# Patient Record
Sex: MALE | Race: WHITE | NOT HISPANIC OR LATINO | Employment: OTHER | URBAN - METROPOLITAN AREA
[De-identification: names, ages, dates, MRNs, and addresses within clinical notes are randomized per-mention and may not be internally consistent; named-entity substitution may affect disease eponyms.]

---

## 2021-06-07 ENCOUNTER — TRANSFERRED RECORDS (OUTPATIENT)
Dept: HEALTH INFORMATION MANAGEMENT | Facility: CLINIC | Age: 66
End: 2021-06-07

## 2021-06-30 ENCOUNTER — TRANSFERRED RECORDS (OUTPATIENT)
Dept: HEALTH INFORMATION MANAGEMENT | Facility: CLINIC | Age: 66
End: 2021-06-30

## 2021-09-13 ENCOUNTER — TRANSFERRED RECORDS (OUTPATIENT)
Dept: HEALTH INFORMATION MANAGEMENT | Facility: CLINIC | Age: 66
End: 2021-09-13

## 2021-09-28 ENCOUNTER — TRANSFERRED RECORDS (OUTPATIENT)
Dept: HEALTH INFORMATION MANAGEMENT | Facility: CLINIC | Age: 66
End: 2021-09-28

## 2021-09-28 LAB — EJECTION FRACTION: 65 %

## 2021-09-29 ENCOUNTER — MEDICAL CORRESPONDENCE (OUTPATIENT)
Dept: HEALTH INFORMATION MANAGEMENT | Facility: CLINIC | Age: 66
End: 2021-09-29

## 2021-11-12 ENCOUNTER — TRANSFERRED RECORDS (OUTPATIENT)
Dept: HEALTH INFORMATION MANAGEMENT | Facility: CLINIC | Age: 66
End: 2021-11-12

## 2022-06-27 ENCOUNTER — HOSPITAL ENCOUNTER (OUTPATIENT)
Facility: HOSPITAL | Age: 67
End: 2022-06-27
Attending: UROLOGY | Admitting: UROLOGY
Payer: COMMERCIAL

## 2022-07-08 ENCOUNTER — OFFICE VISIT (OUTPATIENT)
Dept: CARDIOLOGY | Facility: CLINIC | Age: 67
End: 2022-07-08
Payer: COMMERCIAL

## 2022-07-08 VITALS
DIASTOLIC BLOOD PRESSURE: 62 MMHG | SYSTOLIC BLOOD PRESSURE: 104 MMHG | WEIGHT: 266.3 LBS | OXYGEN SATURATION: 99 % | HEIGHT: 72 IN | BODY MASS INDEX: 36.07 KG/M2 | HEART RATE: 53 BPM

## 2022-07-08 DIAGNOSIS — I25.10 CORONARY ARTERY DISEASE DUE TO LIPID RICH PLAQUE: ICD-10-CM

## 2022-07-08 DIAGNOSIS — I35.0 NONRHEUMATIC AORTIC VALVE STENOSIS: Primary | ICD-10-CM

## 2022-07-08 DIAGNOSIS — I25.83 CORONARY ARTERY DISEASE DUE TO LIPID RICH PLAQUE: ICD-10-CM

## 2022-07-08 DIAGNOSIS — I87.2 VENOUS REFLUX: ICD-10-CM

## 2022-07-08 PROCEDURE — 99205 OFFICE O/P NEW HI 60 MIN: CPT | Performed by: INTERNAL MEDICINE

## 2022-07-08 RX ORDER — BISOPROLOL FUMARATE 5 MG/1
2.5 TABLET, FILM COATED ORAL DAILY
Status: ON HOLD | COMMUNITY
Start: 2022-05-04 | End: 2022-11-08

## 2022-07-08 RX ORDER — ASPIRIN 81 MG/1
81 TABLET ORAL DAILY
Status: ON HOLD | COMMUNITY
End: 2022-11-08

## 2022-07-08 RX ORDER — ATORVASTATIN CALCIUM 20 MG/1
20 TABLET, FILM COATED ORAL DAILY
Status: ON HOLD | COMMUNITY
Start: 2022-05-04 | End: 2022-11-08

## 2022-07-08 RX ORDER — CALCIUM CARBONATE/VITAMIN D3 500-10/5ML
1 LIQUID (ML) ORAL DAILY
COMMUNITY
End: 2023-06-26

## 2022-07-08 RX ORDER — CELECOXIB 200 MG/1
CAPSULE ORAL
COMMUNITY
End: 2022-09-16

## 2022-07-08 RX ORDER — BENAZEPRIL HYDROCHLORIDE 5 MG/1
5 TABLET ORAL DAILY
Status: ON HOLD | COMMUNITY
End: 2022-11-08

## 2022-07-08 RX ORDER — GABAPENTIN 800 MG/1
TABLET ORAL PRN
COMMUNITY
Start: 2022-05-08 | End: 2022-09-16

## 2022-07-08 RX ORDER — MULTIVIT-MIN/IRON/FOLIC ACID/K 18-600-40
500 CAPSULE ORAL DAILY
COMMUNITY

## 2022-07-08 RX ORDER — TRAMADOL HYDROCHLORIDE 200 MG/1
200 TABLET, EXTENDED RELEASE ORAL PRN
Status: ON HOLD | COMMUNITY
Start: 2022-03-11 | End: 2022-11-08

## 2022-07-08 RX ORDER — CHLORAL HYDRATE 500 MG
2 CAPSULE ORAL DAILY
Status: ON HOLD | COMMUNITY
End: 2022-11-08

## 2022-07-08 RX ORDER — CHOLECALCIFEROL (VITAMIN D3) 50 MCG
1 TABLET ORAL EVERY OTHER DAY
COMMUNITY

## 2022-07-08 RX ORDER — TRAMADOL HYDROCHLORIDE 50 MG/1
100-200 TABLET ORAL EVERY 6 HOURS PRN
Status: ON HOLD | COMMUNITY
Start: 2022-03-11 | End: 2022-11-08

## 2022-07-08 RX ORDER — FOLIC ACID 1 MG/1
1000 TABLET ORAL 2 TIMES DAILY
COMMUNITY

## 2022-07-08 RX ORDER — TAMSULOSIN HYDROCHLORIDE 0.4 MG/1
0.8 CAPSULE ORAL EVERY EVENING
COMMUNITY
Start: 2022-02-18

## 2022-07-08 RX ORDER — FLUTICASONE PROPIONATE 50 MCG
SPRAY, SUSPENSION (ML) NASAL
COMMUNITY
Start: 2021-09-03 | End: 2022-09-16

## 2022-07-08 NOTE — PROGRESS NOTES
Cardiology Consultation      Richar Davis MRN# [id number]   YOB: 1955 Age: 66 year old   Date of Visit 07/08/2022     Reason for consult:  Aortic stenosis           Assessment and Plan:     1. Severe aortic stenosis with valve area 0.9 cm  on outside transesophageal echocardiogram, concomitant three-vessel coronary artery disease with occluded right coronary artery and 60 to 70% stenosis in the LAD and circumflex system on cardiac catheterization    Currently without any high risk symptoms but should have evaluation and definitive treatment.    Recommended he visit with our cardiac surgeons for discussion of valve replacement and bypass.    Patient has had vein ablation in [place], he may not have greater saphenous veins available for harvest.  Would need vein mapping regardless.    60 minutes spent with review of past medical records, discussion with patient and his wife and post visit charting      This note was transcribed using electronic voice recognition software, typographical errors may be present.                Chief Complaint:   No chief complaint on file.           History of Present Illness:   This patient is a 66 year old male accompanied by his wife.  They have just moved here from [place].  In summary he has severe aortic stenosis with aortic valve area 0.9 cm .  He has had transesophageal echocardiogram.  He also had cardiac catheterization that showed occluded right coronary artery with some collateralization.  Also 60 to 70% LAD and circumflex disease.  He has records of his catheterization report physically with him.    He denies any significant symptoms at this time but was recommended to follow-up in [place] with cardiology to further that discussion and timing.    We discussed that with his borderline diabetes, young age, multivessel coronary artery disease and aortic valve stenosis that surgical intervention is our standard of care and likely would have best long-term  survival and benefit for him.    He also has venous insufficiency and I do note that outpatient vein clinic performed what sounds like bilateral greater saphenous vein ablation so this may not be available for harvest in the future.           Physical Exam:     Vitals: /62 (BP Location: Right arm, Patient Position: Sitting, Cuff Size: Adult Regular)   Pulse 53   Ht 1.829 m (6')   Wt 120.8 kg (266 lb 4.8 oz)   SpO2 99%   BMI 36.12 kg/m    Constitutional:  cooperative, alert and oriented, well developed, well nourished, in no acute distress        Skin:  warm and dry to the touch, no apparent skin lesions or masses noted        Head:  normocephalic, no masses or lesions        Eyes:  pupils equal and round, conjunctivae and lids unremarkable, sclera white, no xanthalasma, EOMS intact, no nystagmus        ENT:  no pallor or cyanosis        Neck:  JVP normal        Chest:  normal symmetry        Cardiac: regular rhythm       grade 2;late systolic murmur          Abdomen:  BS normoactive        Extremities and Back:  no deformities, clubbing, cyanosis, erythema observed;no edema        Neurological:  no gross motor deficits;affect appropriate                      Past Medical History:   I have reviewed this patient's past medical history  History reviewed. No pertinent past medical history.          Past Surgical History:   I have reviewed this patient's past surgical history  No past surgical history on file.            Social History:   I have reviewed this patient's social history  Social History     Tobacco Use     Smoking status: Not on file     Smokeless tobacco: Not on file   Substance Use Topics     Alcohol use: Not on file             Family History:   I have reviewed this patient's family history  History reviewed. No pertinent family history.          Allergies:   No Known Allergies          Medications:   I have reviewed this patient's current medications  Current Outpatient Medications    Medication Sig Dispense Refill     Ascorbic Acid (VITAMIN C) 500 MG CAPS Take 500 mg by mouth daily       aspirin 81 MG EC tablet Take 81 mg by mouth daily       atorvastatin (LIPITOR) 20 MG tablet Take 20 mg by mouth daily       benazepril (LOTENSIN) 5 MG tablet every 24 hours       bisoprolol (ZEBETA) 5 MG tablet Take 2.5 mg by mouth daily       celecoxib (CELEBREX) 200 MG capsule celecoxib 200 mg capsule       fish oil-omega-3 fatty acids 1000 MG capsule Take 2 g by mouth daily       fluticasone (FLONASE) 50 MCG/ACT nasal spray fluticasone propionate 50 mcg/actuation nasal spray,suspension   SPRAY 2 SPRAYS INTO EACH NOSTRIL EVERY DAY       folic acid (FOLVITE) 400 MCG tablet Take 400 mcg by mouth daily       gabapentin (NEURONTIN) 800 MG tablet TAKE 2-3 TABS BY MOUTH EVERY NIGHT AS NEEDED.       Magnesium Oxide 140 MG CAPS Take 133 mg by mouth daily       metFORMIN (GLUCOPHAGE) 500 MG tablet Take 500 mg by mouth       Multiple Vitamins-Minerals (MENS 50+ MULTI VITAMIN/MIN PO) Take by mouth daily       tamsulosin (FLOMAX) 0.4 MG capsule Take 0.8 mg by mouth       tiZANidine (ZANAFLEX) 4 MG tablet tizanidine 4 mg tablet       traMADol (ULTRAM) 50 MG tablet tramadol 50 mg tablet       traMADol (ULTRAM-ER) 200 MG 24 hr tablet tramadol  mg tablet,extended release 24 hr   TAKE 1 TABLET BY MOUTH EVERY DAY       vitamin D3 (CHOLECALCIFEROL) 50 mcg (2000 units) tablet Take 1 tablet by mouth daily                 Review of Systems:       Review of Systems:  Skin:        Eyes:       ENT:       Respiratory:  Negative    Cardiovascular:  Negative    Gastroenterology:      Genitourinary:       Musculoskeletal:       Neurologic:       Psychiatric:       Heme/Lymph/Imm:       Endocrine:                          Data:   All available laboratory data reviewed  No results found for: CHOL  No results found for: HDL  No results found for: LDL  No results found for: TRIG  No results found for: CHOLHDLRATIO  No results found  for: TSH  Last Basic Metabolic Panel:  No results found for: NA   No results found for: POTASSIUM  No results found for: CHLORIDE  No results found for: HALEY  No results found for: CO2  No results found for: BUN  No results found for: CR  No results found for: GLC  No results found for: WBC  No results found for: RBC  No results found for: HGB  No results found for: HCT  No results found for: MCV  No results found for: MCH  No results found for: MCHC  No results found for: RDW  No results found for: PLT

## 2022-07-08 NOTE — LETTER
7/8/2022    Cory Valles MD  [facility] [address]  [facility] [address]    RE: Richar Davis       Dear Colleague,     I had the pleasure of seeing Richar Davis in the [facility].  Cardiology Consultation      Richar Davis MRN# [id number]   YOB: 1955 Age: 66 year old   Date of Visit 07/08/2022     Reason for consult:  Aortic stenosis           Assessment and Plan:     1. Severe aortic stenosis with valve area 0.9 cm  on outside transesophageal echocardiogram, concomitant three-vessel coronary artery disease with occluded right coronary artery and 60 to 70% stenosis in the LAD and circumflex system on cardiac catheterization    Currently without any high risk symptoms but should have evaluation and definitive treatment.    Recommended he visit with our cardiac surgeons for discussion of valve replacement and bypass.    Patient has had vein ablation in [place], he may not have greater saphenous veins available for harvest.  Would need vein mapping regardless.    60 minutes spent with review of past medical records, discussion with patient and his wife and post visit charting      This note was transcribed using electronic voice recognition software, typographical errors may be present.                Chief Complaint:   No chief complaint on file.           History of Present Illness:   This patient is a 66 year old male accompanied by his wife.  They have just moved here from [place].  In summary he has severe aortic stenosis with aortic valve area 0.9 cm .  He has had transesophageal echocardiogram.  He also had cardiac catheterization that showed occluded right coronary artery with some collateralization.  Also 60 to 70% LAD and circumflex disease.  He has records of his catheterization report physically with him.    He denies any significant symptoms at this time but was recommended to follow-up in [place] with cardiology to further that  discussion and timing.    We discussed that with his borderline diabetes, young age, multivessel coronary artery disease and aortic valve stenosis that surgical intervention is our standard of care and likely would have best long-term survival and benefit for him.    He also has venous insufficiency and I do note that outpatient vein clinic performed what sounds like bilateral greater saphenous vein ablation so this may not be available for harvest in the future.           Physical Exam:     Vitals: /62 (BP Location: Right arm, Patient Position: Sitting, Cuff Size: Adult Regular)   Pulse 53   Ht 1.829 m (6')   Wt 120.8 kg (266 lb 4.8 oz)   SpO2 99%   BMI 36.12 kg/m    Constitutional:  cooperative, alert and oriented, well developed, well nourished, in no acute distress        Skin:  warm and dry to the touch, no apparent skin lesions or masses noted        Head:  normocephalic, no masses or lesions        Eyes:  pupils equal and round, conjunctivae and lids unremarkable, sclera white, no xanthalasma, EOMS intact, no nystagmus        ENT:  no pallor or cyanosis        Neck:  JVP normal        Chest:  normal symmetry        Cardiac: regular rhythm       grade 2;late systolic murmur          Abdomen:  BS normoactive        Extremities and Back:  no deformities, clubbing, cyanosis, erythema observed;no edema        Neurological:  no gross motor deficits;affect appropriate                      Past Medical History:   I have reviewed this patient's past medical history  History reviewed. No pertinent past medical history.          Past Surgical History:   I have reviewed this patient's past surgical history  No past surgical history on file.            Social History:   I have reviewed this patient's social history  Social History     Tobacco Use     Smoking status: Not on file     Smokeless tobacco: Not on file   Substance Use Topics     Alcohol use: Not on file             Family History:   I have reviewed  this patient's family history  History reviewed. No pertinent family history.          Allergies:   No Known Allergies          Medications:   I have reviewed this patient's current medications  Current Outpatient Medications   Medication Sig Dispense Refill     Ascorbic Acid (VITAMIN C) 500 MG CAPS Take 500 mg by mouth daily       aspirin 81 MG EC tablet Take 81 mg by mouth daily       atorvastatin (LIPITOR) 20 MG tablet Take 20 mg by mouth daily       benazepril (LOTENSIN) 5 MG tablet every 24 hours       bisoprolol (ZEBETA) 5 MG tablet Take 2.5 mg by mouth daily       celecoxib (CELEBREX) 200 MG capsule celecoxib 200 mg capsule       fish oil-omega-3 fatty acids 1000 MG capsule Take 2 g by mouth daily       fluticasone (FLONASE) 50 MCG/ACT nasal spray fluticasone propionate 50 mcg/actuation nasal spray,suspension   SPRAY 2 SPRAYS INTO EACH NOSTRIL EVERY DAY       folic acid (FOLVITE) 400 MCG tablet Take 400 mcg by mouth daily       gabapentin (NEURONTIN) 800 MG tablet TAKE 2-3 TABS BY MOUTH EVERY NIGHT AS NEEDED.       Magnesium Oxide 140 MG CAPS Take 133 mg by mouth daily       metFORMIN (GLUCOPHAGE) 500 MG tablet Take 500 mg by mouth       Multiple Vitamins-Minerals (MENS 50+ MULTI VITAMIN/MIN PO) Take by mouth daily       tamsulosin (FLOMAX) 0.4 MG capsule Take 0.8 mg by mouth       tiZANidine (ZANAFLEX) 4 MG tablet tizanidine 4 mg tablet       traMADol (ULTRAM) 50 MG tablet tramadol 50 mg tablet       traMADol (ULTRAM-ER) 200 MG 24 hr tablet tramadol  mg tablet,extended release 24 hr   TAKE 1 TABLET BY MOUTH EVERY DAY       vitamin D3 (CHOLECALCIFEROL) 50 mcg (2000 units) tablet Take 1 tablet by mouth daily                 Review of Systems:       Review of Systems:  Skin:        Eyes:       ENT:       Respiratory:  Negative    Cardiovascular:  Negative    Gastroenterology:      Genitourinary:       Musculoskeletal:       Neurologic:       Psychiatric:       Heme/Lymph/Imm:       Endocrine:                           Data:   All available laboratory data reviewed  No results found for: CHOL  No results found for: HDL  No results found for: LDL  No results found for: TRIG  No results found for: CHOLHDLRATIO  No results found for: TSH  Last Basic Metabolic Panel:  No results found for: NA   No results found for: POTASSIUM  No results found for: CHLORIDE  No results found for: HALEY  No results found for: CO2  No results found for: BUN  No results found for: CR  No results found for: GLC  No results found for: WBC  No results found for: RBC  No results found for: HGB  No results found for: HCT  No results found for: MCV  No results found for: MCH  No results found for: MCHC  No results found for: RDW  No results found for: PLT        Thank you for allowing me to participate in the care of your patient.      Sincerely,     Sebastian Mejias MD     [facility]  cc:   No referring provider defined for this encounter.

## 2022-07-11 ENCOUNTER — TELEPHONE (OUTPATIENT)
Dept: CARDIOLOGY | Facility: CLINIC | Age: 67
End: 2022-07-11

## 2022-07-11 NOTE — TELEPHONE ENCOUNTER
[facility] never returned my call and are not answering their phone.   Sent fax request to [facility] requesting images be pushed.     Called the [facility] to request images to be pushed. Left message with callback number

## 2022-07-15 ENCOUNTER — TELEPHONE (OUTPATIENT)
Dept: CARDIOLOGY | Facility: CLINIC | Age: 67
End: 2022-07-15

## 2022-07-15 NOTE — TELEPHONE ENCOUNTER
[place] with the [facility] concerning records request sent on 7/11 & 7/12. As of this writing I have been unable to speak with anybody about if these faxes were received. Will continue to try to contact.

## 2022-07-18 ENCOUNTER — OFFICE VISIT (OUTPATIENT)
Dept: CARDIOLOGY | Facility: CLINIC | Age: 67
End: 2022-07-18
Attending: INTERNAL MEDICINE
Payer: COMMERCIAL

## 2022-07-18 VITALS
WEIGHT: 263.2 LBS | SYSTOLIC BLOOD PRESSURE: 121 MMHG | HEART RATE: 67 BPM | BODY MASS INDEX: 35.65 KG/M2 | OXYGEN SATURATION: 97 % | HEIGHT: 72 IN | DIASTOLIC BLOOD PRESSURE: 78 MMHG

## 2022-07-18 DIAGNOSIS — I35.0 NONRHEUMATIC AORTIC VALVE STENOSIS: ICD-10-CM

## 2022-07-18 DIAGNOSIS — I25.10 CORONARY ARTERY DISEASE DUE TO LIPID RICH PLAQUE: ICD-10-CM

## 2022-07-18 DIAGNOSIS — I25.83 CORONARY ARTERY DISEASE DUE TO LIPID RICH PLAQUE: ICD-10-CM

## 2022-07-18 PROCEDURE — 99205 OFFICE O/P NEW HI 60 MIN: CPT | Performed by: SURGERY

## 2022-07-18 NOTE — PROGRESS NOTES
I met patient and his wife in consultation with Dr. Pierre. Awaiting Coronary Angiogram images for [place]. Patient needs AVR/CABG. Pre op education intitiated and literature given. Plan on 4-6 weeks after pat's wife has undergone hip replacement and see how her recovery is going. Will follow along.

## 2022-07-20 NOTE — PROGRESS NOTES
Service Date: 07/18/2022    REFERRING CARDIOLOGIST:  Sebastian Mejias MD    REASON FOR CONSULTATION:  Evaluation for severe aortic stenosis and severe 3-vessel coronary artery disease.    HISTORY OF PRESENT ILLNESS:  Mr. Davis is a very pleasant 66-year-old gentleman who recently moved from [place] to the [facility].  In [place], he was diagnosed with severe aortic stenosis and also severe 3-vessel coronary artery disease.  He has also had a vein ablation in both of his legs for varicose veins.  He recently established care in our system and saw Dr. Sebastian Mejias in the Cardiology Clinic.  Dr. Mejias referred the patient to me for surgical evaluation for AVR, CABG.    PAST MEDICAL HISTORY:  Significant for a newly diagnosed triple vessel coronary artery disease and aortic stenosis, diabetes, non-insulin dependent.    PAST SURGICAL HISTORY:  Includes bilateral vein stripping from both legs.    ALLERGIES:  No known drug allergies.    CURRENT OUTPATIENT MEDICATIONS:  Reviewed in Epic chart.    FAMILY HISTORY:  Noncontributory.    SOCIAL HISTORY:  Denies any smoking.  He does chew tobacco.  No significant alcohol use.    REVIEW OF SYSTEMS:  As per HPI.  All other 10-point review of systems are completed and were otherwise negative unless stated above.  The patient denies having any shortness of breath or chest pain.    PHYSICAL EXAMINATION:    VITAL SIGNS:  All vital signs are stable.  GENERAL:  He appears well, in no acute distress.  HEENT:  Within normal limits.  NECK:  Supple, no lymphadenopathy.  CARDIOVASCULAR:  Regular rate and rhythm, normal S1, S2.  Grade 3/6 systolic murmur at the right upper sternal border.  LUNGS:  Clear bilaterally.  ABDOMEN:  Soft, nontender, nondistended.  EXTREMITIES:  Negative for edema, cyanosis or clubbing.  NEUROLOGIC:  He is A and O x3.  No focal deficits.    Of note, the patient is ambidextrous but mostly uses his left arm. I explained to him that we might have to use a radial artery from  either arm, most likely the right arm, if he does not have enough saphenous vein conduit.    PERTINENT LABORATORY STUDIES:  Outside hospital transthoracic echo dated 10/19/2021 demonstrates severe aortic stenosis with preserved systolic function with no other significant valvular disease.  I have reviewed the actual echocardiographic images.  He did have a left heart cath from an outside hospital in [place] as well dated 11/12/2021.  Unfortunately, images were not within our system and we are currently actively trying to obtain them and I will review them when we actually have images.  Dictation suggests that he does have a 60% LAD lesion, 70% circumflex lesion and a chronic total occlusion of the RCA.    IMPRESSION AND PLAN:  Mr. Davis is a 66-year-old gentleman with severe aortic stenosis and severe 3-vessel coronary artery disease.  We are still waiting for the actual cath images for me to review and confirm.  However, my recommendation at this point is aortic valve replacement and concomitant coronary artery bypass grafting.  We will have to get a lower extremity vein mapping study to see if he has enough saphenous vein conduit for grafting, and if not, we will have to scan his arms for radial artery for conduit used.  I explained to him his diagnoses in detail and the reason for recommending aortic valve replacement and coronary artery bypass grafting.  I went over the risks and benefits of the operation and the potential complications associated with the surgery.  These complications include but are not strictly limited to infection, bleeding, stroke, postop MI, postop arrhythmias, pulmonary or renal complications.  I think overall he is an excellent surgical candidate and I quoted an operative mortality risk of 1%.  The patient understands and agrees to proceed.  We briefly discussed the prosthetic valve choice, mechanical versus bioprosthetic, along with the ACC/AHA guidelines and he is considering a  bioprosthetic valve at this time, but will let me know for sure the day of surgery what he decides.  He also mentions that his wife does need hip surgery in the next couple of weeks and he would like to wait for 4-6 weeks after she recovers from the hip surgery to proceed with heart surgery.  I think this is also reasonable as well given that his symptoms appear to be minimal to none.  We will wait for the coronary angiogram images to be sent over and lower extremity vein mapping studies and hope to schedule him for AVR, CABG within the next 2 months.  It was a pleasure meeting Mr. Davis today.  Thank you very much for this referral.      Bennie Pierre MD        D: 2022   T: 2022   MT: daniela    Name:     KANNAN DAVIS  MRN:      [id number]        Account:      [id number]   :      1955           Service Date: 2022       Document: [id number]

## 2022-07-20 NOTE — PROGRESS NOTES
CV Surgery    Patient seen, clinic note dictated #[id number]. Awaiting OSH cath images to be sent to us. Patient also needs LE dopplers to assess his saphenous veins for conduit use.      Bennie Pierre MD

## 2022-07-21 ENCOUNTER — TELEPHONE (OUTPATIENT)
Dept: CARDIOLOGY | Facility: CLINIC | Age: 67
End: 2022-07-21

## 2022-07-21 NOTE — TELEPHONE ENCOUNTER
LVM with [facility] Medical Records trying to obtain coronary angio images for pt. Left name and callback number. Will continue to try.

## 2022-07-22 ENCOUNTER — TELEPHONE (OUTPATIENT)
Dept: CARDIOLOGY | Facility: CLINIC | Age: 67
End: 2022-07-22

## 2022-07-22 NOTE — TELEPHONE ENCOUNTER
Image preview     Cardiothoracic Surgery Division     URGENT REQUEST      CONFIDENTIAL FAX NUMBER:  [phone]                                                                                                                                                                                                 Date: 3/29/2022          To:  [facility] Medical Records                                                                       FAX:   [phone]          Patient:  Richar Davis                                                 : 1955          Please push to [facility] : Recent Coronary Angiogram images          Or mail to:     [address], [facility] [address]     [facility] [address]     Attn: Caitlyn     * Please call if this information is no longer available.              Comments:           FROM: Caitlyn Anders     CV Surgery, [facility]                       Phone: [phone]                            Fax:[phone]          The confidential information accompanying this transmission contains protected health information under state and federal law and is legally privileged. This information is intended only for the use of the individual or entity named above and may be used only for carrying out treatment, payment or other healthcare operations. The recipient or person responsible for delivering this information is prohibited by law from disclosing this information without proper authorization to any other party, unless required to do so by law or regulation. If you are not the intended recipient, you are hereby notified that any review, dissemination, distribution, or copying of this message is strictly prohibited. If you have received this communication in error, please destroy the materials and contact us immediately by calling the department number listed above. No response indicates that the information was received by the appropriate authorized party.      Form #[id number], Aug 2009

## 2022-07-28 ENCOUNTER — TELEPHONE (OUTPATIENT)
Dept: CARDIOLOGY | Facility: CLINIC | Age: 67
End: 2022-07-28

## 2022-07-28 NOTE — TELEPHONE ENCOUNTER
Sent fax request for images 7/22. Called [facility] on 7/25 after receiving faxed medical records to make sure they were sending image disc also. [facility] confirmed the were.    Spoke again today (3rd outreach) with medical records. First was told they never received fax request, then found it, then told I didn't request images. After stating it clearly says images on fax request, medical records response was 'oh sorry we will send the disk now'. Also called the cardiology (Dr Cesar Blank) clinic there to speak with Nurse Real for help acquiring images I was told both times she was at lunch or unavailable.

## 2022-08-01 ENCOUNTER — TELEPHONE (OUTPATIENT)
Dept: CARDIOLOGY | Facility: CLINIC | Age: 67
End: 2022-08-01

## 2022-08-01 NOTE — TELEPHONE ENCOUNTER
Received image disc from [facility] today. Brought to film room for uploading

## 2022-08-11 ENCOUNTER — TELEPHONE (OUTPATIENT)
Dept: CARDIOLOGY | Facility: CLINIC | Age: 67
End: 2022-08-11

## 2022-08-11 ENCOUNTER — PREP FOR PROCEDURE (OUTPATIENT)
Dept: CARDIOLOGY | Facility: CLINIC | Age: 67
End: 2022-08-11

## 2022-08-11 DIAGNOSIS — R07.9 CHEST PAIN, UNSPECIFIED TYPE: ICD-10-CM

## 2022-08-11 DIAGNOSIS — I35.0 AORTIC VALVE STENOSIS, ETIOLOGY OF CARDIAC VALVE DISEASE UNSPECIFIED: Primary | ICD-10-CM

## 2022-08-11 DIAGNOSIS — I25.10 CAD (CORONARY ARTERY DISEASE): Primary | ICD-10-CM

## 2022-08-11 DIAGNOSIS — R09.89 OTHER SPECIFIED SYMPTOMS AND SIGNS INVOLVING THE CIRCULATORY AND RESPIRATORY SYSTEMS: ICD-10-CM

## 2022-08-11 DIAGNOSIS — I25.118 CORONARY ARTERY DISEASE OF NATIVE HEART WITH STABLE ANGINA PECTORIS, UNSPECIFIED VESSEL OR LESION TYPE (H): ICD-10-CM

## 2022-08-11 DIAGNOSIS — I35.0 AORTIC STENOSIS: ICD-10-CM

## 2022-08-11 DIAGNOSIS — R79.89 OTHER SPECIFIED ABNORMAL FINDINGS OF BLOOD CHEMISTRY: ICD-10-CM

## 2022-08-11 NOTE — TELEPHONE ENCOUNTER
Per task, pt needs to schedule surgery with Dr. Pierre. Talked to pt's wife/Mya and schedule surgery for 9/16. Will call if anything changes

## 2022-08-23 ENCOUNTER — TELEPHONE (OUTPATIENT)
Dept: CARDIOLOGY | Facility: CLINIC | Age: 67
End: 2022-08-23

## 2022-08-23 NOTE — TELEPHONE ENCOUNTER
Patient's wife Mya called asking to reschedule imaging and possibly surgery because they need to leave town for [place] for a family emergency. Wife is advised that the stress of traveling could potentially lead to an emergent decompensation of patient's condition. She states that they intend to make this trip even if it is against advice.  notified - will attempt to reschedule imaging and labs per request and keep 9/19 DOS at this time.    JOEY VIZCAINO RN  Care Coordinator - [facility] CV Surgery   [facility]  [phone]

## 2022-08-26 RX ORDER — CEFAZOLIN SODIUM 2 G/100ML
2 INJECTION, SOLUTION INTRAVENOUS SEE ADMIN INSTRUCTIONS
Status: CANCELLED | OUTPATIENT
Start: 2022-08-26

## 2022-08-26 RX ORDER — LIDOCAINE 40 MG/G
CREAM TOPICAL
Status: CANCELLED | OUTPATIENT
Start: 2022-08-26

## 2022-08-26 RX ORDER — ASPIRIN 81 MG/1
81 TABLET, CHEWABLE ORAL
Status: CANCELLED | OUTPATIENT
Start: 2022-08-26

## 2022-08-26 RX ORDER — CHLORHEXIDINE GLUCONATE ORAL RINSE 1.2 MG/ML
10 SOLUTION DENTAL ONCE
Status: CANCELLED | OUTPATIENT
Start: 2022-08-26 | End: 2022-08-26

## 2022-08-26 RX ORDER — FAMOTIDINE 20 MG/1
20 TABLET, FILM COATED ORAL
Status: CANCELLED | OUTPATIENT
Start: 2022-08-26

## 2022-08-26 RX ORDER — ASPIRIN 81 MG/1
162 TABLET, CHEWABLE ORAL
Status: CANCELLED | OUTPATIENT
Start: 2022-08-26

## 2022-08-26 RX ORDER — CEFAZOLIN SODIUM 2 G/100ML
2 INJECTION, SOLUTION INTRAVENOUS
Status: CANCELLED | OUTPATIENT
Start: 2022-08-26

## 2022-09-07 ENCOUNTER — TELEPHONE (OUTPATIENT)
Dept: CARDIOLOGY | Facility: CLINIC | Age: 67
End: 2022-09-07

## 2022-09-07 NOTE — TELEPHONE ENCOUNTER
Patient's wife Mya called to confirm that H&P is still needed before surgery. Explained to her that she will need the appointment but that it doesn't necessarily need to be with his regular provider.     JOEY VIZCAINO RN  Care Coordinator - [facility] CV Surgery   [facility]  [phone]

## 2022-09-13 LAB
ABO/RH(D): NORMAL
ANTIBODY SCREEN: NEGATIVE
SPECIMEN EXPIRATION DATE: NORMAL

## 2022-09-14 ENCOUNTER — HOSPITAL ENCOUNTER (OUTPATIENT)
Dept: ULTRASOUND IMAGING | Facility: CLINIC | Age: 67
Discharge: HOME | End: 2022-09-14
Attending: SURGERY
Payer: COMMERCIAL

## 2022-09-14 ENCOUNTER — TELEPHONE (OUTPATIENT)
Dept: OTHER | Facility: CLINIC | Age: 67
End: 2022-09-14

## 2022-09-14 ENCOUNTER — LAB (OUTPATIENT)
Dept: LAB | Facility: CLINIC | Age: 67
End: 2022-09-14
Attending: SURGERY
Payer: COMMERCIAL

## 2022-09-14 ENCOUNTER — HOSPITAL ENCOUNTER (OUTPATIENT)
Dept: CARDIOLOGY | Facility: CLINIC | Age: 67
Discharge: HOME | End: 2022-09-14
Attending: SURGERY
Payer: COMMERCIAL

## 2022-09-14 ENCOUNTER — HOSPITAL ENCOUNTER (OUTPATIENT)
Dept: CT IMAGING | Facility: CLINIC | Age: 67
Discharge: HOME | End: 2022-09-14
Attending: SURGERY
Payer: COMMERCIAL

## 2022-09-14 DIAGNOSIS — R09.89 OTHER SPECIFIED SYMPTOMS AND SIGNS INVOLVING THE CIRCULATORY AND RESPIRATORY SYSTEMS: ICD-10-CM

## 2022-09-14 DIAGNOSIS — I35.0 AORTIC VALVE STENOSIS, ETIOLOGY OF CARDIAC VALVE DISEASE UNSPECIFIED: ICD-10-CM

## 2022-09-14 DIAGNOSIS — I25.118 CORONARY ARTERY DISEASE OF NATIVE HEART WITH STABLE ANGINA PECTORIS, UNSPECIFIED VESSEL OR LESION TYPE (H): ICD-10-CM

## 2022-09-14 DIAGNOSIS — R07.9 CHEST PAIN, UNSPECIFIED TYPE: ICD-10-CM

## 2022-09-14 DIAGNOSIS — R79.89 OTHER SPECIFIED ABNORMAL FINDINGS OF BLOOD CHEMISTRY: ICD-10-CM

## 2022-09-14 LAB
ALBUMIN SERPL-MCNC: 3.6 G/DL (ref 3.4–5)
ALBUMIN UR-MCNC: NEGATIVE MG/DL
ALP SERPL-CCNC: 106 U/L (ref 40–150)
ALT SERPL W P-5'-P-CCNC: 26 U/L (ref 0–70)
ANION GAP SERPL CALCULATED.3IONS-SCNC: 3 MMOL/L (ref 3–14)
APPEARANCE UR: CLEAR
AST SERPL W P-5'-P-CCNC: 19 U/L (ref 0–45)
BASOPHILS # BLD AUTO: 0 10E3/UL (ref 0–0.2)
BASOPHILS NFR BLD AUTO: 1 %
BILIRUB SERPL-MCNC: 0.4 MG/DL (ref 0.2–1.3)
BILIRUB UR QL STRIP: NEGATIVE
BUN SERPL-MCNC: 16 MG/DL (ref 7–30)
CALCIUM SERPL-MCNC: 9.9 MG/DL (ref 8.5–10.1)
CHLORIDE BLD-SCNC: 107 MMOL/L (ref 94–109)
CO2 SERPL-SCNC: 31 MMOL/L (ref 20–32)
COLOR UR AUTO: YELLOW
CREAT SERPL-MCNC: 0.98 MG/DL (ref 0.66–1.25)
EOSINOPHIL # BLD AUTO: 0.2 10E3/UL (ref 0–0.7)
EOSINOPHIL NFR BLD AUTO: 3 %
ERYTHROCYTE [DISTWIDTH] IN BLOOD BY AUTOMATED COUNT: 13.2 % (ref 10–15)
GFR SERPL CREATININE-BSD FRML MDRD: 85 ML/MIN/1.73M2
GLUCOSE BLD-MCNC: 104 MG/DL (ref 70–99)
GLUCOSE UR STRIP-MCNC: NEGATIVE MG/DL
HBA1C MFR BLD: 5.6 % (ref 0–5.6)
HCT VFR BLD AUTO: 45.9 % (ref 40–53)
HGB BLD-MCNC: 14.8 G/DL (ref 13.3–17.7)
HGB UR QL STRIP: NEGATIVE
IMM GRANULOCYTES # BLD: 0 10E3/UL
IMM GRANULOCYTES NFR BLD: 0 %
KETONES UR STRIP-MCNC: NEGATIVE MG/DL
LEUKOCYTE ESTERASE UR QL STRIP: NEGATIVE
LVEF ECHO: NORMAL
LYMPHOCYTES # BLD AUTO: 0.8 10E3/UL (ref 0.8–5.3)
LYMPHOCYTES NFR BLD AUTO: 12 %
MCH RBC QN AUTO: 30.4 PG (ref 26.5–33)
MCHC RBC AUTO-ENTMCNC: 32.2 G/DL (ref 31.5–36.5)
MCV RBC AUTO: 94 FL (ref 78–100)
MONOCYTES # BLD AUTO: 0.7 10E3/UL (ref 0–1.3)
MONOCYTES NFR BLD AUTO: 10 %
NEUTROPHILS # BLD AUTO: 5.1 10E3/UL (ref 1.6–8.3)
NEUTROPHILS NFR BLD AUTO: 74 %
NITRATE UR QL: NEGATIVE
NRBC # BLD AUTO: 0 10E3/UL
NRBC BLD AUTO-RTO: 0 /100
PH UR STRIP: 6.5 [PH] (ref 5–7)
PLATELET # BLD AUTO: 150 10E3/UL (ref 150–450)
POTASSIUM BLD-SCNC: 4.2 MMOL/L (ref 3.4–5.3)
PROT SERPL-MCNC: 6.7 G/DL (ref 6.8–8.8)
RBC # BLD AUTO: 4.87 10E6/UL (ref 4.4–5.9)
SODIUM SERPL-SCNC: 141 MMOL/L (ref 133–144)
SP GR UR STRIP: 1.02 (ref 1–1.03)
UROBILINOGEN UR STRIP-MCNC: NORMAL MG/DL
WBC # BLD AUTO: 6.9 10E3/UL (ref 4–11)

## 2022-09-14 PROCEDURE — 86901 BLOOD TYPING SEROLOGIC RH(D): CPT

## 2022-09-14 PROCEDURE — 71250 CT THORAX DX C-: CPT

## 2022-09-14 PROCEDURE — 93306 TTE W/DOPPLER COMPLETE: CPT

## 2022-09-14 PROCEDURE — 93970 EXTREMITY STUDY: CPT

## 2022-09-14 PROCEDURE — 93931 UPPER EXTREMITY STUDY: CPT | Mod: RT

## 2022-09-14 PROCEDURE — 81003 URINALYSIS AUTO W/O SCOPE: CPT

## 2022-09-14 PROCEDURE — 36415 COLL VENOUS BLD VENIPUNCTURE: CPT

## 2022-09-14 PROCEDURE — 80053 COMPREHEN METABOLIC PANEL: CPT

## 2022-09-14 PROCEDURE — 93306 TTE W/DOPPLER COMPLETE: CPT | Mod: 26 | Performed by: INTERNAL MEDICINE

## 2022-09-14 PROCEDURE — 83036 HEMOGLOBIN GLYCOSYLATED A1C: CPT

## 2022-09-14 PROCEDURE — 85025 COMPLETE CBC W/AUTO DIFF WBC: CPT

## 2022-09-14 NOTE — TELEPHONE ENCOUNTER
Patient called called stating he needs to extend his disability due to a gout flare post op in his foot limiting his mobility and recovering. He is scheduled to see a podiatrist for a cortisone shot, he is unable to take medications due to his kidney disease. Informed patient we can extend his disability from a heart surgery aspect but he could open a new claim form his podiatrists or PCP.

## 2022-09-15 ENCOUNTER — TELEPHONE (OUTPATIENT)
Dept: OTHER | Facility: CLINIC | Age: 67
End: 2022-09-15

## 2022-09-16 ENCOUNTER — ANESTHESIA EVENT (OUTPATIENT)
Dept: SURGERY | Facility: CLINIC | Age: 67
End: 2022-09-16
Payer: COMMERCIAL

## 2022-09-16 RX ORDER — GABAPENTIN 800 MG/1
800 TABLET ORAL 3 TIMES DAILY PRN
Status: ON HOLD | COMMUNITY
End: 2022-11-08

## 2022-09-16 NOTE — PROGRESS NOTES
PTA medications updated by Medication Scribe prior to surgery via phone call with patient (last doses completed by Nurse)     Medication history sources: Patient, Surescripts and H&P  In the past week, patient estimated taking medication this percent of the time: Greater than 90%  Adherence assessment: N/A Not Observed    Significant changes made to the medication list:  Patient reports no longer taking the following meds (med scribe removed from PTA med list): Celecoxib, Flonase Nasal Spray      Additional medication history information:   None    Medication reconciliation completed by provider prior to medication history? No    Time spent in this activity: 45 minutes    The information provided in this note is only as accurate as the sources available at the time of update(s)    Prior to Admission medications    Medication Sig Last Dose Taking? Auth Provider Long Term End Date   Ascorbic Acid (VITAMIN C) 500 MG CAPS Take 500 mg by mouth daily 9/16/2022 at am Yes Reported, Patient     aspirin 81 MG EC tablet Take 81 mg by mouth daily  at am Yes Reported, Patient     atorvastatin (LIPITOR) 20 MG tablet Take 20 mg by mouth daily  at pm Yes Reported, Patient Yes    benazepril (LOTENSIN) 5 MG tablet Take 5 mg by mouth daily  at pm Yes Reported, Patient Yes    bisoprolol (ZEBETA) 5 MG tablet Take 2.5 mg by mouth daily (0.5 x 5 mg = 2.5 mg)  at pm Yes Reported, Patient Yes    CALCIUM PO Take 1 capsule by mouth daily 9/16/2022 at am Yes Reported, Patient     Cyanocobalamin (VITAMIN B-12 PO) Take 1 tablet by mouth every other day 9/15/2022 at am Yes Reported, Patient     diphenhydrAMINE (BENADRYL) 50 MG tablet Take 100 mg by mouth every 6 hours as needed for itching or allergies  at prn Yes Reported, Patient     fish oil-omega-3 fatty acids 1000 MG capsule Take 2 g by mouth daily 9/15/2022 at am Yes Reported, Patient     folic acid (FOLVITE) 400 MCG tablet Take 400 mcg by mouth 2 times daily 9/16/2022 at am Yes Reported,  Patient     gabapentin (NEURONTIN) 800 MG tablet Take 800 mg by mouth 3 times daily  at prn Yes Reported, Patient No    magnesium oxide 400 MG CAPS Take 1 capsule by mouth daily 9/16/2022 at am Yes Reported, Patient     metFORMIN (GLUCOPHAGE) 500 MG tablet Take 500 mg by mouth daily (with dinner) (0.5 x 500 mg = 250 mg)  at pm Yes Reported, Patient Yes    Multiple Vitamins-Minerals (MENS 50+ MULTI VITAMIN/MIN PO) Take 1 tablet by mouth daily 9/16/2022 at am Yes Reported, Patient     tamsulosin (FLOMAX) 0.4 MG capsule Take 0.8 mg by mouth every evening (2 x 0.4 mg = 0.8 mg)  at pm Yes Reported, Patient     tiZANidine (ZANAFLEX) 4 MG tablet Take 8 mg by mouth every evening (2 x 4 mg = 8 mg)  at pm Yes Reported, Patient     traMADol (ULTRAM) 50 MG tablet Take 100-200 mg by mouth every 6 hours as needed for pain 9/15/2022 at prn Yes Reported, Patient     traMADol (ULTRAM-ER) 200 MG 24 hr tablet Take 200 mg by mouth as needed for moderate pain  at prn Yes Reported, Patient     vitamin D3 (CHOLECALCIFEROL) 50 mcg (2000 units) tablet Take 1 tablet by mouth every other day 9/15/2022 at am Yes Reported, Patient       Medication history completed by:    Arjun Marin CPhT  Medication [facility]

## 2022-09-19 ENCOUNTER — PREP FOR PROCEDURE (OUTPATIENT)
Dept: CARDIOLOGY | Facility: CLINIC | Age: 67
End: 2022-09-19

## 2022-09-19 ENCOUNTER — TELEPHONE (OUTPATIENT)
Dept: CARDIOLOGY | Facility: CLINIC | Age: 67
End: 2022-09-19

## 2022-09-19 ENCOUNTER — HOSPITAL ENCOUNTER (INPATIENT)
Facility: CLINIC | Age: 67
Setting detail: SURGERY ADMIT
End: 2022-09-19
Attending: SURGERY | Admitting: SURGERY
Payer: COMMERCIAL

## 2022-09-19 ENCOUNTER — TELEPHONE (OUTPATIENT)
Dept: OTHER | Facility: CLINIC | Age: 67
End: 2022-09-19

## 2022-09-19 ENCOUNTER — HOSPITAL ENCOUNTER (OUTPATIENT)
Facility: CLINIC | Age: 67
Discharge: HOME | End: 2022-09-19
Attending: SURGERY | Admitting: SURGERY
Payer: COMMERCIAL

## 2022-09-19 ENCOUNTER — ANESTHESIA (OUTPATIENT)
Dept: SURGERY | Facility: CLINIC | Age: 67
End: 2022-09-19
Payer: COMMERCIAL

## 2022-09-19 VITALS
SYSTOLIC BLOOD PRESSURE: 127 MMHG | TEMPERATURE: 98.6 F | OXYGEN SATURATION: 97 % | RESPIRATION RATE: 16 BRPM | HEIGHT: 72 IN | WEIGHT: 268 LBS | DIASTOLIC BLOOD PRESSURE: 69 MMHG | HEART RATE: 72 BPM | BODY MASS INDEX: 36.3 KG/M2

## 2022-09-19 DIAGNOSIS — I25.10 CAD (CORONARY ARTERY DISEASE): Primary | ICD-10-CM

## 2022-09-19 DIAGNOSIS — I35.0 AORTIC STENOSIS: ICD-10-CM

## 2022-09-19 LAB
CREAT SERPL-MCNC: 0.87 MG/DL (ref 0.66–1.25)
GFR SERPL CREATININE-BSD FRML MDRD: >90 ML/MIN/1.73M2
GLUCOSE BLD-MCNC: 108 MG/DL (ref 70–99)
MRSA DNA SPEC QL NAA+PROBE: NEGATIVE
POTASSIUM BLD-SCNC: 4.3 MMOL/L (ref 3.4–5.3)
SA TARGET DNA: NEGATIVE
SARS-COV-2 RNA RESP QL NAA+PROBE: NEGATIVE

## 2022-09-19 PROCEDURE — 82565 ASSAY OF CREATININE: CPT | Performed by: ANESTHESIOLOGY

## 2022-09-19 PROCEDURE — 250N000011 HC RX IP 250 OP 636: Performed by: SURGERY

## 2022-09-19 PROCEDURE — 999N000141 HC STATISTIC PRE-PROCEDURE NURSING ASSESSMENT: Performed by: SURGERY

## 2022-09-19 PROCEDURE — 87641 MR-STAPH DNA AMP PROBE: CPT | Performed by: SURGERY

## 2022-09-19 PROCEDURE — U0003 INFECTIOUS AGENT DETECTION BY NUCLEIC ACID (DNA OR RNA); SEVERE ACUTE RESPIRATORY SYNDROME CORONAVIRUS 2 (SARS-COV-2) (CORONAVIRUS DISEASE [COVID-19]), AMPLIFIED PROBE TECHNIQUE, MAKING USE OF HIGH THROUGHPUT TECHNOLOGIES AS DESCRIBED BY CMS-2020-01-R: HCPCS | Performed by: SURGERY

## 2022-09-19 PROCEDURE — 84132 ASSAY OF SERUM POTASSIUM: CPT | Performed by: ANESTHESIOLOGY

## 2022-09-19 PROCEDURE — 258N000003 HC RX IP 258 OP 636: Performed by: SURGERY

## 2022-09-19 PROCEDURE — 82947 ASSAY GLUCOSE BLOOD QUANT: CPT | Performed by: ANESTHESIOLOGY

## 2022-09-19 PROCEDURE — 250N000009 HC RX 250: Performed by: SURGERY

## 2022-09-19 PROCEDURE — 36415 COLL VENOUS BLD VENIPUNCTURE: CPT | Performed by: ANESTHESIOLOGY

## 2022-09-19 RX ORDER — ASPIRIN 81 MG/1
81 TABLET, CHEWABLE ORAL
Status: DISCONTINUED | OUTPATIENT
Start: 2022-09-19 | End: 2022-09-19 | Stop reason: HOSPADM

## 2022-09-19 RX ORDER — FAMOTIDINE 20 MG/1
20 TABLET, FILM COATED ORAL
Status: CANCELLED | OUTPATIENT
Start: 2022-09-19

## 2022-09-19 RX ORDER — ASPIRIN 81 MG/1
162 TABLET, CHEWABLE ORAL
Status: CANCELLED | OUTPATIENT
Start: 2022-09-19

## 2022-09-19 RX ORDER — CEFAZOLIN SODIUM/WATER 2 G/20 ML
2 SYRINGE (ML) INTRAVENOUS
Status: DISCONTINUED | OUTPATIENT
Start: 2022-09-19 | End: 2022-09-19 | Stop reason: HOSPADM

## 2022-09-19 RX ORDER — CEFAZOLIN SODIUM IN 0.9 % NACL 3 G/100 ML
3 INTRAVENOUS SOLUTION, PIGGYBACK (ML) INTRAVENOUS
Status: CANCELLED | OUTPATIENT
Start: 2022-09-19

## 2022-09-19 RX ORDER — LIDOCAINE 40 MG/G
CREAM TOPICAL
Status: CANCELLED | OUTPATIENT
Start: 2022-09-19

## 2022-09-19 RX ORDER — LIDOCAINE 40 MG/G
CREAM TOPICAL
Status: DISCONTINUED | OUTPATIENT
Start: 2022-09-19 | End: 2022-09-19 | Stop reason: HOSPADM

## 2022-09-19 RX ORDER — CEFAZOLIN SODIUM IN 0.9 % NACL 3 G/100 ML
3 INTRAVENOUS SOLUTION, PIGGYBACK (ML) INTRAVENOUS SEE ADMIN INSTRUCTIONS
Status: CANCELLED | OUTPATIENT
Start: 2022-09-19

## 2022-09-19 RX ORDER — CHLORHEXIDINE GLUCONATE ORAL RINSE 1.2 MG/ML
10 SOLUTION DENTAL ONCE
Status: DISCONTINUED | OUTPATIENT
Start: 2022-09-19 | End: 2022-09-19 | Stop reason: HOSPADM

## 2022-09-19 RX ORDER — ASPIRIN 81 MG/1
81 TABLET, CHEWABLE ORAL
Status: CANCELLED | OUTPATIENT
Start: 2022-09-19

## 2022-09-19 RX ORDER — FAMOTIDINE 20 MG/1
20 TABLET, FILM COATED ORAL
Status: DISCONTINUED | OUTPATIENT
Start: 2022-09-19 | End: 2022-09-19 | Stop reason: HOSPADM

## 2022-09-19 RX ORDER — CHLORHEXIDINE GLUCONATE ORAL RINSE 1.2 MG/ML
10 SOLUTION DENTAL ONCE
Status: CANCELLED | OUTPATIENT
Start: 2022-09-19 | End: 2022-09-19

## 2022-09-19 RX ORDER — ASPIRIN 81 MG/1
162 TABLET, CHEWABLE ORAL
Status: DISCONTINUED | OUTPATIENT
Start: 2022-09-19 | End: 2022-09-19 | Stop reason: HOSPADM

## 2022-09-19 RX ORDER — SODIUM CHLORIDE, SODIUM LACTATE, POTASSIUM CHLORIDE, CALCIUM CHLORIDE 600; 310; 30; 20 MG/100ML; MG/100ML; MG/100ML; MG/100ML
INJECTION, SOLUTION INTRAVENOUS CONTINUOUS
Status: DISCONTINUED | OUTPATIENT
Start: 2022-09-19 | End: 2022-09-19 | Stop reason: HOSPADM

## 2022-09-19 RX ORDER — FENTANYL CITRATE 0.05 MG/ML
50-100 INJECTION, SOLUTION INTRAMUSCULAR; INTRAVENOUS EVERY 5 MIN PRN
Status: DISCONTINUED | OUTPATIENT
Start: 2022-09-19 | End: 2022-09-19 | Stop reason: HOSPADM

## 2022-09-19 RX ORDER — CEFAZOLIN SODIUM/WATER 2 G/20 ML
2 SYRINGE (ML) INTRAVENOUS SEE ADMIN INSTRUCTIONS
Status: DISCONTINUED | OUTPATIENT
Start: 2022-09-19 | End: 2022-09-19 | Stop reason: HOSPADM

## 2022-09-19 NOTE — OR NURSING
Patient cancelled this morning for heart bypass surgery per anesthesia and surgeon due to the fact that patient had tobacco chew in his mouth for a couple hours this morning.  Tobacco chew was spit out at 0630 am this morning

## 2022-09-19 NOTE — ANESTHESIA PREPROCEDURE EVALUATION
Anesthesia Pre-Procedure Evaluation    Patient: Richar Davis   MRN: [id number] : 1955        Procedure : Procedure(s):  CORONARY ARTERY BYPASS GRAFT, WITH AORTIC VALVE REPLACEMENT          No past medical history on file.   No past surgical history on file.   Allergies   Allergen Reactions     Niaspan [Niacin] Other (See Comments)     flushing      Social History     Tobacco Use     Smoking status: Not on file     Smokeless tobacco: Not on file   Substance Use Topics     Alcohol use: Not on file      Wt Readings from Last 1 Encounters:   22 119.4 kg (263 lb 3.2 oz)        Anesthesia Evaluation   Pt has had prior anesthetic.     No history of anesthetic complications       ROS/MED HX  ENT/Pulmonary:    (-) sleep apnea   Neurologic:    (-) no CVA   Cardiovascular:     (+) hypertension--CAD -past MI --    METS/Exercise Tolerance:     Hematologic:       Musculoskeletal:       GI/Hepatic:    (-) GERD   Renal/Genitourinary:       Endo:    (-) Type II DM   Psychiatric/Substance Use:       Infectious Disease:       Malignancy:       Other:               OUTSIDE LABS:  CBC:   Lab Results   Component Value Date    WBC 6.9 2022    HGB 14.8 2022    HCT 45.9 2022     2022     BMP:   Lab Results   Component Value Date     2022    POTASSIUM 4.3 2022    POTASSIUM 4.2 2022    CHLORIDE 107 2022    CO2 31 2022    BUN 16 2022    CR 0.87 2022    CR 0.98 2022     (H) 2022     (H) 2022     COAGS: No results found for: PTT, INR, FIBR  POC: No results found for: BGM, HCG, HCGS  HEPATIC:   Lab Results   Component Value Date    ALBUMIN 3.6 2022    PROTTOTAL 6.7 (L) 2022    ALT 26 2022    AST 19 2022    ALKPHOS 106 2022    BILITOTAL 0.4 2022     OTHER:   Lab Results   Component Value Date    A1C 5.6 2022    HALEY 9.9 2022       Anesthesia Plan    ASA Status:  3   NPO  Status:  NPO Appropriate    Anesthesia Type: General.     - Airway: ETT   Induction: Intravenous.   Maintenance: Balanced.   Techniques and Equipment:     - Lines/Monitors: MANDEEP, Arterial Line, Central Line            MANDEEP Absolute Contra-indication: NONE            MANDEEP Relative Contra-indication: NONE     - Blood: T&C     Consents    Anesthesia Plan(s) and associated risks, benefits, and realistic alternatives discussed. Questions answered and patient/representative(s) expressed understanding.    - Discussed:     - Discussed with:  Patient         Postoperative Care    Pain management: IV analgesics.        Comments:                Gi Lopez MD, MD

## 2022-09-19 NOTE — TELEPHONE ENCOUNTER
I called patient and discussed OR reschedule date of 10/29. Discussed  stopping MVI,fish oil, NPO after midnight,water after 3 pm. Encouraged pt to stop chewing tobacco and none after 3 am of surgery. States understanding.

## 2022-09-19 NOTE — TELEPHONE ENCOUNTER
Per task, pt's 9/19 surgery was cancelled and needs to be r/s. LM asking pt to call back to r/s. Will try again later

## 2022-09-21 ENCOUNTER — TELEPHONE (OUTPATIENT)
Dept: CARDIOLOGY | Facility: CLINIC | Age: 67
End: 2022-09-21

## 2022-09-21 NOTE — TELEPHONE ENCOUNTER
Called patient request he make new appointment for COVID screening.    Mya(spouse) states she will call today.    JOEY VIZCAINO RN  Care Coordinator - [facility] CV Surgery   [facility]  [phone]

## 2022-09-28 RX ORDER — SODIUM CHLORIDE, SODIUM LACTATE, POTASSIUM CHLORIDE, CALCIUM CHLORIDE 600; 310; 30; 20 MG/100ML; MG/100ML; MG/100ML; MG/100ML
INJECTION, SOLUTION INTRAVENOUS CONTINUOUS
Status: CANCELLED | OUTPATIENT
Start: 2022-09-28

## 2022-09-28 NOTE — PROGRESS NOTES
PTA medications updated by Medication Scribe prior to surgery via phone call with patient (last doses completed by Nurse)     Medication history sources: Patient, Surescripts and H&P  In the past week, patient estimated taking medication this percent of the time: Greater than 90%  Adherence assessment: N/A Not Observed    Significant changes made to the medication list:  None      Additional medication history information:   None    Medication reconciliation completed by provider prior to medication history? No    Time spent in this activity: 40 minutes    The information provided in this note is only as accurate as the sources available at the time of update(s)    Prior to Admission medications    Medication Sig Last Dose Taking? Auth Provider Long Term End Date   Ascorbic Acid (VITAMIN C) 500 MG CAPS Take 500 mg by mouth daily 9/16/2022 at am Yes Reported, Patient     aspirin 81 MG EC tablet Take 81 mg by mouth daily 9/16/2022 at am Yes Reported, Patient     atorvastatin (LIPITOR) 20 MG tablet Take 20 mg by mouth daily 9/28/2022 at pm Yes Reported, Patient Yes    benazepril (LOTENSIN) 5 MG tablet Take 5 mg by mouth daily 9/28/2022 at pm Yes Reported, Patient Yes    bisoprolol (ZEBETA) 5 MG tablet Take 2.5 mg by mouth daily (0.5 x 5 mg = 2.5 mg) 9/28/2022 at pm Yes Reported, Patient Yes    CALCIUM PO Take 1 capsule by mouth daily 9/16/2022 at am Yes Reported, Patient     Cyanocobalamin (VITAMIN B-12 PO) Take 1 tablet by mouth every other day 9/15/2022 at am Yes Reported, Patient     diphenhydrAMINE (BENADRYL) 50 MG tablet Take 100 mg by mouth every 6 hours as needed for itching or allergies  at prn Yes Reported, Patient     fish oil-omega-3 fatty acids 1000 MG capsule Take 2 g by mouth daily 9/16/2022 at am Yes Reported, Patient     folic acid (FOLVITE) 400 MCG tablet Take 400 mcg by mouth 2 times daily 9/16/2022 at pm Yes Reported, Patient     gabapentin (NEURONTIN) 800 MG tablet Take 800 mg by mouth 3 times daily  9/28/2022 at am Yes Reported, Patient No    magnesium oxide 400 MG CAPS Take 1 capsule by mouth daily 9/16/2022 at am Yes Reported, Patient     metFORMIN (GLUCOPHAGE) 500 MG tablet Take 500 mg by mouth daily (with dinner) (0.5 x 500 mg = 250 mg) 9/28/2022 at pm Yes Reported, Patient Yes    Multiple Vitamins-Minerals (MENS 50+ MULTI VITAMIN/MIN PO) Take 1 tablet by mouth daily 9/16/2022 at am Yes Reported, Patient     tamsulosin (FLOMAX) 0.4 MG capsule Take 0.8 mg by mouth every evening (2 x 0.4 mg = 0.8 mg) 9/28/2022 at pm Yes Reported, Patient     tiZANidine (ZANAFLEX) 4 MG tablet Take 8 mg by mouth every evening (2 x 4 mg = 8 mg) 9/28/2022 at pm Yes Reported, Patient     traMADol (ULTRAM) 50 MG tablet Take 100-200 mg by mouth every 6 hours as needed for pain  at prn Yes Reported, Patient     traMADol (ULTRAM-ER) 200 MG 24 hr tablet Take 200 mg by mouth as needed for moderate pain  at prn Yes Reported, Patient     vitamin D3 (CHOLECALCIFEROL) 50 mcg (2000 units) tablet Take 1 tablet by mouth every other day 9/16/2022 at am Yes Reported, Patient       Medication history completed by:    Arjun Marin CPhT  Medication [facility]

## 2022-09-29 LAB
BLOOD BANK CHART COMMENT: NORMAL
SPECIMEN EXPIRATION DATE: NORMAL

## 2022-09-30 ENCOUNTER — TELEPHONE (OUTPATIENT)
Dept: OTHER | Facility: CLINIC | Age: 67
End: 2022-09-30

## 2022-09-30 ENCOUNTER — PREP FOR PROCEDURE (OUTPATIENT)
Dept: CARDIOLOGY | Facility: CLINIC | Age: 67
End: 2022-09-30

## 2022-09-30 DIAGNOSIS — I25.10 CAD (CORONARY ARTERY DISEASE): Primary | ICD-10-CM

## 2022-09-30 NOTE — TELEPHONE ENCOUNTER
I spoke with patient's wife regarding new surgery date of 10/6. Instructed her he needs to be symptom free and cleared by his PCP before proceeding. Covid test neg with in 72 hours. She states understanding.   
DC instructions

## 2022-09-30 NOTE — TELEPHONE ENCOUNTER
Due to the pt having a cold, pt's 9/29 surgery was cancelled. Talked to pts/ wife Mya and r/s surgery for 10/6. Will call if anything else changes

## 2022-10-11 RX ORDER — CEFAZOLIN SODIUM IN 0.9 % NACL 3 G/100 ML
3 INTRAVENOUS SOLUTION, PIGGYBACK (ML) INTRAVENOUS SEE ADMIN INSTRUCTIONS
Status: CANCELLED | OUTPATIENT
Start: 2022-10-11

## 2022-10-11 RX ORDER — CHLORHEXIDINE GLUCONATE ORAL RINSE 1.2 MG/ML
10 SOLUTION DENTAL ONCE
Status: CANCELLED | OUTPATIENT
Start: 2022-10-11 | End: 2022-10-11

## 2022-10-11 RX ORDER — FAMOTIDINE 20 MG/1
20 TABLET, FILM COATED ORAL
Status: CANCELLED | OUTPATIENT
Start: 2022-10-11

## 2022-10-11 RX ORDER — ASPIRIN 81 MG/1
81 TABLET, CHEWABLE ORAL
Status: CANCELLED | OUTPATIENT
Start: 2022-10-11

## 2022-10-11 RX ORDER — CEFAZOLIN SODIUM IN 0.9 % NACL 3 G/100 ML
3 INTRAVENOUS SOLUTION, PIGGYBACK (ML) INTRAVENOUS
Status: CANCELLED | OUTPATIENT
Start: 2022-10-11

## 2022-10-11 RX ORDER — ASPIRIN 81 MG/1
162 TABLET, CHEWABLE ORAL
Status: CANCELLED | OUTPATIENT
Start: 2022-10-11

## 2022-10-11 RX ORDER — LIDOCAINE 40 MG/G
CREAM TOPICAL
Status: CANCELLED | OUTPATIENT
Start: 2022-10-11

## 2022-10-19 ENCOUNTER — ANESTHESIA EVENT (OUTPATIENT)
Dept: SURGERY | Facility: CLINIC | Age: 67
DRG: 219 | End: 2022-10-19
Payer: COMMERCIAL

## 2022-10-19 RX ORDER — CELECOXIB 200 MG/1
200 CAPSULE ORAL EVERY 24 HOURS
COMMUNITY
End: 2022-10-19

## 2022-10-19 RX ORDER — CLOTRIMAZOLE 1 %
1 CREAM (GRAM) TOPICAL 2 TIMES DAILY PRN
COMMUNITY
Start: 2021-12-07 | End: 2022-10-19

## 2022-10-19 ASSESSMENT — LIFESTYLE VARIABLES: TOBACCO_USE: 1

## 2022-10-19 NOTE — ANESTHESIA PREPROCEDURE EVALUATION
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     CTA ANGIOGRAM CORONARY ARTERY       ORTHOPEDIC SURGERY Left     elbow surgery      Allergies   Allergen Reactions     Niaspan [Niacin] Other (See Comments)     flushing      Social History     Tobacco Use     Smoking status: Former     Smokeless tobacco: Current     Types: Chew     Tobacco comments:     daily   Substance Use Topics     Alcohol use: Not on file     Comment: rare      Wt Readings from Last 1 Encounters:   22 121.6 kg (268 lb)        Anesthesia Evaluation            ROS/MED HX  ENT/Pulmonary:     (+) sleep apnea, doesn't use CPAP, ALIREZA risk factors, snores loudly, hypertension, obese, observed stopped breathing, tobacco use,     Neurologic:  - neg neurologic ROS     Cardiovascular:     (+) Dyslipidemia hypertension--CAD -past MI --valvular problems/murmurs type: AS Previous cardiac testing   Echo: Date:  Results:  Interpretation Summary     Severe calcific aortic valve stenosis with mild regurgitation.  Peak transaortic velocity 4.1 m/s, mean gradient 40 mmHg, valve area of 0.9  cmÂ , dimensionless index 0.26.  Normal left ventricular systolic function. Estimated LVEF 60-65%.  Normal right ventricular size and systolic function.  Trace mitral and tricuspid valve regurgitation.    Stress Test: Date: Results:    ECG Reviewed: Date: Results:    Cath: Date: Results:      METS/Exercise Tolerance:     Hematologic:       Musculoskeletal:       GI/Hepatic:    (-) GERD   Renal/Genitourinary:  -  neg Renal ROS   (+) BPH,     Endo:     (+) type II DM, Not using insulin, - not using insulin pump. Diabetic complications: cardiac problems. Obesity,     Psychiatric/Substance Use:  - neg psychiatric ROS     Infectious Disease:       Malignancy:    (-) malignancy   Other:            Physical Exam    Airway        Mallampati: III   TM distance: > 3 FB   Neck ROM: full   Mouth opening: > 3 cm    Respiratory Devices and Support         Dental         B=Bridge, C=Chipped, L=Loose, M=Missing    Cardiovascular   cardiovascular exam normal       Rhythm and rate: regular     Pulmonary   pulmonary exam normal                OUTSIDE LABS:  CBC:   Lab Results   Component Value Date    WBC 6.9 09/14/2022    HGB 14.8 09/14/2022    HCT 45.9 09/14/2022     09/14/2022     BMP:   Lab Results   Component Value Date     09/14/2022    POTASSIUM 4.3 09/19/2022    POTASSIUM 4.2 09/14/2022    CHLORIDE 107 09/14/2022    CO2 31 09/14/2022    BUN 16 09/14/2022    CR 0.87 09/19/2022    CR 0.98 09/14/2022     (H) 09/19/2022     (H) 09/14/2022     COAGS: No results found for: PTT, INR, FIBR  POC: No results found for: BGM, HCG, HCGS  HEPATIC:   Lab Results   Component Value Date    ALBUMIN 3.6 09/14/2022    PROTTOTAL 6.7 (L) 09/14/2022    ALT 26 09/14/2022    AST 19 09/14/2022    ALKPHOS 106 09/14/2022    BILITOTAL 0.4 09/14/2022     OTHER:   Lab Results   Component Value Date    A1C 5.6 09/14/2022    HALEY 9.9 09/14/2022       Anesthesia Plan    ASA Status:  4   NPO Status:  NPO Appropriate    Anesthesia Type: General.     - Airway: ETT   Induction: Intravenous, Propofol.   Maintenance: Balanced.   Techniques and Equipment:     - Lines/Monitors: Arterial Line, Central Line, MANDEEP, PAC            MANDEEP Absolute Contra-indication: NONE            MANDEEP Relative Contra-indication: NONE     - Blood: Blood in Room     - Drips/Meds: Epinephrine, Phenylephrine     Consents    Anesthesia Plan(s) and associated risks, benefits, and  realistic alternatives discussed. Questions answered and patient/representative(s) expressed understanding.     - Discussed: Risks, Benefits and Alternatives for the PROCEDURE were discussed     - Discussed with:  Patient      - Extended Intubation/Ventilatory Support Discussed: Yes.      - Patient is DNR/DNI Status: No    Use of blood products discussed: Yes.     - Discussed with: Patient.     - Consented: consented to blood products            Reason for refusal: other.     Postoperative Care    Pain management: Multi-modal analgesia.   PONV prophylaxis: Dexamethasone or Solumedrol     Comments:    Other Comments: Patient is counseled on the anesthesia plan and relevant anesthesia procedures including all risks and benefits. All patient questions were answered.     Induction with Fentanyl, Versed, Propofol and Rocuronium.  Plan to infuse Epinephrine  and Phenylephrine for separation from cardiopulmonary bypass.   MANDEEP for monitoring purposes only.   Will plan to transfer to ICU on full monitors with ETT in situ.   Propofol gtt for post operative sedation.     Multi Model Analgesia:  -Tylenol 1000 mg po.  -Precedex gtt at 0.2-0.5 mcg/kg/hour  -Decadron 8mg IV after induction.   -Dilaudid titrated prn.             Jovan Rubalcava MD

## 2022-10-19 NOTE — PROGRESS NOTES
PTA medications updated by Medication Scribe prior to surgery via phone call with patient (last doses completed by Nurse)     Medication history sources: Patient, Surescripts and H&P  In the past week, patient estimated taking medication this percent of the time: Greater than 90%  Adherence assessment: N/A Not Observed    Significant changes made to the medication list:  Patient reports no longer taking the following meds (med scribe removed from PTA med list): Celebrex, Clotrimazole      Additional medication history information:   None    Medication reconciliation completed by provider prior to medication history? No    Time spent in this activity: 35 minutes    The information provided in this note is only as accurate as the sources available at the time of update(s)    Prior to Admission medications    Medication Sig Last Dose Taking? Auth Provider Long Term End Date   Ascorbic Acid (VITAMIN C) 500 MG CAPS Take 500 mg by mouth daily 9/16/2022 at am Yes Reported, Patient     aspirin 81 MG EC tablet Take 81 mg by mouth daily 10/13/2022 at am Yes Reported, Patient     atorvastatin (LIPITOR) 20 MG tablet Take 20 mg by mouth daily 10/19/2022 at pm Yes Reported, Patient Yes    benazepril (LOTENSIN) 5 MG tablet Take 5 mg by mouth daily 10/19/2022 at pm Yes Reported, Patient Yes    bisoprolol (ZEBETA) 5 MG tablet Take 2.5 mg by mouth daily (0.5 x 5 mg = 2.5 mg) 10/19/2022 at pm Yes Reported, Patient Yes    CALCIUM PO Take 1 capsule by mouth daily 9/16/2022 at am Yes Reported, Patient     Cyanocobalamin (VITAMIN B-12 PO) Take 1 tablet by mouth every other day 9/16/2022 at am Yes Reported, Patient     diphenhydrAMINE (BENADRYL) 50 MG tablet Take 100 mg by mouth every 6 hours as needed for itching or allergies 10/19/2022 at pm Yes Reported, Patient     fish oil-omega-3 fatty acids 1000 MG capsule Take 2 g by mouth daily 9/16/2022 at am Yes Reported, Patient     folic acid (FOLVITE) 400 MCG tablet Take 400 mcg by mouth 2  times daily 10/19/2022 at pm Yes Reported, Patient     gabapentin (NEURONTIN) 800 MG tablet Take 800 mg by mouth 3 times daily as needed 10/19/2022 at am Yes Reported, Patient No    magnesium oxide 400 MG CAPS Take 1 capsule by mouth daily 9/16/2022 at am Yes Reported, Patient     metFORMIN (GLUCOPHAGE) 500 MG tablet Take 250 mg by mouth daily (with dinner) (0.5 x 500 mg = 250 mg) 10/19/2022 at pm Yes Reported, Patient Yes    Multiple Vitamins-Minerals (MENS 50+ MULTI VITAMIN/MIN PO) Take 1 tablet by mouth daily 9/16/2022 at am Yes Reported, Patient     tamsulosin (FLOMAX) 0.4 MG capsule Take 0.8 mg by mouth every evening (2 x 0.4 mg = 0.8 mg) 10/19/2022 at pm Yes Reported, Patient     tiZANidine (ZANAFLEX) 4 MG tablet Take 8 mg by mouth every evening (2 x 4 mg = 8 mg) 10/19/2022 at pm Yes Reported, Patient     traMADol (ULTRAM) 50 MG tablet Take 100-200 mg by mouth every 6 hours as needed for pain  at prn Yes Reported, Patient     traMADol (ULTRAM-ER) 200 MG 24 hr tablet Take 200 mg by mouth as needed for moderate pain  at prn Yes Reported, Patient     vitamin D3 (CHOLECALCIFEROL) 50 mcg (2000 units) tablet Take 1 tablet by mouth every other day 9/16/2022 at am Yes Reported, Patient       Medication history completed by:    Arjun Marin CPhT  Medication [facility]

## 2022-10-20 ENCOUNTER — APPOINTMENT (OUTPATIENT)
Dept: CARDIOLOGY | Facility: CLINIC | Age: 67
DRG: 219 | End: 2022-10-20
Attending: INTERNAL MEDICINE
Payer: COMMERCIAL

## 2022-10-20 ENCOUNTER — APPOINTMENT (OUTPATIENT)
Dept: GENERAL RADIOLOGY | Facility: CLINIC | Age: 67
DRG: 219 | End: 2022-10-20
Attending: STUDENT IN AN ORGANIZED HEALTH CARE EDUCATION/TRAINING PROGRAM
Payer: COMMERCIAL

## 2022-10-20 ENCOUNTER — HOSPITAL ENCOUNTER (INPATIENT)
Facility: CLINIC | Age: 67
LOS: 19 days | Discharge: NURSING, REHAB OR LONG-TERM CARE | DRG: 219 | End: 2022-11-08
Attending: SURGERY | Admitting: SURGERY
Payer: COMMERCIAL

## 2022-10-20 ENCOUNTER — ANESTHESIA (OUTPATIENT)
Dept: SURGERY | Facility: CLINIC | Age: 67
DRG: 219 | End: 2022-10-20
Payer: COMMERCIAL

## 2022-10-20 DIAGNOSIS — I35.0 AORTIC VALVE STENOSIS, ETIOLOGY OF CARDIAC VALVE DISEASE UNSPECIFIED: ICD-10-CM

## 2022-10-20 DIAGNOSIS — Z95.2 S/P AVR (AORTIC VALVE REPLACEMENT): ICD-10-CM

## 2022-10-20 DIAGNOSIS — Z95.1 S/P CABG (CORONARY ARTERY BYPASS GRAFT): Primary | ICD-10-CM

## 2022-10-20 PROBLEM — I25.10 CORONARY ARTERY DISEASE: Status: ACTIVE | Noted: 2022-10-20

## 2022-10-20 LAB
ABO/RH(D): NORMAL
ALBUMIN SERPL-MCNC: 3.1 G/DL (ref 3.4–5)
ALBUMIN SERPL-MCNC: 3.4 G/DL (ref 3.4–5)
ALP SERPL-CCNC: 108 U/L (ref 40–150)
ALP SERPL-CCNC: 84 U/L (ref 40–150)
ALT SERPL W P-5'-P-CCNC: 23 U/L (ref 0–70)
ALT SERPL W P-5'-P-CCNC: 25 U/L (ref 0–70)
ANION GAP SERPL CALCULATED.3IONS-SCNC: 15 MMOL/L (ref 3–14)
ANION GAP SERPL CALCULATED.3IONS-SCNC: 4 MMOL/L (ref 3–14)
ANION GAP SERPL CALCULATED.3IONS-SCNC: 6 MMOL/L (ref 3–14)
ANTIBODY SCREEN: NEGATIVE
APTT PPP: 107 SECONDS (ref 22–38)
APTT PPP: 48 SECONDS (ref 22–38)
AST SERPL W P-5'-P-CCNC: 23 U/L (ref 0–45)
AST SERPL W P-5'-P-CCNC: 68 U/L (ref 0–45)
BASE EXCESS BLDA CALC-SCNC: -14.2 MMOL/L (ref -9–1.8)
BASE EXCESS BLDV CALC-SCNC: -13 MMOL/L (ref -7.7–1.9)
BILIRUB SERPL-MCNC: 0.6 MG/DL (ref 0.2–1.3)
BILIRUB SERPL-MCNC: 0.6 MG/DL (ref 0.2–1.3)
BLD PROD TYP BPU: NORMAL
BLD PROD TYP BPU: NORMAL
BLOOD COMPONENT TYPE: NORMAL
BLOOD COMPONENT TYPE: NORMAL
BUN SERPL-MCNC: 12 MG/DL (ref 7–30)
BUN SERPL-MCNC: 13 MG/DL (ref 7–30)
BUN SERPL-MCNC: 13 MG/DL (ref 7–30)
CA-I BLD-MCNC: 5 MG/DL (ref 4.4–5.2)
CALCIUM SERPL-MCNC: 8.5 MG/DL (ref 8.5–10.1)
CALCIUM SERPL-MCNC: 9.5 MG/DL (ref 8.5–10.1)
CALCIUM SERPL-MCNC: 9.7 MG/DL (ref 8.5–10.1)
CHLORIDE BLD-SCNC: 105 MMOL/L (ref 94–109)
CHLORIDE BLD-SCNC: 109 MMOL/L (ref 94–109)
CHLORIDE BLD-SCNC: 109 MMOL/L (ref 94–109)
CO2 SERPL-SCNC: 17 MMOL/L (ref 20–32)
CO2 SERPL-SCNC: 23 MMOL/L (ref 20–32)
CO2 SERPL-SCNC: 27 MMOL/L (ref 20–32)
CODING SYSTEM: NORMAL
CODING SYSTEM: NORMAL
CREAT SERPL-MCNC: 0.85 MG/DL (ref 0.66–1.25)
CREAT SERPL-MCNC: 0.96 MG/DL (ref 0.66–1.25)
CREAT SERPL-MCNC: 0.97 MG/DL (ref 0.66–1.25)
CROSSMATCH: NORMAL
CROSSMATCH: NORMAL
ERYTHROCYTE [DISTWIDTH] IN BLOOD BY AUTOMATED COUNT: 13.1 % (ref 10–15)
ERYTHROCYTE [DISTWIDTH] IN BLOOD BY AUTOMATED COUNT: 13.2 % (ref 10–15)
FIBRINOGEN PPP-MCNC: 198 MG/DL (ref 170–490)
GFR SERPL CREATININE-BSD FRML MDRD: 86 ML/MIN/1.73M2
GFR SERPL CREATININE-BSD FRML MDRD: 87 ML/MIN/1.73M2
GFR SERPL CREATININE-BSD FRML MDRD: >90 ML/MIN/1.73M2
GLUCOSE BLD-MCNC: 182 MG/DL (ref 70–99)
GLUCOSE BLD-MCNC: 229 MG/DL (ref 70–99)
GLUCOSE BLD-MCNC: 91 MG/DL (ref 70–99)
GLUCOSE BLDC GLUCOMTR-MCNC: 139 MG/DL (ref 70–99)
GLUCOSE BLDC GLUCOMTR-MCNC: 146 MG/DL (ref 70–99)
GLUCOSE BLDC GLUCOMTR-MCNC: 154 MG/DL (ref 70–99)
GLUCOSE BLDC GLUCOMTR-MCNC: 165 MG/DL (ref 70–99)
GLUCOSE BLDC GLUCOMTR-MCNC: 186 MG/DL (ref 70–99)
GLUCOSE BLDC GLUCOMTR-MCNC: 199 MG/DL (ref 70–99)
GLUCOSE BLDC GLUCOMTR-MCNC: 212 MG/DL (ref 70–99)
GLUCOSE BLDC GLUCOMTR-MCNC: 212 MG/DL (ref 70–99)
GLUCOSE BLDC GLUCOMTR-MCNC: 217 MG/DL (ref 70–99)
HCO3 BLD-SCNC: 15 MMOL/L (ref 21–28)
HCO3 BLDV-SCNC: 16 MMOL/L (ref 21–28)
HCT VFR BLD AUTO: 34.3 % (ref 40–53)
HCT VFR BLD AUTO: 42.4 % (ref 40–53)
HCT VFR BLD AUTO: 43.9 % (ref 40–53)
HCT VFR BLD AUTO: 45.3 % (ref 40–53)
HGB BLD-MCNC: 11.5 G/DL (ref 13.3–17.7)
HGB BLD-MCNC: 13.9 G/DL (ref 13.3–17.7)
HGB BLD-MCNC: 14.2 G/DL (ref 13.3–17.7)
HGB BLD-MCNC: 14.9 G/DL (ref 13.3–17.7)
INR PPP: 1.31 (ref 0.85–1.15)
INR PPP: 1.48 (ref 0.85–1.15)
ISSUE DATE AND TIME: NORMAL
ISSUE DATE AND TIME: NORMAL
LACTATE SERPL-SCNC: 11.3 MMOL/L (ref 0.7–2)
LACTATE SERPL-SCNC: 8.7 MMOL/L (ref 0.7–2)
LVEF ECHO: NORMAL
MAGNESIUM SERPL-MCNC: 2.4 MG/DL (ref 1.6–2.3)
MCH RBC QN AUTO: 30.1 PG (ref 26.5–33)
MCH RBC QN AUTO: 30.1 PG (ref 26.5–33)
MCH RBC QN AUTO: 30.5 PG (ref 26.5–33)
MCH RBC QN AUTO: 31 PG (ref 26.5–33)
MCHC RBC AUTO-ENTMCNC: 32.3 G/DL (ref 31.5–36.5)
MCHC RBC AUTO-ENTMCNC: 32.8 G/DL (ref 31.5–36.5)
MCHC RBC AUTO-ENTMCNC: 32.9 G/DL (ref 31.5–36.5)
MCHC RBC AUTO-ENTMCNC: 33.5 G/DL (ref 31.5–36.5)
MCV RBC AUTO: 92 FL (ref 78–100)
MCV RBC AUTO: 93 FL (ref 78–100)
O2/TOTAL GAS SETTING VFR VENT: 50 %
O2/TOTAL GAS SETTING VFR VENT: 50 %
OXYHGB MFR BLDV: 97 % (ref 70–75)
PCO2 BLD: 45 MM HG (ref 35–45)
PCO2 BLDV: 47 MM HG (ref 40–50)
PH BLD: 7.13 [PH] (ref 7.35–7.45)
PH BLDV: 7.14 [PH] (ref 7.32–7.43)
PHOSPHATE SERPL-MCNC: 4.4 MG/DL (ref 2.5–4.5)
PLATELET # BLD AUTO: 135 10E3/UL (ref 150–450)
PLATELET # BLD AUTO: 136 10E3/UL (ref 150–450)
PLATELET # BLD AUTO: 144 10E3/UL (ref 150–450)
PLATELET # BLD AUTO: 158 10E3/UL (ref 150–450)
PO2 BLD: 155 MM HG (ref 80–105)
PO2 BLDV: 150 MM HG (ref 25–47)
POTASSIUM BLD-SCNC: 3.7 MMOL/L (ref 3.4–5.3)
POTASSIUM BLD-SCNC: 3.8 MMOL/L (ref 3.4–5.3)
POTASSIUM BLD-SCNC: 4.2 MMOL/L (ref 3.4–5.3)
PROT SERPL-MCNC: 5.5 G/DL (ref 6.8–8.8)
PROT SERPL-MCNC: 6.8 G/DL (ref 6.8–8.8)
RBC # BLD AUTO: 3.71 10E6/UL (ref 4.4–5.9)
RBC # BLD AUTO: 4.56 10E6/UL (ref 4.4–5.9)
RBC # BLD AUTO: 4.71 10E6/UL (ref 4.4–5.9)
RBC # BLD AUTO: 4.95 10E6/UL (ref 4.4–5.9)
SARS-COV-2 RNA RESP QL NAA+PROBE: NEGATIVE
SODIUM SERPL-SCNC: 136 MMOL/L (ref 133–144)
SODIUM SERPL-SCNC: 138 MMOL/L (ref 133–144)
SODIUM SERPL-SCNC: 141 MMOL/L (ref 133–144)
SPECIMEN EXPIRATION DATE: NORMAL
UNIT ABO/RH: NORMAL
UNIT ABO/RH: NORMAL
UNIT NUMBER: NORMAL
UNIT NUMBER: NORMAL
UNIT STATUS: NORMAL
UNIT STATUS: NORMAL
UNIT TYPE ISBT: 6200
UNIT TYPE ISBT: 6200
WBC # BLD AUTO: 22.4 10E3/UL (ref 4–11)
WBC # BLD AUTO: 25.2 10E3/UL (ref 4–11)
WBC # BLD AUTO: 29.3 10E3/UL (ref 4–11)
WBC # BLD AUTO: 6.5 10E3/UL (ref 4–11)

## 2022-10-20 PROCEDURE — 258N000003 HC RX IP 258 OP 636: Performed by: STUDENT IN AN ORGANIZED HEALTH CARE EDUCATION/TRAINING PROGRAM

## 2022-10-20 PROCEDURE — 99291 CRITICAL CARE FIRST HOUR: CPT | Mod: 24 | Performed by: INTERNAL MEDICINE

## 2022-10-20 PROCEDURE — 3E043XZ INTRODUCTION OF VASOPRESSOR INTO CENTRAL VEIN, PERCUTANEOUS APPROACH: ICD-10-PCS | Performed by: SURGERY

## 2022-10-20 PROCEDURE — 250N000011 HC RX IP 250 OP 636

## 2022-10-20 PROCEDURE — 250N000011 HC RX IP 250 OP 636: Performed by: STUDENT IN AN ORGANIZED HEALTH CARE EDUCATION/TRAINING PROGRAM

## 2022-10-20 PROCEDURE — 250N000011 HC RX IP 250 OP 636: Performed by: ANESTHESIOLOGY

## 2022-10-20 PROCEDURE — 85610 PROTHROMBIN TIME: CPT | Performed by: SURGERY

## 2022-10-20 PROCEDURE — 85730 THROMBOPLASTIN TIME PARTIAL: CPT | Performed by: STUDENT IN AN ORGANIZED HEALTH CARE EDUCATION/TRAINING PROGRAM

## 2022-10-20 PROCEDURE — 06BQ4ZZ EXCISION OF LEFT SAPHENOUS VEIN, PERCUTANEOUS ENDOSCOPIC APPROACH: ICD-10-PCS | Performed by: SURGERY

## 2022-10-20 PROCEDURE — 93325 DOPPLER ECHO COLOR FLOW MAPG: CPT | Mod: 26 | Performed by: INTERNAL MEDICINE

## 2022-10-20 PROCEDURE — 33518 CABG ARTERY-VEIN TWO: CPT | Mod: GC | Performed by: SURGERY

## 2022-10-20 PROCEDURE — P9045 ALBUMIN (HUMAN), 5%, 250 ML: HCPCS

## 2022-10-20 PROCEDURE — 250N000011 HC RX IP 250 OP 636: Performed by: SURGERY

## 2022-10-20 PROCEDURE — 84132 ASSAY OF SERUM POTASSIUM: CPT | Performed by: SURGERY

## 2022-10-20 PROCEDURE — 999N000009 HC STATISTIC AIRWAY CARE

## 2022-10-20 PROCEDURE — 85384 FIBRINOGEN ACTIVITY: CPT | Performed by: SURGERY

## 2022-10-20 PROCEDURE — 250N000009 HC RX 250

## 2022-10-20 PROCEDURE — 82803 BLOOD GASES ANY COMBINATION: CPT | Performed by: SURGERY

## 2022-10-20 PROCEDURE — C1713 ANCHOR/SCREW BN/BN,TIS/BN: HCPCS | Performed by: SURGERY

## 2022-10-20 PROCEDURE — 83605 ASSAY OF LACTIC ACID: CPT | Performed by: STUDENT IN AN ORGANIZED HEALTH CARE EDUCATION/TRAINING PROGRAM

## 2022-10-20 PROCEDURE — 999N000065 XR CHEST PORT 1 VIEW

## 2022-10-20 PROCEDURE — 250N000013 HC RX MED GY IP 250 OP 250 PS 637: Performed by: STUDENT IN AN ORGANIZED HEALTH CARE EDUCATION/TRAINING PROGRAM

## 2022-10-20 PROCEDURE — 250N000011 HC RX IP 250 OP 636: Performed by: INTERNAL MEDICINE

## 2022-10-20 PROCEDURE — 250N000012 HC RX MED GY IP 250 OP 636 PS 637: Performed by: STUDENT IN AN ORGANIZED HEALTH CARE EDUCATION/TRAINING PROGRAM

## 2022-10-20 PROCEDURE — 85014 HEMATOCRIT: CPT | Performed by: INTERNAL MEDICINE

## 2022-10-20 PROCEDURE — 370N000017 HC ANESTHESIA TECHNICAL FEE, PER MIN: Performed by: SURGERY

## 2022-10-20 PROCEDURE — 85014 HEMATOCRIT: CPT | Performed by: STUDENT IN AN ORGANIZED HEALTH CARE EDUCATION/TRAINING PROGRAM

## 2022-10-20 PROCEDURE — 250N000013 HC RX MED GY IP 250 OP 250 PS 637: Performed by: SURGERY

## 2022-10-20 PROCEDURE — 278N000051 HC OR IMPLANT GENERAL: Performed by: SURGERY

## 2022-10-20 PROCEDURE — 250N000011 HC RX IP 250 OP 636: Performed by: PHYSICIAN ASSISTANT

## 2022-10-20 PROCEDURE — 272N000001 HC OR GENERAL SUPPLY STERILE: Performed by: SURGERY

## 2022-10-20 PROCEDURE — 84450 TRANSFERASE (AST) (SGOT): CPT | Performed by: STUDENT IN AN ORGANIZED HEALTH CARE EDUCATION/TRAINING PROGRAM

## 2022-10-20 PROCEDURE — 80053 COMPREHEN METABOLIC PANEL: CPT | Performed by: SURGERY

## 2022-10-20 PROCEDURE — 410N000005 HC PER-PERFUSION, SH ONLY, 1ST 30 MIN: Performed by: SURGERY

## 2022-10-20 PROCEDURE — 82803 BLOOD GASES ANY COMBINATION: CPT | Performed by: PHYSICIAN ASSISTANT

## 2022-10-20 PROCEDURE — 86923 COMPATIBILITY TEST ELECTRIC: CPT | Performed by: SURGERY

## 2022-10-20 PROCEDURE — 258N000003 HC RX IP 258 OP 636: Performed by: PHYSICIAN ASSISTANT

## 2022-10-20 PROCEDURE — 250N000009 HC RX 250: Performed by: STUDENT IN AN ORGANIZED HEALTH CARE EDUCATION/TRAINING PROGRAM

## 2022-10-20 PROCEDURE — U0005 INFEC AGEN DETEC AMPLI PROBE: HCPCS | Performed by: SURGERY

## 2022-10-20 PROCEDURE — 93308 TTE F-UP OR LMTD: CPT | Mod: 26 | Performed by: INTERNAL MEDICINE

## 2022-10-20 PROCEDURE — 200N000001 HC R&B ICU

## 2022-10-20 PROCEDURE — 410N000006: Performed by: SURGERY

## 2022-10-20 PROCEDURE — 250N000009 HC RX 250: Performed by: INTERNAL MEDICINE

## 2022-10-20 PROCEDURE — 255N000002 HC RX 255 OP 636: Performed by: SURGERY

## 2022-10-20 PROCEDURE — 02RF08Z REPLACEMENT OF AORTIC VALVE WITH ZOOPLASTIC TISSUE, OPEN APPROACH: ICD-10-PCS | Performed by: SURGERY

## 2022-10-20 PROCEDURE — 93321 DOPPLER ECHO F-UP/LMTD STD: CPT | Mod: 26 | Performed by: INTERNAL MEDICINE

## 2022-10-20 PROCEDURE — 83735 ASSAY OF MAGNESIUM: CPT | Performed by: STUDENT IN AN ORGANIZED HEALTH CARE EDUCATION/TRAINING PROGRAM

## 2022-10-20 PROCEDURE — 33508 ENDOSCOPIC VEIN HARVEST: CPT | Mod: GC | Performed by: SURGERY

## 2022-10-20 PROCEDURE — 85610 PROTHROMBIN TIME: CPT | Performed by: STUDENT IN AN ORGANIZED HEALTH CARE EDUCATION/TRAINING PROGRAM

## 2022-10-20 PROCEDURE — 999N000157 HC STATISTIC RCP TIME EA 10 MIN

## 2022-10-20 PROCEDURE — 85730 THROMBOPLASTIN TIME PARTIAL: CPT | Performed by: SURGERY

## 2022-10-20 PROCEDURE — 82330 ASSAY OF CALCIUM: CPT | Performed by: SURGERY

## 2022-10-20 PROCEDURE — 83605 ASSAY OF LACTIC ACID: CPT | Performed by: PHYSICIAN ASSISTANT

## 2022-10-20 PROCEDURE — 84155 ASSAY OF PROTEIN SERUM: CPT | Performed by: STUDENT IN AN ORGANIZED HEALTH CARE EDUCATION/TRAINING PROGRAM

## 2022-10-20 PROCEDURE — 0212099 BYPASS CORONARY ARTERY, THREE ARTERIES FROM LEFT INTERNAL MAMMARY WITH AUTOLOGOUS VENOUS TISSUE, OPEN APPROACH: ICD-10-PCS | Performed by: SURGERY

## 2022-10-20 PROCEDURE — 82330 ASSAY OF CALCIUM: CPT | Performed by: STUDENT IN AN ORGANIZED HEALTH CARE EDUCATION/TRAINING PROGRAM

## 2022-10-20 PROCEDURE — 258N000003 HC RX IP 258 OP 636: Performed by: ANESTHESIOLOGY

## 2022-10-20 PROCEDURE — P9016 RBC LEUKOCYTES REDUCED: HCPCS | Performed by: SURGERY

## 2022-10-20 PROCEDURE — 258N000003 HC RX IP 258 OP 636: Performed by: NURSE ANESTHETIST, CERTIFIED REGISTERED

## 2022-10-20 PROCEDURE — 88305 TISSUE EXAM BY PATHOLOGIST: CPT | Mod: TC | Performed by: SURGERY

## 2022-10-20 PROCEDURE — 84100 ASSAY OF PHOSPHORUS: CPT | Performed by: STUDENT IN AN ORGANIZED HEALTH CARE EDUCATION/TRAINING PROGRAM

## 2022-10-20 PROCEDURE — 250N000011 HC RX IP 250 OP 636: Performed by: NURSE ANESTHETIST, CERTIFIED REGISTERED

## 2022-10-20 PROCEDURE — 250N000005 HC OR RX SURGIFLO HEMOSTATIC MATRIX 10ML 199102S OPNP: Performed by: SURGERY

## 2022-10-20 PROCEDURE — 250N000009 HC RX 250: Performed by: SURGERY

## 2022-10-20 PROCEDURE — 360N000079 HC SURGERY LEVEL 6, PER MIN: Performed by: SURGERY

## 2022-10-20 PROCEDURE — 93010 ELECTROCARDIOGRAM REPORT: CPT | Performed by: INTERNAL MEDICINE

## 2022-10-20 PROCEDURE — 258N000003 HC RX IP 258 OP 636: Performed by: SURGERY

## 2022-10-20 PROCEDURE — P9045 ALBUMIN (HUMAN), 5%, 250 ML: HCPCS | Performed by: PHYSICIAN ASSISTANT

## 2022-10-20 PROCEDURE — 82805 BLOOD GASES W/O2 SATURATION: CPT | Performed by: INTERNAL MEDICINE

## 2022-10-20 PROCEDURE — 94003 VENT MGMT INPAT SUBQ DAY: CPT

## 2022-10-20 PROCEDURE — 93005 ELECTROCARDIOGRAM TRACING: CPT

## 2022-10-20 PROCEDURE — 5A1221Z PERFORMANCE OF CARDIAC OUTPUT, CONTINUOUS: ICD-10-PCS | Performed by: SURGERY

## 2022-10-20 PROCEDURE — 33405 REPLACEMENT AORTIC VALVE OPN: CPT | Mod: GC | Performed by: SURGERY

## 2022-10-20 PROCEDURE — 85027 COMPLETE CBC AUTOMATED: CPT | Performed by: SURGERY

## 2022-10-20 PROCEDURE — 86901 BLOOD TYPING SEROLOGIC RH(D): CPT | Performed by: SURGERY

## 2022-10-20 PROCEDURE — 06BP4ZZ EXCISION OF RIGHT SAPHENOUS VEIN, PERCUTANEOUS ENDOSCOPIC APPROACH: ICD-10-PCS | Performed by: SURGERY

## 2022-10-20 PROCEDURE — 82310 ASSAY OF CALCIUM: CPT | Performed by: SURGERY

## 2022-10-20 PROCEDURE — 999N000141 HC STATISTIC PRE-PROCEDURE NURSING ASSESSMENT: Performed by: SURGERY

## 2022-10-20 PROCEDURE — 250N000024 HC ISOFLURANE, PER MIN: Performed by: SURGERY

## 2022-10-20 PROCEDURE — 36415 COLL VENOUS BLD VENIPUNCTURE: CPT | Performed by: SURGERY

## 2022-10-20 PROCEDURE — 250N000009 HC RX 250: Performed by: ANESTHESIOLOGY

## 2022-10-20 PROCEDURE — 33533 CABG ARTERIAL SINGLE: CPT | Mod: GC | Performed by: SURGERY

## 2022-10-20 PROCEDURE — 93325 DOPPLER ECHO COLOR FLOW MAPG: CPT

## 2022-10-20 PROCEDURE — 250N000009 HC RX 250: Performed by: NURSE ANESTHETIST, CERTIFIED REGISTERED

## 2022-10-20 DEVICE — INSPIRIS RESILIA AORTIC VALVE
Type: IMPLANTABLE DEVICE | Site: AORTA | Status: FUNCTIONAL
Brand: INSPIRIS RESILIA AORTIC VALVE

## 2022-10-20 DEVICE — SU DEVICE ENDO COR KNOT QUICK LOAD RELOAD 030874: Type: IMPLANTABLE DEVICE | Site: AORTA | Status: FUNCTIONAL

## 2022-10-20 DEVICE — COR-KNOT MINI® COMBO KIT
Type: IMPLANTABLE DEVICE | Site: HEART | Status: FUNCTIONAL
Brand: COR-KNOT MINI®

## 2022-10-20 RX ORDER — NALOXONE HYDROCHLORIDE 0.4 MG/ML
0.4 INJECTION, SOLUTION INTRAMUSCULAR; INTRAVENOUS; SUBCUTANEOUS
Status: DISCONTINUED | OUTPATIENT
Start: 2022-10-20 | End: 2022-11-08 | Stop reason: HOSPADM

## 2022-10-20 RX ORDER — EPINEPHRINE IN 0.9 % SOD CHLOR 5 MG/250ML
.01-.1 PLASTIC BAG, INJECTION (ML) INTRAVENOUS CONTINUOUS
Status: DISCONTINUED | OUTPATIENT
Start: 2022-10-20 | End: 2022-10-21

## 2022-10-20 RX ORDER — HYDROMORPHONE HCL IN WATER/PF 6 MG/30 ML
0.2 PATIENT CONTROLLED ANALGESIA SYRINGE INTRAVENOUS
Status: DISCONTINUED | OUTPATIENT
Start: 2022-10-20 | End: 2022-10-21

## 2022-10-20 RX ORDER — ASPIRIN 81 MG/1
81 TABLET, CHEWABLE ORAL
Status: COMPLETED | OUTPATIENT
Start: 2022-10-20 | End: 2022-10-20

## 2022-10-20 RX ORDER — ONDANSETRON 2 MG/ML
4 INJECTION INTRAMUSCULAR; INTRAVENOUS EVERY 6 HOURS PRN
Status: DISCONTINUED | OUTPATIENT
Start: 2022-10-20 | End: 2022-11-08 | Stop reason: HOSPADM

## 2022-10-20 RX ORDER — VECURONIUM BROMIDE 1 MG/ML
INJECTION, POWDER, LYOPHILIZED, FOR SOLUTION INTRAVENOUS PRN
Status: DISCONTINUED | OUTPATIENT
Start: 2022-10-20 | End: 2022-10-20

## 2022-10-20 RX ORDER — LIDOCAINE 40 MG/G
CREAM TOPICAL
Status: DISCONTINUED | OUTPATIENT
Start: 2022-10-20 | End: 2022-10-20 | Stop reason: HOSPADM

## 2022-10-20 RX ORDER — SODIUM CHLORIDE 9 MG/ML
INJECTION, SOLUTION INTRAVENOUS CONTINUOUS PRN
Status: DISCONTINUED | OUTPATIENT
Start: 2022-10-20 | End: 2022-10-20

## 2022-10-20 RX ORDER — NITROGLYCERIN 20 MG/100ML
10-200 INJECTION INTRAVENOUS CONTINUOUS PRN
Status: DISCONTINUED | OUTPATIENT
Start: 2022-10-20 | End: 2022-11-01

## 2022-10-20 RX ORDER — METHOCARBAMOL 500 MG/1
500 TABLET, FILM COATED ORAL EVERY 6 HOURS PRN
Status: DISCONTINUED | OUTPATIENT
Start: 2022-10-20 | End: 2022-10-21

## 2022-10-20 RX ORDER — SODIUM CHLORIDE, SODIUM LACTATE, POTASSIUM CHLORIDE, CALCIUM CHLORIDE 600; 310; 30; 20 MG/100ML; MG/100ML; MG/100ML; MG/100ML
INJECTION, SOLUTION INTRAVENOUS CONTINUOUS
Status: DISCONTINUED | OUTPATIENT
Start: 2022-10-20 | End: 2022-10-20 | Stop reason: HOSPADM

## 2022-10-20 RX ORDER — NEOSTIGMINE METHYLSULFATE 1 MG/ML
VIAL (ML) INJECTION PRN
Status: DISCONTINUED | OUTPATIENT
Start: 2022-10-20 | End: 2022-10-20

## 2022-10-20 RX ORDER — CHLORHEXIDINE GLUCONATE ORAL RINSE 1.2 MG/ML
10 SOLUTION DENTAL ONCE
Status: COMPLETED | OUTPATIENT
Start: 2022-10-20 | End: 2022-10-20

## 2022-10-20 RX ORDER — PROTAMINE SULFATE 10 MG/ML
INJECTION, SOLUTION INTRAVENOUS PRN
Status: DISCONTINUED | OUTPATIENT
Start: 2022-10-20 | End: 2022-10-20

## 2022-10-20 RX ORDER — FENTANYL CITRATE 0.05 MG/ML
50 INJECTION, SOLUTION INTRAMUSCULAR; INTRAVENOUS
Status: DISCONTINUED | OUTPATIENT
Start: 2022-10-20 | End: 2022-10-20 | Stop reason: HOSPADM

## 2022-10-20 RX ORDER — PROPOFOL 10 MG/ML
5-75 INJECTION, EMULSION INTRAVENOUS CONTINUOUS
Status: DISCONTINUED | OUTPATIENT
Start: 2022-10-20 | End: 2022-10-21

## 2022-10-20 RX ORDER — AMOXICILLIN 250 MG
1 CAPSULE ORAL 2 TIMES DAILY
Status: DISCONTINUED | OUTPATIENT
Start: 2022-10-20 | End: 2022-11-08 | Stop reason: HOSPADM

## 2022-10-20 RX ORDER — ACETAMINOPHEN 325 MG/1
975 TABLET ORAL EVERY 8 HOURS
Status: DISCONTINUED | OUTPATIENT
Start: 2022-10-20 | End: 2022-10-21

## 2022-10-20 RX ORDER — HEPARIN SODIUM 5000 [USP'U]/.5ML
5000 INJECTION, SOLUTION INTRAVENOUS; SUBCUTANEOUS EVERY 8 HOURS
Status: DISCONTINUED | OUTPATIENT
Start: 2022-10-21 | End: 2022-10-28

## 2022-10-20 RX ORDER — FENTANYL CITRATE 50 UG/ML
INJECTION, SOLUTION INTRAMUSCULAR; INTRAVENOUS PRN
Status: DISCONTINUED | OUTPATIENT
Start: 2022-10-20 | End: 2022-10-20

## 2022-10-20 RX ORDER — PROCHLORPERAZINE MALEATE 5 MG
5 TABLET ORAL EVERY 6 HOURS PRN
Status: DISCONTINUED | OUTPATIENT
Start: 2022-10-20 | End: 2022-11-08 | Stop reason: HOSPADM

## 2022-10-20 RX ORDER — NICOTINE POLACRILEX 4 MG
15-30 LOZENGE BUCCAL
Status: DISCONTINUED | OUTPATIENT
Start: 2022-10-20 | End: 2022-10-20

## 2022-10-20 RX ORDER — HEPARIN SODIUM 1000 [USP'U]/ML
INJECTION, SOLUTION INTRAVENOUS; SUBCUTANEOUS PRN
Status: DISCONTINUED | OUTPATIENT
Start: 2022-10-20 | End: 2022-10-20

## 2022-10-20 RX ORDER — NALOXONE HYDROCHLORIDE 0.4 MG/ML
0.2 INJECTION, SOLUTION INTRAMUSCULAR; INTRAVENOUS; SUBCUTANEOUS
Status: DISCONTINUED | OUTPATIENT
Start: 2022-10-20 | End: 2022-11-08 | Stop reason: HOSPADM

## 2022-10-20 RX ORDER — POLYETHYLENE GLYCOL 3350 17 G/17G
17 POWDER, FOR SOLUTION ORAL DAILY
Status: DISCONTINUED | OUTPATIENT
Start: 2022-10-21 | End: 2022-11-08 | Stop reason: HOSPADM

## 2022-10-20 RX ORDER — GLYCOPYRROLATE 0.2 MG/ML
INJECTION, SOLUTION INTRAMUSCULAR; INTRAVENOUS PRN
Status: DISCONTINUED | OUTPATIENT
Start: 2022-10-20 | End: 2022-10-20

## 2022-10-20 RX ORDER — HYDRALAZINE HYDROCHLORIDE 20 MG/ML
10 INJECTION INTRAMUSCULAR; INTRAVENOUS EVERY 30 MIN PRN
Status: DISCONTINUED | OUTPATIENT
Start: 2022-10-20 | End: 2022-11-08 | Stop reason: HOSPADM

## 2022-10-20 RX ORDER — CEFAZOLIN SODIUM 2 G/100ML
2 INJECTION, SOLUTION INTRAVENOUS EVERY 8 HOURS
Status: COMPLETED | OUTPATIENT
Start: 2022-10-20 | End: 2022-10-21

## 2022-10-20 RX ORDER — LIDOCAINE 40 MG/G
CREAM TOPICAL
Status: DISCONTINUED | OUTPATIENT
Start: 2022-10-20 | End: 2022-11-08 | Stop reason: HOSPADM

## 2022-10-20 RX ORDER — PHENYLEPHRINE HCL IN 0.9% NACL 50MG/250ML
.1-4 PLASTIC BAG, INJECTION (ML) INTRAVENOUS CONTINUOUS PRN
Status: DISCONTINUED | OUTPATIENT
Start: 2022-10-20 | End: 2022-11-01

## 2022-10-20 RX ORDER — FAMOTIDINE 20 MG/1
20 TABLET, FILM COATED ORAL
Status: COMPLETED | OUTPATIENT
Start: 2022-10-20 | End: 2022-10-20

## 2022-10-20 RX ORDER — DEXTROSE MONOHYDRATE 25 G/50ML
25-50 INJECTION, SOLUTION INTRAVENOUS
Status: DISCONTINUED | OUTPATIENT
Start: 2022-10-20 | End: 2022-10-20

## 2022-10-20 RX ORDER — SODIUM CHLORIDE, SODIUM LACTATE, POTASSIUM CHLORIDE, CALCIUM CHLORIDE 600; 310; 30; 20 MG/100ML; MG/100ML; MG/100ML; MG/100ML
INJECTION, SOLUTION INTRAVENOUS CONTINUOUS PRN
Status: DISCONTINUED | OUTPATIENT
Start: 2022-10-20 | End: 2022-10-20

## 2022-10-20 RX ORDER — OXYCODONE HYDROCHLORIDE 5 MG/1
10 TABLET ORAL EVERY 4 HOURS PRN
Status: DISCONTINUED | OUTPATIENT
Start: 2022-10-20 | End: 2022-10-30

## 2022-10-20 RX ORDER — MUPIROCIN 20 MG/G
0.5 OINTMENT TOPICAL 2 TIMES DAILY
Status: DISCONTINUED | OUTPATIENT
Start: 2022-10-20 | End: 2022-10-21

## 2022-10-20 RX ORDER — ASPIRIN 81 MG/1
162 TABLET, CHEWABLE ORAL DAILY
Status: DISCONTINUED | OUTPATIENT
Start: 2022-10-21 | End: 2022-11-08 | Stop reason: HOSPADM

## 2022-10-20 RX ORDER — EPHEDRINE SULFATE 50 MG/ML
INJECTION, SOLUTION INTRAMUSCULAR; INTRAVENOUS; SUBCUTANEOUS PRN
Status: DISCONTINUED | OUTPATIENT
Start: 2022-10-20 | End: 2022-10-20

## 2022-10-20 RX ORDER — CALCIUM GLUCONATE 20 MG/ML
2 INJECTION, SOLUTION INTRAVENOUS
Status: ACTIVE | OUTPATIENT
Start: 2022-10-20 | End: 2022-10-26

## 2022-10-20 RX ORDER — DEXMEDETOMIDINE HYDROCHLORIDE 4 UG/ML
.2-.7 INJECTION, SOLUTION INTRAVENOUS CONTINUOUS
Status: DISCONTINUED | OUTPATIENT
Start: 2022-10-20 | End: 2022-10-21

## 2022-10-20 RX ORDER — CALCIUM GLUCONATE 20 MG/ML
1 INJECTION, SOLUTION INTRAVENOUS
Status: ACTIVE | OUTPATIENT
Start: 2022-10-20 | End: 2022-10-26

## 2022-10-20 RX ORDER — GABAPENTIN 100 MG/1
100 CAPSULE ORAL AT BEDTIME
Status: DISCONTINUED | OUTPATIENT
Start: 2022-10-20 | End: 2022-10-20

## 2022-10-20 RX ORDER — DEXTROSE MONOHYDRATE 25 G/50ML
25-50 INJECTION, SOLUTION INTRAVENOUS
Status: DISCONTINUED | OUTPATIENT
Start: 2022-10-20 | End: 2022-11-08 | Stop reason: HOSPADM

## 2022-10-20 RX ORDER — PANTOPRAZOLE SODIUM 40 MG/1
40 TABLET, DELAYED RELEASE ORAL DAILY
Status: DISCONTINUED | OUTPATIENT
Start: 2022-10-20 | End: 2022-11-08 | Stop reason: HOSPADM

## 2022-10-20 RX ORDER — ACETAMINOPHEN 325 MG/1
650 TABLET ORAL EVERY 4 HOURS PRN
Status: DISCONTINUED | OUTPATIENT
Start: 2022-10-23 | End: 2022-11-08 | Stop reason: HOSPADM

## 2022-10-20 RX ORDER — CEFAZOLIN SODIUM/WATER 3 G/30 ML
3 SYRINGE (ML) INTRAVENOUS SEE ADMIN INSTRUCTIONS
Status: DISCONTINUED | OUTPATIENT
Start: 2022-10-20 | End: 2022-10-20 | Stop reason: HOSPADM

## 2022-10-20 RX ORDER — OXYCODONE HYDROCHLORIDE 5 MG/1
5 TABLET ORAL EVERY 4 HOURS PRN
Status: DISCONTINUED | OUTPATIENT
Start: 2022-10-20 | End: 2022-10-30

## 2022-10-20 RX ORDER — ASPIRIN 81 MG/1
162 TABLET, CHEWABLE ORAL
Status: COMPLETED | OUTPATIENT
Start: 2022-10-20 | End: 2022-10-20

## 2022-10-20 RX ORDER — PROPOFOL 10 MG/ML
INJECTION, EMULSION INTRAVENOUS PRN
Status: DISCONTINUED | OUTPATIENT
Start: 2022-10-20 | End: 2022-10-20

## 2022-10-20 RX ORDER — CEFAZOLIN SODIUM/WATER 3 G/30 ML
3 SYRINGE (ML) INTRAVENOUS
Status: COMPLETED | OUTPATIENT
Start: 2022-10-20 | End: 2022-10-20

## 2022-10-20 RX ORDER — LIDOCAINE HYDROCHLORIDE 20 MG/ML
INJECTION, SOLUTION INFILTRATION; PERINEURAL PRN
Status: DISCONTINUED | OUTPATIENT
Start: 2022-10-20 | End: 2022-10-20

## 2022-10-20 RX ORDER — SODIUM CHLORIDE 9 MG/ML
INJECTION, SOLUTION INTRAVENOUS CONTINUOUS
Status: DISCONTINUED | OUTPATIENT
Start: 2022-10-20 | End: 2022-11-08 | Stop reason: HOSPADM

## 2022-10-20 RX ORDER — NICOTINE POLACRILEX 4 MG
15-30 LOZENGE BUCCAL
Status: DISCONTINUED | OUTPATIENT
Start: 2022-10-20 | End: 2022-11-08 | Stop reason: HOSPADM

## 2022-10-20 RX ORDER — BISACODYL 10 MG
10 SUPPOSITORY, RECTAL RECTAL DAILY PRN
Status: DISCONTINUED | OUTPATIENT
Start: 2022-10-20 | End: 2022-11-08 | Stop reason: HOSPADM

## 2022-10-20 RX ORDER — ONDANSETRON 4 MG/1
4 TABLET, ORALLY DISINTEGRATING ORAL EVERY 6 HOURS PRN
Status: DISCONTINUED | OUTPATIENT
Start: 2022-10-20 | End: 2022-11-08 | Stop reason: HOSPADM

## 2022-10-20 RX ORDER — GABAPENTIN 250 MG/5ML
100 SOLUTION ORAL AT BEDTIME
Status: DISCONTINUED | OUTPATIENT
Start: 2022-10-20 | End: 2022-10-21

## 2022-10-20 RX ORDER — HYDROMORPHONE HCL IN WATER/PF 6 MG/30 ML
0.4 PATIENT CONTROLLED ANALGESIA SYRINGE INTRAVENOUS
Status: DISCONTINUED | OUTPATIENT
Start: 2022-10-20 | End: 2022-10-21

## 2022-10-20 RX ORDER — PROPOFOL 10 MG/ML
INJECTION, EMULSION INTRAVENOUS CONTINUOUS PRN
Status: DISCONTINUED | OUTPATIENT
Start: 2022-10-20 | End: 2022-10-20

## 2022-10-20 RX ORDER — NOREPINEPHRINE BITARTRATE 0.02 MG/ML
.01-.6 INJECTION, SOLUTION INTRAVENOUS CONTINUOUS
Status: DISCONTINUED | OUTPATIENT
Start: 2022-10-20 | End: 2022-11-01

## 2022-10-20 RX ADMIN — HUMAN ALBUMIN MICROSPHERES AND PERFLUTREN 9 ML: 10; .22 INJECTION, SOLUTION INTRAVENOUS at 20:15

## 2022-10-20 RX ADMIN — SODIUM CHLORIDE: 9 INJECTION, SOLUTION INTRAVENOUS at 22:15

## 2022-10-20 RX ADMIN — PHENYLEPHRINE HYDROCHLORIDE 100 MCG: 10 INJECTION INTRAVENOUS at 09:58

## 2022-10-20 RX ADMIN — Medication 3 G: at 11:52

## 2022-10-20 RX ADMIN — PHENYLEPHRINE HYDROCHLORIDE 100 MCG: 10 INJECTION INTRAVENOUS at 09:36

## 2022-10-20 RX ADMIN — FENTANYL CITRATE 100 MCG: 50 INJECTION, SOLUTION INTRAMUSCULAR; INTRAVENOUS at 08:32

## 2022-10-20 RX ADMIN — SODIUM CHLORIDE, POTASSIUM CHLORIDE, SODIUM LACTATE AND CALCIUM CHLORIDE: 600; 310; 30; 20 INJECTION, SOLUTION INTRAVENOUS at 06:24

## 2022-10-20 RX ADMIN — FENTANYL CITRATE 100 MCG: 50 INJECTION, SOLUTION INTRAMUSCULAR; INTRAVENOUS at 13:15

## 2022-10-20 RX ADMIN — FENTANYL CITRATE 100 MCG: 50 INJECTION, SOLUTION INTRAMUSCULAR; INTRAVENOUS at 07:51

## 2022-10-20 RX ADMIN — FENTANYL CITRATE 100 MCG: 50 INJECTION, SOLUTION INTRAMUSCULAR; INTRAVENOUS at 08:35

## 2022-10-20 RX ADMIN — Medication 5 MG: at 08:09

## 2022-10-20 RX ADMIN — VANCOMYCIN HYDROCHLORIDE 1500 MG: 10 INJECTION, POWDER, LYOPHILIZED, FOR SOLUTION INTRAVENOUS at 19:39

## 2022-10-20 RX ADMIN — VECURONIUM BROMIDE 5 MG: 1 INJECTION, POWDER, LYOPHILIZED, FOR SOLUTION INTRAVENOUS at 10:46

## 2022-10-20 RX ADMIN — PHENYLEPHRINE HYDROCHLORIDE 100 MCG: 10 INJECTION INTRAVENOUS at 08:54

## 2022-10-20 RX ADMIN — ALBUMIN HUMAN 25 G: 0.05 INJECTION, SOLUTION INTRAVENOUS at 16:24

## 2022-10-20 RX ADMIN — EPINEPHRINE 0.03 MCG/KG/MIN: 1 INJECTION INTRAMUSCULAR; INTRAVENOUS; SUBCUTANEOUS at 12:52

## 2022-10-20 RX ADMIN — PHENYLEPHRINE HYDROCHLORIDE 100 MCG: 10 INJECTION INTRAVENOUS at 08:14

## 2022-10-20 RX ADMIN — SODIUM CHLORIDE: 9 INJECTION, SOLUTION INTRAVENOUS at 10:47

## 2022-10-20 RX ADMIN — PHENYLEPHRINE HYDROCHLORIDE 0.3 MCG/KG/MIN: 10 INJECTION INTRAVENOUS at 07:57

## 2022-10-20 RX ADMIN — SODIUM BICARBONATE: 84 INJECTION, SOLUTION INTRAVENOUS at 19:39

## 2022-10-20 RX ADMIN — VECURONIUM BROMIDE 3 MG: 1 INJECTION, POWDER, LYOPHILIZED, FOR SOLUTION INTRAVENOUS at 09:01

## 2022-10-20 RX ADMIN — PROPOFOL 50 MCG/KG/MIN: 10 INJECTION, EMULSION INTRAVENOUS at 13:50

## 2022-10-20 RX ADMIN — LIDOCAINE HYDROCHLORIDE 100 MG: 20 INJECTION, SOLUTION INFILTRATION; PERINEURAL at 07:39

## 2022-10-20 RX ADMIN — PROPOFOL 80 MG: 10 INJECTION, EMULSION INTRAVENOUS at 07:39

## 2022-10-20 RX ADMIN — FENTANYL CITRATE 100 MCG: 50 INJECTION, SOLUTION INTRAMUSCULAR; INTRAVENOUS at 07:39

## 2022-10-20 RX ADMIN — Medication 0.5 UNITS: at 13:29

## 2022-10-20 RX ADMIN — VANCOMYCIN HYDROCHLORIDE 2000 MG: 10 INJECTION, POWDER, LYOPHILIZED, FOR SOLUTION INTRAVENOUS at 06:55

## 2022-10-20 RX ADMIN — MIDAZOLAM HYDROCHLORIDE 2 MG: 1 INJECTION, SOLUTION INTRAMUSCULAR; INTRAVENOUS at 07:39

## 2022-10-20 RX ADMIN — PROPOFOL 20 MG: 10 INJECTION, EMULSION INTRAVENOUS at 08:35

## 2022-10-20 RX ADMIN — SENNOSIDES AND DOCUSATE SODIUM 1 TABLET: 8.6; 5 TABLET ORAL at 22:34

## 2022-10-20 RX ADMIN — ACETAMINOPHEN 975 MG: 325 TABLET, FILM COATED ORAL at 22:34

## 2022-10-20 RX ADMIN — SODIUM CHLORIDE 2 UNITS/HR: 9 INJECTION, SOLUTION INTRAVENOUS at 15:39

## 2022-10-20 RX ADMIN — PHENYLEPHRINE HYDROCHLORIDE 100 MCG: 10 INJECTION INTRAVENOUS at 07:57

## 2022-10-20 RX ADMIN — Medication 1 UNITS: at 14:43

## 2022-10-20 RX ADMIN — DEXMEDETOMIDINE HYDROCHLORIDE 0.2 MCG/KG/HR: 100 INJECTION, SOLUTION INTRAVENOUS at 08:09

## 2022-10-20 RX ADMIN — Medication 5 MG: at 14:26

## 2022-10-20 RX ADMIN — MIDAZOLAM HYDROCHLORIDE 2 MG: 1 INJECTION, SOLUTION INTRAMUSCULAR; INTRAVENOUS at 08:32

## 2022-10-20 RX ADMIN — VECURONIUM BROMIDE 2 MG: 1 INJECTION, POWDER, LYOPHILIZED, FOR SOLUTION INTRAVENOUS at 08:32

## 2022-10-20 RX ADMIN — Medication 0.5 UNITS: at 13:26

## 2022-10-20 RX ADMIN — PHENYLEPHRINE HYDROCHLORIDE 100 MCG: 10 INJECTION INTRAVENOUS at 07:39

## 2022-10-20 RX ADMIN — Medication 5 MG: at 08:27

## 2022-10-20 RX ADMIN — FENTANYL CITRATE 100 MCG: 50 INJECTION, SOLUTION INTRAMUSCULAR; INTRAVENOUS at 09:01

## 2022-10-20 RX ADMIN — Medication 1 UNITS: at 13:08

## 2022-10-20 RX ADMIN — Medication 1 UNITS: at 14:37

## 2022-10-20 RX ADMIN — CEFAZOLIN SODIUM 2 G: 2 INJECTION, SOLUTION INTRAVENOUS at 21:51

## 2022-10-20 RX ADMIN — SODIUM CHLORIDE: 9 INJECTION, SOLUTION INTRAVENOUS at 07:55

## 2022-10-20 RX ADMIN — VASOPRESSIN 2.4 UNITS/HR: 20 INJECTION INTRAVENOUS at 22:49

## 2022-10-20 RX ADMIN — Medication 5 MG: at 08:03

## 2022-10-20 RX ADMIN — VECURONIUM BROMIDE 5 MG: 1 INJECTION, POWDER, LYOPHILIZED, FOR SOLUTION INTRAVENOUS at 08:06

## 2022-10-20 RX ADMIN — FAMOTIDINE 20 MG: 20 TABLET ORAL at 06:23

## 2022-10-20 RX ADMIN — PHENYLEPHRINE HYDROCHLORIDE 100 MCG: 10 INJECTION INTRAVENOUS at 07:55

## 2022-10-20 RX ADMIN — FENTANYL CITRATE 100 MCG: 50 INJECTION, SOLUTION INTRAMUSCULAR; INTRAVENOUS at 13:50

## 2022-10-20 RX ADMIN — PROPOFOL 40 MCG/KG/MIN: 10 INJECTION, EMULSION INTRAVENOUS at 19:29

## 2022-10-20 RX ADMIN — CHLORHEXIDINE GLUCONATE 10 ML: 1.2 SOLUTION ORAL at 06:23

## 2022-10-20 RX ADMIN — SODIUM CHLORIDE, POTASSIUM CHLORIDE, SODIUM LACTATE AND CALCIUM CHLORIDE: 600; 310; 30; 20 INJECTION, SOLUTION INTRAVENOUS at 07:55

## 2022-10-20 RX ADMIN — PROPOFOL 20 MG: 10 INJECTION, EMULSION INTRAVENOUS at 10:27

## 2022-10-20 RX ADMIN — PHENYLEPHRINE HYDROCHLORIDE 1.6 MCG/KG/MIN: 10 INJECTION INTRAVENOUS at 18:00

## 2022-10-20 RX ADMIN — Medication 0.03 MCG/KG/MIN: at 19:19

## 2022-10-20 RX ADMIN — Medication 5 MG: at 07:57

## 2022-10-20 RX ADMIN — PROPOFOL 40 MCG/KG/MIN: 10 INJECTION, EMULSION INTRAVENOUS at 22:50

## 2022-10-20 RX ADMIN — Medication 5 MG: at 08:54

## 2022-10-20 RX ADMIN — FENTANYL CITRATE 100 MCG: 50 INJECTION, SOLUTION INTRAMUSCULAR; INTRAVENOUS at 10:24

## 2022-10-20 RX ADMIN — PHENYLEPHRINE HYDROCHLORIDE 100 MCG: 10 INJECTION INTRAVENOUS at 10:30

## 2022-10-20 RX ADMIN — VECURONIUM BROMIDE 5 MG: 1 INJECTION, POWDER, LYOPHILIZED, FOR SOLUTION INTRAVENOUS at 09:34

## 2022-10-20 RX ADMIN — GLYCOPYRROLATE 0.8 MG: 0.2 INJECTION, SOLUTION INTRAMUSCULAR; INTRAVENOUS at 14:50

## 2022-10-20 RX ADMIN — PHENYLEPHRINE HYDROCHLORIDE 100 MCG: 10 INJECTION INTRAVENOUS at 14:14

## 2022-10-20 RX ADMIN — PROPOFOL 30 MG: 10 INJECTION, EMULSION INTRAVENOUS at 10:22

## 2022-10-20 RX ADMIN — HEPARIN SODIUM 30000 UNITS: 1000 INJECTION INTRAVENOUS; SUBCUTANEOUS at 09:51

## 2022-10-20 RX ADMIN — MIDAZOLAM HYDROCHLORIDE 2 MG: 1 INJECTION, SOLUTION INTRAMUSCULAR; INTRAVENOUS at 10:15

## 2022-10-20 RX ADMIN — Medication 0.5 UNITS: at 14:02

## 2022-10-20 RX ADMIN — PROTAMINE SULFATE 300 MG: 10 INJECTION, SOLUTION INTRAVENOUS at 13:12

## 2022-10-20 RX ADMIN — MIDAZOLAM HYDROCHLORIDE 2 MG: 1 INJECTION, SOLUTION INTRAMUSCULAR; INTRAVENOUS at 06:58

## 2022-10-20 RX ADMIN — VECURONIUM BROMIDE 5 MG: 1 INJECTION, POWDER, LYOPHILIZED, FOR SOLUTION INTRAVENOUS at 11:33

## 2022-10-20 RX ADMIN — Medication 0.5 UNITS: at 14:00

## 2022-10-20 RX ADMIN — ROCURONIUM BROMIDE 50 MG: 50 INJECTION, SOLUTION INTRAVENOUS at 07:30

## 2022-10-20 RX ADMIN — ASPIRIN 81 MG CHEWABLE TABLET 162 MG: 81 TABLET CHEWABLE at 06:23

## 2022-10-20 RX ADMIN — Medication 25 MEQ: at 18:44

## 2022-10-20 RX ADMIN — Medication 3 G: at 07:59

## 2022-10-20 RX ADMIN — PROPOFOL 40 MCG/KG/MIN: 10 INJECTION, EMULSION INTRAVENOUS at 16:10

## 2022-10-20 RX ADMIN — PROPOFOL 20 MG: 10 INJECTION, EMULSION INTRAVENOUS at 10:18

## 2022-10-20 RX ADMIN — FENTANYL CITRATE 25 MCG: 50 INJECTION, SOLUTION INTRAMUSCULAR; INTRAVENOUS at 06:58

## 2022-10-20 RX ADMIN — PHENYLEPHRINE HYDROCHLORIDE 100 MCG: 10 INJECTION INTRAVENOUS at 08:25

## 2022-10-20 RX ADMIN — Medication 40 MG: at 18:18

## 2022-10-20 RX ADMIN — FENTANYL CITRATE 100 MCG: 50 INJECTION, SOLUTION INTRAMUSCULAR; INTRAVENOUS at 10:15

## 2022-10-20 RX ADMIN — Medication 0.5 UNITS: at 14:12

## 2022-10-20 RX ADMIN — MUPIROCIN 0.5 G: 20 OINTMENT TOPICAL at 19:31

## 2022-10-20 RX ADMIN — PHENYLEPHRINE HYDROCHLORIDE 100 MCG: 10 INJECTION INTRAVENOUS at 09:52

## 2022-10-20 RX ADMIN — PHENYLEPHRINE HYDROCHLORIDE 0.8 MCG/KG/MIN: 10 INJECTION INTRAVENOUS at 22:29

## 2022-10-20 RX ADMIN — VECURONIUM BROMIDE 5 MG: 1 INJECTION, POWDER, LYOPHILIZED, FOR SOLUTION INTRAVENOUS at 12:19

## 2022-10-20 RX ADMIN — PROPOFOL 30 MG: 10 INJECTION, EMULSION INTRAVENOUS at 07:41

## 2022-10-20 RX ADMIN — NEOSTIGMINE METHYLSULFATE 5 MG: 1 INJECTION, SOLUTION INTRAVENOUS at 14:50

## 2022-10-20 RX ADMIN — SODIUM BICARBONATE 50 MEQ: 84 INJECTION, SOLUTION INTRAVENOUS at 16:03

## 2022-10-20 RX ADMIN — AMINOCAPROIC ACID 5 G/HR: 250 INJECTION, SOLUTION INTRAVENOUS at 07:57

## 2022-10-20 RX ADMIN — PHENYLEPHRINE HYDROCHLORIDE 100 MCG: 10 INJECTION INTRAVENOUS at 09:54

## 2022-10-20 RX ADMIN — SODIUM CHLORIDE 3 UNITS/HR: 9 INJECTION, SOLUTION INTRAVENOUS at 21:10

## 2022-10-20 RX ADMIN — ALBUMIN HUMAN 25 G: 50 SOLUTION INTRAVENOUS at 19:20

## 2022-10-20 RX ADMIN — VASOPRESSIN 2.4 UNITS/HR: 20 INJECTION INTRAVENOUS at 16:25

## 2022-10-20 RX ADMIN — FENTANYL CITRATE 100 MCG: 50 INJECTION, SOLUTION INTRAMUSCULAR; INTRAVENOUS at 10:18

## 2022-10-20 RX ADMIN — GABAPENTIN 100 MG: 250 SOLUTION ORAL at 22:33

## 2022-10-20 RX ADMIN — PHENYLEPHRINE HYDROCHLORIDE 100 MCG: 10 INJECTION INTRAVENOUS at 09:10

## 2022-10-20 RX ADMIN — PHENYLEPHRINE HYDROCHLORIDE 100 MCG: 10 INJECTION INTRAVENOUS at 14:24

## 2022-10-20 RX ADMIN — PHENYLEPHRINE HYDROCHLORIDE 100 MCG: 10 INJECTION INTRAVENOUS at 09:45

## 2022-10-20 ASSESSMENT — ACTIVITIES OF DAILY LIVING (ADL)
ADLS_ACUITY_SCORE: 35
ADLS_ACUITY_SCORE: 35
ADLS_ACUITY_SCORE: 39
ADLS_ACUITY_SCORE: 47
ADLS_ACUITY_SCORE: 47
ADLS_ACUITY_SCORE: 35
ADLS_ACUITY_SCORE: 35
ADLS_ACUITY_SCORE: 47
ADLS_ACUITY_SCORE: 35
ADLS_ACUITY_SCORE: 35

## 2022-10-20 NOTE — ANESTHESIA CARE TRANSFER NOTE
Patient: Richar Davis    Procedure: Procedure(s):  CORONARY ARTERY BYPASS GRAFT x 3 (LEFT INTERNAL MAMMARY ARTERY - LEFT ANTERIOR DESCENDING ARTERY; SAPHENOUS VEIN - POSTERIOR DESCENDING ARTERY; SAPHENOUS VEIN - CIRCUMFLEX ARTERY) WITH ENDOSCOPIC LESSER SAPHENOUS VEIN HARVEST ON BILATERAL LOWER EXTREMITY, AORTIC VALVE REPLACEMENT WITH TISSUE HEART VALVE INSPIRIS  RESILIA  AORTIC VALVE SIZE: 25MM, AND ON CARDIOPULMONARY PUMP OXYGENATOR  (INTRAOPERATIVE TRANSESOPHAGEAL ECHOCARDIOGRAM BY ANESTHESIOLOGIST)       Diagnosis: CAD (coronary artery disease) [I25.10]  Diagnosis Additional Information: No value filed.    Anesthesia Type:   General     Note:    Oropharynx: endotracheal tube in place, ventilatory support and oral gastic tube in place        Independent Airway: airway patency not satisfactory and stable  Dentition: dentition unchanged  Vital Signs Stable: post-procedure vital signs reviewed and stable  Report to RN Given: handoff report given  Patient transferred to: ICU    ICU Handoff: Call for PAUSE to initiate/utilize ICU HANDOFF, Identified Patient, Identified Responsible Provider, Reviewed the Pertinent Medical History, Discussed Surgical Course, Reviewed Intra-OP Anesthesia Management and Issues during Anesthesia, Set Expectations for Post Procedure Period and Allowed Opportunity for Questions and Acknowledgement of Understanding      Vitals:  Vitals Value Taken Time   /60    Temp     Pulse 91 10/20/22 1453   Resp 16 10/20/22 1453   SpO2 99 % 10/20/22 1453   Vitals shown include unvalidated device data.    Electronically Signed By: ADONIS Glass CRNA  October 20, 2022  2:54 PM

## 2022-10-20 NOTE — ANESTHESIA POSTPROCEDURE EVALUATION
Patient: Richar Davis    Procedure: Procedure(s):  CORONARY ARTERY BYPASS GRAFT x 3 (LEFT INTERNAL MAMMARY ARTERY - LEFT ANTERIOR DESCENDING ARTERY; SAPHENOUS VEIN - POSTERIOR DESCENDING ARTERY; SAPHENOUS VEIN - CIRCUMFLEX ARTERY) WITH ENDOSCOPIC LESSER SAPHENOUS VEIN HARVEST ON BILATERAL LOWER EXTREMITY, AORTIC VALVE REPLACEMENT WITH TISSUE HEART VALVE INSPIRIS  RESILIA  AORTIC VALVE SIZE: 25MM, AND ON CARDIOPULMONARY PUMP OXYGENATOR  (INTRAOPERATIVE TRANSESOPHAGEAL ECHOCARDIOGRAM BY ANESTHESIOLOGIST)       Anesthesia Type:  General    Note:  Disposition: ICU            ICU Sign Out: Anesthesiologist/ICU physician sign out WAS performed   Postop Pain Control: Uneventful            Sign Out: Well controlled pain   PONV: No   Neuro/Psych: Uneventful            Sign Out: PLANNED postop sedation   Airway/Respiratory:             Sign Out: AIRWAY IN SITU/Resp. Support               Airway in situ/Resp. Support: ETT                 Reason: Planned Pre-op   CV/Hemodynamics: Uneventful            Sign Out: Acceptable CV status   Other NRE: NONE   DID A NON-ROUTINE EVENT OCCUR? No    Event details/Postop Comments:  Report given to the critical care physician Dr Velarde after patient delivered to ICU.             Last vitals:  Vitals:    10/20/22 0600 10/20/22 1450   BP: (!) 147/82    Pulse: 65    Resp: 16    Temp: 36.4  C (97.6  F)    SpO2: 96% 99%       Electronically Signed By: Jovan Rubalcava MD  October 20, 2022  3:12 PM

## 2022-10-20 NOTE — PROGRESS NOTES
[facility]  Critical Care Service  Progress Note  Date of Service (when I saw the patient): 10/20/2022  Main Plans for Today  extubate on pathway if able  Assessment & Plan   Richar Davis is a 67 year old male who was admitted on 10/20/2022. He underwent 3 vessel CAB and AVR today.  Neuro    1. Acute pain  2. Sedation  Plan:  -- Scheduled acetaminophen,   -- Propofol for sedation as needed until extubation  -- Delirium prevention as able    CV  1. S/p 3 vessel CAB and AVR  2. HTN by history  Plan:  -- Per pathway  -- Cardiac meds per CV surgery    Resp:  1. Post operative ventilator management  2. Acute respiratory failure  Plan:  -- Current settings are:    Vent Mode: CMV/AC  (Continuous Mandatory Ventilation/ Assist Control)  FiO2 (%): 50 %  Resp Rate (Set): 16 breaths/min  Tidal Volume (Set, mL): 500 mL  PEEP (cm H2O): 5 cmH2O  Resp: 16      -- PST when hemodynamically stable    GI/Nutrition  1. No prior hx  Plan:  -- NPO  -- PPI    Renal  1. No prior hx.  Baseline creatinine appears to be 0.9  Plan:  --monitor function and electrolytes as needed with replacement per ICU protocols. - generally avoid nephrotoxic agents such as NSAID, IV contrast unless specifically required  -- adjust medications as needed for renal clearance  -- follow I/O's as appropriate.  -- maintain euvolemia    Endocrine  1. Stress Hyperglycemia  Plan:  --  Insulin gtt per protocol if indicated  -- Keep BG  <180 for optimal healing    Heme:  1. Acute blood loss anemia  2. Coagulopathy   Plan:  -- Monitor hemoglobin.  -- Transfuse to keep > 7.0        General cares:  DVT Prophylaxis: Pneumatic Compression Devices  GI Prophylaxis: PPI  Restraints: Restraints for medical healing needed: YES  Family update by me today: Yes   Current lines are required for patient management  Access:  Maxwell Sarabia MD  Time Spent on this Encounter   Billing:  I spent 40 minutes bedside and on the inpatient unit today  managing the critical care of Richar Davis in relation to the issues listed in this note.  Interval History   S/p cardiac surgery today.  Physical Exam   Temp: 97.6  F (36.4  C) Temp src: Temporal Temp  Min: 97.6  F (36.4  C)  Max: 97.6  F (36.4  C) BP: (!) 147/82 Pulse: 65   Resp: 16 SpO2: 99 % O2 Device: Mechanical Ventilator    Vitals:    10/20/22 0600   Weight: 120.2 kg (265 lb)     I/O last 3 completed shifts:  In: 3050 [I.V.:2150; Other:900]  Out: 1100 [Urine:600; Blood:500]    GEN: sedated on ventilator  EYES: PERRL, Anicteric sclera.   HEENT:  Normocephalic, atraumatic, trachea midline, ETT secure  CV: RRR, no gallops, rubs, or murmurs. Chest tubes in place  PULM/CHEST: Clear breath sounds bilaterally without rhonchi, crackles or wheeze, symmetric chest rise  GI: abdomen soft, obese  : hernandez catheter in place, urine yellow and clear  EXTREMITIES: no peripheral edema, peripheral pulses intact  NEURO: sedated  SKIN: No rashes, sores or ulcerations      Data   Recent Labs   Lab 10/20/22  1321 10/20/22  0631   WBC 22.4* 6.5   HGB 11.5* 14.9   MCV 93 92   * 135*   INR 1.48*  --     136   POTASSIUM 3.8 4.2   CHLORIDE 109 105   CO2 23 27   BUN 12 13   CR 0.85 0.96   ANIONGAP 6 4   HALEY 9.5 9.7   * 91   ALBUMIN  --  3.4   PROTTOTAL  --  6.8   BILITOTAL  --  0.6   ALKPHOS  --  108   ALT  --  23   AST  --  23

## 2022-10-20 NOTE — PROGRESS NOTES
Pt arrived to unit at 1456, by 1511 pt had become hypertensive and the arterial line waveform readout was poor quality, NIBP pressures w/ MAP at 59. Increased epinephrine and phenylephrine and called provider to bedside. Bicarb push given for critical lactic acid and low blood gas pH. Pt stabilized around 1540 on 1.6 phenylephrine and 0.06 epinephrine. Nursing continue to monitor.

## 2022-10-20 NOTE — BRIEF OP NOTE
[facility]    Brief Operative Note    Pre-operative diagnosis: CAD (coronary artery disease) [I25.10]  Post-operative diagnosis Same    Procedure: Procedure(s):  CORONARY ARTERY BYPASS GRAFT x 3 (LEFT INTERNAL MAMMARY ARTERY - LEFT ANTERIOR DESCENDING ARTERY; SAPHENOUS VEIN - POSTERIOR DESCENDING ARTERY; SAPHENOUS VEIN - CIRCUMFLEX ARTERY) WITH ENDOSCOPIC LESSER SAPHENOUS VEIN HARVEST ON BILATERAL LOWER EXTREMITY, AORTIC VALVE REPLACEMENT WITH TISSUE HEART VALVE INSPIRIS  RESILIA  AORTIC VALVE SIZE: 25MM, AND ON CARDIOPULMONARY PUMP OXYGENATOR  (INTRAOPERATIVE TRANSESOPHAGEAL ECHOCARDIOGRAM BY ANESTHESIOLOGIST)  Surgeon: Surgeon(s) and Role:     * Bennie Pierre MD - Primary     * Tennille Ruiz PA-C - Assisting   * Farideh Calero MD - Fellow  Anesthesia: General   Estimated Blood Loss: 500 mls    Drains: 32 Fr meds x 2  Specimens:   ID Type Source Tests Collected by Time Destination   1 : AORTIC VALVE LEAFLETS Tissue Heart Valve, Aortic SURGICAL PATHOLOGY EXAM Bennie Pierre MD 10/20/2022  1:25 PM    A :  Blood Line, arterial CBC WITH PLATELETS, BASIC METABOLIC PANEL, FIBRINOGEN ACTIVITY, PARTIAL THROMBOPLASTIN TIME, INR Sample, ADONIS Cesar CRNA 10/20/2022  1:21 PM      Findings:   Severe multivessel CAD, aortic stenosis  Complications: None  Implants:   Implant Name Type Inv. Item Serial No.  Lot No. LRB No. Used Action   IMP KIT SUTURE COR-KNOT MINI 4X14MM [id number] - [id number] Metallic Hardware/[place] IMP KIT SUTURE COR-KNOT MINI 4X14MM [id number]  [employer] [id number] N/A 1 Used as a Supply   VALVE AORTIC INSPIRIS RESILLA [id number] SZ 25MM - [id number] Valve VALVE AORTIC INSPIRIS RESILLA [id number] SZ 25MM [id number] [place]  N/A 1 Implanted   DEVICE MERINO ENDO COR KNOT QUICK LOAD RELOAD [id number] - [id number] Wire DEVICE MERINO ENDO COR KNOT QUICK LOAD RELOAD [id number]  [employer] [id number] N/A 1 Used as a Supply

## 2022-10-20 NOTE — ANESTHESIA PROCEDURE NOTES
Central Line/PA Catheter Placement    Pre-Procedure   Staff -        Anesthesiologist:  Jovan Rubalcava MD       Performed By: anesthesiologist       Location: pre-op       Pre-Anesthestic Checklist: patient identified, IV checked, site marked, risks and benefits discussed, informed consent, monitors and equipment checked, pre-op evaluation and at physician/surgeon's request  Timeout:       Correct Patient: Yes        Correct Procedure: Yes        Correct Site: Yes        Correct Position: Yes        Correct Laterality: Yes   Line Placement:   This line was placed Pre Induction starting at 10/20/2022 7:00 AM and ending at 10/20/2022 7:15 AM    Procedure   Procedure: central line       Laterality: right       Insertion Site: right, internal jugular.       Patient Position: Trendelenburg  Sterile Prep        All elements of maximal sterile barrier technique followed       Patient Prep/Sterile Barriers: draped, hand hygiene, gloves , hat , mask , draped, gown, sterile gel and probe cover       Skin prep: Chloraprep  Insertion/Injection        Local skin infiltrated with 3 mL of 1% lidocaine.        Technique: ultrasound guided and Seldinger Technique        1. Ultrasound was used to evaluate the access site.       2. Vein evaluated via ultrasound for patency/adequacy.       3. Using real-time ultrasound the needle/catheter was observed entering the artery/vein.       4. Permanent image was captured and entered into the patient's record.       5. The visualized structures were anatomically normal.       6. There were no apparent abnormal pathologic findings.       Introducer Type: 9 Fr, 10 cm        Type: PA/CVC with Introducer       PA Catheter Type: pacing            Withdrawn and Locked at cm: 45  Narrative         Secured by: suture       Tegaderm and Biopatch dressing used.       Complications: None apparent,        blood aspirated from all lumens,        All lumens flushed: Yes       Verification method:  Palliative Care Heme/Onc Ultrasound   Comments:  Ultrasound Interpretation central venous access    1. Ultrasound guidance was used to evaluate potential access sites.  2. Ultrasound was also used to verify the patency of the vessel specified above.   3. Ultrasound was used to visualize the needle entering the vessel.   4. The visualized structures were anatomically normal.  5. There were no apparent abnormal pathological findings.  6. A permanent ultrasound image was saved in the patient's record.       Vascular Surgery Urology Intervent Radiology Nephrology Rehab Medicine

## 2022-10-20 NOTE — CONSULTS
CARDIAC SURGERY NUTRITION CONSULT    Received standing order to assess and educate patient.    Will follow and complete assessment once patient is extubated and/or is transferred to medical unit.    Patient will receive nutrition education during the Outpatient Cardiac Rehab Program (nutrition classes/dietitian counseling).    Mary Lou Lacey RD, LD

## 2022-10-20 NOTE — OR NURSING
Pt has pre-existing rash/excoriation bilateral groin area, more so on left. Dr Pierre aware and assessed in pre-op area. Did not flynn area as appears open/excoriated in nature and contraindicated.     Nicotine patch in place on left upper arm, MDA aware. Pt states has own supply of patches. Advised to notify pharmacy when admitted to floor. RN also visualized has nicotine pouches in his pocket. Educated pt that he cannot use these while in hospital, pt agrees to plan.

## 2022-10-20 NOTE — ANESTHESIA PROCEDURE NOTES
Airway       Patient location during procedure: OR       Procedure Start/Stop Times: 10/20/2022 7:42 AM  Staff -        CRNA: Lauryn Crowder APRN CRNA       Performed By: CRNA  Consent for Airway        Urgency: elective  Indications and Patient Condition       Indications for airway management: kathie-procedural       Induction type:intravenous       Mask difficulty assessment: 2 - vent by mask + OA or adjuvant +/- NMBA    Final Airway Details       Final airway type: endotracheal airway       Successful airway: Single subglottic suction  Endotracheal Airway Details        ETT size (mm): 8.0       Cuffed: yes       Successful intubation technique: video laryngoscopy       VL Blade Size: Tinajero 4       Grade View of Cords: 1       Adjucts: stylet       Position: Right       Measured from: gums/teeth       Secured at (cm): 23       Bite block used: None    Post intubation assessment        Placement verified by: capnometry, equal breath sounds and chest rise        Number of attempts at approach: 1       Number of other approaches attempted: 0       Secured with: pink tape       Ease of procedure: easy       Dentition: Unchanged    Medication(s) Administered   Medication Administration Time: 10/20/2022 7:42 AM

## 2022-10-20 NOTE — INTERVAL H&P NOTE
"I have reviewed the surgical (or preoperative) H&P that is linked to this encounter, and examined the patient. There are no significant changes    Clinical Conditions Present on Arrival:  Clinically Significant Risk Factors Present on Admission                    # Obesity: Estimated body mass index is 36.96 kg/m  as calculated from the following:    Height as of this encounter: 1.803 m (5' 11\").    Weight as of this encounter: 120.2 kg (265 lb).       "

## 2022-10-20 NOTE — ANESTHESIA PROCEDURE NOTES
Arterial Line Procedure Note    Pre-Procedure   Staff -        Anesthesiologist:  Jovan Rubalcava MD       Performed By: Anesthesiologist       Location: pre-op       Pre-Anesthestic Checklist: patient identified, IV checked, risks and benefits discussed, informed consent, monitors and equipment checked and pre-op evaluation  Timeout:       Correct Patient: Yes        Correct Procedure: Yes        Correct Site: Yes        Correct Position: Yes   Line Placement:   This line was placed Pre Induction starting at 10/20/2022 6:50 AM and ending at 10/20/2022 7:00 AM  Procedure   Procedure: arterial line       Diagnosis: Hemodynamic instability       Laterality: right       Insertion Site: radial.  Sterile Prep        All elements of maximal sterile barrier technique followed       Patient Prep/Sterile Barriers: hand hygiene, sterile gloves, hat, mask, draped, sterile gown, draped       Skin prep: Chloraprep  Insertion/Injection        Technique: ultrasound guided        1. Ultrasound was used to evaluate the access site.       2. Artery evaluated via ultrasound for patency/adequacy.       3. Using real-time ultrasound the needle/catheter was observed entering the artery/vein.       4. Permanent image was captured and entered into the patient's record.       Catheter Type/Size: 20 gauge, 1.75 in/4.5 cm quick cath (integral wire)  Narrative         Secured by: other       Tegaderm dressing used.       Complications: None apparent,        Arterial waveform: Yes    Comments:  Ultrasound Interpretation arterial catheter    1. Ultrasound guidance was used to evaluate potential access sites.  2. Ultrasound was also used to verify the patency of the vessel specified above.   3. Ultrasound was used to visualize the needle entering the vessel.   4. The visualized structures were anatomically normal.  5. There were no apparent abnormal pathological findings.  6. A permanent ultrasound image was saved in the patient's  record.

## 2022-10-21 ENCOUNTER — APPOINTMENT (OUTPATIENT)
Dept: PHYSICAL THERAPY | Facility: CLINIC | Age: 67
DRG: 219 | End: 2022-10-21
Attending: STUDENT IN AN ORGANIZED HEALTH CARE EDUCATION/TRAINING PROGRAM
Payer: COMMERCIAL

## 2022-10-21 ENCOUNTER — APPOINTMENT (OUTPATIENT)
Dept: GENERAL RADIOLOGY | Facility: CLINIC | Age: 67
DRG: 219 | End: 2022-10-21
Attending: STUDENT IN AN ORGANIZED HEALTH CARE EDUCATION/TRAINING PROGRAM
Payer: COMMERCIAL

## 2022-10-21 DIAGNOSIS — Z95.1 S/P CABG (CORONARY ARTERY BYPASS GRAFT): Primary | ICD-10-CM

## 2022-10-21 LAB
ALBUMIN SERPL-MCNC: 3.1 G/DL (ref 3.4–5)
ALBUMIN SERPL-MCNC: 3.4 G/DL (ref 3.4–5)
ALP SERPL-CCNC: 63 U/L (ref 40–150)
ALP SERPL-CCNC: 66 U/L (ref 40–150)
ALT SERPL W P-5'-P-CCNC: 23 U/L (ref 0–70)
ALT SERPL W P-5'-P-CCNC: 24 U/L (ref 0–70)
ANION GAP SERPL CALCULATED.3IONS-SCNC: 4 MMOL/L (ref 3–14)
ANION GAP SERPL CALCULATED.3IONS-SCNC: 9 MMOL/L (ref 3–14)
AST SERPL W P-5'-P-CCNC: 73 U/L (ref 0–45)
AST SERPL W P-5'-P-CCNC: 74 U/L (ref 0–45)
BASE EXCESS BLDA CALC-SCNC: -0.1 MMOL/L (ref -9.6–2)
BASE EXCESS BLDA CALC-SCNC: -0.3 MMOL/L (ref -9.6–2)
BASE EXCESS BLDA CALC-SCNC: -0.4 MMOL/L (ref -9.6–2)
BASE EXCESS BLDA CALC-SCNC: -0.7 MMOL/L (ref -9–1.8)
BASE EXCESS BLDA CALC-SCNC: -0.9 MMOL/L (ref -9.6–2)
BASE EXCESS BLDA CALC-SCNC: -1 MMOL/L (ref -9.6–2)
BASE EXCESS BLDA CALC-SCNC: -1.2 MMOL/L (ref -9.6–2)
BASE EXCESS BLDA CALC-SCNC: -4.2 MMOL/L (ref -9.6–2)
BASE EXCESS BLDA CALC-SCNC: 0 MMOL/L (ref -9.6–2)
BASE EXCESS BLDA CALC-SCNC: 1.4 MMOL/L (ref -9–1.8)
BASE EXCESS BLDV CALC-SCNC: 0.1 MMOL/L (ref -8.1–1.9)
BASE EXCESS BLDV CALC-SCNC: 0.5 MMOL/L (ref -7.7–1.9)
BILIRUB DIRECT SERPL-MCNC: 0.2 MG/DL (ref 0–0.2)
BILIRUB SERPL-MCNC: 0.4 MG/DL (ref 0.2–1.3)
BILIRUB SERPL-MCNC: 0.5 MG/DL (ref 0.2–1.3)
BUN SERPL-MCNC: 13 MG/DL (ref 7–30)
BUN SERPL-MCNC: 13 MG/DL (ref 7–30)
CA-I BLD-MCNC: 4.7 MG/DL (ref 4.4–5.2)
CA-I BLD-MCNC: 4.8 MG/DL (ref 4.4–5.2)
CA-I BLD-MCNC: 4.8 MG/DL (ref 4.4–5.2)
CA-I BLD-MCNC: 4.9 MG/DL (ref 4.4–5.2)
CA-I BLD-MCNC: 5.1 MG/DL (ref 4.4–5.2)
CA-I BLD-MCNC: 5.3 MG/DL (ref 4.4–5.2)
CA-I BLD-MCNC: 5.6 MG/DL (ref 4.4–5.2)
CALCIUM SERPL-MCNC: 8.7 MG/DL (ref 8.5–10.1)
CALCIUM SERPL-MCNC: 8.8 MG/DL (ref 8.5–10.1)
CHLORIDE BLD-SCNC: 110 MMOL/L (ref 94–109)
CHLORIDE BLD-SCNC: 111 MMOL/L (ref 94–109)
CO2 SERPL-SCNC: 22 MMOL/L (ref 20–32)
CO2 SERPL-SCNC: 25 MMOL/L (ref 20–32)
CREAT SERPL-MCNC: 0.84 MG/DL (ref 0.66–1.25)
CREAT SERPL-MCNC: 0.89 MG/DL (ref 0.66–1.25)
ERYTHROCYTE [DISTWIDTH] IN BLOOD BY AUTOMATED COUNT: 13.2 % (ref 10–15)
GFR SERPL CREATININE-BSD FRML MDRD: >90 ML/MIN/1.73M2
GFR SERPL CREATININE-BSD FRML MDRD: >90 ML/MIN/1.73M2
GLUCOSE BLD-MCNC: 116 MG/DL (ref 70–99)
GLUCOSE BLD-MCNC: 121 MG/DL (ref 70–99)
GLUCOSE BLD-MCNC: 122 MG/DL (ref 70–99)
GLUCOSE BLD-MCNC: 126 MG/DL (ref 70–99)
GLUCOSE BLD-MCNC: 135 MG/DL (ref 70–99)
GLUCOSE BLD-MCNC: 138 MG/DL (ref 70–99)
GLUCOSE BLD-MCNC: 152 MG/DL (ref 70–99)
GLUCOSE BLD-MCNC: 159 MG/DL (ref 70–99)
GLUCOSE BLD-MCNC: 162 MG/DL (ref 70–99)
GLUCOSE BLD-MCNC: 173 MG/DL (ref 70–99)
GLUCOSE BLD-MCNC: 97 MG/DL (ref 70–99)
GLUCOSE BLDC GLUCOMTR-MCNC: 102 MG/DL (ref 70–99)
GLUCOSE BLDC GLUCOMTR-MCNC: 110 MG/DL (ref 70–99)
GLUCOSE BLDC GLUCOMTR-MCNC: 115 MG/DL (ref 70–99)
GLUCOSE BLDC GLUCOMTR-MCNC: 117 MG/DL (ref 70–99)
GLUCOSE BLDC GLUCOMTR-MCNC: 122 MG/DL (ref 70–99)
GLUCOSE BLDC GLUCOMTR-MCNC: 122 MG/DL (ref 70–99)
GLUCOSE BLDC GLUCOMTR-MCNC: 124 MG/DL (ref 70–99)
GLUCOSE BLDC GLUCOMTR-MCNC: 125 MG/DL (ref 70–99)
GLUCOSE BLDC GLUCOMTR-MCNC: 128 MG/DL (ref 70–99)
GLUCOSE BLDC GLUCOMTR-MCNC: 128 MG/DL (ref 70–99)
GLUCOSE BLDC GLUCOMTR-MCNC: 131 MG/DL (ref 70–99)
GLUCOSE BLDC GLUCOMTR-MCNC: 142 MG/DL (ref 70–99)
GLUCOSE BLDC GLUCOMTR-MCNC: 71 MG/DL (ref 70–99)
GLUCOSE BLDC GLUCOMTR-MCNC: 91 MG/DL (ref 70–99)
HCO3 BLD-SCNC: 23 MMOL/L (ref 21–28)
HCO3 BLD-SCNC: 25 MMOL/L (ref 21–28)
HCO3 BLDA-SCNC: 23 MMOL/L (ref 21–28)
HCO3 BLDA-SCNC: 24 MMOL/L (ref 21–28)
HCO3 BLDA-SCNC: 24 MMOL/L (ref 21–28)
HCO3 BLDA-SCNC: 25 MMOL/L (ref 21–28)
HCO3 BLDV-SCNC: 25 MMOL/L (ref 21–28)
HCO3 BLDV-SCNC: 26 MMOL/L (ref 21–28)
HCT VFR BLD AUTO: 33.4 % (ref 40–53)
HGB BLD-MCNC: 10.7 G/DL (ref 13.3–17.7)
HGB BLD-MCNC: 10.9 G/DL (ref 13.3–17.7)
HGB BLD-MCNC: 11 G/DL (ref 13.3–17.7)
HGB BLD-MCNC: 11.1 G/DL (ref 13.3–17.7)
HGB BLD-MCNC: 11.1 G/DL (ref 13.3–17.7)
HGB BLD-MCNC: 11.3 G/DL (ref 13.3–17.7)
HGB BLD-MCNC: 11.7 G/DL (ref 13.3–17.7)
HGB BLD-MCNC: 11.7 G/DL (ref 13.3–17.7)
HGB BLD-MCNC: 14.2 G/DL (ref 13.3–17.7)
HGB BLD-MCNC: 14.4 G/DL (ref 13.3–17.7)
LACTATE BLD-SCNC: 1 MMOL/L
LACTATE BLD-SCNC: 1 MMOL/L
LACTATE BLD-SCNC: 1.1 MMOL/L
LACTATE BLD-SCNC: 1.3 MMOL/L
LACTATE BLD-SCNC: 1.5 MMOL/L
LACTATE BLD-SCNC: 1.6 MMOL/L
LACTATE BLD-SCNC: 3.1 MMOL/L
LACTATE SERPL-SCNC: 1.2 MMOL/L (ref 0.7–2)
LACTATE SERPL-SCNC: 3.2 MMOL/L (ref 0.7–2)
MAGNESIUM SERPL-MCNC: 2 MG/DL (ref 1.6–2.3)
MCH RBC QN AUTO: 31 PG (ref 26.5–33)
MCHC RBC AUTO-ENTMCNC: 35 G/DL (ref 31.5–36.5)
MCV RBC AUTO: 89 FL (ref 78–100)
O2/TOTAL GAS SETTING VFR VENT: 100 %
O2/TOTAL GAS SETTING VFR VENT: 100 %
O2/TOTAL GAS SETTING VFR VENT: 30 %
O2/TOTAL GAS SETTING VFR VENT: 35 %
O2/TOTAL GAS SETTING VFR VENT: 35 %
O2/TOTAL GAS SETTING VFR VENT: 60 %
O2/TOTAL GAS SETTING VFR VENT: 70 %
O2/TOTAL GAS SETTING VFR VENT: 80 %
OXYHGB MFR BLDV: 72 % (ref 70–75)
PATH REPORT.COMMENTS IMP SPEC: NORMAL
PATH REPORT.COMMENTS IMP SPEC: NORMAL
PATH REPORT.FINAL DX SPEC: NORMAL
PATH REPORT.GROSS SPEC: NORMAL
PATH REPORT.MICROSCOPIC SPEC OTHER STN: NORMAL
PATH REPORT.RELEVANT HX SPEC: NORMAL
PCO2 BLD: 35 MM HG (ref 35–45)
PCO2 BLD: 35 MM HG (ref 35–45)
PCO2 BLDA: 40 MM HG (ref 35–45)
PCO2 BLDA: 41 MM HG (ref 35–45)
PCO2 BLDA: 42 MM HG (ref 35–45)
PCO2 BLDA: 42 MM HG (ref 35–45)
PCO2 BLDA: 43 MM HG (ref 35–45)
PCO2 BLDA: 50 MM HG (ref 35–45)
PCO2 BLDV: 38 MM HG (ref 40–50)
PCO2 BLDV: 49 MM HG (ref 40–50)
PH BLD: 7.43 [PH] (ref 7.35–7.45)
PH BLD: 7.46 [PH] (ref 7.35–7.45)
PH BLDA: 7.27 [PH] (ref 7.35–7.45)
PH BLDA: 7.37 [PH] (ref 7.35–7.45)
PH BLDA: 7.38 [PH] (ref 7.35–7.45)
PH BLDV: 7.34 [PH] (ref 7.32–7.43)
PH BLDV: 7.42 [PH] (ref 7.32–7.43)
PHOSPHATE SERPL-MCNC: 2.4 MG/DL (ref 2.5–4.5)
PHOTO IMAGE: NORMAL
PLATELET # BLD AUTO: 86 10E3/UL (ref 150–450)
PO2 BLD: 115 MM HG (ref 80–105)
PO2 BLD: 153 MM HG (ref 80–105)
PO2 BLDA: 166 MM HG (ref 80–105)
PO2 BLDA: 214 MM HG (ref 80–105)
PO2 BLDA: 329 MM HG (ref 80–105)
PO2 BLDA: 332 MM HG (ref 80–105)
PO2 BLDA: 350 MM HG (ref 80–105)
PO2 BLDA: 386 MM HG (ref 80–105)
PO2 BLDA: 397 MM HG (ref 80–105)
PO2 BLDA: 473 MM HG (ref 80–105)
PO2 BLDV: 36 MM HG (ref 25–47)
PO2 BLDV: 47 MM HG (ref 25–47)
POTASSIUM BLD-SCNC: 3.8 MMOL/L (ref 3.5–5)
POTASSIUM BLD-SCNC: 3.9 MMOL/L (ref 3.5–5)
POTASSIUM BLD-SCNC: 4.2 MMOL/L (ref 3.4–5.3)
POTASSIUM BLD-SCNC: 4.2 MMOL/L (ref 3.5–5)
POTASSIUM BLD-SCNC: 4.3 MMOL/L (ref 3.5–5)
POTASSIUM BLD-SCNC: 4.4 MMOL/L (ref 3.5–5)
POTASSIUM BLD-SCNC: 4.4 MMOL/L (ref 3.5–5)
POTASSIUM BLD-SCNC: 4.5 MMOL/L (ref 3.5–5)
POTASSIUM BLD-SCNC: 4.5 MMOL/L (ref 3.5–5)
POTASSIUM BLD-SCNC: 4.6 MMOL/L (ref 3.5–5)
POTASSIUM BLD-SCNC: 4.8 MMOL/L (ref 3.4–5.3)
PROT SERPL-MCNC: 5.4 G/DL (ref 6.8–8.8)
PROT SERPL-MCNC: 5.5 G/DL (ref 6.8–8.8)
RBC # BLD AUTO: 3.77 10E6/UL (ref 4.4–5.9)
SODIUM BLD-SCNC: 137 MMOL/L (ref 133–144)
SODIUM BLD-SCNC: 138 MMOL/L (ref 133–144)
SODIUM BLD-SCNC: 140 MMOL/L (ref 133–144)
SODIUM SERPL-SCNC: 139 MMOL/L (ref 133–144)
SODIUM SERPL-SCNC: 142 MMOL/L (ref 133–144)
WBC # BLD AUTO: 11.7 10E3/UL (ref 4–11)

## 2022-10-21 PROCEDURE — 258N000003 HC RX IP 258 OP 636: Performed by: STUDENT IN AN ORGANIZED HEALTH CARE EDUCATION/TRAINING PROGRAM

## 2022-10-21 PROCEDURE — 258N000003 HC RX IP 258 OP 636: Performed by: PHYSICIAN ASSISTANT

## 2022-10-21 PROCEDURE — 94003 VENT MGMT INPAT SUBQ DAY: CPT

## 2022-10-21 PROCEDURE — 999N000065 XR CHEST PORT 1 VIEW

## 2022-10-21 PROCEDURE — 84100 ASSAY OF PHOSPHORUS: CPT | Performed by: STUDENT IN AN ORGANIZED HEALTH CARE EDUCATION/TRAINING PROGRAM

## 2022-10-21 PROCEDURE — 82310 ASSAY OF CALCIUM: CPT | Performed by: PHYSICIAN ASSISTANT

## 2022-10-21 PROCEDURE — 250N000009 HC RX 250: Performed by: STUDENT IN AN ORGANIZED HEALTH CARE EDUCATION/TRAINING PROGRAM

## 2022-10-21 PROCEDURE — 93005 ELECTROCARDIOGRAM TRACING: CPT

## 2022-10-21 PROCEDURE — 82805 BLOOD GASES W/O2 SATURATION: CPT | Performed by: SURGERY

## 2022-10-21 PROCEDURE — 999N000157 HC STATISTIC RCP TIME EA 10 MIN

## 2022-10-21 PROCEDURE — 93010 ELECTROCARDIOGRAM REPORT: CPT | Performed by: INTERNAL MEDICINE

## 2022-10-21 PROCEDURE — 82330 ASSAY OF CALCIUM: CPT | Performed by: STUDENT IN AN ORGANIZED HEALTH CARE EDUCATION/TRAINING PROGRAM

## 2022-10-21 PROCEDURE — 97110 THERAPEUTIC EXERCISES: CPT | Mod: GP

## 2022-10-21 PROCEDURE — 250N000011 HC RX IP 250 OP 636: Performed by: STUDENT IN AN ORGANIZED HEALTH CARE EDUCATION/TRAINING PROGRAM

## 2022-10-21 PROCEDURE — 250N000013 HC RX MED GY IP 250 OP 250 PS 637: Performed by: STUDENT IN AN ORGANIZED HEALTH CARE EDUCATION/TRAINING PROGRAM

## 2022-10-21 PROCEDURE — 97162 PT EVAL MOD COMPLEX 30 MIN: CPT | Mod: GP

## 2022-10-21 PROCEDURE — 85027 COMPLETE CBC AUTOMATED: CPT | Performed by: PHYSICIAN ASSISTANT

## 2022-10-21 PROCEDURE — 83605 ASSAY OF LACTIC ACID: CPT | Performed by: PHYSICIAN ASSISTANT

## 2022-10-21 PROCEDURE — 83735 ASSAY OF MAGNESIUM: CPT | Performed by: STUDENT IN AN ORGANIZED HEALTH CARE EDUCATION/TRAINING PROGRAM

## 2022-10-21 PROCEDURE — 200N000001 HC R&B ICU

## 2022-10-21 PROCEDURE — 250N000013 HC RX MED GY IP 250 OP 250 PS 637: Performed by: PHYSICIAN ASSISTANT

## 2022-10-21 PROCEDURE — 250N000011 HC RX IP 250 OP 636: Performed by: PHYSICIAN ASSISTANT

## 2022-10-21 PROCEDURE — 250N000011 HC RX IP 250 OP 636: Performed by: INTERNAL MEDICINE

## 2022-10-21 PROCEDURE — 250N000012 HC RX MED GY IP 250 OP 636 PS 637: Performed by: STUDENT IN AN ORGANIZED HEALTH CARE EDUCATION/TRAINING PROGRAM

## 2022-10-21 PROCEDURE — 99291 CRITICAL CARE FIRST HOUR: CPT | Mod: 24 | Performed by: INTERNAL MEDICINE

## 2022-10-21 PROCEDURE — 82248 BILIRUBIN DIRECT: CPT | Performed by: PHYSICIAN ASSISTANT

## 2022-10-21 PROCEDURE — 88305 TISSUE EXAM BY PATHOLOGIST: CPT | Mod: 26 | Performed by: PATHOLOGY

## 2022-10-21 PROCEDURE — 82803 BLOOD GASES ANY COMBINATION: CPT | Performed by: PHYSICIAN ASSISTANT

## 2022-10-21 RX ORDER — HYDROMORPHONE HYDROCHLORIDE 1 MG/ML
0.5 INJECTION, SOLUTION INTRAMUSCULAR; INTRAVENOUS; SUBCUTANEOUS
Status: DISCONTINUED | OUTPATIENT
Start: 2022-10-21 | End: 2022-10-21

## 2022-10-21 RX ORDER — HYDROMORPHONE HCL IN WATER/PF 6 MG/30 ML
0.2 PATIENT CONTROLLED ANALGESIA SYRINGE INTRAVENOUS
Status: DISCONTINUED | OUTPATIENT
Start: 2022-10-21 | End: 2022-10-21

## 2022-10-21 RX ORDER — GABAPENTIN 250 MG/5ML
300 SOLUTION ORAL 3 TIMES DAILY
Status: DISCONTINUED | OUTPATIENT
Start: 2022-10-21 | End: 2022-11-01

## 2022-10-21 RX ORDER — HYDROMORPHONE HYDROCHLORIDE 1 MG/ML
0.5 INJECTION, SOLUTION INTRAMUSCULAR; INTRAVENOUS; SUBCUTANEOUS
Status: DISCONTINUED | OUTPATIENT
Start: 2022-10-21 | End: 2022-10-22

## 2022-10-21 RX ORDER — ACETAMINOPHEN 325 MG/1
975 TABLET ORAL EVERY 8 HOURS
Status: DISCONTINUED | OUTPATIENT
Start: 2022-10-21 | End: 2022-11-08 | Stop reason: HOSPADM

## 2022-10-21 RX ORDER — KETOROLAC TROMETHAMINE 15 MG/ML
15 INJECTION, SOLUTION INTRAMUSCULAR; INTRAVENOUS EVERY 6 HOURS PRN
Status: DISPENSED | OUTPATIENT
Start: 2022-10-21 | End: 2022-10-26

## 2022-10-21 RX ORDER — TAMSULOSIN HYDROCHLORIDE 0.4 MG/1
0.4 CAPSULE ORAL EVERY EVENING
Status: DISCONTINUED | OUTPATIENT
Start: 2022-10-21 | End: 2022-10-21

## 2022-10-21 RX ORDER — METHOCARBAMOL 750 MG/1
750 TABLET, FILM COATED ORAL 4 TIMES DAILY
Status: DISCONTINUED | OUTPATIENT
Start: 2022-10-21 | End: 2022-10-30

## 2022-10-21 RX ORDER — HYDROXYZINE HYDROCHLORIDE 25 MG/1
25-50 TABLET, FILM COATED ORAL EVERY 6 HOURS PRN
Status: DISCONTINUED | OUTPATIENT
Start: 2022-10-21 | End: 2022-11-08 | Stop reason: HOSPADM

## 2022-10-21 RX ORDER — TAMSULOSIN HYDROCHLORIDE 0.4 MG/1
0.8 CAPSULE ORAL EVERY EVENING
Status: DISCONTINUED | OUTPATIENT
Start: 2022-10-21 | End: 2022-11-08 | Stop reason: HOSPADM

## 2022-10-21 RX ORDER — HYDROMORPHONE HYDROCHLORIDE 1 MG/ML
0.5 INJECTION, SOLUTION INTRAMUSCULAR; INTRAVENOUS; SUBCUTANEOUS ONCE
Status: COMPLETED | OUTPATIENT
Start: 2022-10-21 | End: 2022-10-21

## 2022-10-21 RX ORDER — QUETIAPINE FUMARATE 50 MG/1
50 TABLET, FILM COATED ORAL 3 TIMES DAILY
Status: DISCONTINUED | OUTPATIENT
Start: 2022-10-21 | End: 2022-10-22

## 2022-10-21 RX ORDER — LIDOCAINE 4 G/G
1-2 PATCH TOPICAL
Status: DISCONTINUED | OUTPATIENT
Start: 2022-10-21 | End: 2022-11-08 | Stop reason: HOSPADM

## 2022-10-21 RX ORDER — ATORVASTATIN CALCIUM 10 MG/1
20 TABLET, FILM COATED ORAL DAILY
Status: DISCONTINUED | OUTPATIENT
Start: 2022-10-21 | End: 2022-11-08 | Stop reason: HOSPADM

## 2022-10-21 RX ADMIN — ACETAMINOPHEN 975 MG: 325 TABLET, FILM COATED ORAL at 07:28

## 2022-10-21 RX ADMIN — GABAPENTIN 300 MG: 250 SOLUTION ORAL at 21:48

## 2022-10-21 RX ADMIN — CEFAZOLIN SODIUM 2 G: 2 INJECTION, SOLUTION INTRAVENOUS at 03:10

## 2022-10-21 RX ADMIN — VANCOMYCIN HYDROCHLORIDE 1500 MG: 10 INJECTION, POWDER, LYOPHILIZED, FOR SOLUTION INTRAVENOUS at 06:11

## 2022-10-21 RX ADMIN — HYDROMORPHONE HYDROCHLORIDE 0.5 MG: 1 INJECTION, SOLUTION INTRAMUSCULAR; INTRAVENOUS; SUBCUTANEOUS at 20:04

## 2022-10-21 RX ADMIN — HYDROMORPHONE HYDROCHLORIDE 0.5 MG: 1 INJECTION, SOLUTION INTRAMUSCULAR; INTRAVENOUS; SUBCUTANEOUS at 09:37

## 2022-10-21 RX ADMIN — ACETAMINOPHEN 975 MG: 325 TABLET, FILM COATED ORAL at 14:30

## 2022-10-21 RX ADMIN — HYDROMORPHONE HYDROCHLORIDE 0.5 MG: 1 INJECTION, SOLUTION INTRAMUSCULAR; INTRAVENOUS; SUBCUTANEOUS at 12:10

## 2022-10-21 RX ADMIN — METHOCARBAMOL 750 MG: 750 TABLET ORAL at 12:10

## 2022-10-21 RX ADMIN — OXYCODONE HYDROCHLORIDE 10 MG: 5 TABLET ORAL at 16:24

## 2022-10-21 RX ADMIN — KETOROLAC TROMETHAMINE 15 MG: 15 INJECTION, SOLUTION INTRAMUSCULAR; INTRAVENOUS at 23:29

## 2022-10-21 RX ADMIN — OXYCODONE HYDROCHLORIDE 10 MG: 5 TABLET ORAL at 09:31

## 2022-10-21 RX ADMIN — QUETIAPINE FUMARATE 50 MG: 50 TABLET ORAL at 22:36

## 2022-10-21 RX ADMIN — ATORVASTATIN CALCIUM 20 MG: 10 TABLET, FILM COATED ORAL at 20:07

## 2022-10-21 RX ADMIN — OXYCODONE HYDROCHLORIDE 5 MG: 5 TABLET ORAL at 02:46

## 2022-10-21 RX ADMIN — HYDROXYZINE HYDROCHLORIDE 25 MG: 25 TABLET, FILM COATED ORAL at 09:43

## 2022-10-21 RX ADMIN — SENNOSIDES AND DOCUSATE SODIUM 1 TABLET: 8.6; 5 TABLET ORAL at 20:07

## 2022-10-21 RX ADMIN — HEPARIN SODIUM 5000 UNITS: 5000 INJECTION, SOLUTION INTRAVENOUS; SUBCUTANEOUS at 14:30

## 2022-10-21 RX ADMIN — HYDROMORPHONE HYDROCHLORIDE 0.5 MG: 1 INJECTION, SOLUTION INTRAMUSCULAR; INTRAVENOUS; SUBCUTANEOUS at 09:05

## 2022-10-21 RX ADMIN — SODIUM PHOSPHATE, MONOBASIC, MONOHYDRATE 15 MMOL: 276; 142 INJECTION, SOLUTION INTRAVENOUS at 06:02

## 2022-10-21 RX ADMIN — PROPOFOL 40 MCG/KG/MIN: 10 INJECTION, EMULSION INTRAVENOUS at 02:27

## 2022-10-21 RX ADMIN — MUPIROCIN 0.5 G: 20 OINTMENT TOPICAL at 09:17

## 2022-10-21 RX ADMIN — ONDANSETRON 4 MG: 2 INJECTION INTRAMUSCULAR; INTRAVENOUS at 10:04

## 2022-10-21 RX ADMIN — METHOCARBAMOL 500 MG: 500 TABLET ORAL at 07:28

## 2022-10-21 RX ADMIN — SODIUM CHLORIDE, POTASSIUM CHLORIDE, SODIUM LACTATE AND CALCIUM CHLORIDE 250 ML: 600; 310; 30; 20 INJECTION, SOLUTION INTRAVENOUS at 09:16

## 2022-10-21 RX ADMIN — ASPIRIN 81 MG CHEWABLE TABLET 162 MG: 81 TABLET CHEWABLE at 07:28

## 2022-10-21 RX ADMIN — KETOROLAC TROMETHAMINE 15 MG: 15 INJECTION, SOLUTION INTRAMUSCULAR; INTRAVENOUS at 09:42

## 2022-10-21 RX ADMIN — HYDROMORPHONE HYDROCHLORIDE 0.2 MG: 0.2 INJECTION, SOLUTION INTRAMUSCULAR; INTRAVENOUS; SUBCUTANEOUS at 07:28

## 2022-10-21 RX ADMIN — DEXMEDETOMIDINE HYDROCHLORIDE 0.2 MCG/KG/HR: 400 INJECTION INTRAVENOUS at 07:56

## 2022-10-21 RX ADMIN — LIDOCAINE 2 PATCH: 560 PATCH PERCUTANEOUS; TOPICAL; TRANSDERMAL at 09:16

## 2022-10-21 RX ADMIN — HYDROMORPHONE HYDROCHLORIDE 0.5 MG: 1 INJECTION, SOLUTION INTRAMUSCULAR; INTRAVENOUS; SUBCUTANEOUS at 17:48

## 2022-10-21 RX ADMIN — ACETAMINOPHEN 975 MG: 325 TABLET, FILM COATED ORAL at 23:31

## 2022-10-21 RX ADMIN — HYDROMORPHONE HYDROCHLORIDE 0.2 MG: 0.2 INJECTION, SOLUTION INTRAMUSCULAR; INTRAVENOUS; SUBCUTANEOUS at 02:27

## 2022-10-21 RX ADMIN — METHOCARBAMOL 750 MG: 750 TABLET ORAL at 23:26

## 2022-10-21 RX ADMIN — TAMSULOSIN HYDROCHLORIDE 0.8 MG: 0.4 CAPSULE ORAL at 20:06

## 2022-10-21 RX ADMIN — PROPOFOL 35 MCG/KG/MIN: 10 INJECTION, EMULSION INTRAVENOUS at 05:40

## 2022-10-21 RX ADMIN — METHOCARBAMOL 750 MG: 750 TABLET ORAL at 20:08

## 2022-10-21 RX ADMIN — OXYCODONE HYDROCHLORIDE 10 MG: 5 TABLET ORAL at 22:36

## 2022-10-21 RX ADMIN — POLYETHYLENE GLYCOL 3350 17 G: 17 POWDER, FOR SOLUTION ORAL at 07:28

## 2022-10-21 RX ADMIN — HYDROXYZINE HYDROCHLORIDE 25 MG: 25 TABLET, FILM COATED ORAL at 21:48

## 2022-10-21 RX ADMIN — SODIUM CHLORIDE 1.5 UNITS/HR: 9 INJECTION, SOLUTION INTRAVENOUS at 11:33

## 2022-10-21 RX ADMIN — CEFAZOLIN SODIUM 2 G: 2 INJECTION, SOLUTION INTRAVENOUS at 11:33

## 2022-10-21 RX ADMIN — Medication 40 MG: at 07:27

## 2022-10-21 RX ADMIN — GABAPENTIN 300 MG: 250 SOLUTION ORAL at 15:45

## 2022-10-21 ASSESSMENT — ACTIVITIES OF DAILY LIVING (ADL)
ADLS_ACUITY_SCORE: 47

## 2022-10-21 NOTE — PROGRESS NOTES
[facility]  Critical Care Service  Progress Note  Date of Service (when I saw the patient): 10/21/2022  Main Plans for Today  extubate on pathway if able  Assessment & Plan   Richar Davis is a 67 year old male who was admitted on 10/20/2022. He underwent 3 vessel CAB and AVR yesterday. 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  Neuro    1. Acute pain  2. Sedation  Plan:  -- Scheduled acetaminophen,   -- Propofol for sedation as needed until extubation  -- Delirium prevention as able    CV  1. S/p 3 vessel CAB and AVR  2. HTN by history  Plan:  -- Per pathway  -- Cardiac meds per CV surgery    Resp:  1. Post operative ventilator management  2. Acute respiratory failure  Plan:  -- Current settings are:    Vent Mode: CPAP/PS  (Continuous positive airway pressure with Pressure Support)  FiO2 (%): 30 %  Resp Rate (Set): 20 breaths/min  Tidal Volume (Set, mL): 500 mL  PEEP (cm H2O): 5 cmH2O  Pressure Support (cm H2O): 5 cmH2O  Resp: 24      -- PST this morning with possible extubation    GI/Nutrition  1. No prior hx  Plan:  -- NPO  -- PPI    Renal  1. No prior hx.  Baseline creatinine appears to be 0.9  Plan:  --monitor function and electrolytes as needed with replacement per ICU protocols. - generally avoid nephrotoxic agents such as NSAID, IV contrast unless specifically required  -- adjust medications as needed for renal clearance  -- follow I/O's as appropriate.  -- maintain euvolemia    Endocrine  1. Stress Hyperglycemia  Plan:  --  Insulin gtt per protocol if indicated  -- Keep BG  <180 for optimal healing    Heme:  1. Acute blood loss anemia  2. Coagulopathy   Plan:  -- Monitor hemoglobin.  -- Transfuse to keep > 7.0        General cares:  DVT Prophylaxis: Pneumatic Compression Devices  GI Prophylaxis: PPI  Restraints: Restraints for medical healing needed: YES  Family update by me today: Yes    Current lines are required for patient management  Access:  Maxwell Sarabia MD  Time Spent on this Encounter   Billing:  I spent 30 minutes bedside and on the inpatient unit today managing the critical care of Richar Davis in relation to the issues listed in this note.  Interval History   S/p cardiac surgery today.  Physical Exam   Temp: (!) 100.6  F (38.1  C) Temp src: Bladder Temp  Min: 97  F (36.1  C)  Max: 101.1  F (38.4  C) BP: (!) 134/90 Pulse: 110   Resp: 24 SpO2: 97 % O2 Device: Nasal cannula Oxygen Delivery: 4 LPM  Vitals:    10/20/22 0600 10/21/22 0600   Weight: 120.2 kg (265 lb) 124.6 kg (274 lb 11.1 oz)     I/O last 3 completed shifts:  In: 7106.31 [I.V.:5586.31; Other:900; NG/GT:120]  Out: 2785 [Urine:1805; Blood:500; Chest Tube:480]    GEN: sedated on ventilator  EYES: PERRL, Anicteric sclera.   HEENT:  Normocephalic, atraumatic, trachea midline, ETT secure  CV: RRR, no gallops, rubs, or murmurs. Chest tubes in place  PULM/CHEST: Clear breath sounds bilaterally without rhonchi, crackles or wheeze, symmetric chest rise  GI: abdomen soft, obese  : hernandez catheter in place, urine yellow and clear  EXTREMITIES: no peripheral edema, peripheral pulses intact  NEURO: sedated  SKIN: No rashes, sores or ulcerations      Data   Recent Labs   Lab 10/21/22  0806 10/21/22  0558 10/21/22  0557 10/21/22  0458 10/21/22  0004 10/20/22  2338 10/20/22  2022 10/20/22  1924 10/20/22  1528 10/20/22  1526 10/20/22  1321   WBC  --  11.7*  --   --   --   --   --  25.2*  --  29.3* 22.4*   HGB  --  11.7*  --   --   --   --   --  13.9  --  14.2 11.5*   MCV  --  89  --   --   --   --   --  93  --  93 93   PLT  --  86*  --   --   --   --   --  144*  --  158 136*   INR  --   --   --   --   --   --   --   --   --  1.31* 1.48*   NA  --   --   --  139  --  142  --   --   --  141 138   POTASSIUM  --   --   --  4.2  --  4.8  --   --   --  3.7 3.8   CHLORIDE  --   --   --  110*  --  111*  --   --   --  109 109   CO2  --    --   --  25  --  22  --   --   --  17* 23   BUN  --   --   --  13  --  13  --   --   --  13 12   CR  --   --   --  0.84  --  0.89  --   --   --  0.97 0.85   ANIONGAP  --   --   --  4  --  9  --   --   --  15* 6   HALEY  --   --   --  8.7  --  8.8  --   --   --  8.5 9.5   *  --  117* 126*   < > 135*   < >  --    < > 229* 182*   ALBUMIN  --   --   --  3.1*  --  3.4  --   --   --  3.1*  --    PROTTOTAL  --   --   --  5.4*  --  5.5*  --   --   --  5.5*  --    BILITOTAL  --   --   --  0.5  --  0.4  --   --   --  0.6  --    ALKPHOS  --   --   --  63  --  66  --   --   --  84  --    ALT  --   --   --  23  --  24  --   --   --  25  --    AST  --   --   --  74*  --  73*  --   --   --  68*  --     < > = values in this interval not displayed.

## 2022-10-21 NOTE — PROGRESS NOTES
"[facility]  Cardiovascular and Thoracic Surgery Daily Note      Assessment and Plan  POD # 1 s/p aortic valve replacement with a 25 mm Bal Inspiris Resilia bovine pericardial valve, coronary artery bypass grafting x 3 with left internal mammary artery to left anterior descending, reverse lesser saphenous vein graft to the posterior descending artery, reverse left lesser saphenous vein graft to the distal circumflex marginal artery, endoscopic bilateral lesser saphenous vein graft harvest from below the knees on 10/20 with Dr. Bennie Pierre    - CVS: Pre-op TTE with preserved biventricular function. Immediate postop profound vasoplegia and acidosis requiring multiple high dose pressors, bicarb infusion, and volume resuscitation. Weaned off pressors by this AM, lactate normalized. CI/CO preserved. BP somewhat labile, intermittently requiring nitroglycerin infusion, will consider low dose BB pending stability. Filling pressures low normal, volume PRN. Aspirin 162 mg daily, atorvastatin 20 mg at bedtime. Chest tubes: serosang, 480 mL, no airleak, keep today. TPW: capped.     - Resp: Extubated POD1. IS, pulmonary toilet    - Neuro: Post-extubation, repeating \"I need help\" over and over, but able to answer question appropriately, redirect, and no focal physical deficits. Chronic pain at baseline. Increase IV dilaudid to 0.5-1.0, schedule Robaxin, Tylenol. Add lidocaine patches, Toradol, Atarax. PTA on gabapentin 800 mg TID, will resume 300 mg BID and increasing pending improved confusion.       - Renal: No history of significant renal disease. Cr stable WNL. Trend BMP. Diuretic as above.   Recent Labs   Lab 10/21/22  0458 10/20/22  2338 10/20/22  1526   CR 0.84 0.89 0.97       - GI: No BM, continue bowel regimen    - : Mccarty in place, continue today. History urinary retention, PTA Flomax 0.8 mg daily resumed.     - Endo: DMT2. Insulin infusion x 48H per protocol. PTA metformin on hold.   " "  Hemoglobin A1C   Date Value Ref Range Status   09/14/2022 5.6 0.0 - 5.6 % Final     Comment:     Normal <5.7%   Prediabetes 5.7-6.4%    Diabetes 6.5% or higher     Note: Adopted from ADA consensus guidelines.        - FEN: Replace electrolytes as needed. ADAT post-extubation    - ID: Postop Leukocytosis, improving. Tmax 101.3. WBC down-trending. Periop abx in process. Trend CBC.   Recent Labs   Lab 10/21/22  0558 10/20/22  1924 10/20/22  1526   WBC 11.7* 25.2* 29.3*       - Heme: Acute blood loss anemia and thrombocytopenia due to surgery. Trend CBC, transfuse PRN.   Recent Labs   Lab 10/21/22  0558 10/20/22  1924 10/20/22  1526   HGB 11.7* 13.9 14.2   PLT 86* 144* 158       - Proph: SCD, subcutaneous heparin, PPI    - Dispo: [address]      Interval History  Multiple high dose pressors overnight with profound acidosis, now resolved with bicarb infusion, volume resuscitation. Extubated this AM. Consistently repeating \"I need help\"      Medications    acetaminophen  975 mg Oral Q8H     aspirin  162 mg Oral or NG Tube Daily     ceFAZolin  2 g Intravenous Q8H     gabapentin  100 mg Oral or G tube At Bedtime     heparin ANTICOAGULANT  5,000 Units Subcutaneous Q8H     lactated ringers  250 mL Intravenous Once     mupirocin  0.5 g Both Nostrils BID     pantoprazole  40 mg Oral or NG Tube Daily    Or     pantoprazole  40 mg Oral Daily     polyethylene glycol  17 g Oral Daily     senna-docusate  1 tablet Oral BID     sodium chloride (PF)  3 mL Intracatheter Q8H     sodium phosphate  15 mmol Intravenous Once     vancomycin  1,500 mg Intravenous Q12H     [START ON 10/23/2022] acetaminophen, bisacodyl, calcium gluconate, calcium gluconate, calcium gluconate, glucose **OR** dextrose **OR** glucagon, hydrALAZINE, HYDROmorphone **OR** HYDROmorphone, lidocaine 4%, lidocaine (buffered or not buffered), magnesium hydroxide, methocarbamol, naloxone **OR** naloxone **OR** naloxone **OR** naloxone, nitroGLYcerin, ondansetron **OR** " "ondansetron, oxyCODONE **OR** oxyCODONE, phenylephrine, prochlorperazine **OR** prochlorperazine, sodium chloride, sodium chloride (PF)      Physical Exam  Vitals were reviewed  Blood pressure 125/74, pulse 98, temperature (!) 100.8  F (38.2  C), resp. rate 20, height 1.803 m (5' 11\"), weight 124.6 kg (274 lb 11.1 oz), SpO2 98 %.  Rhythm: NSR    Lungs: diminished bases    Cardiovascular: rrr, no m/r/g    Abdomen: soft, NT, ND, +BS    Extremeties: warm, 1+ LE edema    Incision: CDI    CT: serosang output 480 mL, no air leak    Weight:   Vitals:    10/20/22 0600 10/21/22 0600   Weight: 120.2 kg (265 lb) 124.6 kg (274 lb 11.1 oz)         Data  Recent Labs   Lab 10/21/22  0558 10/21/22  0557 10/21/22  0458 10/21/22  0408 10/21/22  0004 10/20/22  2338 10/20/22  2022 10/20/22  1924 10/20/22  1528 10/20/22  1526 10/20/22  1321   WBC 11.7*  --   --   --   --   --   --  25.2*  --  29.3* 22.4*   HGB 11.7*  --   --   --   --   --   --  13.9  --  14.2 11.5*   MCV 89  --   --   --   --   --   --  93  --  93 93   PLT 86*  --   --   --   --   --   --  144*  --  158 136*   INR  --   --   --   --   --   --   --   --   --  1.31* 1.48*   NA  --   --  139  --   --  142  --   --   --  141 138   POTASSIUM  --   --  4.2  --   --  4.8  --   --   --  3.7 3.8   CHLORIDE  --   --  110*  --   --  111*  --   --   --  109 109   CO2  --   --  25  --   --  22  --   --   --  17* 23   BUN  --   --  13  --   --  13  --   --   --  13 12   CR  --   --  0.84  --   --  0.89  --   --   --  0.97 0.85   ANIONGAP  --   --  4  --   --  9  --   --   --  15* 6   HALEY  --   --  8.7  --   --  8.8  --   --   --  8.5 9.5   GLC  --  117* 126* 115*   < > 135*   < >  --    < > 229* 182*   ALBUMIN  --   --  3.1*  --   --  3.4  --   --   --  3.1*  --    PROTTOTAL  --   --  5.4*  --   --  5.5*  --   --   --  5.5*  --    BILITOTAL  --   --  0.5  --   --  0.4  --   --   --  0.6  --    ALKPHOS  --   --  63  --   --  66  --   --   --  84  --    ALT  --   --  23  --   --  24  " --   --   --  25  --    AST  --   --  74*  --   --  73*  --   --   --  68*  --     < > = values in this interval not displayed.       Imaging:  Recent Results (from the past 24 hour(s))   XR Chest Port 1 View    Narrative    XR CHEST PORT 1 VIEW 10/20/2022 3:51 PM    HISTORY: Post Op CVTS Surgery    COMPARISON: 2022      Impression    IMPRESSION: Endotracheal tube tip is about 5.5 cm above the sona. NG  tube coursing below the left hemidiaphragm, tip outside the field of  view and side port in the stomach. Right IJ [place]-Paige catheter tip in  the main pulmonary artery. Median sternotomy, mediastinal surgical  clips, aortic valvular prosthesis, mediastinal drains and left chest  tube. Small left pleural effusion and mild bibasilar atelectasis. No  pneumothorax. Normal heart size.    SARAH CHARLES MD         SYSTEM ID:  [id number]   Echocardiogram Limited   Result Value    LVEF  65-70%    Narrative    [id number]  [id number]  [id number]  [id number]^ANDREA^MAXWELL^COMFORT     [facility]  Echocardiography Laboratory  [address] [place] [address]     Name: KANNAN LORD  MRN: [id number]  : 1955  Study Date: 10/20/2022 07:53 PM  Age: 67 yrs  Gender: Male  Patient Location: [facility]  Reason For Study: Shock  Ordering Physician: MAXWELL MENDEZ  Performed By: Amita Wesley     BSA: 2.4 m2  Height: 71 in  Weight: 265 lb  HR: 85  BP: 99/55 mmHg  ______________________________________________________________________________  Procedure  Limited Portable Echo Adult. Optison (NDC #[id number]) given intravenously.  ______________________________________________________________________________  Interpretation Summary     Ordering physician number: [phone]  Maxwell Mendez MD The study was technically difficult. The study  was technically limited.  ______________________________________________________________________________  Left Ventricle  The visual ejection fraction  is 65-70%. Left ventricular systolic function is  normal. No regional wall motion abnormalities noted.     Right Ventricle  Right ventricular function cannot be assessed due to poor image quality.     Mitral Valve  There is mild to moderate mitral annular calcification. The mitral valve is  not well visualized. There is trace mitral regurgitation.     Tricuspid Valve  The tricuspid valve is not well visualized.     Aortic Valve  The aortic valve is not well visualized. No hemodynamically significant  valvular aortic stenosis.     Pulmonic Valve  The pulmonic valve is not well visualized.     Vessels  The aortic root is not well visualized.     Pericardium  There is no pericardial effusion.     Rhythm  Sinus rhythm was noted.     ______________________________________________________________________________  Doppler Measurements & Calculations     Ao V2 max: 188.9 cm/sec  Ao max P.0 mmHg  Ao V2 mean: 118.4 cm/sec  Ao mean P.8 mmHg  Ao V2 VTI: 31.7 cm  Ao acc time: 0.09 sec  LV V1 max P.2 mmHg  LV V1 max: 102.8 cm/sec  LV V1 VTI: 17.3 cm  AV Shola Ratio (DI): 0.54     ______________________________________________________________________________  Report approved by: Matilde Naidu 10/20/2022 08:32 PM         XR Chest Port 1 View    Narrative    EXAM: XR CHEST PORT 1 VIEW  LOCATION: [facility]  DATE/TIME: 10/21/2022 5:15 AM    INDICATION: Post Op CVTS Surgery  COMPARISON: 10/20/2022.      Impression    IMPRESSION: Poststernotomy and coronary artery bypass grafting. Endotracheal tube in satisfactory position terminating 5.5 cm above the sona. Right internal jugular venous [place]-Paige catheter tip in the main pulmonary artery. Mediastinal and left   pleural drains unchanged. Small left pleural effusion and mild left basilar atelectasis. Mild atelectasis of the right perihilar region. No pneumothorax visible.         Patient seen and discussed with Dr. Hardy Kat  ANNE Laboy  Cardiothoracic Surgery  Pager [phone]

## 2022-10-21 NOTE — PROGRESS NOTES
Neuro: Sedated. Patient STEEL, PERRLA, withdraws to pain.   CV: SR/ST. MAP greater than 65 and SBP less than 140 maintained with levo. V Wires capped.   Respiratory: Vented. FiO2 30%, , RR 20 Peep 5.   GI/: OG tube in place. Urine output 40-60ml/hr  Skin: Midline sternal incision. Bilateral graft sites. Groin rash.   Activity: Reposition every 2 hours..  Diet: NPO  Drips: Levo, Insulin, Propofol.   Other: Patient Lactic Acidosis corrected throughout shift. Hypotensive at beginning of shift. See chart for titrations and interventions.   Plan: Extubate, begin therapies and advancing activity as tolerated.

## 2022-10-21 NOTE — PLAN OF CARE
"Pt extubated to 2L NC at 0830. On extubation neurologically intact and can answer questions appropriately however maked repeated statements. \"I need help\" \"I want to go home\" etc and has difficulty expressing himself such as pain or thirst. Was very agitated post extubation, pain treated, required nitroglycerin for a few hours through this episiode. Off pressors. Sinus tachycardia HR >100 this shift. Danged at bedside at 1130 and up to chair via mechanical lift at 1300. Pain controlled with tylenol, robaxin, oxycodone, dilaudid, atarax, and Toradol. Tolerating ice chips and few sips of water. Continues on insulin infusion. Wife Mya at bedside this morning, supportive.   "

## 2022-10-21 NOTE — PROGRESS NOTES
[facility] ICU RESPIRATORY NOTE           Date of Admission: 10/20/2020     Date of Intubation (most recent): 10/20/2020    Reason for Mechanical Ventilation: CABG     Number of Days on Mechanical Ventilation: 1     Met Criteria for Spontaneous Breathing Trial: no     Significant event today :none     ABG Results:   Recent Labs   Lab 10/21/22  0503 10/20/22  2339 10/20/22  1806 10/20/22  1535   PH 7.46* 7.43 7.13*  --    PCO2 35 35 45  --    PO2 115* 153* 155*  --    HCO3 25 23 15*  --    O2PER 35 35 50 50       Current Vent Settings:  Vent Mode: CMV/AC  (Continuous Mandatory Ventilation/ Assist Control)  FiO2 (%): 30 %  Resp Rate (Set): 20 breaths/min  Tidal Volume (Set, mL): 500 mL  PEEP (cm H2O): 5 cmH2O  Resp: 22     Skin Assessment :Intact      Plan: Continue full ventilatory support and wean as tolerated.     Kip Haji, RT

## 2022-10-21 NOTE — PLAN OF CARE
[id number]    Pt remains sedated and on ventilator, plan to stay intubated overnight until lactic aid and arterial pH improve. Currently on vasopressin, levophed, and phenylephrine to maintain MAP 65<. CO + CI stable, SVR improved. SR, backup pacer set at 50 bpm. OG clamped for meds, intermittent suctioning. LS clear/dim, minimal secretions. Incisions covered, CDI dressings. Unable to doppler R DP pulse, able to get R PT. Cool extremities baseline per spouse. Rash to groin preexisting. R internal jugular introducer and PA catheter. R radial art line. Continue to monitor.

## 2022-10-21 NOTE — PLAN OF CARE
"Shift Summary: [id number]    Neuro: pt continues to yell out \"Hello, I need help\" \"I want to go home\" etc. Has difficulty expressing his needs and becomes very agitated at times. D/o to time and sometimes place and situation. Remainder of exam intact. JAYESH Laboy and MD Avilaed at bedside and aware of mental status, plan to monitor. Pt very agitated and pulling at Gisele/CT's/IVs. While staff attempting to prevent removal and redirect, pt swung arm at staff, pt able to be verbally redirected. MD Merchant updated at bedside and order received for 50mg seroquel.  CV: ST, remains off vasoactives  Resp: RA  GI: tolerating ice chips  : hernandez with initially dim UOP, became adequate w/out intervention  Inf/Endo: insulin gtt stopped d/t BGL 91.   Lines/Gtts: [place], introducer, and PIV. PA catheter pulled and capped  Pain: difficult to assess as pt will state having pain but refuse intervention. Received scheduled gabapentin, robaxin, tyl. Received PRN atarax, oxycodone, dilaudid. Appeared to rest comfortably between agitation periods. Pt refused scheduled robaxin despite education and spouse encouragement, later took with encouragement.  Family: pts wife Mya at bedside late evening. Appropriate and supportive. Mya inquired if a nicotine patch could help with agitation. Discussed that they are generally not prescribed post CAB, but that the CV surgery team would be asked in the morning.            "

## 2022-10-21 NOTE — PROGRESS NOTES
Weaned sedation off and flipped to SBT at 0800. Moves all extremities, gives thumbs up, and tolerating 5/5 30%. Dr. Sarabia rounded at bedside and gave okay to extubate per RT at 08:20.

## 2022-10-21 NOTE — PROGRESS NOTES
Pt extubated @ 830 to 4l nasal cannula without complication.  Pt is able to vocalize. Breath sounds diminished, strong cough, no stridor present.     Will continue to monitor.    Radha York RT on 10/21/2022 at 8:39 AM

## 2022-10-21 NOTE — PROVIDER NOTIFICATION
MD Notification    Notified Person: MD    Notified Person Name: Syd    Notification Date/Time: 10/20/22 1806    Notification Interaction: verbal    Purpose of Notification: lactic 11.3, pH arterial 7.13    Orders Received: Bicarb push and drip, call to CV surg fellow, start levophed to titrate down epiephrine    Comments:

## 2022-10-21 NOTE — PROVIDER NOTIFICATION
MD Notification    Notified Person: MD    Notified Person Name: Stacie    Notification Date/Time: 1220    Notification Interaction: verbal     Purpose of Notification: Lactic 3.2, PH Arterial 7.43    Orders Received: Stop Bicarb Drip. Recheck Lactic with morning labs     Comments:

## 2022-10-21 NOTE — OP NOTE
Procedure Date: 10/20/2022    REFERRING CARDIOLOGIST:  Sebastian Mejias MD    PREOPERATIVE DIAGNOSIS:  Severe symptomatic aortic stenosis and severe 3-vessel coronary artery disease.    POSTOPERATIVE DIAGNOSIS:  Severe symptomatic aortic stenosis and severe 3-vessel coronary artery disease.    SURGEON:  Bennie Pierre MD    ASSISTANT:  Farideh Calero MD and JAYESH Tay    NAME OF OPERATION:  Aortic valve replacement with a 25 mm Bal Inspiris Resilia bovine pericardial valve, coronary artery bypass grafting x 3 with left internal mammary artery to the left anterior descending, reverse lesser saphenous vein graft to the posterior descending artery, reverse left lesser saphenous vein graft to the distal circumflex marginal artery, endoscopic bilateral lesser saphenous vein graft harvest from below the knees, intraoperative MANDEEP.    ANESTHESIA:  General endotracheal.    INDICATIONS FOR PROCEDURE:  Mr. Davis is a very pleasant 67-year-old gentleman who I saw several months ago for severe symptomatic aortic stenosis and severe 3-vessel coronary artery disease.  He was taken to the operating room today for aortic valve replacement and coronary artery bypass grafting.  The patient elected to have a bioprosthetic valve and understood the limited longevity of a tissue valve and did not want a mechanical valve to avoid lifelong anticoagulation.    OPERATIVE FINDINGS:  The patient had an overall normal LV systolic size and function.  He had moderate LVH.  His aortic valve was severely stenotic and heavily calcified and was a trileaflet structure.  His left internal mammary artery was a good quality conduit with excellent flow, measuring 2.5 mm in diameter.  The lesser saphenous veins were on the slightly smaller side, but was of acceptable quality.  It was roughly 3 mm in diameter.  The posterior descending artery had mild disease and the probe size was 1.5 mm.  The distal circumflex marginal artery had mild disease and  the probe size was 1.5 mm as well.  The mid to distal LAD had mild disease and the probe size was 1.5 mm.    DESCRIPTION OF PROCEDURE:  After informed consent was obtained, the patient was brought down to the operating room and was placed on the OR table in a supine position.  Intravenous and intra-arterial lines were begun.  A [place]-Paige catheter was also placed by the anesthesia team.  While monitoring his blood pressure and EKG tracing, he was anesthetized and intubated using a single lumen endotracheal tube.  His entire chest, abdomen, both groins and legs were prepped down to the toes using multiple layers of DuraPrep.  He was draped in a sterile field.  A median sternotomy was performed, and in the meantime, the lesser saphenous veins were harvested endoscopically from both legs.  The left internal mammary artery was taken down.  Prior to clipping the LIMA distally, the patient was fully heparinized.  The sternal edges were retracted laterally, and the pericardium was opened to suspend the heart in a pericardial cradle.  The ascending aorta and the right atrial appendage were cannulated.  A retrograde cardioplegia catheter was placed into the coronary sinus without difficulty.  An antegrade needle/aortic root was placed in the ascending aorta as well.  After appropriate ACT level was achieved, cardiopulmonary bypass was established and the patient was kept normothermic during the entire operation.  The aorta was then crossclamped and antegrade cold blood cardioplegia was given to fully arrest the heart.  The patient went into good diastolic arrest without any LV distention. Following this, intermittent retrograde cardioplegia doses were given on average every 15 minutes for myocardial protection while the aorta was crossclamped. Carbon dioxide was flooded into the chest to prevent air embolism.  An LV vent was placed via the right superior pulmonary vein as well.    First, the distal coronary anastomoses were  performed.  The first coronary vessel grafted was the posterior descending artery.  Conduit used was a saphenous vein.  This anastomosis was performed in an end-to-side fashion using running 7-0 Prolene.  Next, the distal circumflex marginal artery was grafted.  Another saphenous vein was used as a conduit.  This anastomosis was performed in an end-to-side fashion using running 7-0 Prolene.  Next, the LIMA to LAD anastomosis was performed.  This was done in an end-to-side fashion using running 7-0 Prolene.  This anastomosis was protected by tacking the LIMA pedicle down to the epicardium using interrupted 6-0 Prolene.  Next, a transverse aortotomy was made and the aortic valve was exposed.  Findings noted above.  All 3 leaflets were excised and the annulus was meticulously debrided and irrigated out.  The annulus was sized to a 25 mm Inspiris Resilia valve.  Annular sutures were placed using horizontal mattress sutures with 2-0 Ethibond with subannular pledgets.  The valve was brought to the field and all sutures were brought through the sewing ring and the valve seated down without difficulty.  All sutures were tied down securely using the Cor-Knot device.  The aortotomy was then closed in 2 layers of running 4-0 Prolene.  Next, two 4 mm aortotomies were created in the ascending aorta and the 2 proximal vein anastomoses were performed in an end-to-side fashion using running 6-0 Prolene.  Retrograde hot shot was given and the right crossclamp was removed.  The left ventricle and the ascending aorta were continuously vented via the heart.  The patient was eventually weaned off cardiopulmonary bypass with low-dose epinephrine and Srinivas-Synephrine drips.  LV function was good.  The heart was adequately de-aired.  The prosthetic aortic valve was seated down well and without any paravalvular leaks.  Once the patient remained stable off bypass, the venous cannula was removed and protamine was given.  The aortic cannula was  subsequently removed as well.  Temporary ventricular pacer wire was placed in the RV muscle.  Two 28-Slovak straight chest tubes were placed in mediastinum as well.  These were all brought out percutaneously below the sternotomy incision and secured to the skin using 2-0 Ethibond.  The right pleural space was slightly opened.  The mediastinum was irrigated with antibiotic saline and hemostasis was achieved.  The sternum was reapproximated using multiple interrupted single and double wires.  The incision was closed in layers of Vicryl suture.  Skin was closed using 3-0 Vicryl and sealed using Dermabond.    There were no intraoperative complications and the patient tolerated the operation well.  No blood products were given intraoperatively.  All sponge counts, needle counts and instrument counts were correct x 2 at the end of the operation.    ESTIMATED BLOOD LOSS:  Unknown.    SPECIMEN REMOVED:  Aortic valve leaflets.    The patient was brought to the ICU, hemodynamically stable condition and remained intubated.    Bennie Pierre MD        D: 10/21/2022   T: 10/21/2022   MT: JANELLE    Name:     KANNAN LORD  MRN:      [id number]        Account:        [id number]   :      1955           Procedure Date: 10/20/2022     Document: [id number]

## 2022-10-21 NOTE — PROVIDER NOTIFICATION
MD Notification    Notified Person: MD    Notified Person Name: Syd    Notification Date/Time: 10/20/22 1526    Notification Interaction: verbal    Purpose of Notification: lactic 8.7, mixed blood gas pH 7.14    Orders Received: Amp of bicarb, decrease epi, called CV surg fellow    Comments:

## 2022-10-22 ENCOUNTER — APPOINTMENT (OUTPATIENT)
Dept: GENERAL RADIOLOGY | Facility: CLINIC | Age: 67
DRG: 219 | End: 2022-10-22
Attending: INTERNAL MEDICINE
Payer: COMMERCIAL

## 2022-10-22 LAB
ALBUMIN SERPL-MCNC: 2.8 G/DL (ref 3.4–5)
ALP SERPL-CCNC: 66 U/L (ref 40–150)
ALT SERPL W P-5'-P-CCNC: 20 U/L (ref 0–70)
ANION GAP SERPL CALCULATED.3IONS-SCNC: 2 MMOL/L (ref 3–14)
ANION GAP SERPL CALCULATED.3IONS-SCNC: 7 MMOL/L (ref 3–14)
AST SERPL W P-5'-P-CCNC: 72 U/L (ref 0–45)
BASE EXCESS BLDV CALC-SCNC: 0.6 MMOL/L (ref -7.7–1.9)
BASE EXCESS BLDV CALC-SCNC: 1.3 MMOL/L (ref -7.7–1.9)
BILIRUB DIRECT SERPL-MCNC: 0.3 MG/DL (ref 0–0.2)
BILIRUB SERPL-MCNC: 0.8 MG/DL (ref 0.2–1.3)
BUN SERPL-MCNC: 16 MG/DL (ref 7–30)
BUN SERPL-MCNC: 22 MG/DL (ref 7–30)
CA-I BLD-MCNC: 5.3 MG/DL (ref 4.4–5.2)
CALCIUM SERPL-MCNC: 9.1 MG/DL (ref 8.5–10.1)
CALCIUM SERPL-MCNC: 9.1 MG/DL (ref 8.5–10.1)
CHLORIDE BLD-SCNC: 106 MMOL/L (ref 94–109)
CHLORIDE BLD-SCNC: 107 MMOL/L (ref 94–109)
CO2 SERPL-SCNC: 25 MMOL/L (ref 20–32)
CO2 SERPL-SCNC: 28 MMOL/L (ref 20–32)
CREAT SERPL-MCNC: 1.04 MG/DL (ref 0.66–1.25)
CREAT SERPL-MCNC: 1.08 MG/DL (ref 0.66–1.25)
ERYTHROCYTE [DISTWIDTH] IN BLOOD BY AUTOMATED COUNT: 13.4 % (ref 10–15)
ERYTHROCYTE [DISTWIDTH] IN BLOOD BY AUTOMATED COUNT: 13.6 % (ref 10–15)
GFR SERPL CREATININE-BSD FRML MDRD: 76 ML/MIN/1.73M2
GFR SERPL CREATININE-BSD FRML MDRD: 79 ML/MIN/1.73M2
GLUCOSE BLD-MCNC: 128 MG/DL (ref 70–99)
GLUCOSE BLD-MCNC: 137 MG/DL (ref 70–99)
GLUCOSE BLDC GLUCOMTR-MCNC: 108 MG/DL (ref 70–99)
GLUCOSE BLDC GLUCOMTR-MCNC: 124 MG/DL (ref 70–99)
GLUCOSE BLDC GLUCOMTR-MCNC: 129 MG/DL (ref 70–99)
GLUCOSE BLDC GLUCOMTR-MCNC: 141 MG/DL (ref 70–99)
HCO3 BLDV-SCNC: 27 MMOL/L (ref 21–28)
HCO3 BLDV-SCNC: 28 MMOL/L (ref 21–28)
HCT VFR BLD AUTO: 29.3 % (ref 40–53)
HCT VFR BLD AUTO: 32.7 % (ref 40–53)
HGB BLD-MCNC: 10.5 G/DL (ref 13.3–17.7)
HGB BLD-MCNC: 9.5 G/DL (ref 13.3–17.7)
LACTATE SERPL-SCNC: 1.3 MMOL/L (ref 0.7–2)
MAGNESIUM SERPL-MCNC: 2.2 MG/DL (ref 1.6–2.3)
MCH RBC QN AUTO: 30.2 PG (ref 26.5–33)
MCH RBC QN AUTO: 30.3 PG (ref 26.5–33)
MCHC RBC AUTO-ENTMCNC: 32.1 G/DL (ref 31.5–36.5)
MCHC RBC AUTO-ENTMCNC: 32.4 G/DL (ref 31.5–36.5)
MCV RBC AUTO: 93 FL (ref 78–100)
MCV RBC AUTO: 94 FL (ref 78–100)
O2/TOTAL GAS SETTING VFR VENT: 21 %
O2/TOTAL GAS SETTING VFR VENT: 28 %
PCO2 BLDV: 52 MM HG (ref 40–50)
PCO2 BLDV: 52 MM HG (ref 40–50)
PH BLDV: 7.32 [PH] (ref 7.32–7.43)
PH BLDV: 7.34 [PH] (ref 7.32–7.43)
PHOSPHATE SERPL-MCNC: 3.1 MG/DL (ref 2.5–4.5)
PLATELET # BLD AUTO: 54 10E3/UL (ref 150–450)
PLATELET # BLD AUTO: 54 10E3/UL (ref 150–450)
PO2 BLDV: 33 MM HG (ref 25–47)
PO2 BLDV: 36 MM HG (ref 25–47)
POTASSIUM BLD-SCNC: 4.4 MMOL/L (ref 3.4–5.3)
POTASSIUM BLD-SCNC: 4.7 MMOL/L (ref 3.4–5.3)
PROT SERPL-MCNC: 5.3 G/DL (ref 6.8–8.8)
RBC # BLD AUTO: 3.14 10E6/UL (ref 4.4–5.9)
RBC # BLD AUTO: 3.48 10E6/UL (ref 4.4–5.9)
SODIUM SERPL-SCNC: 137 MMOL/L (ref 133–144)
SODIUM SERPL-SCNC: 138 MMOL/L (ref 133–144)
WBC # BLD AUTO: 8.4 10E3/UL (ref 4–11)
WBC # BLD AUTO: 8.6 10E3/UL (ref 4–11)

## 2022-10-22 PROCEDURE — 93005 ELECTROCARDIOGRAM TRACING: CPT

## 2022-10-22 PROCEDURE — 250N000011 HC RX IP 250 OP 636: Performed by: INTERNAL MEDICINE

## 2022-10-22 PROCEDURE — 85014 HEMATOCRIT: CPT | Performed by: PHYSICIAN ASSISTANT

## 2022-10-22 PROCEDURE — 250N000013 HC RX MED GY IP 250 OP 250 PS 637: Performed by: STUDENT IN AN ORGANIZED HEALTH CARE EDUCATION/TRAINING PROGRAM

## 2022-10-22 PROCEDURE — 99233 SBSQ HOSP IP/OBS HIGH 50: CPT | Mod: 24 | Performed by: INTERNAL MEDICINE

## 2022-10-22 PROCEDURE — 200N000001 HC R&B ICU

## 2022-10-22 PROCEDURE — 85014 HEMATOCRIT: CPT | Performed by: INTERNAL MEDICINE

## 2022-10-22 PROCEDURE — 258N000003 HC RX IP 258 OP 636: Performed by: STUDENT IN AN ORGANIZED HEALTH CARE EDUCATION/TRAINING PROGRAM

## 2022-10-22 PROCEDURE — 999N000040 HC STATISTIC CONSULT NO CHARGE VASC ACCESS

## 2022-10-22 PROCEDURE — 83605 ASSAY OF LACTIC ACID: CPT | Performed by: PHYSICIAN ASSISTANT

## 2022-10-22 PROCEDURE — 84100 ASSAY OF PHOSPHORUS: CPT | Performed by: STUDENT IN AN ORGANIZED HEALTH CARE EDUCATION/TRAINING PROGRAM

## 2022-10-22 PROCEDURE — 258N000003 HC RX IP 258 OP 636: Performed by: PHYSICIAN ASSISTANT

## 2022-10-22 PROCEDURE — 999N000065 XR CHEST PORT 1 VIEW

## 2022-10-22 PROCEDURE — 250N000009 HC RX 250: Performed by: INTERNAL MEDICINE

## 2022-10-22 PROCEDURE — 82330 ASSAY OF CALCIUM: CPT | Performed by: SURGERY

## 2022-10-22 PROCEDURE — 250N000013 HC RX MED GY IP 250 OP 250 PS 637: Performed by: INTERNAL MEDICINE

## 2022-10-22 PROCEDURE — 250N000013 HC RX MED GY IP 250 OP 250 PS 637: Performed by: PHYSICIAN ASSISTANT

## 2022-10-22 PROCEDURE — 82803 BLOOD GASES ANY COMBINATION: CPT | Performed by: PHYSICIAN ASSISTANT

## 2022-10-22 PROCEDURE — 250N000011 HC RX IP 250 OP 636

## 2022-10-22 PROCEDURE — 250N000011 HC RX IP 250 OP 636: Performed by: STUDENT IN AN ORGANIZED HEALTH CARE EDUCATION/TRAINING PROGRAM

## 2022-10-22 PROCEDURE — 82803 BLOOD GASES ANY COMBINATION: CPT | Performed by: STUDENT IN AN ORGANIZED HEALTH CARE EDUCATION/TRAINING PROGRAM

## 2022-10-22 PROCEDURE — 250N000011 HC RX IP 250 OP 636: Performed by: PHYSICIAN ASSISTANT

## 2022-10-22 PROCEDURE — 93010 ELECTROCARDIOGRAM REPORT: CPT | Mod: 76 | Performed by: INTERNAL MEDICINE

## 2022-10-22 PROCEDURE — 82248 BILIRUBIN DIRECT: CPT | Performed by: PHYSICIAN ASSISTANT

## 2022-10-22 PROCEDURE — 250N000009 HC RX 250: Performed by: STUDENT IN AN ORGANIZED HEALTH CARE EDUCATION/TRAINING PROGRAM

## 2022-10-22 PROCEDURE — 82310 ASSAY OF CALCIUM: CPT | Performed by: PHYSICIAN ASSISTANT

## 2022-10-22 PROCEDURE — 999N000065 XR ABDOMEN PORT 1 VIEW

## 2022-10-22 PROCEDURE — 83735 ASSAY OF MAGNESIUM: CPT | Performed by: STUDENT IN AN ORGANIZED HEALTH CARE EDUCATION/TRAINING PROGRAM

## 2022-10-22 PROCEDURE — 36556 INSERT NON-TUNNEL CV CATH: CPT | Performed by: INTERNAL MEDICINE

## 2022-10-22 RX ORDER — DEXMEDETOMIDINE HYDROCHLORIDE 4 UG/ML
.1-1.5 INJECTION, SOLUTION INTRAVENOUS CONTINUOUS
Status: DISCONTINUED | OUTPATIENT
Start: 2022-10-22 | End: 2022-10-24

## 2022-10-22 RX ORDER — HALOPERIDOL 5 MG/ML
2 INJECTION INTRAMUSCULAR EVERY 6 HOURS PRN
Status: DISCONTINUED | OUTPATIENT
Start: 2022-10-22 | End: 2022-10-29

## 2022-10-22 RX ORDER — HALOPERIDOL 5 MG/ML
INJECTION INTRAMUSCULAR
Status: COMPLETED
Start: 2022-10-22 | End: 2022-10-22

## 2022-10-22 RX ORDER — WATER 10 ML/10ML
INJECTION INTRAMUSCULAR; INTRAVENOUS; SUBCUTANEOUS
Status: DISCONTINUED
Start: 2022-10-22 | End: 2022-10-22 | Stop reason: HOSPADM

## 2022-10-22 RX ORDER — HALOPERIDOL 5 MG/ML
5-10 INJECTION INTRAMUSCULAR EVERY 6 HOURS PRN
Status: DISCONTINUED | OUTPATIENT
Start: 2022-10-22 | End: 2022-10-22

## 2022-10-22 RX ORDER — HALOPERIDOL 5 MG/ML
5 INJECTION INTRAMUSCULAR EVERY 6 HOURS PRN
Status: DISCONTINUED | OUTPATIENT
Start: 2022-10-22 | End: 2022-10-22

## 2022-10-22 RX ORDER — OLANZAPINE 10 MG/1
2.5 INJECTION, POWDER, LYOPHILIZED, FOR SOLUTION INTRAMUSCULAR ONCE
Status: COMPLETED | OUTPATIENT
Start: 2022-10-22 | End: 2022-10-22

## 2022-10-22 RX ORDER — HYDROMORPHONE HCL IN WATER/PF 6 MG/30 ML
0.4 PATIENT CONTROLLED ANALGESIA SYRINGE INTRAVENOUS
Status: DISCONTINUED | OUTPATIENT
Start: 2022-10-22 | End: 2022-11-04

## 2022-10-22 RX ORDER — QUETIAPINE FUMARATE 25 MG/1
25 TABLET, FILM COATED ORAL 2 TIMES DAILY
Status: DISCONTINUED | OUTPATIENT
Start: 2022-10-22 | End: 2022-10-30

## 2022-10-22 RX ORDER — HYDROMORPHONE HCL IN WATER/PF 6 MG/30 ML
0.2 PATIENT CONTROLLED ANALGESIA SYRINGE INTRAVENOUS
Status: DISCONTINUED | OUTPATIENT
Start: 2022-10-22 | End: 2022-11-01

## 2022-10-22 RX ORDER — LORAZEPAM 2 MG/ML
.5-1 INJECTION INTRAMUSCULAR ONCE
Status: COMPLETED | OUTPATIENT
Start: 2022-10-22 | End: 2022-10-22

## 2022-10-22 RX ORDER — METOPROLOL TARTRATE 1 MG/ML
5 INJECTION, SOLUTION INTRAVENOUS EVERY 5 MIN PRN
Status: DISCONTINUED | OUTPATIENT
Start: 2022-10-22 | End: 2022-11-08 | Stop reason: HOSPADM

## 2022-10-22 RX ADMIN — AMIODARONE HYDROCHLORIDE 1 MG/MIN: 50 INJECTION, SOLUTION INTRAVENOUS at 09:43

## 2022-10-22 RX ADMIN — HYDROXYZINE HYDROCHLORIDE 50 MG: 25 TABLET, FILM COATED ORAL at 22:54

## 2022-10-22 RX ADMIN — METHOCARBAMOL 750 MG: 750 TABLET ORAL at 23:58

## 2022-10-22 RX ADMIN — HALOPERIDOL 5 MG: 5 INJECTION INTRAMUSCULAR at 09:33

## 2022-10-22 RX ADMIN — ACETAMINOPHEN 975 MG: 325 TABLET, FILM COATED ORAL at 23:58

## 2022-10-22 RX ADMIN — KETOROLAC TROMETHAMINE 15 MG: 15 INJECTION, SOLUTION INTRAMUSCULAR; INTRAVENOUS at 05:33

## 2022-10-22 RX ADMIN — DEXMEDETOMIDINE HYDROCHLORIDE 0.2 MCG/KG/HR: 400 INJECTION INTRAVENOUS at 10:15

## 2022-10-22 RX ADMIN — ACETAMINOPHEN 975 MG: 325 TABLET, FILM COATED ORAL at 17:19

## 2022-10-22 RX ADMIN — SENNOSIDES AND DOCUSATE SODIUM 1 TABLET: 8.6; 5 TABLET ORAL at 20:19

## 2022-10-22 RX ADMIN — LORAZEPAM 1 MG: 2 INJECTION INTRAMUSCULAR at 09:08

## 2022-10-22 RX ADMIN — SODIUM CHLORIDE: 9 INJECTION, SOLUTION INTRAVENOUS at 03:59

## 2022-10-22 RX ADMIN — HEPARIN SODIUM 5000 UNITS: 5000 INJECTION, SOLUTION INTRAVENOUS; SUBCUTANEOUS at 05:49

## 2022-10-22 RX ADMIN — HYDROMORPHONE HYDROCHLORIDE 0.5 MG: 1 INJECTION, SOLUTION INTRAMUSCULAR; INTRAVENOUS; SUBCUTANEOUS at 00:16

## 2022-10-22 RX ADMIN — HYDROMORPHONE HYDROCHLORIDE 0.4 MG: 0.2 INJECTION, SOLUTION INTRAMUSCULAR; INTRAVENOUS; SUBCUTANEOUS at 17:58

## 2022-10-22 RX ADMIN — DEXMEDETOMIDINE HYDROCHLORIDE 1.2 MCG/KG/HR: 400 INJECTION INTRAVENOUS at 13:44

## 2022-10-22 RX ADMIN — METOPROLOL TARTRATE 5 MG: 5 INJECTION INTRAVENOUS at 04:08

## 2022-10-22 RX ADMIN — HYDROXYZINE HYDROCHLORIDE 25 MG: 25 TABLET, FILM COATED ORAL at 05:14

## 2022-10-22 RX ADMIN — GABAPENTIN 300 MG: 250 SOLUTION ORAL at 17:19

## 2022-10-22 RX ADMIN — HALOPERIDOL LACTATE 5 MG: 5 INJECTION, SOLUTION INTRAMUSCULAR at 09:33

## 2022-10-22 RX ADMIN — OLANZAPINE 2.5 MG: 10 INJECTION, POWDER, FOR SOLUTION INTRAMUSCULAR at 10:49

## 2022-10-22 RX ADMIN — QUETIAPINE FUMARATE 25 MG: 25 TABLET ORAL at 20:19

## 2022-10-22 RX ADMIN — Medication 0.01 MCG/KG/MIN: at 13:51

## 2022-10-22 RX ADMIN — DEXMEDETOMIDINE HYDROCHLORIDE 1.2 MCG/KG/HR: 400 INJECTION INTRAVENOUS at 23:58

## 2022-10-22 RX ADMIN — KETOROLAC TROMETHAMINE 15 MG: 15 INJECTION, SOLUTION INTRAMUSCULAR; INTRAVENOUS at 20:19

## 2022-10-22 RX ADMIN — AMIODARONE HYDROCHLORIDE 150 MG: 1.5 INJECTION, SOLUTION INTRAVENOUS at 08:32

## 2022-10-22 RX ADMIN — DEXMEDETOMIDINE HYDROCHLORIDE 1.2 MCG/KG/HR: 400 INJECTION INTRAVENOUS at 21:27

## 2022-10-22 RX ADMIN — OXYCODONE HYDROCHLORIDE 5 MG: 5 TABLET ORAL at 02:50

## 2022-10-22 RX ADMIN — METHOCARBAMOL 750 MG: 750 TABLET ORAL at 17:19

## 2022-10-22 RX ADMIN — METHOCARBAMOL 750 MG: 750 TABLET ORAL at 06:28

## 2022-10-22 RX ADMIN — QUETIAPINE FUMARATE 50 MG: 50 TABLET ORAL at 06:07

## 2022-10-22 RX ADMIN — HALOPERIDOL LACTATE 2 MG: 5 INJECTION, SOLUTION INTRAMUSCULAR at 17:38

## 2022-10-22 RX ADMIN — HALOPERIDOL LACTATE 5 MG: 5 INJECTION, SOLUTION INTRAMUSCULAR at 09:39

## 2022-10-22 RX ADMIN — DEXMEDETOMIDINE HYDROCHLORIDE 1.2 MCG/KG/HR: 400 INJECTION INTRAVENOUS at 16:13

## 2022-10-22 RX ADMIN — ATORVASTATIN CALCIUM 20 MG: 10 TABLET, FILM COATED ORAL at 20:19

## 2022-10-22 RX ADMIN — SODIUM CHLORIDE: 9 INJECTION, SOLUTION INTRAVENOUS at 21:50

## 2022-10-22 RX ADMIN — TAMSULOSIN HYDROCHLORIDE 0.8 MG: 0.4 CAPSULE ORAL at 20:19

## 2022-10-22 RX ADMIN — GABAPENTIN 300 MG: 250 SOLUTION ORAL at 21:53

## 2022-10-22 RX ADMIN — METOPROLOL TARTRATE 5 MG: 5 INJECTION INTRAVENOUS at 01:02

## 2022-10-22 RX ADMIN — HALOPERIDOL 5 MG: 5 INJECTION INTRAMUSCULAR at 09:39

## 2022-10-22 RX ADMIN — OXYCODONE HYDROCHLORIDE 10 MG: 5 TABLET ORAL at 07:40

## 2022-10-22 RX ADMIN — DEXMEDETOMIDINE HYDROCHLORIDE 0.8 MCG/KG/HR: 400 INJECTION INTRAVENOUS at 18:54

## 2022-10-22 ASSESSMENT — ACTIVITIES OF DAILY LIVING (ADL)
ADLS_ACUITY_SCORE: 47
ADLS_ACUITY_SCORE: 45
ADLS_ACUITY_SCORE: 45
ADLS_ACUITY_SCORE: 49
ADLS_ACUITY_SCORE: 45
ADLS_ACUITY_SCORE: 49
ADLS_ACUITY_SCORE: 45

## 2022-10-22 NOTE — PROGRESS NOTES
"[facility]  Cardiovascular and Thoracic Surgery Daily Note      Assessment and Plan  POD # 2 s/p aortic valve replacement with a 25 mm Bal Inspiris Resilia bovine pericardial valve, coronary artery bypass grafting x 3 with left internal mammary artery to left anterior descending, reverse lesser saphenous vein graft to the posterior descending artery, reverse left lesser saphenous vein graft to the distal circumflex marginal artery, endoscopic bilateral lesser saphenous vein graft harvest from below the knees on 10/20 with Dr. Bennie Pierre    - CVS: Pre-op TTE with preserved biventricular function. Immediate postop profound vasoplegia and acidosis requiring multiple high dose pressors, bicarb infusion, and volume resuscitation. Weaned off pressors by POD1, lactate normalized. CI/CO preserved. BP labile with agitation and need for sedation today. May need to resume low dose pressor. New onset afib RVR 10/22; amiodarone bolus and infusion started. Appears hypervolemic after prior volume resuscitation, defer diuresis today unless worsening respiratory status. Aspirin 162 mg daily (hold 10/22 with thrombocytopenia), atorvastatin 20 mg daily. Chest tubes: serosang, 370 mL, no airleak, keep today. TPW: capped.     - Resp: Extubated POD1. IS, pulmonary toilet    - Neuro: Post-extubation, repeating \"I need help\" over and over, but able to answer question appropriately, redirect, and no focal physical deficits. Worsening agitation 10/22 despite Seroquel, ativan, and Haldol. Placed in restraints due to swinging at staff, pulling at lines/tubes. Started on Precedex and Zyprexa with improvement. Chronic pain at baseline. Minimize narcotics with agitation. Schedule Robaxin, Tylenol., lidocaine patches, Toradol, Atarax. PTA on gabapentin 800 mg TID, will resume 300 mg BID and increasing pending improved confusion. Head CT today.     - Renal: No history of significant renal disease. Cr stable WNL. Trend BMP. " Diuretic as above.   Recent Labs   Lab 10/22/22  0334 10/21/22  0458 10/20/22  2338   CR 1.04 0.84 0.89       - GI: No BM, continue bowel regimen    - : Mccarty in place, continue today. History urinary retention, PTA Flomax 0.8 mg daily resumed.     - Endo: DMT2. Insulin infusion transitioned to SSI. PTA metformin on hold.     Hemoglobin A1C   Date Value Ref Range Status   09/14/2022 5.6 0.0 - 5.6 % Final     Comment:     Normal <5.7%   Prediabetes 5.7-6.4%    Diabetes 6.5% or higher     Note: Adopted from ADA consensus guidelines.        - FEN: Replace electrolytes as needed. ADAT post-extubation.     - ID: Postop Leukocytosis, resolved. Afebrile. WBC down-trending. Periop abx in process. Trend CBC.   Recent Labs   Lab 10/22/22  0334 10/21/22  0558 10/20/22  1924   WBC 8.6 11.7* 25.2*       - Heme: Acute blood loss anemia and thrombocytopenia due to surgery. Hold aspirin and subcutaneous heparin with thrombocytopenia. Trend CBC, transfuse PRN.   Recent Labs   Lab 10/22/22  0334 10/21/22  0558 10/20/22  1924   HGB 10.5* 11.7* 13.9   PLT 54* 86* 144*       - Proph: SCD, subcutaneous heparin, PPI    - Dispo: ICU      Interval History  Progressive agitation this morning, unable to be redirected. Combative with staff, pulled out IV, trying to pull out lines. Keep repeating words. Less oriented than yesterday.      Medications    acetaminophen  975 mg Oral Q8H     [Held by provider] aspirin  162 mg Oral or NG Tube Daily     atorvastatin  20 mg Oral Daily     gabapentin  300 mg Oral or G tube TID     [Held by provider] heparin ANTICOAGULANT  5,000 Units Subcutaneous Q8H     lactated ringers  250 mL Intravenous Once     lidocaine  1-2 patch Transdermal Q24H     lidocaine   Transdermal Q8H MITA     methocarbamol  750 mg Oral 4x Daily     pantoprazole  40 mg Oral or NG Tube Daily    Or     pantoprazole  40 mg Oral Daily     polyethylene glycol  17 g Oral Daily     senna-docusate  1 tablet Oral BID     sodium chloride (PF)  3  "mL Intracatheter Q8H     sterile water (preservative free)         tamsulosin  0.8 mg Oral QPM     [START ON 10/23/2022] acetaminophen, bisacodyl, calcium gluconate, calcium gluconate, calcium gluconate, glucose **OR** dextrose **OR** glucagon, haloperidol lactate, hydrALAZINE, HYDROmorphone **OR** HYDROmorphone, hydrOXYzine, ketorolac, lidocaine 4%, lidocaine (buffered or not buffered), magnesium hydroxide, metoprolol, naloxone **OR** naloxone **OR** naloxone **OR** naloxone, nitroGLYcerin, ondansetron **OR** ondansetron, oxyCODONE **OR** oxyCODONE, phenylephrine, prochlorperazine **OR** prochlorperazine, sodium chloride, sodium chloride (PF)      Physical Exam  Vitals were reviewed  Blood pressure (!) 81/64, pulse 112, temperature 98.8  F (37.1  C), temperature source Axillary, resp. rate 20, height 1.803 m (5' 11\"), weight 124 kg (273 lb 5.9 oz), SpO2 90 %.  Rhythm: NSR    Lungs: diminished bases    Cardiovascular: rrr, no m/r/g    Abdomen: soft, NT, ND, +BS    Extremeties: warm, 1+ LE edema    Incision: CDI    CT: serosang output 370 mL, no air leak    Weight:   Vitals:    10/20/22 0600 10/21/22 0600 10/22/22 0250   Weight: 120.2 kg (265 lb) 124.6 kg (274 lb 11.1 oz) 124 kg (273 lb 5.9 oz)         Data  Recent Labs   Lab 10/22/22  1217 10/22/22  0334 10/22/22  0006 10/21/22  0806 10/21/22  0558 10/21/22  0557 10/21/22  0458 10/21/22  0004 10/20/22  2338 10/20/22  2022 10/20/22  1924 10/20/22  1528 10/20/22  1526 10/20/22  1324 10/20/22  1321   WBC  --  8.6  --   --  11.7*  --   --   --   --   --  25.2*  --  29.3*  --  22.4*   HGB  --  10.5*  --   --  11.7*  --   --   --   --   --  13.9  --  14.2   < > 11.5*   MCV  --  94  --   --  89  --   --   --   --   --  93  --  93  --  93   PLT  --  54*  --   --  86*  --   --   --   --   --  144*  --  158  --  136*   INR  --   --   --   --   --   --   --   --   --   --   --   --  1.31*  --  1.48*   NA  --  138  --   --   --   --  139  --  142  --   --   --  141   < > 138 "   POTASSIUM  --  4.4  --   --   --   --  4.2  --  4.8  --   --   --  3.7   < > 3.8   CHLORIDE  --  106  --   --   --   --  110*  --  111*  --   --   --  109  --  109   CO2  --  25  --   --   --   --  25  --  22  --   --   --  17*  --  23   BUN  --  16  --   --   --   --  13  --  13  --   --   --  13  --  12   CR  --  1.04  --   --   --   --  0.84  --  0.89  --   --   --  0.97  --  0.85   ANIONGAP  --  7  --   --   --   --  4  --  9  --   --   --  15*  --  6   HALEY  --  9.1  --   --   --   --  8.7  --  8.8  --   --   --  8.5  --  9.5   * 128* 124*   < >  --    < > 126*   < > 135*   < >  --    < > 229*   < > 182*   ALBUMIN  --  2.8*  --   --   --   --  3.1*  --  3.4  --   --   --  3.1*  --   --    PROTTOTAL  --  5.3*  --   --   --   --  5.4*  --  5.5*  --   --   --  5.5*  --   --    BILITOTAL  --  0.8  --   --   --   --  0.5  --  0.4  --   --   --  0.6  --   --    ALKPHOS  --  66  --   --   --   --  63  --  66  --   --   --  84  --   --    ALT  --  20  --   --   --   --  23  --  24  --   --   --  25  --   --    AST  --  72*  --   --   --   --  74*  --  73*  --   --   --  68*  --   --     < > = values in this interval not displayed.       Imaging:  No results found for this or any previous visit (from the past 24 hour(s)).      Patient seen and discussed with Dr. Hardy Laboy PAAshleighC  Cardiothoracic Surgery  Pager [phone]

## 2022-10-22 NOTE — PROGRESS NOTES
[facility]  Critical Care Service  Progress Note  Date of Service (when I saw the patient): 10/22/2022  Main Plans for Today  extubate on pathway if able  Assessment & Plan      Richar Davis is a 67 year old male who was admitted on 10/20/2022. He underwent 3 vessel CAB and AVR yesterday. 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    Post cardiac patient   10/20 s/p aortic valve replacement with a 25 mm Bal Inspiris Resilia bovine pericardial valve, coronary artery bypass grafting x 3 with left internal mammary artery to left anterior descending, reverse lesser saphenous vein graft to the posterior descending artery, reverse left lesser saphenous vein graft to the distal circumflex marginal artery, endoscopic bilateral lesser saphenous vein graft harvest from below the knees on    Neuro    1. Acute pain  2. Sedation  Plan:  -- Scheduled acetaminophen,   -- Dex for now (agitation since extubation on 10/21)  -- Delirium prevention as able (consider haldol and/or zyprexa) unable to take PO, will place ND/feeding tube  -- Consider head scanning and neurol consult    CV  1. S/p 3 vessel CAB and AVR  2. HTN by history  3. A fib RVR on amiodarone  Plan:  -- Per pathway    Resp:  1. Extubated 10/21  Plan:  -- Resume suppl O2    GI/Nutrition  1. No prior hx  Plan:  -- Place a feeding tube  -- PPI    Renal  1. No prior hx.  Baseline creatinine appears to be 0.9  Plan:  --monitor function and electrolytes as needed with replacement per ICU protocols. - generally avoid nephrotoxic agents such as NSAID, IV contrast unless specifically required  -- adjust medications as needed for renal clearance  -- follow I/O's as appropriate.  -- maintain euvolemia    Endocrine  1. Stress Hyperglycemia  Plan:  --  Insulin gtt per protocol if indicated  -- Keep BG  <180 for optimal healing    Heme:  1. Acute blood loss  anemia  2. Coagulopathy   Plan:  -- Monitor hemoglobin.  -- Transfuse to keep > 7.0    General cares:  DVT Prophylaxis: Pneumatic Compression Devices  GI Prophylaxis: PPI  Restraints: Restraints for medical healing needed: YES  Family update by me today: Yes   Current lines are required for patient management  Access:  Denilson Wallace MD  Time Spent on this Encounter   Billing:  I spent 30 minutes bedside and on the inpatient unit today managing the critical care of Richar Davis in relation to the issues listed in this note.    Physical Exam   Temp: 98.8  F (37.1  C) Temp src: Axillary Temp  Min: 97.5  F (36.4  C)  Max: 99  F (37.2  C) BP: (!) 81/64 Pulse: 112   Resp: 20 SpO2: 90 %   Oxygen Delivery: 2 LPM  Vitals:    10/20/22 0600 10/21/22 0600 10/22/22 0250   Weight: 120.2 kg (265 lb) 124.6 kg (274 lb 11.1 oz) 124 kg (273 lb 5.9 oz)     I/O last 3 completed shifts:  In: 1123.79 [P.O.:110; I.V.:763.79; IV Piggyback:250]  Out: 1170 [Urine:800; Chest Tube:370]    GEN: on Dex, delirious   EYES: PERRL, Anicteric sclera.   HEENT:  Normocephalic, atraumatic, trachea midline, ETT secure  CV: RRR, no gallops, rubs, or murmurs. Chest tubes in place  PULM/CHEST: Clear breath sounds bilaterally without rhonchi, crackles or wheeze, symmetric chest rise  GI: abdomen soft, obese  : hernandez catheter in place, urine yellow and clear  EXTREMITIES: no peripheral edema, peripheral pulses intact  NEURO: delirious  SKIN: No rashes, sores or ulcerations      Data   Recent Labs   Lab 10/22/22  1217 10/22/22  0334 10/22/22  0006 10/21/22  0806 10/21/22  0558 10/21/22  0557 10/21/22  0458 10/21/22  0004 10/20/22  2338 10/20/22  2022 10/20/22  1924 10/20/22  1528 10/20/22  1526 10/20/22  1324 10/20/22  1321   WBC  --  8.6  --   --  11.7*  --   --   --   --   --  25.2*  --  29.3*  --  22.4*   HGB  --  10.5*  --   --  11.7*  --   --   --   --   --  13.9  --  14.2   < > 11.5*   MCV  --  94  --   --  89  --   --   --   --   --  93   --  93  --  93   PLT  --  54*  --   --  86*  --   --   --   --   --  144*  --  158  --  136*   INR  --   --   --   --   --   --   --   --   --   --   --   --  1.31*  --  1.48*   NA  --  138  --   --   --   --  139  --  142  --   --   --  141   < > 138   POTASSIUM  --  4.4  --   --   --   --  4.2  --  4.8  --   --   --  3.7   < > 3.8   CHLORIDE  --  106  --   --   --   --  110*  --  111*  --   --   --  109  --  109   CO2  --  25  --   --   --   --  25  --  22  --   --   --  17*  --  23   BUN  --  16  --   --   --   --  13  --  13  --   --   --  13  --  12   CR  --  1.04  --   --   --   --  0.84  --  0.89  --   --   --  0.97  --  0.85   ANIONGAP  --  7  --   --   --   --  4  --  9  --   --   --  15*  --  6   HALEY  --  9.1  --   --   --   --  8.7  --  8.8  --   --   --  8.5  --  9.5   * 128* 124*   < >  --    < > 126*   < > 135*   < >  --    < > 229*   < > 182*   ALBUMIN  --  2.8*  --   --   --   --  3.1*  --  3.4  --   --   --  3.1*  --   --    PROTTOTAL  --  5.3*  --   --   --   --  5.4*  --  5.5*  --   --   --  5.5*  --   --    BILITOTAL  --  0.8  --   --   --   --  0.5  --  0.4  --   --   --  0.6  --   --    ALKPHOS  --  66  --   --   --   --  63  --  66  --   --   --  84  --   --    ALT  --  20  --   --   --   --  23  --  24  --   --   --  25  --   --    AST  --  72*  --   --   --   --  74*  --  73*  --   --   --  68*  --   --     < > = values in this interval not displayed.

## 2022-10-22 NOTE — PROGRESS NOTES
Notified provider about indwelling hernandez catheter discussed removal or continued need.    Did provider choose to remove indwelling hernandez catheter? No    Provider's hernandez indication for keeping indwelling hernandez catheter: Strict 1-2 Hour I & O if external catheters are not an option.    Is there an order for indwelling hernandez catheter? Yes    *If there is a plan to keep hernandez catheter in place at discharge daily notification with provider is not necessary.

## 2022-10-22 NOTE — PROGRESS NOTES
"Pt oriented to self, will not answer all questions. Frequently refuses care/assessment such as part of neuro exam. Will yell out repetative statements such as \"help me\" or \"please be quiet\". Pt appears anxious/restless/frustrated at times, pulling off oxygen, BP cuff, or gown, other times pt is directable. Scheduled and PRN pain meds given to rule out pain when pt is yelling. Will say \"yes\" to having pain but does not describe where. Only slept for brief periods throughout the night.  Converted to AFIB in RVR during the night. MD updated. PRN Metoprolol ordered and given x2. On 2L NC O2 while sleeping, pt took of NC this morning when more restless.   MD updated on pt overall status and difficult completing full assessment. AM Seroquel given early.   "

## 2022-10-22 NOTE — PLAN OF CARE
"Neuro: yelling out \"help me\" does not reorient. Refusing cares. Restless in bed, pulling off gown.   CV: converted to SR. Levo started   Resp: on room air/2L NC  GI/: hernandez for accurate I&O. Passing gas. NGT placed for medication administration.   Skin: LE harvest sites with staples. Sternal incision ecchymotic.   Pain/Gtts: precedex gtt, amio, levophed. PRN Haldol and dilaudid given.  POC: Restrained. Plan to get CT when patient is calm and able to stay still for period of time  "

## 2022-10-22 NOTE — PROCEDURES
[facility]    Central line    Date/Time: 10/22/2022 12:34 PM  Performed by: Juan Carlos Yee MD  Authorized by: Juan Carlos Yee MD   Indications: vascular access      UNIVERSAL PROTOCOL   Site Marked: NA  Prior Images Obtained and Reviewed:  NA  Required items: Required blood products, implants, devices and special equipment available    Patient identity confirmed:  Verbally with patient  Patient was reevaluated immediately before administering moderate or deep sedation or anesthesia  Confirmation Checklist:  Patient's identity using two indicators, relevant allergies, procedure was appropriate and matched the consent or emergent situation and correct equipment/implants were available  Time out: Immediately prior to the procedure a time out was called    Universal Protocol: the Joint Commission Universal Protocol was followed    Preparation: Patient was prepped and draped in usual sterile fashion       ANESTHESIA    Anesthesia: Local infiltration  Local Anesthetic:  Lidocaine 1% without epinephrine      SEDATION    Patient Sedated: No      Preparation: skin prepped with 2% chlorhexidine  Skin prep agent dried: skin prep agent completely dried prior to procedure  Sterile barriers: all five maximum sterile barriers used - cap, mask, sterile gown, sterile gloves, and large sterile sheet  Hand hygiene: hand hygiene performed prior to central venous catheter insertion  Patient position: flat  Catheter type: triple lumen  Pre-procedure: landmarks identified  Number of attempts: 1  Successful placement: yes  Post-procedure: line sutured and dressing applied  Assessment: blood return through all ports,  free fluid flow,  placement verified by x-ray and no pneumothorax on x-ray      PROCEDURE  Describe Procedure: Previous placed sheath was noted. Sterile precaution and complete drapping of the access was donw and a triple lumen catheter was floated through the port.   Patient Tolerance:  Patient tolerated  the procedure well with no immediate complications  Length of time physician/provider present for 1:1 monitoring during sedation: 0    Line placed to manage undifferentiated shock.

## 2022-10-22 NOTE — PROVIDER NOTIFICATION
"Neuro-  Pt is oriented to self only.  Repeats what is said to him.  Yelling out \"help Me\".  Tried to redirect,  Ppain controlled,  Provided calm environment,  Reassurance given.  Cares explained.  Pt having increasing agitation and delirium.  Pt pulled out IV in Left hand.  Pulled at RT internal jugular, Pulled off wrist bands.  Web PA at bedside.  Haldol and ativan given with no effect on pt condition.  Intensivist's  Notified of continued agitation and Precedex gtt ordered and one time Zyprexa given.  Patient was placed in soft restraints at 10 am as least restrictive alternative  To protect pt from self harm. Wife updated on pt status.  "

## 2022-10-23 ENCOUNTER — APPOINTMENT (OUTPATIENT)
Dept: GENERAL RADIOLOGY | Facility: CLINIC | Age: 67
DRG: 219 | End: 2022-10-23
Attending: INTERNAL MEDICINE
Payer: COMMERCIAL

## 2022-10-23 LAB
ALBUMIN SERPL-MCNC: 2.5 G/DL (ref 3.4–5)
ALP SERPL-CCNC: 60 U/L (ref 40–150)
ALT SERPL W P-5'-P-CCNC: 20 U/L (ref 0–70)
AMMONIA PLAS-SCNC: 19 UMOL/L (ref 10–50)
ANION GAP SERPL CALCULATED.3IONS-SCNC: 4 MMOL/L (ref 3–14)
AST SERPL W P-5'-P-CCNC: 48 U/L (ref 0–45)
ATRIAL RATE - MUSE: 103 BPM
ATRIAL RATE - MUSE: 89 BPM
BASE EXCESS BLDV CALC-SCNC: 1.1 MMOL/L (ref -7.7–1.9)
BILIRUB DIRECT SERPL-MCNC: 0.2 MG/DL (ref 0–0.2)
BILIRUB SERPL-MCNC: 0.6 MG/DL (ref 0.2–1.3)
BUN SERPL-MCNC: 27 MG/DL (ref 7–30)
CA-I BLD-MCNC: 5.4 MG/DL (ref 4.4–5.2)
CALCIUM SERPL-MCNC: 9.2 MG/DL (ref 8.5–10.1)
CHLORIDE BLD-SCNC: 108 MMOL/L (ref 94–109)
CO2 SERPL-SCNC: 26 MMOL/L (ref 20–32)
CREAT SERPL-MCNC: 1.11 MG/DL (ref 0.66–1.25)
DIASTOLIC BLOOD PRESSURE - MUSE: NORMAL MMHG
DIASTOLIC BLOOD PRESSURE - MUSE: NORMAL MMHG
ERYTHROCYTE [DISTWIDTH] IN BLOOD BY AUTOMATED COUNT: 13.4 % (ref 10–15)
GFR SERPL CREATININE-BSD FRML MDRD: 73 ML/MIN/1.73M2
GLUCOSE BLD-MCNC: 136 MG/DL (ref 70–99)
GLUCOSE BLDC GLUCOMTR-MCNC: 122 MG/DL (ref 70–99)
GLUCOSE BLDC GLUCOMTR-MCNC: 124 MG/DL (ref 70–99)
GLUCOSE BLDC GLUCOMTR-MCNC: 128 MG/DL (ref 70–99)
GLUCOSE BLDC GLUCOMTR-MCNC: 137 MG/DL (ref 70–99)
HCO3 BLDV-SCNC: 27 MMOL/L (ref 21–28)
HCT VFR BLD AUTO: 28.3 % (ref 40–53)
HGB BLD-MCNC: 9.2 G/DL (ref 13.3–17.7)
INTERPRETATION ECG - MUSE: NORMAL
INTERPRETATION ECG - MUSE: NORMAL
MAGNESIUM SERPL-MCNC: 2.2 MG/DL (ref 1.6–2.3)
MCH RBC QN AUTO: 30.2 PG (ref 26.5–33)
MCHC RBC AUTO-ENTMCNC: 32.5 G/DL (ref 31.5–36.5)
MCV RBC AUTO: 93 FL (ref 78–100)
O2/TOTAL GAS SETTING VFR VENT: 21 %
P AXIS - MUSE: 52 DEGREES
P AXIS - MUSE: 55 DEGREES
PCO2 BLDV: 49 MM HG (ref 40–50)
PH BLDV: 7.35 [PH] (ref 7.32–7.43)
PHOSPHATE SERPL-MCNC: 1.6 MG/DL (ref 2.5–4.5)
PHOSPHATE SERPL-MCNC: 3.1 MG/DL (ref 2.5–4.5)
PLATELET # BLD AUTO: 57 10E3/UL (ref 150–450)
PO2 BLDV: 34 MM HG (ref 25–47)
POTASSIUM BLD-SCNC: 4.3 MMOL/L (ref 3.4–5.3)
PR INTERVAL - MUSE: 134 MS
PR INTERVAL - MUSE: 150 MS
PROT SERPL-MCNC: 5.3 G/DL (ref 6.8–8.8)
QRS DURATION - MUSE: 128 MS
QRS DURATION - MUSE: 72 MS
QT - MUSE: 334 MS
QT - MUSE: 466 MS
QTC - MUSE: 437 MS
QTC - MUSE: 566 MS
R AXIS - MUSE: -24 DEGREES
R AXIS - MUSE: 58 DEGREES
RBC # BLD AUTO: 3.05 10E6/UL (ref 4.4–5.9)
SODIUM SERPL-SCNC: 138 MMOL/L (ref 133–144)
SYSTOLIC BLOOD PRESSURE - MUSE: NORMAL MMHG
SYSTOLIC BLOOD PRESSURE - MUSE: NORMAL MMHG
T AXIS - MUSE: 24 DEGREES
T AXIS - MUSE: 44 DEGREES
VENTRICULAR RATE- MUSE: 103 BPM
VENTRICULAR RATE- MUSE: 89 BPM
WBC # BLD AUTO: 7.5 10E3/UL (ref 4–11)

## 2022-10-23 PROCEDURE — 250N000013 HC RX MED GY IP 250 OP 250 PS 637: Performed by: INTERNAL MEDICINE

## 2022-10-23 PROCEDURE — 84100 ASSAY OF PHOSPHORUS: CPT | Performed by: SURGERY

## 2022-10-23 PROCEDURE — 85027 COMPLETE CBC AUTOMATED: CPT | Performed by: PHYSICIAN ASSISTANT

## 2022-10-23 PROCEDURE — 250N000009 HC RX 250: Performed by: INTERNAL MEDICINE

## 2022-10-23 PROCEDURE — 258N000003 HC RX IP 258 OP 636: Performed by: SURGERY

## 2022-10-23 PROCEDURE — 250N000011 HC RX IP 250 OP 636: Performed by: PHYSICIAN ASSISTANT

## 2022-10-23 PROCEDURE — 250N000009 HC RX 250: Performed by: SURGERY

## 2022-10-23 PROCEDURE — 250N000011 HC RX IP 250 OP 636: Performed by: INTERNAL MEDICINE

## 2022-10-23 PROCEDURE — 74018 RADEX ABDOMEN 1 VIEW: CPT

## 2022-10-23 PROCEDURE — 99233 SBSQ HOSP IP/OBS HIGH 50: CPT | Mod: 24 | Performed by: INTERNAL MEDICINE

## 2022-10-23 PROCEDURE — 82310 ASSAY OF CALCIUM: CPT | Performed by: PHYSICIAN ASSISTANT

## 2022-10-23 PROCEDURE — 82140 ASSAY OF AMMONIA: CPT | Performed by: PHYSICIAN ASSISTANT

## 2022-10-23 PROCEDURE — 93010 ELECTROCARDIOGRAM REPORT: CPT | Performed by: INTERNAL MEDICINE

## 2022-10-23 PROCEDURE — 93005 ELECTROCARDIOGRAM TRACING: CPT

## 2022-10-23 PROCEDURE — 84100 ASSAY OF PHOSPHORUS: CPT | Performed by: STUDENT IN AN ORGANIZED HEALTH CARE EDUCATION/TRAINING PROGRAM

## 2022-10-23 PROCEDURE — 82803 BLOOD GASES ANY COMBINATION: CPT | Performed by: STUDENT IN AN ORGANIZED HEALTH CARE EDUCATION/TRAINING PROGRAM

## 2022-10-23 PROCEDURE — 250N000011 HC RX IP 250 OP 636: Performed by: STUDENT IN AN ORGANIZED HEALTH CARE EDUCATION/TRAINING PROGRAM

## 2022-10-23 PROCEDURE — 250N000013 HC RX MED GY IP 250 OP 250 PS 637: Performed by: PHYSICIAN ASSISTANT

## 2022-10-23 PROCEDURE — 82330 ASSAY OF CALCIUM: CPT | Performed by: STUDENT IN AN ORGANIZED HEALTH CARE EDUCATION/TRAINING PROGRAM

## 2022-10-23 PROCEDURE — 82248 BILIRUBIN DIRECT: CPT | Performed by: PHYSICIAN ASSISTANT

## 2022-10-23 PROCEDURE — 83735 ASSAY OF MAGNESIUM: CPT | Performed by: STUDENT IN AN ORGANIZED HEALTH CARE EDUCATION/TRAINING PROGRAM

## 2022-10-23 PROCEDURE — 200N000001 HC R&B ICU

## 2022-10-23 PROCEDURE — 250N000013 HC RX MED GY IP 250 OP 250 PS 637: Performed by: STUDENT IN AN ORGANIZED HEALTH CARE EDUCATION/TRAINING PROGRAM

## 2022-10-23 RX ORDER — QUETIAPINE FUMARATE 25 MG/1
25 TABLET, FILM COATED ORAL ONCE
Status: COMPLETED | OUTPATIENT
Start: 2022-10-23 | End: 2022-10-23

## 2022-10-23 RX ORDER — AMIODARONE HYDROCHLORIDE 200 MG/1
400 TABLET ORAL 2 TIMES DAILY
Status: DISCONTINUED | OUTPATIENT
Start: 2022-10-23 | End: 2022-10-24

## 2022-10-23 RX ORDER — DEXTROSE MONOHYDRATE 100 MG/ML
INJECTION, SOLUTION INTRAVENOUS CONTINUOUS PRN
Status: DISCONTINUED | OUTPATIENT
Start: 2022-10-23 | End: 2022-11-08 | Stop reason: HOSPADM

## 2022-10-23 RX ORDER — FUROSEMIDE 10 MG/ML
20 INJECTION INTRAMUSCULAR; INTRAVENOUS ONCE
Status: COMPLETED | OUTPATIENT
Start: 2022-10-23 | End: 2022-10-23

## 2022-10-23 RX ORDER — NICOTINE 21 MG/24HR
1 PATCH, TRANSDERMAL 24 HOURS TRANSDERMAL DAILY
Status: DISCONTINUED | OUTPATIENT
Start: 2022-10-23 | End: 2022-11-08 | Stop reason: HOSPADM

## 2022-10-23 RX ADMIN — SODIUM PHOSPHATE, MONOBASIC, MONOHYDRATE 15 MMOL: 276; 142 INJECTION, SOLUTION INTRAVENOUS at 05:59

## 2022-10-23 RX ADMIN — DEXMEDETOMIDINE HYDROCHLORIDE 1.5 MCG/KG/HR: 400 INJECTION INTRAVENOUS at 21:04

## 2022-10-23 RX ADMIN — OXYCODONE HYDROCHLORIDE 10 MG: 5 TABLET ORAL at 13:57

## 2022-10-23 RX ADMIN — HYDROMORPHONE HYDROCHLORIDE 0.4 MG: 0.2 INJECTION, SOLUTION INTRAMUSCULAR; INTRAVENOUS; SUBCUTANEOUS at 14:47

## 2022-10-23 RX ADMIN — POLYETHYLENE GLYCOL 3350 17 G: 17 POWDER, FOR SOLUTION ORAL at 09:48

## 2022-10-23 RX ADMIN — HALOPERIDOL LACTATE 2 MG: 5 INJECTION, SOLUTION INTRAMUSCULAR at 18:29

## 2022-10-23 RX ADMIN — QUETIAPINE FUMARATE 25 MG: 25 TABLET ORAL at 07:50

## 2022-10-23 RX ADMIN — METHOCARBAMOL 750 MG: 750 TABLET ORAL at 07:49

## 2022-10-23 RX ADMIN — KETOROLAC TROMETHAMINE 15 MG: 15 INJECTION, SOLUTION INTRAMUSCULAR; INTRAVENOUS at 03:05

## 2022-10-23 RX ADMIN — DEXMEDETOMIDINE HYDROCHLORIDE 1.5 MCG/KG/HR: 400 INJECTION INTRAVENOUS at 08:20

## 2022-10-23 RX ADMIN — GABAPENTIN 300 MG: 250 SOLUTION ORAL at 21:58

## 2022-10-23 RX ADMIN — HYDROMORPHONE HYDROCHLORIDE 0.4 MG: 0.2 INJECTION, SOLUTION INTRAMUSCULAR; INTRAVENOUS; SUBCUTANEOUS at 17:37

## 2022-10-23 RX ADMIN — ACETAMINOPHEN 975 MG: 325 TABLET, FILM COATED ORAL at 16:18

## 2022-10-23 RX ADMIN — DEXMEDETOMIDINE HYDROCHLORIDE 1.4 MCG/KG/HR: 400 INJECTION INTRAVENOUS at 06:17

## 2022-10-23 RX ADMIN — HYDROMORPHONE HYDROCHLORIDE 0.4 MG: 0.2 INJECTION, SOLUTION INTRAMUSCULAR; INTRAVENOUS; SUBCUTANEOUS at 11:58

## 2022-10-23 RX ADMIN — FUROSEMIDE 20 MG: 10 INJECTION, SOLUTION INTRAMUSCULAR; INTRAVENOUS at 13:04

## 2022-10-23 RX ADMIN — AMIODARONE HYDROCHLORIDE 400 MG: 200 TABLET ORAL at 10:03

## 2022-10-23 RX ADMIN — AMIODARONE HYDROCHLORIDE 400 MG: 200 TABLET ORAL at 20:03

## 2022-10-23 RX ADMIN — OXYCODONE HYDROCHLORIDE 10 MG: 5 TABLET ORAL at 08:31

## 2022-10-23 RX ADMIN — DEXMEDETOMIDINE HYDROCHLORIDE 1.5 MCG/KG/HR: 400 INJECTION INTRAVENOUS at 14:42

## 2022-10-23 RX ADMIN — DEXMEDETOMIDINE HYDROCHLORIDE 1.5 MCG/KG/HR: 400 INJECTION INTRAVENOUS at 19:08

## 2022-10-23 RX ADMIN — DEXMEDETOMIDINE HYDROCHLORIDE 1.5 MCG/KG/HR: 400 INJECTION INTRAVENOUS at 23:17

## 2022-10-23 RX ADMIN — QUETIAPINE FUMARATE 25 MG: 25 TABLET ORAL at 00:24

## 2022-10-23 RX ADMIN — GABAPENTIN 300 MG: 250 SOLUTION ORAL at 16:18

## 2022-10-23 RX ADMIN — SODIUM PHOSPHATE, MONOBASIC, MONOHYDRATE 15 MMOL: 276; 142 INJECTION, SOLUTION INTRAVENOUS at 08:03

## 2022-10-23 RX ADMIN — METHOCARBAMOL 750 MG: 750 TABLET ORAL at 17:23

## 2022-10-23 RX ADMIN — DEXMEDETOMIDINE HYDROCHLORIDE 1.2 MCG/KG/HR: 400 INJECTION INTRAVENOUS at 16:58

## 2022-10-23 RX ADMIN — HYDROMORPHONE HYDROCHLORIDE 0.4 MG: 0.2 INJECTION, SOLUTION INTRAMUSCULAR; INTRAVENOUS; SUBCUTANEOUS at 19:50

## 2022-10-23 RX ADMIN — DEXMEDETOMIDINE HYDROCHLORIDE 1.5 MCG/KG/HR: 400 INJECTION INTRAVENOUS at 04:24

## 2022-10-23 RX ADMIN — OXYCODONE HYDROCHLORIDE 10 MG: 5 TABLET ORAL at 20:03

## 2022-10-23 RX ADMIN — Medication 40 MG: at 07:50

## 2022-10-23 RX ADMIN — NICOTINE 1 PATCH: 14 PATCH, EXTENDED RELEASE TRANSDERMAL at 18:29

## 2022-10-23 RX ADMIN — ATORVASTATIN CALCIUM 20 MG: 10 TABLET, FILM COATED ORAL at 20:03

## 2022-10-23 RX ADMIN — HYDROMORPHONE HYDROCHLORIDE 0.4 MG: 0.2 INJECTION, SOLUTION INTRAMUSCULAR; INTRAVENOUS; SUBCUTANEOUS at 03:25

## 2022-10-23 RX ADMIN — SENNOSIDES AND DOCUSATE SODIUM 1 TABLET: 8.6; 5 TABLET ORAL at 10:03

## 2022-10-23 RX ADMIN — HALOPERIDOL LACTATE 2 MG: 5 INJECTION, SOLUTION INTRAMUSCULAR at 10:28

## 2022-10-23 RX ADMIN — DEXMEDETOMIDINE HYDROCHLORIDE 1.5 MCG/KG/HR: 400 INJECTION INTRAVENOUS at 01:58

## 2022-10-23 RX ADMIN — QUETIAPINE FUMARATE 25 MG: 25 TABLET ORAL at 20:03

## 2022-10-23 RX ADMIN — DEXMEDETOMIDINE HYDROCHLORIDE 1.5 MCG/KG/HR: 400 INJECTION INTRAVENOUS at 12:11

## 2022-10-23 RX ADMIN — HALOPERIDOL LACTATE 2 MG: 5 INJECTION, SOLUTION INTRAMUSCULAR at 01:58

## 2022-10-23 RX ADMIN — METHOCARBAMOL 750 MG: 750 TABLET ORAL at 12:02

## 2022-10-23 RX ADMIN — GABAPENTIN 300 MG: 250 SOLUTION ORAL at 07:50

## 2022-10-23 RX ADMIN — ACETAMINOPHEN 975 MG: 325 TABLET, FILM COATED ORAL at 07:29

## 2022-10-23 RX ADMIN — SENNOSIDES AND DOCUSATE SODIUM 1 TABLET: 8.6; 5 TABLET ORAL at 20:04

## 2022-10-23 RX ADMIN — DEXMEDETOMIDINE HYDROCHLORIDE 1.5 MCG/KG/HR: 400 INJECTION INTRAVENOUS at 10:04

## 2022-10-23 RX ADMIN — PROCHLORPERAZINE EDISYLATE 5 MG: 5 INJECTION INTRAMUSCULAR; INTRAVENOUS at 20:04

## 2022-10-23 ASSESSMENT — ACTIVITIES OF DAILY LIVING (ADL)
ADLS_ACUITY_SCORE: 49

## 2022-10-23 NOTE — PLAN OF CARE
Pt refusing cares and assessments this shift.  Pt moving all extremities, yelling and screaming all shift.  Pt was restless and agitated throughout and intermittently combative towards staff.  PRN and scheduled medications were given with Precedex infusion max dose being increased with minimal to no effectiveness (see MAR).  Pt was awakePt SR and was able to titrate off Levophed drip.  Epicardial temporary pacer wires remained capped. Pt on 3L O2 via Oxymask while resting.  No new noted skin breakdown.

## 2022-10-23 NOTE — PROGRESS NOTES
[facility]  Critical Care Service  Progress Note  Date of Service (when I saw the patient): 10/23/2022  Main Plans for Today  extubate on pathway if able  Assessment & Plan      Richar Davis is a 67 year old male who was admitted on 10/20/2022. He underwent 3 vessel CAB and AVR yesterday. 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    Post cardiac patient   10/20 s/p aortic valve replacement with a 25 mm Bal Inspiris Resilia bovine pericardial valve, coronary artery bypass grafting x 3 with left internal mammary artery to left anterior descending, reverse lesser saphenous vein graft to the posterior descending artery, reverse left lesser saphenous vein graft to the distal circumflex marginal artery, endoscopic bilateral lesser saphenous vein graft harvest from below the knees on    Neuro    1. Acute pain  2. Sedation  Plan:  -- Scheduled acetaminophen, prn dilaudid   -- Dex for now (agitation since extubation on 10/21)  -- Delirium prevention as able, on Seroquel   -- Consider head scanning and neurol consult    CV  1. S/p 3 vessel CAB and AVR  2. HTN by history  3. A fib RVR on amiodarone  Plan:  -- Per pathway    Resp:  1. Extubated 10/21  Plan:  -- Resume suppl O2 (0-3 lpm)    GI/Nutrition  1. No prior hx  Plan:  -- Placed feeding tube, nutrition service consult, start feeding  -- PPI    Renal  1. No prior hx.  Baseline creatinine appears to be 0.9  Plan:  --monitor function and electrolytes as needed with replacement per ICU protocols. - generally avoid nephrotoxic agents such as NSAID, IV contrast unless specifically required  -- adjust medications as needed for renal clearance  -- follow I/O's as appropriate.  -- maintain euvolemia  -- replace phosphorus    Endocrine  1. Stress Hyperglycemia  Plan:  --  Insulin gtt per protocol if indicated  -- Keep BG  <180 for optimal  healing    Heme:  1. Acute blood loss anemia  2. Coagulopathy   Plan:  -- Monitor hemoglobin.  -- Transfuse to keep Hgb > 7.0    General cares:  DVT Prophylaxis: Pneumatic Compression Devices  GI Prophylaxis: PPI  Restraints: Restraints for medical healing needed: YES  Current lines are required for patient management      Sandeep Wallace MD    Time Spent on this Encounter   Billing:  I spent 30 minutes bedside and on the inpatient unit today managing the critical care of Richar Davis in relation to the issues listed in this note.    Physical Exam   Temp: 98.8  F (37.1  C) Temp src: Axillary Temp  Min: 98.3  F (36.8  C)  Max: 98.8  F (37.1  C) BP: 99/58 Pulse: 59   Resp: 23 SpO2: 100 % O2 Device: Oxymask Oxygen Delivery: 3 LPM  Vitals:    10/21/22 0600 10/22/22 0250 10/23/22 0200   Weight: 124.6 kg (274 lb 11.1 oz) 124 kg (273 lb 5.9 oz) 126.6 kg (279 lb 1.6 oz)     I/O last 3 completed shifts:  In: 2336.78 [I.V.:1836.78; NG/GT:500]  Out: 865 [Urine:695; Chest Tube:170]    GEN: on Dex, delirious   EYES: PERRL, Anicteric sclera.   HEENT:  Normocephalic, atraumatic, trachea midline, ETT secure  CV: RRR, no gallops, rubs, or murmurs. Chest tubes in place  PULM/CHEST: Clear breath sounds bilaterally without rhonchi, crackles or wheeze, symmetric chest rise  GI: abdomen soft, obese  : hernandez catheter in place, urine yellow and clear  EXTREMITIES: no peripheral edema, peripheral pulses intact  NEURO: delirious  SKIN: No rashes, sores or ulcerations      Data   Recent Labs   Lab 10/23/22  0743 10/23/22  0351 10/22/22  2158 10/22/22  1657 10/22/22  1403 10/22/22  1217 10/22/22  0334 10/20/22  1528 10/20/22  1526 10/20/22  1324 10/20/22  1321   WBC  --  7.5  --   --  8.4  --  8.6   < > 29.3*  --  22.4*   HGB  --  9.2*  --   --  9.5*  --  10.5*   < > 14.2   < > 11.5*   MCV  --  93  --   --  93  --  94   < > 93  --  93   PLT  --  57*  --   --  54*  --  54*   < > 158  --  136*   INR  --   --   --   --   --   --   --   --   1.31*  --  1.48*   NA  --  138  --   --  137  --  138   < > 141   < > 138   POTASSIUM  --  4.3  --   --  4.7  --  4.4   < > 3.7   < > 3.8   CHLORIDE  --  108  --   --  107  --  106   < > 109  --  109   CO2  --  26  --   --  28  --  25   < > 17*  --  23   BUN  --  27  --   --  22  --  16   < > 13  --  12   CR  --  1.11  --   --  1.08  --  1.04   < > 0.97  --  0.85   ANIONGAP  --  4  --   --  2*  --  7   < > 15*  --  6   HALEY  --  9.2  --   --  9.1  --  9.1   < > 8.5  --  9.5   * 136* 108*   < > 137*   < > 128*   < > 229*   < > 182*   ALBUMIN  --  2.5*  --   --   --   --  2.8*   < > 3.1*  --   --    PROTTOTAL  --  5.3*  --   --   --   --  5.3*   < > 5.5*  --   --    BILITOTAL  --  0.6  --   --   --   --  0.8   < > 0.6  --   --    ALKPHOS  --  60  --   --   --   --  66   < > 84  --   --    ALT  --  20  --   --   --   --  20   < > 25  --   --    AST  --  48*  --   --   --   --  72*   < > 68*  --   --     < > = values in this interval not displayed.

## 2022-10-23 NOTE — PROGRESS NOTES
"[facility]  Cardiovascular and Thoracic Surgery Daily Note      Assessment and Plan  POD # 3 s/p aortic valve replacement with a 25 mm Bal Inspiris Resilia bovine pericardial valve, coronary artery bypass grafting x 3 with left internal mammary artery to left anterior descending, reverse lesser saphenous vein graft to the posterior descending artery, reverse left lesser saphenous vein graft to the distal circumflex marginal artery, endoscopic bilateral lesser saphenous vein graft harvest from below the knees on 10/20 with Dr. Bennie Pierre    - CVS: Pre-op TTE with preserved biventricular function. Immediate postop profound vasoplegia and acidosis requiring multiple high dose pressors, bicarb infusion, and volume resuscitation. Weaned off pressors by POD1, lactate normalized. BP labile with agitation and need for sedation today. Intermittent low dose NE- sedation related. New onset afib RVR 10/22; amiodarone bolus and infusion, back in NSR, transition to PO amio with taper. Appears hypervolemic, Lasix 20 mg IV x1 and reassess. Aspirin 162 mg daily (hold since 10/22 with thrombocytopenia), atorvastatin 20 mg daily. Chest tubes: serosang, 170 mL, no airleak, keep today. TPW: capped.     - Resp: Extubated POD1. IS, pulmonary toilet    - Neuro: Post-extubation, repeating \"I need help\" over and over, but able to answer question appropriately, redirect, and no focal physical deficits. Worsening agitation 10/22 despite Seroquel, ativan, and Haldol. Placed in restraints due to swinging at staff, pulling at lines/tubes. Started on Precedex and Zyprexa with some improvement. Chronic pain at baseline. Minimize narcotics with agitation. Schedule Robaxin, Tylenol, lidocaine patches, Toradol, Atarax. PTA on gabapentin 800 mg TID, will resume 300 mg BID and increasing pending improved confusion. Head CT on hold due to agitation, would likely require reintubation to tolerate at this point.     - Renal: No " history of significant renal disease. Cr stable WNL. Trend BMP. Diuretic as above.   Recent Labs   Lab 10/23/22  0351 10/22/22  1403 10/22/22  0334   CR 1.11 1.08 1.04       - GI: No BM, continue bowel regimen    - : Mccarty in place, continue today. History urinary retention, PTA Flomax 0.8 mg daily resumed.     - Endo: DMT2. Insulin infusion transitioned to SSI. PTA metformin on hold.  Hemoglobin A1C   Date Value Ref Range Status   09/14/2022 5.6 0.0 - 5.6 % Final     Comment:     Normal <5.7%   Prediabetes 5.7-6.4%    Diabetes 6.5% or higher     Note: Adopted from ADA consensus guidelines.        - FEN: Replace electrolytes as needed. NPO for now with agitation, feeding tube placed. Start TF once tube advanced postpyloric    - ID: Postop Leukocytosis, resolved. Afebrile. WBC down-trending. Periop abx complete. Trend CBC, fever curve.   Recent Labs   Lab 10/23/22  0351 10/22/22  1403 10/22/22  0334   WBC 7.5 8.4 8.6       - Heme: Acute blood loss anemia and thrombocytopenia due to surgery. Hold aspirin and subcutaneous heparin with thrombocytopenia. Trend CBC, transfuse PRN.   Recent Labs   Lab 10/23/22  0351 10/22/22  1403 10/22/22  0334   HGB 9.2* 9.5* 10.5*   PLT 57* 54* 54*       - Proph: SCD, subcutaneous heparin, PPI    - Dispo: ICU      Interval History  Persistent agitation despite Precedex, seroquel. Intermittent levo requirements, sedation related. Converted back to NSR      Medications    acetaminophen  975 mg Oral Q8H     [Held by provider] aspirin  162 mg Oral or NG Tube Daily     atorvastatin  20 mg Oral Daily     gabapentin  300 mg Oral or G tube TID     [Held by provider] heparin ANTICOAGULANT  5,000 Units Subcutaneous Q8H     insulin aspart  1-10 Units Subcutaneous TID AC     insulin aspart  1-7 Units Subcutaneous At Bedtime     lactated ringers  250 mL Intravenous Once     lidocaine  1-2 patch Transdermal Q24H     lidocaine   Transdermal Q8H MITA     methocarbamol  750 mg Oral 4x Daily      "pantoprazole  40 mg Oral or NG Tube Daily    Or     pantoprazole  40 mg Oral Daily     polyethylene glycol  17 g Oral Daily     QUEtiapine  25 mg Oral BID     senna-docusate  1 tablet Oral BID     sodium chloride (PF)  3 mL Intracatheter Q8H     sodium phosphate  15 mmol Intravenous Q2H     tamsulosin  0.8 mg Oral QPM     acetaminophen, bisacodyl, calcium gluconate, calcium gluconate, calcium gluconate, glucose **OR** dextrose **OR** glucagon, haloperidol lactate, hydrALAZINE, HYDROmorphone **OR** HYDROmorphone, hydrOXYzine, ketorolac, lidocaine 4%, lidocaine (buffered or not buffered), magnesium hydroxide, metoprolol, naloxone **OR** naloxone **OR** naloxone **OR** naloxone, nitroGLYcerin, ondansetron **OR** ondansetron, oxyCODONE **OR** oxyCODONE, phenylephrine, prochlorperazine **OR** prochlorperazine, sodium chloride, sodium chloride (PF)      Physical Exam  Vitals were reviewed  Blood pressure 99/58, pulse 59, temperature 98.8  F (37.1  C), temperature source Axillary, resp. rate 23, height 1.803 m (5' 11\"), weight 126.6 kg (279 lb 1.6 oz), SpO2 100 %.  Rhythm: NSR    Lungs: diminished bases    Cardiovascular: rrr, no m/r/g    Abdomen: soft, NT, ND, +BS    Extremeties: warm, 1+ LE edema    Incision: CDI    CT: serosang output 170 mL, no air leak    Weight:   Vitals:    10/20/22 0600 10/21/22 0600 10/22/22 0250 10/23/22 0200   Weight: 120.2 kg (265 lb) 124.6 kg (274 lb 11.1 oz) 124 kg (273 lb 5.9 oz) 126.6 kg (279 lb 1.6 oz)         Data  Recent Labs   Lab 10/23/22  0743 10/23/22  0351 10/22/22  2158 10/22/22  1657 10/22/22  1403 10/22/22  1217 10/22/22  0334 10/20/22  1528 10/20/22  1526 10/20/22  1324 10/20/22  1321   WBC  --  7.5  --   --  8.4  --  8.6   < > 29.3*  --  22.4*   HGB  --  9.2*  --   --  9.5*  --  10.5*   < > 14.2   < > 11.5*   MCV  --  93  --   --  93  --  94   < > 93  --  93   PLT  --  57*  --   --  54*  --  54*   < > 158  --  136*   INR  --   --   --   --   --   --   --   --  1.31*  --  1.48* "   NA  --  138  --   --  137  --  138   < > 141   < > 138   POTASSIUM  --  4.3  --   --  4.7  --  4.4   < > 3.7   < > 3.8   CHLORIDE  --  108  --   --  107  --  106   < > 109  --  109   CO2  --  26  --   --  28  --  25   < > 17*  --  23   BUN  --  27  --   --  22  --  16   < > 13  --  12   CR  --  1.11  --   --  1.08  --  1.04   < > 0.97  --  0.85   ANIONGAP  --  4  --   --  2*  --  7   < > 15*  --  6   HALEY  --  9.2  --   --  9.1  --  9.1   < > 8.5  --  9.5   * 136* 108*   < > 137*   < > 128*   < > 229*   < > 182*   ALBUMIN  --  2.5*  --   --   --   --  2.8*   < > 3.1*  --   --    PROTTOTAL  --  5.3*  --   --   --   --  5.3*   < > 5.5*  --   --    BILITOTAL  --  0.6  --   --   --   --  0.8   < > 0.6  --   --    ALKPHOS  --  60  --   --   --   --  66   < > 84  --   --    ALT  --  20  --   --   --   --  20   < > 25  --   --    AST  --  48*  --   --   --   --  72*   < > 68*  --   --     < > = values in this interval not displayed.       Imaging:  Recent Results (from the past 24 hour(s))   XR Chest Port 1 View    Narrative    EXAM: XR CHEST PORT 1 VIEW  LOCATION: [facility]  DATE/TIME: 10/22/2022 12:10 PM    INDICATION: Line placement  COMPARISON: 10/21/2022      Impression    IMPRESSION: Right IJ central venous catheter tip is at the SVC/right atrial junction. No pneumothorax. Right IJ [place] catheter has been removed. Extubation and removal of the NG tube. Median sternotomy and mediastinal surgical clips. Left-sided chest   tube in place. Stable mild bibasilar atelectasis and small left pleural effusion.   XR Abdomen Port 1 View    Narrative    EXAM: XR ABDOMEN PORT 1 VIEW  LOCATION: [facility]  DATE/TIME: 10/22/2022 3:52 PM    INDICATION: ngt placment  COMPARISON: None.      Impression    IMPRESSION: Enteric tube tip is in the stomach. Cardiac valvular prosthesis, median sternotomy, mediastinal surgical clips, mediastinal drains. Cholelithiasis.          Patient seen and discussed with Dr. Hardy Laboy PA-C  Cardiothoracic Surgery  Pager [phone]

## 2022-10-23 NOTE — PLAN OF CARE
Neuro: in four point restraints due to agitation. Trying to get out of bed and risking sternal precautions and kicking at staple incisions. combative with staff trying to kick. Yelling out and not redirectable.  CV: SR/SB HR 50s-60s. BP soft but stable.  Resp: on 2-3L oxymask  GI/: hernandez in place for adequate output, getting diuresed. NGT advanced to post pyloric, OK to use per MD. TF started at 1800.  Skin: sternal incision ecchymotic. LE with staples.   Pain/Gtts: alternating between dilaudid, oxycodone and haldol to try to keep patient calm and safe, multiple interventions used but pt still very agitated/restless and unable to redirect.  Family: wife updated at bedside.  POC: CT if able depending on neuro status.

## 2022-10-23 NOTE — CONSULTS
"CLINICAL NUTRITION SERVICES  -  ASSESSMENT NOTE    Recommendations Ordered by Registered Dietitian (RD):   Initiate TF @ 15 mL/hr. Once MD approved advancement, advance by 20 mL q 12 hours until goal rate reached.   - Recommend 60 ml flushes q 4 hours  - Certavite --> HELD due to allergy contraindication   - Monitor electrolytes    Future/Additional Recommendations:   Vital High Protein @ goal of  75ml/hr (1800ml/day)  Provides 1800 kcals (15 kcal/kg), 157 g PRO (2 g/kg), 1504 ml free H20, 199 g CHO, and 0 g fiber daily.   Malnutrition:   % Weight Loss:  None noted  % Intake:  Decreased intake does not meet criteria for malnutrition   Subcutaneous Fat Loss:  None observed  Muscle Loss:  None observed  Fluid Retention:  Trace 1+    Malnutrition Diagnosis: Patient does not meet two of the above criteria necessary for diagnosing malnutrition     REASON FOR ASSESSMENT  Richar Davis is a 67 year old male seen by Registered Dietitian for Provider Order - Registered Dietitian to Assess and Order TF per Medical Nutrition Therapy Protocol    NUTRITION HISTORY  - Patient not appropriate for interview at this time.   - Pt admitted 10/20 for CABGx3 and AVR.     CURRENT NUTRITION ORDERS  Diet Order:     Full liquid    Current Intake/Tolerance:  Unable to take PO due to delirium post extubation     NUTRITION FOCUSED PHYSICAL ASSESSMENT FOR DIAGNOSING MALNUTRITION)  Yes -  Brief visual           Observed:    No fat/muscle losses indicated     Obtained from Chart/Interdisciplinary Team:  Combative w/ staff yesterday, pulling on lines, restraints in place for medical healing.   10/22: NGT placed \"enteric tube tip is in the stomach\"    ANTHROPOMETRICS  Height: 5' 11\"  Weight: 265 lb (120.2 kg) - 10/20  Body mass index is 36.96 kg/m .  Weight Status:  Obesity Grade II BMI 35-39.9  IBW: 78.2 kg   % IBW: 154%  Weight History: No recent wt loss indicted.   Wt Readings from Last 10 Encounters:   10/23/22 126.6 kg (279 lb 1.6 oz) "   09/19/22 121.6 kg (268 lb)   07/18/22 119.4 kg (263 lb 3.2 oz)   07/08/22 120.8 kg (266 lb 4.8 oz)      117.9 kg (260 lb) 04/06/2022        LABS  Labs reviewed  Phos 1.6 (L)  BGM <150 - within acceptable range     MEDICATIONS  Medications reviewed  High sliding scale insulin   Miralax, Senokot   Precedex   Norepi - stopped this morning   Phenylephrine - stopped 10/21  Vasopressin - stopped 10/21    ASSESSED NUTRITION NEEDS PER APPROVED PRACTICE GUIDELINES:  Dosing Weight   Actual 120.2 kg for energy  Ideal 78.2 kg for protein   Estimated Energy Needs: 4944-2599 kcals (14-17 Kcal/Kg)  Justification: obese  Estimated Protein Needs: 156+ grams protein (2+ g pro/Kg)  Justification: post-op  Estimated Fluid Needs: Per provider     MALNUTRITION:  % Weight Loss:  None noted  % Intake:  Decreased intake does not meet criteria for malnutrition   Subcutaneous Fat Loss:  None observed  Muscle Loss:  None observed  Fluid Retention:  Trace 1+    Malnutrition Diagnosis: Patient does not meet two of the above criteria necessary for diagnosing malnutrition    NUTRITION DIAGNOSIS:  Inadequate protein-energy intake related to delirium post op as evidenced by no PO x3 day admission in setting of increased need post op.     NUTRITION INTERVENTIONS  Recommendations / Nutrition Prescription  Goal TF as follows :   Vital High Protein @ goal of  75ml/hr (1800ml/day)  Provides 1800 kcals (15 kcal/kg), 157 g PRO (2 g/kg), 1504 ml free H20, 199 g CHO, and 0 g fiber daily.    Initiate TF @ 15 mL/hr. Once MD approved advancement, advance by 20 mL q 12 hours until goal rate reached.   - Recommend 60 ml flushes q 4 hours  - Certavite --> HELD due to allergy contraindication     Implementation  Nutrition education: Per Provider order if indicated   EN Composition, EN Schedule and Feeding Tube Flush: as ordered     Nutrition Goals  TF @ goal to provide % estimated needs while unable to take PO.     MONITORING AND EVALUATION:  Progress  towards goals will be monitored and evaluated per protocol and Practice Guidelines    Mindy House RD, LD  [facility] [place], 67, [facility], [facility]  Pager: [phone]  Weekend Pager: [phone]

## 2022-10-23 NOTE — PROVIDER NOTIFICATION
Pt having increased restlessness, agitation and combativeness towards staff.  Pt also constantly yelling out with increased aggression.  PRN and scheduled medications given with no effect (see MAR).  Precedex drip infusion was at max dose.  Notified CASH Chiu MD.  One time dose of Seroquel ordered and Precedex max dose increased (see orders).

## 2022-10-23 NOTE — PROGRESS NOTES
intensivist note  Patient still agitated and delirious  1 extra dose seroquel 25 mgms given and ok to increase precedex to 1.5 as tolerated.    kailyn stanton  October 23, 2022

## 2022-10-24 ENCOUNTER — APPOINTMENT (OUTPATIENT)
Dept: GENERAL RADIOLOGY | Facility: CLINIC | Age: 67
DRG: 219 | End: 2022-10-24
Attending: INTERNAL MEDICINE
Payer: COMMERCIAL

## 2022-10-24 ENCOUNTER — ANESTHESIA (OUTPATIENT)
Dept: INTENSIVE CARE | Facility: CLINIC | Age: 67
DRG: 219 | End: 2022-10-24
Payer: COMMERCIAL

## 2022-10-24 ENCOUNTER — ANESTHESIA EVENT (OUTPATIENT)
Dept: INTENSIVE CARE | Facility: CLINIC | Age: 67
DRG: 219 | End: 2022-10-24
Payer: COMMERCIAL

## 2022-10-24 ENCOUNTER — APPOINTMENT (OUTPATIENT)
Dept: CT IMAGING | Facility: CLINIC | Age: 67
DRG: 219 | End: 2022-10-24
Attending: PHYSICIAN ASSISTANT
Payer: COMMERCIAL

## 2022-10-24 ENCOUNTER — APPOINTMENT (OUTPATIENT)
Dept: CT IMAGING | Facility: CLINIC | Age: 67
DRG: 219 | End: 2022-10-24
Attending: INTERNAL MEDICINE
Payer: COMMERCIAL

## 2022-10-24 ENCOUNTER — APPOINTMENT (OUTPATIENT)
Dept: CARDIOLOGY | Facility: CLINIC | Age: 67
DRG: 219 | End: 2022-10-24
Attending: ANESTHESIOLOGY
Payer: COMMERCIAL

## 2022-10-24 LAB
ALBUMIN SERPL-MCNC: 2.4 G/DL (ref 3.4–5)
ALP SERPL-CCNC: 64 U/L (ref 40–150)
ALT SERPL W P-5'-P-CCNC: 19 U/L (ref 0–70)
ANION GAP SERPL CALCULATED.3IONS-SCNC: 4 MMOL/L (ref 3–14)
AST SERPL W P-5'-P-CCNC: 35 U/L (ref 0–45)
BASE EXCESS BLDA CALC-SCNC: -1 MMOL/L (ref -9–1.8)
BASE EXCESS BLDA CALC-SCNC: 0 MMOL/L (ref -9–1.8)
BASE EXCESS BLDV CALC-SCNC: 0.2 MMOL/L (ref -7.7–1.9)
BILIRUB DIRECT SERPL-MCNC: 0.1 MG/DL (ref 0–0.2)
BILIRUB SERPL-MCNC: 0.8 MG/DL (ref 0.2–1.3)
BUN SERPL-MCNC: 33 MG/DL (ref 7–30)
CA-I BLD-MCNC: 5.2 MG/DL (ref 4.4–5.2)
CALCIUM SERPL-MCNC: 8.8 MG/DL (ref 8.5–10.1)
CHLORIDE BLD-SCNC: 110 MMOL/L (ref 94–109)
CO2 SERPL-SCNC: 27 MMOL/L (ref 20–32)
CREAT SERPL-MCNC: 1.16 MG/DL (ref 0.66–1.25)
ERYTHROCYTE [DISTWIDTH] IN BLOOD BY AUTOMATED COUNT: 13.2 % (ref 10–15)
GFR SERPL CREATININE-BSD FRML MDRD: 69 ML/MIN/1.73M2
GLUCOSE BLD-MCNC: 126 MG/DL (ref 70–99)
GLUCOSE BLD-MCNC: 151 MG/DL (ref 70–99)
GLUCOSE BLDC GLUCOMTR-MCNC: 119 MG/DL (ref 70–99)
GLUCOSE BLDC GLUCOMTR-MCNC: 125 MG/DL (ref 70–99)
GLUCOSE BLDC GLUCOMTR-MCNC: 130 MG/DL (ref 70–99)
GLUCOSE BLDC GLUCOMTR-MCNC: 131 MG/DL (ref 70–99)
GLUCOSE BLDC GLUCOMTR-MCNC: 132 MG/DL (ref 70–99)
GLUCOSE BLDC GLUCOMTR-MCNC: 140 MG/DL (ref 70–99)
HCO3 BLD-SCNC: 25 MMOL/L (ref 21–28)
HCO3 BLD-SCNC: 25 MMOL/L (ref 21–28)
HCO3 BLDV-SCNC: 27 MMOL/L (ref 21–28)
HCT VFR BLD AUTO: 27.5 % (ref 40–53)
HGB BLD-MCNC: 8.8 G/DL (ref 13.3–17.7)
LACTATE SERPL-SCNC: 1 MMOL/L (ref 0.7–2)
LVEF ECHO: NORMAL
MAGNESIUM SERPL-MCNC: 2.2 MG/DL (ref 1.6–2.3)
MAGNESIUM SERPL-MCNC: 2.2 MG/DL (ref 1.6–2.3)
MCH RBC QN AUTO: 30.1 PG (ref 26.5–33)
MCHC RBC AUTO-ENTMCNC: 32 G/DL (ref 31.5–36.5)
MCV RBC AUTO: 94 FL (ref 78–100)
O2/TOTAL GAS SETTING VFR VENT: 3 %
O2/TOTAL GAS SETTING VFR VENT: 40 %
O2/TOTAL GAS SETTING VFR VENT: 50 %
PCO2 BLD: 43 MM HG (ref 35–45)
PCO2 BLD: 49 MM HG (ref 35–45)
PCO2 BLDV: 55 MM HG (ref 40–50)
PH BLD: 7.32 [PH] (ref 7.35–7.45)
PH BLD: 7.38 [PH] (ref 7.35–7.45)
PH BLDV: 7.3 [PH] (ref 7.32–7.43)
PHOSPHATE SERPL-MCNC: 2.2 MG/DL (ref 2.5–4.5)
PHOSPHATE SERPL-MCNC: 2.5 MG/DL (ref 2.5–4.5)
PLATELET # BLD AUTO: 84 10E3/UL (ref 150–450)
PO2 BLD: 118 MM HG (ref 80–105)
PO2 BLD: 91 MM HG (ref 80–105)
PO2 BLDV: 37 MM HG (ref 25–47)
POTASSIUM BLD-SCNC: 4.1 MMOL/L (ref 3.4–5.3)
POTASSIUM BLD-SCNC: 4.3 MMOL/L (ref 3.4–5.3)
PROCALCITONIN SERPL-MCNC: 0.9 NG/ML
PROT SERPL-MCNC: 5.3 G/DL (ref 6.8–8.8)
RBC # BLD AUTO: 2.92 10E6/UL (ref 4.4–5.9)
SODIUM SERPL-SCNC: 141 MMOL/L (ref 133–144)
WBC # BLD AUTO: 6.9 10E3/UL (ref 4–11)

## 2022-10-24 PROCEDURE — 93308 TTE F-UP OR LMTD: CPT

## 2022-10-24 PROCEDURE — 250N000013 HC RX MED GY IP 250 OP 250 PS 637: Performed by: SURGERY

## 2022-10-24 PROCEDURE — 250N000009 HC RX 250: Performed by: INTERNAL MEDICINE

## 2022-10-24 PROCEDURE — 99291 CRITICAL CARE FIRST HOUR: CPT | Mod: 24 | Performed by: INTERNAL MEDICINE

## 2022-10-24 PROCEDURE — P9045 ALBUMIN (HUMAN), 5%, 250 ML: HCPCS | Performed by: ANESTHESIOLOGY

## 2022-10-24 PROCEDURE — 250N000013 HC RX MED GY IP 250 OP 250 PS 637: Performed by: PHYSICIAN ASSISTANT

## 2022-10-24 PROCEDURE — 250N000009 HC RX 250: Performed by: NURSE ANESTHETIST, CERTIFIED REGISTERED

## 2022-10-24 PROCEDURE — 370N000003 HC ANESTHESIA WARD SERVICE

## 2022-10-24 PROCEDURE — 250N000011 HC RX IP 250 OP 636

## 2022-10-24 PROCEDURE — 255N000002 HC RX 255 OP 636: Performed by: SURGERY

## 2022-10-24 PROCEDURE — 250N000009 HC RX 250: Performed by: STUDENT IN AN ORGANIZED HEALTH CARE EDUCATION/TRAINING PROGRAM

## 2022-10-24 PROCEDURE — 84132 ASSAY OF SERUM POTASSIUM: CPT | Performed by: SURGERY

## 2022-10-24 PROCEDURE — 250N000011 HC RX IP 250 OP 636: Performed by: STUDENT IN AN ORGANIZED HEALTH CARE EDUCATION/TRAINING PROGRAM

## 2022-10-24 PROCEDURE — 200N000001 HC R&B ICU

## 2022-10-24 PROCEDURE — 250N000011 HC RX IP 250 OP 636: Performed by: PHYSICIAN ASSISTANT

## 2022-10-24 PROCEDURE — 82248 BILIRUBIN DIRECT: CPT | Performed by: PHYSICIAN ASSISTANT

## 2022-10-24 PROCEDURE — 80069 RENAL FUNCTION PANEL: CPT | Performed by: PHYSICIAN ASSISTANT

## 2022-10-24 PROCEDURE — 999N000065 XR CHEST PORT 1 VIEW

## 2022-10-24 PROCEDURE — 999N000157 HC STATISTIC RCP TIME EA 10 MIN

## 2022-10-24 PROCEDURE — 84145 PROCALCITONIN (PCT): CPT | Performed by: INTERNAL MEDICINE

## 2022-10-24 PROCEDURE — 87205 SMEAR GRAM STAIN: CPT | Performed by: INTERNAL MEDICINE

## 2022-10-24 PROCEDURE — 84100 ASSAY OF PHOSPHORUS: CPT | Performed by: INTERNAL MEDICINE

## 2022-10-24 PROCEDURE — 250N000011 HC RX IP 250 OP 636: Performed by: INTERNAL MEDICINE

## 2022-10-24 PROCEDURE — 82330 ASSAY OF CALCIUM: CPT | Performed by: STUDENT IN AN ORGANIZED HEALTH CARE EDUCATION/TRAINING PROGRAM

## 2022-10-24 PROCEDURE — 94640 AIRWAY INHALATION TREATMENT: CPT | Mod: 76

## 2022-10-24 PROCEDURE — 258N000003 HC RX IP 258 OP 636: Performed by: INTERNAL MEDICINE

## 2022-10-24 PROCEDURE — 93308 TTE F-UP OR LMTD: CPT | Mod: 26 | Performed by: INTERNAL MEDICINE

## 2022-10-24 PROCEDURE — 250N000013 HC RX MED GY IP 250 OP 250 PS 637: Performed by: INTERNAL MEDICINE

## 2022-10-24 PROCEDURE — 250N000013 HC RX MED GY IP 250 OP 250 PS 637: Performed by: STUDENT IN AN ORGANIZED HEALTH CARE EDUCATION/TRAINING PROGRAM

## 2022-10-24 PROCEDURE — 83735 ASSAY OF MAGNESIUM: CPT | Performed by: SURGERY

## 2022-10-24 PROCEDURE — 94668 MNPJ CHEST WALL SBSQ: CPT

## 2022-10-24 PROCEDURE — 70450 CT HEAD/BRAIN W/O DYE: CPT

## 2022-10-24 PROCEDURE — 5A1935Z RESPIRATORY VENTILATION, LESS THAN 24 CONSECUTIVE HOURS: ICD-10-PCS | Performed by: PHYSICIAN ASSISTANT

## 2022-10-24 PROCEDURE — 82947 ASSAY GLUCOSE BLOOD QUANT: CPT | Performed by: SURGERY

## 2022-10-24 PROCEDURE — 84100 ASSAY OF PHOSPHORUS: CPT | Performed by: SURGERY

## 2022-10-24 PROCEDURE — 80053 COMPREHEN METABOLIC PANEL: CPT | Performed by: PHYSICIAN ASSISTANT

## 2022-10-24 PROCEDURE — 82803 BLOOD GASES ANY COMBINATION: CPT | Performed by: STUDENT IN AN ORGANIZED HEALTH CARE EDUCATION/TRAINING PROGRAM

## 2022-10-24 PROCEDURE — 250N000011 HC RX IP 250 OP 636: Performed by: ANESTHESIOLOGY

## 2022-10-24 PROCEDURE — 83605 ASSAY OF LACTIC ACID: CPT | Performed by: PHYSICIAN ASSISTANT

## 2022-10-24 PROCEDURE — 93005 ELECTROCARDIOGRAM TRACING: CPT

## 2022-10-24 PROCEDURE — 94640 AIRWAY INHALATION TREATMENT: CPT

## 2022-10-24 PROCEDURE — 93010 ELECTROCARDIOGRAM REPORT: CPT | Performed by: INTERNAL MEDICINE

## 2022-10-24 PROCEDURE — 94667 MNPJ CHEST WALL 1ST: CPT

## 2022-10-24 PROCEDURE — 85027 COMPLETE CBC AUTOMATED: CPT | Performed by: PHYSICIAN ASSISTANT

## 2022-10-24 PROCEDURE — 4A133R1 MONITORING OF ARTERIAL SATURATION, PERIPHERAL, PERCUTANEOUS APPROACH: ICD-10-PCS | Performed by: ANESTHESIOLOGY

## 2022-10-24 PROCEDURE — 258N000003 HC RX IP 258 OP 636: Performed by: STUDENT IN AN ORGANIZED HEALTH CARE EDUCATION/TRAINING PROGRAM

## 2022-10-24 PROCEDURE — 71250 CT THORAX DX C-: CPT

## 2022-10-24 PROCEDURE — 82803 BLOOD GASES ANY COMBINATION: CPT | Performed by: INTERNAL MEDICINE

## 2022-10-24 PROCEDURE — 94002 VENT MGMT INPAT INIT DAY: CPT

## 2022-10-24 RX ORDER — AMIODARONE HYDROCHLORIDE 200 MG/1
400 TABLET ORAL 2 TIMES DAILY
Status: DISCONTINUED | OUTPATIENT
Start: 2022-10-24 | End: 2022-10-25

## 2022-10-24 RX ORDER — CHLORHEXIDINE GLUCONATE ORAL RINSE 1.2 MG/ML
15 SOLUTION DENTAL EVERY 12 HOURS
Status: DISCONTINUED | OUTPATIENT
Start: 2022-10-24 | End: 2022-10-26 | Stop reason: CLARIF

## 2022-10-24 RX ORDER — ETOMIDATE 2 MG/ML
INJECTION INTRAVENOUS PRN
Status: DISCONTINUED | OUTPATIENT
Start: 2022-10-24 | End: 2022-10-24

## 2022-10-24 RX ORDER — PROPOFOL 10 MG/ML
INJECTION, EMULSION INTRAVENOUS
Status: COMPLETED
Start: 2022-10-24 | End: 2022-10-24

## 2022-10-24 RX ORDER — PIPERACILLIN SODIUM, TAZOBACTAM SODIUM 4; .5 G/20ML; G/20ML
4.5 INJECTION, POWDER, LYOPHILIZED, FOR SOLUTION INTRAVENOUS EVERY 6 HOURS
Status: COMPLETED | OUTPATIENT
Start: 2022-10-24 | End: 2022-10-31

## 2022-10-24 RX ORDER — PROPOFOL 10 MG/ML
5-75 INJECTION, EMULSION INTRAVENOUS CONTINUOUS
Status: DISCONTINUED | OUTPATIENT
Start: 2022-10-24 | End: 2022-10-26 | Stop reason: CLARIF

## 2022-10-24 RX ORDER — FUROSEMIDE 10 MG/ML
40 INJECTION INTRAMUSCULAR; INTRAVENOUS ONCE
Status: COMPLETED | OUTPATIENT
Start: 2022-10-24 | End: 2022-10-24

## 2022-10-24 RX ORDER — ACETYLCYSTEINE 200 MG/ML
2 SOLUTION ORAL; RESPIRATORY (INHALATION) 4 TIMES DAILY
Status: DISCONTINUED | OUTPATIENT
Start: 2022-10-24 | End: 2022-11-01

## 2022-10-24 RX ORDER — IPRATROPIUM BROMIDE AND ALBUTEROL SULFATE 2.5; .5 MG/3ML; MG/3ML
3 SOLUTION RESPIRATORY (INHALATION)
Status: DISCONTINUED | OUTPATIENT
Start: 2022-10-24 | End: 2022-11-08 | Stop reason: HOSPADM

## 2022-10-24 RX ADMIN — PIPERACILLIN AND TAZOBACTAM 4.5 G: 4; .5 INJECTION, POWDER, FOR SOLUTION INTRAVENOUS at 18:03

## 2022-10-24 RX ADMIN — PROPOFOL 50 MCG/KG/MIN: 10 INJECTION, EMULSION INTRAVENOUS at 23:59

## 2022-10-24 RX ADMIN — CHLORHEXIDINE GLUCONATE 15 ML: 1.2 SOLUTION ORAL at 19:45

## 2022-10-24 RX ADMIN — ACETAMINOPHEN 975 MG: 325 TABLET, FILM COATED ORAL at 18:03

## 2022-10-24 RX ADMIN — ASPIRIN 81 MG CHEWABLE TABLET 162 MG: 81 TABLET CHEWABLE at 09:22

## 2022-10-24 RX ADMIN — PROPOFOL 20 MCG/KG/MIN: 10 INJECTION, EMULSION INTRAVENOUS at 09:47

## 2022-10-24 RX ADMIN — AMIODARONE HYDROCHLORIDE 400 MG: 200 TABLET ORAL at 12:20

## 2022-10-24 RX ADMIN — HYDROXYZINE HYDROCHLORIDE 50 MG: 25 TABLET, FILM COATED ORAL at 00:10

## 2022-10-24 RX ADMIN — NICOTINE 1 PATCH: 14 PATCH, EXTENDED RELEASE TRANSDERMAL at 09:57

## 2022-10-24 RX ADMIN — POLYETHYLENE GLYCOL 3350 17 G: 17 POWDER, FOR SOLUTION ORAL at 08:12

## 2022-10-24 RX ADMIN — ETOMIDATE 16 MG: 2 INJECTION, SOLUTION INTRAVENOUS at 09:42

## 2022-10-24 RX ADMIN — ACETYLCYSTEINE 2 ML: 200 SOLUTION ORAL; RESPIRATORY (INHALATION) at 19:31

## 2022-10-24 RX ADMIN — PROPOFOL 40 MCG/KG/MIN: 10 INJECTION, EMULSION INTRAVENOUS at 16:00

## 2022-10-24 RX ADMIN — POTASSIUM & SODIUM PHOSPHATES POWDER PACK 280-160-250 MG 1 PACKET: 280-160-250 PACK at 19:48

## 2022-10-24 RX ADMIN — GABAPENTIN 300 MG: 250 SOLUTION ORAL at 21:36

## 2022-10-24 RX ADMIN — CHLORHEXIDINE GLUCONATE 15 ML: 1.2 SOLUTION ORAL at 10:43

## 2022-10-24 RX ADMIN — Medication 0.03 MCG/KG/MIN: at 06:07

## 2022-10-24 RX ADMIN — DEXMEDETOMIDINE HYDROCHLORIDE 1.5 MCG/KG/HR: 400 INJECTION INTRAVENOUS at 01:30

## 2022-10-24 RX ADMIN — AMIODARONE HYDROCHLORIDE 1 MG/MIN: 50 INJECTION, SOLUTION INTRAVENOUS at 03:48

## 2022-10-24 RX ADMIN — PIPERACILLIN AND TAZOBACTAM 4.5 G: 4; .5 INJECTION, POWDER, FOR SOLUTION INTRAVENOUS at 22:43

## 2022-10-24 RX ADMIN — QUETIAPINE FUMARATE 25 MG: 25 TABLET ORAL at 21:31

## 2022-10-24 RX ADMIN — ACETAMINOPHEN 975 MG: 325 TABLET, FILM COATED ORAL at 00:10

## 2022-10-24 RX ADMIN — DEXMEDETOMIDINE HYDROCHLORIDE 1.5 MCG/KG/HR: 400 INJECTION INTRAVENOUS at 03:42

## 2022-10-24 RX ADMIN — HYDROMORPHONE HYDROCHLORIDE 0.4 MG: 0.2 INJECTION, SOLUTION INTRAMUSCULAR; INTRAVENOUS; SUBCUTANEOUS at 02:23

## 2022-10-24 RX ADMIN — HALOPERIDOL LACTATE 2 MG: 5 INJECTION, SOLUTION INTRAMUSCULAR at 01:44

## 2022-10-24 RX ADMIN — AMIODARONE HYDROCHLORIDE 150 MG: 1.5 INJECTION, SOLUTION INTRAVENOUS at 03:37

## 2022-10-24 RX ADMIN — ATORVASTATIN CALCIUM 20 MG: 10 TABLET, FILM COATED ORAL at 19:48

## 2022-10-24 RX ADMIN — ACETAMINOPHEN 975 MG: 325 TABLET, FILM COATED ORAL at 08:12

## 2022-10-24 RX ADMIN — PROPOFOL 40 MCG/KG/MIN: 10 INJECTION, EMULSION INTRAVENOUS at 19:33

## 2022-10-24 RX ADMIN — SENNOSIDES AND DOCUSATE SODIUM 1 TABLET: 8.6; 5 TABLET ORAL at 21:31

## 2022-10-24 RX ADMIN — QUETIAPINE FUMARATE 25 MG: 25 TABLET ORAL at 08:12

## 2022-10-24 RX ADMIN — DEXMEDETOMIDINE HYDROCHLORIDE 1.5 MCG/KG/HR: 400 INJECTION INTRAVENOUS at 08:00

## 2022-10-24 RX ADMIN — PROPOFOL 45 MCG/KG/MIN: 10 INJECTION, EMULSION INTRAVENOUS at 18:08

## 2022-10-24 RX ADMIN — ALBUMIN HUMAN 12.5 G: 50 SOLUTION INTRAVENOUS at 18:00

## 2022-10-24 RX ADMIN — HUMAN ALBUMIN MICROSPHERES AND PERFLUTREN 9 ML: 10; .22 INJECTION, SOLUTION INTRAVENOUS at 16:50

## 2022-10-24 RX ADMIN — HYDROMORPHONE HYDROCHLORIDE 0.4 MG: 0.2 INJECTION, SOLUTION INTRAMUSCULAR; INTRAVENOUS; SUBCUTANEOUS at 00:04

## 2022-10-24 RX ADMIN — GABAPENTIN 300 MG: 250 SOLUTION ORAL at 08:21

## 2022-10-24 RX ADMIN — LIDOCAINE 2 PATCH: 560 PATCH PERCUTANEOUS; TOPICAL; TRANSDERMAL at 08:17

## 2022-10-24 RX ADMIN — ACETYLCYSTEINE 2 ML: 200 SOLUTION ORAL; RESPIRATORY (INHALATION) at 14:17

## 2022-10-24 RX ADMIN — Medication 40 MG: at 08:12

## 2022-10-24 RX ADMIN — PIPERACILLIN AND TAZOBACTAM 4.5 G: 4; .5 INJECTION, POWDER, FOR SOLUTION INTRAVENOUS at 10:40

## 2022-10-24 RX ADMIN — IPRATROPIUM BROMIDE AND ALBUTEROL SULFATE 3 ML: .5; 3 SOLUTION RESPIRATORY (INHALATION) at 14:17

## 2022-10-24 RX ADMIN — AMIODARONE HYDROCHLORIDE 400 MG: 200 TABLET ORAL at 21:31

## 2022-10-24 RX ADMIN — HALOPERIDOL LACTATE 2 MG: 5 INJECTION, SOLUTION INTRAMUSCULAR at 09:21

## 2022-10-24 RX ADMIN — OXYCODONE HYDROCHLORIDE 10 MG: 5 TABLET ORAL at 00:10

## 2022-10-24 RX ADMIN — HEPARIN SODIUM 5000 UNITS: 5000 INJECTION, SOLUTION INTRAVENOUS; SUBCUTANEOUS at 21:36

## 2022-10-24 RX ADMIN — HYDROMORPHONE HYDROCHLORIDE 0.4 MG: 0.2 INJECTION, SOLUTION INTRAMUSCULAR; INTRAVENOUS; SUBCUTANEOUS at 04:38

## 2022-10-24 RX ADMIN — GABAPENTIN 300 MG: 250 SOLUTION ORAL at 15:32

## 2022-10-24 RX ADMIN — OXYCODONE HYDROCHLORIDE 10 MG: 5 TABLET ORAL at 04:38

## 2022-10-24 RX ADMIN — DEXMEDETOMIDINE HYDROCHLORIDE 1.5 MCG/KG/HR: 400 INJECTION INTRAVENOUS at 06:08

## 2022-10-24 RX ADMIN — SENNOSIDES AND DOCUSATE SODIUM 1 TABLET: 8.6; 5 TABLET ORAL at 08:12

## 2022-10-24 RX ADMIN — VANCOMYCIN HYDROCHLORIDE 2000 MG: 1 INJECTION, POWDER, LYOPHILIZED, FOR SOLUTION INTRAVENOUS at 13:18

## 2022-10-24 RX ADMIN — IPRATROPIUM BROMIDE AND ALBUTEROL SULFATE 3 ML: .5; 3 SOLUTION RESPIRATORY (INHALATION) at 19:31

## 2022-10-24 RX ADMIN — ACETAMINOPHEN 650 MG: 325 TABLET, FILM COATED ORAL at 04:38

## 2022-10-24 RX ADMIN — METHOCARBAMOL 750 MG: 750 TABLET ORAL at 12:21

## 2022-10-24 RX ADMIN — HEPARIN SODIUM 5000 UNITS: 5000 INJECTION, SOLUTION INTRAVENOUS; SUBCUTANEOUS at 13:24

## 2022-10-24 RX ADMIN — METOPROLOL TARTRATE 5 MG: 5 INJECTION INTRAVENOUS at 02:10

## 2022-10-24 RX ADMIN — PROPOFOL 30 MCG/KG/MIN: 10 INJECTION, EMULSION INTRAVENOUS at 12:34

## 2022-10-24 RX ADMIN — METHOCARBAMOL 750 MG: 750 TABLET ORAL at 08:12

## 2022-10-24 RX ADMIN — PROPOFOL 50 MCG/KG/MIN: 10 INJECTION, EMULSION INTRAVENOUS at 21:28

## 2022-10-24 RX ADMIN — Medication 0.11 MCG/KG/MIN: at 19:10

## 2022-10-24 RX ADMIN — METHOCARBAMOL 750 MG: 750 TABLET ORAL at 00:10

## 2022-10-24 RX ADMIN — Medication 0.09 MCG/KG/MIN: at 14:12

## 2022-10-24 RX ADMIN — HYDROMORPHONE HYDROCHLORIDE 0.4 MG: 0.2 INJECTION, SOLUTION INTRAMUSCULAR; INTRAVENOUS; SUBCUTANEOUS at 07:57

## 2022-10-24 RX ADMIN — FUROSEMIDE 40 MG: 10 INJECTION, SOLUTION INTRAMUSCULAR; INTRAVENOUS at 06:48

## 2022-10-24 ASSESSMENT — ACTIVITIES OF DAILY LIVING (ADL)
ADLS_ACUITY_SCORE: 49
ADLS_ACUITY_SCORE: 49
ADLS_ACUITY_SCORE: 41
ADLS_ACUITY_SCORE: 49
ADLS_ACUITY_SCORE: 41
ADLS_ACUITY_SCORE: 49
ADLS_ACUITY_SCORE: 41
ADLS_ACUITY_SCORE: 49
ADLS_ACUITY_SCORE: 41
ADLS_ACUITY_SCORE: 41

## 2022-10-24 NOTE — PROGRESS NOTES
"End of Shift Summary:  Assumed care of pt at 2300  Neuro- Pt unable to follow commands or participate in assessments. Yells out unintelligible words, or \"help me\". Is not redirectable. Does not track. STEEL 4/5, four point restraints remain in place d/t extreme agitation. Maxed on precedex, prabhakar. Seroquel, given prn haldol, oxy, and dilaudid, pt still extremely restless and agitated.    Resp- 2-3L oxy mask. Lung sounds clear/ dim base. Weak unproductive cough,NT suctioned with moderate amount red/brown thick sections. Chest tubes in place, pt minimal output until pt was turned, dumped 390 serosang.     Cardiac- Pt became tachycardic over night,  With rhythm change on monitor CCMD notified EKG done pt in a-fib with RVR, amio bolus and gtt started. Pt appears to be in sinus now. On 0.03 levo MAP>65. Febrile tmax- 100.8 prn tylenol given    GI- TF Vital High protein, advanced this AM to 35 per order (goal of 70) q4 60ml FWF. No BM over night.     - Mccarty in place good UOP, diuresed this AM with 40mg of lasix    Integ/Msk- Sternal incision ecchyomotic, harvest sites WNL-mepilex placed over sites to prevent injury     Lines/Gtts- R introducer with 3 lumen CVC.   Levo-0.03, precedex- 1.5, Amio- 1mg/min     Labs- WNL, no replacements needed    WCTM          "

## 2022-10-24 NOTE — PROGRESS NOTES
"[facility]  Cardiovascular and Thoracic Surgery Daily Note        Assessment and Plan  POD # 4 s/p aortic valve replacement with a 25 mm Bal Inspiris Resilia bovine pericardial valve, coronary artery bypass grafting x 3 with left internal mammary artery to left anterior descending, reverse lesser saphenous vein graft to the posterior descending artery, reverse left lesser saphenous vein graft to the distal circumflex marginal artery, endoscopic bilateral lesser saphenous vein graft harvest from below the knees on 10/20 with Dr. Bennie Pierre     - CVS: Pre-op TTE with preserved biventricular function. Immediate postop profound vasoplegia and acidosis requiring multiple high dose pressors, bicarb infusion, and volume resuscitation. Weaned off pressors by POD1, lactate normalized. BP labile with agitation and ongoing need for sedation. Intermittent low dose NE- sedation related. New onset afib RVR 10/22; amiodarone bolus and infusion, back in NSR, transition to PO amio with taper, recurrent afib restarted on amio infusion. Appears hypervolemic, Lasix 40 mg IV x1 and reassess. Aspirin 162 mg daily, atorvastatin 20 mg daily. Chest tubes: serosang, 620 mL, no airleak, keep today. TPW: capped.      - Resp: Extubated POD1. IS, pulmonary toilet. Acute hypoxemic respiratory failure, increased WOB and inability to protect airway due to delirium, discussed with Dr. Pagan and Dr. Pierre, reintubated 10/24.      - Neuro: Post-extubation, repeating \"I need help\" over and over, but able to answer question appropriately, redirect, and no focal physical deficits. Worsening agitation 10/22 despite Seroquel, ativan, and Haldol. Placed in restraints due to swinging at staff, pulling at lines/tubes. Started on Precedex and Zyprexa with some improvement. Chronic pain at baseline. Minimize narcotics with agitation. Schedule Robaxin, Tylenol, lidocaine patches, Toradol, Atarax. PTA on gabapentin 800 mg TID, will " resume 300 mg BID and increasing pending improved confusion. Head CT on hold due to agitation, will proceed now that reintubated.       - Renal: No history of significant renal disease. Cr stable WNL. Trend BMP. Diuretic as above.         Recent Labs   Lab 10/24/22  0336 10/23/22  0351 10/22/22  1403   CR 1.16 1.11 1.08         - GI: No BM, continue bowel regimen     - : Mccarty in place, continue today. History urinary retention, PTA Flomax 0.8 mg daily resumed.      - Endo: DMT2. Insulin infusion transitioned to SSI. PTA metformin on hold.          Hemoglobin A1C   Date Value Ref Range Status   09/14/2022 5.6 0.0 - 5.6 % Final       Comment:       Normal <5.7%   Prediabetes 5.7-6.4%    Diabetes 6.5% or higher     Note: Adopted from ADA consensus guidelines.         - FEN: Replace electrolytes as needed. NPO for now with agitation, feeding tube placed postpyloric, TF started     - ID: Postop Leukocytosis, resolved. Afebrile. WBC down-trending. Periop abx complete. Trend CBC, fever curve.         Recent Labs   Lab 10/24/22  0336 10/23/22  0351 10/22/22  1403   WBC 6.9 7.5 8.4         - Heme: Acute blood loss anemia and thrombocytopenia due to surgery. Aspirin and subcutaneous heparin resumed 10/24 with improved PLT count. Trend CBC, transfuse PRN.         Recent Labs   Lab 10/24/22  0336 10/23/22  0351 10/22/22  1403   HGB 8.8* 9.2* 9.5*   PLT 84* 57* 54*         - Proph: SCD, subcutaneous heparin, PPI     - Dispo: ICU        Interval History  Persistent agitation despite Precedex. Maintaining airway thus far. Upper airway course.      Medications    acetaminophen  975 mg Oral Q8H     [Held by provider] aspirin  162 mg Oral or NG Tube Daily     atorvastatin  20 mg Oral Daily     gabapentin  300 mg Oral or G tube TID     [Held by provider] heparin ANTICOAGULANT  5,000 Units Subcutaneous Q8H     insulin aspart  1-10 Units Subcutaneous TID AC     insulin aspart  1-7 Units Subcutaneous At Bedtime     lactated ringers   "250 mL Intravenous Once     lidocaine  1-2 patch Transdermal Q24H     lidocaine   Transdermal Q8H MITA     methocarbamol  750 mg Oral 4x Daily     nicotine  1 patch Transdermal Daily     nicotine   Transdermal Q8H     pantoprazole  40 mg Oral or NG Tube Daily     Or     pantoprazole  40 mg Oral Daily     polyethylene glycol  17 g Oral Daily     QUEtiapine  25 mg Oral BID     senna-docusate  1 tablet Oral BID     sodium chloride (PF)  3 mL Intracatheter Q8H     tamsulosin  0.8 mg Oral QPM      acetaminophen, bisacodyl, calcium gluconate, calcium gluconate, calcium gluconate, dextrose, glucose **OR** dextrose **OR** glucagon, haloperidol lactate, hydrALAZINE, HYDROmorphone **OR** HYDROmorphone, hydrOXYzine, ketorolac, lidocaine 4%, lidocaine (buffered or not buffered), magnesium hydroxide, metoprolol, naloxone **OR** naloxone **OR** naloxone **OR** naloxone, nitroGLYcerin, ondansetron **OR** ondansetron, oxyCODONE **OR** oxyCODONE, phenylephrine, prochlorperazine **OR** prochlorperazine, sodium chloride, sodium chloride (PF)        Physical Exam  Vitals were reviewed  Blood pressure (!) 62/39, pulse 114, temperature (!) 100.8  F (38.2  C), resp. rate 26, height 1.803 m (5' 11\"), weight 126.6 kg (279 lb 1.6 oz), SpO2 100 %.  Rhythm: NSR     Lungs: diminished bases     Cardiovascular: rrr, no m/r/g     Abdomen: soft, NT, ND, +BS     Extremeties: warm, 1+ LE edema     Incision: CDI     CT: serosang output 620 mL, no air leak     Weight:          Vitals:     10/20/22 0600 10/21/22 0600 10/22/22 0250 10/23/22 0200   Weight: 120.2 kg (265 lb) 124.6 kg (274 lb 11.1 oz) 124 kg (273 lb 5.9 oz) 126.6 kg (279 lb 1.6 oz)            Data                Recent Labs   Lab 10/24/22  0434 10/24/22  0336 10/24/22  0019 10/23/22  0743 10/23/22  0351 10/22/22  1657 10/22/22  1403 10/20/22  1528 10/20/22  1526 10/20/22  1324 10/20/22  1321   WBC  --  6.9  --   --  7.5  --  8.4   < > 29.3*  --  22.4*   HGB  --  8.8*  --   --  9.2*  --  9.5* "   < > 14.2   < > 11.5*   MCV  --  94  --   --  93  --  93   < > 93  --  93   PLT  --  84*  --   --  57*  --  54*   < > 158  --  136*   INR  --   --   --   --   --   --   --   --  1.31*  --  1.48*   NA  --  141  --   --  138  --  137   < > 141   < > 138   POTASSIUM  --  4.3  --   --  4.3  --  4.7   < > 3.7   < > 3.8   CHLORIDE  --  110*  --   --  108  --  107   < > 109  --  109   CO2  --  27  --   --  26  --  28   < > 17*  --  23   BUN  --  33*  --   --  27  --  22   < > 13  --  12   CR  --  1.16  --   --  1.11  --  1.08   < > 0.97  --  0.85   ANIONGAP  --  4  --   --  4  --  2*   < > 15*  --  6   HALEY  --  8.8  --   --  9.2  --  9.1   < > 8.5  --  9.5   * 126* 131*   < > 136*   < > 137*   < > 229*   < > 182*   ALBUMIN  --  2.4*  --   --  2.5*  --   --    < > 3.1*  --   --    PROTTOTAL  --  5.3*  --   --  5.3*  --   --    < > 5.5*  --   --    BILITOTAL  --  0.8  --   --  0.6  --   --    < > 0.6  --   --    ALKPHOS  --  64  --   --  60  --   --    < > 84  --   --    ALT  --  19  --   --  20  --   --    < > 25  --   --    AST  --  35  --   --  48*  --   --    < > 68*  --   --     < > = values in this interval not displayed.         Imaging:      Recent Results (from the past 24 hour(s))   XR Abdomen Port 1 View     Narrative     EXAM: XR ABDOMEN PORT 1 VIEW  LOCATION: [facility]  DATE/TIME: 10/23/2022 11:04 AM     INDICATION: advanced NGT, needs to be post pyloric for feeding  COMPARISON: 10/22/2022 at 1542 hours.        Impression     IMPRESSION: Feeding tube tip is at the gastroduodenal junction and is likely postpyloric, however this is not definitive on this study. There is redundancy in the tube and it will likely progress distally on its own. Chest tubes present. Cholelithiasis.   Normal bowel gas pattern.            Patient seen and discussed with Dr. Ignacio Laboy PA-C  Cardiothoracic Surgery  Pager [phone]

## 2022-10-24 NOTE — PROGRESS NOTES
"SPIRITUAL HEALTH SERVICES: Tele-Encounter  Patient Location: ICU  Spoke with: Ptnt's wife Mya    Referral Source: Mya rang in on  phone line.    DATA:  Mya identified herself and shared a tearful conversation that Richar's surgery went well but he's \"not doing as well.\" She shared that she was at bedside Saturday and yesterday and felt that he was doing worse yesterday, from medications and recovery, than the day before, which was distressing her. She said she'd had several surgeries herself this year and would not be coming in to visit today, focusing on self care.    Mya said that Richar's godfather is Fr Andriy Caballero, who may be visiting. She spoke of a strong Denominational hortensia and asked for someone to pray at Richar's bedside today.    I offered reflective listening and emotional support, affirmed experiences, normalized feelings, encouraged self-care, invited her to consider asking Fr Ashraf to visit Richar, even if Richar isn't speaking yet, and said a blessing.    PLAN:  I've triaged to Unit  for follow-up and bedside prayer.     will follow.    Rev Carlee Paz  Associate   [facility] Phone Line [phone]  [place] (Tuesdays & Thursdays) [phone]  ______________________________    Type of service:  Telephone Visit     has received verbal consent for a TelephoneVisit from the patient? Yes    Distance Provider Location: designated [place] office or home office (secure setting)    Mode of Communication: telephone (via [employer] phone or [employer] tele-call-number ([phone]))      "

## 2022-10-24 NOTE — PROGRESS NOTES
Sputum sample obtained from patient ETT. Sent to lab for analysis.    Twan Muhammad, RRT on 10/24/2022 at 11:13 AM

## 2022-10-24 NOTE — PROGRESS NOTES
Critical Care Progress Note      10/24/2022    Name: Richar Davis MRN#: [id number]   Age: 67 year old YOB: 1955     [facility] Day# 4  ICU DAY #    MV DAY #             Problem List:   Principal Problem:    Coronary artery disease    1. Acute respiratory failure -  He was ventilating poorly this morning and was intubated. He is on 50%, +8 PEEP and will titrate down support as able and will add aggressive pulmonary hygiene. I suspect atelectasis and possible HAP (less likely). His wave forms indicate significant airway obstruction and suspect secretions.   2. Possible aspiration - as above; cultures pending and will treat with a short course of antibiotics  3. Circulatory shock- he still remains on Levophed at 0.04 since surgery; will titrate as needed.   4. S/p CABg x3, AVR (tissue valve); preserved EF  5. Delirium ongoing since surgery, etiology unclear; head CT pending though low index of suspicion for structural damage.   6. Atrial fibrillation   7. DM - glucoses ok; no changes at present  8. Nutrition enteral   9. HTN - monitor only   Overall, he remains critical. I assisted anesthesia with intubation and full assessment during the day. I spent 60 minutes of critical care time with him today (does not include time spent assisting arterial line). I discussed case with CV surgery service.            Summary/Hospital Course:           Assessment and plan :     Richar Davis IS a 67 year old male admitted on 10/20/2022 for acute respiratory failure.   I have personally reviewed the daily labs, imaging studies, cultures and discussed the case with referring physician and consulting physicians.     My assessment and plan by system for this patient is as follows:    Neurology/Psychiatry:   1. His delirium is still worrisome and likely metabolic; head CT pending     Cardiovascular:   1.Hemodynamics - he remains on low dose levophed; titrate down as able     Pulmonary/Ventilator Management:   1. He  has significant dependent atelectasis; focus on pulmonary hygiene and antibiotics to treat possible aspiration     GI and Nutrition :   1. Enteral nutrition     Renal/Fluids/Electrolytes:   1. Monitor closely; slight bump in BUN and Creatinine.     Infectious Disease:   1. As above, on antibiotics for possible HAP    Endocrine:   1. Glucoses ok on minimal coverage    Hematology/Oncology:   1. Hb 8.8; will monitor closely; platelets increasing and now 84k today      IV/Access:   1. Venous access -   2. Arterial access -   3.  Plan  - central access required and necessary      ICU Prophylaxis:   1. DVT: Hep Subq/ LMWH/mechanical  2. VAP: HOB 30 degrees, chlorhexidine rinse  3. Stress Ulcer: PPI/H2 blocker  4. Restraints: Nonviolent soft two point restraints required and necessary for patient safety and continued cares and good effect as patient continues to pull at necessary lines, tubes despite education and distraction. Will readdress daily.   5. IV Access - central access required and necessary for continued patient cares  6. Feeding - enteral nutrition   7. Family Update: discussed with wife via phone   8. Disposition - ICU care         Key goals for next 24 hours:   1. Intubated; pulmonary hygiene   2. Antibiotics started  3.               Interim History:              Key Medications:       acetaminophen  975 mg Oral Q8H     acetylcysteine  2 mL Nebulization 4x Daily     amiodarone  400 mg Oral BID     aspirin  162 mg Oral or NG Tube Daily     atorvastatin  20 mg Oral Daily     chlorhexidine  15 mL Mouth/Throat Q12H     gabapentin  300 mg Oral or G tube TID     heparin ANTICOAGULANT  5,000 Units Subcutaneous Q8H     insulin aspart  1-10 Units Subcutaneous TID AC     insulin aspart  1-7 Units Subcutaneous At Bedtime     ipratropium - albuterol 0.5 mg/2.5 mg/3 mL  3 mL Nebulization 4x daily     lactated ringers  250 mL Intravenous Once     lidocaine  1-2 patch Transdermal Q24H     lidocaine   Transdermal Q8H [facility]      methocarbamol  750 mg Oral 4x Daily     nicotine  1 patch Transdermal Daily     nicotine   Transdermal Q8H     pantoprazole  40 mg Oral or NG Tube Daily    Or     pantoprazole  40 mg Oral Daily     piperacillin-tazobactam  4.5 g Intravenous Q6H     polyethylene glycol  17 g Oral Daily     QUEtiapine  25 mg Oral BID     senna-docusate  1 tablet Oral BID     sodium chloride (PF)  3 mL Intracatheter Q8H     tamsulosin  0.8 mg Oral QPM     [START ON 10/25/2022] vancomycin  1,250 mg Intravenous Q12H     vancomycin  2,000 mg (central catheter) Intravenous Once       dextrose       nitroGLYcerin Stopped (10/21/22 1100)     norepinephrine 0.08 mcg/kg/min (10/24/22 1157)     phenylephrine Stopped (10/21/22 0035)     propofol 30 mcg/kg/min (10/24/22 1234)     sodium chloride 20 mL/hr (10/24/22 0808)     vasopressin Stopped (10/21/22 0511)               Physical Examination:   Temp:  [97.4  F (36.3  C)-100.8  F (38.2  C)] 100.8  F (38.2  C)  Pulse:  [] 67  Resp:  [0-40] 22  BP: ()/(39-85) 106/63  FiO2 (%):  [50 %-60 %] 50 %  SpO2:  [87 %-100 %] 99 %    Intake/Output Summary (Last 24 hours) at 10/24/2022 1238  Last data filed at 10/24/2022 1200  Gross per 24 hour   Intake 2993.14 ml   Output 2860 ml   Net 133.14 ml     Wt Readings from Last 4 Encounters:   10/24/22 128.3 kg (282 lb 13.6 oz)   09/19/22 121.6 kg (268 lb)   07/18/22 119.4 kg (263 lb 3.2 oz)   07/08/22 120.8 kg (266 lb 4.8 oz)     BP - Mean:  [] 75  Vent Mode: CMV/AC  (Continuous Mandatory Ventilation/ Assist Control)  FiO2 (%): 50 %  Resp Rate (Set): 18 breaths/min  Tidal Volume (Set, mL): 380 mL  PEEP (cm H2O): 8 cmH2O  Resp: 22    Recent Labs   Lab 10/24/22  1141 10/24/22  0624 10/23/22  0351 10/22/22  1426 10/21/22  0526 10/21/22  0503 10/20/22  0967 10/20/22  8458   PH 7.38  --   --   --   --  7.46* 7.43 7.13*   PCO2 43  --   --   --   --  35 35 45   PO2 91  --   --   --   --  115* 153* 155*   HCO3 25  --   --   --   --  25 23 15*   O2PER  50 3 21 21   < > 35 35 50    < > = values in this interval not displayed.       GEN: no acute distress; now comfortable on vent   HEENT: head ncat, sclera anicteric, OP patent, trachea midline   PULM: unlabored synchronous with vent, clear anteriorly though with significant rhonchi   CV/COR: RRR S1S2 no gallop,  No rub, no murmur; distant heart sounds   ABD: soft nontender, obese  EXT:  Mild edema   warm  NEURO: grossly intact moving extremities; delirious  SKIN: no obvious rash; no cyanosis or mottling   LINES: clean, dry intact         Data:   All data and imaging reviewed     ROUTINE ICU LABS (Last four results)  CMP  Recent Labs   Lab 10/24/22  1141 10/24/22  0807 10/24/22  0434 10/24/22  0336 10/23/22  1731 10/23/22  1255 10/23/22  0743 10/23/22  0351 10/22/22  1657 10/22/22  1403 10/22/22  1217 10/22/22  0334 10/21/22  0557 10/21/22  0458   NA  --   --   --  141  --   --   --  138  --  137  --  138  --  139   POTASSIUM  --   --   --  4.3  --   --   --  4.3  --  4.7  --  4.4  --  4.2   CHLORIDE  --   --   --  110*  --   --   --  108  --  107  --  106  --  110*   CO2  --   --   --  27  --   --   --  26  --  28  --  25  --  25   ANIONGAP  --   --   --  4  --   --   --  4  --  2*  --  7  --  4   * 130* 132* 126*   < >  --    < > 136*   < > 137*   < > 128*   < > 126*   BUN  --   --   --  33*  --   --   --  27  --  22  --  16  --  13   CR  --   --   --  1.16  --   --   --  1.11  --  1.08  --  1.04  --  0.84   GFRESTIMATED  --   --   --  69  --   --   --  73  --  76  --  79  --  >90   HALEY  --   --   --  8.8  --   --   --  9.2  --  9.1  --  9.1  --  8.7   MAG  --   --   --  2.2  --   --   --  2.2  --   --   --  2.2  --  2.0   PHOS  --   --   --  2.5  --  3.1  --  1.6*  --   --   --  3.1  --  2.4*   PROTTOTAL  --   --   --  5.3*  --   --   --  5.3*  --   --   --  5.3*  --  5.4*   ALBUMIN  --   --   --  2.4*  --   --   --  2.5*  --   --   --  2.8*  --  3.1*   BILITOTAL  --   --   --  0.8  --   --   --  0.6  --    --   --  0.8  --  0.5   ALKPHOS  --   --   --  64  --   --   --  60  --   --   --  66  --  63   AST  --   --   --  35  --   --   --  48*  --   --   --  72*  --  74*   ALT  --   --   --  19  --   --   --  20  --   --   --  20  --  23    < > = values in this interval not displayed.     CBC  Recent Labs   Lab 10/24/22  0336 10/23/22  0351 10/22/22  1403 10/22/22  0334   WBC 6.9 7.5 8.4 8.6   RBC 2.92* 3.05* 3.14* 3.48*   HGB 8.8* 9.2* 9.5* 10.5*   HCT 27.5* 28.3* 29.3* 32.7*   MCV 94 93 93 94   MCH 30.1 30.2 30.3 30.2   MCHC 32.0 32.5 32.4 32.1   RDW 13.2 13.4 13.6 13.4   PLT 84* 57* 54* 54*     INR  Recent Labs   Lab 10/20/22  1526 10/20/22  1321   INR 1.31* 1.48*     Arterial Blood Gas  Recent Labs   Lab 10/24/22  1141 10/24/22  0624 10/23/22  0351 10/22/22  1426 10/21/22  0526 10/21/22  0503 10/20/22  2339 10/20/22  1806   PH 7.38  --   --   --   --  7.46* 7.43 7.13*   PCO2 43  --   --   --   --  35 35 45   PO2 91  --   --   --   --  115* 153* 155*   HCO3 25  --   --   --   --  25 23 15*   O2PER 50 3 21 21   < > 35 35 50    < > = values in this interval not displayed.       All cultures:  No results for input(s): CULT in the last 168 hours.  Recent Results (from the past 24 hour(s))   XR Chest Port 1 View    Narrative    XR PORTABLE CHEST ONE VIEW   10/24/2022 10:55 AM       INDICATION: Endotracheal tube positioning  COMPARISON: 10/22/2022       Impression    IMPRESSION: New endotracheal tube in good position above the sona.  Right IJ central venous catheter is in the SVC. Left chest tube  present. Sternotomy wires are unchanged. Enteric tube courses below  the diaphragm. Opacity at the left lung base is similar to previous  likely representing atelectasis. Mild superimposed effusion possible.  No pneumothorax.       MD Luis    Billing: This patient is critically ill: Yes. Total critical care time today 60 min.

## 2022-10-24 NOTE — ANESTHESIA CARE TRANSFER NOTE
Patient: Richar Davis    Procedure: * No procedures listed *       Diagnosis: * No pre-op diagnosis entered *  Diagnosis Additional Information: No value filed.    Anesthesia Type:   No value filed.     Note:    Oropharynx: endotracheal tube in place and ventilatory support  Level of Consciousness: iatrogenic sedation      Independent Airway: airway patency satisfactory and stable  Dentition: dentition unchanged    Report to RN Given: handoff report given  Patient transferred to: ICU  Comments:   Diagnosis/Indication: Respiratory failure, Airway protection.  Procedure: Intubation.  Ordering Physician: Dr. Pagan.  Location: [facility], Bed  INTENSIVE CARE Bed: [facility]        Preoxygenated with 100% oxygen at 15 liters per minute via ambu facemask, induced with 16 mg IV etomidate, Tinajero 4 used to place 8.0 mm ID endotracheal tube with subglottic suction port, Cormack-Lehane grade 1 (full view of entire glottic aperture) view, cords open (abducted), small amount of white frothy secretions noted exiting the glottis.    At handoff of care, ETT depth 24 cm at the incisors, endotracheal tube position unchanged, equal, bilateral breath sounds auscultated in ICU room, patient placed on ICU ventilator by respiratory therapist, teeth and oral mucosa intact and unchanged at handoff of care, oxygen saturations greater than 98% after intubation, clinical monitors and alarms on and functioning, report on patient's clinical status given to ICU RN, report on patient's clinical status given to intensivist Dr. Pagan at bedside, ICU staff questions answered.     **See EPIC flowsheets records for vital signs recorded during anesthesia care.**      ICU Handoff: Call for PAUSE to initiate/utilize ICU HANDOFF, Identified Patient, Identified Responsible Provider, Reviewed the Pertinent Medical History, Discussed Surgical Course, Reviewed Intra-OP Anesthesia Management and Issues during Anesthesia, Set  Expectations for Post Procedure Period and Allowed Opportunity for Questions and Acknowledgement of Understanding      Vitals:  Vitals Value Taken Time   /57 10/24/22 0950   Temp     Pulse 72 10/24/22 0956   Resp 20 10/24/22 0956   SpO2 98 % 10/24/22 0956   Vitals shown include unvalidated device data.    Electronically Signed By: ADONIS Fry CRNA  October 24, 2022  9:48 AM

## 2022-10-24 NOTE — PHARMACY-VANCOMYCIN DOSING SERVICE
"Pharmacy Vancomycin Initial Note  Date of Service 2022  Patient's  1955  67 year old, male    Indication: [facility] Pneum[facility]    Current estimated CrCl = Estimated Creatinine Clearance: 85.5 mL/min (based on SCr of 1.16 mg/dL).    Creatinine for last 3 days  10/22/2022:  3:34 AM Creatinine 1.04 mg/dL;  2:03 PM Creatinine 1.08 mg/dL  10/23/2022:  3:51 AM Creatinine 1.11 mg/dL  10/24/2022:  3:36 AM Creatinine 1.16 mg/dL    Recent Vancomycin Level(s) for last 3 days  No results found for requested labs within last 72 hours.      Vancomycin IV Administrations (past 72 hours)      No vancomycin orders with administrations in past 72 hours.                Nephrotoxins and other renal medications (From now, onward)    Start     Dose/Rate Route Frequency Ordered Stop    10/25/22 0000  vancomycin (VANCOCIN) 1,250 mg in 0.9% NaCl 250 mL intermittent infusion         1,250 mg  over 90 Minutes Intravenous EVERY 12 HOURS 10/24/22 1150      10/24/22 1200  vancomycin (VANCOCIN) 2,000 mg in sodium chloride 0.9 % 250 mL intermittent infusion         2,000 mg (central catheter)  over 90 Minutes Intravenous ONCE 10/24/22 1150      10/24/22 1030  piperacillin-tazobactam (ZOSYN) 4.5 g vial to attach to  mL bag        Note to Pharmacy: For [facility], [facility] and [facility]: For Zosyn-naive patients, use the \"Zosyn initial dose + extended infusion\" order panel.    4.5 g  over 30 Minutes Intravenous EVERY 6 HOURS 10/24/22 1007 10/29/22 1029    10/21/22 0937  ketorolac (TORADOL) injection 15 mg         15 mg Intravenous EVERY 6 HOURS PRN 10/21/22 0937 10/26/22 0936    10/20/22 1930  norepinephrine (LEVOPHED) 4 mg in  mL infusion PREMIX         0.01-0.6 mcg/kg/min × 120.2 kg  4.5-270.5 mL/hr  Intravenous CONTINUOUS 10/20/22 1907      10/20/22 1630  vasopressin 0.2 units/mL in NS (PITRESSIN) standard conc infusion         1-2.4 Units/hr  5-12 mL/hr  Intravenous CONTINUOUS 10/20/22 1609            Contrast Orders - " past 72 hours (72h ago, onward)    None          [employer] Prediction of Planned Initial Vancomycin Regimen  Loading dose: 2000mg x 1  Regimen: 1250 mg IV every 12 hours.  Start time: 12:40 on 10/24/2022  Exposure target: AUC24 (range)400-600 mg/L.hr   AUC24,ss: 547 mg/L.hr  Probability of AUC24 > 400: 82 %  Ctrough,ss: 18.7 mg/L  Probability of Ctrough,ss > 20: 43 %  Probability of nephrotoxicity (Lodise LESLY 2009): 15 %          Plan:  1. Start vancomycin  2000 mg IV x 1, followed by 1250 mg IV q12h   2. Vancomycin monitoring method: AUC  3. Vancomycin therapeutic monitoring goal: 400-600 mg*h/L  4. Pharmacy will check vancomycin levels as appropriate in 1-3 Days.    5. Serum creatinine levels will be ordered daily for the first week of therapy and at least twice weekly for subsequent weeks.      Estephania Devlin, pharmacy student

## 2022-10-24 NOTE — PROCEDURES
Called by nurse quickly to bedside for nonfunctioning arterial line.  Patient's on escalating doses of norepinephrine.  Decision made to either rewire or replace arterial line for blood pressure monitoring and frequent arterial blood gases.  I attempted to rewire the patient's left radial A-line under sterile conditions.  The wire passed easily however when I remove the wire there was nonbrisk blood flow and the catheter was unable to sustain an arterial tracing.  I then turned my attention to the patient's right extremity.  First view on ultrasound showed small clotted right radial A-line however what appeared to be an adequate brachial artery.  Under sterile conditions I attempted to place a right brachial arterial line under ultrasound guidance.  I was able to enter the brachial artery under direct visualization multiple times with good blood flow.  However the wire was not able to thread.  At this time and then turned my attention to the right groin.  The right groin was prepped and draped in usual fashion and under ultrasound guidance the right femoral artery was entered with brisk blood flow once the syringe was removed.  The wire was threaded without resistance.  Using the Seldinger technique the catheter was threaded over the wire without resistance.  The wire was removed and disposed in usual fashion.  There was brisk blood flow.  The catheter was hooked up to the wire which showed appropriate arterial tracing.  The line was sutured into place.  Dressing was applied.  The patient tolerated procedure I personally performed the procedure.    Shant Zaldivar MD  Critical Care Medicine

## 2022-10-24 NOTE — PROGRESS NOTES
SPIRITUAL HEALTH SERVICES Progress Note  [facility]  ICU    Per request by spouse (mya) visited PT at beside for prayer and silent presence.    Mya requested regular visits with PT for prayer. Follow-up with PT and family while PT remains in hospital.    Daqaun Rogers  Associate

## 2022-10-24 NOTE — PROCEDURES
[facility]    Arterial line placement    Date/Time: 10/24/2022 11:39 AM  Performed by: Shannon Montano MD  Authorized by: Shannon Montano MD       UNIVERSAL PROTOCOL   Site Marked: No  Prior Images Obtained and Reviewed:  Yes  Required items: Required blood products, implants, devices and special equipment available    Patient identity confirmed:  Arm band  Patient was reevaluated immediately before administering moderate or deep sedation or anesthesia  Confirmation Checklist:  Patient's identity using two indicators  Time out: Immediately prior to the procedure a time out was called    Universal Protocol: the Joint Commission Universal Protocol was followed    Preparation: Patient was prepped and draped in usual sterile fashion    ESBL (mL):  2  Indication: multiple ABGs respiratory failure hemodynamic monitoring  Location:  Left radial      SEDATION  Patient Sedated: Yes    Sedation:  Propofol  Vital signs: Vital signs monitored during sedation      PROCEDURE DETAILS  Nomi's test: US showed color doppler flow in left radial and ulnar artery.  Needle Gauge:  18  Seldinger technique: Seldinger technique used    Number of Attempts:  1  Post-procedure:  Line sutured and dressing applied  CMS: normal    PROCEDURE    Length of time physician/provider present for 1:1 monitoring during sedation: 10

## 2022-10-24 NOTE — PROGRESS NOTES
Patient reintubated around 0940 with size 8.0 ETT located 24 cm at patient teeth. Tube placement confirmed via colorimetric CO2 detector, and bilateral breath sounds. Chest X-Ray pending. Patient placed on CMV mode with the following settings:     Rate: 18/min   Tidal Volume: 380 mL  PEEP: 8 cm H2O  Itime: 0.9 seconds  FiO2: 100% (now weaned to 60%)     Blood gas to be drawn in an hour.     Respiratory will continue to follow.    Twan Muhammad, RRT on 10/24/2022 at 10:05 AM

## 2022-10-24 NOTE — PROGRESS NOTES
[facility] ICU RESPIRATORY NOTE        Date of Admission: 10/20/2022    Date of Intubation (most recent): Reintubated 10/24/22    Reason for Mechanical Ventilation: Acute respiratory failure    Number of Days on Mechanical Ventilation: Initial Day    Met Criteria for Spontaneous Breathing Trial: No    Reason for No Spontaneous Breathing Trial: Initial day of mechanical ventilation.    Significant Events Today: Patient was intubated around 0940. Patient brought to CT for head and chest. CPT volera initiated with duoneb and mucomyst. Sputum sample obtained and sent to lab for analysis.     ABG Results:   Recent Labs   Lab 10/24/22  1141 10/24/22  0624 10/23/22  0351 10/22/22  1426 10/21/22  0526 10/21/22  0503 10/20/22  2339 10/20/22  1806   PH 7.38  --   --   --   --  7.46* 7.43 7.13*   PCO2 43  --   --   --   --  35 35 45   PO2 91  --   --   --   --  115* 153* 155*   HCO3 25  --   --   --   --  25 23 15*   O2PER 50 3 21 21   < > 35 35 50    < > = values in this interval not displayed.       Current Vent Settings: Vent Mode: CMV/AC  (Continuous Mandatory Ventilation/ Assist Control)  FiO2 (%): 50 %  Resp Rate (Set): 18 breaths/min  Tidal Volume (Set, mL): 380 mL  PEEP (cm H2O): 8 cmH2O  Resp: 24      Skin Assessment: Clean/Dry/Intact.    Plan: Continue to monitor patient on full ventilatory support. Continue QID nebulizer and volera treatments.     Twan Muhammad, RRT on 10/24/2022 at 2:28 PM

## 2022-10-25 ENCOUNTER — APPOINTMENT (OUTPATIENT)
Dept: GENERAL RADIOLOGY | Facility: CLINIC | Age: 67
DRG: 219 | End: 2022-10-25
Attending: INTERNAL MEDICINE
Payer: COMMERCIAL

## 2022-10-25 LAB
ANION GAP SERPL CALCULATED.3IONS-SCNC: 3 MMOL/L (ref 3–14)
ATRIAL RATE - MUSE: 141 BPM
ATRIAL RATE - MUSE: 166 BPM
ATRIAL RATE - MUSE: 58 BPM
ATRIAL RATE - MUSE: 70 BPM
BASE EXCESS BLDA CALC-SCNC: 0.4 MMOL/L (ref -9–1.8)
BUN SERPL-MCNC: 26 MG/DL (ref 7–30)
CA-I BLD-MCNC: 5.2 MG/DL (ref 4.4–5.2)
CA-I BLD-MCNC: 5.2 MG/DL (ref 4.4–5.2)
CALCIUM SERPL-MCNC: 8.7 MG/DL (ref 8.5–10.1)
CHLORIDE BLD-SCNC: 112 MMOL/L (ref 94–109)
CO2 SERPL-SCNC: 27 MMOL/L (ref 20–32)
CREAT SERPL-MCNC: 0.84 MG/DL (ref 0.66–1.25)
DIASTOLIC BLOOD PRESSURE - MUSE: NORMAL MMHG
ERYTHROCYTE [DISTWIDTH] IN BLOOD BY AUTOMATED COUNT: 13.7 % (ref 10–15)
ERYTHROCYTE [DISTWIDTH] IN BLOOD BY AUTOMATED COUNT: 13.8 % (ref 10–15)
GFR SERPL CREATININE-BSD FRML MDRD: >90 ML/MIN/1.73M2
GLUCOSE BLD-MCNC: 163 MG/DL (ref 70–99)
GLUCOSE BLD-MCNC: 183 MG/DL (ref 70–99)
GLUCOSE BLDC GLUCOMTR-MCNC: 130 MG/DL (ref 70–99)
GLUCOSE BLDC GLUCOMTR-MCNC: 142 MG/DL (ref 70–99)
GLUCOSE BLDC GLUCOMTR-MCNC: 143 MG/DL (ref 70–99)
GLUCOSE BLDC GLUCOMTR-MCNC: 157 MG/DL (ref 70–99)
GLUCOSE BLDC GLUCOMTR-MCNC: 167 MG/DL (ref 70–99)
HCO3 BLD-SCNC: 27 MMOL/L (ref 21–28)
HCT VFR BLD AUTO: 26.7 % (ref 40–53)
HCT VFR BLD AUTO: 26.7 % (ref 40–53)
HGB BLD-MCNC: 8.9 G/DL (ref 13.3–17.7)
HGB BLD-MCNC: 9 G/DL (ref 13.3–17.7)
INTERPRETATION ECG - MUSE: NORMAL
MAGNESIUM SERPL-MCNC: 2 MG/DL (ref 1.6–2.3)
MAGNESIUM SERPL-MCNC: 2.1 MG/DL (ref 1.6–2.3)
MCH RBC QN AUTO: 30.3 PG (ref 26.5–33)
MCH RBC QN AUTO: 30.8 PG (ref 26.5–33)
MCHC RBC AUTO-ENTMCNC: 33.3 G/DL (ref 31.5–36.5)
MCHC RBC AUTO-ENTMCNC: 33.7 G/DL (ref 31.5–36.5)
MCV RBC AUTO: 91 FL (ref 78–100)
MCV RBC AUTO: 91 FL (ref 78–100)
O2/TOTAL GAS SETTING VFR VENT: 40 %
P AXIS - MUSE: 40 DEGREES
P AXIS - MUSE: 40 DEGREES
P AXIS - MUSE: NORMAL DEGREES
P AXIS - MUSE: NORMAL DEGREES
PCO2 BLD: 49 MM HG (ref 35–45)
PH BLD: 7.35 [PH] (ref 7.35–7.45)
PHOSPHATE SERPL-MCNC: 2.2 MG/DL (ref 2.5–4.5)
PHOSPHATE SERPL-MCNC: 2.5 MG/DL (ref 2.5–4.5)
PLATELET # BLD AUTO: 104 10E3/UL (ref 150–450)
PLATELET # BLD AUTO: 86 10E3/UL (ref 150–450)
PO2 BLD: 166 MM HG (ref 80–105)
POTASSIUM BLD-SCNC: 3.8 MMOL/L (ref 3.4–5.3)
POTASSIUM BLD-SCNC: 4 MMOL/L (ref 3.4–5.3)
PR INTERVAL - MUSE: 150 MS
PR INTERVAL - MUSE: 156 MS
PR INTERVAL - MUSE: NORMAL MS
PR INTERVAL - MUSE: NORMAL MS
QRS DURATION - MUSE: 72 MS
QRS DURATION - MUSE: 76 MS
QRS DURATION - MUSE: 76 MS
QRS DURATION - MUSE: 80 MS
QT - MUSE: 290 MS
QT - MUSE: 336 MS
QT - MUSE: 402 MS
QT - MUSE: 450 MS
QTC - MUSE: 434 MS
QTC - MUSE: 441 MS
QTC - MUSE: 459 MS
QTC - MUSE: 488 MS
R AXIS - MUSE: 17 DEGREES
R AXIS - MUSE: 47 DEGREES
R AXIS - MUSE: 52 DEGREES
R AXIS - MUSE: 57 DEGREES
RBC # BLD AUTO: 2.92 10E6/UL (ref 4.4–5.9)
RBC # BLD AUTO: 2.94 10E6/UL (ref 4.4–5.9)
SODIUM SERPL-SCNC: 142 MMOL/L (ref 133–144)
SYSTOLIC BLOOD PRESSURE - MUSE: NORMAL MMHG
T AXIS - MUSE: 100 DEGREES
T AXIS - MUSE: 73 DEGREES
T AXIS - MUSE: 76 DEGREES
T AXIS - MUSE: 77 DEGREES
VANCOMYCIN SERPL-MCNC: 11 MG/L
VENTRICULAR RATE- MUSE: 127 BPM
VENTRICULAR RATE- MUSE: 151 BPM
VENTRICULAR RATE- MUSE: 58 BPM
VENTRICULAR RATE- MUSE: 70 BPM
WBC # BLD AUTO: 5.2 10E3/UL (ref 4–11)
WBC # BLD AUTO: 7.8 10E3/UL (ref 4–11)

## 2022-10-25 PROCEDURE — 83735 ASSAY OF MAGNESIUM: CPT | Performed by: INTERNAL MEDICINE

## 2022-10-25 PROCEDURE — 250N000011 HC RX IP 250 OP 636: Performed by: STUDENT IN AN ORGANIZED HEALTH CARE EDUCATION/TRAINING PROGRAM

## 2022-10-25 PROCEDURE — 250N000012 HC RX MED GY IP 250 OP 636 PS 637: Performed by: PHYSICIAN ASSISTANT

## 2022-10-25 PROCEDURE — 258N000003 HC RX IP 258 OP 636: Performed by: INTERNAL MEDICINE

## 2022-10-25 PROCEDURE — 258N000003 HC RX IP 258 OP 636: Performed by: STUDENT IN AN ORGANIZED HEALTH CARE EDUCATION/TRAINING PROGRAM

## 2022-10-25 PROCEDURE — 85027 COMPLETE CBC AUTOMATED: CPT | Performed by: INTERNAL MEDICINE

## 2022-10-25 PROCEDURE — 250N000011 HC RX IP 250 OP 636: Performed by: INTERNAL MEDICINE

## 2022-10-25 PROCEDURE — 250N000013 HC RX MED GY IP 250 OP 250 PS 637: Performed by: INTERNAL MEDICINE

## 2022-10-25 PROCEDURE — 258N000003 HC RX IP 258 OP 636: Performed by: SURGERY

## 2022-10-25 PROCEDURE — 94003 VENT MGMT INPAT SUBQ DAY: CPT

## 2022-10-25 PROCEDURE — 83735 ASSAY OF MAGNESIUM: CPT | Performed by: SURGERY

## 2022-10-25 PROCEDURE — 82947 ASSAY GLUCOSE BLOOD QUANT: CPT | Performed by: SURGERY

## 2022-10-25 PROCEDURE — 999N000157 HC STATISTIC RCP TIME EA 10 MIN

## 2022-10-25 PROCEDURE — 250N000009 HC RX 250: Performed by: PHYSICIAN ASSISTANT

## 2022-10-25 PROCEDURE — 71045 X-RAY EXAM CHEST 1 VIEW: CPT

## 2022-10-25 PROCEDURE — 200N000001 HC R&B ICU

## 2022-10-25 PROCEDURE — 99291 CRITICAL CARE FIRST HOUR: CPT | Mod: 24 | Performed by: INTERNAL MEDICINE

## 2022-10-25 PROCEDURE — 93005 ELECTROCARDIOGRAM TRACING: CPT

## 2022-10-25 PROCEDURE — 250N000013 HC RX MED GY IP 250 OP 250 PS 637: Performed by: PHYSICIAN ASSISTANT

## 2022-10-25 PROCEDURE — 93010 ELECTROCARDIOGRAM REPORT: CPT | Mod: 76 | Performed by: INTERNAL MEDICINE

## 2022-10-25 PROCEDURE — 94640 AIRWAY INHALATION TREATMENT: CPT | Mod: 76

## 2022-10-25 PROCEDURE — 84100 ASSAY OF PHOSPHORUS: CPT | Performed by: SURGERY

## 2022-10-25 PROCEDURE — 94640 AIRWAY INHALATION TREATMENT: CPT

## 2022-10-25 PROCEDURE — 94668 MNPJ CHEST WALL SBSQ: CPT

## 2022-10-25 PROCEDURE — 250N000011 HC RX IP 250 OP 636: Performed by: SURGERY

## 2022-10-25 PROCEDURE — 82803 BLOOD GASES ANY COMBINATION: CPT | Performed by: INTERNAL MEDICINE

## 2022-10-25 PROCEDURE — 84100 ASSAY OF PHOSPHORUS: CPT | Performed by: INTERNAL MEDICINE

## 2022-10-25 PROCEDURE — 82310 ASSAY OF CALCIUM: CPT | Performed by: PHYSICIAN ASSISTANT

## 2022-10-25 PROCEDURE — 250N000009 HC RX 250: Performed by: INTERNAL MEDICINE

## 2022-10-25 PROCEDURE — 85027 COMPLETE CBC AUTOMATED: CPT | Performed by: PHYSICIAN ASSISTANT

## 2022-10-25 PROCEDURE — 82330 ASSAY OF CALCIUM: CPT | Performed by: STUDENT IN AN ORGANIZED HEALTH CARE EDUCATION/TRAINING PROGRAM

## 2022-10-25 PROCEDURE — 250N000011 HC RX IP 250 OP 636: Performed by: PHYSICIAN ASSISTANT

## 2022-10-25 PROCEDURE — 250N000013 HC RX MED GY IP 250 OP 250 PS 637: Performed by: STUDENT IN AN ORGANIZED HEALTH CARE EDUCATION/TRAINING PROGRAM

## 2022-10-25 PROCEDURE — 999N000009 HC STATISTIC AIRWAY CARE

## 2022-10-25 PROCEDURE — 80202 ASSAY OF VANCOMYCIN: CPT | Performed by: SURGERY

## 2022-10-25 PROCEDURE — 258N000003 HC RX IP 258 OP 636: Performed by: PHYSICIAN ASSISTANT

## 2022-10-25 PROCEDURE — 84132 ASSAY OF SERUM POTASSIUM: CPT | Performed by: SURGERY

## 2022-10-25 PROCEDURE — 250N000013 HC RX MED GY IP 250 OP 250 PS 637: Performed by: SURGERY

## 2022-10-25 RX ORDER — FUROSEMIDE 10 MG/ML
40 INJECTION INTRAMUSCULAR; INTRAVENOUS ONCE
Status: COMPLETED | OUTPATIENT
Start: 2022-10-25 | End: 2022-10-25

## 2022-10-25 RX ORDER — METOPROLOL TARTRATE 1 MG/ML
5 INJECTION, SOLUTION INTRAVENOUS ONCE
Status: COMPLETED | OUTPATIENT
Start: 2022-10-25 | End: 2022-10-25

## 2022-10-25 RX ORDER — AMINO AC/PROTEIN HYDR/WHEY PRO 10G-100/30
1 LIQUID (ML) ORAL 3 TIMES DAILY
Status: DISCONTINUED | OUTPATIENT
Start: 2022-10-25 | End: 2022-10-28 | Stop reason: CLARIF

## 2022-10-25 RX ADMIN — GABAPENTIN 300 MG: 250 SOLUTION ORAL at 08:44

## 2022-10-25 RX ADMIN — VANCOMYCIN HYDROCHLORIDE 1500 MG: 10 INJECTION, POWDER, LYOPHILIZED, FOR SOLUTION INTRAVENOUS at 13:11

## 2022-10-25 RX ADMIN — Medication 0.08 MCG/KG/MIN: at 19:19

## 2022-10-25 RX ADMIN — METOPROLOL TARTRATE 5 MG: 5 INJECTION INTRAVENOUS at 08:53

## 2022-10-25 RX ADMIN — IPRATROPIUM BROMIDE AND ALBUTEROL SULFATE 3 ML: .5; 3 SOLUTION RESPIRATORY (INHALATION) at 19:11

## 2022-10-25 RX ADMIN — PIPERACILLIN AND TAZOBACTAM 4.5 G: 4; .5 INJECTION, POWDER, FOR SOLUTION INTRAVENOUS at 04:06

## 2022-10-25 RX ADMIN — HEPARIN SODIUM 5000 UNITS: 5000 INJECTION, SOLUTION INTRAVENOUS; SUBCUTANEOUS at 21:18

## 2022-10-25 RX ADMIN — ACETAMINOPHEN 975 MG: 325 TABLET, FILM COATED ORAL at 15:56

## 2022-10-25 RX ADMIN — PROPOFOL 45 MCG/KG/MIN: 10 INJECTION, EMULSION INTRAVENOUS at 14:07

## 2022-10-25 RX ADMIN — POTASSIUM & SODIUM PHOSPHATES POWDER PACK 280-160-250 MG 1 PACKET: 280-160-250 PACK at 21:25

## 2022-10-25 RX ADMIN — QUETIAPINE FUMARATE 25 MG: 25 TABLET ORAL at 21:19

## 2022-10-25 RX ADMIN — IPRATROPIUM BROMIDE AND ALBUTEROL SULFATE 3 ML: .5; 3 SOLUTION RESPIRATORY (INHALATION) at 16:27

## 2022-10-25 RX ADMIN — AMIODARONE HYDROCHLORIDE 400 MG: 200 TABLET ORAL at 08:43

## 2022-10-25 RX ADMIN — ASPIRIN 81 MG CHEWABLE TABLET 162 MG: 81 TABLET CHEWABLE at 08:43

## 2022-10-25 RX ADMIN — INSULIN ASPART 1 UNITS: 100 INJECTION, SOLUTION INTRAVENOUS; SUBCUTANEOUS at 05:57

## 2022-10-25 RX ADMIN — PROPOFOL 45 MCG/KG/MIN: 10 INJECTION, EMULSION INTRAVENOUS at 22:11

## 2022-10-25 RX ADMIN — IPRATROPIUM BROMIDE AND ALBUTEROL SULFATE 3 ML: .5; 3 SOLUTION RESPIRATORY (INHALATION) at 12:08

## 2022-10-25 RX ADMIN — ACETAMINOPHEN 975 MG: 325 TABLET, FILM COATED ORAL at 23:38

## 2022-10-25 RX ADMIN — ACETAMINOPHEN 975 MG: 325 TABLET, FILM COATED ORAL at 08:43

## 2022-10-25 RX ADMIN — HEPARIN SODIUM 5000 UNITS: 5000 INJECTION, SOLUTION INTRAVENOUS; SUBCUTANEOUS at 05:42

## 2022-10-25 RX ADMIN — POTASSIUM & SODIUM PHOSPHATES POWDER PACK 280-160-250 MG 1 PACKET: 280-160-250 PACK at 00:29

## 2022-10-25 RX ADMIN — METHOCARBAMOL 750 MG: 750 TABLET ORAL at 23:38

## 2022-10-25 RX ADMIN — ACETYLCYSTEINE 2 ML: 200 SOLUTION ORAL; RESPIRATORY (INHALATION) at 08:33

## 2022-10-25 RX ADMIN — CHLORHEXIDINE GLUCONATE 15 ML: 1.2 SOLUTION ORAL at 19:35

## 2022-10-25 RX ADMIN — AMIODARONE HYDROCHLORIDE 1 MG/MIN: 50 INJECTION, SOLUTION INTRAVENOUS at 10:06

## 2022-10-25 RX ADMIN — PROPOFOL 50 MCG/KG/MIN: 10 INJECTION, EMULSION INTRAVENOUS at 04:51

## 2022-10-25 RX ADMIN — FUROSEMIDE 40 MG: 10 INJECTION, SOLUTION INTRAMUSCULAR; INTRAVENOUS at 13:28

## 2022-10-25 RX ADMIN — METHOCARBAMOL 750 MG: 750 TABLET ORAL at 00:29

## 2022-10-25 RX ADMIN — INSULIN ASPART 1 UNITS: 100 INJECTION, SOLUTION INTRAVENOUS; SUBCUTANEOUS at 11:29

## 2022-10-25 RX ADMIN — GABAPENTIN 300 MG: 250 SOLUTION ORAL at 15:57

## 2022-10-25 RX ADMIN — LIDOCAINE 1 PATCH: 560 PATCH PERCUTANEOUS; TOPICAL; TRANSDERMAL at 09:05

## 2022-10-25 RX ADMIN — Medication 0.12 MCG/KG/MIN: at 05:56

## 2022-10-25 RX ADMIN — PROPOFOL 45 MCG/KG/MIN: 10 INJECTION, EMULSION INTRAVENOUS at 09:44

## 2022-10-25 RX ADMIN — ACETAMINOPHEN 975 MG: 325 TABLET, FILM COATED ORAL at 00:29

## 2022-10-25 RX ADMIN — Medication 1 PACKET: at 21:19

## 2022-10-25 RX ADMIN — QUETIAPINE FUMARATE 25 MG: 25 TABLET ORAL at 08:43

## 2022-10-25 RX ADMIN — PROPOFOL 45 MCG/KG/MIN: 10 INJECTION, EMULSION INTRAVENOUS at 11:59

## 2022-10-25 RX ADMIN — ACETYLCYSTEINE 2 ML: 200 SOLUTION ORAL; RESPIRATORY (INHALATION) at 19:12

## 2022-10-25 RX ADMIN — Medication 40 MG: at 08:44

## 2022-10-25 RX ADMIN — SODIUM CHLORIDE: 9 INJECTION, SOLUTION INTRAVENOUS at 21:11

## 2022-10-25 RX ADMIN — HEPARIN SODIUM 5000 UNITS: 5000 INJECTION, SOLUTION INTRAVENOUS; SUBCUTANEOUS at 13:26

## 2022-10-25 RX ADMIN — ACETYLCYSTEINE 2 ML: 200 SOLUTION ORAL; RESPIRATORY (INHALATION) at 12:08

## 2022-10-25 RX ADMIN — PROPOFOL 45 MCG/KG/MIN: 10 INJECTION, EMULSION INTRAVENOUS at 19:17

## 2022-10-25 RX ADMIN — PROPOFOL 45 MCG/KG/MIN: 10 INJECTION, EMULSION INTRAVENOUS at 16:37

## 2022-10-25 RX ADMIN — IPRATROPIUM BROMIDE AND ALBUTEROL SULFATE 3 ML: .5; 3 SOLUTION RESPIRATORY (INHALATION) at 08:34

## 2022-10-25 RX ADMIN — PIPERACILLIN AND TAZOBACTAM 4.5 G: 4; .5 INJECTION, POWDER, FOR SOLUTION INTRAVENOUS at 21:25

## 2022-10-25 RX ADMIN — POTASSIUM & SODIUM PHOSPHATES POWDER PACK 280-160-250 MG 1 PACKET: 280-160-250 PACK at 17:07

## 2022-10-25 RX ADMIN — SODIUM CHLORIDE: 9 INJECTION, SOLUTION INTRAVENOUS at 10:50

## 2022-10-25 RX ADMIN — PIPERACILLIN AND TAZOBACTAM 4.5 G: 4; .5 INJECTION, POWDER, FOR SOLUTION INTRAVENOUS at 09:46

## 2022-10-25 RX ADMIN — Medication 0.08 MCG/KG/MIN: at 00:46

## 2022-10-25 RX ADMIN — VANCOMYCIN HYDROCHLORIDE 1500 MG: 10 INJECTION, POWDER, LYOPHILIZED, FOR SOLUTION INTRAVENOUS at 23:43

## 2022-10-25 RX ADMIN — ATORVASTATIN CALCIUM 20 MG: 10 TABLET, FILM COATED ORAL at 19:38

## 2022-10-25 RX ADMIN — Medication 0.08 MCG/KG/MIN: at 12:00

## 2022-10-25 RX ADMIN — ACETYLCYSTEINE 2 ML: 200 SOLUTION ORAL; RESPIRATORY (INHALATION) at 16:27

## 2022-10-25 RX ADMIN — VANCOMYCIN HYDROCHLORIDE 1250 MG: 10 INJECTION, POWDER, LYOPHILIZED, FOR SOLUTION INTRAVENOUS at 00:29

## 2022-10-25 RX ADMIN — Medication 1 PACKET: at 15:56

## 2022-10-25 RX ADMIN — PROPOFOL 40 MCG/KG/MIN: 10 INJECTION, EMULSION INTRAVENOUS at 07:19

## 2022-10-25 RX ADMIN — GABAPENTIN 300 MG: 250 SOLUTION ORAL at 21:24

## 2022-10-25 RX ADMIN — POTASSIUM & SODIUM PHOSPHATES POWDER PACK 280-160-250 MG 1 PACKET: 280-160-250 PACK at 04:06

## 2022-10-25 RX ADMIN — INSULIN ASPART 1 UNITS: 100 INJECTION, SOLUTION INTRAVENOUS; SUBCUTANEOUS at 16:17

## 2022-10-25 RX ADMIN — PIPERACILLIN AND TAZOBACTAM 4.5 G: 4; .5 INJECTION, POWDER, FOR SOLUTION INTRAVENOUS at 15:53

## 2022-10-25 RX ADMIN — NICOTINE 1 PATCH: 14 PATCH, EXTENDED RELEASE TRANSDERMAL at 08:38

## 2022-10-25 RX ADMIN — CHLORHEXIDINE GLUCONATE 15 ML: 1.2 SOLUTION ORAL at 08:39

## 2022-10-25 RX ADMIN — PROPOFOL 55 MCG/KG/MIN: 10 INJECTION, EMULSION INTRAVENOUS at 02:41

## 2022-10-25 ASSESSMENT — ACTIVITIES OF DAILY LIVING (ADL)
ADLS_ACUITY_SCORE: 49
ADLS_ACUITY_SCORE: 43
ADLS_ACUITY_SCORE: 51
ADLS_ACUITY_SCORE: 43
ADLS_ACUITY_SCORE: 49
ADLS_ACUITY_SCORE: 43
ADLS_ACUITY_SCORE: 41
ADLS_ACUITY_SCORE: 43
ADLS_ACUITY_SCORE: 43
ADLS_ACUITY_SCORE: 49

## 2022-10-25 NOTE — PROVIDER NOTIFICATION
At approximately 0818-- Sudden sustained tachycardia 130-160's. EKG ordered.   --> Notified Dr. Zaldivar and eventually CV surgery about elevated HR

## 2022-10-25 NOTE — PROGRESS NOTES
CLINICAL NUTRITION SERVICES - REASSESSMENT NOTE      Recommendations Ordered by Registered Dietitian (RD): Reduce TF to Vital HP at 40 mL/hr= 960 kcal, 84 g protein, 107 g CHO, 0 g fiber, 803 mL H2O  Add ProSource 1 pkt TID = 120 kcal and 33 g protein  Total (TF + ProSource + Propofol)= 2096 kcal (17 kcal/kg), 117 g protein (1.5 g/kg)   Malnutrition: (10/23)  % Weight Loss:  None noted  % Intake:  Decreased intake does not meet criteria for malnutrition   Subcutaneous Fat Loss:  None observed  Muscle Loss:  None observed  Fluid Retention:  Trace 1+     Malnutrition Diagnosis: Patient does not meet two of the above criteria necessary for diagnosing malnutrition       EVALUATION OF PROGRESS TOWARD GOALS   Diet:  NPO on vent     Nutrition Support:  Patient started on TF 10/23, advanced to goal this am (0600), and continues as follows ~    Nutrition Support Enteral:  Type of Feeding Tube: Nasoduodenal   Enteral Frequency:  Continuous  Enteral Regimen: Vital HP at 75 mL/hr  Total Enteral Provisions: 1800 kcal, 157 g protein (2 g/kg), 1504 mL H2O, 199 g CHO, 0 g fiber   Free Water Flush: 60 mL every 4 hours  Propofol started yesterday and is currently running at 38.5 mL/hr= 1016 kcal   Total = 2816 kcal (23 kcal/kg and 138% needs)    Intake/Tolerance:    K/Mg/Phos normal  -167 with High sliding scale insulin  I/O 5285/2425, wt 127.6 kg (up overall)  No BM yet (noted holding bowel meds??)      ASSESSED NUTRITION NEEDS:  Dosing Weight   Actual 120.2 kg for energy  Ideal 78.2 kg for protein   Estimated Energy Needs: 3924-4226 kcals (14-17 Kcal/Kg)  Justification: obese  Estimated Protein Needs: 115-115 grams protein (1.5-2 g pro/Kg)  Justification: post-op      NEW FINDINGS:   10/24:  Re-intubated     Previous Goals (10/23):   TF @ goal to provide % estimated needs while unable to take PO  Evaluation: Met, patient now being overfed with addition of Propofol     Previous Nutrition Diagnosis (10/23):   Inadequate  protein-energy intake related to delirium post op as evidenced by no PO x3 day admission in setting of increased need post op  Evaluation: Resolved       CURRENT NUTRITION DIAGNOSIS  Excessive energy intake related to TF at goal, also on Propofol as evidenced by meeting > 135% estimated energy needs     INTERVENTIONS  Recommendations / Nutrition Prescription  Reduce TF to Vital HP at 40 mL/hr= 960 kcal, 84 g protein, 107 g CHO, 0 g fiber, 803 mL H2O  Add ProSource 1 pkt TID = 120 kcal and 33 g protein  Total (TF + ProSource + Propofol)= 2096 kcal (17 kcal/kg), 117 g protein (1.5 g/kg)  Certavite contraindicated d/t Niacin allergy     Implementation  EN Composition, Medical Food Supplement:  Orders written for reduced TF rate, ProSource as above.   Collaboration and Referral of Nutrition care:  Patient discussed today during interdisciplinary bedside rounds.     Goals  TF + ProSource + Propofol will meet % needs  Patient will have a BM once every 24-48 hours     MONITORING AND EVALUATION:  Progress towards goals will be monitored and evaluated per protocol and Practice Guidelines    Roxi Johns RD, LD, CNSC   Clinical Dietitian - [facility]

## 2022-10-25 NOTE — PLAN OF CARE
Shift 4216-7877: VSS ex. febrile, hypotension. Neuro: GOMEZ orientation, does not follow commands, moves all extremities. Pulm: Lungs: clear in upper lobes, diminished in bases bilaterally; mechanically ventilated. GI/: BS present, no BM; Mccarty in place: WDL output. Access: CVC x 3 lumens, A-line; prop gtt, levo gtt, NS for TKO. Diet: Continuous enteral feedings.     AM Shift:  - Re-intubated  - CT of head + chest completed  - ECHO completed  - A-line placed x 2 (initial a-line went bad and later removed)  - Pt behavior improved being intubated and sedated, but still thrashes around when more alert

## 2022-10-25 NOTE — PROGRESS NOTES
"[facility]  Cardiovascular and Thoracic Surgery Daily Note        Assessment and Plan  POD # 5 s/p aortic valve replacement with a 25 mm Bal Inspiris Resilia bovine pericardial valve, coronary artery bypass grafting x 3 with left internal mammary artery to left anterior descending, reverse lesser saphenous vein graft to the posterior descending artery, reverse left lesser saphenous vein graft to the distal circumflex marginal artery, endoscopic bilateral lesser saphenous vein graft harvest from below the knees on 10/20 with Dr. Bennie Pierre     - CVS: Pre-op TTE with preserved biventricular function. Immediate postop profound vasoplegia and acidosis requiring multiple high dose pressors, bicarb infusion, and volume resuscitation. Weaned off pressors by POD1, lactate normalized. BP labile with agitation and ongoing need for sedation. Intermittent low dose NE- sedation related. New onset afib RVR 10/22; amiodarone bolus and infusion, back in NSR, transition to PO amio with taper, recurrent afib restarted on amio infusion, back in RVR a.fib this am 1 dose IV metoprolol trialed with no success, will restart amiodarone. Appears hypervolemic, repeat Lasix 40 mg IV x1 and reassess. Aspirin 162 mg daily, atorvastatin 20 mg daily. Chest tubes: serosang, 440 this am<620 mL, no airleak, keep today. TPW: capped.      - Resp: Extubated POD1. IS, pulmonary toilet. Acute hypoxemic respiratory failure, increased WOB and inability to protect airway due to delirium, discussed with Dr. Pagan and Dr. Pierre, reintubated 10/24.      - Neuro: Post-extubation, repeating \"I need help\" over and over, but able to answer question appropriately, redirect, and no focal physical deficits. Worsening agitation 10/22 despite Seroquel, ativan, and Haldol. Placed in restraints due to swinging at staff, pulling at lines/tubes. Started on Precedex and Zyprexa with some improvement. Chronic pain at baseline. Minimize narcotics " with agitation. Schedule Robaxin, Tylenol, lidocaine patches, Toradol, Atarax. PTA on gabapentin 800 mg TID, will resume 300 mg BID and increasing pending improved confusion. Head CT on hold due to agitation, will proceed now that reintubated. Head CT negative 10/24/22     - Renal: No history of significant renal disease. Cr stable WNL. Trend BMP. Diuretic as above.         Recent Labs   Lab 10/24/22  0336 10/23/22  0351 10/22/22  1403   CR 1.16 1.11 1.08         - GI: No BM, continue bowel regimen     - : Mccarty in place, continue today. History urinary retention, PTA Flomax 0.8 mg daily resumed.      - Endo: DMT2. Insulin infusion transitioned to SSI. PTA metformin on hold.          Hemoglobin A1C   Date Value Ref Range Status   09/14/2022 5.6 0.0 - 5.6 % Final       Comment:       Normal <5.7%   Prediabetes 5.7-6.4%    Diabetes 6.5% or higher     Note: Adopted from ADA consensus guidelines.         - FEN: Replace electrolytes as needed. NPO for now with agitation, feeding tube placed postpyloric, TF continued      - ID: Postop Leukocytosis, resolved. Afebrile. WBC down-trending. Periop abx complete. Trend CBC, fever curve.         Recent Labs   Lab 10/24/22  0336 10/23/22  0351 10/22/22  1403   WBC 6.9 7.5 8.4         - Heme: Acute blood loss anemia and thrombocytopenia due to surgery. Aspirin and subcutaneous heparin resumed 10/24 with improved PLT count. Trend CBC, transfuse PRN.         Recent Labs   Lab 10/24/22  0336 10/23/22  0351 10/22/22  1403   HGB 8.8* 9.2* 9.5*   PLT 84* 57* 54*         - Proph: SCD, subcutaneous heparin, PPI     - Dispo: ICU        Interval History  Lying in bed, appears comfortable, wife at bedside, updated will restart amio gtt this am, trial of diuresis later this afternoon.      Medications    acetaminophen  975 mg Oral Q8H     [Held by provider] aspirin  162 mg Oral or NG Tube Daily     atorvastatin  20 mg Oral Daily     gabapentin  300 mg Oral or G tube TID     [Held by  "provider] heparin ANTICOAGULANT  5,000 Units Subcutaneous Q8H     insulin aspart  1-10 Units Subcutaneous TID AC     insulin aspart  1-7 Units Subcutaneous At Bedtime     lactated ringers  250 mL Intravenous Once     lidocaine  1-2 patch Transdermal Q24H     lidocaine   Transdermal Q8H MITA     methocarbamol  750 mg Oral 4x Daily     nicotine  1 patch Transdermal Daily     nicotine   Transdermal Q8H     pantoprazole  40 mg Oral or NG Tube Daily     Or     pantoprazole  40 mg Oral Daily     polyethylene glycol  17 g Oral Daily     QUEtiapine  25 mg Oral BID     senna-docusate  1 tablet Oral BID     sodium chloride (PF)  3 mL Intracatheter Q8H     tamsulosin  0.8 mg Oral QPM      acetaminophen, bisacodyl, calcium gluconate, calcium gluconate, calcium gluconate, dextrose, glucose **OR** dextrose **OR** glucagon, haloperidol lactate, hydrALAZINE, HYDROmorphone **OR** HYDROmorphone, hydrOXYzine, ketorolac, lidocaine 4%, lidocaine (buffered or not buffered), magnesium hydroxide, metoprolol, naloxone **OR** naloxone **OR** naloxone **OR** naloxone, nitroGLYcerin, ondansetron **OR** ondansetron, oxyCODONE **OR** oxyCODONE, phenylephrine, prochlorperazine **OR** prochlorperazine, sodium chloride, sodium chloride (PF)        Physical Exam  Vitals were reviewed  Blood pressure (!) 62/39, pulse 114, temperature (!) 100.8  F (38.2  C), resp. rate 26, height 1.803 m (5' 11\"), weight 126.6 kg (279 lb 1.6 oz), SpO2 100 %.  Rhythm: irreg      Lungs: diminished bases     Cardiovascular: rrr, no m/r/g     Abdomen: soft, NT, ND, +BS     Extremeties: warm, 1+ LE edema      Incision: cdi      CT: serosang output 440<620 mL, no air leak     Weight:          Vitals:     10/20/22 0600 10/21/22 0600 10/22/22 0250 10/23/22 0200   Weight: 120.2 kg (265 lb) 124.6 kg (274 lb 11.1 oz) 124 kg (273 lb 5.9 oz) 126.6 kg (279 lb 1.6 oz)            Data                Recent Labs   Lab 10/24/22  0434 10/24/22  0336 10/24/22  0019 10/23/22  0743 " 10/23/22  0351 10/22/22  1657 10/22/22  1403 10/20/22  1528 10/20/22  1526 10/20/22  1324 10/20/22  1321   WBC  --  6.9  --   --  7.5  --  8.4   < > 29.3*  --  22.4*   HGB  --  8.8*  --   --  9.2*  --  9.5*   < > 14.2   < > 11.5*   MCV  --  94  --   --  93  --  93   < > 93  --  93   PLT  --  84*  --   --  57*  --  54*   < > 158  --  136*   INR  --   --   --   --   --   --   --   --  1.31*  --  1.48*   NA  --  141  --   --  138  --  137   < > 141   < > 138   POTASSIUM  --  4.3  --   --  4.3  --  4.7   < > 3.7   < > 3.8   CHLORIDE  --  110*  --   --  108  --  107   < > 109  --  109   CO2  --  27  --   --  26  --  28   < > 17*  --  23   BUN  --  33*  --   --  27  --  22   < > 13  --  12   CR  --  1.16  --   --  1.11  --  1.08   < > 0.97  --  0.85   ANIONGAP  --  4  --   --  4  --  2*   < > 15*  --  6   HALEY  --  8.8  --   --  9.2  --  9.1   < > 8.5  --  9.5   * 126* 131*   < > 136*   < > 137*   < > 229*   < > 182*   ALBUMIN  --  2.4*  --   --  2.5*  --   --    < > 3.1*  --   --    PROTTOTAL  --  5.3*  --   --  5.3*  --   --    < > 5.5*  --   --    BILITOTAL  --  0.8  --   --  0.6  --   --    < > 0.6  --   --    ALKPHOS  --  64  --   --  60  --   --    < > 84  --   --    ALT  --  19  --   --  20  --   --    < > 25  --   --    AST  --  35  --   --  48*  --   --    < > 68*  --   --     < > = values in this interval not displayed.         Imaging:      Recent Results (from the past 24 hour(s))   XR Abdomen Port 1 View     Narrative     EXAM: XR ABDOMEN PORT 1 VIEW  LOCATION: [facility]  DATE/TIME: 10/23/2022 11:04 AM     INDICATION: advanced NGT, needs to be post pyloric for feeding  COMPARISON: 10/22/2022 at 1542 hours.        Impression     IMPRESSION: Feeding tube tip is at the gastroduodenal junction and is likely postpyloric, however this is not definitive on this study. There is redundancy in the tube and it will likely progress distally on its own. Chest tubes present. Cholelithiasis.    Normal bowel gas pattern.            Patient seen and discussed with Dr. Hardy Baugh PA-C  Cardiothoracic Surgery  Pager [phone]

## 2022-10-25 NOTE — PLAN OF CARE
Shift 1115-6953: VSS on levo gtt. Neuro: GOMEZ orientation, does not follow commands, moves all extremities. Pulm: Lungs: clear in upper lobes, diminished in bases bilaterally; mechanically ventilated. GI/: BS present, multiple loose BM's; Mccarty in place: WDL output. Access: CVC x 3 lumens, A-line; prop gtt, levo gtt, amio gtt, NS for TKO. Diet: Continuous enteral feedings.      AM Shift:  - Sudden change to A-fib RVR in AM (confirmed w/ EKG); IV metoprolol given x 1, amio gtt eventually re-ordered and started; converted back into SR (confirmed w/ EKG @1827)  - Multiple loose BM's  - IV Lasix given x 1  - TF rate decreased per nutrition order  - Chest x-ray completed in evening r/t change in ETT cm position; ETT okay at current position  - Levo gtt turned off at 2059--remains off at change of shift

## 2022-10-25 NOTE — PHARMACY-VANCOMYCIN DOSING SERVICE
"Pharmacy Vancomycin Note  Date of Service 2022  Patient's  1955   67 year old, male    Indication: [facility]ated Pneumonia  Day of Therapy: 2  Current vancomycin regimen:  1250 mg IV q12h  Current vancomycin monitoring method: AUC  Current vancomycin therapeutic monitoring goal: 400-600 mg*h/L    InsightRX Prediction of Current Vancomycin Regimen  Regimen: 1250 mg IV every 12 hours.  Start time: 12:37 on 10/25/2022  Exposure target: AUC24 (range)400-600 mg/L.hr   AUC24,ss: 399 mg/L.hr  Probability of AUC24 > 400: 50 %  Ctrough,ss: 12.9 mg/L  Probability of Ctrough,ss > 20: 8 %  Probability of nephrotoxicity (Lodise LESLY ): 8 %    Current estimated CrCl = Estimated Creatinine Clearance: 117.7 mL/min (based on SCr of 0.84 mg/dL).    Creatinine for last 3 days  10/22/2022:  2:03 PM Creatinine 1.08 mg/dL  10/23/2022:  3:51 AM Creatinine 1.11 mg/dL  10/24/2022:  3:36 AM Creatinine 1.16 mg/dL  10/25/2022:  5:47 AM Creatinine 0.84 mg/dL    Recent Vancomycin Levels (past 3 days)  10/25/2022:  9:54 AM Vancomycin 11.0 mg/L    Vancomycin IV Administrations (past 72 hours)                   vancomycin (VANCOCIN) 1,250 mg in 0.9% NaCl 250 mL intermittent infusion (mg) 1,250 mg New Bag 10/25/22 0029    vancomycin (VANCOCIN) 2,000 mg in sodium chloride 0.9 % 250 mL intermittent infusion (mg) 2,000 mg New Bag 10/24/22 1318                Nephrotoxins and other renal medications (From now, onward)    Start     Dose/Rate Route Frequency Ordered Stop    10/25/22 1300  furosemide (LASIX) injection 40 mg         40 mg  over 1-3 Minutes Intravenous ONCE 10/25/22 0935      10/25/22 1200  vancomycin (VANCOCIN) 1,500 mg in 0.9% NaCl 250 mL intermittent infusion         1,500 mg  over 90 Minutes Intravenous EVERY 12 HOURS 10/25/22 1147      10/24/22 1030  piperacillin-tazobactam (ZOSYN) 4.5 g vial to attach to  mL bag        Note to Pharmacy: For [facility], [facility] and [facility]: For Zosyn-naive patients, use the \"Zosyn " "initial dose + extended infusion\" order panel.    4.5 g  over 30 Minutes Intravenous EVERY 6 HOURS 10/24/22 1007 10/29/22 1029    10/21/22 0937  ketorolac (TORADOL) injection 15 mg         15 mg Intravenous EVERY 6 HOURS PRN 10/21/22 0937 10/26/22 0936    10/20/22 1930  norepinephrine (LEVOPHED) 4 mg in  mL infusion PREMIX         0.01-0.6 mcg/kg/min × 120.2 kg  4.5-270.5 mL/hr  Intravenous CONTINUOUS 10/20/22 1907      10/20/22 1630  vasopressin 0.2 units/mL in NS (PITRESSIN) standard conc infusion         1-2.4 Units/hr  5-12 mL/hr  Intravenous CONTINUOUS 10/20/22 1609               Contrast Orders - past 72 hours (72h ago, onward)    Start     Dose/Rate Route Frequency Stop    10/24/22 1700  perflutren diluted 1mL to 2mL with saline (OPTISON) diluted injection 5 mL         5 mL Intravenous ONCE 10/24/22 1650          Interpretation of levels and current regimen:  Vancomycin level is reflective of AUC less than 400    Has serum creatinine changed greater than 50% in last 72 hours: No    Urine output:  good urine output    Renal Function: Stable    InsightRX Prediction of Planned New Vancomycin Regimen  Regimen: 1500 mg IV every 12 hours.  Start time: 12:37 on 10/25/2022  Exposure target: AUC24 (range)400-600 mg/L.hr   AUC24,ss: 477 mg/L.hr  Probability of AUC24 > 400: 80 %  Ctrough,ss: 15.5 mg/L  Probability of Ctrough,ss > 20: 21 %  Probability of nephrotoxicity (Lodise LESLY 2009): 11 %    Plan:  1. Increase Dose to Vancomycin 1500 mg IV q12h.  2. Vancomycin monitoring method: AUC  3. Vancomycin therapeutic monitoring goal: 400-600 mg*h/L  4. Pharmacy will check vancomycin levels as appropriate in 3-5 Days.  5. Serum creatinine levels will be ordered daily for the first week of therapy and at least twice weekly for subsequent weeks.    Beverly Jimenez, PharmD, BCPS    "

## 2022-10-25 NOTE — PROGRESS NOTES
[facility] ICU RESPIRATORY NOTE        Date of Admission: 10/20/2022    Date of Intubation (most recent): 10/24/2022    Reason for Mechanical Ventilation: Resp Failure    Number of Days on Mechanical Ventilation: 1    Met Criteria for Spontaneous Breathing Trial: No    Reason for No Spontaneous Breathing Trial: Per MD    Significant Events Today: No events overnight.     Requiring regular suctioning for tenacious secretions.     ABG Results:   Recent Labs   Lab 10/24/22  1850 10/24/22  1141 10/24/22  0624 10/23/22  0351 10/21/22  0526 10/21/22  0503 10/20/22  2339   PH 7.32* 7.38  --   --   --  7.46* 7.43   PCO2 49* 43  --   --   --  35 35   PO2 118* 91  --   --   --  115* 153*   HCO3 25 25  --   --   --  25 23   O2PER 40 50 3 21   < > 35 35    < > = values in this interval not displayed.       Current Vent Settings: Vent Mode: CMV/AC  (Continuous Mandatory Ventilation/ Assist Control)  FiO2 (%): 50 %  Resp Rate (Set): 18 breaths/min  Tidal Volume (Set, mL): 480 mL  PEEP (cm H2O): 8 cmH2O  Resp: 15      Skin Assessment: Intact    Plan: Continue QID Volera and neb treatments    Levy Silverio on 10/25/2022 at 5:28 AM

## 2022-10-25 NOTE — PLAN OF CARE
PT/OT/CR: Per discussion with RN and chart review, patient is not medically appropriate to participate in therapy. Will complete orders. Please reorder therapy when patient able to participate in therapy and medically appropriate.

## 2022-10-25 NOTE — PROVIDER NOTIFICATION
Paged and notified CV surgery of sudden large output dump out of chest tube following BM and turn/repositioning. See flowsheets for output total

## 2022-10-25 NOTE — PROGRESS NOTES
Pt's spouse (Mya) called and I provided a pt update + answered questions. Mya plans to follow up with MD's tomorrow AM

## 2022-10-25 NOTE — PROGRESS NOTES
Critical Care Progress Note      10/25/2022    Name: Richar Davis MRN#: [id number]   Age: 67 year old YOB: 1955     [facility]    [facility] DAY #             Problem List:   Principal Problem:    Coronary artery disease    1. Acute respiratory failure -  Compensated today on 35%, +8 PEEP and will titrate down support as able and will continue aggressive pulmonary hygiene for atelectasis and possible HAP (less likely). His wave forms c/w significant airway obstruction and suspect secretions. His cultures are negative and he will continue for 5 days on antibiotics  2. Possible aspiration - as above; cultures negative and will treat with a short course of antibiotics  3. Circulatory shock- he still remains on Levophed at 0.04 since surgery and will continue to titrate as needed.   4. S/p CABg x3, AVR (tissue valve); preserved EF  5. Delirium ongoing since surgery, etiology unclear; head CT negative though low index of suspicion for structural damage.   6. Atrial fibrillation - he is back on IV amiodarone with reasonable HR control  7. DM - glucoses ok   8. Nutrition enteral   9. HTN - monitor only          Summary/Hospital Course:           Assessment and plan :     Richar Davis IS a 67 year old male admitted on 10/20/2022 for CABg and AVR.   I have personally reviewed the daily labs, imaging studies, cultures and discussed the case with referring physician and consulting physicians.     My assessment and plan by system for this patient is as follows:    Neurology/Psychiatry:   1. Sedated on vent     Cardiovascular:   1.Hemodynamics  - he remains on Levophed     Pulmonary/Ventilator Management:   1. On 35% and +8 PEEP    GI and Nutrition :   1. Enteral nutrition     Renal/Fluids/Electrolytes:   1. Normal renal function     Infectious Disease:   1. Completing 5 day course of antibiotics for possible HAP; cultures negative     Endocrine:   1. Glucoses ok    Hematology/Oncology:   1. Hb  ok at 9.0     IV/Access:   1. Venous access -   2. Arterial access -   3.  Plan  - central access required and necessary      ICU Prophylaxis:   1. DVT: Hep Subq/ LMWH/mechanical  2. VAP: HOB 30 degrees, chlorhexidine rinse  3. Stress Ulcer: PPI/H2 blocker  4. Restraints: Nonviolent soft two point restraints required and necessary for patient safety and continued cares and good effect as patient continues to pull at necessary lines, tubes despite education and distraction. Will readdress daily.   5. IV Access - central access required and necessary for continued patient cares  6. Feeding - enteral nutrition  7. Family Update: deferred   8. Disposition - ICU care        Key goals for next 24 hours:   1. Continue to titrate down vent support as able   2. Continue to titrate down Levophed as able  3.               Interim History:              Key Medications:       acetaminophen  975 mg Oral Q8H     acetylcysteine  2 mL Nebulization 4x Daily     aspirin  162 mg Oral or NG Tube Daily     atorvastatin  20 mg Oral Daily     chlorhexidine  15 mL Mouth/Throat Q12H     gabapentin  300 mg Oral or G tube TID     heparin ANTICOAGULANT  5,000 Units Subcutaneous Q8H     insulin aspart  1-10 Units Subcutaneous TID AC     insulin aspart  1-7 Units Subcutaneous At Bedtime     ipratropium - albuterol 0.5 mg/2.5 mg/3 mL  3 mL Nebulization 4x daily     lactated ringers  250 mL Intravenous Once     lidocaine  1-2 patch Transdermal Q24H     lidocaine   Transdermal Q8H MITA     methocarbamol  750 mg Oral 4x Daily     nicotine  1 patch Transdermal Daily     nicotine   Transdermal Q8H     pantoprazole  40 mg Oral or NG Tube Daily    Or     pantoprazole  40 mg Oral Daily     piperacillin-tazobactam  4.5 g Intravenous Q6H     polyethylene glycol  17 g Oral Daily     potassium & sodium phosphates  1 packet Oral or Feeding Tube Q4H     protein modular  1 packet Per Feeding Tube TID     QUEtiapine  25 mg Oral BID     senna-docusate  1 tablet  Oral BID     sodium chloride (PF)  3 mL Intracatheter Q8H     [Held by provider] tamsulosin  0.8 mg Oral QPM     vancomycin  1,500 mg Intravenous Q12H       amiodarone 0.5 mg/min (10/25/22 1622)     dextrose       nitroGLYcerin Stopped (10/21/22 1100)     norepinephrine 0.08 mcg/kg/min (10/25/22 1631)     phenylephrine Stopped (10/21/22 0035)     propofol 45 mcg/kg/min (10/25/22 1637)     sodium chloride 20 mL/hr at 10/25/22 1050     vasopressin Stopped (10/21/22 0511)               Physical Examination:   Temp:  [97.1  F (36.2  C)-98.2  F (36.8  C)] 98.2  F (36.8  C)  Pulse:  [] 87  Resp:  [0-29] 22  BP: ()/(43-64) 97/43  MAP:  [41 mmHg-114 mmHg] 67 mmHg  Arterial Line BP: ()/(25-75) 119/46  FiO2 (%):  [30 %-50 %] 35 %  SpO2:  [87 %-100 %] 99 %    Intake/Output Summary (Last 24 hours) at 10/25/2022 1733  Last data filed at 10/25/2022 1709  Gross per 24 hour   Intake 5832.11 ml   Output 4840 ml   Net 992.11 ml     Wt Readings from Last 4 Encounters:   10/25/22 127.6 kg (281 lb 4.9 oz)   09/19/22 121.6 kg (268 lb)   07/18/22 119.4 kg (263 lb 3.2 oz)   07/08/22 120.8 kg (266 lb 4.8 oz)     Arterial Line BP: ()/(25-75) 119/46  MAP:  [41 mmHg-114 mmHg] 67 mmHg  BP - Mean:  [64-82] 64  CVP:  [8 mmHg-18 mmHg] 8 mmHg  Vent Mode: CMV/AC  (Continuous Mandatory Ventilation/ Assist Control)  FiO2 (%): 35 %  Resp Rate (Set): 18 breaths/min  Tidal Volume (Set, mL): 380 mL  PEEP (cm H2O): 8 cmH2O  Resp: 22    Recent Labs   Lab 10/25/22  0543 10/24/22  1850 10/24/22  1141 10/24/22  0624 10/21/22  0526 10/21/22  0503   PH 7.35 7.32* 7.38  --   --  7.46*   PCO2 49* 49* 43  --   --  35   PO2 166* 118* 91  --   --  115*   HCO3 27 25 25  --   --  25   O2PER 40 40 50 3   < > 35    < > = values in this interval not displayed.       GEN: no acute distress; comfortable on vent    HEENT: head ncat, sclera anicteric, OP patent, trachea midline   PULM: unlabored synchronous with vent, clear anteriorly    CV/COR: RRR  S1S2 no gallop,  No rub, no murmur  ABD: soft nontender,  Obese   EXT:  Minimal edema   warm  NEURO: moves 4 extremities when light  SKIN: no obvious rash; no cyanosis or mottling   LINES: clean, dry intact         Data:   All data and imaging reviewed     ROUTINE ICU LABS (Last four results)  CMP  Recent Labs   Lab 10/25/22  1549 10/25/22  1128 10/25/22  0734 10/25/22  0547 10/24/22  2139 10/24/22  1855 10/24/22  0434 10/24/22  0336 10/23/22  0743 10/23/22  0351 10/22/22  1657 10/22/22  1403 10/22/22  1217 10/22/22  0334 10/21/22  0557 10/21/22  0458   NA  --   --   --  142  --   --   --  141  --  138  --  137  --  138  --  139   POTASSIUM 3.8  --   --  4.0  --  4.1  --  4.3  --  4.3  --  4.7  --  4.4  --  4.2   CHLORIDE  --   --   --  112*  --   --   --  110*  --  108  --  107  --  106  --  110*   CO2  --   --   --  27  --   --   --  27  --  26  --  28  --  25  --  25   ANIONGAP  --   --   --  3  --   --   --  4  --  4  --  2*  --  7  --  4   * 143* 167* 183*  157*   < > 151*   < > 126*   < > 136*   < > 137*   < > 128*   < > 126*   BUN  --   --   --  26  --   --   --  33*  --  27  --  22  --  16  --  13   CR  --   --   --  0.84  --   --   --  1.16  --  1.11  --  1.08  --  1.04  --  0.84   GFRESTIMATED  --   --   --  >90  --   --   --  69  --  73  --  76  --  79  --  >90   HALEY  --   --   --  8.7  --   --   --  8.8  --  9.2  --  9.1  --  9.1  --  8.7   MAG 2.0  --   --  2.1  --  2.2  --  2.2  --  2.2  --   --   --  2.2  --  2.0   PHOS 2.2*  --   --  2.5  --  2.2*  --  2.5   < > 1.6*  --   --   --  3.1  --  2.4*   PROTTOTAL  --   --   --   --   --   --   --  5.3*  --  5.3*  --   --   --  5.3*  --  5.4*   ALBUMIN  --   --   --   --   --   --   --  2.4*  --  2.5*  --   --   --  2.8*  --  3.1*   BILITOTAL  --   --   --   --   --   --   --  0.8  --  0.6  --   --   --  0.8  --  0.5   ALKPHOS  --   --   --   --   --   --   --  64  --  60  --   --   --  66  --  63   AST  --   --   --   --   --   --   --  35  --  48*   --   --   --  72*  --  74*   ALT  --   --   --   --   --   --   --  19  --  20  --   --   --  20  --  23    < > = values in this interval not displayed.     CBC  Recent Labs   Lab 10/25/22  1043 10/25/22  0547 10/24/22  0336 10/23/22  0351   WBC 5.2 7.8 6.9 7.5   RBC 2.92* 2.94* 2.92* 3.05*   HGB 9.0* 8.9* 8.8* 9.2*   HCT 26.7* 26.7* 27.5* 28.3*   MCV 91 91 94 93   MCH 30.8 30.3 30.1 30.2   MCHC 33.7 33.3 32.0 32.5   RDW 13.8 13.7 13.2 13.4   PLT 86* 104* 84* 57*     INR  Recent Labs   Lab 10/20/22  1526 10/20/22  1321   INR 1.31* 1.48*     Arterial Blood Gas  Recent Labs   Lab 10/25/22  0543 10/24/22  1850 10/24/22  1141 10/24/22  0624 10/21/22  0526 10/21/22  0503   PH 7.35 7.32* 7.38  --   --  7.46*   PCO2 49* 49* 43  --   --  35   PO2 166* 118* 91  --   --  115*   HCO3 27 25 25  --   --  25   O2PER 40 40 50 3   < > 35    < > = values in this interval not displayed.       All cultures:  No results for input(s): CULT in the last 168 hours.  No results found for this or any previous visit (from the past 24 hour(s)).  Case discussed with [facility] service.     MD Luis    Billing: This patient is critically ill: Yes. Total critical care time today 40 min.

## 2022-10-25 NOTE — PLAN OF CARE
Goal Outcome Evaluation:      Plan of Care Reviewed With:  (Interdisciplinary bedside rounds)    Overall Patient Progress: no changeOverall Patient Progress: no change    Outcome Evaluation: Patient now being overfed via TF + Propofol.  TF goal adjustment made, added protein modular.    Roxi Johns RD, LD, CNSC   Clinical Dietitian - [facility]

## 2022-10-25 NOTE — PLAN OF CARE
Neuro: patient is intubated ans sedated GOMEZ orientation, does not follow commands, moves all extremities.  CV: SR, Levo continuous to maintain MAP > 65   Pulm: LSC mechanically ventilated thick white in line and oral secretion.  GI/: NPO, TF at goal. BS present, no BM; Mccarty in place: WDL output.   LIne /Gtt: CVC x 3 lumens, A-line; prop gtt, levo gtt, NS for TKO.  Continue to monitor closely.

## 2022-10-26 LAB
ANION GAP SERPL CALCULATED.3IONS-SCNC: 5 MMOL/L (ref 3–14)
BACTERIA SPT CULT: NORMAL
BASE EXCESS BLDA CALC-SCNC: 4.4 MMOL/L (ref -9–1.8)
BUN SERPL-MCNC: 18 MG/DL (ref 7–30)
CA-I BLD-MCNC: 5 MG/DL (ref 4.4–5.2)
CA-I BLD-MCNC: 5.1 MG/DL (ref 4.4–5.2)
CALCIUM SERPL-MCNC: 8.8 MG/DL (ref 8.5–10.1)
CHLORIDE BLD-SCNC: 114 MMOL/L (ref 94–109)
CO2 SERPL-SCNC: 27 MMOL/L (ref 20–32)
CREAT SERPL-MCNC: 0.83 MG/DL (ref 0.66–1.25)
ERYTHROCYTE [DISTWIDTH] IN BLOOD BY AUTOMATED COUNT: 13.8 % (ref 10–15)
GFR SERPL CREATININE-BSD FRML MDRD: >90 ML/MIN/1.73M2
GLUCOSE BLD-MCNC: 154 MG/DL (ref 70–99)
GLUCOSE BLD-MCNC: 160 MG/DL (ref 70–99)
GLUCOSE BLDC GLUCOMTR-MCNC: 128 MG/DL (ref 70–99)
GLUCOSE BLDC GLUCOMTR-MCNC: 128 MG/DL (ref 70–99)
GLUCOSE BLDC GLUCOMTR-MCNC: 130 MG/DL (ref 70–99)
GLUCOSE BLDC GLUCOMTR-MCNC: 132 MG/DL (ref 70–99)
GLUCOSE BLDC GLUCOMTR-MCNC: 135 MG/DL (ref 70–99)
GRAM STAIN RESULT: NORMAL
GRAM STAIN RESULT: NORMAL
HCO3 BLD-SCNC: 30 MMOL/L (ref 21–28)
HCT VFR BLD AUTO: 28.3 % (ref 40–53)
HGB BLD-MCNC: 9.1 G/DL (ref 13.3–17.7)
MAGNESIUM SERPL-MCNC: 1.8 MG/DL (ref 1.6–2.3)
MAGNESIUM SERPL-MCNC: 2 MG/DL (ref 1.6–2.3)
MCH RBC QN AUTO: 29.5 PG (ref 26.5–33)
MCHC RBC AUTO-ENTMCNC: 32.2 G/DL (ref 31.5–36.5)
MCV RBC AUTO: 92 FL (ref 78–100)
O2/TOTAL GAS SETTING VFR VENT: 32 %
OXYHGB MFR BLD: 97 % (ref 92–100)
PCO2 BLD: 46 MM HG (ref 35–45)
PH BLD: 7.42 [PH] (ref 7.35–7.45)
PHOSPHATE SERPL-MCNC: 2.2 MG/DL (ref 2.5–4.5)
PHOSPHATE SERPL-MCNC: 2.8 MG/DL (ref 2.5–4.5)
PLATELET # BLD AUTO: 120 10E3/UL (ref 150–450)
PO2 BLD: 101 MM HG (ref 80–105)
POTASSIUM BLD-SCNC: 3.5 MMOL/L (ref 3.4–5.3)
POTASSIUM BLD-SCNC: 3.6 MMOL/L (ref 3.4–5.3)
RBC # BLD AUTO: 3.08 10E6/UL (ref 4.4–5.9)
SODIUM SERPL-SCNC: 146 MMOL/L (ref 133–144)
VANCOMYCIN SERPL-MCNC: 14.8 MG/L
WBC # BLD AUTO: 7.5 10E3/UL (ref 4–11)

## 2022-10-26 PROCEDURE — 250N000011 HC RX IP 250 OP 636: Performed by: STUDENT IN AN ORGANIZED HEALTH CARE EDUCATION/TRAINING PROGRAM

## 2022-10-26 PROCEDURE — 999N000259 HC STATISTIC EXTUBATION

## 2022-10-26 PROCEDURE — 250N000009 HC RX 250: Performed by: INTERNAL MEDICINE

## 2022-10-26 PROCEDURE — 250N000013 HC RX MED GY IP 250 OP 250 PS 637: Performed by: INTERNAL MEDICINE

## 2022-10-26 PROCEDURE — 94640 AIRWAY INHALATION TREATMENT: CPT

## 2022-10-26 PROCEDURE — 250N000013 HC RX MED GY IP 250 OP 250 PS 637: Performed by: STUDENT IN AN ORGANIZED HEALTH CARE EDUCATION/TRAINING PROGRAM

## 2022-10-26 PROCEDURE — 999N000157 HC STATISTIC RCP TIME EA 10 MIN

## 2022-10-26 PROCEDURE — 82805 BLOOD GASES W/O2 SATURATION: CPT | Performed by: SURGERY

## 2022-10-26 PROCEDURE — 82330 ASSAY OF CALCIUM: CPT | Performed by: STUDENT IN AN ORGANIZED HEALTH CARE EDUCATION/TRAINING PROGRAM

## 2022-10-26 PROCEDURE — 200N000001 HC R&B ICU

## 2022-10-26 PROCEDURE — 94640 AIRWAY INHALATION TREATMENT: CPT | Mod: 76

## 2022-10-26 PROCEDURE — 83735 ASSAY OF MAGNESIUM: CPT | Performed by: SURGERY

## 2022-10-26 PROCEDURE — 250N000013 HC RX MED GY IP 250 OP 250 PS 637: Performed by: SURGERY

## 2022-10-26 PROCEDURE — 250N000011 HC RX IP 250 OP 636: Performed by: INTERNAL MEDICINE

## 2022-10-26 PROCEDURE — 258N000003 HC RX IP 258 OP 636: Performed by: INTERNAL MEDICINE

## 2022-10-26 PROCEDURE — 250N000013 HC RX MED GY IP 250 OP 250 PS 637: Performed by: ANESTHESIOLOGY

## 2022-10-26 PROCEDURE — 80202 ASSAY OF VANCOMYCIN: CPT | Performed by: SURGERY

## 2022-10-26 PROCEDURE — 93005 ELECTROCARDIOGRAM TRACING: CPT

## 2022-10-26 PROCEDURE — 258N000003 HC RX IP 258 OP 636: Performed by: SURGERY

## 2022-10-26 PROCEDURE — 84100 ASSAY OF PHOSPHORUS: CPT | Performed by: SURGERY

## 2022-10-26 PROCEDURE — 80048 BASIC METABOLIC PNL TOTAL CA: CPT | Performed by: PHYSICIAN ASSISTANT

## 2022-10-26 PROCEDURE — 82947 ASSAY GLUCOSE BLOOD QUANT: CPT | Performed by: SURGERY

## 2022-10-26 PROCEDURE — 258N000003 HC RX IP 258 OP 636: Performed by: PHYSICIAN ASSISTANT

## 2022-10-26 PROCEDURE — 94660 CPAP INITIATION&MGMT: CPT

## 2022-10-26 PROCEDURE — 84132 ASSAY OF SERUM POTASSIUM: CPT | Performed by: SURGERY

## 2022-10-26 PROCEDURE — 99291 CRITICAL CARE FIRST HOUR: CPT | Mod: 24 | Performed by: INTERNAL MEDICINE

## 2022-10-26 PROCEDURE — 250N000013 HC RX MED GY IP 250 OP 250 PS 637: Performed by: PHYSICIAN ASSISTANT

## 2022-10-26 PROCEDURE — 250N000011 HC RX IP 250 OP 636: Performed by: PHYSICIAN ASSISTANT

## 2022-10-26 PROCEDURE — 999N000009 HC STATISTIC AIRWAY CARE

## 2022-10-26 PROCEDURE — 94003 VENT MGMT INPAT SUBQ DAY: CPT

## 2022-10-26 PROCEDURE — 85014 HEMATOCRIT: CPT | Performed by: PHYSICIAN ASSISTANT

## 2022-10-26 PROCEDURE — 250N000011 HC RX IP 250 OP 636: Performed by: SURGERY

## 2022-10-26 PROCEDURE — 93010 ELECTROCARDIOGRAM REPORT: CPT | Performed by: INTERNAL MEDICINE

## 2022-10-26 PROCEDURE — 999N000253 HC STATISTIC WEANING TRIALS

## 2022-10-26 PROCEDURE — 84100 ASSAY OF PHOSPHORUS: CPT | Performed by: INTERNAL MEDICINE

## 2022-10-26 RX ORDER — LOPERAMIDE HCL 1 MG/7.5ML
2 SUSPENSION ORAL 4 TIMES DAILY PRN
Status: DISCONTINUED | OUTPATIENT
Start: 2022-10-26 | End: 2022-11-08 | Stop reason: HOSPADM

## 2022-10-26 RX ORDER — FUROSEMIDE 10 MG/ML
40 INJECTION INTRAMUSCULAR; INTRAVENOUS ONCE
Status: COMPLETED | OUTPATIENT
Start: 2022-10-26 | End: 2022-10-26

## 2022-10-26 RX ADMIN — HYDROXYZINE HYDROCHLORIDE 25 MG: 25 TABLET, FILM COATED ORAL at 15:48

## 2022-10-26 RX ADMIN — HEPARIN SODIUM 5000 UNITS: 5000 INJECTION, SOLUTION INTRAVENOUS; SUBCUTANEOUS at 05:44

## 2022-10-26 RX ADMIN — PROPOFOL 35 MCG/KG/MIN: 10 INJECTION, EMULSION INTRAVENOUS at 04:18

## 2022-10-26 RX ADMIN — GABAPENTIN 300 MG: 250 SOLUTION ORAL at 21:43

## 2022-10-26 RX ADMIN — HALOPERIDOL LACTATE 2 MG: 5 INJECTION, SOLUTION INTRAMUSCULAR at 18:57

## 2022-10-26 RX ADMIN — HEPARIN SODIUM 5000 UNITS: 5000 INJECTION, SOLUTION INTRAVENOUS; SUBCUTANEOUS at 13:25

## 2022-10-26 RX ADMIN — ACETAMINOPHEN 975 MG: 325 TABLET, FILM COATED ORAL at 08:21

## 2022-10-26 RX ADMIN — POTASSIUM & SODIUM PHOSPHATES POWDER PACK 280-160-250 MG 1 PACKET: 280-160-250 PACK at 11:19

## 2022-10-26 RX ADMIN — Medication 1 PACKET: at 16:10

## 2022-10-26 RX ADMIN — ACETYLCYSTEINE 2 ML: 200 SOLUTION ORAL; RESPIRATORY (INHALATION) at 19:06

## 2022-10-26 RX ADMIN — QUETIAPINE FUMARATE 25 MG: 25 TABLET ORAL at 08:21

## 2022-10-26 RX ADMIN — OXYCODONE HYDROCHLORIDE 5 MG: 5 TABLET ORAL at 15:48

## 2022-10-26 RX ADMIN — PIPERACILLIN AND TAZOBACTAM 4.5 G: 4; .5 INJECTION, POWDER, FOR SOLUTION INTRAVENOUS at 11:13

## 2022-10-26 RX ADMIN — PIPERACILLIN AND TAZOBACTAM 4.5 G: 4; .5 INJECTION, POWDER, FOR SOLUTION INTRAVENOUS at 03:35

## 2022-10-26 RX ADMIN — AMIODARONE HYDROCHLORIDE 0.5 MG/MIN: 50 INJECTION, SOLUTION INTRAVENOUS at 06:36

## 2022-10-26 RX ADMIN — IPRATROPIUM BROMIDE AND ALBUTEROL SULFATE 3 ML: .5; 3 SOLUTION RESPIRATORY (INHALATION) at 16:23

## 2022-10-26 RX ADMIN — METHOCARBAMOL 750 MG: 750 TABLET ORAL at 23:33

## 2022-10-26 RX ADMIN — HYDRALAZINE HYDROCHLORIDE 10 MG: 20 INJECTION INTRAMUSCULAR; INTRAVENOUS at 17:55

## 2022-10-26 RX ADMIN — QUETIAPINE FUMARATE 25 MG: 25 TABLET ORAL at 21:17

## 2022-10-26 RX ADMIN — POTASSIUM & SODIUM PHOSPHATES POWDER PACK 280-160-250 MG 1 PACKET: 280-160-250 PACK at 00:54

## 2022-10-26 RX ADMIN — ONDANSETRON 4 MG: 2 INJECTION INTRAMUSCULAR; INTRAVENOUS at 11:16

## 2022-10-26 RX ADMIN — ATORVASTATIN CALCIUM 20 MG: 10 TABLET, FILM COATED ORAL at 21:17

## 2022-10-26 RX ADMIN — PIPERACILLIN AND TAZOBACTAM 4.5 G: 4; .5 INJECTION, POWDER, FOR SOLUTION INTRAVENOUS at 15:56

## 2022-10-26 RX ADMIN — PIPERACILLIN AND TAZOBACTAM 4.5 G: 4; .5 INJECTION, POWDER, FOR SOLUTION INTRAVENOUS at 21:42

## 2022-10-26 RX ADMIN — PROPOFOL 45 MCG/KG/MIN: 10 INJECTION, EMULSION INTRAVENOUS at 01:36

## 2022-10-26 RX ADMIN — Medication 1 PACKET: at 21:43

## 2022-10-26 RX ADMIN — POTASSIUM & SODIUM PHOSPHATES POWDER PACK 280-160-250 MG 1 PACKET: 280-160-250 PACK at 05:44

## 2022-10-26 RX ADMIN — ACETAMINOPHEN 975 MG: 325 TABLET, FILM COATED ORAL at 15:48

## 2022-10-26 RX ADMIN — ACETAMINOPHEN 975 MG: 325 TABLET, FILM COATED ORAL at 23:33

## 2022-10-26 RX ADMIN — POTASSIUM & SODIUM PHOSPHATES POWDER PACK 280-160-250 MG 1 PACKET: 280-160-250 PACK at 13:26

## 2022-10-26 RX ADMIN — IPRATROPIUM BROMIDE AND ALBUTEROL SULFATE 3 ML: .5; 3 SOLUTION RESPIRATORY (INHALATION) at 07:35

## 2022-10-26 RX ADMIN — ACETYLCYSTEINE 2 ML: 200 SOLUTION ORAL; RESPIRATORY (INHALATION) at 16:23

## 2022-10-26 RX ADMIN — PROCHLORPERAZINE EDISYLATE 5 MG: 5 INJECTION INTRAMUSCULAR; INTRAVENOUS at 18:02

## 2022-10-26 RX ADMIN — VANCOMYCIN HYDROCHLORIDE 1500 MG: 10 INJECTION, POWDER, LYOPHILIZED, FOR SOLUTION INTRAVENOUS at 11:55

## 2022-10-26 RX ADMIN — ASPIRIN 81 MG CHEWABLE TABLET 162 MG: 81 TABLET CHEWABLE at 08:21

## 2022-10-26 RX ADMIN — INSULIN ASPART 1 UNITS: 100 INJECTION, SOLUTION INTRAVENOUS; SUBCUTANEOUS at 17:11

## 2022-10-26 RX ADMIN — HYDRALAZINE HYDROCHLORIDE 10 MG: 20 INJECTION INTRAMUSCULAR; INTRAVENOUS at 03:01

## 2022-10-26 RX ADMIN — NICOTINE 1 PATCH: 14 PATCH, EXTENDED RELEASE TRANSDERMAL at 08:19

## 2022-10-26 RX ADMIN — GABAPENTIN 300 MG: 250 SOLUTION ORAL at 08:26

## 2022-10-26 RX ADMIN — ACETYLCYSTEINE 2 ML: 200 SOLUTION ORAL; RESPIRATORY (INHALATION) at 07:35

## 2022-10-26 RX ADMIN — VANCOMYCIN HYDROCHLORIDE 1500 MG: 10 INJECTION, POWDER, LYOPHILIZED, FOR SOLUTION INTRAVENOUS at 23:33

## 2022-10-26 RX ADMIN — FUROSEMIDE 40 MG: 10 INJECTION, SOLUTION INTRAMUSCULAR; INTRAVENOUS at 08:17

## 2022-10-26 RX ADMIN — METHOCARBAMOL 750 MG: 750 TABLET ORAL at 13:26

## 2022-10-26 RX ADMIN — GABAPENTIN 300 MG: 250 SOLUTION ORAL at 15:55

## 2022-10-26 RX ADMIN — IPRATROPIUM BROMIDE AND ALBUTEROL SULFATE 3 ML: .5; 3 SOLUTION RESPIRATORY (INHALATION) at 19:06

## 2022-10-26 RX ADMIN — CHLORHEXIDINE GLUCONATE 15 ML: 1.2 SOLUTION ORAL at 07:26

## 2022-10-26 RX ADMIN — METHOCARBAMOL 750 MG: 750 TABLET ORAL at 17:56

## 2022-10-26 RX ADMIN — Medication 1 PACKET: at 08:22

## 2022-10-26 RX ADMIN — HEPARIN SODIUM 5000 UNITS: 5000 INJECTION, SOLUTION INTRAVENOUS; SUBCUTANEOUS at 21:39

## 2022-10-26 RX ADMIN — LOPERAMIDE HCL 2 MG: 1 SOLUTION ORAL at 21:17

## 2022-10-26 RX ADMIN — LIDOCAINE 1 PATCH: 560 PATCH PERCUTANEOUS; TOPICAL; TRANSDERMAL at 08:20

## 2022-10-26 RX ADMIN — PROPOFOL 35 MCG/KG/MIN: 10 INJECTION, EMULSION INTRAVENOUS at 07:46

## 2022-10-26 RX ADMIN — Medication 40 MG: at 08:14

## 2022-10-26 ASSESSMENT — ACTIVITIES OF DAILY LIVING (ADL)
ADLS_ACUITY_SCORE: 47
ADLS_ACUITY_SCORE: 43
ADLS_ACUITY_SCORE: 55
ADLS_ACUITY_SCORE: 43
ADLS_ACUITY_SCORE: 43
ADLS_ACUITY_SCORE: 55
ADLS_ACUITY_SCORE: 47
ADLS_ACUITY_SCORE: 47
ADLS_ACUITY_SCORE: 43
ADLS_ACUITY_SCORE: 43
ADLS_ACUITY_SCORE: 51
ADLS_ACUITY_SCORE: 47

## 2022-10-26 NOTE — PROGRESS NOTES
[facility] ICU RESPIRATORY NOTE        Date of Admission: 10/20/2022    Date of Intubation (most recent):  Reintubated 10/24/22    Reason for Mechanical Ventilation: Resp Failure    Number of Days on Mechanical Ventilation: 2    Met Criteria for Spontaneous Breathing Trial: No    Reason for No Spontaneous Breathing Trial: Secretions    Significant Events Today: e    ABG Results:   Recent Labs   Lab 10/25/22  0543 10/24/22  1850 10/24/22  1141 10/24/22  0624 10/21/22  0526 10/21/22  0503   PH 7.35 7.32* 7.38  --   --  7.46*   PCO2 49* 49* 43  --   --  35   PO2 166* 118* 91  --   --  115*   HCO3 27 25 25  --   --  25   O2PER 40 40 50 3   < > 35    < > = values in this interval not displayed.       Current Vent Settings: CMV -8-30%    Skin Assessment: Itace    Plan: Continue pulmonary toilet, possible PST    RT Melissa on 10/26/2022 at 8:24 AM

## 2022-10-26 NOTE — PROGRESS NOTES
Alerted by RN to change in placement of ET tube. Previously 24cm and now 26cm. ETT 5-6cm above sona. ETT should be at 26.

## 2022-10-26 NOTE — PROGRESS NOTES
Pt extubated by Rt and placed on 10 lpm oxymask, flow weaned from 10 to 3 lpm, and then pt placed on 2lpm nc.  No pulmonary toilet post extubation 2/2 to chest tubes and inability to follow directions.    Bipap order for noc 14/6, may trial earlier.

## 2022-10-26 NOTE — PROGRESS NOTES
Shift 7090-4645  VSS off levophed. No changes to vent settings overnight. Tele SR, rates 90-100s. Opens eyes to repeated stimulation, does not follow commands, PERLL. Moves BLE spontaneously and withdraws to pain in BUE. Lung sounds course/diminished. CT x2 in place to suction with minimal output. Soft Bm x 2 overnight. Mccarty in place with adequate UOP. See LDA for skin assessments.     Plan: wean vent support as able

## 2022-10-26 NOTE — PROGRESS NOTES
"[facility]  Cardiovascular and Thoracic Surgery Daily Note        Assessment and Plan  POD # 6 s/p aortic valve replacement with a 25 mm Bal Inspiris Resilia bovine pericardial valve, coronary artery bypass grafting x 3 with left internal mammary artery to left anterior descending, reverse lesser saphenous vein graft to the posterior descending artery, reverse left lesser saphenous vein graft to the distal circumflex marginal artery, endoscopic bilateral lesser saphenous vein graft harvest from below the knees on 10/20 with Dr. Bennie Pierre     - CVS: Pre-op TTE with preserved biventricular function. Immediate postop profound vasoplegia and acidosis requiring multiple high dose pressors, bicarb infusion, and volume resuscitation. Weaned off pressors by POD1, lactate normalized. BP labile with agitation and ongoing need for sedation. Intermittent low dose NE- sedation related. New onset afib RVR 10/22; amiodarone bolus and infusion, back in NSR, transition to PO amio with taper, recurrent afib restarted on amio infusion, RVR a.fib 10/27-1 dose IV metoprolol trialed with no success, amio restarted converted to NSR at 1849. Will need anticoagulation for PAF once chest tubes removed. Appears hypervolemic but improved, repeat Lasix 40 mg IV x1 and reassess. Aspirin 162 mg daily, atorvastatin 20 mg daily. Chest tubes: serosang, 634<440<620 mL, no airleak, keep today. TPW: capped.      - Resp: Extubated POD1. IS, pulmonary toilet. Acute hypoxemic respiratory failure, increased WOB and inability to protect airway due to delirium, discussed with Dr. Pagan and Dr. Pierre, reintubated 10/24. Lightening sedation, wean to extubate as able per intensivist      - Neuro: Post-extubation, repeating \"I need help\" over and over, but able to answer question appropriately, redirect, and no focal physical deficits. Worsening agitation 10/22 despite Seroquel, ativan, and Haldol. Placed in restraints due to " swinging at staff, pulling at lines/tubes. Started on Precedex and Zyprexa with some improvement. Chronic pain at baseline. Minimize narcotics with agitation. Schedule Robaxin, Tylenol, lidocaine patches, Toradol, Atarax. PTA on gabapentin 800 mg TID, will resume 300 mg BID and increasing pending improved confusion. Head CT on hold due to agitation, will proceed now that reintubated. Head CT negative 10/24/22     - Renal: No history of significant renal disease. Cr stable WNL. Trend BMP. Diuretic as above.         Recent Labs   Lab 10/24/22  0336 10/23/22  0351 10/22/22  1403   CR 1.16 1.11 1.08         - GI: No BM, continue bowel regimen     - : Mccarty in place, continue today. History urinary retention, PTA Flomax 0.8 mg daily resumed.      - Endo: DMT2. Insulin infusion transitioned to SSI. PTA metformin on hold.          Hemoglobin A1C   Date Value Ref Range Status   09/14/2022 5.6 0.0 - 5.6 % Final       Comment:       Normal <5.7%   Prediabetes 5.7-6.4%    Diabetes 6.5% or higher     Note: Adopted from ADA consensus guidelines.         - FEN: Replace electrolytes as needed. NPO for now with agitation, feeding tube placed postpyloric, TF continued      - ID: Postop Leukocytosis, resolved. Afebrile. WBC down-trending. Periop abx complete. Trend CBC, fever curve.         Recent Labs   Lab 10/24/22  0336 10/23/22  0351 10/22/22  1403   WBC 6.9 7.5 8.4         - Heme: Acute blood loss anemia and thrombocytopenia due to surgery. Aspirin and subcutaneous heparin resumed 10/24 with improved PLT count. Trend CBC, transfuse PRN.         Recent Labs   Lab 10/24/22  0336 10/23/22  0351 10/22/22  1403   HGB 8.8* 9.2* 9.5*   PLT 84* 57* 54*         - Proph: SCD, subcutaneous heparin, PPI     - Dispo: ICU        Interval History  Lying in bed, starting to move legs as sedation lightening, lasix this am, ekg for QT monitoring-if ok would give haldol prior to weaning     Medications    acetaminophen  975 mg Oral Q8H     [Held  "by provider] aspirin  162 mg Oral or NG Tube Daily     atorvastatin  20 mg Oral Daily     gabapentin  300 mg Oral or G tube TID     [Held by provider] heparin ANTICOAGULANT  5,000 Units Subcutaneous Q8H     insulin aspart  1-10 Units Subcutaneous TID AC     insulin aspart  1-7 Units Subcutaneous At Bedtime     lactated ringers  250 mL Intravenous Once     lidocaine  1-2 patch Transdermal Q24H     lidocaine   Transdermal Q8H MITA     methocarbamol  750 mg Oral 4x Daily     nicotine  1 patch Transdermal Daily     nicotine   Transdermal Q8H     pantoprazole  40 mg Oral or NG Tube Daily     Or     pantoprazole  40 mg Oral Daily     polyethylene glycol  17 g Oral Daily     QUEtiapine  25 mg Oral BID     senna-docusate  1 tablet Oral BID     sodium chloride (PF)  3 mL Intracatheter Q8H     tamsulosin  0.8 mg Oral QPM      acetaminophen, bisacodyl, calcium gluconate, calcium gluconate, calcium gluconate, dextrose, glucose **OR** dextrose **OR** glucagon, haloperidol lactate, hydrALAZINE, HYDROmorphone **OR** HYDROmorphone, hydrOXYzine, ketorolac, lidocaine 4%, lidocaine (buffered or not buffered), magnesium hydroxide, metoprolol, naloxone **OR** naloxone **OR** naloxone **OR** naloxone, nitroGLYcerin, ondansetron **OR** ondansetron, oxyCODONE **OR** oxyCODONE, phenylephrine, prochlorperazine **OR** prochlorperazine, sodium chloride, sodium chloride (PF)        Physical Exam  Vitals were reviewed  Blood pressure (!) 62/39, pulse 114, temperature (!) 100.8  F (38.2  C), resp. rate 26, height 1.803 m (5' 11\"), weight 126.6 kg (279 lb 1.6 oz), SpO2 100 %.  Rhythm: NSR     Lungs: diminished bases     Cardiovascular: rrr, no m/r/g     Abdomen: soft, NT, ND, +BS     Extremeties: warm, 1+ LE edema      Incision: cdi      CT: serosang output 634<440<620 mL, no air leak     Weight:          Vitals:     10/20/22 0600 10/21/22 0600 10/22/22 0250 10/23/22 0200   Weight: 120.2 kg (265 lb) 124.6 kg (274 lb 11.1 oz) 124 kg (273 lb 5.9 oz) " 126.6 kg (279 lb 1.6 oz)            Data                Recent Labs   Lab 10/24/22  0434 10/24/22  0336 10/24/22  0019 10/23/22  0743 10/23/22  0351 10/22/22  1657 10/22/22  1403 10/20/22  1528 10/20/22  1526 10/20/22  1324 10/20/22  1321   WBC  --  6.9  --   --  7.5  --  8.4   < > 29.3*  --  22.4*   HGB  --  8.8*  --   --  9.2*  --  9.5*   < > 14.2   < > 11.5*   MCV  --  94  --   --  93  --  93   < > 93  --  93   PLT  --  84*  --   --  57*  --  54*   < > 158  --  136*   INR  --   --   --   --   --   --   --   --  1.31*  --  1.48*   NA  --  141  --   --  138  --  137   < > 141   < > 138   POTASSIUM  --  4.3  --   --  4.3  --  4.7   < > 3.7   < > 3.8   CHLORIDE  --  110*  --   --  108  --  107   < > 109  --  109   CO2  --  27  --   --  26  --  28   < > 17*  --  23   BUN  --  33*  --   --  27  --  22   < > 13  --  12   CR  --  1.16  --   --  1.11  --  1.08   < > 0.97  --  0.85   ANIONGAP  --  4  --   --  4  --  2*   < > 15*  --  6   HALEY  --  8.8  --   --  9.2  --  9.1   < > 8.5  --  9.5   * 126* 131*   < > 136*   < > 137*   < > 229*   < > 182*   ALBUMIN  --  2.4*  --   --  2.5*  --   --    < > 3.1*  --   --    PROTTOTAL  --  5.3*  --   --  5.3*  --   --    < > 5.5*  --   --    BILITOTAL  --  0.8  --   --  0.6  --   --    < > 0.6  --   --    ALKPHOS  --  64  --   --  60  --   --    < > 84  --   --    ALT  --  19  --   --  20  --   --    < > 25  --   --    AST  --  35  --   --  48*  --   --    < > 68*  --   --     < > = values in this interval not displayed.         Imaging:      Recent Results (from the past 24 hour(s))   XR Abdomen Port 1 View     Narrative     EXAM: XR ABDOMEN PORT 1 VIEW  LOCATION: [facility]  DATE/TIME: 10/23/2022 11:04 AM     INDICATION: advanced NGT, needs to be post pyloric for feeding  COMPARISON: 10/22/2022 at 1542 hours.        Impression     IMPRESSION: Feeding tube tip is at the gastroduodenal junction and is likely postpyloric, however this is not  definitive on this study. There is redundancy in the tube and it will likely progress distally on its own. Chest tubes present. Cholelithiasis.   Normal bowel gas pattern.            Patient seen and discussed with Dr. Hardy Baugh PA-C  Cardiothoracic Surgery  Pager [phone]

## 2022-10-26 NOTE — PROGRESS NOTES
[facility] ICU RESPIRATORY NOTE        Date of Admission: 10/20/2022    Date of Intubation (most recent): 10/24/22    Reason for Mechanical Ventilation: Respiratory Failure    Number of Days on Mechanical Ventilation: 1     Met Criteria for Spontaneous Breathing Trial:No    Reason for No Spontaneous Breathing Trial: Per MD    Significant Events Today: None overnight    ABG Results:   Recent Labs   Lab 10/25/22  0543 10/24/22  1850 10/24/22  1141 10/24/22  0624 10/21/22  0526 10/21/22  0503   PH 7.35 7.32* 7.38  --   --  7.46*   PCO2 49* 49* 43  --   --  35   PO2 166* 118* 91  --   --  115*   HCO3 27 25 25  --   --  25   O2PER 40 40 50 3   < > 35    < > = values in this interval not displayed.       Current Vent Settings: Vent Mode: CMV/AC  (Continuous Mandatory Ventilation/ Assist Control)  FiO2 (%): 35 %  Resp Rate (Set): 18 breaths/min  Tidal Volume (Set, mL): 380 mL  PEEP (cm H2O): 8 cmH2O  Resp: 8      Skin Assessment: Intact    Plan: Continue full vent support with daily wean assessment    Ronald Gonsalves, RRT on 10/26/2022 at 4:51 AM

## 2022-10-26 NOTE — PROGRESS NOTES
Critical Care Progress Note      10/26/2022    Name: Richar Davis MRN#: [id number]   Age: 67 year old YOB: 1955     [facility]    [facility] DAY #             Problem List:   Principal Problem:    Coronary artery disease     1. Acute respiratory failure -  Compensated today on 35%, +5 PEEP, he did well on a breathing trial, and was successfully extubated. We will continue to focus on pulmonary hygiene and increase physical activity. He has sleep apnea and will provide bipap as needed, and will complete 5 days worth of antibiotics  2. Possible aspiration - as above; cultures are negative and will treat with a 5 day course of antibiotics  3. Circulatory shock- he is now off Levophed for about a day.    4. S/p CABg x3, AVR (tissue valve); preserved EF  5. Delirium ongoing since surgery, etiology unclear; head CT negative though low index of suspicion for structural damage.He was following commands before extubation and am hopeful this is improving   6. Atrial fibrillation - he is on IV amiodarone with reasonable HR control  7. DM - glucoses ok   8. Nutrition enteral; PO's when able  9. HTN - monitor only           Summary/Hospital Course:           Assessment and plan :     Richar Davis IS a 67 year old male admitted on 10/20/2022 for CABg and AVR.   I have personally reviewed the daily labs, imaging studies, cultures and discussed the case with referring physician and consulting physicians.     My assessment and plan by system for this patient is as follows:    Neurology/Psychiatry:   1. Resolving sedation after intubation    Cardiovascular:   1.Hemodynamics - compensated now off of Levophed    Pulmonary/Ventilator Management:   1. Extubated this AM; focus on pulmonary hygiene    GI and Nutrition :   1. Enteral before extubation; PO's now and swallow eval in AM    Renal/Fluids/Electrolytes:   1. Normal renal function     Infectious Disease:   1. Complete 5 day course of antibiotics for  possible HAP    Endocrine:   1. Glucoses ok     Hematology/Oncology:   1. Hb 9.1     IV/Access:   1. Venous access -   2. Arterial access -   3.  Plan  - central access required and necessary      ICU Prophylaxis:   1. DVT: Hep Subq/ LMWH/mechanical  2. VAP: HOB 30 degrees, chlorhexidine rinse  3. Stress Ulcer: PPI/H2 blocker  4. Restraints: Nonviolent soft two point restraints required and necessary for patient safety and continued cares and good effect as patient continues to pull at necessary lines, tubes despite education and distraction. Will readdress daily.   5. IV Access - central access required and necessary for continued patient cares  6. Feeding - enteral to PO's   7. Family Update: discussed with wife at bedside   8. Disposition - follow in ICU at present        Key goals for next 24 hours:   1. Extubation today and pulmonary hygiene   2. Therapies scheduled tomorrow   3.               Interim History:              Key Medications:       acetaminophen  975 mg Oral Q8H     acetylcysteine  2 mL Nebulization 4x Daily     aspirin  162 mg Oral or NG Tube Daily     atorvastatin  20 mg Oral Daily     chlorhexidine  15 mL Mouth/Throat Q12H     gabapentin  300 mg Oral or G tube TID     heparin ANTICOAGULANT  5,000 Units Subcutaneous Q8H     insulin aspart  1-10 Units Subcutaneous TID AC     insulin aspart  1-7 Units Subcutaneous At Bedtime     ipratropium - albuterol 0.5 mg/2.5 mg/3 mL  3 mL Nebulization 4x daily     lactated ringers  250 mL Intravenous Once     lidocaine  1-2 patch Transdermal Q24H     lidocaine   Transdermal Q8H MITA     methocarbamol  750 mg Oral 4x Daily     nicotine  1 patch Transdermal Daily     nicotine   Transdermal Q8H     pantoprazole  40 mg Oral or NG Tube Daily    Or     pantoprazole  40 mg Oral Daily     piperacillin-tazobactam  4.5 g Intravenous Q6H     polyethylene glycol  17 g Oral Daily     protein modular  1 packet Per Feeding Tube TID     QUEtiapine  25 mg Oral BID      senna-docusate  1 tablet Oral BID     sodium chloride (PF)  3 mL Intracatheter Q8H     [Held by provider] tamsulosin  0.8 mg Oral QPM     vancomycin  1,500 mg Intravenous Q12H       amiodarone 0.5 mg/min (10/26/22 0727)     dextrose       nitroGLYcerin Stopped (10/21/22 1100)     norepinephrine Stopped (10/25/22 2059)     phenylephrine Stopped (10/21/22 0035)     propofol Stopped (10/26/22 0945)     sodium chloride 20 mL/hr at 10/26/22 0727     vasopressin Stopped (10/21/22 0511)               Physical Examination:   Temp:  [98.2  F (36.8  C)-98.9  F (37.2  C)] 98.9  F (37.2  C)  Pulse:  [] 100  Resp:  [0-34] 12  BP: (108-146)/(52-73) 141/64  MAP:  [50 mmHg-89 mmHg] 62 mmHg  Arterial Line BP: ()/(23-62) 117/36  FiO2 (%):  [35 %-40 %] 35 %  SpO2:  [92 %-100 %] 98 %    Intake/Output Summary (Last 24 hours) at 10/26/2022 1552  Last data filed at 10/26/2022 1400  Gross per 24 hour   Intake 4172.12 ml   Output 3581 ml   Net 591.12 ml     Wt Readings from Last 4 Encounters:   10/26/22 128.3 kg (282 lb 13.6 oz)   09/19/22 121.6 kg (268 lb)   07/18/22 119.4 kg (263 lb 3.2 oz)   07/08/22 120.8 kg (266 lb 4.8 oz)     Arterial Line BP: ()/(23-62) 117/36  MAP:  [50 mmHg-89 mmHg] 62 mmHg  BP - Mean:  [] 86  CVP:  [6 mmHg-23 mmHg] 14 mmHg  Vent Mode: PS  (Pressure Support)  FiO2 (%): 35 %  Resp Rate (Set): 18 breaths/min  Tidal Volume (Set, mL): 380 mL  PEEP (cm H2O): 6 cmH2O  Pressure Support (cm H2O): 8 cmH2O  Resp: 12    Recent Labs   Lab 10/25/22  0543 10/24/22  1850 10/24/22  1141 10/24/22  0624 10/21/22  0526 10/21/22  0503   PH 7.35 7.32* 7.38  --   --  7.46*   PCO2 49* 49* 43  --   --  35   PO2 166* 118* 91  --   --  115*   HCO3 27 25 25  --   --  25   O2PER 40 40 50 3   < > 35    < > = values in this interval not displayed.       GEN: no acute distress; comfortable on breathing trial    HEENT: head ncat, sclera anicteric, OP patent, trachea midline   PULM: unlabored synchronous with vent, clear  anteriorly prior to extubation     CV/COR: RRR S1S2 no gallop,  No rub, no murmur  ABD: soft nontender, obese   EXT:  Mild edema   warm  NEURO: moving all extremities spontaneously   SKIN: no obvious rash; no cyanosis or mottling   LINES: clean, dry intact         Data:   All data and imaging reviewed     ROUTINE ICU LABS (Last four results)  CMP  Recent Labs   Lab 10/26/22  1128 10/26/22  0733 10/26/22  0640 10/26/22  0441 10/25/22  1943 10/25/22  1549 10/25/22  0734 10/25/22  0547 10/24/22  2139 10/24/22  1855 10/24/22  0434 10/24/22  0336 10/23/22  0743 10/23/22  0351 10/22/22  1217 10/22/22  0334 10/21/22  0557 10/21/22  0458   NA  --   --   --  146*  --   --   --  142  --   --   --  141  --  138   < > 138  --  139   POTASSIUM  --   --   --  3.6  --  3.8  --  4.0  --  4.1  --  4.3  --  4.3   < > 4.4  --  4.2   CHLORIDE  --   --   --  114*  --   --   --  112*  --   --   --  110*  --  108   < > 106  --  110*   CO2  --   --   --  27  --   --   --  27  --   --   --  27  --  26   < > 25  --  25   ANIONGAP  --   --   --  5  --   --   --  3  --   --   --  4  --  4   < > 7  --  4   * 135* 132* 154*   < > 163*   < > 183*  157*   < > 151*   < > 126*   < > 136*   < > 128*   < > 126*   BUN  --   --   --  18  --   --   --  26  --   --   --  33*  --  27   < > 16  --  13   CR  --   --   --  0.83  --   --   --  0.84  --   --   --  1.16  --  1.11   < > 1.04  --  0.84   GFRESTIMATED  --   --   --  >90  --   --   --  >90  --   --   --  69  --  73   < > 79  --  >90   HALEY  --   --   --  8.8  --   --   --  8.7  --   --   --  8.8  --  9.2   < > 9.1  --  8.7   MAG  --   --   --  2.0  --  2.0  --  2.1  --  2.2  --  2.2  --  2.2  --  2.2  --  2.0   PHOS  --   --   --  2.2*  --  2.2*  --  2.5  --  2.2*  --  2.5   < > 1.6*  --  3.1  --  2.4*   PROTTOTAL  --   --   --   --   --   --   --   --   --   --   --  5.3*  --  5.3*  --  5.3*  --  5.4*   ALBUMIN  --   --   --   --   --   --   --   --   --   --   --  2.4*  --  2.5*  --  2.8*   --  3.1*   BILITOTAL  --   --   --   --   --   --   --   --   --   --   --  0.8  --  0.6  --  0.8  --  0.5   ALKPHOS  --   --   --   --   --   --   --   --   --   --   --  64  --  60  --  66  --  63   AST  --   --   --   --   --   --   --   --   --   --   --  35  --  48*  --  72*  --  74*   ALT  --   --   --   --   --   --   --   --   --   --   --  19  --  20  --  20  --  23    < > = values in this interval not displayed.     CBC  Recent Labs   Lab 10/26/22  0441 10/25/22  1043 10/25/22  0547 10/24/22  0336   WBC 7.5 5.2 7.8 6.9   RBC 3.08* 2.92* 2.94* 2.92*   HGB 9.1* 9.0* 8.9* 8.8*   HCT 28.3* 26.7* 26.7* 27.5*   MCV 92 91 91 94   MCH 29.5 30.8 30.3 30.1   MCHC 32.2 33.7 33.3 32.0   RDW 13.8 13.8 13.7 13.2   * 86* 104* 84*     INR  Recent Labs   Lab 10/20/22  1526 10/20/22  1321   INR 1.31* 1.48*     Arterial Blood Gas  Recent Labs   Lab 10/25/22  0543 10/24/22  1850 10/24/22  1141 10/24/22  0624 10/21/22  0526 10/21/22  0503   PH 7.35 7.32* 7.38  --   --  7.46*   PCO2 49* 49* 43  --   --  35   PO2 166* 118* 91  --   --  115*   HCO3 27 25 25  --   --  25   O2PER 40 40 50 3   < > 35    < > = values in this interval not displayed.       All cultures:  No results for input(s): CULT in the last 168 hours.  Recent Results (from the past 24 hour(s))   XR Chest Port 1 View    Narrative    EXAM: XR CHEST PORT 1 VIEW  LOCATION: [facility]  DATE/TIME: 10/25/2022 8:14 PM    INDICATION: ARDS  COMPARISON: Chest radiograph 10/22/2022.      Impression    IMPRESSION: Intubation with endotracheal tube tip 5.5 cm above the sona. Feeding tube courses below the diaphragm, tip beyond the field of view. Stable position of right central venous catheter and mediastinal drain/left chest tube. Stable   cardiomediastinal silhouette status post CABG and aortic valve replacement. Unchanged small left pleural effusion with likely new small right pleural effusion. No significant pneumothorax. Bones are  unchanged.       MD Luis    Billing: This patient is critically ill: Yes. Total critical care time today 35 min.

## 2022-10-26 NOTE — PROVIDER NOTIFICATION
Chest tube became disconnected from the [facility] for what I estimated to be about 5 to 7 seconds. No changes in patients respiratory status. Contacted and notified CV surgery about the CT disconnection. No new orders at this time.

## 2022-10-27 ENCOUNTER — APPOINTMENT (OUTPATIENT)
Dept: SPEECH THERAPY | Facility: CLINIC | Age: 67
DRG: 219 | End: 2022-10-27
Attending: INTERNAL MEDICINE
Payer: COMMERCIAL

## 2022-10-27 ENCOUNTER — APPOINTMENT (OUTPATIENT)
Dept: OCCUPATIONAL THERAPY | Facility: CLINIC | Age: 67
DRG: 219 | End: 2022-10-27
Attending: INTERNAL MEDICINE
Payer: COMMERCIAL

## 2022-10-27 ENCOUNTER — APPOINTMENT (OUTPATIENT)
Dept: GENERAL RADIOLOGY | Facility: CLINIC | Age: 67
DRG: 219 | End: 2022-10-27
Attending: PHYSICIAN ASSISTANT
Payer: COMMERCIAL

## 2022-10-27 LAB
ANION GAP SERPL CALCULATED.3IONS-SCNC: 1 MMOL/L (ref 3–14)
BUN SERPL-MCNC: 18 MG/DL (ref 7–30)
CA-I BLD-MCNC: 5.1 MG/DL (ref 4.4–5.2)
CALCIUM SERPL-MCNC: 8.8 MG/DL (ref 8.5–10.1)
CHLORIDE BLD-SCNC: 113 MMOL/L (ref 94–109)
CO2 SERPL-SCNC: 31 MMOL/L (ref 20–32)
CREAT SERPL-MCNC: 0.72 MG/DL (ref 0.66–1.25)
ERYTHROCYTE [DISTWIDTH] IN BLOOD BY AUTOMATED COUNT: 14.1 % (ref 10–15)
GFR SERPL CREATININE-BSD FRML MDRD: >90 ML/MIN/1.73M2
GLUCOSE BLD-MCNC: 138 MG/DL (ref 70–99)
GLUCOSE BLDC GLUCOMTR-MCNC: 128 MG/DL (ref 70–99)
GLUCOSE BLDC GLUCOMTR-MCNC: 134 MG/DL (ref 70–99)
GLUCOSE BLDC GLUCOMTR-MCNC: 134 MG/DL (ref 70–99)
GLUCOSE BLDC GLUCOMTR-MCNC: 145 MG/DL (ref 70–99)
HCT VFR BLD AUTO: 28.5 % (ref 40–53)
HGB BLD-MCNC: 9.1 G/DL (ref 13.3–17.7)
MAGNESIUM SERPL-MCNC: 1.9 MG/DL (ref 1.6–2.3)
MCH RBC QN AUTO: 30.2 PG (ref 26.5–33)
MCHC RBC AUTO-ENTMCNC: 31.9 G/DL (ref 31.5–36.5)
MCV RBC AUTO: 95 FL (ref 78–100)
PHOSPHATE SERPL-MCNC: 2.1 MG/DL (ref 2.5–4.5)
PLATELET # BLD AUTO: 167 10E3/UL (ref 150–450)
POTASSIUM BLD-SCNC: 3.5 MMOL/L (ref 3.4–5.3)
RBC # BLD AUTO: 3.01 10E6/UL (ref 4.4–5.9)
SODIUM SERPL-SCNC: 145 MMOL/L (ref 133–144)
WBC # BLD AUTO: 8.8 10E3/UL (ref 4–11)

## 2022-10-27 PROCEDURE — 250N000013 HC RX MED GY IP 250 OP 250 PS 637: Performed by: PHYSICIAN ASSISTANT

## 2022-10-27 PROCEDURE — 71045 X-RAY EXAM CHEST 1 VIEW: CPT

## 2022-10-27 PROCEDURE — 250N000013 HC RX MED GY IP 250 OP 250 PS 637: Performed by: STUDENT IN AN ORGANIZED HEALTH CARE EDUCATION/TRAINING PROGRAM

## 2022-10-27 PROCEDURE — 84100 ASSAY OF PHOSPHORUS: CPT | Performed by: SURGERY

## 2022-10-27 PROCEDURE — 92526 ORAL FUNCTION THERAPY: CPT | Mod: GN | Performed by: SPEECH-LANGUAGE PATHOLOGIST

## 2022-10-27 PROCEDURE — 250N000009 HC RX 250: Performed by: INTERNAL MEDICINE

## 2022-10-27 PROCEDURE — 250N000011 HC RX IP 250 OP 636: Performed by: INTERNAL MEDICINE

## 2022-10-27 PROCEDURE — 94640 AIRWAY INHALATION TREATMENT: CPT

## 2022-10-27 PROCEDURE — 250N000009 HC RX 250: Performed by: STUDENT IN AN ORGANIZED HEALTH CARE EDUCATION/TRAINING PROGRAM

## 2022-10-27 PROCEDURE — 250N000011 HC RX IP 250 OP 636: Performed by: STUDENT IN AN ORGANIZED HEALTH CARE EDUCATION/TRAINING PROGRAM

## 2022-10-27 PROCEDURE — 999N000157 HC STATISTIC RCP TIME EA 10 MIN

## 2022-10-27 PROCEDURE — 82330 ASSAY OF CALCIUM: CPT | Performed by: STUDENT IN AN ORGANIZED HEALTH CARE EDUCATION/TRAINING PROGRAM

## 2022-10-27 PROCEDURE — 258N000003 HC RX IP 258 OP 636: Performed by: INTERNAL MEDICINE

## 2022-10-27 PROCEDURE — 94640 AIRWAY INHALATION TREATMENT: CPT | Mod: 76

## 2022-10-27 PROCEDURE — 97167 OT EVAL HIGH COMPLEX 60 MIN: CPT | Mod: GO

## 2022-10-27 PROCEDURE — 200N000001 HC R&B ICU

## 2022-10-27 PROCEDURE — 83735 ASSAY OF MAGNESIUM: CPT | Performed by: SURGERY

## 2022-10-27 PROCEDURE — 94799 UNLISTED PULMONARY SVC/PX: CPT

## 2022-10-27 PROCEDURE — 250N000013 HC RX MED GY IP 250 OP 250 PS 637: Performed by: INTERNAL MEDICINE

## 2022-10-27 PROCEDURE — 94660 CPAP INITIATION&MGMT: CPT

## 2022-10-27 PROCEDURE — 258N000003 HC RX IP 258 OP 636: Performed by: PHYSICIAN ASSISTANT

## 2022-10-27 PROCEDURE — 250N000013 HC RX MED GY IP 250 OP 250 PS 637

## 2022-10-27 PROCEDURE — 250N000011 HC RX IP 250 OP 636: Performed by: PHYSICIAN ASSISTANT

## 2022-10-27 PROCEDURE — 97530 THERAPEUTIC ACTIVITIES: CPT | Mod: GO

## 2022-10-27 PROCEDURE — 258N000003 HC RX IP 258 OP 636: Performed by: STUDENT IN AN ORGANIZED HEALTH CARE EDUCATION/TRAINING PROGRAM

## 2022-10-27 PROCEDURE — 85027 COMPLETE CBC AUTOMATED: CPT | Performed by: PHYSICIAN ASSISTANT

## 2022-10-27 PROCEDURE — 80048 BASIC METABOLIC PNL TOTAL CA: CPT | Performed by: PHYSICIAN ASSISTANT

## 2022-10-27 RX ORDER — FUROSEMIDE 10 MG/ML
40 INJECTION INTRAMUSCULAR; INTRAVENOUS ONCE
Status: COMPLETED | OUTPATIENT
Start: 2022-10-27 | End: 2022-10-27

## 2022-10-27 RX ORDER — METOPROLOL TARTRATE 25 MG/1
25 TABLET, FILM COATED ORAL 2 TIMES DAILY
Status: DISCONTINUED | OUTPATIENT
Start: 2022-10-27 | End: 2022-10-28

## 2022-10-27 RX ORDER — AMIODARONE HYDROCHLORIDE 200 MG/1
400 TABLET ORAL 2 TIMES DAILY
Status: COMPLETED | OUTPATIENT
Start: 2022-10-27 | End: 2022-11-02

## 2022-10-27 RX ADMIN — METOPROLOL TARTRATE 2.5 MG: 5 INJECTION INTRAVENOUS at 09:55

## 2022-10-27 RX ADMIN — ACETYLCYSTEINE 2 ML: 200 SOLUTION ORAL; RESPIRATORY (INHALATION) at 11:44

## 2022-10-27 RX ADMIN — VANCOMYCIN HYDROCHLORIDE 1500 MG: 10 INJECTION, POWDER, LYOPHILIZED, FOR SOLUTION INTRAVENOUS at 12:10

## 2022-10-27 RX ADMIN — GABAPENTIN 300 MG: 250 SOLUTION ORAL at 16:04

## 2022-10-27 RX ADMIN — ATORVASTATIN CALCIUM 20 MG: 10 TABLET, FILM COATED ORAL at 20:53

## 2022-10-27 RX ADMIN — POTASSIUM & SODIUM PHOSPHATES POWDER PACK 280-160-250 MG 1 PACKET: 280-160-250 PACK at 05:11

## 2022-10-27 RX ADMIN — GABAPENTIN 300 MG: 250 SOLUTION ORAL at 09:24

## 2022-10-27 RX ADMIN — HYDRALAZINE HYDROCHLORIDE 10 MG: 20 INJECTION INTRAMUSCULAR; INTRAVENOUS at 05:08

## 2022-10-27 RX ADMIN — METOPROLOL TARTRATE 25 MG: 25 TABLET, FILM COATED ORAL at 20:54

## 2022-10-27 RX ADMIN — HEPARIN SODIUM 5000 UNITS: 5000 INJECTION, SOLUTION INTRAVENOUS; SUBCUTANEOUS at 06:42

## 2022-10-27 RX ADMIN — METHOCARBAMOL 750 MG: 750 TABLET ORAL at 17:42

## 2022-10-27 RX ADMIN — NICOTINE 1 PATCH: 14 PATCH, EXTENDED RELEASE TRANSDERMAL at 09:00

## 2022-10-27 RX ADMIN — POTASSIUM & SODIUM PHOSPHATES POWDER PACK 280-160-250 MG 1 PACKET: 280-160-250 PACK at 14:03

## 2022-10-27 RX ADMIN — POTASSIUM & SODIUM PHOSPHATES POWDER PACK 280-160-250 MG 1 PACKET: 280-160-250 PACK at 09:24

## 2022-10-27 RX ADMIN — Medication 1 PACKET: at 21:50

## 2022-10-27 RX ADMIN — ACETYLCYSTEINE 2 ML: 200 SOLUTION ORAL; RESPIRATORY (INHALATION) at 15:35

## 2022-10-27 RX ADMIN — MAGNESIUM SULFATE HEPTAHYDRATE 1 G: 500 INJECTION, SOLUTION INTRAMUSCULAR; INTRAVENOUS at 10:31

## 2022-10-27 RX ADMIN — Medication 40 MG: at 08:34

## 2022-10-27 RX ADMIN — ACETYLCYSTEINE 2 ML: 200 SOLUTION ORAL; RESPIRATORY (INHALATION) at 08:27

## 2022-10-27 RX ADMIN — GABAPENTIN 300 MG: 250 SOLUTION ORAL at 21:50

## 2022-10-27 RX ADMIN — AMIODARONE HYDROCHLORIDE 400 MG: 200 TABLET ORAL at 20:53

## 2022-10-27 RX ADMIN — PIPERACILLIN AND TAZOBACTAM 4.5 G: 4; .5 INJECTION, POWDER, FOR SOLUTION INTRAVENOUS at 09:34

## 2022-10-27 RX ADMIN — HEPARIN SODIUM 5000 UNITS: 5000 INJECTION, SOLUTION INTRAVENOUS; SUBCUTANEOUS at 14:03

## 2022-10-27 RX ADMIN — QUETIAPINE FUMARATE 25 MG: 25 TABLET ORAL at 20:54

## 2022-10-27 RX ADMIN — ASPIRIN 81 MG CHEWABLE TABLET 162 MG: 81 TABLET CHEWABLE at 08:33

## 2022-10-27 RX ADMIN — LIDOCAINE 1 PATCH: 560 PATCH PERCUTANEOUS; TOPICAL; TRANSDERMAL at 09:01

## 2022-10-27 RX ADMIN — HYDRALAZINE HYDROCHLORIDE 10 MG: 20 INJECTION INTRAMUSCULAR; INTRAVENOUS at 06:45

## 2022-10-27 RX ADMIN — PIPERACILLIN AND TAZOBACTAM 4.5 G: 4; .5 INJECTION, POWDER, FOR SOLUTION INTRAVENOUS at 03:36

## 2022-10-27 RX ADMIN — IPRATROPIUM BROMIDE AND ALBUTEROL SULFATE 3 ML: .5; 3 SOLUTION RESPIRATORY (INHALATION) at 15:35

## 2022-10-27 RX ADMIN — IPRATROPIUM BROMIDE AND ALBUTEROL SULFATE 3 ML: .5; 3 SOLUTION RESPIRATORY (INHALATION) at 19:53

## 2022-10-27 RX ADMIN — FUROSEMIDE 40 MG: 10 INJECTION, SOLUTION INTRAMUSCULAR; INTRAVENOUS at 16:14

## 2022-10-27 RX ADMIN — HYDRALAZINE HYDROCHLORIDE 10 MG: 20 INJECTION INTRAMUSCULAR; INTRAVENOUS at 22:21

## 2022-10-27 RX ADMIN — ACETAMINOPHEN 975 MG: 325 TABLET, FILM COATED ORAL at 08:32

## 2022-10-27 RX ADMIN — METHOCARBAMOL 750 MG: 750 TABLET ORAL at 08:33

## 2022-10-27 RX ADMIN — Medication 1 PACKET: at 15:54

## 2022-10-27 RX ADMIN — Medication 1 PACKET: at 09:33

## 2022-10-27 RX ADMIN — METHOCARBAMOL 750 MG: 750 TABLET ORAL at 14:03

## 2022-10-27 RX ADMIN — QUETIAPINE FUMARATE 25 MG: 25 TABLET ORAL at 08:33

## 2022-10-27 RX ADMIN — HYDROXYZINE HYDROCHLORIDE 50 MG: 25 TABLET, FILM COATED ORAL at 03:06

## 2022-10-27 RX ADMIN — SODIUM CHLORIDE: 9 INJECTION, SOLUTION INTRAVENOUS at 09:53

## 2022-10-27 RX ADMIN — ACETYLCYSTEINE 2 ML: 200 SOLUTION ORAL; RESPIRATORY (INHALATION) at 19:53

## 2022-10-27 RX ADMIN — IPRATROPIUM BROMIDE AND ALBUTEROL SULFATE 3 ML: .5; 3 SOLUTION RESPIRATORY (INHALATION) at 11:44

## 2022-10-27 RX ADMIN — METOPROLOL TARTRATE 25 MG: 25 TABLET, FILM COATED ORAL at 09:22

## 2022-10-27 RX ADMIN — IPRATROPIUM BROMIDE AND ALBUTEROL SULFATE 3 ML: .5; 3 SOLUTION RESPIRATORY (INHALATION) at 08:27

## 2022-10-27 RX ADMIN — AMIODARONE HYDROCHLORIDE 400 MG: 200 TABLET ORAL at 09:23

## 2022-10-27 RX ADMIN — ACETAMINOPHEN 975 MG: 325 TABLET, FILM COATED ORAL at 15:53

## 2022-10-27 RX ADMIN — PIPERACILLIN AND TAZOBACTAM 4.5 G: 4; .5 INJECTION, POWDER, FOR SOLUTION INTRAVENOUS at 15:53

## 2022-10-27 RX ADMIN — PIPERACILLIN AND TAZOBACTAM 4.5 G: 4; .5 INJECTION, POWDER, FOR SOLUTION INTRAVENOUS at 21:50

## 2022-10-27 ASSESSMENT — ACTIVITIES OF DAILY LIVING (ADL)
ADLS_ACUITY_SCORE: 47
ADLS_ACUITY_SCORE: 51
ADLS_ACUITY_SCORE: 43
ADLS_ACUITY_SCORE: 47
ADLS_ACUITY_SCORE: 47
ADLS_ACUITY_SCORE: 43
ADLS_ACUITY_SCORE: 47
IADL_COMMENTS: RETIRED

## 2022-10-27 NOTE — PROGRESS NOTES
SLP Bedside Swallow Evaluation  10/27/22 0921   Appointment Info   Signing Clinician's Name / Credentials (SLP) Briana Bangura MA [facility] SLP   General Information   Onset of Illness/Injury or Date of Surgery 10/20/22   Referring Physician Dr. Pagan   Patient/Family Therapy Goal Statement (SLP) Agreed to POC goal.  No additional goal stated.   Pertinent History of Current Problem Dx: CABG 10/20-extubated 10/21.  Re-intubated 10/24 - 10/26 pm.   General Observations Pt was alert and cooperative, but unable to follow all commands.  Pt coughed up thick mucus with suctioning provided by SLP x 3 with repeateed mod-max cues to cough.  Vocal quality still partially gurgly after cough.   Type of Evaluation   Type of Evaluation Swallow Evaluation   Oral Motor   Oral Musculature generally intact   Dentition (Oral Motor)   Dentition (Oral Motor) some missing teeth   Facial Symmetry (Oral Motor)   Comment, Facial Symmetry (Oral Motor) Slight facial asymmetry   Vocal Quality/Secretion Management (Oral Motor)   Secretion Management (Oral Motor) difficulty swallowing secretions;excessive secretions;oral suctioning required;wet/gurgly vocal quality;unaware of wet vocal quality  (thick copious secretions noted)   Comment, Vocal Quality/Secretion Management (Oral Motor) Gurgly hoarse quality   General Swallowing Observations   Respiratory Support (General Swallowing Observations) none   Current Diet/Method of Nutritional Intake (General Swallowing Observations, NIS) full liquid diet   Swallowing Evaluation Clinical swallow evaluation   Clinical Swallow Evaluation   Feeding Assistance dependent   Clinical Swallow Evaluation Textures Trialed thin liquids   Clinical Swallow Eval: Thin Liquid Texture Trial   Mode of Presentation, Thin Liquids spoon   Volume of Liquid or Food Presented ice chips x 3   Oral Phase of Swallow premature pharyngeal entry   Pharyngeal Phase of Swallow reduction in laryngeal movement;repeated swallows  (delay)    Diagnostic Statement increased wet gurgly voicing, increased wet congested coughing x 2   Swallowing Recommendations   Diet Consistency Recommendations NPO   Clinical Impression   Criteria for Skilled Therapeutic Interventions Met (SLP Eval) Yes, treatment indicated   SLP Diagnosis Moderate-severe oral-pharyngeal dysphagia   Risks & Benefits of therapy have been explained evaluation/treatment results reviewed;care plan/treatment goals reviewed;risks/benefits reviewed;current/potential barriers reviewed;participants voiced agreement with care plan;participants included;patient   Clinical Impression Comments Pt presents with moderate-severe oral-pharyngeal dysphagia and poor secretion management at bedside.  Deficits/risk factors include recent CABG, intubations x 2, cognitive deficits, thick copious secretions, wet gurgly voicing and cough with decreased awareness, delayed swallows, decreased laryngeal elevation, need for multiple swallows, and overt congested coughing with minimal ice chip trials.  Recommend change to NPO, cue pt to cough to spit/suction, and cues to swallow hard to help manage secretions and increase pharyngeal function.  Plan to continue swallow Tx with increased po trials as indicated.  VFSS/FEES may be needed to fully assess pt's aspiration risk prior to return to a po diet given pt deficits and decreased awareness of gurgly voicing/cough/silent aspiration risk.   SLP Total Evaluation Time   Eval: oral/pharyngeal swallow function, clinical swallow Minutes (37066) 15   Interventions   Interventions Quick Adds Swallowing Dysfunction   SLP Discharge Planning   SLP Plan SLP - PO trials, ?VFSS/FEES   SLP Discharge Recommendation [facility]-Mot[facility] patient will benefit from intensive, interdisciplinary therapy.  Anticipate will be able to tolerate 3 hours of therapy per day;Transitional Care Facility   SLP Rationale for DC Rec swallow and cognitive function are below baseline, 24 hour  assist/supervision after discharge   SLP Brief overview of current status  Mod-severe dysphagia, poor secretion management; Rec: NPO, cues to cough, frequent suctioning, cues to complete effortful swallows   Total Session Time   Total Session Time (sum of timed and untimed services) 25

## 2022-10-27 NOTE — PHARMACY-VANCOMYCIN DOSING SERVICE
Pharmacy Vancomycin Note  Date of Service 2022  Patient's  1955   67 year old, male    Indication: [facility]ated Pneumonia  Day of Therapy: 3  Current vancomycin regimen:  1500 mg IV q12h  Current vancomycin monitoring method: AUC  Current vancomycin therapeutic monitoring goal: 400-600 mg*h/L    [employer]ight[employer] Prediction of Current Vancomycin Regimen  Regimen: 1500 mg IV every 12 hours.  Start time: 23:29 on 10/26/2022  Exposure target: AUC24 (range)400-600 mg/L.hr   AUC24,ss: 450 mg/L.hr  Probability of AUC24 > 400: 77 %  Ctrough,ss: 13.9 mg/L  Probability of Ctrough,ss > 20: 8 %  Probability of nephrotoxicity (Lodise LESLY ): 9 %      Current estimated CrCl = Estimated Creatinine Clearance: 119.5 mL/min (based on SCr of 0.83 mg/dL).    Creatinine for last 3 days  10/24/2022:  3:36 AM Creatinine 1.16 mg/dL  10/25/2022:  5:47 AM Creatinine 0.84 mg/dL  10/26/2022:  4:41 AM Creatinine 0.83 mg/dL    Recent Vancomycin Levels (past 3 days)  10/25/2022:  9:54 AM Vancomycin 11.0 mg/L  10/26/2022:  9:22 PM Vancomycin 14.8 mg/L    Vancomycin IV Administrations (past 72 hours)                   vancomycin (VANCOCIN) 1,500 mg in 0.9% NaCl 250 mL intermittent infusion (mg) 1,500 mg New Bag 10/26/22 1155     1,500 mg New Bag 10/25/22 2343     1,500 mg New Bag  1311    vancomycin (VANCOCIN) 1,250 mg in 0.9% NaCl 250 mL intermittent infusion (mg) 1,250 mg New Bag 10/25/22 0029    vancomycin (VANCOCIN) 2,000 mg in sodium chloride 0.9 % 250 mL intermittent infusion (mg) 2,000 mg New Bag 10/24/22 1318                Nephrotoxins and other renal medications (From now, onward)    Start     Dose/Rate Route Frequency Ordered Stop    10/25/22 1200  vancomycin (VANCOCIN) 1,500 mg in 0.9% NaCl 250 mL intermittent infusion         1,500 mg  over 90 Minutes Intravenous EVERY 12 HOURS 10/25/22 1147 10/29/22 1159    10/24/22 1030  piperacillin-tazobactam (ZOSYN) 4.5 g vial to attach to  mL bag        Note to  "Pharmacy: For [facility], [facility] and [facility]: For Zosyn-naive patients, use the \"Zosyn initial dose + extended infusion\" order panel.    4.5 g  over 30 Minutes Intravenous EVERY 6 HOURS 10/24/22 1007 10/29/22 0959    10/20/22 1930  norepinephrine (LEVOPHED) 4 mg in  mL infusion PREMIX         0.01-0.6 mcg/kg/min × 120.2 kg  4.5-270.5 mL/hr  Intravenous CONTINUOUS 10/20/22 1907      10/20/22 1630  vasopressin 0.2 units/mL in NS (PITRESSIN) standard conc infusion         1-2.4 Units/hr  5-12 mL/hr  Intravenous CONTINUOUS 10/20/22 1609               Contrast Orders - past 72 hours (72h ago, onward)    Start     Dose/Rate Route Frequency Stop    10/24/22 1700  perflutren diluted 1mL to 2mL with saline (OPTISON) diluted injection 5 mL         5 mL Intravenous ONCE 10/24/22 1650          Interpretation of levels and current regimen:  Vancomycin level is reflective of -600  Has serum creatinine changed greater than 50% in last 72 hours: No  Urine output:  good urine output  Renal Function: Stable    Plan:  1. Continue Current Dose  2. Vancomycin monitoring method: AUC  3. Vancomycin therapeutic monitoring goal: 400-600 mg*h/L  4. Pharmacy will check vancomycin levels as appropriate in 3-5 Days.  5. Serum creatinine levels will be ordered every 48 hours.    Josephine Craig [facility]    "

## 2022-10-27 NOTE — PROGRESS NOTES
"[facility]  Cardiovascular and Thoracic Surgery Daily Note        Assessment and Plan  POD # 7 s/p aortic valve replacement with a 25 mm Bal Inspiris Resilia bovine pericardial valve, coronary artery bypass grafting x 3 with left internal mammary artery to left anterior descending, reverse lesser saphenous vein graft to the posterior descending artery, reverse left lesser saphenous vein graft to the distal circumflex marginal artery, endoscopic bilateral lesser saphenous vein graft harvest from below the knees on 10/20 with Dr. Bennie Pierre     - CVS: Pre-op TTE with preserved biventricular function. Immediate postop profound vasoplegia and acidosis requiring multiple high dose pressors, bicarb infusion, and volume resuscitation. Weaned off pressors by POD1, lactate normalized. BP labile with agitation and ongoing need for sedation. Intermittent low dose NE- sedation related. New onset afib RVR 10/22; amiodarone bolus and infusion, back in NSR, transition to PO amio with taper, recurrent afib restarted on amio infusion, RVR a.fib 10/27-1 dose IV metoprolol trialed with no success, amio restarted converted to NSR at 1849. Will need anticoagulation for PAF once chest tubes removed. Appears hypervolemic but improved, repeat Lasix 40 mg IV x1 and reassess. Aspirin 162 mg daily, atorvastatin 20 mg daily. Chest tubes: serosang, 65<634<440<620 mL, no airleak, keep today. TPW: capped.      - Resp: Extubated POD1. IS, pulmonary toilet. Acute hypoxemic respiratory failure, increased WOB and inability to protect airway due to delirium, discussed with Dr. Pagan and Dr. Pierre, reintubated 10/24.Re extubated 10/26/22, sating well on 3L NC, CPAP at night      - Neuro: Post-extubation, repeating \"I need help\" over and over, but able to answer question appropriately, redirect, and no focal physical deficits. Worsening agitation 10/22 despite Seroquel, ativan, and Haldol. Placed in restraints due to " swinging at staff, pulling at lines/tubes. Started on Precedex and Zyprexa with some improvement. Chronic pain at baseline. Minimize narcotics with agitation. Schedule Robaxin, Tylenol, lidocaine patches, Toradol, Atarax. PTA on gabapentin 800 mg TID, will resume 300 mg BID and increasing pending improved confusion. Head CT on hold due to agitation, will proceed now that reintubated. Head CT negative 10/24/22, calm this am, re oriented.      - Renal: No history of significant renal disease. Cr stable WNL. Trend BMP. Diuretic as above.         Recent Labs   Lab 10/24/22  0336 10/23/22  0351 10/22/22  1403   CR 1.16 1.11 1.08         - GI: No BM, continue bowel regimen     - : Mccarty in place, continue today. History urinary retention, PTA Flomax 0.8 mg daily resumed.      - Endo: DMT2. Insulin infusion transitioned to SSI. PTA metformin on hold.          Hemoglobin A1C   Date Value Ref Range Status   09/14/2022 5.6 0.0 - 5.6 % Final       Comment:       Normal <5.7%   Prediabetes 5.7-6.4%    Diabetes 6.5% or higher     Note: Adopted from ADA consensus guidelines.         - FEN: Replace electrolytes as needed. NPO for now with agitation, feeding tube placed postpyloric, TF continued      - ID: Postop Leukocytosis, resolved. Afebrile. WBC down-trending. Periop abx complete. Trend CBC, fever curve.         Recent Labs   Lab 10/24/22  0336 10/23/22  0351 10/22/22  1403   WBC 6.9 7.5 8.4         - Heme: Acute blood loss anemia and thrombocytopenia due to surgery. Aspirin and subcutaneous heparin resumed 10/24 with improved PLT count. Trend CBC, transfuse PRN.         Recent Labs   Lab 10/24/22  0336 10/23/22  0351 10/22/22  1403   HGB 8.8* 9.2* 9.5*   PLT 84* 57* 54*         - Proph: SCD, subcutaneous heparin, PPI     - Dispo: ICU        Interval History  Sitting up in bed, breathing course/congested, able to suction self, pain controlled, will remove CTs this am     Medications    acetaminophen  975 mg Oral Q8H      "[Held by provider] aspirin  162 mg Oral or NG Tube Daily     atorvastatin  20 mg Oral Daily     gabapentin  300 mg Oral or G tube TID     [Held by provider] heparin ANTICOAGULANT  5,000 Units Subcutaneous Q8H     insulin aspart  1-10 Units Subcutaneous TID AC     insulin aspart  1-7 Units Subcutaneous At Bedtime     lactated ringers  250 mL Intravenous Once     lidocaine  1-2 patch Transdermal Q24H     lidocaine   Transdermal Q8H MITA     methocarbamol  750 mg Oral 4x Daily     nicotine  1 patch Transdermal Daily     nicotine   Transdermal Q8H     pantoprazole  40 mg Oral or NG Tube Daily     Or     pantoprazole  40 mg Oral Daily     polyethylene glycol  17 g Oral Daily     QUEtiapine  25 mg Oral BID     senna-docusate  1 tablet Oral BID     sodium chloride (PF)  3 mL Intracatheter Q8H     tamsulosin  0.8 mg Oral QPM      acetaminophen, bisacodyl, calcium gluconate, calcium gluconate, calcium gluconate, dextrose, glucose **OR** dextrose **OR** glucagon, haloperidol lactate, hydrALAZINE, HYDROmorphone **OR** HYDROmorphone, hydrOXYzine, ketorolac, lidocaine 4%, lidocaine (buffered or not buffered), magnesium hydroxide, metoprolol, naloxone **OR** naloxone **OR** naloxone **OR** naloxone, nitroGLYcerin, ondansetron **OR** ondansetron, oxyCODONE **OR** oxyCODONE, phenylephrine, prochlorperazine **OR** prochlorperazine, sodium chloride, sodium chloride (PF)        Physical Exam  Vitals were reviewed  Blood pressure (!) 62/39, pulse 114, temperature (!) 100.8  F (38.2  C), resp. rate 26, height 1.803 m (5' 11\"), weight 126.6 kg (279 lb 1.6 oz), SpO2 100 %.  Rhythm: NSR     Lungs: diminished bases     Cardiovascular: rrr, no m/r/g     Abdomen: soft, NT, ND, +BS     Extremeties: warm, 1+ LE edema      Incision: cdi      CT: serosang output 65<634<440<620 mL, no air leak     Weight:          Vitals:     10/20/22 0600 10/21/22 0600 10/22/22 0250 10/23/22 0200   Weight: 120.2 kg (265 lb) 124.6 kg (274 lb 11.1 oz) 124 kg (273 lb " 5.9 oz) 126.6 kg (279 lb 1.6 oz)            Data                Recent Labs   Lab 10/24/22  0434 10/24/22  0336 10/24/22  0019 10/23/22  0743 10/23/22  0351 10/22/22  1657 10/22/22  1403 10/20/22  1528 10/20/22  1526 10/20/22  1324 10/20/22  1321   WBC  --  6.9  --   --  7.5  --  8.4   < > 29.3*  --  22.4*   HGB  --  8.8*  --   --  9.2*  --  9.5*   < > 14.2   < > 11.5*   MCV  --  94  --   --  93  --  93   < > 93  --  93   PLT  --  84*  --   --  57*  --  54*   < > 158  --  136*   INR  --   --   --   --   --   --   --   --  1.31*  --  1.48*   NA  --  141  --   --  138  --  137   < > 141   < > 138   POTASSIUM  --  4.3  --   --  4.3  --  4.7   < > 3.7   < > 3.8   CHLORIDE  --  110*  --   --  108  --  107   < > 109  --  109   CO2  --  27  --   --  26  --  28   < > 17*  --  23   BUN  --  33*  --   --  27  --  22   < > 13  --  12   CR  --  1.16  --   --  1.11  --  1.08   < > 0.97  --  0.85   ANIONGAP  --  4  --   --  4  --  2*   < > 15*  --  6   HALEY  --  8.8  --   --  9.2  --  9.1   < > 8.5  --  9.5   * 126* 131*   < > 136*   < > 137*   < > 229*   < > 182*   ALBUMIN  --  2.4*  --   --  2.5*  --   --    < > 3.1*  --   --    PROTTOTAL  --  5.3*  --   --  5.3*  --   --    < > 5.5*  --   --    BILITOTAL  --  0.8  --   --  0.6  --   --    < > 0.6  --   --    ALKPHOS  --  64  --   --  60  --   --    < > 84  --   --    ALT  --  19  --   --  20  --   --    < > 25  --   --    AST  --  35  --   --  48*  --   --    < > 68*  --   --     < > = values in this interval not displayed.         Imaging:      Recent Results (from the past 24 hour(s))   XR Abdomen Port 1 View     Narrative     EXAM: XR ABDOMEN PORT 1 VIEW  LOCATION: [facility]  DATE/TIME: 10/23/2022 11:04 AM     INDICATION: advanced NGT, needs to be post pyloric for feeding  COMPARISON: 10/22/2022 at 1542 hours.        Impression     IMPRESSION: Feeding tube tip is at the gastroduodenal junction and is likely postpyloric, however this is not  definitive on this study. There is redundancy in the tube and it will likely progress distally on its own. Chest tubes present. Cholelithiasis.   Normal bowel gas pattern.            Patient seen and discussed with Dr. Hardy Baugh PA-C  Cardiothoracic Surgery  Pager [phone]

## 2022-10-27 NOTE — PLAN OF CARE
Pt Alert and oriented x3;disoriented to time. Neuro checks unchaged. BP and MAP at goal. On 3L oxygen via NC. Pain managed with scheduled pain meds. Continues on Tube feeding. Chest tubes removed dressing CDI. Art line removed; dressing CDI.  Mccarty is removed; voiding spontaneously. Wife visited she was updated and questions answered.

## 2022-10-27 NOTE — PROGRESS NOTES
Patient is on a 3L NC with SpO2 in the mid 90's. BS coarse and crackles. Skin intact under oxygen device. EZPAP treatments started. Pt is only achieving 250 ml on IS with poor pt effort. BS improved after EZPAP treatments. Pt has a productive cough.   Will cont to follow.  10/27/2022  Amita Hernandez, RT

## 2022-10-27 NOTE — PROGRESS NOTES
10/27/22 1100   Appointment Info   Signing Clinician's Name / Credentials (OT) Shannan Fleming, OTR/L   Rehab Comments (OT) OT Initial Eval   Living Environment   People in Home spouse   Current Living Arrangements house   Home Accessibility stairs to enter home   Number of Stairs, Main Entrance 3   Transportation Anticipated family or friend will provide;car, drives self   Living Environment Comments pt reports he live with his wife and has stairs. walk in shower and tub shower combo. better historian today though still somewhat confused   Self-Care   Usual Activity Tolerance good   Current Activity Tolerance fair   Regular Exercise No   Equipment Currently Used at Home none;cane, straight   Activity/Exercise/Self-Care Comment per chart uses a cane and was fairly indp with self care and ADL   Instrumental Activities of Daily Living (IADL)   IADL Comments retired   General Information   Onset of Illness/Injury or Date of Surgery 10/20/22   Referring Physician Saurabh Pagan MD   Patient/Family Therapy Goal Statement (OT) pt does not state goal. is agreeable to getting up to chair   Additional Occupational Profile Info/Pertinent History of Current Problem per CARDS note this date pt is POD # 7 s/p aortic valve replacement with a 25 mm Bal Inspiris Resilia bovine pericardial valve, coronary artery bypass grafting x 3 with left internal mammary artery to left anterior descending, reverse lesser saphenous vein graft to the posterior descending artery, reverse left lesser saphenous vein graft to the distal circumflex marginal artery, endoscopic bilateral lesser saphenous vein graft harvest from below the knees on 10/20   Existing Precautions/Restrictions fall   General Observations and Info agreeable to activity with TC for alertness and arousal   Cognitive Status Examination   Orientation Status person;place   Cognitive Status Comments vaguely oriented to time and season/date. was able self correct date  after education. follows commands appropiately, answering questions appropiately. can count back from 20 to 1 outload on cue. was seen attempting problem solving with looking at screen for time of day.   Visual Perception   Impact of Vision Impairment on Function (Vision) unable to assess   Sensory   Sensory Comments reports no numbness or tingling   Pain Assessment   Patient Currently in Pain No   Posture   Posture forward head position;protracted shoulders   Range of Motion Comprehensive   Comment, General Range of Motion PROM at SH biaterally intact. L UE at SH ~8-0 AROM. R UE 50% AROM. finger flexion and extension impaired bilaterally L >R. elbow flexion bilerally intact PROM and AROM   Strength Comprehensive (MMT)   Comment, General Manual Muscle Testing (MMT) Assessment L UE SH 3+/5, R SH 3-/5   Coordination   Upper Extremity Coordination Left UE impaired;Right UE impaired   Gross Motor Coordination impaired bilaterally   Fine Motor Coordination impaired bilterally   Bed Mobility   Comment (Bed Mobility) dep x2   Transfers   Transfer Comments depx2 shalonda   Activities of Daily Living   BADL Assessment/Intervention grooming;bathing;upper body dressing;lower body dressing;toileting   Bathing Assessment/Intervention   [place] Level (Bathing) dependent (less than 25% patient effort)   Upper Body Dressing Assessment/Training   [place] Level (Upper Body Dressing) dependent (less than 25% patient effort)   Lower Body Dressing Assessment/Training   [place] Level (Lower Body Dressing) dependent (less than 25% patient effort)   Grooming Assessment/Training   [place] Level (Grooming) maximum assist (25% patient effort)   Comment, (Grooming) able to bring L and R hands to face for sim facial grooming.   Clinical Impression   Criteria for Skilled Therapeutic Interventions Met (OT) Yes, treatment indicated   OT Diagnosis decreased function in ADL   OT Problem List-Impairments impacting ADL problems  related to;activity tolerance impaired;balance;cognition;coordination;flexibility;mobility;strength;post-surgical precautions;vision   Assessment of Occupational Performance 5 or more Performance Deficits   Identified Performance Deficits bathing, dressing, toileting, mobility, bed mobility, home mgmt, all IADL   Planned Therapy Interventions (OT) ADL retraining;IADL retraining;progressive activity/exercise;transfer training;strengthening;ROM   Clinical Decision Making Complexity (OT) high complexity   Anticipated Equipment Needs Upon Discharge (OT)   (defer to next level of care)   Risk & Benefits of therapy have been explained evaluation/treatment results reviewed;care plan/treatment goals reviewed;risks/benefits reviewed;current/potential barriers reviewed;participants voiced agreement with care plan;participants included;patient   Clinical Impression Comments decreased function in ADL and precautions warrants skilled IP OT tx   OT Total Evaluation Time   OT Eval, High Complexity Minutes (35963) 20   OT Goals   Therapy Frequency (OT) Daily   OT Predicted Duration/Target Date for Goal Attainment 11/15/22   OT Goals Hygiene/Grooming;Upper Body Dressing;Lower Body Dressing;Toilet Transfer/Toileting;Transfers   OT: Hygiene/Grooming minimal assist  (seated EOB)   OT: Upper Body Dressing Moderate assist  (seated EOB)   OT: Lower Body Dressing Moderate assist  (seated in recliner)   OT: Transfer Maximum assist  (sit>Stand)   OT: Toilet Transfer/Toileting Maximum assist  (to BSC)   Interventions   Interventions Quick Adds Therapeutic Activity   Therapeutic Activities   Therapeutic Activity Minutes (22716) 25   Symptoms noted during/after treatment fatigue   Treatment Detail/Skilled Intervention pt seen for OT tx session this date. RN in room for line mgmt during session. rolling L<>R with VC and TC for precautions and safe mobility max x2 for shalonda under pt. dep lift to EOB. seated EOB poor balance, cues for weight  shifting, posture, attention to task. pt able to anterior lean  with VC, reports he feels when off center, requries cues to attend to and correct. able to progress to CGAx2 seated EOB with sling under for back support. dep shalonda lift to chair. seated in chair able to complete lateral weight shifting with mod A to place pillows. educated on LE knee extension while in chair and pink sponge in L hand 10 repx2 sets. educated on performing while up. end of session pt fatigued, all needs in reach with RN   OT Discharge Planning   OT Plan shalonda to EOB or to chair for g/h, exercises   OT Discharge Recommendation (DC Rec) Acute Rehab Center-Motivated patient will benefit from intensive, interdisciplinary therapy.  Anticipate will be able to tolerate 3 hours of therapy per day;Transitional Care Facility   OT Rationale for DC Rec pt well below baseline function in self care, ADL and mobility. currently will needs Ax2 for all ADL and mobility with shalonda lift. recommend ARU at this time as pt cooperative, good family spose support and anticiapte will be able to tolerance at discharge. if not ARU then TCU   OT Brief overview of current status dep with shalonda, increase partipation with g/h and ex this date.   Total Session Time   Timed Code Treatment Minutes 25   Total Session Time (sum of timed and untimed services) 45

## 2022-10-27 NOTE — PLAN OF CARE
Shift 1501-9366: VSS. Neuro: Alert to self only (most consistently) ex. was A/O x 4 at 2000 despite continuous illogical commentary/repetition of words; behavior: anxious, sudden agitation at times attempting to remove lines/hernandez; pt is eventually re-directable, but frequently forgetful. Pulm: Lungs: clear in upper lobes, diminished in bases bilaterally; congested, on RA. GI/: BS present, multiple and almost always large loose BM's (see flow sheets); Hernandez in place: WDL output. Access: CVC x 3 lumens, A-line; amio gtt, NS for TKO's. Diet: Continuous enteral feedings.      AM Shift:  - Extubated, now on RA at change of shift  - Multiple loose BM's x 12; rectal tube placed in late evening; PRN Imodium ordered and given x 1  - PRN's given in evening for behavior; PRN oxycodone given x 1 for 10/10 chronic back pain per pt + pt crying-- pain resolved   - Behavior/mentation: From noon-change of shift pt slowly became increasingly delirious every hour. Pt did not sleep at all and is continuously talking to everyone present in room or having conversation with himself/continuously repeating 3-5 words.  - Pt required nearly continuous oral suctioning for majority of time post-extubation, pt has very weak and strong difficulty to clear junk in throat; pt refused/unable to follow direction for IS for me  - Sitter now at bedside at all times

## 2022-10-27 NOTE — PROGRESS NOTES
Patient on BiPAP overnight, AxO, coopereative, swabs, oral cares and suction during mask breaks, switched to 3l NC at 0600, patient able to suction himself, thick white secretions, patient sounds like he has secretions stuck in the back of throat. Rectal tube remains in place, liquid output, hernandez in place, initially pink tinged, now more garret, adequate output, Chest tube in place, minimal output overnight, dressings changed overnight, pacer wires remain in place, all incisions WNL, appear to be healing well. Given hydralazine x2 for SBP >140.

## 2022-10-28 ENCOUNTER — APPOINTMENT (OUTPATIENT)
Dept: GENERAL RADIOLOGY | Facility: CLINIC | Age: 67
DRG: 219 | End: 2022-10-28
Attending: PHYSICIAN ASSISTANT
Payer: COMMERCIAL

## 2022-10-28 ENCOUNTER — APPOINTMENT (OUTPATIENT)
Dept: SPEECH THERAPY | Facility: CLINIC | Age: 67
DRG: 219 | End: 2022-10-28
Attending: SURGERY
Payer: COMMERCIAL

## 2022-10-28 ENCOUNTER — APPOINTMENT (OUTPATIENT)
Dept: OCCUPATIONAL THERAPY | Facility: CLINIC | Age: 67
DRG: 219 | End: 2022-10-28
Attending: SURGERY
Payer: COMMERCIAL

## 2022-10-28 LAB
ALBUMIN UR-MCNC: 10 MG/DL
ANION GAP SERPL CALCULATED.3IONS-SCNC: 4 MMOL/L (ref 3–14)
APPEARANCE UR: CLEAR
ATRIAL RATE - MUSE: 75 BPM
ATRIAL RATE - MUSE: 85 BPM
ATRIAL RATE - MUSE: 99 BPM
BILIRUB UR QL STRIP: NEGATIVE
BUN SERPL-MCNC: 23 MG/DL (ref 7–30)
CALCIUM SERPL-MCNC: 9.6 MG/DL (ref 8.5–10.1)
CHLORIDE BLD-SCNC: 112 MMOL/L (ref 94–109)
CO2 SERPL-SCNC: 29 MMOL/L (ref 20–32)
COLOR UR AUTO: ABNORMAL
CREAT SERPL-MCNC: 0.74 MG/DL (ref 0.66–1.25)
DIASTOLIC BLOOD PRESSURE - MUSE: NORMAL MMHG
ERYTHROCYTE [DISTWIDTH] IN BLOOD BY AUTOMATED COUNT: 14.3 % (ref 10–15)
ERYTHROCYTE [DISTWIDTH] IN BLOOD BY AUTOMATED COUNT: 14.5 % (ref 10–15)
GFR SERPL CREATININE-BSD FRML MDRD: >90 ML/MIN/1.73M2
GLUCOSE BLD-MCNC: 160 MG/DL (ref 70–99)
GLUCOSE BLDC GLUCOMTR-MCNC: 137 MG/DL (ref 70–99)
GLUCOSE BLDC GLUCOMTR-MCNC: 144 MG/DL (ref 70–99)
GLUCOSE BLDC GLUCOMTR-MCNC: 145 MG/DL (ref 70–99)
GLUCOSE BLDC GLUCOMTR-MCNC: 158 MG/DL (ref 70–99)
GLUCOSE UR STRIP-MCNC: NEGATIVE MG/DL
HCT VFR BLD AUTO: 32.7 % (ref 40–53)
HCT VFR BLD AUTO: 34.1 % (ref 40–53)
HGB BLD-MCNC: 10.1 G/DL (ref 13.3–17.7)
HGB BLD-MCNC: 10.7 G/DL (ref 13.3–17.7)
HGB UR QL STRIP: ABNORMAL
INTERPRETATION ECG - MUSE: NORMAL
KETONES UR STRIP-MCNC: NEGATIVE MG/DL
LEUKOCYTE ESTERASE UR QL STRIP: NEGATIVE
MAGNESIUM SERPL-MCNC: 2.1 MG/DL (ref 1.6–2.3)
MCH RBC QN AUTO: 29.5 PG (ref 26.5–33)
MCH RBC QN AUTO: 29.6 PG (ref 26.5–33)
MCHC RBC AUTO-ENTMCNC: 30.9 G/DL (ref 31.5–36.5)
MCHC RBC AUTO-ENTMCNC: 31.4 G/DL (ref 31.5–36.5)
MCV RBC AUTO: 94 FL (ref 78–100)
MCV RBC AUTO: 96 FL (ref 78–100)
NITRATE UR QL: NEGATIVE
P AXIS - MUSE: 61 DEGREES
P AXIS - MUSE: 75 DEGREES
P AXIS - MUSE: NORMAL DEGREES
PH UR STRIP: 6.5 [PH] (ref 5–7)
PHOSPHATE SERPL-MCNC: 2.2 MG/DL (ref 2.5–4.5)
PLATELET # BLD AUTO: 250 10E3/UL (ref 150–450)
PLATELET # BLD AUTO: 260 10E3/UL (ref 150–450)
POTASSIUM BLD-SCNC: 3.6 MMOL/L (ref 3.4–5.3)
PR INTERVAL - MUSE: 128 MS
PR INTERVAL - MUSE: 136 MS
PR INTERVAL - MUSE: NORMAL MS
QRS DURATION - MUSE: 76 MS
QRS DURATION - MUSE: 80 MS
QRS DURATION - MUSE: 82 MS
QT - MUSE: 364 MS
QT - MUSE: 366 MS
QT - MUSE: 384 MS
QTC - MUSE: 456 MS
QTC - MUSE: 467 MS
QTC - MUSE: 542 MS
R AXIS - MUSE: 58 DEGREES
R AXIS - MUSE: 63 DEGREES
R AXIS - MUSE: 77 DEGREES
RBC # BLD AUTO: 3.41 10E6/UL (ref 4.4–5.9)
RBC # BLD AUTO: 3.63 10E6/UL (ref 4.4–5.9)
RBC URINE: 47 /HPF
SODIUM SERPL-SCNC: 145 MMOL/L (ref 133–144)
SP GR UR STRIP: 1.01 (ref 1–1.03)
SYSTOLIC BLOOD PRESSURE - MUSE: NORMAL MMHG
T AXIS - MUSE: -63 DEGREES
T AXIS - MUSE: 60 DEGREES
T AXIS - MUSE: 76 DEGREES
UFH PPP CHRO-ACNC: <0.1 IU/ML
UROBILINOGEN UR STRIP-MCNC: NORMAL MG/DL
VENTRICULAR RATE- MUSE: 132 BPM
VENTRICULAR RATE- MUSE: 85 BPM
VENTRICULAR RATE- MUSE: 99 BPM
WBC # BLD AUTO: 11.2 10E3/UL (ref 4–11)
WBC # BLD AUTO: 14.6 10E3/UL (ref 4–11)
WBC URINE: 4 /HPF

## 2022-10-28 PROCEDURE — 200N000001 HC R&B ICU

## 2022-10-28 PROCEDURE — 250N000011 HC RX IP 250 OP 636: Performed by: PHYSICIAN ASSISTANT

## 2022-10-28 PROCEDURE — 71045 X-RAY EXAM CHEST 1 VIEW: CPT

## 2022-10-28 PROCEDURE — 258N000003 HC RX IP 258 OP 636

## 2022-10-28 PROCEDURE — 999N000157 HC STATISTIC RCP TIME EA 10 MIN

## 2022-10-28 PROCEDURE — 85027 COMPLETE CBC AUTOMATED: CPT | Performed by: PHYSICIAN ASSISTANT

## 2022-10-28 PROCEDURE — 94640 AIRWAY INHALATION TREATMENT: CPT

## 2022-10-28 PROCEDURE — 250N000011 HC RX IP 250 OP 636: Performed by: INTERNAL MEDICINE

## 2022-10-28 PROCEDURE — 250N000009 HC RX 250

## 2022-10-28 PROCEDURE — 258N000003 HC RX IP 258 OP 636: Performed by: INTERNAL MEDICINE

## 2022-10-28 PROCEDURE — 250N000011 HC RX IP 250 OP 636: Performed by: STUDENT IN AN ORGANIZED HEALTH CARE EDUCATION/TRAINING PROGRAM

## 2022-10-28 PROCEDURE — 94799 UNLISTED PULMONARY SVC/PX: CPT

## 2022-10-28 PROCEDURE — 250N000013 HC RX MED GY IP 250 OP 250 PS 637: Performed by: PHYSICIAN ASSISTANT

## 2022-10-28 PROCEDURE — 83735 ASSAY OF MAGNESIUM: CPT

## 2022-10-28 PROCEDURE — 82310 ASSAY OF CALCIUM: CPT | Performed by: PHYSICIAN ASSISTANT

## 2022-10-28 PROCEDURE — 250N000013 HC RX MED GY IP 250 OP 250 PS 637: Performed by: STUDENT IN AN ORGANIZED HEALTH CARE EDUCATION/TRAINING PROGRAM

## 2022-10-28 PROCEDURE — 92526 ORAL FUNCTION THERAPY: CPT | Mod: GN | Performed by: SPEECH-LANGUAGE PATHOLOGIST

## 2022-10-28 PROCEDURE — 250N000009 HC RX 250: Performed by: INTERNAL MEDICINE

## 2022-10-28 PROCEDURE — 97110 THERAPEUTIC EXERCISES: CPT | Mod: GO

## 2022-10-28 PROCEDURE — 94640 AIRWAY INHALATION TREATMENT: CPT | Mod: 76

## 2022-10-28 PROCEDURE — 250N000009 HC RX 250: Performed by: PHYSICIAN ASSISTANT

## 2022-10-28 PROCEDURE — 250N000013 HC RX MED GY IP 250 OP 250 PS 637: Performed by: INTERNAL MEDICINE

## 2022-10-28 PROCEDURE — 81001 URINALYSIS AUTO W/SCOPE: CPT | Performed by: PHYSICIAN ASSISTANT

## 2022-10-28 PROCEDURE — 85520 HEPARIN ASSAY: CPT | Performed by: SURGERY

## 2022-10-28 PROCEDURE — 84100 ASSAY OF PHOSPHORUS: CPT

## 2022-10-28 RX ORDER — METOPROLOL TARTRATE 50 MG
50 TABLET ORAL 2 TIMES DAILY
Status: DISCONTINUED | OUTPATIENT
Start: 2022-10-28 | End: 2022-11-01

## 2022-10-28 RX ORDER — FUROSEMIDE 10 MG/ML
40 INJECTION INTRAMUSCULAR; INTRAVENOUS ONCE
Status: COMPLETED | OUTPATIENT
Start: 2022-10-28 | End: 2022-10-28

## 2022-10-28 RX ORDER — GLYCOPYRROLATE 0.2 MG/ML
0.1 INJECTION, SOLUTION INTRAMUSCULAR; INTRAVENOUS ONCE
Status: COMPLETED | OUTPATIENT
Start: 2022-10-28 | End: 2022-10-28

## 2022-10-28 RX ORDER — METOPROLOL TARTRATE 25 MG/1
25 TABLET, FILM COATED ORAL ONCE
Status: COMPLETED | OUTPATIENT
Start: 2022-10-28 | End: 2022-10-28

## 2022-10-28 RX ORDER — HEPARIN SODIUM 10000 [USP'U]/100ML
0-5000 INJECTION, SOLUTION INTRAVENOUS CONTINUOUS
Status: DISCONTINUED | OUTPATIENT
Start: 2022-10-28 | End: 2022-11-04

## 2022-10-28 RX ADMIN — METHOCARBAMOL 750 MG: 750 TABLET ORAL at 00:25

## 2022-10-28 RX ADMIN — INSULIN ASPART 1 UNITS: 100 INJECTION, SOLUTION INTRAVENOUS; SUBCUTANEOUS at 06:53

## 2022-10-28 RX ADMIN — ACETYLCYSTEINE 2 ML: 200 SOLUTION ORAL; RESPIRATORY (INHALATION) at 16:27

## 2022-10-28 RX ADMIN — PIPERACILLIN AND TAZOBACTAM 4.5 G: 4; .5 INJECTION, POWDER, FOR SOLUTION INTRAVENOUS at 15:30

## 2022-10-28 RX ADMIN — IPRATROPIUM BROMIDE AND ALBUTEROL SULFATE 3 ML: .5; 3 SOLUTION RESPIRATORY (INHALATION) at 16:27

## 2022-10-28 RX ADMIN — METHOCARBAMOL 750 MG: 750 TABLET ORAL at 13:11

## 2022-10-28 RX ADMIN — GABAPENTIN 300 MG: 250 SOLUTION ORAL at 15:33

## 2022-10-28 RX ADMIN — GLYCOPYRROLATE 0.1 MG: 0.2 INJECTION, SOLUTION INTRAMUSCULAR; INTRAVENOUS at 13:11

## 2022-10-28 RX ADMIN — ATORVASTATIN CALCIUM 20 MG: 10 TABLET, FILM COATED ORAL at 19:52

## 2022-10-28 RX ADMIN — METHOCARBAMOL 750 MG: 750 TABLET ORAL at 08:20

## 2022-10-28 RX ADMIN — ACETAMINOPHEN 975 MG: 325 TABLET, FILM COATED ORAL at 00:25

## 2022-10-28 RX ADMIN — GABAPENTIN 300 MG: 250 SOLUTION ORAL at 08:25

## 2022-10-28 RX ADMIN — HEPARIN SODIUM 1200 UNITS/HR: 10000 INJECTION, SOLUTION INTRAVENOUS at 13:47

## 2022-10-28 RX ADMIN — PIPERACILLIN AND TAZOBACTAM 4.5 G: 4; .5 INJECTION, POWDER, FOR SOLUTION INTRAVENOUS at 21:02

## 2022-10-28 RX ADMIN — PIPERACILLIN AND TAZOBACTAM 4.5 G: 4; .5 INJECTION, POWDER, FOR SOLUTION INTRAVENOUS at 10:02

## 2022-10-28 RX ADMIN — HEPARIN SODIUM 5000 UNITS: 5000 INJECTION, SOLUTION INTRAVENOUS; SUBCUTANEOUS at 05:36

## 2022-10-28 RX ADMIN — IPRATROPIUM BROMIDE AND ALBUTEROL SULFATE 3 ML: .5; 3 SOLUTION RESPIRATORY (INHALATION) at 08:00

## 2022-10-28 RX ADMIN — ACETYLCYSTEINE 2 ML: 200 SOLUTION ORAL; RESPIRATORY (INHALATION) at 19:20

## 2022-10-28 RX ADMIN — NICOTINE 1 PATCH: 14 PATCH, EXTENDED RELEASE TRANSDERMAL at 08:21

## 2022-10-28 RX ADMIN — ACETAMINOPHEN 975 MG: 325 TABLET, FILM COATED ORAL at 08:21

## 2022-10-28 RX ADMIN — METHOCARBAMOL 750 MG: 750 TABLET ORAL at 19:52

## 2022-10-28 RX ADMIN — AMIODARONE HYDROCHLORIDE 400 MG: 200 TABLET ORAL at 08:21

## 2022-10-28 RX ADMIN — QUETIAPINE FUMARATE 25 MG: 25 TABLET ORAL at 21:02

## 2022-10-28 RX ADMIN — VANCOMYCIN HYDROCHLORIDE 1500 MG: 10 INJECTION, POWDER, LYOPHILIZED, FOR SOLUTION INTRAVENOUS at 00:24

## 2022-10-28 RX ADMIN — IPRATROPIUM BROMIDE AND ALBUTEROL SULFATE 3 ML: .5; 3 SOLUTION RESPIRATORY (INHALATION) at 11:07

## 2022-10-28 RX ADMIN — Medication 1 PACKET: at 08:23

## 2022-10-28 RX ADMIN — INSULIN ASPART 1 UNITS: 100 INJECTION, SOLUTION INTRAVENOUS; SUBCUTANEOUS at 11:26

## 2022-10-28 RX ADMIN — METOPROLOL TARTRATE 50 MG: 50 TABLET, FILM COATED ORAL at 21:01

## 2022-10-28 RX ADMIN — HYDRALAZINE HYDROCHLORIDE 10 MG: 20 INJECTION INTRAMUSCULAR; INTRAVENOUS at 03:39

## 2022-10-28 RX ADMIN — QUETIAPINE FUMARATE 25 MG: 25 TABLET ORAL at 08:21

## 2022-10-28 RX ADMIN — ACETAMINOPHEN 975 MG: 325 TABLET, FILM COATED ORAL at 15:30

## 2022-10-28 RX ADMIN — Medication 40 MG: at 08:20

## 2022-10-28 RX ADMIN — METOPROLOL TARTRATE 25 MG: 25 TABLET, FILM COATED ORAL at 11:03

## 2022-10-28 RX ADMIN — METOPROLOL TARTRATE 25 MG: 25 TABLET, FILM COATED ORAL at 08:21

## 2022-10-28 RX ADMIN — Medication 1 PACKET: at 15:33

## 2022-10-28 RX ADMIN — ACETYLCYSTEINE 2 ML: 200 SOLUTION ORAL; RESPIRATORY (INHALATION) at 08:00

## 2022-10-28 RX ADMIN — ASPIRIN 81 MG CHEWABLE TABLET 162 MG: 81 TABLET CHEWABLE at 08:21

## 2022-10-28 RX ADMIN — AMIODARONE HYDROCHLORIDE 400 MG: 200 TABLET ORAL at 21:01

## 2022-10-28 RX ADMIN — ACETYLCYSTEINE 2 ML: 200 SOLUTION ORAL; RESPIRATORY (INHALATION) at 11:07

## 2022-10-28 RX ADMIN — GABAPENTIN 300 MG: 250 SOLUTION ORAL at 21:04

## 2022-10-28 RX ADMIN — VANCOMYCIN HYDROCHLORIDE 1500 MG: 10 INJECTION, POWDER, LYOPHILIZED, FOR SOLUTION INTRAVENOUS at 11:20

## 2022-10-28 RX ADMIN — LIDOCAINE 1 PATCH: 560 PATCH PERCUTANEOUS; TOPICAL; TRANSDERMAL at 08:22

## 2022-10-28 RX ADMIN — FUROSEMIDE 40 MG: 10 INJECTION, SOLUTION INTRAMUSCULAR; INTRAVENOUS at 08:21

## 2022-10-28 RX ADMIN — IPRATROPIUM BROMIDE AND ALBUTEROL SULFATE 3 ML: .5; 3 SOLUTION RESPIRATORY (INHALATION) at 19:20

## 2022-10-28 RX ADMIN — PIPERACILLIN AND TAZOBACTAM 4.5 G: 4; .5 INJECTION, POWDER, FOR SOLUTION INTRAVENOUS at 03:38

## 2022-10-28 RX ADMIN — SODIUM PHOSPHATE, MONOBASIC, MONOHYDRATE 15 MMOL: 276; 142 INJECTION, SOLUTION INTRAVENOUS at 06:21

## 2022-10-28 RX ADMIN — HYDRALAZINE HYDROCHLORIDE 10 MG: 20 INJECTION INTRAMUSCULAR; INTRAVENOUS at 00:32

## 2022-10-28 ASSESSMENT — ACTIVITIES OF DAILY LIVING (ADL)
ADLS_ACUITY_SCORE: 38
WALKING_OR_CLIMBING_STAIRS_DIFFICULTY: YES
TRANSFERRING: 2-->COMPLETELY DEPENDENT
ADLS_ACUITY_SCORE: 39
ADLS_ACUITY_SCORE: 55
SWALLOWING: 2-->DIFFICULTY SWALLOWING LIQUIDS/FOODS
EATING: 2-->COMPLETELY DEPENDENT (NOT DEVELOPMENTALLY APPROPRIATE)
ADLS_ACUITY_SCORE: 38
ADLS_ACUITY_SCORE: 55
EQUIPMENT_CURRENTLY_USED_AT_HOME: CANE, STRAIGHT
ADLS_ACUITY_SCORE: 38
WALKING_OR_CLIMBING_STAIRS: OTHER (SEE COMMENTS)
ADLS_ACUITY_SCORE: 38
DOING_ERRANDS_INDEPENDENTLY_DIFFICULTY: NO
SWALLOWING: 2-->DIFFICULTY SWALLOWING LIQUIDS/FOODS
VISION_MANAGEMENT: GLASSES
ADLS_ACUITY_SCORE: 55
CHANGE_IN_FUNCTIONAL_STATUS_SINCE_ONSET_OF_CURRENT_ILLNESS/INJURY: YES
WEAR_GLASSES_OR_BLIND: YES
EATING/SWALLOWING: SWALLOWING LIQUIDS;SWALLOWING SOLID FOOD
ADLS_ACUITY_SCORE: 47
TOILETING_ISSUES: NO
TRANSFERRING: 2-->COMPLETELY DEPENDENT (NOT DEVELOPMENTALLY APPROPRIATE)
DIFFICULTY_EATING/SWALLOWING: YES
CONCENTRATING,_REMEMBERING_OR_MAKING_DECISIONS_DIFFICULTY: YES
ADLS_ACUITY_SCORE: 55
EATING: 2-->COMPLETELY DEPENDENT
ADLS_ACUITY_SCORE: 39
DRESSING/BATHING_DIFFICULTY: NO
FALL_HISTORY_WITHIN_LAST_SIX_MONTHS: NO
ADLS_ACUITY_SCORE: 38

## 2022-10-28 NOTE — PROGRESS NOTES
Notified provider about indwelling hernandez catheter discussed removal or continued need.    Did provider choose to remove indwelling hernandez catheter? No    Provider's hernandez indication for keeping indwelling hernandez catheter: Retention.    Is there an order for indwelling hernandez catheter? Yes    *If there is a plan to keep hernandez catheter in place at discharge daily notification with provider is not necessary.

## 2022-10-28 NOTE — PLAN OF CARE
Goal Outcome Evaluation:  Pt A/O x 3, forgetful. Minimal complaints of pain. Nonproductive cough, frequent. Gargling with speech due to inability to cough out secretions, frequent suctioning attempted. Tolerating tube feed. Failed speech today. Up to the chair with lift. PT able to get pt to stand. Mccarty and rectal tube in place. Wife at the bedside this am.

## 2022-10-28 NOTE — PROGRESS NOTES
Pt alert and oriented x4 but can become forgetful. LS diminished/coarse with a weak cough, oral suctioning done with thick secretions. Pt remains NPO, pt does cough when given oral swabs. Rectal tube in place, leaked twice during shift, bag remains intact. Pt given Hydralazine multiple times during shift per order with effect. Pt's hernandez removed during day shift, pt voided at beginning of shift and at midnight, pt bladder scanned @ 0430 with a volume of 985mL. Spoke with provider and hernandez reinserted. 1250mL urine out from hernandez. Tube feeds remain @ goal with q4h 100mL flushes.    Gloria Flores, RN

## 2022-10-28 NOTE — PROGRESS NOTES
CLINICAL NUTRITION SERVICES - REASSESSMENT NOTE      Recommendations Ordered by Registered Dietitian (RD):   Cancel Prosource as will no longer be needed.  Change TF product to Promote with Fiber at 40 mL/hr; increase by 20 mL every 12 hrs to goal 80 mL/hr = 1920 kcals (16 kcal/kg), 119 gm pro (1.5 gm/kg), 1595 mL H20, 265 gm CHO, 27 gm fiber.    Malnutrition: (10/23)  % Weight Loss:  None noted  % Intake:  Decreased intake does not meet criteria for malnutrition   Subcutaneous Fat Loss:  None observed  Muscle Loss:  None observed  Fluid Retention:  Trace 1+     Malnutrition Diagnosis: Patient does not meet two of the above criteria necessary for diagnosing malnutrition       EVALUATION OF PROGRESS TOWARD GOALS   Diet:  NPO    Nutrition Support:  TF was changed on 10/25 as below:    Nutrition Support Enteral:  Type of Feeding Tube: NG  Enteral Frequency:  Continuous  Enteral Regimen: Vital HP at 40 mL/hr  Total Enteral Provisions: 960 kcal, 84 g protein, 107 g CHO, 0 g fiber, 803 mL H2O  Free Water Flush: 100 mL every 4 hrs    Propofol discontinued 10/26.  Prosource 1 packet TID = 120 kcals, 33 gm pro.  Total (TF + Prosource): 1080 kcals (9 kcal/kg and 64% energy needs), 117 gm pro (1.5 gm/kg).    Intake/Tolerance:    Stool Pattern (Senokot on hold):  10/25:  x6  10/26:  x12 --> rectal tube placed late HS  10/27:  x2   10/28:  x2 (100 mL)  Na 145 (H), Phos 2.2 (L) --> replaced.  BGM: 128, 134, 134, 128, 145, 144 - acceptable (High Resistant sliding scale insulin).  I/O: 1551/1730. Wt: 125.9 kg (up 5.7 kg since 10/20). Pt with 1+ trace generalized edema.    ASSESSED NUTRITION NEEDS:  Dosing Weight: 120.2 kg (10/20 wt for energy), 78.2 kg (IBW for protein)   Estimated Energy Needs: 8481-8713 kcals (14-17 Kcal/Kg)  Justification: obese  Estimated Protein Needs: 115-155 grams protein (1.5-2 g pro/Kg)  Justification: post-op, obese    NEW FINDINGS:   10/26:  Extubated   10/27:  SLP = Moderate-severe oral-pharyngeal  dysphagia --> Rec NPO     Per rounds, patient not clearing secretions well.    Previous Goals (10/25):   TF + ProSource + Propofol will meet % needs  Evaluation: Not met    Patient will have a BM once every 24-48 hours   Evaluation: Not met, but improving    Previous Nutrition Diagnosis (10/25):   Excessive energy intake related to TF at goal, also on Propofol as evidenced by meeting > 135% estimated energy needs   Evaluation:  Resolved      CURRENT NUTRITION DIAGNOSIS  Inadequate energy intake related to Propofol off and low TF rate as evidenced by TF + Prosource meeting only 64% energy needs    INTERVENTIONS  Recommendations / Nutrition Prescription  Cancel Prosource as will no longer be needed.  Change TF product to Promote with Fiber at 40 mL/hr; increase by 20 mL every 12 hrs to goal 80 mL/hr = 1920 kcals (16 kcal/kg), 119 gm pro (1.5 gm/kg), 1595 mL H20, 265 gm CHO, 27 gm fiber.     Implementation  Collaboration and Referral of Nutrition care - Pt was discussed during ICU interdisciplinary rounds this morning  EN Composition, EN Schedule and Medical Food supplement- Modified TF orders in Epic as above    Goals  TF Promote with Fiber at 80 mL/hr will meet % estimated needs.  Stool pattern will improve (less frequency and volume)    MONITORING AND EVALUATION:  Progress towards goals will be monitored and evaluated per protocol and Practice Guidelines    Funmilayo Collazo, RD, LD, CNSC

## 2022-10-28 NOTE — PROGRESS NOTES
SPIRITUAL HEALTH SERVICES Progress Note       ICU     Visited with Richar at the bedside per request for follow-up.     Richar was awake and seated in his bedside chair. He noted that he is appreciative of  support and that his  had visited recently which was meaningful for him.     remains available to support this pt and will continue to follow while on unit. Please consult if any immediate needs arise.     ------  Alberto Fontenot M.Div.  Resident   Pager: [phone]

## 2022-10-28 NOTE — PLAN OF CARE
Goal Outcome Evaluation:    Plan of Care Reviewed With: other (see comments) Patient was discussed during ICU interdisciplinary rounds this morning.    Overall Patient Progress: no change    Outcome Evaluation: Patient now being underfed energy with Propofol off and low TF rate. Thus changed TF product to fiber-containing, increased the goal rate, and canceled Prosource as will no longer be needed.    Funmilayo Collazo, RD, LD, CNSC

## 2022-10-28 NOTE — PROGRESS NOTES
[facility]  Cardiovascular and Thoracic Surgery Daily Note        Assessment and Plan  POD # 8 s/p aortic valve replacement with a 25 mm Bal Inspiris Resilia bovine pericardial valve, coronary artery bypass grafting x 3 with left internal mammary artery to left anterior descending, reverse lesser saphenous vein graft to the posterior descending artery, reverse left lesser saphenous vein graft to the distal circumflex marginal artery, endoscopic bilateral lesser saphenous vein graft harvest from below the knees on 10/20 with Dr. Bennie Pierre     - CVS: Pre-op TTE with preserved biventricular function. Immediate postop profound vasoplegia and acidosis requiring multiple high dose pressors, bicarb infusion, and volume resuscitation. Weaned off pressors by POD1, lactate normalized. BP labile with agitation and ongoing need for sedation. Intermittent low dose NE- sedation related. New onset afib RVR 10/22; amiodarone bolus and infusion, back in NSR, transition to PO amio with taper, recurrent afib restarted on amio infusion, RVR a.fib 10/27-1 dose IV metoprolol trialed with no success, amio restarted converted to NSR at 1849. Will need anticoagulation for PAF- discussed with Dr. Pierre now that chest tubes and pacer wires removed will start heparin gtt with no loading dose or no bolus. Appears hypervolemic but improved, repeat Lasix 40 mg IV x1 and reassess. Aspirin 162 mg daily, atorvastatin 20 mg daily. Metoprolol increased to 50mg BID today. Chest tubes and temporary pacing wires removed on 10/27. CXR yesterday prior to chest tube removal with worsening pulmonary edema, diuresing, Repeat CXR today with improving pulmonary edema.      - Resp: Extubated POD1. IS, pulmonary toilet. Acute hypoxemic respiratory failure, increased WOB and inability to protect airway due to delirium, discussed with Dr. Pagan and Dr. Pierre, reintubated 10/24. Re extubated 10/26/22, sating well on 3L NC, CPAP at  "night, EZPAP added. Significant amount amount of secretions overnight into this am 10/28, Needs frequent suctioning. Discussed with intensivist recommending one dose glycopyrrolate and reassess. Will continue to need attentive nursing care and respiratory therapies.     - Neuro: Post-extubation, repeating \"I need help\" over and over, but able to answer question appropriately, redirect, and no focal physical deficits. Worsening agitation 10/22 despite Seroquel, ativan, and Haldol. Placed in restraints due to swinging at staff, pulling at lines/tubes. Started on Precedex and Zyprexa with some improvement. Chronic pain at baseline. Minimize narcotics with agitation. Schedule Robaxin, Tylenol, lidocaine patches, Toradol, Atarax. PTA on gabapentin 800 mg TID, will resume 300 mg BID and increasing pending improved confusion. Head CT on hold due to agitation, will proceed now that reintubated. Head CT negative 10/24/22, calm this am, difficult to understand speech due to secretions.     - Renal: No history of significant renal disease. Cr stable WNL. Trend BMP. Remains about 5kg up from preoperative weight. Diuretic as above.   Creatinine   Date Value Ref Range Status   10/28/2022 0.74 0.66 - 1.25 mg/dL Final        - GI: rectal tube in place, continue bowel regimen     - : Mccarty in place, continue today. Mccarty removed on 10/27, urinary retention requiring replacement when bladder scanned for about 1L.  History urinary retention, PTA Flomax 0.8 mg daily resumed- on hold due to NPO status?      - Endo: DMT2. Insulin infusion transitioned to SSI. PTA metformin on hold.          Hemoglobin A1C   Date Value Ref Range Status   09/14/2022 5.6 0.0 - 5.6 % Final       Comment:       Normal <5.7%   Prediabetes 5.7-6.4%    Diabetes 6.5% or higher     Note: Adopted from ADA consensus guidelines.         - FEN: Replace electrolytes as needed. NPO for now with agitation, feeding tube placed postpyloric, TF continued      - ID: Postop " Leukocytosis, increased to 14.6 10/28, up from 8.8 on 10/27.  Afebrile.  Periop abx complete. Trend CBC, fever curve. On Vanc/Zosyn for suspected aspiration pneumonia, started on 10/24 will complete for full 7 days. UA and repeat CXR ordered 10/28. UA negative except for moderate amount of blood and repeat CXR with improving infiltrate and edema  WBC Count   Date Value Ref Range Status   10/28/2022 14.6 (H) 4.0 - 11.0 10e3/uL Final     - Heme: Acute blood loss anemia and thrombocytopenia due to surgery. Aspirin and subcutaneous heparin resumed 10/24 with improved PLT count. Trend CBC, transfuse PRN.  Will need anticoagulation for PAF.        - Proph: SCD, subcutaneous heparin, PPI     - Dispo: Continue ICU cares for high nursing needs.         Interval History  Required frequent suctioning overnight for secretions and continues with difficulty clearing secretions. Alert and oriented, but easily forgetful. Difficult to understand due to secretions and soft voice. Pain controlled. Tolerated up to chair. Tolerating therapies.      Medications    acetaminophen  975 mg Oral Q8H     [Held by provider] aspirin  162 mg Oral or NG Tube Daily     atorvastatin  20 mg Oral Daily     gabapentin  300 mg Oral or G tube TID     [Held by provider] heparin ANTICOAGULANT  5,000 Units Subcutaneous Q8H     insulin aspart  1-10 Units Subcutaneous TID AC     insulin aspart  1-7 Units Subcutaneous At Bedtime     lactated ringers  250 mL Intravenous Once     lidocaine  1-2 patch Transdermal Q24H     lidocaine   Transdermal Q8H MITA     methocarbamol  750 mg Oral 4x Daily     nicotine  1 patch Transdermal Daily     nicotine   Transdermal Q8H     pantoprazole  40 mg Oral or NG Tube Daily     Or     pantoprazole  40 mg Oral Daily     polyethylene glycol  17 g Oral Daily     QUEtiapine  25 mg Oral BID     senna-docusate  1 tablet Oral BID     sodium chloride (PF)  3 mL Intracatheter Q8H     tamsulosin  0.8 mg Oral QPM      acetaminophen,  "bisacodyl, calcium gluconate, calcium gluconate, calcium gluconate, dextrose, glucose **OR** dextrose **OR** glucagon, haloperidol lactate, hydrALAZINE, HYDROmorphone **OR** HYDROmorphone, hydrOXYzine, ketorolac, lidocaine 4%, lidocaine (buffered or not buffered), magnesium hydroxide, metoprolol, naloxone **OR** naloxone **OR** naloxone **OR** naloxone, nitroGLYcerin, ondansetron **OR** ondansetron, oxyCODONE **OR** oxyCODONE, phenylephrine, prochlorperazine **OR** prochlorperazine, sodium chloride, sodium chloride (PF)        Physical Exam  Vitals were reviewed  BP 97/55   Pulse 88   Temp 97.7  F (36.5  C) (Oral)   Resp 30   Ht 1.803 m (5' 11\")   Wt 125.9 kg (277 lb 9 oz)   SpO2 92%   BMI 38.71 kg/m     Rhythm: NSR     Lungs: diminished bases     Cardiovascular: rrr, no m/r/g     Abdomen: soft, NT, ND, +BS     Extremeties: warm, 1+ LE edema      Incision: cdi      CT: n/a     Weight:          Vitals:     10/20/22 0600 10/21/22 0600 10/22/22 0250 10/23/22 0200   Weight: 120.2 kg (265 lb) 124.6 kg (274 lb 11.1 oz) 124 kg (273 lb 5.9 oz) 126.6 kg (279 lb 1.6 oz)            Data                Recent Labs   Lab 10/24/22  0434 10/24/22  0336 10/24/22  0019 10/23/22  0743 10/23/22  0351 10/22/22  1657 10/22/22  1403 10/20/22  1528 10/20/22  1526 10/20/22  1324 10/20/22  1321   WBC  --  6.9  --   --  7.5  --  8.4   < > 29.3*  --  22.4*   HGB  --  8.8*  --   --  9.2*  --  9.5*   < > 14.2   < > 11.5*   MCV  --  94  --   --  93  --  93   < > 93  --  93   PLT  --  84*  --   --  57*  --  54*   < > 158  --  136*   INR  --   --   --   --   --   --   --   --  1.31*  --  1.48*   NA  --  141  --   --  138  --  137   < > 141   < > 138   POTASSIUM  --  4.3  --   --  4.3  --  4.7   < > 3.7   < > 3.8   CHLORIDE  --  110*  --   --  108  --  107   < > 109  --  109   CO2  --  27  --   --  26  --  28   < > 17*  --  23   BUN  --  33*  --   --  27  --  22   < > 13  --  12   CR  --  1.16  --   --  1.11  --  1.08   < > 0.97  --  0.85 "   ANIONGAP  --  4  --   --  4  --  2*   < > 15*  --  6   HALEY  --  8.8  --   --  9.2  --  9.1   < > 8.5  --  9.5   * 126* 131*   < > 136*   < > 137*   < > 229*   < > 182*   ALBUMIN  --  2.4*  --   --  2.5*  --   --    < > 3.1*  --   --    PROTTOTAL  --  5.3*  --   --  5.3*  --   --    < > 5.5*  --   --    BILITOTAL  --  0.8  --   --  0.6  --   --    < > 0.6  --   --    ALKPHOS  --  64  --   --  60  --   --    < > 84  --   --    ALT  --  19  --   --  20  --   --    < > 25  --   --    AST  --  35  --   --  48*  --   --    < > 68*  --   --     < > = values in this interval not displayed.         Imaging:      Recent Results (from the past 24 hour(s))   XR Abdomen Port 1 View     Narrative     EXAM: XR ABDOMEN PORT 1 VIEW  LOCATION: [facility]  DATE/TIME: 10/23/2022 11:04 AM     INDICATION: advanced NGT, needs to be post pyloric for feeding  COMPARISON: 10/22/2022 at 1542 hours.        Impression     IMPRESSION: Feeding tube tip is at the gastroduodenal junction and is likely postpyloric, however this is not definitive on this study. There is redundancy in the tube and it will likely progress distally on its own. Chest tubes present. Cholelithiasis.   Normal bowel gas pattern.            Patient seen and discussed with Dr. Ignacio Ruiz PA-C  Cardiothoracic Surgery  Pager [phone]

## 2022-10-28 NOTE — PROGRESS NOTES
Patient currently on RA with SPO2 of mid 90's. BS coarse and crackles. Nebs given per MD. Patient has productive cough.     Ana Rosa Jeffery, SRT

## 2022-10-29 ENCOUNTER — APPOINTMENT (OUTPATIENT)
Dept: OCCUPATIONAL THERAPY | Facility: CLINIC | Age: 67
DRG: 219 | End: 2022-10-29
Attending: SURGERY
Payer: COMMERCIAL

## 2022-10-29 ENCOUNTER — APPOINTMENT (OUTPATIENT)
Dept: SPEECH THERAPY | Facility: CLINIC | Age: 67
DRG: 219 | End: 2022-10-29
Attending: SURGERY
Payer: COMMERCIAL

## 2022-10-29 LAB
ANION GAP SERPL CALCULATED.3IONS-SCNC: 3 MMOL/L (ref 3–14)
BUN SERPL-MCNC: 22 MG/DL (ref 7–30)
CALCIUM SERPL-MCNC: 9.5 MG/DL (ref 8.5–10.1)
CHLORIDE BLD-SCNC: 113 MMOL/L (ref 94–109)
CO2 SERPL-SCNC: 29 MMOL/L (ref 20–32)
CREAT SERPL-MCNC: 0.7 MG/DL (ref 0.66–1.25)
ERYTHROCYTE [DISTWIDTH] IN BLOOD BY AUTOMATED COUNT: 14.6 % (ref 10–15)
GFR SERPL CREATININE-BSD FRML MDRD: >90 ML/MIN/1.73M2
GLUCOSE BLD-MCNC: 149 MG/DL (ref 70–99)
GLUCOSE BLDC GLUCOMTR-MCNC: 128 MG/DL (ref 70–99)
GLUCOSE BLDC GLUCOMTR-MCNC: 130 MG/DL (ref 70–99)
GLUCOSE BLDC GLUCOMTR-MCNC: 134 MG/DL (ref 70–99)
GLUCOSE BLDC GLUCOMTR-MCNC: 157 MG/DL (ref 70–99)
GLUCOSE BLDC GLUCOMTR-MCNC: 161 MG/DL (ref 70–99)
HCT VFR BLD AUTO: 28.8 % (ref 40–53)
HGB BLD-MCNC: 9.2 G/DL (ref 13.3–17.7)
MCH RBC QN AUTO: 29.9 PG (ref 26.5–33)
MCHC RBC AUTO-ENTMCNC: 31.9 G/DL (ref 31.5–36.5)
MCV RBC AUTO: 94 FL (ref 78–100)
PHOSPHATE SERPL-MCNC: 2.3 MG/DL (ref 2.5–4.5)
PLATELET # BLD AUTO: 207 10E3/UL (ref 150–450)
POTASSIUM BLD-SCNC: 3.4 MMOL/L (ref 3.4–5.3)
POTASSIUM BLD-SCNC: 4 MMOL/L (ref 3.4–5.3)
RBC # BLD AUTO: 3.08 10E6/UL (ref 4.4–5.9)
SODIUM SERPL-SCNC: 145 MMOL/L (ref 133–144)
UFH PPP CHRO-ACNC: 0.15 IU/ML
UFH PPP CHRO-ACNC: 0.23 IU/ML
UFH PPP CHRO-ACNC: 0.34 IU/ML
VANCOMYCIN SERPL-MCNC: 15.3 MG/L
WBC # BLD AUTO: 10.4 10E3/UL (ref 4–11)

## 2022-10-29 PROCEDURE — 92526 ORAL FUNCTION THERAPY: CPT | Mod: GN

## 2022-10-29 PROCEDURE — 250N000009 HC RX 250: Performed by: INTERNAL MEDICINE

## 2022-10-29 PROCEDURE — 80048 BASIC METABOLIC PNL TOTAL CA: CPT | Performed by: PHYSICIAN ASSISTANT

## 2022-10-29 PROCEDURE — 84100 ASSAY OF PHOSPHORUS: CPT

## 2022-10-29 PROCEDURE — 94799 UNLISTED PULMONARY SVC/PX: CPT

## 2022-10-29 PROCEDURE — 250N000013 HC RX MED GY IP 250 OP 250 PS 637: Performed by: PHYSICIAN ASSISTANT

## 2022-10-29 PROCEDURE — 97110 THERAPEUTIC EXERCISES: CPT | Mod: GO

## 2022-10-29 PROCEDURE — 85520 HEPARIN ASSAY: CPT

## 2022-10-29 PROCEDURE — 258N000003 HC RX IP 258 OP 636: Performed by: PHYSICIAN ASSISTANT

## 2022-10-29 PROCEDURE — 97530 THERAPEUTIC ACTIVITIES: CPT | Mod: GO

## 2022-10-29 PROCEDURE — 250N000013 HC RX MED GY IP 250 OP 250 PS 637: Performed by: INTERNAL MEDICINE

## 2022-10-29 PROCEDURE — 250N000013 HC RX MED GY IP 250 OP 250 PS 637: Performed by: STUDENT IN AN ORGANIZED HEALTH CARE EDUCATION/TRAINING PROGRAM

## 2022-10-29 PROCEDURE — 250N000011 HC RX IP 250 OP 636: Performed by: PHYSICIAN ASSISTANT

## 2022-10-29 PROCEDURE — 258N000003 HC RX IP 258 OP 636: Performed by: STUDENT IN AN ORGANIZED HEALTH CARE EDUCATION/TRAINING PROGRAM

## 2022-10-29 PROCEDURE — 200N000001 HC R&B ICU

## 2022-10-29 PROCEDURE — 999N000157 HC STATISTIC RCP TIME EA 10 MIN

## 2022-10-29 PROCEDURE — 84132 ASSAY OF SERUM POTASSIUM: CPT

## 2022-10-29 PROCEDURE — 250N000011 HC RX IP 250 OP 636: Performed by: INTERNAL MEDICINE

## 2022-10-29 PROCEDURE — 250N000013 HC RX MED GY IP 250 OP 250 PS 637

## 2022-10-29 PROCEDURE — 94640 AIRWAY INHALATION TREATMENT: CPT | Mod: 76

## 2022-10-29 PROCEDURE — 80202 ASSAY OF VANCOMYCIN: CPT | Performed by: SURGERY

## 2022-10-29 PROCEDURE — 85027 COMPLETE CBC AUTOMATED: CPT | Performed by: PHYSICIAN ASSISTANT

## 2022-10-29 PROCEDURE — 85520 HEPARIN ASSAY: CPT | Performed by: SURGERY

## 2022-10-29 PROCEDURE — 94640 AIRWAY INHALATION TREATMENT: CPT

## 2022-10-29 RX ORDER — HALOPERIDOL 5 MG/ML
5 INJECTION INTRAMUSCULAR EVERY 6 HOURS PRN
Status: DISCONTINUED | OUTPATIENT
Start: 2022-10-29 | End: 2022-11-02

## 2022-10-29 RX ORDER — POTASSIUM CHLORIDE 20MEQ/15ML
40 LIQUID (ML) ORAL ONCE
Status: COMPLETED | OUTPATIENT
Start: 2022-10-29 | End: 2022-10-29

## 2022-10-29 RX ORDER — QUETIAPINE FUMARATE 25 MG/1
25 TABLET, FILM COATED ORAL ONCE
Status: COMPLETED | OUTPATIENT
Start: 2022-10-29 | End: 2022-10-29

## 2022-10-29 RX ORDER — FUROSEMIDE 10 MG/ML
40 INJECTION INTRAMUSCULAR; INTRAVENOUS ONCE
Status: COMPLETED | OUTPATIENT
Start: 2022-10-29 | End: 2022-10-29

## 2022-10-29 RX ADMIN — INSULIN ASPART 1 UNITS: 100 INJECTION, SOLUTION INTRAVENOUS; SUBCUTANEOUS at 07:43

## 2022-10-29 RX ADMIN — HEPARIN SODIUM 1650 UNITS/HR: 10000 INJECTION, SOLUTION INTRAVENOUS at 07:40

## 2022-10-29 RX ADMIN — GABAPENTIN 300 MG: 250 SOLUTION ORAL at 22:09

## 2022-10-29 RX ADMIN — METHOCARBAMOL 750 MG: 750 TABLET ORAL at 13:18

## 2022-10-29 RX ADMIN — ACETYLCYSTEINE 2 ML: 200 SOLUTION ORAL; RESPIRATORY (INHALATION) at 11:13

## 2022-10-29 RX ADMIN — IPRATROPIUM BROMIDE AND ALBUTEROL SULFATE 3 ML: .5; 3 SOLUTION RESPIRATORY (INHALATION) at 11:13

## 2022-10-29 RX ADMIN — AMIODARONE HYDROCHLORIDE 400 MG: 200 TABLET ORAL at 08:46

## 2022-10-29 RX ADMIN — OXYCODONE HYDROCHLORIDE 5 MG: 5 TABLET ORAL at 20:22

## 2022-10-29 RX ADMIN — HYDROXYZINE HYDROCHLORIDE 50 MG: 25 TABLET, FILM COATED ORAL at 20:22

## 2022-10-29 RX ADMIN — LIDOCAINE 1 PATCH: 560 PATCH PERCUTANEOUS; TOPICAL; TRANSDERMAL at 08:44

## 2022-10-29 RX ADMIN — Medication 40 MG: at 08:47

## 2022-10-29 RX ADMIN — ATORVASTATIN CALCIUM 20 MG: 10 TABLET, FILM COATED ORAL at 20:22

## 2022-10-29 RX ADMIN — GABAPENTIN 300 MG: 250 SOLUTION ORAL at 08:48

## 2022-10-29 RX ADMIN — PIPERACILLIN AND TAZOBACTAM 4.5 G: 4; .5 INJECTION, POWDER, FOR SOLUTION INTRAVENOUS at 10:30

## 2022-10-29 RX ADMIN — OXYCODONE HYDROCHLORIDE 10 MG: 5 TABLET ORAL at 14:54

## 2022-10-29 RX ADMIN — IPRATROPIUM BROMIDE AND ALBUTEROL SULFATE 3 ML: .5; 3 SOLUTION RESPIRATORY (INHALATION) at 07:29

## 2022-10-29 RX ADMIN — VANCOMYCIN HYDROCHLORIDE 1500 MG: 10 INJECTION, POWDER, LYOPHILIZED, FOR SOLUTION INTRAVENOUS at 12:19

## 2022-10-29 RX ADMIN — ACETAMINOPHEN 975 MG: 325 TABLET, FILM COATED ORAL at 00:46

## 2022-10-29 RX ADMIN — INSULIN ASPART 1 UNITS: 100 INJECTION, SOLUTION INTRAVENOUS; SUBCUTANEOUS at 16:16

## 2022-10-29 RX ADMIN — QUETIAPINE FUMARATE 25 MG: 25 TABLET ORAL at 20:22

## 2022-10-29 RX ADMIN — PIPERACILLIN AND TAZOBACTAM 4.5 G: 4; .5 INJECTION, POWDER, FOR SOLUTION INTRAVENOUS at 04:47

## 2022-10-29 RX ADMIN — IPRATROPIUM BROMIDE AND ALBUTEROL SULFATE 3 ML: .5; 3 SOLUTION RESPIRATORY (INHALATION) at 15:50

## 2022-10-29 RX ADMIN — QUETIAPINE FUMARATE 25 MG: 25 TABLET ORAL at 08:46

## 2022-10-29 RX ADMIN — AMIODARONE HYDROCHLORIDE 400 MG: 200 TABLET ORAL at 21:57

## 2022-10-29 RX ADMIN — METHOCARBAMOL 750 MG: 750 TABLET ORAL at 23:36

## 2022-10-29 RX ADMIN — ASPIRIN 81 MG CHEWABLE TABLET 162 MG: 81 TABLET CHEWABLE at 08:46

## 2022-10-29 RX ADMIN — ACETAMINOPHEN 975 MG: 325 TABLET, FILM COATED ORAL at 23:36

## 2022-10-29 RX ADMIN — ACETYLCYSTEINE 2 ML: 200 SOLUTION ORAL; RESPIRATORY (INHALATION) at 15:50

## 2022-10-29 RX ADMIN — HALOPERIDOL LACTATE 2 MG: 5 INJECTION, SOLUTION INTRAMUSCULAR at 03:03

## 2022-10-29 RX ADMIN — METHOCARBAMOL 750 MG: 750 TABLET ORAL at 08:46

## 2022-10-29 RX ADMIN — SODIUM CHLORIDE: 9 INJECTION, SOLUTION INTRAVENOUS at 23:41

## 2022-10-29 RX ADMIN — FUROSEMIDE 40 MG: 10 INJECTION, SOLUTION INTRAMUSCULAR; INTRAVENOUS at 08:46

## 2022-10-29 RX ADMIN — ACETAMINOPHEN 975 MG: 325 TABLET, FILM COATED ORAL at 07:45

## 2022-10-29 RX ADMIN — METOPROLOL TARTRATE 50 MG: 50 TABLET, FILM COATED ORAL at 20:22

## 2022-10-29 RX ADMIN — HEPARIN SODIUM 1800 UNITS/HR: 10000 INJECTION, SOLUTION INTRAVENOUS at 21:59

## 2022-10-29 RX ADMIN — OXYCODONE HYDROCHLORIDE 5 MG: 5 TABLET ORAL at 09:44

## 2022-10-29 RX ADMIN — ACETYLCYSTEINE 2 ML: 200 SOLUTION ORAL; RESPIRATORY (INHALATION) at 19:49

## 2022-10-29 RX ADMIN — ACETAMINOPHEN 975 MG: 325 TABLET, FILM COATED ORAL at 16:08

## 2022-10-29 RX ADMIN — METHOCARBAMOL 750 MG: 750 TABLET ORAL at 18:25

## 2022-10-29 RX ADMIN — METHOCARBAMOL 750 MG: 750 TABLET ORAL at 00:46

## 2022-10-29 RX ADMIN — ACETYLCYSTEINE 2 ML: 200 SOLUTION ORAL; RESPIRATORY (INHALATION) at 07:29

## 2022-10-29 RX ADMIN — PIPERACILLIN AND TAZOBACTAM 4.5 G: 4; .5 INJECTION, POWDER, FOR SOLUTION INTRAVENOUS at 16:08

## 2022-10-29 RX ADMIN — METOPROLOL TARTRATE 50 MG: 50 TABLET, FILM COATED ORAL at 08:46

## 2022-10-29 RX ADMIN — GABAPENTIN 300 MG: 250 SOLUTION ORAL at 16:08

## 2022-10-29 RX ADMIN — POTASSIUM CHLORIDE 40 MEQ: 20 SOLUTION ORAL at 07:45

## 2022-10-29 RX ADMIN — QUETIAPINE FUMARATE 25 MG: 25 TABLET ORAL at 22:10

## 2022-10-29 RX ADMIN — NICOTINE 1 PATCH: 14 PATCH, EXTENDED RELEASE TRANSDERMAL at 08:45

## 2022-10-29 RX ADMIN — VANCOMYCIN HYDROCHLORIDE 1500 MG: 10 INJECTION, POWDER, LYOPHILIZED, FOR SOLUTION INTRAVENOUS at 00:46

## 2022-10-29 RX ADMIN — PIPERACILLIN AND TAZOBACTAM 4.5 G: 4; .5 INJECTION, POWDER, FOR SOLUTION INTRAVENOUS at 21:57

## 2022-10-29 RX ADMIN — IPRATROPIUM BROMIDE AND ALBUTEROL SULFATE 3 ML: .5; 3 SOLUTION RESPIRATORY (INHALATION) at 19:49

## 2022-10-29 RX ADMIN — HALOPERIDOL LACTATE 2 MG: 5 INJECTION, SOLUTION INTRAMUSCULAR at 13:54

## 2022-10-29 ASSESSMENT — ACTIVITIES OF DAILY LIVING (ADL)
ADLS_ACUITY_SCORE: 39

## 2022-10-29 NOTE — PROGRESS NOTES
[facility]  Cardiovascular and Thoracic Surgery Daily Note        Assessment and Plan  POD # 9 s/p aortic valve replacement with a 25 mm Bal Inspiris Resilia bovine pericardial valve, coronary artery bypass grafting x 3 with left internal mammary artery to left anterior descending, reverse lesser saphenous vein graft to the posterior descending artery, reverse left lesser saphenous vein graft to the distal circumflex marginal artery, endoscopic bilateral lesser saphenous vein graft harvest from below the knees on 10/20 with Dr. Bennie Pierre     - CVS: Pre-op TTE with preserved biventricular function. Immediate postop profound vasoplegia and acidosis requiring multiple high dose pressors, bicarb infusion, and volume resuscitation. Weaned off pressors by POD1, lactate normalized. BP labile with agitation and ongoing need for sedation. Intermittent low dose NE- sedation related. New onset afib RVR 10/22; amiodarone bolus and infusion, back in NSR, transition to PO amio with taper, recurrent afib restarted on amio infusion, RVR a.fib 10/27-1 dose IV metoprolol trialed with no success, amio restarted converted to NSR at 1849. Will need anticoagulation for PAF- discussed with Dr. Pierre now that chest tubes and pacer wires removed will start heparin gtt with no loading dose or no bolus. Appears hypervolemic but improving daily, repeat Lasix 40 mg IV x1 and reassess. Aspirin 162 mg daily, atorvastatin 20 mg daily. Metoprolol increased to 50mg BID today. Chest tubes and temporary pacing wires removed on 10/27. CXR yesterday prior to chest tube removal with worsening pulmonary edema, diuresing, Repeat CXR 10/28 with improving pulmonary edema.      - Resp: Extubated POD1. IS, pulmonary toilet. Acute hypoxemic respiratory failure, increased WOB and inability to protect airway due to delirium, discussed with Dr. Pagan and Dr. Pierre, reintubated 10/24. Re extubated 10/26/22, sating well on 3L NC, CPAP  "at night, EZPAP added. Significant amount amount of secretions overnight into am 10/28,requiring frequent suctioning. Given one dose glycopyrrolate on 10/28. Improved secretions today.      - Neuro: Post-extubation, repeating \"I need help\" over and over, but able to answer question appropriately, redirect, and no focal physical deficits. Worsening agitation 10/22 despite Seroquel, ativan, and Haldol. Placed in restraints due to swinging at staff, pulling at lines/tubes. Started on Precedex and Zyprexa with some improvement. Chronic pain at baseline. Minimize narcotics with agitation. Schedule Robaxin, Tylenol, lidocaine patches, Toradol, Atarax. PTA on gabapentin 800 mg TID, will resume 300 mg BID and increasing pending improved confusion. Head CT on hold due to agitation, will proceed now that reintubated. Head CT negative 10/24/22, calm this am. Remains delirious 10/29, answers orientation questions appropriately but continues with nonsensical conversations.      - Renal: No history of significant renal disease. Cr stable WNL. Trend BMP. Remains about 3kg up from preoperative weight. Diuretic as above.   Creatinine   Date Value Ref Range Status   10/29/2022 0.70 0.66 - 1.25 mg/dL Final        - GI: rectal tube in place, continue bowel regimen     - : Mccarty in place, continue today. Mccarty removed on 10/27, urinary retention requiring replacement when bladder scanned for about 1L.  History urinary retention, PTA Flomax 0.8 mg daily resumed- on hold     - Endo: DMT2. Insulin infusion transitioned to SSI. PTA metformin on hold.          Hemoglobin A1C   Date Value Ref Range Status   09/14/2022 5.6 0.0 - 5.6 % Final       Comment:       Normal <5.7%   Prediabetes 5.7-6.4%    Diabetes 6.5% or higher     Note: Adopted from ADA consensus guidelines.         - FEN: Replace electrolytes as needed. SLP following and diet advanced on 10/29- will continue TF and consider calorie counts when appropriate.      - ID: Postop " Leukocytosis, increased to 14.6 10/28, up from 8.8 on 10/27-- improving today to 10.4.  Afebrile.  Periop abx complete. Trend CBC, fever curve. On Vanc/Zosyn for suspected aspiration pneumonia, started on 10/24 will complete for full 7 days. UA and repeat CXR ordered 10/28. UA negative except for moderate amount of blood and repeat CXR with improving infiltrate and edema  WBC Count   Date Value Ref Range Status   10/29/2022 10.4 4.0 - 11.0 10e3/uL Final     - Heme: Acute blood loss anemia and thrombocytopenia due to surgery. Aspirin and subcutaneous heparin resumed 10/24 with improved PLT count. Trend CBC, transfuse PRN.  Will need anticoagulation for PAF. Heparin gtt initiated 10/28       - Proph: SCD, subcutaneous heparin, PPI     - Dispo: Continue ICU cares for high nursing needs related to ongoing delirium.         Interval History  No acute events overnight. Able to cough up a lot of secretions this am and sounds much less congested this am. Continues with delirium today- answers orientation questions appropriately but otherwise nonsensical conversation. Passed swallow and able to advance diet.      Medications    acetaminophen  975 mg Oral Q8H     [Held by provider] aspirin  162 mg Oral or NG Tube Daily     atorvastatin  20 mg Oral Daily     gabapentin  300 mg Oral or G tube TID     [Held by provider] heparin ANTICOAGULANT  5,000 Units Subcutaneous Q8H     insulin aspart  1-10 Units Subcutaneous TID AC     insulin aspart  1-7 Units Subcutaneous At Bedtime     lactated ringers  250 mL Intravenous Once     lidocaine  1-2 patch Transdermal Q24H     lidocaine   Transdermal Q8H MITA     methocarbamol  750 mg Oral 4x Daily     nicotine  1 patch Transdermal Daily     nicotine   Transdermal Q8H     pantoprazole  40 mg Oral or NG Tube Daily     Or     pantoprazole  40 mg Oral Daily     polyethylene glycol  17 g Oral Daily     QUEtiapine  25 mg Oral BID     senna-docusate  1 tablet Oral BID     sodium chloride (PF)  3 mL  "Intracatheter Q8H     tamsulosin  0.8 mg Oral QPM      acetaminophen, bisacodyl, calcium gluconate, calcium gluconate, calcium gluconate, dextrose, glucose **OR** dextrose **OR** glucagon, haloperidol lactate, hydrALAZINE, HYDROmorphone **OR** HYDROmorphone, hydrOXYzine, ketorolac, lidocaine 4%, lidocaine (buffered or not buffered), magnesium hydroxide, metoprolol, naloxone **OR** naloxone **OR** naloxone **OR** naloxone, nitroGLYcerin, ondansetron **OR** ondansetron, oxyCODONE **OR** oxyCODONE, phenylephrine, prochlorperazine **OR** prochlorperazine, sodium chloride, sodium chloride (PF)        Physical Exam  Vitals were reviewed  /48   Pulse 85   Temp 97.5  F (36.4  C) (Oral)   Resp 25   Ht 1.803 m (5' 11\")   Wt 123.3 kg (271 lb 13.2 oz)   SpO2 94%   BMI 37.91 kg/m     Rhythm: NSR     Lungs: diminished bases     Cardiovascular: rrr, no m/r/g     Abdomen: soft, NT, ND, +BS     Extremeties: warm, 1+ LE edema      Incision: cdi      CT: n/a     Weight:          Vitals:     10/20/22 0600 10/21/22 0600 10/22/22 0250 10/23/22 0200   Weight: 120.2 kg (265 lb) 124.6 kg (274 lb 11.1 oz) 124 kg (273 lb 5.9 oz) 126.6 kg (279 lb 1.6 oz)            Data                Recent Labs   Lab 10/24/22  0434 10/24/22  0336 10/24/22  0019 10/23/22  0743 10/23/22  0351 10/22/22  1657 10/22/22  1403 10/20/22  1528 10/20/22  1526 10/20/22  1324 10/20/22  1321   WBC  --  6.9  --   --  7.5  --  8.4   < > 29.3*  --  22.4*   HGB  --  8.8*  --   --  9.2*  --  9.5*   < > 14.2   < > 11.5*   MCV  --  94  --   --  93  --  93   < > 93  --  93   PLT  --  84*  --   --  57*  --  54*   < > 158  --  136*   INR  --   --   --   --   --   --   --   --  1.31*  --  1.48*   NA  --  141  --   --  138  --  137   < > 141   < > 138   POTASSIUM  --  4.3  --   --  4.3  --  4.7   < > 3.7   < > 3.8   CHLORIDE  --  110*  --   --  108  --  107   < > 109  --  109   CO2  --  27  --   --  26  --  28   < > 17*  --  23   BUN  --  33*  --   --  27  --  22   < > " 13  --  12   CR  --  1.16  --   --  1.11  --  1.08   < > 0.97  --  0.85   ANIONGAP  --  4  --   --  4  --  2*   < > 15*  --  6   HALEY  --  8.8  --   --  9.2  --  9.1   < > 8.5  --  9.5   * 126* 131*   < > 136*   < > 137*   < > 229*   < > 182*   ALBUMIN  --  2.4*  --   --  2.5*  --   --    < > 3.1*  --   --    PROTTOTAL  --  5.3*  --   --  5.3*  --   --    < > 5.5*  --   --    BILITOTAL  --  0.8  --   --  0.6  --   --    < > 0.6  --   --    ALKPHOS  --  64  --   --  60  --   --    < > 84  --   --    ALT  --  19  --   --  20  --   --    < > 25  --   --    AST  --  35  --   --  48*  --   --    < > 68*  --   --     < > = values in this interval not displayed.         Imaging:      Recent Results (from the past 24 hour(s))   XR Abdomen Port 1 View     Narrative     EXAM: XR ABDOMEN PORT 1 VIEW  LOCATION: [facility]  DATE/TIME: 10/23/2022 11:04 AM     INDICATION: advanced NGT, needs to be post pyloric for feeding  COMPARISON: 10/22/2022 at 1542 hours.        Impression     IMPRESSION: Feeding tube tip is at the gastroduodenal junction and is likely postpyloric, however this is not definitive on this study. There is redundancy in the tube and it will likely progress distally on its own. Chest tubes present. Cholelithiasis.   Normal bowel gas pattern.            Patient seen and discussed with Dr. Hari Ruiz PAAshleighC  Cardiothoracic Surgery  Pager [phone]

## 2022-10-29 NOTE — PROGRESS NOTES
Pt currently on RA sating mid 90s. BS coarse. Nebs/aerobike done.   Will cont to monitor.     Ana Rosa Jeffery,RTA

## 2022-10-29 NOTE — PROGRESS NOTES
Pt seen for neb /ezpap /Aerobika. BS coarse. Fair cough orally suctioned for moderate pale yellow secretions. IS less than 250. Will continue to monitor.    Kip Haji, RT

## 2022-10-29 NOTE — PHARMACY-VANCOMYCIN DOSING SERVICE
"Pharmacy Vancomycin Note  Date of Service 2022  Patient's  1955   67 year old, male    Indication: [facility] Pneumonia  Day of Therapy: 6  Current vancomycin regimen:  1500 mg IV q12h  Current vancomycin monitoring method: AUC  Current vancomycin therapeutic monitoring goal: 400-600 mg*h/L    [employer] Prediction of Current Vancomycin Regimen  Regimen: 1500 mg IV every 12 hours.  Start time: 12:22 on 10/29/2022  Exposure target: AUC24 (range)400-600 mg/L.hr   AUC24,ss: 449 mg/L.hr  Probability of AUC24 > 400: 81 %  Ctrough,ss: 13.4 mg/L  Probability of Ctrough,ss > 20: 1 %  Probability of nephrotoxicity (Lodise LESLY ): 9 %  }    Current estimated CrCl = Estimated Creatinine Clearance: 138.8 mL/min (based on SCr of 0.7 mg/dL).    Creatinine for last 3 days  10/27/2022:  4:13 AM Creatinine 0.72 mg/dL  10/28/2022:  4:29 AM Creatinine 0.74 mg/dL  10/29/2022:  5:31 AM Creatinine 0.70 mg/dL    Recent Vancomycin Levels (past 3 days)  10/26/2022:  9:22 PM Vancomycin 14.8 mg/L  10/29/2022: 10:21 AM Vancomycin 15.3 mg/L    Vancomycin IV Administrations (past 72 hours)                   vancomycin (VANCOCIN) 1,500 mg in 0.9% NaCl 250 mL intermittent infusion (mg) 1,500 mg New Bag 10/29/22 0046     1,500 mg New Bag 10/28/22 1120     1,500 mg New Bag  0024     1,500 mg New Bag 10/27/22 1210     1,500 mg New Bag 10/26/22 2333     1,500 mg New Bag  1155                Nephrotoxins and other renal medications (From now, onward)    Start     Dose/Rate Route Frequency Ordered Stop    10/25/22 1200  vancomycin (VANCOCIN) 1,500 mg in 0.9% NaCl 250 mL intermittent infusion         1,500 mg  over 90 Minutes Intravenous EVERY 12 HOURS 10/25/22 1147 10/31/22 2359    10/24/22 1030  piperacillin-tazobactam (ZOSYN) 4.5 g vial to attach to  mL bag        Note to Pharmacy: For [facility], [facility] and [facility]: For Zosyn-naive patients, use the \"Zosyn initial dose + extended infusion\" order panel.    4.5 g  over 30 " Minutes Intravenous EVERY 6 HOURS 10/24/22 1007 10/31/22 0959    10/20/22 1930  norepinephrine (LEVOPHED) 4 mg in  mL infusion PREMIX         0.01-0.6 mcg/kg/min × 120.2 kg  4.5-270.5 mL/hr  Intravenous CONTINUOUS 10/20/22 1907      10/20/22 1630  vasopressin 0.2 units/mL in NS (PITRESSIN) standard conc infusion         1-2.4 Units/hr  5-12 mL/hr  Intravenous CONTINUOUS 10/20/22 1609               Contrast Orders - past 72 hours (72h ago, onward)    None          Interpretation of levels and current regimen:  Vancomycin level is reflective of -600    Has serum creatinine changed greater than 50% in last 72 hours: No    Urine output:  good urine output    Renal Function: Stable    Plan:  1. Continue Current Dose  2. Vancomycin monitoring method: AUC  3. Vancomycin therapeutic monitoring goal: 400-600 mg*h/L  4. Pharmacy will check vancomycin levels as appropriate in no need as therapy set to end in 2 days.  If continued longer will get another level. .  5. Serum creatinine levels will be ordered daily for the first week of therapy and at least twice weekly for subsequent weeks.    Danni Awad, [facility], PharmD

## 2022-10-29 NOTE — PLAN OF CARE
Neuro: Alert/Orientedx3, Confused, intermittently following commands  CV: SR/ST, BP WNL  Respiratory: on RA, LS coarse, frequent cough   GI/: Mccarty with adequate UOP, rectal tube with small leakage   Skin: see flowsheet   Activity: Turn Q2h  Diet: TF @ goal 80/hr   Drips: Heparin @ 1650  Plan: Encourage IS, continue plan of care

## 2022-10-30 ENCOUNTER — APPOINTMENT (OUTPATIENT)
Dept: OCCUPATIONAL THERAPY | Facility: CLINIC | Age: 67
DRG: 219 | End: 2022-10-30
Attending: SURGERY
Payer: COMMERCIAL

## 2022-10-30 LAB
ANION GAP SERPL CALCULATED.3IONS-SCNC: 5 MMOL/L (ref 3–14)
BUN SERPL-MCNC: 18 MG/DL (ref 7–30)
CALCIUM SERPL-MCNC: 9.3 MG/DL (ref 8.5–10.1)
CHLORIDE BLD-SCNC: 112 MMOL/L (ref 94–109)
CO2 SERPL-SCNC: 27 MMOL/L (ref 20–32)
CREAT SERPL-MCNC: 0.71 MG/DL (ref 0.66–1.25)
ERYTHROCYTE [DISTWIDTH] IN BLOOD BY AUTOMATED COUNT: 14.7 % (ref 10–15)
GFR SERPL CREATININE-BSD FRML MDRD: >90 ML/MIN/1.73M2
GLUCOSE BLD-MCNC: 178 MG/DL (ref 70–99)
GLUCOSE BLDC GLUCOMTR-MCNC: 120 MG/DL (ref 70–99)
GLUCOSE BLDC GLUCOMTR-MCNC: 121 MG/DL (ref 70–99)
GLUCOSE BLDC GLUCOMTR-MCNC: 132 MG/DL (ref 70–99)
GLUCOSE BLDC GLUCOMTR-MCNC: 147 MG/DL (ref 70–99)
HCT VFR BLD AUTO: 28.1 % (ref 40–53)
HGB BLD-MCNC: 8.6 G/DL (ref 13.3–17.7)
MAGNESIUM SERPL-MCNC: 2 MG/DL (ref 1.6–2.3)
MCH RBC QN AUTO: 29.6 PG (ref 26.5–33)
MCHC RBC AUTO-ENTMCNC: 30.6 G/DL (ref 31.5–36.5)
MCV RBC AUTO: 97 FL (ref 78–100)
PHOSPHATE SERPL-MCNC: 2.5 MG/DL (ref 2.5–4.5)
PLATELET # BLD AUTO: 189 10E3/UL (ref 150–450)
POTASSIUM BLD-SCNC: 3.6 MMOL/L (ref 3.4–5.3)
RBC # BLD AUTO: 2.91 10E6/UL (ref 4.4–5.9)
SODIUM SERPL-SCNC: 144 MMOL/L (ref 133–144)
UFH PPP CHRO-ACNC: 0.16 IU/ML
UFH PPP CHRO-ACNC: 0.28 IU/ML
UFH PPP CHRO-ACNC: 0.39 IU/ML
WBC # BLD AUTO: 9.7 10E3/UL (ref 4–11)

## 2022-10-30 PROCEDURE — 85027 COMPLETE CBC AUTOMATED: CPT | Performed by: PHYSICIAN ASSISTANT

## 2022-10-30 PROCEDURE — 250N000013 HC RX MED GY IP 250 OP 250 PS 637: Performed by: PHYSICIAN ASSISTANT

## 2022-10-30 PROCEDURE — 200N000001 HC R&B ICU

## 2022-10-30 PROCEDURE — 250N000011 HC RX IP 250 OP 636: Performed by: PHYSICIAN ASSISTANT

## 2022-10-30 PROCEDURE — 80048 BASIC METABOLIC PNL TOTAL CA: CPT | Performed by: PHYSICIAN ASSISTANT

## 2022-10-30 PROCEDURE — 999N000127 HC STATISTIC PERIPHERAL IV START W US GUIDANCE

## 2022-10-30 PROCEDURE — 258N000003 HC RX IP 258 OP 636: Performed by: PHYSICIAN ASSISTANT

## 2022-10-30 PROCEDURE — 999N000157 HC STATISTIC RCP TIME EA 10 MIN

## 2022-10-30 PROCEDURE — 85520 HEPARIN ASSAY: CPT | Performed by: PHYSICIAN ASSISTANT

## 2022-10-30 PROCEDURE — 94640 AIRWAY INHALATION TREATMENT: CPT

## 2022-10-30 PROCEDURE — 85520 HEPARIN ASSAY: CPT | Performed by: SURGERY

## 2022-10-30 PROCEDURE — 97530 THERAPEUTIC ACTIVITIES: CPT | Mod: GO

## 2022-10-30 PROCEDURE — 999N000128 HC STATISTIC PERIPHERAL IV START W/O US GUIDANCE

## 2022-10-30 PROCEDURE — 250N000013 HC RX MED GY IP 250 OP 250 PS 637: Performed by: STUDENT IN AN ORGANIZED HEALTH CARE EDUCATION/TRAINING PROGRAM

## 2022-10-30 PROCEDURE — 94668 MNPJ CHEST WALL SBSQ: CPT

## 2022-10-30 PROCEDURE — 83735 ASSAY OF MAGNESIUM: CPT | Performed by: INTERNAL MEDICINE

## 2022-10-30 PROCEDURE — 99231 SBSQ HOSP IP/OBS SF/LOW 25: CPT | Mod: 24 | Performed by: ANESTHESIOLOGY

## 2022-10-30 PROCEDURE — 250N000013 HC RX MED GY IP 250 OP 250 PS 637: Performed by: INTERNAL MEDICINE

## 2022-10-30 PROCEDURE — 94640 AIRWAY INHALATION TREATMENT: CPT | Mod: 76

## 2022-10-30 PROCEDURE — 84100 ASSAY OF PHOSPHORUS: CPT | Performed by: INTERNAL MEDICINE

## 2022-10-30 PROCEDURE — 250N000011 HC RX IP 250 OP 636: Performed by: INTERNAL MEDICINE

## 2022-10-30 PROCEDURE — 250N000009 HC RX 250: Performed by: INTERNAL MEDICINE

## 2022-10-30 PROCEDURE — 85520 HEPARIN ASSAY: CPT | Performed by: INTERNAL MEDICINE

## 2022-10-30 RX ORDER — FUROSEMIDE 10 MG/ML
40 INJECTION INTRAMUSCULAR; INTRAVENOUS ONCE
Status: COMPLETED | OUTPATIENT
Start: 2022-10-30 | End: 2022-10-30

## 2022-10-30 RX ORDER — METHOCARBAMOL 750 MG/1
750 TABLET, FILM COATED ORAL 2 TIMES DAILY
Status: DISCONTINUED | OUTPATIENT
Start: 2022-10-30 | End: 2022-10-30

## 2022-10-30 RX ORDER — QUETIAPINE FUMARATE 50 MG/1
50 TABLET, FILM COATED ORAL AT BEDTIME
Status: DISCONTINUED | OUTPATIENT
Start: 2022-10-30 | End: 2022-10-31

## 2022-10-30 RX ORDER — METHOCARBAMOL 750 MG/1
750 TABLET, FILM COATED ORAL 2 TIMES DAILY PRN
Status: DISCONTINUED | OUTPATIENT
Start: 2022-10-30 | End: 2022-11-08 | Stop reason: HOSPADM

## 2022-10-30 RX ADMIN — PIPERACILLIN AND TAZOBACTAM 4.5 G: 4; .5 INJECTION, POWDER, FOR SOLUTION INTRAVENOUS at 16:37

## 2022-10-30 RX ADMIN — QUETIAPINE FUMARATE 25 MG: 25 TABLET ORAL at 08:13

## 2022-10-30 RX ADMIN — NICOTINE 1 PATCH: 14 PATCH, EXTENDED RELEASE TRANSDERMAL at 08:16

## 2022-10-30 RX ADMIN — VANCOMYCIN HYDROCHLORIDE 1500 MG: 10 INJECTION, POWDER, LYOPHILIZED, FOR SOLUTION INTRAVENOUS at 00:18

## 2022-10-30 RX ADMIN — ASPIRIN 81 MG CHEWABLE TABLET 162 MG: 81 TABLET CHEWABLE at 08:13

## 2022-10-30 RX ADMIN — ACETYLCYSTEINE 2 ML: 200 SOLUTION ORAL; RESPIRATORY (INHALATION) at 07:43

## 2022-10-30 RX ADMIN — ACETAMINOPHEN 975 MG: 325 TABLET, FILM COATED ORAL at 08:09

## 2022-10-30 RX ADMIN — Medication 40 MG: at 08:13

## 2022-10-30 RX ADMIN — GABAPENTIN 300 MG: 250 SOLUTION ORAL at 22:25

## 2022-10-30 RX ADMIN — HEPARIN SODIUM 1950 UNITS/HR: 10000 INJECTION, SOLUTION INTRAVENOUS at 12:40

## 2022-10-30 RX ADMIN — GABAPENTIN 300 MG: 250 SOLUTION ORAL at 08:18

## 2022-10-30 RX ADMIN — FUROSEMIDE 40 MG: 10 INJECTION, SOLUTION INTRAMUSCULAR; INTRAVENOUS at 09:00

## 2022-10-30 RX ADMIN — ACETAMINOPHEN 650 MG: 325 TABLET, FILM COATED ORAL at 13:53

## 2022-10-30 RX ADMIN — ACETYLCYSTEINE 2 ML: 200 SOLUTION ORAL; RESPIRATORY (INHALATION) at 11:26

## 2022-10-30 RX ADMIN — OXYCODONE HYDROCHLORIDE 5 MG: 5 TABLET ORAL at 00:00

## 2022-10-30 RX ADMIN — ATORVASTATIN CALCIUM 20 MG: 10 TABLET, FILM COATED ORAL at 20:51

## 2022-10-30 RX ADMIN — AMIODARONE HYDROCHLORIDE 400 MG: 200 TABLET ORAL at 20:51

## 2022-10-30 RX ADMIN — HALOPERIDOL LACTATE 5 MG: 5 INJECTION, SOLUTION INTRAMUSCULAR at 23:58

## 2022-10-30 RX ADMIN — PIPERACILLIN AND TAZOBACTAM 4.5 G: 4; .5 INJECTION, POWDER, FOR SOLUTION INTRAVENOUS at 03:58

## 2022-10-30 RX ADMIN — METOPROLOL TARTRATE 50 MG: 50 TABLET, FILM COATED ORAL at 20:51

## 2022-10-30 RX ADMIN — LIDOCAINE 1 PATCH: 560 PATCH PERCUTANEOUS; TOPICAL; TRANSDERMAL at 08:15

## 2022-10-30 RX ADMIN — IPRATROPIUM BROMIDE AND ALBUTEROL SULFATE 3 ML: .5; 3 SOLUTION RESPIRATORY (INHALATION) at 11:26

## 2022-10-30 RX ADMIN — INSULIN ASPART 1 UNITS: 100 INJECTION, SOLUTION INTRAVENOUS; SUBCUTANEOUS at 08:34

## 2022-10-30 RX ADMIN — HALOPERIDOL LACTATE 5 MG: 5 INJECTION, SOLUTION INTRAMUSCULAR at 00:27

## 2022-10-30 RX ADMIN — AMIODARONE HYDROCHLORIDE 400 MG: 200 TABLET ORAL at 08:09

## 2022-10-30 RX ADMIN — PIPERACILLIN AND TAZOBACTAM 4.5 G: 4; .5 INJECTION, POWDER, FOR SOLUTION INTRAVENOUS at 10:35

## 2022-10-30 RX ADMIN — VANCOMYCIN HYDROCHLORIDE 1500 MG: 10 INJECTION, POWDER, LYOPHILIZED, FOR SOLUTION INTRAVENOUS at 11:57

## 2022-10-30 RX ADMIN — HALOPERIDOL LACTATE 5 MG: 5 INJECTION, SOLUTION INTRAMUSCULAR at 07:08

## 2022-10-30 RX ADMIN — ACETYLCYSTEINE 2 ML: 200 SOLUTION ORAL; RESPIRATORY (INHALATION) at 19:36

## 2022-10-30 RX ADMIN — IPRATROPIUM BROMIDE AND ALBUTEROL SULFATE 3 ML: .5; 3 SOLUTION RESPIRATORY (INHALATION) at 19:36

## 2022-10-30 RX ADMIN — METHOCARBAMOL 750 MG: 750 TABLET ORAL at 08:13

## 2022-10-30 RX ADMIN — GABAPENTIN 300 MG: 250 SOLUTION ORAL at 16:40

## 2022-10-30 RX ADMIN — IPRATROPIUM BROMIDE AND ALBUTEROL SULFATE 3 ML: .5; 3 SOLUTION RESPIRATORY (INHALATION) at 07:42

## 2022-10-30 RX ADMIN — METOPROLOL TARTRATE 50 MG: 50 TABLET, FILM COATED ORAL at 08:09

## 2022-10-30 RX ADMIN — OXYCODONE HYDROCHLORIDE 10 MG: 5 TABLET ORAL at 04:51

## 2022-10-30 RX ADMIN — ACETAMINOPHEN 975 MG: 325 TABLET, FILM COATED ORAL at 16:37

## 2022-10-30 RX ADMIN — QUETIAPINE FUMARATE 50 MG: 50 TABLET ORAL at 21:51

## 2022-10-30 RX ADMIN — HYDROXYZINE HYDROCHLORIDE 50 MG: 25 TABLET, FILM COATED ORAL at 02:44

## 2022-10-30 RX ADMIN — PIPERACILLIN AND TAZOBACTAM 4.5 G: 4; .5 INJECTION, POWDER, FOR SOLUTION INTRAVENOUS at 21:42

## 2022-10-30 RX ADMIN — HYDROXYZINE HYDROCHLORIDE 50 MG: 25 TABLET, FILM COATED ORAL at 20:51

## 2022-10-30 ASSESSMENT — ACTIVITIES OF DAILY LIVING (ADL)
ADLS_ACUITY_SCORE: 39
ADLS_ACUITY_SCORE: 43
ADLS_ACUITY_SCORE: 39
ADLS_ACUITY_SCORE: 43
ADLS_ACUITY_SCORE: 39
ADLS_ACUITY_SCORE: 43
ADLS_ACUITY_SCORE: 39
ADLS_ACUITY_SCORE: 39

## 2022-10-30 NOTE — PROGRESS NOTES
Remains impulsive and visual hallucinations.  On room air.  On haldol 2.5 and seroquel 25 BID  P: increase Seroquel to 50 q HS and haldol prn.,

## 2022-10-30 NOTE — PROGRESS NOTES
"Reconsulted on patient secondary to continue visual hallucination and impulsiveness.  Events of the last 24 hours noted including increasing Seroquel at bedtime.  When I examined the patient this morning he is awake alert knows he is in the hospital able to have lucid conversations and overall seems to be progressing as expected.  He offers no complaints at this time  Vital signs:  Temp: 97.2  F (36.2  C) Temp src: Axillary BP: 101/46 Pulse: 75   Resp: 17 SpO2: 95 % O2 Device: Nasal cannula Oxygen Delivery: 2 LPM Height: 180.3 cm (5' 11\") Weight: 124.5 kg (274 lb 7.6 oz)  Estimated body mass index is 38.28 kg/m  as calculated from the following:    Height as of this encounter: 1.803 m (5' 11\").    Weight as of this encounter: 124.5 kg (274 lb 7.6 oz).   Alert male orientated to self and place, when asked time he stated it was following which is actually tomorrow  CV is regular rate and rhythm  Chest is coarse anteriorly   abdomen is soft  Neuro follows commands in all 4 extremities       Lab Results   Component Value Date     10/30/2022    Lab Results   Component Value Date    CHLORIDE 112 10/30/2022    Lab Results   Component Value Date    BUN 18 10/30/2022      Lab Results   Component Value Date    POTASSIUM 3.6 10/30/2022    Lab Results   Component Value Date    CO2 27 10/30/2022    Lab Results   Component Value Date    CR 0.71 10/30/2022        Assessment and plan: 67-year-old gentleman status post CABG/AVR complicated by delirium.  Overall he seems to be improving and progressing as expected.  He is much more awake alert this morning.  Would recommend continued increased dose of Seroquel at bedtime.  Also stopped opioids and decreased his Robaxin dosing.  He is not complaining of any pain at this time.  Stressed to patient and wife need to maintain day night cycles avoid napping during the day.  Of note he is also hypokalemic.  Would recommend potassium repletion.  We will continue to see the patient to " assist in management of his delirium.    Shant Zaldivar MD  Critical Care Medicine

## 2022-10-30 NOTE — PROGRESS NOTES
[facility]  Cardiovascular and Thoracic Surgery Daily Note        Assessment and Plan  POD # 10 s/p aortic valve replacement with a 25 mm Bal Inspiris Resilia bovine pericardial valve, coronary artery bypass grafting x 3 with left internal mammary artery to left anterior descending, reverse lesser saphenous vein graft to the posterior descending artery, reverse left lesser saphenous vein graft to the distal circumflex marginal artery, endoscopic bilateral lesser saphenous vein graft harvest from below the knees on 10/20 with Dr. Bennie Pierre     - CVS: Pre-op TTE with preserved biventricular function. Immediate postop profound vasoplegia and acidosis requiring multiple high dose pressors, bicarb infusion, and volume resuscitation. Weaned off pressors by POD1, lactate normalized. BP labile with agitation and ongoing need for sedation. Intermittent low dose NE- sedation related. New onset afib RVR 10/22; amiodarone bolus and infusion, back in NSR, transition to PO amio with taper, recurrent afib restarted on amio infusion, RVR a.fib 10/27-1 dose IV metoprolol trialed with no success, amio restarted converted to NSR at 1849. Will need anticoagulation for PAF- discussed with Dr. Pierre now that chest tubes and pacer wires removed will start heparin gtt with no loading dose or no bolus. Appears hypervolemic but improving daily, repeat Lasix 40 mg IV x1 this am and decreased BP-- will hold further diuretics today. Aspirin 162 mg daily, atorvastatin 20 mg daily. Metoprolol increased to 50mg BID 10/29. Chest tubes and temporary pacing wires removed on 10/27. CXR prior to chest tube removal with worsening pulmonary edema, diuresing, Repeat CXR 10/28 with improving pulmonary edema.      - Resp: Extubated POD1. IS, pulmonary toilet. Acute hypoxemic respiratory failure, increased WOB and inability to protect airway due to delirium, discussed with Dr. Pagan and Dr. Pierre, reintubated 10/24. Re  "extubated 10/26/22, sating well on 2L NC, CPAP at night, EZPAP added. Significant amount amount of secretions overnight into am 10/28,requiring frequent suctioning. Given one dose glycopyrrolate on 10/28. Improved secretions today.     - Neuro: Post-extubation, repeating \"I need help\" over and over, but able to answer question appropriately, redirect, and no focal physical deficits. Worsening agitation 10/22 despite Seroquel, ativan, and Haldol. Placed in restraints due to swinging at staff, pulling at lines/tubes- Started on Precedex and Zyprexa with some improvement- since discontinued. Chronic pain at baseline. Minimize narcotics with agitation. PTA on gabapentin 800 mg TID, resumed 300 mg TID and increasing pending improved confusion. Head CT negative 10/24/22. Remains delirious 10/29, answers orientation questions appropriately but continues with nonsensical conversations. Agitated overnight- seroquel increased and given haldol x2 and oxycodone PRN. Somnolent this am on exam but significantly improved alertness and mentation this afternoon. Oxycodone discontinued 10/30, decreasing robaxin to BID instead of QID-- continue to encourage up to chair and wakefulness during the day.       - Renal: No history of significant renal disease. Cr stable WNL. Trend BMP. Remains about 4kg up from preoperative weight. Diuretic as above.   Creatinine   Date Value Ref Range Status   10/30/2022 0.71 0.66 - 1.25 mg/dL Final        - GI: rectal tube in place, continue bowel regimen     - : Mccarty in place, continue today. Mccarty removed on 10/27, urinary retention requiring replacement when bladder scanned for about 1L.  History urinary retention, PTA Flomax 0.8 mg daily resumed- on hold     - Endo: DMT2. Insulin infusion transitioned to SSI. PTA metformin on hold.          Hemoglobin A1C   Date Value Ref Range Status   09/14/2022 5.6 0.0 - 5.6 % Final       Comment:       Normal <5.7%   Prediabetes 5.7-6.4%    Diabetes 6.5% or " higher     Note: Adopted from ADA consensus guidelines.         - FEN: Replace electrolytes as needed. SLP following and diet advanced on 10/29- will continue TF and consider calorie counts when appropriate.      - ID: Postop Leukocytosis, increased to 14.6 10/28, up from 8.8 on 10/27-- improving today to 9.7.  Afebrile.  Periop abx complete. Trend CBC, fever curve. On Vanc/Zosyn for suspected aspiration pneumonia, started on 10/24 will complete for full 7 days. UA and repeat CXR ordered 10/28. UA negative except for moderate amount of blood and repeat CXR with improving infiltrate and edema. Leukocytosis improving.  WBC Count   Date Value Ref Range Status   10/30/2022 9.7 4.0 - 11.0 10e3/uL Final     - Heme: Acute blood loss anemia and thrombocytopenia due to surgery. Aspirin and subcutaneous heparin resumed 10/24 with improved PLT count. Trend CBC, transfuse PRN.  Will need anticoagulation for PAF. Heparin gtt initiated 10/28- will discuss with surgeon regarding timing for oral anticoagulation once patient oral intake is reliable       - Proph: SCD, heparin gtt, PPI     - Dispo: Continue ICU cares for high nursing needs related to intermittent delirium.          Interval History  Agitated overnight. Received oxycodone, increased seroquel and haldol. Somnolent this am but alert and oriented this afternoon. Saturating well on 2L. Tolerating sitting up in bed with plans to move to chair with therapy. Tolerating modified diet. +BM     Medications    acetaminophen  975 mg Oral Q8H     [Held by provider] aspirin  162 mg Oral or NG Tube Daily     atorvastatin  20 mg Oral Daily     gabapentin  300 mg Oral or G tube TID     [Held by provider] heparin ANTICOAGULANT  5,000 Units Subcutaneous Q8H     insulin aspart  1-10 Units Subcutaneous TID AC     insulin aspart  1-7 Units Subcutaneous At Bedtime     lactated ringers  250 mL Intravenous Once     lidocaine  1-2 patch Transdermal Q24H     lidocaine   Transdermal Q8H MITA      "methocarbamol  750 mg Oral 4x Daily     nicotine  1 patch Transdermal Daily     nicotine   Transdermal Q8H     pantoprazole  40 mg Oral or NG Tube Daily     Or     pantoprazole  40 mg Oral Daily     polyethylene glycol  17 g Oral Daily     QUEtiapine  25 mg Oral BID     senna-docusate  1 tablet Oral BID     sodium chloride (PF)  3 mL Intracatheter Q8H     tamsulosin  0.8 mg Oral QPM      acetaminophen, bisacodyl, calcium gluconate, calcium gluconate, calcium gluconate, dextrose, glucose **OR** dextrose **OR** glucagon, haloperidol lactate, hydrALAZINE, HYDROmorphone **OR** HYDROmorphone, hydrOXYzine, ketorolac, lidocaine 4%, lidocaine (buffered or not buffered), magnesium hydroxide, metoprolol, naloxone **OR** naloxone **OR** naloxone **OR** naloxone, nitroGLYcerin, ondansetron **OR** ondansetron, oxyCODONE **OR** oxyCODONE, phenylephrine, prochlorperazine **OR** prochlorperazine, sodium chloride, sodium chloride (PF)        Physical Exam  Vitals were reviewed  /46   Pulse 75   Temp 97.2  F (36.2  C) (Axillary)   Resp 17   Ht 1.803 m (5' 11\")   Wt 124.5 kg (274 lb 7.6 oz)   SpO2 95%   BMI 38.28 kg/m     Rhythm: NSR     Lungs: diminished bases     Cardiovascular: rrr, no m/r/g     Abdomen: soft, NT, ND, +BS     Extremeties: warm, 1+ LE edema      Incision: cdi      CT: n/a     Weight:          Vitals:     10/20/22 0600 10/21/22 0600 10/22/22 0250 10/23/22 0200   Weight: 120.2 kg (265 lb) 124.6 kg (274 lb 11.1 oz) 124 kg (273 lb 5.9 oz) 126.6 kg (279 lb 1.6 oz)            Data                Recent Labs   Lab 10/24/22  0434 10/24/22  0336 10/24/22  0019 10/23/22  0743 10/23/22  0351 10/22/22  1657 10/22/22  1403 10/20/22  1528 10/20/22  1526 10/20/22  1324 10/20/22  1321   WBC  --  6.9  --   --  7.5  --  8.4   < > 29.3*  --  22.4*   HGB  --  8.8*  --   --  9.2*  --  9.5*   < > 14.2   < > 11.5*   MCV  --  94  --   --  93  --  93   < > 93  --  93   PLT  --  84*  --   --  57*  --  54*   < > 158  --  136* "   INR  --   --   --   --   --   --   --   --  1.31*  --  1.48*   NA  --  141  --   --  138  --  137   < > 141   < > 138   POTASSIUM  --  4.3  --   --  4.3  --  4.7   < > 3.7   < > 3.8   CHLORIDE  --  110*  --   --  108  --  107   < > 109  --  109   CO2  --  27  --   --  26  --  28   < > 17*  --  23   BUN  --  33*  --   --  27  --  22   < > 13  --  12   CR  --  1.16  --   --  1.11  --  1.08   < > 0.97  --  0.85   ANIONGAP  --  4  --   --  4  --  2*   < > 15*  --  6   HALEY  --  8.8  --   --  9.2  --  9.1   < > 8.5  --  9.5   * 126* 131*   < > 136*   < > 137*   < > 229*   < > 182*   ALBUMIN  --  2.4*  --   --  2.5*  --   --    < > 3.1*  --   --    PROTTOTAL  --  5.3*  --   --  5.3*  --   --    < > 5.5*  --   --    BILITOTAL  --  0.8  --   --  0.6  --   --    < > 0.6  --   --    ALKPHOS  --  64  --   --  60  --   --    < > 84  --   --    ALT  --  19  --   --  20  --   --    < > 25  --   --    AST  --  35  --   --  48*  --   --    < > 68*  --   --     < > = values in this interval not displayed.         Imaging:      Recent Results (from the past 24 hour(s))   XR Abdomen Port 1 View     Narrative     EXAM: XR ABDOMEN PORT 1 VIEW  LOCATION: [facility]  DATE/TIME: 10/23/2022 11:04 AM     INDICATION: advanced NGT, needs to be post pyloric for feeding  COMPARISON: 10/22/2022 at 1542 hours.        Impression     IMPRESSION: Feeding tube tip is at the gastroduodenal junction and is likely postpyloric, however this is not definitive on this study. There is redundancy in the tube and it will likely progress distally on its own. Chest tubes present. Cholelithiasis.   Normal bowel gas pattern.            Patient seen and discussed with Dr. Hari Ruiz PA-C  Cardiothoracic Surgery  Pager [phone]

## 2022-10-31 ENCOUNTER — APPOINTMENT (OUTPATIENT)
Dept: SPEECH THERAPY | Facility: CLINIC | Age: 67
DRG: 219 | End: 2022-10-31
Attending: SURGERY
Payer: COMMERCIAL

## 2022-10-31 ENCOUNTER — APPOINTMENT (OUTPATIENT)
Dept: GENERAL RADIOLOGY | Facility: CLINIC | Age: 67
DRG: 219 | End: 2022-10-31
Attending: PHYSICIAN ASSISTANT
Payer: COMMERCIAL

## 2022-10-31 ENCOUNTER — APPOINTMENT (OUTPATIENT)
Dept: OCCUPATIONAL THERAPY | Facility: CLINIC | Age: 67
DRG: 219 | End: 2022-10-31
Attending: SURGERY
Payer: COMMERCIAL

## 2022-10-31 LAB
ANION GAP SERPL CALCULATED.3IONS-SCNC: 3 MMOL/L (ref 3–14)
BUN SERPL-MCNC: 16 MG/DL (ref 7–30)
CALCIUM SERPL-MCNC: 9.5 MG/DL (ref 8.5–10.1)
CHLORIDE BLD-SCNC: 111 MMOL/L (ref 94–109)
CO2 SERPL-SCNC: 28 MMOL/L (ref 20–32)
CREAT SERPL-MCNC: 0.82 MG/DL (ref 0.66–1.25)
ERYTHROCYTE [DISTWIDTH] IN BLOOD BY AUTOMATED COUNT: 14.8 % (ref 10–15)
GFR SERPL CREATININE-BSD FRML MDRD: >90 ML/MIN/1.73M2
GLUCOSE BLD-MCNC: 141 MG/DL (ref 70–99)
GLUCOSE BLDC GLUCOMTR-MCNC: 119 MG/DL (ref 70–99)
GLUCOSE BLDC GLUCOMTR-MCNC: 124 MG/DL (ref 70–99)
GLUCOSE BLDC GLUCOMTR-MCNC: 136 MG/DL (ref 70–99)
GLUCOSE BLDC GLUCOMTR-MCNC: 151 MG/DL (ref 70–99)
HCT VFR BLD AUTO: 28.1 % (ref 40–53)
HGB BLD-MCNC: 8.6 G/DL (ref 13.3–17.7)
MAGNESIUM SERPL-MCNC: 2.1 MG/DL (ref 1.6–2.3)
MCH RBC QN AUTO: 29.6 PG (ref 26.5–33)
MCHC RBC AUTO-ENTMCNC: 30.6 G/DL (ref 31.5–36.5)
MCV RBC AUTO: 97 FL (ref 78–100)
PHOSPHATE SERPL-MCNC: 2.7 MG/DL (ref 2.5–4.5)
PLATELET # BLD AUTO: 179 10E3/UL (ref 150–450)
POTASSIUM BLD-SCNC: 4.1 MMOL/L (ref 3.4–5.3)
PROCALCITONIN SERPL-MCNC: 0.14 NG/ML
RBC # BLD AUTO: 2.91 10E6/UL (ref 4.4–5.9)
SODIUM SERPL-SCNC: 142 MMOL/L (ref 133–144)
UFH PPP CHRO-ACNC: 0.25 IU/ML
WBC # BLD AUTO: 14.1 10E3/UL (ref 4–11)

## 2022-10-31 PROCEDURE — 71045 X-RAY EXAM CHEST 1 VIEW: CPT

## 2022-10-31 PROCEDURE — 250N000013 HC RX MED GY IP 250 OP 250 PS 637: Performed by: INTERNAL MEDICINE

## 2022-10-31 PROCEDURE — 999N000040 HC STATISTIC CONSULT NO CHARGE VASC ACCESS

## 2022-10-31 PROCEDURE — 999N000157 HC STATISTIC RCP TIME EA 10 MIN

## 2022-10-31 PROCEDURE — 83735 ASSAY OF MAGNESIUM: CPT | Performed by: INTERNAL MEDICINE

## 2022-10-31 PROCEDURE — 85027 COMPLETE CBC AUTOMATED: CPT | Performed by: PHYSICIAN ASSISTANT

## 2022-10-31 PROCEDURE — 250N000013 HC RX MED GY IP 250 OP 250 PS 637: Performed by: ANESTHESIOLOGY

## 2022-10-31 PROCEDURE — 80048 BASIC METABOLIC PNL TOTAL CA: CPT | Performed by: PHYSICIAN ASSISTANT

## 2022-10-31 PROCEDURE — 94640 AIRWAY INHALATION TREATMENT: CPT | Mod: 76

## 2022-10-31 PROCEDURE — 250N000011 HC RX IP 250 OP 636: Performed by: INTERNAL MEDICINE

## 2022-10-31 PROCEDURE — 250N000013 HC RX MED GY IP 250 OP 250 PS 637: Performed by: PHYSICIAN ASSISTANT

## 2022-10-31 PROCEDURE — 85520 HEPARIN ASSAY: CPT | Performed by: PHYSICIAN ASSISTANT

## 2022-10-31 PROCEDURE — 250N000013 HC RX MED GY IP 250 OP 250 PS 637: Performed by: STUDENT IN AN ORGANIZED HEALTH CARE EDUCATION/TRAINING PROGRAM

## 2022-10-31 PROCEDURE — 999N000190 HC STATISTIC VAT ROUNDS

## 2022-10-31 PROCEDURE — 250N000011 HC RX IP 250 OP 636: Performed by: PHYSICIAN ASSISTANT

## 2022-10-31 PROCEDURE — 250N000009 HC RX 250: Performed by: INTERNAL MEDICINE

## 2022-10-31 PROCEDURE — 84100 ASSAY OF PHOSPHORUS: CPT | Performed by: INTERNAL MEDICINE

## 2022-10-31 PROCEDURE — 94668 MNPJ CHEST WALL SBSQ: CPT

## 2022-10-31 PROCEDURE — 94640 AIRWAY INHALATION TREATMENT: CPT

## 2022-10-31 PROCEDURE — 258N000003 HC RX IP 258 OP 636: Performed by: PHYSICIAN ASSISTANT

## 2022-10-31 PROCEDURE — 84145 PROCALCITONIN (PCT): CPT | Performed by: PHYSICIAN ASSISTANT

## 2022-10-31 PROCEDURE — 94660 CPAP INITIATION&MGMT: CPT

## 2022-10-31 PROCEDURE — 97530 THERAPEUTIC ACTIVITIES: CPT | Mod: GO | Performed by: OCCUPATIONAL THERAPIST

## 2022-10-31 PROCEDURE — 200N000001 HC R&B ICU

## 2022-10-31 PROCEDURE — 92526 ORAL FUNCTION THERAPY: CPT | Mod: GN

## 2022-10-31 RX ORDER — QUETIAPINE FUMARATE 25 MG/1
50 TABLET, FILM COATED ORAL 2 TIMES DAILY
Status: DISCONTINUED | OUTPATIENT
Start: 2022-10-31 | End: 2022-11-05

## 2022-10-31 RX ORDER — FUROSEMIDE 10 MG/ML
20 INJECTION INTRAMUSCULAR; INTRAVENOUS ONCE
Status: COMPLETED | OUTPATIENT
Start: 2022-10-31 | End: 2022-10-31

## 2022-10-31 RX ADMIN — AMIODARONE HYDROCHLORIDE 400 MG: 200 TABLET ORAL at 20:47

## 2022-10-31 RX ADMIN — ACETYLCYSTEINE 2 ML: 200 SOLUTION ORAL; RESPIRATORY (INHALATION) at 19:55

## 2022-10-31 RX ADMIN — IPRATROPIUM BROMIDE AND ALBUTEROL SULFATE 3 ML: .5; 3 SOLUTION RESPIRATORY (INHALATION) at 12:25

## 2022-10-31 RX ADMIN — IPRATROPIUM BROMIDE AND ALBUTEROL SULFATE 3 ML: .5; 3 SOLUTION RESPIRATORY (INHALATION) at 07:32

## 2022-10-31 RX ADMIN — LIDOCAINE 1 PATCH: 560 PATCH PERCUTANEOUS; TOPICAL; TRANSDERMAL at 08:46

## 2022-10-31 RX ADMIN — METOPROLOL TARTRATE 50 MG: 50 TABLET, FILM COATED ORAL at 08:46

## 2022-10-31 RX ADMIN — VANCOMYCIN HYDROCHLORIDE 1500 MG: 10 INJECTION, POWDER, LYOPHILIZED, FOR SOLUTION INTRAVENOUS at 13:14

## 2022-10-31 RX ADMIN — METOPROLOL TARTRATE 50 MG: 50 TABLET, FILM COATED ORAL at 20:47

## 2022-10-31 RX ADMIN — ACETAMINOPHEN 975 MG: 325 TABLET, FILM COATED ORAL at 22:45

## 2022-10-31 RX ADMIN — Medication 40 MG: at 08:46

## 2022-10-31 RX ADMIN — ACETAMINOPHEN 975 MG: 325 TABLET, FILM COATED ORAL at 08:45

## 2022-10-31 RX ADMIN — ACETYLCYSTEINE 2 ML: 200 SOLUTION ORAL; RESPIRATORY (INHALATION) at 15:57

## 2022-10-31 RX ADMIN — AMIODARONE HYDROCHLORIDE 400 MG: 200 TABLET ORAL at 08:46

## 2022-10-31 RX ADMIN — HALOPERIDOL LACTATE 5 MG: 5 INJECTION, SOLUTION INTRAMUSCULAR at 22:45

## 2022-10-31 RX ADMIN — GABAPENTIN 300 MG: 250 SOLUTION ORAL at 21:00

## 2022-10-31 RX ADMIN — QUETIAPINE FUMARATE 50 MG: 50 TABLET ORAL at 12:08

## 2022-10-31 RX ADMIN — HEPARIN SODIUM 1950 UNITS/HR: 10000 INJECTION, SOLUTION INTRAVENOUS at 01:41

## 2022-10-31 RX ADMIN — ACETYLCYSTEINE 2 ML: 200 SOLUTION ORAL; RESPIRATORY (INHALATION) at 12:25

## 2022-10-31 RX ADMIN — ACETYLCYSTEINE 2 ML: 200 SOLUTION ORAL; RESPIRATORY (INHALATION) at 07:32

## 2022-10-31 RX ADMIN — GABAPENTIN 300 MG: 250 SOLUTION ORAL at 16:24

## 2022-10-31 RX ADMIN — IPRATROPIUM BROMIDE AND ALBUTEROL SULFATE 3 ML: .5; 3 SOLUTION RESPIRATORY (INHALATION) at 19:56

## 2022-10-31 RX ADMIN — ASPIRIN 81 MG CHEWABLE TABLET 162 MG: 81 TABLET CHEWABLE at 08:46

## 2022-10-31 RX ADMIN — QUETIAPINE FUMARATE 50 MG: 50 TABLET ORAL at 20:47

## 2022-10-31 RX ADMIN — METHOCARBAMOL 750 MG: 750 TABLET ORAL at 22:45

## 2022-10-31 RX ADMIN — NICOTINE 1 PATCH: 14 PATCH, EXTENDED RELEASE TRANSDERMAL at 08:46

## 2022-10-31 RX ADMIN — IPRATROPIUM BROMIDE AND ALBUTEROL SULFATE 3 ML: .5; 3 SOLUTION RESPIRATORY (INHALATION) at 15:57

## 2022-10-31 RX ADMIN — ACETAMINOPHEN 975 MG: 325 TABLET, FILM COATED ORAL at 00:04

## 2022-10-31 RX ADMIN — ATORVASTATIN CALCIUM 20 MG: 10 TABLET, FILM COATED ORAL at 20:47

## 2022-10-31 RX ADMIN — PIPERACILLIN AND TAZOBACTAM 4.5 G: 4; .5 INJECTION, POWDER, FOR SOLUTION INTRAVENOUS at 04:48

## 2022-10-31 RX ADMIN — ACETAMINOPHEN 975 MG: 325 TABLET, FILM COATED ORAL at 16:24

## 2022-10-31 RX ADMIN — GABAPENTIN 300 MG: 250 SOLUTION ORAL at 09:07

## 2022-10-31 RX ADMIN — HEPARIN SODIUM 1950 UNITS/HR: 10000 INJECTION, SOLUTION INTRAVENOUS at 13:21

## 2022-10-31 RX ADMIN — FUROSEMIDE 20 MG: 10 INJECTION, SOLUTION INTRAMUSCULAR; INTRAVENOUS at 16:24

## 2022-10-31 RX ADMIN — VANCOMYCIN HYDROCHLORIDE 1500 MG: 10 INJECTION, POWDER, LYOPHILIZED, FOR SOLUTION INTRAVENOUS at 00:05

## 2022-10-31 ASSESSMENT — ACTIVITIES OF DAILY LIVING (ADL)
ADLS_ACUITY_SCORE: 39
ADLS_ACUITY_SCORE: 37
ADLS_ACUITY_SCORE: 39

## 2022-10-31 NOTE — PROGRESS NOTES
Notified provider about indwelling hernandez catheter discussed removal or continued need.    Did provider choose to remove indwelling hernandez catheter? Yes    Provider's hernandez indication for keeping indwelling hernandez catheter: Retention.    Is there an order for indwelling hernandez catheter? Yes    *If there is a plan to keep hernandez catheter in place at discharge daily notification with provider is not necessary.

## 2022-10-31 NOTE — PROGRESS NOTES
Patient confused, restless, delirious. Frequently scratching groin and butt, then moving dirty hand to incisions and face. This morning had stool all over. Sitter at bedside and continuously redirecting, unable to catch every time he goes to pick and pull at things as the patient is intermittently in constant motion and has had loose stool. Discussed w/ cv surg kali the concern for infection. Patient incisions cleaned multiple times already, and hands as well. Old chest tube sites covered after cleaning to help prevent patient picking at them and introducing bacteria.

## 2022-10-31 NOTE — PROGRESS NOTES
CLINICAL NUTRITION SERVICES - REASSESSMENT NOTE      Recommendations Ordered by Registered Dietitian (RD):   Recommend initiating calorie counts 11/1-11/3 to quantify oral intake   Magic Cup TID with meals   Cycle TF overnight with day time oral intakes -   Promote with Fiber at 100 mL/hr x 12 hours (1200 mL/day) = 1200 kcal (10 kcal/kg, 71% needs), 74 g PRO (~1 g/kg, 64% needs), 166 g CHO, 17 g fiber and 997 mL free water daily    Future/Additional Recommendations:   TF adjustment pending oral intakes    Malnutrition: (10/23)  % Weight Loss:  None noted  % Intake:  Decreased intake does not meet criteria for malnutrition   Subcutaneous Fat Loss:  None observed  Muscle Loss:  None observed  Fluid Retention:  Trace 1+     Malnutrition Diagnosis: Patient does not meet two of the above criteria necessary for diagnosing malnutrition       EVALUATION OF PROGRESS TOWARD GOALS   Diet:  Soft and Bite Sized Diet (level 6) + Mildly Thick Liquids -- diet advanced from NPO on 10/29 per SLP recommendations     Intake/Tolerance (oral):    Patient ate 100% of lunch and 50% of dinner yesterday (~966 kcal and 57 g PRO).   Patient has 3 meals ordered for today.     Nutrition Support - Enteral:    Type of Feeding Tube: NG vs ND   Enteral Frequency:  Continuous  Enteral Regimen: Promote with Fiber at 80 mL/hr   Total Enteral Provisions: 1920 kcals (16 kcal/kg), 119 gm pro (1.5 gm/kg), 1595 mL H20, 265 gm CHO, 27 gm fiber   Free Water Flush: 100 mL q 4 hours     Intake/Tolerance:    TF formula changed on 10/28, reached new goal rate on 10/29 at 0500 and tolerating well at this time.   TF + oral intake yesterday = 2886 kcal (24 kcal/kg, 141% needs) and 176 g PRO (2.3 g/kg, 114% needs).     Labs: Chase WNL  Recent Labs   Lab 10/31/22  1300 10/31/22  0909 10/31/22  0456 10/30/22  2141 10/30/22  1627 10/30/22  1151   * 124* 141* 120* 121* 132*     Meds: high sliding scale insulin, miralax, senna BID, vancomycin   Stool: 200 mL  yesterday (improved volume output)   I/O 4159/2625, weight 124.5 kg (overall up from admission)      ASSESSED NUTRITION NEEDS:  Dosing Weight: 120.2 kg (10/20 wt for energy), 78.2 kg (IBW for protein)   Estimated Energy Needs: 0507-7866 kcals (14-17 Kcal/Kg)  Justification: obese  Estimated Protein Needs: 115-155 grams protein (1.5-2 g pro/Kg)  Justification: post-op, obese      NEW FINDINGS:   Noted patient is confused, restless and delirious - sitter at bedside     Previous Goals:   TF Promote with Fiber at 80 mL/hr will meet % estimated needs.  Evaluation: Met while NPO, now meeting > 100% nutrition needs with oral intake     Stool pattern will improve (less frequency and volume)  Evaluation: Met    Previous Nutrition Diagnosis:   Inadequate energy intake related to Propofol off and low TF rate as evidenced by TF + Prosource meeting only 64% energy needs  Evaluation: Completed      CURRENT NUTRITION DIAGNOSIS  Predicted excessive nutrient intake (protein-calorie) related to diet advancement with oral intake over the past 24-48 hours, yesterday met 141% calorie needs and 114% protein needs with TF + oral intake    INTERVENTIONS  Recommendations / Nutrition Prescription  Recommend initiating calorie counts 11/1-11/3 to quantify oral intake   Magic Cup TID with meals   Cycle TF overnight with day time oral intakes -   Promote with Fiber at 100 mL/hr x 12 hours (1200 mL/day) = 1200 kcal (10 kcal/kg, 71% needs), 74 g PRO (~1 g/kg, 64% needs), 166 g CHO, 17 g fiber and 997 mL free water daily     TF adjustment pending oral intakes     Implementation  EN Composition, EN Schedule - orders modified as above  Calorie Counts - initiate  Collaboration and Referral of Nutrition care - patient discussed this morning during interdisciplinary rounds     Goals  TF + PO intake will meet % nutrition needs vs PO intake >/= 65% needs to justify discontinuing TF       MONITORING AND EVALUATION:  Progress towards goals will  be monitored and evaluated per protocol and Practice Guidelines      Mary Lou Lacey RD, LD

## 2022-10-31 NOTE — PLAN OF CARE
Goal Outcome Evaluation:  Pt disoriented x4. Unable to provde his name or . Neuros intact, follows commands intermittently. Only slept 1-2 hrs during night. PERRLA. Restless most of night, agitated at times. Rambles/illogical words. Denies pain but received scheduled tylenol. Chest incision CDI, incisions to BLE CDI. Perineum/ coccyx excoriated and blanchable redness. bruising to BLE and BUE. Mccarty patent w/ garret output. Rectal tube in place, loose stools have decreased. TF @80. Intermittent abx. Heparin @ goal of .VSS on RA.

## 2022-10-31 NOTE — PROGRESS NOTES
Patient was given scheduled nebs, along with ezpap.  Kept refusing Volera.  Patient on room air with coarse/diminished bs.    RT will continue to follow with scheduled nebs.      Bette Dominguez  RRT

## 2022-10-31 NOTE — PROGRESS NOTES
[facility]  Cardiovascular and Thoracic Surgery Daily Note        Assessment and Plan  POD # 11 s/p aortic valve replacement with a 25 mm Bal Inspiris Resilia bovine pericardial valve, coronary artery bypass grafting x 3 with left internal mammary artery to left anterior descending, reverse lesser saphenous vein graft to the posterior descending artery, reverse left lesser saphenous vein graft to the distal circumflex marginal artery, endoscopic bilateral lesser saphenous vein graft harvest from below the knees on 10/20 with Dr. Bennie Pierre     - CVS: Pre-op TTE with preserved biventricular function. Immediate postop profound vasoplegia and acidosis requiring multiple high dose pressors, bicarb infusion, and volume resuscitation. Weaned off pressors by POD1, lactate normalized. BP labile with agitation and ongoing need for sedation. Intermittent low dose NE- sedation related. New onset afib RVR 10/22; amiodarone bolus and infusion, back in NSR, transition to PO amio with taper, recurrent afib restarted on amio infusion, RVR a.fib 10/27-1 dose IV metoprolol trialed with no success, amio restarted converted to NSR at 1849. Will need anticoagulation for PAF- discussed with Dr. Pierre now that chest tubes and pacer wires removed will start heparin gtt with no loading dose or no bolus. Aspirin 162 mg daily, atorvastatin 20 mg daily. Metoprolol increased to 50mg BID 10/29. Chest tubes and temporary pacing wires removed on 10/27. CXR prior to chest tube removal with worsening pulmonary edema, diuresing, Repeat CXR 10/28 with improving pulmonary edema. Remains hypervolemic--low BP with 40mg lasix yesterday.  will continue gentle diuresis -- repeat 20mg IV lasix today.      - Resp: Extubated POD1. IS, pulmonary toilet. Acute hypoxemic respiratory failure, increased WOB and inability to protect airway due to delirium, discussed with Dr. Pagan and Dr. Pierre, reintubated 10/24. Re extubated  "10/26/22, sating well on 2L NC, CPAP at night, EZPAP added. Significant amount amount of secretions overnight into am 10/28,requiring frequent suctioning. Given one dose glycopyrrolate on 10/28. Improved secretions today.     - Neuro: Post-extubation, repeating \"I need help\" over and over, but able to answer question appropriately, redirect, and no focal physical deficits. Worsening agitation 10/22 despite Seroquel, ativan, and Haldol. Placed in restraints due to swinging at staff, pulling at lines/tubes- Started on Precedex and Zyprexa with some improvement- since discontinued. Chronic pain at baseline. Minimize narcotics with agitation. PTA on gabapentin 800 mg TID, resumed 300 mg TID and increasing pending improved confusion. Head CT negative 10/24/22.  improved alertness and mentation 10/30 afternoon. Oxycodone discontinued 10/30, decreasing robaxin to BID instead of QID-- continue to encourage up to chair and wakefulness during the day.  Agitated again overnight-- delirious again this am. Rectal tube removed and scratching bottom and moving stool all over.       - Renal: No history of significant renal disease. Cr stable WNL. Trend BMP. Remains about 4kg up from preoperative weight yesterday, no weight this am. Diuretic as above.   Creatinine   Date Value Ref Range Status   10/31/2022 0.82 0.66 - 1.25 mg/dL Final        - GI: rectal tube removed today, incontinent of stool.      - : Mccarty in place, continue today. Mccarty removed on 10/27, urinary retention requiring replacement when bladder scanned for about 1L.  History urinary retention, PTA Flomax 0.8 mg daily resumed- on hold     - Endo: DMT2. Insulin infusion transitioned to SSI. PTA metformin on hold.          Hemoglobin A1C   Date Value Ref Range Status   09/14/2022 5.6 0.0 - 5.6 % Final       Comment:       Normal <5.7%   Prediabetes 5.7-6.4%    Diabetes 6.5% or higher     Note: Adopted from ADA consensus guidelines.         - FEN: Replace electrolytes " as needed. SLP following and diet advanced on 10/29- will continue TF and consider calorie counts when appropriate.      - ID: Postop Leukocytosis, increased to 14.6 10/28, up from 8.8 on 10/27--14.1 today.  Afebrile.  Periop abx complete. Trend CBC, fever curve. On Vanc/Zosyn for suspected aspiration pneumonia, started on 10/24 will complete for full 7 days. UA and repeat CXR ordered 10/28. UA negative except for moderate amount of blood and repeat CXR with improving infiltrate and edema. Leukocytosis increased again today--completing 7 days IV abx today, procal 0.14 today will repeat tomorrow am to trend.   WBC Count   Date Value Ref Range Status   10/31/2022 14.1 (H) 4.0 - 11.0 10e3/uL Final     - Heme: Acute blood loss anemia and thrombocytopenia due to surgery. Aspirin and subcutaneous heparin resumed 10/24 with improved PLT count. Trend CBC, transfuse PRN.  Will need anticoagulation for PAF. Heparin gtt initiated 10/28- will discuss with surgeon regarding timing for oral anticoagulation once patient oral intake is reliable       - Proph: SCD, heparin gtt, PPI     - Dispo: Continue ICU cares for high nursing needs related to intermittent delirium.          Interval History  Agitated again overnight. Delirious again today. Found with stool smeared all over this am. Sitter in place. Tolerating sitting to chair and stands with therapies.     Medications    acetaminophen  975 mg Oral Q8H     [Held by provider] aspirin  162 mg Oral or NG Tube Daily     atorvastatin  20 mg Oral Daily     gabapentin  300 mg Oral or G tube TID     [Held by provider] heparin ANTICOAGULANT  5,000 Units Subcutaneous Q8H     insulin aspart  1-10 Units Subcutaneous TID AC     insulin aspart  1-7 Units Subcutaneous At Bedtime     lactated ringers  250 mL Intravenous Once     lidocaine  1-2 patch Transdermal Q24H     lidocaine   Transdermal Q8H MITA     methocarbamol  750 mg Oral 4x Daily     nicotine  1 patch Transdermal Daily     nicotine    "Transdermal Q8H     pantoprazole  40 mg Oral or NG Tube Daily     Or     pantoprazole  40 mg Oral Daily     polyethylene glycol  17 g Oral Daily     QUEtiapine  25 mg Oral BID     senna-docusate  1 tablet Oral BID     sodium chloride (PF)  3 mL Intracatheter Q8H     tamsulosin  0.8 mg Oral QPM      acetaminophen, bisacodyl, calcium gluconate, calcium gluconate, calcium gluconate, dextrose, glucose **OR** dextrose **OR** glucagon, haloperidol lactate, hydrALAZINE, HYDROmorphone **OR** HYDROmorphone, hydrOXYzine, ketorolac, lidocaine 4%, lidocaine (buffered or not buffered), magnesium hydroxide, metoprolol, naloxone **OR** naloxone **OR** naloxone **OR** naloxone, nitroGLYcerin, ondansetron **OR** ondansetron, oxyCODONE **OR** oxyCODONE, phenylephrine, prochlorperazine **OR** prochlorperazine, sodium chloride, sodium chloride (PF)        Physical Exam  Vitals were reviewed  /69   Pulse 93   Temp 98.2  F (36.8  C)   Resp 30   Ht 1.803 m (5' 11\")   Wt 124.5 kg (274 lb 7.6 oz)   SpO2 92%   BMI 38.28 kg/m     Rhythm: NSR     Lungs: diminished bases     Cardiovascular: rrr, no m/r/g     Abdomen: soft, NT, ND, +BS     Extremeties: warm, 1+ LE edema      Incision: cdi      CT: n/a     Weight:          Vitals:     10/20/22 0600 10/21/22 0600 10/22/22 0250 10/23/22 0200   Weight: 120.2 kg (265 lb) 124.6 kg (274 lb 11.1 oz) 124 kg (273 lb 5.9 oz) 126.6 kg (279 lb 1.6 oz)            Data                Recent Labs   Lab 10/24/22  0434 10/24/22  0336 10/24/22  0019 10/23/22  0743 10/23/22  0351 10/22/22  1657 10/22/22  1403 10/20/22  1528 10/20/22  1526 10/20/22  1324 10/20/22  1321   WBC  --  6.9  --   --  7.5  --  8.4   < > 29.3*  --  22.4*   HGB  --  8.8*  --   --  9.2*  --  9.5*   < > 14.2   < > 11.5*   MCV  --  94  --   --  93  --  93   < > 93  --  93   PLT  --  84*  --   --  57*  --  54*   < > 158  --  136*   INR  --   --   --   --   --   --   --   --  1.31*  --  1.48*   NA  --  141  --   --  138  --  137   < > " 141   < > 138   POTASSIUM  --  4.3  --   --  4.3  --  4.7   < > 3.7   < > 3.8   CHLORIDE  --  110*  --   --  108  --  107   < > 109  --  109   CO2  --  27  --   --  26  --  28   < > 17*  --  23   BUN  --  33*  --   --  27  --  22   < > 13  --  12   CR  --  1.16  --   --  1.11  --  1.08   < > 0.97  --  0.85   ANIONGAP  --  4  --   --  4  --  2*   < > 15*  --  6   HALEY  --  8.8  --   --  9.2  --  9.1   < > 8.5  --  9.5   * 126* 131*   < > 136*   < > 137*   < > 229*   < > 182*   ALBUMIN  --  2.4*  --   --  2.5*  --   --    < > 3.1*  --   --    PROTTOTAL  --  5.3*  --   --  5.3*  --   --    < > 5.5*  --   --    BILITOTAL  --  0.8  --   --  0.6  --   --    < > 0.6  --   --    ALKPHOS  --  64  --   --  60  --   --    < > 84  --   --    ALT  --  19  --   --  20  --   --    < > 25  --   --    AST  --  35  --   --  48*  --   --    < > 68*  --   --     < > = values in this interval not displayed.         Imaging:      Recent Results (from the past 24 hour(s))   XR Abdomen Port 1 View     Narrative     EXAM: XR ABDOMEN PORT 1 VIEW  LOCATION: [facility]  DATE/TIME: 10/23/2022 11:04 AM     INDICATION: advanced NGT, needs to be post pyloric for feeding  COMPARISON: 10/22/2022 at 1542 hours.        Impression     IMPRESSION: Feeding tube tip is at the gastroduodenal junction and is likely postpyloric, however this is not definitive on this study. There is redundancy in the tube and it will likely progress distally on its own. Chest tubes present. Cholelithiasis.   Normal bowel gas pattern.            Patient seen and discussed with Dr. Cristino Ruiz PAAshleighC  Cardiothoracic Surgery  Pager [phone]

## 2022-10-31 NOTE — PROGRESS NOTES
Notified provider about indwelling hernandez catheter discussed removal or continued need.    Did provider choose to remove indwelling hernandez catheter? Yes    Provider's hernandez indication for keeping indwelling hernandez catheter: Retention    Is there an order for indwelling hernandez catheter? Yes

## 2022-10-31 NOTE — PROGRESS NOTES
Patient remains delirious. Has brief periods of being lucid. Wife at bedside part of the afternoon, she feels he is the same mentally as he's been. Sitter at bedside for safety as patient is frequently trying to get out of bed or pulling at tubes and lines. Rectal tube and cordis removed today. Patient remains on heparin gtt and is keeping hernandez today.

## 2022-10-31 NOTE — PLAN OF CARE
0700 - 1900 RN Shift Summary    Patient much more alert, mostly oriented, calm. Sedating medication minimized. Sat in the chair for three hours, walked a few steps with walked and gait belt. Vitals stable. Hypotensive this AM after Lasix given, recovered spontaneously. Ate two meals, soft and bite sized diet, good appetite. Continues to have diarrhea with fecal incontinence. Spouse at bedside midday, updated.    Goal Outcome Evaluation:      Plan of Care Reviewed With: patient, spouse    Overall Patient Progress: improvingOverall Patient Progress: improving

## 2022-11-01 ENCOUNTER — APPOINTMENT (OUTPATIENT)
Dept: SPEECH THERAPY | Facility: CLINIC | Age: 67
DRG: 219 | End: 2022-11-01
Attending: SURGERY
Payer: COMMERCIAL

## 2022-11-01 ENCOUNTER — APPOINTMENT (OUTPATIENT)
Dept: OCCUPATIONAL THERAPY | Facility: CLINIC | Age: 67
DRG: 219 | End: 2022-11-01
Attending: SURGERY
Payer: COMMERCIAL

## 2022-11-01 LAB
ANION GAP SERPL CALCULATED.3IONS-SCNC: 1 MMOL/L (ref 3–14)
BUN SERPL-MCNC: 17 MG/DL (ref 7–30)
CALCIUM SERPL-MCNC: 9.4 MG/DL (ref 8.5–10.1)
CHLORIDE BLD-SCNC: 108 MMOL/L (ref 94–109)
CO2 SERPL-SCNC: 29 MMOL/L (ref 20–32)
CREAT SERPL-MCNC: 0.8 MG/DL (ref 0.66–1.25)
ERYTHROCYTE [DISTWIDTH] IN BLOOD BY AUTOMATED COUNT: 15.3 % (ref 10–15)
GFR SERPL CREATININE-BSD FRML MDRD: >90 ML/MIN/1.73M2
GLUCOSE BLD-MCNC: 155 MG/DL (ref 70–99)
GLUCOSE BLDC GLUCOMTR-MCNC: 121 MG/DL (ref 70–99)
GLUCOSE BLDC GLUCOMTR-MCNC: 124 MG/DL (ref 70–99)
GLUCOSE BLDC GLUCOMTR-MCNC: 135 MG/DL (ref 70–99)
GLUCOSE BLDC GLUCOMTR-MCNC: 136 MG/DL (ref 70–99)
HCT VFR BLD AUTO: 28.2 % (ref 40–53)
HGB BLD-MCNC: 8.7 G/DL (ref 13.3–17.7)
MAGNESIUM SERPL-MCNC: 2.3 MG/DL (ref 1.6–2.3)
MCH RBC QN AUTO: 29.4 PG (ref 26.5–33)
MCHC RBC AUTO-ENTMCNC: 30.9 G/DL (ref 31.5–36.5)
MCV RBC AUTO: 95 FL (ref 78–100)
PHOSPHATE SERPL-MCNC: 2.8 MG/DL (ref 2.5–4.5)
PLATELET # BLD AUTO: 167 10E3/UL (ref 150–450)
POTASSIUM BLD-SCNC: 4.3 MMOL/L (ref 3.4–5.3)
PROCALCITONIN SERPL-MCNC: 0.12 NG/ML
RBC # BLD AUTO: 2.96 10E6/UL (ref 4.4–5.9)
SODIUM SERPL-SCNC: 138 MMOL/L (ref 133–144)
UFH PPP CHRO-ACNC: 0.27 IU/ML
WBC # BLD AUTO: 14 10E3/UL (ref 4–11)

## 2022-11-01 PROCEDURE — 94640 AIRWAY INHALATION TREATMENT: CPT | Mod: 76

## 2022-11-01 PROCEDURE — 92611 MOTION FLUOROSCOPY/SWALLOW: CPT | Mod: GN | Performed by: SPEECH-LANGUAGE PATHOLOGIST

## 2022-11-01 PROCEDURE — 250N000013 HC RX MED GY IP 250 OP 250 PS 637: Performed by: PHYSICIAN ASSISTANT

## 2022-11-01 PROCEDURE — 250N000013 HC RX MED GY IP 250 OP 250 PS 637: Performed by: STUDENT IN AN ORGANIZED HEALTH CARE EDUCATION/TRAINING PROGRAM

## 2022-11-01 PROCEDURE — 83735 ASSAY OF MAGNESIUM: CPT | Performed by: SURGERY

## 2022-11-01 PROCEDURE — 250N000013 HC RX MED GY IP 250 OP 250 PS 637: Performed by: ANESTHESIOLOGY

## 2022-11-01 PROCEDURE — 84100 ASSAY OF PHOSPHORUS: CPT | Performed by: SURGERY

## 2022-11-01 PROCEDURE — 250N000013 HC RX MED GY IP 250 OP 250 PS 637: Performed by: INTERNAL MEDICINE

## 2022-11-01 PROCEDURE — 85520 HEPARIN ASSAY: CPT | Performed by: SURGERY

## 2022-11-01 PROCEDURE — 80048 BASIC METABOLIC PNL TOTAL CA: CPT | Performed by: PHYSICIAN ASSISTANT

## 2022-11-01 PROCEDURE — 250N000011 HC RX IP 250 OP 636: Performed by: PHYSICIAN ASSISTANT

## 2022-11-01 PROCEDURE — 250N000009 HC RX 250: Performed by: PHYSICIAN ASSISTANT

## 2022-11-01 PROCEDURE — 999N000157 HC STATISTIC RCP TIME EA 10 MIN

## 2022-11-01 PROCEDURE — 84145 PROCALCITONIN (PCT): CPT | Performed by: PHYSICIAN ASSISTANT

## 2022-11-01 PROCEDURE — 92526 ORAL FUNCTION THERAPY: CPT | Mod: GN | Performed by: SPEECH-LANGUAGE PATHOLOGIST

## 2022-11-01 PROCEDURE — 97530 THERAPEUTIC ACTIVITIES: CPT | Mod: GO | Performed by: OCCUPATIONAL THERAPIST

## 2022-11-01 PROCEDURE — 250N000009 HC RX 250: Performed by: INTERNAL MEDICINE

## 2022-11-01 PROCEDURE — 85027 COMPLETE CBC AUTOMATED: CPT | Performed by: PHYSICIAN ASSISTANT

## 2022-11-01 PROCEDURE — 94640 AIRWAY INHALATION TREATMENT: CPT

## 2022-11-01 PROCEDURE — 36415 COLL VENOUS BLD VENIPUNCTURE: CPT | Performed by: PHYSICIAN ASSISTANT

## 2022-11-01 PROCEDURE — 120N000013 HC R&B IMCU

## 2022-11-01 RX ORDER — LIDOCAINE 40 MG/G
CREAM TOPICAL
Status: DISCONTINUED | OUTPATIENT
Start: 2022-11-01 | End: 2022-11-08 | Stop reason: HOSPADM

## 2022-11-01 RX ORDER — ACETYLCYSTEINE 200 MG/ML
2 SOLUTION ORAL; RESPIRATORY (INHALATION) EVERY 4 HOURS PRN
Status: DISCONTINUED | OUTPATIENT
Start: 2022-11-01 | End: 2022-11-08 | Stop reason: HOSPADM

## 2022-11-01 RX ORDER — METOPROLOL TARTRATE 100 MG
100 TABLET ORAL 2 TIMES DAILY
Status: DISCONTINUED | OUTPATIENT
Start: 2022-11-01 | End: 2022-11-08 | Stop reason: HOSPADM

## 2022-11-01 RX ORDER — FUROSEMIDE 10 MG/ML
40 INJECTION INTRAMUSCULAR; INTRAVENOUS ONCE
Status: COMPLETED | OUTPATIENT
Start: 2022-11-01 | End: 2022-11-01

## 2022-11-01 RX ORDER — NITROGLYCERIN 0.4 MG/1
0.4 TABLET SUBLINGUAL EVERY 5 MIN PRN
Status: DISCONTINUED | OUTPATIENT
Start: 2022-11-01 | End: 2022-11-08 | Stop reason: HOSPADM

## 2022-11-01 RX ORDER — LACTOBACILLUS RHAMNOSUS GG 10B CELL
1 CAPSULE ORAL 2 TIMES DAILY
Status: DISCONTINUED | OUTPATIENT
Start: 2022-11-01 | End: 2022-11-08 | Stop reason: HOSPADM

## 2022-11-01 RX ORDER — METOPROLOL TARTRATE 25 MG/1
25 TABLET, FILM COATED ORAL ONCE
Status: COMPLETED | OUTPATIENT
Start: 2022-11-01 | End: 2022-11-01

## 2022-11-01 RX ADMIN — SENNOSIDES AND DOCUSATE SODIUM 1 TABLET: 8.6; 5 TABLET ORAL at 20:25

## 2022-11-01 RX ADMIN — Medication 40 MG: at 08:17

## 2022-11-01 RX ADMIN — QUETIAPINE FUMARATE 50 MG: 50 TABLET ORAL at 08:17

## 2022-11-01 RX ADMIN — METOPROLOL TARTRATE 25 MG: 25 TABLET, FILM COATED ORAL at 15:46

## 2022-11-01 RX ADMIN — ATORVASTATIN CALCIUM 20 MG: 10 TABLET, FILM COATED ORAL at 20:25

## 2022-11-01 RX ADMIN — METOPROLOL TARTRATE 100 MG: 100 TABLET, FILM COATED ORAL at 20:25

## 2022-11-01 RX ADMIN — HEPARIN SODIUM 1950 UNITS/HR: 10000 INJECTION, SOLUTION INTRAVENOUS at 01:47

## 2022-11-01 RX ADMIN — TAMSULOSIN HYDROCHLORIDE 0.8 MG: 0.4 CAPSULE ORAL at 20:25

## 2022-11-01 RX ADMIN — ACETAMINOPHEN 975 MG: 325 TABLET, FILM COATED ORAL at 08:17

## 2022-11-01 RX ADMIN — HYDROMORPHONE HYDROCHLORIDE 0.2 MG: 0.2 INJECTION, SOLUTION INTRAMUSCULAR; INTRAVENOUS; SUBCUTANEOUS at 03:49

## 2022-11-01 RX ADMIN — HEPARIN SODIUM 1950 UNITS/HR: 10000 INJECTION, SOLUTION INTRAVENOUS at 15:58

## 2022-11-01 RX ADMIN — METHOCARBAMOL 750 MG: 750 TABLET ORAL at 13:55

## 2022-11-01 RX ADMIN — ACETAMINOPHEN 650 MG: 325 TABLET, FILM COATED ORAL at 01:11

## 2022-11-01 RX ADMIN — METOPROLOL TARTRATE 50 MG: 50 TABLET, FILM COATED ORAL at 08:17

## 2022-11-01 RX ADMIN — IPRATROPIUM BROMIDE AND ALBUTEROL SULFATE 3 ML: .5; 3 SOLUTION RESPIRATORY (INHALATION) at 15:30

## 2022-11-01 RX ADMIN — NICOTINE 1 PATCH: 14 PATCH, EXTENDED RELEASE TRANSDERMAL at 08:20

## 2022-11-01 RX ADMIN — QUETIAPINE FUMARATE 50 MG: 50 TABLET ORAL at 20:25

## 2022-11-01 RX ADMIN — AMIODARONE HYDROCHLORIDE 400 MG: 200 TABLET ORAL at 08:17

## 2022-11-01 RX ADMIN — Medication 1 CAPSULE: at 20:26

## 2022-11-01 RX ADMIN — GABAPENTIN 300 MG: 250 SOLUTION ORAL at 08:20

## 2022-11-01 RX ADMIN — ASPIRIN 81 MG CHEWABLE TABLET 162 MG: 81 TABLET CHEWABLE at 08:18

## 2022-11-01 RX ADMIN — ACETAMINOPHEN 975 MG: 325 TABLET, FILM COATED ORAL at 17:35

## 2022-11-01 RX ADMIN — LIDOCAINE 2 PATCH: 560 PATCH PERCUTANEOUS; TOPICAL; TRANSDERMAL at 08:18

## 2022-11-01 RX ADMIN — IPRATROPIUM BROMIDE AND ALBUTEROL SULFATE 3 ML: .5; 3 SOLUTION RESPIRATORY (INHALATION) at 19:46

## 2022-11-01 RX ADMIN — FUROSEMIDE 40 MG: 10 INJECTION, SOLUTION INTRAMUSCULAR; INTRAVENOUS at 12:06

## 2022-11-01 RX ADMIN — AMIODARONE HYDROCHLORIDE 400 MG: 200 TABLET ORAL at 20:25

## 2022-11-01 RX ADMIN — IPRATROPIUM BROMIDE AND ALBUTEROL SULFATE 3 ML: .5; 3 SOLUTION RESPIRATORY (INHALATION) at 11:12

## 2022-11-01 RX ADMIN — HYDROXYZINE HYDROCHLORIDE 50 MG: 25 TABLET, FILM COATED ORAL at 01:11

## 2022-11-01 RX ADMIN — FUROSEMIDE 40 MG: 10 INJECTION, SOLUTION INTRAMUSCULAR; INTRAVENOUS at 15:47

## 2022-11-01 ASSESSMENT — ACTIVITIES OF DAILY LIVING (ADL)
ADLS_ACUITY_SCORE: 37
ADLS_ACUITY_SCORE: 38
ADLS_ACUITY_SCORE: 37
ADLS_ACUITY_SCORE: 38
ADLS_ACUITY_SCORE: 38
ADLS_ACUITY_SCORE: 37
ADLS_ACUITY_SCORE: 37

## 2022-11-01 NOTE — PLAN OF CARE
[id number]   Orientations: 3 - disoriented to time  Vitals/Pain: VSS on RA, no pain  Tele: SR  Lines/Drains: PIV in R forearm   Heparin infusing @1950 - recheck in AM  NJ @ 101 cm - tube feed at night  Skin/Wounds: sternal incision ADI, chest tube removal sites, staples to L and R ankle with mepilex in place and staples to R calf ADI, bruising to back of L and R calves/thighs  GI/: Mccarty in place with clear yellow urine, no BM this shift but loose incontinent BM in ICU  Labs: Abnormal/Trends Hgb 8.7, elevated WBC  Electrolyte Replacement- K, Mg, Ph  Ambulation/Assist: 2 w/ walker  Sleep Quality: fair  Plan: calorie count, sitter at bedside - advised to not trial removal of sitter due to history of rubbing stool covered hands on sternal incsion

## 2022-11-01 NOTE — PROGRESS NOTES
End of Shift Summary:  Assumed care of patient at 2300  Neuro- Pt a/ox3 disoriented to time, rambles on at times. Follows commands STEEL, PERRLA, C/O pain given prn tylenol and x1 0.2 dilaudid     Resp- RA    Cardiac- SR, Bps taken q2 hours. metoprolol for SBP>140    GI- x1 BM, TF infusin NJ at 100ml/hr from [id number]    -hernandez in place for retention    Integ/Msk- See charting, sternal incision ADI and reddened mepilex to bilateral ankle sites.     Lines/Gtts- heparin 1950, Anti-Xa therapeutic AM draw scheduled     WCTM

## 2022-11-01 NOTE — PROGRESS NOTES
[facility]  Cardiovascular and Thoracic Surgery Daily Note        Assessment and Plan  POD # 12 s/p aortic valve replacement with a 25 mm Bal Inspiris Resilia bovine pericardial valve, coronary artery bypass grafting x 3 with left internal mammary artery to left anterior descending, reverse lesser saphenous vein graft to the posterior descending artery, reverse left lesser saphenous vein graft to the distal circumflex marginal artery, endoscopic bilateral lesser saphenous vein graft harvest from below the knees on 10/20 with Dr. Bennie Pierre     - CVS: Pre-op TTE with preserved biventricular function. Immediate postop profound vasoplegia and acidosis requiring multiple high dose pressors, bicarb infusion, and volume resuscitation. Weaned off pressors by POD1, lactate normalized. BP labile with agitation and ongoing need for sedation. Intermittent low dose NE- sedation related. New onset afib RVR 10/22; amiodarone bolus and infusion, back in NSR, transition to PO amio with taper, recurrent afib restarted on amio infusion, RVR a.fib 10/27-1 dose IV metoprolol trialed with no success, amio restarted converted to NSR at 1849. Will need anticoagulation for PAF- discussed with Dr. Pierre now that chest tubes and pacer wires removed will start heparin gtt with no loading dose or no bolus. Aspirin 162 mg daily, atorvastatin 20 mg daily. Metoprolol increased to 100mg BID 11/1. Chest tubes and temporary pacing wires removed on 10/27. CXR prior to chest tube removal with worsening pulmonary edema, diuresing, Repeat CXR 10/28 with improving pulmonary edema. Remains hypervolemic--low BP with 40mg lasix yesterday.  will continue gentle diuresis -- IV 40 mg IVP lasix this am     - Resp: Extubated POD1. IS, pulmonary toilet. Acute hypoxemic respiratory failure, increased WOB and inability to protect airway due to delirium, discussed with Dr. Pagan and Dr. Pierre, reintubated 10/24. Re extubated 10/26/22,  "sating well on 2L NC, CPAP at night, EZPAP added. Significant amount amount of secretions overnight into am 10/28,requiring frequent suctioning. Given one dose glycopyrrolate on 10/28. Continued improvement of secretions today.     - Neuro: Post-extubation, repeating \"I need help\" over and over, but able to answer question appropriately, redirect, and no focal physical deficits. Worsening agitation 10/22 despite Seroquel, ativan, and Haldol. Placed in restraints due to swinging at staff, pulling at lines/tubes- Started on Precedex and Zyprexa with some improvement- since discontinued. Chronic pain at baseline. Minimize narcotics with agitation. PTA on gabapentin 800 mg TID, resumed 300 mg TID and increasing pending improved confusion. Head CT negative 10/24/22.  improved alertness and mentation 10/30 afternoon. Oxycodone discontinued 10/30, decreasing robaxin to BID instead of QID-- continue to encourage up to chair and wakefulness during the day.  Agitated again overnight-- delirious again this am. Rectal tube removed and scratching bottom and moving stool all over 10/31, improved this am.       - Renal: No history of significant renal disease. Cr stable WNL. Trend BMP. Remains about 6kg up from preoperative weight. Diuretic as above. Lasix 40 mg IVP bid  Creatinine   Date Value Ref Range Status   11/01/2022 0.80 0.66 - 1.25 mg/dL Final        - GI: rectal tube removed today, incontinent of stool.      - : Mccarty in place, continue today. Mccarty removed on 10/27, urinary retention requiring replacement when bladder scanned for about 1L.  History urinary retention, PTA Flomax 0.8 mg daily resumed today     - Endo: DMT2. Insulin infusion transitioned to SSI. PTA metformin on hold.          Hemoglobin A1C   Date Value Ref Range Status   09/14/2022 5.6 0.0 - 5.6 % Final       Comment:       Normal <5.7%   Prediabetes 5.7-6.4%    Diabetes 6.5% or higher     Note: Adopted from ADA consensus guidelines.         - FEN: " Replace electrolytes as needed. SLP following and diet advanced on 10/29- will continue TF and consider calorie counts when appropriate.      - ID: Postop Leukocytosis, increased to 14.6 10/28, up from 8.8 on 10/27--14.1 today.  Afebrile.  Periop abx complete. Trend CBC, fever curve. On Vanc/Zosyn for suspected aspiration pneumonia, started on 10/24 will complete for full 7 days. UA and repeat CXR ordered 10/28. UA negative except for moderate amount of blood and repeat CXR with improving infiltrate and edema. Leukocytosis increased again today--completing 7 days IV abx today, procal 0.12<0.14  WBC Count   Date Value Ref Range Status   11/01/2022 14.0 (H) 4.0 - 11.0 10e3/uL Final     - Heme: Acute blood loss anemia and thrombocytopenia due to surgery. Aspirin and subcutaneous heparin resumed 10/24 with improved PLT count. Trend CBC, transfuse PRN.  Will need anticoagulation for PAF. Heparin gtt initiated 10/28- will discuss with surgeon regarding timing for oral anticoagulation once patient oral intake is reliable       - Proph: SCD, heparin gtt, PPI     - Dispo: ICU cares this am, potentially st 33 later this afternoon if remains stable this am. IMC status         Interval History  Sitting up in bed, legs crossed, breathing stable, encephalopathic but re-directable, breathing stable, sitter present, working with therapies.      Medications    acetaminophen  975 mg Oral Q8H     [Held by provider] aspirin  162 mg Oral or NG Tube Daily     atorvastatin  20 mg Oral Daily     gabapentin  300 mg Oral or G tube TID     [Held by provider] heparin ANTICOAGULANT  5,000 Units Subcutaneous Q8H     insulin aspart  1-10 Units Subcutaneous TID AC     insulin aspart  1-7 Units Subcutaneous At Bedtime     lactated ringers  250 mL Intravenous Once     lidocaine  1-2 patch Transdermal Q24H     lidocaine   Transdermal Q8H MITA     methocarbamol  750 mg Oral 4x Daily     nicotine  1 patch Transdermal Daily     nicotine   Transdermal Q8H  "    pantoprazole  40 mg Oral or NG Tube Daily     Or     pantoprazole  40 mg Oral Daily     polyethylene glycol  17 g Oral Daily     QUEtiapine  25 mg Oral BID     senna-docusate  1 tablet Oral BID     sodium chloride (PF)  3 mL Intracatheter Q8H     tamsulosin  0.8 mg Oral QPM      acetaminophen, bisacodyl, calcium gluconate, calcium gluconate, calcium gluconate, dextrose, glucose **OR** dextrose **OR** glucagon, haloperidol lactate, hydrALAZINE, HYDROmorphone **OR** HYDROmorphone, hydrOXYzine, ketorolac, lidocaine 4%, lidocaine (buffered or not buffered), magnesium hydroxide, metoprolol, naloxone **OR** naloxone **OR** naloxone **OR** naloxone, nitroGLYcerin, ondansetron **OR** ondansetron, oxyCODONE **OR** oxyCODONE, phenylephrine, prochlorperazine **OR** prochlorperazine, sodium chloride, sodium chloride (PF)        Physical Exam  Vitals were reviewed  /66 (BP Location: Left arm)   Pulse 91   Temp (!) 96.4  F (35.8  C) (Axillary)   Resp 26   Ht 1.803 m (5' 11\")   Wt 126.7 kg (279 lb 5.2 oz)   SpO2 96%   BMI 38.96 kg/m     Rhythm: NSR     Lungs: diminished bases     Cardiovascular: rrr, no m/r/g     Abdomen: soft, NT, ND, +BS     Extremeties: warm, 1+ LE edema      Incision: cdi      CT: n/a     Weight:          Vitals:     10/20/22 0600 10/21/22 0600 10/22/22 0250 10/23/22 0200   Weight: 120.2 kg (265 lb) 124.6 kg (274 lb 11.1 oz) 124 kg (273 lb 5.9 oz) 126.6 kg (279 lb 1.6 oz)            Data                Recent Labs   Lab 10/24/22  0434 10/24/22  0336 10/24/22  0019 10/23/22  0743 10/23/22  0351 10/22/22  1657 10/22/22  1403 10/20/22  1528 10/20/22  1526 10/20/22  1324 10/20/22  1321   WBC  --  6.9  --   --  7.5  --  8.4   < > 29.3*  --  22.4*   HGB  --  8.8*  --   --  9.2*  --  9.5*   < > 14.2   < > 11.5*   MCV  --  94  --   --  93  --  93   < > 93  --  93   PLT  --  84*  --   --  57*  --  54*   < > 158  --  136*   INR  --   --   --   --   --   --   --   --  1.31*  --  1.48*   NA  --  141  --   " --  138  --  137   < > 141   < > 138   POTASSIUM  --  4.3  --   --  4.3  --  4.7   < > 3.7   < > 3.8   CHLORIDE  --  110*  --   --  108  --  107   < > 109  --  109   CO2  --  27  --   --  26  --  28   < > 17*  --  23   BUN  --  33*  --   --  27  --  22   < > 13  --  12   CR  --  1.16  --   --  1.11  --  1.08   < > 0.97  --  0.85   ANIONGAP  --  4  --   --  4  --  2*   < > 15*  --  6   HALEY  --  8.8  --   --  9.2  --  9.1   < > 8.5  --  9.5   * 126* 131*   < > 136*   < > 137*   < > 229*   < > 182*   ALBUMIN  --  2.4*  --   --  2.5*  --   --    < > 3.1*  --   --    PROTTOTAL  --  5.3*  --   --  5.3*  --   --    < > 5.5*  --   --    BILITOTAL  --  0.8  --   --  0.6  --   --    < > 0.6  --   --    ALKPHOS  --  64  --   --  60  --   --    < > 84  --   --    ALT  --  19  --   --  20  --   --    < > 25  --   --    AST  --  35  --   --  48*  --   --    < > 68*  --   --     < > = values in this interval not displayed.         Imaging:      Recent Results (from the past 24 hour(s))   XR Abdomen Port 1 View     Narrative     EXAM: XR ABDOMEN PORT 1 VIEW  LOCATION: [facility]  DATE/TIME: 10/23/2022 11:04 AM     INDICATION: advanced NGT, needs to be post pyloric for feeding  COMPARISON: 10/22/2022 at 1542 hours.        Impression     IMPRESSION: Feeding tube tip is at the gastroduodenal junction and is likely postpyloric, however this is not definitive on this study. There is redundancy in the tube and it will likely progress distally on its own. Chest tubes present. Cholelithiasis.   Normal bowel gas pattern.            Patient seen and discussed with Dr. Gregg/Hardy Baugh PA-C  Cardiothoracic Surgery  Pager [phone]

## 2022-11-01 NOTE — PROGRESS NOTES
Pt seen for neb / ezpap tx. Fair effort performing Ezpap.unable to do Aerobika. Pt is restless. Neb given per order. BS coarse. On RA. Will continue to monitor.    Kip Haji, RT

## 2022-11-01 NOTE — PLAN OF CARE
Neuro: disoriented to time, lethargic, follows commands inconsistently, moves all extremities, PERRLA, PRN haldol x1 for agitation - agitation also seemed to improve when sitting in upright position    CV: tele SR, scheduled metoprolol to maintain SBP < 140    Resp: LS diminished on room air    GI: BS active, BM earlier today, TF infusing via NJ at 100 ml/hr x 12 hours    : hernandez with adequate urine output     Skin: sternal incision ADI and reddened, mepilex applied to incision to bilateral ankles, bruising to BLE, generalized edema, wounds, new island dressing applied to old CT sites    Additional: robaxin and tylenol for pain, afebrile, electrolytes WDL - rechecks in AM, no calls from family this shift, plan to transfer for [facility] when bed becomes available

## 2022-11-02 ENCOUNTER — APPOINTMENT (OUTPATIENT)
Dept: SPEECH THERAPY | Facility: CLINIC | Age: 67
DRG: 219 | End: 2022-11-02
Attending: SURGERY
Payer: COMMERCIAL

## 2022-11-02 ENCOUNTER — APPOINTMENT (OUTPATIENT)
Dept: OCCUPATIONAL THERAPY | Facility: CLINIC | Age: 67
DRG: 219 | End: 2022-11-02
Attending: SURGERY
Payer: COMMERCIAL

## 2022-11-02 ENCOUNTER — APPOINTMENT (OUTPATIENT)
Dept: GENERAL RADIOLOGY | Facility: CLINIC | Age: 67
DRG: 219 | End: 2022-11-02
Attending: INTERNAL MEDICINE
Payer: COMMERCIAL

## 2022-11-02 LAB
ANION GAP SERPL CALCULATED.3IONS-SCNC: 5 MMOL/L (ref 3–14)
BUN SERPL-MCNC: 19 MG/DL (ref 7–30)
CALCIUM SERPL-MCNC: 9.8 MG/DL (ref 8.5–10.1)
CHLORIDE BLD-SCNC: 106 MMOL/L (ref 94–109)
CO2 SERPL-SCNC: 27 MMOL/L (ref 20–32)
CREAT SERPL-MCNC: 0.84 MG/DL (ref 0.66–1.25)
ERYTHROCYTE [DISTWIDTH] IN BLOOD BY AUTOMATED COUNT: 15.6 % (ref 10–15)
ERYTHROCYTE [DISTWIDTH] IN BLOOD BY AUTOMATED COUNT: 15.7 % (ref 10–15)
GFR SERPL CREATININE-BSD FRML MDRD: >90 ML/MIN/1.73M2
GLUCOSE BLD-MCNC: 155 MG/DL (ref 70–99)
GLUCOSE BLDC GLUCOMTR-MCNC: 123 MG/DL (ref 70–99)
GLUCOSE BLDC GLUCOMTR-MCNC: 126 MG/DL (ref 70–99)
GLUCOSE BLDC GLUCOMTR-MCNC: 135 MG/DL (ref 70–99)
GLUCOSE BLDC GLUCOMTR-MCNC: 142 MG/DL (ref 70–99)
HCT VFR BLD AUTO: 28.6 % (ref 40–53)
HCT VFR BLD AUTO: 29.1 % (ref 40–53)
HGB BLD-MCNC: 8.8 G/DL (ref 13.3–17.7)
HGB BLD-MCNC: 9.4 G/DL (ref 13.3–17.7)
MAGNESIUM SERPL-MCNC: 2.5 MG/DL (ref 1.6–2.3)
MCH RBC QN AUTO: 29.6 PG (ref 26.5–33)
MCH RBC QN AUTO: 30.6 PG (ref 26.5–33)
MCHC RBC AUTO-ENTMCNC: 30.8 G/DL (ref 31.5–36.5)
MCHC RBC AUTO-ENTMCNC: 32.3 G/DL (ref 31.5–36.5)
MCV RBC AUTO: 95 FL (ref 78–100)
MCV RBC AUTO: 96 FL (ref 78–100)
PHOSPHATE SERPL-MCNC: 3 MG/DL (ref 2.5–4.5)
PLATELET # BLD AUTO: 161 10E3/UL (ref 150–450)
PLATELET # BLD AUTO: 174 10E3/UL (ref 150–450)
POTASSIUM BLD-SCNC: 4.2 MMOL/L (ref 3.4–5.3)
RBC # BLD AUTO: 2.97 10E6/UL (ref 4.4–5.9)
RBC # BLD AUTO: 3.07 10E6/UL (ref 4.4–5.9)
SODIUM SERPL-SCNC: 138 MMOL/L (ref 133–144)
UFH PPP CHRO-ACNC: 0.12 IU/ML
UFH PPP CHRO-ACNC: 0.24 IU/ML
UFH PPP CHRO-ACNC: 0.38 IU/ML
WBC # BLD AUTO: 10.5 10E3/UL (ref 4–11)
WBC # BLD AUTO: 13.3 10E3/UL (ref 4–11)

## 2022-11-02 PROCEDURE — 120N000013 HC R&B IMCU

## 2022-11-02 PROCEDURE — 250N000009 HC RX 250: Performed by: PHYSICIAN ASSISTANT

## 2022-11-02 PROCEDURE — 36415 COLL VENOUS BLD VENIPUNCTURE: CPT | Performed by: SURGERY

## 2022-11-02 PROCEDURE — 250N000013 HC RX MED GY IP 250 OP 250 PS 637: Performed by: PHYSICIAN ASSISTANT

## 2022-11-02 PROCEDURE — 74230 X-RAY XM SWLNG FUNCJ C+: CPT

## 2022-11-02 PROCEDURE — 92526 ORAL FUNCTION THERAPY: CPT | Mod: GN | Performed by: SPEECH-LANGUAGE PATHOLOGIST

## 2022-11-02 PROCEDURE — 94640 AIRWAY INHALATION TREATMENT: CPT

## 2022-11-02 PROCEDURE — 99232 SBSQ HOSP IP/OBS MODERATE 35: CPT | Mod: 24

## 2022-11-02 PROCEDURE — 250N000013 HC RX MED GY IP 250 OP 250 PS 637

## 2022-11-02 PROCEDURE — 250N000011 HC RX IP 250 OP 636: Performed by: PHYSICIAN ASSISTANT

## 2022-11-02 PROCEDURE — 85027 COMPLETE CBC AUTOMATED: CPT | Performed by: PHYSICIAN ASSISTANT

## 2022-11-02 PROCEDURE — 36415 COLL VENOUS BLD VENIPUNCTURE: CPT | Performed by: PHYSICIAN ASSISTANT

## 2022-11-02 PROCEDURE — 83735 ASSAY OF MAGNESIUM: CPT | Performed by: SURGERY

## 2022-11-02 PROCEDURE — 85520 HEPARIN ASSAY: CPT | Performed by: PHYSICIAN ASSISTANT

## 2022-11-02 PROCEDURE — 250N000011 HC RX IP 250 OP 636: Performed by: THORACIC SURGERY (CARDIOTHORACIC VASCULAR SURGERY)

## 2022-11-02 PROCEDURE — 84100 ASSAY OF PHOSPHORUS: CPT | Performed by: SURGERY

## 2022-11-02 PROCEDURE — 80048 BASIC METABOLIC PNL TOTAL CA: CPT | Performed by: PHYSICIAN ASSISTANT

## 2022-11-02 PROCEDURE — 92611 MOTION FLUOROSCOPY/SWALLOW: CPT | Mod: GN | Performed by: SPEECH-LANGUAGE PATHOLOGIST

## 2022-11-02 PROCEDURE — 250N000013 HC RX MED GY IP 250 OP 250 PS 637: Performed by: INTERNAL MEDICINE

## 2022-11-02 PROCEDURE — 97530 THERAPEUTIC ACTIVITIES: CPT | Mod: GO | Performed by: OCCUPATIONAL THERAPIST

## 2022-11-02 PROCEDURE — 94640 AIRWAY INHALATION TREATMENT: CPT | Mod: 76

## 2022-11-02 PROCEDURE — 85520 HEPARIN ASSAY: CPT | Performed by: SURGERY

## 2022-11-02 RX ORDER — BARIUM SULFATE 400 MG/ML
5 SUSPENSION ORAL ONCE
Status: COMPLETED | OUTPATIENT
Start: 2022-11-02 | End: 2022-11-02

## 2022-11-02 RX ORDER — OLANZAPINE 5 MG/1
5-10 TABLET, ORALLY DISINTEGRATING ORAL EVERY 6 HOURS PRN
Status: DISCONTINUED | OUTPATIENT
Start: 2022-11-02 | End: 2022-11-08 | Stop reason: HOSPADM

## 2022-11-02 RX ORDER — FUROSEMIDE 10 MG/ML
40 INJECTION INTRAMUSCULAR; INTRAVENOUS ONCE
Status: COMPLETED | OUTPATIENT
Start: 2022-11-02 | End: 2022-11-02

## 2022-11-02 RX ORDER — RAMELTEON 8 MG/1
8 TABLET ORAL AT BEDTIME
Status: DISCONTINUED | OUTPATIENT
Start: 2022-11-02 | End: 2022-11-08 | Stop reason: HOSPADM

## 2022-11-02 RX ORDER — FUROSEMIDE 10 MG/ML
20 INJECTION INTRAMUSCULAR; INTRAVENOUS ONCE
Status: DISCONTINUED | OUTPATIENT
Start: 2022-11-02 | End: 2022-11-02

## 2022-11-02 RX ORDER — HALOPERIDOL 5 MG/ML
5 INJECTION INTRAMUSCULAR ONCE
Status: COMPLETED | OUTPATIENT
Start: 2022-11-02 | End: 2022-11-02

## 2022-11-02 RX ORDER — OLANZAPINE 10 MG/2ML
5-10 INJECTION, POWDER, FOR SOLUTION INTRAMUSCULAR EVERY 6 HOURS PRN
Status: DISCONTINUED | OUTPATIENT
Start: 2022-11-02 | End: 2022-11-08 | Stop reason: HOSPADM

## 2022-11-02 RX ORDER — GABAPENTIN 300 MG/1
300 CAPSULE ORAL 2 TIMES DAILY
Status: DISCONTINUED | OUTPATIENT
Start: 2022-11-02 | End: 2022-11-03

## 2022-11-02 RX ADMIN — GABAPENTIN 300 MG: 300 CAPSULE ORAL at 20:23

## 2022-11-02 RX ADMIN — METOPROLOL TARTRATE 100 MG: 100 TABLET, FILM COATED ORAL at 20:22

## 2022-11-02 RX ADMIN — NICOTINE 1 PATCH: 14 PATCH, EXTENDED RELEASE TRANSDERMAL at 09:55

## 2022-11-02 RX ADMIN — INSULIN ASPART 1 UNITS: 100 INJECTION, SOLUTION INTRAVENOUS; SUBCUTANEOUS at 12:44

## 2022-11-02 RX ADMIN — GABAPENTIN 300 MG: 300 CAPSULE ORAL at 12:44

## 2022-11-02 RX ADMIN — FUROSEMIDE 40 MG: 10 INJECTION, SOLUTION INTRAMUSCULAR; INTRAVENOUS at 09:55

## 2022-11-02 RX ADMIN — ACETAMINOPHEN 975 MG: 325 TABLET, FILM COATED ORAL at 09:48

## 2022-11-02 RX ADMIN — BARIUM SULFATE 25 ML: 400 SUSPENSION ORAL at 12:04

## 2022-11-02 RX ADMIN — ACETAMINOPHEN 975 MG: 325 TABLET, FILM COATED ORAL at 16:30

## 2022-11-02 RX ADMIN — HEPARIN SODIUM 2250 UNITS/HR: 10000 INJECTION, SOLUTION INTRAVENOUS at 16:29

## 2022-11-02 RX ADMIN — Medication 1 CAPSULE: at 09:48

## 2022-11-02 RX ADMIN — IPRATROPIUM BROMIDE AND ALBUTEROL SULFATE 3 ML: .5; 3 SOLUTION RESPIRATORY (INHALATION) at 15:38

## 2022-11-02 RX ADMIN — AMIODARONE HYDROCHLORIDE 400 MG: 200 TABLET ORAL at 09:48

## 2022-11-02 RX ADMIN — ASPIRIN 81 MG CHEWABLE TABLET 162 MG: 81 TABLET CHEWABLE at 09:48

## 2022-11-02 RX ADMIN — HEPARIN SODIUM 1950 UNITS/HR: 10000 INJECTION, SOLUTION INTRAVENOUS at 04:28

## 2022-11-02 RX ADMIN — OLANZAPINE 5 MG: 5 TABLET, ORALLY DISINTEGRATING ORAL at 19:19

## 2022-11-02 RX ADMIN — SENNOSIDES AND DOCUSATE SODIUM 1 TABLET: 8.6; 5 TABLET ORAL at 20:23

## 2022-11-02 RX ADMIN — HALOPERIDOL LACTATE 5 MG: 5 INJECTION, SOLUTION INTRAMUSCULAR at 20:54

## 2022-11-02 RX ADMIN — IPRATROPIUM BROMIDE AND ALBUTEROL SULFATE 3 ML: .5; 3 SOLUTION RESPIRATORY (INHALATION) at 08:12

## 2022-11-02 RX ADMIN — METOPROLOL TARTRATE 100 MG: 100 TABLET, FILM COATED ORAL at 09:47

## 2022-11-02 RX ADMIN — QUETIAPINE FUMARATE 50 MG: 50 TABLET ORAL at 09:47

## 2022-11-02 RX ADMIN — TAMSULOSIN HYDROCHLORIDE 0.8 MG: 0.4 CAPSULE ORAL at 20:22

## 2022-11-02 RX ADMIN — ATORVASTATIN CALCIUM 20 MG: 10 TABLET, FILM COATED ORAL at 20:23

## 2022-11-02 RX ADMIN — AMIODARONE HYDROCHLORIDE 400 MG: 200 TABLET ORAL at 20:22

## 2022-11-02 RX ADMIN — IPRATROPIUM BROMIDE AND ALBUTEROL SULFATE 3 ML: .5; 3 SOLUTION RESPIRATORY (INHALATION) at 19:37

## 2022-11-02 RX ADMIN — HALOPERIDOL LACTATE 5 MG: 5 INJECTION, SOLUTION INTRAMUSCULAR at 01:10

## 2022-11-02 RX ADMIN — PANTOPRAZOLE SODIUM 40 MG: 40 TABLET, DELAYED RELEASE ORAL at 09:47

## 2022-11-02 RX ADMIN — LIDOCAINE 2 PATCH: 560 PATCH PERCUTANEOUS; TOPICAL; TRANSDERMAL at 09:56

## 2022-11-02 RX ADMIN — ACETAMINOPHEN 650 MG: 325 TABLET, FILM COATED ORAL at 02:38

## 2022-11-02 RX ADMIN — HYDROXYZINE HYDROCHLORIDE 50 MG: 25 TABLET, FILM COATED ORAL at 00:33

## 2022-11-02 RX ADMIN — QUETIAPINE FUMARATE 50 MG: 50 TABLET ORAL at 20:22

## 2022-11-02 RX ADMIN — METHOCARBAMOL 750 MG: 750 TABLET ORAL at 04:25

## 2022-11-02 RX ADMIN — Medication 1 CAPSULE: at 20:23

## 2022-11-02 ASSESSMENT — ACTIVITIES OF DAILY LIVING (ADL)
ADLS_ACUITY_SCORE: 39
ADLS_ACUITY_SCORE: 37
ADLS_ACUITY_SCORE: 39
ADLS_ACUITY_SCORE: 37
ADLS_ACUITY_SCORE: 38
ADLS_ACUITY_SCORE: 37
ADLS_ACUITY_SCORE: 39

## 2022-11-02 NOTE — PROVIDER NOTIFICATION
Pt agitated, pulling tubes & aggressive to staffs. Placed pt on 4 point restraints for patient's safety. [facility] Received TO to ok for 4 point soft restraints.

## 2022-11-02 NOTE — CONSULTS
"      Initial Psychiatric Consult   Consult date: November 2, 2022         Reason for Consult, requesting source:    Ongoing delirium and agitation  Requesting source: Bennie Pierre    Labs and imaging reviewed.  Discussed with nursing.        HPI:   Richar Davis is a 67 year old male with a history of coronary artery disease who was admitted on 10/20/22 and underwent CAB and AVR, was extubated on 10/21 and became delirious on 10/22. He was re-intubated on 10/24, again extubated on 10/26 and has had ongoing delirium since. He has been agitated with physical aggression. Code 21 was called overnight and he is now in restraints. Psychiatry is consulted to assist with behavioral management.    I met with Richar in his room. His speech is quite garbled and difficult to understand. Sitter is at bedside, he is in 4 point restraints and I am told he tried to pull out NG tube earlier today. He is unable to provide any meaningful input in assessment. He is oriented to self and \"hospital\", though it took several attempts for him to understand the orientation questions. He is unable to state name of the hospital, his response is unintelligible. Thinks we are in \"[facility]\". Thinks it is \"10/20/22\" so he is oriented to year and is at least in the ballpark of today's date. He is unable to even attempt to state why he is in the hospital. He frequently fell asleep during assessment, became especially somnolent and unable to rouse to continue assessment.        Past Psychiatric History:   Limited assessment due to mental status; no psychiatric history per chart review.        Substance Use and History:   Unknown due to limited ability to participate in assessment. None per chart review.        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     BYPASS GRAFT ARTERY CORONARY, REPLACE VALVE AORTIC, COMBINED N/A 10/20/2022    Procedure: CORONARY ARTERY BYPASS GRAFT x 3 (LEFT INTERNAL MAMMARY ARTERY - LEFT ANTERIOR DESCENDING ARTERY; SAPHENOUS VEIN - POSTERIOR DESCENDING ARTERY; SAPHENOUS VEIN - CIRCUMFLEX ARTERY) WITH ENDOSCOPIC LESSER SAPHENOUS VEIN HARVEST ON BILATERAL LOWER EXTREMITY, AORTIC VALVE REPLACEMENT WITH TISSUE HEART VALVE INSPIRIS  RESILIA  AORTIC VALVE SIZE: 25MM, AND ON CARDIOPULMONARY PUMP OXYGENATOR  (     CTA ANGIOGRAM CORONARY ARTERY       ORTHOPEDIC SURGERY Left     elbow surgery             Family History:   FAMILY HISTORY: No family history on file.        Social History:   SOCIAL HISTORY:   Social History     Tobacco Use     Smoking status: Former     Smokeless tobacco: Current     Types: Chew     Tobacco comments:     daily   Substance Use Topics     Alcohol use: Not on file     Comment: rare     Unable to assess.         Physical ROS:   The 10 point Review of Systems is negative other than noted in the HPI or here.    He is unable to participate in ROS, unable to assess.         Medications:       acetaminophen  975 mg Oral Q8H     amiodarone  400 mg Oral BID     aspirin  162 mg Oral or NG Tube Daily     atorvastatin  20 mg Oral Daily     gabapentin  300 mg Oral BID     insulin aspart  1-10 Units Subcutaneous TID AC     insulin aspart  1-7 Units Subcutaneous At Bedtime     ipratropium - albuterol 0.5 mg/2.5 mg/3 mL  3 mL Nebulization 4x daily     lactobacillus rhamnosus (GG)  1 capsule Oral BID     lidocaine  1-2 patch Transdermal Q24H     lidocaine   Transdermal Q8H [facility]     metoprolol tartrate  100 mg Oral BID     nicotine  1 patch Transdermal Daily     nicotine   Transdermal Q8H     pantoprazole  40 mg Oral or NG Tube Daily    Or     pantoprazole  40 mg Oral Daily     polyethylene glycol  17 g Oral  Daily     QUEtiapine  50 mg Oral BID     senna-docusate  1 tablet Oral BID     sodium chloride (PF)  3 mL Intracatheter Q8H     sodium chloride (PF)  3 mL Intracatheter Q8H     tamsulosin  0.8 mg Oral QPM              Allergies:     Allergies   Allergen Reactions     Niaspan [Niacin] Other (See Comments)     flushing          Labs:     Recent Results (from the past 48 hour(s))   Glucose by meter    Collection Time: 10/31/22  1:00 PM   Result Value Ref Range    GLUCOSE BY METER POCT 136 (H) 70 - 99 mg/dL   Glucose by meter    Collection Time: 10/31/22  5:37 PM   Result Value Ref Range    GLUCOSE BY METER POCT 119 (H) 70 - 99 mg/dL   Glucose by meter    Collection Time: 10/31/22  9:05 PM   Result Value Ref Range    GLUCOSE BY METER POCT 151 (H) 70 - 99 mg/dL   Basic metabolic panel    Collection Time: 11/01/22  4:49 AM   Result Value Ref Range    Sodium 138 133 - 144 mmol/L    Potassium 4.3 3.4 - 5.3 mmol/L    Chloride 108 94 - 109 mmol/L    Carbon Dioxide (CO2) 29 20 - 32 mmol/L    Anion Gap 1 (L) 3 - 14 mmol/L    Urea Nitrogen 17 7 - 30 mg/dL    Creatinine 0.80 0.66 - 1.25 mg/dL    Calcium 9.4 8.5 - 10.1 mg/dL    Glucose 155 (H) 70 - 99 mg/dL    GFR Estimate >90 >60 mL/min/1.73m2   CBC with platelets    Collection Time: 11/01/22  4:49 AM   Result Value Ref Range    WBC Count 14.0 (H) 4.0 - 11.0 10e3/uL    RBC Count 2.96 (L) 4.40 - 5.90 10e6/uL    Hemoglobin 8.7 (L) 13.3 - 17.7 g/dL    Hematocrit 28.2 (L) 40.0 - 53.0 %    MCV 95 78 - 100 fL    MCH 29.4 26.5 - 33.0 pg    MCHC 30.9 (L) 31.5 - 36.5 g/dL    RDW 15.3 (H) 10.0 - 15.0 %    Platelet Count 167 150 - 450 10e3/uL   Heparin Unfractionated Anti Xa Level    Collection Time: 11/01/22  4:49 AM   Result Value Ref Range    Anti Xa Unfractionated Heparin 0.27 For Reference Range, See Comment IU/mL   Procalcitonin    Collection Time: 11/01/22  4:49 AM   Result Value Ref Range    Procalcitonin 0.12 (H) <0.05 ng/mL   Magnesium    Collection Time: 11/01/22  4:49 AM    Result Value Ref Range    Magnesium 2.3 1.6 - 2.3 mg/dL   Phosphorus    Collection Time: 11/01/22  4:49 AM   Result Value Ref Range    Phosphorus 2.8 2.5 - 4.5 mg/dL   Glucose by meter    Collection Time: 11/01/22  8:02 AM   Result Value Ref Range    GLUCOSE BY METER POCT 135 (H) 70 - 99 mg/dL   Glucose by meter    Collection Time: 11/01/22 11:51 AM   Result Value Ref Range    GLUCOSE BY METER POCT 136 (H) 70 - 99 mg/dL   Glucose by meter    Collection Time: 11/01/22  4:20 PM   Result Value Ref Range    GLUCOSE BY METER POCT 121 (H) 70 - 99 mg/dL   Glucose by meter    Collection Time: 11/01/22 11:15 PM   Result Value Ref Range    GLUCOSE BY METER POCT 124 (H) 70 - 99 mg/dL   CBC with platelets    Collection Time: 11/02/22 12:20 AM   Result Value Ref Range    WBC Count 13.3 (H) 4.0 - 11.0 10e3/uL    RBC Count 3.07 (L) 4.40 - 5.90 10e6/uL    Hemoglobin 9.4 (L) 13.3 - 17.7 g/dL    Hematocrit 29.1 (L) 40.0 - 53.0 %    MCV 95 78 - 100 fL    MCH 30.6 26.5 - 33.0 pg    MCHC 32.3 31.5 - 36.5 g/dL    RDW 15.6 (H) 10.0 - 15.0 %    Platelet Count 174 150 - 450 10e3/uL   Basic metabolic panel    Collection Time: 11/02/22  5:37 AM   Result Value Ref Range    Sodium 138 133 - 144 mmol/L    Potassium 4.2 3.4 - 5.3 mmol/L    Chloride 106 94 - 109 mmol/L    Carbon Dioxide (CO2) 27 20 - 32 mmol/L    Anion Gap 5 3 - 14 mmol/L    Urea Nitrogen 19 7 - 30 mg/dL    Creatinine 0.84 0.66 - 1.25 mg/dL    Calcium 9.8 8.5 - 10.1 mg/dL    Glucose 155 (H) 70 - 99 mg/dL    GFR Estimate >90 >60 mL/min/1.73m2   CBC with platelets    Collection Time: 11/02/22  5:37 AM   Result Value Ref Range    WBC Count 10.5 4.0 - 11.0 10e3/uL    RBC Count 2.97 (L) 4.40 - 5.90 10e6/uL    Hemoglobin 8.8 (L) 13.3 - 17.7 g/dL    Hematocrit 28.6 (L) 40.0 - 53.0 %    MCV 96 78 - 100 fL    MCH 29.6 26.5 - 33.0 pg    MCHC 30.8 (L) 31.5 - 36.5 g/dL    RDW 15.7 (H) 10.0 - 15.0 %    Platelet Count 161 150 - 450 10e3/uL   Heparin Unfractionated Anti Xa Level    Collection  "Time: 11/02/22  5:37 AM   Result Value Ref Range    Anti Xa Unfractionated Heparin 0.12 For Reference Range, See Comment IU/mL   Magnesium    Collection Time: 11/02/22  5:37 AM   Result Value Ref Range    Magnesium 2.5 (H) 1.6 - 2.3 mg/dL   Phosphorus    Collection Time: 11/02/22  5:37 AM   Result Value Ref Range    Phosphorus 3.0 2.5 - 4.5 mg/dL   Glucose by meter    Collection Time: 11/02/22  9:44 AM   Result Value Ref Range    GLUCOSE BY METER POCT 123 (H) 70 - 99 mg/dL          Physical and Psychiatric Examination:     /51 (BP Location: Left arm)   Pulse 87   Temp 98.7  F (37.1  C) (Axillary)   Resp 25   Ht 1.803 m (5' 11\")   Wt 125.4 kg (276 lb 7.3 oz)   SpO2 95%   BMI 38.56 kg/m    Weight is 276 lbs 7.31 oz  Body mass index is 38.56 kg/m .    Physical Exam:  I have reviewed the physical exam as documented by by the medical team and agree with findings and assessment and have no additional findings to add at this time.    Mental Status Exam:    Appearance: laying in bed, in 4 point restraints. Wearing hospital gown.  Attitude:  He attempts to participate but is unable to provide any meaningful information  Eye Contact:  poor   Mood:  GOMEZ  Affect:  blunted  Speech:  garbled, incoherent speech  Psychomotor Behavior:  no evidence of tardive dyskinesia, dystonia, or tics and assessment limited due to physical restraints  Thought Process:  GOMEZ  Associations:  GOMEZ  Thought Content:  GOMEZ  Insight:  partial  Judgement:  poor  Oriented to:  Self and \"hospital\"  Attention Span and Concentration:  poor  Recent and Remote Memory:  GOMEZ                 DSM-5 Diagnosis:   Delirium, ongoing          Assessment:   Richar Davis is a 67 year old male who presents with ongoing delirium. He has been physically aggressive toward staff and has been attempted to pull at lines/NG tube. Has been receiving quetiapine 50mg BID and haloperidol IV PRN for agitation. QTc was 542 on 10/25, down to 467 on 10/26. " Electrolytes have been okay, magnesium slightly elevated at 2.5. Recommend repeat EKG to continue to monitor QTc. Haloperidol IV is particularly prone to prolonging QTc, so I will discontinue this in favor of olanzapine ODT or IM. Per nursing/sitter, he has not been sleeping well- intermittent and restless. Will start ramelteon nightly for sleep. So long as QTc remains below 500, could increase bedtime dose of quetiapine to 100mg to see if this helps improve sleep as well.          Summary of Recommendations:   1. Discontinued IV haloperidol due to increased risk of torsades de pointes with prolonged QTc    2. Olanzapine 5-10mg ODT or IM every 6 hours as needed for agitation    3. Recommend repeat EKG to monitor QTc    4. Ramelteon 8mg nightly for sleep    5. Delirium precautions:    Up during the day with lights on, blinds open    Lights off at night, avoid interruptions during the night as much as possible    Family visits, mementos from home (family photos, favorite blanket, ect) can be helpful    Encourage use of sensory aids (hearing aid, eyeglasses)    Frequent reorientation    Avoid opioids, benzodiazepines, anticholinergics if possible      Continue to ensure proper nutrition, fluid and electrolyte balance. Monitor for infections, hypoxia, metabolic derangements, or other causes of delirium.     6. It appears he was taking gabapentin 800mg TID at home, gabapentin is not ordered at all currently. Recommend restarting as sudden discontinuation can cause withdrawal and potentially worsen or cause delirium.    7. Could increase quetiapine to 100mg nightly so long as QTc remains below 500ms, electrolytes WNL.    8. Please reconsult psychiatry as needed      ADONIS [facility]    Consult/Liaison Psychiatry   [facility]    Contact information available via [facility] Paging/Directory  If I am not available, then [facility] CL line ([phone]) should know who is covering our consult service.

## 2022-11-02 NOTE — PROVIDER NOTIFICATION
MD Notification    Notified Person: MD    Notified Person Name: Dr. Ramires    Notification Date/Time: 11/2/22 at 0130    Notification Interaction: amcom    Purpose of Notification: Pt was given haldol for agitation with no effect. He is swinging at staff, trying to leave, and trying to pull out NG and IV. Can we get orders for restraints?    Comments: Hospitalist not following. Paging CV surgery

## 2022-11-02 NOTE — PROGRESS NOTES
0110h: Pt is agitated, doesn't know that he is in the hospital. Re-oriention, distraction & medication not effective. Pt tried pulling his tubings & shown aggression to staffs. Called security & applied 4 point restraints for both patient & staffs safety.

## 2022-11-02 NOTE — PROGRESS NOTES
CALORIE COUNT      Approximate Oral Intake for:    11/1/22  Calories:  1149 kcal   Protein:  37 grams       Intake from TF/PN:       Patient continues on nocturnal TF of Promote with Fiber at 100 mL/hr x 12 hours (8 pm to 8 am) = 1200 kcal, 74 g protein, 166 g CHO, 17 g fiber, 997 mL H2O    Total (Oral + TF)= 2349 kcal (20 kcal/kg and 115% needs), 111 g protein (1.4 g/kg and 97% needs)    Estimated Needs:    Calories:  9863-1008 kcal (14-17 kcal/kg) - Obese   Protein: 115-155 g protein (1.5-2 g/kg) - Post-op     Roxi Johns, RD, LD, CNSC   Clinical Dietitian - [facility]

## 2022-11-02 NOTE — PLAN OF CARE
[facility]- Alert and oriented to self only. Vital signs stable on room air ex soft BP at times. Assist of 2 GBW. Tolerating soft and bite size diet with mildly thickened liquids. Lung sounds diminished. Bowel sounds active, passing flatus, incontinent of bowel x1 during shift, hernandez in place, adequate urine output. Sternal incision CDI, harvest sites ecchymotic, old CT sites CDI. Pain managed with scheduled tylenol. Denies nausea. Tele SR. Heparin infusing at 2250 units/hr, recheck for 2215. Sitter at bedside throughout shift. 4 point soft restraints removed approx 1000.

## 2022-11-02 NOTE — PROGRESS NOTES
[facility]  Cardiovascular and Thoracic Surgery Daily Note        Assessment and Plan  POD # 13 s/p aortic valve replacement with a 25 mm Bla Inspiris Resilia bovine pericardial valve, coronary artery bypass grafting x 3 with left internal mammary artery to left anterior descending, reverse lesser saphenous vein graft to the posterior descending artery, reverse left lesser saphenous vein graft to the distal circumflex marginal artery, endoscopic bilateral lesser saphenous vein graft harvest from below the knees on 10/20 with Dr. Bennie Pierre     - CVS: Pre-op TTE with preserved biventricular function. Immediate postop profound vasoplegia and acidosis requiring multiple high dose pressors, bicarb infusion, and volume resuscitation. Weaned off pressors by POD1, lactate normalized. BP labile with agitation and ongoing need for sedation. Intermittent low dose NE- sedation related. New onset afib RVR 10/22; amiodarone bolus and infusion, back in NSR, transition to PO amio with taper, recurrent afib restarted on amio infusion, RVR a.fib 10/27-1 dose IV metoprolol trialed with no success, amio restarted converted to NSR at 1849. Will need anticoagulation for PAF- discussed with Dr. Pierre now that chest tubes and pacer wires removed will start heparin gtt with no loading dose or no bolus. Aspirin 162 mg daily, atorvastatin 20 mg daily. Metoprolol increased to 100mg BID 11/1. Chest tubes and temporary pacing wires removed on 10/27. CXR prior to chest tube removal with worsening pulmonary edema, diuresing, Repeat CXR 10/28 with improving pulmonary edema. Remains hypervolemic--low BP with 40mg lasix IV BID yesterday.  will continue gentle diuresis -- IV 40 mg IV lasix once this am     - Resp: Extubated POD1. IS, pulmonary toilet. Acute hypoxemic respiratory failure, increased WOB and inability to protect airway due to delirium, discussed with Dr. Pagan and Dr. Pierre, reintubated 10/24. Re extubated  "10/26/22. 11/02 sating well on room air. Discontinued use of CPAP at night.   -Significant amount amount of secretions overnight into am 10/28,requiring frequent suctioning. Given one dose glycopyrrolate on 10/28. Continued improvement of secretions today.     - Neuro: Post-extubation, repeating \"I need help\" over and over, but able to answer question appropriately, redirect, and no focal physical deficits. Worsening agitation 10/22 despite Seroquel, ativan, and Haldol. Placed in restraints due to swinging at staff, pulling at lines/tubes- Started on Precedex and Zyprexa with some improvement- since discontinued. Chronic pain at baseline. Minimize narcotics with agitation. PTA on gabapentin 800 mg TID, resumed 300 mg TID post-operatively but khoi discontinued on 11/01. Will start khoi taper and restarted khoi 300mg BID on 11/02 due to chronic use will need taper to prevent withdrawal. Head CT negative 10/24/22.  improved alertness and mentation 10/30 afternoon. Oxycodone discontinued 10/30, decreasing robaxin to BID instead of QID-- continue to encourage up to chair and wakefulness during the day.  Agitated again overnight-- delirious again this am. Rectal tube removed and scratching bottom and moving stool all over 10/31, improved this am.    - Patient had security called on him overnight 11/02 and was placed into 4-pt restraints as was swinging at staff and pulling at all lines and tubes. Psych consulted today, thanks for recs and help.      - Renal: No history of significant renal disease. Cr stable WNL. Trend BMP. Remains about 5kg up from preoperative weight. Diuretic as above. BP got soft with Lasix yesterday so will watch today and continue gentle diuresis today with Lasix 40 mg IV once on 11/02  Creatinine   Date Value Ref Range Status   11/02/2022 0.84 0.66 - 1.25 mg/dL Final        - GI: rectal tube removed today, incontinent of stool.      - : Mccarty in place, continue today. Mccarty removed on 10/27, " urinary retention requiring replacement when bladder scanned for about 1L.  History urinary retention, PTA Flomax 0.8 mg daily resumed today     - Endo: DMT2. Insulin infusion transitioned to SSI. PTA metformin on hold.          Hemoglobin A1C   Date Value Ref Range Status   09/14/2022 5.6 0.0 - 5.6 % Final       Comment:       Normal <5.7%   Prediabetes 5.7-6.4%    Diabetes 6.5% or higher     Note: Adopted from ADA consensus guidelines.         - FEN: Replace electrolytes as needed. SLP following and diet advanced on 10/29- will continue TF and consider calorie counts when appropriate.      - ID: Postop Leukocytosis, increased to 14.6 10/28, up from 8.8 on 10/27--14.1 today.  Afebrile.  Periop abx complete. Trend CBC, fever curve. On Vanc/Zosyn for suspected aspiration pneumonia, started on 10/24 will complete for full 7 days. UA and repeat CXR ordered 10/28. UA negative except for moderate amount of blood and repeat CXR with improving infiltrate and edema. Leukocytosis improved and wnl today--completed 7 days IV abx 11/01, procal 0.12<0.14 down-trending.   WBC Count   Date Value Ref Range Status   11/02/2022 10.5 4.0 - 11.0 10e3/uL Final     - Heme: Acute blood loss anemia and thrombocytopenia due to surgery. Aspirin and subcutaneous heparin resumed 10/24 with improved PLT count. Trend CBC, transfuse PRN.  Will need anticoagulation for PAF. Heparin gtt initiated 10/28- will discuss with surgeon regarding timing for oral anticoagulation once patient oral intake is reliable     - Proph: SCD, heparin gtt, PPI     - Dispo: [address], continue cares with sitter due to delirium and psych consulted this morning to assist with cares and for recs. Continue therapies as able.          Interval History  Laying in bed in 4-pt restraints in place this morning, able to come off once wife arrived as she helps re-direct patient. Earlier in the morning was asking for them to be removed. Patient still having delirium today and has  "hard time relaxing. Sitter still present this morning. Work with therapies as able. Breathing now stable on room air.      Medications    acetaminophen  975 mg Oral Q8H     [Held by provider] aspirin  162 mg Oral or NG Tube Daily     atorvastatin  20 mg Oral Daily     gabapentin  300 mg Oral or G tube TID     [Held by provider] heparin ANTICOAGULANT  5,000 Units Subcutaneous Q8H     insulin aspart  1-10 Units Subcutaneous TID AC     insulin aspart  1-7 Units Subcutaneous At Bedtime     lactated ringers  250 mL Intravenous Once     lidocaine  1-2 patch Transdermal Q24H     lidocaine   Transdermal Q8H MITA     methocarbamol  750 mg Oral 4x Daily     nicotine  1 patch Transdermal Daily     nicotine   Transdermal Q8H     pantoprazole  40 mg Oral or NG Tube Daily     Or     pantoprazole  40 mg Oral Daily     polyethylene glycol  17 g Oral Daily     QUEtiapine  25 mg Oral BID     senna-docusate  1 tablet Oral BID     sodium chloride (PF)  3 mL Intracatheter Q8H     tamsulosin  0.8 mg Oral QPM      acetaminophen, bisacodyl, calcium gluconate, calcium gluconate, calcium gluconate, dextrose, glucose **OR** dextrose **OR** glucagon, haloperidol lactate, hydrALAZINE, HYDROmorphone **OR** HYDROmorphone, hydrOXYzine, ketorolac, lidocaine 4%, lidocaine (buffered or not buffered), magnesium hydroxide, metoprolol, naloxone **OR** naloxone **OR** naloxone **OR** naloxone, nitroGLYcerin, ondansetron **OR** ondansetron, oxyCODONE **OR** oxyCODONE, phenylephrine, prochlorperazine **OR** prochlorperazine, sodium chloride, sodium chloride (PF)        Physical Exam  Vitals were reviewed  /51 (BP Location: Left arm)   Pulse 87   Temp 98.7  F (37.1  C) (Axillary)   Resp 25   Ht 1.803 m (5' 11\")   Wt 125.4 kg (276 lb 7.3 oz)   SpO2 95%   BMI 38.56 kg/m       Rhythm: NSR     Lungs: diminished bases      Cardiovascular: RRR, no m/r/g      Abdomen: s/nt/nd, +BS     Extremeties: warm, 1+ LE edema      Incision: c/d/i      CT: " n/a     Weight:          Vitals:     10/20/22 0600 10/21/22 0600 10/22/22 0250 10/23/22 0200   Weight: 120.2 kg (265 lb) 124.6 kg (274 lb 11.1 oz) 124 kg (273 lb 5.9 oz) 126.6 kg (279 lb 1.6 oz)            Data                Recent Labs   Lab 10/24/22  0434 10/24/22  0336 10/24/22  0019 10/23/22  0743 10/23/22  0351 10/22/22  1657 10/22/22  1403 10/20/22  1528 10/20/22  1526 10/20/22  1324 10/20/22  1321   WBC  --  6.9  --   --  7.5  --  8.4   < > 29.3*  --  22.4*   HGB  --  8.8*  --   --  9.2*  --  9.5*   < > 14.2   < > 11.5*   MCV  --  94  --   --  93  --  93   < > 93  --  93   PLT  --  84*  --   --  57*  --  54*   < > 158  --  136*   INR  --   --   --   --   --   --   --   --  1.31*  --  1.48*   NA  --  141  --   --  138  --  137   < > 141   < > 138   POTASSIUM  --  4.3  --   --  4.3  --  4.7   < > 3.7   < > 3.8   CHLORIDE  --  110*  --   --  108  --  107   < > 109  --  109   CO2  --  27  --   --  26  --  28   < > 17*  --  23   BUN  --  33*  --   --  27  --  22   < > 13  --  12   CR  --  1.16  --   --  1.11  --  1.08   < > 0.97  --  0.85   ANIONGAP  --  4  --   --  4  --  2*   < > 15*  --  6   HALEY  --  8.8  --   --  9.2  --  9.1   < > 8.5  --  9.5   * 126* 131*   < > 136*   < > 137*   < > 229*   < > 182*   ALBUMIN  --  2.4*  --   --  2.5*  --   --    < > 3.1*  --   --    PROTTOTAL  --  5.3*  --   --  5.3*  --   --    < > 5.5*  --   --    BILITOTAL  --  0.8  --   --  0.6  --   --    < > 0.6  --   --    ALKPHOS  --  64  --   --  60  --   --    < > 84  --   --    ALT  --  19  --   --  20  --   --    < > 25  --   --    AST  --  35  --   --  48*  --   --    < > 68*  --   --     < > = values in this interval not displayed.         Imaging:      Recent Results (from the past 24 hour(s))   XR Abdomen Port 1 View     Narrative     EXAM: XR ABDOMEN PORT 1 VIEW  LOCATION: [facility]  DATE/TIME: 10/23/2022 11:04 AM     INDICATION: advanced NGT, needs to be post pyloric for  feeding  COMPARISON: 10/22/2022 at 1542 hours.        Impression     IMPRESSION: Feeding tube tip is at the gastroduodenal junction and is likely postpyloric, however this is not definitive on this study. There is redundancy in the tube and it will likely progress distally on its own. Chest tubes present. Cholelithiasis.   Normal bowel gas pattern.            Patient seen and discussed with Dr. Shoshana Gipson PAAshleighC   Cardiothoracic Surgery  Pager [phone]

## 2022-11-02 NOTE — PLAN OF CARE
Patient is A&Ox1. Disoriented to time, situation, place. Vitals are stable on room air. Breath sounds are clear and diminished with crackles in left lower lobe. Tele NSR. Patient became agitated and aggressive and was placed on 4 point soft restraints. Patient has sitter due to pulling in NJ tube and urinary catheter. Patient is up with assist of 2, walker, and gait belt. Ambulated to chair 1x during shift. Sleep Quality is poor. Patient slept very minimally on shift. Plan is to continue monitoring necessity for restraints, reorient client to time, situation, and place, and avoid narcotic medications for management of pain in lower back.

## 2022-11-02 NOTE — PROGRESS NOTES
SLP VFSS Report 11/02/22 [id number]   Appointment Info   Signing Clinician's Name / Credentials (SLP) Briana Bangura MA [facility] SLP   General Information   Onset of Illness/Injury or Date of Surgery 10/20/22   Referring Physician Dr. Pagan   Patient/Family Therapy Goal Statement (SLP) P distracted, unable to focus on goals.   Pertinent History of Current Problem Dx: CABG 10/20-extubated 10/21.  Re-intubated 10/24 - 10/26 pm.   General Observations Pt was alert, but disoriented and restless.  Pt required mod-max cues to swallow during the study which limited number of trials presented and pt's ability to attempt strategies.   General Swallowing Observations   Swallowing Evaluation Videofluoroscopic swallow study (VFSS)   VFSS Evaluation   Radiologist Dr. Buchanan   Views Taken left lateral;A/P   Physical Location of Procedure [facility]   VFSS Textures Trialed thin liquids;mildly thick liquids;pureed;soft & bite-sized   VFSS Eval: Thin Liquid Texture Trial   Mode of Presentation, Thin Liquid spoon;cup;straw   Order of Presentation 3, 4, 5, 6, 7   Preparatory Phase poor bolus control;holding   Oral Phase, Thin Liquid premature pharyngeal entry;residue in oral cavity;impaired AP movement   Bolus Location When Swallow Triggered valleculae   Pharyngeal Phase, Thin Liquid impaired hyolaryngeal excursion;impaired epiglottic movement;impaired tongue base retraction   Rosenbek's Penetration Aspiration Scale: Thin Liquid Trial Results 3 - contrast remains above the vocal cords, visible residue remains (penetration)   Diagnostic Statement Mild residue increased to mild-moderate residue due to swallow of oral residue off recording, impulsive large sips with poor attention/oral awareness, mod-max cues needed to swallow, flash penetration by tsp, slight penetration with residual  by cup and straw, pt unable to follow cues to use a chin tuck consistently   VFSS Eval: Mildly Thick Liquids   Mode of Presentation spoon;cup   Order of  Presentation 1, 2, 10   Preparatory Phase poor bolus control;anterior loss of bolus;holding;prolonged bolus preparation   Oral Phase residue in oral cavity;premature pharyngeal entry;impaired AP movement   Bolus Location When Swallow Triggered valleculae   Pharyngeal Phase impaired hyolaryngel excursion;impaired epiglottic movement;impaired tongue base retraction   Rosenbek's Penetration Aspiration Scale 1 - no aspiration, contrast does not enter airway   Diagnostic Statement Mild residue increased to mild-moderate residue due to swallow of oral residue off recording,  impulsive large sips with poor attention/oral awareness, mod-max cues needed to swallow at times   VFSS Evaluation: Puree Solid Texture Trial   Mode of Presentation, Puree spoon   Order of Presentation 8, 11 - AP view   Preparatory Phase poor bolus control;prolonged bolus preparation   Oral Phase, Puree premature pharyngeal entry;residue in oral cavity;impaired AP movement   Bolus Location When Swallow Triggered posterior angle of ramus   Pharyngeal Phase, Puree impaired hyolaryngel excursion;impaired epiglottic movement;impaired tongue base retraction   Rosenbek's Penetration Aspiration Scale: Puree Food Trial Results 1 - no aspiration, contrast does not enter airway   Diagnostic Statement Mild residue increased to mild-moderate residue due to swallow of oral residue,  mod-max cues needed to swallow at times; start of AP view bilateral residue valleculae, increased residue in right pyriform, during swallow of pudding trial residue in pyriforms was bilateral, slight decreased pharyngeal wall movement on right   VFSS Eval: Soft & Bite Sized   Mode of Presentation spoon   Order of presentation 9   Preparatory Phase poor bolus control;prolonged bolus preparation   Oral Phase premature phrayngeal entry;residue in oral cavity;impaired AP movement   Bolus Location When Swallow Triggered valleculae   Pharyngeal Phase impaired hyolaryngel excursion;impaired  epiglottic movement;impaired tongue base retraction   Rosenbek's Penetration Aspiration Scale 1 - no aspiration, contrast does not enter airway   Diagnostic Statement Moderate-severe residue, mod-max cues needed to swallow, pt distracted, alternating to mildly thick by cup decreased residue to mild-moderate levels, but significant oral residue still noted   Swallowing Recommendations   Diet Consistency Recommendations soft & bite-sized (level 6);mildly thick liquids (level 2)   Supervision Level for Intake 1:1 supervision needed   Mode of Delivery Recommendations no straws;bolus size, small;slow rate of intake   Swallowing Maneuver Recommendations alternate food and liquid intake;double dry swallow;effortful (hard) swallow;extra swallow   Monitoring/Assistance Required (Eating/Swallowing) monitor for cough or change in vocal quality with intake;check mouth frequently for oral residue/pocketing   Recommended Feeding/Eating Techniques (Swallow Eval) maintain upright posture during/after eating for 30 minutes   Medication Administration Recommendations, Swallowing (SLP) Crushed meds with puree or mildly thick, extra swallows   Comment, Swallowing Recommendations Verify/cue swallows, decrease distractions, ok for single ice chips   Clinical Impression   Criteria for Skilled Therapeutic Interventions Met (SLP Eval) Yes, treatment indicated   SLP Diagnosis Moderate oral-pharyngeal dysphagia, increased aspiration risk due to confusion   Risks & Benefits of therapy have been explained evaluation/treatment results reviewed;risks/benefits reviewed;care plan/treatment goals reviewed;current/potential barriers reviewed;participants voiced agreement with care plan;participants included;patient   Clinical Impression Comments Moderate oral-pharyngeal dysphagia was observed during today's VFSS.  Pt's level of confusion, restlessness, and poor awareness also significantly impact swallow safety.  Findings include decreased oral motor  control and AP movement, delayed swallows, decreased base of tongue function and hyoid elevation, and decreased epiglottic inversion/closure.  Deficits resulted in mild-mod to mod-severe pharyngeal residue, flash penetration of thin by tsp, and mild penetration with slight residual of thin by cup and straw.  Pt was not able to execute safe swallow strategies during the study due to cognitive status.  Recommend a diet downgrade back to an IDDSI Diet Level 6 soft and bite size with mildly thick liquids, 1:1 supervision/assist, increased cues to use safe swallow strategies, and single ice chips with supervision.  Hold po if increased aspiration signs are noted and/or respiratory status declines. Plan to continue swallow Tx to assess diet tolerance, train strategies, and complete exercises.  Will trial po upgrades as indicated in Tx.  Suspect safety for po upgrades will improve if cognitive deficits resolve.   SLP Total Evaluation Time   Eval: oral/pharyngeal swallow function, clinical swallow Minutes (82439) 15   SLP Goals   SLP Predicted Duration/Target Date for Goal Attainment 11/08/22   Swallowing Dysfunction &/or Oral Function for Feeding   Treatment of Swallowing Dysfunction &/or Oral Function for Feeding Minutes (50582) 10   Symptoms Noted During/After Treatment   (Restless, poor awareness, and orientation)   Treatment Detail/Skilled Intervention Swallow: Educated pt and nursing regarding current deficits and recommended soft and bite size diet textures, continue mildly thick liquids, ok for single ice chips, increased use of safe swallow strategies.  Pt will need continued education.  Pt was able to cough on command given mod cues in Tx.  Min-no wet quality noted.   SLP Discharge Planning   SLP Plan SLP - meal f/u, train strategies, exercises   SLP Discharge Recommendation Acute [facility]hab [facility]-Motivated patient will benefit from intensive, interdisciplinary therapy.  Anticipate will be able to tolerate 3 hours of  therapy per day;Transitional Care Facility;home with assist;home with home care speech therapy   SLP Rationale for DC Rec swallow and cognitive function are below baseline, 24 hour assist/supervision after discharge   SLP Brief overview of current status  Moderate oral-pharyngeal dysphagia, increased confusion and restlessness are impacting swallow safety/function at this time; Rec: downgrade back to an IDDSI Diet Level 6 soft and bite size with mildly thick liquids, 1:1 supervision/assist, increased cues to use safe swallow strategies, and single ice chips with supervision.  Hold po if increased aspiration signs are noted and/or respiratory status declines.   Total Session Time   Total Session Time (sum of timed and untimed services) 25

## 2022-11-03 ENCOUNTER — APPOINTMENT (OUTPATIENT)
Dept: SPEECH THERAPY | Facility: CLINIC | Age: 67
DRG: 219 | End: 2022-11-03
Attending: SURGERY
Payer: COMMERCIAL

## 2022-11-03 ENCOUNTER — APPOINTMENT (OUTPATIENT)
Dept: OCCUPATIONAL THERAPY | Facility: CLINIC | Age: 67
DRG: 219 | End: 2022-11-03
Attending: SURGERY
Payer: COMMERCIAL

## 2022-11-03 LAB
ALBUMIN UR-MCNC: 20 MG/DL
ANION GAP SERPL CALCULATED.3IONS-SCNC: 3 MMOL/L (ref 3–14)
APPEARANCE UR: CLEAR
BILIRUB UR QL STRIP: NEGATIVE
BUN SERPL-MCNC: 19 MG/DL (ref 7–30)
CALCIUM SERPL-MCNC: 9.6 MG/DL (ref 8.5–10.1)
CHLORIDE BLD-SCNC: 106 MMOL/L (ref 94–109)
CO2 SERPL-SCNC: 28 MMOL/L (ref 20–32)
COLOR UR AUTO: YELLOW
CREAT SERPL-MCNC: 0.87 MG/DL (ref 0.66–1.25)
ERYTHROCYTE [DISTWIDTH] IN BLOOD BY AUTOMATED COUNT: 15.6 % (ref 10–15)
GFR SERPL CREATININE-BSD FRML MDRD: >90 ML/MIN/1.73M2
GLUCOSE BLD-MCNC: 144 MG/DL (ref 70–99)
GLUCOSE BLDC GLUCOMTR-MCNC: 108 MG/DL (ref 70–99)
GLUCOSE BLDC GLUCOMTR-MCNC: 131 MG/DL (ref 70–99)
GLUCOSE BLDC GLUCOMTR-MCNC: 155 MG/DL (ref 70–99)
GLUCOSE UR STRIP-MCNC: NEGATIVE MG/DL
HCT VFR BLD AUTO: 27.1 % (ref 40–53)
HGB BLD-MCNC: 8.6 G/DL (ref 13.3–17.7)
HGB UR QL STRIP: NEGATIVE
KETONES UR STRIP-MCNC: NEGATIVE MG/DL
LEUKOCYTE ESTERASE UR QL STRIP: ABNORMAL
MAGNESIUM SERPL-MCNC: 2.5 MG/DL (ref 1.6–2.3)
MCH RBC QN AUTO: 30.2 PG (ref 26.5–33)
MCHC RBC AUTO-ENTMCNC: 31.7 G/DL (ref 31.5–36.5)
MCV RBC AUTO: 95 FL (ref 78–100)
MUCOUS THREADS #/AREA URNS LPF: PRESENT /LPF
NITRATE UR QL: NEGATIVE
PH UR STRIP: 7 [PH] (ref 5–7)
PHOSPHATE SERPL-MCNC: 3.4 MG/DL (ref 2.5–4.5)
PLATELET # BLD AUTO: 152 10E3/UL (ref 150–450)
POTASSIUM BLD-SCNC: 4.8 MMOL/L (ref 3.4–5.3)
RBC # BLD AUTO: 2.85 10E6/UL (ref 4.4–5.9)
RBC URINE: 8 /HPF
SODIUM SERPL-SCNC: 137 MMOL/L (ref 133–144)
SP GR UR STRIP: 1.02 (ref 1–1.03)
UFH PPP CHRO-ACNC: 0.36 IU/ML
UROBILINOGEN UR STRIP-MCNC: NORMAL MG/DL
WBC # BLD AUTO: 11.4 10E3/UL (ref 4–11)
WBC URINE: 5 /HPF

## 2022-11-03 PROCEDURE — 94640 AIRWAY INHALATION TREATMENT: CPT

## 2022-11-03 PROCEDURE — 250N000013 HC RX MED GY IP 250 OP 250 PS 637: Performed by: PHYSICIAN ASSISTANT

## 2022-11-03 PROCEDURE — 81001 URINALYSIS AUTO W/SCOPE: CPT | Performed by: PHYSICIAN ASSISTANT

## 2022-11-03 PROCEDURE — 92526 ORAL FUNCTION THERAPY: CPT | Mod: GN | Performed by: SPEECH-LANGUAGE PATHOLOGIST

## 2022-11-03 PROCEDURE — 250N000011 HC RX IP 250 OP 636: Performed by: PHYSICIAN ASSISTANT

## 2022-11-03 PROCEDURE — 250N000013 HC RX MED GY IP 250 OP 250 PS 637: Performed by: INTERNAL MEDICINE

## 2022-11-03 PROCEDURE — 250N000013 HC RX MED GY IP 250 OP 250 PS 637

## 2022-11-03 PROCEDURE — 94640 AIRWAY INHALATION TREATMENT: CPT | Mod: 76

## 2022-11-03 PROCEDURE — 999N000157 HC STATISTIC RCP TIME EA 10 MIN

## 2022-11-03 PROCEDURE — 120N000013 HC R&B IMCU

## 2022-11-03 PROCEDURE — 250N000009 HC RX 250: Performed by: PHYSICIAN ASSISTANT

## 2022-11-03 PROCEDURE — 85027 COMPLETE CBC AUTOMATED: CPT | Performed by: PHYSICIAN ASSISTANT

## 2022-11-03 PROCEDURE — 36415 COLL VENOUS BLD VENIPUNCTURE: CPT | Performed by: PHYSICIAN ASSISTANT

## 2022-11-03 PROCEDURE — 84100 ASSAY OF PHOSPHORUS: CPT | Performed by: SURGERY

## 2022-11-03 PROCEDURE — 85520 HEPARIN ASSAY: CPT | Performed by: SURGERY

## 2022-11-03 PROCEDURE — 80048 BASIC METABOLIC PNL TOTAL CA: CPT | Performed by: PHYSICIAN ASSISTANT

## 2022-11-03 PROCEDURE — 97530 THERAPEUTIC ACTIVITIES: CPT | Mod: GO | Performed by: OCCUPATIONAL THERAPIST

## 2022-11-03 PROCEDURE — 83735 ASSAY OF MAGNESIUM: CPT | Performed by: SURGERY

## 2022-11-03 RX ORDER — FUROSEMIDE 10 MG/ML
20 INJECTION INTRAMUSCULAR; INTRAVENOUS ONCE
Status: COMPLETED | OUTPATIENT
Start: 2022-11-03 | End: 2022-11-03

## 2022-11-03 RX ORDER — GABAPENTIN 300 MG/1
300 CAPSULE ORAL 3 TIMES DAILY
Status: DISCONTINUED | OUTPATIENT
Start: 2022-11-03 | End: 2022-11-08 | Stop reason: HOSPADM

## 2022-11-03 RX ADMIN — ASPIRIN 81 MG CHEWABLE TABLET 162 MG: 81 TABLET CHEWABLE at 08:49

## 2022-11-03 RX ADMIN — GABAPENTIN 300 MG: 300 CAPSULE ORAL at 22:46

## 2022-11-03 RX ADMIN — GABAPENTIN 300 MG: 300 CAPSULE ORAL at 08:49

## 2022-11-03 RX ADMIN — IPRATROPIUM BROMIDE AND ALBUTEROL SULFATE 3 ML: .5; 3 SOLUTION RESPIRATORY (INHALATION) at 11:38

## 2022-11-03 RX ADMIN — TAMSULOSIN HYDROCHLORIDE 0.8 MG: 0.4 CAPSULE ORAL at 20:48

## 2022-11-03 RX ADMIN — HYDROXYZINE HYDROCHLORIDE 50 MG: 25 TABLET, FILM COATED ORAL at 12:20

## 2022-11-03 RX ADMIN — HEPARIN SODIUM 2250 UNITS/HR: 10000 INJECTION, SOLUTION INTRAVENOUS at 03:49

## 2022-11-03 RX ADMIN — METOPROLOL TARTRATE 100 MG: 100 TABLET, FILM COATED ORAL at 20:31

## 2022-11-03 RX ADMIN — QUETIAPINE FUMARATE 50 MG: 50 TABLET ORAL at 20:42

## 2022-11-03 RX ADMIN — NICOTINE 1 PATCH: 14 PATCH, EXTENDED RELEASE TRANSDERMAL at 09:05

## 2022-11-03 RX ADMIN — FUROSEMIDE 20 MG: 10 INJECTION, SOLUTION INTRAMUSCULAR; INTRAVENOUS at 12:26

## 2022-11-03 RX ADMIN — LIDOCAINE 2 PATCH: 560 PATCH PERCUTANEOUS; TOPICAL; TRANSDERMAL at 09:05

## 2022-11-03 RX ADMIN — IPRATROPIUM BROMIDE AND ALBUTEROL SULFATE 3 ML: .5; 3 SOLUTION RESPIRATORY (INHALATION) at 15:40

## 2022-11-03 RX ADMIN — GABAPENTIN 300 MG: 300 CAPSULE ORAL at 16:08

## 2022-11-03 RX ADMIN — RAMELTEON 8 MG: 8 TABLET ORAL at 23:49

## 2022-11-03 RX ADMIN — PANTOPRAZOLE SODIUM 40 MG: 40 TABLET, DELAYED RELEASE ORAL at 09:13

## 2022-11-03 RX ADMIN — ATORVASTATIN CALCIUM 20 MG: 10 TABLET, FILM COATED ORAL at 20:35

## 2022-11-03 RX ADMIN — IPRATROPIUM BROMIDE AND ALBUTEROL SULFATE 3 ML: .5; 3 SOLUTION RESPIRATORY (INHALATION) at 19:35

## 2022-11-03 RX ADMIN — IPRATROPIUM BROMIDE AND ALBUTEROL SULFATE 3 ML: .5; 3 SOLUTION RESPIRATORY (INHALATION) at 06:59

## 2022-11-03 RX ADMIN — METOPROLOL TARTRATE 100 MG: 100 TABLET, FILM COATED ORAL at 08:49

## 2022-11-03 RX ADMIN — ACETAMINOPHEN 975 MG: 325 TABLET, FILM COATED ORAL at 16:08

## 2022-11-03 RX ADMIN — Medication 1 CAPSULE: at 20:44

## 2022-11-03 RX ADMIN — Medication 1 CAPSULE: at 08:50

## 2022-11-03 RX ADMIN — ACETAMINOPHEN 975 MG: 325 TABLET, FILM COATED ORAL at 08:48

## 2022-11-03 RX ADMIN — SENNOSIDES AND DOCUSATE SODIUM 1 TABLET: 8.6; 5 TABLET ORAL at 20:35

## 2022-11-03 RX ADMIN — HEPARIN SODIUM 2250 UNITS/HR: 10000 INJECTION, SOLUTION INTRAVENOUS at 16:07

## 2022-11-03 RX ADMIN — QUETIAPINE FUMARATE 50 MG: 50 TABLET ORAL at 08:49

## 2022-11-03 RX ADMIN — INSULIN ASPART 1 UNITS: 100 INJECTION, SOLUTION INTRAVENOUS; SUBCUTANEOUS at 09:12

## 2022-11-03 ASSESSMENT — ACTIVITIES OF DAILY LIVING (ADL)
ADLS_ACUITY_SCORE: 45
ADLS_ACUITY_SCORE: 45
ADLS_ACUITY_SCORE: 39
ADLS_ACUITY_SCORE: 45
ADLS_ACUITY_SCORE: 39
ADLS_ACUITY_SCORE: 39
ADLS_ACUITY_SCORE: 43
ADLS_ACUITY_SCORE: 39
ADLS_ACUITY_SCORE: 39
ADLS_ACUITY_SCORE: 43

## 2022-11-03 NOTE — PROGRESS NOTES
Encounter Date: 5/17/2020       History     Chief Complaint   Patient presents with    Fall     trip and fell around 1 pm, rolled my left ankle. Pain is 8/10.     Inverted ankle while walking.  Difficulty bearing weight      The history is provided by the patient.   Fall   The accident occurred 3 to 5 hours ago. The fall occurred while walking. Pain location: L ankle. Pertinent negatives include no paresthesias. The symptoms are aggravated by use of the injured limb and pressure on the injury.     Review of patient's allergies indicates:  No Known Allergies  History reviewed. No pertinent past medical history.  History reviewed. No pertinent surgical history.  History reviewed. No pertinent family history.  Social History     Tobacco Use    Smoking status: Never Smoker   Substance Use Topics    Alcohol use: Never     Frequency: Never    Drug use: Never     Review of Systems   Musculoskeletal: Positive for gait problem and joint swelling.   Skin: Negative for wound.   Neurological: Negative for paresthesias.       Physical Exam     Initial Vitals [05/17/20 1825]   BP Pulse Resp Temp SpO2   129/82 84 20 99.4 °F (37.4 °C) 99 %      MAP       --         Physical Exam    Nursing note and vitals reviewed.  Constitutional: She appears well-developed and well-nourished. She is not diaphoretic. No distress.   HENT:   Head: Normocephalic and atraumatic.   Neck: Normal range of motion. Neck supple.   Cardiovascular: Normal rate and regular rhythm.   Pulmonary/Chest: No respiratory distress.   Musculoskeletal: Normal range of motion.        Left ankle: She exhibits swelling. She exhibits no deformity.        Feet:    Neurological: She is alert and oriented to person, place, and time. She has normal strength.   Skin: Skin is warm and dry.         ED Course   Procedures  Labs Reviewed - No data to display       Imaging Results          X-Ray Ankle Complete Left (Final result)  Result time 05/17/20 19:04:57    Final result by  [facility]  Cardiovascular and Thoracic Surgery Daily Note        Assessment and Plan  POD # 14 s/p aortic valve replacement with a 25 mm Bal Inspiris Resilia bovine pericardial valve, coronary artery bypass grafting x 3 with left internal mammary artery to left anterior descending, reverse lesser saphenous vein graft to the posterior descending artery, reverse left lesser saphenous vein graft to the distal circumflex marginal artery, endoscopic bilateral lesser saphenous vein graft harvest from below the knees on 10/20 with Dr. Bennie Pierre     - CVS: Pre-op TTE with preserved biventricular function. Immediate postop profound vasoplegia and acidosis requiring multiple high dose pressors, bicarb infusion, and volume resuscitation. Weaned off pressors by POD1, lactate normalized. BP labile with agitation and ongoing need for sedation. Intermittent low dose NE- sedation related. New onset afib RVR 10/22; amiodarone bolus and infusion, back in NSR, transition to PO amio with taper, recurrent afib restarted on amio infusion, RVR a.fib 10/27-1 dose IV metoprolol trialed with no success, amio restarted converted to NSR at 1849. Will need anticoagulation for PAF- discussed with Dr. Pierre now that chest tubes and pacer wires removed will start heparin gtt with no loading dose or no bolus. Aspirin 162 mg daily, atorvastatin 20 mg daily. Metoprolol increased to 100mg BID 11/1. Chest tubes and temporary pacing wires removed on 10/27. CXR prior to chest tube removal with worsening pulmonary edema, diuresing, Repeat CXR 10/28 with improving pulmonary edema. Remains hypervolemic--low BP with 40mg lasix IV BID yesterday.  will continue gentle diuresis -- IV 20 mg IV lasix once this am     - Resp: Extubated POD1. IS, pulmonary toilet. Acute hypoxemic respiratory failure, increased WOB and inability to protect airway due to delirium, discussed with Dr. Pagan and Dr. Pierre, reintubated 10/24. Re extubated  "10/26/22. 11/02 sating well on room air. Discontinued use of CPAP at night.   -Significant amount amount of secretions overnight into am 10/28,requiring frequent suctioning. Given one dose glycopyrrolate on 10/28. Continued improvement of secretions 10/29.     - Neuro: Post-extubation, repeating \"I need help\" over and over, but able to answer question appropriately, redirect, and no focal physical deficits. Worsening agitation 10/22 despite Seroquel, ativan, and Haldol. Placed in restraints due to swinging at staff, pulling at lines/tubes- Started on Precedex and Zyprexa with some improvement- since discontinued. Chronic pain at baseline. Minimize narcotics with agitation. PTA on gabapentin 800 mg TID, resumed 300 mg TID post-operatively but khoi discontinued on 11/01. Will start khoi taper and restarted khoi 300mg BID on 11/02 due to chronic use will need taper to prevent withdrawal. Head CT negative 10/24/22.  improved alertness and mentation 10/30 afternoon. Oxycodone discontinued 10/30, decreasing robaxin to BID instead of QID-- continue to encourage up to chair and wakefulness during the day.  Agitated again overnight-- delirious again this am. Rectal tube removed and scratching bottom and moving stool all over 10/31, improved this am.    - Patient was placed back into 4-pt restraints overnight 11/03. Psych consulted and weighed in on options for patient to try. (Khoi restarted 11/02, Ordered Ramelteon 8mg HS, Olanzapine 5-10mg ODT or IM q6hr PRN, discontinued haldol, and delirium precautions). Recommended to RN to try mitts today instead of restraints.      - Renal: No history of significant renal disease. Cr stable WNL. Trend BMP. Remains about 5kg up from preoperative weight. Diuretic as above.   Creatinine   Date Value Ref Range Status   11/03/2022 0.87 0.66 - 1.25 mg/dL Final     - GI: +BM, continue bowel regimen     - : Remove hernandez today, but obtain culture to send for UA prior 11/03. Will try external " Aryan Zhu Jr., MD (05/17/20 19:04:57)                 Impression:      1.  No acute fracture or dislocation left ankle      Electronically signed by: Aryan Zhu Jr., MD  Date:    05/17/2020  Time:    19:04             Narrative:    EXAMINATION:  XR ANKLE COMPLETE 3 VIEW LEFT    CLINICAL HISTORY:  Unspecified fall, initial encounter    COMPARISON:  None    FINDINGS:  The bones, joint spaces and soft tissues are within normal limits.  No acute fracture or dislocation.                                 Medical Decision Making:   Differential Diagnosis:   Differential Diagnosis includes, but is not limited to:  Fracture, dislocation, compartment syndrome, nerve injury/palsy, vascular injury, rhabdomyolysis, hemarthrosis, septic joint, bursitis, muscle strain, ligament tear/sprain, laceration with foreign body, abrasion, soft tissue contusion, osteoarthritis.    Independently Interpreted Test(s):   I have ordered and independently interpreted X-rays - see prior notes.  Clinical Tests:   Radiological Study: Ordered and Reviewed  ED Management:  Fitted for crutches, recommend brace, RICE, nsaids.  Discussed expected course of injury and appropriate follow-up.                                 Clinical Impression:       ICD-10-CM ICD-9-CM   1. Sprain of ankle, unspecified laterality, unspecified ligament, initial encounter S93.409A 845.00   2. Fall W19.XXXA E888.9         Disposition:   Disposition: Discharged  Condition: Stable     ED Disposition Condition    Discharge Stable        ED Prescriptions     Medication Sig Dispense Start Date End Date Auth. Provider    ibuprofen (ADVIL,MOTRIN) 600 MG tablet Take 1 tablet (600 mg total) by mouth every 6 (six) hours as needed for Pain. 20 tablet 5/17/2020  Guy J. Lefort, MD        Follow-up Information     Follow up With Specialties Details Why Contact Info [facility]  If symptoms worsen or any other concerns [address]  [facility] [id number]  [phone]    Primary Doctor No  In 1 week                                       Guy J. Lefort, MD  05/17/20 3831     catheter instead. PTA Flomax 0.8 mg daily resumed      - Endo: DMT2. Insulin infusion transitioned to SSI. PTA metformin on hold.          Hemoglobin A1C   Date Value Ref Range Status   09/14/2022 5.6 0.0 - 5.6 % Final       Comment:       Normal <5.7%   Prediabetes 5.7-6.4%    Diabetes 6.5% or higher     Note: Adopted from ADA consensus guidelines.         - FEN: Replace electrolytes as needed. SLP following and diet advanced on 10/29- will continue TF and consider calorie counts when appropriate.      - ID: Postop Leukocytosis, increased to 14.6 10/28, up from 8.8 on 10/27--14.1 today.  Afebrile.  Periop abx complete. Trend CBC, fever curve. On Vanc/Zosyn for suspected aspiration pneumonia, started on 10/24 will complete for full 7 days. UA and repeat CXR ordered 10/28. UA negative except for moderate amount of blood and repeat CXR with improving infiltrate and edema. Completed 7 days IV abx 11/01.   - Reordered UA 11/03. Negative at this time and inflammation likely from having prolonged hernandez caused trace leukocyte esterase, will continue to watch culture.   WBC Count   Date Value Ref Range Status   11/03/2022 11.4 (H) 4.0 - 11.0 10e3/uL Final     - Heme: Acute blood loss anemia and thrombocytopenia due to surgery. Aspirin and subcutaneous heparin resumed 10/24 with improved PLT count. Trend CBC, transfuse PRN.  Will need anticoagulation for PAF. Heparin gtt initiated 10/28- will discuss with surgeon regarding timing for oral anticoagulation once patient oral intake is reliable     - Proph: SCD, heparin gtt, PPI     - Dispo: [address], continue therapies, psych protocol to be trailed today to keep helping with delirium. Recommended switching to mitts today from restraints for patient and to help with delirium.        Interval History  Laying in bed in 4-pt restraints early this morning. Will try changing to mitts today and off restraints and following psych recommendations for delirium. UA to be obtained and sent  "for culture. Breathing stable on room air. Sitter present this morning. Feeding tube in place and diet has been slightly advanced, continue working on therapies.      Medications    acetaminophen  975 mg Oral Q8H     [Held by provider] aspirin  162 mg Oral or NG Tube Daily     atorvastatin  20 mg Oral Daily     gabapentin  300 mg Oral or G tube TID     [Held by provider] heparin ANTICOAGULANT  5,000 Units Subcutaneous Q8H     insulin aspart  1-10 Units Subcutaneous TID AC     insulin aspart  1-7 Units Subcutaneous At Bedtime     lactated ringers  250 mL Intravenous Once     lidocaine  1-2 patch Transdermal Q24H     lidocaine   Transdermal Q8H MITA     methocarbamol  750 mg Oral 4x Daily     nicotine  1 patch Transdermal Daily     nicotine   Transdermal Q8H     pantoprazole  40 mg Oral or NG Tube Daily     Or     pantoprazole  40 mg Oral Daily     polyethylene glycol  17 g Oral Daily     QUEtiapine  25 mg Oral BID     senna-docusate  1 tablet Oral BID     sodium chloride (PF)  3 mL Intracatheter Q8H     tamsulosin  0.8 mg Oral QPM      acetaminophen, bisacodyl, calcium gluconate, calcium gluconate, calcium gluconate, dextrose, glucose **OR** dextrose **OR** glucagon, haloperidol lactate, hydrALAZINE, HYDROmorphone **OR** HYDROmorphone, hydrOXYzine, ketorolac, lidocaine 4%, lidocaine (buffered or not buffered), magnesium hydroxide, metoprolol, naloxone **OR** naloxone **OR** naloxone **OR** naloxone, nitroGLYcerin, ondansetron **OR** ondansetron, oxyCODONE **OR** oxyCODONE, phenylephrine, prochlorperazine **OR** prochlorperazine, sodium chloride, sodium chloride (PF)        Physical Exam  Vitals were reviewed  /69   Pulse 77   Temp 97.3  F (36.3  C) (Axillary)   Resp 28   Ht 1.803 m (5' 11\")   Wt 125.4 kg (276 lb 7.3 oz)   SpO2 97%   BMI 38.56 kg/m       Rhythm: NSR     Lungs: Diminished bases       Cardiovascular: RRR, no m/r/g       Abdomen: s/nt/nd, +BS, +BM    Extremeties: warm, 1+ LE edema "      Incision: c/d/i      CT: n/a     Weight:          Vitals:     10/20/22 0600 10/21/22 0600 10/22/22 0250 10/23/22 0200   Weight: 120.2 kg (265 lb) 124.6 kg (274 lb 11.1 oz) 124 kg (273 lb 5.9 oz) 126.6 kg (279 lb 1.6 oz)            Data                Recent Labs   Lab 10/24/22  0434 10/24/22  0336 10/24/22  0019 10/23/22  0743 10/23/22  0351 10/22/22  1657 10/22/22  1403 10/20/22  1528 10/20/22  1526 10/20/22  1324 10/20/22  1321   WBC  --  6.9  --   --  7.5  --  8.4   < > 29.3*  --  22.4*   HGB  --  8.8*  --   --  9.2*  --  9.5*   < > 14.2   < > 11.5*   MCV  --  94  --   --  93  --  93   < > 93  --  93   PLT  --  84*  --   --  57*  --  54*   < > 158  --  136*   INR  --   --   --   --   --   --   --   --  1.31*  --  1.48*   NA  --  141  --   --  138  --  137   < > 141   < > 138   POTASSIUM  --  4.3  --   --  4.3  --  4.7   < > 3.7   < > 3.8   CHLORIDE  --  110*  --   --  108  --  107   < > 109  --  109   CO2  --  27  --   --  26  --  28   < > 17*  --  23   BUN  --  33*  --   --  27  --  22   < > 13  --  12   CR  --  1.16  --   --  1.11  --  1.08   < > 0.97  --  0.85   ANIONGAP  --  4  --   --  4  --  2*   < > 15*  --  6   HALEY  --  8.8  --   --  9.2  --  9.1   < > 8.5  --  9.5   * 126* 131*   < > 136*   < > 137*   < > 229*   < > 182*   ALBUMIN  --  2.4*  --   --  2.5*  --   --    < > 3.1*  --   --    PROTTOTAL  --  5.3*  --   --  5.3*  --   --    < > 5.5*  --   --    BILITOTAL  --  0.8  --   --  0.6  --   --    < > 0.6  --   --    ALKPHOS  --  64  --   --  60  --   --    < > 84  --   --    ALT  --  19  --   --  20  --   --    < > 25  --   --    AST  --  35  --   --  48*  --   --    < > 68*  --   --     < > = values in this interval not displayed.         Imaging:      Recent Results (from the past 24 hour(s))   XR Abdomen Port 1 View     Narrative     EXAM: XR ABDOMEN PORT 1 VIEW  LOCATION: [facility]  DATE/TIME: 10/23/2022 11:04 AM     INDICATION: advanced NGT, needs to be post  pyloric for feeding  COMPARISON: 10/22/2022 at 1542 hours.        Impression     IMPRESSION: Feeding tube tip is at the gastroduodenal junction and is likely postpyloric, however this is not definitive on this study. There is redundancy in the tube and it will likely progress distally on its own. Chest tubes present. Cholelithiasis.   Normal bowel gas pattern.            Patient seen and discussed with Dr. Dash/Cristino Gipson PA-C   Cardiothoracic Surgery  Pager [phone]

## 2022-11-03 NOTE — PLAN OF CARE
Patient is A&O to self. Disoriented to time, place, situation. Vitals are stable on room air. Tele is NSR. Patient continues to be restless, aggressive, and pulling at lines during shift. Provider ordered 4 point soft restraints. Agitation managed with seroquel, zyprexa, and haloperidol while awake.  Patient did sleep during shift, but continues mumbling, restlessness, and agitation during slep. Patient is up with assist of 2, gaitbelt and walker. Sleep Quality is likely inadequate. Heparin running at 2250 units/hr. Next recheck at 11/4/22 AM. Plan is to continue monitoring necessity for restraints. Reorient client to time, place, and situation.

## 2022-11-03 NOTE — PROGRESS NOTES
CALORIE COUNT    Approximate Oral Intake for:   11/2   Calories:  1300  Protein:  45    --> Above included 2 meals, 0 supplements    Intake from TF:       Nocturnal TF of Promote with Fiber at 100 mL/hr x 12 hours (8 pm to 8 am) = 1200 kcal, 74 g protein, 166 g CHO, 17 g fiber, 997 mL H2O     Total (Oral + TF) = 2500 kcal (21 kcal/kg and 123% needs), 119 g protein (1.5 g/kg)    Estimated Needs:      Calories:  6154-9147 kcal (14-17 kcal/kg) - Obese   Protein: 115-155 g protein (1.5-2 g/kg) - Post-op     Summary:   Diet changes per SLP noted:  11/2:  Moderate oral-pharyngeal dysphagia, increased aspiration risk due to confusion --> downgrade to soft and bite- sized (Level 6), Mildly thick liquids (Level 2)   11/3:  Downgrade to minced & moist (Level 5), mildly thick liq (Level 2), no straws     3 day calorie count summary pending 11/4.    Funmilayo Collazo, BIA, LD, CNSC

## 2022-11-03 NOTE — PLAN OF CARE
[facility]- Disoriented x4. Very restless, 4 point soft restraints removed approx 1000, mits in place. Vital signs stable on room air ex soft BP at times. Assist of 2 GBW, refused to participate in therapies. Little appetite, soft and bite size diet with mildly thickened liquids. Lung sounds diminished. Bowel sounds active, passing flatus, incontinent of bowel x2 during shift, hernandez removed during shift, incontinent, adequate urine output. Sternal incision CDI, harvest sites ecchymotic, old CT sites CDI. Pain managed with scheduled tylenol and PRN atarax. Denies nausea. Tele SR. Heparin infusing at 2250 units/hr, recheck for 11/4 am. Sitter at bedside throughout shift.

## 2022-11-04 ENCOUNTER — APPOINTMENT (OUTPATIENT)
Dept: OCCUPATIONAL THERAPY | Facility: CLINIC | Age: 67
DRG: 219 | End: 2022-11-04
Attending: SURGERY
Payer: COMMERCIAL

## 2022-11-04 ENCOUNTER — APPOINTMENT (OUTPATIENT)
Dept: SPEECH THERAPY | Facility: CLINIC | Age: 67
DRG: 219 | End: 2022-11-04
Attending: SURGERY
Payer: COMMERCIAL

## 2022-11-04 LAB
ANION GAP SERPL CALCULATED.3IONS-SCNC: 4 MMOL/L (ref 3–14)
BUN SERPL-MCNC: 23 MG/DL (ref 7–30)
CALCIUM SERPL-MCNC: 9.6 MG/DL (ref 8.5–10.1)
CHLORIDE BLD-SCNC: 105 MMOL/L (ref 94–109)
CO2 SERPL-SCNC: 26 MMOL/L (ref 20–32)
CREAT SERPL-MCNC: 0.9 MG/DL (ref 0.66–1.25)
ERYTHROCYTE [DISTWIDTH] IN BLOOD BY AUTOMATED COUNT: 15.9 % (ref 10–15)
GFR SERPL CREATININE-BSD FRML MDRD: >90 ML/MIN/1.73M2
GLUCOSE BLD-MCNC: 146 MG/DL (ref 70–99)
GLUCOSE BLDC GLUCOMTR-MCNC: 118 MG/DL (ref 70–99)
GLUCOSE BLDC GLUCOMTR-MCNC: 118 MG/DL (ref 70–99)
GLUCOSE BLDC GLUCOMTR-MCNC: 142 MG/DL (ref 70–99)
GLUCOSE BLDC GLUCOMTR-MCNC: 143 MG/DL (ref 70–99)
HCT VFR BLD AUTO: 28.5 % (ref 40–53)
HGB BLD-MCNC: 8.9 G/DL (ref 13.3–17.7)
MAGNESIUM SERPL-MCNC: 2.4 MG/DL (ref 1.6–2.3)
MCH RBC QN AUTO: 30.2 PG (ref 26.5–33)
MCHC RBC AUTO-ENTMCNC: 31.2 G/DL (ref 31.5–36.5)
MCV RBC AUTO: 97 FL (ref 78–100)
PHOSPHATE SERPL-MCNC: 3.7 MG/DL (ref 2.5–4.5)
PLATELET # BLD AUTO: 116 10E3/UL (ref 150–450)
POTASSIUM BLD-SCNC: 4.8 MMOL/L (ref 3.4–5.3)
RBC # BLD AUTO: 2.95 10E6/UL (ref 4.4–5.9)
SODIUM SERPL-SCNC: 135 MMOL/L (ref 133–144)
UFH PPP CHRO-ACNC: 0.38 IU/ML
WBC # BLD AUTO: 10.8 10E3/UL (ref 4–11)

## 2022-11-04 PROCEDURE — 250N000011 HC RX IP 250 OP 636: Performed by: PHYSICIAN ASSISTANT

## 2022-11-04 PROCEDURE — 97530 THERAPEUTIC ACTIVITIES: CPT | Mod: GO | Performed by: OCCUPATIONAL THERAPIST

## 2022-11-04 PROCEDURE — 250N000011 HC RX IP 250 OP 636

## 2022-11-04 PROCEDURE — 84100 ASSAY OF PHOSPHORUS: CPT | Performed by: SURGERY

## 2022-11-04 PROCEDURE — 82310 ASSAY OF CALCIUM: CPT | Performed by: PHYSICIAN ASSISTANT

## 2022-11-04 PROCEDURE — 250N000009 HC RX 250: Performed by: PHYSICIAN ASSISTANT

## 2022-11-04 PROCEDURE — 250N000013 HC RX MED GY IP 250 OP 250 PS 637: Performed by: PHYSICIAN ASSISTANT

## 2022-11-04 PROCEDURE — 94660 CPAP INITIATION&MGMT: CPT

## 2022-11-04 PROCEDURE — 36415 COLL VENOUS BLD VENIPUNCTURE: CPT | Performed by: PHYSICIAN ASSISTANT

## 2022-11-04 PROCEDURE — 120N000013 HC R&B IMCU

## 2022-11-04 PROCEDURE — 85027 COMPLETE CBC AUTOMATED: CPT | Performed by: PHYSICIAN ASSISTANT

## 2022-11-04 PROCEDURE — 250N000013 HC RX MED GY IP 250 OP 250 PS 637

## 2022-11-04 PROCEDURE — 94640 AIRWAY INHALATION TREATMENT: CPT | Mod: 76

## 2022-11-04 PROCEDURE — 94640 AIRWAY INHALATION TREATMENT: CPT

## 2022-11-04 PROCEDURE — 999N000157 HC STATISTIC RCP TIME EA 10 MIN

## 2022-11-04 PROCEDURE — 250N000013 HC RX MED GY IP 250 OP 250 PS 637: Performed by: INTERNAL MEDICINE

## 2022-11-04 PROCEDURE — 83735 ASSAY OF MAGNESIUM: CPT | Performed by: SURGERY

## 2022-11-04 PROCEDURE — 92526 ORAL FUNCTION THERAPY: CPT | Mod: GN

## 2022-11-04 PROCEDURE — 85520 HEPARIN ASSAY: CPT | Performed by: THORACIC SURGERY (CARDIOTHORACIC VASCULAR SURGERY)

## 2022-11-04 RX ORDER — TRAMADOL HYDROCHLORIDE 50 MG/1
50 TABLET ORAL EVERY 6 HOURS PRN
Status: DISCONTINUED | OUTPATIENT
Start: 2022-11-04 | End: 2022-11-06

## 2022-11-04 RX ORDER — FUROSEMIDE 10 MG/ML
20 INJECTION INTRAMUSCULAR; INTRAVENOUS ONCE
Status: COMPLETED | OUTPATIENT
Start: 2022-11-04 | End: 2022-11-04

## 2022-11-04 RX ADMIN — PANTOPRAZOLE SODIUM 40 MG: 40 TABLET, DELAYED RELEASE ORAL at 08:23

## 2022-11-04 RX ADMIN — OLANZAPINE 10 MG: 10 INJECTION, POWDER, FOR SOLUTION INTRAMUSCULAR at 16:53

## 2022-11-04 RX ADMIN — METHOCARBAMOL 750 MG: 750 TABLET ORAL at 10:51

## 2022-11-04 RX ADMIN — ACETAMINOPHEN 975 MG: 325 TABLET, FILM COATED ORAL at 08:23

## 2022-11-04 RX ADMIN — TAMSULOSIN HYDROCHLORIDE 0.8 MG: 0.4 CAPSULE ORAL at 20:48

## 2022-11-04 RX ADMIN — ASPIRIN 81 MG CHEWABLE TABLET 162 MG: 81 TABLET CHEWABLE at 08:23

## 2022-11-04 RX ADMIN — ATORVASTATIN CALCIUM 20 MG: 10 TABLET, FILM COATED ORAL at 20:53

## 2022-11-04 RX ADMIN — HYDROXYZINE HYDROCHLORIDE 50 MG: 25 TABLET, FILM COATED ORAL at 01:08

## 2022-11-04 RX ADMIN — GABAPENTIN 300 MG: 300 CAPSULE ORAL at 16:05

## 2022-11-04 RX ADMIN — LIDOCAINE 2 PATCH: 560 PATCH PERCUTANEOUS; TOPICAL; TRANSDERMAL at 08:22

## 2022-11-04 RX ADMIN — NICOTINE 1 PATCH: 14 PATCH, EXTENDED RELEASE TRANSDERMAL at 08:21

## 2022-11-04 RX ADMIN — METHOCARBAMOL 750 MG: 750 TABLET ORAL at 02:35

## 2022-11-04 RX ADMIN — ACETAMINOPHEN 975 MG: 325 TABLET, FILM COATED ORAL at 16:05

## 2022-11-04 RX ADMIN — OLANZAPINE 10 MG: 5 TABLET, ORALLY DISINTEGRATING ORAL at 10:51

## 2022-11-04 RX ADMIN — IPRATROPIUM BROMIDE AND ALBUTEROL SULFATE 3 ML: .5; 3 SOLUTION RESPIRATORY (INHALATION) at 07:39

## 2022-11-04 RX ADMIN — TRAMADOL HYDROCHLORIDE 50 MG: 50 TABLET, COATED ORAL at 20:53

## 2022-11-04 RX ADMIN — METOPROLOL TARTRATE 100 MG: 100 TABLET, FILM COATED ORAL at 20:53

## 2022-11-04 RX ADMIN — HYDROXYZINE HYDROCHLORIDE 50 MG: 25 TABLET, FILM COATED ORAL at 08:23

## 2022-11-04 RX ADMIN — QUETIAPINE FUMARATE 50 MG: 50 TABLET ORAL at 20:47

## 2022-11-04 RX ADMIN — Medication 1 CAPSULE: at 20:53

## 2022-11-04 RX ADMIN — IPRATROPIUM BROMIDE AND ALBUTEROL SULFATE 3 ML: .5; 3 SOLUTION RESPIRATORY (INHALATION) at 19:00

## 2022-11-04 RX ADMIN — IPRATROPIUM BROMIDE AND ALBUTEROL SULFATE 3 ML: .5; 3 SOLUTION RESPIRATORY (INHALATION) at 15:02

## 2022-11-04 RX ADMIN — RAMELTEON 8 MG: 8 TABLET ORAL at 23:54

## 2022-11-04 RX ADMIN — HYDROMORPHONE HYDROCHLORIDE 0.4 MG: 0.2 INJECTION, SOLUTION INTRAMUSCULAR; INTRAVENOUS; SUBCUTANEOUS at 12:49

## 2022-11-04 RX ADMIN — GABAPENTIN 300 MG: 300 CAPSULE ORAL at 08:22

## 2022-11-04 RX ADMIN — QUETIAPINE FUMARATE 50 MG: 50 TABLET ORAL at 08:23

## 2022-11-04 RX ADMIN — Medication 1 CAPSULE: at 08:23

## 2022-11-04 RX ADMIN — METOPROLOL TARTRATE 100 MG: 100 TABLET, FILM COATED ORAL at 08:23

## 2022-11-04 RX ADMIN — IPRATROPIUM BROMIDE AND ALBUTEROL SULFATE 3 ML: .5; 3 SOLUTION RESPIRATORY (INHALATION) at 11:03

## 2022-11-04 RX ADMIN — HEPARIN SODIUM 2250 UNITS/HR: 10000 INJECTION, SOLUTION INTRAVENOUS at 03:40

## 2022-11-04 RX ADMIN — FUROSEMIDE 20 MG: 10 INJECTION, SOLUTION INTRAMUSCULAR; INTRAVENOUS at 16:05

## 2022-11-04 RX ADMIN — ACETAMINOPHEN 975 MG: 325 TABLET, FILM COATED ORAL at 23:53

## 2022-11-04 ASSESSMENT — ACTIVITIES OF DAILY LIVING (ADL)
ADLS_ACUITY_SCORE: 44
ADLS_ACUITY_SCORE: 39
ADLS_ACUITY_SCORE: 39
ADLS_ACUITY_SCORE: 40
ADLS_ACUITY_SCORE: 39
ADLS_ACUITY_SCORE: 40
ADLS_ACUITY_SCORE: 39
ADLS_ACUITY_SCORE: 39

## 2022-11-04 NOTE — PLAN OF CARE
Pt AO to self, 1:1 sitter at bedside for safety. Pt up w/ assist x2, gait belt and walker. Sternal precautions maintained. Tele- NSR. Pt maintaining O2 sats on room air. NG tube WDL. Pt unable to swallow pills w/ applesauce during shift, medications crushed and administered through NG tube. Pt c/o lower back pain- PRN's given per eMAR.

## 2022-11-04 NOTE — PROGRESS NOTES
[facility]  Cardiovascular and Thoracic Surgery Daily Note        Assessment and Plan  POD # 15 s/p aortic valve replacement with a 25 mm Bal Inspiris Resilia bovine pericardial valve, coronary artery bypass grafting x 3 with left internal mammary artery to left anterior descending, reverse lesser saphenous vein graft to the posterior descending artery, reverse left lesser saphenous vein graft to the distal circumflex marginal artery, endoscopic bilateral lesser saphenous vein graft harvest from below the knees on 10/20 with Dr. Bennie Pierre     - CVS: Pre-op TTE with preserved biventricular function. Immediate postop profound vasoplegia and acidosis requiring multiple high dose pressors, bicarb infusion, and volume resuscitation. Weaned off pressors by POD1, lactate normalized. BP labile with agitation and ongoing need for sedation. Intermittent low dose NE- sedation related. New onset afib RVR 10/22; amiodarone bolus and infusion, back in NSR, transition to PO amio with taper, recurrent afib restarted on amio infusion, RVR a.fib 10/27-1 dose IV metoprolol trialed with no success, amio restarted converted to NSR at 1849. Will need anticoagulation for PAF- discussed with Dr. Pierre now that chest tubes and pacer wires removed will started heparin gtt with no loading dose or no bolus. Heparin discontinued on 11/04.  Aspirin 162 mg daily, atorvastatin 20 mg daily. Metoprolol increased to 100mg BID 11/1. Chest tubes and temporary pacing wires removed on 10/27. CXR prior to chest tube removal with worsening pulmonary edema, diuresing, Repeat CXR 10/28 with improving pulmonary edema. Remains hypervolemic--low BP with 40mg lasix IV BID yesterday.  will continue gentle diuresis -- IV 20 mg IV lasix once this am     - Resp: Extubated POD1. IS, pulmonary toilet. Acute hypoxemic respiratory failure, increased WOB and inability to protect airway due to delirium, discussed with Dr. Pagan and   "Ignacio, reintubated 10/24. Re extubated 10/26/22. 11/02 sating well on room air. Discontinued use of CPAP at night.   -Significant amount amount of secretions overnight into am 10/28,requiring frequent suctioning. Given one dose glycopyrrolate on 10/28. Continued improvement of secretions 10/29.     - Neuro: Post-extubation, repeating \"I need help\" over and over, but able to answer question appropriately, redirect, and no focal physical deficits. Worsening agitation 10/22 despite Seroquel, ativan, and Haldol. Placed in restraints due to swinging at staff, pulling at lines/tubes- Started on Precedex and Zyprexa with some improvement- since discontinued. Chronic pain at baseline. Minimize narcotics with agitation. PTA on gabapentin 800 mg TID, resumed 300 mg TID post-operatively but khoi discontinued on 11/01. Will start khoi taper and restarted khoi 300mg BID on 11/02 due to chronic use will need taper to prevent withdrawal. Head CT negative 10/24/22.  improved alertness and mentation 10/30 afternoon. Oxycodone discontinued 10/30, decreasing robaxin to BID instead of QID-- continue to encourage up to chair and wakefulness during the day.  Agitated again overnight-- delirious again this am. Rectal tube removed and scratching bottom and moving stool all over 10/31, improved this am.    - Patient was placed back into 4-pt restraints overnight 11/03. Psych consulted and weighed in on options for patient to try. (Khoi restarted 11/02, Ordered Ramelteon 8mg HS, Olanzapine 5-10mg ODT or IM q6hr PRN, discontinued haldol, and delirium precautions). Patient did well in mitts yesterday and was able to have mitts off this morning now as well. Keep progressing and getting patient to chair.      - Renal: No history of significant renal disease. Cr stable WNL. Trend BMP. Remains about 3kg up from preoperative weight. Diuretic as above.   Creatinine   Date Value Ref Range Status   11/04/2022 0.90 0.66 - 1.25 mg/dL Final     - GI: " +BM, continue bowel regimen     - : Remove hernandez today, but obtain culture to send for UA prior 11/03. Will try external catheter instead. PTA Flomax 0.8 mg daily resumed      - Endo: DMT2. Insulin infusion transitioned to SSI. PTA metformin on hold.          Hemoglobin A1C   Date Value Ref Range Status   09/14/2022 5.6 0.0 - 5.6 % Final       Comment:       Normal <5.7%   Prediabetes 5.7-6.4%    Diabetes 6.5% or higher     Note: Adopted from ADA consensus guidelines.         - FEN: Replace electrolytes as needed. SLP following and diet advanced on 11/03   Orders Placed This Encounter      Combination Diet Minced and Moist Diet (level 5); Mildly Thick (level 2); No Straws    - ID: Postop Leukocytosis, stable.  Afebrile.  Periop abx complete. Trend CBC, fever curve. On Vanc/Zosyn for suspected aspiration pneumonia, started on 10/24 will complete for full 7 days. UA and repeat CXR ordered 10/28. UA negative except for moderate amount of blood and repeat CXR with improving infiltrate and edema. Completed 7 days IV abx 11/01.   - Reordered UA 11/03. Negative at this time and inflammation likely from having prolonged hernandez caused trace leukocyte esterase, will continue to monitor.   WBC Count   Date Value Ref Range Status   11/04/2022 10.8 4.0 - 11.0 10e3/uL Final     - Heme: Acute blood loss anemia and thrombocytopenia due to surgery. Aspirin and subcutaneous heparin resumed 10/24 with improved PLT count. Trend CBC, transfuse PRN. Heparin gtt initiated 10/28 due to PAF. Heparin discontinued 11/04 with no more episodes of Afib.     - Proph: SCD, ASA , PPI     - Dispo: [address], continue therapies, continue getting patient out of bed to chair and minimize restraints as able.      Interval History  Sitting in bed watching tv without mitts on when in this afternoon. Patient doing better today and more responsive and aware this afternoon as well. Breathing stable on room air. Diet advanced yesterday. Continuing to have  "normal BM and has external cath in place.      Medications    acetaminophen  975 mg Oral Q8H     [Held by provider] aspirin  162 mg Oral or NG Tube Daily     atorvastatin  20 mg Oral Daily     gabapentin  300 mg Oral or G tube TID     [Held by provider] heparin ANTICOAGULANT  5,000 Units Subcutaneous Q8H     insulin aspart  1-10 Units Subcutaneous TID AC     insulin aspart  1-7 Units Subcutaneous At Bedtime     lactated ringers  250 mL Intravenous Once     lidocaine  1-2 patch Transdermal Q24H     lidocaine   Transdermal Q8H MITA     methocarbamol  750 mg Oral 4x Daily     nicotine  1 patch Transdermal Daily     nicotine   Transdermal Q8H     pantoprazole  40 mg Oral or NG Tube Daily     Or     pantoprazole  40 mg Oral Daily     polyethylene glycol  17 g Oral Daily     QUEtiapine  25 mg Oral BID     senna-docusate  1 tablet Oral BID     sodium chloride (PF)  3 mL Intracatheter Q8H     tamsulosin  0.8 mg Oral QPM      acetaminophen, bisacodyl, calcium gluconate, calcium gluconate, calcium gluconate, dextrose, glucose **OR** dextrose **OR** glucagon, haloperidol lactate, hydrALAZINE, HYDROmorphone **OR** HYDROmorphone, hydrOXYzine, ketorolac, lidocaine 4%, lidocaine (buffered or not buffered), magnesium hydroxide, metoprolol, naloxone **OR** naloxone **OR** naloxone **OR** naloxone, nitroGLYcerin, ondansetron **OR** ondansetron, oxyCODONE **OR** oxyCODONE, phenylephrine, prochlorperazine **OR** prochlorperazine, sodium chloride, sodium chloride (PF)        Physical Exam  Vitals were reviewed  /62   Pulse 71   Temp 97.8  F (36.6  C) (Axillary)   Resp 18   Ht 1.803 m (5' 11\")   Wt 123.6 kg (272 lb 7.8 oz)   SpO2 93%   BMI 38.00 kg/m       Rhythm: NSR     Lungs: diminished bases       Cardiovascular: RRR, no m/r/g       Abdomen: s/nt/nd, +BS, +BM    Extremeties: warm, 1+ LE edema     Incision: c/d/i      CT: n/a     Weight:          Vitals:     10/20/22 0600 10/21/22 0600 10/22/22 0250 10/23/22 0200   Weight: " 120.2 kg (265 lb) 124.6 kg (274 lb 11.1 oz) 124 kg (273 lb 5.9 oz) 126.6 kg (279 lb 1.6 oz)            Data                Recent Labs   Lab 10/24/22  0434 10/24/22  0336 10/24/22  0019 10/23/22  0743 10/23/22  0351 10/22/22  1657 10/22/22  1403 10/20/22  1528 10/20/22  1526 10/20/22  1324 10/20/22  1321   WBC  --  6.9  --   --  7.5  --  8.4   < > 29.3*  --  22.4*   HGB  --  8.8*  --   --  9.2*  --  9.5*   < > 14.2   < > 11.5*   MCV  --  94  --   --  93  --  93   < > 93  --  93   PLT  --  84*  --   --  57*  --  54*   < > 158  --  136*   INR  --   --   --   --   --   --   --   --  1.31*  --  1.48*   NA  --  141  --   --  138  --  137   < > 141   < > 138   POTASSIUM  --  4.3  --   --  4.3  --  4.7   < > 3.7   < > 3.8   CHLORIDE  --  110*  --   --  108  --  107   < > 109  --  109   CO2  --  27  --   --  26  --  28   < > 17*  --  23   BUN  --  33*  --   --  27  --  22   < > 13  --  12   CR  --  1.16  --   --  1.11  --  1.08   < > 0.97  --  0.85   ANIONGAP  --  4  --   --  4  --  2*   < > 15*  --  6   HALEY  --  8.8  --   --  9.2  --  9.1   < > 8.5  --  9.5   * 126* 131*   < > 136*   < > 137*   < > 229*   < > 182*   ALBUMIN  --  2.4*  --   --  2.5*  --   --    < > 3.1*  --   --    PROTTOTAL  --  5.3*  --   --  5.3*  --   --    < > 5.5*  --   --    BILITOTAL  --  0.8  --   --  0.6  --   --    < > 0.6  --   --    ALKPHOS  --  64  --   --  60  --   --    < > 84  --   --    ALT  --  19  --   --  20  --   --    < > 25  --   --    AST  --  35  --   --  48*  --   --    < > 68*  --   --     < > = values in this interval not displayed.         Imaging:      Recent Results (from the past 24 hour(s))   XR Abdomen Port 1 View     Narrative     EXAM: XR ABDOMEN PORT 1 VIEW  LOCATION: [facility]  DATE/TIME: 10/23/2022 11:04 AM     INDICATION: advanced NGT, needs to be post pyloric for feeding  COMPARISON: 10/22/2022 at 1542 hours.        Impression     IMPRESSION: Feeding tube tip is at the  gastroduodenal junction and is likely postpyloric, however this is not definitive on this study. There is redundancy in the tube and it will likely progress distally on its own. Chest tubes present. Cholelithiasis.   Normal bowel gas pattern.            Patient seen and discussed with Dr. Pierre/Cristino Gipson PA-C   Cardiothoracic Surgery  Pager [phone]

## 2022-11-04 NOTE — PROGRESS NOTES
CLINICAL NUTRITION SERVICES - REASSESSMENT NOTE AND CALORIE COUNT      Recommendations Ordered by Registered Dietitian (RD):   Would continue current TF overnight at 100 mL/hr. Patient eating just enough during the day to meet >/=90% nutrition needs along with TF provisions  Will continue to monitor for changes in intake, appetite, diet and mentation and need to increase or decrease TF further   Calorie counts completed     Continue diet per SLP and Magic Cup TID    Malnutrition: (10/23)  % Weight Loss:  None noted  % Intake:  Decreased intake does not meet criteria for malnutrition   Subcutaneous Fat Loss:  None observed  Muscle Loss:  None observed  Fluid Retention:  Trace 1+     Malnutrition Diagnosis: Patient does not meet two of the above criteria necessary for diagnosing malnutrition       EVALUATION OF PROGRESS TOWARD GOALS   Diet:  Minced and Moist; Mildly thick, no straws   Magic Cup TID w/ meals     Nutrition Support:    Nocturnal TF of Promote with Fiber at 100 mL/hr x 12 hours (8 pm to 8 am) provides 1200 kcal, 74 g protein, 166 g CHO, 17 g fiber, 997 mL H2O (provides 70% estimated energy needs and 65% estimated protein needs)   Free water flush 100 mL q 4 hrs     Intake/Tolerance:    CALORIE COUNT FINAL DAY:  Estimated oral intake for Thursday 11/4  Calories: 290 kcal  Protein: 9 grams   Patient only consumed 1 magic cup yesterday and no meals     3-DAY CALORIE COUNT SUMMARY:  11/1: 1149 kcal and 37 g protein  11/2: 1300 kcal and 45 g protein   11/3:  290 kcal and 9 grams     Over the past 3 days, patient has consumed 913 kcal (8 kcal/kg and 54% estimated needs) and 30 grams protein (0.4 grams/kg and 26% estimated needs) per day on average   Total intake (PO + TF) per day on average was approx 2112 kcal (18 kcal/kg and 103% estimated needs) and 104 grams protein (1.3 grams/kg and 90% estimated needs)    Labs reviewed: Na 135, K 4.8, Mg 2.5 (H), Phos 3.4  Recent Labs   Lab 11/04/22  0593 11/04/22  3249  11/03/22  1703 11/03/22  1358 11/03/22  0525 11/03/22  0155   * 143* 108* 131* 144* 155*     Medications reviewed: lasix, culturell, senokot  Stooling:  - 11/1: BM x4  - 11/2: BM x1  - 11/3: BM x2  Wt: 272 lbs 7.82 oz  Vitals:    10/30/22 0058 11/01/22 0400 11/01/22 1700 11/02/22 0648   Weight: 124.5 kg (274 lb 7.6 oz) 126.7 kg (279 lb 5.2 oz) 124.2 kg (273 lb 13 oz) 125.4 kg (276 lb 7.3 oz)    11/04/22 0400   Weight: 123.6 kg (272 lb 7.8 oz)         ASSESSED NUTRITION NEEDS:  Dosing Weight: 120.2 kg (10/20 wt for energy), 78.2 kg (IBW for protein)   Estimated Energy Needs: 2433-4964 kcals (14-17 Kcal/Kg)  Justification: obese  Estimated Protein Needs: 115-155 grams protein (1.5-2 g pro/Kg)  Justification: post-op, obese      NEW FINDINGS:   Chart reviewed  Ongoing delirium, now with mitts and continued with sitter   SLP kobyal on 11/2 - Moderate oral-pharyngeal dysphagia, increased confusion and restlessness are impacting swallow safety/function at this time; Rec: downgrade back to an IDDSI Diet Level 6 soft and bite size with mildly thick liquids    Previous Goals:   TF + PO intake will meet % nutrition needs vs PO intake >/= 65% needs to justify discontinuing TF   Evaluation: Met with TF + PO meeting needs     Previous Nutrition Diagnosis:   Predicted excessive nutrient intake (protein-calorie) related to diet advancement with oral intake over the past 24-48 hours, yesterday met 141% calorie needs and 114% protein needs with TF + oral intake  Evaluation: Completed      CURRENT NUTRITION DIAGNOSIS  Inadequate oral intake related to variable appetite likely d/t current mental status as evidenced by PO alone meeting 54% energy needs and 26% protein needs, not justifying removal of FT     INTERVENTIONS  Recommendations / Nutrition Prescription  Would continue current TF overnight at 100 mL/hr. Patient eating just enough during the day to meet >/=90% nutrition needs along with TF provisions  Will continue  to monitor for changes in intake, appetite, diet and mentation and need to increase or decrease TF further   Calorie counts completed     Continue diet per SLP and Magic Cup TID     Implementation  None new     Goals  TF + PO to meet 90-11% estimated needs   PO to meet >/=60% estimated needs to justify removal of FT       MONITORING AND EVALUATION:  Progress towards goals will be monitored and evaluated per protocol and Practice Guidelines      Estephania Hill RD, LD  Clinical Dietitian

## 2022-11-04 NOTE — PLAN OF CARE
Patient is A&Ox 1. At times, disoriented to place, time, situation. Sometimes oriented to situation. Vitals are stable on room air. 02 sats drop to upper 80's while sleeping. Tele NSR. Abnormal assessment points included pain in lumbar area of back. Managed with atarax and robaxin. Patient is up with assist of 2 and gait belt/walker. Ambulated to chair 2 times on shift. Sleep Quality inadequate. Restless/talking/pulling at lines in sleep. Heparin running at 2250. Recheck tomorrow AM. Sitter limited in room from 0330, Mits in place with bed alarm.  Plan is for continue orienting x4. Check results of UA culture for UTI.

## 2022-11-05 ENCOUNTER — APPOINTMENT (OUTPATIENT)
Dept: OCCUPATIONAL THERAPY | Facility: CLINIC | Age: 67
DRG: 219 | End: 2022-11-05
Attending: SURGERY
Payer: COMMERCIAL

## 2022-11-05 ENCOUNTER — APPOINTMENT (OUTPATIENT)
Dept: SPEECH THERAPY | Facility: CLINIC | Age: 67
DRG: 219 | End: 2022-11-05
Attending: SURGERY
Payer: COMMERCIAL

## 2022-11-05 LAB
ALBUMIN UR-MCNC: NEGATIVE MG/DL
ANION GAP SERPL CALCULATED.3IONS-SCNC: 4 MMOL/L (ref 3–14)
APPEARANCE UR: CLEAR
BILIRUB UR QL STRIP: NEGATIVE
BUN SERPL-MCNC: 24 MG/DL (ref 7–30)
CALCIUM SERPL-MCNC: 9.8 MG/DL (ref 8.5–10.1)
CHLORIDE BLD-SCNC: 107 MMOL/L (ref 94–109)
CO2 SERPL-SCNC: 27 MMOL/L (ref 20–32)
COLOR UR AUTO: NORMAL
CREAT SERPL-MCNC: 0.92 MG/DL (ref 0.66–1.25)
ERYTHROCYTE [DISTWIDTH] IN BLOOD BY AUTOMATED COUNT: 16.2 % (ref 10–15)
GFR SERPL CREATININE-BSD FRML MDRD: >90 ML/MIN/1.73M2
GLUCOSE BLD-MCNC: 152 MG/DL (ref 70–99)
GLUCOSE BLDC GLUCOMTR-MCNC: 124 MG/DL (ref 70–99)
GLUCOSE BLDC GLUCOMTR-MCNC: 138 MG/DL (ref 70–99)
GLUCOSE BLDC GLUCOMTR-MCNC: 144 MG/DL (ref 70–99)
GLUCOSE BLDC GLUCOMTR-MCNC: 156 MG/DL (ref 70–99)
GLUCOSE BLDC GLUCOMTR-MCNC: 287 MG/DL (ref 70–99)
GLUCOSE UR STRIP-MCNC: NEGATIVE MG/DL
HCT VFR BLD AUTO: 29.6 % (ref 40–53)
HGB BLD-MCNC: 9 G/DL (ref 13.3–17.7)
HGB UR QL STRIP: NEGATIVE
KETONES UR STRIP-MCNC: NEGATIVE MG/DL
LEUKOCYTE ESTERASE UR QL STRIP: NEGATIVE
MAGNESIUM SERPL-MCNC: 2.5 MG/DL (ref 1.6–2.3)
MCH RBC QN AUTO: 29.6 PG (ref 26.5–33)
MCHC RBC AUTO-ENTMCNC: 30.4 G/DL (ref 31.5–36.5)
MCV RBC AUTO: 97 FL (ref 78–100)
NITRATE UR QL: NEGATIVE
PH UR STRIP: 6.5 [PH] (ref 5–7)
PHOSPHATE SERPL-MCNC: 4.3 MG/DL (ref 2.5–4.5)
PLATELET # BLD AUTO: 179 10E3/UL (ref 150–450)
POTASSIUM BLD-SCNC: 4.5 MMOL/L (ref 3.4–5.3)
RBC # BLD AUTO: 3.04 10E6/UL (ref 4.4–5.9)
SODIUM SERPL-SCNC: 138 MMOL/L (ref 133–144)
SP GR UR STRIP: 1 (ref 1–1.03)
UROBILINOGEN UR STRIP-MCNC: NORMAL MG/DL
WBC # BLD AUTO: 10.3 10E3/UL (ref 4–11)

## 2022-11-05 PROCEDURE — 36415 COLL VENOUS BLD VENIPUNCTURE: CPT | Performed by: PHYSICIAN ASSISTANT

## 2022-11-05 PROCEDURE — 120N000013 HC R&B IMCU

## 2022-11-05 PROCEDURE — 250N000013 HC RX MED GY IP 250 OP 250 PS 637: Performed by: PHYSICIAN ASSISTANT

## 2022-11-05 PROCEDURE — 94640 AIRWAY INHALATION TREATMENT: CPT | Mod: 76

## 2022-11-05 PROCEDURE — 92526 ORAL FUNCTION THERAPY: CPT | Mod: GN | Performed by: REHABILITATION PRACTITIONER

## 2022-11-05 PROCEDURE — 83735 ASSAY OF MAGNESIUM: CPT | Performed by: SURGERY

## 2022-11-05 PROCEDURE — 999N000157 HC STATISTIC RCP TIME EA 10 MIN

## 2022-11-05 PROCEDURE — 250N000013 HC RX MED GY IP 250 OP 250 PS 637: Performed by: INTERNAL MEDICINE

## 2022-11-05 PROCEDURE — 85027 COMPLETE CBC AUTOMATED: CPT | Performed by: PHYSICIAN ASSISTANT

## 2022-11-05 PROCEDURE — 250N000011 HC RX IP 250 OP 636: Performed by: NURSE PRACTITIONER

## 2022-11-05 PROCEDURE — 250N000009 HC RX 250: Performed by: PHYSICIAN ASSISTANT

## 2022-11-05 PROCEDURE — 97530 THERAPEUTIC ACTIVITIES: CPT | Mod: GO | Performed by: OCCUPATIONAL THERAPIST

## 2022-11-05 PROCEDURE — 80048 BASIC METABOLIC PNL TOTAL CA: CPT | Performed by: PHYSICIAN ASSISTANT

## 2022-11-05 PROCEDURE — 84100 ASSAY OF PHOSPHORUS: CPT | Performed by: SURGERY

## 2022-11-05 PROCEDURE — 81003 URINALYSIS AUTO W/O SCOPE: CPT | Performed by: THORACIC SURGERY (CARDIOTHORACIC VASCULAR SURGERY)

## 2022-11-05 RX ORDER — FUROSEMIDE 10 MG/ML
40 INJECTION INTRAMUSCULAR; INTRAVENOUS EVERY 6 HOURS
Status: COMPLETED | OUTPATIENT
Start: 2022-11-05 | End: 2022-11-05

## 2022-11-05 RX ORDER — QUETIAPINE FUMARATE 25 MG/1
50 TABLET, FILM COATED ORAL AT BEDTIME
Status: DISCONTINUED | OUTPATIENT
Start: 2022-11-05 | End: 2022-11-08 | Stop reason: HOSPADM

## 2022-11-05 RX ADMIN — TRAMADOL HYDROCHLORIDE 50 MG: 50 TABLET, COATED ORAL at 04:33

## 2022-11-05 RX ADMIN — Medication 1 CAPSULE: at 22:14

## 2022-11-05 RX ADMIN — QUETIAPINE FUMARATE 50 MG: 50 TABLET ORAL at 08:43

## 2022-11-05 RX ADMIN — ACETAMINOPHEN 975 MG: 325 TABLET, FILM COATED ORAL at 16:08

## 2022-11-05 RX ADMIN — ASPIRIN 81 MG CHEWABLE TABLET 162 MG: 81 TABLET CHEWABLE at 08:43

## 2022-11-05 RX ADMIN — METHOCARBAMOL 750 MG: 750 TABLET ORAL at 08:44

## 2022-11-05 RX ADMIN — GABAPENTIN 300 MG: 300 CAPSULE ORAL at 16:08

## 2022-11-05 RX ADMIN — Medication 1 CAPSULE: at 08:45

## 2022-11-05 RX ADMIN — METOPROLOL TARTRATE 100 MG: 100 TABLET, FILM COATED ORAL at 08:44

## 2022-11-05 RX ADMIN — METOPROLOL TARTRATE 100 MG: 100 TABLET, FILM COATED ORAL at 22:14

## 2022-11-05 RX ADMIN — INSULIN ASPART 1 UNITS: 100 INJECTION, SOLUTION INTRAVENOUS; SUBCUTANEOUS at 08:52

## 2022-11-05 RX ADMIN — GABAPENTIN 300 MG: 300 CAPSULE ORAL at 08:43

## 2022-11-05 RX ADMIN — IPRATROPIUM BROMIDE AND ALBUTEROL SULFATE 3 ML: .5; 3 SOLUTION RESPIRATORY (INHALATION) at 15:22

## 2022-11-05 RX ADMIN — ACETAMINOPHEN 975 MG: 325 TABLET, FILM COATED ORAL at 08:44

## 2022-11-05 RX ADMIN — PANTOPRAZOLE SODIUM 40 MG: 40 TABLET, DELAYED RELEASE ORAL at 08:44

## 2022-11-05 RX ADMIN — FUROSEMIDE 40 MG: 10 INJECTION, SOLUTION INTRAMUSCULAR; INTRAVENOUS at 18:15

## 2022-11-05 RX ADMIN — NICOTINE 1 PATCH: 14 PATCH, EXTENDED RELEASE TRANSDERMAL at 08:43

## 2022-11-05 RX ADMIN — LIDOCAINE 2 PATCH: 560 PATCH PERCUTANEOUS; TOPICAL; TRANSDERMAL at 17:03

## 2022-11-05 RX ADMIN — IPRATROPIUM BROMIDE AND ALBUTEROL SULFATE 3 ML: .5; 3 SOLUTION RESPIRATORY (INHALATION) at 07:52

## 2022-11-05 RX ADMIN — FUROSEMIDE 40 MG: 10 INJECTION, SOLUTION INTRAMUSCULAR; INTRAVENOUS at 13:01

## 2022-11-05 ASSESSMENT — ACTIVITIES OF DAILY LIVING (ADL)
ADLS_ACUITY_SCORE: 36
ADLS_ACUITY_SCORE: 43
ADLS_ACUITY_SCORE: 36
ADLS_ACUITY_SCORE: 36
ADLS_ACUITY_SCORE: 44

## 2022-11-05 NOTE — PROVIDER NOTIFICATION
Dr Keys notified regarding patient pulling at NG tube, hernandez and continuous pulse ox. Restless with sitter at bedside. Order given for bilateral wrist restraint.

## 2022-11-05 NOTE — CARE PLAN
Pt confused. Wax and wanes from restless to lethargic. Up in the chair multiple times today. Fair appetite, ate about 50% of meals. Lasix given today. Off IMC. NJ clamped during the day, will have nocturnal TF tonight. Mccarty in place for retention.

## 2022-11-05 NOTE — PLAN OF CARE
"[facility] [place]. VSS. Tele NSR. Alert, confused. Orientation waxes and wanes. At times he is oriented x4, [facility], heart surgery with Dr Pierre, date \"11/3/22.\" Other times speech rambling and illogical and has no idea where he is. Twitching movements noted in his sleep. Does well with simple commands and frequent reorientation to situation. Cooperative for much of shift. Frequent attempts to pull out NG tube/itch nose. Mitts made patient more agitated. Voided frequent small amounts last evening, appeared uncomfortable straining to void. Bladder scanned showed >1130ml. Hernandez placed and drained 1425ml. Patient able to rest after hernandez. Around 0230 patient restless again pulling at lines, MD notified and soft wrist restraint order given. Left wrist restrained. Up to commode x2, large BM. Bruising to bilateral LE, trace edema. Up with assist of 2, gait belt and walker. Tolerated pills with applesauce when sat upright. Ate 60% of dinner per sitter. Tube feeding overnight from 0759-0171 per order.       "

## 2022-11-05 NOTE — CONSULTS
Care Management Initial Consult    General Information  Assessment completed with: Spouse or significant other (Mya),    Type of CM/SW Visit: Initial Assessment    Primary Care Provider verified and updated as needed:     Readmission within the last 30 days: no previous admission in last 30 days      Reason for Consult: discharge planning  Advance Care Planning: Advance Care Planning Reviewed: no concerns identified          Communication Assessment  Patient's communication style: spoken language (English or Bilingual)    Hearing Difficulty or Deaf: no   Wear Glasses or Blind: yes    Cognitive  Cognitive/Neuro/Behavioral: .WDL except  Level of Consciousness: confused  Arousal Level: opens eyes spontaneously  Orientation: disoriented to  Mood/Behavior: restless  Best Language: 0 - No aphasia  Speech: illogical    Living Environment:   People in home: spouse     Current living Arrangements: house      Able to return to prior arrangements:  (OT/SLP recommend ARU)       Family/Social Support:  Care provided by: self  Provides care for:    Marital Status:   Wife          Description of Support System: Supportive, Involved    Support Assessment: Adequate family and caregiver support, Adequate social supports    Current Resources:   Patient receiving home care services:       Community Resources:    Equipment currently used at home: cane, straight  Supplies currently used at home:      Employment/Financial:  Employment Status: retired        Financial Concerns:             Lifestyle & Psychosocial Needs:  Social Determinants of Health     Tobacco Use: High Risk     Smoking Tobacco Use: Former     Smokeless Tobacco Use: Current     Passive Exposure: Not on file   Alcohol Use: Not on file   Financial Resource Strain: Not on file   Food Insecurity: Not on file   Transportation Needs: Not on file   Physical Activity: Not on file   Stress: Not on file   Social Connections: Not on file   Intimate Partner Violence: Not  on file   Depression: Not on file   Housing Stability: Not on file       Functional Status:  Prior to admission patient needed assistance:              Mental Health Status:          Chemical Dependency Status:                Values/Beliefs:  Spiritual, Cultural Beliefs, [employer] Practices, Values that affect care:  (Undesignated)               Additional Information:    Pt was admitted on 10/20/22 for s/p aortic valve replacement with a 25 mm Bal Inspiris Resilia bovine pericardial valve, coronary artery bypass grafting x 3 with left internal mammary artery to left anterior descending, reverse lesser saphenous vein graft to the posterior descending artery, reverse left lesser saphenous vein graft to the distal circumflex marginal artery, endoscopic bilateral lesser saphenous vein graft harvest from below the knees with Dr. Bennie Pierre.  OT/SLP are recommending ARU.  Sw consult ordered for discharge planning.    Per rounds pt has a sitter.  Per nursing notes pt's orientation status waxes and wanes and pt is restless.  Sw called spouse to begin initial assessment and discuss current ARU recommendation.  Sw provided education between ARU vs TCU and went over ARU locations/options.  Spouse stated that given the amount of driving it is from [facility], the exhaustion she is having as a result, the preference would be to pursue TCU so she is able to visit pt often.  Sw explained that TCU offers less rehab each day than ARU but does present more facility options near their home in [place].  Spouse mentioned [facility] as their first choice.  Sw explained that b/c pt has [employer] it will be necessary to find in-network TCU facilities and currently the list SW has does not show this facility as an option, however, Sw can call [facility] to ensure they do not accept UHC.  Sw explained that once pt is closer to discharge and sitter is discontinued that referrals can be sent out.  Spouse asked that referrals be sent out in  the [facility] area and prefers higher rated facilities to be checked into first.      Sw to continue to follow and keep spouse updated.      Carmita Yoon, LICSW

## 2022-11-05 NOTE — PROGRESS NOTES
Pt was  Too agitated to do aerobica & EZ PAP.    BBS CLEAR/DIMINISHED.spo2 91% ON RA  SCHEDULED NEB GIVEN.

## 2022-11-05 NOTE — PROGRESS NOTES
[facility]  Cardiovascular and Thoracic Surgery Daily Note        Assessment and Plan  POD # 16 s/p aortic valve replacement with a 25 mm Bal Inspiris Resilia bovine pericardial valve, coronary artery bypass grafting x 3 with left internal mammary artery to left anterior descending, reverse lesser saphenous vein graft to the posterior descending artery, reverse left lesser saphenous vein graft to the distal circumflex marginal artery, endoscopic bilateral lesser saphenous vein graft harvest from below the knees on 10/20 with Dr. Bennie Pierre     - CVS: Pre-op TTE with preserved biventricular function. Immediate postop profound vasoplegia and acidosis requiring multiple high dose pressors, bicarb infusion, and volume resuscitation. Weaned off pressors by POD1, lactate normalized. BP labile with agitation and ongoing need for sedation. Intermittent low dose NE- sedation related. New onset afib RVR 10/22; amiodarone bolus and infusion, back in NSR, transition to PO amio with taper, recurrent afib restarted on amio infusion, RVR a.fib 10/27-1 dose IV metoprolol trialed with no success, amio restarted converted to NSR at 1849. Will need anticoagulation for PAF- discussed with Dr. Pierre now that chest tubes and pacer wires removed started heparin gtt with no loading dose or no bolus. Heparin discontinued on 11/04, will not anticoagulate as patient remains in NSR.  Aspirin 162 mg daily, atorvastatin 20 mg daily. Metoprolol increased to 100mg BID 11/1. Chest tubes and temporary pacing wires removed on 10/27. CXR prior to chest tube removal with worsening pulmonary edema, diuresing, Repeat CXR 10/28 with improving pulmonary edema. Remains hypervolemic, lasix 40 mg IV x2 today, 11/5.     - Resp: Extubated POD1. IS, pulmonary toilet. Acute hypoxemic respiratory failure, increased WOB and inability to protect airway due to delirium, discussed with Dr. Pagan and Dr. Pierre, reintubated 10/24. Re  "extubated 10/26/22.  -Significant amount amount of secretions overnight into am 10/28,requiring frequent suctioning. Given one dose glycopyrrolate on 10/28. Continued improvement of secretions 10/29. Saturating well on room air. Discontinued use of CPAP at night.      - Neuro: Post-extubation, repeating \"I need help\" over and over, but able to answer question appropriately, redirect, and no focal physical deficits. Worsening agitation 10/22 despite Seroquel, ativan, and Haldol. Placed in restraints due to swinging at staff, pulling at lines/tubes- Started on Precedex and Zyprexa with some improvement- since discontinued. Chronic pain at baseline. Minimize narcotics with agitation. PTA on gabapentin 800 mg TID, resumed 300 mg TID post-operatively but khoi discontinued on 11/01. Will start khoi taper and restarted khoi 300mg BID on 11/02 due to chronic use will need taper to prevent withdrawal. Head CT negative 10/24/22.  improved alertness and mentation 10/30 afternoon. Oxycodone discontinued 10/30, decreasing robaxin to BID instead of QID-- continue to encourage up to chair and wakefulness during the day.  - Patient was placed back into 4-pt restraints overnight 11/03. Psych consulted and weighed in on options for patient to try. (Khoi restarted 11/02, Ordered Ramelteon 8mg HS, Olanzapine 5-10mg ODT or IM q6hr PRN, discontinued haldol, and delirium precautions).  - 11/5 more agitation overnight requiring restraints. Sleep/wake cycles mixed up, discontinue AM seroquel.      - Renal: No history of significant renal disease. Cr stable WNL. Trend BMP. Remains about 3kg up from preoperative weight. Diuretic as above.   Creatinine   Date Value Ref Range Status   11/05/2022 0.92 0.66 - 1.25 mg/dL Final     - GI: LBM 11/4, continue bowel regimen     - : Removed hernandez 11/03. Will try external catheter instead. PTA Flomax 0.8 mg daily resumed. Had retention overnight 11/4 requiring replacement of hernandez. Discontinue daily " sedatives as above.     - Endo: DMT2. Insulin infusion transitioned to SSI. PTA metformin on hold.          Hemoglobin A1C   Date Value Ref Range Status   09/14/2022 5.6 0.0 - 5.6 % Final       Comment:       Normal <5.7%   Prediabetes 5.7-6.4%    Diabetes 6.5% or higher     Note: Adopted from ADA consensus guidelines.         - FEN: Replace electrolytes as needed. SLP following and diet advanced on 11/03. Is on cycled feedings at , continue for one more day. If meets calorie counts, will consider removing 11/6.   Orders Placed This Encounter      Combination Diet Easy to Chew (level 7); Mildly Thick (level 2); No Straws    - ID: Postop Leukocytosis, resolved.  Afebrile.  Periop abx complete. Trend CBC, fever curve. On Vanc/Zosyn for suspected aspiration pneumonia, started on 10/24 will complete for full 7 days. UA and repeat CXR ordered 10/28. UA negative except for moderate amount of blood and repeat CXR with improving infiltrate and edema. Completed 7 days IV abx 11/01.   - Reordered UA 11/03. Negative at this time and inflammation likely from having prolonged hernandez caused trace leukocyte esterase, will continue to monitor.   WBC Count   Date Value Ref Range Status   11/05/2022 10.3 4.0 - 11.0 10e3/uL Final     - Heme: Acute blood loss anemia and thrombocytopenia due to surgery. Aspirin and subcutaneous heparin resumed 10/24 with improved PLT count. Trend CBC, transfuse PRN. Heparin gtt initiated 10/28 due to PAF. Heparin discontinued 11/04 with no more episodes of Afib. Will not anticoagulate unless afib recurs.      - Proph: SCD, ASA , PPI     - Dispo: Continue on [address], continue therapies, continue getting patient out of bed to chair and delirium precautions.     Interval History  Confused conversation this AM, now somnolent sleeping in bed. Wife present at bedside and multiple questions answered.     Medications    acetaminophen  975 mg Oral Q8H     [Held by provider] aspirin  162 mg Oral or NG Tube Daily  "    atorvastatin  20 mg Oral Daily     gabapentin  300 mg Oral or G tube TID     [Held by provider] heparin ANTICOAGULANT  5,000 Units Subcutaneous Q8H     insulin aspart  1-10 Units Subcutaneous TID AC     insulin aspart  1-7 Units Subcutaneous At Bedtime     lactated ringers  250 mL Intravenous Once     lidocaine  1-2 patch Transdermal Q24H     lidocaine   Transdermal Q8H MITA     methocarbamol  750 mg Oral 4x Daily     nicotine  1 patch Transdermal Daily     nicotine   Transdermal Q8H     pantoprazole  40 mg Oral or NG Tube Daily     Or     pantoprazole  40 mg Oral Daily     polyethylene glycol  17 g Oral Daily     QUEtiapine  25 mg Oral BID     senna-docusate  1 tablet Oral BID     sodium chloride (PF)  3 mL Intracatheter Q8H     tamsulosin  0.8 mg Oral QPM      acetaminophen, bisacodyl, calcium gluconate, calcium gluconate, calcium gluconate, dextrose, glucose **OR** dextrose **OR** glucagon, haloperidol lactate, hydrALAZINE, HYDROmorphone **OR** HYDROmorphone, hydrOXYzine, ketorolac, lidocaine 4%, lidocaine (buffered or not buffered), magnesium hydroxide, metoprolol, naloxone **OR** naloxone **OR** naloxone **OR** naloxone, nitroGLYcerin, ondansetron **OR** ondansetron, oxyCODONE **OR** oxyCODONE, phenylephrine, prochlorperazine **OR** prochlorperazine, sodium chloride, sodium chloride (PF)        Physical Exam  Vitals were reviewed  Blood Pressure 117/64   Pulse 72   Temperature 97.6  F (36.4  C)   Respiration 13   Height 1.803 m (5' 11\")   Weight 123.6 kg (272 lb 7.8 oz)   Oxygen Saturation 96%   Body Mass Index 38.00 kg/m       Rhythm: NSR     Lungs: diminished bases       Cardiovascular: RRR, no m/r/g       Abdomen: s/nt/nd, +BS, +BM    Extremeties: warm, 1+ LE edema     Incision: c/d/i      CT: n/a     Weight:          Vitals:     10/20/22 0600 10/21/22 0600 10/22/22 0250 10/23/22 0200   Weight: 120.2 kg (265 lb) 124.6 kg (274 lb 11.1 oz) 124 kg (273 lb 5.9 oz) 126.6 kg (279 lb 1.6 oz) "            Data                Recent Labs   Lab 10/24/22  0434 10/24/22  0336 10/24/22  0019 10/23/22  0743 10/23/22  0351 10/22/22  1657 10/22/22  1403 10/20/22  1528 10/20/22  1526 10/20/22  1324 10/20/22  1321   WBC  --  6.9  --   --  7.5  --  8.4   < > 29.3*  --  22.4*   HGB  --  8.8*  --   --  9.2*  --  9.5*   < > 14.2   < > 11.5*   MCV  --  94  --   --  93  --  93   < > 93  --  93   PLT  --  84*  --   --  57*  --  54*   < > 158  --  136*   INR  --   --   --   --   --   --   --   --  1.31*  --  1.48*   NA  --  141  --   --  138  --  137   < > 141   < > 138   POTASSIUM  --  4.3  --   --  4.3  --  4.7   < > 3.7   < > 3.8   CHLORIDE  --  110*  --   --  108  --  107   < > 109  --  109   CO2  --  27  --   --  26  --  28   < > 17*  --  23   BUN  --  33*  --   --  27  --  22   < > 13  --  12   CR  --  1.16  --   --  1.11  --  1.08   < > 0.97  --  0.85   ANIONGAP  --  4  --   --  4  --  2*   < > 15*  --  6   HALEY  --  8.8  --   --  9.2  --  9.1   < > 8.5  --  9.5   * 126* 131*   < > 136*   < > 137*   < > 229*   < > 182*   ALBUMIN  --  2.4*  --   --  2.5*  --   --    < > 3.1*  --   --    PROTTOTAL  --  5.3*  --   --  5.3*  --   --    < > 5.5*  --   --    BILITOTAL  --  0.8  --   --  0.6  --   --    < > 0.6  --   --    ALKPHOS  --  64  --   --  60  --   --    < > 84  --   --    ALT  --  19  --   --  20  --   --    < > 25  --   --    AST  --  35  --   --  48*  --   --    < > 68*  --   --     < > = values in this interval not displayed.         Imaging:      Recent Results (from the past 24 hour(s))   XR Abdomen Port 1 View     Narrative     EXAM: XR ABDOMEN PORT 1 VIEW  LOCATION: [facility]  DATE/TIME: 10/23/2022 11:04 AM     INDICATION: advanced NGT, needs to be post pyloric for feeding  COMPARISON: 10/22/2022 at 1542 hours.        Impression     IMPRESSION: Feeding tube tip is at the gastroduodenal junction and is likely postpyloric, however this is not definitive on this study. There is  redundancy in the tube and it will likely progress distally on its own. Chest tubes present. Cholelithiasis.   Normal bowel gas pattern.            Patient seen and discussed with ADONIS Gates, ACN-AG, CCRN  Nurse Practitioner  Cardiothoracic Surgery  Pager: [phone]

## 2022-11-06 ENCOUNTER — APPOINTMENT (OUTPATIENT)
Dept: OCCUPATIONAL THERAPY | Facility: CLINIC | Age: 67
DRG: 219 | End: 2022-11-06
Attending: SURGERY
Payer: COMMERCIAL

## 2022-11-06 ENCOUNTER — APPOINTMENT (OUTPATIENT)
Dept: SPEECH THERAPY | Facility: CLINIC | Age: 67
DRG: 219 | End: 2022-11-06
Attending: SURGERY
Payer: COMMERCIAL

## 2022-11-06 LAB
ANION GAP SERPL CALCULATED.3IONS-SCNC: 6 MMOL/L (ref 3–14)
BUN SERPL-MCNC: 28 MG/DL (ref 7–30)
CALCIUM SERPL-MCNC: 10 MG/DL (ref 8.5–10.1)
CHLORIDE BLD-SCNC: 103 MMOL/L (ref 94–109)
CO2 SERPL-SCNC: 28 MMOL/L (ref 20–32)
CREAT SERPL-MCNC: 1.02 MG/DL (ref 0.66–1.25)
ERYTHROCYTE [DISTWIDTH] IN BLOOD BY AUTOMATED COUNT: 16.1 % (ref 10–15)
GFR SERPL CREATININE-BSD FRML MDRD: 81 ML/MIN/1.73M2
GLUCOSE BLD-MCNC: 135 MG/DL (ref 70–99)
GLUCOSE BLDC GLUCOMTR-MCNC: 114 MG/DL (ref 70–99)
GLUCOSE BLDC GLUCOMTR-MCNC: 124 MG/DL (ref 70–99)
GLUCOSE BLDC GLUCOMTR-MCNC: 144 MG/DL (ref 70–99)
HCT VFR BLD AUTO: 31.8 % (ref 40–53)
HGB BLD-MCNC: 9.6 G/DL (ref 13.3–17.7)
MAGNESIUM SERPL-MCNC: 2.4 MG/DL (ref 1.6–2.3)
MCH RBC QN AUTO: 29.6 PG (ref 26.5–33)
MCHC RBC AUTO-ENTMCNC: 30.2 G/DL (ref 31.5–36.5)
MCV RBC AUTO: 98 FL (ref 78–100)
PHOSPHATE SERPL-MCNC: 3.7 MG/DL (ref 2.5–4.5)
PLATELET # BLD AUTO: 201 10E3/UL (ref 150–450)
POTASSIUM BLD-SCNC: 4.4 MMOL/L (ref 3.4–5.3)
RBC # BLD AUTO: 3.24 10E6/UL (ref 4.4–5.9)
SODIUM SERPL-SCNC: 137 MMOL/L (ref 133–144)
WBC # BLD AUTO: 9.9 10E3/UL (ref 4–11)

## 2022-11-06 PROCEDURE — 250N000013 HC RX MED GY IP 250 OP 250 PS 637

## 2022-11-06 PROCEDURE — 250N000013 HC RX MED GY IP 250 OP 250 PS 637: Performed by: PHYSICIAN ASSISTANT

## 2022-11-06 PROCEDURE — 84100 ASSAY OF PHOSPHORUS: CPT | Performed by: SURGERY

## 2022-11-06 PROCEDURE — 97110 THERAPEUTIC EXERCISES: CPT | Mod: GO | Performed by: OCCUPATIONAL THERAPIST

## 2022-11-06 PROCEDURE — 250N000011 HC RX IP 250 OP 636: Performed by: NURSE PRACTITIONER

## 2022-11-06 PROCEDURE — 250N000013 HC RX MED GY IP 250 OP 250 PS 637: Performed by: NURSE PRACTITIONER

## 2022-11-06 PROCEDURE — 120N000013 HC R&B IMCU

## 2022-11-06 PROCEDURE — 999N000157 HC STATISTIC RCP TIME EA 10 MIN

## 2022-11-06 PROCEDURE — 250N000009 HC RX 250: Performed by: PHYSICIAN ASSISTANT

## 2022-11-06 PROCEDURE — 36415 COLL VENOUS BLD VENIPUNCTURE: CPT | Performed by: NURSE PRACTITIONER

## 2022-11-06 PROCEDURE — 94640 AIRWAY INHALATION TREATMENT: CPT

## 2022-11-06 PROCEDURE — 80048 BASIC METABOLIC PNL TOTAL CA: CPT | Performed by: NURSE PRACTITIONER

## 2022-11-06 PROCEDURE — 97535 SELF CARE MNGMENT TRAINING: CPT | Mod: GO | Performed by: OCCUPATIONAL THERAPIST

## 2022-11-06 PROCEDURE — 250N000013 HC RX MED GY IP 250 OP 250 PS 637: Performed by: INTERNAL MEDICINE

## 2022-11-06 PROCEDURE — 85027 COMPLETE CBC AUTOMATED: CPT | Performed by: NURSE PRACTITIONER

## 2022-11-06 PROCEDURE — 92526 ORAL FUNCTION THERAPY: CPT | Mod: GN | Performed by: REHABILITATION PRACTITIONER

## 2022-11-06 PROCEDURE — 83735 ASSAY OF MAGNESIUM: CPT | Performed by: NURSE PRACTITIONER

## 2022-11-06 PROCEDURE — 94640 AIRWAY INHALATION TREATMENT: CPT | Mod: 76

## 2022-11-06 RX ORDER — TRAMADOL HYDROCHLORIDE 50 MG/1
100 TABLET ORAL EVERY 6 HOURS PRN
Status: DISCONTINUED | OUTPATIENT
Start: 2022-11-06 | End: 2022-11-08 | Stop reason: HOSPADM

## 2022-11-06 RX ORDER — FUROSEMIDE 10 MG/ML
40 INJECTION INTRAMUSCULAR; INTRAVENOUS EVERY 6 HOURS
Status: COMPLETED | OUTPATIENT
Start: 2022-11-06 | End: 2022-11-06

## 2022-11-06 RX ADMIN — ATORVASTATIN CALCIUM 20 MG: 10 TABLET, FILM COATED ORAL at 21:31

## 2022-11-06 RX ADMIN — METOPROLOL TARTRATE 100 MG: 100 TABLET, FILM COATED ORAL at 09:06

## 2022-11-06 RX ADMIN — NICOTINE 1 PATCH: 14 PATCH, EXTENDED RELEASE TRANSDERMAL at 09:08

## 2022-11-06 RX ADMIN — GABAPENTIN 300 MG: 300 CAPSULE ORAL at 15:06

## 2022-11-06 RX ADMIN — INSULIN ASPART 1 UNITS: 100 INJECTION, SOLUTION INTRAVENOUS; SUBCUTANEOUS at 11:46

## 2022-11-06 RX ADMIN — ATORVASTATIN CALCIUM 20 MG: 10 TABLET, FILM COATED ORAL at 00:24

## 2022-11-06 RX ADMIN — TAMSULOSIN HYDROCHLORIDE 0.8 MG: 0.4 CAPSULE ORAL at 21:24

## 2022-11-06 RX ADMIN — RAMELTEON 8 MG: 8 TABLET ORAL at 21:24

## 2022-11-06 RX ADMIN — TRAMADOL HYDROCHLORIDE 100 MG: 50 TABLET, COATED ORAL at 09:09

## 2022-11-06 RX ADMIN — IPRATROPIUM BROMIDE AND ALBUTEROL SULFATE 3 ML: .5; 3 SOLUTION RESPIRATORY (INHALATION) at 15:18

## 2022-11-06 RX ADMIN — METHOCARBAMOL 750 MG: 750 TABLET ORAL at 01:29

## 2022-11-06 RX ADMIN — IPRATROPIUM BROMIDE AND ALBUTEROL SULFATE 3 ML: .5; 3 SOLUTION RESPIRATORY (INHALATION) at 11:24

## 2022-11-06 RX ADMIN — FUROSEMIDE 40 MG: 10 INJECTION, SOLUTION INTRAMUSCULAR; INTRAVENOUS at 09:08

## 2022-11-06 RX ADMIN — METOPROLOL TARTRATE 100 MG: 100 TABLET, FILM COATED ORAL at 21:24

## 2022-11-06 RX ADMIN — LIDOCAINE 2 PATCH: 560 PATCH PERCUTANEOUS; TOPICAL; TRANSDERMAL at 09:08

## 2022-11-06 RX ADMIN — ACETAMINOPHEN 975 MG: 325 TABLET, FILM COATED ORAL at 23:18

## 2022-11-06 RX ADMIN — SENNOSIDES AND DOCUSATE SODIUM 1 TABLET: 8.6; 5 TABLET ORAL at 09:06

## 2022-11-06 RX ADMIN — Medication 1 CAPSULE: at 09:05

## 2022-11-06 RX ADMIN — GABAPENTIN 300 MG: 300 CAPSULE ORAL at 21:24

## 2022-11-06 RX ADMIN — FUROSEMIDE 40 MG: 10 INJECTION, SOLUTION INTRAMUSCULAR; INTRAVENOUS at 14:06

## 2022-11-06 RX ADMIN — PANTOPRAZOLE SODIUM 40 MG: 40 TABLET, DELAYED RELEASE ORAL at 09:06

## 2022-11-06 RX ADMIN — ASPIRIN 81 MG CHEWABLE TABLET 162 MG: 81 TABLET CHEWABLE at 09:07

## 2022-11-06 RX ADMIN — IPRATROPIUM BROMIDE AND ALBUTEROL SULFATE 3 ML: .5; 3 SOLUTION RESPIRATORY (INHALATION) at 07:12

## 2022-11-06 RX ADMIN — ACETAMINOPHEN 975 MG: 325 TABLET, FILM COATED ORAL at 09:07

## 2022-11-06 RX ADMIN — ACETAMINOPHEN 975 MG: 325 TABLET, FILM COATED ORAL at 00:12

## 2022-11-06 RX ADMIN — GABAPENTIN 300 MG: 300 CAPSULE ORAL at 09:07

## 2022-11-06 RX ADMIN — IPRATROPIUM BROMIDE AND ALBUTEROL SULFATE 3 ML: .5; 3 SOLUTION RESPIRATORY (INHALATION) at 19:40

## 2022-11-06 RX ADMIN — ACETAMINOPHEN 975 MG: 325 TABLET, FILM COATED ORAL at 15:07

## 2022-11-06 RX ADMIN — TRAMADOL HYDROCHLORIDE 100 MG: 50 TABLET, COATED ORAL at 21:32

## 2022-11-06 RX ADMIN — Medication 1 CAPSULE: at 21:24

## 2022-11-06 RX ADMIN — SENNOSIDES AND DOCUSATE SODIUM 1 TABLET: 8.6; 5 TABLET ORAL at 21:24

## 2022-11-06 RX ADMIN — GABAPENTIN 300 MG: 300 CAPSULE ORAL at 00:24

## 2022-11-06 RX ADMIN — TAMSULOSIN HYDROCHLORIDE 0.8 MG: 0.4 CAPSULE ORAL at 00:23

## 2022-11-06 RX ADMIN — TRAMADOL HYDROCHLORIDE 100 MG: 50 TABLET, COATED ORAL at 15:07

## 2022-11-06 RX ADMIN — TRAMADOL HYDROCHLORIDE 50 MG: 50 TABLET, COATED ORAL at 00:12

## 2022-11-06 ASSESSMENT — ACTIVITIES OF DAILY LIVING (ADL)
ADLS_ACUITY_SCORE: 36

## 2022-11-06 NOTE — PLAN OF CARE
VSS. Tele NSR. Patient slept soundly until 2330 last evening and then woke up much clearer. He is logical and following commands, no agitation or restlessness. Not pulling at NG tube. Oriented x4 but does not recall events of last couple weeks. Does not remember agitation or restraints. Requesting water and for tube to come out of nose. Took pills with applesauce without trouble. Requesting tramadol for chronic back pain. Overall patient much improved since yesterday. Bilateral LE edema and bruising from harvest sites. Up with assist of 2, gait belt and walker. Mccarty in place for retention. Called wife at 0700 at bedside to speak with patient and update her on his improved mental clarity.

## 2022-11-06 NOTE — PROGRESS NOTES
CALORIE COUNT      Approximate Oral Intake for:    11/5  Calories: 2036 kcal  Protein: 38 grams       Number of Meals Recorded:     3    Number of Snacks Recorded:   3 (Magic Cup)      Estimated Needs:    Calories: 3083-3492 kcals (14-17 kcal/kg) - obesity guidelines  Protein: 115-155 grams (1.5-2 g/kg) - post-op, obese       Summary:   Nocturnal TF of Promote with Fiber at 100 mL/hr x 12 hours (8 pm to 8 am) provides 1200 kcal, 74 g protein, 166 g CHO, 17 g fiber, 997 mL H2O (provides 70% estimated energy needs and 65% estimated protein needs)     Noted patient has improved mentation, is requesting to have FT pulled out.   Yesterday patient met 100% energy needs and 33% protein needs via oral intake.   From nutrition perspective, OK to discontinue TF and continue to encourage oral intakes with emphasis on protein foods.     We will continue to follow and monitor.     Mary Lou Lacey RD, LD

## 2022-11-06 NOTE — PROGRESS NOTES
[facility]  Cardiovascular and Thoracic Surgery Daily Note        Assessment and Plan  POD # 17 s/p aortic valve replacement with a 25 mm Bal Inspiris Resilia bovine pericardial valve, coronary artery bypass grafting x 3 with left internal mammary artery to left anterior descending, reverse lesser saphenous vein graft to the posterior descending artery, reverse left lesser saphenous vein graft to the distal circumflex marginal artery, endoscopic bilateral lesser saphenous vein graft harvest from below the knees on 10/20 with Dr. Bennie Pierre     - CVS: Pre-op TTE with preserved biventricular function. Immediate postop profound vasoplegia and acidosis requiring multiple high dose pressors, bicarb infusion, and volume resuscitation. Weaned off pressors by POD1, lactate normalized. BP labile with agitation and ongoing need for sedation. Intermittent low dose NE- sedation related. New onset afib RVR 10/22; amiodarone bolus and infusion, back in NSR, transition to PO amio with taper, recurrent afib restarted on amio infusion, RVR a.fib 10/27-1 dose IV metoprolol trialed with no success, amio restarted converted to NSR at 1849. Will need anticoagulation for PAF- discussed with Dr. Pierre now that chest tubes and pacer wires removed started heparin gtt with no loading dose or no bolus. Heparin discontinued on 11/04, will not anticoagulate as patient remains in NSR.  Aspirin 162 mg daily, atorvastatin 20 mg daily. Metoprolol increased to 100mg BID 11/1. Chest tubes and temporary pacing wires removed on 10/27. CXR prior to chest tube removal with worsening pulmonary edema, diuresing, Repeat CXR 10/28 with improving pulmonary edema. Remains hypervolemic, lasix 40 mg IV x2 today, 11/6.     - Resp: Extubated POD1. IS, pulmonary toilet. Acute hypoxemic respiratory failure, increased WOB and inability to protect airway due to delirium, discussed with Dr. Pagan and Dr. Pierre, reintubated 10/24. Re  "extubated 10/26/22.  -Significant amount amount of secretions overnight into am 10/28,requiring frequent suctioning. Given one dose glycopyrrolate on 10/28. Continued improvement of secretions 10/29. Saturating well on room air. Discontinued use of CPAP at night.      - Neuro: Post-extubation, repeating \"I need help\" over and over, but able to answer question appropriately, redirect, and no focal physical deficits. Worsening agitation 10/22 despite Seroquel, ativan, and Haldol. Placed in restraints due to swinging at staff, pulling at lines/tubes- Started on Precedex and Zyprexa with some improvement- since discontinued. Chronic pain at baseline. Minimize narcotics with agitation. PTA on gabapentin 800 mg TID, resumed 300 mg TID post-operatively but khoi discontinued on 11/01. Will start khoi taper and restarted khoi 300mg BID on 11/02 due to chronic use will need taper to prevent withdrawal. Head CT negative 10/24/22.  improved alertness and mentation 10/30 afternoon. Oxycodone discontinued 10/30, decreasing robaxin to BID instead of QID-- continue to encourage up to chair and wakefulness during the day.  - Patient was placed back into 4-pt restraints overnight 11/03. Psych consulted and weighed in on options for patient to try. (Khoi restarted 11/02, Ordered Ramelteon 8mg HS, Olanzapine 5-10mg ODT or IM q6hr PRN, discontinued haldol, and delirium precautions).  - 11/5 more agitation overnight requiring restraints. Sleep/wake cycles mixed up, discontinue AM seroquel.   - 11/6 slept very well overnight and mentation much more clear today, no longer requiring sitter.     - Renal: No history of significant renal disease. Cr stable WNL. Trend BMP. At preoperative weight today. Diuretic as above.   Creatinine   Date Value Ref Range Status   11/06/2022 1.02 0.66 - 1.25 mg/dL Final     - GI: LBM 11/4, continue bowel regimen     - : Removed hernandez 11/03. Will try external catheter instead. PTA Flomax 0.8 mg daily resumed. " Had retention overnight 11/4 requiring replacement of hernandez. Discontinue daily sedatives as above. Look to remove Monday 11/7.     - Endo: DMT2. Insulin infusion transitioned to SSI. PTA metformin on hold.          Hemoglobin A1C   Date Value Ref Range Status   09/14/2022 5.6 0.0 - 5.6 % Final       Comment:       Normal <5.7%   Prediabetes 5.7-6.4%    Diabetes 6.5% or higher     Note: Adopted from ADA consensus guidelines.         - FEN: Replace electrolytes as needed. SLP following and diet advanced on 11/03. Is on cycled feedings at , continue for one more day. Eating has improved, remove feeding tube today 11/6. Add supplements.   Orders Placed This Encounter      Combination Diet Easy to Chew (level 7); Mildly Thick (level 2); No Straws    - ID: Postop Leukocytosis, resolved.  Afebrile.  Periop abx complete. Trend CBC, fever curve. On Vanc/Zosyn for suspected aspiration pneumonia, started on 10/24 will complete for full 7 days. UA and repeat CXR ordered 10/28. UA negative except for moderate amount of blood and repeat CXR with improving infiltrate and edema. Completed 7 days IV abx 11/01.   - Reordered UA 11/03. Negative at this time and inflammation likely from having prolonged hernandez caused trace leukocyte esterase, will continue to monitor.   WBC Count   Date Value Ref Range Status   11/06/2022 9.9 4.0 - 11.0 10e3/uL Final     - Heme: Acute blood loss anemia and thrombocytopenia due to surgery. Aspirin and subcutaneous heparin resumed 10/24 with improved PLT count. Trend CBC, transfuse PRN. Heparin gtt initiated 10/28 due to PAF. Heparin discontinued 11/04 with no more episodes of Afib. Will not anticoagulate unless afib recurs.      - Proph: SCD, ASA , PPI     - Dispo: Continue on st[address], continue therapies, continue getting patient out of bed to chair and delirium precautions. Recommending ARU vs home when medically appropriate.     Interval History  Much more clear mentally today not requiring a sitter.  "Having baseline chronic back pain requesting increase of tramadol dosing. Eating has improved and would like feeding tube removed. Has been ambulating and getting up to chair. Slept well overnight. Denies dizziness or palpitations. Denies sternal popping/clicking.    Medications    acetaminophen  975 mg Oral Q8H     [Held by provider] aspirin  162 mg Oral or NG Tube Daily     atorvastatin  20 mg Oral Daily     gabapentin  300 mg Oral or G tube TID     [Held by provider] heparin ANTICOAGULANT  5,000 Units Subcutaneous Q8H     insulin aspart  1-10 Units Subcutaneous TID AC     insulin aspart  1-7 Units Subcutaneous At Bedtime     lactated ringers  250 mL Intravenous Once     lidocaine  1-2 patch Transdermal Q24H     lidocaine   Transdermal Q8H MITA     methocarbamol  750 mg Oral 4x Daily     nicotine  1 patch Transdermal Daily     nicotine   Transdermal Q8H     pantoprazole  40 mg Oral or NG Tube Daily     Or     pantoprazole  40 mg Oral Daily     polyethylene glycol  17 g Oral Daily     QUEtiapine  25 mg Oral BID     senna-docusate  1 tablet Oral BID     sodium chloride (PF)  3 mL Intracatheter Q8H     tamsulosin  0.8 mg Oral QPM      acetaminophen, bisacodyl, calcium gluconate, calcium gluconate, calcium gluconate, dextrose, glucose **OR** dextrose **OR** glucagon, haloperidol lactate, hydrALAZINE, HYDROmorphone **OR** HYDROmorphone, hydrOXYzine, ketorolac, lidocaine 4%, lidocaine (buffered or not buffered), magnesium hydroxide, metoprolol, naloxone **OR** naloxone **OR** naloxone **OR** naloxone, nitroGLYcerin, ondansetron **OR** ondansetron, oxyCODONE **OR** oxyCODONE, phenylephrine, prochlorperazine **OR** prochlorperazine, sodium chloride, sodium chloride (PF)        Physical Exam  Vitals were reviewed  Blood Pressure 98/57 (BP Location: Right arm)   Pulse 76   Temperature 97.5  F (36.4  C) (Oral)   Respiration 16   Height 1.803 m (5' 11\")   Weight 120.3 kg (265 lb 3.4 oz)   Oxygen Saturation 94%   Body " Mass Index 36.99 kg/m       Rhythm: NSR     Lungs: diminished bases on room air     Cardiovascular: S1, S2, RRR, no m/r/g       Abdomen: s/nt/nd, +BS, +BM    Extremeties: warm, 1+ LE edema     Incision: c/d/i      CT: n/a     Weight:          Vitals:     10/20/22 0600 10/21/22 0600 10/22/22 0250 10/23/22 0200   Weight: 120.2 kg (265 lb) 124.6 kg (274 lb 11.1 oz) 124 kg (273 lb 5.9 oz) 126.6 kg (279 lb 1.6 oz)            Data                Recent Labs   Lab 10/24/22  0434 10/24/22  0336 10/24/22  0019 10/23/22  0743 10/23/22  0351 10/22/22  1657 10/22/22  1403 10/20/22  1528 10/20/22  1526 10/20/22  1324 10/20/22  1321   WBC  --  6.9  --   --  7.5  --  8.4   < > 29.3*  --  22.4*   HGB  --  8.8*  --   --  9.2*  --  9.5*   < > 14.2   < > 11.5*   MCV  --  94  --   --  93  --  93   < > 93  --  93   PLT  --  84*  --   --  57*  --  54*   < > 158  --  136*   INR  --   --   --   --   --   --   --   --  1.31*  --  1.48*   NA  --  141  --   --  138  --  137   < > 141   < > 138   POTASSIUM  --  4.3  --   --  4.3  --  4.7   < > 3.7   < > 3.8   CHLORIDE  --  110*  --   --  108  --  107   < > 109  --  109   CO2  --  27  --   --  26  --  28   < > 17*  --  23   BUN  --  33*  --   --  27  --  22   < > 13  --  12   CR  --  1.16  --   --  1.11  --  1.08   < > 0.97  --  0.85   ANIONGAP  --  4  --   --  4  --  2*   < > 15*  --  6   HALEY  --  8.8  --   --  9.2  --  9.1   < > 8.5  --  9.5   * 126* 131*   < > 136*   < > 137*   < > 229*   < > 182*   ALBUMIN  --  2.4*  --   --  2.5*  --   --    < > 3.1*  --   --    PROTTOTAL  --  5.3*  --   --  5.3*  --   --    < > 5.5*  --   --    BILITOTAL  --  0.8  --   --  0.6  --   --    < > 0.6  --   --    ALKPHOS  --  64  --   --  60  --   --    < > 84  --   --    ALT  --  19  --   --  20  --   --    < > 25  --   --    AST  --  35  --   --  48*  --   --    < > 68*  --   --     < > = values in this interval not displayed.         Imaging:      Recent Results (from the past 24 hour(s))   XR  Abdomen Port 1 View     Narrative     EXAM: XR ABDOMEN PORT 1 VIEW  LOCATION: [facility]  DATE/TIME: 10/23/2022 11:04 AM     INDICATION: advanced NGT, needs to be post pyloric for feeding  COMPARISON: 10/22/2022 at 1542 hours.        Impression     IMPRESSION: Feeding tube tip is at the gastroduodenal junction and is likely postpyloric, however this is not definitive on this study. There is redundancy in the tube and it will likely progress distally on its own. Chest tubes present. Cholelithiasis.   Normal bowel gas pattern.            Patient seen and discussed with Dr. Ignacio Chen, APRN, ACNPC-AG, CCRN  Nurse Practitioner  Cardiothoracic Surgery  Pager: [phone]

## 2022-11-07 ENCOUNTER — APPOINTMENT (OUTPATIENT)
Dept: OCCUPATIONAL THERAPY | Facility: CLINIC | Age: 67
DRG: 219 | End: 2022-11-07
Attending: SURGERY
Payer: COMMERCIAL

## 2022-11-07 ENCOUNTER — APPOINTMENT (OUTPATIENT)
Dept: SPEECH THERAPY | Facility: CLINIC | Age: 67
DRG: 219 | End: 2022-11-07
Attending: SURGERY
Payer: COMMERCIAL

## 2022-11-07 LAB
ANION GAP SERPL CALCULATED.3IONS-SCNC: 6 MMOL/L (ref 3–14)
BUN SERPL-MCNC: 32 MG/DL (ref 7–30)
CALCIUM SERPL-MCNC: 9.7 MG/DL (ref 8.5–10.1)
CHLORIDE BLD-SCNC: 101 MMOL/L (ref 94–109)
CO2 SERPL-SCNC: 30 MMOL/L (ref 20–32)
CREAT SERPL-MCNC: 1.14 MG/DL (ref 0.66–1.25)
ERYTHROCYTE [DISTWIDTH] IN BLOOD BY AUTOMATED COUNT: 15.9 % (ref 10–15)
GFR SERPL CREATININE-BSD FRML MDRD: 71 ML/MIN/1.73M2
GLUCOSE BLD-MCNC: 110 MG/DL (ref 70–99)
GLUCOSE BLDC GLUCOMTR-MCNC: 110 MG/DL (ref 70–99)
GLUCOSE BLDC GLUCOMTR-MCNC: 126 MG/DL (ref 70–99)
GLUCOSE BLDC GLUCOMTR-MCNC: 126 MG/DL (ref 70–99)
GLUCOSE BLDC GLUCOMTR-MCNC: 187 MG/DL (ref 70–99)
GLUCOSE BLDC GLUCOMTR-MCNC: 95 MG/DL (ref 70–99)
HCT VFR BLD AUTO: 31.3 % (ref 40–53)
HGB BLD-MCNC: 9.6 G/DL (ref 13.3–17.7)
MAGNESIUM SERPL-MCNC: 2.4 MG/DL (ref 1.6–2.3)
MCH RBC QN AUTO: 29.2 PG (ref 26.5–33)
MCHC RBC AUTO-ENTMCNC: 30.7 G/DL (ref 31.5–36.5)
MCV RBC AUTO: 95 FL (ref 78–100)
PLATELET # BLD AUTO: 214 10E3/UL (ref 150–450)
POTASSIUM BLD-SCNC: 4.6 MMOL/L (ref 3.4–5.3)
RBC # BLD AUTO: 3.29 10E6/UL (ref 4.4–5.9)
SODIUM SERPL-SCNC: 137 MMOL/L (ref 133–144)
WBC # BLD AUTO: 8.7 10E3/UL (ref 4–11)

## 2022-11-07 PROCEDURE — 250N000013 HC RX MED GY IP 250 OP 250 PS 637: Performed by: NURSE PRACTITIONER

## 2022-11-07 PROCEDURE — 83735 ASSAY OF MAGNESIUM: CPT | Performed by: PHYSICIAN ASSISTANT

## 2022-11-07 PROCEDURE — 250N000009 HC RX 250: Performed by: PHYSICIAN ASSISTANT

## 2022-11-07 PROCEDURE — 94640 AIRWAY INHALATION TREATMENT: CPT

## 2022-11-07 PROCEDURE — 92526 ORAL FUNCTION THERAPY: CPT | Mod: GN

## 2022-11-07 PROCEDURE — 250N000013 HC RX MED GY IP 250 OP 250 PS 637: Performed by: PHYSICIAN ASSISTANT

## 2022-11-07 PROCEDURE — G0008 ADMIN INFLUENZA VIRUS VAC: HCPCS | Performed by: SURGERY

## 2022-11-07 PROCEDURE — 120N000013 HC R&B IMCU

## 2022-11-07 PROCEDURE — 90662 IIV NO PRSV INCREASED AG IM: CPT | Performed by: SURGERY

## 2022-11-07 PROCEDURE — 85027 COMPLETE CBC AUTOMATED: CPT | Performed by: NURSE PRACTITIONER

## 2022-11-07 PROCEDURE — 250N000013 HC RX MED GY IP 250 OP 250 PS 637: Performed by: INTERNAL MEDICINE

## 2022-11-07 PROCEDURE — 80048 BASIC METABOLIC PNL TOTAL CA: CPT | Performed by: NURSE PRACTITIONER

## 2022-11-07 PROCEDURE — 94640 AIRWAY INHALATION TREATMENT: CPT | Mod: 76

## 2022-11-07 PROCEDURE — 250N000011 HC RX IP 250 OP 636: Performed by: SURGERY

## 2022-11-07 PROCEDURE — 250N000013 HC RX MED GY IP 250 OP 250 PS 637

## 2022-11-07 PROCEDURE — 250N000011 HC RX IP 250 OP 636: Performed by: PHYSICIAN ASSISTANT

## 2022-11-07 PROCEDURE — 97530 THERAPEUTIC ACTIVITIES: CPT | Mod: GO | Performed by: OCCUPATIONAL THERAPIST

## 2022-11-07 PROCEDURE — 36415 COLL VENOUS BLD VENIPUNCTURE: CPT | Performed by: NURSE PRACTITIONER

## 2022-11-07 PROCEDURE — 999N000157 HC STATISTIC RCP TIME EA 10 MIN

## 2022-11-07 RX ORDER — MINOXIDIL 2.5 MG/1
5 TABLET ORAL DAILY
Status: DISCONTINUED | OUTPATIENT
Start: 2022-11-07 | End: 2022-11-08 | Stop reason: HOSPADM

## 2022-11-07 RX ORDER — FUROSEMIDE 10 MG/ML
20 INJECTION INTRAMUSCULAR; INTRAVENOUS EVERY 6 HOURS
Status: COMPLETED | OUTPATIENT
Start: 2022-11-07 | End: 2022-11-07

## 2022-11-07 RX ADMIN — ACETAMINOPHEN 975 MG: 325 TABLET, FILM COATED ORAL at 10:03

## 2022-11-07 RX ADMIN — FUROSEMIDE 20 MG: 10 INJECTION, SOLUTION INTRAMUSCULAR; INTRAVENOUS at 15:42

## 2022-11-07 RX ADMIN — RAMELTEON 8 MG: 8 TABLET ORAL at 21:49

## 2022-11-07 RX ADMIN — SENNOSIDES AND DOCUSATE SODIUM 1 TABLET: 8.6; 5 TABLET ORAL at 20:14

## 2022-11-07 RX ADMIN — FUROSEMIDE 20 MG: 10 INJECTION, SOLUTION INTRAMUSCULAR; INTRAVENOUS at 10:03

## 2022-11-07 RX ADMIN — IPRATROPIUM BROMIDE AND ALBUTEROL SULFATE 3 ML: .5; 3 SOLUTION RESPIRATORY (INHALATION) at 20:11

## 2022-11-07 RX ADMIN — NICOTINE 1 PATCH: 14 PATCH, EXTENDED RELEASE TRANSDERMAL at 10:04

## 2022-11-07 RX ADMIN — IPRATROPIUM BROMIDE AND ALBUTEROL SULFATE 3 ML: .5; 3 SOLUTION RESPIRATORY (INHALATION) at 14:52

## 2022-11-07 RX ADMIN — PANTOPRAZOLE SODIUM 40 MG: 40 TABLET, DELAYED RELEASE ORAL at 10:03

## 2022-11-07 RX ADMIN — POLYETHYLENE GLYCOL 3350 17 G: 17 POWDER, FOR SOLUTION ORAL at 10:04

## 2022-11-07 RX ADMIN — QUETIAPINE FUMARATE 50 MG: 25 TABLET ORAL at 21:49

## 2022-11-07 RX ADMIN — METOPROLOL TARTRATE 100 MG: 100 TABLET, FILM COATED ORAL at 10:03

## 2022-11-07 RX ADMIN — GABAPENTIN 300 MG: 300 CAPSULE ORAL at 10:03

## 2022-11-07 RX ADMIN — Medication 1 CAPSULE: at 20:14

## 2022-11-07 RX ADMIN — ASPIRIN 81 MG CHEWABLE TABLET 162 MG: 81 TABLET CHEWABLE at 10:04

## 2022-11-07 RX ADMIN — ATORVASTATIN CALCIUM 20 MG: 10 TABLET, FILM COATED ORAL at 20:14

## 2022-11-07 RX ADMIN — IPRATROPIUM BROMIDE AND ALBUTEROL SULFATE 3 ML: .5; 3 SOLUTION RESPIRATORY (INHALATION) at 11:03

## 2022-11-07 RX ADMIN — GABAPENTIN 300 MG: 300 CAPSULE ORAL at 15:42

## 2022-11-07 RX ADMIN — LIDOCAINE 1 PATCH: 560 PATCH PERCUTANEOUS; TOPICAL; TRANSDERMAL at 10:04

## 2022-11-07 RX ADMIN — METOPROLOL TARTRATE 100 MG: 100 TABLET, FILM COATED ORAL at 20:14

## 2022-11-07 RX ADMIN — TAMSULOSIN HYDROCHLORIDE 0.8 MG: 0.4 CAPSULE ORAL at 20:14

## 2022-11-07 RX ADMIN — MINOXIDIL 5 MG: 2.5 TABLET ORAL at 10:14

## 2022-11-07 RX ADMIN — INSULIN ASPART 2 UNITS: 100 INJECTION, SOLUTION INTRAVENOUS; SUBCUTANEOUS at 13:01

## 2022-11-07 RX ADMIN — GABAPENTIN 300 MG: 300 CAPSULE ORAL at 21:49

## 2022-11-07 RX ADMIN — METFORMIN HYDROCHLORIDE 250 MG: 500 TABLET ORAL at 17:15

## 2022-11-07 RX ADMIN — INFLUENZA A VIRUS A/VICTORIA/2570/2019 IVR-215 (H1N1) ANTIGEN (FORMALDEHYDE INACTIVATED), INFLUENZA A VIRUS A/DARWIN/9/2021 SAN-010 (H3N2) ANTIGEN (FORMALDEHYDE INACTIVATED), INFLUENZA B VIRUS B/PHUKET/3073/2013 ANTIGEN (FORMALDEHYDE INACTIVATED), AND INFLUENZA B VIRUS B/MICHIGAN/01/2021 ANTIGEN (FORMALDEHYDE INACTIVATED) 0.7 ML: 60; 60; 60; 60 INJECTION, SUSPENSION INTRAMUSCULAR at 18:11

## 2022-11-07 RX ADMIN — SENNOSIDES AND DOCUSATE SODIUM 1 TABLET: 8.6; 5 TABLET ORAL at 10:03

## 2022-11-07 RX ADMIN — Medication 1 CAPSULE: at 10:03

## 2022-11-07 RX ADMIN — ACETAMINOPHEN 975 MG: 325 TABLET, FILM COATED ORAL at 15:42

## 2022-11-07 RX ADMIN — ACETAMINOPHEN 975 MG: 325 TABLET, FILM COATED ORAL at 23:14

## 2022-11-07 RX ADMIN — IPRATROPIUM BROMIDE AND ALBUTEROL SULFATE 3 ML: .5; 3 SOLUTION RESPIRATORY (INHALATION) at 07:22

## 2022-11-07 RX ADMIN — MAGNESIUM HYDROXIDE 30 ML: 400 SUSPENSION ORAL at 10:14

## 2022-11-07 ASSESSMENT — ACTIVITIES OF DAILY LIVING (ADL)
ADLS_ACUITY_SCORE: 36
ADLS_ACUITY_SCORE: 42
ADLS_ACUITY_SCORE: 36
ADLS_ACUITY_SCORE: 41
ADLS_ACUITY_SCORE: 36
ADLS_ACUITY_SCORE: 36

## 2022-11-07 NOTE — PROGRESS NOTES
CALORIE COUNT      Approximate Oral Intake for:  Sunday 11/6    Calories: 1563 kcal  Protein: 55 grams   Diet: Easy to Chew (level 7); mildly thick (level 2) + Magic Cup w/ meals   Meals recorded: 2  Supplements recorded: 3       Intake from TF/PN:       FT was removed yesterday with improving PO and mentation       Estimated Needs:    Calories: 8774-5614 kcals (14-17 kcal/kg) - obesity guidelines  Protein: 115-155 grams (1.5-2 g/kg) - post-op, obese       Summary:   PO intake met 90% estimated energy needs and 48% estimated protein needs yesterday   Final calorie count recorded through the day today   Discontinued free water flush     Estephania Hill, RD, LD  Clinical Dietitian

## 2022-11-07 NOTE — PLAN OF CARE
A&OX3, disoriented to time, able to follow commands, calm. Hypertensive, tramadol given for pain. Mccarty in place. Pt slept most of the night, bed alarm in place.

## 2022-11-07 NOTE — PROGRESS NOTES
Patient is more clear today. Still has some confusion as the night gets going, but had a full day of PT, speech and visitors. Alert and oriented x 4, but is forgetful. He is calm and cooperative. His tramadol has been increased to 100 mg q6hr. He is still wanting what he takes at home which he had increased by himself, and not a doctors prescription. He is on a calorie count. NG tube was removed today. Soft bite sized pieces and mildly thickened liquids. Tele is NSR. Mccarty to remain in place until doctor can follow up tomorrow. Mccarty due to retention. Assist of 1/GB/walker. Full code. BG checks. Bruised posterior knee harvest sites. Chronic back pain. To be discharged to TCU.

## 2022-11-07 NOTE — PROGRESS NOTES
Care Management Follow Up    Length of Stay (days): 18    Expected Discharge Date: 11/08/2022     Concerns to be Addressed:       Patient plan of care discussed at interdisciplinary rounds: Yes    Anticipated Discharge Disposition: Transitional Care     Anticipated Discharge Services:    Anticipated Discharge DME:      Patient/family educated on Medicare website which has current facility and service quality ratings:    Education Provided on the Discharge Plan:    Patient/Family in Agreement with the Plan:      Referrals Placed by CM/SW:    Private pay costs discussed: Not applicable    Additional Information:    Per previous conversation, spouse asked that TCU be found closer to home within the [place] area, which can also include [place].  Sw sent referrals to facilities in these areas.  Pt has [facility] insurance and it is unknown which facilities are in-network.  Sw to continue to follow.     Update:  [facility] has clinically declined.  [facility] has declined due to not having any beds.  Sw to continue to follow.        Carmita Yoon, KAYLEIGHSW

## 2022-11-07 NOTE — PROGRESS NOTES
[facility]  Cardiovascular and Thoracic Surgery Daily Note        Assessment and Plan  POD # 18 s/p aortic valve replacement with a 25 mm Bal Inspiris Resilia bovine pericardial valve, coronary artery bypass grafting x 3 with left internal mammary artery to left anterior descending, reverse lesser saphenous vein graft to the posterior descending artery, reverse left lesser saphenous vein graft to the distal circumflex marginal artery, endoscopic bilateral lesser saphenous vein graft harvest from below the knees on 10/20 with Dr. Bennie Pierre     - CVS: Pre-op TTE with preserved biventricular function. Immediate postop profound vasoplegia and acidosis requiring multiple high dose pressors, bicarb infusion, and volume resuscitation. Weaned off pressors by POD1, lactate normalized. BP labile with agitation and ongoing need for sedation. Intermittent low dose NE- sedation related. New onset afib RVR 10/22; amiodarone bolus and infusion, back in NSR, transition to PO amio with taper, recurrent afib restarted on amio infusion, RVR a.fib 10/27-1 dose IV metoprolol trialed with no success, amio restarted converted to NSR at 1849. Will need anticoagulation for PAF- discussed with Dr. Pierre now that chest tubes and pacer wires removed started heparin gtt with no loading dose or no bolus. Heparin discontinued on 11/04, will not anticoagulate as patient remains in NSR.  Aspirin 162 mg daily, atorvastatin 20 mg daily. Metoprolol increased to 100mg BID 11/1. Chest tubes and temporary pacing wires removed on 10/27. CXR prior to chest tube removal with worsening pulmonary edema, diuresing, Repeat CXR 10/28 with improving pulmonary edema. Improving hypervolemia, lasix 20 mg IV x2 today, 11/7.     - Resp: Extubated POD1. IS, pulmonary toilet. Acute hypoxemic respiratory failure, increased WOB and inability to protect airway due to delirium, discussed with Dr. Pagan and Dr. Pierre, reintubated 10/24. Re  "extubated 10/26/22.  -Significant amount of secretions overnight into am 10/28,requiring frequent suctioning. Given one dose glycopyrrolate on 10/28. Continued improvement of secretions 10/29. Saturating well on room air. Discontinued use of CPAP at night.      - Neuro: Post-extubation, repeating \"I need help\" over and over, but able to answer question appropriately, redirect, and no focal physical deficits. Worsening agitation 10/22 despite Seroquel, ativan, and Haldol. Placed in restraints due to swinging at staff, pulling at lines/tubes- Started on Precedex and Zyprexa with some improvement- since discontinued. Chronic pain at baseline. Minimize narcotics with agitation. PTA on gabapentin 800 mg TID, resumed 300 mg TID post-operatively but khoi discontinued on 11/01. Will start khoi taper and restarted khoi 300mg BID on 11/02 due to chronic use will need taper to prevent withdrawal. Head CT negative 10/24/22.  improved alertness and mentation 10/30 afternoon. Oxycodone discontinued 10/30, decreasing robaxin to BID instead of QID-- continue to encourage up to chair and wakefulness during the day.  - Patient was placed back into 4-pt restraints overnight 11/03. Psych consulted and weighed in on options for patient to try. (Khoi restarted 11/02, Ordered Ramelteon 8mg HS, Olanzapine 5-10mg ODT or IM q6hr PRN, discontinued haldol, and delirium precautions).  - 11/5 more agitation overnight requiring restraints. Sleep/wake cycles mixed up, discontinue AM seroquel.   - 11/6 slept very well overnight and mentation much more clear today, no longer requiring sitter.     - Renal: No history of significant renal disease. Cr stable WNL. Trend BMP. At preoperative weight 11/07. Diuretic as above.   Creatinine   Date Value Ref Range Status   11/07/2022 1.14 0.66 - 1.25 mg/dL Final     - GI: LBM 11/4, continue bowel regimen and will add in increased MOM today x2 doses     - : Removed hernandez 11/03. Will try external catheter " instead. PTA Flomax 0.8 mg daily resumed. Had retention overnight 11/4 requiring replacement of hernandez. Discontinue daily sedatives as above. Remove hernandez today, 11/07, order placed.      - Endo: DMT2. Insulin infusion transitioned to SSI. PTA metformin resumed 11/07.          Hemoglobin A1C   Date Value Ref Range Status   09/14/2022 5.6 0.0 - 5.6 % Final       Comment:       Normal <5.7%   Prediabetes 5.7-6.4%    Diabetes 6.5% or higher     Note: Adopted from ADA consensus guidelines.         - FEN: Replace electrolytes as needed. SLP following and diet advanced on 11/03. Is on cycled feedings at HS, continue for one more day. Eating has improved, feeding tube removed 11/6. Add supplements.   Orders Placed This Encounter      Combination Diet Easy to Chew (level 7); Mildly Thick (level 2); No Straws    - ID: Postop Leukocytosis, resolved.  Afebrile.  Periop abx complete. Trend CBC, fever curve. On Vanc/Zosyn for suspected aspiration pneumonia, started on 10/24 will complete for full 7 days. UA and repeat CXR ordered 10/28. UA negative except for moderate amount of blood and repeat CXR with improving infiltrate and edema. Completed 7 days IV abx 11/01.   - Reordered UA 11/03. Negative at this time and inflammation likely from having prolonged hernandez caused trace leukocyte esterase, will continue to monitor.   WBC Count   Date Value Ref Range Status   11/07/2022 8.7 4.0 - 11.0 10e3/uL Final     - Heme: Acute blood loss anemia and thrombocytopenia due to surgery. Aspirin and subcutaneous heparin resumed 10/24 with improved PLT count. Trend CBC, transfuse PRN. Heparin gtt initiated 10/28 due to PAF. Heparin discontinued 11/04 with no more episodes of Afib. Will not anticoagulate unless afib recurs.      - Proph: SCD, ASA , PPI     - Dispo: [facility], continue cares and therapies, work on getting patient to chair multiple times throughout the day to help with delirium. Hopeful discharge this week if continues to improve, PT  recommending ARU at this time.     Interval History  No acute events overnight. Doing better today with mental clarity and no longer on sitter since 11/06. Has chronic back pain, but denies, CP, SOB and N/V. Slept well overnight. Pain doing well on current regimen. Breathing stable on room air. Feed tube has been removed and eating well and tolerating diet, supplement as needed. Continue to get patient to chair throughout the day. No BM since 11/04 so will increase regimen slightly today to help.     Medications    acetaminophen  975 mg Oral Q8H     [Held by provider] aspirin  162 mg Oral or NG Tube Daily     atorvastatin  20 mg Oral Daily     gabapentin  300 mg Oral or G tube TID     [Held by provider] heparin ANTICOAGULANT  5,000 Units Subcutaneous Q8H     insulin aspart  1-10 Units Subcutaneous TID AC     insulin aspart  1-7 Units Subcutaneous At Bedtime     lactated ringers  250 mL Intravenous Once     lidocaine  1-2 patch Transdermal Q24H     lidocaine   Transdermal Q8H MITA     methocarbamol  750 mg Oral 4x Daily     nicotine  1 patch Transdermal Daily     nicotine   Transdermal Q8H     pantoprazole  40 mg Oral or NG Tube Daily     Or     pantoprazole  40 mg Oral Daily     polyethylene glycol  17 g Oral Daily     QUEtiapine  25 mg Oral BID     senna-docusate  1 tablet Oral BID     sodium chloride (PF)  3 mL Intracatheter Q8H     tamsulosin  0.8 mg Oral QPM      acetaminophen, bisacodyl, calcium gluconate, calcium gluconate, calcium gluconate, dextrose, glucose **OR** dextrose **OR** glucagon, haloperidol lactate, hydrALAZINE, HYDROmorphone **OR** HYDROmorphone, hydrOXYzine, ketorolac, lidocaine 4%, lidocaine (buffered or not buffered), magnesium hydroxide, metoprolol, naloxone **OR** naloxone **OR** naloxone **OR** naloxone, nitroGLYcerin, ondansetron **OR** ondansetron, oxyCODONE **OR** oxyCODONE, phenylephrine, prochlorperazine **OR** prochlorperazine, sodium chloride, sodium chloride (PF)        Physical  "Exam  Vitals were reviewed  /69 (BP Location: Right arm)   Pulse 70   Temp 97.8  F (36.6  C) (Oral)   Resp 16   Ht 1.803 m (5' 11\")   Wt 120.3 kg (265 lb 3.4 oz)   SpO2 98%   BMI 36.99 kg/m       Rhythm: NSR     Lungs: diminished bases      Cardiovascular: S1/S2, RRR, no m/r/g        Abdomen: s/nt/nd, +BS, +BM 11/04    Extremeties: warm, mild LE edema      Incision: c/d/i      CT: n/a     Weight:          Vitals:     10/20/22 0600 10/21/22 0600 10/22/22 0250 10/23/22 0200   Weight: 120.2 kg (265 lb) 124.6 kg (274 lb 11.1 oz) 124 kg (273 lb 5.9 oz) 126.6 kg (279 lb 1.6 oz)            Data                Recent Labs   Lab 10/24/22  0434 10/24/22  0336 10/24/22  0019 10/23/22  0743 10/23/22  0351 10/22/22  1657 10/22/22  1403 10/20/22  1528 10/20/22  1526 10/20/22  1324 10/20/22  1321   WBC  --  6.9  --   --  7.5  --  8.4   < > 29.3*  --  22.4*   HGB  --  8.8*  --   --  9.2*  --  9.5*   < > 14.2   < > 11.5*   MCV  --  94  --   --  93  --  93   < > 93  --  93   PLT  --  84*  --   --  57*  --  54*   < > 158  --  136*   INR  --   --   --   --   --   --   --   --  1.31*  --  1.48*   NA  --  141  --   --  138  --  137   < > 141   < > 138   POTASSIUM  --  4.3  --   --  4.3  --  4.7   < > 3.7   < > 3.8   CHLORIDE  --  110*  --   --  108  --  107   < > 109  --  109   CO2  --  27  --   --  26  --  28   < > 17*  --  23   BUN  --  33*  --   --  27  --  22   < > 13  --  12   CR  --  1.16  --   --  1.11  --  1.08   < > 0.97  --  0.85   ANIONGAP  --  4  --   --  4  --  2*   < > 15*  --  6   HALEY  --  8.8  --   --  9.2  --  9.1   < > 8.5  --  9.5   * 126* 131*   < > 136*   < > 137*   < > 229*   < > 182*   ALBUMIN  --  2.4*  --   --  2.5*  --   --    < > 3.1*  --   --    PROTTOTAL  --  5.3*  --   --  5.3*  --   --    < > 5.5*  --   --    BILITOTAL  --  0.8  --   --  0.6  --   --    < > 0.6  --   --    ALKPHOS  --  64  --   --  60  --   --    < > 84  --   --    ALT  --  19  --   --  20  --   --    < > 25  --   --  "   AST  --  35  --   --  48*  --   --    < > 68*  --   --     < > = values in this interval not displayed.         Imaging:      Recent Results (from the past 24 hour(s))   XR Abdomen Port 1 View     Narrative     EXAM: XR ABDOMEN PORT 1 VIEW  LOCATION: [facility]  DATE/TIME: 10/23/2022 11:04 AM     INDICATION: advanced NGT, needs to be post pyloric for feeding  COMPARISON: 10/22/2022 at 1542 hours.        Impression     IMPRESSION: Feeding tube tip is at the gastroduodenal junction and is likely postpyloric, however this is not definitive on this study. There is redundancy in the tube and it will likely progress distally on its own. Chest tubes present. Cholelithiasis.   Normal bowel gas pattern.            Patient seen and discussed with Dr. Ignacio Gipson PA-C   Cardiothoracic Surgery  Pager: [phone]

## 2022-11-08 ENCOUNTER — APPOINTMENT (OUTPATIENT)
Dept: OCCUPATIONAL THERAPY | Facility: CLINIC | Age: 67
DRG: 219 | End: 2022-11-08
Attending: SURGERY
Payer: COMMERCIAL

## 2022-11-08 VITALS
HEART RATE: 72 BPM | TEMPERATURE: 97.3 F | WEIGHT: 259.4 LBS | HEIGHT: 71 IN | SYSTOLIC BLOOD PRESSURE: 109 MMHG | OXYGEN SATURATION: 98 % | DIASTOLIC BLOOD PRESSURE: 67 MMHG | BODY MASS INDEX: 36.31 KG/M2 | RESPIRATION RATE: 16 BRPM

## 2022-11-08 LAB
ANION GAP SERPL CALCULATED.3IONS-SCNC: 10 MMOL/L (ref 3–14)
BUN SERPL-MCNC: 31 MG/DL (ref 7–30)
CALCIUM SERPL-MCNC: 9.9 MG/DL (ref 8.5–10.1)
CHLORIDE BLD-SCNC: 100 MMOL/L (ref 94–109)
CO2 SERPL-SCNC: 28 MMOL/L (ref 20–32)
CREAT SERPL-MCNC: 1.04 MG/DL (ref 0.66–1.25)
ERYTHROCYTE [DISTWIDTH] IN BLOOD BY AUTOMATED COUNT: 15.7 % (ref 10–15)
GFR SERPL CREATININE-BSD FRML MDRD: 79 ML/MIN/1.73M2
GLUCOSE BLD-MCNC: 99 MG/DL (ref 70–99)
GLUCOSE BLDC GLUCOMTR-MCNC: 106 MG/DL (ref 70–99)
HCT VFR BLD AUTO: 31.8 % (ref 40–53)
HGB BLD-MCNC: 9.7 G/DL (ref 13.3–17.7)
MAGNESIUM SERPL-MCNC: 2.7 MG/DL (ref 1.6–2.3)
MCH RBC QN AUTO: 29 PG (ref 26.5–33)
MCHC RBC AUTO-ENTMCNC: 30.5 G/DL (ref 31.5–36.5)
MCV RBC AUTO: 95 FL (ref 78–100)
PLATELET # BLD AUTO: 226 10E3/UL (ref 150–450)
POTASSIUM BLD-SCNC: 4.2 MMOL/L (ref 3.4–5.3)
RBC # BLD AUTO: 3.34 10E6/UL (ref 4.4–5.9)
SODIUM SERPL-SCNC: 138 MMOL/L (ref 133–144)
WBC # BLD AUTO: 8.8 10E3/UL (ref 4–11)

## 2022-11-08 PROCEDURE — 999N000157 HC STATISTIC RCP TIME EA 10 MIN

## 2022-11-08 PROCEDURE — 250N000013 HC RX MED GY IP 250 OP 250 PS 637: Performed by: PHYSICIAN ASSISTANT

## 2022-11-08 PROCEDURE — 94640 AIRWAY INHALATION TREATMENT: CPT

## 2022-11-08 PROCEDURE — 83735 ASSAY OF MAGNESIUM: CPT | Performed by: SURGERY

## 2022-11-08 PROCEDURE — 36415 COLL VENOUS BLD VENIPUNCTURE: CPT | Performed by: NURSE PRACTITIONER

## 2022-11-08 PROCEDURE — 97535 SELF CARE MNGMENT TRAINING: CPT | Mod: GO | Performed by: OCCUPATIONAL THERAPIST

## 2022-11-08 PROCEDURE — 85027 COMPLETE CBC AUTOMATED: CPT | Performed by: NURSE PRACTITIONER

## 2022-11-08 PROCEDURE — 97110 THERAPEUTIC EXERCISES: CPT | Mod: GO | Performed by: OCCUPATIONAL THERAPIST

## 2022-11-08 PROCEDURE — 250N000013 HC RX MED GY IP 250 OP 250 PS 637: Performed by: NURSE PRACTITIONER

## 2022-11-08 PROCEDURE — 80048 BASIC METABOLIC PNL TOTAL CA: CPT | Performed by: NURSE PRACTITIONER

## 2022-11-08 PROCEDURE — 250N000009 HC RX 250: Performed by: PHYSICIAN ASSISTANT

## 2022-11-08 PROCEDURE — 94660 CPAP INITIATION&MGMT: CPT

## 2022-11-08 PROCEDURE — 94640 AIRWAY INHALATION TREATMENT: CPT | Mod: 76

## 2022-11-08 PROCEDURE — 250N000013 HC RX MED GY IP 250 OP 250 PS 637: Performed by: INTERNAL MEDICINE

## 2022-11-08 RX ORDER — QUETIAPINE FUMARATE 50 MG/1
50 TABLET, FILM COATED ORAL AT BEDTIME
Qty: 30 TABLET | Refills: 0 | Status: SHIPPED | OUTPATIENT
Start: 2022-11-08 | End: 2023-01-03

## 2022-11-08 RX ORDER — GABAPENTIN 300 MG/1
300 CAPSULE ORAL 3 TIMES DAILY
Qty: 90 CAPSULE | Refills: 1 | Status: SHIPPED | OUTPATIENT
Start: 2022-11-08

## 2022-11-08 RX ORDER — TRAMADOL HYDROCHLORIDE 100 MG/1
100 TABLET, COATED ORAL EVERY 6 HOURS PRN
Qty: 40 TABLET | Refills: 0 | Status: SHIPPED | OUTPATIENT
Start: 2022-11-08

## 2022-11-08 RX ORDER — MINOXIDIL 2.5 MG/1
5 TABLET ORAL DAILY
Qty: 30 TABLET | Refills: 2 | Status: SHIPPED | OUTPATIENT
Start: 2022-11-09 | End: 2022-11-23

## 2022-11-08 RX ORDER — ASPIRIN 81 MG/1
162 TABLET, CHEWABLE ORAL DAILY
Qty: 60 TABLET | Refills: 2 | Status: SHIPPED | OUTPATIENT
Start: 2022-11-09 | End: 2023-12-13 | Stop reason: DRUGHIGH

## 2022-11-08 RX ORDER — AMOXICILLIN 250 MG
1 CAPSULE ORAL 2 TIMES DAILY PRN
Qty: 60 TABLET | Refills: 0 | Status: SHIPPED | OUTPATIENT
Start: 2022-11-08 | End: 2022-11-23

## 2022-11-08 RX ORDER — ACETAMINOPHEN 325 MG/1
650 TABLET ORAL EVERY 4 HOURS PRN
Qty: 100 TABLET | Refills: 1 | Status: SHIPPED | OUTPATIENT
Start: 2022-11-08

## 2022-11-08 RX ORDER — RAMELTEON 8 MG/1
8 TABLET ORAL AT BEDTIME
Qty: 30 TABLET | Refills: 0 | Status: SHIPPED | OUTPATIENT
Start: 2022-11-08 | End: 2022-11-23

## 2022-11-08 RX ORDER — POLYETHYLENE GLYCOL 3350 17 G/17G
17 POWDER, FOR SOLUTION ORAL DAILY PRN
Qty: 30 PACKET | Refills: 0 | Status: SHIPPED | OUTPATIENT
Start: 2022-11-08 | End: 2022-11-23

## 2022-11-08 RX ORDER — METHOCARBAMOL 750 MG/1
750 TABLET, FILM COATED ORAL 2 TIMES DAILY PRN
Qty: 40 TABLET | Refills: 0 | Status: SHIPPED | OUTPATIENT
Start: 2022-11-08 | End: 2023-01-03

## 2022-11-08 RX ORDER — ATORVASTATIN CALCIUM 20 MG/1
20 TABLET, FILM COATED ORAL DAILY
Qty: 30 TABLET | Refills: 2 | Status: SHIPPED | OUTPATIENT
Start: 2022-11-08 | End: 2023-01-03

## 2022-11-08 RX ORDER — FAMOTIDINE 20 MG/1
20 TABLET, FILM COATED ORAL 2 TIMES DAILY
Qty: 28 TABLET | Refills: 0 | Status: SHIPPED | OUTPATIENT
Start: 2022-11-08 | End: 2022-11-23

## 2022-11-08 RX ORDER — AMIODARONE HYDROCHLORIDE 200 MG/1
200 TABLET ORAL DAILY
Qty: 30 TABLET | Refills: 0 | Status: SHIPPED | OUTPATIENT
Start: 2022-11-08 | End: 2023-01-03

## 2022-11-08 RX ORDER — METOPROLOL SUCCINATE 100 MG/1
100 TABLET, EXTENDED RELEASE ORAL 2 TIMES DAILY
Qty: 60 TABLET | Refills: 2 | Status: SHIPPED | OUTPATIENT
Start: 2022-11-08 | End: 2022-11-23

## 2022-11-08 RX ADMIN — SENNOSIDES AND DOCUSATE SODIUM 1 TABLET: 8.6; 5 TABLET ORAL at 09:02

## 2022-11-08 RX ADMIN — NICOTINE 1 PATCH: 14 PATCH, EXTENDED RELEASE TRANSDERMAL at 09:12

## 2022-11-08 RX ADMIN — ASPIRIN 81 MG CHEWABLE TABLET 162 MG: 81 TABLET CHEWABLE at 09:02

## 2022-11-08 RX ADMIN — PANTOPRAZOLE SODIUM 40 MG: 40 TABLET, DELAYED RELEASE ORAL at 09:02

## 2022-11-08 RX ADMIN — METOPROLOL TARTRATE 100 MG: 100 TABLET, FILM COATED ORAL at 09:02

## 2022-11-08 RX ADMIN — IPRATROPIUM BROMIDE AND ALBUTEROL SULFATE 3 ML: .5; 3 SOLUTION RESPIRATORY (INHALATION) at 11:27

## 2022-11-08 RX ADMIN — Medication 1 CAPSULE: at 09:02

## 2022-11-08 RX ADMIN — ACETAMINOPHEN 975 MG: 325 TABLET, FILM COATED ORAL at 09:02

## 2022-11-08 RX ADMIN — POLYETHYLENE GLYCOL 3350 17 G: 17 POWDER, FOR SOLUTION ORAL at 09:07

## 2022-11-08 RX ADMIN — MINOXIDIL 5 MG: 2.5 TABLET ORAL at 09:01

## 2022-11-08 RX ADMIN — LIDOCAINE 1 PATCH: 560 PATCH PERCUTANEOUS; TOPICAL; TRANSDERMAL at 09:08

## 2022-11-08 RX ADMIN — IPRATROPIUM BROMIDE AND ALBUTEROL SULFATE 3 ML: .5; 3 SOLUTION RESPIRATORY (INHALATION) at 08:02

## 2022-11-08 RX ADMIN — GABAPENTIN 300 MG: 300 CAPSULE ORAL at 09:02

## 2022-11-08 ASSESSMENT — ACTIVITIES OF DAILY LIVING (ADL)
ADLS_ACUITY_SCORE: 42

## 2022-11-08 NOTE — PLAN OF CARE
Goal Outcome Evaluation:       A&O x's 3, with signs of a little confusion and forgetfulness. VSS on R/A. BP soft during PT but went back to normal. Lungs sounds - Cleat and diminished bilaterally. Bowel Sounds - Normoactive, one BM during shift.  Urine Output - Removed hernandez and voided five hours after, adequate in urinal. Incisions - Mid-sternal chest, closed with liquid bandage, CDI & ADI,  right calf surg site, ADI & CDI, L Post ankle, ADI & CDI, R Post ankle, mepilex dressing, GOMEZ. Ambulation - Assist of 1 w/walker & gait belt. Diet - Regular. Pain controlled by - PRN tramadol, not taken during shift. Possible discharge in the next couple days.

## 2022-11-08 NOTE — DISCHARGE SUMMARY
"Discharge Summary    Richar Davis MRN# [id number]   YOB: 1955 Age: 67 year old     Date of Admission:  10/20/2022  Date of Discharge:  11/8/2022  Admitting Physician:  Bennie Pierre MD  Discharge Physician:  Bennie Pierre,*  Discharging Service:  Cardiovascular and Thoracic Surgery     Home clinic: [place], [place]  Primary Provider: Cory Valles          Admission Diagnoses:   CAD (coronary artery disease) [I25.10]  Coronary artery disease [I25.10]          Discharge Diagnosis:     Patient Active Problem List   Diagnosis     Coronary artery disease                Discharge Disposition:     Discharged to short-term care facility           Condition on Discharge:     Discharge condition: Stable   Discharge vitals: Blood pressure 109/67, pulse 72, temperature 97.3  F (36.3  C), temperature source Oral, resp. rate 16, height 1.803 m (5' 11\"), weight 117.7 kg (259 lb 6.4 oz), SpO2 98 %.     Code status on discharge: Full Code           Procedures:   S/p: Aortic valve replacement with a 25 mm Bal Inspiris Resilia bovine pericardial valve, coronary artery bypass grafting x 3 with left internal mammary artery to the left anterior descending, reverse lesser saphenous vein graft to the posterior descending artery, reverse left lesser saphenous vein graft to the distal circumflex marginal artery, endoscopic bilateral lesser saphenous vein graft harvest from below the knees, intraoperative MANDEEP on 10/20/2022 with Dr. Pierre.           Medications Prior to Admission:     Medications Prior to Admission   Medication Sig Dispense Refill Last Dose     Ascorbic Acid (VITAMIN C) 500 MG CAPS Take 500 mg by mouth daily   9/16/2022 at am     CALCIUM PO Take 1 capsule by mouth daily   9/16/2022 at am     Cyanocobalamin (VITAMIN B-12 PO) Take 1 tablet by mouth every other day   9/16/2022 at am     diphenhydrAMINE (BENADRYL) 50 MG tablet Take 100 mg by mouth every 6 hours as " needed for itching or allergies   10/19/2022 at pm     folic acid (FOLVITE) 400 MCG tablet Take 400 mcg by mouth 2 times daily   10/19/2022 at pm     magnesium oxide 400 MG CAPS Take 1 capsule by mouth daily   9/16/2022 at am     Multiple Vitamins-Minerals (MENS 50+ MULTI VITAMIN/MIN PO) Take 1 tablet by mouth daily   9/16/2022 at am     tamsulosin (FLOMAX) 0.4 MG capsule Take 0.8 mg by mouth every evening (2 x 0.4 mg = 0.8 mg)   10/19/2022 at pm     vitamin D3 (CHOLECALCIFEROL) 50 mcg (2000 units) tablet Take 1 tablet by mouth every other day   9/16/2022 at am     [DISCONTINUED] aspirin 81 MG EC tablet Take 81 mg by mouth daily   10/13/2022 at am     [DISCONTINUED] atorvastatin (LIPITOR) 20 MG tablet Take 20 mg by mouth daily   10/19/2022 at pm     [DISCONTINUED] benazepril (LOTENSIN) 5 MG tablet Take 5 mg by mouth daily   10/19/2022 at pm     [DISCONTINUED] bisoprolol (ZEBETA) 5 MG tablet Take 2.5 mg by mouth daily (0.5 x 5 mg = 2.5 mg)   10/19/2022 at pm     [DISCONTINUED] fish oil-omega-3 fatty acids 1000 MG capsule Take 2 g by mouth daily   9/16/2022 at am     [DISCONTINUED] gabapentin (NEURONTIN) 800 MG tablet Take 800 mg by mouth 3 times daily as needed   10/19/2022 at am     [DISCONTINUED] metFORMIN (GLUCOPHAGE) 500 MG tablet Take 250 mg by mouth daily (with dinner) (0.5 x 500 mg = 250 mg)   10/19/2022 at pm     [DISCONTINUED] nicotine (NICODERM CQ) 7 MG/24HR 24 hr patch Place 1 patch onto the skin every 24 hours Pt unsure of dosage-they are over the counter patches, patch has no markings to determine dosage. Pt has own supply   10/20/2022     [DISCONTINUED] tiZANidine (ZANAFLEX) 4 MG tablet Take 8 mg by mouth every evening (2 x 4 mg = 8 mg)   10/19/2022 at pm     [DISCONTINUED] traMADol (ULTRAM) 50 MG tablet Take 100-200 mg by mouth every 6 hours as needed for pain    at prn     [DISCONTINUED] traMADol (ULTRAM-ER) 200 MG 24 hr tablet Take 200 mg by mouth as needed for moderate pain   10/19/2022 at prn              Discharge Medications:        Review of your medicines      START taking      Dose / Directions   acetaminophen 325 MG tablet  Commonly known as: TYLENOL  Used for: S/P AVR (aortic valve replacement), S/P CABG (coronary artery bypass graft)      Dose: 650 mg  Take 2 tablets (650 mg) by mouth every 4 hours as needed for other (For optimal non-opioid multimodal pain management to improve pain control.)  Quantity: 100 tablet  Refills: 1     aspirin 81 MG chewable tablet  Commonly known as: ASA  Used for: S/P AVR (aortic valve replacement), S/P CABG (coronary artery bypass graft)  Replaces: aspirin 81 MG EC tablet      Dose: 162 mg  Start taking on: November 9, 2022  2 tablets (162 mg) by Oral or NG Tube route daily  Quantity: 60 tablet  Refills: 2     famotidine 20 MG tablet  Commonly known as: PEPCID  Used for: S/P AVR (aortic valve replacement), S/P CABG (coronary artery bypass graft)      Dose: 20 mg  Take 1 tablet (20 mg) by mouth 2 times daily for 14 days  Quantity: 28 tablet  Refills: 0     gabapentin 300 MG capsule  Commonly known as: NEURONTIN  Used for: S/P AVR (aortic valve replacement), S/P CABG (coronary artery bypass graft)  Replaces: gabapentin 800 MG tablet      Dose: 300 mg  Take 1 capsule (300 mg) by mouth 3 times daily  Quantity: 90 capsule  Refills: 1     methocarbamol 750 MG tablet  Commonly known as: ROBAXIN  Used for: S/P AVR (aortic valve replacement), S/P CABG (coronary artery bypass graft)      Dose: 750 mg  Take 1 tablet (750 mg) by mouth 2 times daily as needed for muscle spasms  Quantity: 40 tablet  Refills: 0     metoprolol succinate  MG 24 hr tablet  Commonly known as: TOPROL XL  Used for: S/P AVR (aortic valve replacement), S/P CABG (coronary artery bypass graft)      Dose: 100 mg  Take 1 tablet (100 mg) by mouth 2 times daily  Quantity: 60 tablet  Refills: 2     minoxidil 2.5 MG tablet  Commonly known as: LONITEN  Used for: S/P AVR (aortic valve replacement), S/P CABG  (coronary artery bypass graft)      Dose: 5 mg  Start taking on: November 9, 2022  Take 2 tablets (5 mg) by mouth daily  Quantity: 30 tablet  Refills: 2     polyethylene glycol 17 g packet  Commonly known as: MIRALAX  Used for: S/P AVR (aortic valve replacement), S/P CABG (coronary artery bypass graft)      Dose: 17 g  Take 17 g by mouth daily as needed for constipation  Quantity: 30 packet  Refills: 0     QUEtiapine 50 MG tablet  Commonly known as: SEROquel  Used for: S/P AVR (aortic valve replacement), S/P CABG (coronary artery bypass graft)      Dose: 50 mg  Take 1 tablet (50 mg) by mouth At Bedtime  Quantity: 30 tablet  Refills: 0     ramelteon 8 MG tablet  Commonly known as: ROZEREM  Used for: S/P AVR (aortic valve replacement), S/P CABG (coronary artery bypass graft)      Dose: 8 mg  Take 1 tablet (8 mg) by mouth At Bedtime  Quantity: 30 tablet  Refills: 0     senna-docusate 8.6-50 MG tablet  Commonly known as: SENOKOT-S/PERICOLACE  Used for: S/P AVR (aortic valve replacement), S/P CABG (coronary artery bypass graft)      Dose: 1 tablet  Take 1 tablet by mouth 2 times daily as needed for constipation  Quantity: 60 tablet  Refills: 0        CONTINUE these medicines which may have CHANGED, or have new prescriptions. If we are uncertain of the size of tablets/capsules you have at home, strength may be listed as something that might have changed.      Dose / Directions   traMADol HCl 100 MG Tabs  This may have changed:     medication strength    how much to take    reasons to take this    Another medication with the same name was removed. Continue taking this medication, and follow the directions you see here.  Used for: S/P AVR (aortic valve replacement), S/P CABG (coronary artery bypass graft)      Dose: 100 mg  Take 100 mg by mouth every 6 hours as needed for moderate pain  Quantity: 40 tablet  Refills: 0        CONTINUE these medicines which have NOT CHANGED      Dose / Directions   atorvastatin 20 MG  tablet  Commonly known as: LIPITOR  Used for: S/P AVR (aortic valve replacement), S/P CABG (coronary artery bypass graft)      Dose: 20 mg  Take 1 tablet (20 mg) by mouth daily  Quantity: 30 tablet  Refills: 2     CALCIUM PO      Dose: 1 capsule  Take 1 capsule by mouth daily  Refills: 0     diphenhydrAMINE 50 MG tablet  Commonly known as: BENADRYL      Dose: 100 mg  Take 100 mg by mouth every 6 hours as needed for itching or allergies  Refills: 0     folic acid 400 MCG tablet  Commonly known as: FOLVITE      Dose: 400 mcg  Take 400 mcg by mouth 2 times daily  Refills: 0     magnesium oxide 400 MG Caps      Dose: 1 capsule  Take 1 capsule by mouth daily  Refills: 0     MENS 50+ MULTI VITAMIN/MIN PO      Dose: 1 tablet  Take 1 tablet by mouth daily  Refills: 0     metFORMIN 500 MG tablet  Commonly known as: GLUCOPHAGE  Used for: S/P AVR (aortic valve replacement), S/P CABG (coronary artery bypass graft)      Dose: 250 mg  Take 0.5 tablets (250 mg) by mouth daily (with dinner) (0.5 x 500 mg = 250 mg)  Quantity: 30 tablet  Refills: 1     nicotine 7 MG/24HR 24 hr patch  Commonly known as: NICODERM CQ  Used for: S/P AVR (aortic valve replacement), S/P CABG (coronary artery bypass graft)      Dose: 1 patch  Place 1 patch onto the skin every 24 hours Pt unsure of dosage-they are over the counter patches, patch has no markings to determine dosage. Pt has own supply  Quantity: 30 patch  Refills: 0     tamsulosin 0.4 MG capsule  Commonly known as: FLOMAX      Dose: 0.8 mg  Take 0.8 mg by mouth every evening (2 x 0.4 mg = 0.8 mg)  Refills: 0     VITAMIN B-12 PO      Dose: 1 tablet  Take 1 tablet by mouth every other day  Refills: 0     Vitamin C 500 MG Caps      Dose: 500 mg  Take 500 mg by mouth daily  Refills: 0     vitamin D3 50 mcg (2000 units) tablet  Commonly known as: CHOLECALCIFEROL      Dose: 1 tablet  Take 1 tablet by mouth every other day  Refills: 0        STOP taking    aspirin 81 MG EC tablet  Replaced by:  aspirin 81 MG chewable tablet        benazepril 5 MG tablet  Commonly known as: LOTENSIN        bisoprolol 5 MG tablet  Commonly known as: ZEBETA        fish oil-omega-3 fatty acids 1000 MG capsule        gabapentin 800 MG tablet  Commonly known as: NEURONTIN  Replaced by: gabapentin 300 MG capsule        tiZANidine 4 MG tablet  Commonly known as: ZANAFLEX              Where to get your medicines      These medications were sent to [facility], [place] - [address] [address]  [address] [address], [facility] [address]    Phone: [phone]     acetaminophen 325 MG tablet    aspirin 81 MG chewable tablet    atorvastatin 20 MG tablet    famotidine 20 MG tablet    gabapentin 300 MG capsule    metFORMIN 500 MG tablet    methocarbamol 750 MG tablet    metoprolol succinate  MG 24 hr tablet    minoxidil 2.5 MG tablet    nicotine 7 MG/24HR 24 hr patch    polyethylene glycol 17 g packet    QUEtiapine 50 MG tablet    ramelteon 8 MG tablet    senna-docusate 8.6-50 MG tablet     Some of these will need a paper prescription and others can be bought over the counter. Ask your nurse if you have questions.    Bring a paper prescription for each of these medications    traMADol HCl 100 MG Tabs                Consultations:     Nutrition, Intensivist, , Psychiatry             Brief History of Illness:   Mr. Davis is a very pleasant 67-year-old gentleman who I saw several months ago for severe symptomatic aortic stenosis and severe 3-vessel coronary artery disease.  He was taken to the operating room today for aortic valve replacement and coronary artery bypass grafting.  The patient elected to have a bioprosthetic valve and understood the limited longevity of a tissue valve and did not want a mechanical valve to avoid lifelong anticoagulation.          Hospital Course:     Patient tolerated the surgery and was brought to the ICU post-operatively for recovery. Patient was originally  extubated on POD#1, but had to be re-intubated on POD#4 to protect airway due to worsening delirium and respiratory status. Patient then re-extubated on POD#6.     Patient developed afib RVR on POD#4, so was started on amio bolus and infusion, and was able to convert back into NSR. Attempted to transition to oral and reconverted back into afib so was restarted on amio infusion and able to convert again to NSR and was able to stay on oral amio taper and maintain NSR. Was started on heparin drip after chest tube removal for PAF, but was decided to not need long term anticoagulation so later discontinued once back in NSR. Will continue amio 200mg once daily x 30 days at discharge.     Post-operatively immediately after first extubation and as well after second extubation patient had altered mental status and delirium. CT performed and no infarcts or abnormalities noted.  Psych was consulted 11/02 to help with control, improve mentation, and medications for patient. He was on a sitter for some time and also on restraints as well, but all was able to be removed by POD#16 and has been off now for 48hrs prior to discharge.     Patient has history of DMT2 and had hyperglycemia post-operatively that was initially treated with an insulin gtt, transitioned to sliding scale insulin, and has his normal at home Metformin need at discharge and patient was started back on this the day prior to discharge for control.     Patient had some fluid overload treated with diuretic medication. At discharge he is below their pre-op weight and was not sent to TCU with any diuretics.     Patient continued to progress well with cardiac rehab and gained mental clarity and is discharged to TCU on POD #19 with the help of wife/family.              Significant Results:     Lab Results   Component Value Date    WBC 8.8 11/08/2022     Lab Results   Component Value Date    RBC 3.34 11/08/2022     Lab Results   Component Value Date    HGB 9.7  11/08/2022     Lab Results   Component Value Date    HCT 31.8 11/08/2022     No components found for: MCT  Lab Results   Component Value Date    MCV 95 11/08/2022     Lab Results   Component Value Date    MCH 29.0 11/08/2022     Lab Results   Component Value Date    MCHC 30.5 11/08/2022     Lab Results   Component Value Date    RDW 15.7 11/08/2022     Lab Results   Component Value Date     11/08/2022       Last Basic Metabolic Panel:  Lab Results   Component Value Date     11/08/2022      Lab Results   Component Value Date    POTASSIUM 4.2 11/08/2022     Lab Results   Component Value Date    CHLORIDE 100 11/08/2022     Lab Results   Component Value Date    HALEY 9.9 11/08/2022     Lab Results   Component Value Date    CO2 28 11/08/2022     Lab Results   Component Value Date    BUN 31 11/08/2022     Lab Results   Component Value Date    CR 1.04 11/08/2022     Lab Results   Component Value Date     11/08/2022    GLC 99 11/08/2022                  Pending Results:     None           Discharge Instructions and Follow-Up:     Discharge diet: Regular   Discharge activity: Daily weights: Call if weight gain 2-3 lbs over 24 hours or if greater than 5 lbs in 1 week.  No lifting more than 10 lbs for 8-12 weeks.  No driving for 1 month.  Call for pain medication refill.  Call for temperature greater than 101.0  Daily shower with antibacterial soap.   Discharge follow-up: You have a follow up visit with CV Surgery Advance Care Practitioners on 11/23/22 at 1:30pm at the [facility] at [facility] in [place].  You will then return to the care of your primary provider and your cardiologist. Future medication refills should come from your PCP or Cardiologist.   You should see your primary care provider in 2-4 weeks after discharge.   Follow up with your cardiologist on 12/15/22 at 10:10am with Ghazala Stokes CNP and then on 01/03/23 at 8:15am with Dr. Mejias as scheduled.   If you do not hear from a   in 7 days, please call [phone] and request to be seen with a general cardiologist or someone that you have seen in the past.    Outpatient therapy: Cardiac rehab   Home Care agency: None    Supplies and equipment: None   Lines and drains: None    Wound care: Wash incision daily with antibacterial soap   Other instructions: None        Joanna Gipson PA-C   Cardiothoracic Surgery  Pager [phone]

## 2022-11-08 NOTE — PROGRESS NOTES
Care Management Follow Up    Length of Stay (days): 19    Expected Discharge Date: 11/08/2022     Concerns to be Addressed:       Patient plan of care discussed at interdisciplinary rounds: Yes    Anticipated Discharge Disposition: Transitional Care     Anticipated Discharge Services:    Anticipated Discharge DME:      Patient/family educated on Medicare website which has current facility and service quality ratings:    Education Provided on the Discharge Plan:    Patient/Family in Agreement with the Plan:      Referrals Placed by CM/SW:    Private pay costs discussed: Not applicable    Additional Information:  Voicemail received from Tennille at [facility] confirming that they can accept patient as early as today pending prior-auth. Tennille asking for a call back at [phone] if she should proceed to get insurance auth. Writer called and spoke with spouse, Mya. Writer updated her regarding location and accepting facility. Mya aware and agreeable for facility to submit auth. Writer explained if auth is received and is medically ready we can move forward today with discharge. Writer will keep spouse updated once auth is received.     Addendum 1145: VM received from Cristel with [facility] confirming that she received [facility] Auth and patient can admit today and just needs to arrive by  7pm at the latest. Writer asked PA to complete discharge orders. Writer called to speak with patient's wife that Auth has been received and patient is ready for discharge. Spouse confirmed she is able to transport patient and is heading to the hospital in about 30 mins. Spouse agreeable to transport around 1300. Writer left MARIFER for Tennille in admission regarding wife transporting around 1300.     Maira Isabel Reyna, SATHISH, LGSW   Social Work   [facility]

## 2022-11-08 NOTE — PLAN OF CARE
Occupational Therapy Discharge Summary    Reason for therapy discharge:    Discharged to transitional care facility.    Progress towards therapy goal(s). See goals on Care Plan in [facility] electronic health record for goal details.  Goals partially met.  Barriers to achieving goals:   discharge from facility.    Therapy recommendation(s):    Continued therapy is recommended.  Rationale/Recommendations:  Patient continues to be below baseline with activity tolerance and cognition requiring further therapy in TCU to return to prior level of function.

## 2022-11-08 NOTE — PROGRESS NOTES
SW:  D: VM received from Amara with [facility] stating that patient is there and he is not on their list for TCU. Writer called back at 1520 and spoke to Amara. Amara confirmed they got it figured out and spouse when to the wrong TCU but was able to get patient to the correct facility to [facility].     Maria Isabel Reyna, SATHISH, LGSW   Social Work   [facility]

## 2022-11-08 NOTE — PROGRESS NOTES
Orientations: AOx3, occasionally confused   Vitals/Pain: VSS, denies pan, RA  Diet: Regular  Tele: NSR  Lines/Drains: R PIV saline locked   Skin/Wounds: BLE harvest sites CDI, sternal incision ADI  GI/: UOA, BMx1  Labs: Abnormal/Trends, Electrolyte Replacement- Recheck this morning   Ambulation/Assist: Assist of 1 w/ walker  Plan: Pending discharge to TCU

## 2022-11-08 NOTE — PROGRESS NOTES
Care Management Discharge Note    Discharge Date: 11/08/2022       Discharge Disposition: Transitional Care    Discharge Services:      Discharge DME:      Discharge Transportation: family or friend will provide, car, drives self    Private pay costs discussed: Not applicable    PAS Confirmation Code: [id number]  Patient/family educated on Medicare website which has current facility and service quality ratings:      Education Provided on the Discharge Plan:    Persons Notified of Discharge Plans: Patient, spouse, chare RN, RUSSELL, MD  Patient/Family in Agreement with the Plan:      Handoff Referral Completed: Yes    Additional Information:  Writer received a VM from [facility] confirming a 1300 transport time works. Writer completed PAS and placed on chart. Writer updated HUC. Writer updated charge RN who will update bedside RN.    PAS-RR    D: Per DHS regulation, SW completed and submitted PAS-RR to [place] [employer]ging Direct Connect via the Senior LinkAge Line.  PAS-RR confirmation # is :[id number]    I: SW spoke with patient  and they are aware a PAS-RR has been submitted.  SW reviewed with patient  that they may be contacted for a follow up appointment within 10 days of hospital discharge if their SNF stay is < 30 days.  Contact information for Senior [employer]Age Line was also provided.    A: patient verbalized understanding.    P: Further questions may be directed to Senior LinkAge Line at #[phone], option #4 for PAS-RR staff.  Addendum 1250: Orders received and faxed to RUSSELL,.    SATHISH Arzola, [facility]   Social Work   [facility]

## 2022-11-08 NOTE — PROGRESS NOTES
CLINICAL NUTRITION SERVICES - REASSESSMENT NOTE      Recommendations Ordered by Registered Dietitian (RD):   Change supplements to Glucerna BID between meals 10 am and 2 pm (chocolate and vanilla).   Malnutrition: (10/23)  % Weight Loss:  None noted  % Intake:  Decreased intake does not meet criteria for malnutrition   Subcutaneous Fat Loss:  None observed  Muscle Loss:  None observed  Fluid Retention:  Trace 1+     Malnutrition Diagnosis: Patient does not meet two of the above criteria necessary for diagnosing malnutrition       EVALUATION OF PROGRESS TOWARD GOALS   Diet: Regular + Magic Cup TID with meals     Intake/Tolerance:  Patient has been on a calorie count over the past 3 days:  CALORIE COUNT  Approximate Oral Intake for:   11/7   Calories:  2620  Protein:  67    Summary:   Average oral intake over past 3 days:  2073 kcals (18 kcal/kg), 53 gm pro (0.7 gm/kg and 46% protein needs)    --> Yesterdays intake met 128% energy and 58% protein needs.    ASSESSED NUTRITION NEEDS: (Modified energy needs using lower DW)  Dosing Weight: 117.7 kg (11/8 wt for energy), 78.2 kg (IBW for protein)   Estimated Energy Needs: 1954-9679 kcals (14-17 Kcal/Kg)  Justification: obese  Estimated Protein Needs: 115-155 grams protein (1.5-2 g pro/Kg)  Justification: post-op, obese    NEW FINDINGS:   11/4:  SLP = upgrade to easy to chew (level 7), mildly thick (level 2)  11/6:  FT was removed  11/7:  SLP = upgrade to Regular diet    Discharge to TCU pending.    Previous Goals (11/4):   TF + PO to meet 90-11% estimated needs   Evaluation: Not met, as TF off    PO to meet >/=60% estimated needs to justify removal of FT   Evaluation: not met over past 3 days, but closely met yesterday    Previous Nutrition Diagnosis (11/4):   Inadequate oral intake related to variable appetite likely d/t current mental status as evidenced by PO alone meeting 54% energy needs and 26% protein needs, not justifying removal of FT   Evaluation:  Improving      CURRENT NUTRITION DIAGNOSIS  Inadequate protein intake related to variable appetite but improving as evidenced by patient meeting 1/2-2/3 estimated protein needs over past 3 days.    INTERVENTIONS  Recommendations / Nutrition Prescription  Change supplements to Glucerna BID between meals 10 am and 2 pm (chocolate and vanilla).    Implementation  Medical Food Supplement - Modified in Epic as above    Goals  Patient will consume > 75% meals and supplements.    MONITORING AND EVALUATION:  Progress towards goals will be monitored and evaluated per protocol and Practice Guidelines    Funmilaoy Collazo, BIA, LD, CNSC

## 2022-11-08 NOTE — PLAN OF CARE
Goal Outcome Evaluation:       A&O x's 3, with signs of a little confusion and forgetfulness. VSS on R/A. Lungs sounds - Clear and diminished bilaterally. Bowel Sounds - Normoactive, one BM during shift.  Urine Output - adequate in urinal. Incisions - Mid-sternal chest, closed with liquid bandage, CDI & ADI,  right calf surg site, ADI & CDI, L Post ankle, CDI, R Post ankle, mepilex dressing, both dressing changed today. Ambulation - Assist of 1 w/walker. Diet - Regular. Pain controlled by - PRN tramadol, not taken during shift. Discharge today to [facility] TCU.

## 2022-11-08 NOTE — PROGRESS NOTES
[facility]  Cardiovascular and Thoracic Surgery Daily Note        Assessment and Plan  POD # 19 s/p aortic valve replacement with a 25 mm Bal Inspiris Resilia bovine pericardial valve, coronary artery bypass grafting x 3 with left internal mammary artery to left anterior descending, reverse lesser saphenous vein graft to the posterior descending artery, reverse left lesser saphenous vein graft to the distal circumflex marginal artery, endoscopic bilateral lesser saphenous vein graft harvest from below the knees on 10/20 with Dr. Bennie Pierre     - CVS: Pre-op TTE with preserved biventricular function. Immediate postop profound vasoplegia and acidosis requiring multiple high dose pressors, bicarb infusion, and volume resuscitation. Weaned off pressors by POD1, lactate normalized. BP labile with agitation and ongoing need for sedation. Intermittent low dose NE- sedation related. New onset afib RVR 10/22; amiodarone bolus and infusion, back in NSR, transition to PO amio with taper, recurrent afib restarted on amio infusion, RVR a.fib 10/27-1 dose IV metoprolol trialed with no success, amio restarted converted to NSR at 1849. Will need anticoagulation for PAF- discussed with Dr. Pierre now that chest tubes and pacer wires removed started heparin gtt with no loading dose or no bolus. Heparin discontinued on 11/04, will not anticoagulate as patient remains in NSR.  Aspirin 162 mg daily, atorvastatin 20 mg daily. Metoprolol increased to 100mg BID 11/1. Chest tubes and temporary pacing wires removed on 10/27. CXR prior to chest tube removal with worsening pulmonary edema, diuresing, Repeat CXR 10/28 with improving pulmonary edema. Euvolemic and below pre-operative weight, will hold on further diuresis today, 11/08.      - Resp: Extubated POD1. IS, pulmonary toilet. Acute hypoxemic respiratory failure, increased WOB and inability to protect airway due to delirium, discussed with Dr. Pagan and   "Ignacio, reintubated 10/24. Re extubated 10/26/22.  -Significant amount of secretions overnight into am 10/28,requiring frequent suctioning. Given one dose glycopyrrolate on 10/28. Continued improvement of secretions 10/29. Saturating well on room air. Discontinued use of CPAP at night.      - Neuro: Post-extubation, repeating \"I need help\" over and over, but able to answer question appropriately, redirect, and no focal physical deficits. Worsening agitation 10/22 despite Seroquel, ativan, and Haldol. Placed in restraints due to swinging at staff, pulling at lines/tubes- Started on Precedex and Zyprexa with some improvement- since discontinued. Chronic pain at baseline. Minimize narcotics with agitation. PTA on gabapentin 800 mg TID, resumed 300 mg TID post-operatively but khoi discontinued on 11/01. Will start khoi taper and restarted khoi 300mg BID on 11/02 due to chronic use will need taper to prevent withdrawal. Head CT negative 10/24/22.  improved alertness and mentation 10/30 afternoon. Oxycodone discontinued 10/30, decreasing robaxin to BID instead of QID-- continue to encourage up to chair and wakefulness during the day.  - Patient was placed back into 4-pt restraints overnight 11/03. Psych consulted and weighed in on options for patient to try. (Khoi restarted 11/02, Ordered Ramelteon 8mg HS, Olanzapine 5-10mg ODT or IM q6hr PRN, discontinued haldol, and delirium precautions).  - 11/5 more agitation overnight requiring restraints. Sleep/wake cycles mixed up, discontinue AM seroquel.   - 11/6 slept very well overnight and mentation much more clear today, no longer requiring sitter. Since this time mentation has continued to improve daily and patient more awake and oriented. Patient close to pre-op baseline at this time.      - Renal: No history of significant renal disease. Cr stable WNL. Trend BMP. At preoperative weight 11/07, and now below pre-op weight 11/08.  Creatinine   Date Value Ref Range Status "   11/08/2022 1.04 0.66 - 1.25 mg/dL Final     - GI: +BM 11/07, continue bowel regimen     - : Removed hernandez 11/03. Will try external catheter instead. PTA Flomax 0.8 mg daily resumed. Had retention overnight 11/4 requiring replacement of hernandez. Discontinue daily sedatives as above. Remove hernandez removed 11/07 and voiding on own a few hours after removal with no issues.      - Endo: DMT2. Insulin infusion transitioned to SSI. PTA metformin resumed 11/07.          Hemoglobin A1C   Date Value Ref Range Status   09/14/2022 5.6 0.0 - 5.6 % Final       Comment:       Normal <5.7%   Prediabetes 5.7-6.4%    Diabetes 6.5% or higher     Note: Adopted from ADA consensus guidelines.         - FEN: Replace electrolytes as needed. SLP following and diet advanced on 11/03. Is on cycled feedings at HS, continue for one more day. Eating has improved, feeding tube removed 11/6. Add supplements.   Orders Placed This Encounter      Regular Diet Adult    - ID: Postop Leukocytosis, resolved.  Afebrile.  Periop abx complete. Trend CBC, fever curve. On Vanc/Zosyn for suspected aspiration pneumonia, started on 10/24 will complete for full 7 days. UA and repeat CXR ordered 10/28. UA negative except for moderate amount of blood and repeat CXR with improving infiltrate and edema. Completed 7 days IV abx 11/01.   - Reordered UA 11/03. Negative at this time and inflammation likely from having prolonged hernandez caused trace leukocyte esterase, will continue to monitor.   WBC Count   Date Value Ref Range Status   11/08/2022 8.8 4.0 - 11.0 10e3/uL Final     - Heme: Acute blood loss anemia and thrombocytopenia due to surgery. Aspirin and subcutaneous heparin resumed 10/24 with improved PLT count. Trend CBC, transfuse PRN. Heparin gtt initiated 10/28 due to PAF. Heparin discontinued 11/04 with no more episodes of Afib. Will not anticoagulate unless afib recurs.      - Proph: SCD, ASA , PPI     - Dispo: [facility], continue therapies. SW currently trying  to arrange TCU placement for patient closer to home per patient and wife and that will take patients insurance. Likely to discharge to TCU in 1-2 days pending placement and bed availability.      Interval History  No acute events overnight. Patient continuing to improve daily with mental clarity. Still not needing a sitter since 11/06. Pain well controlled and using little narcotics at this time for his pain control. Tolerating diet well. Had BM yesterday and voiding well on own since hernandez removed. No CP, SOB or N/V.  Sleeping better the last few nights. Care coordinator working on placement as patient and wife want something close to home, waiting on facilities to approve and accept patient and his insurance.     Medications    acetaminophen  975 mg Oral Q8H     [Held by provider] aspirin  162 mg Oral or NG Tube Daily     atorvastatin  20 mg Oral Daily     gabapentin  300 mg Oral or G tube TID     [Held by provider] heparin ANTICOAGULANT  5,000 Units Subcutaneous Q8H     insulin aspart  1-10 Units Subcutaneous TID AC     insulin aspart  1-7 Units Subcutaneous At Bedtime     lactated ringers  250 mL Intravenous Once     lidocaine  1-2 patch Transdermal Q24H     lidocaine   Transdermal Q8H MITA     methocarbamol  750 mg Oral 4x Daily     nicotine  1 patch Transdermal Daily     nicotine   Transdermal Q8H     pantoprazole  40 mg Oral or NG Tube Daily     Or     pantoprazole  40 mg Oral Daily     polyethylene glycol  17 g Oral Daily     QUEtiapine  25 mg Oral BID     senna-docusate  1 tablet Oral BID     sodium chloride (PF)  3 mL Intracatheter Q8H     tamsulosin  0.8 mg Oral QPM      acetaminophen, bisacodyl, calcium gluconate, calcium gluconate, calcium gluconate, dextrose, glucose **OR** dextrose **OR** glucagon, haloperidol lactate, hydrALAZINE, HYDROmorphone **OR** HYDROmorphone, hydrOXYzine, ketorolac, lidocaine 4%, lidocaine (buffered or not buffered), magnesium hydroxide, metoprolol, naloxone **OR** naloxone  "**OR** naloxone **OR** naloxone, nitroGLYcerin, ondansetron **OR** ondansetron, oxyCODONE **OR** oxyCODONE, phenylephrine, prochlorperazine **OR** prochlorperazine, sodium chloride, sodium chloride (PF)        Physical Exam  Vitals were reviewed  /49 (BP Location: Right arm, Patient Position: Left side, Cuff Size: Adult Regular)   Pulse 72   Temp 97.6  F (36.4  C) (Oral)   Resp 16   Ht 1.803 m (5' 11\")   Wt 117.7 kg (259 lb 6.4 oz)   SpO2 94%   BMI 36.18 kg/m       Rhythm: NSR     Lungs: diminished bases       Cardiovascular: S1/S2, RRR, no m/r/g        Abdomen: s/nt/nd, +BS, +BM 11/07    Extremeties: warm, minimal LE edema      Incision: c/d/i      CT: n/a     Weight:          Vitals:     10/20/22 0600 10/21/22 0600 10/22/22 0250 10/23/22 0200   Weight: 120.2 kg (265 lb) 124.6 kg (274 lb 11.1 oz) 124 kg (273 lb 5.9 oz) 126.6 kg (279 lb 1.6 oz)            Data                Recent Labs   Lab 10/24/22  0434 10/24/22  0336 10/24/22  0019 10/23/22  0743 10/23/22  0351 10/22/22  1657 10/22/22  1403 10/20/22  1528 10/20/22  1526 10/20/22  1324 10/20/22  1321   WBC  --  6.9  --   --  7.5  --  8.4   < > 29.3*  --  22.4*   HGB  --  8.8*  --   --  9.2*  --  9.5*   < > 14.2   < > 11.5*   MCV  --  94  --   --  93  --  93   < > 93  --  93   PLT  --  84*  --   --  57*  --  54*   < > 158  --  136*   INR  --   --   --   --   --   --   --   --  1.31*  --  1.48*   NA  --  141  --   --  138  --  137   < > 141   < > 138   POTASSIUM  --  4.3  --   --  4.3  --  4.7   < > 3.7   < > 3.8   CHLORIDE  --  110*  --   --  108  --  107   < > 109  --  109   CO2  --  27  --   --  26  --  28   < > 17*  --  23   BUN  --  33*  --   --  27  --  22   < > 13  --  12   CR  --  1.16  --   --  1.11  --  1.08   < > 0.97  --  0.85   ANIONGAP  --  4  --   --  4  --  2*   < > 15*  --  6   HALEY  --  8.8  --   --  9.2  --  9.1   < > 8.5  --  9.5   * 126* 131*   < > 136*   < > 137*   < > 229*   < > 182*   ALBUMIN  --  2.4*  --   --  2.5*  --  "  --    < > 3.1*  --   --    PROTTOTAL  --  5.3*  --   --  5.3*  --   --    < > 5.5*  --   --    BILITOTAL  --  0.8  --   --  0.6  --   --    < > 0.6  --   --    ALKPHOS  --  64  --   --  60  --   --    < > 84  --   --    ALT  --  19  --   --  20  --   --    < > 25  --   --    AST  --  35  --   --  48*  --   --    < > 68*  --   --     < > = values in this interval not displayed.         Imaging:      Recent Results (from the past 24 hour(s))   XR Abdomen Port 1 View     Narrative     EXAM: XR ABDOMEN PORT 1 VIEW  LOCATION: [facility]  DATE/TIME: 10/23/2022 11:04 AM     INDICATION: advanced NGT, needs to be post pyloric for feeding  COMPARISON: 10/22/2022 at 1542 hours.        Impression     IMPRESSION: Feeding tube tip is at the gastroduodenal junction and is likely postpyloric, however this is not definitive on this study. There is redundancy in the tube and it will likely progress distally on its own. Chest tubes present. Cholelithiasis.   Normal bowel gas pattern.            Patient seen and discussed with Dr. Dash/Cristino Gipson PA-C   Cardiothoracic Surgery  Pager: [phone]

## 2022-11-08 NOTE — DISCHARGE INSTRUCTIONS
AFTER YOU GO HOME FROM YOUR HEART SURGERY  (AVR with Bal Inspiris Resilia bovine pericardial valve, CABG x3 with Dr. Pierre on 10/20/22)    You had a sternotomy, avoid lifting, pushing, or pulling anything greater than ten pounds for 8 weeks after surgery and then less than 20-30 pounds for an additional 6 weeks. Do not reach backwards or use arms to push out of chair. Do not let people pull on your arms to assist with standing. Avoid twisting or reaching too far across your body.  Avoid strenuous activities such as bowling, vacuuming, raking, shoveling, golf or tennis for 12 weeks after your surgery. Splint your chest incision by hugging a pillow or bringing your arms across your chest when coughing or sneezing. Please try to sleep on your back for the first 4-6 weeks to avoid extra stress on your sternum (breastbone) while it is healing.     No driving for 4 weeks after surgery or while on pain medication.     Shower or wash your incisions daily with antibacterial soap and water (or as instructed), pat dry. Keep wound clean and dry, showers are okay after discharge, but don't let spray hit directly on incision. No baths or swimming for 6 weeks and/or until incisions are completely healed over. Cover chest tube sites with gauze until they stop draining, then leave open to air. It is normal for chest tube sites to drain fluid for up to 2-3 weeks after surgery. It is not normal to have drainage from other incisions.   Watch for signs of infection: increased redness, tenderness, warmth or any drainage from sternum incision.  Also a temperature > 100.5 F or chills. Call your surgeon or primary care provider's office immediately.     Exercise is very important in your recovery. Please follow the guidelines set up for you in your cardiac rehab classes at the hospital. If outpatient cardiac rehab was ordered for you, we highly recommend you participate. If you have problems arranging your cardiac rehab, please  call [phone] for all locations, with the exception of [place], please call [phone] and [place], please call [phone].    Avoid sitting for prolonged periods of time, try to walk every hour during the day. If you have a leg incision, elevate your leg often when you are not walking.    Check your weight when you get home from the hospital and continue to check it daily through your recovery for at least a month. If you notice a weight gain of 2-3 pounds in a week, notify your primary care physician, cardiologist or surgeon.    Bowel activity may be slow after surgery. If necessary, you may take an over the counter laxative such as Milk of Magnesia or Miralax. You may have stool softeners prescribed (docusate sodium, Senokot). We recommend using stool softeners while using narcotics for pain (oxycodone/percocet, hydrocodone/vicodin, hydromorphone/dilaudid).      Wean OFF of narcotics (oxycodone, dilaudid, hydrocodone) as soon as possible. You should continue taking acetaminophen as long as you have any surgical pain as the first choice for pain control and add narcotics as necessary for pain to be tolerable.      DENTAL VISITS AFTER SURGERY  If you have had your heart valve repaired or replaced, we do not recommend having any dental work done for 6 months and you will need to take an antibiotic prior to dental visits from now on.  Please notify your dentist before any procedure for the proper treatment needed. The antibiotic is taken by mouth one hour prior to visit. This includes routine cleanings.    DO NOT SMOKE.  IF YOU NEED HELP QUITTING, PLEASE TALK WITH YOUR CARDIOLOGIST OR PRIMARY DOCTOR.    REGARDING PRESCRIPTION REFILLS.  If you need a refill on your pain medication contact us to discuss your pain and a possible one time refill.   All other medications will be adjusted, discontinued and re-filled by your primary care physician and/or your cardiologist as they were prior to your surgery. We  have given you enough for one to three month with possibly one refill. You may have refills available to you for some of your medications through the [facility]. If you would like your refills sent to a different a different pharmacy, please contact your preferred pharmacy and let them know that you have prescriptions at [facility] that you would like transferred.    POST-OPERATIVE CLINIC VISITS  You have a follow up visit with CV Surgery Advance Care Practitioners on 11/23/22 at 1:30pm at the Heart Clinic at [facility] in [place].  You will then return to the care of your primary provider and your cardiologist. Future medication refills should come from your PCP or Cardiologist.   You should see your primary care provider in 2-4 weeks after discharge.   Follow up with your cardiologist on 12/15/22 at 10:10am with Ghazala Stokes CNP and then on 01/03/23 at 8:15am with Dr. Mejias as scheduled.   If you do not hear from a  in 7 days, please call [phone] and request to be seen with a general cardiologist or someone that you have seen in the past.     SURGICAL QUESTIONS  Please call our nurse coordinators, Jeanine and Ginger, at [phone] with questions or concerns related to surgery. For other non-urgent questions and requests, out nurse coordinators can also be reached via email; Jeanine at [email or web address] and Ginger at [email or web address]    On weekends or after hours, please call [phone] and ask the  to page the Cardiothoracic Surgeon on-call.      Thank you,    Your Cardiothoracic Surgery Team

## 2022-11-09 ENCOUNTER — PATIENT OUTREACH (OUTPATIENT)
Dept: CARE COORDINATION | Facility: CLINIC | Age: 67
End: 2022-11-09

## 2022-11-09 NOTE — PLAN OF CARE
Speech Language Therapy Discharge Summary    Reason for therapy discharge:    Discharged to transitional care facility.    Progress towards therapy goal(s). See goals on Care Plan in [facility] electronic health record for goal details.  Goals not met.  Barriers to achieving goals:   discharge from facility.    Therapy recommendation(s):    Continued therapy is recommended.  Rationale/Recommendations:  Continue ST needs to ensure diet tolerance of regular textures and thin liquids.Complete a cognitive linguistic evaluation as indicated.

## 2022-11-09 NOTE — PROGRESS NOTES
[facility]    Background: Transitional Care Management program identified per system criteria and reviewed by [facility] team for possible outreach.    Assessment: Upon chart review, CCRC Team member will not proceed with patient outreach related to this episode of Transitional Care Management program due to reason below:    Non-[facility] TCU: Patient is not established within [facility] and CCRC team member noted patient discharged to TCU/ARU/LTACH.    Plan: Transitional Care Management episode addressed appropriately per reason noted above.      Porsha Oscar RN  [facility], [facility]    *Connected Care Resource Team does NOT follow patient ongoing. Referrals are identified based on internal discharge reports and the outreach is to ensure patient has an understanding of their discharge instructions.

## 2022-11-13 ENCOUNTER — LAB REQUISITION (OUTPATIENT)
Dept: LAB | Facility: CLINIC | Age: 67
End: 2022-11-13
Payer: COMMERCIAL

## 2022-11-13 DIAGNOSIS — I10 ESSENTIAL (PRIMARY) HYPERTENSION: ICD-10-CM

## 2022-11-15 ENCOUNTER — TELEPHONE (OUTPATIENT)
Dept: OTHER | Facility: CLINIC | Age: 67
End: 2022-11-15

## 2022-11-15 LAB
ANION GAP SERPL CALCULATED.3IONS-SCNC: 13 MMOL/L (ref 7–15)
BUN SERPL-MCNC: 19.7 MG/DL (ref 8–23)
CALCIUM SERPL-MCNC: 10 MG/DL (ref 8.8–10.2)
CHLORIDE SERPL-SCNC: 102 MMOL/L (ref 98–107)
CREAT SERPL-MCNC: 1.06 MG/DL (ref 0.67–1.17)
DEPRECATED HCO3 PLAS-SCNC: 23 MMOL/L (ref 22–29)
ERYTHROCYTE [DISTWIDTH] IN BLOOD BY AUTOMATED COUNT: 15.2 % (ref 10–15)
GFR SERPL CREATININE-BSD FRML MDRD: 77 ML/MIN/1.73M2
GLUCOSE SERPL-MCNC: 116 MG/DL (ref 70–99)
HCT VFR BLD AUTO: 33.5 % (ref 40–53)
HGB BLD-MCNC: 9.7 G/DL (ref 13.3–17.7)
MCH RBC QN AUTO: 28.1 PG (ref 26.5–33)
MCHC RBC AUTO-ENTMCNC: 29 G/DL (ref 31.5–36.5)
MCV RBC AUTO: 97 FL (ref 78–100)
PLATELET # BLD AUTO: 246 10E3/UL (ref 150–450)
POTASSIUM SERPL-SCNC: 5.2 MMOL/L (ref 3.4–5.3)
RBC # BLD AUTO: 3.45 10E6/UL (ref 4.4–5.9)
SODIUM SERPL-SCNC: 138 MMOL/L (ref 136–145)
WBC # BLD AUTO: 8.1 10E3/UL (ref 4–11)

## 2022-11-15 PROCEDURE — 80048 BASIC METABOLIC PNL TOTAL CA: CPT | Performed by: NURSE PRACTITIONER

## 2022-11-15 PROCEDURE — P9603 ONE-WAY ALLOW PRORATED MILES: HCPCS | Performed by: NURSE PRACTITIONER

## 2022-11-15 PROCEDURE — 36415 COLL VENOUS BLD VENIPUNCTURE: CPT | Performed by: NURSE PRACTITIONER

## 2022-11-15 PROCEDURE — 85027 COMPLETE CBC AUTOMATED: CPT | Performed by: NURSE PRACTITIONER

## 2022-11-15 NOTE — TELEPHONE ENCOUNTER
I spoke with RN at TCU, Incision intact,dry, Left lung base diminished. Denies shortness of breath. Afebrile, no productive cough. Weight up 11 lbs since discharge, no diuretics ordered. HR stable. States patient is intermittently confused. No narcotics given. Was seen by provider yesterday with no changes made. Nurse manager number given for provider to talk to regarding weight gain.

## 2022-11-23 ENCOUNTER — OFFICE VISIT (OUTPATIENT)
Dept: CARDIOLOGY | Facility: CLINIC | Age: 67
End: 2022-11-23
Payer: COMMERCIAL

## 2022-11-23 VITALS
WEIGHT: 295 LBS | BODY MASS INDEX: 39.96 KG/M2 | SYSTOLIC BLOOD PRESSURE: 108 MMHG | DIASTOLIC BLOOD PRESSURE: 58 MMHG | OXYGEN SATURATION: 96 % | HEIGHT: 72 IN | HEART RATE: 80 BPM

## 2022-11-23 DIAGNOSIS — E66.01 MORBID OBESITY (H): Primary | ICD-10-CM

## 2022-11-23 DIAGNOSIS — Z95.1 S/P CABG (CORONARY ARTERY BYPASS GRAFT): ICD-10-CM

## 2022-11-23 DIAGNOSIS — Z95.2 S/P AVR (AORTIC VALVE REPLACEMENT): ICD-10-CM

## 2022-11-23 PROCEDURE — 99024 POSTOP FOLLOW-UP VISIT: CPT | Performed by: PHYSICIAN ASSISTANT

## 2022-11-23 RX ORDER — POTASSIUM CHLORIDE 1500 MG/1
20 TABLET, EXTENDED RELEASE ORAL
COMMUNITY
Start: 2022-11-22 | End: 2023-06-26

## 2022-11-23 RX ORDER — METOPROLOL SUCCINATE 50 MG/1
50 TABLET, EXTENDED RELEASE ORAL 2 TIMES DAILY
Qty: 60 TABLET | Refills: 3 | Status: SHIPPED | OUTPATIENT
Start: 2022-11-23 | End: 2023-01-03

## 2022-11-23 RX ORDER — MINOXIDIL 2.5 MG/1
5 TABLET ORAL DAILY
Qty: 30 TABLET | Refills: 2 | Status: SHIPPED | OUTPATIENT
Start: 2022-11-23 | End: 2022-11-23 | Stop reason: DRUGHIGH

## 2022-11-23 RX ORDER — FUROSEMIDE 40 MG
40 TABLET ORAL
COMMUNITY
Start: 2022-11-22 | End: 2023-01-03

## 2022-11-23 RX ORDER — MINOXIDIL 2.5 MG/1
5 TABLET ORAL DAILY
Qty: 60 TABLET | Refills: 3 | Status: SHIPPED | OUTPATIENT
Start: 2022-11-23 | End: 2023-01-03

## 2022-11-23 NOTE — PROGRESS NOTES
CV Surgery Post Op Follow Up Note:     S:  From discharge summary:     S/p: Aortic valve replacement with a 25 mm Bal Inspiris Resilia bovine pericardial valve, coronary artery bypass grafting x 3 with left internal mammary artery to the left anterior descending, reverse lesser saphenous vein graft to the posterior descending artery, reverse left lesser saphenous vein graft to the distal circumflex marginal artery, endoscopic bilateral lesser saphenous vein graft harvest from below the knees, intraoperative MANDEEP on 10/20/2022 with Dr. Pierre.     Patient tolerated the surgery and was brought to the ICU post-operatively for recovery. Patient was originally extubated on POD#1, but had to be re-intubated on POD#4 to protect airway due to worsening delirium and respiratory status. Patient then re-extubated on POD#6.      Patient developed afib RVR on POD#4, so was started on amio bolus and infusion, and was able to convert back into NSR. Attempted to transition to oral and reconverted back into afib so was restarted on amio infusion and able to convert again to NSR and was able to stay on oral amio taper and maintain NSR. Was started on heparin drip after chest tube removal for PAF, but was decided to not need long term anticoagulation so later discontinued once back in NSR. Will continue amio 200mg once daily x 30 days at discharge.      Post-operatively immediately after first extubation and as well after second extubation patient had altered mental status and delirium. CT performed and no infarcts or abnormalities noted.  Psych was consulted 11/02 to help with control, improve mentation, and medications for patient. He was on a sitter for some time and also on restraints as well, but all was able to be removed by POD#16 and has been off now for 48hrs prior to discharge.      Patient has history of DMT2 and had hyperglycemia post-operatively that was initially treated with an insulin gtt, transitioned to sliding  scale insulin, and has his normal at home Metformin need at discharge and patient was started back on this the day prior to discharge for control.      Patient had some fluid overload treated with diuretic medication. At discharge he is below their pre-op weight and was not sent to TCU with any diuretics.      Patient continued to progress well with cardiac rehab and gained mental clarity and is discharged to TCU on POD #19 with the help of wife/family.     Since being discharged from hospital, patient is recovering well at TCU and recently discharged to home. His BB was decreased to 50mg BID in TCU and has been tolerating that dose. Pain is controlled. He has had increase in lower extremity edema, reports legs are tight. He saw his PCP yesterday and was prescribed lasix and potassium. He has been doing therapies in home and will continue. He has been working to increase his activity as tolerated. He has not been weighing himself or utilizing IS regularly. Reviewed medications and prescriptions.     Allergies:   Allergies   Allergen Reactions     Niaspan [Niacin] Other (See Comments)     flushing       Medications:   Current Outpatient Medications:      acetaminophen (TYLENOL) 325 MG tablet, Take 2 tablets (650 mg) by mouth every 4 hours as needed for other (For optimal non-opioid multimodal pain management to improve pain control.), Disp: 100 tablet, Rfl: 1     amiodarone (PACERONE) 200 MG tablet, Take 1 tablet (200 mg) by mouth daily for 30 days, Disp: 30 tablet, Rfl: 0     Ascorbic Acid (VITAMIN C) 500 MG CAPS, Take 500 mg by mouth daily, Disp: , Rfl:      atorvastatin (LIPITOR) 20 MG tablet, Take 1 tablet (20 mg) by mouth daily, Disp: 30 tablet, Rfl: 2     CALCIUM PO, Take 1 capsule by mouth daily, Disp: , Rfl:      Cyanocobalamin (VITAMIN B-12 PO), Take 1 tablet by mouth every other day, Disp: , Rfl:      diphenhydrAMINE (BENADRYL) 50 MG tablet, Take 100 mg by mouth every 6 hours as needed for itching or  allergies, Disp: , Rfl:      FAMOTIDINE PO, Take by mouth daily, Disp: , Rfl:      folic acid (FOLVITE) 400 MCG tablet, Take 400 mcg by mouth 2 times daily, Disp: , Rfl:      gabapentin (NEURONTIN) 300 MG capsule, Take 1 capsule (300 mg) by mouth 3 times daily, Disp: 90 capsule, Rfl: 1     magnesium oxide 400 MG CAPS, Take 1 capsule by mouth daily, Disp: , Rfl:      metFORMIN (GLUCOPHAGE) 500 MG tablet, Take 0.5 tablets (250 mg) by mouth daily (with dinner) (0.5 x 500 mg = 250 mg), Disp: 30 tablet, Rfl: 1     methocarbamol (ROBAXIN) 750 MG tablet, Take 1 tablet (750 mg) by mouth 2 times daily as needed for muscle spasms, Disp: 40 tablet, Rfl: 0     metoprolol succinate ER (TOPROL XL) 100 MG 24 hr tablet, Take 1 tablet (100 mg) by mouth 2 times daily, Disp: 60 tablet, Rfl: 2     minoxidil (LONITEN) 2.5 MG tablet, Take 2 tablets (5 mg) by mouth daily, Disp: 30 tablet, Rfl: 2     Multiple Vitamins-Minerals (MENS 50+ MULTI VITAMIN/MIN PO), Take 1 tablet by mouth daily, Disp: , Rfl:      nicotine (NICODERM CQ) 7 MG/24HR 24 hr patch, Place 1 patch onto the skin every 24 hours Pt unsure of dosage-they are over the counter patches, patch has no markings to determine dosage. Pt has own supply, Disp: 30 patch, Rfl: 0     QUEtiapine (SEROQUEL) 50 MG tablet, Take 1 tablet (50 mg) by mouth At Bedtime, Disp: 30 tablet, Rfl: 0     tamsulosin (FLOMAX) 0.4 MG capsule, Take 0.8 mg by mouth every evening (2 x 0.4 mg = 0.8 mg), Disp: , Rfl:      traMADol 100 MG TABS, Take 100 mg by mouth every 6 hours as needed for moderate pain, Disp: 40 tablet, Rfl: 0     vitamin D3 (CHOLECALCIFEROL) 50 mcg (2000 units) tablet, Take 1 tablet by mouth every other day, Disp: , Rfl:      aspirin (ASA) 81 MG chewable tablet, 2 tablets (162 mg) by Oral or NG Tube route daily (Patient not taking: Reported on 11/23/2022), Disp: 60 tablet, Rfl: 2     furosemide (LASIX) 40 MG tablet, Take 40 mg by mouth (Patient not taking: Reported on 11/23/2022), Disp: ,  Rfl:      potassium chloride ER (KLOR-CON M) 20 MEQ CR tablet, Take 20 mEq by mouth (Patient not taking: Reported on 11/23/2022), Disp: , Rfl:     Physical Exam:   /58   Pulse 80   Ht 1.829 m (6')   Wt 133.8 kg (295 lb)   SpO2 96%   BMI 40.01 kg/m    Gen: NAD  CV: RRR normal s1 and s2  Lungs: diminished bilateral bases  Sternum: CDI  Extremities: 2+ lower extremity edema, taught. Staples removed from incisions    Assessment/Plan:     Toprol decreased to 50mg BID in TCU- reviewed medications, which will continue and which will end. continue current medications  Agree with diuretics and recommend taking daily-- PCP has scheduled follow up in a couple weeks with labs to follow up  Refills sent for metoprolol and minoxidil.   Continue weights and using IS.  Call our office if weight continues to increase.  Discussed normal expectations for recovery  Discussed timeline for restrictions.  Follow up with PCP and cardiology as scheduled    All of the patient's questions were answered. Our contact information was given to him/her if they should have any further questions/concerns. No further follow-up is needed with us.      Tennille Ruiz PA-C  CV Surgery  [facility]  Pager: [phone]

## 2022-11-23 NOTE — PATIENT INSTRUCTIONS
Your surgery was on 10/20/22    Ok to start driving when you feel safe to do so as long as you are no longer taking narcotic pain medications.    No lifting greater than 10 pounds until 12/15/22, then    No lifting greater than 20-30 pounds until 1/12/22    Do not soak your incisions until fully healed over with no scabbing; this includes hot tubs, baths, pools etc.    If you have questions or concerns, please call us:    Jeanine Hendrickson  [phone]     Aspirin 162mg daily   Amiodarone, no refill needed, once gone you are done with that  Famotidine- complete what you have, no refills needed  Refills sent for metoprolol and minoxidil   Agree with lasix 40mg and potassium- I would recommend daily until start to see weight come off likely at least 5 days- call if weight continues to increase.  PCP will get labs in a couple weeks.     call [phone] when home therapies completed to set up outpatient cardiac rehab

## 2023-01-03 ENCOUNTER — OFFICE VISIT (OUTPATIENT)
Dept: CARDIOLOGY | Facility: CLINIC | Age: 68
End: 2023-01-03
Payer: COMMERCIAL

## 2023-01-03 VITALS
OXYGEN SATURATION: 99 % | WEIGHT: 263.4 LBS | BODY MASS INDEX: 36.88 KG/M2 | HEIGHT: 71 IN | HEART RATE: 76 BPM | DIASTOLIC BLOOD PRESSURE: 68 MMHG | SYSTOLIC BLOOD PRESSURE: 110 MMHG

## 2023-01-03 DIAGNOSIS — Z95.1 S/P CABG (CORONARY ARTERY BYPASS GRAFT): ICD-10-CM

## 2023-01-03 DIAGNOSIS — I35.0 NONRHEUMATIC AORTIC VALVE STENOSIS: ICD-10-CM

## 2023-01-03 DIAGNOSIS — Z95.2 S/P AVR (AORTIC VALVE REPLACEMENT): ICD-10-CM

## 2023-01-03 DIAGNOSIS — I25.10 CORONARY ARTERY DISEASE DUE TO LIPID RICH PLAQUE: ICD-10-CM

## 2023-01-03 DIAGNOSIS — I25.83 CORONARY ARTERY DISEASE DUE TO LIPID RICH PLAQUE: ICD-10-CM

## 2023-01-03 PROCEDURE — 99215 OFFICE O/P EST HI 40 MIN: CPT | Performed by: INTERNAL MEDICINE

## 2023-01-03 RX ORDER — FUROSEMIDE 20 MG
40 TABLET ORAL DAILY
Qty: 90 TABLET | Refills: 3 | Status: SHIPPED | OUTPATIENT
Start: 2023-01-03 | End: 2023-03-22

## 2023-01-03 RX ORDER — ATORVASTATIN CALCIUM 40 MG/1
40 TABLET, FILM COATED ORAL DAILY
Qty: 90 TABLET | Refills: 3 | Status: SHIPPED | OUTPATIENT
Start: 2023-01-03

## 2023-01-03 RX ORDER — METOPROLOL SUCCINATE 25 MG/1
25 TABLET, EXTENDED RELEASE ORAL DAILY
Qty: 90 TABLET | Refills: 3 | Status: SHIPPED | OUTPATIENT
Start: 2023-01-03 | End: 2024-06-10

## 2023-01-03 NOTE — PROGRESS NOTES
Cardiology Progress Note          Assessment and Plan:       1. Status post bioprosthetic aortic valve replacement 10/20/2022    Doing well      2. Status post three-vessel CABG 10/20/2022    No exertional chest discomfort.  Start cardiac rehab soon.  Increase atorvastatin to 40 mg daily and consider increase to maximum dose if tolerated.      3. Fatigue and lightheadedness with borderline low blood pressures and fairly high dose of beta-blocker    Currently on Toprol 50 mg twice daily and having some episodes of lightheadedness on standing and with exertion.    Reduced Toprol to 25 mg once per day    Change furosemide from 40 mg every other day to 20 mg daily.      4. Postoperative atrial fibrillation    Discussed future recurrence.  If palpitations or elevated heart rates on blood pressure checks at home, consider monitoring.    Follow-up in 6 months with SUKHWINDER      40 minutes was spent with the patient, precharting and reviewing tests as well as post visit charting all done today..    This note was transcribed using electronic voice recognition software and there may be typographical errors present.                    Interval History:     The patient is a very pleasant 67 year old whom I met prior to having evaluation for three-vessel CABG and aortic stenosis.  He had a prolonged hospital course with delirium and an extended stay.  He had recurrent paroxysmal atrial fibrillation during the hospitalization.  He was discharged on amiodarone for 1 month.    He has done pretty well and needed some diuretics to maintain water weight.  He has some dyspnea and lightheadedness on some days.  He is on a fairly high dose of Toprol at this point.                     Review of Systems:     Review of Systems:  Skin:  not assessed     Eyes:  not assessed    ENT:  not assessed    Respiratory:  Positive for dyspnea on exertion  Cardiovascular:  Negative lightheadedness;dizziness;Positive for  Gastroenterology: not assessed   "  Genitourinary:  not assessed    Musculoskeletal:  not assessed    Neurologic:  not assessed    Psychiatric:  not assessed    Heme/Lymph/Imm:  not assessed    Endocrine:  not assessed                Physical Exam:     Vitals: /68 (BP Location: Right arm, Patient Position: Sitting, Cuff Size: Adult Large)   Pulse 76   Ht 1.803 m (5' 11\")   Wt 119.5 kg (263 lb 6.4 oz)   SpO2 99%   BMI 36.74 kg/m    Constitutional:  cooperative, alert and oriented, well developed, well nourished, in no acute distress        Skin:  warm and dry to the touch, no apparent skin lesions or masses noted        Head:  normocephalic, no masses or lesions        Eyes:  pupils equal and round, conjunctivae and lids unremarkable, sclera white, no xanthalasma, EOMS intact, no nystagmus        Chest:  normal symmetry        Cardiac: regular rhythm       grade 1;early systolic murmur;RUSB          Extremities and Back:  no deformities, clubbing, cyanosis, erythema observed;no edema        Neurological:  no gross motor deficits;affect appropriate                 Medications:     Current Outpatient Medications   Medication Sig Dispense Refill     Ascorbic Acid (VITAMIN C) 500 MG CAPS Take 500 mg by mouth daily       aspirin (ASA) 81 MG chewable tablet 2 tablets (162 mg) by Oral or NG Tube route daily 60 tablet 2     atorvastatin (LIPITOR) 40 MG tablet Take 1 tablet (40 mg) by mouth daily 90 tablet 3     Cyanocobalamin (VITAMIN B-12 PO) Take 1 tablet by mouth every other day       diphenhydrAMINE (BENADRYL) 50 MG tablet Take 100 mg by mouth every 6 hours as needed for itching or allergies       folic acid (FOLVITE) 400 MCG tablet Take 400 mcg by mouth 2 times daily       furosemide (LASIX) 20 MG tablet Take 2 tablets (40 mg) by mouth daily 90 tablet 3     gabapentin (NEURONTIN) 300 MG capsule Take 1 capsule (300 mg) by mouth 3 times daily 90 capsule 1     magnesium oxide 400 MG CAPS Take 1 capsule by mouth daily       metFORMIN (GLUCOPHAGE) " 500 MG tablet Take 0.5 tablets (250 mg) by mouth daily (with dinner) (0.5 x 500 mg = 250 mg) 30 tablet 1     metoprolol succinate ER (TOPROL XL) 25 MG 24 hr tablet Take 1 tablet (25 mg) by mouth daily 90 tablet 3     Multiple Vitamins-Minerals (MENS 50+ MULTI VITAMIN/MIN PO) Take 1 tablet by mouth daily       potassium chloride ER (KLOR-CON M) 20 MEQ CR tablet Take 20 mEq by mouth       tamsulosin (FLOMAX) 0.4 MG capsule Take 0.8 mg by mouth every evening (2 x 0.4 mg = 0.8 mg)       traMADol 100 MG TABS Take 100 mg by mouth every 6 hours as needed for moderate pain 40 tablet 0     vitamin D3 (CHOLECALCIFEROL) 50 mcg (2000 units) tablet Take 1 tablet by mouth every other day       acetaminophen (TYLENOL) 325 MG tablet Take 2 tablets (650 mg) by mouth every 4 hours as needed for other (For optimal non-opioid multimodal pain management to improve pain control.) 100 tablet 1                Data:   All laboratory data reviewed  No results found for this or any previous visit (from the past 24 hour(s)).    All laboratory data reviewed  No results found for: CHOL  No results found for: HDL  No results found for: LDL  No results found for: TRIG  No results found for: CHOLHDLRATIO  No results found for: TSH  Last Basic Metabolic Panel:  Lab Results   Component Value Date     11/15/2022      Lab Results   Component Value Date    POTASSIUM 5.2 11/15/2022    POTASSIUM 4.2 11/08/2022     Lab Results   Component Value Date    CHLORIDE 102 11/15/2022    CHLORIDE 100 11/08/2022     Lab Results   Component Value Date    HALEY 10.0 11/15/2022     Lab Results   Component Value Date    CO2 23 11/15/2022    CO2 28 11/08/2022     Lab Results   Component Value Date    BUN 19.7 11/15/2022    BUN 31 11/08/2022     Lab Results   Component Value Date    CR 1.06 11/15/2022     Lab Results   Component Value Date     11/15/2022     11/08/2022    GLC 99 11/08/2022     Lab Results   Component Value Date    WBC 8.1 11/15/2022      Lab Results   Component Value Date    RBC 3.45 11/15/2022     Lab Results   Component Value Date    HGB 9.7 11/15/2022     Lab Results   Component Value Date    HCT 33.5 11/15/2022     Lab Results   Component Value Date    MCV 97 11/15/2022     Lab Results   Component Value Date    MCH 28.1 11/15/2022     Lab Results   Component Value Date    MCHC 29.0 11/15/2022     Lab Results   Component Value Date    RDW 15.2 11/15/2022     Lab Results   Component Value Date     11/15/2022

## 2023-01-03 NOTE — LETTER
1/3/2023    Cory Valles MD  [employer] [id number] [address]  [facility] [address]    RE: Richar Davis       Dear Colleague,     I had the pleasure of seeing Richar Davis in the [facility].  Cardiology Progress Note          Assessment and Plan:       1. Status post bioprosthetic aortic valve replacement 10/20/2022    Doing well      2. Status post three-vessel CABG 10/20/2022    No exertional chest discomfort.  Start cardiac rehab soon.  Increase atorvastatin to 40 mg daily and consider increase to maximum dose if tolerated.      3. Fatigue and lightheadedness with borderline low blood pressures and fairly high dose of beta-blocker    Currently on Toprol 50 mg twice daily and having some episodes of lightheadedness on standing and with exertion.    Reduced Toprol to 25 mg once per day    Change furosemide from 40 mg every other day to 20 mg daily.      4. Postoperative atrial fibrillation    Discussed future recurrence.  If palpitations or elevated heart rates on blood pressure checks at home, consider monitoring.    Follow-up in 6 months with SUKHWINDER      40 minutes was spent with the patient, precharting and reviewing tests as well as post visit charting all done today..    This note was transcribed using electronic voice recognition software and there may be typographical errors present.                    Interval History:     The patient is a very pleasant 67 year old whom I met prior to having evaluation for three-vessel CABG and aortic stenosis.  He had a prolonged hospital course with delirium and an extended stay.  He had recurrent paroxysmal atrial fibrillation during the hospitalization.  He was discharged on amiodarone for 1 month.    He has done pretty well and needed some diuretics to maintain water weight.  He has some dyspnea and lightheadedness on some days.  He is on a fairly high dose of Toprol at this point.                     Review of Systems:  "    Review of Systems:  Skin:  not assessed     Eyes:  not assessed    ENT:  not assessed    Respiratory:  Positive for dyspnea on exertion  Cardiovascular:  Negative lightheadedness;dizziness;Positive for  Gastroenterology: not assessed    Genitourinary:  not assessed    Musculoskeletal:  not assessed    Neurologic:  not assessed    Psychiatric:  not assessed    Heme/Lymph/Imm:  not assessed    Endocrine:  not assessed                Physical Exam:     Vitals: /68 (BP Location: Right arm, Patient Position: Sitting, Cuff Size: Adult Large)   Pulse 76   Ht 1.803 m (5' 11\")   Wt 119.5 kg (263 lb 6.4 oz)   SpO2 99%   BMI 36.74 kg/m    Constitutional:  cooperative, alert and oriented, well developed, well nourished, in no acute distress        Skin:  warm and dry to the touch, no apparent skin lesions or masses noted        Head:  normocephalic, no masses or lesions        Eyes:  pupils equal and round, conjunctivae and lids unremarkable, sclera white, no xanthalasma, EOMS intact, no nystagmus        Chest:  normal symmetry        Cardiac: regular rhythm       grade 1;early systolic murmur;RUSB          Extremities and Back:  no deformities, clubbing, cyanosis, erythema observed;no edema        Neurological:  no gross motor deficits;affect appropriate                 Medications:     Current Outpatient Medications   Medication Sig Dispense Refill     Ascorbic Acid (VITAMIN C) 500 MG CAPS Take 500 mg by mouth daily       aspirin (ASA) 81 MG chewable tablet 2 tablets (162 mg) by Oral or NG Tube route daily 60 tablet 2     atorvastatin (LIPITOR) 40 MG tablet Take 1 tablet (40 mg) by mouth daily 90 tablet 3     Cyanocobalamin (VITAMIN B-12 PO) Take 1 tablet by mouth every other day       diphenhydrAMINE (BENADRYL) 50 MG tablet Take 100 mg by mouth every 6 hours as needed for itching or allergies       folic acid (FOLVITE) 400 MCG tablet Take 400 mcg by mouth 2 times daily       furosemide (LASIX) 20 MG tablet " Take 2 tablets (40 mg) by mouth daily 90 tablet 3     gabapentin (NEURONTIN) 300 MG capsule Take 1 capsule (300 mg) by mouth 3 times daily 90 capsule 1     magnesium oxide 400 MG CAPS Take 1 capsule by mouth daily       metFORMIN (GLUCOPHAGE) 500 MG tablet Take 0.5 tablets (250 mg) by mouth daily (with dinner) (0.5 x 500 mg = 250 mg) 30 tablet 1     metoprolol succinate ER (TOPROL XL) 25 MG 24 hr tablet Take 1 tablet (25 mg) by mouth daily 90 tablet 3     Multiple Vitamins-Minerals (MENS 50+ MULTI VITAMIN/MIN PO) Take 1 tablet by mouth daily       potassium chloride ER (KLOR-CON M) 20 MEQ CR tablet Take 20 mEq by mouth       tamsulosin (FLOMAX) 0.4 MG capsule Take 0.8 mg by mouth every evening (2 x 0.4 mg = 0.8 mg)       traMADol 100 MG TABS Take 100 mg by mouth every 6 hours as needed for moderate pain 40 tablet 0     vitamin D3 (CHOLECALCIFEROL) 50 mcg (2000 units) tablet Take 1 tablet by mouth every other day       acetaminophen (TYLENOL) 325 MG tablet Take 2 tablets (650 mg) by mouth every 4 hours as needed for other (For optimal non-opioid multimodal pain management to improve pain control.) 100 tablet 1                Data:   All laboratory data reviewed  No results found for this or any previous visit (from the past 24 hour(s)).    All laboratory data reviewed  No results found for: CHOL  No results found for: HDL  No results found for: LDL  No results found for: TRIG  No results found for: CHOLHDLRATIO  No results found for: TSH  Last Basic Metabolic Panel:  Lab Results   Component Value Date     11/15/2022      Lab Results   Component Value Date    POTASSIUM 5.2 11/15/2022    POTASSIUM 4.2 11/08/2022     Lab Results   Component Value Date    CHLORIDE 102 11/15/2022    CHLORIDE 100 11/08/2022     Lab Results   Component Value Date    HALEY 10.0 11/15/2022     Lab Results   Component Value Date    CO2 23 11/15/2022    CO2 28 11/08/2022     Lab Results   Component Value Date    BUN 19.7 11/15/2022    BUN  31 11/08/2022     Lab Results   Component Value Date    CR 1.06 11/15/2022     Lab Results   Component Value Date     11/15/2022     11/08/2022    GLC 99 11/08/2022     Lab Results   Component Value Date    WBC 8.1 11/15/2022     Lab Results   Component Value Date    RBC 3.45 11/15/2022     Lab Results   Component Value Date    HGB 9.7 11/15/2022     Lab Results   Component Value Date    HCT 33.5 11/15/2022     Lab Results   Component Value Date    MCV 97 11/15/2022     Lab Results   Component Value Date    MCH 28.1 11/15/2022     Lab Results   Component Value Date    MCHC 29.0 11/15/2022     Lab Results   Component Value Date    RDW 15.2 11/15/2022     Lab Results   Component Value Date     11/15/2022       Thank you for allowing me to participate in the care of your patient.      Sincerely,     Sebastian Mejias MD     [facility]  cc:   Sebastian Mejias MD  [address] [facility] [address]  [place] [address]

## 2023-03-02 ENCOUNTER — HOSPITAL ENCOUNTER (OUTPATIENT)
Dept: CARDIAC REHAB | Facility: CLINIC | Age: 68
Discharge: HOME | End: 2023-03-02
Attending: SURGERY
Payer: COMMERCIAL

## 2023-03-02 DIAGNOSIS — Z95.1 S/P CABG (CORONARY ARTERY BYPASS GRAFT): ICD-10-CM

## 2023-03-02 PROCEDURE — 93797 PHYS/QHP OP CAR RHAB WO ECG: CPT | Mod: XU

## 2023-03-02 PROCEDURE — 93798 PHYS/QHP OP CAR RHAB W/ECG: CPT

## 2023-03-10 ENCOUNTER — HOSPITAL ENCOUNTER (OUTPATIENT)
Dept: CARDIAC REHAB | Facility: CLINIC | Age: 68
Discharge: HOME | End: 2023-03-10
Attending: SURGERY
Payer: COMMERCIAL

## 2023-03-10 PROCEDURE — 93798 PHYS/QHP OP CAR RHAB W/ECG: CPT | Performed by: OCCUPATIONAL THERAPIST

## 2023-03-17 ENCOUNTER — HOSPITAL ENCOUNTER (OUTPATIENT)
Dept: CARDIAC REHAB | Facility: CLINIC | Age: 68
Discharge: HOME | End: 2023-03-17
Attending: SURGERY
Payer: COMMERCIAL

## 2023-03-17 PROCEDURE — 93798 PHYS/QHP OP CAR RHAB W/ECG: CPT

## 2023-03-21 ENCOUNTER — HOSPITAL ENCOUNTER (OUTPATIENT)
Dept: CARDIAC REHAB | Facility: CLINIC | Age: 68
Discharge: HOME | End: 2023-03-21
Attending: SURGERY
Payer: COMMERCIAL

## 2023-03-21 PROCEDURE — 93798 PHYS/QHP OP CAR RHAB W/ECG: CPT | Performed by: REHABILITATION PRACTITIONER

## 2023-03-22 DIAGNOSIS — Z95.1 S/P CABG (CORONARY ARTERY BYPASS GRAFT): ICD-10-CM

## 2023-03-22 DIAGNOSIS — Z95.2 S/P AVR (AORTIC VALVE REPLACEMENT): ICD-10-CM

## 2023-03-22 DIAGNOSIS — I25.10 CORONARY ARTERY DISEASE DUE TO LIPID RICH PLAQUE: ICD-10-CM

## 2023-03-22 DIAGNOSIS — I25.83 CORONARY ARTERY DISEASE DUE TO LIPID RICH PLAQUE: ICD-10-CM

## 2023-03-22 DIAGNOSIS — I35.0 NONRHEUMATIC AORTIC VALVE STENOSIS: ICD-10-CM

## 2023-03-22 RX ORDER — FUROSEMIDE 20 MG
40 TABLET ORAL DAILY
Qty: 180 TABLET | Refills: 0 | Status: SHIPPED | OUTPATIENT
Start: 2023-03-22 | End: 2023-06-26

## 2023-03-24 ENCOUNTER — HOSPITAL ENCOUNTER (OUTPATIENT)
Dept: CARDIAC REHAB | Facility: CLINIC | Age: 68
Discharge: HOME | End: 2023-03-24
Attending: STUDENT IN AN ORGANIZED HEALTH CARE EDUCATION/TRAINING PROGRAM
Payer: COMMERCIAL

## 2023-03-24 PROCEDURE — 93798 PHYS/QHP OP CAR RHAB W/ECG: CPT

## 2023-03-28 ENCOUNTER — HOSPITAL ENCOUNTER (OUTPATIENT)
Dept: CARDIAC REHAB | Facility: CLINIC | Age: 68
Discharge: HOME | End: 2023-03-28
Attending: SURGERY
Payer: COMMERCIAL

## 2023-03-28 PROCEDURE — 93798 PHYS/QHP OP CAR RHAB W/ECG: CPT | Performed by: REHABILITATION PRACTITIONER

## 2023-04-03 ENCOUNTER — HOSPITAL ENCOUNTER (OUTPATIENT)
Dept: CARDIAC REHAB | Facility: CLINIC | Age: 68
Discharge: HOME | End: 2023-04-03
Attending: STUDENT IN AN ORGANIZED HEALTH CARE EDUCATION/TRAINING PROGRAM
Payer: COMMERCIAL

## 2023-04-03 PROCEDURE — 93798 PHYS/QHP OP CAR RHAB W/ECG: CPT

## 2023-04-05 ENCOUNTER — HOSPITAL ENCOUNTER (OUTPATIENT)
Dept: CARDIAC REHAB | Facility: CLINIC | Age: 68
Discharge: HOME | End: 2023-04-05
Attending: SURGERY
Payer: COMMERCIAL

## 2023-04-05 PROCEDURE — 93798 PHYS/QHP OP CAR RHAB W/ECG: CPT | Performed by: REHABILITATION PRACTITIONER

## 2023-04-07 ENCOUNTER — HOSPITAL ENCOUNTER (OUTPATIENT)
Dept: CARDIAC REHAB | Facility: CLINIC | Age: 68
Discharge: HOME | End: 2023-04-07
Attending: SURGERY
Payer: COMMERCIAL

## 2023-04-07 PROCEDURE — 93798 PHYS/QHP OP CAR RHAB W/ECG: CPT

## 2023-04-10 ENCOUNTER — HOSPITAL ENCOUNTER (OUTPATIENT)
Dept: CARDIAC REHAB | Facility: CLINIC | Age: 68
Discharge: HOME | End: 2023-04-10
Attending: SURGERY
Payer: COMMERCIAL

## 2023-04-10 PROCEDURE — 93798 PHYS/QHP OP CAR RHAB W/ECG: CPT

## 2023-04-12 ENCOUNTER — HOSPITAL ENCOUNTER (OUTPATIENT)
Dept: CARDIAC REHAB | Facility: CLINIC | Age: 68
Discharge: HOME | End: 2023-04-12
Attending: SURGERY
Payer: COMMERCIAL

## 2023-04-12 PROCEDURE — 93798 PHYS/QHP OP CAR RHAB W/ECG: CPT

## 2023-04-14 ENCOUNTER — HOSPITAL ENCOUNTER (OUTPATIENT)
Dept: CARDIAC REHAB | Facility: CLINIC | Age: 68
Discharge: HOME | End: 2023-04-14
Attending: SURGERY
Payer: COMMERCIAL

## 2023-04-14 PROCEDURE — 93798 PHYS/QHP OP CAR RHAB W/ECG: CPT

## 2023-04-19 ENCOUNTER — HOSPITAL ENCOUNTER (OUTPATIENT)
Dept: CARDIAC REHAB | Facility: CLINIC | Age: 68
Discharge: HOME | End: 2023-04-19
Attending: SURGERY
Payer: COMMERCIAL

## 2023-04-19 PROCEDURE — 93798 PHYS/QHP OP CAR RHAB W/ECG: CPT

## 2023-04-21 ENCOUNTER — HOSPITAL ENCOUNTER (OUTPATIENT)
Dept: CARDIAC REHAB | Facility: CLINIC | Age: 68
Discharge: HOME | End: 2023-04-21
Attending: SURGERY
Payer: COMMERCIAL

## 2023-04-21 PROCEDURE — 93798 PHYS/QHP OP CAR RHAB W/ECG: CPT

## 2023-04-24 ENCOUNTER — HOSPITAL ENCOUNTER (OUTPATIENT)
Dept: CARDIAC REHAB | Facility: CLINIC | Age: 68
Discharge: HOME | End: 2023-04-24
Attending: SURGERY
Payer: COMMERCIAL

## 2023-04-24 PROCEDURE — 93798 PHYS/QHP OP CAR RHAB W/ECG: CPT

## 2023-04-27 ENCOUNTER — HOSPITAL ENCOUNTER (OUTPATIENT)
Dept: CARDIAC REHAB | Facility: CLINIC | Age: 68
Discharge: HOME | End: 2023-04-27
Attending: STUDENT IN AN ORGANIZED HEALTH CARE EDUCATION/TRAINING PROGRAM
Payer: COMMERCIAL

## 2023-04-27 PROCEDURE — 93798 PHYS/QHP OP CAR RHAB W/ECG: CPT

## 2023-04-28 ENCOUNTER — HOSPITAL ENCOUNTER (OUTPATIENT)
Dept: CARDIAC REHAB | Facility: CLINIC | Age: 68
Discharge: HOME | End: 2023-04-28
Attending: SURGERY
Payer: COMMERCIAL

## 2023-04-28 PROCEDURE — 93798 PHYS/QHP OP CAR RHAB W/ECG: CPT | Performed by: REHABILITATION PRACTITIONER

## 2023-05-01 ENCOUNTER — HOSPITAL ENCOUNTER (OUTPATIENT)
Dept: CARDIAC REHAB | Facility: CLINIC | Age: 68
Discharge: HOME | End: 2023-05-01
Attending: STUDENT IN AN ORGANIZED HEALTH CARE EDUCATION/TRAINING PROGRAM
Payer: COMMERCIAL

## 2023-05-01 PROCEDURE — 93798 PHYS/QHP OP CAR RHAB W/ECG: CPT

## 2023-05-03 ENCOUNTER — HOSPITAL ENCOUNTER (OUTPATIENT)
Dept: CARDIAC REHAB | Facility: CLINIC | Age: 68
Discharge: HOME | End: 2023-05-03
Attending: STUDENT IN AN ORGANIZED HEALTH CARE EDUCATION/TRAINING PROGRAM
Payer: COMMERCIAL

## 2023-05-03 PROCEDURE — 93798 PHYS/QHP OP CAR RHAB W/ECG: CPT

## 2023-05-05 ENCOUNTER — HOSPITAL ENCOUNTER (OUTPATIENT)
Dept: CARDIAC REHAB | Facility: CLINIC | Age: 68
Discharge: HOME | End: 2023-05-05
Attending: STUDENT IN AN ORGANIZED HEALTH CARE EDUCATION/TRAINING PROGRAM
Payer: COMMERCIAL

## 2023-05-05 PROCEDURE — 93798 PHYS/QHP OP CAR RHAB W/ECG: CPT

## 2023-05-08 ENCOUNTER — HOSPITAL ENCOUNTER (OUTPATIENT)
Dept: CARDIAC REHAB | Facility: CLINIC | Age: 68
Discharge: HOME | End: 2023-05-08
Attending: STUDENT IN AN ORGANIZED HEALTH CARE EDUCATION/TRAINING PROGRAM
Payer: COMMERCIAL

## 2023-05-08 PROCEDURE — 93798 PHYS/QHP OP CAR RHAB W/ECG: CPT | Performed by: OCCUPATIONAL THERAPIST

## 2023-05-10 ENCOUNTER — HOSPITAL ENCOUNTER (OUTPATIENT)
Dept: CARDIAC REHAB | Facility: CLINIC | Age: 68
Discharge: HOME | End: 2023-05-10
Attending: STUDENT IN AN ORGANIZED HEALTH CARE EDUCATION/TRAINING PROGRAM
Payer: COMMERCIAL

## 2023-05-10 PROCEDURE — 93798 PHYS/QHP OP CAR RHAB W/ECG: CPT | Performed by: REHABILITATION PRACTITIONER

## 2023-05-11 ENCOUNTER — HOSPITAL ENCOUNTER (OUTPATIENT)
Dept: CARDIAC REHAB | Facility: CLINIC | Age: 68
Discharge: HOME | End: 2023-05-11
Attending: STUDENT IN AN ORGANIZED HEALTH CARE EDUCATION/TRAINING PROGRAM
Payer: COMMERCIAL

## 2023-05-11 PROCEDURE — 93798 PHYS/QHP OP CAR RHAB W/ECG: CPT | Performed by: REHABILITATION PRACTITIONER

## 2023-05-15 ENCOUNTER — TELEPHONE (OUTPATIENT)
Dept: CARDIOLOGY | Facility: CLINIC | Age: 68
End: 2023-05-15
Payer: COMMERCIAL

## 2023-05-15 DIAGNOSIS — Z95.2 S/P AVR (AORTIC VALVE REPLACEMENT): Primary | ICD-10-CM

## 2023-05-15 RX ORDER — AMOXICILLIN 500 MG/1
CAPSULE ORAL
Qty: 4 CAPSULE | Refills: 0 | Status: SHIPPED | OUTPATIENT
Start: 2023-05-15 | End: 2023-06-07

## 2023-05-15 NOTE — TELEPHONE ENCOUNTER
Health Call Center    Phone Message    May a detailed message be left on voicemail: yes     Reason for Call: Other: Mya called in to request Dr. Mejias prescribe an antibiotic for Richar to have dental work done next Friday. Mya was told by the dentist that Richar's cardiologist will need to provide the inital Rx and the dentist can do refills. Please reach out to Mya or the dentist's office at [phone] with any questions otherwise please send an Rx for an antibiotic to the pharmacy for Richar. Thank you!       [id number] - [place], [place] - [address]  [address]      Action Taken: Other: Cardiology    Travel Screening: Not Applicable     Thank you!  [facility]

## 2023-06-07 DIAGNOSIS — Z95.2 S/P AVR (AORTIC VALVE REPLACEMENT): ICD-10-CM

## 2023-06-07 RX ORDER — AMOXICILLIN 500 MG/1
CAPSULE ORAL
Qty: 4 CAPSULE | Refills: 1 | Status: SHIPPED | OUTPATIENT
Start: 2023-06-07

## 2023-06-26 ENCOUNTER — OFFICE VISIT (OUTPATIENT)
Dept: CARDIOLOGY | Facility: CLINIC | Age: 68
End: 2023-06-26
Attending: INTERNAL MEDICINE
Payer: COMMERCIAL

## 2023-06-26 VITALS
HEART RATE: 90 BPM | BODY MASS INDEX: 38.93 KG/M2 | HEIGHT: 71 IN | SYSTOLIC BLOOD PRESSURE: 116 MMHG | OXYGEN SATURATION: 97 % | DIASTOLIC BLOOD PRESSURE: 72 MMHG | WEIGHT: 278.1 LBS

## 2023-06-26 DIAGNOSIS — Z95.1 S/P CABG (CORONARY ARTERY BYPASS GRAFT): ICD-10-CM

## 2023-06-26 DIAGNOSIS — I35.0 NONRHEUMATIC AORTIC VALVE STENOSIS: ICD-10-CM

## 2023-06-26 DIAGNOSIS — Z95.2 S/P AVR (AORTIC VALVE REPLACEMENT): ICD-10-CM

## 2023-06-26 DIAGNOSIS — E78.5 HYPERLIPIDEMIA LDL GOAL <70: ICD-10-CM

## 2023-06-26 DIAGNOSIS — I10 BENIGN ESSENTIAL HYPERTENSION: ICD-10-CM

## 2023-06-26 DIAGNOSIS — I25.83 CORONARY ARTERY DISEASE DUE TO LIPID RICH PLAQUE: Primary | ICD-10-CM

## 2023-06-26 DIAGNOSIS — E66.01 MORBID OBESITY (H): ICD-10-CM

## 2023-06-26 DIAGNOSIS — I25.10 CORONARY ARTERY DISEASE DUE TO LIPID RICH PLAQUE: Primary | ICD-10-CM

## 2023-06-26 PROCEDURE — 99214 OFFICE O/P EST MOD 30 MIN: CPT | Performed by: NURSE PRACTITIONER

## 2023-06-26 RX ORDER — FUROSEMIDE 20 MG
20 TABLET ORAL DAILY
Qty: 180 TABLET | Refills: 0
Start: 2023-06-26 | End: 2023-12-13

## 2023-06-26 NOTE — PATIENT INSTRUCTIONS
Thanks for participating in a office visit with the [facility] today.    Doing well on a cardiac standpoint   Blood pressures well controlled   Weights stable  No evidence of fluid overload, decrease lasix to 20 mg daily.   Monitor weight and call office with a weight gain of 3 lbs overnight or 5 lbs in week.   Strict low salt diet  Exercise and weight loss,   Continue all other medications  Echocardiogram in October.     Follow up in 6 months with Dr. Mejias     Please call my nurse at  [phone] with any questions or concerns.    Scheduling phone number: [phone]  Reminder: Please bring in all current medications, over the counter supplements and vitamin bottles to your next appointment.

## 2023-06-26 NOTE — LETTER
6/26/2023    Cory Valles MD  [place] [address] [address]  [facility] [address]    RE: Richar Davis       Dear Colleague,     I had the pleasure of seeing Richar Davis in the [facility].  Cardiology Clinic Progress Note  Richar Davis MRN# [id number]   YOB: 1955 Age: 67 year old     Reason For Visit:  6 month follow up   Primary Cardiologist:   Dr. Mejias           History of Presenting Illness:      Richar Davis is a pleasant 67 year old patient who carries a past medical history significant for morbid obesity, CAD s/p CABG x 3 using a LIMA to the LAD, SVG to the PDA and SVG to the distal circumflex, aortic stenosis status post aortic valve replacement using a 25 mm Bal Inspiris Resilia bovine valve (10/2022), postoperative complications included delirium and atrial fibrillation discharged on amiodarone x1 month and no long term anticoagulation, hypertension, hyperlipidemia, and type 2 diabetes.     He was last seen on 1/3/2023 in follow up with Dr. Mejias. At that time he was doing well on a cardiac standpoint but noted to be borderline hypotensive and Toprol was reduced to 25 mg daily.  He returns to the office today for a 6-month follow-up.  He has continued to do well without any ischemic symptoms or evidence of fluid overload. He recently went on an [place] cruise where he did a significant amount of walking with no ischemic symptoms. During his rehabilitation at a U he did gain approximately 25 pounds with worsening leg edema and was placed on higher dose Lasix of 40 mg daily.  He is requesting a reduction if possible due to nuisance urination.  Upon exam, lungs are clear bilaterally, heart rate and rhythm regular, no neck vein distention, abdominal distention, or lower extremity edema.  Weight is stable at 278 pounds.    Limited echocardiogram on 10/24/2022 showed a normal ejection fraction estimated at 55 to 60%, no significant  valvular disease.    Blood pressure is well controlled at 116/72 managed on metoprolol and furosemide  Last BMP ([facility]) showed a sodium of 138, potassium 5.2, BUN 12, creatinine 1.06, and GFR 77  CBC was unremarkable  Lipid panel showed a total cholesterol of 133, HDL 47, LDL 70, triglycerides 82  Hemoglobin A1c 5.8    He has graduated from cardiac rehab and currently looking into a membership with [employer]  Remains compliant with all medications.                   Assessment and Plan:     1.  Severe aortic stenosis -status post AVR using a 25 mm Bal Inspiris Resilia bovine valve (10/2022).  Postop echocardiogram showed no significant stenosis or regurgitation. Recommend echocardiogram in October.    2.  Coronary artery disease -status post CABG x3 using a LIMA to the LAD, SVG to PDA and SVG to the distal circumflex.  No symptoms of ischemia.  Echocardiogram shows well-preserved LV function.  Continue aspirin, metoprolol, and atorvastatin.  Encourage exercise, weight loss, strict low-sodium diet.    3. Hypertension - Well controlled  4. Hyperlipidemia - LDL at goal, continue atorvastatin   5. Type 2 diabetes - A1c 5.7, on metformin.   6. Leg edema - stable. Decrease lasix to 20 mg daily, ok to take additional 20 mg for recurrent swelling.          Thank you for allowing me to participate in this delightful patient's care. I have recommended he follow up in 6 months with Dr. Mejias with echocardiogram prior.       ADONIS Dunn CNP         Review of Systems:     Review of Systems:  Skin:  not assessed     Eyes:  not assessed    ENT:  not assessed    Respiratory:  Positive for dyspnea on exertion  Cardiovascular:  Negative    Gastroenterology: not assessed    Genitourinary:  not assessed    Musculoskeletal:    joint pain;joint stiffness  Neurologic:  not assessed    Psychiatric:       Heme/Lymph/Imm:  not assessed    Endocrine:  not assessed diabetes              Physical Exam:     GEN:  In general,  this is a overweight male in no acute distress.  HEENT:  Pupils equal, round. Sclerae nonicteric. Clear oropharynx. Mucous membranes moist.  NECK: Supple, no masses appreciated. Trachea midline.  No JVD   C/V:  Regular rate and rhythm, no murmur, rub or gallop. No S3 or RV heave.   RESP: Respirations are unlabored. No use of accessory muscles. Clear to auscultation bilaterally without wheezing, rales, or rhonchi.  GI: Obese abdomen  EXTREM: No LE edema. No cyanosis or clubbing.  NEURO: Alert and oriented, cooperative. No obvious focal deficits.   PSYCH: Normal affect.  SKIN: Warm and dry. No rashes or petechiae appreciated.          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    BYPASS GRAFT ARTERY CORONARY, REPLACE VALVE AORTIC, COMBINED N/A 10/20/2022    Procedure: CORONARY ARTERY BYPASS GRAFT x 3 (LEFT INTERNAL MAMMARY ARTERY - LEFT ANTERIOR DESCENDING ARTERY; SAPHENOUS VEIN - POSTERIOR DESCENDING ARTERY; SAPHENOUS VEIN - CIRCUMFLEX ARTERY) WITH ENDOSCOPIC LESSER SAPHENOUS VEIN HARVEST ON BILATERAL LOWER EXTREMITY, AORTIC VALVE REPLACEMENT WITH TISSUE HEART VALVE INSPIRIS  RESILIA  AORTIC VALVE SIZE: 25MM, AND ON CARDIOPULMONARY PUMP OXYGENATOR  (    CTA ANGIOGRAM CORONARY ARTERY      ORTHOPEDIC SURGERY Left     elbow surgery              Allergies:   Niaspan [niacin]       Data:   All laboratory data reviewed:    LAST BMP:  Lab Results   Component Value Date     11/15/2022      Lab Results   Component Value Date    POTASSIUM 5.2 11/15/2022    POTASSIUM 4.2 11/08/2022     Lab Results   Component Value Date    CHLORIDE 102 11/15/2022    CHLORIDE 100 11/08/2022     Lab Results   Component Value Date    HALEY 10.0 11/15/2022     Lab  Results   Component Value Date    CO2 23 11/15/2022    CO2 28 11/08/2022     Lab Results   Component Value Date    BUN 19.7 11/15/2022    BUN 31 11/08/2022     Lab Results   Component Value Date    CR 1.06 11/15/2022     Lab Results   Component Value Date     11/15/2022     11/08/2022    GLC 99 11/08/2022       LAST CBC:  Lab Results   Component Value Date    WBC 8.1 11/15/2022     Lab Results   Component Value Date    RBC 3.45 11/15/2022     Lab Results   Component Value Date    HGB 9.7 11/15/2022     Lab Results   Component Value Date    HCT 33.5 11/15/2022     Lab Results   Component Value Date    MCV 97 11/15/2022     Lab Results   Component Value Date    MCH 28.1 11/15/2022     Lab Results   Component Value Date    MCHC 29.0 11/15/2022     Lab Results   Component Value Date    RDW 15.2 11/15/2022     Lab Results   Component Value Date     11/15/2022               Thank you for allowing me to participate in the care of your patient.      Sincerely,     ADONIS Dunn [facility]  cc:   Sebastian Mejias MD  [address]  [place] [id number]

## 2023-06-26 NOTE — PROGRESS NOTES
Cardiology Clinic Progress Note  Richar Davis MRN# [id number]   YOB: 1955 Age: 67 year old     Reason For Visit:  6 month follow up   Primary Cardiologist:   Dr. Mejias           History of Presenting Illness:      Richar Davis is a pleasant 67 year old patient who carries a past medical history significant for morbid obesity, CAD s/p CABG x 3 using a LIMA to the LAD, SVG to the PDA and SVG to the distal circumflex, aortic stenosis status post aortic valve replacement using a 25 mm Bal Inspiris Resilia bovine valve (10/2022), postoperative complications included delirium and atrial fibrillation discharged on amiodarone x1 month and no long term anticoagulation, hypertension, hyperlipidemia, and type 2 diabetes.     He was last seen on 1/3/2023 in follow up with Dr. Mejias. At that time he was doing well on a cardiac standpoint but noted to be borderline hypotensive and Toprol was reduced to 25 mg daily.  He returns to the office today for a 6-month follow-up.  He has continued to do well without any ischemic symptoms or evidence of fluid overload. He recently went on an [employer] cruise where he did a significant amount of walking with no ischemic symptoms. During his rehabilitation at a TCU he did gain approximately 25 pounds with worsening leg edema and was placed on higher dose Lasix of 40 mg daily.  He is requesting a reduction if possible due to nuisance urination.  Upon exam, lungs are clear bilaterally, heart rate and rhythm regular, no neck vein distention, abdominal distention, or lower extremity edema.  Weight is stable at 278 pounds.    Limited echocardiogram on 10/24/2022 showed a normal ejection fraction estimated at 55 to 60%, no significant valvular disease.    Blood pressure is well controlled at 116/72 managed on metoprolol and furosemide  Last BMP ([facility]) showed a sodium of 138, potassium 5.2, BUN 12, creatinine 1.06, and GFR 77  CBC was unremarkable  Lipid panel showed a  total cholesterol of 133, HDL 47, LDL 70, triglycerides 82  Hemoglobin A1c 5.8    He has graduated from cardiac rehab and currently looking into a membership with [employer]  Remains compliant with all medications.                   Assessment and Plan:     1.  Severe aortic stenosis -status post AVR using a 25 mm Bal Inspiris Resilia bovine valve (10/2022).  Postop echocardiogram showed no significant stenosis or regurgitation. Recommend echocardiogram in October.    2.  Coronary artery disease -status post CABG x3 using a LIMA to the LAD, SVG to PDA and SVG to the distal circumflex.  No symptoms of ischemia.  Echocardiogram shows well-preserved LV function.  Continue aspirin, metoprolol, and atorvastatin.  Encourage exercise, weight loss, strict low-sodium diet.    3. Hypertension - Well controlled  4. Hyperlipidemia - LDL at goal, continue atorvastatin   5. Type 2 diabetes - A1c 5.7, on metformin.   6. Leg edema - stable. Decrease lasix to 20 mg daily, ok to take additional 20 mg for recurrent swelling.          Thank you for allowing me to participate in this delightful patient's care. I have recommended he follow up in 6 months with Dr. Mejias with echocardiogram prior.       Magalie Rodas, ADONIS CNP         Review of Systems:     Review of Systems:  Skin:  not assessed     Eyes:  not assessed    ENT:  not assessed    Respiratory:  Positive for dyspnea on exertion  Cardiovascular:  Negative    Gastroenterology: not assessed    Genitourinary:  not assessed    Musculoskeletal:    joint pain;joint stiffness  Neurologic:  not assessed    Psychiatric:       Heme/Lymph/Imm:  not assessed    Endocrine:  not assessed diabetes              Physical Exam:     GEN:  In general, this is a overweight male in no acute distress.  HEENT:  Pupils equal, round. Sclerae nonicteric. Clear oropharynx. Mucous membranes moist.  NECK: Supple, no masses appreciated. Trachea midline.  No JVD   C/V:  Regular rate and rhythm, no  murmur, rub or gallop. No S3 or RV heave.   RESP: Respirations are unlabored. No use of accessory muscles. Clear to auscultation bilaterally without wheezing, rales, or rhonchi.  GI: Obese abdomen  EXTREM: No LE edema. No cyanosis or clubbing.  NEURO: Alert and oriented, cooperative. No obvious focal deficits.   PSYCH: Normal affect.  SKIN: Warm and dry. No rashes or petechiae appreciated.          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     BYPASS GRAFT ARTERY CORONARY, REPLACE VALVE AORTIC, COMBINED N/A 10/20/2022    Procedure: CORONARY ARTERY BYPASS GRAFT x 3 (LEFT INTERNAL MAMMARY ARTERY - LEFT ANTERIOR DESCENDING ARTERY; SAPHENOUS VEIN - POSTERIOR DESCENDING ARTERY; SAPHENOUS VEIN - CIRCUMFLEX ARTERY) WITH ENDOSCOPIC LESSER SAPHENOUS VEIN HARVEST ON BILATERAL LOWER EXTREMITY, AORTIC VALVE REPLACEMENT WITH TISSUE HEART VALVE INSPIRIS  RESILIA  AORTIC VALVE SIZE: 25MM, AND ON CARDIOPULMONARY PUMP OXYGENATOR  (     CTA ANGIOGRAM CORONARY ARTERY       ORTHOPEDIC SURGERY Left     elbow surgery              Allergies:   Niaspan [niacin]       Data:   All laboratory data reviewed:    LAST BMP:  Lab Results   Component Value Date     11/15/2022      Lab Results   Component Value Date    POTASSIUM 5.2 11/15/2022    POTASSIUM 4.2 11/08/2022     Lab Results   Component Value Date    CHLORIDE 102 11/15/2022    CHLORIDE 100 11/08/2022     Lab Results   Component Value Date    HALEY 10.0 11/15/2022     Lab Results   Component Value Date    CO2 23 11/15/2022    CO2 28 11/08/2022     Lab Results   Component Value Date    BUN 19.7 11/15/2022    BUN 31 11/08/2022     Lab Results   Component Value Date    CR 1.06 11/15/2022     Lab  Results   Component Value Date     11/15/2022     11/08/2022    GLC 99 11/08/2022       LAST CBC:  Lab Results   Component Value Date    WBC 8.1 11/15/2022     Lab Results   Component Value Date    RBC 3.45 11/15/2022     Lab Results   Component Value Date    HGB 9.7 11/15/2022     Lab Results   Component Value Date    HCT 33.5 11/15/2022     Lab Results   Component Value Date    MCV 97 11/15/2022     Lab Results   Component Value Date    MCH 28.1 11/15/2022     Lab Results   Component Value Date    MCHC 29.0 11/15/2022     Lab Results   Component Value Date    RDW 15.2 11/15/2022     Lab Results   Component Value Date     11/15/2022

## 2023-12-13 ENCOUNTER — HOSPITAL ENCOUNTER (OUTPATIENT)
Dept: CARDIOLOGY | Facility: CLINIC | Age: 68
Discharge: HOME | End: 2023-12-13
Attending: NURSE PRACTITIONER | Admitting: NURSE PRACTITIONER
Payer: COMMERCIAL

## 2023-12-13 ENCOUNTER — OFFICE VISIT (OUTPATIENT)
Dept: CARDIOLOGY | Facility: CLINIC | Age: 68
End: 2023-12-13
Payer: COMMERCIAL

## 2023-12-13 VITALS
BODY MASS INDEX: 39.17 KG/M2 | HEIGHT: 71 IN | OXYGEN SATURATION: 94 % | DIASTOLIC BLOOD PRESSURE: 64 MMHG | SYSTOLIC BLOOD PRESSURE: 112 MMHG | WEIGHT: 279.8 LBS | HEART RATE: 96 BPM

## 2023-12-13 DIAGNOSIS — Z95.1 S/P CABG (CORONARY ARTERY BYPASS GRAFT): ICD-10-CM

## 2023-12-13 DIAGNOSIS — Z95.2 S/P AVR (AORTIC VALVE REPLACEMENT): ICD-10-CM

## 2023-12-13 DIAGNOSIS — I35.0 NONRHEUMATIC AORTIC VALVE STENOSIS: ICD-10-CM

## 2023-12-13 DIAGNOSIS — I25.83 CORONARY ARTERY DISEASE DUE TO LIPID RICH PLAQUE: ICD-10-CM

## 2023-12-13 DIAGNOSIS — I25.10 CORONARY ARTERY DISEASE DUE TO LIPID RICH PLAQUE: ICD-10-CM

## 2023-12-13 LAB — LVEF ECHO: NORMAL

## 2023-12-13 PROCEDURE — 99215 OFFICE O/P EST HI 40 MIN: CPT | Mod: 25 | Performed by: INTERNAL MEDICINE

## 2023-12-13 PROCEDURE — 255N000002 HC RX 255 OP 636: Performed by: NURSE PRACTITIONER

## 2023-12-13 PROCEDURE — 93306 TTE W/DOPPLER COMPLETE: CPT | Mod: 26 | Performed by: INTERNAL MEDICINE

## 2023-12-13 PROCEDURE — 999N000208 ECHOCARDIOGRAM COMPLETE

## 2023-12-13 RX ORDER — CHLORAL HYDRATE 500 MG
2 CAPSULE ORAL DAILY
COMMUNITY

## 2023-12-13 RX ORDER — FUROSEMIDE 20 MG
20 TABLET ORAL DAILY
Qty: 180 TABLET | Refills: 3 | Status: SHIPPED | OUTPATIENT
Start: 2023-12-13

## 2023-12-13 RX ORDER — ASPIRIN 81 MG/1
81 TABLET ORAL DAILY
COMMUNITY

## 2023-12-13 RX ADMIN — HUMAN ALBUMIN MICROSPHERES AND PERFLUTREN 9 ML: 10; .22 INJECTION, SOLUTION INTRAVENOUS at 11:41

## 2023-12-13 NOTE — PROGRESS NOTES
Cardiology Progress Note          Assessment and Plan:       Coronary artery disease status post three-vessel CABG and bioprosthetic aortic valve replacement  Valve is functioning normally on echocardiogram today.  Patient feels well with more stamina.  Follow-up in 6 months with Magalie Rodas and then may be free to pick a different cardiologist to follow-up with that has more availability.      Paroxysmal atrial fibrillation without symptomatic recurrence  Continue conservative management.  Patient did not realize he was in atrial fibrillation previously.    40 minutes was spent with the patient, precharting and reviewing tests as well as post visit charting all done today..    This note was transcribed using electronic voice recognition software and there may be typographical errors present.                    Interval History:     The patient is a 68 year old whom I met in the [place] clinic when he came with [facility] documenting his severe aortic stenosis and multivessel coronary artery disease and expected to have evaluation and treatment here.    He underwent bioprosthetic valve replacement because he did not want to be on anticoagulation.    He does not want to be on a number of medications.    He has been taking 2 pills of furosemide daily because he did not feel he was peeing enough on 1 pill and may be hypovolemic at this time with compensatory tachycardia.    He is frustrated with having to come to [place] and that my schedule is booked out.  We discussed that he is free to find an alternate provider with more availability.                     Review of Systems:     Review of Systems:  Skin:  Negative     Eyes:  Positive for glasses  ENT:  Negative    Respiratory:  Positive for sleep apnea  Cardiovascular:    edema;Positive for  Gastroenterology: Negative    Genitourinary:  Negative    Musculoskeletal:  Positive for joint pain  Neurologic:  Positive for numbness or tingling of feet  Psychiatric:  " Negative    Heme/Lymph/Imm:  Negative    Endocrine:  Negative                Physical Exam:     Vitals: /64   Pulse 96   Ht 1.803 m (5' 11\")   Wt 126.9 kg (279 lb 12.8 oz)   SpO2 94%   BMI 39.02 kg/m    Constitutional:  cooperative, alert and oriented, well developed, well nourished, in no acute distress        Skin:  warm and dry to the touch, no apparent skin lesions or masses noted        Head:  normocephalic, no masses or lesions        Eyes:  pupils equal and round, conjunctivae and lids unremarkable, sclera white, no xanthalasma, EOMS intact, no nystagmus        Chest:  normal symmetry        Cardiac: regular rhythm       grade 1;early systolic murmur;RUSB          Extremities and Back:  no deformities, clubbing, cyanosis, erythema observed;no edema        Neurological:  no gross motor deficits;affect appropriate                 Medications:     Current Outpatient Medications   Medication Sig Dispense Refill    acetaminophen (TYLENOL) 325 MG tablet Take 2 tablets (650 mg) by mouth every 4 hours as needed for other (For optimal non-opioid multimodal pain management to improve pain control.) 100 tablet 1    amoxicillin (AMOXIL) 500 MG capsule Take 4 capsules (2,000 mg) 30-60 minutes prior to your dental procedure 4 capsule 1    Ascorbic Acid (VITAMIN C) 500 MG CAPS Take 500 mg by mouth daily      aspirin (ASPIRIN LOW DOSE) 81 MG EC tablet Take 81 mg by mouth 2 times daily      atorvastatin (LIPITOR) 40 MG tablet Take 1 tablet (40 mg) by mouth daily 90 tablet 3    Cyanocobalamin (VITAMIN B-12 PO) Take 1 tablet by mouth every other day      diphenhydrAMINE (BENADRYL) 50 MG tablet Take 100 mg by mouth every 6 hours as needed for itching or allergies      fish oil-omega-3 fatty acids 1000 MG capsule Take 2 g by mouth daily      folic acid (FOLVITE) 400 MCG tablet Take 400 mcg by mouth 2 times daily      furosemide (LASIX) 20 MG tablet Take 1 tablet (20 mg) by mouth daily 180 tablet 3    gabapentin " (NEURONTIN) 300 MG capsule Take 1 capsule (300 mg) by mouth 3 times daily 90 capsule 1    metFORMIN (GLUCOPHAGE) 500 MG tablet Take 0.5 tablets (250 mg) by mouth daily (with dinner) (0.5 x 500 mg = 250 mg) 30 tablet 1    metoprolol succinate ER (TOPROL XL) 25 MG 24 hr tablet Take 1 tablet (25 mg) by mouth daily 90 tablet 3    Multiple Vitamins-Minerals (MENS 50+ MULTI VITAMIN/MIN PO) Take 1 tablet by mouth daily      Multiple Vitamins-Minerals (PRESERVISION AREDS 2 PO) Take by mouth daily      tamsulosin (FLOMAX) 0.4 MG capsule Take 0.8 mg by mouth every evening (2 x 0.4 mg = 0.8 mg)      traMADol 100 MG TABS Take 100 mg by mouth every 6 hours as needed for moderate pain 40 tablet 0    vitamin D3 (CHOLECALCIFEROL) 50 mcg (2000 units) tablet Take 1 tablet by mouth every other day                  Data:   All laboratory data reviewed  Results for orders placed or performed during the hospital encounter of 23 (from the past 24 hour(s))   Echocardiogram Complete   Result Value Ref Range    LVEF  50-55%     Narrative    [id number]  [id number]  [id number]  [id number]^BRANDI^KATI     [facility]  [facility]  Echocardiography Laboratory  [address]  [place] [address]  Phone [phone]  Fax [phone]     Name: KANNAN LORD  MRN: [id number]  : 1955  Study Date: 2023 11:17 AM  Age: 68 yrs  Gender: Male  Patient Location: [facility]  Reason For Study: S/P AVR (aortic valve replacement), S/P CABG (coronary  artery by  Ordering Physician: KATI PAZ  Referring Physician: KATI PAZ  Performed By: Amita Wesley     BSA: 2.4 m2  Height: 71 in  Weight: 278 lb  HR: 78  BP: 131/73 mmHg  ______________________________________________________________________________  Procedure  Complete Echo Adult. Optison (NDC #1544-3191) given intravenously.      ______________________________________________________________________________  Interpretation Summary     The visual ejection fraction is 50-55%.  Diastolic Doppler findings (E/E' ratio and/or other parameters) suggest left  ventricular filling pressures are indeterminate.  There is moderate inferior wall hypokinesis.  There is mild to moderate apical wall hypokinesis.  The prosthetic aortic valve is well-seated.  The prosthetic aortic valve is not well visualized.  The gradient is normal for this prosthetic aortic valve.  No aortic regurgitation is present.     Extremely technically limited study reduces sensitivity and specificity of  findings.  ______________________________________________________________________________  Left Ventricle  The left ventricle is normal in size. There is normal left ventricular wall  thickness. The visual ejection fraction is 50-55%. Diastolic Doppler findings  (E/E' ratio and/or other parameters) suggest left ventricular filling  pressures are indeterminate. There is mild to moderate apical wall  hypokinesis. There is moderate inferior wall hypokinesis.     Right Ventricle  The right ventricle is normal in size and function.     Atria  Normal left atrial size. Right atrial size is normal. There is no color  Doppler evidence of an atrial shunt.     Mitral Valve  The mitral valve leaflets are mildly thickened. There is moderate mitral  annular calcification. There is trace mitral regurgitation.     Tricuspid Valve  There is trace tricuspid regurgitation. IVC diameter <2.1 cm collapsing >50%  with sniff suggests a normal RA pressure of 3 mmHg.     Aortic Valve  No aortic regurgitation is present. The mean AoV pressure gradient is 11.3  mmHg. The prosthetic aortic valve is well-seated. The gradient is normal for  this prosthetic aortic valve. The prosthetic aortic valve is not well  visualized.     Pulmonic Valve  There is trace pulmonic valvular regurgitation.     Vessels  Normal  "size aorta. The aortic root is normal size.     Pericardium  There is no pericardial effusion.     Rhythm  Sinus rhythm was noted.     ______________________________________________________________________________  MMode/2D Measurements & Calculations  IVSd: 1.0 cm  LVIDd: 4.9 cm  LVIDs: 2.9 cm  LVPWd: 0.96 cm  FS: 40.7 %  LV mass(C)d: 172.2 grams  LV mass(C)dI: 71.0 grams/m2  LA dimension: 4.3 cm     asc Aorta Diam: 2.5 cm  LVOT diam: 2.2 cm  LVOT area: 3.8 cm2  Asc Ao diam index BSA (cm/m2): 1.0  Asc Ao diam index Ht(cm/m): 1.4  RWT: 0.40     Doppler Measurements & Calculations  MV E max shola: 110.0 cm/sec  MV A max shola: 96.4 cm/sec  MV E/A: 1.1  MV dec slope: 527.2 cm/sec2  MV dec time: 0.21 sec  Ao V2 max: 210.1 cm/sec  Ao max P.0 mmHg  Ao V2 mean: 164.3 cm/sec  Ao mean P.3 mmHg  Ao V2 VTI: 45.1 cm  AKILAH(I,D): 1.9 cm2  AKILAH(V,D): 1.6 cm2  LV V1 max PG: 3.2 mmHg  LV V1 max: 89.6 cm/sec  LV V1 VTI: 22.4 cm  SV(LVOT): 85.4 ml  SI(LVOT): 35.2 ml/m2  PA acc time: 0.10 sec  AV Shola Ratio (DI): 0.43  AKILAH Index (cm2/m2): 0.78  E/E' avg: 10.8     Lateral E/e': 8.5  Medial E/e': 13.1     ______________________________________________________________________________  Report approved by: Matilde Webster 2023 01:55 PM             All laboratory data reviewed  No results found for: \"CHOL\"  No results found for: \"HDL\"  No results found for: \"LDL\"  No results found for: \"TRIG\"  No results found for: \"CHOLHDLRATIO\"  No results found for: \"TSH\"  Last Basic Metabolic Panel:  Lab Results   Component Value Date     11/15/2022      Lab Results   Component Value Date    POTASSIUM 5.2 11/15/2022    POTASSIUM 4.2 2022     Lab Results   Component Value Date    CHLORIDE 102 11/15/2022    CHLORIDE 100 2022     Lab Results   Component Value Date    HALEY 10.0 11/15/2022     Lab Results   Component Value Date    CO2 23 11/15/2022    CO2 28 2022     Lab Results   Component Value Date    BUN 19.7 11/15/2022 "    BUN 31 11/08/2022     Lab Results   Component Value Date    CR 1.06 11/15/2022     Lab Results   Component Value Date     11/15/2022     11/08/2022    GLC 99 11/08/2022     Lab Results   Component Value Date    WBC 8.1 11/15/2022     Lab Results   Component Value Date    RBC 3.45 11/15/2022     Lab Results   Component Value Date    HGB 9.7 11/15/2022     Lab Results   Component Value Date    HCT 33.5 11/15/2022     Lab Results   Component Value Date    MCV 97 11/15/2022     Lab Results   Component Value Date    MCH 28.1 11/15/2022     Lab Results   Component Value Date    MCHC 29.0 11/15/2022     Lab Results   Component Value Date    RDW 15.2 11/15/2022     Lab Results   Component Value Date     11/15/2022

## 2023-12-13 NOTE — LETTER
12/13/2023    Cory Valles MD  [address]  [facility] [address]    RE: Richar Davis       Dear Colleague,     I had the pleasure of seeing Richar Davis in the [facility].  Cardiology Progress Note          Assessment and Plan:       Coronary artery disease status post three-vessel CABG and bioprosthetic aortic valve replacement  Valve is functioning normally on echocardiogram today.  Patient feels well with more stamina.  Follow-up in 6 months with Magalie Rodas and then may be free to pick a different cardiologist to follow-up with that has more availability.      Paroxysmal atrial fibrillation without symptomatic recurrence  Continue conservative management.  Patient did not realize he was in atrial fibrillation previously.    40 minutes was spent with the patient, precharting and reviewing tests as well as post visit charting all done today..    This note was transcribed using electronic voice recognition software and there may be typographical errors present.                    Interval History:     The patient is a 68 year old whom I met in the [place] clinic when he came with [place] records documenting his severe aortic stenosis and multivessel coronary artery disease and expected to have evaluation and treatment here.    He underwent bioprosthetic valve replacement because he did not want to be on anticoagulation.    He does not want to be on a number of medications.    He has been taking 2 pills of furosemide daily because he did not feel he was peeing enough on 1 pill and may be hypovolemic at this time with compensatory tachycardia.    He is frustrated with having to come to [place] and that my schedule is booked out.  We discussed that he is free to find an alternate provider with more availability.                     Review of Systems:     Review of Systems:  Skin:  Negative     Eyes:  Positive for glasses  ENT:  Negative    Respiratory:  Positive  "for sleep apnea  Cardiovascular:    edema;Positive for  Gastroenterology: Negative    Genitourinary:  Negative    Musculoskeletal:  Positive for joint pain  Neurologic:  Positive for numbness or tingling of feet  Psychiatric:  Negative    Heme/Lymph/Imm:  Negative    Endocrine:  Negative                Physical Exam:     Vitals: /64   Pulse 96   Ht 1.803 m (5' 11\")   Wt 126.9 kg (279 lb 12.8 oz)   SpO2 94%   BMI 39.02 kg/m    Constitutional:  cooperative, alert and oriented, well developed, well nourished, in no acute distress        Skin:  warm and dry to the touch, no apparent skin lesions or masses noted        Head:  normocephalic, no masses or lesions        Eyes:  pupils equal and round, conjunctivae and lids unremarkable, sclera white, no xanthalasma, EOMS intact, no nystagmus        Chest:  normal symmetry        Cardiac: regular rhythm       grade 1;early systolic murmur;RUSB          Extremities and Back:  no deformities, clubbing, cyanosis, erythema observed;no edema        Neurological:  no gross motor deficits;affect appropriate                 Medications:     Current Outpatient Medications   Medication Sig Dispense Refill    acetaminophen (TYLENOL) 325 MG tablet Take 2 tablets (650 mg) by mouth every 4 hours as needed for other (For optimal non-opioid multimodal pain management to improve pain control.) 100 tablet 1    amoxicillin (AMOXIL) 500 MG capsule Take 4 capsules (2,000 mg) 30-60 minutes prior to your dental procedure 4 capsule 1    Ascorbic Acid (VITAMIN C) 500 MG CAPS Take 500 mg by mouth daily      aspirin (ASPIRIN LOW DOSE) 81 MG EC tablet Take 81 mg by mouth 2 times daily      atorvastatin (LIPITOR) 40 MG tablet Take 1 tablet (40 mg) by mouth daily 90 tablet 3    Cyanocobalamin (VITAMIN B-12 PO) Take 1 tablet by mouth every other day      diphenhydrAMINE (BENADRYL) 50 MG tablet Take 100 mg by mouth every 6 hours as needed for itching or allergies      fish oil-omega-3 fatty " acids 1000 MG capsule Take 2 g by mouth daily      folic acid (FOLVITE) 400 MCG tablet Take 400 mcg by mouth 2 times daily      furosemide (LASIX) 20 MG tablet Take 1 tablet (20 mg) by mouth daily 180 tablet 3    gabapentin (NEURONTIN) 300 MG capsule Take 1 capsule (300 mg) by mouth 3 times daily 90 capsule 1    metFORMIN (GLUCOPHAGE) 500 MG tablet Take 0.5 tablets (250 mg) by mouth daily (with dinner) (0.5 x 500 mg = 250 mg) 30 tablet 1    metoprolol succinate ER (TOPROL XL) 25 MG 24 hr tablet Take 1 tablet (25 mg) by mouth daily 90 tablet 3    Multiple Vitamins-Minerals (MENS 50+ MULTI VITAMIN/MIN PO) Take 1 tablet by mouth daily      Multiple Vitamins-Minerals (PRESERVISION AREDS 2 PO) Take by mouth daily      tamsulosin (FLOMAX) 0.4 MG capsule Take 0.8 mg by mouth every evening (2 x 0.4 mg = 0.8 mg)      traMADol 100 MG TABS Take 100 mg by mouth every 6 hours as needed for moderate pain 40 tablet 0    vitamin D3 (CHOLECALCIFEROL) 50 mcg (2000 units) tablet Take 1 tablet by mouth every other day                  Data:   All laboratory data reviewed  Results for orders placed or performed during the hospital encounter of 23 (from the past 24 hour(s))   Echocardiogram Complete   Result Value Ref Range    LVEF  50-55%     Narrative    [id number]  [id number]  [id number]  [id number]^BRANDI^KATI     [facility]  [facility]  Echocardiography Laboratory  [address]  [place] [address]  Phone [phone]  Fax [phone]     Name: KANNAN LORD  MRN: [id number]  : 1955  Study Date: 2023 11:17 AM  Age: 68 yrs  Gender: Male  Patient Location: [facility]  Reason For Study: S/P AVR (aortic valve replacement), S/P CABG (coronary  artery by  Ordering Physician: KATI PAZ  Referring Physician: KATI PAZ  Performed By: Amita Wesley     BSA: 2.4 m2  Height: 71 in  Weight: 278 lb  HR: 78  BP: 131/73  mmHg  ______________________________________________________________________________  Procedure  Complete Echo Adult. Optison (NDC #[id number]) given intravenously.     ______________________________________________________________________________  Interpretation Summary     The visual ejection fraction is 50-55%.  Diastolic Doppler findings (E/E' ratio and/or other parameters) suggest left  ventricular filling pressures are indeterminate.  There is moderate inferior wall hypokinesis.  There is mild to moderate apical wall hypokinesis.  The prosthetic aortic valve is well-seated.  The prosthetic aortic valve is not well visualized.  The gradient is normal for this prosthetic aortic valve.  No aortic regurgitation is present.     Extremely technically limited study reduces sensitivity and specificity of  findings.  ______________________________________________________________________________  Left Ventricle  The left ventricle is normal in size. There is normal left ventricular wall  thickness. The visual ejection fraction is 50-55%. Diastolic Doppler findings  (E/E' ratio and/or other parameters) suggest left ventricular filling  pressures are indeterminate. There is mild to moderate apical wall  hypokinesis. There is moderate inferior wall hypokinesis.     Right Ventricle  The right ventricle is normal in size and function.     Atria  Normal left atrial size. Right atrial size is normal. There is no color  Doppler evidence of an atrial shunt.     Mitral Valve  The mitral valve leaflets are mildly thickened. There is moderate mitral  annular calcification. There is trace mitral regurgitation.     Tricuspid Valve  There is trace tricuspid regurgitation. IVC diameter <2.1 cm collapsing >50%  with sniff suggests a normal RA pressure of 3 mmHg.     Aortic Valve  No aortic regurgitation is present. The mean AoV pressure gradient is 11.3  mmHg. The prosthetic aortic valve is well-seated. The gradient is normal for  this  "prosthetic aortic valve. The prosthetic aortic valve is not well  visualized.     Pulmonic Valve  There is trace pulmonic valvular regurgitation.     Vessels  Normal size aorta. The aortic root is normal size.     Pericardium  There is no pericardial effusion.     Rhythm  Sinus rhythm was noted.     ______________________________________________________________________________  MMode/2D Measurements & Calculations  IVSd: 1.0 cm  LVIDd: 4.9 cm  LVIDs: 2.9 cm  LVPWd: 0.96 cm  FS: 40.7 %  LV mass(C)d: 172.2 grams  LV mass(C)dI: 71.0 grams/m2  LA dimension: 4.3 cm     asc Aorta Diam: 2.5 cm  LVOT diam: 2.2 cm  LVOT area: 3.8 cm2  Asc Ao diam index BSA (cm/m2): 1.0  Asc Ao diam index Ht(cm/m): 1.4  RWT: 0.40     Doppler Measurements & Calculations  MV E max shola: 110.0 cm/sec  MV A max shola: 96.4 cm/sec  MV E/A: 1.1  MV dec slope: 527.2 cm/sec2  MV dec time: 0.21 sec  Ao V2 max: 210.1 cm/sec  Ao max P.0 mmHg  Ao V2 mean: 164.3 cm/sec  Ao mean P.3 mmHg  Ao V2 VTI: 45.1 cm  AKILAH(I,D): 1.9 cm2  AKILAH(V,D): 1.6 cm2  LV V1 max PG: 3.2 mmHg  LV V1 max: 89.6 cm/sec  LV V1 VTI: 22.4 cm  SV(LVOT): 85.4 ml  SI(LVOT): 35.2 ml/m2  PA acc time: 0.10 sec  AV Shola Ratio (DI): 0.43  AKILAH Index (cm2/m2): 0.78  E/E' avg: 10.8     Lateral E/e': 8.5  Medial E/e': 13.1     ______________________________________________________________________________  Report approved by: Matilde Webster 2023 01:55 PM             All laboratory data reviewed  No results found for: \"CHOL\"  No results found for: \"HDL\"  No results found for: \"LDL\"  No results found for: \"TRIG\"  No results found for: \"CHOLHDLRATIO\"  No results found for: \"TSH\"  Last Basic Metabolic Panel:  Lab Results   Component Value Date     11/15/2022      Lab Results   Component Value Date    POTASSIUM 5.2 11/15/2022    POTASSIUM 4.2 2022     Lab Results   Component Value Date    CHLORIDE 102 11/15/2022    CHLORIDE 100 2022     Lab Results   Component Value Date "    HALEY 10.0 11/15/2022     Lab Results   Component Value Date    CO2 23 11/15/2022    CO2 28 11/08/2022     Lab Results   Component Value Date    BUN 19.7 11/15/2022    BUN 31 11/08/2022     Lab Results   Component Value Date    CR 1.06 11/15/2022     Lab Results   Component Value Date     11/15/2022     11/08/2022    GLC 99 11/08/2022     Lab Results   Component Value Date    WBC 8.1 11/15/2022     Lab Results   Component Value Date    RBC 3.45 11/15/2022     Lab Results   Component Value Date    HGB 9.7 11/15/2022     Lab Results   Component Value Date    HCT 33.5 11/15/2022     Lab Results   Component Value Date    MCV 97 11/15/2022     Lab Results   Component Value Date    MCH 28.1 11/15/2022     Lab Results   Component Value Date    MCHC 29.0 11/15/2022     Lab Results   Component Value Date    RDW 15.2 11/15/2022     Lab Results   Component Value Date     11/15/2022               Thank you for allowing me to participate in the care of your patient.      Sincerely,     Sebastian Mejias MD     [facility]  cc:   Magalie Rodas, APRN CNP  [address] [place] [address]

## 2024-06-10 ENCOUNTER — OFFICE VISIT (OUTPATIENT)
Dept: CARDIOLOGY | Facility: CLINIC | Age: 69
End: 2024-06-10
Attending: INTERNAL MEDICINE
Payer: COMMERCIAL

## 2024-06-10 VITALS
WEIGHT: 281.8 LBS | HEART RATE: 66 BPM | BODY MASS INDEX: 39.45 KG/M2 | HEIGHT: 71 IN | DIASTOLIC BLOOD PRESSURE: 72 MMHG | SYSTOLIC BLOOD PRESSURE: 134 MMHG

## 2024-06-10 DIAGNOSIS — G47.33 OSA (OBSTRUCTIVE SLEEP APNEA): ICD-10-CM

## 2024-06-10 DIAGNOSIS — Z95.2 S/P AVR (AORTIC VALVE REPLACEMENT): ICD-10-CM

## 2024-06-10 DIAGNOSIS — Z95.1 S/P CABG (CORONARY ARTERY BYPASS GRAFT): ICD-10-CM

## 2024-06-10 DIAGNOSIS — I25.810 CORONARY ARTERY DISEASE INVOLVING CORONARY BYPASS GRAFT OF NATIVE HEART WITHOUT ANGINA PECTORIS: Primary | ICD-10-CM

## 2024-06-10 DIAGNOSIS — E66.01 MORBID OBESITY (H): ICD-10-CM

## 2024-06-10 DIAGNOSIS — I10 BENIGN ESSENTIAL HYPERTENSION: ICD-10-CM

## 2024-06-10 DIAGNOSIS — E78.5 HYPERLIPIDEMIA LDL GOAL <70: ICD-10-CM

## 2024-06-10 DIAGNOSIS — I35.0 NONRHEUMATIC AORTIC VALVE STENOSIS: ICD-10-CM

## 2024-06-10 PROCEDURE — 99214 OFFICE O/P EST MOD 30 MIN: CPT | Performed by: NURSE PRACTITIONER

## 2024-06-10 RX ORDER — METOPROLOL SUCCINATE 25 MG/1
25 TABLET, EXTENDED RELEASE ORAL EVERY EVENING
Status: SHIPPED
Start: 2024-06-10

## 2024-06-10 NOTE — PROGRESS NOTES
CARDIOLOGY CLINIC NOTE    PRIMARY CARDIOLOGIST  Dr. Mejias    PRIMARY CARE PHYSICIAN:  Cory Valles    HISTORY OF PRESENT ILLNESS:  Richar Davis is a very pleasant 68-year-old male with a past medical history significant for coronary artery disease status post CABG x 3 using a LIMA/LAD, SVG/PDA and SVG to distal circumflex, aortic stenosis status post AVR with a 25 mm Bal Inspiris Resilia bovine valve (10/2022), postop A-fib without further recurrence, hypertension, hyperlipidemia, obstructive sleep apnea (untreated), tobacco use and type 2 diabetes.    He returns to the office today for 6-month follow-up.  Overall, he is feeling well on a cardiac standpoint with no recurrent chest pain, shortness of breath, palpitations, PND, orthopnea, presyncope, syncope, or lower extremity edema.  His primary complaint is occasional lightheadedness.    Last echocardiogram on 12/13/2023 showed a low normal ejection fraction estimated at 50 to 55%, moderate inferior wall hypokinesis and mild to moderate apical wall hypokinesis and a well-seated bioprosthetic aortic valve with a mean gradient of 11.3 mmHg    Blood pressure is well-controlled at 134/72  Last lipid panel showed a total cholesterol of 133, triglycerides 82, HDL 47, and LDL 70  A1c 6.3    He has known sleep apnea but intolerant to current mask.  He has not had a sleep medicine evaluation since moving here from [place].    He continues to be very active working out regularly.  He chews tobacco.  No alcohol use  Compliant with all medications.    PAST MEDICAL HISTORY:  Past Medical History:   Diagnosis Date    Cataract     Complication of anesthesia     Diabetes mellitus (H)     Heart attack (H)     Heart murmur     Hypertension        MEDICATIONS:  Current Outpatient Medications   Medication Sig Dispense Refill    acetaminophen (TYLENOL) 325 MG tablet Take 2 tablets (650 mg) by mouth every 4 hours as needed for other (For optimal non-opioid multimodal pain  management to improve pain control.) 100 tablet 1    amoxicillin (AMOXIL) 500 MG capsule Take 4 capsules (2,000 mg) 30-60 minutes prior to your dental procedure 4 capsule 1    Ascorbic Acid (VITAMIN C) 500 MG CAPS Take 500 mg by mouth daily      aspirin (ASPIRIN LOW DOSE) 81 MG EC tablet Take 81 mg by mouth daily      atorvastatin (LIPITOR) 40 MG tablet Take 1 tablet (40 mg) by mouth daily 90 tablet 3    Cyanocobalamin (VITAMIN B-12 PO) Take 1 tablet by mouth every other day      diphenhydrAMINE (BENADRYL) 50 MG tablet Take 100 mg by mouth every 6 hours as needed for itching or allergies      fish oil-omega-3 fatty acids 1000 MG capsule Take 2 g by mouth daily      folic acid (FOLVITE) 1 MG tablet Take 1,000 mcg by mouth 2 times daily      furosemide (LASIX) 20 MG tablet Take 1 tablet (20 mg) by mouth daily 180 tablet 3    gabapentin (NEURONTIN) 300 MG capsule Take 1 capsule (300 mg) by mouth 3 times daily (Patient taking differently: Take 300 mg by mouth 3 times daily as needed) 90 capsule 1    metFORMIN (GLUCOPHAGE) 500 MG tablet Take 0.5 tablets (250 mg) by mouth daily (with dinner) (0.5 x 500 mg = 250 mg) 30 tablet 1    metoprolol succinate ER (TOPROL XL) 25 MG 24 hr tablet Take 1 tablet (25 mg) by mouth every evening      Multiple Vitamins-Minerals (MENS 50+ MULTI VITAMIN/MIN PO) Take 1 tablet by mouth daily      Multiple Vitamins-Minerals (PRESERVISION AREDS 2 PO) Take by mouth daily      traMADol 100 MG TABS Take 100 mg by mouth every 6 hours as needed for moderate pain 40 tablet 0    vitamin D3 (CHOLECALCIFEROL) 50 mcg (2000 units) tablet Take 1 tablet by mouth every other day      tamsulosin (FLOMAX) 0.4 MG capsule Take 0.8 mg by mouth every evening (2 x 0.4 mg = 0.8 mg) (Patient not taking: Reported on 6/10/2024)       No current facility-administered medications for this visit.       SOCIAL HISTORY:  I have reviewed this patient's social history and updated it with pertinent information if needed. Richar  David Davis  reports that he has quit smoking. His smokeless tobacco use includes chew. He reports that he does not currently use alcohol. He reports that he does not use drugs.    PHYSICAL EXAM:  Pulse:  [66] 66  BP: (134)/(72) 134/72  281 lbs 12.8 oz    Constitutional: alert, no distress  Respiratory: Good bilateral air entry  Cardiovascular: Regular rate and rhythm  GI: nondistended  Neuropsychiatric: appropriate affact    ASSESSMENT/PLAN:  Pertinent issues addressed/ reviewed during this cardiology visit  CAD s/p CABG x 3 using a LIMA/LAD, SVG/PDA and SVG to distal circumflex (10/2022) -no symptoms of ischemia.  EF 50 to 55%.  Reduce aspirin to 81 mg daily, continue low-dose furosemide.  To avoid lightheadedness, switch metoprolol to evening.  Aortic stenosis status post AVR with a 25 mm Bal Inspiris Resilia bovine valve (10/2022) -last echocardiogram showed a well-seated bioprosthetic aortic valve with a mean gradient of 11.3 mmHg.  Follow-up echocardiogram in December  Brief post op A-fib - no recurrence.   Hypertension -well-controlled  Hyperlipidemia -due for fasting lipid panel, okay to get with upcoming PCP visit.  Continue atorvastatin  Obstructive sleep apnea -referral sent to sleep medicine to reassess therapy    Follow-up in 6 months with SUKHWINDER with echo prior    It was a pleasure seeing this patient in clinic today. Please do not hesitate to contact me with any future questions.     ADONIS Dunn, CNP  Cardiology - [facility]  06/10/2024       The level of medical decision making during this visit was of moderate complexity.    This note was completed in part using dictation via the Dragon voice recognition software. Some word and grammatical errors may occur and must be interpreted in the appropriate clinical context.  If there are any questions pertaining to this issue, please contact me for further clarification.

## 2024-06-10 NOTE — PATIENT INSTRUCTIONS
Thanks for participating in a office visit with the [facility] today.    Doing well on a cardiac standpoint   No recurrent episodes of chest pain.   Occasional lightheadedness, recommend switching metoprolol to the evening.   Reduce aspirin to 81 mg daily  Continue all other medications  Encourage continued exercise  Heart healthy diet   Follow up echo in 6 months  Sleep referral  Stop tobacco use.     Follow up in 6 months with SUKHWINDER     Please call my nurse at  [phone] with any questions or concerns.    Scheduling phone number: [phone]  Reminder: Please bring in all current medications, over the counter supplements and vitamin bottles to your next appointment.

## 2024-06-10 NOTE — LETTER
6/10/2024    Cory Valles MD  [address]  [facility] [address]    RE: Richar Davis       Dear Colleague,     I had the pleasure of seeing Richar Davis in the [facility].  CARDIOLOGY CLINIC NOTE    PRIMARY CARDIOLOGIST  Dr. Mejias    PRIMARY CARE PHYSICIAN:  Cory Valles    HISTORY OF PRESENT ILLNESS:  Richar Davis is a very pleasant 68-year-old male with a past medical history significant for coronary artery disease status post CABG x 3 using a LIMA/LAD, SVG/PDA and SVG to distal circumflex, aortic stenosis status post AVR with a 25 mm Bal Inspiris Resilia bovine valve (10/2022), postop A-fib without further recurrence, hypertension, hyperlipidemia, obstructive sleep apnea (untreated), tobacco use and type 2 diabetes.    He returns to the office today for 6-month follow-up.  Overall, he is feeling well on a cardiac standpoint with no recurrent chest pain, shortness of breath, palpitations, PND, orthopnea, presyncope, syncope, or lower extremity edema.  His primary complaint is occasional lightheadedness.    Last echocardiogram on 12/13/2023 showed a low normal ejection fraction estimated at 50 to 55%, moderate inferior wall hypokinesis and mild to moderate apical wall hypokinesis and a well-seated bioprosthetic aortic valve with a mean gradient of 11.3 mmHg    Blood pressure is well-controlled at 134/72  Last lipid panel showed a total cholesterol of 133, triglycerides 82, HDL 47, and LDL 70  A1c 6.3    He has known sleep apnea but intolerant to current mask.  He has not had a sleep medicine evaluation since moving here from [place].    He continues to be very active working out regularly.  He chews tobacco.  No alcohol use  Compliant with all medications.    PAST MEDICAL HISTORY:  Past Medical History:   Diagnosis Date    Cataract     Complication of anesthesia     Diabetes mellitus (H)     Heart attack (H)     Heart murmur     Hypertension         MEDICATIONS:  Current Outpatient Medications   Medication Sig Dispense Refill    acetaminophen (TYLENOL) 325 MG tablet Take 2 tablets (650 mg) by mouth every 4 hours as needed for other (For optimal non-opioid multimodal pain management to improve pain control.) 100 tablet 1    amoxicillin (AMOXIL) 500 MG capsule Take 4 capsules (2,000 mg) 30-60 minutes prior to your dental procedure 4 capsule 1    Ascorbic Acid (VITAMIN C) 500 MG CAPS Take 500 mg by mouth daily      aspirin (ASPIRIN LOW DOSE) 81 MG EC tablet Take 81 mg by mouth daily      atorvastatin (LIPITOR) 40 MG tablet Take 1 tablet (40 mg) by mouth daily 90 tablet 3    Cyanocobalamin (VITAMIN B-12 PO) Take 1 tablet by mouth every other day      diphenhydrAMINE (BENADRYL) 50 MG tablet Take 100 mg by mouth every 6 hours as needed for itching or allergies      fish oil-omega-3 fatty acids 1000 MG capsule Take 2 g by mouth daily      folic acid (FOLVITE) 1 MG tablet Take 1,000 mcg by mouth 2 times daily      furosemide (LASIX) 20 MG tablet Take 1 tablet (20 mg) by mouth daily 180 tablet 3    gabapentin (NEURONTIN) 300 MG capsule Take 1 capsule (300 mg) by mouth 3 times daily (Patient taking differently: Take 300 mg by mouth 3 times daily as needed) 90 capsule 1    metFORMIN (GLUCOPHAGE) 500 MG tablet Take 0.5 tablets (250 mg) by mouth daily (with dinner) (0.5 x 500 mg = 250 mg) 30 tablet 1    metoprolol succinate ER (TOPROL XL) 25 MG 24 hr tablet Take 1 tablet (25 mg) by mouth every evening      Multiple Vitamins-Minerals (MENS 50+ MULTI VITAMIN/MIN PO) Take 1 tablet by mouth daily      Multiple Vitamins-Minerals (PRESERVISION AREDS 2 PO) Take by mouth daily      traMADol 100 MG TABS Take 100 mg by mouth every 6 hours as needed for moderate pain 40 tablet 0    vitamin D3 (CHOLECALCIFEROL) 50 mcg (2000 units) tablet Take 1 tablet by mouth every other day      tamsulosin (FLOMAX) 0.4 MG capsule Take 0.8 mg by mouth every evening (2 x 0.4 mg = 0.8 mg) (Patient  not taking: Reported on 6/10/2024)       No current facility-administered medications for this visit.       SOCIAL HISTORY:  I have reviewed this patient's social history and updated it with pertinent information if needed. Richar Davis  reports that he has quit smoking. His smokeless tobacco use includes chew. He reports that he does not currently use alcohol. He reports that he does not use drugs.    PHYSICAL EXAM:  Pulse:  [66] 66  BP: (134)/(72) 134/72  281 lbs 12.8 oz    Constitutional: alert, no distress  Respiratory: Good bilateral air entry  Cardiovascular: Regular rate and rhythm  GI: nondistended  Neuropsychiatric: appropriate affact    ASSESSMENT/PLAN:  Pertinent issues addressed/ reviewed during this cardiology visit  CAD s/p CABG x 3 using a LIMA/LAD, SVG/PDA and SVG to distal circumflex (10/2022) -no symptoms of ischemia.  EF 50 to 55%.  Reduce aspirin to 81 mg daily, continue low-dose furosemide.  To avoid lightheadedness, switch metoprolol to evening.  Aortic stenosis status post AVR with a 25 mm Bal Inspiris Resilia bovine valve (10/2022) -last echocardiogram showed a well-seated bioprosthetic aortic valve with a mean gradient of 11.3 mmHg.  Follow-up echocardiogram in December  Brief post op A-fib - no recurrence.   Hypertension -well-controlled  Hyperlipidemia -due for fasting lipid panel, okay to get with upcoming PCP visit.  Continue atorvastatin  Obstructive sleep apnea -referral sent to sleep medicine to reassess therapy    Follow-up in 6 months with SUKHWINDER with echo prior    It was a pleasure seeing this patient in clinic today. Please do not hesitate to contact me with any future questions.     ADONIS Dunn, CNP  Cardiology - [facility]  06/10/2024       The level of medical decision making during this visit was of moderate complexity.    This note was completed in part using dictation via the Dragon voice recognition software. Some word and grammatical errors may occur and  must be interpreted in the appropriate clinical context.  If there are any questions pertaining to this issue, please contact me for further clarification.      Thank you for allowing me to participate in the care of your patient.      Sincerely,     ADONIS Dunn [facility]  cc:   Sebastian Mejias MD  [address]  [place] [id number]

## 2024-12-10 ENCOUNTER — HOSPITAL ENCOUNTER (OUTPATIENT)
Dept: CARDIOLOGY | Facility: CLINIC | Age: 69
Discharge: HOME | End: 2024-12-10
Attending: NURSE PRACTITIONER
Payer: COMMERCIAL

## 2024-12-10 DIAGNOSIS — Z95.2 S/P AVR (AORTIC VALVE REPLACEMENT): ICD-10-CM

## 2024-12-10 LAB — LVEF ECHO: NORMAL

## 2024-12-10 PROCEDURE — 255N000002 HC RX 255 OP 636: Performed by: NURSE PRACTITIONER

## 2024-12-10 PROCEDURE — 999N000208 ECHOCARDIOGRAM COMPLETE

## 2024-12-10 PROCEDURE — C8929 TTE W OR WO FOL WCON,DOPPLER: HCPCS

## 2024-12-10 RX ADMIN — HUMAN ALBUMIN MICROSPHERES AND PERFLUTREN 2 ML: 10; .22 INJECTION, SOLUTION INTRAVENOUS at 14:14

## 2024-12-12 ENCOUNTER — OFFICE VISIT (OUTPATIENT)
Dept: CARDIOLOGY | Facility: CLINIC | Age: 69
End: 2024-12-12
Attending: NURSE PRACTITIONER
Payer: COMMERCIAL

## 2024-12-12 VITALS
DIASTOLIC BLOOD PRESSURE: 74 MMHG | WEIGHT: 284.9 LBS | OXYGEN SATURATION: 97 % | BODY MASS INDEX: 39.89 KG/M2 | HEIGHT: 71 IN | SYSTOLIC BLOOD PRESSURE: 130 MMHG | HEART RATE: 70 BPM

## 2024-12-12 DIAGNOSIS — Z95.2 S/P AVR (AORTIC VALVE REPLACEMENT): ICD-10-CM

## 2024-12-12 DIAGNOSIS — Z95.1 S/P CABG (CORONARY ARTERY BYPASS GRAFT): ICD-10-CM

## 2024-12-12 DIAGNOSIS — G47.33 OSA (OBSTRUCTIVE SLEEP APNEA): ICD-10-CM

## 2024-12-12 DIAGNOSIS — I35.0 NONRHEUMATIC AORTIC VALVE STENOSIS: ICD-10-CM

## 2024-12-12 DIAGNOSIS — I10 BENIGN ESSENTIAL HYPERTENSION: ICD-10-CM

## 2024-12-12 DIAGNOSIS — E78.5 HYPERLIPIDEMIA LDL GOAL <70: ICD-10-CM

## 2024-12-12 DIAGNOSIS — I25.83 CORONARY ARTERY DISEASE DUE TO LIPID RICH PLAQUE: Primary | ICD-10-CM

## 2024-12-12 DIAGNOSIS — I25.10 CORONARY ARTERY DISEASE DUE TO LIPID RICH PLAQUE: Primary | ICD-10-CM

## 2024-12-12 RX ORDER — FUROSEMIDE 20 MG/1
20 TABLET ORAL DAILY
Qty: 90 TABLET | Refills: 3 | Status: CANCELLED | OUTPATIENT
Start: 2024-12-12

## 2024-12-12 RX ORDER — ATORVASTATIN CALCIUM 80 MG/1
80 TABLET, FILM COATED ORAL DAILY
Qty: 90 TABLET | Refills: 3 | Status: SHIPPED | OUTPATIENT
Start: 2024-12-12

## 2024-12-12 NOTE — PATIENT INSTRUCTIONS
Thanks for participating in a office visit with the [facility] clinic today.    Doing well on a cardiac standpoint  Reviewed result of echocardiogram, valve is working well.   Reviewed cholesterol levels, LDL not at goal. Increase atorvastatin to 80 mg daily.   Follow up fasting lipid panel in 3 months.   Encourage continued exercise, weight loss, and heart healthy diet.   Echocardiogram in 1 year    Follow up in 1 year with SUKHWINDER     Please call my nurse at   [phone]. Call with any questions or concerns.    Scheduling phone number: [phone]  Reminder: Please bring in all current medications, over the counter supplements and vitamin bottles to your next appointment.

## 2024-12-12 NOTE — LETTER
12/12/2024    Cory Valles MD  [address]  [facility] [address]    RE: Richar Davis       Dear Colleague,     I had the pleasure of seeing Richar Davis in the [facility].  CARDIOLOGY CLINIC NOTE    PRIMARY CARDIOLOGIST  Dr. Mejias     PRIMARY CARE PHYSICIAN:  Cory Valles    HISTORY OF PRESENT ILLNESS:  Richar Davis is a very pleasant 69-year-old male with a past medical history significant for coronary artery disease status post CABG x 3 using a LIMA/LAD, SVG/PDA and SVG to distal circumflex, aortic stenosis status post AVR with a 25 mm Bal Inspiris Resilia bovine valve (10/2022), postop A-fib without further recurrence, hypertension, hyperlipidemia, obstructive sleep apnea (untreated), tobacco use and type 2 diabetes.     He returns to the office today for 6-month follow-up.  He does not endorse any cardiac complaint, denies chest pain, shortness of breath, palpitations, PND, orthopnea, presyncope, syncope or edema.  Previous lightheadedness resolved with switching metoprolol to evening.  Primary complaint is inability to lose weight despite working out 6 days a week.    Echocardiogram on 12/10/2024 showed a normal ejection fraction estimated at 60 to 65%, normal RV size and function, a well-seated prosthetic aortic valve with a mean AoV pressure gradient of 12 mmHg, AKILAH of 1.2 cm .    Blood pressure stable at 130/74  Last BMP and CBC were unremarkable with the exception of elevated glucose.   A1c 6.2  Lipid panel showed a total cholesterol 167, HDL 48,  and triglycerides 96    Compliant with all medications.    He and his wife are planning to take a [place] cruise in January which he is excited about.        PAST MEDICAL HISTORY:  Past Medical History:   Diagnosis Date     Cataract      Complication of anesthesia      Diabetes mellitus (H)      Heart attack (H)      Heart murmur      Hypertension        MEDICATIONS:  Current Outpatient Medications    Medication Sig Dispense Refill     acetaminophen (TYLENOL) 325 MG tablet Take 2 tablets (650 mg) by mouth every 4 hours as needed for other (For optimal non-opioid multimodal pain management to improve pain control.) 100 tablet 1     amoxicillin (AMOXIL) 500 MG capsule Take 4 capsules (2,000 mg) 30-60 minutes prior to your dental procedure 4 capsule 1     Ascorbic Acid (VITAMIN C) 500 MG CAPS Take 500 mg by mouth daily       aspirin (ASPIRIN LOW DOSE) 81 MG EC tablet Take 81 mg by mouth daily       atorvastatin (LIPITOR) 80 MG tablet Take 1 tablet (80 mg) by mouth daily. 90 tablet 3     Cyanocobalamin (VITAMIN B-12 PO) Take 1 tablet by mouth every other day       diphenhydrAMINE (BENADRYL) 50 MG tablet Take 100 mg by mouth every 6 hours as needed for itching or allergies       fish oil-omega-3 fatty acids 1000 MG capsule Take 2 g by mouth daily       folic acid (FOLVITE) 1 MG tablet Take 1,000 mcg by mouth 2 times daily       furosemide (LASIX) 20 MG tablet Take 1 tablet (20 mg) by mouth daily 180 tablet 3     gabapentin (NEURONTIN) 300 MG capsule Take 1 capsule (300 mg) by mouth 3 times daily (Patient taking differently: Take 300 mg by mouth 3 times daily as needed.) 90 capsule 1     MAGNESIUM PO Take 500 mg by mouth daily.       metFORMIN (GLUCOPHAGE) 500 MG tablet Take 0.5 tablets (250 mg) by mouth daily (with dinner) (0.5 x 500 mg = 250 mg) 30 tablet 1     metoprolol succinate ER (TOPROL XL) 25 MG 24 hr tablet Take 1 tablet (25 mg) by mouth every evening       Multiple Vitamins-Minerals (MENS 50+ MULTI VITAMIN/MIN PO) Take 1 tablet by mouth daily       Multiple Vitamins-Minerals (PRESERVISION AREDS 2 PO) Take by mouth daily       tamsulosin (FLOMAX) 0.4 MG capsule Take 0.8 mg by mouth every evening. (2 x 0.4 mg = 0.8 mg)       traMADol 100 MG TABS Take 100 mg by mouth every 6 hours as needed for moderate pain 40 tablet 0     vitamin D3 (CHOLECALCIFEROL) 50 mcg (2000 units) tablet Take 1 tablet by mouth every  other day       No current facility-administered medications for this visit.       SOCIAL HISTORY:  I have reviewed this patient's social history and updated it with pertinent information if needed. Richar Davis  reports that he has quit smoking. His smokeless tobacco use includes chew. He reports that he does not currently use alcohol. He reports that he does not use drugs.    PHYSICAL EXAM:  Pulse:  [70] 70  BP: (130)/(74) 130/74  SpO2:  [97 %] 97 %  284 lbs 14.4 oz    Constitutional: alert, no distress  Respiratory: Good bilateral air entry  Cardiovascular: Regular rate and rhythm  GI: nondistended  Neuropsychiatric: appropriate affact    ASSESSMENT/PLAN:  Pertinent issues addressed/ reviewed during this cardiology visit    CAD - s/p CABG x 3 using a LIMA/LAD, SVG/PDA and SVG to distal circumflex (10/2022) -no symptoms of ischemia.  Continue aspirin, metoprolol and statin.  Encourage exercise, weight loss and Mediterranean diet.  Aortic stenosis - status post AVR with a 25 mm Bal Inspiris Resilia bovine valve (10/2022) -recent echocardiogram showed a normal ejection fraction estimated at 60 to 65%.  A well-seated prosthetic aortic valve with a mean AoV pressure gradient of 12 mmHg, AKILAH of 1.2 cm .  Follow-up echocardiogram in 1 year.    Hypertension -well-controlled  Hyperlipidemia -LDL not at goal, increase atorvastatin to 80 mg daily.  Follow-up fasting lipid panel in 3 months.  Patient prefers to get done at PCP office.  Obstructive sleep apnea -untreated    Follow-up in 1 year with SUKHWINDER or sooner if needed.    It was a pleasure seeing this patient in clinic today. Please do not hesitate to contact me with any future questions.     ADONIS Dunn, CNP  Cardiology - [facility]  12/12/2024       The level of medical decision making during this visit was of moderate complexity.    This note was completed in part using dictation via the Dragon voice recognition software. Some word and grammatical  errors may occur and must be interpreted in the appropriate clinical context.  If there are any questions pertaining to this issue, please contact me for further clarification.      Thank you for allowing me to participate in the care of your patient.      Sincerely,     ADONIS Dunn CNP     [facility]  cc:   ADONIS Dunn CNP  [address] [place] [address]

## 2024-12-12 NOTE — PROGRESS NOTES
CARDIOLOGY CLINIC NOTE    PRIMARY CARDIOLOGIST  Dr. Mejias     PRIMARY CARE PHYSICIAN:  Cory Valles    HISTORY OF PRESENT ILLNESS:  Richar Davis is a very pleasant 69-year-old male with a past medical history significant for coronary artery disease status post CABG x 3 using a LIMA/LAD, SVG/PDA and SVG to distal circumflex, aortic stenosis status post AVR with a 25 mm Bal Inspiris Resilia bovine valve (10/2022), postop A-fib without further recurrence, hypertension, hyperlipidemia, obstructive sleep apnea (untreated), tobacco use and type 2 diabetes.     He returns to the office today for 6-month follow-up.  He does not endorse any cardiac complaint, denies chest pain, shortness of breath, palpitations, PND, orthopnea, presyncope, syncope or edema.  Previous lightheadedness resolved with switching metoprolol to evening.  Primary complaint is inability to lose weight despite working out 6 days a week.    Echocardiogram on 12/10/2024 showed a normal ejection fraction estimated at 60 to 65%, normal RV size and function, a well-seated prosthetic aortic valve with a mean AoV pressure gradient of 12 mmHg, AKILAH of 1.2 cm .    Blood pressure stable at 130/74  Last BMP and CBC were unremarkable with the exception of elevated glucose.   A1c 6.2  Lipid panel showed a total cholesterol 167, HDL 48,  and triglycerides 96    Compliant with all medications.    He and his wife are planning to take a [place] cruise in January which he is excited about.        PAST MEDICAL HISTORY:  Past Medical History:   Diagnosis Date    Cataract     Complication of anesthesia     Diabetes mellitus (H)     Heart attack (H)     Heart murmur     Hypertension        MEDICATIONS:  Current Outpatient Medications   Medication Sig Dispense Refill    acetaminophen (TYLENOL) 325 MG tablet Take 2 tablets (650 mg) by mouth every 4 hours as needed for other (For optimal non-opioid multimodal pain management to improve pain control.) 100 tablet  1    amoxicillin (AMOXIL) 500 MG capsule Take 4 capsules (2,000 mg) 30-60 minutes prior to your dental procedure 4 capsule 1    Ascorbic Acid (VITAMIN C) 500 MG CAPS Take 500 mg by mouth daily      aspirin (ASPIRIN LOW DOSE) 81 MG EC tablet Take 81 mg by mouth daily      atorvastatin (LIPITOR) 80 MG tablet Take 1 tablet (80 mg) by mouth daily. 90 tablet 3    Cyanocobalamin (VITAMIN B-12 PO) Take 1 tablet by mouth every other day      diphenhydrAMINE (BENADRYL) 50 MG tablet Take 100 mg by mouth every 6 hours as needed for itching or allergies      fish oil-omega-3 fatty acids 1000 MG capsule Take 2 g by mouth daily      folic acid (FOLVITE) 1 MG tablet Take 1,000 mcg by mouth 2 times daily      furosemide (LASIX) 20 MG tablet Take 1 tablet (20 mg) by mouth daily 180 tablet 3    gabapentin (NEURONTIN) 300 MG capsule Take 1 capsule (300 mg) by mouth 3 times daily (Patient taking differently: Take 300 mg by mouth 3 times daily as needed.) 90 capsule 1    MAGNESIUM PO Take 500 mg by mouth daily.      metFORMIN (GLUCOPHAGE) 500 MG tablet Take 0.5 tablets (250 mg) by mouth daily (with dinner) (0.5 x 500 mg = 250 mg) 30 tablet 1    metoprolol succinate ER (TOPROL XL) 25 MG 24 hr tablet Take 1 tablet (25 mg) by mouth every evening      Multiple Vitamins-Minerals (MENS 50+ MULTI VITAMIN/MIN PO) Take 1 tablet by mouth daily      Multiple Vitamins-Minerals (PRESERVISION AREDS 2 PO) Take by mouth daily      tamsulosin (FLOMAX) 0.4 MG capsule Take 0.8 mg by mouth every evening. (2 x 0.4 mg = 0.8 mg)      traMADol 100 MG TABS Take 100 mg by mouth every 6 hours as needed for moderate pain 40 tablet 0    vitamin D3 (CHOLECALCIFEROL) 50 mcg (2000 units) tablet Take 1 tablet by mouth every other day       No current facility-administered medications for this visit.       SOCIAL HISTORY:  I have reviewed this patient's social history and updated it with pertinent information if needed. Richar Davis  reports that he has quit  smoking. His smokeless tobacco use includes chew. He reports that he does not currently use alcohol. He reports that he does not use drugs.    PHYSICAL EXAM:  Pulse:  [70] 70  BP: (130)/(74) 130/74  SpO2:  [97 %] 97 %  284 lbs 14.4 oz    Constitutional: alert, no distress  Respiratory: Good bilateral air entry  Cardiovascular: Regular rate and rhythm  GI: nondistended  Neuropsychiatric: appropriate affact    ASSESSMENT/PLAN:  Pertinent issues addressed/ reviewed during this cardiology visit    CAD - s/p CABG x 3 using a LIMA/LAD, SVG/PDA and SVG to distal circumflex (10/2022) -no symptoms of ischemia.  Continue aspirin, metoprolol and statin.  Encourage exercise, weight loss and Mediterranean diet.  Aortic stenosis - status post AVR with a 25 mm Bal Inspiris Resilia bovine valve (10/2022) -recent echocardiogram showed a normal ejection fraction estimated at 60 to 65%.  A well-seated prosthetic aortic valve with a mean AoV pressure gradient of 12 mmHg, AKILAH of 1.2 cm .  Follow-up echocardiogram in 1 year.    Hypertension -well-controlled  Hyperlipidemia -LDL not at goal, increase atorvastatin to 80 mg daily.  Follow-up fasting lipid panel in 3 months.  Patient prefers to get done at PCP office.  Obstructive sleep apnea -untreated    Follow-up in 1 year with SUKHWINDER or sooner if needed.    It was a pleasure seeing this patient in clinic today. Please do not hesitate to contact me with any future questions.     ADONIS Dunn, CNP  Cardiology - [facility]  12/12/2024       The level of medical decision making during this visit was of moderate complexity.    This note was completed in part using dictation via the Dragon voice recognition software. Some word and grammatical errors may occur and must be interpreted in the appropriate clinical context.  If there are any questions pertaining to this issue, please contact me for further clarification.

## 2024-12-16 ENCOUNTER — OFFICE VISIT (OUTPATIENT)
Dept: SLEEP MEDICINE | Facility: CLINIC | Age: 69
End: 2024-12-16
Attending: NURSE PRACTITIONER
Payer: COMMERCIAL

## 2024-12-16 VITALS
SYSTOLIC BLOOD PRESSURE: 126 MMHG | BODY MASS INDEX: 40.43 KG/M2 | WEIGHT: 288.8 LBS | HEIGHT: 71 IN | DIASTOLIC BLOOD PRESSURE: 80 MMHG | HEART RATE: 79 BPM

## 2024-12-16 DIAGNOSIS — G47.8 UNREFRESHED BY SLEEP: ICD-10-CM

## 2024-12-16 DIAGNOSIS — E66.01 MORBID OBESITY (H): ICD-10-CM

## 2024-12-16 DIAGNOSIS — G47.33 OSA (OBSTRUCTIVE SLEEP APNEA): Primary | ICD-10-CM

## 2024-12-16 DIAGNOSIS — F51.04 CHRONIC INSOMNIA: ICD-10-CM

## 2024-12-16 DIAGNOSIS — I10 BENIGN ESSENTIAL HYPERTENSION: ICD-10-CM

## 2024-12-16 DIAGNOSIS — Z72.821 INADEQUATE SLEEP HYGIENE: ICD-10-CM

## 2024-12-16 DIAGNOSIS — G47.52 DREAM ENACTMENT BEHAVIOR: ICD-10-CM

## 2024-12-16 DIAGNOSIS — G47.21 CIRCADIAN RHYTHM SLEEP DISORDER, DELAYED SLEEP PHASE TYPE: ICD-10-CM

## 2024-12-16 DIAGNOSIS — R06.83 SNORING: ICD-10-CM

## 2024-12-16 DIAGNOSIS — R06.81 WITNESSED EPISODE OF APNEA: ICD-10-CM

## 2024-12-16 ASSESSMENT — SLEEP AND FATIGUE QUESTIONNAIRES
HOW LIKELY ARE YOU TO NOD OFF OR FALL ASLEEP WHILE SITTING AND TALKING TO SOMEONE: WOULD NEVER DOZE
HOW LIKELY ARE YOU TO NOD OFF OR FALL ASLEEP WHILE WATCHING TV: HIGH CHANCE OF DOZING
HOW LIKELY ARE YOU TO NOD OFF OR FALL ASLEEP IN A CAR, WHILE STOPPED FOR A FEW MINUTES IN TRAFFIC: WOULD NEVER DOZE
HOW LIKELY ARE YOU TO NOD OFF OR FALL ASLEEP WHILE SITTING AND READING: WOULD NEVER DOZE
HOW LIKELY ARE YOU TO NOD OFF OR FALL ASLEEP WHEN YOU ARE A PASSENGER IN A CAR FOR AN HOUR WITHOUT A BREAK: WOULD NEVER DOZE
HOW LIKELY ARE YOU TO NOD OFF OR FALL ASLEEP WHILE LYING DOWN TO REST IN THE AFTERNOON WHEN CIRCUMSTANCES PERMIT: MODERATE CHANCE OF DOZING
HOW LIKELY ARE YOU TO NOD OFF OR FALL ASLEEP WHILE SITTING QUIETLY AFTER LUNCH WITHOUT ALCOHOL: WOULD NEVER DOZE
HOW LIKELY ARE YOU TO NOD OFF OR FALL ASLEEP WHILE SITTING INACTIVE IN A PUBLIC PLACE: WOULD NEVER DOZE

## 2024-12-16 NOTE — PATIENT INSTRUCTIONS
"          MY TREATMENT INFORMATION FOR SLEEP APNEA-  Richar Davis    DOCTOR : Ana Payen MD    Am I having a sleep study at a sleep center?  --->Due to normal delays, you will be contacted within 2-4 weeks to schedule    Frequently asked questions:  1. What is Obstructive Sleep Apnea (ALIREZA)? ALIREZA is the most common type of sleep apnea. Apnea means, \"without breath.\"  Apnea is most often caused by narrowing or collapse of the upper airway as muscles relax during sleep.   Almost everyone has occasional apneas. Most people with sleep apnea have had brief interruptions at night frequently for many years.  The severity of sleep apnea is related to how frequent and severe the events are.   2. What are the consequences of ALIREZA? Symptoms include: feeling sleepy during the day, snoring loudly, gasping or stopping of breathing, trouble sleeping, and occasionally morning headaches or heartburn at night.  Sleepiness can be serious and even increase the risk of falling asleep while driving. Other health consequences may include development of high blood pressure and other cardiovascular disease in persons who are susceptible. Untreated ALIREZA  can contribute to heart disease, stroke and diabetes.   3. What are the treatment options? In most situations, sleep apnea is a lifelong disease that must be managed with daily therapy. Medications are not effective for sleep apnea and surgery is generally not considered until other therapies have been tried. Your treatment is your choice . Continuous Positive Airway (CPAP) works right away and is the therapy that is effective in nearly everyone. An oral device to hold your jaw forward is usually the next most reliable option. Other options include postioning devices (to keep you off your back), weight loss, and surgery including a tongue pacing device. There is more detail about some of these options below.  4. Are my sleep studies covered by insurance? " Although we will request verification of coverage, we advise you also check in advance of the study to ensure there is coverage.    Important tips for those choosing CPAP and similar devices  REMEMBER-IF YOU RECEIVE A CALL FROM  [phone]-->IT IS TO SETUP A DEVICE  For new devices, sign up for device FRANCES to monitor your device for your followup visits  We encourage you to utilize the [employer] frances or website ( https://Blackbay[employer]/ ) to monitor your therapy progress and share the data with your healthcare team when you discuss your sleep apnea.                                                    Know your equipment:  CPAP is continuous positive airway pressure that prevents obstructive sleep apnea by keeping the throat from collapsing while you are sleeping. In most cases, the device is  smart  and can slowly self-adjusts if your throat collapses and keeps a record every day of how well you are treated-this information is available to you and your care team.  BPAP is bilevel positive airway pressure that keeps your throat open and also assists each breath with a pressure boost to maintain adequate breathing.  Special kinds of BPAP are used in patients who have inadequate breathing from lung or heart disease. In most cases, the device is  smart  and can slowly self-adjusts to assist breathing. Like CPAP, the device keeps a record of how well you are treated.  Your mask is your connection to the device. You get to choose what feels most comfortable and the staff will help to make sure if fits. Here: are some examples of the different masks that are available: Magnetic mask aids may assist with use but there are safety issues that should be addressed when considering with magnets* ( see end of discussion).       Key points to remember on your journey with sleep apnea:  Sleep study.  PAP devices often need to be adjusted during a sleep study to show that they are effective and adjusted right.  Good tips to  remember: Try wearing just the mask during a quiet time during the day so your body adapts to wearing it. A humidifier is recommended for comfort in most cases to prevent drying of your nose and throat. Allergy medication from your provider may help you if you are having nasal congestion.  Getting settled-in. It takes more than one night for most of us to get used to wearing a mask. Try wearing just the mask during a quiet time during the day so your body adapts to wearing it. A humidifier is recommended for comfort in most cases. Our team will work with you carefully on the first day and will be in contact within 4 days and again at 2 and 4 weeks for advice and remote device adjustments. Your therapy is evaluated by the device each day.   Use it every night. The more you are able to sleep naturally for 7-8 hours, the more likely you will have good sleep and to prevent health risks or symptoms from sleep apnea. Even if you use it 4 hours it helps. Occasionally all of us are unable to use a medical therapy, in sleep apnea, it is not dangerous to miss one night.   Communicate. Call our skilled team on the number provided on the first day if your visit for problems that make it difficult to wear the device. Over 2 out of 3 patients can learn to wear the device long-term with help from our team. Remember to call our team or your sleep providers if you are unable to wear the device as we may have other solutions for those who cannot adapt to mask CPAP therapy. It is recommended that you sleep your sleep provider within the first 3 months and yearly after that if you are not having problems.   Use it for your health. We encourage use of CPAP masks during daytime quiet periods to allow your face and brain to adapt to the sensation of CPAP so that it will be a more natural sensation to awaken to at night or during naps. This can be very useful during the first few weeks or months of adapting to CPAP though it does not help  medically to wear CPAP during wakefulness and  should not be used as a strategy just to meet guidelines.  Take care of your equipment. Make sure you clean your mask and tubing using directions every day and that your filter and mask are replaced as recommended or if they are not working.     *Masks with magnets:  Updated Contraindications  Masks with magnetic components are contraindicated for use by patients where they, or anyone in close physical contact while using the mask, have the following:   Active medical implants that interact with magnets (i.e., pacemakers, implantable cardioverter defibrillators (ICD), neurostimulators, cerebrospinal fluid (CSF) shunts, insulin/infusion pumps)   Metallic implants/objects containing ferromagnetic material (i.e., aneurysm clips/flow disruption devices, embolic coils, stents, valves, electrodes, implants to restore hearing or balance with implanted magnets, ocular implants, metallic splinters in the eye)  Updated Warning  Keep the mask magnets at a safe distance of at least 6 inches (150 mm) away from implants or medical devices that may be adversely affected by magnetic interference. This warning applies to you or anyone in close physical contact with your mask. The magnets are in the frame and lower headgear clips, with a magnetic field strength of up to 400mT. When worn, they connect to secure the mask but may inadvertently detach while asleep.  Implants/medical devices, including those listed within contraindications, may be adversely affected if they change function under external magnetic fields or contain ferromagnetic materials that attract/repel to magnetic fields (some metallic implants, e.g., contact lenses with metal, dental implants, metallic cranial plates, screws, simone hole covers, and bone substitute devices). Consult your physician and  of your implant / other medical device for information on the potential adverse effects of magnetic  fields.    BESIDES CPAP, WHAT OTHER THERAPIES ARE THERE?    Positioning Device  Positioning devices are generally used when sleep apnea is mild and only occurs on your back.This example shows a pillow that straps around the waist. It may be appropriate for those whose sleep study shows milder sleep apnea that occurs primarily when lying flat on one's back. Preliminary studies have shown benefit but effectiveness at home may need to be verified by a home sleep test. These devices are generally not covered by medical insurance.  Examples of devices that maintain sleeping on the back to prevent snoring and mild sleep apnea.    Belt type body positioner  [email or web address]    Electronic reminder  http://nightshifttherapy.com/            Oral Appliance  What is oral appliance therapy?  An oral appliance device fits on your teeth at night like a retainer used after having braces. The device is made by a specialized dentist and requires several visits over 1-2 months before a manufactured device is made to fit your teeth and is adjusted to prevent your sleep apnea. Once an oral device is working properly, snoring should be improved. A home sleep test may be recommended at that time if to determine whether the sleep apnea is adequately treated.       Some things to remember:  -Oral devices are often, but not always, covered by your medical insurance. Be sure to check with your insurance provider.   -If you are referred for oral therapy, you will be given a list of specialized dentists to consider or you may choose to visit the Web site of the American Academy of Dental Sleep Medicine  -Oral devices are less likely to work if you have severe sleep apnea or are extremely overweight.     More detailed information  An oral appliance is a small acrylic device that fits over the upper and lower teeth  (similar to a retainer or a mouth guard). This device slightly moves jaw forward, which moves the base of the tongue forward,  opens the airway, improves breathing for effective treat snoring and obstructive sleep apnea in perhaps 7 out of 10 people .  The best working devices are custom-made by a dental device  after a mold is made of the teeth 1, 2, 3.  When is an oral appliance indicated?  Oral appliance therapy is recommended as a first-line treatment for patients with primary snoring, mild sleep apnea, and for patients with moderate sleep apnea who prefer appliance therapy to use of CPAP4, 5. Severity of sleep apnea is determined by sleep testing and is based on the number of respiratory events per hour of sleep.   How successful is oral appliance therapy?  The success rate of oral appliance therapy in patients with mild sleep apnea is 75-80% while in patients with moderate sleep apnea it is 50-70%. The chance of success in patients with severe sleep apnea is 40-50%. The research also shows that oral appliances have a beneficial effect on the cardiovascular health of ALIREZA patients at the same magnitude as CPAP therapy7.  Oral appliances should be a second-line treatment in cases of severe sleep apnea, but if not completely successful then a combination therapy utilizing CPAP plus oral appliance therapy may be effective. Oral appliances tend to be effective in a broad range of patients although studies show that the patients who have the highest success are females, younger patients, those with milder disease, and less severe obesity. 3, 6.   Finding a dentist that practices dental sleep medicine  Specific training is available through the American Academy of Dental Sleep Medicine for dentists interested in working in the field of sleep. To find a dentist who is educated in the field of sleep and the use of oral appliances, near you, visit the Web site of the American Academy of Dental Sleep Medicine.    References  1. Alexa et al. Objectively measured vs self-reported compliance during oral appliance therapy for  sleep-disordered breathing. Chest 2013; 144(5): 2377-2199.  2. Jazlyn, et al. Objective measurement of compliance during oral appliance therapy for sleep-disordered breathing. Thorax 2013; 68(1): 91-96.  3. Yareli et al. Mandibular advancement devices in 620 men and women with ALIREZA and snoring: tolerability and predictors of treatment success. Chest 2004; 125: 1414-1478.  4. Nixon et al. Oral appliances for snoring and ALIREZA: a review. Sleep 2006; 29: 244-262.  5. Dion et al. Oral appliance treatment for ALIREZA: an update. J Clin Sleep Med 2014; 10(2): 215-227.  6. Campbell et al. Predictors of OSAH treatment outcome. J Dent Res 2007; 86: 2032-6040.      Weight Loss:   Your Body mass index is 40.3 kg/m .    Being overweight does not necessarily mean you will have health consequences.  Those who have BMI over 35 or over 27 with existing medical conditions carries greater risk.   Weight loss decreases severity of sleep apnea in most people with obesity. For those with mild obesity who have developed snoring with weight gain, even 15-30 pound weight loss can improve and occasionally milder eliminate sleep apnea.  Structured and life-long dietary and health habits are necessary to lose weight and keep healthier weight levels.     The Comprehensive Weight loss program offers all aspects of weight loss strategies including two Non-Surgical Weight Loss Programs: Medical Weight Management and our 24 Week Healthy Lifestyle Program:    Medical Weight Management: You will meet with a Medical Weight Management Provider, as well as a Registered Dietician. The program may include medication therapy, dietary education, recommended exercise and physical therapy programs, monthly support group meetings, and possible psychological counseling. Follow up visits with the provider or dietician are scheduled based on your progress and needs.    24 Week Healthy Lifestyle Program: This unique program is designed to give you the  support of weekly appointments and activities thru a 24-week period. It may include all of the components of the basic program (above), with the addition of 11 individual Health  Visits, 24-week access to the [employer] website for over 700 online classes, and monthly support group meetings. This program has an out-of-pocket expense of $499 to cover the items that can not be billed to insurance (health coaches and [facility] access), and is non-refundable/non-transferable (you may be able to use a Health Savings Account; ask your HSA provider). There may be an optional meal replacement plan prescribed as well.   Surgical management achieves meaningful long-term weight loss and improvement in health risks in most patients with more severe obesity.      Sleep Apnea Surgery:    Surgery for obstructive sleep apnea is considered generally only when other therapies fail to work. Surgery may be discussed with you if you are having a difficult time tolerating CPAP and or when there is an abnormal structure that requires surgical correction.  Nose and throat surgeries often enlarge the airway to prevent collapse.  Most of these surgeries create pain for 1-2 weeks and up to half of the most common surgeries are not effective throughout life.  You should carefully discuss the benefits and drawbacks to surgery with your sleep provider and surgeon to determine if it is the best solution for you.   More information  Surgery for ALIREZA is directed at areas that are responsible for narrowing or complete obstruction of the airway during sleep.  There are a wide range of procedures available to enlarge and/or stabilize the airway to prevent blockage of breathing in the three major areas where it can occur: the palate, tongue, and nasal regions.  Successful surgical treatment depends on the accurate identification of the factors responsible for obstructive sleep apnea in each person.  A personalized approach is required because there  is no single treatment that works well for everyone.  Because of anatomic variation, consultation with an examination by a sleep surgeon is a critical first step in determining what surgical options are best for each patient.  In some cases, examination during sedation may be recommended in order to guide the selection of procedures.  Patients will be counseled about risks and benefits as well as the typical recovery course after surgery. Surgery is typically not a cure for a person s ALIREZA.  However, surgery will often significantly improve one s ALIREZA severity (termed  success rate ).  Even in the absence of a cure, surgery will decrease the cardiovascular risk associated with OSA7; improve overall quality of life8 (sleepiness, functionality, sleep quality, etc).      Palate Procedures:  Patients with ALIREZA often have narrowing of their airway in the region of their tonsils and uvula.  The goals of palate procedures are to widen the airway in this region as well as to help the tissues resist collapse.  Modern palate procedure techniques focus on tissue conservation and soft tissue rearrangement, rather than tissue removal.  Often the uvula is preserved in this procedure. Residual sleep apnea is common in patient after pharyngoplasty with an average reduction in sleep apnea events of 33%2.      Tongue Procedures:  ExamWhile patients are awake, the muscles that surround the throat are active and keep this region open for breathing. These muscles relax during sleep, allowing the tongue and other structures to collapse and block breathing.  There are several different tongue procedures available.  Selection of a tongue base procedure depends on characteristics seen on physical exam.  Generally, procedures are aimed at removing bulky tissues in this area or preventing the back of the tongue from falling back during sleep.  Success rates for tongue surgery range from 50-62%3.    Hypoglossal Nerve Stimulation:  Hypoglossal  nerve stimulation has recently received approval from the [place] Food and Drug Administration for the treatment of obstructive sleep apnea.  This is based on research showing that the system was safe and effective in treating sleep apnea6.  Results showed that the median AHI score decreased 68%, from 29.3 to 9.0. This therapy uses an implant system that senses breathing patterns and delivers mild stimulation to airway muscles, which keeps the airway open during sleep.  The system consists of three fully implanted components: a small generator (similar in size to a pacemaker), a breathing sensor, and a stimulation lead.  Using a small handheld remote, a patient turns the therapy on before bed and off upon awakening.    Candidates for this device must be greater than 18 years of age, have moderate to severe obstructive sleep apnea with less than 25% central events  (AHI between 15-65), BMI less than 35, have tried CPAP/oral appliance for at least 8 weeks without success, and have appropriate upper airway anatomy (determined by a sleep endoscopy performed by Dr. Sai Mendoza or Dr. Wolf Alejandro).    Nasal Procedures:  Nasal obstruction can interfere with nasal breathing during the day and night.  Studies have shown that relief of nasal obstruction can improve the ability of some patients to tolerate positive airway pressure therapy for obstructive sleep apnea1.  Treatment options include medications such as nasal saline, topical corticosteroid and antihistamine sprays, and oral medications such as antihistamines or decongestants. Non-surgical treatments can include external nasal dilators for selected patients. If these are not successful by themselves, surgery can improve the nasal airway either alone or in combination with these other options.        Combination Procedures:  Combination of surgical procedures and other treatments may be recommended, particularly if patients have more than one area of  narrowing or persistent positional disease.  The success rate of combination surgery ranges from 66-80%2,3.    References  Scarlett MALIK. The Role of the Nose in Snoring and Obstructive Sleep Apnoea: An Update.  Eur Arch Otorhinolaryngol. 2011; 268: 1365-73.   Supa SM; Durga JA; Alissa JR; Pallanch JF; Jomar MB; Niels SG; Lucho GAXIOLA. Surgical modifications of the upper airway for obstructive sleep apnea in adults: a systematic review and meta-analysis. SLEEP 2010;33(10):5238-7843. Umair GUEVARA. Hypopharyngeal surgery in obstructive sleep apnea: an evidence-based medicine review.  Arch Otolaryngol Head Neck Surg. 2006 Feb;132(2):206-13.  Rasheed YH1, Hunter Y, Chidi JOBY. The efficacy of anatomically based multilevel surgery for obstructive sleep apnea. Otolaryngol Head Neck Surg. 2003 Oct;129(4):327-35.  Umair GUEVARA, Goldberg A. Hypopharyngeal Surgery in Obstructive Sleep Apnea: An Evidence-Based Medicine Review. Arch Otolaryngol Head Neck Surg. 2006 Feb;132(2):206-13.  Stephanie PJ et al. Upper-Airway Stimulation for Obstructive Sleep Apnea.  N Engl J Med. 2014 Jan 9;370(2):139-49.  Juani Y et al. Increased Incidence of Cardiovascular Disease in Middle-aged Men with Obstructive Sleep Apnea. Am J Respir Crit Care Med; 2002 166: 159-165  Vasquez EM et al. Studying Life Effects and Effectiveness of Palatopharyngoplasty (SLEEP) study: Subjective Outcomes of Isolated Uvulopalatopharyngoplasty. Otolaryngol Head Neck Surg. 2011; 144: 623-631.        WHAT IF I ONLY HAVE SNORING?    Mandibular advancement devices, lateral sleep positioning, long-term weight loss and treatment of nasal allergies have been shown to improve snoring.  Exercising tongue muscles with a game (https://apps.[employer]/us/frances/soundly-reduce-snoring/dm0667282816) or stimulating the tongue during the day with a device (https://doi.org/10.3390/vdn99460130) have improved snoring in some  individuals.  https://www.[employer]/  https://www.sleepfoundation.org/best-anti-snoring-mouthpieces-and-mouthguards    Remember to Drive Safe... Drive Alive     Sleep health profoundly affects your health, mood, and your safety.  Thirty three percent of the population (one in three of us) is not getting enough sleep and many have a sleep disorder. Not getting enough sleep or having an untreated / undertreated sleep condition may make us sleepy without even knowing it. In fact, our driving could be dramatically impaired due to our sleep health. As your provider, here are some things I would like you to know about driving:     Here are some warning signs for impairment and dangerous drowsy driving:              -Having been awake more than 16 hours               -Looking tired               -Eyelid drooping              -Head nodding (it could be too late at this point)              -Driving for more than 30 minutes     Some things you could do to make the driving safer if you are experiencing some drowsiness:              -Stop driving and rest              -Call for transportation              -Make sure your sleep disorder is adequately treated     Some things that have been shown NOT to work when experiencing drowsiness while driving:              -Turning on the radio              -Opening windows              -Eating any  distracting  /  entertaining  foods (e.g., sunflower seeds, candy, or any other)              -Talking on the phone      Your decision may not only impact your life, but also the life of others. Please, remember to drive safe for yourself and all of us.  Insomnia - Stimulus Control  When someone lays awake in bed over many nights, your body can actually learn to be awake in bed, mainly because that is what has happened so many times.  Your body has actually been  conditioned  or trained to be awake during the night because it has happened so often.  To break this habit you should try to  follow these steps to improve your insomnia:  Set a strict bedtime and rise time to keep every day of the week, including weekends  Go to bed at the set time, but only if you are sleepy (not tired or fatigued but drowsy)  Don t lay in bed for more than 15-30 minutes if you can t sleep.  Get up and go do something relaxing like reading or watching TV until you get drowsy again   Get up at the same time every day regardless of how much sleep you get  Use the bedroom only for sleep and sex.  Do NOT watch TV, read, use the computer, play on your cell phone or do work while in bed    Do not take naps during the daytime and avoid any situations where you might get drowsy or fall asleep unintentionally especially in the evening.     Recommended following a regular sleep-wake pattern every day, arrived at a goal bedtime of 2AM and a goal wake time of 10 AM every day of the week. We discussed minimizing exposure to bright lights at least a couple hours before the goal bedtime. Avoid mind stimulating activities/screen time or use of electronics at least an hour before bed.  Recommend using a  bright light box of 10,000 Lux intensity with exposure to bright light for 30 to 60 minutes soon after awakening.   Please keep your bed room safe(with regards to dream enactment behavior):  Sleep environmental safety:  Keeping padding by the side of the bed   Keeping lower mattress to the floor and/or use a ground-floor bedroom  Secure doors and windows (locks, alarms, barriers)  Remove sharp and dangerous objects from bedroom, including firearms  Closely monitor for any future episodes of dream enactment behaviors.

## 2024-12-16 NOTE — PROGRESS NOTES
Outpatient Sleep Medicine Consultation:      Name: Richar Davis MRN# [id number]   Age: 69 year old YOB: 1955     Date of Consultation: December 16, 2024  Consultation is requested by: ADONIS Dunn CNP  [address] [place] [address] Magalie Rodas  Primary care provider: Cory Valles       Reason for Sleep Consult:     Richar Davis is sent by Magalie Rodas for a sleep consultation regarding evaluation and management of previously diagnosed sleep disordered breathing.  Patient s Reason for visit  Richar Davis main reason for visit: (Patient-Rptd) major issue overall  Patient states problem(s) started: (Patient-Rptd) childhood  Richar Davis's goals for this visit: (Patient-Rptd) information education           Assessment and Plan:     Summary Sleep Diagnoses and summary Recommendations::  --Previously diagnosed obstructive sleep apnea (untreated, previous history of CPAP intolerance)  --Suspect possible coexistent obesity related hypoventilation based on BMI at 40.30 kg/m .  --Snoring, witnessed apneas during sleep, nonrestorative sleep.  Likely due to untreated sleep disordered breathing  --Chronic insomnia-psychophysiological, delayed sleep phase, untreated sleep disordered breathing, inadequate sleep hygiene, diabetic neuropathy  --Abnormal sleep-related behaviors-occasional nightmares, kicking and thrashing-possible dream enactment behavior-could be due to possible untreated ALIREZA    Previously diagnosed obstructive sleep apnea (untreated, previous history of CPAP intolerance). Suspect possible coexistent obesity related hypoventilation based on BMI at 40.30 kg/m . Snoring, witnessed apneas during sleep, nonrestorative sleep.  Likely due to untreated sleep disordered breathing  Recommended obtaining split-night polysomnography to evaluate for sleep-related breathing disorder.    It will be a split-night study, study will be split if the AHI is greater  than 15/h.  Study will include transcutaneous carbon dioxide monitoring to evaluate for hypoventilation. Recommend obtaining arterial blood gas analysis on room air within 48 hours before the sleep study.  Since patient reports  possible dream enactment behavior, the sleep study will also include 4 limb EMG with RBD protocol to assess the muscle tone during REM sleep.  Discussed pathophysiology and risks of untreated ALIREZA.  Information provided regarding treatment options for ALIREZA.  Patient was amenable to treatment using CPAP device  Patient will follow up 3 months after the sleep study. We will review results and initiate treatment (if indicated) over [facility] prior to the follow up.     Chronic insomnia-psychophysiological, delayed sleep phase, untreated sleep disordered breathing, inadequate sleep hygiene, diabetic neuropathy  We discussed stimulus control measures for insomnia.  We discussed optimizing sleep hygiene measures including avoiding activities such as reading, eating or watching TV in bed. We also discussed reducing the caffeine consumption and not to consume caffeine within 6 hours before bed.  We also discussed avoiding napping during the day.  Recommended following a regular sleep-wake pattern every day, arrived at a goal bedtime of 2AM and a goal wake time of 10 AM every day of the week.   We discussed minimizing exposure to bright lights at least a couple hours before the goal bedtime.   Patient was instructed to avoid mind stimulating activities/screen time or use of electronics at least an hour before bed.    Recommend using a  bright light box of 10,000 Lux intensity with exposure to bright light for 30 to 60 minutes soon after awakening.     Abnormal sleep-related behaviors-occasional nightmares, kicking and thrashing-possible dream enactment behavior-could be due to possible untreated ALIREZA/possible RBD.  As documented above, the sleep study will include 4 limb EMG with RBD protocol to assess  "the muscle tone during REM sleep.  We discussed safety measures in the sleep environment to prevent trauma :  Keeping padding by the side of the bed   Keeping lower mattress to the floor and/or use a ground-floor bedroom  Secure doors and windows ( bed alarms, barriers)  Remove sharp and dangerous objects from bedroom, including firearms  Patient was instructed to closely monitor for any future episodes of dream enactment behaviors.     ---We discussed weight management with diet and exercise.    ---Patient was strongly advised to avoid driving, operating any heavy machinery or other hazardous situations while drowsy or sleepy.  Patient was counseled on the importance of driving while alert, to pull over if drowsy, or nap before getting into the vehicle if sleepy.     Comorbid Diagnoses:  Coronary artery disease status post CABG x 3 using a LIMA/LAD, SVG/PDA and SVG to distal circumflex, aortic stenosis status post AVR with a 25 mm Bal Inspiris Resilia bovine valve (10/2022), postop A-fib without further recurrence, hypertension, hyperlipidemia, obstructive sleep apnea (untreated), tobacco use and type 2 diabetes.        Orders Placed This Encounter   Procedures    Comprehensive Sleep Study    ABG-Blood Gas Arterial ([facility])       Summary Counseling:    Sleep Testing Reviewed  Obstructive Sleep Apnea Reviewed  Complications of Untreated Sleep Apnea Reviewed  Regularization of sleep schedule, stimulus control for insomnia measures Reviewed    Medical Decision-making:   Educational materials provided in instructions       CC: ADONIS Dunn-CNP      The above note was dictated using voice recognition software. Although reviewed after completion, some word and grammatical error may remain . Please contact the author for any clarifications.     \" Total time spent was 56 minutes for this appointment on this date of service which include time spent before, during and after the visit " "for chart review, patient care, counseling and coordination of care including documentation.\"      Ana Payne MD  [facility]  [address] , [place] [address]             History of Present Illness:     Patient lived in [place] for 25 years and relocated to [place] 3 years ago  He obtained PSG in [place] more than 12 years ago when he was diagnosed with diagnosed ALIREZA. Tried CPAP for night could not tolerate and returned machine back.  He had a fullface mask and  the device was noisy.  He considers himself  as a [employer] in [facility]  He completed home sleep study 4-5 years ago-diagnosed with ALIREZA-and was recommended CPAP but did not follow through.  He presents to sleep clinic today for management of the previously diagnosed obstructive sleep apnea.      Past Sleep Evaluations:    SLEEP-WAKE SCHEDULE:     Work/School Days: Patient goes to school/work: (Patient-Rptd) No   Usually gets into bed at (Patient-Rptd) varies 11-3am  Takes patient about (Patient-Rptd) varies to fall asleep  Has trouble falling asleep (Patient-Rptd) varies nights per week  Wakes up in the middle of the night (Patient-Rptd) 4-6times   Wakes up due to (Patient-Rptd) Snorting self awake;Use the bathroom;Nightmares(NM extremely rare)  He typically denies trouble falling back asleep.   It usually takes few minutes to get back to sleep  Patient is usually up at 10-1pm  Uses alarm: (Patient-Rptd) No    Weekends/Non-work Days/All Other Days:  Usually gets into bed at (Patient-Rptd) 11-3am   Takes patient about (Patient-Rptd) varies to fall asleep  Patient is usually up at (Patient-Rptd) 10-12pm-1pm  Uses alarm: (Patient-Rptd) No    He reports nonrestorative sleep. Denies fatigue/EDS   Sleep Need  Patient gets  (Patient-Rptd) 8-10 hours sleep on average   Patient thinks he needs about (Patient-Rptd) same sleep    Richar Davis prefers to sleep in this position(s): (Patient-Rptd) " Side;Stomach;Head Elevated   Patient states they do the following activities in bed: (Patient-Rptd) Read;Eat;Watch TV    Naps  Patient takes a purposeful nap   times a week and naps are usually (Patient-Rptd) 1-3hrs if needed in duration  He feels better after a nap: (Patient-Rptd) Yes  He dozes off unintentionally   days per week  Patient has had a driving accident or near-miss due to sleepiness/drowsiness: (Patient-Rptd) No      SLEEP DISRUPTIONS:    Breathing/Snoring  Patient snores:(Patient-Rptd) Yes  Other people complain about his snoring: (Patient-Rptd) Yes  Patient has been told he stops breathing in his sleep:(Patient-Rptd) Yes  He has issues with the following:      Movement:  He denies symptoms of RLS. He reports back pain.     Behaviors in Sleep:  Richar Davis has experienced the following behaviors while sleeping:   He reports occasional nightmares and occasional sleep talking  He reports teeth grinding.  He reports eating at night but is awake during these episodes  There have been reports of kicking his legs and thrashing his arms during sleep,  but he does not remember his dreams and is unable to associate these episodes with dream content.  His sensation of smell is fine.   Denies reports of sleep walking  He has experienced sudden muscle weakness during the day:  No      Is there anything else you would like your sleep provider to know:          CAFFEINE AND OTHER SUBSTANCES:    Patient consumes caffeinated beverages per day:  (Patient-Rptd) a lot  Last caffeine use is usually: (Patient-Rptd) varies  List of any prescribed or over the counter stimulants that patient takes:    List of any prescribed or over the counter sleep medication patient takes:    List of previous sleep medications that patient has tried: (Patient-Rptd) muscle relaxer  Patient drinks alcohol to help them sleep: (Patient-Rptd) No  Patient drinks alcohol near bedtime: (Patient-Rptd) No    Family History:  Patient has a  family member been diagnosed with a sleep disorder: (Patient-Rptd) No            SCALES:    EPWORTH SLEEPINESS SCALE         12/16/2024    12:53 PM    [place] Sleepiness Scale ( STEVE Laura  [id number]<br>ESS - [place]/English - Final version - 21 Nov 07 - [facility] [place].)   Sitting and reading Would never doze   Watching TV High chance of dozing   Sitting, inactive in a public place (e.g. a theatre or a meeting) Would never doze   As a passenger in a car for an hour without a break Would never doze   Lying down to rest in the afternoon when circumstances permit Moderate chance of dozing   Sitting and talking to someone Would never doze   Sitting quietly after a lunch without alcohol Would never doze   In a car, while stopped for a few minutes in traffic Would never doze   [place] Score (MC) 5   [place] Score (Sleep) 5        Patient-reported         INSOMNIA SEVERITY INDEX (RYNE)          12/16/2024    12:38 PM   Insomnia Severity Index (RYNE)   Difficulty falling asleep 4    Difficulty staying asleep 2    Problems waking up too early 2    How SATISFIED/DISSATISFIED are you with your CURRENT sleep pattern? 4    How NOTICEABLE to others do you think your sleep problem is in terms of impairing the quality of your life? 4    How WORRIED/DISTRESSED are you about your current sleep problem? 3    To what extent do you consider your sleep problem to INTERFERE with your daily functioning (e.g. daytime fatigue, mood, ability to function at work/daily chores, concentration, memory, mood, etc.) CURRENTLY? 1    RYNE Total Score 20        Patient-reported       Guidelines for Scoring/Interpretation:  Total score categories:  0-7 = No clinically significant insomnia   8-14 = Subthreshold insomnia   15-21 = Clinical insomnia (moderate severity)  22-28 = Clinical insomnia (severe)  Used via courtesy of www.[employer].va.gov with permission from Sebastian Cheung PhD., [facility]          GAD7         No data to display           "        CAGE-AID         No data to display                CAGE-AID reprinted with permission from the [place] Medical Journal, CARSON Velarde. and RENUKA Hanley, \"Conjoint screening questionnaires for alcohol and drug abuse\" [place] Medical Journal 94: 135-140, 1995.      PATIENT HEALTH QUESTIONNAIRE-9 (PHQ - 9)         No data to display                Developed by Adina Barrios, Leeann Dougherty, Rey Nayak and colleagues, with an educational raz from [employer] No permission required to reproduce, translate, display or distribute.        Allergies:    Allergies   Allergen Reactions    Niaspan [Niacin] Other (See Comments)     flushing       Medications:    Current Outpatient Medications   Medication Sig Dispense Refill    acetaminophen (TYLENOL) 325 MG tablet Take 2 tablets (650 mg) by mouth every 4 hours as needed for other (For optimal non-opioid multimodal pain management to improve pain control.) 100 tablet 1    amoxicillin (AMOXIL) 500 MG capsule Take 4 capsules (2,000 mg) 30-60 minutes prior to your dental procedure 4 capsule 1    Ascorbic Acid (VITAMIN C) 500 MG CAPS Take 500 mg by mouth daily      aspirin (ASPIRIN LOW DOSE) 81 MG EC tablet Take 81 mg by mouth daily      atorvastatin (LIPITOR) 80 MG tablet Take 1 tablet (80 mg) by mouth daily. 90 tablet 3    Cyanocobalamin (VITAMIN B-12 PO) Take 1 tablet by mouth every other day      diphenhydrAMINE (BENADRYL) 50 MG tablet Take 100 mg by mouth every 6 hours as needed for itching or allergies      fish oil-omega-3 fatty acids 1000 MG capsule Take 2 g by mouth daily      folic acid (FOLVITE) 1 MG tablet Take 1,000 mcg by mouth 2 times daily      furosemide (LASIX) 20 MG tablet Take 1 tablet (20 mg) by mouth daily 180 tablet 3    gabapentin (NEURONTIN) 300 MG capsule Take 1 capsule (300 mg) by mouth 3 times daily (Patient taking differently: Take 300 mg by mouth 3 times daily as needed.) 90 capsule 1    MAGNESIUM PO Take 500 mg by mouth daily.  "     metFORMIN (GLUCOPHAGE) 500 MG tablet Take 0.5 tablets (250 mg) by mouth daily (with dinner) (0.5 x 500 mg = 250 mg) 30 tablet 1    metoprolol succinate ER (TOPROL XL) 25 MG 24 hr tablet Take 1 tablet (25 mg) by mouth every evening      Multiple Vitamins-Minerals (MENS 50+ MULTI VITAMIN/MIN PO) Take 1 tablet by mouth daily      Multiple Vitamins-Minerals (PRESERVISION AREDS 2 PO) Take by mouth daily      tamsulosin (FLOMAX) 0.4 MG capsule Take 0.8 mg by mouth every evening. (2 x 0.4 mg = 0.8 mg)      traMADol 100 MG TABS Take 100 mg by mouth every 6 hours as needed for moderate pain 40 tablet 0    vitamin D3 (CHOLECALCIFEROL) 50 mcg (2000 units) tablet Take 1 tablet by mouth every other day     Patient reports he takes gabapentin 800 mg 3 times daily.    Problem List:  Patient Active Problem List    Diagnosis Date Noted    ALIREZA (obstructive sleep apnea) 12/12/2024     Priority: Medium    Hyperlipidemia LDL goal <70 06/26/2023     Priority: Medium    Benign essential hypertension 06/26/2023     Priority: Medium    Morbid obesity (H) 11/23/2022     Priority: Medium    Coronary artery disease involving coronary bypass graft of native heart without angina pectoris 10/20/2022     Priority: Medium        Past Medical/Surgical History:  Past Medical History:   Diagnosis Date    Cataract     Complication of anesthesia     Diabetes mellitus (H)     Heart attack (H)     Heart murmur     Hypertension      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    BYPASS GRAFT ARTERY CORONARY, REPLACE VALVE AORTIC, COMBINED N/A 10/20/2022    Procedure: CORONARY ARTERY BYPASS GRAFT x 3 (LEFT INTERNAL MAMMARY ARTERY - LEFT ANTERIOR DESCENDING ARTERY; SAPHENOUS VEIN - POSTERIOR DESCENDING ARTERY; SAPHENOUS VEIN - CIRCUMFLEX ARTERY) WITH ENDOSCOPIC LESSER SAPHENOUS VEIN HARVEST ON BILATERAL LOWER EXTREMITY, AORTIC VALVE REPLACEMENT [facility]  [facility] VAL[facility]  SIZE: 25MM, AND ON CARDIOPULMONARY PUMP OXYGENATOR  (    CTA ANGIOGRAM CORONARY ARTERY      ORTHOPEDIC SURGERY Left     elbow surgery       Social History:  Social History     Socioeconomic History    Marital status:      Spouse name: Not on file    Number of children: Not on file    Years of education: Not on file    Highest education level: Not on file   Occupational History    Not on file   Tobacco Use    Smoking status: Former    Smokeless tobacco: Current     Types: Chew    Tobacco comments:     daily   Vaping Use    Vaping status: Never Used   Substance and Sexual Activity    Alcohol use: Not Currently    Drug use: Never    Sexual activity: Not on file   Other Topics Concern    Not on file   Social History Narrative    Not on file     Social Drivers of Health     Financial Resource Strain: Low Risk  (9/30/2024)    Received from [employer] [facility]    Financial Resource Strain     Difficulty of Paying Living Expenses: 3     Difficulty of Paying Living Expenses: Not on file   Food Insecurity: No Food Insecurity (9/30/2024)    [facility]    Food Insecurity     Do you worry your food will run out before you are able to buy more?: 1   Transportation Needs: No Transportation Needs (9/30/2024)    [facility]    Transportation Needs     Does lack of transportation keep you from medical appointments?: 1     Does lack of transportation keep you from work, meetings or getting things that you need?: 1   Physical Activity: Not on file   Stress: Not on file   Social Connections: Socially Integrated (9/30/2024)    [facility]    Social Connections     Do you often feel lonely or isolated from those around you?: 0   Interpersonal Safety: Not on file   Housing Stability: Low Risk  (9/30/2024)    Received from [employer] [facility]     "Housing Stability     What is your housing situation today?: 1       Family History:  No family history on file.    Review of Systems:  A complete review of systems reviewed by me is negative with the exeption of what has been mentioned in the history of present illness.  In the last TWO WEEKS have you experienced any of the following symptoms?  Fevers: (Patient-Rptd) No  Night Sweats: (Patient-Rptd) No  Weight Gain: (Patient-Rptd) No  Pain at Night: (Patient-Rptd) Yes  Double Vision: (Patient-Rptd) No  Changes in Vision: (Patient-Rptd) No  Difficulty Breathing through Nose: (Patient-Rptd) Yes  Sore Throat in Morning: (Patient-Rptd) No  Dry Mouth in the Morning: (Patient-Rptd) Yes  Shortness of Breath Lying Flat: (Patient-Rptd) No  Shortness of Breath With Activity: (Patient-Rptd) No  Awakening with Shortness of Breath: (Patient-Rptd) No  Increased Cough: (Patient-Rptd) No  Heart Racing at Night: (Patient-Rptd) No  Swelling in Feet or Legs: (Patient-Rptd) No  Diarrhea at Night: (Patient-Rptd) No  Heartburn at Night: (Patient-Rptd) No  Urinating More than Once at Night: (Patient-Rptd) Yes  Losing Control of Urine at Night: (Patient-Rptd) No  Joint Pains at Night: (Patient-Rptd) No  Headaches in Morning: (Patient-Rptd) No  Weakness in Arms or Legs: (Patient-Rptd) No  Depressed Mood: (Patient-Rptd) No  Anxiety: (Patient-Rptd) No      Physical Examination:  Vitals: /80   Pulse 79   Ht 1.803 m (5' 10.98\")   Wt 131 kg (288 lb 12.8 oz)   BMI 40.30 kg/m    BMI= Body mass index is 40.3 kg/m .    Neck Cir (cm): 51 cm  General: No apparent distress, appropriately groomed  Head: Normocephalic, atraumatic  Neck:Circumference: 20 inches  Chest: Clear to auscultation - no rales, rhonchi or wheezes  CV: regular rates and rhythm, normal S1 S2, no S3 or S4, and no murmurs  Psych: mood and affect normal   Neuro:  Mental status: Alert and  Oriented X 3  Speech: normal            Data: All pertinent previous laboratory data " "reviewed     Recent Labs   Lab Test 11/15/22  0611 11/08/22  0714 11/08/22  0558     --  138   POTASSIUM 5.2  --  4.2   CHLORIDE 102  --  100   CO2 23  --  28   ANIONGAP 13  --  10   * 106* 99   BUN 19.7  --  31*   CR 1.06  --  1.04   HALEY 10.0  --  9.9       Recent Labs   Lab Test 11/15/22  0611   WBC 8.1   RBC 3.45*   HGB 9.7*   HCT 33.5*   MCV 97   MCH 28.1   MCHC 29.0*   RDW 15.2*          Recent Labs   Lab Test 10/24/22  0336   PROTTOTAL 5.3*   ALBUMIN 2.4*   BILITOTAL 0.8   ALKPHOS 64   AST 35   ALT 19       No results found for: \"TSH\"    No results found for: \"UAMP\", \"UBARB\", \"BENZODIAZEUR\", \"UCANN\", \"UCOC\", \"OPIT\", \"UPCP\"    No results found for: \"IRONSAT\", \"FR40989\", \"MARIA DEL ROSARIO\"    pH Arterial (no units)   Date Value   10/26/2022 7.42   10/25/2022 7.35     pO2 Arterial (mm Hg)   Date Value   10/26/2022 101   10/25/2022 166 (H)     pCO2 Arterial (mm Hg)   Date Value   10/26/2022 46 (H)   10/25/2022 49 (H)     Bicarbonate Arterial (mmol/L)   Date Value   10/26/2022 30 (H)   10/25/2022 27     Base Excess/Deficit Arterial (mmol/L)   Date Value   10/26/2022 4.4 (H)   10/25/2022 0.4        Echocardiography:   Study date: December 10, 2024  Interpretation Summary:  The prosthetic aortic valve is not well visualized.  This degree of valvular regurgitation is within normal limits.  The gradient is normal for this prosthetic aortic valve. 12 mmHg.  The visual ejection fraction is 60-65%.  Contrast was used without apparent complications. Compared to prior study,  there is no significant change.    Chest x-ray: No results found for this or any previous visit from the past 365 days.      Chest CT: No results found for this or any previous visit from the past 365 days.      PFT: Most Recent [place] Pulmonary Function Testing: No results found      Ana Payne MD 12/16/2024   "

## 2024-12-17 DIAGNOSIS — I35.0 NONRHEUMATIC AORTIC VALVE STENOSIS: ICD-10-CM

## 2024-12-17 DIAGNOSIS — I25.83 CORONARY ARTERY DISEASE DUE TO LIPID RICH PLAQUE: ICD-10-CM

## 2024-12-17 DIAGNOSIS — Z95.2 S/P AVR (AORTIC VALVE REPLACEMENT): ICD-10-CM

## 2024-12-17 DIAGNOSIS — Z95.1 S/P CABG (CORONARY ARTERY BYPASS GRAFT): ICD-10-CM

## 2024-12-17 DIAGNOSIS — I25.10 CORONARY ARTERY DISEASE DUE TO LIPID RICH PLAQUE: ICD-10-CM

## 2024-12-17 RX ORDER — FUROSEMIDE 20 MG/1
20 TABLET ORAL DAILY
Qty: 90 TABLET | Refills: 4 | Status: SHIPPED | OUTPATIENT
Start: 2024-12-17

## 2025-03-30 ENCOUNTER — APPOINTMENT (OUTPATIENT)
Dept: CT IMAGING | Facility: CLINIC | Age: 70
DRG: 329 | End: 2025-03-30
Attending: PHYSICIAN ASSISTANT
Payer: COMMERCIAL

## 2025-03-30 ENCOUNTER — HOSPITAL ENCOUNTER (INPATIENT)
Facility: CLINIC | Age: 70
End: 2025-03-30
Attending: PHYSICIAN ASSISTANT | Admitting: SURGERY
Payer: COMMERCIAL

## 2025-03-30 ENCOUNTER — ANESTHESIA (OUTPATIENT)
Dept: SURGERY | Facility: CLINIC | Age: 70
End: 2025-03-30
Payer: COMMERCIAL

## 2025-03-30 ENCOUNTER — ANESTHESIA EVENT (OUTPATIENT)
Dept: SURGERY | Facility: CLINIC | Age: 70
End: 2025-03-30
Payer: COMMERCIAL

## 2025-03-30 DIAGNOSIS — G89.18 ACUTE POST-OPERATIVE PAIN: ICD-10-CM

## 2025-03-30 DIAGNOSIS — R21 RASH: ICD-10-CM

## 2025-03-30 DIAGNOSIS — G62.9 NEUROPATHY: ICD-10-CM

## 2025-03-30 DIAGNOSIS — T14.8XXA OPEN WOUND: ICD-10-CM

## 2025-03-30 DIAGNOSIS — K56.3 GALLSTONE ILEUS (H): Primary | ICD-10-CM

## 2025-03-30 DIAGNOSIS — C78.6 PERITONEAL CARCINOMATOSIS (H): ICD-10-CM

## 2025-03-30 DIAGNOSIS — I10 BENIGN ESSENTIAL HYPERTENSION: ICD-10-CM

## 2025-03-30 DIAGNOSIS — Z95.2 S/P AVR (AORTIC VALVE REPLACEMENT): ICD-10-CM

## 2025-03-30 DIAGNOSIS — G93.41 METABOLIC ENCEPHALOPATHY: ICD-10-CM

## 2025-03-30 DIAGNOSIS — Z95.1 S/P CABG (CORONARY ARTERY BYPASS GRAFT): ICD-10-CM

## 2025-03-30 DIAGNOSIS — F17.200 TOBACCO USE DISORDER: ICD-10-CM

## 2025-03-30 DIAGNOSIS — I48.0 PAROXYSMAL ATRIAL FIBRILLATION (H): ICD-10-CM

## 2025-03-30 LAB
ALBUMIN SERPL BCG-MCNC: 4.1 G/DL (ref 3.5–5.2)
ALP SERPL-CCNC: 133 U/L (ref 40–150)
ALT SERPL W P-5'-P-CCNC: 21 U/L (ref 0–70)
ANION GAP SERPL CALCULATED.3IONS-SCNC: 12 MMOL/L (ref 7–15)
AST SERPL W P-5'-P-CCNC: 34 U/L (ref 0–45)
BASOPHILS # BLD AUTO: 0 10E3/UL (ref 0–0.2)
BASOPHILS NFR BLD AUTO: 0 %
BILIRUB SERPL-MCNC: 0.7 MG/DL
BUN SERPL-MCNC: 18.9 MG/DL (ref 8–23)
CALCIUM SERPL-MCNC: 10.4 MG/DL (ref 8.8–10.4)
CHLORIDE SERPL-SCNC: 93 MMOL/L (ref 98–107)
CREAT SERPL-MCNC: 0.92 MG/DL (ref 0.67–1.17)
EGFRCR SERPLBLD CKD-EPI 2021: >90 ML/MIN/1.73M2
EOSINOPHIL # BLD AUTO: 0.4 10E3/UL (ref 0–0.7)
EOSINOPHIL NFR BLD AUTO: 4 %
ERYTHROCYTE [DISTWIDTH] IN BLOOD BY AUTOMATED COUNT: 13.9 % (ref 10–15)
GLUCOSE BLDC GLUCOMTR-MCNC: 137 MG/DL (ref 70–99)
GLUCOSE SERPL-MCNC: 171 MG/DL (ref 70–99)
HCO3 SERPL-SCNC: 30 MMOL/L (ref 22–29)
HCT VFR BLD AUTO: 47.8 % (ref 40–53)
HGB BLD-MCNC: 15.9 G/DL (ref 13.3–17.7)
IMM GRANULOCYTES # BLD: 0.1 10E3/UL
IMM GRANULOCYTES NFR BLD: 1 %
LIPASE SERPL-CCNC: 33 U/L (ref 13–60)
LYMPHOCYTES # BLD AUTO: 0.8 10E3/UL (ref 0.8–5.3)
LYMPHOCYTES NFR BLD AUTO: 9 %
MCH RBC QN AUTO: 30.6 PG (ref 26.5–33)
MCHC RBC AUTO-ENTMCNC: 33.3 G/DL (ref 31.5–36.5)
MCV RBC AUTO: 92 FL (ref 78–100)
MONOCYTES # BLD AUTO: 1.2 10E3/UL (ref 0–1.3)
MONOCYTES NFR BLD AUTO: 12 %
NEUTROPHILS # BLD AUTO: 7.1 10E3/UL (ref 1.6–8.3)
NEUTROPHILS NFR BLD AUTO: 74 %
NRBC # BLD AUTO: 0 10E3/UL
NRBC BLD AUTO-RTO: 0 /100
PLATELET # BLD AUTO: 199 10E3/UL (ref 150–450)
POTASSIUM SERPL-SCNC: 4.3 MMOL/L (ref 3.4–5.3)
PROT SERPL-MCNC: 7.9 G/DL (ref 6.4–8.3)
RBC # BLD AUTO: 5.19 10E6/UL (ref 4.4–5.9)
SODIUM SERPL-SCNC: 135 MMOL/L (ref 135–145)
WBC # BLD AUTO: 9.6 10E3/UL (ref 4–11)

## 2025-03-30 PROCEDURE — 250N000011 HC RX IP 250 OP 636: Mod: JZ | Performed by: ANESTHESIOLOGY

## 2025-03-30 PROCEDURE — 80053 COMPREHEN METABOLIC PANEL: CPT | Performed by: PHYSICIAN ASSISTANT

## 2025-03-30 PROCEDURE — 99222 1ST HOSP IP/OBS MODERATE 55: CPT | Performed by: HOSPITALIST

## 2025-03-30 PROCEDURE — 0DC80ZZ EXTIRPATION OF MATTER FROM SMALL INTESTINE, OPEN APPROACH: ICD-10-PCS | Performed by: SURGERY

## 2025-03-30 PROCEDURE — 250N000011 HC RX IP 250 OP 636: Performed by: SURGERY

## 2025-03-30 PROCEDURE — 250N000011 HC RX IP 250 OP 636: Performed by: NURSE ANESTHETIST, CERTIFIED REGISTERED

## 2025-03-30 PROCEDURE — 99285 EMERGENCY DEPT VISIT HI MDM: CPT | Mod: 25

## 2025-03-30 PROCEDURE — 99222 1ST HOSP IP/OBS MODERATE 55: CPT | Mod: 57 | Performed by: SURGERY

## 2025-03-30 PROCEDURE — 120N000001 HC R&B MED SURG/OB

## 2025-03-30 PROCEDURE — 83690 ASSAY OF LIPASE: CPT | Performed by: EMERGENCY MEDICINE

## 2025-03-30 PROCEDURE — 88300 SURGICAL PATH GROSS: CPT | Mod: TC | Performed by: SURGERY

## 2025-03-30 PROCEDURE — 999N000141 HC STATISTIC PRE-PROCEDURE NURSING ASSESSMENT: Performed by: SURGERY

## 2025-03-30 PROCEDURE — 710N000009 HC RECOVERY PHASE 1, LEVEL 1, PER MIN: Performed by: SURGERY

## 2025-03-30 PROCEDURE — 250N000009 HC RX 250: Performed by: NURSE ANESTHETIST, CERTIFIED REGISTERED

## 2025-03-30 PROCEDURE — 250N000011 HC RX IP 250 OP 636: Performed by: EMERGENCY MEDICINE

## 2025-03-30 PROCEDURE — 250N000025 HC SEVOFLURANE, PER MIN: Performed by: SURGERY

## 2025-03-30 PROCEDURE — 250N000011 HC RX IP 250 OP 636: Performed by: ANESTHESIOLOGY

## 2025-03-30 PROCEDURE — 85025 COMPLETE CBC W/AUTO DIFF WBC: CPT | Performed by: EMERGENCY MEDICINE

## 2025-03-30 PROCEDURE — 250N000009 HC RX 250: Performed by: PHYSICIAN ASSISTANT

## 2025-03-30 PROCEDURE — 74177 CT ABD & PELVIS W/CONTRAST: CPT

## 2025-03-30 PROCEDURE — 36415 COLL VENOUS BLD VENIPUNCTURE: CPT | Performed by: EMERGENCY MEDICINE

## 2025-03-30 PROCEDURE — 44020 EXPLORE SMALL INTESTINE: CPT | Performed by: SURGERY

## 2025-03-30 PROCEDURE — 250N000009 HC RX 250: Performed by: ANESTHESIOLOGY

## 2025-03-30 PROCEDURE — 250N000011 HC RX IP 250 OP 636: Performed by: PHYSICIAN ASSISTANT

## 2025-03-30 PROCEDURE — 370N000017 HC ANESTHESIA TECHNICAL FEE, PER MIN: Performed by: SURGERY

## 2025-03-30 PROCEDURE — 250N000009 HC RX 250: Performed by: SURGERY

## 2025-03-30 PROCEDURE — 96361 HYDRATE IV INFUSION ADD-ON: CPT | Mod: 59

## 2025-03-30 PROCEDURE — 272N000001 HC OR GENERAL SUPPLY STERILE: Performed by: SURGERY

## 2025-03-30 PROCEDURE — 85025 COMPLETE CBC W/AUTO DIFF WBC: CPT | Performed by: PHYSICIAN ASSISTANT

## 2025-03-30 PROCEDURE — 258N000003 HC RX IP 258 OP 636: Performed by: ANESTHESIOLOGY

## 2025-03-30 PROCEDURE — 258N000003 HC RX IP 258 OP 636: Performed by: SURGERY

## 2025-03-30 PROCEDURE — 258N000003 HC RX IP 258 OP 636: Performed by: EMERGENCY MEDICINE

## 2025-03-30 PROCEDURE — 83690 ASSAY OF LIPASE: CPT | Performed by: PHYSICIAN ASSISTANT

## 2025-03-30 PROCEDURE — 96374 THER/PROPH/DIAG INJ IV PUSH: CPT | Mod: 59

## 2025-03-30 PROCEDURE — 80053 COMPREHEN METABOLIC PANEL: CPT | Performed by: EMERGENCY MEDICINE

## 2025-03-30 PROCEDURE — 360N000076 HC SURGERY LEVEL 3, PER MIN: Performed by: SURGERY

## 2025-03-30 RX ORDER — TRAMADOL HYDROCHLORIDE 50 MG/1
50 TABLET ORAL 4 TIMES DAILY PRN
Status: ON HOLD | COMMUNITY
End: 2025-04-23

## 2025-03-30 RX ORDER — BUPIVACAINE HYDROCHLORIDE 5 MG/ML
INJECTION, SOLUTION PERINEURAL PRN
Status: DISCONTINUED | OUTPATIENT
Start: 2025-03-30 | End: 2025-03-30 | Stop reason: HOSPADM

## 2025-03-30 RX ORDER — CEFAZOLIN SODIUM IN 0.9 % NACL 3 G/100 ML
3 INTRAVENOUS SOLUTION, PIGGYBACK (ML) INTRAVENOUS
Status: COMPLETED | OUTPATIENT
Start: 2025-03-30 | End: 2025-03-30

## 2025-03-30 RX ORDER — MAGNESIUM HYDROXIDE 1200 MG/15ML
LIQUID ORAL PRN
Status: DISCONTINUED | OUTPATIENT
Start: 2025-03-30 | End: 2025-03-30 | Stop reason: HOSPADM

## 2025-03-30 RX ORDER — ACETAMINOPHEN 325 MG/1
975 TABLET ORAL EVERY 8 HOURS
Status: DISCONTINUED | OUTPATIENT
Start: 2025-03-30 | End: 2025-04-08

## 2025-03-30 RX ORDER — ONDANSETRON 2 MG/ML
4 INJECTION INTRAMUSCULAR; INTRAVENOUS ONCE
Status: COMPLETED | OUTPATIENT
Start: 2025-03-30 | End: 2025-03-30

## 2025-03-30 RX ORDER — DEXTROSE MONOHYDRATE 25 G/50ML
25-50 INJECTION, SOLUTION INTRAVENOUS
Status: DISCONTINUED | OUTPATIENT
Start: 2025-03-30 | End: 2025-04-14

## 2025-03-30 RX ORDER — LABETALOL 20 MG/4 ML (5 MG/ML) INTRAVENOUS SYRINGE
PRN
Status: DISCONTINUED | OUTPATIENT
Start: 2025-03-30 | End: 2025-03-30

## 2025-03-30 RX ORDER — IOPAMIDOL 755 MG/ML
135 INJECTION, SOLUTION INTRAVASCULAR ONCE
Status: COMPLETED | OUTPATIENT
Start: 2025-03-30 | End: 2025-03-30

## 2025-03-30 RX ORDER — SODIUM CHLORIDE, SODIUM LACTATE, POTASSIUM CHLORIDE, CALCIUM CHLORIDE 600; 310; 30; 20 MG/100ML; MG/100ML; MG/100ML; MG/100ML
INJECTION, SOLUTION INTRAVENOUS CONTINUOUS
Status: DISCONTINUED | OUTPATIENT
Start: 2025-03-30 | End: 2025-03-30

## 2025-03-30 RX ORDER — AMOXICILLIN 250 MG
1 CAPSULE ORAL 2 TIMES DAILY
Status: DISCONTINUED | OUTPATIENT
Start: 2025-03-30 | End: 2025-04-02

## 2025-03-30 RX ORDER — ONDANSETRON 4 MG/1
4 TABLET, ORALLY DISINTEGRATING ORAL EVERY 30 MIN PRN
Status: DISCONTINUED | OUTPATIENT
Start: 2025-03-30 | End: 2025-03-30 | Stop reason: HOSPADM

## 2025-03-30 RX ORDER — SODIUM CHLORIDE 9 MG/ML
INJECTION, SOLUTION INTRAVENOUS CONTINUOUS
Status: DISCONTINUED | OUTPATIENT
Start: 2025-03-30 | End: 2025-04-02

## 2025-03-30 RX ORDER — NALOXONE HYDROCHLORIDE 0.4 MG/ML
0.4 INJECTION, SOLUTION INTRAMUSCULAR; INTRAVENOUS; SUBCUTANEOUS
Status: DISCONTINUED | OUTPATIENT
Start: 2025-03-30 | End: 2025-04-29 | Stop reason: HOSPADM

## 2025-03-30 RX ORDER — GABAPENTIN 800 MG/1
800 TABLET ORAL 3 TIMES DAILY PRN
Status: ON HOLD | COMMUNITY
End: 2025-04-29

## 2025-03-30 RX ORDER — FENTANYL CITRATE 0.05 MG/ML
50 INJECTION, SOLUTION INTRAMUSCULAR; INTRAVENOUS EVERY 5 MIN PRN
Status: DISCONTINUED | OUTPATIENT
Start: 2025-03-30 | End: 2025-03-30 | Stop reason: HOSPADM

## 2025-03-30 RX ORDER — DEXAMETHASONE SODIUM PHOSPHATE 4 MG/ML
4 INJECTION, SOLUTION INTRA-ARTICULAR; INTRALESIONAL; INTRAMUSCULAR; INTRAVENOUS; SOFT TISSUE
Status: DISCONTINUED | OUTPATIENT
Start: 2025-03-30 | End: 2025-03-30 | Stop reason: HOSPADM

## 2025-03-30 RX ORDER — SODIUM CHLORIDE, SODIUM LACTATE, POTASSIUM CHLORIDE, CALCIUM CHLORIDE 600; 310; 30; 20 MG/100ML; MG/100ML; MG/100ML; MG/100ML
INJECTION, SOLUTION INTRAVENOUS CONTINUOUS PRN
Status: DISCONTINUED | OUTPATIENT
Start: 2025-03-30 | End: 2025-03-30

## 2025-03-30 RX ORDER — ACETAMINOPHEN 325 MG/1
975 TABLET ORAL EVERY 8 HOURS
Status: DISCONTINUED | OUTPATIENT
Start: 2025-03-30 | End: 2025-03-30

## 2025-03-30 RX ORDER — ONDANSETRON 2 MG/ML
4 INJECTION INTRAMUSCULAR; INTRAVENOUS EVERY 30 MIN PRN
Status: DISCONTINUED | OUTPATIENT
Start: 2025-03-30 | End: 2025-03-30 | Stop reason: HOSPADM

## 2025-03-30 RX ORDER — LABETALOL HYDROCHLORIDE 5 MG/ML
10 INJECTION, SOLUTION INTRAVENOUS
Status: DISCONTINUED | OUTPATIENT
Start: 2025-03-30 | End: 2025-03-30 | Stop reason: HOSPADM

## 2025-03-30 RX ORDER — ONDANSETRON 2 MG/ML
4 INJECTION INTRAMUSCULAR; INTRAVENOUS EVERY 6 HOURS PRN
Status: DISCONTINUED | OUTPATIENT
Start: 2025-03-30 | End: 2025-04-29 | Stop reason: HOSPADM

## 2025-03-30 RX ORDER — METOPROLOL SUCCINATE 25 MG/1
25 TABLET, EXTENDED RELEASE ORAL EVERY EVENING
Status: DISCONTINUED | OUTPATIENT
Start: 2025-03-31 | End: 2025-04-05

## 2025-03-30 RX ORDER — DEXAMETHASONE SODIUM PHOSPHATE 4 MG/ML
INJECTION, SOLUTION INTRA-ARTICULAR; INTRALESIONAL; INTRAMUSCULAR; INTRAVENOUS; SOFT TISSUE PRN
Status: DISCONTINUED | OUTPATIENT
Start: 2025-03-30 | End: 2025-03-30

## 2025-03-30 RX ORDER — HYDROMORPHONE HCL IN WATER/PF 6 MG/30 ML
0.2 PATIENT CONTROLLED ANALGESIA SYRINGE INTRAVENOUS
Status: DISCONTINUED | OUTPATIENT
Start: 2025-03-30 | End: 2025-04-08 | Stop reason: ALTCHOICE

## 2025-03-30 RX ORDER — NICOTINE POLACRILEX 4 MG
15-30 LOZENGE BUCCAL
Status: DISCONTINUED | OUTPATIENT
Start: 2025-03-30 | End: 2025-04-14

## 2025-03-30 RX ORDER — GABAPENTIN 800 MG/1
800 TABLET ORAL 3 TIMES DAILY PRN
Status: DISCONTINUED | OUTPATIENT
Start: 2025-03-30 | End: 2025-04-18

## 2025-03-30 RX ORDER — ERTAPENEM 1 G/1
1 INJECTION, POWDER, LYOPHILIZED, FOR SOLUTION INTRAMUSCULAR; INTRAVENOUS EVERY 24 HOURS
Status: COMPLETED | OUTPATIENT
Start: 2025-03-30 | End: 2025-03-31

## 2025-03-30 RX ORDER — HYDROMORPHONE HYDROCHLORIDE 1 MG/ML
0.5 INJECTION, SOLUTION INTRAMUSCULAR; INTRAVENOUS; SUBCUTANEOUS
Status: DISCONTINUED | OUTPATIENT
Start: 2025-03-30 | End: 2025-04-08 | Stop reason: ALTCHOICE

## 2025-03-30 RX ORDER — CEFAZOLIN SODIUM IN 0.9 % NACL 3 G/100 ML
3 INTRAVENOUS SOLUTION, PIGGYBACK (ML) INTRAVENOUS SEE ADMIN INSTRUCTIONS
Status: DISCONTINUED | OUTPATIENT
Start: 2025-03-30 | End: 2025-03-30 | Stop reason: HOSPADM

## 2025-03-30 RX ORDER — FENTANYL CITRATE 0.05 MG/ML
25 INJECTION, SOLUTION INTRAMUSCULAR; INTRAVENOUS EVERY 5 MIN PRN
Status: DISCONTINUED | OUTPATIENT
Start: 2025-03-30 | End: 2025-03-30 | Stop reason: HOSPADM

## 2025-03-30 RX ORDER — PROPOFOL 10 MG/ML
INJECTION, EMULSION INTRAVENOUS PRN
Status: DISCONTINUED | OUTPATIENT
Start: 2025-03-30 | End: 2025-03-30

## 2025-03-30 RX ORDER — HYDROMORPHONE HCL IN WATER/PF 6 MG/30 ML
0.4 PATIENT CONTROLLED ANALGESIA SYRINGE INTRAVENOUS EVERY 5 MIN PRN
Status: DISCONTINUED | OUTPATIENT
Start: 2025-03-30 | End: 2025-03-30 | Stop reason: HOSPADM

## 2025-03-30 RX ORDER — PROPOFOL 10 MG/ML
INJECTION, EMULSION INTRAVENOUS CONTINUOUS PRN
Status: DISCONTINUED | OUTPATIENT
Start: 2025-03-30 | End: 2025-03-30

## 2025-03-30 RX ORDER — METHOCARBAMOL 500 MG/1
250 TABLET ORAL EVERY 6 HOURS PRN
Status: DISCONTINUED | OUTPATIENT
Start: 2025-03-30 | End: 2025-04-21

## 2025-03-30 RX ORDER — NALOXONE HYDROCHLORIDE 0.4 MG/ML
0.2 INJECTION, SOLUTION INTRAMUSCULAR; INTRAVENOUS; SUBCUTANEOUS
Status: DISCONTINUED | OUTPATIENT
Start: 2025-03-30 | End: 2025-04-29 | Stop reason: HOSPADM

## 2025-03-30 RX ORDER — ONDANSETRON 4 MG/1
4 TABLET, ORALLY DISINTEGRATING ORAL EVERY 6 HOURS PRN
Status: DISCONTINUED | OUTPATIENT
Start: 2025-03-30 | End: 2025-04-29 | Stop reason: HOSPADM

## 2025-03-30 RX ORDER — LIDOCAINE 40 MG/G
CREAM TOPICAL
Status: DISCONTINUED | OUTPATIENT
Start: 2025-03-30 | End: 2025-04-04

## 2025-03-30 RX ORDER — FOLIC ACID 1 MG/1
1000 TABLET ORAL 2 TIMES DAILY
Status: DISCONTINUED | OUTPATIENT
Start: 2025-03-30 | End: 2025-04-08

## 2025-03-30 RX ORDER — DIAZEPAM 10 MG/2ML
2.5 INJECTION, SOLUTION INTRAMUSCULAR; INTRAVENOUS EVERY 6 HOURS PRN
Status: DISCONTINUED | OUTPATIENT
Start: 2025-03-30 | End: 2025-04-18

## 2025-03-30 RX ORDER — PROCHLORPERAZINE MALEATE 5 MG/1
5 TABLET ORAL EVERY 6 HOURS PRN
Status: DISCONTINUED | OUTPATIENT
Start: 2025-03-30 | End: 2025-04-29 | Stop reason: HOSPADM

## 2025-03-30 RX ORDER — METHOCARBAMOL 100 MG/ML
500 INJECTION, SOLUTION INTRAMUSCULAR; INTRAVENOUS ONCE
Status: COMPLETED | OUTPATIENT
Start: 2025-03-30 | End: 2025-03-30

## 2025-03-30 RX ORDER — POLYETHYLENE GLYCOL 3350 17 G/17G
17 POWDER, FOR SOLUTION ORAL DAILY
Status: DISCONTINUED | OUTPATIENT
Start: 2025-03-31 | End: 2025-04-29 | Stop reason: HOSPADM

## 2025-03-30 RX ORDER — FENTANYL CITRATE 50 UG/ML
INJECTION, SOLUTION INTRAMUSCULAR; INTRAVENOUS PRN
Status: DISCONTINUED | OUTPATIENT
Start: 2025-03-30 | End: 2025-03-30

## 2025-03-30 RX ORDER — DIPHENHYDRAMINE HYDROCHLORIDE 50 MG/ML
12.5 INJECTION, SOLUTION INTRAMUSCULAR; INTRAVENOUS EVERY 6 HOURS PRN
Status: DISCONTINUED | OUTPATIENT
Start: 2025-03-30 | End: 2025-04-29 | Stop reason: HOSPADM

## 2025-03-30 RX ORDER — DIPHENHYDRAMINE HCL 12.5MG/5ML
12.5 LIQUID (ML) ORAL EVERY 6 HOURS PRN
Status: DISCONTINUED | OUTPATIENT
Start: 2025-03-30 | End: 2025-04-29 | Stop reason: HOSPADM

## 2025-03-30 RX ORDER — BISACODYL 10 MG
10 SUPPOSITORY, RECTAL RECTAL DAILY PRN
Status: DISCONTINUED | OUTPATIENT
Start: 2025-04-02 | End: 2025-04-29 | Stop reason: HOSPADM

## 2025-03-30 RX ORDER — HYDROMORPHONE HCL IN WATER/PF 6 MG/30 ML
0.2 PATIENT CONTROLLED ANALGESIA SYRINGE INTRAVENOUS EVERY 5 MIN PRN
Status: DISCONTINUED | OUTPATIENT
Start: 2025-03-30 | End: 2025-03-30 | Stop reason: HOSPADM

## 2025-03-30 RX ORDER — LIDOCAINE HYDROCHLORIDE 20 MG/ML
INJECTION, SOLUTION INFILTRATION; PERINEURAL PRN
Status: DISCONTINUED | OUTPATIENT
Start: 2025-03-30 | End: 2025-03-30

## 2025-03-30 RX ORDER — KETOROLAC TROMETHAMINE 15 MG/ML
15 INJECTION, SOLUTION INTRAMUSCULAR; INTRAVENOUS ONCE
Status: COMPLETED | OUTPATIENT
Start: 2025-03-30 | End: 2025-03-30

## 2025-03-30 RX ORDER — ONDANSETRON 2 MG/ML
INJECTION INTRAMUSCULAR; INTRAVENOUS PRN
Status: DISCONTINUED | OUTPATIENT
Start: 2025-03-30 | End: 2025-03-30

## 2025-03-30 RX ORDER — NALOXONE HYDROCHLORIDE 0.4 MG/ML
0.1 INJECTION, SOLUTION INTRAMUSCULAR; INTRAVENOUS; SUBCUTANEOUS
Status: DISCONTINUED | OUTPATIENT
Start: 2025-03-30 | End: 2025-03-30 | Stop reason: HOSPADM

## 2025-03-30 RX ADMIN — SODIUM CHLORIDE: 9 INJECTION, SOLUTION INTRAVENOUS at 23:40

## 2025-03-30 RX ADMIN — IOPAMIDOL 135 ML: 755 INJECTION, SOLUTION INTRAVENOUS at 17:31

## 2025-03-30 RX ADMIN — PROPOFOL 150 MCG/KG/MIN: 10 INJECTION, EMULSION INTRAVENOUS at 20:00

## 2025-03-30 RX ADMIN — HYDROMORPHONE HYDROCHLORIDE 0.4 MG: 0.2 INJECTION, SOLUTION INTRAMUSCULAR; INTRAVENOUS; SUBCUTANEOUS at 21:58

## 2025-03-30 RX ADMIN — HYDROMORPHONE HYDROCHLORIDE 0.5 MG: 1 INJECTION, SOLUTION INTRAMUSCULAR; INTRAVENOUS; SUBCUTANEOUS at 20:26

## 2025-03-30 RX ADMIN — SODIUM CHLORIDE 79 ML: 9 INJECTION, SOLUTION INTRAVENOUS at 17:31

## 2025-03-30 RX ADMIN — ONDANSETRON 4 MG: 2 INJECTION INTRAMUSCULAR; INTRAVENOUS at 20:32

## 2025-03-30 RX ADMIN — LABETALOL 20 MG/4 ML (5 MG/ML) INTRAVENOUS SYRINGE 5 MG: at 20:48

## 2025-03-30 RX ADMIN — FENTANYL CITRATE 50 MCG: 50 INJECTION, SOLUTION INTRAMUSCULAR; INTRAVENOUS at 21:34

## 2025-03-30 RX ADMIN — LABETALOL 20 MG/4 ML (5 MG/ML) INTRAVENOUS SYRINGE 5 MG: at 21:20

## 2025-03-30 RX ADMIN — SODIUM CHLORIDE, SODIUM LACTATE, POTASSIUM CHLORIDE, AND CALCIUM CHLORIDE: .6; .31; .03; .02 INJECTION, SOLUTION INTRAVENOUS at 19:47

## 2025-03-30 RX ADMIN — ROCURONIUM BROMIDE 60 MG: 50 INJECTION, SOLUTION INTRAVENOUS at 20:16

## 2025-03-30 RX ADMIN — LIDOCAINE HYDROCHLORIDE 100 MG: 20 INJECTION, SOLUTION INFILTRATION; PERINEURAL at 20:00

## 2025-03-30 RX ADMIN — Medication 3 G: at 20:05

## 2025-03-30 RX ADMIN — DEXAMETHASONE SODIUM PHOSPHATE 4 MG: 4 INJECTION, SOLUTION INTRA-ARTICULAR; INTRALESIONAL; INTRAMUSCULAR; INTRAVENOUS; SOFT TISSUE at 20:16

## 2025-03-30 RX ADMIN — ONDANSETRON 4 MG: 2 INJECTION, SOLUTION INTRAMUSCULAR; INTRAVENOUS at 14:57

## 2025-03-30 RX ADMIN — Medication 200 MG: at 20:55

## 2025-03-30 RX ADMIN — FENTANYL CITRATE 50 MCG: 50 INJECTION INTRAMUSCULAR; INTRAVENOUS at 20:00

## 2025-03-30 RX ADMIN — HYDROMORPHONE HYDROCHLORIDE 0.4 MG: 0.2 INJECTION, SOLUTION INTRAMUSCULAR; INTRAVENOUS; SUBCUTANEOUS at 22:33

## 2025-03-30 RX ADMIN — PROPOFOL 100 MG: 10 INJECTION, EMULSION INTRAVENOUS at 20:05

## 2025-03-30 RX ADMIN — METHOCARBAMOL 500 MG: 1000 INJECTION, SOLUTION INTRAMUSCULAR; INTRAVENOUS at 22:04

## 2025-03-30 RX ADMIN — FENTANYL CITRATE 50 MCG: 50 INJECTION INTRAMUSCULAR; INTRAVENOUS at 20:11

## 2025-03-30 RX ADMIN — FENTANYL CITRATE 50 MCG: 50 INJECTION, SOLUTION INTRAMUSCULAR; INTRAVENOUS at 21:39

## 2025-03-30 RX ADMIN — KETOROLAC TROMETHAMINE 15 MG: 15 INJECTION, SOLUTION INTRAMUSCULAR; INTRAVENOUS at 21:43

## 2025-03-30 RX ADMIN — SUCCINYLCHOLINE CHLORIDE 100 MG: 20 INJECTION, SOLUTION INTRAMUSCULAR; INTRAVENOUS; PARENTERAL at 20:00

## 2025-03-30 RX ADMIN — SODIUM CHLORIDE, SODIUM LACTATE, POTASSIUM CHLORIDE, AND CALCIUM CHLORIDE: .6; .31; .03; .02 INJECTION, SOLUTION INTRAVENOUS at 21:07

## 2025-03-30 RX ADMIN — SODIUM CHLORIDE 1000 ML: 0.9 INJECTION, SOLUTION INTRAVENOUS at 14:58

## 2025-03-30 RX ADMIN — Medication 200 MG: at 21:06

## 2025-03-30 RX ADMIN — HYDROMORPHONE HYDROCHLORIDE 0.4 MG: 0.2 INJECTION, SOLUTION INTRAMUSCULAR; INTRAVENOUS; SUBCUTANEOUS at 21:51

## 2025-03-30 RX ADMIN — PROPOFOL 200 MG: 10 INJECTION, EMULSION INTRAVENOUS at 20:00

## 2025-03-30 RX ADMIN — HYDROMORPHONE HYDROCHLORIDE 0.4 MG: 0.2 INJECTION, SOLUTION INTRAMUSCULAR; INTRAVENOUS; SUBCUTANEOUS at 22:18

## 2025-03-30 RX ADMIN — HYDROMORPHONE HYDROCHLORIDE 0.4 MG: 0.2 INJECTION, SOLUTION INTRAMUSCULAR; INTRAVENOUS; SUBCUTANEOUS at 21:44

## 2025-03-30 ASSESSMENT — ACTIVITIES OF DAILY LIVING (ADL)
ADLS_ACUITY_SCORE: 54

## 2025-03-30 NOTE — ED PROVIDER NOTES
Emergency Department Note      History of Present Illness     Chief Complaint   Abdominal Pain      HPI   Richar Davis is a 69 year old male with a history of DM, MI, aortic valve replacement, who presents to the ED for evaluation of abdominal pain, nausea, vomiting. Patient notes that he began to feel unwell 2 days ago and started to have vomiting. He states he has had 2 episodes of emesis per day, stating that everything that he puts in he is vomiting up. He has had 2 normal stools, last yesterday. No hematemesis, melena, hematochezia. He states that he went to urgent care today and had XR performed suggestive for SBO, prompting referral to the ED. Last food 0800.    Independent Historian   Wife aids history    Review of External Notes   Arben GERARDO today:  Richar comes in for evaluation of vomiting. Patient has been having vomiting for the past 3 days. Symptoms started 3/28 night with two episodes of vomiting, twice 3/29 and once today. Patient not taken any medication in the past two days- has had tramadol and tylenol. Abdomen is tender- tylenol does help this and standing and walking around improves the pain. Urination is less than usual. No other ill contacts. Last BM was last night and formed- somewhat constipated.     AXR:  Impression  Multiple dilated loops of small bowel in the upper abdomen with air-fluid levels on the upright film. Relative paucity of gas in the colon. Findings indicate distal mechanical small bowel obstruction. Cholelithiasis.    Past Medical History     Medical History and Problem List   Past Medical History:   Diagnosis Date     Cataract      Complication of anesthesia      Diabetes mellitus (H)      Heart attack (H)      Heart murmur      Hypertension        Medications   acetaminophen (TYLENOL) 325 MG tablet  Ascorbic Acid (VITAMIN C) 500 MG CAPS  aspirin (ASPIRIN LOW DOSE) 81 MG EC tablet  atorvastatin (LIPITOR) 80 MG tablet  Cyanocobalamin (VITAMIN B-12 PO)  diphenhydrAMINE  "(BENADRYL) 50 MG tablet  fish oil-omega-3 fatty acids 1000 MG capsule  folic acid (FOLVITE) 1 MG tablet  furosemide (LASIX) 20 MG tablet  gabapentin (NEURONTIN) 300 MG capsule  gabapentin (NEURONTIN) 800 MG tablet  MAGNESIUM PO  metFORMIN (GLUCOPHAGE) 500 MG tablet  metoprolol succinate ER (TOPROL XL) 25 MG 24 hr tablet  Multiple Vitamins-Minerals (MENS 50+ MULTI VITAMIN/MIN PO)  Multiple Vitamins-Minerals (PRESERVISION AREDS 2 PO)  tamsulosin (FLOMAX) 0.4 MG capsule  traMADol (ULTRAM) 50 MG tablet  vitamin D3 (CHOLECALCIFEROL) 50 mcg (2000 units) tablet  amoxicillin (AMOXIL) 500 MG capsule        Surgical History   Past Surgical History:   Procedure Laterality Date     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     BYPASS GRAFT ARTERY CORONARY, REPLACE VALVE AORTIC, COMBINED N/A 10/20/2022    Procedure: CORONARY ARTERY BYPASS GRAFT x 3 (LEFT INTERNAL MAMMARY ARTERY - LEFT ANTERIOR DESCENDING ARTERY; SAPHENOUS VEIN - POSTERIOR DESCENDING ARTERY; SAPHENOUS VEIN - CIRCUMFLEX ARTERY) WITH ENDOSCOPIC LESSER SAPHENOUS VEIN HARVEST ON BILATERAL LOWER EXTREMITY, AORTIC VALVE REPLACEMENT WITH TISSUE HEART VALVE INSPIRIS  RESILIA  AORTIC VALVE SIZE: 25MM, AND ON CARDIOPULMONARY PUMP OXYGENATOR  (     CTA ANGIOGRAM CORONARY ARTERY       ORTHOPEDIC SURGERY Left     elbow surgery       Physical Exam     Patient Vitals for the past 24 hrs:   BP Temp Temp src Pulse Resp SpO2 Height Weight   03/30/25 1919 (!) 155/100 97.4  F (36.3  C) Temporal 106 18 95 % 1.803 m (5' 11\") 58.5 kg (129 lb)   03/30/25 1455 120/74 97  F (36.1  C) Temporal 76 16 97 % 1.803 m (5' 11\") 129.3 kg (285 lb)     Physical Exam  Constitutional: Pleasant. Cooperative.   Eyes: Pupils equally round and reactive  HENT: Head is normal in appearance. Dry mucous membranes.  Cardiovascular: Regular rate and rhythm and without murmurs.  Respiratory: Normal respiratory effort, lungs are clear bilaterally.  GI: Distension. TTP to abdomen diffusely. No guarding, rebound, " or rigidity.  Musculoskeletal: No asymmetry of the lower extremities.  Skin: Normal, without rash.  Neurologic: Cranial nerves grossly intact, normal cognition, no focal deficits. Alert and oriented x 3.   Psychiatric: Normal affect.  Nursing notes and vital signs reviewed.      Diagnostics     Lab Results   Labs Ordered and Resulted from Time of ED Arrival to Time of ED Departure   COMPREHENSIVE METABOLIC PANEL - Abnormal       Result Value    Sodium 135      Potassium 4.3      Carbon Dioxide (CO2) 30 (*)     Anion Gap 12      Urea Nitrogen 18.9      Creatinine 0.92      GFR Estimate >90      Calcium 10.4      Chloride 93 (*)     Glucose 171 (*)     Alkaline Phosphatase 133      AST 34      ALT 21      Protein Total 7.9      Albumin 4.1      Bilirubin Total 0.7     LIPASE - Normal    Lipase 33     CBC WITH PLATELETS AND DIFFERENTIAL    WBC Count 9.6      RBC Count 5.19      Hemoglobin 15.9      Hematocrit 47.8      MCV 92      MCH 30.6      MCHC 33.3      RDW 13.9      Platelet Count 199      % Neutrophils 74      % Lymphocytes 9      % Monocytes 12      % Eosinophils 4      % Basophils 0      % Immature Granulocytes 1      NRBCs per 100 WBC 0      Absolute Neutrophils 7.1      Absolute Lymphocytes 0.8      Absolute Monocytes 1.2      Absolute Eosinophils 0.4      Absolute Basophils 0.0      Absolute Immature Granulocytes 0.1      Absolute NRBCs 0.0     GLUCOSE MONITOR NURSING POCT       Imaging   CT Abdomen Pelvis w Contrast   Final Result   IMPRESSION:    1.  Gallstone ileus. Distal small bowel obstruction secondary to an impacted 2.2 cm gallstone located just right of midline at the level of the right common iliac artery bifurcation.   2.  Cholelithiasis with contracted gallbladder but no suggestion for acute cholecystitis.   3.  Mild splenomegaly.   4.  Trace volume ascites.   5.  Prostatomegaly.   6.  Moderate central spinal stenosis at lumbar spine.        Independent Interpretation   None    ED Course       Medications Administered   Medications   ceFAZolin (ANCEF) 3 g in  mL intermittent infusion (has no administration in time range)   ceFAZolin (ANCEF) 3 g in  mL intermittent infusion (has no administration in time range)   ondansetron (ZOFRAN) injection 4 mg (4 mg Intravenous $Given 3/30/25 5917)   sodium chloride 0.9% BOLUS 1,000 mL (0 mLs Intravenous Stopped 3/30/25 1559)   sodium chloride 0.9 % bag for CT scan flush (79 mLs As instructed $Given 3/30/25 1731)   iopamidol (ISOVUE-370) solution 135 mL (135 mLs Intravenous $Given 3/30/25 1731)       Procedures   Procedures     Discussion of Management   General surgery - plan for OR, declined abx ordered at this time    ED Course        Additional Documentation  None    Medical Decision Making / Diagnosis     CMS Diagnoses: None    MIPS       None    MDM   Richar Davis is a 69 year old male who presents to the ED from  with abdominal pain, nausea, vomiting. Patient sent from  with concern for SBO per AXR. See HPI as above for additional details. Vitals and physical exam as above. DDx was broad and included SBO, perforated viscus, biliary pathology, pancreatitis, amongst others. Work up as above notable for gallstone ileus. Remainder of work up as above. Case discussed with general surgery, who will plan to take patient to the OR. Plan discussed with patient, who is in agreement. All questions answered. Patient stable at time of transfer to OR.    Disposition   Patient transferred to the OR    Diagnosis     ICD-10-CM    1. Gallstone ileus (H)  K56.3 Case Request: exploratory laparotomy with enterotomy and removal of gallstone, possible bowel resection     Case Request: exploratory laparotomy with enterotomy and removal of gallstone, possible bowel resection         This record was created at least in part using electronic voice recognition software, so please excuse any typographical errors.       Edward Watts PA-C  03/30/25 1935     3

## 2025-03-30 NOTE — ANESTHESIA PREPROCEDURE EVALUATION
Anesthesia Pre-Procedure Evaluation    Patient: Richar Davis   MRN: [id number] : 1955        Procedure : Procedure(s):  exploratory laparotomy with enterotomy and removal of gallstone, possible bowel resection          Past Medical History:   Diagnosis Date    Cataract     Complication of anesthesia     Diabetes mellitus (H)     Heart attack (H)     Heart murmur     Hypertension       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    BYPASS GRAFT ARTERY CORONARY, REPLACE VALVE AORTIC, COMBINED N/A 10/20/2022    Procedure: CORONARY ARTERY BYPASS GRAFT x 3 (LEFT INTERNAL MAMMARY ARTERY - LEFT ANTERIOR DESCENDING ARTERY; SAPHENOUS VEIN - POSTERIOR DESCENDING ARTERY; SAPHENOUS VEIN - CIRCUMFLEX ARTERY) WITH ENDOSCOPIC LESSER SAPHENOUS VEIN HARVEST ON BILATERAL LOWER EXTREMITY, AORTIC VALVE REPLACEMENT WITH TISSUE HEART VALVE INSPIRIS  RESILIA  AORTIC VALVE SIZE: 25MM, AND ON CARDIOPULMONARY PUMP OXYGENATOR  (    CTA ANGIOGRAM CORONARY ARTERY      ORTHOPEDIC SURGERY Left     elbow surgery      Allergies   Allergen Reactions    Niaspan [Niacin] Other (See Comments)     flushing      Social History     Tobacco Use    Smoking status: Former    Smokeless tobacco: Current     Types: Chew    Tobacco comments:     daily   Substance Use Topics    Alcohol use: Not Currently      Wt Readings from Last 1 Encounters:   25 129.3 kg (285 lb)        Anesthesia Evaluation            ROS/MED HX  ENT/Pulmonary:     (+) sleep apnea, doesn't use CPAP,                                      Neurologic:       Cardiovascular:     (+)  hypertension- -  CAD - past MI CABG- -                           valvular problems/murmurs  s/p AVR;.         METS/Exercise Tolerance:     Hematologic:       Musculoskeletal:       GI/Hepatic:    (-) GERD   Renal/Genitourinary:       Endo:     (+)  type II DM,             Obesity,       Psychiatric/Substance Use:       Infectious Disease:      "  Malignancy:       Other:            Physical Exam    Airway        Mallampati: III   TM distance: > 3 FB   Neck ROM: full   Mouth opening: > 3 cm    Respiratory Devices and Support         Dental       (+) Multiple visibly decayed, broken teeth      Cardiovascular   cardiovascular exam normal          Pulmonary   pulmonary exam normal                OUTSIDE LABS:  CBC:   Lab Results   Component Value Date    WBC 9.6 03/30/2025    WBC 8.1 11/15/2022    HGB 15.9 03/30/2025    HGB 9.7 (L) 11/15/2022    HCT 47.8 03/30/2025    HCT 33.5 (L) 11/15/2022     03/30/2025     11/15/2022     BMP:   Lab Results   Component Value Date     03/30/2025     11/15/2022    POTASSIUM 4.3 03/30/2025    POTASSIUM 5.2 11/15/2022    CHLORIDE 93 (L) 03/30/2025    CHLORIDE 102 11/15/2022    CO2 30 (H) 03/30/2025    CO2 23 11/15/2022    BUN 18.9 03/30/2025    BUN 19.7 11/15/2022    CR 0.92 03/30/2025    CR 1.06 11/15/2022     (H) 03/30/2025     (H) 11/15/2022     COAGS:   Lab Results   Component Value Date     (H) 10/20/2022    INR 1.31 (H) 10/20/2022    FIBR 198 10/20/2022     POC: No results found for: \"BGM\", \"HCG\", \"HCGS\"  HEPATIC:   Lab Results   Component Value Date    ALBUMIN 4.1 03/30/2025    PROTTOTAL 7.9 03/30/2025    ALT 21 03/30/2025    AST 34 03/30/2025    ALKPHOS 133 03/30/2025    BILITOTAL 0.7 03/30/2025    YUNG 19 10/23/2022     OTHER:   Lab Results   Component Value Date    PH 7.42 10/26/2022    LACT 1.0 10/24/2022    A1C 5.6 09/14/2022    HALEY 10.4 03/30/2025    PHOS 3.7 11/06/2022    MAG 2.7 (H) 11/08/2022    LIPASE 33 03/30/2025       Anesthesia Plan    ASA Status:  4, emergent    NPO Status:  NPO Appropriate    Anesthesia Type: General.     - Airway: ETT   Induction: Intravenous, RSI.   Maintenance: Balanced.   Techniques and Equipment:     - Airway: Video-Laryngoscope     - Lines/Monitors: 2nd IV     Consents    Anesthesia Plan(s) and associated risks, benefits, and realistic " "alternatives discussed. Questions answered and patient/representative(s) expressed understanding.     - Discussed:     - Discussed with:  Patient            Postoperative Care    Pain management: IV analgesics.   PONV prophylaxis: Ondansetron (or other 5HT-3), Dexamethasone or Solumedrol     Comments:               ERMELINDA SMILEY MD    Clinically Significant Risk Factors Present on Admission          # Hypochloremia: Lowest Cl = 93 mmol/L in last 2 days, will monitor as appropriate        # Drug Induced Platelet Defect: home medication list includes an antiplatelet medication   # Hypertension: Noted on problem list           # Obesity: Estimated body mass index is 39.75 kg/m  as calculated from the following:    Height as of this encounter: 1.803 m (5' 11\").    Weight as of this encounter: 129.3 kg (285 lb).        # History of CABG: noted on surgical history         "

## 2025-03-30 NOTE — ED NOTES
"[facility]  ED Nurse Handoff Report    ED Chief complaint: Abdominal Pain      ED Diagnosis:   Final diagnoses:   None       Code Status: Full Code    Allergies:   Allergies   Allergen Reactions    Niaspan [Niacin] Other (See Comments)     flushing       Patient Story: Pt has a bowel blockage that was seen at urgent care [facility] and sent her for further work up      Pt has abd pain and nausa vomiting for 3 days  Focused Assessment:  A/O. BM yesterday. No nausea at present. Has not had anything to eat or drink since this AM. Up ind.     Treatments and/or interventions provided: IV, CT   Patient's response to treatments and/or interventions: no change    To be done/followed up on inpatient unit:  see signed and held    Does this patient have any cognitive concerns?:  NA    Activity level - Baseline/Home:  Independent  Activity Level - Current:   Independent    Patient's Preferred language: English   Needed?: No    Isolation: None  Infection: Not Applicable  Patient tested for COVID 19 prior to admission: NO  Bariatric?: YES    Vital Signs:   Vitals:    03/30/25 1455   BP: 120/74   Pulse: 76   Resp: 16   Temp: 97  F (36.1  C)   TempSrc: Temporal   SpO2: 97%   Weight: 129.3 kg (285 lb)   Height: 1.803 m (5' 11\")       Cardiac Rhythm:     Was the PSS-3 completed:   Yes  What interventions are required if any?         Family Comments: wife    For the majority of the shift this patient's behavior was Green.   Behavioral interventions performed were NA.    ED NURSE PHONE NUMBER: [phone]        "

## 2025-03-30 NOTE — PHARMACY-ADMISSION MEDICATION HISTORY
Pharmacist Admission Medication History    Admission medication history is complete. The information provided in this note is only as accurate as the sources available at the time of the update.    Information Source(s): Patient and CareEverywhere/SureScripts via in-person    Pertinent Information: Patient has not taken scheduled meds regularly in several days due to symptoms of nausea/vomiting - took vitamins yesterday, 3/29, but possibly vomited them back up.    Changes made to PTA medication list:  Added: None  Deleted: None  Changed: Tramadol (100 mg q6h prn to 50 mg qid prn)    Allergies reviewed with patient and updates made in EHR: no    Medication History Completed By: Vincenzo Zuluaga [facility] 3/30/2025 6:13 PM    PTA Med List   Medication Sig Last Dose/Taking    acetaminophen (TYLENOL) 325 MG tablet Take 2 tablets (650 mg) by mouth every 4 hours as needed for other (For optimal non-opioid multimodal pain management to improve pain control.) Unknown    Ascorbic Acid (VITAMIN C) 500 MG CAPS Take 500 mg by mouth daily 3/29/2025 Morning    aspirin (ASPIRIN LOW DOSE) 81 MG EC tablet Take 81 mg by mouth daily Past Week    atorvastatin (LIPITOR) 80 MG tablet Take 1 tablet (80 mg) by mouth daily. Past Week    Cyanocobalamin (VITAMIN B-12 PO) Take 1 tablet by mouth every other day. Unknown dose 3/29/2025 Morning    diphenhydrAMINE (BENADRYL) 50 MG tablet Take 100 mg by mouth every 6 hours as needed for itching or allergies Unknown    fish oil-omega-3 fatty acids 1000 MG capsule Take 2 g by mouth daily Past Month    folic acid (FOLVITE) 1 MG tablet Take 1,000 mcg by mouth 2 times daily Past Month    furosemide (LASIX) 20 MG tablet Take 1 tablet (20 mg) by mouth daily. Past Week    gabapentin (NEURONTIN) 300 MG capsule Take 1 capsule (300 mg) by mouth 3 times daily (Patient taking differently: Take 300 mg by mouth 3 times daily as needed.) Past Week    gabapentin (NEURONTIN) 800 MG tablet Take 800 mg by mouth 3 times  daily as needed. Past Week    MAGNESIUM PO Take 500 mg by mouth daily. 3/29/2025 Morning    metFORMIN (GLUCOPHAGE) 500 MG tablet Take 0.5 tablets (250 mg) by mouth daily (with dinner) (0.5 x 500 mg = 250 mg) Past Week    metoprolol succinate ER (TOPROL XL) 25 MG 24 hr tablet Take 1 tablet (25 mg) by mouth every evening Past Week    Multiple Vitamins-Minerals (MENS 50+ MULTI VITAMIN/MIN PO) Take 1 tablet by mouth daily 3/29/2025 Morning    Multiple Vitamins-Minerals (PRESERVISION AREDS 2 PO) Take by mouth daily Past Week    tamsulosin (FLOMAX) 0.4 MG capsule Take 0.8 mg by mouth every evening. (2 x 0.4 mg = 0.8 mg) Past Week    traMADol (ULTRAM) 50 MG tablet Take 50 mg by mouth 4 times daily as needed for severe pain. Past Week    vitamin D3 (CHOLECALCIFEROL) 50 mcg (2000 units) tablet Take 1 tablet by mouth every other day Past Week

## 2025-03-30 NOTE — ED TRIAGE NOTES
Pt has a bowel blockage that was seen at urgent care [place] and sent her for further work up     Pt has abd pain and nausa vomiting for 3 days

## 2025-03-31 ENCOUNTER — TELEPHONE (OUTPATIENT)
Dept: SURGERY | Facility: CLINIC | Age: 70
End: 2025-03-31
Payer: COMMERCIAL

## 2025-03-31 LAB
ALBUMIN SERPL BCG-MCNC: 4.1 G/DL (ref 3.5–5.2)
ALP SERPL-CCNC: 118 U/L (ref 40–150)
ALT SERPL W P-5'-P-CCNC: 17 U/L (ref 0–70)
ANION GAP SERPL CALCULATED.3IONS-SCNC: 15 MMOL/L (ref 7–15)
AST SERPL W P-5'-P-CCNC: 25 U/L (ref 0–45)
BILIRUB DIRECT SERPL-MCNC: 0.29 MG/DL (ref 0–0.3)
BILIRUB SERPL-MCNC: 0.8 MG/DL
BUN SERPL-MCNC: 22.2 MG/DL (ref 8–23)
CALCIUM SERPL-MCNC: 10.1 MG/DL (ref 8.8–10.4)
CHLORIDE SERPL-SCNC: 94 MMOL/L (ref 98–107)
CREAT SERPL-MCNC: 1.09 MG/DL (ref 0.67–1.17)
EGFRCR SERPLBLD CKD-EPI 2021: 74 ML/MIN/1.73M2
ERYTHROCYTE [DISTWIDTH] IN BLOOD BY AUTOMATED COUNT: 14.2 % (ref 10–15)
EST. AVERAGE GLUCOSE BLD GHB EST-MCNC: 148 MG/DL
GLUCOSE BLDC GLUCOMTR-MCNC: 127 MG/DL (ref 70–99)
GLUCOSE BLDC GLUCOMTR-MCNC: 137 MG/DL (ref 70–99)
GLUCOSE BLDC GLUCOMTR-MCNC: 168 MG/DL (ref 70–99)
GLUCOSE BLDC GLUCOMTR-MCNC: 172 MG/DL (ref 70–99)
GLUCOSE BLDC GLUCOMTR-MCNC: 186 MG/DL (ref 70–99)
GLUCOSE BLDC GLUCOMTR-MCNC: 193 MG/DL (ref 70–99)
GLUCOSE SERPL-MCNC: 183 MG/DL (ref 70–99)
HBA1C MFR BLD: 6.8 %
HCO3 SERPL-SCNC: 29 MMOL/L (ref 22–29)
HCT VFR BLD AUTO: 47.9 % (ref 40–53)
HGB BLD-MCNC: 15.7 G/DL (ref 13.3–17.7)
MCH RBC QN AUTO: 30.8 PG (ref 26.5–33)
MCHC RBC AUTO-ENTMCNC: 32.8 G/DL (ref 31.5–36.5)
MCV RBC AUTO: 94 FL (ref 78–100)
PLATELET # BLD AUTO: 196 10E3/UL (ref 150–450)
POTASSIUM SERPL-SCNC: 4.3 MMOL/L (ref 3.4–5.3)
PROT SERPL-MCNC: 7.8 G/DL (ref 6.4–8.3)
RBC # BLD AUTO: 5.09 10E6/UL (ref 4.4–5.9)
SODIUM SERPL-SCNC: 138 MMOL/L (ref 135–145)
WBC # BLD AUTO: 10.6 10E3/UL (ref 4–11)

## 2025-03-31 PROCEDURE — 250N000013 HC RX MED GY IP 250 OP 250 PS 637: Performed by: INTERNAL MEDICINE

## 2025-03-31 PROCEDURE — 99232 SBSQ HOSP IP/OBS MODERATE 35: CPT | Performed by: INTERNAL MEDICINE

## 2025-03-31 PROCEDURE — 250N000011 HC RX IP 250 OP 636: Performed by: INTERNAL MEDICINE

## 2025-03-31 PROCEDURE — 83036 HEMOGLOBIN GLYCOSYLATED A1C: CPT | Performed by: HOSPITALIST

## 2025-03-31 PROCEDURE — 250N000012 HC RX MED GY IP 250 OP 636 PS 637: Performed by: HOSPITALIST

## 2025-03-31 PROCEDURE — 85014 HEMATOCRIT: CPT | Performed by: SURGERY

## 2025-03-31 PROCEDURE — 258N000003 HC RX IP 258 OP 636: Performed by: SURGERY

## 2025-03-31 PROCEDURE — 36415 COLL VENOUS BLD VENIPUNCTURE: CPT | Performed by: SURGERY

## 2025-03-31 PROCEDURE — 250N000013 HC RX MED GY IP 250 OP 250 PS 637: Performed by: HOSPITALIST

## 2025-03-31 PROCEDURE — 80048 BASIC METABOLIC PNL TOTAL CA: CPT | Performed by: SURGERY

## 2025-03-31 PROCEDURE — 82248 BILIRUBIN DIRECT: CPT | Performed by: INTERNAL MEDICINE

## 2025-03-31 PROCEDURE — 80053 COMPREHEN METABOLIC PANEL: CPT | Performed by: INTERNAL MEDICINE

## 2025-03-31 PROCEDURE — 250N000013 HC RX MED GY IP 250 OP 250 PS 637: Performed by: SURGERY

## 2025-03-31 PROCEDURE — 85027 COMPLETE CBC AUTOMATED: CPT | Performed by: SURGERY

## 2025-03-31 PROCEDURE — 99231 SBSQ HOSP IP/OBS SF/LOW 25: CPT | Mod: 24 | Performed by: STUDENT IN AN ORGANIZED HEALTH CARE EDUCATION/TRAINING PROGRAM

## 2025-03-31 PROCEDURE — 120N000001 HC R&B MED SURG/OB

## 2025-03-31 PROCEDURE — 250N000011 HC RX IP 250 OP 636: Mod: JZ | Performed by: SURGERY

## 2025-03-31 RX ORDER — TAMSULOSIN HYDROCHLORIDE 0.4 MG/1
0.8 CAPSULE ORAL EVERY EVENING
Status: DISCONTINUED | OUTPATIENT
Start: 2025-03-31 | End: 2025-04-17

## 2025-03-31 RX ORDER — NICOTINE 21 MG/24HR
1 PATCH, TRANSDERMAL 24 HOURS TRANSDERMAL DAILY
Status: DISCONTINUED | OUTPATIENT
Start: 2025-03-31 | End: 2025-04-29 | Stop reason: HOSPADM

## 2025-03-31 RX ORDER — BISACODYL 10 MG
10 SUPPOSITORY, RECTAL RECTAL ONCE
Status: COMPLETED | OUTPATIENT
Start: 2025-03-31 | End: 2025-03-31

## 2025-03-31 RX ORDER — PIPERACILLIN SODIUM, TAZOBACTAM SODIUM 3; .375 G/15ML; G/15ML
3.38 INJECTION, POWDER, LYOPHILIZED, FOR SOLUTION INTRAVENOUS EVERY 6 HOURS
Status: DISCONTINUED | OUTPATIENT
Start: 2025-03-31 | End: 2025-03-31

## 2025-03-31 RX ORDER — OXYCODONE HYDROCHLORIDE 5 MG/1
5 TABLET ORAL EVERY 4 HOURS PRN
Status: DISCONTINUED | OUTPATIENT
Start: 2025-03-31 | End: 2025-04-01

## 2025-03-31 RX ORDER — ENOXAPARIN SODIUM 100 MG/ML
40 INJECTION SUBCUTANEOUS EVERY 24 HOURS
Status: DISCONTINUED | OUTPATIENT
Start: 2025-03-31 | End: 2025-04-05 | Stop reason: ALTCHOICE

## 2025-03-31 RX ADMIN — INSULIN ASPART 1 UNITS: 100 INJECTION, SOLUTION INTRAVENOUS; SUBCUTANEOUS at 08:34

## 2025-03-31 RX ADMIN — HYDROMORPHONE HYDROCHLORIDE 0.5 MG: 1 INJECTION, SOLUTION INTRAMUSCULAR; INTRAVENOUS; SUBCUTANEOUS at 00:36

## 2025-03-31 RX ADMIN — HYDROMORPHONE HYDROCHLORIDE 0.5 MG: 1 INJECTION, SOLUTION INTRAMUSCULAR; INTRAVENOUS; SUBCUTANEOUS at 21:48

## 2025-03-31 RX ADMIN — HYDROMORPHONE HYDROCHLORIDE 0.5 MG: 1 INJECTION, SOLUTION INTRAMUSCULAR; INTRAVENOUS; SUBCUTANEOUS at 07:35

## 2025-03-31 RX ADMIN — FAMOTIDINE 20 MG: 10 INJECTION, SOLUTION INTRAVENOUS at 20:39

## 2025-03-31 RX ADMIN — INSULIN ASPART 1 UNITS: 100 INJECTION, SOLUTION INTRAVENOUS; SUBCUTANEOUS at 13:44

## 2025-03-31 RX ADMIN — SENNOSIDES AND DOCUSATE SODIUM 1 TABLET: 50; 8.6 TABLET ORAL at 20:39

## 2025-03-31 RX ADMIN — ACETAMINOPHEN 975 MG: 325 TABLET, FILM COATED ORAL at 08:33

## 2025-03-31 RX ADMIN — OXYCODONE HYDROCHLORIDE 5 MG: 5 TABLET ORAL at 15:15

## 2025-03-31 RX ADMIN — ONDANSETRON 4 MG: 2 INJECTION, SOLUTION INTRAMUSCULAR; INTRAVENOUS at 15:04

## 2025-03-31 RX ADMIN — PROCHLORPERAZINE EDISYLATE 5 MG: 5 INJECTION INTRAMUSCULAR; INTRAVENOUS at 20:31

## 2025-03-31 RX ADMIN — HYDROMORPHONE HYDROCHLORIDE 0.5 MG: 1 INJECTION, SOLUTION INTRAMUSCULAR; INTRAVENOUS; SUBCUTANEOUS at 04:02

## 2025-03-31 RX ADMIN — HYDROMORPHONE HYDROCHLORIDE 0.5 MG: 1 INJECTION, SOLUTION INTRAMUSCULAR; INTRAVENOUS; SUBCUTANEOUS at 02:44

## 2025-03-31 RX ADMIN — TAMSULOSIN HYDROCHLORIDE 0.8 MG: 0.4 CAPSULE ORAL at 20:38

## 2025-03-31 RX ADMIN — SODIUM CHLORIDE: 9 INJECTION, SOLUTION INTRAVENOUS at 20:42

## 2025-03-31 RX ADMIN — FOLIC ACID 1000 MCG: 1 TABLET ORAL at 20:39

## 2025-03-31 RX ADMIN — OXYCODONE HYDROCHLORIDE 5 MG: 5 TABLET ORAL at 10:25

## 2025-03-31 RX ADMIN — ERTAPENEM SODIUM 1 G: 1 INJECTION, POWDER, LYOPHILIZED, FOR SOLUTION INTRAMUSCULAR; INTRAVENOUS at 00:11

## 2025-03-31 RX ADMIN — METOPROLOL SUCCINATE 25 MG: 25 TABLET, EXTENDED RELEASE ORAL at 20:38

## 2025-03-31 RX ADMIN — ACETAMINOPHEN 975 MG: 325 TABLET, FILM COATED ORAL at 00:05

## 2025-03-31 RX ADMIN — FOLIC ACID 1000 MCG: 1 TABLET ORAL at 08:33

## 2025-03-31 RX ADMIN — ACETAMINOPHEN 975 MG: 325 TABLET, FILM COATED ORAL at 16:55

## 2025-03-31 RX ADMIN — NICOTINE 1 PATCH: 21 PATCH, EXTENDED RELEASE TRANSDERMAL at 12:01

## 2025-03-31 RX ADMIN — FAMOTIDINE 20 MG: 10 INJECTION, SOLUTION INTRAVENOUS at 08:34

## 2025-03-31 RX ADMIN — SENNOSIDES AND DOCUSATE SODIUM 1 TABLET: 50; 8.6 TABLET ORAL at 08:34

## 2025-03-31 RX ADMIN — SODIUM CHLORIDE: 9 INJECTION, SOLUTION INTRAVENOUS at 10:31

## 2025-03-31 RX ADMIN — OXYCODONE HYDROCHLORIDE 5 MG: 5 TABLET ORAL at 20:39

## 2025-03-31 RX ADMIN — POLYETHYLENE GLYCOL 3350 17 G: 17 POWDER, FOR SOLUTION ORAL at 08:34

## 2025-03-31 RX ADMIN — ENOXAPARIN SODIUM 40 MG: 40 INJECTION SUBCUTANEOUS at 10:25

## 2025-03-31 RX ADMIN — BISACODYL 10 MG: 10 SUPPOSITORY RECTAL at 10:24

## 2025-03-31 RX ADMIN — METOPROLOL SUCCINATE 25 MG: 25 TABLET, EXTENDED RELEASE ORAL at 00:05

## 2025-03-31 ASSESSMENT — ACTIVITIES OF DAILY LIVING (ADL)
ADLS_ACUITY_SCORE: 57
ADLS_ACUITY_SCORE: 59
ADLS_ACUITY_SCORE: 57
ADLS_ACUITY_SCORE: 54
ADLS_ACUITY_SCORE: 57
ADLS_ACUITY_SCORE: 59
ADLS_ACUITY_SCORE: 57
ADLS_ACUITY_SCORE: 54
ADLS_ACUITY_SCORE: 57
ADLS_ACUITY_SCORE: 57

## 2025-03-31 NOTE — PROVIDER NOTIFICATION
Will need to discuss with surgery need for ongoing Abx. (Received invanz in ER once) ? Continue or change zosyn? Discuss with surgery this am. Received dose of invanz last night so covered until evening but will determine plan   DVT prevention discussion subcutaneous lovenox/heparin and treatment.   DR. Enma Schafer

## 2025-03-31 NOTE — ANESTHESIA CARE TRANSFER NOTE
Patient: Richar Davis    Procedure: Procedure(s):  exploratory laparotomy with enterotomy and removal of gallstone       Diagnosis: Gallstone ileus (H) [K56.3]  Diagnosis Additional Information: No value filed.    Anesthesia Type:   General     Note:    Oropharynx: spontaneously breathing and nasal gatric tube in place  Level of Consciousness: awake  Oxygen Supplementation: room air    Independent Airway: airway patency satisfactory and stable  Dentition: dentition unchanged  Vital Signs Stable: post-procedure vital signs reviewed and stable  Report to RN Given: handoff report given  Patient transferred to: PACU    Handoff Report: Identifed the Patient, Identified the Reponsible Provider, Reviewed the pertinent medical history, Discussed the surgical course, Reviewed Intra-OP anesthesia mangement and issues during anesthesia, Set expectations for post-procedure period and Allowed opportunity for questions and acknowledgement of understanding      Vitals:  Vitals Value Taken Time   /106 03/30/25 2116   Temp 36.3  C (97.34  F) 03/30/25 2123   Pulse 92 03/30/25 2123   Resp 20 03/30/25 2123   SpO2 100 % 03/30/25 2122   Vitals shown include unfiled device data.    Electronically Signed By: Christine Marie Volp Hodgkins, CRNA, APRN CRNA  March 30, 2025  9:24 PM

## 2025-03-31 NOTE — OP NOTE
General Surgery Operative Note    PREOPERATIVE DIAGNOSIS: gallstone ileus    POSTOPERATIVE DIAGNOSIS: same    PROCEDURE:   Exploratory laparotomy  Small bowel enterotomy with removal of gallstone and primary closure    SURGEON:  Clau Calles MD    ASSISTANT:  NONE    ANESTHESIA:  General.    BLOOD LOSS: 5 cc    FINDINGS: 2.5 cm gallstone impacted in the ileum surrounding bowel all viable some hyperemia on the proximal very distended bowel.  Longitudinal enterotomy created stone removed with transverse closure of the enterotomy site.     INDICATIONS: Richar is a 69-year-old male who presented with abdominal pain associated with nausea and vomiting and CT was concerning for gallstone ileus.  We discussed options and he elected to proceed with urgent laparotomy and removal of the stone.  We discussed all of the risks, benefits, indications and alternatives and he elected to proceed.    DETAILS OF PROCEDURE: The patient was brought to the operating room per Anesthesia, placed in supine position, and intubated without difficulty. Perioperative antibiotics were administered.  A nasogastric tube was placed and 1500 cc of bilious fluid was evacuated.  The abdomen was prepped and draped in standard fashion.     A Surgical timeout was then performed, verifying the correct surgeon, site, procedure, and patient and all in the room were in agreement.     Local anesthetic was injected along the midline.  I then made a vertical incision from just above the umbilicus to just below with a 10 blade.  Cautery was used to divide the subcutaneous tissue down to the fascia.  The fascia was then incised along the linea alba using cautery and opened the length of the incision.  The peritoneum was entered bluntly.  The remainder of the incision was widely opened with cautery.  Small bowel was immediately visible within our field and appeared viable.  An Ayden wound retractor was placed.  I began evaluating the small bowel at the terminal  ileum and followed the bowel proximally and encountered impacted stone about 25 cm from the terminal ileum.  There were no stones distal or proximal to this.  I was able to milk the stone from its site proximally and all of the bowel and the area appeared healthy.  We then placed blue towels around the eviscerated small bowel and created a longitudinal enterotomy using cautery along the antimesenteric aspect of the small bowel just proximal to the stone.  The stone was then removed through this enterotomy and sent to pathology.  There was minimal bilious spillage.  The enterotomy site was closed in a transverse fashion with a running 3-0 Vicryl suture and then oversewn with interrupted 3-0 Vicryl sutures.  The nasogastric tube was palpated in good position in the stomach.  There were no additional lesions noted in the proximal small bowel.  The abdomen was then irrigated with several liters of warm sterile saline.  The fascia was closed using 2 looped 0 PDS sutures.  The subcutaneous tissue was irrigated.  The skin was closed with staples.  Sterile dressings were placed.  All instrument, needle and sponge counts were correct at the conclusion of the procedure x 2.  The patient was taken to PACU in stable condition.    Clau Calles MD

## 2025-03-31 NOTE — ANESTHESIA POSTPROCEDURE EVALUATION
Patient: Richar Davis    Procedure: Procedure(s):  exploratory laparotomy with enterotomy and removal of gallstone       Anesthesia Type:  General    Note:  Disposition: Inpatient   Postop Pain Control: Uneventful            Sign Out: Well controlled pain   PONV: No   Neuro/Psych: Uneventful            Sign Out: Acceptable/Baseline neuro status   Airway/Respiratory: Uneventful            Sign Out: Acceptable/Baseline resp. status   CV/Hemodynamics: Uneventful            Sign Out: Acceptable CV status; No obvious hypovolemia; No obvious fluid overload   Other NRE: NONE   DID A NON-ROUTINE EVENT OCCUR? No           Last vitals:  Vitals Value Taken Time   /93 03/30/25 2230   Temp 36.7  C (98.06  F) 03/30/25 2238   Pulse 98 03/30/25 2238   Resp 13 03/30/25 2238   SpO2 97 % 03/30/25 2238   Vitals shown include unfiled device data.    Electronically Signed By: ERMELINDA SMILEY MD  March 30, 2025  10:39 PM

## 2025-03-31 NOTE — CONSULTS
"[facility] General Surgery Consultation    Richar Davis MRN# [id number]   YOB: 1955 Age: 69 year old      Date of Admission:  3/30/2025  Date of Consult: 3/30/2025         Assessment and Plan:   Patient is a 69 year old male with small bowel obstruction due to gallstone ileus.  We discussed options and I would recommend exploratory laparotomy of the gallstone, possible bowel resection.  We discussed the risks, benefits, indications and alternatives and he elected to proceed.    PLAN:  Exploratory laparotomy with enterotomy for removal of gallstone  Hospitalist consult for management of multiple medical comorbidities         Requesting Physician:      ED        Chief Complaint:     Chief Complaint   Patient presents with    Abdominal Pain          History of Present Illness:   Richar Davis is a 69 year old male who was seen in consultation at the request of Dr. Susanne sanchez. provider found who presented with abdominal pain associated with nausea and vomiting since Friday.  He reports that he is not been able to tolerate oral intake since Friday.  He has had abdominal distention pain with ongoing emesis.  His last bowel movement was yesterday and normal.  He reports a similar episode of abdominal pain associated with nausea and vomiting about 8 years ago and it was initially felt related to his gallbladder however he had followed up with a surgeon in [place] who thought it was more likely food poisoning.  He has not had any prior abdominal surgeries.  He has had prior heart surgery and reports that he had a difficult recovery and did not tolerate anesthesia well and was in the ICU for couple weeks.  He is currently on a baby aspirin daily.  He does have type 2 diabetes.          Physical Exam:   Blood pressure (!) 155/100, pulse 106, temperature 97.4  F (36.3  C), temperature source Temporal, resp. rate 18, height 1.803 m (5' 11\"), weight 58.5 kg (129 lb), SpO2 95%.  129 lbs 0 " oz  General: Sitting up in bed, appears comfortable  Psych: Alert and Oriented.  Normal affect  Neurological: grossly intact  Eyes: Sclera clear  Respiratory:  nonlabored breathing  Cardiovascular:  Regular Rate and Rhythm   GI: Obese, distended, mildly tender to palpation  Lymphatic/Hematologic/Immune:  No femoral or cervical lymphadenopathy.  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    BYPASS GRAFT ARTERY CORONARY, REPLACE VALVE AORTIC, COMBINED N/A 10/20/2022    Procedure: CORONARY ARTERY BYPASS GRAFT x 3 (LEFT INTERNAL MAMMARY ARTERY - LEFT ANTERIOR DESCENDING ARTERY; SAPHENOUS VEIN - POSTERIOR DESCENDING ARTERY; SAPHENOUS VEIN - CIRCUMFLEX ARTERY) WITH ENDOSCOPIC LESSER SAPHENOUS VEIN HARVEST ON BILATERAL LOWER EXTREMITY, AORTIC VALVE REPLACEMENT WITH TISSUE HEART VALVE INSPIRIS  RESILIA  AORTIC VALVE SIZE: 25MM, AND ON CARDIOPULMONARY PUMP OXYGENATOR  (    CTA ANGIOGRAM CORONARY ARTERY      ORTHOPEDIC SURGERY Left     elbow surgery            Current Medications:         Current Facility-Administered Medications   Medication Dose Route Frequency Provider Last Rate Last Admin    ceFAZolin (ANCEF) 3 g in  mL intermittent infusion  3 g Intravenous Pre-Op/Pre-procedure x 1 dose Clau Calles MD        ceFAZolin (ANCEF) 3 g in  mL intermittent infusion  3 g Intravenous See Admin Instructions Clau Calles MD           Current Facility-Administered Medications   Medication Dose Route Frequency Provider Last Rate Last Admin            Home Medications:     Prior to Admission medications    Medication Sig Last Dose Taking? Auth Provider Long Term End Date   acetaminophen (TYLENOL) 325 MG  tablet Take 2 tablets (650 mg) by mouth every 4 hours as needed for other (For optimal non-opioid multimodal pain management to improve pain control.) Unknown Yes Joanna Gipson PA-C     Ascorbic Acid (VITAMIN C) 500 MG CAPS Take 500 mg by mouth daily 3/29/2025 Morning Yes Reported, Patient     aspirin (ASPIRIN LOW DOSE) 81 MG EC tablet Take 81 mg by mouth daily Past Week Yes Reported, Patient No    atorvastatin (LIPITOR) 80 MG tablet Take 1 tablet (80 mg) by mouth daily. Past Week Yes Magalie Rodas APRN CNP Yes    Cyanocobalamin (VITAMIN B-12 PO) Take 1 tablet by mouth every other day. Unknown dose 3/29/2025 Morning Yes Reported, Patient     diphenhydrAMINE (BENADRYL) 50 MG tablet Take 100 mg by mouth every 6 hours as needed for itching or allergies Unknown Yes Reported, Patient     fish oil-omega-3 fatty acids 1000 MG capsule Take 2 g by mouth daily Past Month Yes Reported, Patient No    folic acid (FOLVITE) 1 MG tablet Take 1,000 mcg by mouth 2 times daily Past Month Yes Reported, Patient     furosemide (LASIX) 20 MG tablet Take 1 tablet (20 mg) by mouth daily. Past Week Yes Magalie Rodas APRN CNP Yes    gabapentin (NEURONTIN) 300 MG capsule Take 1 capsule (300 mg) by mouth 3 times daily  Patient taking differently: Take 300 mg by mouth 3 times daily as needed. Past Week Yes Joanna Gipson PA-C Yes    gabapentin (NEURONTIN) 800 MG tablet Take 800 mg by mouth 3 times daily as needed. Past Week Yes Unknown, Entered By History Yes    MAGNESIUM PO Take 500 mg by mouth daily. 3/29/2025 Morning Yes Reported, Patient     metFORMIN (GLUCOPHAGE) 500 MG tablet Take 0.5 tablets (250 mg) by mouth daily (with dinner) (0.5 x 500 mg = 250 mg) Past Week Yes Joanna Gipson PA-C Yes    metoprolol succinate ER (TOPROL XL) 25 MG 24 hr tablet Take 1 tablet (25 mg) by mouth every evening Past Week Yes Magalie Rodas APRN CNP Yes    Multiple Vitamins-Minerals (MENS 50+ MULTI VITAMIN/MIN PO) Take 1 tablet by mouth  daily 3/29/2025 Morning Yes Reported, Patient     Multiple Vitamins-Minerals (PRESERVISION AREDS 2 PO) Take by mouth daily Past Week Yes Reported, Patient     tamsulosin (FLOMAX) 0.4 MG capsule Take 0.8 mg by mouth every evening. (2 x 0.4 mg = 0.8 mg) Past Week Yes Reported, Patient     traMADol (ULTRAM) 50 MG tablet Take 50 mg by mouth 4 times daily as needed for severe pain. Past Week Yes Unknown, Entered By History No    vitamin D3 (CHOLECALCIFEROL) 50 mcg (2000 units) tablet Take 1 tablet by mouth every other day Past Week Yes Reported, Patient     amoxicillin (AMOXIL) 500 MG capsule Take 4 capsules (2,000 mg) 30-60 minutes prior to your dental procedure   Sebastian Mejias MD              Allergies:     Allergies   Allergen Reactions    Niaspan [Niacin] Other (See Comments)     flushing            Family History:   History reviewed. No pertinent family history.        Social History:   Richar Davis  reports that he has quit smoking. His smokeless tobacco use includes chew. He reports that he does not currently use alcohol. He reports that he does not use drugs.          Review of Systems:   The 10 point Review of Systems is negative other than noted in the HPI.         Labs/Imaging   All new lab and imaging data was reviewed.   I have personally reviewed the imaging studies including his abdominal CT which reveals gallstone ileus        Clau Calles MD

## 2025-03-31 NOTE — OR NURSING
Pt rating pain 7-9/10, but falling asleep between cares and vs stable.  Notified Dr Lara and ok send pt to floor.

## 2025-03-31 NOTE — PROGRESS NOTES
[facility]    Medicine Progress Note - Hospitalist Service    Date of Admission:  3/30/2025  Interval History   Patient sitting up on the edge of the bed. Reports that he needs a nicotine patch as he chews tobacco.. He has untreated sleep apnea.  He has a known bovine valve for aortic stenosis.  Surgery rounding as well.  Patient still has NG in place.  No return of bowel function yet.  Discussion about antibiotics.  Surgery did not feel indicated (received Invanz in the ER)       Assessment & Plan   Richar Davis is a 69 year old male with a history of diabetes, hypertension, CABG, status post AVR with bovine valve 10/2022, untreated sleep apnea, hypertension, hyperlipidemia, neuropathy, BPH who presented to the emergency department with abdominal pain, nausea and vomiting.  Imaging showing gallstone ileus with a 2.2 cm gallstone impacted.     Patient presented to the emergency room with approximately 2 days of nausea and vomiting.  He initially reported going to urgent care and found that x-ray showing suggestion of a small bowel obstruction with referral to the ER  On arrival found to have a blood pressure 120/74 with a pulse of 76.  Labs sodium 135 potassium 4.3 creatinine 0.92 with normal LFTs.  White count 9.6.  CT abdomen/pelvis:   IMPRESSION:   1.  Gallstone ileus. Distal small bowel obstruction secondary to an impacted 2.2 cm gallstone located just right of midline at the level of the right common iliac artery bifurcation.  2.  Cholelithiasis with contracted gallbladder but no suggestion for acute cholecystitis.  3.  Mild splenomegaly.  4.  Trace volume ascites.  5.  Prostatomegaly.  6.  Moderate central spinal stenosis at lumbar spine.    Patient was given Ancef in the emergency department/perioperative time period.  Subsequently was taken to the OR on March 30 with ex lap small bowel enterotomy and removal of gallstone with primary closure.  See operative report for further  details.     Gallstone ileus distal small bowel obstruction secondary to impacted 2.2 cm gallstone  Exploratory laparotomy with small bowel enterotomy and removal of gallstone on March 30, 2025  Patient was feeling unwell and went to the  Urgent care for evaluation. x-ray with possible SBO and sent to the ED. vomiting for the past 3 days.    3/30 abdominal x-ray showing multiple dilated loops of small bowel in the upper abdomen with air-fluid levels in the upright film.  Relative paucity of gas in the colon suggesting distal mechanical small bowel obstruction and cholelithiasis  3/30 CT abdomen pelvis showing gallstone ileus,Distal small bowel obstruction secondary to an impacted 2.2 cm gallstone located just right of midline at the level of the right common iliac artery bifurcation.Cholelithiasis with contracted gallbladder but no suggestion for acute cholecystitis.  Patient was given Ancef in the emergency department/perioperative time period. And 1 dose of invanz.   3/30 OR ex lap small bowel enterotomy and removal of gallstone with primary closure.  See operative report for further details.  Patient has NG in place.  Currently on NS at 100  N.p.o. with IV fluids. >.  Once flatus consider clamp trial  Okay per surgery to start DVT prevention     CAD  Aortic stenosis (status post AVR with bovine valve)   Hypertension  Patient with history of CABG, also aortic stenosis status post AVR with 25 mm Bal Inspiris Resilia bovine valve in October 2022, see recent cardiology visit dated December 2024 for further details.  Prior to admission metoprolol currently on hold which is 25 mg XL every evening  (would need to change to short acting if given with clamping of NG or IV equivalent)   Reviewed records and cannot find any documentation that he requires anticoagulation of some sort for his AVR  Patient really cannot define this any further      Obstructive sleep apnea:   Patient on nasal cannula but satting over 90% on  "rounding  Stop nasal cannula oxygen  Based on history that can be obtained he likely has underlying sleep apnea that he is not continued with his treatment     Hyperlipidemia:   Continue prior to admission statin when taking p.o.     History of edema:   Hold prior to admission Lasix at this time.     Neuropathy:   Neurontin 800 mg 3 times daily as needed by 1 report and another cyst 300 mg 3 times daily as needed  Attempt clarification of his Neurontin which appears to be as needed but the dosing is not consistent     Diabetes:   Hold prior to admission metformin, insulin sliding scale.  Would not resume in the setting of surgery  Patient takes Glucophage 250 mg at dinner     BPH:   Continue prior to admission Flomax   Flomax 0.8 mg daily        # Cachexia: Estimated body mass index is 17.99 kg/m  as calculated from the following:    Height as of this encounter: 1.803 m (5' 11\").    Weight as of this encounter: 58.5 kg (129 lb).        # History of CABG: noted on surgical history        Disposition Plan     Medically Ready for Discharge: Anticipated in 2-4 Days             Diet: NPO for Medical/Clinical Reasons Except for: Meds, Ice Chips    DVT Prophylaxis: Enoxaparin (Lovenox) SQ  Mccarty Catheter: Not present  Lines: None     Cardiac Monitoring: None  Code Status: Full Code      Clinically Significant Risk Factors Present on Admission          # Hypochloremia: Lowest Cl = 93 mmol/L in last 2 days, will monitor as appropriate        # Drug Induced Platelet Defect: home medication list includes an antiplatelet medication   # Hypertension: Noted on problem list          # DMII: A1C = 6.8 % (Ref range: <5.7 %) within past 6 months    # Obesity: Estimated body mass index is 38.96 kg/m  as calculated from the following:    Height as of this encounter: 1.829 m (6').    Weight as of this encounter: 130.3 kg (287 lb 4.2 oz).        # History of CABG: noted on surgical history       Social Drivers of Health    Tobacco Use: " High Risk (3/30/2025)    Patient History     Smoking Tobacco Use: Former     Smokeless Tobacco Use: Current          Disposition Plan     Medically Ready for Discharge: Anticipated in 2-4 Days       PINKY ELIZABETH MD  Hospitalist Service  [facility]  Securely message with [employer] (more info)  Text page via [employer] Paging/Directory   ______________________________________________________________________        Physical Exam   Vital Signs: Temp: 97.7  F (36.5  C) Temp src: Oral BP: 136/85 Pulse: 97   Resp: 16 SpO2: 95 % O2 Device: None (Room air) Oxygen Delivery: 3 LPM  Weight: 287 lbs 4.15 oz    General Appearance: Patient is awake and alert he is slightly restless.  Has nasal cannula and NG in place  Respiratory: Relatively clear to auscultation bilaterally without wheezes or rhonchi  Cardiovascular: Regular rate and rhythm without gallop rub normal S1-S2  GI: Abdomen distended no overt bowel sounds  Skin: No overt rashes    Medical Decision Making       45 MINUTES SPENT BY ME on the date of service doing chart review, history, exam, documentation & further activities per the note.      Data     I have personally reviewed the following data over the past 24 hrs:    10.6  \   15.7   / 196     138 94 (L) 22.2 /  137 (H)   4.3 29 1.09 \     ALT: 17 AST: 25 AP: 118 TBILI: 0.8   ALB: 4.1 TOT PROTEIN: 7.8 LIPASE: N/A     TSH: N/A T4: N/A A1C: 6.8 (H)       Imaging results reviewed over the past 24 hrs:   No results found for this or any previous visit (from the past 24 hours).

## 2025-03-31 NOTE — TELEPHONE ENCOUNTER
Name of caller:  Shawna with [facility]    Reason for Call:  has a question about what is supposed to be checked?      Please call Shawna at: [phone]    Surgeon:  Clau Calles MD    Recent Surgery:  Yes.    If yes, when & what type:  3/30/2025    exploratory laparotomy with enterotomy and removal of gallstone

## 2025-03-31 NOTE — OR NURSING
Ok to give 15mg IV toradol per Dr Calles.  See new order  Also per Dr Calles, if NG comes out ok to leave out.

## 2025-03-31 NOTE — PROGRESS NOTES
[facility]    General Surgery  Daily Progress Note       Assessment and Plan:   Richar Davis is a 69 year old male who is POD1 s/p ex-lap for gallstone ileus. In pain this morning but doing ok. VSS. AFebrile. NG with 50cc+700cc out. No evidence of bowel fxn yet.     Discussed operative findings.     Plan:  Pain control as needed  No abx indicated for now.   NPO with mivfs. Continue NG to LIS. Once passing flatus, can consider clamp trials.   Ambulate, pulm toiletry, ok to start lovenox dvt ppx      Terry Patino MD         Interval History:   Did well last night but now with RUQ abdominal pain. Denies any flatus or BM.            Physical Exam:   Temp: 97.9  F (36.6  C) Temp src: Oral BP: 130/78 Pulse: 110   Resp: 19 SpO2: 96 % O2 Device: None (Room air) Oxygen Delivery: 3 LPM    I/O last 3 completed shifts:  In: 1100 [I.V.:1100]  Out: 2405 [Urine:150; Emesis/NG output:750; Other:1500; Blood:5]      Constitutional: healthy, alert, and no distress   Cardiovascular: sinus tachy  Respiratory: breathing comfortably on NC, clear speech  Abdomen: obese, protuberant, tender to palpation in RUQ, no rebound, no peritonitis. Midline incision with serosang drainage on island, changed at bedside. Abd binder placed.        Data   Recent Labs   Lab 03/31/25  0820 03/31/25  0809 03/31/25  0559 03/30/25  2123 03/30/25  1455   WBC  --  10.6  --   --  9.6   HGB  --  15.7  --   --  15.9   MCV  --  94  --   --  92   PLT  --  196  --   --  199   NA  --  138  --   --  135   POTASSIUM  --  4.3  --   --  4.3   CHLORIDE  --  94*  --   --  93*   CO2  --  29  --   --  30*   BUN  --  22.2  --   --  18.9   CR  --  1.09  --   --  0.92   ANIONGAP  --  15  --   --  12   HALEY  --  10.1  --   --  10.4   * 183* 168*   < > 171*   ALBUMIN  --  4.1  --   --  4.1   PROTTOTAL  --  7.8  --   --  7.9   BILITOTAL  --  0.8  --   --  0.7   ALKPHOS  --  118  --   --  133   ALT  --  17  --   --  21   AST  --  25  --   --  34     < > = values in this interval not displayed.

## 2025-03-31 NOTE — PLAN OF CARE
Date & Time: 3/31/25 6758-2521  Surgery/POD#: POD 1 Ex. Lap with small bowel enterotomy and gallstone removal.   Behavior & Aggression: Green, can be impulsive but redirectable  Fall Risk: Yes  Orientation: A/Ox4  ABNL VS/O2: VSS on RA  ABNL Labs: See labs  Pain Management: PRN oxycodone PRN IV Dilaudid, scheduled tylenol  Bowel/Bladder: Cont. B/B, up to BR/uses urinal. Concentrated urine output  Drains: NG on LIS, 64 cm, brown/green OP.  Clamping NG tube for PO medications  Wounds/incisions: Abdominal midline staples covered with island dressing.  Changed by MD today  Diet: NPO ex. Ice and meds  Activity Level: A1 GB, walked in halls x2, sits on side of bed or in chair often  Tests/Procedures: n/a  Anticipated  DC Date: Pending  Significant Information: IV fluids infusing.  CMS intact.

## 2025-03-31 NOTE — PLAN OF CARE
Goal Outcome Evaluation:      Plan of Care Reviewed With: patient    Overall Patient Progress: improvingOverall Patient Progress: improving         Date & Time: 3/30-31/25 9336-5365  Surgery/POD#: POD 0-1 Ex. Lap with enterotomy and gallstone removal.   Behavior & Aggression: Green, can be impulsive/not listen to instructions  Fall Risk: Yes  Orientation: A/Ox4  ABNL VS/O2: 3L on NC, HTN, tachycardia  ABNL Labs: See labs  Pain Management: IV Dilaudid, scheduled tylenol  Bowel/Bladder: Cont. B/B, up to BR and uses urinal. Good urine OP, denies gas/BM  Drains: NG on LIS, 65cm, brown/green OP  Wounds/incisions: Abdominal midline  Diet: NPO ex. Ice and meds  Activity Level: A1 GB, sits on side of bed often  Tests/Procedures: n/a  Anticipated  DC Date: Pending  Significant Information: Pt accidentally ripped out 2 IV overnight, R PIV placed. CMS intact.

## 2025-03-31 NOTE — PROGRESS NOTES
Surgery Note    Pt s/p ex lap with enterotomy for removal of gallstone. NGT to LIS until having bowel function. NPO but ok for oral medications. If patient removes NGT - ok to leave out overnight and will reassess in am.     Hospitalist consult for DM2 management and HTN. Ok to resume medications orally from my perspective.     Clau Calles MD  [facility]  [phone]

## 2025-03-31 NOTE — TELEPHONE ENCOUNTER
Dr. Calles paged and asked to call.    She called and left voicemail with her cell phone number for call back    Suma Bishop, RN-BSN

## 2025-03-31 NOTE — ANESTHESIA PROCEDURE NOTES
Airway       Patient location during procedure: OR       Procedure Start/Stop Times: 3/30/2025 8:04 PM  Staff -        Anesthesiologist:  Zeferino Lara MD       CRNA: Hodgkins, Christine Marie Volp, APRN CRNA       Performed By: CRNA  Consent for Airway        Urgency: elective  Indications and Patient Condition       Indications for airway management: kathie-procedural       Induction type:RSI       Mask difficulty assessment: 0 - not attempted    Final Airway Details       Final airway type: endotracheal airway       Successful airway: ETT - single  Endotracheal Airway Details        ETT size (mm): 8.0       Cuffed: yes       Successful intubation technique: video laryngoscopy       VL Blade Size: Tinajero 4       Grade View of Cords: 1       Adjucts: stylet       Position: Right       Measured from: lips       Secured at (cm): 23       Bite block used: None    Post intubation assessment        Placement verified by: capnometry, equal breath sounds and chest rise        Number of attempts at approach: 1       Number of other approaches attempted: 0       Secured with: tape       Ease of procedure: easy       Dentition: Intact and Unchanged    Medication(s) Administered   Medication Administration Time: 3/30/2025 8:04 PM

## 2025-03-31 NOTE — H&P
[facility]    History and Physical - Hospitalist Service       Date of Admission:  3/30/2025    Assessment & Plan      Richar Davis is a 69 year old male admitted on 3/30/2025.     Gallstone ileus distal small bowel obstruction secondary to impacted 2.2 cm gallstone  Exploratory laparotomy with small bowel enterotomy and removal of gallstone on March 30, 2025  Patient was feeling unwell and went to the emergency department for further evaluation.  Patient with vomiting.  Patient also had x-ray with possible SBO and sent to the ED.  Was at urgent care with vomiting for the past 3 days.  Patient unable to take medications.  Multiple dilated loops of small bowel in the upper abdomen on x-ray per report.  Evaluation in the emergency department revealed normal creatinine.  Liver function testing within normal range.  White blood cell count of 9.6.  Abdominal x-ray showing multiple dilated loops of small bowel.  CT abdomen showing gallstone ileus distal small bowel obstruction secondary to impacted 2.2 cm gallstone, see report for further details.  Patient was given Ancef in the emergency department/perioperative time period.  Subsequently was taken to the OR on March 30 with ex lap small bowel enterotomy and removal of gallstone with primary closure.  See operative report for further details.  Plan:  -Surgery following.  -Ertapenem.  -Close hemodynamic monitoring.  -Defer postoperative cares to the surgical team as clinically indicated.    CAD  Aortic stenosis  Hypertension  Patient with history of CABG, also aortic stenosis status post AVR with 25 mm Bal Inspiris Resilia bovine valve in October 2022, see recent cardiology visit dated December 2024 for further details.  Plan:  -Defer aspirin to surgical team.  -Continue prior to admission metoprolol when verified.    Obstructive sleep apnea: CPAP as able, VBG in AM.    Hyperlipidemia: Continue prior to admission statin when  "verified.    History of edema: Hold prior to admission Lasix at this time.    Neuropathy: Continue prior to admission gabapentin when verified.    Diabetes: Hold prior to admission metformin, insulin sliding scale.    BPH: Continue prior to admission Flomax when verified.          Diet: NPO for Medical/Clinical Reasons Except for: Meds, Ice Chips    DVT Prophylaxis: Defer to surgery team.  Mccarty Catheter: Not present  Lines: None     Cardiac Monitoring: None  Code Status: Full Code      Clinically Significant Risk Factors Present on Admission          # Hypochloremia: Lowest Cl = 93 mmol/L in last 2 days, will monitor as appropriate        # Drug Induced Platelet Defect: home medication list includes an antiplatelet medication   # Hypertension: Noted on problem list           # Cachexia: Estimated body mass index is 17.99 kg/m  as calculated from the following:    Height as of this encounter: 1.803 m (5' 11\").    Weight as of this encounter: 58.5 kg (129 lb).        # History of CABG: noted on surgical history       Disposition Plan     Medically Ready for Discharge: Anticipated in 2-4 Days           Jake Blevins DO  Hospitalist Service  [facility]  Securely message with Smilebox (more info)  Text page via [facility] Paging/Directory     ______________________________________________________________________    Chief Complaint   Abdominal pain        History of Present Illness   Richar Davis is a 69 year old male who presented to the emergency department with abdominal pain.  Patient was feeling unwell and went to the emergency department for further evaluation.  Patient with vomiting.  Patient also had x-ray with possible SBO and sent to the ED.  Was at urgent care with vomiting for the past 3 days.  Patient unable to take medications.  Multiple dilated loops of small bowel in the upper abdomen on x-ray per report.  Evaluation in the emergency department revealed normal creatinine. "  Liver function testing within normal range.  White blood cell count of 9.6.  Abdominal x-ray showing multiple dilated loops of small bowel.  CT abdomen showing gallstone ileus distal small bowel obstruction secondary to impacted 2.2 cm gallstone, see report for further details.  Patient was given Ancef in the emergency department/perioperative time period.  Subsequently was taken to the OR on March 30 with ex lap small bowel enterotomy and removal of gallstone with primary closure.  See operative report for further details.      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    BYPASS GRAFT ARTERY CORONARY, REPLACE VALVE AORTIC, COMBINED N/A 10/20/2022    Procedure: CORONARY ARTERY BYPASS GRAFT x 3 (LEFT INTERNAL MAMMARY ARTERY - LEFT ANTERIOR DESCENDING ARTERY; SAPHENOUS VEIN - POSTERIOR DESCENDING ARTERY; SAPHENOUS VEIN - CIRCUMFLEX ARTERY) WITH ENDOSCOPIC LESSER SAPHENOUS VEIN HARVEST ON BILATERAL LOWER EXTREMITY, AORTIC VALVE REPLACEMENT WITH TISSUE HEART VALVE INSPIRIS  RESILIA  AORTIC VALVE SIZE: 25MM, AND ON CARDIOPULMONARY PUMP OXYGENATOR  (    CTA ANGIOGRAM CORONARY ARTERY      ORTHOPEDIC SURGERY Left     elbow surgery       Prior to Admission Medications   Prior to Admission Medications   Prescriptions Last Dose Informant Patient Reported? Taking?   Ascorbic Acid (VITAMIN C) 500 MG CAPS 3/29/2025 Morning Self Yes Yes   Sig: Take 500 mg by mouth daily   Cyanocobalamin (VITAMIN B-12 PO) 3/29/2025 Morning Self Yes Yes   Sig: Take 1 tablet by mouth every other day. Unknown dose   MAGNESIUM PO 3/29/2025 Morning  Yes Yes   Sig: Take 500 mg by mouth daily.   Multiple Vitamins-Minerals (MENS 50+ MULTI VITAMIN/MIN PO) 3/29/2025 Morning Self Yes Yes    Sig: Take 1 tablet by mouth daily   Multiple Vitamins-Minerals (PRESERVISION AREDS 2 PO) Past Week  Yes Yes   Sig: Take by mouth daily   acetaminophen (TYLENOL) 325 MG tablet Unknown  No Yes   Sig: Take 2 tablets (650 mg) by mouth every 4 hours as needed for other (For optimal non-opioid multimodal pain management to improve pain control.)   amoxicillin (AMOXIL) 500 MG capsule   No No   Sig: Take 4 capsules (2,000 mg) 30-60 minutes prior to your dental procedure   aspirin (ASPIRIN LOW DOSE) 81 MG EC tablet Past Week  Yes Yes   Sig: Take 81 mg by mouth daily   atorvastatin (LIPITOR) 80 MG tablet Past Week  No Yes   Sig: Take 1 tablet (80 mg) by mouth daily.   diphenhydrAMINE (BENADRYL) 50 MG tablet Unknown Self Yes Yes   Sig: Take 100 mg by mouth every 6 hours as needed for itching or allergies   fish oil-omega-3 fatty acids 1000 MG capsule Past Month  Yes Yes   Sig: Take 2 g by mouth daily   folic acid (FOLVITE) 1 MG tablet Past Month Self Yes Yes   Sig: Take 1,000 mcg by mouth 2 times daily   furosemide (LASIX) 20 MG tablet Past Week  No Yes   Sig: Take 1 tablet (20 mg) by mouth daily.   gabapentin (NEURONTIN) 300 MG capsule Past Week  No Yes   Sig: Take 1 capsule (300 mg) by mouth 3 times daily   Patient taking differently: Take 300 mg by mouth 3 times daily as needed.   gabapentin (NEURONTIN) 800 MG tablet Past Week  Yes Yes   Sig: Take 800 mg by mouth 3 times daily as needed.   metFORMIN (GLUCOPHAGE) 500 MG tablet Past Week  No Yes   Sig: Take 0.5 tablets (250 mg) by mouth daily (with dinner) (0.5 x 500 mg = 250 mg)   metoprolol succinate ER (TOPROL XL) 25 MG 24 hr tablet Past Week  No Yes   Sig: Take 1 tablet (25 mg) by mouth every evening   tamsulosin (FLOMAX) 0.4 MG capsule Past Week Self Yes Yes   Sig: Take 0.8 mg by mouth every evening. (2 x 0.4 mg = 0.8 mg)   traMADol (ULTRAM) 50 MG tablet Past Week  Yes Yes   Sig: Take 50 mg by mouth 4 times daily as needed for severe pain.   vitamin D3  (CHOLECALCIFEROL) 50 mcg (2000 units) tablet Past Week Self Yes Yes   Sig: Take 1 tablet by mouth every other day      Facility-Administered Medications: None           Physical Exam   Vital Signs: Temp: 98.2  F (36.8  C) Temp src: Temporal BP: (!) 126/95 Pulse: 99   Resp: 16 SpO2: 98 % O2 Device: Nasal cannula Oxygen Delivery: 4 LPM  Weight: 129 lbs 0 oz    GENERAL: Alert. NAD. Conversational, appropriate.  NG in place.  HEENT: Normocephalic. No icterus or injection. Nares normal.   LUNGS: Clear to auscultation. No dyspnea at rest.   HEART: Regular rate. Extremities perfused.   ABDOMEN: Soft, tender, and distended.  Hypoactive bowel sounds.   NEUROLOGIC: Moves extremities x4, follows commands.      Medical Decision Making       53 MINUTES SPENT BY ME on the date of service doing chart review, history, exam, documentation & further activities per the note.      Data     I have personally reviewed the following data over the past 24 hrs:    9.6  \   15.9   / 199     135 93 (L) 18.9 /  137 (H)   4.3 30 (H) 0.92 \     ALT: 21 AST: 34 AP: 133 TBILI: 0.7   ALB: 4.1 TOT PROTEIN: 7.9 LIPASE: 33       Imaging results reviewed over the past 24 hrs:   Recent Results (from the past 24 hours)   XR Abdomen 2 Views    Narrative    For Patients: As a result of the 21st Century Cures Act, medical imaging exams and procedure reports are released immediately into your electronic medical record. You may view this report before your referring provider. If you have questions, please contact your health care provider.    EXAM: XR ABDOMEN 2 VIEW FLAT AND UPRIGHT OR DECUBITUS  LOCATION: [facility]  DATE: 03/30/2025    INDICATION: Generalized abdominal pain.  COMPARISON: None.    Impression    Multiple dilated loops of small bowel in the upper abdomen with air-fluid levels on the upright film. Relative paucity of gas in the colon. Findings indicate distal mechanical small bowel obstruction. Cholelithiasis.   CT Abdomen Pelvis w  Contrast    Narrative    EXAM: CT ABDOMEN PELVIS W CONTRAST  LOCATION: [facility]  DATE: 3/30/2025    INDICATION: Diffuse abdominal pain, distension, vomiting, outside AXR concerning for possible obstruction  COMPARISON: 11/30/2021  TECHNIQUE: CT scan of the abdomen and pelvis was performed following injection of IV contrast. Multiplanar reformats were obtained. Dose reduction techniques were used.  CONTRAST: 135mL Isovue 370    FINDINGS:   LOWER CHEST: Normal.    HEPATOBILIARY: Cholelithiasis with contracted gallbladder, no suggestion for acute cholecystitis. However, only a single faceted stone identified within gallbladder currently while on prior exam there were several large faceted stones.  Liver and bile ducts normal.    PANCREAS: Tiny calcifications involving pancreatic tail unchanged, no acute inflammation.    SPLEEN: Mild splenomegaly.    ADRENAL GLANDS: Normal.    KIDNEYS/BLADDER: No significant mass, stone, or hydronephrosis.    BOWEL: Small bowel obstruction present with numerous loops of fluid-filled dilated small bowel proximally and several decompressed distal ileal loops present. At the transition point there is an impacted 2.2 cm gallstone, located deep and just right of   midline projecting directly anterior of the right common iliac artery bifurcation (series 3 image 150, series 5 image 63).  Appendix and colon unremarkable.    LYMPH NODES: Normal.    VASCULATURE: Normal.    PELVIC ORGANS: Prostatomegaly. Wisp of ascites within the pelvis similar to previous exam.    MUSCULOSKELETAL: Degenerative disc and facet joint disease throughout lumbar spine with at least moderate central spinal stenosis.      Impression    IMPRESSION:   1.  Gallstone ileus. Distal small bowel obstruction secondary to an impacted 2.2 cm gallstone located just right of midline at the level of the right common iliac artery bifurcation.  2.  Cholelithiasis with contracted gallbladder but no  suggestion for acute cholecystitis.  3.  Mild splenomegaly.  4.  Trace volume ascites.  5.  Prostatomegaly.  6.  Moderate central spinal stenosis at lumbar spine.

## 2025-04-01 ENCOUNTER — APPOINTMENT (OUTPATIENT)
Dept: PHYSICAL THERAPY | Facility: CLINIC | Age: 70
DRG: 329 | End: 2025-04-01
Attending: INTERNAL MEDICINE
Payer: COMMERCIAL

## 2025-04-01 LAB
ANION GAP SERPL CALCULATED.3IONS-SCNC: 12 MMOL/L (ref 7–15)
BUN SERPL-MCNC: 21.4 MG/DL (ref 8–23)
CALCIUM SERPL-MCNC: 9.4 MG/DL (ref 8.8–10.4)
CHLORIDE SERPL-SCNC: 100 MMOL/L (ref 98–107)
CREAT SERPL-MCNC: 0.8 MG/DL (ref 0.67–1.17)
EGFRCR SERPLBLD CKD-EPI 2021: >90 ML/MIN/1.73M2
ERYTHROCYTE [DISTWIDTH] IN BLOOD BY AUTOMATED COUNT: 14.1 % (ref 10–15)
GLUCOSE BLDC GLUCOMTR-MCNC: 114 MG/DL (ref 70–99)
GLUCOSE BLDC GLUCOMTR-MCNC: 136 MG/DL (ref 70–99)
GLUCOSE BLDC GLUCOMTR-MCNC: 146 MG/DL (ref 70–99)
GLUCOSE BLDC GLUCOMTR-MCNC: 149 MG/DL (ref 70–99)
GLUCOSE BLDC GLUCOMTR-MCNC: 168 MG/DL (ref 70–99)
GLUCOSE SERPL-MCNC: 184 MG/DL (ref 70–99)
HCO3 SERPL-SCNC: 25 MMOL/L (ref 22–29)
HCT VFR BLD AUTO: 43.9 % (ref 40–53)
HGB BLD-MCNC: 14.2 G/DL (ref 13.3–17.7)
MCH RBC QN AUTO: 30.5 PG (ref 26.5–33)
MCHC RBC AUTO-ENTMCNC: 32.3 G/DL (ref 31.5–36.5)
MCV RBC AUTO: 94 FL (ref 78–100)
PLATELET # BLD AUTO: 140 10E3/UL (ref 150–450)
POTASSIUM SERPL-SCNC: 4.1 MMOL/L (ref 3.4–5.3)
RBC # BLD AUTO: 4.66 10E6/UL (ref 4.4–5.9)
SODIUM SERPL-SCNC: 137 MMOL/L (ref 135–145)
WBC # BLD AUTO: 4 10E3/UL (ref 4–11)

## 2025-04-01 PROCEDURE — 250N000013 HC RX MED GY IP 250 OP 250 PS 637: Performed by: STUDENT IN AN ORGANIZED HEALTH CARE EDUCATION/TRAINING PROGRAM

## 2025-04-01 PROCEDURE — 80048 BASIC METABOLIC PNL TOTAL CA: CPT | Performed by: INTERNAL MEDICINE

## 2025-04-01 PROCEDURE — 250N000013 HC RX MED GY IP 250 OP 250 PS 637: Performed by: INTERNAL MEDICINE

## 2025-04-01 PROCEDURE — 97162 PT EVAL MOD COMPLEX 30 MIN: CPT | Mod: GP | Performed by: PHYSICAL THERAPIST

## 2025-04-01 PROCEDURE — 250N000011 HC RX IP 250 OP 636: Performed by: INTERNAL MEDICINE

## 2025-04-01 PROCEDURE — 250N000011 HC RX IP 250 OP 636: Performed by: SURGERY

## 2025-04-01 PROCEDURE — 99232 SBSQ HOSP IP/OBS MODERATE 35: CPT | Performed by: INTERNAL MEDICINE

## 2025-04-01 PROCEDURE — 97530 THERAPEUTIC ACTIVITIES: CPT | Mod: GP | Performed by: PHYSICAL THERAPIST

## 2025-04-01 PROCEDURE — 85014 HEMATOCRIT: CPT | Performed by: INTERNAL MEDICINE

## 2025-04-01 PROCEDURE — 120N000001 HC R&B MED SURG/OB

## 2025-04-01 PROCEDURE — 250N000013 HC RX MED GY IP 250 OP 250 PS 637: Performed by: SURGERY

## 2025-04-01 PROCEDURE — 258N000003 HC RX IP 258 OP 636: Performed by: SURGERY

## 2025-04-01 PROCEDURE — 97116 GAIT TRAINING THERAPY: CPT | Mod: GP | Performed by: PHYSICAL THERAPIST

## 2025-04-01 PROCEDURE — 250N000013 HC RX MED GY IP 250 OP 250 PS 637: Performed by: HOSPITALIST

## 2025-04-01 PROCEDURE — 85027 COMPLETE CBC AUTOMATED: CPT | Performed by: INTERNAL MEDICINE

## 2025-04-01 PROCEDURE — 36415 COLL VENOUS BLD VENIPUNCTURE: CPT | Performed by: INTERNAL MEDICINE

## 2025-04-01 RX ORDER — PIPERACILLIN SODIUM, TAZOBACTAM SODIUM 3; .375 G/15ML; G/15ML
3.38 INJECTION, POWDER, LYOPHILIZED, FOR SOLUTION INTRAVENOUS EVERY 6 HOURS
Status: DISCONTINUED | OUTPATIENT
Start: 2025-04-01 | End: 2025-04-01

## 2025-04-01 RX ORDER — BISACODYL 10 MG
10 SUPPOSITORY, RECTAL RECTAL ONCE
Status: COMPLETED | OUTPATIENT
Start: 2025-04-01 | End: 2025-04-01

## 2025-04-01 RX ADMIN — METOPROLOL SUCCINATE 25 MG: 25 TABLET, EXTENDED RELEASE ORAL at 20:45

## 2025-04-01 RX ADMIN — OXYCODONE HYDROCHLORIDE 5 MG: 5 TABLET ORAL at 08:46

## 2025-04-01 RX ADMIN — OXYCODONE HYDROCHLORIDE 2.5 MG: 5 TABLET ORAL at 22:11

## 2025-04-01 RX ADMIN — SODIUM CHLORIDE: 9 INJECTION, SOLUTION INTRAVENOUS at 19:31

## 2025-04-01 RX ADMIN — OXYCODONE HYDROCHLORIDE 5 MG: 5 TABLET ORAL at 03:01

## 2025-04-01 RX ADMIN — Medication 1 LOZENGE: at 03:34

## 2025-04-01 RX ADMIN — ONDANSETRON 4 MG: 2 INJECTION, SOLUTION INTRAMUSCULAR; INTRAVENOUS at 12:12

## 2025-04-01 RX ADMIN — PIPERACILLIN AND TAZOBACTAM 3.38 G: 3; .375 INJECTION, POWDER, FOR SOLUTION INTRAVENOUS at 12:59

## 2025-04-01 RX ADMIN — INSULIN ASPART 1 UNITS: 100 INJECTION, SOLUTION INTRAVENOUS; SUBCUTANEOUS at 08:46

## 2025-04-01 RX ADMIN — FAMOTIDINE 20 MG: 10 INJECTION, SOLUTION INTRAVENOUS at 20:47

## 2025-04-01 RX ADMIN — ONDANSETRON 4 MG: 2 INJECTION, SOLUTION INTRAMUSCULAR; INTRAVENOUS at 03:25

## 2025-04-01 RX ADMIN — ACETAMINOPHEN 975 MG: 325 TABLET, FILM COATED ORAL at 00:36

## 2025-04-01 RX ADMIN — SENNOSIDES AND DOCUSATE SODIUM 1 TABLET: 50; 8.6 TABLET ORAL at 08:45

## 2025-04-01 RX ADMIN — PIPERACILLIN AND TAZOBACTAM 3.38 G: 3; .375 INJECTION, POWDER, FOR SOLUTION INTRAVENOUS at 06:51

## 2025-04-01 RX ADMIN — ENOXAPARIN SODIUM 40 MG: 40 INJECTION SUBCUTANEOUS at 10:03

## 2025-04-01 RX ADMIN — HYDROMORPHONE HYDROCHLORIDE 0.5 MG: 1 INJECTION, SOLUTION INTRAMUSCULAR; INTRAVENOUS; SUBCUTANEOUS at 00:34

## 2025-04-01 RX ADMIN — POLYETHYLENE GLYCOL 3350 17 G: 17 POWDER, FOR SOLUTION ORAL at 08:47

## 2025-04-01 RX ADMIN — SODIUM CHLORIDE 100 ML/HR: 9 INJECTION, SOLUTION INTRAVENOUS at 08:47

## 2025-04-01 RX ADMIN — FAMOTIDINE 20 MG: 10 INJECTION, SOLUTION INTRAVENOUS at 08:46

## 2025-04-01 RX ADMIN — ACETAMINOPHEN 975 MG: 325 TABLET, FILM COATED ORAL at 16:56

## 2025-04-01 RX ADMIN — ACETAMINOPHEN 975 MG: 325 TABLET, FILM COATED ORAL at 08:46

## 2025-04-01 RX ADMIN — TAMSULOSIN HYDROCHLORIDE 0.8 MG: 0.4 CAPSULE ORAL at 20:45

## 2025-04-01 RX ADMIN — FOLIC ACID 1000 MCG: 1 TABLET ORAL at 20:45

## 2025-04-01 RX ADMIN — OXYCODONE HYDROCHLORIDE 5 MG: 5 TABLET ORAL at 12:47

## 2025-04-01 RX ADMIN — FOLIC ACID 1000 MCG: 1 TABLET ORAL at 08:46

## 2025-04-01 RX ADMIN — OXYCODONE HYDROCHLORIDE 5 MG: 5 TABLET ORAL at 16:56

## 2025-04-01 RX ADMIN — INSULIN ASPART 1 UNITS: 100 INJECTION, SOLUTION INTRAVENOUS; SUBCUTANEOUS at 12:57

## 2025-04-01 RX ADMIN — MAGNESIUM HYDROXIDE 30 ML: 400 SUSPENSION ORAL at 04:27

## 2025-04-01 RX ADMIN — INSULIN ASPART 1 UNITS: 100 INJECTION, SOLUTION INTRAVENOUS; SUBCUTANEOUS at 17:10

## 2025-04-01 ASSESSMENT — ACTIVITIES OF DAILY LIVING (ADL)
ADLS_ACUITY_SCORE: 60
ADLS_ACUITY_SCORE: 61
ADLS_ACUITY_SCORE: 60
ADLS_ACUITY_SCORE: 59
ADLS_ACUITY_SCORE: 59
ADLS_ACUITY_SCORE: 61
ADLS_ACUITY_SCORE: 63
ADLS_ACUITY_SCORE: 59
ADLS_ACUITY_SCORE: 65
ADLS_ACUITY_SCORE: 61
ADLS_ACUITY_SCORE: 60
ADLS_ACUITY_SCORE: 63
ADLS_ACUITY_SCORE: 63
ADLS_ACUITY_SCORE: 65
ADLS_ACUITY_SCORE: 59
ADLS_ACUITY_SCORE: 60
ADLS_ACUITY_SCORE: 65
ADLS_ACUITY_SCORE: 61
ADLS_ACUITY_SCORE: 63
ADLS_ACUITY_SCORE: 60
ADLS_ACUITY_SCORE: 63

## 2025-04-01 NOTE — PLAN OF CARE
Date & Time: 3/31-4/1 19:00-07:30    POD#2 Ex. Lap with small bowel enterotomy and gallstone removal.     Behavior & Aggression: Green  Fall Risk: Yes  Orientation: A&Ox4  ABNL VS/O2: VSS on RA ex tachycardic  ABNL Labs: See labs  Pain Management: PRN oxycodone PRN IV Dilaudid, scheduled tylenol  Bowel/Bladder: Continent of B&B, not passing gas  Drains: NG on LIS, 64 cm, brown/green output. Clamping NG tube x1 hour for PO medications  IV: R PIV infusing NS @ 100mL/hr  Wounds/incisions: Abdominal midline staples covered with island dressing.    Diet: NPO ex. Ice and meds  Activity Level: Ax1 GB+W  Anticipated DC Date: Pending    Significant Information:   Ambulated in the lockhart x1 overnight. PRN milk of mag given.

## 2025-04-01 NOTE — PROGRESS NOTES
[facility]    Medicine Progress Note - Hospitalist Service    Date of Admission:  3/30/2025  Interval History   Patient resting in bed today. He wakes up to say a few words. His wife is here and very thankful that his surgery was done.   He has untreated sleep apnea.   Has NG in place. PT evaluating patient.   Continue with zosyn short term.     Assessment & Plan   Richar Davis is a 69 year old male with a history of diabetes, hypertension, CABG, status post AVR with bovine valve 10/2022, untreated sleep apnea, hypertension, hyperlipidemia, neuropathy, BPH who presented to the emergency department with abdominal pain, nausea and vomiting.  Imaging showing gallstone ileus with a 2.2 cm gallstone impacted.     Patient presented to the emergency room with approximately 2 days of nausea and vomiting.  He initially reported going to urgent care and found that x-ray showing suggestion of a small bowel obstruction with referral to the ER  On arrival found to have a blood pressure 120/74 with a pulse of 76.  Labs sodium 135 potassium 4.3 creatinine 0.92 with normal LFTs.  White count 9.6.  CT abdomen/pelvis:   IMPRESSION:   1.  Gallstone ileus. Distal small bowel obstruction secondary to an impacted 2.2 cm gallstone located just right of midline at the level of the right common iliac artery bifurcation.  2.  Cholelithiasis with contracted gallbladder but no suggestion for acute cholecystitis.  3.  Mild splenomegaly.  4.  Trace volume ascites.  5.  Prostatomegaly.  6.  Moderate central spinal stenosis at lumbar spine.    Patient was given Ancef in the emergency department/perioperative time period.  Subsequently was taken to the OR on March 30 with ex lap small bowel enterotomy and removal of gallstone with primary closure.  See operative report for further details.     Gallstone ileus distal small bowel obstruction secondary to impacted 2.2 cm gallstone  Exploratory laparotomy with small  bowel enterotomy and removal of gallstone on March 30, 2025  Patient was feeling unwell and went to the  Urgent care for evaluation. x-ray with possible SBO and sent to the ED. vomiting for the past 3 days.    3/30 abdominal x-ray showing multiple dilated loops of small bowel in the upper abdomen with air-fluid levels in the upright film.  Relative paucity of gas in the colon suggesting distal mechanical small bowel obstruction and cholelithiasis  3/30 CT abdomen pelvis showing gallstone ileus,Distal small bowel obstruction secondary to an impacted 2.2 cm gallstone located just right of midline at the level of the right common iliac artery bifurcation.Cholelithiasis with contracted gallbladder but no suggestion for acute cholecystitis.  Patient was given Ancef in the emergency department/perioperative time period. And 1 dose of invanz.   3/30 OR ex lap small bowel enterotomy and removal of gallstone with primary closure.  See operative report for further details.  Patient has NG in place.  Currently on NS at 100  N.p.o. with IV fluids. >.  Once flatus consider clamp trial  Okay per surgery to start DVT prevention (3/31 started on lovenox)   4/1 patient is stooling      CAD  Aortic stenosis (status post AVR with bovine valve) 10/22   CABG X 3 10/22  Hypertension  Patient with history of CABG, also aortic stenosis status post AVR with 25 mm Bal Inspiris Resilia bovine valve in October 2022, see recent cardiology visit dated December 2024 for further details.  Toprol XL 25 mg po evening.   ASA when able to stop lovenox (hopeful next week with AVR)     Obstructive sleep apnea:   Patient on nasal cannula but satting over 90% on rounding  Stop nasal cannula oxygen  Based on history that can be obtained he likely has underlying sleep apnea that he is not continued with his treatment     Hyperlipidemia:   Continue prior to admission statin when taking p.o.     History of edema:   Hold prior to admission Lasix at this  "time.     Neuropathy:   Neurontin 800 mg 3 times daily as needed by 1 report and another cyst 300 mg 3 times daily as needed  Attempt clarification of his Neurontin which appears to be as needed but the dosing is not consistent  4/1 per pharmacy note only recent fills are 800 mg and not 300 mg dosing.      Diabetes:   Hold prior to admission metformin, insulin sliding scale.  Would not resume in the setting of surgery  Patient takes Glucophage 250 mg at dinner     BPH:   Continue prior to admission Flomax   Flomax 0.8 mg daily        # Cachexia: Estimated body mass index is 17.99 kg/m  as calculated from the following:    Height as of this encounter: 1.803 m (5' 11\").    Weight as of this encounter: 58.5 kg (129 lb).        # History of CABG: noted on surgical history        Disposition Plan     Medically Ready for Discharge: Anticipated in 2-4 Days             Diet: NPO for Medical/Clinical Reasons Except for: Meds, Ice Chips    DVT Prophylaxis: Enoxaparin (Lovenox) SQ  Mccarty Catheter: Not present  Lines: None     Cardiac Monitoring: None  Code Status: Full Code      Clinically Significant Risk Factors          # Hypochloremia: Lowest Cl = 93 mmol/L in last 2 days, will monitor as appropriate          # Hypertension: Noted on problem list           # DMII: A1C = 6.8 % (Ref range: <5.7 %) within past 6 months, PRESENT ON ADMISSION  # Obesity: Estimated body mass index is 38.96 kg/m  as calculated from the following:    Height as of this encounter: 1.829 m (6').    Weight as of this encounter: 130.3 kg (287 lb 4.2 oz)., PRESENT ON ADMISSION      # History of CABG: noted on surgical history       Social Drivers of Health    Tobacco Use: High Risk (3/30/2025)    Patient History     Smoking Tobacco Use: Former     Smokeless Tobacco Use: Current          Disposition Plan     Medically Ready for Discharge: Anticipated in 2-4 Days       PINKY ELIZABETH MD  Hospitalist Service  [facility]  Securely " message with Anshul (more info)  Text page via [employer] Paging/Directory   ______________________________________________________________________        Physical Exam   Vital Signs: Temp: 98.7  F (37.1  C) Temp src: Oral BP: 114/64 Pulse: 109   Resp: 17 SpO2: 96 % O2 Device: None (Room air)    Weight: 287 lbs 4.15 oz    General Appearance: Patient is awake and alert he is slightly restless.  Has nasal cannula and NG in place  Respiratory: Relatively clear to auscultation bilaterally without wheezes or rhonchi  Cardiovascular: Regular rate and rhythm without gallop rub normal S1-S2  GI: Abdomen distended no overt bowel sounds  Skin: No overt rashes    Medical Decision Making       45 MINUTES SPENT BY ME on the date of service doing chart review, history, exam, documentation & further activities per the note.      Data     I have personally reviewed the following data over the past 24 hrs:    4.0  \   14.2   / 140 (L)     137 100 21.4 /  149 (H)   4.1 25 0.80 \       Imaging results reviewed over the past 24 hrs:   No results found for this or any previous visit (from the past 24 hours).

## 2025-04-01 NOTE — PROGRESS NOTES
[facility]    General Surgery  Daily Progress Note       Assessment and Plan:   Richar Davis is a 69 year old male who is POD2 s/p ex-lap for gallstone ileus. Doing better and pain controlled. VSS. AFebrile. NG with 1200cc+1050cc green bilious out. No evidence of bowel fxn yet.     Plan:  Pain control as needed  No abx indicated for now.   NPO with mivfs. Continue NG to LIS. Once passing flatus, can consider clamp trials.   Ambulate multiple times today, pulm toiletry, ok to start lovenox dvt ppx      Terry Patino MD         Interval History:   Doing better but still has some pain. Got up only once. Denies any flatus or BM.            Physical Exam:   Temp: 98.7  F (37.1  C) Temp src: Oral BP: 114/64 Pulse: 109   Resp: 17 SpO2: 96 % O2 Device: None (Room air)      I/O last 3 completed shifts:  In: 2488 [P.O.:240; I.V.:2248]  Out: 1750 [Urine:600; Emesis/NG output:1150]      Constitutional: healthy, alert, and no distress   Cardiovascular: sinus tachy  Respiratory: breathing comfortably on NC, clear speech  Abdomen: obese, protuberant, tender to palpation in RLQ, no rebound, no peritonitis. Midline incision with serosang drainage on island. Abd binder placed.        Data   Recent Labs   Lab 04/01/25  0806 04/01/25  0709 04/01/25  0143 03/31/25  0820 03/31/25  0809 03/30/25  2123 03/30/25  1455   WBC  --  4.0  --   --  10.6  --  9.6   HGB  --  14.2  --   --  15.7  --  15.9   MCV  --  94  --   --  94  --  92   PLT  --  140*  --   --  196  --  199   NA  --  137  --   --  138  --  135   POTASSIUM  --  4.1  --   --  4.3  --  4.3   CHLORIDE  --  100  --   --  94*  --  93*   CO2  --  25  --   --  29  --  30*   BUN  --  21.4  --   --  22.2  --  18.9   CR  --  0.80  --   --  1.09  --  0.92   ANIONGAP  --  12  --   --  15  --  12   HALEY  --  9.4  --   --  10.1  --  10.4   * 184* 136*   < > 183*   < > 171*   ALBUMIN  --   --   --   --  4.1  --  4.1   PROTTOTAL  --   --   --   --  7.8  --   7.9   BILITOTAL  --   --   --   --  0.8  --  0.7   ALKPHOS  --   --   --   --  118  --  133   ALT  --   --   --   --  17  --  21   AST  --   --   --   --  25  --  34    < > = values in this interval not displayed.        Ilumya Counseling: I discussed with the patient the risks of tildrakizumab including but not limited to immunosuppression, malignancy, posterior leukoencephalopathy syndrome, and serious infections.  The patient understands that monitoring is required including a PPD at baseline and must alert us or the primary physician if symptoms of infection or other concerning signs are noted.

## 2025-04-01 NOTE — PLAN OF CARE
Goal Outcome Evaluation:      Plan of Care Reviewed With: patient, spouse    Overall Patient Progress: improvingOverall Patient Progress: improving         Date & Time: 4/1/2025 [id number]  Surgery/POD#: POD#2 Ex. Lap with small bowel enterotomy and gallstone removal.   Behavior & Aggression: Green  Fall Risk: Yes  Orientation: A&Ox4  ABNL VS/O2: VSS on RA ex tachycardic  ABNL Labs: Plt 140, glucose 168, 149.  Pain Management: PRN oxycodone given x 2 for pain rated 2-3/10, scheduled tylenol given. Ice applied, abd binder on.  Bowel/Bladder: Continent of B&B, not passing flatus this morning and hypoactive BS, but has had 4 small loose BM's and now active BS.   Drains: NG on LIS, 64 cm, brown/green output, 750 ml out. Clamping NG tube x1 hour for PO medications and has had a glass of ice chips and sips of water with meds. One bout of nausea, zofran given with relief.  IV: R PIV infusing NS @ 100mL/hr  Wounds/incisions: Abdominal midline staples covered with island dressing.    Diet: NPO ex. Ice and meds  Activity Level: Ax1 GB/Walker, ambulated in lockhart x 4, and to bathroom.  Anticipated  DC Date: Pending  Significant Information:

## 2025-04-01 NOTE — PROGRESS NOTES
04/01/25 1000   Appointment Info   Signing Clinician's Name / Credentials (PT) Norma Raines PT   Living Environment   People in Home spouse   Current Living Arrangements house   Home Accessibility stairs to enter home;stairs within home   Number of Stairs, Main Entrance 3   Stair Railings, Main Entrance railings on both sides of stairs   Number of Stairs, Within Home, Primary greater than 10 stairs   Stair Railings, Within Home, Primary railing on right side (ascending)   Transportation Anticipated family or friend will provide   Self-Care   Usual Activity Tolerance good   Current Activity Tolerance moderate   Regular Exercise Yes   Activity/Exercise Type biking;strength training   Exercise Amount/Frequency 3-5 times/wk   Equipment Currently Used at Home raised toilet seat;shower chair   Fall history within last six months no   General Information   Onset of Illness/Injury or Date of Surgery 03/31/25   Referring Physician Maddie Schafer   Patient/Family Therapy Goals Statement (PT) pt agreeable to TCU   Pertinent History of Current Problem (include personal factors and/or comorbidities that impact the POC) Richar Davis is a 69 year old male who is POD2 s/p ex-lap for gallstone ileus. Doing better and pain controlled. VSS. AFebrile. NG with 1200cc+1050cc green bilious out. No evidence of bowel fxn yet.   Existing Precautions/Restrictions abdominal;brace worn when out of bed   Weight-Bearing Status - LLE full weight-bearing   Weight-Bearing Status - RLE full weight-bearing   Cognition   Affect/Mental Status (Cognition) WFL   Orientation Status (Cognition) oriented x 4   Follows Commands (Cognition) WFL   Pain Assessment   Patient Currently in Pain Yes, see Vital Sign flowsheet  (abdomin)   Integumentary/Edema   Integumentary/Edema Comments per surgery, abdomin   Range of Motion (ROM)   ROM Comment decreased ROM LE with soft tissue approximation and discomfort   Strength (Manual Muscle Testing)   Strength  Comments decreased LE and trunk strength, UE A to stand, mod A for bed mob   Bed Mobility   Comment, (Bed Mobility) supine to sit with mod A   Transfers   Comment, (Transfers) sit to stand with min A and FWW, heavy use B UE   Gait/Stairs (Locomotion)   Adams Level (Gait) contact guard   Assistive Device (Gait) walker, front-wheeled   Distance in Feet (Gait) 10'   Pattern (Gait) step-through   Deviations/Abnormal Patterns (Gait) base of support, wide;jed decreased;gait speed decreased;stride length decreased   Comment, (Gait/Stairs) increased lat sway   Balance   Balance Comments standing static and dynamic balance impaired, requires B UE support to maintain   Clinical Impression   Criteria for Skilled Therapeutic Intervention Yes, treatment indicated   PT Diagnosis (PT) difficulty with all mob   Influenced by the following impairments Decreased act vince, strength, balance and ROM and post op pain   Functional limitations due to impairments Impaired functional mob I, safety and vince   Clinical Presentation (PT Evaluation Complexity) evolving   Clinical Presentation Rationale clinical judgement   Clinical Decision Making (Complexity) moderate complexity   Planned Therapy Interventions (PT) bed mobility training;balance training;gait training;strengthening;stair training;transfer training   Risk & Benefits of therapy have been explained evaluation/treatment results reviewed;care plan/treatment goals reviewed;risks/benefits reviewed;current/potential barriers reviewed;participants voiced agreement with care plan;participants included;patient   PT Total Evaluation Time   PT Eval, Moderate Complexity Minutes (37681) 13   Physical Therapy Goals   PT Frequency 5x/week   PT Predicted Duration/Target Date for Goal Attainment 04/05/25   PT Goals Bed Mobility;Transfers;Gait;Stairs   PT: Bed Mobility Modified independent   PT: Transfers Modified independent;Assistive device   PT: Gait Modified independent;Rolling  walker;Greater than 200 feet   PT: Stairs Modified independent;8 stairs;Rail on both sides   Interventions   Interventions Quick Adds Gait Training;Therapeutic Activity   Therapeutic Activity   Therapeutic Activities: dynamic activities to improve functional performance Minutes (78174) 25   Symptoms Noted During/After Treatment Fatigue;Increased pain   Treatment Detail/Skilled Intervention PT pt educated in role of PT and POC. initial part of session with spouse present and able to help provide PLOF, pt sleepy and painful, declining up OOB, returned later. pt sitting up on EOB wanting to go to BR, Sit to stand with FWW with mod A and cues for hand placement. CGA for transfer onto toilte, use of grab bars, mod A off toilet. rep toilet transfer after amb to try again, able to void slightly.  Seated rest on bed prior to 2nd visit to BR, then returned to bedside, stating wanting to return to bed, instructed in roll tech, significant time trialing bed mob, pt resisitant to roll tech, stating too painful, but when moving into bed through long sit, too painful to move from sit straight back to supine.  instructed in scooting in bed, pt states he can't do things prior to trying based on pain, however able to bend knees wtih mod A and scoot hips x 4 to position to roll onto side, needing frequent education and rational for trialing bed mob to allow pt to vince, pt stating he doesn't know what he will do at home, yet payan not follow PT instructions for option, repeats how he used to do it.  Ultimately mod A to move supine or side to sit. Pt discouraged and feels he will need rehab. Left sitting on EOB with care needs in reach and further discussion about options for bed mob at home including renting hospital bed, buying adjustable bed, getting bed rails.   Gait Training   Gait Training Minutes (40062) 23   Symptoms Noted During/After Treatment (Gait Training) fatigue;increased pain   Treatment Detail/Skilled Intervention PT gait  training with FWW with cues for upgith posture, pt with WBOS, appears baseline, slow jed, amb in [place], stairs training with B rails, CGA, x 4 steps, trial to amb with no AD, able to amb 60' but slow jed, mildly less steady   Distance in Feet 15' x 3, 320'   PT Discharge Planning   PT Plan cont with bed mob, trouble shooting, difficulty with roll tech. gait progress to no AD, rep STS   PT Discharge Recommendation (DC Rec) Transitional Care Facility;home with home care physical therapy   PT Rationale for DC Rec pt well below baseline, particularly limited in bed mob due to pain, states spouse cannot help him.  will benfit from cont therapies to improve strength and act vince to progress functional mob to PLOF, pending LOS may need TCU prior to return home if unable to manage bed mob and transfers I.  If progressing well would benefit from home PT and home safety eval to see pt in home environment   PT Brief overview of current status mod A for bed mob, mod to min A for STS, CGA for amb with FWW Goals of therapy will be to address safe mobility and make recs for d/c to next level of care. Pt and RN will continue to follow all falls risk precautions as documented by RN staff while hospitalized   PT Total Distance Amb During Session (feet) 365   Physical Therapy Time and Intention   Timed Code Treatment Minutes 48   Total Session Time (sum of timed and untimed services) 61

## 2025-04-02 ENCOUNTER — APPOINTMENT (OUTPATIENT)
Dept: PHYSICAL THERAPY | Facility: CLINIC | Age: 70
DRG: 329 | End: 2025-04-02
Payer: COMMERCIAL

## 2025-04-02 LAB
ANION GAP SERPL CALCULATED.3IONS-SCNC: 11 MMOL/L (ref 7–15)
BUN SERPL-MCNC: 16.9 MG/DL (ref 8–23)
CALCIUM SERPL-MCNC: 9.4 MG/DL (ref 8.8–10.4)
CHLORIDE SERPL-SCNC: 103 MMOL/L (ref 98–107)
CREAT SERPL-MCNC: 0.83 MG/DL (ref 0.67–1.17)
EGFRCR SERPLBLD CKD-EPI 2021: >90 ML/MIN/1.73M2
ERYTHROCYTE [DISTWIDTH] IN BLOOD BY AUTOMATED COUNT: 13.7 % (ref 10–15)
GLUCOSE BLDC GLUCOMTR-MCNC: 114 MG/DL (ref 70–99)
GLUCOSE BLDC GLUCOMTR-MCNC: 129 MG/DL (ref 70–99)
GLUCOSE BLDC GLUCOMTR-MCNC: 184 MG/DL (ref 70–99)
GLUCOSE SERPL-MCNC: 142 MG/DL (ref 70–99)
HCO3 SERPL-SCNC: 27 MMOL/L (ref 22–29)
HCT VFR BLD AUTO: 40.7 % (ref 40–53)
HGB BLD-MCNC: 13.4 G/DL (ref 13.3–17.7)
MCH RBC QN AUTO: 30.6 PG (ref 26.5–33)
MCHC RBC AUTO-ENTMCNC: 32.9 G/DL (ref 31.5–36.5)
MCV RBC AUTO: 93 FL (ref 78–100)
PLATELET # BLD AUTO: 154 10E3/UL (ref 150–450)
POTASSIUM SERPL-SCNC: 3.8 MMOL/L (ref 3.4–5.3)
RBC # BLD AUTO: 4.38 10E6/UL (ref 4.4–5.9)
SODIUM SERPL-SCNC: 141 MMOL/L (ref 135–145)
WBC # BLD AUTO: 4.3 10E3/UL (ref 4–11)

## 2025-04-02 PROCEDURE — 97116 GAIT TRAINING THERAPY: CPT | Mod: GP | Performed by: PHYSICAL THERAPIST

## 2025-04-02 PROCEDURE — 250N000011 HC RX IP 250 OP 636: Performed by: SURGERY

## 2025-04-02 PROCEDURE — 80048 BASIC METABOLIC PNL TOTAL CA: CPT | Performed by: INTERNAL MEDICINE

## 2025-04-02 PROCEDURE — 85027 COMPLETE CBC AUTOMATED: CPT | Performed by: INTERNAL MEDICINE

## 2025-04-02 PROCEDURE — 250N000013 HC RX MED GY IP 250 OP 250 PS 637: Performed by: STUDENT IN AN ORGANIZED HEALTH CARE EDUCATION/TRAINING PROGRAM

## 2025-04-02 PROCEDURE — 99232 SBSQ HOSP IP/OBS MODERATE 35: CPT | Performed by: INTERNAL MEDICINE

## 2025-04-02 PROCEDURE — 258N000003 HC RX IP 258 OP 636: Performed by: SURGERY

## 2025-04-02 PROCEDURE — 120N000001 HC R&B MED SURG/OB

## 2025-04-02 PROCEDURE — 97530 THERAPEUTIC ACTIVITIES: CPT | Mod: GP | Performed by: PHYSICAL THERAPIST

## 2025-04-02 PROCEDURE — 36415 COLL VENOUS BLD VENIPUNCTURE: CPT | Performed by: INTERNAL MEDICINE

## 2025-04-02 PROCEDURE — 250N000011 HC RX IP 250 OP 636: Performed by: INTERNAL MEDICINE

## 2025-04-02 PROCEDURE — 250N000013 HC RX MED GY IP 250 OP 250 PS 637: Performed by: SURGERY

## 2025-04-02 PROCEDURE — 258N000003 HC RX IP 258 OP 636: Performed by: INTERNAL MEDICINE

## 2025-04-02 RX ORDER — SODIUM CHLORIDE 9 MG/ML
INJECTION, SOLUTION INTRAVENOUS CONTINUOUS
Status: DISCONTINUED | OUTPATIENT
Start: 2025-04-02 | End: 2025-04-03

## 2025-04-02 RX ORDER — FUROSEMIDE 20 MG/1
20 TABLET ORAL DAILY
Status: DISCONTINUED | OUTPATIENT
Start: 2025-04-03 | End: 2025-04-18

## 2025-04-02 RX ORDER — METOPROLOL TARTRATE 1 MG/ML
5 INJECTION, SOLUTION INTRAVENOUS ONCE
Status: COMPLETED | OUTPATIENT
Start: 2025-04-02 | End: 2025-04-03

## 2025-04-02 RX ADMIN — OXYCODONE HYDROCHLORIDE 2.5 MG: 5 TABLET ORAL at 16:33

## 2025-04-02 RX ADMIN — OXYCODONE HYDROCHLORIDE 5 MG: 5 TABLET ORAL at 01:59

## 2025-04-02 RX ADMIN — ONDANSETRON 4 MG: 4 TABLET, ORALLY DISINTEGRATING ORAL at 16:33

## 2025-04-02 RX ADMIN — INSULIN ASPART 1 UNITS: 100 INJECTION, SOLUTION INTRAVENOUS; SUBCUTANEOUS at 09:18

## 2025-04-02 RX ADMIN — SODIUM CHLORIDE: 9 INJECTION, SOLUTION INTRAVENOUS at 06:13

## 2025-04-02 RX ADMIN — ENOXAPARIN SODIUM 40 MG: 40 INJECTION SUBCUTANEOUS at 09:18

## 2025-04-02 RX ADMIN — PROCHLORPERAZINE MALEATE 5 MG: 5 TABLET ORAL at 18:01

## 2025-04-02 RX ADMIN — PROCHLORPERAZINE EDISYLATE 5 MG: 5 INJECTION INTRAMUSCULAR; INTRAVENOUS at 23:51

## 2025-04-02 RX ADMIN — ACETAMINOPHEN 975 MG: 325 TABLET, FILM COATED ORAL at 16:33

## 2025-04-02 RX ADMIN — FOLIC ACID 1000 MCG: 1 TABLET ORAL at 09:23

## 2025-04-02 RX ADMIN — SODIUM CHLORIDE: 9 INJECTION, SOLUTION INTRAVENOUS at 23:21

## 2025-04-02 RX ADMIN — ONDANSETRON 4 MG: 4 TABLET, ORALLY DISINTEGRATING ORAL at 21:37

## 2025-04-02 RX ADMIN — OXYCODONE HYDROCHLORIDE 5 MG: 5 TABLET ORAL at 06:22

## 2025-04-02 RX ADMIN — ACETAMINOPHEN 975 MG: 325 TABLET, FILM COATED ORAL at 00:18

## 2025-04-02 RX ADMIN — ACETAMINOPHEN 975 MG: 325 TABLET, FILM COATED ORAL at 09:19

## 2025-04-02 RX ADMIN — INSULIN ASPART 1 UNITS: 100 INJECTION, SOLUTION INTRAVENOUS; SUBCUTANEOUS at 13:10

## 2025-04-02 RX ADMIN — POLYETHYLENE GLYCOL 3350 17 G: 17 POWDER, FOR SOLUTION ORAL at 09:19

## 2025-04-02 ASSESSMENT — ACTIVITIES OF DAILY LIVING (ADL)
ADLS_ACUITY_SCORE: 63
ADLS_ACUITY_SCORE: 64
ADLS_ACUITY_SCORE: 63
ADLS_ACUITY_SCORE: 65
ADLS_ACUITY_SCORE: 65
ADLS_ACUITY_SCORE: 63
ADLS_ACUITY_SCORE: 63
ADLS_ACUITY_SCORE: 64
ADLS_ACUITY_SCORE: 64
ADLS_ACUITY_SCORE: 63
ADLS_ACUITY_SCORE: 65
ADLS_ACUITY_SCORE: 63
ADLS_ACUITY_SCORE: 65
ADLS_ACUITY_SCORE: 63
ADLS_ACUITY_SCORE: 63
ADLS_ACUITY_SCORE: 65
ADLS_ACUITY_SCORE: 64
ADLS_ACUITY_SCORE: 63
ADLS_ACUITY_SCORE: 63
ADLS_ACUITY_SCORE: 65
ADLS_ACUITY_SCORE: 63

## 2025-04-02 NOTE — PROGRESS NOTES
[facility]    Medicine Progress Note - Hospitalist Service    Date of Admission:  3/30/2025  Interval History   Patient sitting on the edge of the bed. He is having bowel movements that are mainly diarrhea. (None today )   Walking around on the floor and relatively stable.   Therapy present on rounding and ok to likely go home   He lost his IV  Admitted to surgery he has been chewing tobacco in the hospital.  Has nicotine patch in place   His NG was removed  Surgery has not felt antibiotics are needed     Assessment & Plan   Richar Davis is a 69 year old male with a history of diabetes, hypertension, CABG, status post AVR with bovine valve 10/2022, untreated sleep apnea, hypertension, hyperlipidemia, neuropathy, BPH who presented to the emergency department with abdominal pain, nausea and vomiting.  Imaging showing gallstone ileus with a 2.2 cm gallstone impacted.     Patient presented to the emergency room with approximately 2 days of nausea and vomiting.  He initially reported going to urgent care and found that x-ray showing suggestion of a small bowel obstruction with referral to the ER  On arrival found to have a blood pressure 120/74 with a pulse of 76.  Labs sodium 135 potassium 4.3 creatinine 0.92 with normal LFTs.  White count 9.6.  CT abdomen/pelvis:   IMPRESSION:   1.  Gallstone ileus. Distal small bowel obstruction secondary to an impacted 2.2 cm gallstone located just right of midline at the level of the right common iliac artery bifurcation.  2.  Cholelithiasis with contracted gallbladder but no suggestion for acute cholecystitis.  3.  Mild splenomegaly.  4.  Trace volume ascites.  5.  Prostatomegaly.  6.  Moderate central spinal stenosis at lumbar spine.    Patient was given Ancef in the emergency department/perioperative time period. Dose of invanz at midnight Subsequently was taken to the OR on March 30 with ex lap small bowel enterotomy and removal of gallstone with  primary closure.  See operative report for further details.     Gallstone ileus distal small bowel obstruction secondary to impacted 2.2 cm gallstone  Exploratory laparotomy with small bowel enterotomy and removal of gallstone on March 30, 2025  Patient was feeling unwell and went to the  Urgent care for evaluation. x-ray with possible SBO and sent to the ED. vomiting for the past 3 days.    3/30 abdominal x-ray showing multiple dilated loops of small bowel in the upper abdomen with air-fluid levels in the upright film.  Relative paucity of gas in the colon suggesting distal mechanical small bowel obstruction and cholelithiasis  3/30 CT abdomen pelvis showing gallstone ileus,Distal small bowel obstruction secondary to an impacted 2.2 cm gallstone located just right of midline at the level of the right common iliac artery bifurcation.Cholelithiasis with contracted gallbladder but no suggestion for acute cholecystitis.  Patient was given Ancef in the emergency department/perioperative time period. And 1 dose of invanz.   3/30 OR ex lap small bowel enterotomy and removal of gallstone with primary closure.  See operative report for further details.  4/1 started to have return of bowel function   4/2 NG Removed   4/2 with clear liquid diet. If tolerated to low fiber.   Tylenol and prn oxy.   Lost IV access but no IV meds.   Okay per surgery to start DVT prevention (3/31 started on lovenox)    ?? Discharge on Thursday if stable      CAD  Aortic stenosis (status post AVR with bovine valve) 10/22   CABG X 3 10/22  Hypertension  Patient with history of CABG, also aortic stenosis status post AVR with 25 mm Bal Inspiris Resilia bovine valve in October 2022, see recent cardiology visit dated December 2024 for further details.  Toprol XL 25 mg po evening.   ASA when able to stop lovenox (  4/2 potential discharge on Thursday 4/3 then lovenox would be stopped and CONFIRM with surgery to start ASA on discharge.     Obstructive  "sleep apnea:   Patient on nasal cannula but satting over 90% on rounding  Stop nasal cannula oxygen  Based on history that can be obtained he likely has underlying sleep apnea that he is not continued with his treatment     Hyperlipidemia:   Continue prior to admission statin when taking p.o.     History of edema:   Hold prior to admission Lasix at this time.  4/2 ordered PTA lasix in am at 20 mg po daily      Neuropathy:   Neurontin 800 mg 3 times daily as needed by 1 report and another cyst 300 mg 3 times daily as needed  Attempt clarification of his Neurontin which appears to be as needed but the dosing is not consistent  4/1 per pharmacy note only recent fills are 800 mg and not 300 mg dosing.   4/2 ON discharge will need to confirm if patient has 800 or 300 mg      Diabetes:   Hold prior to admission metformin, insulin sliding scale.  Would not resume in the setting of surgery  Patient takes Glucophage 250 mg at dinner  On low dose insulin 1-2 units.   Glucophage still on hold with surgery      BPH:   Continue prior to admission Flomax   Flomax 0.8 mg daily        # Cachexia: Estimated body mass index is 17.99 kg/m  as calculated from the following:    Height as of this encounter: 1.803 m (5' 11\").    Weight as of this encounter: 58.5 kg (129 lb).        # History of CABG: noted on surgical history           Diet: Advance Diet as Tolerated: Clear Liquid Diet; Low Fiber    DVT Prophylaxis: Enoxaparin (Lovenox) SQ  Mccarty Catheter: Not present  Lines: None     Cardiac Monitoring: None  Code Status: Full Code      Clinically Significant Risk Factors                   # Hypertension: Noted on problem list           # DMII: A1C = 6.8 % (Ref range: <5.7 %) within past 6 months, PRESENT ON ADMISSION  # Obesity: Estimated body mass index is 38.96 kg/m  as calculated from the following:    Height as of this encounter: 1.829 m (6').    Weight as of this encounter: 130.3 kg (287 lb 4.2 oz)., PRESENT ON ADMISSION      # " History of CABG: noted on surgical history       Social Drivers of Health    Tobacco Use: High Risk (3/30/2025)    Patient History     Smoking Tobacco Use: Former     Smokeless Tobacco Use: Current          Disposition Plan     Medically Ready for Discharge: Anticipated Tomorrow       PINKY ELIZABETH MD  Hospitalist Service  [facility]  Securely message with [employer] (more info)  Text page via [employer] Paging/Directory   ______________________________________________________________________        Physical Exam   Vital Signs: Temp: 97.3  F (36.3  C) Temp src: Oral BP: 135/72 Pulse: 86   Resp: 18 SpO2: 99 % O2 Device: None (Room air)    Weight: 287 lbs 4.15 oz    General Appearance: Patient is awake and alert he is slightly restless.  Has nasal cannula and NG in place  Respiratory: Relatively clear to auscultation bilaterally without wheezes or rhonchi  Cardiovascular: Regular rate and rhythm without gallop rub normal S1-S2  GI: Abdomen distended no overt bowel sounds  Skin: No overt rashes    Medical Decision Making       45 MINUTES SPENT BY ME on the date of service doing chart review, history, exam, documentation & further activities per the note.      Data     I have personally reviewed the following data over the past 24 hrs:    4.3  \   13.4   / 154     141 103 16.9 /  184 (H)   3.8 27 0.83 \       Imaging results reviewed over the past 24 hrs:   No results found for this or any previous visit (from the past 24 hours).

## 2025-04-02 NOTE — PLAN OF CARE
Orientation: A/Ox4  Aggression Stop Light: Green   Activity: A1 GB/W. Pt likes to sit at edge of bed; calling appropriately  Diet/BS Checks: NPO ex meds & ice chips. BG check, insulin given per sliding scale  Tele:  NA  IV Access/Drains: PIV infusing NS @ 100 ml/hr. NG to LIS.   Pain Management: scheduled tylenol & prn oxy given x1. Denies nausea.   Abnormal VS/Results: VSS on RA  Bowel/Bladder: cont B/B. No BM this shift  Skin/Wounds: abdominal midline staples w/ island dressing. Moderate drainage.  Consults: surgery, PT  D/C Disposition: pending clinical improvement    Other Info:   - plan for clamp trial this evening

## 2025-04-02 NOTE — PLAN OF CARE
Goal Outcome Evaluation:      Plan of Care Reviewed With: patient    Overall Patient Progress: no changeOverall Patient Progress: no change    Date & Time: 4/2/25 9085-5351   Surgery/POD#: POD#3 Ex. Lap with small bowel enterotomy and gallstone removal.   Behavior & Aggression: Green  Fall Risk: Yes  Orientation: A&Ox4  ABNL VS/O2: VSS on RA ex tachycardic  ABNL Labs: See chart  Pain Management: PRN oxycodone; Scheduled Tylenol; PO Zofran and Compazine for Nausea, little relief   Bowel/Bladder: Continent of B&B. Passing gas and having BMs  IV: no IV Access; MD aware and ok  Wounds/incisions: Abdominal midline staples covered with island dressing.    Diet: Low Fiber; not great tolerance  Activity Level: Ax1 GB/Walker  Anticipated DC Date: Pending  Significant Information:   Patient was reporting Nausea and received PO Zofran, attempted to eat newly advanced Low Fiber, was unable to complete and reported no relief of nausea; given compazine and still reported little relief of nausea.     *NG clamped 4/1 @ 20:45, Pt tolerating all night.   *NG removed by Pt around 06:00.

## 2025-04-02 NOTE — PLAN OF CARE
Date & Time: 4/1-4/2 19:00-07:30    POD#3 Ex. Lap with small bowel enterotomy and gallstone removal.     Behavior & Aggression: Green  Fall Risk: Yes  Orientation: A&Ox4  ABNL VS/O2: VSS on RA ex tachycardic  ABNL Labs: See chart  Pain Management: PRN oxycodone. Ice applied, abd binder on.  Bowel/Bladder: Continent of B&B. Passing gas and having BMs  IV: R PIV infusing NS @ 100mL/hr  Wounds/incisions: Abdominal midline staples covered with island dressing.    Diet: NPO ex. Ice and meds  Activity Level: Ax1 GB/Walker  Anticipated DC Date: Pending    Significant Information:   *NG clamped 4/1 @ 20:45, Pt tolerating all night.   *NG removed by Pt around 06:00.

## 2025-04-02 NOTE — PROGRESS NOTES
General Surgery Progress Note    Admission Date: 3/30/2025  Today's Date: 4/2/2025         Assessment:      Richar Davis is a 69 year old male with gallstone ileus s/p ex lap and small bowel enterotomy with removal of gallstone POD 3.    +return of bowel function, tolerating NG clamping, NG removed         Plan:   - Clear liquid diet. If tolerates well, can slowly advance as tolerated to low fiber  - Scheduled tylenol for pain, prn oxy and IV dilaudid available  - Miralax daily   - Lovenox for DVT ppx. Encourage frequent out of bed, ambulation, PCDs while resting, IS use every hour. Working with PT  - Avoid tobacco use. Patient admits he has been chewing tobacco here since admission. Nicotine patch ordered, but patient has refused as he says they don't help  - Lost IV access this morning. Patient is drinking plenty of water, has no necessary IV meds. OK to hold off on IV placement at this time  - Medical management per hospitalist, appreciate input    Dispo: likely discharge tomorrow if Richar tolerates diet advancement and is cleared from medical perspective  - Anticipate discharge home with home PT        Interval History:   Afebrile, vitals stable on room air with occasional mild tachycardia. Richar had multiple BMs yesterday. NG was clamped all night, no nausea or increased pain with clamping. Richar removed NG accidentally this morning. Clear liquid diet was ordered by surgeon. He has been taking in a lot of water and tolerating well. Having some mild abdominal pain around incision and to the right side of his abdomen, no severe pain.           Physical Exam:   /72 (BP Location: Right arm)   Pulse 86   Temp 97.3  F (36.3  C) (Oral)   Resp 18   Ht 1.829 m (6')   Wt 130.3 kg (287 lb 4.2 oz)   SpO2 99%   BMI 38.96 kg/m    I/O last 3 completed shifts:  In: 3336 [P.O.:120; I.V.:3096; NG/GT:120]  Out: 1250 [Emesis/NG output:1250]  General: NAD, pleasant, alert and awake  Respiratory: non-labored  breathing   Abdomen: soft, rotund, mild tenderness around incision and right abdomen  Incision: midline incision intact with staples, small amount of serosanguinous drainage on dressing. Surrounding skin intact without erythema. New dressing placed.    LABS:  Recent Labs   Lab Test 04/02/25  0746 04/01/25  0709 03/31/25  0809   WBC 4.3 4.0 10.6   HGB 13.4 14.2 15.7   MCV 93 94 94    140* 196      Recent Labs   Lab Test 04/02/25  0746 04/01/25  0709 03/31/25  0809   POTASSIUM 3.8 4.1 4.3   CHLORIDE 103 100 94*   CO2 27 25 29   BUN 16.9 21.4 22.2   CR 0.83 0.80 1.09   ANIONGAP 11 12 15          -------------------------------    Arlyn Wang PA-C  [facility]  [phone]

## 2025-04-02 NOTE — PROGRESS NOTES
The patient is AO X 4; VSS; on RA; independent  in the room.  There is no IV line: Surgery PA Ms. Wang is aware and said it was okay not to place another one in.   Unfortunately the patient has been chewing tobacco; surgery PA has been notified.   Richar has been   tolerating CLD; will be advanced to low fiber diet.  Incision is CDI; dressing has been changed.

## 2025-04-03 ENCOUNTER — APPOINTMENT (OUTPATIENT)
Dept: GENERAL RADIOLOGY | Facility: CLINIC | Age: 70
DRG: 329 | End: 2025-04-03
Attending: SURGERY
Payer: COMMERCIAL

## 2025-04-03 LAB
GLUCOSE BLDC GLUCOMTR-MCNC: 138 MG/DL (ref 70–99)
GLUCOSE BLDC GLUCOMTR-MCNC: 140 MG/DL (ref 70–99)
GLUCOSE BLDC GLUCOMTR-MCNC: 147 MG/DL (ref 70–99)
GLUCOSE BLDC GLUCOMTR-MCNC: 152 MG/DL (ref 70–99)
GLUCOSE BLDC GLUCOMTR-MCNC: 164 MG/DL (ref 70–99)
GLUCOSE BLDC GLUCOMTR-MCNC: 167 MG/DL (ref 70–99)
PATH REPORT.COMMENTS IMP SPEC: NORMAL
PATH REPORT.COMMENTS IMP SPEC: NORMAL
PATH REPORT.FINAL DX SPEC: NORMAL
PATH REPORT.GROSS SPEC: NORMAL
PATH REPORT.RELEVANT HX SPEC: NORMAL
PHOTO IMAGE: NORMAL

## 2025-04-03 PROCEDURE — 99232 SBSQ HOSP IP/OBS MODERATE 35: CPT | Performed by: INTERNAL MEDICINE

## 2025-04-03 PROCEDURE — 74018 RADEX ABDOMEN 1 VIEW: CPT

## 2025-04-03 PROCEDURE — 250N000011 HC RX IP 250 OP 636: Mod: JZ | Performed by: SURGERY

## 2025-04-03 PROCEDURE — 250N000013 HC RX MED GY IP 250 OP 250 PS 637: Performed by: INTERNAL MEDICINE

## 2025-04-03 PROCEDURE — 88300 SURGICAL PATH GROSS: CPT | Mod: 26 | Performed by: STUDENT IN AN ORGANIZED HEALTH CARE EDUCATION/TRAINING PROGRAM

## 2025-04-03 PROCEDURE — 120N000001 HC R&B MED SURG/OB

## 2025-04-03 PROCEDURE — 250N000013 HC RX MED GY IP 250 OP 250 PS 637: Performed by: STUDENT IN AN ORGANIZED HEALTH CARE EDUCATION/TRAINING PROGRAM

## 2025-04-03 PROCEDURE — 258N000003 HC RX IP 258 OP 636: Performed by: INTERNAL MEDICINE

## 2025-04-03 PROCEDURE — 250N000013 HC RX MED GY IP 250 OP 250 PS 637: Performed by: SURGERY

## 2025-04-03 PROCEDURE — 250N000009 HC RX 250: Performed by: INTERNAL MEDICINE

## 2025-04-03 PROCEDURE — 250N000011 HC RX IP 250 OP 636: Performed by: INTERNAL MEDICINE

## 2025-04-03 RX ORDER — POLYETHYLENE GLYCOL 3350 17 G
2 POWDER IN PACKET (EA) ORAL
Status: DISCONTINUED | OUTPATIENT
Start: 2025-04-03 | End: 2025-04-29 | Stop reason: HOSPADM

## 2025-04-03 RX ORDER — TRAMADOL HYDROCHLORIDE 50 MG/1
50 TABLET ORAL EVERY 6 HOURS PRN
Status: DISCONTINUED | OUTPATIENT
Start: 2025-04-03 | End: 2025-04-14

## 2025-04-03 RX ORDER — FAMOTIDINE 20 MG/1
20 TABLET, FILM COATED ORAL 2 TIMES DAILY
Status: DISCONTINUED | OUTPATIENT
Start: 2025-04-03 | End: 2025-04-16

## 2025-04-03 RX ADMIN — ONDANSETRON 4 MG: 2 INJECTION, SOLUTION INTRAMUSCULAR; INTRAVENOUS at 05:18

## 2025-04-03 RX ADMIN — ACETAMINOPHEN 975 MG: 325 TABLET, FILM COATED ORAL at 16:49

## 2025-04-03 RX ADMIN — HYDROMORPHONE HYDROCHLORIDE 0.2 MG: 0.2 INJECTION, SOLUTION INTRAMUSCULAR; INTRAVENOUS; SUBCUTANEOUS at 00:09

## 2025-04-03 RX ADMIN — METOPROLOL TARTRATE 5 MG: 5 INJECTION INTRAVENOUS at 00:10

## 2025-04-03 RX ADMIN — OXYCODONE HYDROCHLORIDE 5 MG: 5 TABLET ORAL at 07:56

## 2025-04-03 RX ADMIN — HYDROMORPHONE HYDROCHLORIDE 0.2 MG: 0.2 INJECTION, SOLUTION INTRAMUSCULAR; INTRAVENOUS; SUBCUTANEOUS at 03:26

## 2025-04-03 RX ADMIN — ENOXAPARIN SODIUM 40 MG: 40 INJECTION SUBCUTANEOUS at 10:32

## 2025-04-03 RX ADMIN — NICOTINE POLACRILEX 2 MG: 2 LOZENGE ORAL at 11:45

## 2025-04-03 RX ADMIN — PROCHLORPERAZINE EDISYLATE 5 MG: 5 INJECTION INTRAMUSCULAR; INTRAVENOUS at 17:28

## 2025-04-03 RX ADMIN — HYDROMORPHONE HYDROCHLORIDE 0.2 MG: 0.2 INJECTION, SOLUTION INTRAMUSCULAR; INTRAVENOUS; SUBCUTANEOUS at 06:00

## 2025-04-03 RX ADMIN — FUROSEMIDE 20 MG: 20 TABLET ORAL at 08:00

## 2025-04-03 RX ADMIN — TRAMADOL HYDROCHLORIDE 50 MG: 50 TABLET, COATED ORAL at 19:41

## 2025-04-03 RX ADMIN — ACETAMINOPHEN 975 MG: 325 TABLET, FILM COATED ORAL at 08:00

## 2025-04-03 RX ADMIN — INSULIN ASPART 1 UNITS: 100 INJECTION, SOLUTION INTRAVENOUS; SUBCUTANEOUS at 11:46

## 2025-04-03 RX ADMIN — NICOTINE 1 PATCH: 21 PATCH, EXTENDED RELEASE TRANSDERMAL at 08:00

## 2025-04-03 RX ADMIN — POLYETHYLENE GLYCOL 3350 17 G: 17 POWDER, FOR SOLUTION ORAL at 08:00

## 2025-04-03 RX ADMIN — SODIUM CHLORIDE: 9 INJECTION, SOLUTION INTRAVENOUS at 10:33

## 2025-04-03 RX ADMIN — ONDANSETRON 4 MG: 4 TABLET, ORALLY DISINTEGRATING ORAL at 22:26

## 2025-04-03 RX ADMIN — ONDANSETRON 4 MG: 4 TABLET, ORALLY DISINTEGRATING ORAL at 11:45

## 2025-04-03 RX ADMIN — INSULIN ASPART 1 UNITS: 100 INJECTION, SOLUTION INTRAVENOUS; SUBCUTANEOUS at 08:41

## 2025-04-03 RX ADMIN — PROCHLORPERAZINE MALEATE 5 MG: 5 TABLET ORAL at 07:56

## 2025-04-03 RX ADMIN — INSULIN ASPART 1 UNITS: 100 INJECTION, SOLUTION INTRAVENOUS; SUBCUTANEOUS at 16:48

## 2025-04-03 RX ADMIN — FAMOTIDINE 20 MG: 20 TABLET, FILM COATED ORAL at 20:46

## 2025-04-03 ASSESSMENT — ACTIVITIES OF DAILY LIVING (ADL)
ADLS_ACUITY_SCORE: 64
DEPENDENT_IADLS:: INDEPENDENT
ADLS_ACUITY_SCORE: 64

## 2025-04-03 NOTE — PROGRESS NOTES
General Surgery Progress Note    Admission Date: 3/30/2025  Today's Date: 4/3/2025         Assessment:      Richar Davis is a 69 year old male with gallstone ileus s/p ex lap and small bowel enterotomy with removal of gallstone POD 4. Return of bowel function, NGT removed 4/3, advanced from CLD to low fiber, nausea and emesis with low fiber diet but tolerating clears.         Plan:   - Clear liquid diet. If tolerates well, can slowly advance as tolerated to low fiber. Discussed with patient and wife, to go slow with advancement  - Scheduled tylenol for pain, believes oxycodone may be worsening nausea, normally takes tramadol at home, will switch to tramadol for prn pain control, still using IV diluadid  - Miralax daily   - Lovenox for DVT ppx. Encourage frequent out of bed, ambulation, PCDs while resting, IS use every hour. Working with PT  - Avoid tobacco use. Patient admits he has been chewing tobacco here since admission. Nicotine patch ordered, but patient has refused as he says they don't help  - Medical management per hospitalist, appreciate input    Dispo: likely discharge tomorrow if Richar tolerates diet advancement and is cleared from medical perspective  - Anticipate discharge home with home PT in 1-2 days        Interval History:   Nausea and emesis when tried regular diet, feels okay with CLD. Would like to try a few bites of food for lunch. Having normal BM. Intermittent abdominal pain, no fevers or chills. Emesis x2 last night.       Physical Exam:   /61 (BP Location: Left arm)   Pulse 100   Temp 99.6  F (37.6  C) (Oral)   Resp 18   Ht 1.829 m (6')   Wt 130.3 kg (287 lb 4.2 oz)   SpO2 96%   BMI 38.96 kg/m    I/O last 3 completed shifts:  In: 400 [P.O.:400]  Out: -   General: NAD, pleasant, alert and awake  Respiratory: non-labored breathing on room air, no cough  Abdomen: soft, rotund, appropriate tenderness in right hemiabdomen, incision closed with staples c/d/i  Incision: midline  incision intact with staples, c/d/I without surrounding erythema or discharge  LABS:  Recent Labs   Lab Test 04/02/25  0746 04/01/25  0709 03/31/25  0809   WBC 4.3 4.0 10.6   HGB 13.4 14.2 15.7   MCV 93 94 94    140* 196      Recent Labs   Lab Test 04/02/25  0746 04/01/25  0709 03/31/25  0809   POTASSIUM 3.8 4.1 4.3   CHLORIDE 103 100 94*   CO2 27 25 29   BUN 16.9 21.4 22.2   CR 0.83 0.80 1.09   ANIONGAP 11 12 15      Patient discussed with Dr. Sukumar Dougherty, PGY4  Surgery

## 2025-04-03 NOTE — PROGRESS NOTES
[facility]    Medicine Progress Note - Hospitalist Service    Date of Admission:  3/30/2025    Assessment & Plan   Richar Davis is a 69 year old male with a history of diabetes, hypertension, CABG, status post AVR with bovine valve 10/2022, untreated sleep apnea, hypertension, hyperlipidemia, neuropathy, BPH who presented to the emergency department with abdominal pain, nausea and vomiting.  Imaging showing gallstone ileus with a 2.2 cm gallstone impacted.      Gallstone ileus distal small bowel obstruction secondary to impacted 2.2 cm gallstone  Exploratory laparotomy with small bowel enterotomy and removal of gallstone on March 30, 2025  Appreciate surgery service following patient.  3/30 OR ex lap small bowel enterotomy and removal of gallstone with primary closure.  See operative report for further details.  4/1 started to have return of bowel function   4/2 NG Removed and started on low fiber diet but clinically got worse so transition back to CLD normal 4/3       CAD  Aortic stenosis (status post AVR with bovine valve) 10/22   CABG X 3 10/22  Hypertension  Patient with history of CABG, also aortic stenosis status post AVR with 25 mm Bal Inspiris Resilia bovine valve in October 2022, see recent cardiology visit dated December 2024 for further details.  Toprol XL 25 mg po evening.   ASA when able to stop lovenox      Obstructive sleep apnea:   Follow-up with outpatient    Hyperlipidemia:   Continue prior to admission statin when taking p.o.     History of edema:     4/2 ordered PTA lasix in am at 20 mg po daily      Neuropathy:   Neurontin 800 mg 3 times daily as needed by 1 report and another cyst 300 mg 3 times daily as needed  Attempt clarification of his Neurontin which appears to be as needed but the dosing is not consistent  4/1 per pharmacy note only recent fills are 800 mg and not 300 mg dosing.   ON discharge will need to confirm if patient has 800 or 300 mg     "  Diabetes:   Hold prior to admission metformin, insulin sliding scale.  Would not resume in the setting of surgery  Patient takes Glucophage 250 mg at dinner  Recent Labs   Lab 04/03/25  1130 04/03/25  0829 04/03/25  0252 04/03/25  0016 04/02/25  1637 04/02/25  1151   * 164* 138* 147* 129* 184*     Glucophage still on hold with surgery      BPH:   Continue prior to admission Flomax   Flomax 0.8 mg daily      # Cachexia: Estimated body mass index is 17.99 kg/m  as calculated from the following:    Height as of this encounter: 1.803 m (5' 11\").    Weight as of this encounter: 58.5 kg (129 lb).        # History of CABG: noted on surgical history     # Nicotine use: Chews tobacco  -Continue nicotine patch, and as needed nicotine lozenges        Diet: Advance Diet as Tolerated: Low Fiber; Low Fiber    DVT Prophylaxis: Enoxaparin (Lovenox) SQ  Mccarty Catheter: Not present  Lines: None     Cardiac Monitoring: None  Code Status: Full Code      Clinically Significant Risk Factors                   # Hypertension: Noted on problem list           # DMII: A1C = 6.8 % (Ref range: <5.7 %) within past 6 months, PRESENT ON ADMISSION  # Obesity: Estimated body mass index is 38.96 kg/m  as calculated from the following:    Height as of this encounter: 1.829 m (6').    Weight as of this encounter: 130.3 kg (287 lb 4.2 oz)., PRESENT ON ADMISSION      # History of CABG: noted on surgical history       Social Drivers of Health    Tobacco Use: High Risk (3/30/2025)    Patient History     Smoking Tobacco Use: Former     Smokeless Tobacco Use: Current          Disposition Plan     Medically Ready for Discharge: Anticipated Tomorrow       Damian Griffin MD  Hospitalist Service  [facility]  Securely message with [employer] (more info)  Text page via [employer] Paging/Directory   \"This dictation was performed with voice recognition software and may contain errors,  omissions and inadvertent word substitution.\"    " ______________________________________________________________________    Interval History   Continues to complain of nausea and vomiting when tried regular feeds.  Smoking withdrawal      Physical Exam   /61 (BP Location: Left arm)   Pulse 100   Temp 99.6  F (37.6  C) (Oral)   Resp 18   Ht 1.829 m (6')   Wt 130.3 kg (287 lb 4.2 oz)   SpO2 96%   BMI 38.96 kg/m    Gen- pleasant   Neck- supple  CVS- I+II+ no m/r/g  RS- CTAB  Abdo- soft, further as per general surgery team  Ext- no edema     Medical Decision Making       40 MINUTES SPENT BY ME on the date of service doing chart review, history, exam, documentation & further activities per the note.      Data   ------------------------- PAST 24 HR DATA REVIEWED -----------------------------------------------        Imaging results reviewed over the past 24 hrs:   No results found for this or any previous visit (from the past 24 hours).

## 2025-04-03 NOTE — PROGRESS NOTES
Surgery Update    Chart reviewed - note pt with ongoing intermittent nausea and emesis. I am concerned he may have post op ileus. Abdominal XR ordered. May need to go back to NPO pending results and NGT placement if has persistent nausea/emesis.     Clau Calles MD  [facility]  [phone]

## 2025-04-03 NOTE — PLAN OF CARE
4/2/25  [id number]  POD#4 Ex. Lap with small bowel enterotomy and gallstone removal.     Orientation: A/Ox4    Vitals/Tele: VSS, except tachycardic     IV Access/drains: RPIV saline running @ 75 ml/hr    Diet: Clears     Mobility: Ax1 GB/W    GI/: Continent of B&B. Passing gas and having BMs    Wound/Skin: Abdominal midline staples covered with island dressing.      Consults: SW, PT    Discharge Plan: TBD    Other: Pt was vomiting during shift. Zofran given x2, compazine given x1 this shift. Pt has been chewing tobacco in room, chewing tobacco has been confiscated. Sticky note left for provider.      See Flow sheets for assessment

## 2025-04-03 NOTE — CONSULTS
Care Management Initial Consult    General Information  Assessment completed with: Patient, Spouse or significant other,    Type of CM/SW Visit: Initial Assessment    Primary Care Provider verified and updated as needed: Yes   Readmission within the last 30 days: no previous admission in last 30 days      Reason for Consult: discharge planning  Advance Care Planning:            Communication Assessment  Patient's communication style: spoken language (English or Bilingual)             Cognitive  Cognitive/Neuro/Behavioral: WDL  Level of Consciousness: alert  Arousal Level: opens eyes spontaneously  Orientation: oriented x 4  Mood/Behavior: calm, cooperative  Best Language: 0 - No aphasia  Speech: clear, spontaneous, logical    Living Environment:   People in home: spouse     Current living Arrangements: house      Able to return to prior arrangements: yes  Living Arrangement Comments:  (All on one level)    Family/Social Support:  Care provided by: self  Provides care for: no one  Marital Status:   Support system: Wife  Mya       Description of Support System: Supportive, Involved    Support Assessment: Adequate family and caregiver support    Current Resources:   Patient receiving home care services: No        Community Resources:    Equipment currently used at home: shower chair, commode chair  Supplies currently used at home: None    Employment/Financial:  Employment Status: retired     Employment/ Comments:  (teacher special ed)  Financial Concerns: none   Referral to Financial Worker: No       Does the patient's insurance plan have a 3 day qualifying hospital stay waiver?  No    Lifestyle & Psychosocial Needs:  Social Drivers of Health     Food Insecurity: No Food Insecurity (9/30/2024)    Received from [facility]    Food Insecurity     Do you worry your food will run out before you are able to buy more?: 1   Depression: Not at risk (1/7/2025)    [facility]    PHQ-2     PHQ-2 TOTAL SCORE: 0   Housing Stability: Low Risk  (9/30/2024)    [facility] from [facility]    Housing Stability     What is your housing situation today?: 1   Tobacco Use: High Risk (3/30/2025)    Patient History     Smoking Tobacco Use: Former     Smokeless Tobacco Use: Current     Passive Exposure: Not on file   Financial Resource Strain: Low Risk  (9/30/2024)    Received from [facility]    Financial Resource Strain     Difficulty of Paying Living Expenses: 3     Difficulty of Paying Living Expenses: Not on file   Alcohol Use: Not on file   Transportation Needs: No Transportation Needs (9/30/2024)    Received from [facility]    Transportation Needs     Does lack of transportation keep you from medical appointments?: 1     Does lack of transportation keep you from work, meetings or getting things that you need?: 1   Physical Activity: Not on file   Interpersonal Safety: Low Risk  (3/30/2025)    Interpersonal Safety     Do you feel physically and emotionally safe where you currently live?: Yes     Within the past 12 months, have you been hit, slapped, kicked or otherwise physically hurt by someone?: No     Within the past 12 months, have you been humiliated or emotionally abused in other ways by your partner or ex-partner?: No   Stress: Not on file   Social Connections: Socially Integrated (9/30/2024)    Received from [facility]    Social Connections     Do you often feel lonely or isolated from those around you?: 0   Health Literacy: Not on file       Functional Status:  Prior to admission patient needed assistance:   Dependent ADLs:: Independent  Dependent IADLs:: Independent       Mental Health Status:  Mental Health Status: No Current Concerns       Chemical Dependency Status:  Chemical Dependency Status: No Current  Concerns             Values/Beliefs:  Spiritual, Cultural Beliefs, Tenriism Practices, Values that affect care:                 Discussed  Partnership in Safe Discharge Planning  document with patient/family: No    Additional Information:  Consult for discharge planning.     69 year old male with a history of diabetes, hypertension, CABG, status post AVR with bovine valve 10/2022, untreated sleep apnea, hypertension, hyperlipidemia, neuropathy, BPH who presented to the emergency department with abdominal pain, nausea and vomiting. Imaging showing gallstone ileus with a 2.2 cm gallstone impacted.     Writer reviewed chart and recommendations at discharge. Writer met with patient and spouse Mya at bedside and introduced self and role. Writer confirmed patient's primary doctor and home address as accurate. Patient is familiar with home health and agreeable to have home health come to the home. Patient independent prior to admission and does not use any assisted devices. Mya is supportive and helpful.      Next Steps: Secure home health care for nursing and PT    Arlyn Rivero, KANDI

## 2025-04-03 NOTE — PLAN OF CARE
POD4 ex lap w small bowel enterotomy and removal of gallstone. A&O x4. VSS on RA. Up w/ Ax1-2 out of bed d/t abdominal pain. Given oxycodone. Zofran, compazine given for nausea without much relief. Had x1 emesis during shift. Voiding. Taking clear liquids, encouraged to go slow. Takes pills whole. Discharge pending medical clearance.

## 2025-04-04 VITALS
BODY MASS INDEX: 38.91 KG/M2 | OXYGEN SATURATION: 98 % | HEART RATE: 102 BPM | DIASTOLIC BLOOD PRESSURE: 88 MMHG | TEMPERATURE: 99.3 F | HEIGHT: 72 IN | RESPIRATION RATE: 18 BRPM | WEIGHT: 287.26 LBS | SYSTOLIC BLOOD PRESSURE: 147 MMHG

## 2025-04-04 LAB
ANION GAP SERPL CALCULATED.3IONS-SCNC: 11 MMOL/L (ref 7–15)
BUN SERPL-MCNC: 7.9 MG/DL (ref 8–23)
CALCIUM SERPL-MCNC: 9.7 MG/DL (ref 8.8–10.4)
CHLORIDE SERPL-SCNC: 98 MMOL/L (ref 98–107)
CREAT SERPL-MCNC: 0.84 MG/DL (ref 0.67–1.17)
EGFRCR SERPLBLD CKD-EPI 2021: >90 ML/MIN/1.73M2
GLUCOSE BLDC GLUCOMTR-MCNC: 121 MG/DL (ref 70–99)
GLUCOSE BLDC GLUCOMTR-MCNC: 142 MG/DL (ref 70–99)
GLUCOSE BLDC GLUCOMTR-MCNC: 147 MG/DL (ref 70–99)
GLUCOSE BLDC GLUCOMTR-MCNC: 192 MG/DL (ref 70–99)
GLUCOSE SERPL-MCNC: 137 MG/DL (ref 70–99)
HCO3 SERPL-SCNC: 28 MMOL/L (ref 22–29)
HGB BLD-MCNC: 14.2 G/DL (ref 13.3–17.7)
MAGNESIUM SERPL-MCNC: 1.9 MG/DL (ref 1.7–2.3)
PHOSPHATE SERPL-MCNC: 2.3 MG/DL (ref 2.5–4.5)
POTASSIUM SERPL-SCNC: 3.4 MMOL/L (ref 3.4–5.3)
SODIUM SERPL-SCNC: 137 MMOL/L (ref 135–145)

## 2025-04-04 PROCEDURE — 250N000013 HC RX MED GY IP 250 OP 250 PS 637: Performed by: STUDENT IN AN ORGANIZED HEALTH CARE EDUCATION/TRAINING PROGRAM

## 2025-04-04 PROCEDURE — 80048 BASIC METABOLIC PNL TOTAL CA: CPT | Performed by: INTERNAL MEDICINE

## 2025-04-04 PROCEDURE — 250N000013 HC RX MED GY IP 250 OP 250 PS 637: Performed by: SURGERY

## 2025-04-04 PROCEDURE — 250N000009 HC RX 250: Performed by: INTERNAL MEDICINE

## 2025-04-04 PROCEDURE — 258N000003 HC RX IP 258 OP 636: Performed by: STUDENT IN AN ORGANIZED HEALTH CARE EDUCATION/TRAINING PROGRAM

## 2025-04-04 PROCEDURE — 84100 ASSAY OF PHOSPHORUS: CPT | Performed by: SURGERY

## 2025-04-04 PROCEDURE — 83735 ASSAY OF MAGNESIUM: CPT | Performed by: SURGERY

## 2025-04-04 PROCEDURE — 36415 COLL VENOUS BLD VENIPUNCTURE: CPT | Performed by: INTERNAL MEDICINE

## 2025-04-04 PROCEDURE — 99232 SBSQ HOSP IP/OBS MODERATE 35: CPT | Performed by: STUDENT IN AN ORGANIZED HEALTH CARE EDUCATION/TRAINING PROGRAM

## 2025-04-04 PROCEDURE — 250N000011 HC RX IP 250 OP 636: Performed by: INTERNAL MEDICINE

## 2025-04-04 PROCEDURE — 250N000011 HC RX IP 250 OP 636: Performed by: SURGERY

## 2025-04-04 PROCEDURE — 93005 ELECTROCARDIOGRAM TRACING: CPT

## 2025-04-04 PROCEDURE — 250N000013 HC RX MED GY IP 250 OP 250 PS 637: Performed by: INTERNAL MEDICINE

## 2025-04-04 PROCEDURE — 85018 HEMOGLOBIN: CPT | Performed by: INTERNAL MEDICINE

## 2025-04-04 PROCEDURE — 93010 ELECTROCARDIOGRAM REPORT: CPT | Performed by: INTERNAL MEDICINE

## 2025-04-04 PROCEDURE — 120N000001 HC R&B MED SURG/OB

## 2025-04-04 RX ORDER — ASPIRIN 81 MG/1
81 TABLET ORAL DAILY
Status: DISCONTINUED | OUTPATIENT
Start: 2025-04-04 | End: 2025-04-07

## 2025-04-04 RX ORDER — METOPROLOL TARTRATE 1 MG/ML
5 INJECTION, SOLUTION INTRAVENOUS
Status: COMPLETED | OUTPATIENT
Start: 2025-04-04 | End: 2025-04-04

## 2025-04-04 RX ORDER — SODIUM CHLORIDE 9 MG/ML
INJECTION, SOLUTION INTRAVENOUS CONTINUOUS
Status: DISCONTINUED | OUTPATIENT
Start: 2025-04-04 | End: 2025-04-06

## 2025-04-04 RX ORDER — ATORVASTATIN CALCIUM 40 MG/1
80 TABLET, FILM COATED ORAL DAILY
Status: DISCONTINUED | OUTPATIENT
Start: 2025-04-04 | End: 2025-04-08

## 2025-04-04 RX ADMIN — TRAMADOL HYDROCHLORIDE 50 MG: 50 TABLET, COATED ORAL at 16:34

## 2025-04-04 RX ADMIN — FOLIC ACID 1000 MCG: 1 TABLET ORAL at 21:06

## 2025-04-04 RX ADMIN — METOPROLOL SUCCINATE 25 MG: 25 TABLET, EXTENDED RELEASE ORAL at 21:06

## 2025-04-04 RX ADMIN — ACETAMINOPHEN 975 MG: 325 TABLET, FILM COATED ORAL at 16:28

## 2025-04-04 RX ADMIN — FUROSEMIDE 20 MG: 20 TABLET ORAL at 10:17

## 2025-04-04 RX ADMIN — NICOTINE 1 PATCH: 21 PATCH, EXTENDED RELEASE TRANSDERMAL at 09:18

## 2025-04-04 RX ADMIN — FAMOTIDINE 20 MG: 20 TABLET, FILM COATED ORAL at 21:06

## 2025-04-04 RX ADMIN — ACETAMINOPHEN 975 MG: 325 TABLET, FILM COATED ORAL at 01:12

## 2025-04-04 RX ADMIN — TAMSULOSIN HYDROCHLORIDE 0.8 MG: 0.4 CAPSULE ORAL at 21:06

## 2025-04-04 RX ADMIN — ASPIRIN 81 MG: 81 TABLET, COATED ORAL at 10:17

## 2025-04-04 RX ADMIN — FAMOTIDINE 20 MG: 20 TABLET, FILM COATED ORAL at 10:17

## 2025-04-04 RX ADMIN — SODIUM CHLORIDE: 9 INJECTION, SOLUTION INTRAVENOUS at 09:19

## 2025-04-04 RX ADMIN — INSULIN ASPART 1 UNITS: 100 INJECTION, SOLUTION INTRAVENOUS; SUBCUTANEOUS at 12:23

## 2025-04-04 RX ADMIN — ENOXAPARIN SODIUM 40 MG: 40 INJECTION SUBCUTANEOUS at 09:16

## 2025-04-04 RX ADMIN — ATORVASTATIN CALCIUM 80 MG: 80 TABLET, FILM COATED ORAL at 10:17

## 2025-04-04 RX ADMIN — FOLIC ACID 1000 MCG: 1 TABLET ORAL at 10:16

## 2025-04-04 RX ADMIN — PROCHLORPERAZINE EDISYLATE 5 MG: 5 INJECTION INTRAMUSCULAR; INTRAVENOUS at 01:12

## 2025-04-04 RX ADMIN — METOPROLOL TARTRATE 5 MG: 5 INJECTION INTRAVENOUS at 23:30

## 2025-04-04 RX ADMIN — ACETAMINOPHEN 975 MG: 325 TABLET, FILM COATED ORAL at 10:17

## 2025-04-04 RX ADMIN — INSULIN ASPART 1 UNITS: 100 INJECTION, SOLUTION INTRAVENOUS; SUBCUTANEOUS at 17:08

## 2025-04-04 ASSESSMENT — ACTIVITIES OF DAILY LIVING (ADL)
ADLS_ACUITY_SCORE: 66
ADLS_ACUITY_SCORE: 64
ADLS_ACUITY_SCORE: 66
ADLS_ACUITY_SCORE: 64
ADLS_ACUITY_SCORE: 66
ADLS_ACUITY_SCORE: 64
ADLS_ACUITY_SCORE: 66
ADLS_ACUITY_SCORE: 64
ADLS_ACUITY_SCORE: 66

## 2025-04-04 NOTE — PROGRESS NOTES
MD Notification    Notified Person: MD    Notified Person Name: Clau Calles MD    Notification Date/Time: 04/3/25 10:00PM    Notification Interaction: Vocera    Purpose of Notification: Abd XR results are back. Pt is continuously nauseous w/no relief after giving Compazine. Also has had 1 emesis so far this shift and no appetite.    Orders Received: I am not on call. But make him npo and place ngt if has further emesis. I placed orders. For any other care over night please page on call surgeon    Comments: Diet order changed to NPO

## 2025-04-04 NOTE — PLAN OF CARE
Goal Outcome Evaluation:  4/4/25  [id number]  POD#5 Ex. Lap with small bowel enterotomy and gallstone removal.   Orientation: A/Ox4  Vitals/Tele: VSS, except tachycardic at times   IV Access/drains: R. PIV CDI SL.   Diet: NPO d/t nausea and emesis through the night. IV compazine given x1    Mobility: Ax1 GB/W  GI/: Continent B/B. 1 BM this shift, voiding adequately   Wound/Skin: Abdominal midline incision abdominal binder on. CDI    Consults: SW, PT  Discharge Plan: TBD  - Full bed change this shift. Incontinent/urgency episode   - Nicotiene patch on L. shoulder

## 2025-04-04 NOTE — PLAN OF CARE
PT-  Pt requested discontinuation of PT services.  Pt reported he is up independently and doesn't feel he needs further PT.  Will discharge pt from PT at pt request.

## 2025-04-04 NOTE — PROGRESS NOTES
Surgery Note    Richar reports he had nausea and 2 episodes of emesis overnight.  He reports no further nausea this morning and has not taken any antiemetics.  He has had 5 bowel movements this morning.  Reports he feels hungry.    O: /85 (BP Location: Right arm)   Pulse (!) 158   Temp 100  F (37.8  C) (Oral)   Resp 18   Ht 1.829 m (6')   Wt 130.3 kg (287 lb 4.2 oz)   SpO2 97%   BMI 38.96 kg/m    Abdomen: Obese, mildly tender to palpation around incision, incision looks fine, softer than prior    A/P: Richar is a 69-year-old male who is status post ex lap for gallstone ileus with enterotomy and removal of gallstone and transverse closure.  He had been progressing well and diet advanced however developed more nausea and vomiting over the past 24 hours.  Abdominal x-ray last night revealed a a dilated loop in the left abdomen and read as concerning for possible ileus.  This morning he is improved with no further nausea and many more bowel movements.  I think we can start clear liquids again and see how it goes.  We did discuss considering a Gastrografin challenge if he has recurrent nausea/emesis to see if this helps improve if there is any edema at the enterotomy site.  - clear liquid diet    Clau Calles MD  [facility]  [phone]

## 2025-04-04 NOTE — PROGRESS NOTES
Care Management Follow Up    Length of Stay (days): 5    Expected Discharge Date: 04/04/2025     Concerns to be Addressed: discharge planning     Patient plan of care discussed at interdisciplinary rounds: Yes    Anticipated Discharge Disposition: Home Care       Anticipated Discharge Services: Home Care  Anticipated Discharge DME:      Patient/family educated on Medicare website which has current facility and service quality ratings: no  Education Provided on the Discharge Plan:    Patient/Family in Agreement with the Plan: yes    Referrals Placed by CM/SW:    Private pay costs discussed: Not applicable    Discussed  Partnership in Safe Discharge Planning  document with patient/family: No     Handoff Completed: No, handoff not indicated or clinically appropriate    Additional Information:  Patient has been accepted by [facility] for services upon discharge.     Next Steps: await medical readiness    Radha Negrete RN   [facility]   Phone [phone], [facility] or [phone]

## 2025-04-04 NOTE — PROGRESS NOTES
Date & Time: 04/03/25 [id number]  Surgery/POD#: POD #4 ex lap w small bowel enterotomy and removal of gallstone.  Behavior & Aggression: Green  Fall Risk: Yes  Orientation:A&Ox4  ABNL VS/O2:VSS on RA   ABNL Labs: BS checks 140 & 152   Pain Management: PRN tramadol   Bowel/Bladder: Voiding in BR. Pt reports BM x1   Drains: PIV SL   Wounds/incisions: Midline abd- CDI Abd binder on   Diet: NPO ex ice/meds   Activity Level:  A1 gb/walker   Tests/Procedures: NA  Anticipated  DC Date: Pending   Significant Information: PRN Zofran and Compazine given for nausea w/ little to no relief. Had emesis x1 this shift. Pt put on NPO and orders for NG placement if pt has another emesis.

## 2025-04-04 NOTE — PROGRESS NOTES
[facility]    Medicine Progress Note - Hospitalist Service    Date of Admission:  3/30/2025    Assessment & Plan   Richar Davis is a 69 year old male with a history of diabetes, hypertension, CABG, status post AVR with bovine valve 10/2022, untreated sleep apnea, hypertension, hyperlipidemia, neuropathy, BPH who presented to the emergency department with abdominal pain, nausea and vomiting.  Imaging showing gallstone ileus with a 2.2 cm gallstone impacted.      Gallstone ileus distal small bowel obstruction secondary to impacted 2.2 cm gallstone  Exploratory laparotomy with small bowel enterotomy and removal of gallstone on March 30, 2025  *3/30 OR ex lap small bowel enterotomy and removal of gallstone with primary closure.  See operative report for further details.  *4/1 started to have return of bowel function   *4/2 NG Removed and started on low fiber diet but clinically got worse so transition back to CLD normal 4/3  - Appreciate surgery service     CAD  Aortic stenosis (status post AVR with bovine valve) 10/22   CABG X 3 10/22  Hypertension  *Patient with history of CABG, also aortic stenosis status post AVR with 25 mm Bal Inspiris Resilia bovine valve in October 2022, see recent cardiology visit dated December 2024 for further details.  - Toprol XL 25 mg po evening.   - ASA EC 81mg     Obstructive sleep apnea:   - Follow-up with outpatient     Hyperlipidemia:   - Continue prior to admission statin     History of edema:   - continue PTA lasix in am at 20 mg po daily      Neuropathy:   *Neurontin 800 mg 3 times daily as needed has been filled but patient reports taking 300mg TID  *4/1 per pharmacy note only recent fills are 800 mg and not 300 mg dosing.   - continue 800mg TID PRN     Diabetes:   - Hold prior to admission metformin  - Insulin sliding scale.     BPH:   - Continue PTA Flomax 0.8 mg daily      Cachexia      Nicotine use: Chews tobacco  -Continue nicotine patch,  and as needed nicotine lozenges          Diet: NPO for Medical/Clinical Reasons Except for: Meds, Ice Chips    DVT Prophylaxis: Enoxaparin (Lovenox) SQ  Mccarty Catheter: Not present  Lines: None     Cardiac Monitoring: None  Code Status: Full Code      Clinically Significant Risk Factors                   # Hypertension: Noted on problem list           # DMII: A1C = 6.8 % (Ref range: <5.7 %) within past 6 months   # Obesity: Estimated body mass index is 38.96 kg/m  as calculated from the following:    Height as of this encounter: 1.829 m (6').    Weight as of this encounter: 130.3 kg (287 lb 4.2 oz).      # Financial/Environmental Concerns: none   # History of CABG: noted on surgical history       Social Drivers of Health    Tobacco Use: High Risk (3/30/2025)    Patient History     Smoking Tobacco Use: Former     Smokeless Tobacco Use: Current          Disposition Plan     Medically Ready for Discharge: Anticipated in 2-4 Days  Pending return of bowel function           Khari Peña MD  Hospitalist Service  [facility]  Securely message with [employer] (more info)  Text page via [employer] Paging/Directory   ______________________________________________________________________    Interval History   Seen in AM. He is on clears. Tolerating well. Several bowel movements. Pain controlled.     Physical Exam   Vital Signs: Temp: 99.3  F (37.4  C) Temp src: Oral BP: (!) 147/88 Pulse: 102   Resp: 18 SpO2: 98 % O2 Device: None (Room air)    Weight: 287 lbs 4.15 oz    Constitutional: Awake, alert, cooperative, no apparent distress  Respiratory: Clear to auscultation bilaterally, no crackles or wheezing  Cardiovascular: Regular rate and rhythm, normal S1 and S2, and no murmur noted  GI: Normal bowel sounds, soft, non-distended, appropriately tender, abdominal binder in place  Skin/Integumen: No rashes, no cyanosis, no edema  Other:       Medical Decision Making       40 MINUTES SPENT BY ME on the date of  service doing chart review, history, exam, documentation & further activities per the note.      Data   ------------------------- PAST 24 HR DATA REVIEWED -----------------------------------------------    I have personally reviewed the following data over the past 24 hrs:    N/A  \   14.2   / N/A     137 98 7.9 (L) /  192 (H)   3.4 28 0.84 \       Imaging results reviewed over the past 24 hrs:   Recent Results (from the past 24 hours)   XR Abdomen 1 View    Narrative    EXAM: XR ABDOMEN 1 VIEW  LOCATION: [facility]  DATE: 4/3/2025    INDICATION: ongoing nausea after ex lap for gallstone ileus, evaluate for post op ileus.  COMPARISON: CT 3/30/2025.      Impression    IMPRESSION: There is a 2.5 cm calcified gallstone in the right upper quadrant correlating with prior CT. No other abdominal calcification. There is abnormally dilated small bowel in the left midabdomen measuring up to 4-5 cm compatible with ileus. No   free air. Imaged portions of the lower chest show changes of prior sternotomy and aortic valve replacement. Multiple lower abdominal wall skin staples.

## 2025-04-04 NOTE — PROGRESS NOTES
Patient is A/O x 4, Vss, a-febrile, denies pain, up independently in the room, ambulated in the hallway with SBA, tolerated well, s/p Exploratory Lap and small bowel enteroectomy with removal of gallstones pod # 5, patient experienced post op Ileus which has resolved, patient has had more than 5 stools since last night per patient, tolerating clear liquids, midline incision staples are intact, incision well approximated.

## 2025-04-05 ENCOUNTER — APPOINTMENT (OUTPATIENT)
Dept: GENERAL RADIOLOGY | Facility: CLINIC | Age: 70
DRG: 329 | End: 2025-04-05
Attending: INTERNAL MEDICINE
Payer: COMMERCIAL

## 2025-04-05 LAB
ALBUMIN SERPL BCG-MCNC: 3 G/DL (ref 3.5–5.2)
ALP SERPL-CCNC: 96 U/L (ref 40–150)
ALT SERPL W P-5'-P-CCNC: 20 U/L (ref 0–70)
ANION GAP SERPL CALCULATED.3IONS-SCNC: 12 MMOL/L (ref 7–15)
ANION GAP SERPL CALCULATED.3IONS-SCNC: 12 MMOL/L (ref 7–15)
AST SERPL W P-5'-P-CCNC: 23 U/L (ref 0–45)
BILIRUB SERPL-MCNC: 0.6 MG/DL
BUN SERPL-MCNC: 7.2 MG/DL (ref 8–23)
BUN SERPL-MCNC: 7.2 MG/DL (ref 8–23)
CALCIUM SERPL-MCNC: 9.3 MG/DL (ref 8.8–10.4)
CALCIUM SERPL-MCNC: 9.3 MG/DL (ref 8.8–10.4)
CHLORIDE SERPL-SCNC: 98 MMOL/L (ref 98–107)
CHLORIDE SERPL-SCNC: 98 MMOL/L (ref 98–107)
CREAT SERPL-MCNC: 0.73 MG/DL (ref 0.67–1.17)
CREAT SERPL-MCNC: 0.73 MG/DL (ref 0.67–1.17)
EGFRCR SERPLBLD CKD-EPI 2021: >90 ML/MIN/1.73M2
EGFRCR SERPLBLD CKD-EPI 2021: >90 ML/MIN/1.73M2
ERYTHROCYTE [DISTWIDTH] IN BLOOD BY AUTOMATED COUNT: 13.8 % (ref 10–15)
FLUAV RNA SPEC QL NAA+PROBE: NEGATIVE
FLUBV RNA RESP QL NAA+PROBE: NEGATIVE
GLUCOSE BLDC GLUCOMTR-MCNC: 134 MG/DL (ref 70–99)
GLUCOSE BLDC GLUCOMTR-MCNC: 150 MG/DL (ref 70–99)
GLUCOSE BLDC GLUCOMTR-MCNC: 154 MG/DL (ref 70–99)
GLUCOSE SERPL-MCNC: 132 MG/DL (ref 70–99)
GLUCOSE SERPL-MCNC: 132 MG/DL (ref 70–99)
HCO3 SERPL-SCNC: 25 MMOL/L (ref 22–29)
HCO3 SERPL-SCNC: 25 MMOL/L (ref 22–29)
HCT VFR BLD AUTO: 40.2 % (ref 40–53)
HGB BLD-MCNC: 13.2 G/DL (ref 13.3–17.7)
LACTATE SERPL-SCNC: 1 MMOL/L (ref 0.7–2)
MAGNESIUM SERPL-MCNC: 1.7 MG/DL (ref 1.7–2.3)
MCH RBC QN AUTO: 30.7 PG (ref 26.5–33)
MCHC RBC AUTO-ENTMCNC: 32.8 G/DL (ref 31.5–36.5)
MCV RBC AUTO: 94 FL (ref 78–100)
PLATELET # BLD AUTO: 186 10E3/UL (ref 150–450)
POTASSIUM SERPL-SCNC: 3.6 MMOL/L (ref 3.4–5.3)
POTASSIUM SERPL-SCNC: 3.6 MMOL/L (ref 3.4–5.3)
PROT SERPL-MCNC: 6.2 G/DL (ref 6.4–8.3)
RBC # BLD AUTO: 4.3 10E6/UL (ref 4.4–5.9)
RSV RNA SPEC NAA+PROBE: NEGATIVE
SARS-COV-2 RNA RESP QL NAA+PROBE: NEGATIVE
SODIUM SERPL-SCNC: 135 MMOL/L (ref 135–145)
SODIUM SERPL-SCNC: 135 MMOL/L (ref 135–145)
WBC # BLD AUTO: 11.9 10E3/UL (ref 4–11)

## 2025-04-05 PROCEDURE — 258N000003 HC RX IP 258 OP 636: Performed by: INTERNAL MEDICINE

## 2025-04-05 PROCEDURE — 250N000009 HC RX 250: Performed by: INTERNAL MEDICINE

## 2025-04-05 PROCEDURE — 250N000013 HC RX MED GY IP 250 OP 250 PS 637: Performed by: INTERNAL MEDICINE

## 2025-04-05 PROCEDURE — 250N000013 HC RX MED GY IP 250 OP 250 PS 637: Performed by: STUDENT IN AN ORGANIZED HEALTH CARE EDUCATION/TRAINING PROGRAM

## 2025-04-05 PROCEDURE — 99233 SBSQ HOSP IP/OBS HIGH 50: CPT | Performed by: INTERNAL MEDICINE

## 2025-04-05 PROCEDURE — 87637 SARSCOV2&INF A&B&RSV AMP PRB: CPT | Performed by: INTERNAL MEDICINE

## 2025-04-05 PROCEDURE — 250N000013 HC RX MED GY IP 250 OP 250 PS 637: Performed by: SURGERY

## 2025-04-05 PROCEDURE — 85018 HEMOGLOBIN: CPT | Performed by: SURGERY

## 2025-04-05 PROCEDURE — 36415 COLL VENOUS BLD VENIPUNCTURE: CPT | Performed by: INTERNAL MEDICINE

## 2025-04-05 PROCEDURE — 999N000128 HC STATISTIC PERIPHERAL IV START W/O US GUIDANCE

## 2025-04-05 PROCEDURE — 93005 ELECTROCARDIOGRAM TRACING: CPT

## 2025-04-05 PROCEDURE — 80053 COMPREHEN METABOLIC PANEL: CPT | Performed by: SURGERY

## 2025-04-05 PROCEDURE — 210N000002 HC R&B HEART CARE

## 2025-04-05 PROCEDURE — 80048 BASIC METABOLIC PNL TOTAL CA: CPT | Performed by: SURGERY

## 2025-04-05 PROCEDURE — 84295 ASSAY OF SERUM SODIUM: CPT | Performed by: INTERNAL MEDICINE

## 2025-04-05 PROCEDURE — 250N000011 HC RX IP 250 OP 636: Performed by: INTERNAL MEDICINE

## 2025-04-05 PROCEDURE — 36415 COLL VENOUS BLD VENIPUNCTURE: CPT | Performed by: SURGERY

## 2025-04-05 PROCEDURE — 71045 X-RAY EXAM CHEST 1 VIEW: CPT

## 2025-04-05 PROCEDURE — 83735 ASSAY OF MAGNESIUM: CPT | Performed by: INTERNAL MEDICINE

## 2025-04-05 PROCEDURE — 258N000003 HC RX IP 258 OP 636: Performed by: STUDENT IN AN ORGANIZED HEALTH CARE EDUCATION/TRAINING PROGRAM

## 2025-04-05 PROCEDURE — 87040 BLOOD CULTURE FOR BACTERIA: CPT | Performed by: INTERNAL MEDICINE

## 2025-04-05 PROCEDURE — 83605 ASSAY OF LACTIC ACID: CPT | Performed by: INTERNAL MEDICINE

## 2025-04-05 PROCEDURE — 93010 ELECTROCARDIOGRAM REPORT: CPT | Performed by: INTERNAL MEDICINE

## 2025-04-05 PROCEDURE — 99223 1ST HOSP IP/OBS HIGH 75: CPT | Mod: 24 | Performed by: INTERNAL MEDICINE

## 2025-04-05 RX ORDER — METOPROLOL TARTRATE 1 MG/ML
5 INJECTION, SOLUTION INTRAVENOUS ONCE
Status: COMPLETED | OUTPATIENT
Start: 2025-04-05 | End: 2025-04-05

## 2025-04-05 RX ORDER — POTASSIUM CHLORIDE 1500 MG/1
20 TABLET, EXTENDED RELEASE ORAL ONCE
Status: COMPLETED | OUTPATIENT
Start: 2025-04-05 | End: 2025-04-05

## 2025-04-05 RX ORDER — METOPROLOL TARTRATE 1 MG/ML
5 INJECTION, SOLUTION INTRAVENOUS EVERY 4 HOURS PRN
Status: DISCONTINUED | OUTPATIENT
Start: 2025-04-05 | End: 2025-04-29 | Stop reason: HOSPADM

## 2025-04-05 RX ORDER — METOPROLOL TARTRATE 25 MG/1
25 TABLET, FILM COATED ORAL 2 TIMES DAILY
Status: DISCONTINUED | OUTPATIENT
Start: 2025-04-05 | End: 2025-04-05

## 2025-04-05 RX ORDER — MAGNESIUM OXIDE 400 MG/1
400 TABLET ORAL EVERY 4 HOURS
Status: COMPLETED | OUTPATIENT
Start: 2025-04-05 | End: 2025-04-05

## 2025-04-05 RX ORDER — HEPARIN SODIUM 10000 [USP'U]/100ML
0-5000 INJECTION, SOLUTION INTRAVENOUS CONTINUOUS
Status: DISCONTINUED | OUTPATIENT
Start: 2025-04-05 | End: 2025-04-07

## 2025-04-05 RX ORDER — METOPROLOL TARTRATE 25 MG/1
25 TABLET, FILM COATED ORAL ONCE
Status: COMPLETED | OUTPATIENT
Start: 2025-04-05 | End: 2025-04-05

## 2025-04-05 RX ORDER — METOPROLOL TARTRATE 50 MG
50 TABLET ORAL 2 TIMES DAILY
Status: DISCONTINUED | OUTPATIENT
Start: 2025-04-05 | End: 2025-04-16

## 2025-04-05 RX ADMIN — FOLIC ACID 1000 MCG: 1 TABLET ORAL at 20:18

## 2025-04-05 RX ADMIN — SODIUM CHLORIDE: 9 INJECTION, SOLUTION INTRAVENOUS at 00:48

## 2025-04-05 RX ADMIN — METOPROLOL TARTRATE 50 MG: 50 TABLET, FILM COATED ORAL at 20:18

## 2025-04-05 RX ADMIN — TRAMADOL HYDROCHLORIDE 50 MG: 50 TABLET, COATED ORAL at 17:19

## 2025-04-05 RX ADMIN — Medication 400 MG: at 12:26

## 2025-04-05 RX ADMIN — TAMSULOSIN HYDROCHLORIDE 0.8 MG: 0.4 CAPSULE ORAL at 20:18

## 2025-04-05 RX ADMIN — METOPROLOL TARTRATE 5 MG: 5 INJECTION INTRAVENOUS at 18:22

## 2025-04-05 RX ADMIN — ACETAMINOPHEN 975 MG: 325 TABLET, FILM COATED ORAL at 00:50

## 2025-04-05 RX ADMIN — ACETAMINOPHEN 975 MG: 325 TABLET, FILM COATED ORAL at 17:19

## 2025-04-05 RX ADMIN — ENOXAPARIN SODIUM 40 MG: 40 INJECTION SUBCUTANEOUS at 09:22

## 2025-04-05 RX ADMIN — FAMOTIDINE 20 MG: 20 TABLET, FILM COATED ORAL at 09:18

## 2025-04-05 RX ADMIN — SODIUM CHLORIDE 500 ML: 0.9 INJECTION, SOLUTION INTRAVENOUS at 02:19

## 2025-04-05 RX ADMIN — TRAMADOL HYDROCHLORIDE 50 MG: 50 TABLET, COATED ORAL at 09:34

## 2025-04-05 RX ADMIN — ACETAMINOPHEN 975 MG: 325 TABLET, FILM COATED ORAL at 09:18

## 2025-04-05 RX ADMIN — TRAMADOL HYDROCHLORIDE 50 MG: 50 TABLET, COATED ORAL at 00:50

## 2025-04-05 RX ADMIN — Medication 400 MG: at 09:18

## 2025-04-05 RX ADMIN — ASPIRIN 81 MG: 81 TABLET, COATED ORAL at 09:18

## 2025-04-05 RX ADMIN — FAMOTIDINE 20 MG: 20 TABLET, FILM COATED ORAL at 20:18

## 2025-04-05 RX ADMIN — POTASSIUM CHLORIDE 20 MEQ: 1500 TABLET, EXTENDED RELEASE ORAL at 09:18

## 2025-04-05 RX ADMIN — METOPROLOL TARTRATE 25 MG: 25 TABLET, FILM COATED ORAL at 06:06

## 2025-04-05 RX ADMIN — HEPARIN SODIUM 1200 UNITS/HR: 10000 INJECTION, SOLUTION INTRAVENOUS at 17:16

## 2025-04-05 RX ADMIN — SODIUM CHLORIDE: 9 INJECTION, SOLUTION INTRAVENOUS at 18:00

## 2025-04-05 RX ADMIN — METOPROLOL TARTRATE 5 MG: 5 INJECTION INTRAVENOUS at 06:04

## 2025-04-05 RX ADMIN — METOPROLOL TARTRATE 25 MG: 25 TABLET, FILM COATED ORAL at 12:27

## 2025-04-05 RX ADMIN — METOPROLOL TARTRATE 5 MG: 5 INJECTION INTRAVENOUS at 03:31

## 2025-04-05 RX ADMIN — NICOTINE 1 PATCH: 21 PATCH, EXTENDED RELEASE TRANSDERMAL at 09:18

## 2025-04-05 RX ADMIN — ATORVASTATIN CALCIUM 80 MG: 80 TABLET, FILM COATED ORAL at 09:18

## 2025-04-05 RX ADMIN — FOLIC ACID 1000 MCG: 1 TABLET ORAL at 09:18

## 2025-04-05 ASSESSMENT — ACTIVITIES OF DAILY LIVING (ADL)
ADLS_ACUITY_SCORE: 60
ADLS_ACUITY_SCORE: 66
ADLS_ACUITY_SCORE: 63
ADLS_ACUITY_SCORE: 62
ADLS_ACUITY_SCORE: 62
ADLS_ACUITY_SCORE: 63
ADLS_ACUITY_SCORE: 62
ADLS_ACUITY_SCORE: 62
ADLS_ACUITY_SCORE: 63
ADLS_ACUITY_SCORE: 66
ADLS_ACUITY_SCORE: 62
ADLS_ACUITY_SCORE: 60
ADLS_ACUITY_SCORE: 63
ADLS_ACUITY_SCORE: 60
ADLS_ACUITY_SCORE: 63
ADLS_ACUITY_SCORE: 62
ADLS_ACUITY_SCORE: 62
ADLS_ACUITY_SCORE: 63
ADLS_ACUITY_SCORE: 60
ADLS_ACUITY_SCORE: 62
ADLS_ACUITY_SCORE: 62
ADLS_ACUITY_SCORE: 63
ADLS_ACUITY_SCORE: 60

## 2025-04-05 NOTE — PROGRESS NOTES
Dimitri sosa 101, MD aware, orders for BC and lactic drawn.  Resp PCR neg, chest x-ray results pending.  Mg and K+ replaced, redraw in AM.  Tachycardic, 160s overnight, aflutter, and sustained in 120-130s this afternoon.  Cards consulted and additional medication given with little effect.  [facility] for [facility] monitoring per Hospitalist, report called to Carlos Enrique 1330, pt transferred 1400.      Sugery needs to okay the Heparin drip that Cards would like to initiate.

## 2025-04-05 NOTE — CONSULTS
[facility]    Cardiology Consultation     Date of Admission:  3/30/2025    Assessment & Plan   Richar Davis is a 69 year old male who was admitted on 3/30/2025.      1.  Atrial flutter-new onset, asymptomatic. Likely due to acute issues.  2.  Coronary disease status post bypass. LIMA/LAD, SVG/PDA and SVG to distal circumflex  3.  Aortic valve replacement with 25 mm bioprosthesis-stable gradients on last echo in 12/2024   4.  Essential hypertension  5.  Hyperlipidemia  6.  Obstructive sleep apnea  7.  Tobacco use  8.  Type 2 diabetes    Recommendation:   Stop enoxaparin and start heparin drip for anticoagulation if okay with surgery team.  If patient remains in atrial fibrillation with perform cardioversion on Monday.  In such a scenario, he will require anticoagulation at least for a few weeks after cardioversion..  Increase metoprolol to 50 mg twice daily.  Can use 5 mg IV push for ventricular rates over 140. Will hold off on starting a dilt drip due to soft BP. Hydrate adequately to support bp.  We will continue to follow.     High complexity     Fahad Wei MD, MD    Primary Care Physician   Cory Valles    Reason for Consult   Reason for consult: I was asked to evaluate this patient for atrial flutter.    History of Present Illness   Richar Davis is a 69 year old male with past medical history of coronary disease status post CABG and aortic valve replacement with a bioprosthetic prosthesis in 2022, essential hypertension, type 2 diabetes who is now status post ex lap for gallstone ileus with enterotomy and removal of gallstone and transverse closure complicated by possible ileus.  Cardiology has been consulted because the patient was noted to develop atrial flutter [asymptomatic] yesterday.  He has no history of atrial flutter but did develop atrial fibrillation postop after cardiac surgery.  This is not recurred.  His heart rates have been in the 120s-130s and he  has been treated with several doses of IV metoprolol with good effect.    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    BYPASS GRAFT ARTERY CORONARY, REPLACE VALVE AORTIC, COMBINED N/A 10/20/2022    Procedure: CORONARY ARTERY BYPASS GRAFT x 3 (LEFT INTERNAL MAMMARY ARTERY - LEFT ANTERIOR DESCENDING ARTERY; SAPHENOUS VEIN - POSTERIOR DESCENDING ARTERY; SAPHENOUS VEIN - CIRCUMFLEX ARTERY) WITH ENDOSCOPIC LESSER SAPHENOUS VEIN HARVEST ON BILATERAL LOWER EXTREMITY, AORTIC VALVE REPLACEMENT WITH TISSUE HEART VALVE INSPIRIS  RESILIA  AORTIC VALVE SIZE: 25MM, AND ON CARDIOPULMONARY PUMP OXYGENATOR  (    CTA ANGIOGRAM CORONARY ARTERY      LAPAROTOMY EXPLORATORY N/A 3/30/2025    Procedure: exploratory laparotomy with enterotomy and removal of gallstone;  Surgeon: Clau Calles MD;  Location: [facility] OR    ORTHOPEDIC SURGERY Left     elbow surgery         Prior to Admission Medications   Prior to Admission Medications   Prescriptions Last Dose Informant Patient Reported? Taking?   Ascorbic Acid (VITAMIN C) 500 MG CAPS 3/29/2025 Morning Self Yes Yes   Sig: Take 500 mg by mouth daily   Cyanocobalamin (VITAMIN B-12 PO) 3/29/2025 Morning Self Yes Yes   Sig: Take 1 tablet by mouth every other day. Unknown dose   MAGNESIUM PO 3/29/2025 Morning  Yes Yes   Sig: Take 500 mg by mouth daily.   Multiple Vitamins-Minerals (MENS 50+ MULTI VITAMIN/MIN PO) 3/29/2025 Morning Self Yes Yes   Sig: Take 1 tablet by mouth daily   Multiple Vitamins-Minerals (PRESERVISION AREDS 2 PO) Past Week  Yes Yes   Sig: Take by mouth daily   acetaminophen (TYLENOL) 325 MG tablet Unknown  No Yes   Sig: Take 2 tablets (650 mg) by mouth every 4 hours as needed for other (For optimal non-opioid  multimodal pain management to improve pain control.)   amoxicillin (AMOXIL) 500 MG capsule   No No   Sig: Take 4 capsules (2,000 mg) 30-60 minutes prior to your dental procedure   aspirin (ASPIRIN LOW DOSE) 81 MG EC tablet Past Week  Yes Yes   Sig: Take 81 mg by mouth daily   atorvastatin (LIPITOR) 80 MG tablet Past Week  No Yes   Sig: Take 1 tablet (80 mg) by mouth daily.   diphenhydrAMINE (BENADRYL) 50 MG tablet Unknown Self Yes Yes   Sig: Take 100 mg by mouth every 6 hours as needed for itching or allergies   fish oil-omega-3 fatty acids 1000 MG capsule Past Month  Yes Yes   Sig: Take 2 g by mouth daily   folic acid (FOLVITE) 1 MG tablet Past Month Self Yes Yes   Sig: Take 1,000 mcg by mouth 2 times daily   furosemide (LASIX) 20 MG tablet Past Week  No Yes   Sig: Take 1 tablet (20 mg) by mouth daily.   gabapentin (NEURONTIN) 800 MG tablet Past Week  Yes Yes   Sig: Take 800 mg by mouth 3 times daily as needed.   metFORMIN (GLUCOPHAGE) 500 MG tablet Past Week  No Yes   Sig: Take 0.5 tablets (250 mg) by mouth daily (with dinner) (0.5 x 500 mg = 250 mg)   metoprolol succinate ER (TOPROL XL) 25 MG 24 hr tablet Past Week  No Yes   Sig: Take 1 tablet (25 mg) by mouth every evening   tamsulosin (FLOMAX) 0.4 MG capsule Past Week Self Yes Yes   Sig: Take 0.8 mg by mouth every evening. (2 x 0.4 mg = 0.8 mg)   traMADol (ULTRAM) 50 MG tablet Past Week  Yes Yes   Sig: Take 50 mg by mouth 4 times daily as needed for severe pain.   vitamin D3 (CHOLECALCIFEROL) 50 mcg (2000 units) tablet Past Week Self Yes Yes   Sig: Take 1 tablet by mouth every other day      Facility-Administered Medications: None     Current Facility-Administered Medications   Medication Dose Route Frequency Provider Last Rate Last Admin    acetaminophen (TYLENOL) tablet 975 mg  975 mg Oral Q8H Clau Calles MD   975 mg at 04/05/25 0918    aspirin EC tablet 81 mg  81 mg Oral Daily Khari Peña MD   81 mg at 04/05/25 0918    atorvastatin (LIPITOR)  tablet 80 mg  80 mg Oral Daily Khari Peña MD   80 mg at 04/05/25 0918    benzocaine-menthol (CHLORASEPTIC) 6-10 MG lozenge 1 lozenge  1 lozenge Buccal Q1H PRN Clau Calles MD   1 lozenge at 04/01/25 0334    bisacodyl (DULCOLAX) suppository 10 mg  10 mg Rectal Daily PRN Clau Calles MD        glucose gel 15-30 g  15-30 g Oral Q15 Min PRN Jake Blevins DO        Or    dextrose 50 % injection 25-50 mL  25-50 mL Intravenous Q15 Min PRN Jake Blevins DO        Or    glucagon injection 1 mg  1 mg Subcutaneous Q15 Min PRN Jake Blevins DO        diazepam (VALIUM) injection 2.5 mg  2.5 mg Intravenous Q6H PRN Clau Calles MD        diphenhydrAMINE (BENADRYL) elixir 12.5 mg  12.5 mg Oral Q6H PRN Clau Calles MD        Or    diphenhydrAMINE (BENADRYL) injection 12.5 mg  12.5 mg Intravenous Q6H PRN Clau Calles MD        enoxaparin ANTICOAGULANT (LOVENOX) injection 40 mg  40 mg Subcutaneous Q24H Maddie Schafer MD   40 mg at 04/05/25 0922    famotidine (PEPCID) tablet 20 mg  20 mg Oral BID Terry Patino MD   20 mg at 04/05/25 0918    folic acid (FOLVITE) tablet 1,000 mcg  1,000 mcg Oral BID Khari Peña MD   1,000 mcg at 04/05/25 0918    [Held by provider] furosemide (LASIX) tablet 20 mg  20 mg Oral Daily Maddie Schafer MD   20 mg at 04/04/25 1017    gabapentin (NEURONTIN) tablet 800 mg  800 mg Oral TID PRN Clau Calles MD        HYDROmorphone (DILAUDID) injection 0.2 mg  0.2 mg Intravenous Q2H PRN Clau Calles MD   0.2 mg at 04/03/25 0600    Or    HYDROmorphone (PF) (DILAUDID) injection 0.5 mg  0.5 mg Intravenous Q2H PRN Clau Calles MD   0.5 mg at 04/01/25 0034    insulin aspart (NovoLOG) injection (RAPID ACTING)  1-3 Units Subcutaneous TID AC Jake Blevins DO   1 Units at 04/04/25 1708    insulin aspart (NovoLOG) injection (RAPID ACTING)  1-3 Units Subcutaneous At Bedtime Jake Blevins DO        magnesium hydroxide  (MILK OF MAGNESIA) suspension 30 mL  30 mL Oral Daily PRN Clau Calles MD   30 mL at 04/01/25 0427    magnesium oxide (MAG-OX) tablet 400 mg  400 mg Oral Q4H Mann Torres DO   400 mg at 04/05/25 0918    methocarbamol (ROBAXIN) half-tab 250 mg  250 mg Oral Q6H PRN Clau Calles MD        metoprolol (LOPRESSOR) injection 5 mg  5 mg Intravenous Q4H PRN Agustin Ramires MD   5 mg at 04/05/25 0331    metoprolol tartrate (LOPRESSOR) tablet 25 mg  25 mg Oral BID Agustin Ramires MD        naloxone (NARCAN) injection 0.2 mg  0.2 mg Intravenous Q2 Min PRN Clau Calles MD        Or    naloxone (NARCAN) injection 0.4 mg  0.4 mg Intravenous Q2 Min PRN Clau Calles MD        Or    naloxone (NARCAN) injection 0.2 mg  0.2 mg Intramuscular Q2 Min PRN Clau Calles MD        Or    naloxone (NARCAN) injection 0.4 mg  0.4 mg Intramuscular Q2 Min PRN Clau Calles MD        nicotine (COMMIT) lozenge 2 mg  2 mg Buccal Q1H PRN Damian Griffin MD   2 mg at 04/03/25 1145    nicotine (NICODERM CQ) 21 MG/24HR 24 hr patch 1 patch  1 patch Transdermal Daily Maddie Schafer MD   1 patch at 04/05/25 0918    ondansetron (ZOFRAN ODT) ODT tab 4 mg  4 mg Oral Q6H PRN Clau Calles MD   4 mg at 04/03/25 2226    Or    ondansetron (ZOFRAN) injection 4 mg  4 mg Intravenous Q6H PRN Clau Calles MD   4 mg at 04/03/25 0518    phenol (CHLORASEPTIC) 1.4 % spray 1 mL  1 spray Mouth/Throat Q1H PRN Clau Calles MD        polyethylene glycol (MIRALAX) Packet 17 g  17 g Oral Daily Clau Calles MD   17 g at 04/03/25 0800    prochlorperazine (COMPAZINE) injection 5 mg  5 mg Intravenous Q6H PRN Clau Calles MD   5 mg at 04/04/25 0112    Or    prochlorperazine (COMPAZINE) tablet 5 mg  5 mg Oral Q6H PRN Clau Calles MD   5 mg at 04/03/25 0756    sodium chloride (PF) 0.9% PF flush 3 mL  3 mL Intracatheter Q8H [facility] Clau Calles MD   3 mL at 04/03/25 1149    sodium chloride (PF) 0.9% PF flush 3 mL  3  mL Intracatheter q1 min prn Clau Calles MD        sodium chloride 0.9 % infusion   Intravenous Continuous Terry Patino  mL/hr at 04/05/25 0048 New Bag at 04/05/25 0048    tamsulosin (FLOMAX) capsule 0.8 mg  0.8 mg Oral QPM Khari Peña MD   0.8 mg at 04/04/25 2106    traMADol (ULTRAM) tablet 50 mg  50 mg Oral Q6H PRN Ana Dougherty MD   50 mg at 04/05/25 0934     Current Facility-Administered Medications   Medication Dose Route Frequency Provider Last Rate Last Admin    acetaminophen (TYLENOL) tablet 975 mg  975 mg Oral Q8H Clau Calles MD   975 mg at 04/05/25 0918    aspirin EC tablet 81 mg  81 mg Oral Daily Khari Peña MD   81 mg at 04/05/25 0918    atorvastatin (LIPITOR) tablet 80 mg  80 mg Oral Daily Khari Peña MD   80 mg at 04/05/25 0918    benzocaine-menthol (CHLORASEPTIC) 6-10 MG lozenge 1 lozenge  1 lozenge Buccal Q1H PRN Clau Calles MD   1 lozenge at 04/01/25 0334    bisacodyl (DULCOLAX) suppository 10 mg  10 mg Rectal Daily PRN Clau Calles MD        glucose gel 15-30 g  15-30 g Oral Q15 Min PRN Jake Blevins DO        Or    dextrose 50 % injection 25-50 mL  25-50 mL Intravenous Q15 Min PRN Jake Blevins DO        Or    glucagon injection 1 mg  1 mg Subcutaneous Q15 Min PRN Jake Blevins DO        diazepam (VALIUM) injection 2.5 mg  2.5 mg Intravenous Q6H PRN Clau Calles MD        diphenhydrAMINE (BENADRYL) elixir 12.5 mg  12.5 mg Oral Q6H PRN Clau Calles MD        Or    diphenhydrAMINE (BENADRYL) injection 12.5 mg  12.5 mg Intravenous Q6H PRN Clau Calles MD        enoxaparin ANTICOAGULANT (LOVENOX) injection 40 mg  40 mg Subcutaneous Q24H Maddie Schafer MD   40 mg at 04/05/25 0922    famotidine (PEPCID) tablet 20 mg  20 mg Oral BID Terry Patino MD   20 mg at 04/05/25 0918    folic acid (FOLVITE) tablet 1,000 mcg  1,000 mcg Oral BID Khari Peña MD   1,000 mcg at 04/05/25 0918    [Held  by provider] furosemide (LASIX) tablet 20 mg  20 mg Oral Daily Maddie Schafer MD   20 mg at 04/04/25 1017    gabapentin (NEURONTIN) tablet 800 mg  800 mg Oral TID PRN Clau Calles MD        HYDROmorphone (DILAUDID) injection 0.2 mg  0.2 mg Intravenous Q2H PRN Clau Calles MD   0.2 mg at 04/03/25 0600    Or    HYDROmorphone (PF) (DILAUDID) injection 0.5 mg  0.5 mg Intravenous Q2H PRN Clau Calles MD   0.5 mg at 04/01/25 0034    insulin aspart (NovoLOG) injection (RAPID ACTING)  1-3 Units Subcutaneous TID AC Jake Blevins DO   1 Units at 04/04/25 1708    insulin aspart (NovoLOG) injection (RAPID ACTING)  1-3 Units Subcutaneous At Bedtime Jake Blevins DO        magnesium hydroxide (MILK OF MAGNESIA) suspension 30 mL  30 mL Oral Daily PRN Clau Calles MD   30 mL at 04/01/25 0427    magnesium oxide (MAG-OX) tablet 400 mg  400 mg Oral Q4H Mann Torres DO   400 mg at 04/05/25 0918    methocarbamol (ROBAXIN) half-tab 250 mg  250 mg Oral Q6H PRN Clau Calles MD        metoprolol (LOPRESSOR) injection 5 mg  5 mg Intravenous Q4H PRN Agustin Ramires MD   5 mg at 04/05/25 0331    metoprolol tartrate (LOPRESSOR) tablet 25 mg  25 mg Oral BID Agustin Ramires MD        naloxone (NARCAN) injection 0.2 mg  0.2 mg Intravenous Q2 Min PRN Clau Calles MD        Or    naloxone (NARCAN) injection 0.4 mg  0.4 mg Intravenous Q2 Min PRN Clau Calles MD        Or    naloxone (NARCAN) injection 0.2 mg  0.2 mg Intramuscular Q2 Min PRN Clau Calles MD        Or    naloxone (NARCAN) injection 0.4 mg  0.4 mg Intramuscular Q2 Min PRN Clau Calles MD        nicotine (COMMIT) lozenge 2 mg  2 mg Buccal Q1H PRN Damian Griffin MD   2 mg at 04/03/25 1145    nicotine (NICODERM CQ) 21 MG/24HR 24 hr patch 1 patch  1 patch Transdermal Daily Maddie Schafer MD   1 patch at 04/05/25 0918    ondansetron (ZOFRAN ODT) ODT tab 4 mg  4 mg Oral Q6H PRN Clau Calles MD   4 mg at  04/03/25 2226    Or    ondansetron (ZOFRAN) injection 4 mg  4 mg Intravenous Q6H PRN Clau Calles MD   4 mg at 04/03/25 0518    phenol (CHLORASEPTIC) 1.4 % spray 1 mL  1 spray Mouth/Throat Q1H PRN Clau Calles MD        polyethylene glycol (MIRALAX) Packet 17 g  17 g Oral Daily Clau Calles MD   17 g at 04/03/25 0800    prochlorperazine (COMPAZINE) injection 5 mg  5 mg Intravenous Q6H PRN Clau Calles MD   5 mg at 04/04/25 0112    Or    prochlorperazine (COMPAZINE) tablet 5 mg  5 mg Oral Q6H PRN Clau Calles MD   5 mg at 04/03/25 0756    sodium chloride (PF) 0.9% PF flush 3 mL  3 mL Intracatheter Q8H MITA Clau Calles MD   3 mL at 04/03/25 1149    sodium chloride (PF) 0.9% PF flush 3 mL  3 mL Intracatheter q1 min prn Clau Calles MD        sodium chloride 0.9 % infusion   Intravenous Continuous Terry Patino  mL/hr at 04/05/25 0048 New Bag at 04/05/25 0048    tamsulosin (FLOMAX) capsule 0.8 mg  0.8 mg Oral QPM Khari Peña MD   0.8 mg at 04/04/25 2106    traMADol (ULTRAM) tablet 50 mg  50 mg Oral Q6H PRN Ana Dougherty MD   50 mg at 04/05/25 0934     Allergies   Allergies   Allergen Reactions    Niaspan [Niacin] Other (See Comments)     flushing       Social History    reports that he has quit smoking. His smokeless tobacco use includes chew. He reports that he does not currently use alcohol. He reports that he does not use drugs.      Family History            Review of Systems   A comprehensive review of system was performed and is negative other than that noted in the HPI or here.     Physical Exam   Vital Signs with Ranges  Temp:  [97.5  F (36.4  C)-101  F (38.3  C)] 101  F (38.3  C)  Pulse:  [] 128  Resp:  [16-18] 16  BP: ()/(59-93) 118/79  SpO2:  [96 %-98 %] 98 %  Wt Readings from Last 4 Encounters:   04/04/25 130.4 kg (287 lb 7.7 oz)   12/16/24 131 kg (288 lb 12.8 oz)   12/12/24 129.2 kg (284 lb 14.4 oz)   06/10/24 127.8 kg (281 lb 12.8 oz)  "    I/O last 3 completed shifts:  In: 1712 [P.O.:640; I.V.:1072]  Out: -       Vitals: /79 (BP Location: Right arm)   Pulse (!) 128   Temp 101  F (38.3  C) (Oral)   Resp 16   Ht 1.829 m (6')   Wt 130.4 kg (287 lb 7.7 oz)   SpO2 98%   BMI 38.99 kg/m      Physical Exam:   General - Alert and oriented to time place and person in no acute distress  Eyes - No scleral icterus  HEENT - Neck supple, moist mucous membranes  Cardiovascular -irregularly irregular rhythm, S1-S2 normal, soft systolic murmur at the aortic area   Extremities - There is no edema  Respiratory -CTAB  Skin - No pallor or cyanosis  Gastrointestinal - Non tender and non distended without rebound or guarding  Psych - Appropriate affect   Neurological - No gross motor neurological focal deficits    No lab results found in last 7 days.    Invalid input(s): \"TROPONINIES\"    Recent Labs   Lab 04/05/25  0720 04/05/25  0107 04/04/25  2225 04/04/25  1214 04/04/25  0601 04/02/25  1151 04/02/25  0746 04/01/25  0806 04/01/25  0709 03/31/25  0820 03/31/25  0809 03/30/25  2123 03/30/25  1455   WBC 11.9*  --   --   --   --   --  4.3  --  4.0  --  10.6  --  9.6   HGB 13.2*  --   --   --  14.2  --  13.4  --  14.2  --  15.7  --  15.9   MCV 94  --   --   --   --   --  93  --  94  --  94  --  92     --   --   --   --   --  154  --  140*  --  196  --  199     135  --   --   --  137  --  141  --  137  --  138  --  135   POTASSIUM 3.6  3.6  --   --   --  3.4  --  3.8  --  4.1  --  4.3  --  4.3   CHLORIDE 98  98  --   --   --  98  --  103  --  100  --  94*  --  93*   CO2 25 25  --   --   --  28  --  27  --  25  --  29  --  30*   BUN 7.2*  7.2*  --   --   --  7.9*  --  16.9  --  21.4  --  22.2  --  18.9   CR 0.73  0.73  --   --   --  0.84  --  0.83  --  0.80  --  1.09  --  0.92   GFRESTIMATED >90  >90  --   --   --  >90  --  >90  --  >90  --  74  --  >90   ANIONGAP 12  12  --   --   --  11  --  11  --  12  --  15  --  12   HALEY 9.3  9.3  --   " "--   --  9.7  --  9.4  --  9.4  --  10.1  --  10.4   *  132* 134* 142*   < > 137*   < > 142*   < > 184*   < > 183*   < > 171*   ALBUMIN 3.0*  --   --   --   --   --   --   --   --   --  4.1  --  4.1   PROTTOTAL 6.2*  --   --   --   --   --   --   --   --   --  7.8  --  7.9   BILITOTAL 0.6  --   --   --   --   --   --   --   --   --  0.8  --  0.7   ALKPHOS 96  --   --   --   --   --   --   --   --   --  118  --  133   ALT 20  --   --   --   --   --   --   --   --   --  17  --  21   AST 23  --   --   --   --   --   --   --   --   --  25  --  34   LIPASE  --   --   --   --   --   --   --   --   --   --   --   --  33    < > = values in this interval not displayed.     No results for input(s): \"CHOL\", \"HDL\", \"LDL\", \"TRIG\", \"CHOLHDLRATIO\" in the last 46009 hours.  Recent Labs   Lab 04/05/25  0720 04/04/25  0601 04/02/25  0746 04/01/25  0709   WBC 11.9*  --  4.3 4.0   HGB 13.2* 14.2 13.4 14.2   HCT 40.2  --  40.7 43.9   MCV 94  --  93 94     --  154 140*     No results for input(s): \"PH\", \"PHV\", \"PO2\", \"PO2V\", \"SAT\", \"PCO2\", \"PCO2V\", \"HCO3\", \"HCO3V\" in the last 168 hours.  No results for input(s): \"NTBNPI\", \"NTBNP\" in the last 168 hours.  No results for input(s): \"DD\" in the last 168 hours.  No results for input(s): \"SED\", \"CRP\" in the last 168 hours.  Recent Labs   Lab 04/05/25  0720 04/02/25  0746 04/01/25  0709    154 140*     No results for input(s): \"TSH\" in the last 168 hours.  No results for input(s): \"COLOR\", \"APPEARANCE\", \"URINEGLC\", \"URINEBILI\", \"URINEKETONE\", \"SG\", \"UBLD\", \"URINEPH\", \"PROTEIN\", \"UROBILINOGEN\", \"NITRITE\", \"LEUKEST\", \"RBCU\", \"WBCU\" in the last 168 hours.    Imaging:  Recent Results (from the past 48 hours)   XR Abdomen 1 View    Narrative    EXAM: XR ABDOMEN 1 VIEW  LOCATION: [facility]  DATE: 4/3/2025    INDICATION: ongoing nausea after ex lap for gallstone ileus, evaluate for post op ileus.  COMPARISON: CT 3/30/2025.      Impression    IMPRESSION: " There is a 2.5 cm calcified gallstone in the right upper quadrant correlating with prior CT. No other abdominal calcification. There is abnormally dilated small bowel in the left midabdomen measuring up to 4-5 cm compatible with ileus. No   free air. Imaged portions of the lower chest show changes of prior sternotomy and aortic valve replacement. Multiple lower abdominal wall skin staples.       Echo:  No results found for this or any previous visit (from the past 4320 hours).    Clinically Significant Risk Factors               # Hypoalbuminemia: Lowest albumin = 3 g/dL at 4/5/2025  7:20 AM, will monitor as appropriate     # Hypertension: Noted on problem list           # DMII: A1C = 6.8 % (Ref range: <5.7 %) within past 6 months   # Obesity: Estimated body mass index is 38.99 kg/m  as calculated from the following:    Height as of this encounter: 1.829 m (6').    Weight as of this encounter: 130.4 kg (287 lb 7.7 oz).      # Financial/Environmental Concerns: none   # History of CABG: noted on surgical history      Native vessel CAD  Cardiac Arrhythmia: Atrial flutter: Typical  Heart Valve Replacement  Peripheral vascular disease (PVD)

## 2025-04-05 NOTE — PROVIDER NOTIFICATION
MD Notification    Notified Person: MD    Notified Person Name: Agustin Ramires    Notification Date/Time: 4/5/2025 0142    Notification Interaction: [employer] web page    Purpose of Notification: sustained, asymptomatic (he is sleeping) -141. Scheduled metoprolol XL was given at 2106. Orders for IV Metoprolol and given IVP was at 2330    Orders Received:    Comments:

## 2025-04-05 NOTE — PROGRESS NOTES
General Surgery Progress Note    Admission Date: 3/30/2025  Today's Date: 4/5/2025         Assessment:      Richar Davis is a 69 year old male with gallstone ileus s/p ex lap and small bowel enterotomy with removal of gallstone POD 6. Return of bowel function, NGT removed 4/3, advanced from CLD to low fiber, nausea and emesis with low fiber diet but tolerating clears. In [facility], cardiology consulted, possible cardioversion Monday 4/7.         Plan:   - Okay for FLD  - Pain control with Tylenol and tramadol  - Miralax daily   - Okay for heparin gtt for surgical perspective and therapeutic anticoagulation  - Medical management per hospitalist, appreciate input          Interval History:   Denies abdominal pain at rest, some with movement. Having loose BM. Denies nausea or vomiting. Tolerating CLD.       Physical Exam:   /75 (BP Location: Left arm)   Pulse (!) 127   Temp 97.9  F (36.6  C)   Resp 16   Ht 1.829 m (6')   Wt 130.4 kg (287 lb 7.7 oz)   SpO2 96%   BMI 38.99 kg/m    I/O last 3 completed shifts:  In: 1712 [P.O.:640; I.V.:1072]  Out: -   General: NAD, pleasant, alert and awake  Respiratory: non-labored breathing on room air, no cough  Abdomen: soft, rotund, nontender, incision closed with staples c/d/i  Incision: midline incision intact with staples, c/d/I without surrounding erythema or discharge  LABS:  Recent Labs   Lab Test 04/05/25  0720 04/04/25  0601 04/02/25  0746 04/01/25  0709   WBC 11.9*  --  4.3 4.0   HGB 13.2* 14.2 13.4 14.2   MCV 94  --  93 94     --  154 140*      Recent Labs   Lab Test 04/05/25  0720 04/04/25  0601 04/02/25  0746   POTASSIUM 3.6  3.6 3.4 3.8   CHLORIDE 98  98 98 103   CO2 25  25 28 27   BUN 7.2*  7.2* 7.9* 16.9   CR 0.73  0.73 0.84 0.83   ANIONGAP 12  12 11 11     Ana Dougherty, PGY4  Surgery       Expiration Date (Month Year): 11/2025

## 2025-04-05 NOTE — PROGRESS NOTES
[facility]    Medicine Progress Note - Hospitalist Service    Date of Admission:  3/30/2025    Assessment & Plan   Richar Davis is a 69 year old male with a history of diabetes, hypertension, CABG, status post AVR with bovine valve 10/2022, untreated sleep apnea, hypertension, hyperlipidemia, neuropathy, BPH who presented to the emergency department with abdominal pain, nausea and vomiting.  Imaging showing gallstone ileus with a 2.2 cm gallstone impacted.  S/P surgery 3/30/25 as outlined below.  Post-op has had issues with ileus, fever, and aflutter with RVR.     Gallstone ileus distal small bowel obstruction secondary to impacted 2.2 cm gallstone  Exploratory laparotomy with small bowel enterotomy and removal of gallstone on March 30, 2025:  Summary:  3/30 OR ex lap small bowel enterotomy and removal of gallstone with primary closure.  See operative report for further details. 4/1 started to have return of bowel function. 4/2 NG Removed and started on low fiber diet but clinically got worse so transition back to CLD normal 4/3.      -Tolerating clears and surgical team is advancing to fulls.  ABD surgical site appears stable, pain controlled.  No overt evidence source of fever related to new ABD issue.  -Post-op site cares, activity restrictions, diet, pain meds per surgical team     CAD  Aortic stenosis (status post AVR with bovine valve) 10/22   CABG X 3 10/22  Hypertension  New Aflutter with RVR 4/5:  Summary:  Patient with history of CABG, also aortic stenosis status post AVR with 25 mm Bal Inspiris Resilia bovine valve in October 2022, see recent cardiology visit dated December 2024 for further details.  - ASA EC 81mg    -Appears to have developed new Aflutter.  Overnight and this AM treated with multiple doses metoprolol (IV and PO) with modest rate improvement.  Has remained in flutter.  Cardiology consulted.  I discussed the case with them.  They recommended heparin  drip and continued work on modest rate control.  Surgical team this afternoon has reviewed patient and feels heparin drip can start.  If he doesn't spontaneously convert by Monday tentative plan for cardioversion 4/7/25.  Given ongoing rate issues patient also moving to [facility] unit today.     Fevers:  -Unclear source.  More overt one this AM at 101.  Checked COVID/Flu/RSV which was negative.  BC x 2 pending.  Surgical site/ABD not overtly worse and prior GI complaints better.  CXR pending though he denies any worsening resp complaints.  UA requested/pending.  Plan to hold on abx and monitor unless pending studies suggest bacterial infection source.  Tylenol PRN fevers.  If fevers worsen and no obvious source otherwise consider CT ABD/Pelvis.    Probable Obstructive sleep apnea:   - Follow-up with outpatient, not on baseline tx.       Hyperlipidemia:   - Continue prior to admission statin     History of edema:   -Hold lasix for now with fevers/rate issues.     Neuropathy:   *Neurontin 800 mg 3 times daily as needed has been filled but patient reports taking 300mg TID  *4/1 per pharmacy note only recent fills are 800 mg and not 300 mg dosing.   -Patient not really using now and denying any worsening neuropathy. Will keep on hold.     Diabetes:   - Hold prior to admission metformin given oral intake issues.  - Insulin sliding scale.     BPH:   - Continue PTA Flomax 0.8 mg daily     Nicotine use: Chews tobacco  -Continue nicotine patch, and as needed nicotine lozenges.  Long term cessation to be encouraged during stay.     Family Update:  Family by phone with patient visit  PPE Used:  Mask        Diet: Full Liquid Diet    DVT Prophylaxis: Heparin drip  Mccarty Catheter: Not present  Lines: None     Cardiac Monitoring: ACTIVE order. Indication: Tachyarrhythmias, acute (48 hours)  Code Status: Full Code      Clinically Significant Risk Factors               # Hypoalbuminemia: Lowest albumin = 3 g/dL at 4/5/2025  7:20 AM, will  monitor as appropriate     # Hypertension: Noted on problem list           # DMII: A1C = 6.8 % (Ref range: <5.7 %) within past 6 months   # Obesity: Estimated body mass index is 38.99 kg/m  as calculated from the following:    Height as of this encounter: 1.829 m (6').    Weight as of this encounter: 130.4 kg (287 lb 7.7 oz).      # Financial/Environmental Concerns: none   # History of CABG: noted on surgical history       Disposition Plan     Medically Ready for Discharge: Anticipated in 2-4 Days             Mann Torres DO  Hospitalist Service  [facility]  Securely message with [employer] (more info)  Text page via [facility] Paging/Directory   ______________________________________________________________________    Interval History   Assumed hospitalist team care, history reviewed.  Mr. Davis this AM felt better, however RN reported ongoing tachycardia overnight.  He tolerated clear liquids.  Nausea improved.  No chest pain, sob.  He was surprised by fever and denies increased cough, sore throat, dysuria, or feeling more ill this AM.    Physical Exam   Vital Signs: Temp: 97.9  F (36.6  C) Temp src: Oral BP: 116/75 Pulse: (!) 127   Resp: 16 SpO2: 96 % O2 Device: None (Room air)    Weight: 287 lbs 7.68 oz    GEN:  Alert, oriented x 3, appears ill but comfortable.  HEENT:  Normocephalic/atraumatic, no scleral icterus, no nasal discharge, mouth moist.  CV:  Tachy with rate 120's, distant.   LUNGS:  Clear to auscultation bilaterally without rales/rhonchi/wheezing/retractions.  Breath sounds mildly diminished bases.  Symmetric chest rise on inhalation noted.  ABD:  Active bowel sounds, soft, slight tenderness toward surgical site, mildly distended.  No guarding/rigidity.  Detailed site exam deferred to surgical team.  EXT:  Trace edema.  No cyanosis.  No new joint synovitis noted.  SKIN:  Dry to touch, no new exanthems noted in the visualized areas.    Medical Decision Making       Over 60  MINUTES SPENT BY ME on the date of service doing chart review, history, exam, documentation & further activities per the note.      Data   Medications   Current Facility-Administered Medications   Medication Dose Route Frequency Provider Last Rate Last Admin    sodium chloride 0.9 % infusion   Intravenous Continuous Terry Patino  mL/hr at 04/05/25 0048 New Bag at 04/05/25 0048     Current Facility-Administered Medications   Medication Dose Route Frequency Provider Last Rate Last Admin    acetaminophen (TYLENOL) tablet 975 mg  975 mg Oral Q8H Clau Calles MD   975 mg at 04/05/25 0918    aspirin EC tablet 81 mg  81 mg Oral Daily Khari Peña MD   81 mg at 04/05/25 0918    atorvastatin (LIPITOR) tablet 80 mg  80 mg Oral Daily Khari Peña MD   80 mg at 04/05/25 0918    [Held by provider] enoxaparin ANTICOAGULANT (LOVENOX) injection 40 mg  40 mg Subcutaneous Q24H Maddie Schafer MD   40 mg at 04/05/25 0922    famotidine (PEPCID) tablet 20 mg  20 mg Oral BID Terry Patino MD   20 mg at 04/05/25 0918    folic acid (FOLVITE) tablet 1,000 mcg  1,000 mcg Oral BID Khari Peña MD   1,000 mcg at 04/05/25 0918    [Held by provider] furosemide (LASIX) tablet 20 mg  20 mg Oral Daily Maddie Schafer MD   20 mg at 04/04/25 1017    insulin aspart (NovoLOG) injection (RAPID ACTING)  1-3 Units Subcutaneous TID AC Jake Blevins DO   1 Units at 04/04/25 1708    insulin aspart (NovoLOG) injection (RAPID ACTING)  1-3 Units Subcutaneous At Bedtime Jake Blevins DO        metoprolol tartrate (LOPRESSOR) tablet 50 mg  50 mg Oral BID Fahad Wei MD        nicotine (NICODERM CQ) 21 MG/24HR 24 hr patch 1 patch  1 patch Transdermal Daily Maddie Schafer MD   1 patch at 04/05/25 0918    polyethylene glycol (MIRALAX) Packet 17 g  17 g Oral Daily Clau Calles MD   17 g at 04/03/25 0800    sodium chloride (PF) 0.9% PF flush 3 mL  3 mL Intracatheter Q8H Clau Chaparro MD    3 mL at 04/03/25 1149    tamsulosin (FLOMAX) capsule 0.8 mg  0.8 mg Oral QPM Khari Peña MD   0.8 mg at 04/04/25 2106     Labs and Imaging results below reviewed today.  Recent Labs   Lab 04/05/25  0720 04/04/25  0601 04/02/25  0746 04/01/25  0709   WBC 11.9*  --  4.3 4.0   HGB 13.2* 14.2 13.4 14.2   HCT 40.2  --  40.7 43.9   MCV 94  --  93 94     --  154 140*     7-Day Micro Results       Collected Updated Procedure Result Status      04/05/2025 1049 04/05/2025 1153 Influenza A/B, RSV and SARS-CoV2 PCR (COVID-19) Nose [[id number]]    Swab from Nose    Final result Component Value   Influenza A PCR Negative   Influenza B PCR Negative   RSV PCR Negative   SARS CoV2 PCR Negative   NEGATIVE: SARS-CoV-2 (COVID-19) RNA not detected, presumed negative.            04/05/2025 1043 04/05/2025 1054 Blood Culture Arm, Left [[id number]]   Blood from Arm, Left    In process Component Value   No component results               04/05/2025 1038 04/05/2025 1054 Blood Culture Arm, Right [[id number]]   Blood from Arm, Right    In process Component Value   No component results                     Recent Labs   Lab 04/05/25  1121 04/05/25  0720 04/05/25  0107 04/04/25  1214 04/04/25  0601 04/02/25  1151 04/02/25  0746 03/31/25  0820 03/31/25  0809 03/30/25  2123 03/30/25  1455   NA  --  135  135  --   --  137  --  141   < > 138  --  135   POTASSIUM  --  3.6  3.6  --   --  3.4  --  3.8   < > 4.3  --  4.3   CHLORIDE  --  98  98  --   --  98  --  103   < > 94*  --  93*   CO2  --  25 25  --   --  28  --  27   < > 29  --  30*   ANIONGAP  --  12  12  --   --  11  --  11   < > 15  --  12   * 132*  132* 134*   < > 137*   < > 142*   < > 183*   < > 171*   BUN  --  7.2*  7.2*  --   --  7.9*  --  16.9   < > 22.2  --  18.9   CR  --  0.73  0.73  --   --  0.84  --  0.83   < > 1.09  --  0.92   GFRESTIMATED  --  >90  >90  --   --  >90  --  >90   < > 74  --  >90   HALEY  --  9.3  9.3  --   --  9.7  --  9.4   < >  10.1  --  10.4   MAG  --  1.7  --   --  1.9  --   --   --   --   --   --    PHOS  --   --   --   --  2.3*  --   --   --   --   --   --    PROTTOTAL  --  6.2*  --   --   --   --   --   --  7.8  --  7.9   ALBUMIN  --  3.0*  --   --   --   --   --   --  4.1  --  4.1   BILITOTAL  --  0.6  --   --   --   --   --   --  0.8  --  0.7   ALKPHOS  --  96  --   --   --   --   --   --  118  --  133   AST  --  23  --   --   --   --   --   --  25  --  34   ALT  --  20  --   --   --   --   --   --  17  --  21    < > = values in this interval not displayed.     No results found for this or any previous visit (from the past 24 hours).

## 2025-04-05 NOTE — PROGRESS NOTES
Patient remains in Aflutter this AM though rate improved to 110's-120 range now with multiple doses of metoprolol.  RN reports patient feels well and is asymptomatic.  Also his ABD/GI complaints continue to improve.   Echo and AM labs pending.  Electrolyte K and Mg protocols ordered, optimize electrolytes.  Given persistent nature of flutter will ask cardiology to also consult today.   Continue metoprolol.   I asked RN to let me know if patient develops any new complaints/symptoms.     ADDENDUM:  Patient also has developed fever.  Will request cultures, viral swab.

## 2025-04-05 NOTE — PROVIDER NOTIFICATION
MD Notification    Notified Person: MD    Notified Person Name: Dr. Ramires     Notification Date/Time: April 5, 2025 9377    Notification Interaction: [employer] messaging     Purpose of Notification: Hey, I know Josephine has talked to you tonight about this guys HR, I'm charge and just want to get an idea of what number we are fine with letting him ride at so I can pass on to day charge. He is sustained at 130+ up to 150s at times    Orders Received: Additional dose of Metoprolol 5 mg IV and Metoprolol tartrate 25 mg once. If HR seems to drop into a rage where rhythm is more decipherable, ok to order stat EKG and/or at least get a rhythm strip printed off to determine.    Comments: Did discuss with Dr. Ramires that while EKG read aflutter, it's hard to see on tele/smart disclosure pts true rhythm. Appears at times in sinus tach, but other times seems like afib.

## 2025-04-05 NOTE — PLAN OF CARE
4/4/25  [id number]  POD#5 Ex. Lap with small bowel enterotomy and gallstone removal.   Orientation: A/Ox4  Vitals/Tele: VSS ex tachy into low 160's.    IV Access/drains: R. PIV CDI SL.   Diet: Clears, tolerating well     Mobility: SBA, sits on EOB, wife helping patient to BR   GI/: Continent B/B. 1 BM this shift, voiding adequately   Wound/Skin: Abdominal midline incision CDI, abdominal binder on.   Consults: SW, PT  Discharge Plan: TBD   - Nicotiene patch on R shoulder  -blood sugar  -see MD notification regarding HR

## 2025-04-05 NOTE — PROVIDER NOTIFICATION
MD Notification    Notified Person: MD    Notified Person Name: Dr. Bullock     Notification Date/Time: 4/4 2135    Notification Interaction: [employer]     Purpose of Notification: Pt's HR sitting in the 150's. Asymptomatic. Preliminary EKG at 1600 shows aflutter. I don't see any history. Can we get PRN for HR? Placed on tele.    Orders Received: order placed at 2229 for IV metoprolol 5mg IV once for HR >140     Comments: working on getting new IV placement. Pt is a difficult stick and has lost multiple IV's.

## 2025-04-05 NOTE — PLAN OF CARE
Goal Outcome Evaluation:  4/4/25  [id number]  POD#6 Ex. Lap with small bowel enterotomy and gallstone removal.   Orientation: A/Ox4  Vitals/Tele: VSS on RA, stat ECG showed A-flutter interm w ST RVR (to 160, sustained in mid 120s) Metoprolol XL daily chg to BID, three doses 5mg IVP Metoprolol given for sustained HR >120 as well can have prn.   IV Access/drains: R. PIV NS @ 100cc/h after 500cc bolus   Diet: CLD  Mobility: SBA  GI/: Continent B/B. + BM this shift, voiding adequately in BR but wears brief for intermittent incontinence  Wound/Skin: Abdominal midline incision staples ADI, CDI  Tests: TTE ordered    Consults: SW, PT,  hospitalist, ? Cards consult sometime today  Discharge Plan: TBD  See Flow sheets for assessment

## 2025-04-05 NOTE — PROVIDER NOTIFICATION
Fe paged Dr. Wei: Pt's heart rate in 130-150's, while pt is sleeping. Pt pulled IV out trying to get another established. I saw metoprolol was increased to 50 PO BID starting tonight. Pt already got 25mg PO this am, can we give another 25mg PO this am? please advise. Awaiting a response. Fe paged back from MD, new orders received and noted.

## 2025-04-06 ENCOUNTER — APPOINTMENT (OUTPATIENT)
Dept: CARDIOLOGY | Facility: CLINIC | Age: 70
DRG: 329 | End: 2025-04-06
Attending: INTERNAL MEDICINE
Payer: COMMERCIAL

## 2025-04-06 LAB
ALBUMIN UR-MCNC: 20 MG/DL
ANION GAP SERPL CALCULATED.3IONS-SCNC: 11 MMOL/L (ref 7–15)
APPEARANCE UR: CLEAR
ATRIAL RATE - MUSE: 308 BPM
ATRIAL RATE - MUSE: 316 BPM
BACTERIA #/AREA URNS HPF: ABNORMAL /HPF
BILIRUB UR QL STRIP: NEGATIVE
BUN SERPL-MCNC: 7.9 MG/DL (ref 8–23)
CALCIUM SERPL-MCNC: 9 MG/DL (ref 8.8–10.4)
CHLORIDE SERPL-SCNC: 101 MMOL/L (ref 98–107)
COLOR UR AUTO: YELLOW
CREAT SERPL-MCNC: 0.69 MG/DL (ref 0.67–1.17)
DIASTOLIC BLOOD PRESSURE - MUSE: NORMAL MMHG
DIASTOLIC BLOOD PRESSURE - MUSE: NORMAL MMHG
EGFRCR SERPLBLD CKD-EPI 2021: >90 ML/MIN/1.73M2
ERYTHROCYTE [DISTWIDTH] IN BLOOD BY AUTOMATED COUNT: 13.7 % (ref 10–15)
GLUCOSE BLDC GLUCOMTR-MCNC: 106 MG/DL (ref 70–99)
GLUCOSE BLDC GLUCOMTR-MCNC: 135 MG/DL (ref 70–99)
GLUCOSE BLDC GLUCOMTR-MCNC: 142 MG/DL (ref 70–99)
GLUCOSE BLDC GLUCOMTR-MCNC: 157 MG/DL (ref 70–99)
GLUCOSE BLDC GLUCOMTR-MCNC: 204 MG/DL (ref 70–99)
GLUCOSE SERPL-MCNC: 117 MG/DL (ref 70–99)
GLUCOSE UR STRIP-MCNC: NEGATIVE MG/DL
HCO3 SERPL-SCNC: 22 MMOL/L (ref 22–29)
HCT VFR BLD AUTO: 40.1 % (ref 40–53)
HGB BLD-MCNC: 13.1 G/DL (ref 13.3–17.7)
HGB UR QL STRIP: NEGATIVE
INTERPRETATION ECG - MUSE: NORMAL
INTERPRETATION ECG - MUSE: NORMAL
KETONES UR STRIP-MCNC: 10 MG/DL
LEUKOCYTE ESTERASE UR QL STRIP: NEGATIVE
LVEF ECHO: NORMAL
MAGNESIUM SERPL-MCNC: 1.8 MG/DL (ref 1.7–2.3)
MCH RBC QN AUTO: 30.5 PG (ref 26.5–33)
MCHC RBC AUTO-ENTMCNC: 32.7 G/DL (ref 31.5–36.5)
MCV RBC AUTO: 94 FL (ref 78–100)
MUCOUS THREADS #/AREA URNS LPF: PRESENT /LPF
NITRATE UR QL: NEGATIVE
P AXIS - MUSE: NORMAL DEGREES
P AXIS - MUSE: NORMAL DEGREES
PH UR STRIP: 6 [PH] (ref 5–7)
PLATELET # BLD AUTO: 167 10E3/UL (ref 150–450)
POTASSIUM SERPL-SCNC: 3.9 MMOL/L (ref 3.4–5.3)
PR INTERVAL - MUSE: NORMAL MS
PR INTERVAL - MUSE: NORMAL MS
QRS DURATION - MUSE: 74 MS
QRS DURATION - MUSE: 80 MS
QT - MUSE: 306 MS
QT - MUSE: 316 MS
QTC - MUSE: 465 MS
QTC - MUSE: 512 MS
R AXIS - MUSE: 61 DEGREES
R AXIS - MUSE: 77 DEGREES
RBC # BLD AUTO: 4.29 10E6/UL (ref 4.4–5.9)
RBC URINE: 1 /HPF
SODIUM SERPL-SCNC: 134 MMOL/L (ref 135–145)
SP GR UR STRIP: 1.02 (ref 1–1.03)
SYSTOLIC BLOOD PRESSURE - MUSE: NORMAL MMHG
SYSTOLIC BLOOD PRESSURE - MUSE: NORMAL MMHG
T AXIS - MUSE: 150 DEGREES
T AXIS - MUSE: 182 DEGREES
UFH PPP CHRO-ACNC: 0.12 IU/ML
UFH PPP CHRO-ACNC: <0.1 IU/ML
UFH PPP CHRO-ACNC: <0.1 IU/ML
UROBILINOGEN UR STRIP-MCNC: NORMAL MG/DL
VENTRICULAR RATE- MUSE: 139 BPM
VENTRICULAR RATE- MUSE: 158 BPM
WBC # BLD AUTO: 10.5 10E3/UL (ref 4–11)
WBC URINE: 4 /HPF

## 2025-04-06 PROCEDURE — 250N000013 HC RX MED GY IP 250 OP 250 PS 637: Performed by: STUDENT IN AN ORGANIZED HEALTH CARE EDUCATION/TRAINING PROGRAM

## 2025-04-06 PROCEDURE — 250N000013 HC RX MED GY IP 250 OP 250 PS 637: Performed by: INTERNAL MEDICINE

## 2025-04-06 PROCEDURE — 99233 SBSQ HOSP IP/OBS HIGH 50: CPT | Mod: 24 | Performed by: INTERNAL MEDICINE

## 2025-04-06 PROCEDURE — 210N000001 HC R&B IMCU HEART CARE

## 2025-04-06 PROCEDURE — 258N000003 HC RX IP 258 OP 636: Performed by: HOSPITALIST

## 2025-04-06 PROCEDURE — 36415 COLL VENOUS BLD VENIPUNCTURE: CPT | Performed by: INTERNAL MEDICINE

## 2025-04-06 PROCEDURE — 258N000003 HC RX IP 258 OP 636: Performed by: STUDENT IN AN ORGANIZED HEALTH CARE EDUCATION/TRAINING PROGRAM

## 2025-04-06 PROCEDURE — 250N000013 HC RX MED GY IP 250 OP 250 PS 637: Performed by: SURGERY

## 2025-04-06 PROCEDURE — 999N000208 ECHOCARDIOGRAM COMPLETE

## 2025-04-06 PROCEDURE — 250N000009 HC RX 250: Performed by: HOSPITALIST

## 2025-04-06 PROCEDURE — 250N000013 HC RX MED GY IP 250 OP 250 PS 637: Performed by: HOSPITALIST

## 2025-04-06 PROCEDURE — 81001 URINALYSIS AUTO W/SCOPE: CPT | Performed by: INTERNAL MEDICINE

## 2025-04-06 PROCEDURE — 250N000011 HC RX IP 250 OP 636: Performed by: INTERNAL MEDICINE

## 2025-04-06 PROCEDURE — 255N000002 HC RX 255 OP 636: Performed by: INTERNAL MEDICINE

## 2025-04-06 PROCEDURE — 85520 HEPARIN ASSAY: CPT | Performed by: INTERNAL MEDICINE

## 2025-04-06 PROCEDURE — 250N000011 HC RX IP 250 OP 636: Performed by: SURGERY

## 2025-04-06 PROCEDURE — 250N000009 HC RX 250: Performed by: INTERNAL MEDICINE

## 2025-04-06 PROCEDURE — 83735 ASSAY OF MAGNESIUM: CPT | Performed by: INTERNAL MEDICINE

## 2025-04-06 PROCEDURE — 93306 TTE W/DOPPLER COMPLETE: CPT | Mod: 26 | Performed by: INTERNAL MEDICINE

## 2025-04-06 PROCEDURE — 99233 SBSQ HOSP IP/OBS HIGH 50: CPT | Performed by: INTERNAL MEDICINE

## 2025-04-06 PROCEDURE — 85018 HEMOGLOBIN: CPT | Performed by: INTERNAL MEDICINE

## 2025-04-06 PROCEDURE — 80048 BASIC METABOLIC PNL TOTAL CA: CPT | Performed by: INTERNAL MEDICINE

## 2025-04-06 RX ORDER — MAGNESIUM OXIDE 400 MG/1
400 TABLET ORAL EVERY 4 HOURS
Status: COMPLETED | OUTPATIENT
Start: 2025-04-06 | End: 2025-04-06

## 2025-04-06 RX ORDER — METOPROLOL TARTRATE 25 MG/1
25 TABLET, FILM COATED ORAL ONCE
Status: COMPLETED | OUTPATIENT
Start: 2025-04-06 | End: 2025-04-06

## 2025-04-06 RX ORDER — LIDOCAINE 40 MG/G
CREAM TOPICAL
Status: DISCONTINUED | OUTPATIENT
Start: 2025-04-06 | End: 2025-04-29 | Stop reason: HOSPADM

## 2025-04-06 RX ORDER — METOPROLOL TARTRATE 25 MG/1
25 TABLET, FILM COATED ORAL ONCE
Status: DISCONTINUED | OUTPATIENT
Start: 2025-04-06 | End: 2025-04-06

## 2025-04-06 RX ADMIN — NICOTINE 1 PATCH: 21 PATCH, EXTENDED RELEASE TRANSDERMAL at 08:16

## 2025-04-06 RX ADMIN — DILTIAZEM HYDROCHLORIDE 5 MG/HR: 5 INJECTION, SOLUTION INTRAVENOUS at 04:10

## 2025-04-06 RX ADMIN — ACETAMINOPHEN 975 MG: 325 TABLET, FILM COATED ORAL at 17:37

## 2025-04-06 RX ADMIN — POLYETHYLENE GLYCOL 3350 17 G: 17 POWDER, FOR SOLUTION ORAL at 08:17

## 2025-04-06 RX ADMIN — TRAMADOL HYDROCHLORIDE 50 MG: 50 TABLET, COATED ORAL at 01:42

## 2025-04-06 RX ADMIN — FAMOTIDINE 20 MG: 20 TABLET, FILM COATED ORAL at 08:15

## 2025-04-06 RX ADMIN — METOPROLOL TARTRATE 25 MG: 25 TABLET, FILM COATED ORAL at 02:59

## 2025-04-06 RX ADMIN — HEPARIN SODIUM 1500 UNITS/HR: 10000 INJECTION, SOLUTION INTRAVENOUS at 05:58

## 2025-04-06 RX ADMIN — SODIUM CHLORIDE: 9 INJECTION, SOLUTION INTRAVENOUS at 15:31

## 2025-04-06 RX ADMIN — TRAMADOL HYDROCHLORIDE 50 MG: 50 TABLET, COATED ORAL at 22:56

## 2025-04-06 RX ADMIN — TRAMADOL HYDROCHLORIDE 50 MG: 50 TABLET, COATED ORAL at 16:11

## 2025-04-06 RX ADMIN — ASPIRIN 81 MG: 81 TABLET, COATED ORAL at 08:15

## 2025-04-06 RX ADMIN — FOLIC ACID 1000 MCG: 1 TABLET ORAL at 08:15

## 2025-04-06 RX ADMIN — TRAMADOL HYDROCHLORIDE 50 MG: 50 TABLET, COATED ORAL at 09:51

## 2025-04-06 RX ADMIN — ACETAMINOPHEN 975 MG: 325 TABLET, FILM COATED ORAL at 08:15

## 2025-04-06 RX ADMIN — ONDANSETRON 4 MG: 2 INJECTION, SOLUTION INTRAMUSCULAR; INTRAVENOUS at 21:16

## 2025-04-06 RX ADMIN — HEPARIN SODIUM 1650 UNITS/HR: 10000 INJECTION, SOLUTION INTRAVENOUS at 20:04

## 2025-04-06 RX ADMIN — METOPROLOL TARTRATE 50 MG: 50 TABLET, FILM COATED ORAL at 08:15

## 2025-04-06 RX ADMIN — FAMOTIDINE 20 MG: 20 TABLET, FILM COATED ORAL at 20:36

## 2025-04-06 RX ADMIN — METOPROLOL TARTRATE 5 MG: 5 INJECTION INTRAVENOUS at 01:23

## 2025-04-06 RX ADMIN — METOPROLOL TARTRATE 50 MG: 50 TABLET, FILM COATED ORAL at 20:37

## 2025-04-06 RX ADMIN — INSULIN ASPART 1 UNITS: 100 INJECTION, SOLUTION INTRAVENOUS; SUBCUTANEOUS at 18:21

## 2025-04-06 RX ADMIN — SODIUM CHLORIDE 500 ML: 9 INJECTION, SOLUTION INTRAVENOUS at 03:00

## 2025-04-06 RX ADMIN — ACETAMINOPHEN 975 MG: 325 TABLET, FILM COATED ORAL at 00:24

## 2025-04-06 RX ADMIN — FOLIC ACID 1000 MCG: 1 TABLET ORAL at 20:36

## 2025-04-06 RX ADMIN — PERFLUTREN 10 ML: 6.52 INJECTION, SUSPENSION INTRAVENOUS at 09:14

## 2025-04-06 RX ADMIN — TAMSULOSIN HYDROCHLORIDE 0.8 MG: 0.4 CAPSULE ORAL at 20:37

## 2025-04-06 RX ADMIN — INSULIN ASPART 1 UNITS: 100 INJECTION, SOLUTION INTRAVENOUS; SUBCUTANEOUS at 12:46

## 2025-04-06 RX ADMIN — SODIUM CHLORIDE: 9 INJECTION, SOLUTION INTRAVENOUS at 05:01

## 2025-04-06 RX ADMIN — ATORVASTATIN CALCIUM 80 MG: 80 TABLET, FILM COATED ORAL at 08:15

## 2025-04-06 RX ADMIN — Medication 400 MG: at 12:43

## 2025-04-06 RX ADMIN — DILTIAZEM HYDROCHLORIDE 10 MG/HR: 5 INJECTION, SOLUTION INTRAVENOUS at 15:32

## 2025-04-06 RX ADMIN — PROCHLORPERAZINE EDISYLATE 5 MG: 5 INJECTION INTRAMUSCULAR; INTRAVENOUS at 22:23

## 2025-04-06 RX ADMIN — NICOTINE 1 PATCH: 21 PATCH, EXTENDED RELEASE TRANSDERMAL at 16:07

## 2025-04-06 ASSESSMENT — ACTIVITIES OF DAILY LIVING (ADL)
ADLS_ACUITY_SCORE: 65
ADLS_ACUITY_SCORE: 62
ADLS_ACUITY_SCORE: 60
ADLS_ACUITY_SCORE: 65
ADLS_ACUITY_SCORE: 61
ADLS_ACUITY_SCORE: 60
ADLS_ACUITY_SCORE: 65
ADLS_ACUITY_SCORE: 62
ADLS_ACUITY_SCORE: 60
ADLS_ACUITY_SCORE: 61
ADLS_ACUITY_SCORE: 61
ADLS_ACUITY_SCORE: 62
ADLS_ACUITY_SCORE: 61
ADLS_ACUITY_SCORE: 65
ADLS_ACUITY_SCORE: 61
ADLS_ACUITY_SCORE: 65
ADLS_ACUITY_SCORE: 65
ADLS_ACUITY_SCORE: 61
ADLS_ACUITY_SCORE: 60
ADLS_ACUITY_SCORE: 62
ADLS_ACUITY_SCORE: 65

## 2025-04-06 NOTE — PROGRESS NOTES
Care plan note:      Recent Vitals:  Temp: 97.8  F (36.6  C) Temp src: Oral BP: 108/83 Pulse: 114   Resp: 20 SpO2: 98 % O2 Device: None (Room air)      Orientation/Neuro: Alert and Oriented x 4  Pain: The patient is not having any pain.   Tele: Atrial flutter   IV medications: Normal Saline, Heparin, and Diltiazem   Mobility: St. by assist   Skin: Incision: ABD- staples- slightly reddened approximated   Resp: RA  GI: WDL  : WDL     Diet: Tolerating diet:   Well- Full liquid diet  Orders Placed This Encounter      Full Liquid Diet      Safety/Concerns:  None  Aggression Color: Green    Plan: Hospitalist, cards and surgery following.  No fever overnight. Continued to be tachycardic with rates up into the 160's even after IV metoprolol and fluid bolus. Transitioned to IMC status and started Dilt gtt. HR came down to the 110's. Pt asymptomatic with high HR.  Soft BP in the 90's to 100's. On k and mag protocol, recheck this Am. Pt up 8 lbs 2+ pitting edema in LE's. Planning DCCV on Monday.   Continue to monitor.      Zoe Varela RN

## 2025-04-06 NOTE — PROGRESS NOTES
[facility]    Medicine Progress Note - Hospitalist Service    Date of Admission:  3/30/2025    Assessment & Plan   Richar Davis is a 69 year old male with a history of diabetes, hypertension, CABG, status post AVR with bovine valve 10/2022, untreated sleep apnea, hypertension, hyperlipidemia, neuropathy, BPH who presented to the emergency department with abdominal pain, nausea and vomiting.  Imaging showing gallstone ileus with a 2.2 cm gallstone impacted.  S/P surgery 3/30/25 as outlined below.  Post-op has had issues with ileus, fever, and aflutter with RVR.     Gallstone ileus distal small bowel obstruction secondary to impacted 2.2 cm gallstone  Exploratory laparotomy with small bowel enterotomy and removal of gallstone on March 30, 2025:  Summary:  3/30 OR ex lap small bowel enterotomy and removal of gallstone with primary closure.  See operative report for further details. 4/1 started to have return of bowel function. 4/2 NG Removed and started on low fiber diet but clinically got worse so transition back to CLD normal 4/3.      -Tolerating diet advance, overall feeling improved.  Pain controlled.  No overt evidence source of recent fever related to new ABD issue.  -Post-op site cares, activity restrictions, diet, pain meds per surgical team     CAD  Aortic stenosis (status post AVR with bovine valve) 10/22   CABG X 3 10/22  Hypertension  New Aflutter with RVR 4/5:  Summary:  Patient with history of CABG, also aortic stenosis status post AVR with 25 mm Bal Inspiris Resilia bovine valve in October 2022, see recent cardiology visit dated December 2024 for further details.  - ASA EC 81mg    -Appears to have developed new Aflutter.  Cardiology consulted, appreciate their assistance.  \\They recommended heparin drip and continued work on modest rate control.  Had issues last night with softer BP's and high rates.  Diltiazem drip started.  If he doesn't spontaneously convert by  Monday tentative plan for cardioversion 4/7/25.  Continue IMC status with ongoing rate issues/dilt drip.     Fevers 4/5:  -Unclear source.  No fevers today.  Checked COVID/Flu/RSV which was negative.  BC x 2 pending.  Surgical site/ABD not overtly worse and prior GI complaints better.  CXR without overt pneumonia.  UA not consistent with significant infection.  Plan to hold on abx and monitor unless pending studies suggest bacterial infection source.  Tylenol PRN fevers.  If fevers worsen and no obvious source otherwise consider CT ABD/Pelvis.    Probable Obstructive sleep apnea:   - Follow-up with outpatient, not on baseline tx.       Hyperlipidemia:   - Continue prior to admission statin     History of edema:   -Hold lasix for now with fevers/rate issues.     Neuropathy:   *Neurontin 800 mg 3 times daily as needed has been filled but patient reports taking 300mg TID  *4/1 per pharmacy note only recent fills are 800 mg and not 300 mg dosing.   -Patient not really using now and denying any worsening neuropathy. Will keep on hold.     Diabetes:   - Hold prior to admission metformin given oral intake issues.  - Insulin sliding scale to continue  - Adjusted diet to include carb balance    BPH:   - Continue PTA Flomax 0.8 mg daily     Nicotine use: Chews tobacco  -Continue nicotine patch, and as needed nicotine lozenges.  Long term cessation to be encouraged during stay.     PPE Used:  Mask        Diet: Low Fiber Diet    DVT Prophylaxis: Heparin drip  Mccarty Catheter: Not present  Lines: None     Cardiac Monitoring: ACTIVE order. Indication: IMC  Code Status: Full Code      Clinically Significant Risk Factors         # Hyponatremia: Lowest Na = 134 mmol/L in last 2 days, will monitor as appropriate       # Hypoalbuminemia: Lowest albumin = 3 g/dL at 4/5/2025  7:20 AM, will monitor as appropriate     # Hypertension: Noted on problem list           # DMII: A1C = 6.8 % (Ref range: <5.7 %) within past 6 months   # Severe  Obesity: Estimated body mass index is 40.23 kg/m  as calculated from the following:    Height as of this encounter: 1.829 m (6').    Weight as of this encounter: 134.5 kg (296 lb 9.6 oz).        # Financial/Environmental Concerns: none   # History of CABG: noted on surgical history       Disposition Plan     Medically Ready for Discharge: Anticipated in 2-4 Days             Mann Torres DO  Hospitalist Service  [facility]  Securely message with [employer] (more info)  Text page via [employer] Paging/Directory   ______________________________________________________________________    Interval History   Mr. Davis this AM overall feels ABD/diet going better.  Denies chest pain, increased SOB, dizziness.  He doesn't feel different when rate escalates.      Physical Exam   Vital Signs: Temp: 98.8  F (37.1  C) Temp src: Oral BP: 128/84 Pulse: 83   Resp: 20 SpO2: 98 % O2 Device: None (Room air)    Weight: 296 lbs 9.6 oz    GEN:  Alert, oriented x 3, appears ill but comfortable.  HEENT:  Normocephalic/atraumatic, no scleral icterus, no nasal discharge, mouth moist.  CV:  Tachy with rate 120 range, distant.   LUNGS:  Clear to auscultation bilaterally without rales/rhonchi/wheezing/retractions.  Breath sounds mildly diminished bases.  Symmetric chest rise on inhalation noted.  ABD:  Active bowel sounds, soft, slight tenderness toward surgical site, mildly distended.  No guarding/rigidity.  Detailed site exam deferred to surgical team.  EXT:  Trace edema.  No cyanosis.  No new joint synovitis noted.  SKIN:  Dry to touch, no new exanthems noted in the visualized areas.    Medical Decision Making       Over 50 MINUTES SPENT BY ME on the date of service doing chart review, history, exam, documentation & further activities per the note.      Data   Medications   Current Facility-Administered Medications   Medication Dose Route Frequency Provider Last Rate Last Admin    diltiazem (CARDIZEM) 125 mg in sodium  chloride 0.9 % 125 mL infusion  5-15 mg/hr Intravenous Continuous Shant Winston MD 10 mL/hr at 04/06/25 1106 10 mg/hr at 04/06/25 1106    heparin 25,000 units in 0.45% NaCl 250 mL ANTICOAGULANT infusion  0-5,000 Units/hr Intravenous Continuous Mann Torres DO 16.5 mL/hr at 04/06/25 0953 1,650 Units/hr at 04/06/25 0953    sodium chloride 0.9 % infusion   Intravenous Continuous Terry Patino  mL/hr at 04/06/25 0501 New Bag at 04/06/25 0501     Current Facility-Administered Medications   Medication Dose Route Frequency Provider Last Rate Last Admin    acetaminophen (TYLENOL) tablet 975 mg  975 mg Oral Q8H Clau Calles MD   975 mg at 04/06/25 0815    aspirin EC tablet 81 mg  81 mg Oral Daily Khari Peña MD   81 mg at 04/06/25 0815    atorvastatin (LIPITOR) tablet 80 mg  80 mg Oral Daily Khari Peña MD   80 mg at 04/06/25 0815    famotidine (PEPCID) tablet 20 mg  20 mg Oral BID Terry Patino MD   20 mg at 04/06/25 0815    folic acid (FOLVITE) tablet 1,000 mcg  1,000 mcg Oral BID Khari Peña MD   1,000 mcg at 04/06/25 0815    [Held by provider] furosemide (LASIX) tablet 20 mg  20 mg Oral Daily Maddie Schafer MD   20 mg at 04/04/25 1017    insulin aspart (NovoLOG) injection (RAPID ACTING)  1-3 Units Subcutaneous TID AC Jake Blevins DO   1 Units at 04/06/25 1246    insulin aspart (NovoLOG) injection (RAPID ACTING)  1-3 Units Subcutaneous At Bedtime Jake Blevins DO        magnesium oxide (MAG-OX) tablet 400 mg  400 mg Oral Q4H Mann Torres DO   400 mg at 04/06/25 1243    metoprolol tartrate (LOPRESSOR) tablet 50 mg  50 mg Oral BID Fahad Wei MD   50 mg at 04/06/25 0815    nicotine (NICODERM CQ) 21 MG/24HR 24 hr patch 1 patch  1 patch Transdermal Daily Maddie Schafer MD   1 patch at 04/06/25 0816    polyethylene glycol (MIRALAX) Packet 17 g  17 g Oral Daily Clau Calles MD   17 g at 04/06/25 0817    sodium chloride (PF) 0.9% PF  flush 3 mL  3 mL Intracatheter Q8H Shant Henley MD        sodium chloride (PF) 0.9% PF flush 3 mL  3 mL Intracatheter Q8H Clau Chaparro MD   3 mL at 04/03/25 1149    tamsulosin (FLOMAX) capsule 0.8 mg  0.8 mg Oral QPM Khari Peña MD   0.8 mg at 04/05/25 2018     Labs and Imaging results below reviewed today.  Recent Labs   Lab 04/06/25  0610 04/05/25  0720 04/04/25  0601 04/02/25  0746   WBC 10.5 11.9*  --  4.3   HGB 13.1* 13.2* 14.2 13.4   HCT 40.1 40.2  --  40.7   MCV 94 94  --  93    186  --  154     7-Day Micro Results       Collected Updated Procedure Result Status      04/05/2025 1049 04/05/2025 1153 Influenza A/B, RSV and SARS-CoV2 PCR (COVID-19) Nose [[id number]]    Swab from Nose    Final result Component Value   Influenza A PCR Negative   Influenza B PCR Negative   RSV PCR Negative   SARS CoV2 PCR Negative   NEGATIVE: SARS-CoV-2 (COVID-19) RNA not detected, presumed negative.            04/05/2025 1043 04/06/2025 0402 Blood Culture Arm, Left [[id number]]   Blood from Arm, Left    Preliminary result Component Value   Culture No growth after 12 hours  [P]                04/05/2025 1038 04/06/2025 0402 Blood Culture Arm, Right [[id number]]   Blood from Arm, Right    Preliminary result Component Value   Culture No growth after 12 hours  [P]                      Recent Labs   Lab 04/06/25  1223 04/06/25  0803 04/06/25  0610 04/05/25  1121 04/05/25  0720 04/04/25  1214 04/04/25  0601 03/31/25  0820 03/31/25  0809 03/30/25  2123 03/30/25  1455   NA  --   --  134*  --  135  135  --  137   < > 138  --  135   POTASSIUM  --   --  3.9  --  3.6  3.6  --  3.4   < > 4.3  --  4.3   CHLORIDE  --   --  101  --  98  98  --  98   < > 94*  --  93*   CO2  --   --  22  --  25  25  --  28   < > 29  --  30*   ANIONGAP  --   --  11  --  12  12  --  11   < > 15  --  12   * 106* 117*   < > 132*  132*   < > 137*   < > 183*   < > 171*   BUN  --   --  7.9*  --  7.2*  7.2*  --   7.9*   < > 22.2  --  18.9   CR  --   --  0.69  --  0.73  0.73  --  0.84   < > 1.09  --  0.92   GFRESTIMATED  --   --  >90  --  >90  >90  --  >90   < > 74  --  >90   HALEY  --   --  9.0  --  9.3  9.3  --  9.7   < > 10.1  --  10.4   MAG  --   --  1.8  --  1.7  --  1.9  --   --   --   --    PHOS  --   --   --   --   --   --  2.3*  --   --   --   --    PROTTOTAL  --   --   --   --  6.2*  --   --   --  7.8  --  7.9   ALBUMIN  --   --   --   --  3.0*  --   --   --  4.1  --  4.1   BILITOTAL  --   --   --   --  0.6  --   --   --  0.8  --  0.7   ALKPHOS  --   --   --   --  96  --   --   --  118  --  133   AST  --   --   --   --  23  --   --   --  25  --  34   ALT  --   --   --   --  20  --   --   --  17  --  21    < > = values in this interval not displayed.     Recent Results (from the past 24 hours)   Echocardiogram Complete   Result Value    LVEF  60-65%    [facility]    [id number]  [id number]  [id number]  [id number]^JACQUIE^JORGE^BC     [facility]  Echocardiography Laboratory  [address] [place] [id number]     Name: KANNAN LORD  MRN: [id number]  : 1955  Study Date: 2025 08:18 AM  Age: 69 yrs  Gender: Male  Patient Location: [facility]  Reason For Study: Atrial Fibrillation  Ordering Physician: JORGE DHILLON  Performed By: Wolf Gaxiola     BSA: 2.5 m2  Height: 72 in  Weight: 287 lb  HR: 129  BP: 125/78 mmHg  ______________________________________________________________________________  Procedure  Echocardiogram with two-dimensional, color and spectral Doppler. Definity (ND[facility]  #49157-660) given intravenously.  ______________________________________________________________________________  Interpretation Summary     Technically difficult imaging  The rhythm was atrial fibrillation.  Left ventricular systolic function is normal.  The visual ejection fraction is 60-65%.  The left ventricle is normal in size.  The right ventricle is normal in structure, function and size.  Doppler  interrogation does not demonstrate significant stenosis or  insufficiency involving cardiica valves . No change since 12/10/2024  ______________________________________________________________________________  Left Ventricle  The left ventricle is normal in size. There is normal left ventricular wall  thickness. Left ventricular systolic function is normal. The visual ejection  fraction is 60-65%. Left ventricular diastolic function is indeterminate. No  regional wall motion abnormalities noted. There is no thrombus seen in the  left ventricle.     Right Ventricle  The right ventricle is normal in structure, function and size. There is no  mass or thrombus in the right ventricle.     Atria  Normal left atrial size. Right atrial size is normal. There is no atrial shunt  seen. The left atrial appendage is not well visualized.     Mitral Valve  The mitral valve is normal in structure and function. There is no mitral  regurgitation noted. There is no mitral valve stenosis.     Tricuspid Valve  Normal tricuspid valve. No tricuspid regurgitation. Right ventricular systolic  pressure could not be approximated due to inadequate tricuspid regurgitation.  There is no tricuspid stenosis.     Aortic Valve  The aortic valve is trileaflet. No aortic regurgitation is present. No aortic  stenosis is present.     Pulmonic Valve  The pulmonic valve is not well visualized. There is no pulmonic valvular  regurgitation. There is no pulmonic valvular stenosis.     Vessels  The aortic root is normal size. Normal size ascending aorta. The inferior vena  cava is normal. The pulmonary artery is normal size.     Pericardium  The pericardium appears normal. There is no pleural effusion.     Rhythm  The rhythm was atrial fibrillation.  ______________________________________________________________________________  MMode/2D Measurements & Calculations     IVSd: 1.0 cm  LVIDd: 4.4 cm  LVIDs: 3.5 cm  LVPWd: 1.1 cm  FS: 21.3 %  LV mass(C)d: 158.2  grams  LV mass(C)dI: 63.7 grams/m2  asc Aorta Diam: 2.9 cm  LVOT diam: 2.0 cm  LVOT area: 3.1 cm2  Asc Ao diam index BSA (cm/m2): 1.1  Asc Ao diam index Ht(cm/m): 1.6  LA Volume (BP): 31.6 ml  LA Volume Index (BP): 12.7 ml/m2     RWT: 0.50     Doppler Measurements & Calculations  MV E max shola: 131.3 cm/sec  MV dec slope: 1106 cm/sec2  MV dec time: 0.12 sec  Ao V2 max: 199.7 cm/sec  Ao max P.0 mmHg  Ao V2 mean: 141.3 cm/sec  Ao mean P.3 mmHg  Ao V2 VTI: 26.7 cm  AKILAH(I,D): 1.7 cm2  AKILAH(V,D): 1.5 cm2  LV V1 max PG: 3.5 mmHg  LV V1 max: 93.0 cm/sec  LV V1 VTI: 14.8 cm  SV(LVOT): 46.4 ml  SI(LVOT): 18.7 ml/m2  PA acc time: 0.06 sec  AV Shola Ratio (DI): 0.47  AKILAH Index (cm2/m2): 0.70  E/E' avg: 10.8  Lateral E/e': 7.7  Medial E/e': 13.9  RV S Shola: 7.1 cm/sec     ______________________________________________________________________________  Report approved by: Dr. Andres Smith on 2025 10:28 AM

## 2025-04-06 NOTE — PROVIDER NOTIFICATION
MD Notification    Notified Person: MD Bran    Notification Date/Time:    Notification Interaction:  delvin  Purpose of Notification:  Pt hr is still going up to the 160s, bp is 102/47  Orders Received:    Comments:

## 2025-04-06 NOTE — PLAN OF CARE
"  Problem: Adult Inpatient Plan of Care  Goal: Plan of Care Review  Description: The Plan of Care Review/Shift note should be completed every shift.  The Outcome Evaluation is a brief statement about your assessment that the patient is improving, declining, or no change.  This information will be displayed automatically on your shiftnote.  Outcome: Not Progressing  Flowsheets (Taken 4/5/2025 9511)  Outcome Evaluation:   Patient has been moved to the heart center due to new onset of A. Flutter. heparin has been started. patient is independent in the room. he does feel more lethargic than usual. Full liquid diet. cardioversion set for monday. has chronic back pain that is controlled with tramadol. having loose stools. post op day 6 from a gallstone removal. centerline incision looks good and approximated   held with staples and open to air. given metoprolol for HR over 120.  Plan of Care Reviewed With:   patient   spouse  Overall Patient Progress: no change  Goal: Patient-Specific Goal (Individualized)  Description: You can add care plan individualizations to a care plan. Examples of Individualization might be:  \"Parent requests to be called daily at 9am for status\", \"I have a hard time hearing out of my right ear\", or \"Do not touch me to wake me up as it startlesme\".  Outcome: Not Progressing  Goal: Optimal Comfort and Wellbeing  Outcome: Not Progressing  Intervention: Provide Person-Centered Care  Recent Flowsheet Documentation  Taken 4/5/2025 1600 by Carlos Enrique Blevins, RN  Trust Relationship/Rapport:   care explained   choices provided   questions answered   questions encouraged   reassurance provided  Goal: Readiness for Transition of Care  Outcome: Not Progressing     Problem: Delirium  Goal: Optimal Coping  Outcome: Not Progressing  Goal: Improved Behavioral Control  Outcome: Not Progressing  Intervention: Minimize Safety Risk  Recent Flowsheet Documentation  Taken 4/5/2025 1600 by Carlos Enrique Blevins, " RN  Enhanced Safety Measures: pain management  Trust Relationship/Rapport:   care explained   choices provided   questions answered   questions encouraged   reassurance provided  Goal: Improved Attention and Thought Clarity  Outcome: Not Progressing  Intervention: Maximize Cognitive Function  Recent Flowsheet Documentation  Taken 4/5/2025 1600 by Carlos Enrique Blevins, RN  Sensory Stimulation Regulation:   care clustered   television on  Reorientation Measures:   calendar in view   clock in view  Goal: Improved Sleep  Outcome: Not Progressing   Goal Outcome Evaluation:      Plan of Care Reviewed With: patient, spouse    Overall Patient Progress: no changeOverall Patient Progress: no change    Outcome Evaluation: Patient has been moved to the heart center due to new onset of A. Flutter. heparin has been started. patient is independent in the room. he does feel more lethargic than usual. Full liquid diet. cardioversion set for monday. has chronic back pain that is controlled with tramadol. having loose stools. post op day 6 from a gallstone removal. centerline incision looks good and approximated; held with staples and open to air. given metoprolol for HR over 120.

## 2025-04-06 NOTE — CONSULTS
Duplicate consult entered by [employer] for High Readmission Risk (YOJANA) score patients , please see original consult filed 4/3/2025 at 2:38 PM by Arlyn Rivero LSW.  Consults provide background / prior to admission information, will clear 'new' consult to prevent duplicate entry.      CM team is continuing to follow for discharge planning.  Of note there is an accepting homecare agency:     Coordination complete.  Service Provider Request Status Services Address Phone Fax   [facility] [address], [facility] [id number] [phone]

## 2025-04-06 NOTE — PROGRESS NOTES
DATE & TIME:4/6/25, 1900-0337  Cognitive Concerns/ Orientation:A/Ox4  BEHAVIOR & AGGRESSION TOOL COLOR:Green  ABNL VS/O2:VSS on RA, Hr is high  MOBILITY:Independent  PAIN MANAGMENT:Given tramadol and scheduled tylenol  BOWEL/BLADDER:Continent of B/B  DRAIN/DEVICES:PIV infusing heparin 1500 units/hr, Bolus  mL/hr, Ns continuous 100 mL/hr  TELEMETRY RHYTHM:Aflutter RVR  TESTS/PROCEDURES:Hep xa 0852 recheck, K&mag protocols  D/C DATE:Possible cardioversion on Monday

## 2025-04-06 NOTE — PROVIDER NOTIFICATION
MD Notification    Notified Person: MD Patino    Notification Date/Time:    Notification Interaction:  vocera  Purpose of Notification:  Hi pt hr is in the 120-130 range, bp is 108/62, no chest pain.  do you still want me to give the scheduled metoprolol 50 mg.   Orders Received:  let's wait 30 mins after PO  If still above 120, can do iv    Comments:

## 2025-04-06 NOTE — PROGRESS NOTES
Additional chewing tobacco found at bedside. Discussed with pt that it is not allowed in the hospital, and can contribute to his high HR especially since he also has nicotine patch on. Per security policy, it needs to be sent home or disposed of, pt agreed to dispose of it.  Pt states he understands, is calm and cooperative with this.

## 2025-04-06 NOTE — SIGNIFICANT EVENT
Significant Event Note    Time of event: 3:34 AM April 6, 2025    Description of event:  Paged this AM for tachycardia to 160, BP in the 90's systolic. IV metoprolol had been given with transient improvement but tachycardia soon recurred. Extra one time dose of oral Metoprolol also ordered but did not improve heart rates. Case reviewed. IV fluid bolus ordered with improvement in BP, now to 102/54. In that context will carefully order a Diltiazem infusion though if remain limited by soft blood pressure, would also or alternatively consider a Digoxin load. We continue to monitor very closely overnight    Discussed with: bedside nurse    0500 addendum: some improvement on low dose Diltiazem infusion: Blood pressure 108/72, pulse 119, temperature 97.8  F (36.6  C), temperature source Oral, resp. rate 20, height 1.829 m (6'), weight 134.5 kg (296 lb 9.6 oz), SpO2 98%.    Shant Winston MD

## 2025-04-06 NOTE — PROGRESS NOTES
[facility]    Cardiology Progress Note  Assessment & Plan  Richar Davis is a 69 year old male who was admitted on 3/30/2025.       1.  Atrial flutter-new onset, asymptomatic. Likely due to acute issues.-Started on diltiazem last night due to RVR episodes over 150  2.  Coronary disease status post bypass. LIMA/LAD, SVG/PDA and SVG to distal circumflex  3.  Aortic valve replacement with 25 mm bioprosthesis-stable gradients on last echo in 12/2024   4.  Essential hypertension  5.  Hyperlipidemia  6.  Obstructive sleep apnea  7.  Tobacco use  8.  Type 2 diabetes     Recommendation:   Cardioversion tomorrow.  Continue heparin drip for now.  Close blood pressure monitoring while on diltiazem drip.  BP running on the lower side but is asymptomatic.  We will continue to follow.      High complexity   Fahad Wei MD  Text Page (7am - 5pm, M-F)    Interval History   Had episodes of rapid ventricular response yesterday with heart rates over 150 bpm.  Echo was hypotensive after metoprolol IV push.  Was hydrated which improved blood pressure and then started on diltiazem drip which he is tolerating.    Physical Exam   Temp: 98.8  F (37.1  C) Temp src: Oral BP: 120/70 Pulse: (!) 151   Resp: 20 SpO2: 98 % O2 Device: None (Room air)    Vitals:    03/31/25 0905 04/04/25 2104 04/06/25 0415   Weight: 130.3 kg (287 lb 4.2 oz) 130.4 kg (287 lb 7.7 oz) 134.5 kg (296 lb 9.6 oz)     Vital Signs with Ranges  Temp:  [97.8  F (36.6  C)-98.8  F (37.1  C)] 98.8  F (37.1  C)  Pulse:  [] 151  Resp:  [16-20] 20  BP: ()/(47-90) 120/70  SpO2:  [96 %-100 %] 98 %  I/O last 3 completed shifts:  In: 4050 [P.O.:1140; I.V.:2410; IV Piggyback:500]  Out: 200 [Urine:200]  Patient Active Problem List   Diagnosis    Coronary artery disease involving coronary bypass graft of native heart without angina pectoris    Morbid obesity (H)    Hyperlipidemia LDL goal <70    Benign essential hypertension    ALIREZA  (obstructive sleep apnea)    Gallstone ileus (H)       Constitutional:    in no apparent distress      Skin:    no jaundice      Neck:    supple, symmetrical, trachea midline      Chest:    Normal Symmetry and no tenderness      Lungs:    CTAB      Cardiovascular:    IRR, S1-S2 normal, no murmurs going thank you so much for helping      Extremities and Back:    No edema      Neurological:    No gross or focal neurologic abnormalities          Medications   Current Facility-Administered Medications   Medication Dose Route Frequency Provider Last Rate Last Admin    diltiazem (CARDIZEM) 125 mg in sodium chloride 0.9 % 125 mL infusion  5-15 mg/hr Intravenous Continuous Shant Winston MD 15 mL/hr at 04/06/25 0808 15 mg/hr at 04/06/25 0808    heparin 25,000 units in 0.45% NaCl 250 mL ANTICOAGULANT infusion  0-5,000 Units/hr Intravenous Continuous Mann Torres DO 15 mL/hr at 04/06/25 0558 1,500 Units/hr at 04/06/25 0558    sodium chloride 0.9 % infusion   Intravenous Continuous Terry Patino  mL/hr at 04/06/25 0501 New Bag at 04/06/25 0501     Current Facility-Administered Medications   Medication Dose Route Frequency Provider Last Rate Last Admin    acetaminophen (TYLENOL) tablet 975 mg  975 mg Oral Q8H Clau Calles MD   975 mg at 04/06/25 0815    aspirin EC tablet 81 mg  81 mg Oral Daily Khari Peña MD   81 mg at 04/06/25 0815    atorvastatin (LIPITOR) tablet 80 mg  80 mg Oral Daily Khari Peña MD   80 mg at 04/06/25 0815    famotidine (PEPCID) tablet 20 mg  20 mg Oral BID Terry Patino MD   20 mg at 04/06/25 0815    folic acid (FOLVITE) tablet 1,000 mcg  1,000 mcg Oral BID Khari Peña MD   1,000 mcg at 04/06/25 0815    [Held by provider] furosemide (LASIX) tablet 20 mg  20 mg Oral Daily Maddie Schafer MD   20 mg at 04/04/25 1017    insulin aspart (NovoLOG) injection (RAPID ACTING)  1-3 Units Subcutaneous TID Jake Neal DO   1 Units at 04/04/25 1708     insulin aspart (NovoLOG) injection (RAPID ACTING)  1-3 Units Subcutaneous At Bedtime Jake Blevins,         metoprolol tartrate (LOPRESSOR) tablet 50 mg  50 mg Oral BID Fahad Wei MD   50 mg at 04/06/25 0815    nicotine (NICODERM CQ) 21 MG/24HR 24 hr patch 1 patch  1 patch Transdermal Daily Maddie Schafer MD   1 patch at 04/06/25 0816    polyethylene glycol (MIRALAX) Packet 17 g  17 g Oral Daily Clau Calles MD   17 g at 04/06/25 0817    sodium chloride (PF) 0.9% PF flush 3 mL  3 mL Intracatheter Q8H [facility] Shant Winston MD        sodium chloride (PF) 0.9% PF flush 3 mL  3 mL Intracatheter Q8H [facility] Clau Calles MD   3 mL at 04/03/25 1149    tamsulosin (FLOMAX) capsule 0.8 mg  0.8 mg Oral QPM Khari Peña MD   0.8 mg at 04/05/25 2018       Data   Results for orders placed or performed during the hospital encounter of 03/30/25 (from the past 24 hours)   Lactic Acid Whole Blood w/ 1x repeat in 2 hrs when >2   Result Value Ref Range    Lactic Acid, Initial 1.0 0.7 - 2.0 mmol/L   Blood Culture Arm, Right    Specimen: Arm, Right; Blood   Result Value Ref Range    Culture No growth after 12 hours    Blood Culture Arm, Left    Specimen: Arm, Left; Blood   Result Value Ref Range    Culture No growth after 12 hours    Influenza A/B, RSV and SARS-CoV2 PCR (COVID-19) Nose    Specimen: Nose; Swab   Result Value Ref Range    Influenza A PCR Negative Negative    Influenza B PCR Negative Negative    RSV PCR Negative Negative    SARS CoV2 PCR Negative Negative    Narrative    Testing was performed using the Xpert Xpress CoV2/Flu/RSV Assay on the [employer] GeneXpert Instrument. This test should be ordered for the detection of SARS-CoV2, influenza, and RSV viruses in individuals with signs and symptoms of respiratory tract infection. This test is for in vitro diagnostic use under the [place] FDA for laboratories certified under CLIA to perform high or moderate complexity testing. This test has  been [place] FDA cleared. A negative result does not rule out the presence of PCR inhibitors in the specimen or target RNA in concentration below the limit of detection for the assay. If only one viral target is positive but coinfection with multiple targets is suspected, the sample should be re-tested with another FDA cleared, approved, or authorized test, if coninfection would change clinical management. This test was validated by the [facility]. These laboratories are certified under the Clinical Laboratory Improvement Amendments of 1988 (CLIA-88) as qualified to perfom high complexity laboratory testing.   Glucose by meter   Result Value Ref Range    GLUCOSE BY METER POCT 150 (H) 70 - 99 mg/dL   XR Chest Port 1 View    Narrative    EXAM: XR CHEST PORT 1 VIEW  LOCATION: [facility]  DATE: 4/5/2025    INDICATION: Fever  COMPARISON: Chest radiograph 10/31/2022      Impression    IMPRESSION: Stable size of cardiomediastinal silhouette status post median sternotomy and CABG. No definite airspace consolidation, pleural effusion or pneumothorax. No acute bony abnormality.   Glucose by meter   Result Value Ref Range    GLUCOSE BY METER POCT 154 (H) 70 - 99 mg/dL   Heparin Unfractionated Anti Xa Level   Result Value Ref Range    Anti Xa Unfractionated Heparin <0.10 For Reference Range, See Comment IU/mL    Narrative    Therapeutic Range: UFH: 0.25-0.50 IU/mL for low intensity dosing,  0.30-0.70 IU/mL for high intensity dosing DVT and PE.  This test is not validated for other direct factor X inhibitors (e.g. rivaroxaban, apixaban, edoxaban, betrixaban, fondaparinux) and should not be used for monitoring of other medications.   Glucose by meter   Result Value Ref Range    GLUCOSE BY METER POCT 135 (H) 70 - 99 mg/dL   UA Macroscopic with reflex to Microscopic and Culture    Specimen: Urine, Clean Catch   Result Value Ref Range    Color Urine Yellow Colorless, Straw, Light Yellow,  Yellow    Appearance Urine Clear Clear    Glucose Urine Negative Negative mg/dL    Bilirubin Urine Negative Negative    Ketones Urine 10 (A) Negative mg/dL    Specific Gravity Urine 1.018 1.003 - 1.035    Blood Urine Negative Negative    pH Urine 6.0 5.0 - 7.0    Protein Albumin Urine 20 (A) Negative mg/dL    Urobilinogen Urine Normal Normal mg/dL    Nitrite Urine Negative Negative    Leukocyte Esterase Urine Negative Negative    Bacteria Urine Few (A) None Seen /HPF    Mucus Urine Present (A) None Seen /LPF    RBC Urine 1 <=2 /HPF    WBC Urine 4 <=5 /HPF    Narrative    Urine Culture not indicated   Magnesium   Result Value Ref Range    Magnesium 1.8 1.7 - 2.3 mg/dL   CBC with platelets   Result Value Ref Range    WBC Count 10.5 4.0 - 11.0 10e3/uL    RBC Count 4.29 (L) 4.40 - 5.90 10e6/uL    Hemoglobin 13.1 (L) 13.3 - 17.7 g/dL    Hematocrit 40.1 40.0 - 53.0 %    MCV 94 78 - 100 fL    MCH 30.5 26.5 - 33.0 pg    MCHC 32.7 31.5 - 36.5 g/dL    RDW 13.7 10.0 - 15.0 %    Platelet Count 167 150 - 450 10e3/uL   Glucose by meter   Result Value Ref Range    GLUCOSE BY METER POCT 106 (H) 70 - 99 mg/dL

## 2025-04-06 NOTE — PROVIDER NOTIFICATION
MD Notification    Notified Person: MD Bran    Notification Date/Time:    Notification Interaction:  delvin  Purpose of Notification:  Pt hr was in the 160s but not symptomatic, received metoprolol x1 and hr went to the 120s and its now back at 160. Bp is now 98/57, temp is 98.7  Orders Received:  Iv fluid bolus and one time dose of oral metoprolol ordered. Let me know bp and pulse after these are done  Comments:

## 2025-04-06 NOTE — PLAN OF CARE
Tele remains Aflutter, CVR most of this shift. VSS on RA. Diltiazem infusing at 10mg/hr, heparin at 1650 units/hr, recheck due now. Magnesium replaced. Loose BM x3. Tolerating low fiber diet. Little voiding today, bladder scan and encouraging fluid intake. Ambulating to BR with A1. Plan for cardioversion tomorrow.

## 2025-04-06 NOTE — PROGRESS NOTES
General Surgery Progress Note    Admission Date: 3/30/2025  Today's Date: 4/6/2025         Assessment:      Richar Davis is a 69 year old male with gallstone ileus s/p ex lap and small bowel enterotomy with removal of gallstone POD 6. Return of bowel function, NGT removed 4/3, advanced from CLD to low fiber, nausea and emesis with low fiber diet but tolerating clears. Now tolerating. In new Aflutter, cardiology consulted, possible cardioversion Monday 4/7. On dilt gtt and heparin gtt         Plan:   - Okay for low fiber diet  - Pain control with Tylenol and tramadol  - Miralax daily   - Okay for heparin gtt for surgical perspective and therapeutic anticoagulation  - Medical management per hospitalist, appreciate input          Interval History:   Denies abdominal pain at rest, some with movement. Having loose BM. Denies nausea or vomiting. Tolerating FLD. Wants to try real food, only wants a few bites. Denies chest pain or lightheadedness with Aflutter.       Physical Exam:   /70   Pulse (!) 151   Temp 98.8  F (37.1  C) (Oral)   Resp 20   Ht 1.829 m (6')   Wt 134.5 kg (296 lb 9.6 oz)   SpO2 98%   BMI 40.23 kg/m    I/O last 3 completed shifts:  In: 4050 [P.O.:1140; I.V.:2410; IV Piggyback:500]  Out: 200 [Urine:200]  General: NAD, pleasant, alert and awake  Respiratory: non-labored breathing on room air, no cough  Abdomen: soft, rotund, nontender, incision closed with staples, small serous drainage around umbilicus and inferior portion, no surrounding erythema  Extremities: Warm, dry, no rashes  LABS:  Recent Labs   Lab Test 04/06/25  0610 04/05/25  0720 04/04/25  0601 04/02/25  0746   WBC 10.5 11.9*  --  4.3   HGB 13.1* 13.2* 14.2 13.4   MCV 94 94  --  93    186  --  154      Recent Labs   Lab Test 04/05/25  0720 04/04/25  0601 04/02/25  0746   POTASSIUM 3.6  3.6 3.4 3.8   CHLORIDE 98  98 98 103   CO2 25  25 28 27   BUN 7.2*  7.2* 7.9* 16.9   CR 0.73  0.73 0.84 0.83   ANIONGAP 12  12  11 11     Ana Dougherty, PGY4  Surgery

## 2025-04-06 NOTE — PROGRESS NOTES
"BRIEF NUTRITION ASSESSMENT      REASON FOR ASSESSMENT:  Richar Davis is a 69 year old male seen by Registered Dietitian for [facility]    NUTRITION HISTORY:  - Pt and spouse seen in room. Pt trying to get some rest.   - Patient typically eats well on a regular diet.     CURRENT DIET AND INTAKE:  Diet: Low Fiber (advanced to solids this morning after ~6 days NPO/liquids)  - Pt tolerated a solid breakfast well this morning.     ANTHROPOMETRICS:  Height: 6' 0\"  Weight:  296 lbs 9.6 oz (134.5 kg)   Body mass index is 40.23 kg/m .   Weight Status: Obesity Grade III BMI >40  IBW:  80.9 kg   %IBW: 166%  Weight History:   Wt Readings from Last 10 Encounters:   04/06/25 134.5 kg (296 lb 9.6 oz)   12/16/24 131 kg (288 lb 12.8 oz)   12/12/24 129.2 kg (284 lb 14.4 oz)   06/10/24 127.8 kg (281 lb 12.8 oz)   12/13/23 126.9 kg (279 lb 12.8 oz)   06/26/23 126.1 kg (278 lb 1.6 oz)   01/03/23 119.5 kg (263 lb 6.4 oz)   11/23/22 133.8 kg (295 lb)   11/08/22 117.7 kg (259 lb 6.4 oz)   09/19/22 121.6 kg (268 lb)       LABS/MEDS/PHYSICAL FINDINGS:  Reviewed     - BM x2 yesterday   - No PI     MALNUTRITION:  Visual Nutrition Focused Physical Assessment (NFPA) completed.   Patient does not meet two of the following criteria necessary for diagnosing malnutrition.     % Weight Loss:  None noted  % Intake:  </= 50% for >/= 5 days (severe malnutrition)  Subcutaneous Fat Loss:  None observed  Muscle Loss:  None observed  Fluid Retention:  Trace- does not meet criteria     NUTRITION INTERVENTION:  Nutrition Diagnosis:  Inadequate Oral Intake related to altered GI function as evidenced by 6 days NPO/CLD post op.     Implementation:  Nutrition Education:  Discussed Low Fiber diet, Pt/spouse have no questions.     FOLLOW UP/MONITORING:   Will re-evaluate in 7 - 10 days, or sooner, if re-consulted.        "

## 2025-04-07 ENCOUNTER — ANESTHESIA EVENT (OUTPATIENT)
Dept: SURGERY | Facility: CLINIC | Age: 70
End: 2025-04-07
Payer: COMMERCIAL

## 2025-04-07 ENCOUNTER — ANESTHESIA (OUTPATIENT)
Dept: SURGERY | Facility: CLINIC | Age: 70
End: 2025-04-07
Payer: COMMERCIAL

## 2025-04-07 LAB
ALBUMIN SERPL BCG-MCNC: 2.9 G/DL (ref 3.5–5.2)
ALP SERPL-CCNC: 140 U/L (ref 40–150)
ALT SERPL W P-5'-P-CCNC: 26 U/L (ref 0–70)
ANION GAP SERPL CALCULATED.3IONS-SCNC: 11 MMOL/L (ref 7–15)
ANION GAP SERPL CALCULATED.3IONS-SCNC: 16 MMOL/L (ref 7–15)
AST SERPL W P-5'-P-CCNC: 33 U/L (ref 0–45)
BILIRUB SERPL-MCNC: 0.4 MG/DL
BUN SERPL-MCNC: 9.2 MG/DL (ref 8–23)
BUN SERPL-MCNC: 9.8 MG/DL (ref 8–23)
CALCIUM SERPL-MCNC: 9.5 MG/DL (ref 8.8–10.4)
CALCIUM SERPL-MCNC: 9.6 MG/DL (ref 8.8–10.4)
CHLORIDE SERPL-SCNC: 97 MMOL/L (ref 98–107)
CHLORIDE SERPL-SCNC: 99 MMOL/L (ref 98–107)
CREAT SERPL-MCNC: 0.72 MG/DL (ref 0.67–1.17)
CREAT SERPL-MCNC: 0.79 MG/DL (ref 0.67–1.17)
EGFRCR SERPLBLD CKD-EPI 2021: >90 ML/MIN/1.73M2
EGFRCR SERPLBLD CKD-EPI 2021: >90 ML/MIN/1.73M2
ERYTHROCYTE [DISTWIDTH] IN BLOOD BY AUTOMATED COUNT: 13.8 % (ref 10–15)
GLUCOSE BLDC GLUCOMTR-MCNC: 120 MG/DL (ref 70–99)
GLUCOSE BLDC GLUCOMTR-MCNC: 147 MG/DL (ref 70–99)
GLUCOSE BLDC GLUCOMTR-MCNC: 150 MG/DL (ref 70–99)
GLUCOSE BLDC GLUCOMTR-MCNC: 152 MG/DL (ref 70–99)
GLUCOSE SERPL-MCNC: 140 MG/DL (ref 70–99)
GLUCOSE SERPL-MCNC: 151 MG/DL (ref 70–99)
HCO3 SERPL-SCNC: 22 MMOL/L (ref 22–29)
HCO3 SERPL-SCNC: 24 MMOL/L (ref 22–29)
HCT VFR BLD AUTO: 37.7 % (ref 40–53)
HGB BLD-MCNC: 12.5 G/DL (ref 13.3–17.7)
INR PPP: 1.13 (ref 0.85–1.15)
INR PPP: 1.15 (ref 0.85–1.15)
LACTATE SERPL-SCNC: 6.4 MMOL/L (ref 0.7–2)
MAGNESIUM SERPL-MCNC: 1.6 MG/DL (ref 1.7–2.3)
MCH RBC QN AUTO: 30.4 PG (ref 26.5–33)
MCHC RBC AUTO-ENTMCNC: 33.2 G/DL (ref 31.5–36.5)
MCV RBC AUTO: 92 FL (ref 78–100)
PLATELET # BLD AUTO: 205 10E3/UL (ref 150–450)
POTASSIUM SERPL-SCNC: 4.1 MMOL/L (ref 3.4–5.3)
POTASSIUM SERPL-SCNC: 4.1 MMOL/L (ref 3.4–5.3)
POTASSIUM SERPL-SCNC: 4.6 MMOL/L (ref 3.4–5.3)
PROCALCITONIN SERPL IA-MCNC: 0.65 NG/ML
PROT SERPL-MCNC: 6.6 G/DL (ref 6.4–8.3)
RBC # BLD AUTO: 4.11 10E6/UL (ref 4.4–5.9)
SODIUM SERPL-SCNC: 134 MMOL/L (ref 135–145)
SODIUM SERPL-SCNC: 135 MMOL/L (ref 135–145)
UFH PPP CHRO-ACNC: 0.11 IU/ML
UFH PPP CHRO-ACNC: 0.25 IU/ML
WBC # BLD AUTO: 12.7 10E3/UL (ref 4–11)

## 2025-04-07 PROCEDURE — 36415 COLL VENOUS BLD VENIPUNCTURE: CPT | Performed by: INTERNAL MEDICINE

## 2025-04-07 PROCEDURE — 84132 ASSAY OF SERUM POTASSIUM: CPT | Performed by: NURSE PRACTITIONER

## 2025-04-07 PROCEDURE — 83605 ASSAY OF LACTIC ACID: CPT | Performed by: NURSE PRACTITIONER

## 2025-04-07 PROCEDURE — 250N000011 HC RX IP 250 OP 636: Performed by: INTERNAL MEDICINE

## 2025-04-07 PROCEDURE — 93005 ELECTROCARDIOGRAM TRACING: CPT

## 2025-04-07 PROCEDURE — 250N000013 HC RX MED GY IP 250 OP 250 PS 637: Performed by: INTERNAL MEDICINE

## 2025-04-07 PROCEDURE — 99233 SBSQ HOSP IP/OBS HIGH 50: CPT | Mod: 24 | Performed by: INTERNAL MEDICINE

## 2025-04-07 PROCEDURE — 85520 HEPARIN ASSAY: CPT | Performed by: INTERNAL MEDICINE

## 2025-04-07 PROCEDURE — 210N000002 HC R&B HEART CARE

## 2025-04-07 PROCEDURE — 250N000013 HC RX MED GY IP 250 OP 250 PS 637: Performed by: STUDENT IN AN ORGANIZED HEALTH CARE EDUCATION/TRAINING PROGRAM

## 2025-04-07 PROCEDURE — 83690 ASSAY OF LIPASE: CPT | Performed by: NURSE PRACTITIONER

## 2025-04-07 PROCEDURE — 250N000013 HC RX MED GY IP 250 OP 250 PS 637: Performed by: SURGERY

## 2025-04-07 PROCEDURE — 87186 SC STD MICRODIL/AGAR DIL: CPT | Performed by: NURSE PRACTITIONER

## 2025-04-07 PROCEDURE — 85018 HEMOGLOBIN: CPT | Performed by: INTERNAL MEDICINE

## 2025-04-07 PROCEDURE — 83880 ASSAY OF NATRIURETIC PEPTIDE: CPT | Performed by: NURSE PRACTITIONER

## 2025-04-07 PROCEDURE — 87641 MR-STAPH DNA AMP PROBE: CPT | Performed by: NURSE PRACTITIONER

## 2025-04-07 PROCEDURE — 250N000011 HC RX IP 250 OP 636: Mod: JZ | Performed by: SURGERY

## 2025-04-07 PROCEDURE — 84145 PROCALCITONIN (PCT): CPT | Performed by: NURSE PRACTITIONER

## 2025-04-07 PROCEDURE — 250N000011 HC RX IP 250 OP 636: Performed by: NURSE ANESTHETIST, CERTIFIED REGISTERED

## 2025-04-07 PROCEDURE — 85610 PROTHROMBIN TIME: CPT | Performed by: INTERNAL MEDICINE

## 2025-04-07 PROCEDURE — 83735 ASSAY OF MAGNESIUM: CPT | Performed by: INTERNAL MEDICINE

## 2025-04-07 PROCEDURE — 250N000009 HC RX 250: Performed by: HOSPITALIST

## 2025-04-07 PROCEDURE — 258N000003 HC RX IP 258 OP 636: Performed by: NURSE ANESTHETIST, CERTIFIED REGISTERED

## 2025-04-07 PROCEDURE — 36415 COLL VENOUS BLD VENIPUNCTURE: CPT | Performed by: NURSE PRACTITIONER

## 2025-04-07 PROCEDURE — 999N000184 HC STATISTIC TELEMETRY

## 2025-04-07 PROCEDURE — 92960 CARDIOVERSION ELECTRIC EXT: CPT | Performed by: INTERNAL MEDICINE

## 2025-04-07 PROCEDURE — 92960 CARDIOVERSION ELECTRIC EXT: CPT

## 2025-04-07 PROCEDURE — 84132 ASSAY OF SERUM POTASSIUM: CPT | Performed by: INTERNAL MEDICINE

## 2025-04-07 PROCEDURE — 258N000003 HC RX IP 258 OP 636: Performed by: HOSPITALIST

## 2025-04-07 PROCEDURE — 87154 CUL TYP ID BLD PTHGN 6+ TRGT: CPT | Performed by: NURSE PRACTITIONER

## 2025-04-07 PROCEDURE — 99207 PR APP CREDIT; MD BILLING SHARED VISIT: CPT | Performed by: NURSE PRACTITIONER

## 2025-04-07 PROCEDURE — 93010 ELECTROCARDIOGRAM REPORT: CPT | Mod: XP | Performed by: INTERNAL MEDICINE

## 2025-04-07 PROCEDURE — 85018 HEMOGLOBIN: CPT | Performed by: NURSE PRACTITIONER

## 2025-04-07 PROCEDURE — 370N000017 HC ANESTHESIA TECHNICAL FEE, PER MIN

## 2025-04-07 PROCEDURE — 99233 SBSQ HOSP IP/OBS HIGH 50: CPT | Performed by: INTERNAL MEDICINE

## 2025-04-07 PROCEDURE — 80048 BASIC METABOLIC PNL TOTAL CA: CPT | Performed by: INTERNAL MEDICINE

## 2025-04-07 PROCEDURE — 999N000010 HC STATISTIC ANES STAT CODE-CRNA PER MINUTE

## 2025-04-07 PROCEDURE — 999N000128 HC STATISTIC PERIPHERAL IV START W/O US GUIDANCE

## 2025-04-07 RX ORDER — PROPOFOL 10 MG/ML
INJECTION, EMULSION INTRAVENOUS PRN
Status: DISCONTINUED | OUTPATIENT
Start: 2025-04-07 | End: 2025-04-07

## 2025-04-07 RX ORDER — PIPERACILLIN SODIUM, TAZOBACTAM SODIUM 4; .5 G/20ML; G/20ML
4.5 INJECTION, POWDER, LYOPHILIZED, FOR SOLUTION INTRAVENOUS EVERY 6 HOURS
Status: DISCONTINUED | OUTPATIENT
Start: 2025-04-08 | End: 2025-04-22

## 2025-04-07 RX ORDER — POTASSIUM CHLORIDE 1500 MG/1
40 TABLET, EXTENDED RELEASE ORAL
Status: DISCONTINUED | OUTPATIENT
Start: 2025-04-07 | End: 2025-04-08 | Stop reason: HOSPADM

## 2025-04-07 RX ORDER — MAGNESIUM SULFATE HEPTAHYDRATE 40 MG/ML
2 INJECTION, SOLUTION INTRAVENOUS ONCE
Status: COMPLETED | OUTPATIENT
Start: 2025-04-07 | End: 2025-04-07

## 2025-04-07 RX ORDER — SODIUM CHLORIDE 9 MG/ML
INJECTION, SOLUTION INTRAVENOUS CONTINUOUS PRN
Status: DISCONTINUED | OUTPATIENT
Start: 2025-04-07 | End: 2025-04-07

## 2025-04-07 RX ORDER — BENZOCAINE/MENTHOL 6 MG-10 MG
LOZENGE MUCOUS MEMBRANE 3 TIMES DAILY PRN
Status: ACTIVE | OUTPATIENT
Start: 2025-04-07 | End: 2025-04-09

## 2025-04-07 RX ORDER — POTASSIUM CHLORIDE 1500 MG/1
20 TABLET, EXTENDED RELEASE ORAL
Status: DISCONTINUED | OUTPATIENT
Start: 2025-04-07 | End: 2025-04-08 | Stop reason: HOSPADM

## 2025-04-07 RX ORDER — MAGNESIUM SULFATE HEPTAHYDRATE 40 MG/ML
2 INJECTION, SOLUTION INTRAVENOUS
Status: DISCONTINUED | OUTPATIENT
Start: 2025-04-07 | End: 2025-04-08 | Stop reason: HOSPADM

## 2025-04-07 RX ADMIN — HEPARIN SODIUM 2100 UNITS/HR: 10000 INJECTION, SOLUTION INTRAVENOUS at 13:38

## 2025-04-07 RX ADMIN — FOLIC ACID 1000 MCG: 1 TABLET ORAL at 20:31

## 2025-04-07 RX ADMIN — TRAMADOL HYDROCHLORIDE 50 MG: 50 TABLET, COATED ORAL at 15:23

## 2025-04-07 RX ADMIN — PROPOFOL 50 MG: 10 INJECTION, EMULSION INTRAVENOUS at 14:24

## 2025-04-07 RX ADMIN — FAMOTIDINE 20 MG: 20 TABLET, FILM COATED ORAL at 20:31

## 2025-04-07 RX ADMIN — TRAMADOL HYDROCHLORIDE 50 MG: 50 TABLET, COATED ORAL at 08:18

## 2025-04-07 RX ADMIN — ACETAMINOPHEN 975 MG: 325 TABLET, FILM COATED ORAL at 23:40

## 2025-04-07 RX ADMIN — TAMSULOSIN HYDROCHLORIDE 0.8 MG: 0.4 CAPSULE ORAL at 20:31

## 2025-04-07 RX ADMIN — HEPARIN SODIUM 2100 UNITS/HR: 10000 INJECTION, SOLUTION INTRAVENOUS at 18:09

## 2025-04-07 RX ADMIN — MAGNESIUM SULFATE HEPTAHYDRATE 2 G: 40 INJECTION, SOLUTION INTRAVENOUS at 08:11

## 2025-04-07 RX ADMIN — METOPROLOL TARTRATE 50 MG: 50 TABLET, FILM COATED ORAL at 20:31

## 2025-04-07 RX ADMIN — FAMOTIDINE 20 MG: 20 TABLET, FILM COATED ORAL at 08:25

## 2025-04-07 RX ADMIN — SODIUM CHLORIDE: 900 INJECTION INTRAVENOUS at 14:23

## 2025-04-07 RX ADMIN — ATORVASTATIN CALCIUM 80 MG: 80 TABLET, FILM COATED ORAL at 08:26

## 2025-04-07 RX ADMIN — ASPIRIN 81 MG: 81 TABLET, COATED ORAL at 08:25

## 2025-04-07 RX ADMIN — NICOTINE 1 PATCH: 21 PATCH, EXTENDED RELEASE TRANSDERMAL at 08:22

## 2025-04-07 RX ADMIN — DILTIAZEM HYDROCHLORIDE 10 MG/HR: 5 INJECTION, SOLUTION INTRAVENOUS at 08:19

## 2025-04-07 RX ADMIN — HEPARIN SODIUM 1950 UNITS/HR: 10000 INJECTION, SOLUTION INTRAVENOUS at 06:32

## 2025-04-07 RX ADMIN — ACETAMINOPHEN 975 MG: 325 TABLET, FILM COATED ORAL at 18:05

## 2025-04-07 RX ADMIN — ACETAMINOPHEN 975 MG: 325 TABLET, FILM COATED ORAL at 00:22

## 2025-04-07 RX ADMIN — ACETAMINOPHEN 975 MG: 325 TABLET, FILM COATED ORAL at 08:26

## 2025-04-07 RX ADMIN — HYDROMORPHONE HYDROCHLORIDE 0.5 MG: 1 INJECTION, SOLUTION INTRAMUSCULAR; INTRAVENOUS; SUBCUTANEOUS at 20:36

## 2025-04-07 RX ADMIN — APIXABAN 5 MG: 5 TABLET, FILM COATED ORAL at 20:31

## 2025-04-07 RX ADMIN — INSULIN ASPART 1 UNITS: 100 INJECTION, SOLUTION INTRAVENOUS; SUBCUTANEOUS at 18:17

## 2025-04-07 RX ADMIN — METOPROLOL TARTRATE 50 MG: 50 TABLET, FILM COATED ORAL at 08:22

## 2025-04-07 RX ADMIN — FOLIC ACID 1000 MCG: 1 TABLET ORAL at 08:26

## 2025-04-07 ASSESSMENT — ACTIVITIES OF DAILY LIVING (ADL)
ADLS_ACUITY_SCORE: 69
ADLS_ACUITY_SCORE: 72
ADLS_ACUITY_SCORE: 49
ADLS_ACUITY_SCORE: 72
ADLS_ACUITY_SCORE: 49
ADLS_ACUITY_SCORE: 72
ADLS_ACUITY_SCORE: 49
ADLS_ACUITY_SCORE: 49
ADLS_ACUITY_SCORE: 69
ADLS_ACUITY_SCORE: 72
ADLS_ACUITY_SCORE: 49
ADLS_ACUITY_SCORE: 72
ADLS_ACUITY_SCORE: 49
ADLS_ACUITY_SCORE: 72
ADLS_ACUITY_SCORE: 72
ADLS_ACUITY_SCORE: 49
ADLS_ACUITY_SCORE: 72

## 2025-04-07 ASSESSMENT — ENCOUNTER SYMPTOMS
DYSRHYTHMIAS: 1
SEIZURES: 0

## 2025-04-07 ASSESSMENT — LIFESTYLE VARIABLES: TOBACCO_USE: 1

## 2025-04-07 NOTE — PROGRESS NOTES
[facility]    General Surgery  Daily Progress Note       Assessment and Plan:   Richar Davis is a 69 year old male admitted with gallstone ileus s/p ex lap and small bowel enterotomy with removal of gallstone POD 8. Return of bowel function, NGT removed 4/3, advanced from CLD to low fiber, nausea and emesis with low fiber diet but tolerating clears. Now tolerating low fiber. In [facility], cardiology consulted, possible cardioversion Monday 4/7. On dilt gtt and heparin gtt     PLAN:  - NPO for Cardioversion today. Okay to continue low fiber diet following this from our standpoint.  - Back down on diet if emesis. Continue antinausea medications prn.   - Having bowel function. Continue miralax daily.  - Pain controlled with tylenol and tramadol.  - Ambulate QID to assist with bowel function, PCDs for DVT prophylaxis.   - Encourage deep breathe and IS.   - Medical management per primary team.    DISPOSITION:  - Discharge when medically appropriate. Doing well from surgical perspective.        Interval History:   Richar Davis is seen on surgical rounds. His abdominal pain continues to improve, having back pain currently and trying to get comfortable. Reapplied abdominal binder and patient reports improvement. Endorses mild nausea yesterday but this was intermittent and not following low fiber diet. Continues to have bowel movements. White count 12.7 this morning, Hgb 12.5.         Physical Exam:   Temp: 98.3  F (36.8  C) Temp src: Oral BP: 135/85 Pulse: (!) 130   Resp: 20 SpO2: 97 % O2 Device: None (Room air)      I/O last 3 completed shifts:  In: 2021.3 [P.O.:1335; I.V.:686.3]  Out: -     GENERAL: VS reviewed, alert, oriented, no acute distress  LUNGS: Normal respiratory effort, no wheezing  ABDOMEN:  Soft, nontender, non-distended   INCISION: Staples in place. Incision is clean, dry, and intact. Mild surrounding erythema could be secondary to ice application this  morning.  EXTREMITIES: Moving all extremities, no gross deformities  NEUROLOGICAL: Grossly non-focal, mood & affect appropriate    Data   Recent Labs   Lab 04/07/25  0727 04/07/25  0425 04/06/25  2138 04/06/25  1742 04/06/25  0803 04/06/25  0610 04/05/25  1121 04/05/25  0720   WBC  --  12.7*  --   --   --  10.5  --  11.9*   HGB  --  12.5*  --   --   --  13.1*  --  13.2*   MCV  --  92  --   --   --  94  --  94   PLT  --  205  --   --   --  167  --  186   INR 1.15 1.13  --   --   --   --   --   --    NA  --  134*  --   --   --  134*  --  135  135   POTASSIUM  --  4.1  4.1  --   --   --  3.9  --  3.6  3.6   CHLORIDE  --  99  --   --   --  101  --  98  98   CO2  --  24  --   --   --  22  --  25  25   BUN  --  9.2  --   --   --  7.9*  --  7.2*  7.2*   CR  --  0.72  --   --   --  0.69  --  0.73  0.73   ANIONGAP  --  11  --   --   --  11  --  12  12   HALEY  --  9.6  --   --   --  9.0  --  9.3  9.3   GLC  --  151* 142* 157*   < > 117*   < > 132*  132*   ALBUMIN  --   --   --   --   --   --   --  3.0*   PROTTOTAL  --   --   --   --   --   --   --  6.2*   BILITOTAL  --   --   --   --   --   --   --  0.6   ALKPHOS  --   --   --   --   --   --   --  96   ALT  --   --   --   --   --   --   --  20   AST  --   --   --   --   --   --   --  23    < > = values in this interval not displayed.       Viviane Xiao PA-C    Please use Anshul to page 7:30am-4pm, page on-call surgeon after 4pm  Office number: [phone]

## 2025-04-07 NOTE — PROVIDER NOTIFICATION
Pt's concerned about weight and requested to be weighed. Wt has gone up 7lbs since this AM and up 16lbs since 4/4. Pt states weight on 4/4 was standing. PO PTA diuretic has been on hold and pt has been receiving NS at 100ml/hr. Pt has not been producing much urine and not retaining. He had been refusing to use urinal so there's not measurements for output. Dr. Sotomayor paged to see if any changes need to be made. IVF's discontinued.

## 2025-04-07 NOTE — PROCEDURES
[facility]    Procedure: EP Cardioversion External    Date/Time: 4/7/2025 2:29 PM    Performed by: Laurie Capps MD  Authorized by: Fahad Wei MD      UNIVERSAL PROTOCOL   Site Marked: Yes  Prior Images Obtained and Reviewed:  Yes  Required items: Required blood products, implants, devices and special equipment available    Patient identity confirmed:  Verbally with patient  Patient was reevaluated immediately before administering moderate or deep sedation or anesthesia  Confirmation Checklist:  Patient's identity using two indicators  Time out: Immediately prior to the procedure a time out was called    Universal Protocol: the Joint Commission Universal Protocol was followed       ANESTHESIA    Anesthesia was administered and monitored by anesthesiology.  See anesthesia documentation for details.    SEDATION  Patient Sedated: Yes    Sedation:  Propofol  Vital signs: Vital signs monitored during sedation      PROCEDURE DETAILS  Cardioversion basis: elective  Pre-procedure rhythm: atrial flutter  Patient position: patient was placed in a supine position  Chest area: chest area exposed  Electrodes: pads  Electrodes placed: anterior-posterior  Number of attempts: 1  Post-procedure rhythm: normal sinus rhythm  Complications: no complications      PROCEDURE  Describe Procedure: Consent was obtained. Patient was anticoagulated with heparin infusion, confirmed within therapeutic range. Pads placed in AP position. Single shock of synchronized cardioversion utilizing 200 J applied which successfully converted rhythm from atrial flutter into normal sinus rhythm. Sedation was provided by anesthesia team. No immediate complications.   Patient Tolerance:  Patient tolerated the procedure well with no immediate complications

## 2025-04-07 NOTE — PROGRESS NOTES
Patient is concerned about of volume overload, tolerating oral intake, will stop maintenance fluid, re-assess In the morning.

## 2025-04-07 NOTE — ANESTHESIA PREPROCEDURE EVALUATION
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    BYPASS GRAFT ARTERY CORONARY, REPLACE VALVE AORTIC, COMBINED N/A 10/20/2022    Procedure: CORONARY ARTERY BYPASS GRAFT x 3 (LEFT INTERNAL MAMMARY ARTERY - LEFT ANTERIOR DESCENDING ARTERY; SAPHENOUS VEIN - POSTERIOR DESCENDING ARTERY; SAPHENOUS VEIN - CIRCUMFLEX ARTERY) WITH ENDOSCOPIC LESSER SAPHENOUS VEIN HARVEST ON BILATERAL LOWER EXTREMITY, AORTIC VALVE REPLACEMENT WITH TISSUE HEART VALVE INSPIRIS  RESILIA  AORTIC VALVE SIZE: 25MM, AND ON CARDIOPULMONARY PUMP OXYGENATOR  (    CTA ANGIOGRAM CORONARY ARTERY      LAPAROTOMY EXPLORATORY N/A 3/30/2025    Procedure: exploratory laparotomy with enterotomy and removal of gallstone;  Surgeon: Clau Calles MD;  Location: [facility]    ORTHOPEDIC SURGERY Left     elbow surgery      Allergies   Allergen Reactions    Niaspan [Niacin] Other (See Comments)     flushing      Social History     Tobacco Use    Smoking status: Former    Smokeless tobacco: Current     Types: Chew    Tobacco comments:     daily   Substance Use Topics    Alcohol use: Not Currently      Wt Readings from Last 1 Encounters:   25 (!) 137.3 kg (302 lb 9.6 oz)        Anesthesia Evaluation            ROS/MED HX  ENT/Pulmonary:     (+) sleep apnea, doesn't use CPAP,   ALIREZA risk factors, snores loudly, hypertension, obese,  observed stopped breathing,      tobacco use,                        Neurologic:    (-) no seizures and no CVA   Cardiovascular:     (+) Dyslipidemia hypertension- -  CAD - past MI - -                        dysrhythmias, a-fib,  valvular problems/murmurs  type: AS     Previous cardiac testing   Echo: Date: 9/22 Results:  Interpretation Summary     Severe calcific aortic valve stenosis with mild regurgitation.  Peak transaortic velocity 4.1 m/s, mean gradient 40 mmHg, valve area of 0.9  cmÂ , dimensionless index 0.26.  Normal left ventricular systolic function. Estimated LVEF 60-65%.  Normal right ventricular size and systolic function.  Trace mitral and tricuspid valve regurgitation.    Stress Test:  Date: Results:    ECG Reviewed:  Date: Results:    Cath:  Date: Results:      METS/Exercise Tolerance:     Hematologic:       Musculoskeletal:       GI/Hepatic:    (-) GERD and liver disease   Renal/Genitourinary:     (+)        BPH,   (-) renal disease   Endo:     (+)  type II DM,   Not using insulin, - not using insulin pump.   Diabetic complications: cardiac problems.      Obesity,    (-) thyroid disease   Psychiatric/Substance Use:       Infectious Disease:       Malignancy:    (-) malignancy   Other:            Physical Exam    Airway        Mallampati: III   TM distance: > 3 FB   Neck ROM: full   Mouth opening: > 3 cm    Respiratory Devices and Support         Dental       (+) Minor Abnormalities - some fillings, tiny chips      Cardiovascular          Rhythm and rate: irregular and normal     Pulmonary   pulmonary exam normal                OUTSIDE LABS:  CBC:   Lab Results   Component Value Date    WBC 12.7 (H) 04/07/2025    WBC 10.5 04/06/2025    HGB 12.5 (L) 04/07/2025    HGB 13.1 (L) 04/06/2025    HCT 37.7 (L) 04/07/2025    HCT 40.1 04/06/2025     04/07/2025     04/06/2025     BMP:   Lab Results   Component Value Date     (L) 04/07/2025     (L) 04/06/2025    POTASSIUM 4.1 04/07/2025    POTASSIUM 4.1 04/07/2025    CHLORIDE 99 04/07/2025    CHLORIDE 101 04/06/2025    CO2 24 04/07/2025    CO2 22 04/06/2025    BUN 9.2 04/07/2025    BUN 7.9 (L) 04/06/2025    CR 0.72 04/07/2025    CR 0.69 04/06/2025     (H) 04/07/2025     (H)  "04/07/2025     COAGS:   Lab Results   Component Value Date     (H) 10/20/2022    INR 1.15 04/07/2025    FIBR 198 10/20/2022     POC: No results found for: \"BGM\", \"HCG\", \"HCGS\"  HEPATIC:   Lab Results   Component Value Date    ALBUMIN 3.0 (L) 04/05/2025    PROTTOTAL 6.2 (L) 04/05/2025    ALT 20 04/05/2025    AST 23 04/05/2025    ALKPHOS 96 04/05/2025    BILITOTAL 0.6 04/05/2025    YUNG 19 10/23/2022     OTHER:   Lab Results   Component Value Date    PH 7.42 10/26/2022    LACT 1.0 04/05/2025    A1C 6.8 (H) 03/31/2025    HALEY 9.6 04/07/2025    PHOS 2.3 (L) 04/04/2025    MAG 1.6 (L) 04/07/2025    LIPASE 33 03/30/2025       Anesthesia Plan    ASA Status:  3    NPO Status:  NPO Appropriate    Anesthesia Type: General.     - Airway: Native airway              Consents    Anesthesia Plan(s) and associated risks, benefits, and realistic alternatives discussed. Questions answered and patient/representative(s) expressed understanding.     - Discussed:     - Discussed with:  Patient            Postoperative Care            Comments:               Red Shirley MD    Clinically Significant Risk Factors         # Hyponatremia: Lowest Na = 134 mmol/L in last 2 days, will monitor as appropriate     # Hypomagnesemia: Lowest Mg = 1.6 mg/dL in last 2 days, will replace as needed   # Hypoalbuminemia: Lowest albumin = 3 g/dL at 4/5/2025  7:20 AM, will monitor as appropriate     # Hypertension: Noted on problem list           # DMII: A1C = 6.8 % (Ref range: <5.7 %) within past 6 months   # Severe Obesity: Estimated body mass index is 41.04 kg/m  as calculated from the following:    Height as of this encounter: 1.829 m (6').    Weight as of this encounter: 137.3 kg (302 lb 9.6 oz).      # Financial/Environmental Concerns: none   # History of CABG: noted on surgical history         "

## 2025-04-07 NOTE — PROGRESS NOTES
[facility]    Medicine Progress Note - Hospitalist Service    Date of Admission:  3/30/2025    Assessment & Plan   Richar Davis is a 69 year old male with a history of diabetes, hypertension, CABG, status post AVR with bovine valve 10/2022, untreated sleep apnea, hypertension, hyperlipidemia, neuropathy, BPH who presented to the emergency department with abdominal pain, nausea and vomiting.  Imaging showing gallstone ileus with a 2.2 cm gallstone impacted.  S/P surgery 3/30/25 as outlined below.  Post-op has had issues with ileus, fever, and aflutter with RVR.     Gallstone ileus distal small bowel obstruction secondary to impacted 2.2 cm gallstone  Exploratory laparotomy with small bowel enterotomy and removal of gallstone on March 30, 2025:  Summary:  3/30 OR ex lap small bowel enterotomy and removal of gallstone with primary closure.  See operative report for further details. 4/1 started to have return of bowel function. 4/2 NG Removed and started on low fiber diet but clinically got worse so transition back to CLD normal 4/3.      -Tolerating diet advance, overall feeling improved.  Pain controlled.  Had a fever a couple days ago but none since.    -Post-op site cares, activity restrictions, diet, pain meds per surgical team     CAD  Aortic stenosis (status post AVR with bovine valve) 10/22   CABG X 3 10/22  Hypertension  New Aflutter with RVR 4/5:  Summary:  Patient with history of CABG, also aortic stenosis status post AVR with 25 mm Bal Inspiris Resilia bovine valve in October 2022, see recent cardiology visit dated December 2024 for further details.  Developed new aflutter with RVR postop.  Cardiology consulted, appreciate their assistance.  They recommended heparin drip and continued work on modest rate control.  He did not convert over the weekend on rate control attempts.  Currently on diltiazem drip as had high rates on metoprolol alone.   Plan is to continue heparin  drip and have cardioversion today.    -Patients wife requested we have pharmacy assess anticoag coverage for any oral agents post procedure.  I placed a liaison consult.    ADDENDUM per pharmacy.  Will need to clarify with him and his wife when he is ready to go how they would like to handle initial Rx, note first month free as noted below:  Eliquis/Xarelto  --Upon receipt of RX, Discharge Pharmacy can provide 1 mo free using one-time  voucher.  --Patient will pay 100% of the first $340 in drug costs (first month will be as much as $377)  --Subsequent fills will be $47/mo.    Edema:  -Patients weight up over 10 lbs.  He was kept extra hydrated with IVF and the soft BP with flutter.  He and his wife are concerned about this and request lasix to be resumed soon.  IVF stopped and if cardioversion successful and he otherwise has stable numbers will resume oral lasix later today.    Fevers 4/5:  -Unclear source.  No fevers recently since.  Checked COVID/Flu/RSV which was negative.  BC x 2 pending.  Surgical site/ABD not overtly worse and prior GI complaints better.  CXR without overt pneumonia.  UA not consistent with significant infection.  Plan to hold on abx and monitor unless pending studies suggest bacterial infection source.  Tylenol PRN fevers.  If fevers return and no obvious source otherwise consider CT ABD/Pelvis.    Probable Obstructive sleep apnea:   - Follow-up with outpatient, not on baseline tx.       Hyperlipidemia:   - Continue prior to admission statin     History of edema:   -Hold lasix for now with fevers/rate issues.     Neuropathy:   *Neurontin 800 mg 3 times daily as needed has been filled but patient reports taking 300mg TID  *4/1 per pharmacy note only recent fills are 800 mg and not 300 mg dosing.   -Patient not really using now and denying any worsening neuropathy. Will keep on hold.     Diabetes:   - Hold prior to admission metformin given recent GI issues, likely resume at  discharge  - Insulin sliding scale to continue  - Adjusted diet to include carb balance    BPH:   - Continue PTA Flomax 0.8 mg daily     Nicotine use: Chews tobacco  -Continue nicotine patch, and as needed nicotine lozenges.  Long term cessation to be encouraged during stay.     Family Updated:  Spoke to patients wife by phone, all questions answered.  PPE Used:  Mask        Diet: Low Fiber Diet    DVT Prophylaxis: Heparin drip  Mccarty Catheter: Not present  Lines: None     Cardiac Monitoring: ACTIVE order. Indication: Post- EP procedure (48 hours)  Code Status: Full Code      Clinically Significant Risk Factors         # Hyponatremia: Lowest Na = 134 mmol/L in last 2 days, will monitor as appropriate     # Hypomagnesemia: Lowest Mg = 1.6 mg/dL in last 2 days, will replace as needed   # Hypoalbuminemia: Lowest albumin = 3 g/dL at 4/5/2025  7:20 AM, will monitor as appropriate     # Hypertension: Noted on problem list           # DMII: A1C = 6.8 % (Ref range: <5.7 %) within past 6 months   # Severe Obesity: Estimated body mass index is 41.04 kg/m  as calculated from the following:    Height as of this encounter: 1.829 m (6').    Weight as of this encounter: 137.3 kg (302 lb 9.6 oz).        # Financial/Environmental Concerns: none   # History of CABG: noted on surgical history       Disposition Plan     Medically Ready for Discharge: Anticipated Tomorrow or day after depending on success of cardioversion attempt and continued improvement otherwise.             Mann Torres, DO  Hospitalist Service  [facility]  Securely message with [employer] (more info)  Text page via [employer] Paging/Directory   ______________________________________________________________________    Interval History   Mr. Davis this AM again stated he feels pretty well despite aflutter.  No new pain or worsening SOB.  Surgical site prior pain controlled.  Tolerating low fiber diet.  Had a recent BM.    Physical Exam   Vital  Signs: Temp: 98.3  F (36.8  C) Temp src: Oral BP: 122/71 Pulse: 90   Resp: 24 SpO2: 98 % O2 Device: None (Room air) Oxygen Delivery: 2 LPM  Weight: 302 lbs 9.6 oz    GEN:  Alert, oriented x 3, appears less ill, appears comfortable.  HEENT:  Normocephalic/atraumatic, no scleral icterus, no nasal discharge, mouth moist.  CV:  Tachy with rate 110's this AM, distant.   LUNGS:  Clear to auscultation bilaterally without rales/rhonchi/wheezing/retractions.  Breath sounds mildly diminished bases.  Symmetric chest rise on inhalation noted.  ABD:  Active bowel sounds, soft, slight tenderness toward surgical site, mildly distended.  No guarding/rigidity.  Surgical site dry, no new discharge.  Further detailed site exam deferred to surgical team.  EXT:  Trace to +1 edema.  No cyanosis.  No new joint synovitis noted.  SKIN:  Dry to touch, no new exanthems noted in the visualized areas.    Medical Decision Making       55 MINUTES SPENT BY ME on the date of service doing chart review, history, exam, documentation & further activities per the note.      Data   Medications   Current Facility-Administered Medications   Medication Dose Route Frequency Provider Last Rate Last Admin    diltiazem (CARDIZEM) 125 mg in sodium chloride 0.9 % 125 mL infusion  5-15 mg/hr Intravenous Continuous Shant Winston MD 10 mL/hr at 04/07/25 0819 10 mg/hr at 04/07/25 0819    Give 1/2 of usual dose of LONG ACTING insulin AM of procedure IF diabetic   Does not apply Continuous PRN Fahad Wei MD        heparin 25,000 units in 0.45% NaCl 250 mL ANTICOAGULANT infusion  0-5,000 Units/hr Intravenous Continuous Mann Torres, DO 21 mL/hr at 04/07/25 1338 2,100 Units/hr at 04/07/25 1338    May take oral meds with sip of water, the morning of Cardioversion procedure.   Does not apply Continuous PRFahad Lopez MD         Current Facility-Administered Medications   Medication Dose Route Frequency Provider Last Rate Last Admin    acetaminophen  (TYLENOL) tablet 975 mg  975 mg Oral Q8H Clau Calles MD   975 mg at 04/07/25 0826    aspirin EC tablet 81 mg  81 mg Oral Daily Khari Peña MD   81 mg at 04/07/25 0825    atorvastatin (LIPITOR) tablet 80 mg  80 mg Oral Daily Khari Peña MD   80 mg at 04/07/25 0826    famotidine (PEPCID) tablet 20 mg  20 mg Oral BID Terry Patino MD   20 mg at 04/07/25 0825    folic acid (FOLVITE) tablet 1,000 mcg  1,000 mcg Oral BID Khari Peña MD   1,000 mcg at 04/07/25 0826    [Held by provider] furosemide (LASIX) tablet 20 mg  20 mg Oral Daily Maddie Schafer MD   20 mg at 04/04/25 1017    insulin aspart (NovoLOG) injection (RAPID ACTING)  1-3 Units Subcutaneous TID AC Jake Blevins DO   1 Units at 04/06/25 1821    insulin aspart (NovoLOG) injection (RAPID ACTING)  1-3 Units Subcutaneous At Bedtime Jake Blevins DO        metoprolol tartrate (LOPRESSOR) tablet 50 mg  50 mg Oral BID Fahad Wei MD   50 mg at 04/07/25 0822    nicotine (NICODERM CQ) 21 MG/24HR 24 hr patch 1 patch  1 patch Transdermal Daily Maddie Schafer MD   1 patch at 04/07/25 0822    polyethylene glycol (MIRALAX) Packet 17 g  17 g Oral Daily Clau Calles MD   17 g at 04/06/25 0817    sodium chloride (PF) 0.9% PF flush 3 mL  3 mL Intravenous Q8H Fahad Wei MD   3 mL at 04/07/25 0828    sodium chloride (PF) 0.9% PF flush 3 mL  3 mL Intracatheter Q8H [facility] Shant Winston MD        sodium chloride (PF) 0.9% PF flush 3 mL  3 mL Intracatheter Q8H Clau Chaparro MD   3 mL at 04/03/25 1149    tamsulosin (FLOMAX) capsule 0.8 mg  0.8 mg Oral QPM Khari Peña MD   0.8 mg at 04/06/25 2037     Labs and Imaging results below reviewed today.  Recent Labs   Lab 04/07/25  0425 04/06/25  0610 04/05/25  0720   WBC 12.7* 10.5 11.9*   HGB 12.5* 13.1* 13.2*   HCT 37.7* 40.1 40.2   MCV 92 94 94    167 186     7-Day Micro Results       Collected Updated Procedure Result Status       04/05/2025 1049 04/05/2025 1153 Influenza A/B, RSV and SARS-CoV2 PCR (COVID-19) Nose [[id number]]    Swab from Nose    Final result Component Value   Influenza A PCR Negative   Influenza B PCR Negative   RSV PCR Negative   SARS CoV2 PCR Negative   NEGATIVE: SARS-CoV-2 (COVID-19) RNA not detected, presumed negative.            04/05/2025 1043 04/06/2025 1605 Blood Culture Arm, Left [[id number]]   Blood from Arm, Left    Preliminary result Component Value   Culture No growth after 1 day  [P]                04/05/2025 1038 04/06/2025 1605 Blood Culture Arm, Right [[id number]]   Blood from Arm, Right    Preliminary result Component Value   Culture No growth after 1 day  [P]                      Recent Labs   Lab 04/07/25  1113 04/07/25  0425 04/06/25  2138 04/06/25  0803 04/06/25  0610 04/05/25  1121 04/05/25  0720 04/04/25  1214 04/04/25  0601   NA  --  134*  --   --  134*  --  135  135  --  137   POTASSIUM  --  4.1  4.1  --   --  3.9  --  3.6  3.6  --  3.4   CHLORIDE  --  99  --   --  101  --  98  98  --  98   CO2  --  24  --   --  22  --  25  25  --  28   ANIONGAP  --  11  --   --  11  --  12  12  --  11   * 151* 142*   < > 117*   < > 132*  132*   < > 137*   BUN  --  9.2  --   --  7.9*  --  7.2*  7.2*  --  7.9*   CR  --  0.72  --   --  0.69  --  0.73  0.73  --  0.84   GFRESTIMATED  --  >90  --   --  >90  --  >90  >90  --  >90   HALEY  --  9.6  --   --  9.0  --  9.3  9.3  --  9.7   MAG  --  1.6*  --   --  1.8  --  1.7  --  1.9   PHOS  --   --   --   --   --   --   --   --  2.3*   PROTTOTAL  --   --   --   --   --   --  6.2*  --   --    ALBUMIN  --   --   --   --   --   --  3.0*  --   --    BILITOTAL  --   --   --   --   --   --  0.6  --   --    ALKPHOS  --   --   --   --   --   --  96  --   --    AST  --   --   --   --   --   --  23  --   --    ALT  --   --   --   --   --   --  20  --   --     < > = values in this interval not displayed.     Recent Results (from the past 24 hours)   EP  Cardioversion External    Narrative    Laurie Capps MD     4/7/2025  2:31 PM  [facility]    Procedure: EP Cardioversion External    Date/Time: 4/7/2025 2:29 PM    Performed by: Laurie Capps MD  Authorized by: Fahad Wei MD      UNIVERSAL PROTOCOL   Site Marked: Yes  Prior Images Obtained and Reviewed:  Yes  Required items: Required blood products, implants, devices and special   equipment available    Patient identity confirmed:  Verbally with patient  Patient was reevaluated immediately before administering moderate or deep   sedation or anesthesia  Confirmation Checklist:  Patient's identity using two indicators  Time out: Immediately prior to the procedure a time out was called    Universal Protocol: the Joint Commission Universal Protocol was followed       ANESTHESIA    Anesthesia was administered and monitored by anesthesiology.  See   anesthesia documentation for details.    SEDATION  Patient Sedated: Yes    Sedation:  Propofol  Vital signs: Vital signs monitored during sedation      PROCEDURE DETAILS  Cardioversion basis: elective  Pre-procedure rhythm: atrial flutter  Patient position: patient was placed in a supine position  Chest area: chest area exposed  Electrodes: pads  Electrodes placed: anterior-posterior  Number of attempts: 1  Post-procedure rhythm: normal sinus rhythm  Complications: no complications      PROCEDURE  Describe Procedure: Consent was obtained. Patient was anticoagulated with   heparin infusion, confirmed within therapeutic range. Pads placed in AP   position. Single shock of synchronized cardioversion utilizing 200 J   applied which successfully converted rhythm from atrial flutter into   normal sinus rhythm. Sedation was provided by anesthesia team. No   immediate complications.   Patient Tolerance:  Patient tolerated the procedure well with no immediate   complications

## 2025-04-07 NOTE — CONSULTS
Patient has Medicare Advantage through [email or web address] ([email or web address]).    Eliquis/Xarelto  --Upon receipt of RX, Discharge Pharmacy can provide 1 mo free using one-time  voucher.  --Patient will pay 100% of the first $340 in drug costs (first month will be as much as $377)  --Subsequent fills will be $47/mo.  --If/when total out of pocket drug costs exceed $2000, patient will pay $0 for all covered drugs for the remainder of the year.    Hoa Dai  Pharmacy Technician/Liaison, Discharge Pharmacy   [phone] (voice or text)  [email or web address]  Pharmacy test claims are estimates and may not reflect final costs.   Suggested alternatives aim to be cost-effective but may not be therapeutically equivalent as this consult is informational and does not constitute medical advice.   Clinical decisions should be made by qualified healthcare providers.

## 2025-04-07 NOTE — PROGRESS NOTES
Patient underwent cardioversion today with restoration of sinus rhythm.  Doing well and maintaining sinus rhythm.      Recommendation  1.  Will discontinue diltiazem drip and continue metoprolol.  2.  Start Eliquis 5 mg twice daily.  Discontinue heparin an hour after the first dose of Eliquis.  Discontinue aspirin.  3.  2-week Zio patch monitor at the time of discharge.  4.  Follow-up in cardiology clinic after 1 month.    We will sign off.     Fahad Wei  Cardiology

## 2025-04-07 NOTE — ANESTHESIA POSTPROCEDURE EVALUATION
Patient: Richar Davis    Procedure: Procedure(s):  Anesthesia cardioversion       Anesthesia Type:  General    Note:  Disposition: Inpatient   Postop Pain Control: Uneventful            Sign Out: Well controlled pain   PONV: No   Neuro/Psych: Uneventful            Sign Out: Acceptable/Baseline neuro status   Airway/Respiratory: Uneventful            Sign Out: Acceptable/Baseline resp. status   CV/Hemodynamics: Uneventful            Sign Out: Acceptable CV status; No obvious hypovolemia; No obvious fluid overload   Other NRE: NONE   DID A NON-ROUTINE EVENT OCCUR? No           Last vitals:  Vitals Value Taken Time   BP     Temp     Pulse     Resp     SpO2         Electronically Signed By: Red Shirley MD  April 7, 2025  3:32 PM

## 2025-04-07 NOTE — PROVIDER NOTIFICATION
MD Notification    Notified Person: MD    Notified Person Name: Dr Wei    Notification Date/Time: 4/7/25 9807    Notification Interaction: Vocera    Purpose of Notification: Pt is still on Dilt gtt post cardioversion, do you want that stopped?    Orders Received: yes, stop diltiazem gtt    Comments:

## 2025-04-07 NOTE — ANESTHESIA POSTPROCEDURE EVALUATION
Patient: Richar Davis    Procedure: Procedure(s):  Anesthesia cardioversion       Anesthesia Type:  General    Note:  Disposition: Inpatient   Postop Pain Control: Uneventful            Sign Out: Well controlled pain   PONV: No   Neuro/Psych: Uneventful            Sign Out: Acceptable/Baseline neuro status   Airway/Respiratory: Uneventful            Sign Out: Acceptable/Baseline resp. status   CV/Hemodynamics: Uneventful            Sign Out: Acceptable CV status; No obvious hypovolemia; No obvious fluid overload   Other NRE: NONE   DID A NON-ROUTINE EVENT OCCUR? No           Last vitals:  Vitals Value Taken Time   BP     Temp     Pulse     Resp     SpO2         Electronically Signed By: Red Shirley MD  April 7, 2025  3:55 PM

## 2025-04-07 NOTE — ANESTHESIA CARE TRANSFER NOTE
Patient: Richar Davis    Procedure: Procedure(s):  Anesthesia cardioversion       Diagnosis: Irregular cardiac rhythm [I49.9]  Diagnosis Additional Information: No value filed.    Anesthesia Type:   General     Note:    Oropharynx: oropharynx clear of all foreign objects and spontaneously breathing  Level of Consciousness: awake  Oxygen Supplementation: room air    Independent Airway: airway patency satisfactory and stable  Dentition: dentition unchanged  Vital Signs Stable: post-procedure vital signs reviewed and stable  Report to RN Given: handoff report given  Patient transferred to: Cardiac Special Care  Comments: Diagnosis: Atrial Fibrillation.  Procedure: Cardioversion.  Cardiologist: Laurie Valle MD.   Location: [facility] Room 17.    At end of cardioversion procedure, patient exhibited spontaneous respirations, patient alert to voice, able to follow commands. Patient breathing room air with oxygen saturations maintained >95%.   SpO2, NiBP, and EKG monitors and alarms on and functioning, report on patient's clinical status given to recovery-trained [facility] RN, RN questions answered.        Vitals:  Vitals Value Taken Time   /76 04/07/25 1516   Temp     Pulse 92 04/07/25 1516   Resp 20 04/07/25 1516   SpO2         Electronically Signed By: ADONIS Fry CRNA  April 7, 2025  :29 PM

## 2025-04-07 NOTE — PLAN OF CARE
Goal Outcome Evaluation:      Plan of Care Reviewed With: patient    Overall Patient Progress: no changeOverall Patient Progress: no change    Outcome Evaluation: Patient remains alert/orientated. Tele remains Aflutter CVR/RVR HR 80s-110s, continue to have dilt gtt at 10, hep gtt infusing. Pt had several loose stools overnight, afebrile. NPO since midnight for procedure today.

## 2025-04-07 NOTE — PROGRESS NOTES
[facility]    Cardiology Progress Note  Assessment & Plan  Richar Davis is a 69 year old male who was admitted on 3/30/2025.       1.  Atrial flutter-new onset, asymptomatic. Likely due to acute issues.-Started on diltiazem last night due to RVR episodes over 150  2.  Coronary disease status post bypass. LIMA/LAD, SVG/PDA and SVG to distal circumflex  3.  Aortic valve replacement with 25 mm bioprosthesis-stable gradients on last echo in 12/2024   4.  Essential hypertension  5.  Hyperlipidemia  6.  Obstructive sleep apnea  7.  Tobacco use  8.  Type 2 diabetes     Recommendation:   Cardioversion today.  Consent obtained. Continue heparin drip for now.  Close blood pressure monitoring while on diltiazem drip.  BP running on the lower side but is asymptomatic.  We will continue to follow.      High complexity   Fahad Wei MD  Text Page (7am - 5pm, M-F)    Interval History   No new complains.     Physical Exam   Temp: 98.3  F (36.8  C) Temp src: Oral BP: 135/85 Pulse: (!) 130   Resp: 20 SpO2: 97 % O2 Device: None (Room air)    Vitals:    04/06/25 0415 04/06/25 1955 04/07/25 0500   Weight: 134.5 kg (296 lb 9.6 oz) (!) 137.7 kg (303 lb 9.6 oz) (!) 137.3 kg (302 lb 9.6 oz)     Vital Signs with Ranges  Temp:  [97.9  F (36.6  C)-98.8  F (37.1  C)] 98.3  F (36.8  C)  Pulse:  [] 130  Resp:  [16-20] 20  BP: ()/() 135/85  SpO2:  [95 %-98 %] 97 %  I/O last 3 completed shifts:  In: 2021.3 [P.O.:1335; I.V.:686.3]  Out: -   Patient Active Problem List   Diagnosis    Coronary artery disease involving coronary bypass graft of native heart without angina pectoris    Morbid obesity (H)    Hyperlipidemia LDL goal <70    Benign essential hypertension    ALIREZA (obstructive sleep apnea)    Gallstone ileus (H)       Constitutional:    in no apparent distress      Skin:    no jaundice      Neck:    supple, symmetrical, trachea midline      Chest:    Normal Symmetry and no tenderness       Lungs:    CTAB      Cardiovascular:    IRR, S1-S2 normal, no murmurs going thank you so much for helping      Extremities and Back:    No edema      Neurological:    No gross or focal neurologic abnormalities          Medications   Current Facility-Administered Medications   Medication Dose Route Frequency Provider Last Rate Last Admin    diltiazem (CARDIZEM) 125 mg in sodium chloride 0.9 % 125 mL infusion  5-15 mg/hr Intravenous Continuous Shant Winston MD 10 mL/hr at 04/07/25 0819 10 mg/hr at 04/07/25 0819    Give 1/2 of usual dose of LONG ACTING insulin AM of procedure IF diabetic   Does not apply Continuous PRN Fahad Wei MD        heparin 25,000 units in 0.45% NaCl 250 mL ANTICOAGULANT infusion  0-5,000 Units/hr Intravenous Continuous Mann Torres DO 19.5 mL/hr at 04/07/25 0828 1,950 Units/hr at 04/07/25 0828    May take oral meds with sip of water, the morning of Cardioversion procedure.   Does not apply Continuous PRN Fahad Wei MD         Current Facility-Administered Medications   Medication Dose Route Frequency Provider Last Rate Last Admin    acetaminophen (TYLENOL) tablet 975 mg  975 mg Oral Q8H Clau Calles MD   975 mg at 04/07/25 0826    aspirin EC tablet 81 mg  81 mg Oral Daily Khari Peña MD   81 mg at 04/07/25 0825    atorvastatin (LIPITOR) tablet 80 mg  80 mg Oral Daily Khari Peña MD   80 mg at 04/07/25 0826    famotidine (PEPCID) tablet 20 mg  20 mg Oral BID Terry Patino MD   20 mg at 04/07/25 0825    folic acid (FOLVITE) tablet 1,000 mcg  1,000 mcg Oral BID Khari Peña MD   1,000 mcg at 04/07/25 0826    [Held by provider] furosemide (LASIX) tablet 20 mg  20 mg Oral Daily Maddie Schafer MD   20 mg at 04/04/25 1017    insulin aspart (NovoLOG) injection (RAPID ACTING)  1-3 Units Subcutaneous TID AC Jake Blevins DO   1 Units at 04/06/25 1821    insulin aspart (NovoLOG) injection (RAPID ACTING)  1-3 Units Subcutaneous At  Bedtime Jake Blevins DO        magnesium sulfate 2 g in 50 mL sterile water intermittent infusion  2 g Intravenous Once Mann Torres DO 50 mL/hr at 25 0811 2 g at 25 0811    metoprolol tartrate (LOPRESSOR) tablet 50 mg  50 mg Oral BID Fahad Wei MD   50 mg at 25 0822    nicotine (NICODERM CQ) 21 MG/24HR 24 hr patch 1 patch  1 patch Transdermal Daily Maddie Schafer MD   1 patch at 25 0822    polyethylene glycol (MIRALAX) Packet 17 g  17 g Oral Daily Clau Calles MD   17 g at 25 0817    sodium chloride (PF) 0.9% PF flush 3 mL  3 mL Intravenous Q8H Fahad Wei MD   3 mL at 25 0828    sodium chloride (PF) 0.9% PF flush 3 mL  3 mL Intracatheter Q8H [facility] Shant Winston MD        sodium chloride (PF) 0.9% PF flush 3 mL  3 mL Intracatheter Q8H Clau Chaparro MD   3 mL at 25 1149    tamsulosin (FLOMAX) capsule 0.8 mg  0.8 mg Oral QPM Khari Peña MD   0.8 mg at 25       Data   Results for orders placed or performed during the hospital encounter of 25 (from the past 24 hours)   Echocardiogram Complete   Result Value Ref Range    LVEF  60-65%     Narrative    [id number]  [id number]  [id number]  [id number]^JACQUIE^JORGE^BC     [facility]  Echocardiography Laboratory  [address] [place], [place] [address]     Name: KANNAN LORD  MRN: [id number]  : 1955  Study Date: 2025 08:18 AM  Age: 69 yrs  Gender: Male  Patient Location: [facility]  Reason For Study: Atrial Fibrillation  Ordering Physician: JORGE DHILLON  Performed By: Wolf Gaxiola     BSA: 2.5 m2  Height: 72 in  Weight: 287 lb  HR: 129  BP: 125/78 mmHg  ______________________________________________________________________________  Procedure  Echocardiogram with two-dimensional, color and spectral Doppler. Definity (NDC  #19471-810) given  intravenously.  ______________________________________________________________________________  Interpretation Summary     Technically difficult imaging  The rhythm was atrial fibrillation.  Left ventricular systolic function is normal.  The visual ejection fraction is 60-65%.  The left ventricle is normal in size.  The right ventricle is normal in structure, function and size.  Doppler interrogation does not demonstrate significant stenosis or  insufficiency involving cardiica valves . No change since 12/10/2024  ______________________________________________________________________________  Left Ventricle  The left ventricle is normal in size. There is normal left ventricular wall  thickness. Left ventricular systolic function is normal. The visual ejection  fraction is 60-65%. Left ventricular diastolic function is indeterminate. No  regional wall motion abnormalities noted. There is no thrombus seen in the  left ventricle.     Right Ventricle  The right ventricle is normal in structure, function and size. There is no  mass or thrombus in the right ventricle.     Atria  Normal left atrial size. Right atrial size is normal. There is no atrial shunt  seen. The left atrial appendage is not well visualized.     Mitral Valve  The mitral valve is normal in structure and function. There is no mitral  regurgitation noted. There is no mitral valve stenosis.     Tricuspid Valve  Normal tricuspid valve. No tricuspid regurgitation. Right ventricular systolic  pressure could not be approximated due to inadequate tricuspid regurgitation.  There is no tricuspid stenosis.     Aortic Valve  The aortic valve is trileaflet. No aortic regurgitation is present. No aortic  stenosis is present.     Pulmonic Valve  The pulmonic valve is not well visualized. There is no pulmonic valvular  regurgitation. There is no pulmonic valvular stenosis.     Vessels  The aortic root is normal size. Normal size ascending aorta. The inferior  vena  cava is normal. The pulmonary artery is normal size.     Pericardium  The pericardium appears normal. There is no pleural effusion.     Rhythm  The rhythm was atrial fibrillation.  ______________________________________________________________________________  MMode/2D Measurements & Calculations     IVSd: 1.0 cm  LVIDd: 4.4 cm  LVIDs: 3.5 cm  LVPWd: 1.1 cm  FS: 21.3 %  LV mass(C)d: 158.2 grams  LV mass(C)dI: 63.7 grams/m2  asc Aorta Diam: 2.9 cm  LVOT diam: 2.0 cm  LVOT area: 3.1 cm2  Asc Ao diam index BSA (cm/m2): 1.1  Asc Ao diam index Ht(cm/m): 1.6  LA Volume (BP): 31.6 ml  LA Volume Index (BP): 12.7 ml/m2     RWT: 0.50     Doppler Measurements & Calculations  MV E max shola: 131.3 cm/sec  MV dec slope: 1106 cm/sec2  MV dec time: 0.12 sec  Ao V2 max: 199.7 cm/sec  Ao max P.0 mmHg  Ao V2 mean: 141.3 cm/sec  Ao mean P.3 mmHg  Ao V2 VTI: 26.7 cm  AKILAH(I,D): 1.7 cm2  AKILAH(V,D): 1.5 cm2  LV V1 max PG: 3.5 mmHg  LV V1 max: 93.0 cm/sec  LV V1 VTI: 14.8 cm  SV(LVOT): 46.4 ml  SI(LVOT): 18.7 ml/m2  PA acc time: 0.06 sec  AV Shola Ratio (DI): 0.47  AKILAH Index (cm2/m2): 0.70  E/E' avg: 10.8  Lateral E/e': 7.7  Medial E/e': 13.9  RV S Shola: 7.1 cm/sec     ______________________________________________________________________________  Report approved by: Dr. Andres Smith on 2025 10:28 AM         Glucose by meter   Result Value Ref Range    GLUCOSE BY METER POCT 204 (H) 70 - 99 mg/dL   Glucose by meter   Result Value Ref Range    GLUCOSE BY METER POCT 157 (H) 70 - 99 mg/dL   Heparin Unfractionated Anti Xa Level   Result Value Ref Range    Anti Xa Unfractionated Heparin <0.10 For Reference Range, See Comment IU/mL    Narrative    Therapeutic Range: UFH: 0.25-0.50 IU/mL for low intensity dosing,  0.30-0.70 IU/mL for high intensity dosing DVT and PE.  This test is not validated for other direct factor X inhibitors (e.g. rivaroxaban, apixaban, edoxaban, betrixaban, fondaparinux) and should not be used for  monitoring of other medications.   Glucose by meter   Result Value Ref Range    GLUCOSE BY METER POCT 142 (H) 70 - 99 mg/dL   Heparin Unfractionated Anti Xa Level   Result Value Ref Range    Anti Xa Unfractionated Heparin 0.25 For Reference Range, See Comment IU/mL    Narrative    Therapeutic Range: UFH: 0.25-0.50 IU/mL for low intensity dosing,  0.30-0.70 IU/mL for high intensity dosing DVT and PE.  This test is not validated for other direct factor X inhibitors (e.g. rivaroxaban, apixaban, edoxaban, betrixaban, fondaparinux) and should not be used for monitoring of other medications.   Magnesium   Result Value Ref Range    Magnesium 1.6 (L) 1.7 - 2.3 mg/dL   Potassium   Result Value Ref Range    Potassium 4.1 3.4 - 5.3 mmol/L   Basic metabolic panel   Result Value Ref Range    Sodium 134 (L) 135 - 145 mmol/L    Potassium 4.1 3.4 - 5.3 mmol/L    Chloride 99 98 - 107 mmol/L    Carbon Dioxide (CO2) 24 22 - 29 mmol/L    Anion Gap 11 7 - 15 mmol/L    Urea Nitrogen 9.2 8.0 - 23.0 mg/dL    Creatinine 0.72 0.67 - 1.17 mg/dL    GFR Estimate >90 >60 mL/min/1.73m2    Calcium 9.6 8.8 - 10.4 mg/dL    Glucose 151 (H) 70 - 99 mg/dL   CBC with platelets   Result Value Ref Range    WBC Count 12.7 (H) 4.0 - 11.0 10e3/uL    RBC Count 4.11 (L) 4.40 - 5.90 10e6/uL    Hemoglobin 12.5 (L) 13.3 - 17.7 g/dL    Hematocrit 37.7 (L) 40.0 - 53.0 %    MCV 92 78 - 100 fL    MCH 30.4 26.5 - 33.0 pg    MCHC 33.2 31.5 - 36.5 g/dL    RDW 13.8 10.0 - 15.0 %    Platelet Count 205 150 - 450 10e3/uL   INR   Result Value Ref Range    INR 1.13 0.85 - 1.15   EKG 12-lead, tracing only   Result Value Ref Range    Systolic Blood Pressure  mmHg    Diastolic Blood Pressure  mmHg    Ventricular Rate 119 BPM    Atrial Rate 326 BPM    WV Interval  ms    QRS Duration 76 ms     ms    QTc 455 ms    P Axis  degrees    R AXIS 50 degrees    T Axis 85 degrees    Interpretation ECG       Atrial flutter with variable A-V block  Nonspecific T wave  abnormality  Abnormal ECG  When compared with ECG of 05-Apr-2025 02:28,  Non-specific change in ST segment in Inferior leads  Nonspecific T wave abnormality, improved in Inferior leads  T wave inversion no longer evident in Anterior leads     INR   Result Value Ref Range    INR 1.15 0.85 - 1.15

## 2025-04-07 NOTE — PRE-PROCEDURE
GENERAL PRE-PROCEDURE:   Procedure:  Cardioversion  Date/Time:  4/7/2025 2:14 PM    Verbal consent obtained?: Yes    Written consent obtained?: Yes    Risks and benefits: Risks, benefits and alternatives were discussed    DC Plan: Appropriate discharge home plan in place for patients who are going home after procedure   Consent given by:  Patient  Patient states understanding of procedure being performed: Yes    Patient's understanding of procedure matches consent: Yes    Procedure consent matches procedure scheduled: Yes    Expected level of sedation:  Deep  Appropriately NPO:  Yes  ASA Class:  4  Mallampati  :  Grade 4- soft palate obscured by base of tongue  Lungs:  Lungs clear with good breath sounds bilaterally  Heart:  Normal heart sounds and rate  History & Physical reviewed:  History and physical reviewed and no updates needed  Statement of review:  I have reviewed the lab findings, diagnostic data, medications, and the plan for sedation

## 2025-04-07 NOTE — PROVIDER NOTIFICATION
Pt c/o nausea and received IVP Zofran and Compazine with little relief. He has had loose stools since 1350 but did receive miralax this AM. C/o groin/lower abdominal pain but no fevers. Dr. Bran kc.

## 2025-04-07 NOTE — PLAN OF CARE
A&Ox4. Pt and his wife agitated and frustrated regarding pt's weight and not being talked to about plan. Educated pt and wife about medications and reasoning behind why medications are being given and why diuretic is on hold. Educated on the plan. Questions encouraged and answered. After explaining multiple times to wife and pt about plan and medications they continued to be upset about not being told the plan. Re-education needed. Blood pressure 102/65, pulse 79, temperature 97.9  F (36.6  C), temperature source Axillary, resp. rate 18, height 1.829 m (6'), weight (!) 137.7 kg (303 lb 9.6 oz), SpO2 95%.  Tele aflutter CVR/RVR. RA. PRN Tramadol given for groin/abdomen/back pain. Pt also receiving scheduled Tylenol. Up with SBA. Pt incontinent of stool but reports knowing when he needs to go. Pt encouraged to ambulate to the bathroom or commode. Commode placed near bed. Pt up with SBA, steady gait. Refuses bed alarm. Pt refuses to have BP cuff on arm or pulse ox on so notified patient RN will have to wake him up to check VS at least Q2H due to diltiazem gtt and orders. Pt agreeable to this. Diltiazem gtt infusing at 10 ml/hr. Heparin gtt infusing at 1950 units/hr. Heparin 10A scheduled for 0424. Pt NPO for DCCV tomorrow. Potassium and magnesium lab checks in for 0400. IVF's discontinued. Pt c/o nausea. Zofran and compazine given. 2 loose stools this shift.

## 2025-04-07 NOTE — PROGRESS NOTES
Reason for Visit: Cardioversion    1315 A/O. Pt has history of sleep apnea. No dentures or loose teeth. NPO x 12+ hrs. Pt's wife at bedside. Patient is inpatient and will be transported back to [facility] after procedure.      0723 EKG ordered to verify if pt is in A Flutter or A Fib.    1443 EKG done - rhythm is Sinus Rhythm     CDV: Pt tolerated well. CDV x 1 at 200J. See rhythm strips. See Procedural Sedation Flowsheet. Total sedation given by anesthesia 50 mg of propofol.    1500 Pt transported back to [place] [id number] per cart. Report given to Radha HERNANDEZ

## 2025-04-08 ENCOUNTER — APPOINTMENT (OUTPATIENT)
Dept: CT IMAGING | Facility: CLINIC | Age: 70
DRG: 329 | End: 2025-04-08
Payer: COMMERCIAL

## 2025-04-08 ENCOUNTER — APPOINTMENT (OUTPATIENT)
Dept: GENERAL RADIOLOGY | Facility: CLINIC | Age: 70
DRG: 329 | End: 2025-04-08
Attending: SURGERY
Payer: COMMERCIAL

## 2025-04-08 ENCOUNTER — ANESTHESIA (OUTPATIENT)
Dept: SURGERY | Facility: CLINIC | Age: 70
End: 2025-04-08
Payer: COMMERCIAL

## 2025-04-08 ENCOUNTER — ANESTHESIA EVENT (OUTPATIENT)
Dept: SURGERY | Facility: CLINIC | Age: 70
End: 2025-04-08
Payer: COMMERCIAL

## 2025-04-08 ENCOUNTER — APPOINTMENT (OUTPATIENT)
Dept: CT IMAGING | Facility: CLINIC | Age: 70
DRG: 329 | End: 2025-04-08
Attending: NURSE PRACTITIONER
Payer: COMMERCIAL

## 2025-04-08 LAB
ACB COMPLEX DNA BLD POS QL NAA+NON-PROBE: NOT DETECTED
ALBUMIN SERPL BCG-MCNC: 2.4 G/DL (ref 3.5–5.2)
ALBUMIN UR-MCNC: 30 MG/DL
ALLEN'S TEST: ABNORMAL
ALLEN'S TEST: ABNORMAL
ALP SERPL-CCNC: 125 U/L (ref 40–150)
ALT SERPL W P-5'-P-CCNC: 24 U/L (ref 0–70)
ANION GAP SERPL CALCULATED.3IONS-SCNC: 13 MMOL/L (ref 7–15)
ANION GAP SERPL CALCULATED.3IONS-SCNC: 19 MMOL/L (ref 7–15)
APPEARANCE UR: CLEAR
AST SERPL W P-5'-P-CCNC: 36 U/L (ref 0–45)
ATRIAL RATE - MUSE: 103 BPM
ATRIAL RATE - MUSE: 133 BPM
ATRIAL RATE - MUSE: 326 BPM
ATRIAL RATE - MUSE: 84 BPM
B FRAGILIS DNA BLD POS QL NAA+NON-PROBE: NOT DETECTED
BASE EXCESS BLDA CALC-SCNC: -11 MMOL/L (ref -3–3)
BASE EXCESS BLDA CALC-SCNC: -7.3 MMOL/L (ref -3–3)
BASE EXCESS BLDA CALC-SCNC: -8.3 MMOL/L (ref -3–3)
BASE EXCESS BLDV CALC-SCNC: -2.1 MMOL/L (ref -3–3)
BASOPHILS # BLD AUTO: 0.1 10E3/UL (ref 0–0.2)
BASOPHILS NFR BLD AUTO: 0 %
BILIRUB SERPL-MCNC: 0.7 MG/DL
BILIRUB UR QL STRIP: NEGATIVE
BUN SERPL-MCNC: 13.5 MG/DL (ref 8–23)
BUN SERPL-MCNC: 21.1 MG/DL (ref 8–23)
BURR CELLS BLD QL SMEAR: SLIGHT
C ALBICANS DNA BLD POS QL NAA+NON-PROBE: NOT DETECTED
C AURIS DNA BLD POS QL NAA+NON-PROBE: NOT DETECTED
C GATTII+NEOFOR DNA BLD POS QL NAA+N-PRB: NOT DETECTED
C GLABRATA DNA BLD POS QL NAA+NON-PROBE: NOT DETECTED
C KRUSEI DNA BLD POS QL NAA+NON-PROBE: NOT DETECTED
C PARAP DNA BLD POS QL NAA+NON-PROBE: NOT DETECTED
C TROPICLS DNA BLD POS QL NAA+NON-PROBE: NOT DETECTED
CALCIUM SERPL-MCNC: 8.3 MG/DL (ref 8.8–10.4)
CALCIUM SERPL-MCNC: 9.7 MG/DL (ref 8.8–10.4)
CHLORIDE SERPL-SCNC: 98 MMOL/L (ref 98–107)
CHLORIDE SERPL-SCNC: 98 MMOL/L (ref 98–107)
COLOR UR AUTO: ABNORMAL
CPB POCT: NO
CREAT SERPL-MCNC: 1.3 MG/DL (ref 0.67–1.17)
CREAT SERPL-MCNC: 1.83 MG/DL (ref 0.67–1.17)
DIASTOLIC BLOOD PRESSURE - MUSE: NORMAL MMHG
E CLOAC COMP DNA BLD POS NAA+NON-PROBE: NOT DETECTED
E COLI DNA BLD POS QL NAA+NON-PROBE: NOT DETECTED
E FAECALIS DNA BLD POS QL NAA+NON-PROBE: DETECTED
E FAECIUM DNA BLD POS QL NAA+NON-PROBE: NOT DETECTED
EGFRCR SERPLBLD CKD-EPI 2021: 40 ML/MIN/1.73M2
EGFRCR SERPLBLD CKD-EPI 2021: 60 ML/MIN/1.73M2
ENTEROBACTERALES DNA BLD POS NAA+N-PRB: NOT DETECTED
EOSINOPHIL # BLD AUTO: 0 10E3/UL (ref 0–0.7)
EOSINOPHIL NFR BLD AUTO: 0 %
ERYTHROCYTE [DISTWIDTH] IN BLOOD BY AUTOMATED COUNT: 14 % (ref 10–15)
ERYTHROCYTE [DISTWIDTH] IN BLOOD BY AUTOMATED COUNT: 14.1 % (ref 10–15)
GIANT PLATELETS BLD QL SMEAR: SLIGHT
GLUCOSE BLDC GLUCOMTR-MCNC: 120 MG/DL (ref 70–99)
GLUCOSE BLDC GLUCOMTR-MCNC: 146 MG/DL (ref 70–99)
GLUCOSE BLDC GLUCOMTR-MCNC: 161 MG/DL (ref 70–99)
GLUCOSE BLDC GLUCOMTR-MCNC: 179 MG/DL (ref 70–99)
GLUCOSE SERPL-MCNC: 144 MG/DL (ref 70–99)
GLUCOSE SERPL-MCNC: 146 MG/DL (ref 70–99)
GLUCOSE UR STRIP-MCNC: NEGATIVE MG/DL
GP B STREP DNA BLD POS QL NAA+NON-PROBE: NOT DETECTED
HAEM INFLU DNA BLD POS QL NAA+NON-PROBE: NOT DETECTED
HCO3 BLD-SCNC: 17 MMOL/L (ref 21–28)
HCO3 BLD-SCNC: 19 MMOL/L (ref 21–28)
HCO3 BLDA-SCNC: 16 MMOL/L (ref 21–28)
HCO3 BLDV-SCNC: 22 MMOL/L (ref 21–28)
HCO3 SERPL-SCNC: 16 MMOL/L (ref 22–29)
HCO3 SERPL-SCNC: 17 MMOL/L (ref 22–29)
HCT VFR BLD AUTO: 43.5 % (ref 40–53)
HCT VFR BLD AUTO: 46.6 % (ref 40–53)
HCT VFR BLD CALC: 41 %
HGB BLD-MCNC: 13.9 G/DL (ref 13.3–17.7)
HGB BLD-MCNC: 13.9 G/DL (ref 13.3–17.7)
HGB BLD-MCNC: 15.5 G/DL (ref 13.3–17.7)
HGB UR QL STRIP: ABNORMAL
IMM GRANULOCYTES # BLD: 1 10E3/UL
IMM GRANULOCYTES NFR BLD: 3 %
INTERPRETATION ECG - MUSE: NORMAL
K OXYTOCA DNA BLD POS QL NAA+NON-PROBE: NOT DETECTED
KETONES UR STRIP-MCNC: NEGATIVE MG/DL
KLEBSIELLA SP DNA BLD POS QL NAA+NON-PRB: NOT DETECTED
KLEBSIELLA SP DNA BLD POS QL NAA+NON-PRB: NOT DETECTED
L MONOCYTOG DNA BLD POS QL NAA+NON-PROBE: NOT DETECTED
LACTATE SERPL-SCNC: 3.4 MMOL/L (ref 0.7–2)
LACTATE SERPL-SCNC: 3.5 MMOL/L (ref 0.7–2)
LACTATE SERPL-SCNC: 5.4 MMOL/L (ref 0.7–2)
LACTATE SERPL-SCNC: 5.5 MMOL/L (ref 0.7–2)
LEUKOCYTE ESTERASE UR QL STRIP: NEGATIVE
LIPASE SERPL-CCNC: 29 U/L (ref 13–60)
LYMPHOCYTES # BLD AUTO: 0.9 10E3/UL (ref 0.8–5.3)
LYMPHOCYTES NFR BLD AUTO: 3 %
MAGNESIUM SERPL-MCNC: 1.8 MG/DL (ref 1.7–2.3)
MCH RBC QN AUTO: 30.1 PG (ref 26.5–33)
MCH RBC QN AUTO: 30.9 PG (ref 26.5–33)
MCHC RBC AUTO-ENTMCNC: 32 G/DL (ref 31.5–36.5)
MCHC RBC AUTO-ENTMCNC: 33.3 G/DL (ref 31.5–36.5)
MCV RBC AUTO: 93 FL (ref 78–100)
MCV RBC AUTO: 94 FL (ref 78–100)
MONOCYTES # BLD AUTO: 2.2 10E3/UL (ref 0–1.3)
MONOCYTES NFR BLD AUTO: 6 %
MRSA DNA SPEC QL NAA+PROBE: NEGATIVE
N MEN DNA BLD POS QL NAA+NON-PROBE: NOT DETECTED
NEUTROPHILS # BLD AUTO: 31.7 10E3/UL (ref 1.6–8.3)
NEUTROPHILS NFR BLD AUTO: 88 %
NITRATE UR QL: NEGATIVE
NRBC # BLD AUTO: 0 10E3/UL
NRBC BLD AUTO-RTO: 0 /100
NT-PROBNP SERPL-MCNC: 1612 PG/ML (ref 0–900)
NT-PROBNP SERPL-MCNC: 3106 PG/ML (ref 0–900)
O2/TOTAL GAS SETTING VFR VENT: 21 %
O2/TOTAL GAS SETTING VFR VENT: 40 %
O2/TOTAL GAS SETTING VFR VENT: 50 %
OXYHGB MFR BLDA: 98 % (ref 92–100)
OXYHGB MFR BLDA: 98 % (ref 92–100)
OXYHGB MFR BLDV: 76 % (ref 70–75)
P AERUGINOSA DNA BLD POS NAA+NON-PROBE: NOT DETECTED
P AXIS - MUSE: 53 DEGREES
P AXIS - MUSE: 60 DEGREES
P AXIS - MUSE: 74 DEGREES
P AXIS - MUSE: NORMAL DEGREES
PCO2 BLD: 32 MM HG (ref 35–45)
PCO2 BLD: 45 MM HG (ref 35–45)
PCO2 BLDA: 39 MM HG (ref 35–45)
PCO2 BLDV: 33 MM HG (ref 40–50)
PEEP: 5 CM H2O
PEEP: 8 CM H2O
PH BLD: 7.23 [PH] (ref 7.35–7.45)
PH BLD: 7.34 [PH] (ref 7.35–7.45)
PH BLDA: 7.23 [PH] (ref 7.35–7.45)
PH BLDV: 7.42 [PH] (ref 7.32–7.43)
PH UR STRIP: 5.5 [PH] (ref 5–7)
PLAT MORPH BLD: ABNORMAL
PLATELET # BLD AUTO: 294 10E3/UL (ref 150–450)
PLATELET # BLD AUTO: 301 10E3/UL (ref 150–450)
PO2 BLD: 140 MM HG (ref 80–105)
PO2 BLD: 143 MM HG (ref 80–105)
PO2 BLDA: 275 MM HG (ref 80–105)
PO2 BLDV: 41 MM HG (ref 25–47)
POLYCHROMASIA BLD QL SMEAR: SLIGHT
POTASSIUM BLD-SCNC: 5.3 MMOL/L (ref 3.4–5.3)
POTASSIUM SERPL-SCNC: 4.1 MMOL/L (ref 3.4–5.3)
POTASSIUM SERPL-SCNC: 4.3 MMOL/L (ref 3.4–5.3)
POTASSIUM SERPL-SCNC: 5.2 MMOL/L (ref 3.4–5.3)
PR INTERVAL - MUSE: 130 MS
PR INTERVAL - MUSE: 146 MS
PR INTERVAL - MUSE: 148 MS
PR INTERVAL - MUSE: NORMAL MS
PROT SERPL-MCNC: 6 G/DL (ref 6.4–8.3)
PROTEUS SP DNA BLD POS QL NAA+NON-PROBE: NOT DETECTED
QRS DURATION - MUSE: 72 MS
QRS DURATION - MUSE: 76 MS
QRS DURATION - MUSE: 76 MS
QRS DURATION - MUSE: 80 MS
QT - MUSE: 272 MS
QT - MUSE: 324 MS
QT - MUSE: 344 MS
QT - MUSE: 380 MS
QTC - MUSE: 404 MS
QTC - MUSE: 449 MS
QTC - MUSE: 450 MS
QTC - MUSE: 455 MS
R AXIS - MUSE: 44 DEGREES
R AXIS - MUSE: 47 DEGREES
R AXIS - MUSE: 50 DEGREES
R AXIS - MUSE: 66 DEGREES
RBC # BLD AUTO: 4.62 10E6/UL (ref 4.4–5.9)
RBC # BLD AUTO: 5.02 10E6/UL (ref 4.4–5.9)
RBC MORPH BLD: ABNORMAL
RBC URINE: >182 /HPF
S AUREUS DNA BLD POS QL NAA+NON-PROBE: NOT DETECTED
S AUREUS+CONS DNA BLD POS NAA+NON-PROBE: NOT DETECTED
S EPIDERMIDIS DNA BLD POS QL NAA+NON-PRB: NOT DETECTED
S LUGDUNENSIS DNA BLD POS QL NAA+NON-PRB: NOT DETECTED
S MALTOPHILIA DNA BLD POS QL NAA+NON-PRB: NOT DETECTED
S MARCESCENS DNA BLD POS NAA+NON-PROBE: NOT DETECTED
S PNEUM DNA BLD POS QL NAA+NON-PROBE: NOT DETECTED
S PYO DNA BLD POS QL NAA+NON-PROBE: NOT DETECTED
SA TARGET DNA: NEGATIVE
SALMONELLA DNA BLD POS QL NAA+NON-PROBE: NOT DETECTED
SAO2 % BLDA: 100 % (ref 95–96)
SAO2 % BLDA: 99.2 % (ref 95–96)
SAO2 % BLDA: 99.8 % (ref 95–96)
SAO2 % BLDV: 77.1 % (ref 70–75)
SMUDGE CELLS BLD QL SMEAR: PRESENT
SODIUM BLD-SCNC: 130 MMOL/L (ref 135–145)
SODIUM SERPL-SCNC: 128 MMOL/L (ref 135–145)
SODIUM SERPL-SCNC: 133 MMOL/L (ref 135–145)
SP GR UR STRIP: 1.02 (ref 1–1.03)
STREPTOCOCCUS DNA BLD POS NAA+NON-PROBE: NOT DETECTED
SYSTOLIC BLOOD PRESSURE - MUSE: NORMAL MMHG
T AXIS - MUSE: 53 DEGREES
T AXIS - MUSE: 60 DEGREES
T AXIS - MUSE: 72 DEGREES
T AXIS - MUSE: 85 DEGREES
UROBILINOGEN UR STRIP-MCNC: NORMAL MG/DL
VANA+VANB ISLT/SPM QL: NOT DETECTED
VENTRICULAR RATE- MUSE: 103 BPM
VENTRICULAR RATE- MUSE: 119 BPM
VENTRICULAR RATE- MUSE: 133 BPM
VENTRICULAR RATE- MUSE: 84 BPM
WBC # BLD AUTO: 14.1 10E3/UL (ref 4–11)
WBC # BLD AUTO: 35.9 10E3/UL (ref 4–11)
WBC URINE: 9 /HPF

## 2025-04-08 PROCEDURE — 258N000003 HC RX IP 258 OP 636: Performed by: NURSE ANESTHETIST, CERTIFIED REGISTERED

## 2025-04-08 PROCEDURE — 250N000009 HC RX 250: Performed by: NURSE PRACTITIONER

## 2025-04-08 PROCEDURE — 250N000011 HC RX IP 250 OP 636: Mod: JZ | Performed by: SURGERY

## 2025-04-08 PROCEDURE — 250N000009 HC RX 250: Performed by: SURGERY

## 2025-04-08 PROCEDURE — 82803 BLOOD GASES ANY COMBINATION: CPT

## 2025-04-08 PROCEDURE — 88307 TISSUE EXAM BY PATHOLOGIST: CPT | Mod: TC | Performed by: SURGERY

## 2025-04-08 PROCEDURE — 250N000013 HC RX MED GY IP 250 OP 250 PS 637: Performed by: STUDENT IN AN ORGANIZED HEALTH CARE EDUCATION/TRAINING PROGRAM

## 2025-04-08 PROCEDURE — 85025 COMPLETE CBC W/AUTO DIFF WBC: CPT

## 2025-04-08 PROCEDURE — 250N000013 HC RX MED GY IP 250 OP 250 PS 637: Performed by: SURGERY

## 2025-04-08 PROCEDURE — 258N000003 HC RX IP 258 OP 636: Performed by: SURGERY

## 2025-04-08 PROCEDURE — 250N000011 HC RX IP 250 OP 636: Performed by: SURGERY

## 2025-04-08 PROCEDURE — 250N000011 HC RX IP 250 OP 636: Performed by: NURSE PRACTITIONER

## 2025-04-08 PROCEDURE — P9045 ALBUMIN (HUMAN), 5%, 250 ML: HCPCS | Performed by: NURSE ANESTHETIST, CERTIFIED REGISTERED

## 2025-04-08 PROCEDURE — 200N000001 HC R&B ICU

## 2025-04-08 PROCEDURE — 83605 ASSAY OF LACTIC ACID: CPT | Performed by: HOSPITALIST

## 2025-04-08 PROCEDURE — 83605 ASSAY OF LACTIC ACID: CPT | Performed by: NURSE PRACTITIONER

## 2025-04-08 PROCEDURE — 74018 RADEX ABDOMEN 1 VIEW: CPT

## 2025-04-08 PROCEDURE — 83735 ASSAY OF MAGNESIUM: CPT | Performed by: INTERNAL MEDICINE

## 2025-04-08 PROCEDURE — 250N000013 HC RX MED GY IP 250 OP 250 PS 637: Performed by: INTERNAL MEDICINE

## 2025-04-08 PROCEDURE — 99207 PR APP CREDIT; MD BILLING SHARED VISIT: CPT | Performed by: HOSPITALIST

## 2025-04-08 PROCEDURE — 83880 ASSAY OF NATRIURETIC PEPTIDE: CPT

## 2025-04-08 PROCEDURE — 258N000003 HC RX IP 258 OP 636: Performed by: ANESTHESIOLOGY

## 2025-04-08 PROCEDURE — 999N000141 HC STATISTIC PRE-PROCEDURE NURSING ASSESSMENT: Performed by: SURGERY

## 2025-04-08 PROCEDURE — 36415 COLL VENOUS BLD VENIPUNCTURE: CPT

## 2025-04-08 PROCEDURE — 71260 CT THORAX DX C+: CPT

## 2025-04-08 PROCEDURE — 999N000065 XR CHEST PORT 1 VIEW

## 2025-04-08 PROCEDURE — 999N000157 HC STATISTIC RCP TIME EA 10 MIN

## 2025-04-08 PROCEDURE — 250N000009 HC RX 250: Performed by: NURSE ANESTHETIST, CERTIFIED REGISTERED

## 2025-04-08 PROCEDURE — 74176 CT ABD & PELVIS W/O CONTRAST: CPT

## 2025-04-08 PROCEDURE — 250N000025 HC SEVOFLURANE, PER MIN: Performed by: SURGERY

## 2025-04-08 PROCEDURE — 87070 CULTURE OTHR SPECIMN AEROBIC: CPT

## 2025-04-08 PROCEDURE — 84132 ASSAY OF SERUM POTASSIUM: CPT | Performed by: HOSPITALIST

## 2025-04-08 PROCEDURE — 94002 VENT MGMT INPAT INIT DAY: CPT

## 2025-04-08 PROCEDURE — 36415 COLL VENOUS BLD VENIPUNCTURE: CPT | Performed by: HOSPITALIST

## 2025-04-08 PROCEDURE — 0DB80ZZ EXCISION OF SMALL INTESTINE, OPEN APPROACH: ICD-10-PCS | Performed by: SURGERY

## 2025-04-08 PROCEDURE — 99207 PR NO BILLABLE SERVICE THIS VISIT: CPT | Performed by: NURSE PRACTITIONER

## 2025-04-08 PROCEDURE — 272N000001 HC OR GENERAL SUPPLY STERILE: Performed by: SURGERY

## 2025-04-08 PROCEDURE — 360N000077 HC SURGERY LEVEL 4, PER MIN: Performed by: SURGERY

## 2025-04-08 PROCEDURE — 250N000013 HC RX MED GY IP 250 OP 250 PS 637: Performed by: HOSPITALIST

## 2025-04-08 PROCEDURE — 250N000011 HC RX IP 250 OP 636: Performed by: NURSE ANESTHETIST, CERTIFIED REGISTERED

## 2025-04-08 PROCEDURE — 82805 BLOOD GASES W/O2 SATURATION: CPT | Performed by: NURSE PRACTITIONER

## 2025-04-08 PROCEDURE — 36415 COLL VENOUS BLD VENIPUNCTURE: CPT | Performed by: NURSE PRACTITIONER

## 2025-04-08 PROCEDURE — 82805 BLOOD GASES W/O2 SATURATION: CPT | Performed by: SURGERY

## 2025-04-08 PROCEDURE — 99291 CRITICAL CARE FIRST HOUR: CPT | Mod: 24 | Performed by: SURGERY

## 2025-04-08 PROCEDURE — 250N000011 HC RX IP 250 OP 636: Performed by: ANESTHESIOLOGY

## 2025-04-08 PROCEDURE — 0DBB0ZZ EXCISION OF ILEUM, OPEN APPROACH: ICD-10-PCS | Performed by: SURGERY

## 2025-04-08 PROCEDURE — 36415 COLL VENOUS BLD VENIPUNCTURE: CPT | Performed by: INTERNAL MEDICINE

## 2025-04-08 PROCEDURE — 370N000017 HC ANESTHESIA TECHNICAL FEE, PER MIN: Performed by: SURGERY

## 2025-04-08 PROCEDURE — 81003 URINALYSIS AUTO W/O SCOPE: CPT

## 2025-04-08 PROCEDURE — 80053 COMPREHEN METABOLIC PANEL: CPT

## 2025-04-08 PROCEDURE — 99291 CRITICAL CARE FIRST HOUR: CPT

## 2025-04-08 PROCEDURE — 258N000003 HC RX IP 258 OP 636: Performed by: NURSE PRACTITIONER

## 2025-04-08 PROCEDURE — 84132 ASSAY OF SERUM POTASSIUM: CPT | Performed by: INTERNAL MEDICINE

## 2025-04-08 RX ORDER — FENTANYL CITRATE 0.05 MG/ML
25 INJECTION, SOLUTION INTRAMUSCULAR; INTRAVENOUS EVERY 5 MIN PRN
Status: DISCONTINUED | OUTPATIENT
Start: 2025-04-08 | End: 2025-04-08

## 2025-04-08 RX ORDER — DEXAMETHASONE SODIUM PHOSPHATE 4 MG/ML
4 INJECTION, SOLUTION INTRA-ARTICULAR; INTRALESIONAL; INTRAMUSCULAR; INTRAVENOUS; SOFT TISSUE
Status: DISCONTINUED | OUTPATIENT
Start: 2025-04-08 | End: 2025-04-08

## 2025-04-08 RX ORDER — FENTANYL CITRATE 50 UG/ML
INJECTION, SOLUTION INTRAMUSCULAR; INTRAVENOUS PRN
Status: DISCONTINUED | OUTPATIENT
Start: 2025-04-08 | End: 2025-04-08

## 2025-04-08 RX ORDER — ONDANSETRON 2 MG/ML
INJECTION INTRAMUSCULAR; INTRAVENOUS PRN
Status: DISCONTINUED | OUTPATIENT
Start: 2025-04-08 | End: 2025-04-08

## 2025-04-08 RX ORDER — FOLIC ACID 1 MG/1
1000 TABLET ORAL 2 TIMES DAILY
Status: DISCONTINUED | OUTPATIENT
Start: 2025-04-08 | End: 2025-04-18

## 2025-04-08 RX ORDER — ACETAMINOPHEN 325 MG/1
975 TABLET ORAL EVERY 8 HOURS
Status: DISCONTINUED | OUTPATIENT
Start: 2025-04-09 | End: 2025-04-18

## 2025-04-08 RX ORDER — FENTANYL CITRATE 0.05 MG/ML
50 INJECTION, SOLUTION INTRAMUSCULAR; INTRAVENOUS EVERY 5 MIN PRN
Status: DISCONTINUED | OUTPATIENT
Start: 2025-04-08 | End: 2025-04-08

## 2025-04-08 RX ORDER — PROPOFOL 10 MG/ML
INJECTION, EMULSION INTRAVENOUS CONTINUOUS PRN
Status: DISCONTINUED | OUTPATIENT
Start: 2025-04-08 | End: 2025-04-08

## 2025-04-08 RX ORDER — MAGNESIUM HYDROXIDE 1200 MG/15ML
LIQUID ORAL PRN
Status: DISCONTINUED | OUTPATIENT
Start: 2025-04-08 | End: 2025-04-08 | Stop reason: HOSPADM

## 2025-04-08 RX ORDER — SODIUM CHLORIDE 9 MG/ML
INJECTION, SOLUTION INTRAVENOUS CONTINUOUS PRN
Status: DISCONTINUED | OUTPATIENT
Start: 2025-04-08 | End: 2025-04-08

## 2025-04-08 RX ORDER — CEFAZOLIN SODIUM/WATER 3 G/30 ML
3 SYRINGE (ML) INTRAVENOUS
Status: COMPLETED | OUTPATIENT
Start: 2025-04-08 | End: 2025-04-08

## 2025-04-08 RX ORDER — PROPOFOL 10 MG/ML
INJECTION, EMULSION INTRAVENOUS PRN
Status: DISCONTINUED | OUTPATIENT
Start: 2025-04-08 | End: 2025-04-08

## 2025-04-08 RX ORDER — INDOMETHACIN 25 MG/1
CAPSULE ORAL PRN
Status: DISCONTINUED | OUTPATIENT
Start: 2025-04-08 | End: 2025-04-08

## 2025-04-08 RX ORDER — MAGNESIUM OXIDE 400 MG/1
400 TABLET ORAL EVERY 4 HOURS
Status: DISPENSED | OUTPATIENT
Start: 2025-04-08 | End: 2025-04-08

## 2025-04-08 RX ORDER — ONDANSETRON 4 MG/1
4 TABLET, ORALLY DISINTEGRATING ORAL EVERY 30 MIN PRN
Status: DISCONTINUED | OUTPATIENT
Start: 2025-04-08 | End: 2025-04-08

## 2025-04-08 RX ORDER — CEFAZOLIN SODIUM/WATER 3 G/30 ML
3 SYRINGE (ML) INTRAVENOUS SEE ADMIN INSTRUCTIONS
Status: DISCONTINUED | OUTPATIENT
Start: 2025-04-08 | End: 2025-04-08 | Stop reason: HOSPADM

## 2025-04-08 RX ORDER — NOREPINEPHRINE BITARTRATE 0.02 MG/ML
.01-.6 INJECTION, SOLUTION INTRAVENOUS CONTINUOUS
Status: DISCONTINUED | OUTPATIENT
Start: 2025-04-08 | End: 2025-04-12

## 2025-04-08 RX ORDER — SODIUM CHLORIDE, SODIUM LACTATE, POTASSIUM CHLORIDE, CALCIUM CHLORIDE 600; 310; 30; 20 MG/100ML; MG/100ML; MG/100ML; MG/100ML
INJECTION, SOLUTION INTRAVENOUS CONTINUOUS
Status: DISCONTINUED | OUTPATIENT
Start: 2025-04-08 | End: 2025-04-08 | Stop reason: HOSPADM

## 2025-04-08 RX ORDER — ONDANSETRON 2 MG/ML
4 INJECTION INTRAMUSCULAR; INTRAVENOUS EVERY 30 MIN PRN
Status: DISCONTINUED | OUTPATIENT
Start: 2025-04-08 | End: 2025-04-08

## 2025-04-08 RX ORDER — NALOXONE HYDROCHLORIDE 0.4 MG/ML
0.1 INJECTION, SOLUTION INTRAMUSCULAR; INTRAVENOUS; SUBCUTANEOUS
Status: DISCONTINUED | OUTPATIENT
Start: 2025-04-08 | End: 2025-04-08

## 2025-04-08 RX ORDER — PROPOFOL 10 MG/ML
5-75 INJECTION, EMULSION INTRAVENOUS CONTINUOUS
Status: DISCONTINUED | OUTPATIENT
Start: 2025-04-08 | End: 2025-04-12

## 2025-04-08 RX ORDER — LIDOCAINE HYDROCHLORIDE 20 MG/ML
INJECTION, SOLUTION INFILTRATION; PERINEURAL PRN
Status: DISCONTINUED | OUTPATIENT
Start: 2025-04-08 | End: 2025-04-08

## 2025-04-08 RX ORDER — PHENYLEPHRINE HCL IN 0.9% NACL 50MG/250ML
.1-6 PLASTIC BAG, INJECTION (ML) INTRAVENOUS CONTINUOUS
Status: DISCONTINUED | OUTPATIENT
Start: 2025-04-08 | End: 2025-04-10

## 2025-04-08 RX ORDER — HYDROMORPHONE HCL IN WATER/PF 6 MG/30 ML
0.4 PATIENT CONTROLLED ANALGESIA SYRINGE INTRAVENOUS EVERY 5 MIN PRN
Status: DISCONTINUED | OUTPATIENT
Start: 2025-04-08 | End: 2025-04-08

## 2025-04-08 RX ORDER — CHLORHEXIDINE GLUCONATE ORAL RINSE 1.2 MG/ML
15 SOLUTION DENTAL EVERY 12 HOURS
Status: DISCONTINUED | OUTPATIENT
Start: 2025-04-08 | End: 2025-04-11

## 2025-04-08 RX ORDER — SODIUM CHLORIDE, SODIUM LACTATE, POTASSIUM CHLORIDE, CALCIUM CHLORIDE 600; 310; 30; 20 MG/100ML; MG/100ML; MG/100ML; MG/100ML
INJECTION, SOLUTION INTRAVENOUS CONTINUOUS
Status: DISCONTINUED | OUTPATIENT
Start: 2025-04-08 | End: 2025-04-08

## 2025-04-08 RX ORDER — IOPAMIDOL 755 MG/ML
135 INJECTION, SOLUTION INTRAVASCULAR ONCE
Status: COMPLETED | OUTPATIENT
Start: 2025-04-08 | End: 2025-04-08

## 2025-04-08 RX ORDER — NOREPINEPHRINE BITARTRATE 0.02 MG/ML
INJECTION, SOLUTION INTRAVENOUS CONTINUOUS PRN
Status: DISCONTINUED | OUTPATIENT
Start: 2025-04-08 | End: 2025-04-08

## 2025-04-08 RX ORDER — HYDROMORPHONE HCL IN WATER/PF 6 MG/30 ML
0.2 PATIENT CONTROLLED ANALGESIA SYRINGE INTRAVENOUS EVERY 5 MIN PRN
Status: DISCONTINUED | OUTPATIENT
Start: 2025-04-08 | End: 2025-04-08

## 2025-04-08 RX ORDER — FENTANYL CITRATE 0.05 MG/ML
50 INJECTION, SOLUTION INTRAMUSCULAR; INTRAVENOUS
Status: DISCONTINUED | OUTPATIENT
Start: 2025-04-08 | End: 2025-04-11 | Stop reason: ALTCHOICE

## 2025-04-08 RX ORDER — ATORVASTATIN CALCIUM 80 MG/1
80 TABLET, FILM COATED ORAL DAILY
Status: DISCONTINUED | OUTPATIENT
Start: 2025-04-09 | End: 2025-04-18

## 2025-04-08 RX ORDER — FUROSEMIDE 10 MG/ML
40 INJECTION INTRAMUSCULAR; INTRAVENOUS ONCE
Status: DISCONTINUED | OUTPATIENT
Start: 2025-04-08 | End: 2025-04-08

## 2025-04-08 RX ORDER — VASOPRESSIN IN 0.9 % NACL 2 UNIT/2ML
SYRINGE (ML) INTRAVENOUS PRN
Status: DISCONTINUED | OUTPATIENT
Start: 2025-04-08 | End: 2025-04-08

## 2025-04-08 RX ADMIN — PHENYLEPHRINE HYDROCHLORIDE 150 MCG: 10 INJECTION INTRAVENOUS at 16:45

## 2025-04-08 RX ADMIN — SODIUM CHLORIDE, SODIUM LACTATE, POTASSIUM CHLORIDE, AND CALCIUM CHLORIDE: .6; .31; .03; .02 INJECTION, SOLUTION INTRAVENOUS at 13:25

## 2025-04-08 RX ADMIN — ATORVASTATIN CALCIUM 80 MG: 80 TABLET, FILM COATED ORAL at 08:06

## 2025-04-08 RX ADMIN — SODIUM CHLORIDE 80 ML: 9 INJECTION, SOLUTION INTRAVENOUS at 00:19

## 2025-04-08 RX ADMIN — INSULIN ASPART 1 UNITS: 100 INJECTION, SOLUTION INTRAVENOUS; SUBCUTANEOUS at 08:05

## 2025-04-08 RX ADMIN — NICOTINE 1 PATCH: 21 PATCH, EXTENDED RELEASE TRANSDERMAL at 08:07

## 2025-04-08 RX ADMIN — NOREPINEPHRINE BITARTRATE 6.4 MCG: 1 INJECTION, SOLUTION, CONCENTRATE INTRAVENOUS at 16:58

## 2025-04-08 RX ADMIN — METOPROLOL TARTRATE 50 MG: 50 TABLET, FILM COATED ORAL at 08:06

## 2025-04-08 RX ADMIN — ALBUMIN (HUMAN): 12.5 SOLUTION INTRAVENOUS at 17:44

## 2025-04-08 RX ADMIN — ACETAMINOPHEN 975 MG: 325 TABLET, FILM COATED ORAL at 08:06

## 2025-04-08 RX ADMIN — POLYETHYLENE GLYCOL 3350 17 G: 17 POWDER, FOR SOLUTION ORAL at 08:05

## 2025-04-08 RX ADMIN — APIXABAN 5 MG: 5 TABLET, FILM COATED ORAL at 08:06

## 2025-04-08 RX ADMIN — SODIUM BICARBONATE 50 MEQ: 84 INJECTION, SOLUTION INTRAVENOUS at 18:10

## 2025-04-08 RX ADMIN — MIDAZOLAM 2 MG: 1 INJECTION INTRAMUSCULAR; INTRAVENOUS at 16:44

## 2025-04-08 RX ADMIN — LIDOCAINE HYDROCHLORIDE 80 MG: 20 INJECTION, SOLUTION INFILTRATION; PERINEURAL at 16:44

## 2025-04-08 RX ADMIN — FOLIC ACID 1000 MCG: 1 TABLET ORAL at 08:05

## 2025-04-08 RX ADMIN — HYDROMORPHONE HYDROCHLORIDE 0.2 MG: 0.2 INJECTION, SOLUTION INTRAMUSCULAR; INTRAVENOUS; SUBCUTANEOUS at 10:42

## 2025-04-08 RX ADMIN — Medication 0.6 MCG/KG/MIN: at 19:53

## 2025-04-08 RX ADMIN — FENTANYL CITRATE 50 MCG: 50 INJECTION, SOLUTION INTRAMUSCULAR; INTRAVENOUS at 21:06

## 2025-04-08 RX ADMIN — SODIUM CHLORIDE, SODIUM LACTATE, POTASSIUM CHLORIDE, AND CALCIUM CHLORIDE 1000 ML: .6; .31; .03; .02 INJECTION, SOLUTION INTRAVENOUS at 22:09

## 2025-04-08 RX ADMIN — PIPERACILLIN AND TAZOBACTAM 4.5 G: 4; .5 INJECTION, POWDER, FOR SOLUTION INTRAVENOUS at 17:44

## 2025-04-08 RX ADMIN — NOREPINEPHRINE BITARTRATE 0.15 MCG/KG/MIN: 0.02 INJECTION, SOLUTION INTRAVENOUS at 21:18

## 2025-04-08 RX ADMIN — Medication 400 MG: at 08:06

## 2025-04-08 RX ADMIN — CHLORHEXIDINE GLUCONATE 15 ML: 1.2 SOLUTION ORAL at 20:04

## 2025-04-08 RX ADMIN — NOREPINEPHRINE BITARTRATE 6.4 MCG: 1 INJECTION, SOLUTION, CONCENTRATE INTRAVENOUS at 17:26

## 2025-04-08 RX ADMIN — TRAMADOL HYDROCHLORIDE 50 MG: 50 TABLET, COATED ORAL at 00:42

## 2025-04-08 RX ADMIN — PROCHLORPERAZINE EDISYLATE 5 MG: 5 INJECTION INTRAMUSCULAR; INTRAVENOUS at 11:03

## 2025-04-08 RX ADMIN — NOREPINEPHRINE BITARTRATE 0.17 MCG/KG/MIN: 0.02 INJECTION, SOLUTION INTRAVENOUS at 23:44

## 2025-04-08 RX ADMIN — HYDROMORPHONE HYDROCHLORIDE 0.5 MG: 1 INJECTION, SOLUTION INTRAMUSCULAR; INTRAVENOUS; SUBCUTANEOUS at 04:26

## 2025-04-08 RX ADMIN — ONDANSETRON 4 MG: 2 INJECTION INTRAMUSCULAR; INTRAVENOUS at 18:22

## 2025-04-08 RX ADMIN — MIDAZOLAM 1 MG: 1 INJECTION INTRAMUSCULAR; INTRAVENOUS at 14:26

## 2025-04-08 RX ADMIN — TRAMADOL HYDROCHLORIDE 50 MG: 50 TABLET, COATED ORAL at 08:06

## 2025-04-08 RX ADMIN — FENTANYL CITRATE 50 MCG: 50 INJECTION INTRAMUSCULAR; INTRAVENOUS at 16:56

## 2025-04-08 RX ADMIN — NOREPINEPHRINE BITARTRATE 0.12 MCG/KG/MIN: 0.02 INJECTION, SOLUTION INTRAVENOUS at 19:52

## 2025-04-08 RX ADMIN — ONDANSETRON 4 MG: 2 INJECTION, SOLUTION INTRAMUSCULAR; INTRAVENOUS at 09:01

## 2025-04-08 RX ADMIN — SODIUM CHLORIDE, SODIUM LACTATE, POTASSIUM CHLORIDE, AND CALCIUM CHLORIDE 1000 ML: .6; .31; .03; .02 INJECTION, SOLUTION INTRAVENOUS at 20:32

## 2025-04-08 RX ADMIN — SODIUM CHLORIDE, SODIUM LACTATE, POTASSIUM CHLORIDE, AND CALCIUM CHLORIDE 500 ML: .6; .31; .03; .02 INJECTION, SOLUTION INTRAVENOUS at 02:07

## 2025-04-08 RX ADMIN — SODIUM CHLORIDE: 900 INJECTION INTRAVENOUS at 17:00

## 2025-04-08 RX ADMIN — PROPOFOL 30 MCG/KG/MIN: 10 INJECTION, EMULSION INTRAVENOUS at 19:54

## 2025-04-08 RX ADMIN — PHENYLEPHRINE HYDROCHLORIDE 200 MCG: 10 INJECTION INTRAVENOUS at 16:48

## 2025-04-08 RX ADMIN — Medication 1 UNITS: at 16:48

## 2025-04-08 RX ADMIN — PIPERACILLIN AND TAZOBACTAM 4.5 G: 4; .5 INJECTION, POWDER, FOR SOLUTION INTRAVENOUS at 05:41

## 2025-04-08 RX ADMIN — ONDANSETRON 4 MG: 4 TABLET, ORALLY DISINTEGRATING ORAL at 02:08

## 2025-04-08 RX ADMIN — PROPOFOL 200 MG: 10 INJECTION, EMULSION INTRAVENOUS at 16:44

## 2025-04-08 RX ADMIN — IOPAMIDOL 135 ML: 755 INJECTION, SOLUTION INTRAVENOUS at 00:19

## 2025-04-08 RX ADMIN — ROCURONIUM BROMIDE 50 MG: 50 INJECTION, SOLUTION INTRAVENOUS at 16:52

## 2025-04-08 RX ADMIN — PIPERACILLIN AND TAZOBACTAM 4.5 G: 4; .5 INJECTION, POWDER, FOR SOLUTION INTRAVENOUS at 00:39

## 2025-04-08 RX ADMIN — PROPOFOL 45 MCG/KG/MIN: 10 INJECTION, EMULSION INTRAVENOUS at 23:38

## 2025-04-08 RX ADMIN — PROPOFOL 30 MCG/KG/MIN: 10 INJECTION, EMULSION INTRAVENOUS at 16:56

## 2025-04-08 RX ADMIN — SODIUM CHLORIDE, SODIUM LACTATE, POTASSIUM CHLORIDE, AND CALCIUM CHLORIDE: .6; .31; .03; .02 INJECTION, SOLUTION INTRAVENOUS at 19:11

## 2025-04-08 RX ADMIN — PIPERACILLIN AND TAZOBACTAM 4.5 G: 4; .5 INJECTION, POWDER, FOR SOLUTION INTRAVENOUS at 13:06

## 2025-04-08 RX ADMIN — Medication 3 G: at 16:55

## 2025-04-08 RX ADMIN — FENTANYL CITRATE 50 MCG: 50 INJECTION, SOLUTION INTRAMUSCULAR; INTRAVENOUS at 23:51

## 2025-04-08 RX ADMIN — SODIUM CHLORIDE, SODIUM LACTATE, POTASSIUM CHLORIDE, AND CALCIUM CHLORIDE: .6; .31; .03; .02 INJECTION, SOLUTION INTRAVENOUS at 17:38

## 2025-04-08 RX ADMIN — PIPERACILLIN AND TAZOBACTAM 4.5 G: 4; .5 INJECTION, POWDER, FOR SOLUTION INTRAVENOUS at 23:30

## 2025-04-08 RX ADMIN — NOREPINEPHRINE BITARTRATE 6.4 MCG: 1 INJECTION, SOLUTION, CONCENTRATE INTRAVENOUS at 16:53

## 2025-04-08 RX ADMIN — FENTANYL CITRATE 50 MCG: 50 INJECTION INTRAMUSCULAR; INTRAVENOUS at 16:44

## 2025-04-08 RX ADMIN — PROPOFOL 40 MCG/KG/MIN: 10 INJECTION, EMULSION INTRAVENOUS at 20:55

## 2025-04-08 RX ADMIN — FAMOTIDINE 20 MG: 20 TABLET, FILM COATED ORAL at 08:05

## 2025-04-08 RX ADMIN — ROCURONIUM BROMIDE 20 MG: 50 INJECTION, SOLUTION INTRAVENOUS at 17:49

## 2025-04-08 RX ADMIN — PROCHLORPERAZINE EDISYLATE 5 MG: 5 INJECTION INTRAMUSCULAR; INTRAVENOUS at 02:51

## 2025-04-08 RX ADMIN — NOREPINEPHRINE BITARTRATE 0.2 MCG/KG/MIN: 0.02 INJECTION, SOLUTION INTRAVENOUS at 16:50

## 2025-04-08 RX ADMIN — PHENYLEPHRINE HYDROCHLORIDE 200 MCG: 10 INJECTION INTRAVENOUS at 16:50

## 2025-04-08 RX ADMIN — PHENYLEPHRINE HYDROCHLORIDE 0.4 MCG/KG/MIN: 10 INJECTION INTRAVENOUS at 16:50

## 2025-04-08 ASSESSMENT — ACTIVITIES OF DAILY LIVING (ADL)
ADLS_ACUITY_SCORE: 48
ADLS_ACUITY_SCORE: 47
ADLS_ACUITY_SCORE: 48
ADLS_ACUITY_SCORE: 49
ADLS_ACUITY_SCORE: 49
ADLS_ACUITY_SCORE: 48
ADLS_ACUITY_SCORE: 49
ADLS_ACUITY_SCORE: 48
ADLS_ACUITY_SCORE: 49
ADLS_ACUITY_SCORE: 48
ADLS_ACUITY_SCORE: 47
ADLS_ACUITY_SCORE: 49
ADLS_ACUITY_SCORE: 48
ADLS_ACUITY_SCORE: 49
ADLS_ACUITY_SCORE: 48
ADLS_ACUITY_SCORE: 45

## 2025-04-08 ASSESSMENT — LIFESTYLE VARIABLES: TOBACCO_USE: 1

## 2025-04-08 ASSESSMENT — ENCOUNTER SYMPTOMS
SEIZURES: 0
DYSRHYTHMIAS: 1

## 2025-04-08 NOTE — PROGRESS NOTES
Surgery Note    Pt had RRT called overnight due to lactate elevation and hyptension following his cardioversion yesterday. Pt reports feeling bloated. Had a normal BM yesterday.     O: /67   Pulse 95   Temp 99.1  F (37.3  C) (Rectal)   Resp 20   Ht 1.829 m (6')   Wt (!) 137.3 kg (302 lb 9.6 oz)   SpO2 99%   BMI 41.04 kg/m    Abd: distended, more firm than prior. Some erythema around staples.       Procedure: staples removed from periumbilical incision. Wound probed. Minimal to no drainage. Cultures taken. Wound packed.     A/P: 69yom admitted with gallstone ileus with post op delayed bowel function and then copious bowel function, tolerating diet and now new abdominal symptoms.     After my rounds, note that WBC returned at 35,000 and new CT completed which shows increased free fluid and pt having drainage from open area. I am worried about a leak at the repair with these findings. Dr. Landrum on ACS is seeing pt shortly to discuss return to OR for laparotomy/washout/possible bowel resection.   - hold anticoagulation  - NPO    Clau Calles MD  [facility]  [phone]

## 2025-04-08 NOTE — PLAN OF CARE
Goal Outcome Evaluation:    RRT at 2245, refer to RRT note.   0631: Notified Ranulfo about criticall Lactic of 5.4, She said she hebert pass it on to Noel who saw Pt at bedside, ordered labs and consulted gen surgery.     Orientation: A&Ox4  Vitals/Pain: VSS on RA, c/o back and R flank pain, utilized PRN Dilaudid, Tramadol and scheduled tyl.  Tele: ST  Lines/Drains: R PIV SL  LS: LSD  GI/: +BS, voiding adequately, small frequent amts, PRN Zofran and Compazine for nausea.   Labs: Abnormal/Trends, Electrolyte Replacement- Lactate 5.4 this AM, team aware, K+ okay, Mg+ replacement ordered.  Ambulation/Assist: SBA/A1 GB/W  Skin/Wounds: Midline abd incision, stapples intact, redness to the coccyx and scattered bruises.   Plan: Hold Lasix, UA/UC, blood Cx pending, IV Abx, surgical team to follow up.     Colin Sterling RN

## 2025-04-08 NOTE — ANESTHESIA PROCEDURE NOTES
Airway       Patient location during procedure: OR       Procedure Start/Stop Times: 4/8/2025 4:47 PM  Staff -        Anesthesiologist:  Adalgisa Barber MD       CRNA: Josep Tony APRN CRNA       Performed By: CRNA  Consent for Airway        Urgency: elective  Indications and Patient Condition       Indications for airway management: kathie-procedural       Induction type:RSI       Mask difficulty assessment: 0 - not attempted    Final Airway Details       Final airway type: endotracheal airway       Successful airway: ETT - single  Endotracheal Airway Details        ETT size (mm): 8.0       Cuffed: yes       Cuff volume (mL): 7       Successful intubation technique: video laryngoscopy       VL Blade Size: Glidescope 4       Grade View of Cords: 2       Adjucts: stylet       Position: Center       Measured from: lips       Secured at (cm): 24    Post intubation assessment        Placement verified by: capnometry, equal breath sounds and chest rise        Number of attempts at approach: 1       Secured with: tape       Ease of procedure: easy       Dentition: Unchanged and Intact    Medication(s) Administered   Medication Administration Time: 4/8/2025 4:47 PM

## 2025-04-08 NOTE — PROGRESS NOTES
[facility]  Hospitalist Progress Note   04/08/2025          Assessment and Plan:       Richar Davis is a 69 year old male with a history of diabetes, hypertension, CABG, status post AVR with bovine valve 10/2022, untreated sleep apnea, hypertension, hyperlipidemia, neuropathy, BPH admitted on 3/30/2025 with abdominal pain nausea and vomiting.  Imaging showed gallstone ileus with 2.2 cm gallstone impacted.  Underwent surgery on 3/30.  Postoperatively had issues with ileus, fever, and aflutter with RVR.    Severe lactic acidosis  Severe leukocytosis.  Abdominal pain  Gallstone ileus, distal small bowel obstruction secondary to impacted 2.2 cm gallstone  Exploratory laparotomy with small bowel enterotomy and removal of gallstone on March 30, 2025:  --3/30 OR ex lap small bowel enterotomy and removal of gallstone with primary closure.  See operative report for further details. 4/1 started to have return of bowel function. 4/2 NG Removed and started on low fiber diet but clinically got worse so transition back to liquid diet on 4/3.  --Patient spiked fever on 4/5, COVID/Flu/RSV which was negative.  BC x 2 no growth. CXR without overt pneumonia.  UA not consistent with significant infection.   -- On 4/8 patient uncomfortable, complaining of diffuse abdominal pain.  Noted elevated lactate to 6.4 overnight, this morning at 5.4, WBC count of 35.9.  On intravenous Zosyn, will continue.  Stat CT abdomen and pelvis ordered.  [place] [facility] [place]  Await surgical team input.  --Stat CT abdomen without contrast.  -- N.p.o. status.  -IV Protonix ordered.  IV/p.o. as needed pain meds.  Gentle IV fluids, hold off diuresis.  IV/p.o. as needed Zofran.  Continue IV Zosyn.  Blood cultures from 4/7.  Follow abdominal wound cultures from 4/8.    Hold anticoagulation until surgical team input, resume anticoagulation after surgical team input.  General Surgery following, appreciate input.    Acute kidney injury.  Anion  gap metabolic acidosis.  Mild hyponatremia  Noted bump in creatinine to 1.3, sodium 133, bicarb 16, anion gap 19 on 4/8.  Monitor BMP this evening.  Will keep n.p.o. hold diuresis, gentle IV fluids.    Status post cardioversion 4/7/2025  New Aflutter with RVR 4/5/2025  Coronary artery disease status post CABG x 3 in October 2022  Aortic stenosis (status post AVR with bovine valve) October 2022.  Hypertension  Hyperlipidemia.  Developed new aflutter with RVR postop.  Was on heparin drip, rate control with Cardizem drip, oral metoprolol.  -- Underwent cardioversion on 4/7/2025.  -- Cardiology signed off/7 postprocedure, recommended Eliquis 5 mg twice daily.  2-week Zio patch at the time of discharge, follow-up with cardiology clinic after 1 month.  Appreciate input.  --Hold Eliquis on 4/8 given concern for acute abdominal pain  And surgical team input.  Resume anticoagulation once okay by surgical standpoint  Continue metoprolol with hold parameters.  Continue PTA Lipitor.  Telemetry monitoring.  Strict intake output monitoring.  Hold oral Lasix on 4/8, as needed IV Lasix if needed if shortness of breath.    History of lower extremity edema.  Hold oral Lasix, as needed IV Lasix if shortness of breath.  ? Plans for surgery.      Type 2 diabetes mellitus with a hemoglobin A1c of 6.8  Hold PTA metformin.  Insulin sliding scale.  N.p.o. placed     BPH:   Continue PTA Flomax 0.8 mg daily    Neuropathy:   *Neurontin 800 mg 3 times daily as needed has been filled but patient reports taking 300mg TID  *4/1 per pharmacy note only recent fills are 800 mg and not 300 mg dosing.   -Patient not really using now and denying any worsening neuropathy. Will keep on hold.     Nicotine use: Chews tobacco  -Continue nicotine patch, and as needed nicotine lozenges.    Long term cessation to be encouraged during stay.     ALIREZA not on CPAP.  Recommend sleep studies as outpatient      Medically complicated obesity with a BMI of 41.  Increase  all-cause mortality and morbidity.  Consider lifestyle modification with diet and exercise as able to.    Physical deconditioning from medical illness.  PT, OT evaluation once stable prior to discharge.  Fall precautions    Clinically Significant Risk Factors         # Hyponatremia: Lowest Na = 133 mmol/L in last 2 days, will monitor as appropriate  # Hypochloremia: Lowest Cl = 97 mmol/L in last 2 days, will monitor as appropriate   # Hypercalcemia: corrected calcium is >10.1, will monitor as appropriate  # Hypomagnesemia: Lowest Mg = 1.6 mg/dL in last 2 days, will replace as needed  # Anion Gap Metabolic Acidosis: Highest Anion Gap = 19 mmol/L in last 2 days, will monitor and treat as appropriate  # Hypoalbuminemia: Lowest albumin = 2.4 g/dL at 4/8/2025  6:04 AM, will monitor as appropriate    # Acute Kidney Injury, unspecified: based on a >150% or 0.3 mg/dL increase in last creatinine compared to past 90 day average, will monitor renal function  # Hypertension: Noted on problem list           # DMII: A1C = 6.8 % (Ref range: <5.7 %) within past 6 months   # Severe Obesity: Estimated body mass index is 41.04 kg/m  as calculated from the following:    Height as of this encounter: 1.829 m (6').    Weight as of this encounter: 137.3 kg (302 lb 9.6 oz).      # Financial/Environmental Concerns: none   # History of CABG: noted on surgical history        Orders Placed This Encounter      NPO for Procedure/Surgery per Anesthesia Guidelines Except for: Meds; Clear liquids before procedure/surgery: ADULT (Age GREATER than or Equal to 18 years) - Clear liquids 2 hours before procedure/surgery      DVT Prophylaxis: SCDs, hold pharmacological DVT prophylaxis until surgical team input  Code Status: Full Code  Disposition: Expected discharge in greater than 4 days.  Transfer to [facility] [place]    Medically Ready for Discharge: Anticipated in 2-4 Days       Discussed with patient, bedside RN, housestaff.  Patient high complexity, critically  ill.  [place] [facility] [place] More than 70% of time spent in direct patient care, care coordination, patient counseling, and formalizing plan of care.        Tim Mays MD        Interval History:        Patient sitting up in chair.  Appears uncomfortable, complaining of abdominal pain, diffuse all over the abdomen.  Denies any chest pain.  Denies any palpitations.  Denies any shortness of breath on rest.  Denies any new tingling or numbness.  Passing concentrated urine  Reports had bowel movement yesterday.  Afebrile.  This morning tachycardic, lactic acid spiked to 5.4, last night lactic acid 6.4     Physical Exam   Temp:  [96  F (35.6  C)-101.2  F (38.4  C)] 96  F (35.6  C)  Pulse:  [] 93  Resp:  [11-28] 20  BP: ()/() 118/41  SpO2:  [95 %-99 %] 99 %    Intake/Output Summary (Last 24 hours) at 4/8/2025 1430  Last data filed at 4/8/2025 0800  Gross per 24 hour   Intake 360 ml   Output 530 ml   Net -170 ml       Admission Weight: 129.3 kg (285 lb)  Current Weight: (!) 137.3 kg (302 lb 9.6 oz)    PHYSICAL EXAM  GENERAL: Patient appears uncomfortable, no increased work of breathing.  HEENT: Oropharynx pink  HEART: Regular rate and rhythm. S1S2, tachycardic  LUNGS: Bilateral slightly decreased breath sounds, no wheezing, no crackles.  Respirations unlabored  ABDOMEN: Distended, tense, diffuse tenderness all over the abdomen, oozing serous discharge from surgical site, bowel sounds not heard  NEURO: Moving all extremities  EXTREMITIES: 3+ pitting pedal edema  SKIN: Warm, dry.  bruising.  PSYCHIATRY Cooperative       Medications:        Current Facility-Administered Medications   Medication Dose Route Frequency Provider Last Rate Last Admin    [Auto Hold] acetaminophen (TYLENOL) tablet 975 mg  975 mg Oral Q8H Clau Calles MD   975 mg at 04/08/25 0806    [Held by provider] apixaban ANTICOAGULANT (ELIQUIS) tablet 5 mg  5 mg Oral BID Fahad Wei MD   5 mg at 04/08/25 0806    [Auto Hold]  atorvastatin (LIPITOR) tablet 80 mg  80 mg Oral Daily Khari Peña MD   80 mg at 04/08/25 0806    ceFAZolin Sodium (ANCEF) injection 3 g  3 g Intravenous Pre-Op/Pre-procedure x 1 dose Liana Landrum MD        ceFAZolin Sodium (ANCEF) injection 3 g  3 g Intravenous See Admin Instructions Liana Landrum MD        [Held by provider] famotidine (PEPCID) tablet 20 mg  20 mg Oral BID Terry Patino MD   20 mg at 04/08/25 0805    [Auto Hold] folic acid (FOLVITE) tablet 1,000 mcg  1,000 mcg Oral BID Khari Peña MD   1,000 mcg at 04/08/25 0805    [Held by provider] furosemide (LASIX) tablet 20 mg  20 mg Oral Daily Mann Torres DO   20 mg at 04/04/25 1017    [Auto Hold] insulin aspart (NovoLOG) injection (RAPID ACTING)  1-3 Units Subcutaneous TID AC Jake Blevins DO   1 Units at 04/08/25 0805    [Auto Hold] insulin aspart (NovoLOG) injection (RAPID ACTING)  1-3 Units Subcutaneous At Bedtime Jake Blevins DO        magnesium oxide (MAG-OX) tablet 400 mg  400 mg Oral Q4H Tim Masy MD   400 mg at 04/08/25 0806    [Auto Hold] metoprolol tartrate (LOPRESSOR) tablet 50 mg  50 mg Oral BID Fahad Wei MD   50 mg at 04/08/25 0806    [Auto Hold] nicotine (NICODERM CQ) 21 MG/24HR 24 hr patch 1 patch  1 patch Transdermal Daily Maddie Schafer MD   1 patch at 04/08/25 0807    [Auto Hold] pantoprazole (PROTONIX) IV push injection 40 mg  40 mg Intravenous Daily with breakfast Noel Roche NP        [Auto Hold] piperacillin-tazobactam (ZOSYN) 4.5 g vial to attach to  mL bag  4.5 g Intravenous Q6H Terry Winchester APRN CNP   4.5 g at 04/08/25 1306    [Auto Hold] polyethylene glycol (MIRALAX) Packet 17 g  17 g Oral Daily Clau Calles MD   17 g at 04/08/25 0805    [Auto Hold] sodium chloride (PF) 0.9% PF flush 3 mL  3 mL Intracatheter Q8H [facility] Shant Winston MD   3 mL at 04/08/25 0541    [Auto Hold] tamsulosin (FLOMAX) capsule 0.8 mg  0.8 mg Oral QPM Mariana,  Khari Pace MD   0.8 mg at 04/07/25 2031     Current Facility-Administered Medications   Medication Dose Route Frequency Provider Last Rate Last Admin    [Auto Hold] benzocaine-menthol (CHLORASEPTIC) 6-10 MG lozenge 1 lozenge  1 lozenge Buccal Q1H PRN Clau Calles MD   1 lozenge at 04/01/25 0334    [Auto Hold] bisacodyl (DULCOLAX) suppository 10 mg  10 mg Rectal Daily PRN Clau Calles MD        [Auto Hold] glucose gel 15-30 g  15-30 g Oral Q15 Min PRN Jake Blevins DO        Or    [Auto Hold] dextrose 50 % injection 25-50 mL  25-50 mL Intravenous Q15 Min PRN Jake Blevins DO        Or    [Auto Hold] glucagon injection 1 mg  1 mg Subcutaneous Q15 Min PRN Jake Blevins DO        [Auto Hold] diazepam (VALIUM) injection 2.5 mg  2.5 mg Intravenous Q6H PRN Clau Calles MD        [Auto Hold] diphenhydrAMINE (BENADRYL) elixir 12.5 mg  12.5 mg Oral Q6H PRN Clau Calles MD        Or    [Auto Hold] diphenhydrAMINE (BENADRYL) injection 12.5 mg  12.5 mg Intravenous Q6H PRN Clau Calles MD        [Held by provider] gabapentin (NEURONTIN) tablet 800 mg  800 mg Oral TID PRN Clau Calles MD        [Auto Hold] hydrocortisone (CORTAID) 1 % cream   Topical TID PRN Laurie Capps MD        [Auto Hold] HYDROmorphone (DILAUDID) injection 0.2 mg  0.2 mg Intravenous Q2H PRN Clau Calles MD   0.2 mg at 04/08/25 1042    Or    [Auto Hold] HYDROmorphone (PF) (DILAUDID) injection 0.5 mg  0.5 mg Intravenous Q2H PRN Clau Calles MD   0.5 mg at 04/08/25 0426    [Auto Hold] lidocaine (LMX4) cream   Topical Q1H PRN Shant Winston MD        lidocaine 1 % 0.1-1 mL  0.1-1 mL Other Q1H PRN Kim Dougherty MD        [Auto Hold] lidocaine 1 % 0.1-1 mL  0.1-1 mL Other Q1H PRN Shant Winston MD        [Auto Hold] magnesium hydroxide (MILK OF MAGNESIA) suspension 30 mL  30 mL Oral Daily PRN Clau Calles MD   30 mL at 04/01/25 0427    magnesium sulfate 2 g in 50 mL  sterile water intermittent infusion  2 g Intravenous Once PRN Fahad Wei MD        May take oral meds with sip of water, the morning of Cardioversion procedure.   Does not apply Continuous PRN Fahad Wei MD        [Auto Hold] methocarbamol (ROBAXIN) half-tab 250 mg  250 mg Oral Q6H PRN Clau Calles MD        [Auto Hold] metoprolol (LOPRESSOR) injection 5 mg  5 mg Intravenous Q4H PRN Agustin Ramires MD   5 mg at 04/06/25 0123    [Auto Hold] naloxone (NARCAN) injection 0.2 mg  0.2 mg Intravenous Q2 Min PRN Clau Calles MD        Or    [Auto Hold] naloxone (NARCAN) injection 0.4 mg  0.4 mg Intravenous Q2 Min PRN Clau Calles MD        Or    [Auto Hold] naloxone (NARCAN) injection 0.2 mg  0.2 mg Intramuscular Q2 Min PRN Clau Calles MD        Or    [Auto Hold] naloxone (NARCAN) injection 0.4 mg  0.4 mg Intramuscular Q2 Min PRN Clau Calles MD        [Auto Hold] nicotine (COMMIT) lozenge 2 mg  2 mg Buccal Q1H PRN Damian Griffin MD   2 mg at 04/03/25 1145    [Auto Hold] ondansetron (ZOFRAN ODT) ODT tab 4 mg  4 mg Oral Q6H PRN Clau Calles MD   4 mg at 04/08/25 0208    Or    [Auto Hold] ondansetron (ZOFRAN) injection 4 mg  4 mg Intravenous Q6H PRN Clau Calels MD   4 mg at 04/08/25 0901    [Auto Hold] phenol (CHLORASEPTIC) 1.4 % spray 1 mL  1 spray Mouth/Throat Q1H PRN Clau Calles MD        potassium chloride eboni ER (KLOR-CON M20) CR tablet 20 mEq  20 mEq Oral Once PRN Fahad Wei MD        potassium chloride eboni ER (KLOR-CON M20) CR tablet 40 mEq  40 mEq Oral Once PRN Fahad Wei MD        [Auto Hold] prochlorperazine (COMPAZINE) injection 5 mg  5 mg Intravenous Q6H PRN Clau Calles MD   5 mg at 04/08/25 1103    Or    [Auto Hold] prochlorperazine (COMPAZINE) tablet 5 mg  5 mg Oral Q6H PRN Clau Calles MD   5 mg at 04/03/25 0756    [Auto Hold] sodium chloride (PF) 0.9% PF flush 3 mL  3 mL Intracatheter q1 min prn Shant Winston MD         [Auto Hold] traMADol (ULTRAM) tablet 50 mg  50 mg Oral Q6H PRN Ana Dougherty MD   50 mg at 04/08/25 0806            Data:      All new lab and imaging data was reviewed.

## 2025-04-08 NOTE — CODE/RAPID RESPONSE
"[facility]    Sepsis Evaluation Progress Note    Date of Service: 04/08/2025    I was called to see Richar Davis due to  lactic 5.4 . He is known to have an infection.     Lab:  Lactic Acid   Date Value Ref Range Status   04/08/2025 5.4 (HH) 0.7 - 2.0 mmol/L Final         Assessment and Plan      Lactic acidosis suspect 2/2 severe sepsis vs. Acute congestive heart failure  Signout given to me by my colleague Terry Winchester, THONYN, APRN, who is in the house officer that evaluated patient overnight.  Patient was evaluated by house officer due to elevated lactic, please see RRT note for details.  I was asked to follow-up on lactic acid which came back at 5.4.  Patient was evaluated bedside.  Upon arrival patient has chronic ill appearance but is not in acute distress.  He is lying supine in bed with only brief in place.  Initial vital signs include 97.7 axillary, HR 96, BP 90/63, RR 20, SpO2 96% on room air.  His abdomen appears quite distended.  Patient endorses abdominal pain and intermittent nausea.  He reports passing flatus, last BM yesterday.  Patient does report he was having loose stools, however yesterday his stool was more formed.  Patient denies vomiting.  On exam skin is cool and dry.  Abdomen is protuberant.  It is firm, distended, and patient endorses generalized tenderness in all 4 quadrants.  He does not have any rebound tenderness or guarding.    The SIRS criteria and exam findings are likely due to Vital signs, lab and physical exam findings constitute a diagnosis of SEPSIS, based on:SIRS criteria met and Lactic acid resulted with a level > 2.0         Anti-infectives (From now, onward)      Start     Dose/Rate Route Frequency Ordered Stop    04/08/25 0000  piperacillin-tazobactam (ZOSYN) 4.5 g vial to attach to  mL bag        Note to Pharmacy: For [facility], [facility] and [facility]: For Zosyn-naive patients, use the \"Zosyn initial dose + extended infusion\" order panel.    4.5 g  over " 30 Minutes Intravenous EVERY 6 HOURS 04/07/25 2332            Current antibiotic coverage is appropriate for source of infection.    Differentials considered include sepsis from urinary source vs. Intraabdominal infection vs. Surgical site infection, ischemic bowel, pericardial effusion, and heart failure.     Working diagnosis for lactic acidosis is heart failure. Patient's proBNP has doubled. He has ascites and splenomegaly on CT. He has +2 peripheral edema on exam.     Interventions:  - UA/UC  - Repeat CBC  - Hold lasix due to soft BPs; need to consider IV lasix once BP improved  - proBNP 3,106; increased from 1,612 yesterday  - CT CAP reviewed; findings suggestive of ileus or enteritis. Also has small amount of ascites and splenomegaly   - Discussed with Dr. Calles with general surgery. Initially patient made NPO. Dr. Calles assessed patient at bedside. She cultured patient's midline abdominal incision and placed packing. CT findings suggestive of illeus. She ordered clear liquid diet. If not tolerating, patient may need NGT. No plans for any surgical intervention at this time.  - If develops SBP < 90, will perform NICOM assessment      Physical Exam  General:  Appears stated age, no acute distress. A&O x 3.  Skin:  Warm, dry. Midline abdominal incision approximated with staples; some surrounding erythema without purulent drainage.   HEENT:  Normocephalic, atraumatic; EOMs grossly intact.  Neck:  Supple.  Chest:  No increased work of breathing on room air.  Cardiovascular:  RRR, no rub or murmur. +2 peripheral edema.  Abdomen:  Firm, distended, diffuse generalized tenderness. No rebound tenderness or gaurding.   Musculoskeletal:  Moves all four extremities.   Neurological:  No focal neurological deficits   Psychiatric:  Affect and mood congruent.    Vital Signs:  Temp: 97.7  F (36.5  C) Temp src: Axillary BP: 90/63 Pulse: 94   Resp: 20 SpO2: 96 % O2 Device: None (Room air) Oxygen Delivery: 2 LPM  ID: The  "patient is currently on the following antibiotics:  Anti-infectives (From now, onward)      Start     Dose/Rate Route Frequency Ordered Stop    04/08/25 0000  piperacillin-tazobactam (ZOSYN) 4.5 g vial to attach to  mL bag        Note to Pharmacy: For [facility], [facility] and [facility]: For Zosyn-naive patients, use the \"Zosyn initial dose + extended infusion\" order panel.    4.5 g  over 30 Minutes Intravenous EVERY 6 HOURS 04/07/25 2332            Current antibiotic coverage is appropriate for source of infection.    Fluid: Fluid bolus not indicated due to: CHF & Pulmonary Edema.    Lab: Repeat lactic acid is not indicated.    Disposition: The patient will remain on the current unit. We will continue to monitor this patient closely..    Noel Roche [facility]  Securely message with the Vocera Web Console (learn more here)  Text page via [employer] Paging/Directory    Addendum:   - Paged Dr. Landrum with general surgery regarding increased drainage from abdominal incision  - Updated Dr. Mays, primary Hospitalist at 9:17 AM. Patient was reassessed at bedside due to concern with copious amount of drainage from abdominal incision. Drainage appears serous. Patient does not endorse worsening abdominal pain. Originally considered lasix as SBP now 120s/70s, however after reviewing labs, patient has worsening leukocytosis of 39.5, up from 14.1. Per Dr. Mays she would like Eliquis held, hold famotidine due to CRISTINA, and start IV pantoprazole. Will hold off on Lasix at this time.  - Paged Dr. Landrum again at 9:24 due to new leukocytosis  - Paged Dr. Calles at 9:25AM  - Paged general surgery office, who directed to page via [employer]; which is the original method of paging that was used  - Paged Dr. Calles again at 9:45 AM  - STAT CT abd/pelvis; patient transported via flying squad RN. At this time patient's abdominal pain has increased, he endorses feeling weaker. Repeat lactic acid pending.   - " Vascular access consult for 2nd PIV  - Attempted to Call Dr. Landrum at 10:30 AM; will attempt to call again after CT result back  - Received message from Dr. Landrum at 11:06 AM. She will come speak with patient and talk about surgery to evaluate for breakdown of the small bowel obstruction      Medical Decision Making     Time Spent on this Encounter   I spent 90 minutes of critical care time on the unit/floor managing the care of Richar Davis. Upon evaluation, this patient had a high probability of imminent or life-threatening deterioration due to severe sepsis, which required my direct attention, intervention, and personal management included in this note 100% of my time was spent at the bedside counseling the patient and/or coordinating care regarding services listed in this note.

## 2025-04-08 NOTE — ANESTHESIA PROCEDURE NOTES
Arterial Line Procedure Note    Pre-Procedure   Staff -        Anesthesiologist:  Adalgisa Barber MD       Performed By: anesthesiologist       Location: pre-op       Pre-Anesthestic Checklist: patient identified, IV checked, risks and benefits discussed, informed consent, monitors and equipment checked, pre-op evaluation and at physician/surgeon's request  Timeout:       Correct Patient: Yes        Correct Procedure: Yes        Correct Site: Yes        Correct Position: Yes   Line Placement:   This line was placed Pre Induction starting at 4/8/2025 2:15 PM and ending at 4/8/2025 2:30 PM  Procedure   Procedure: arterial line       Laterality: right       Insertion Site: brachial.  Sterile Prep        All elements of maximal sterile barrier technique followed       Patient Prep/Sterile Barriers: hand hygiene, sterile gloves, hat, mask, draped, sterile gown, draped       Skin prep: Chloraprep  Insertion/Injection        Technique: Seldinger Technique and Nomi's test completed        1. Ultrasound was used to evaluate the access site.       2. Artery evaluated via ultrasound for patency/adequacy.       3. Using real-time ultrasound the needle/catheter was observed entering the artery/vein.       4. Permanent image was captured and entered into the patient's record.       5. The visualized structures were anatomically normal.       6. There were no apparent abnormal pathologic findings.       Catheter Type/Size: 20 gauge, 12 cm  Narrative         Secured by: other       Tegaderm dressing used.       Complications: None apparent,        Arterial waveform: Yes        IBP within 10% of NIBP: Yes   Comments:  Ultrasound was used to place arterial line due to vascular disease.

## 2025-04-08 NOTE — PROVIDER NOTIFICATION
MD Notification    Notified Person: MD    Notified Person Name: Noel Roche      Notification Date/Time: 0838    Notification Interaction: text page     Purpose of Notification: copious drainage from abd incision, pooling on ground.     Orders Received: Noel came to bedside to assess.     Comments: Noel is contacting gen surgery.

## 2025-04-08 NOTE — ANESTHESIA POSTPROCEDURE EVALUATION
Patient: Richar Davis    Procedure: Procedure(s):  EXPLORATORY LAPAROTOMY WITH SMALL BOWEL RESECTION       Anesthesia Type:  General    Note:  Disposition: ICU            ICU Sign Out: Anesthesiologist/ICU physician sign out WAS performed   Postop Pain Control: Uneventful            Sign Out: Well controlled pain   PONV: No   Neuro/Psych:             Sign Out: PLANNED postop sedation   Airway/Respiratory:             Sign Out: AIRWAY IN SITU/Resp. Support               Airway in situ/Resp. Support: ETT   CV/Hemodynamics:             Sign Out: Acceptable CV status   Other NRE:    DID A NON-ROUTINE EVENT OCCUR?            Last vitals:  Vitals Value Taken Time   BP 77/29 04/08/25 1853   Temp     Pulse 88 04/08/25 1856   Resp 18 04/08/25 1856   SpO2 98 % 04/08/25 1855   Vitals shown include unfiled device data.    Electronically Signed By: Adalgisa Barber MD  April 8, 2025  6:58 PM

## 2025-04-08 NOTE — ANESTHESIA PREPROCEDURE EVALUATION
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    ANESTHESIA CARDIOVERSION N/A 2025    Procedure: Anesthesia cardioversion;  Surgeon: GENERIC ANESTHESIA PROVIDER;  Location:  OR    BYPASS GRAFT ARTERY CORONARY, REPLACE VALVE AORTIC, COMBINED N/A 10/20/2022    Procedure: CORONARY ARTERY BYPASS GRAFT x 3 (LEFT INTERNAL MAMMARY ARTERY - LEFT ANTERIOR DESCENDING ARTERY; SAPHENOUS VEIN - POSTERIOR DESCENDING ARTERY; SAPHENOUS VEIN - CIRCUMFLEX ARTERY) WITH ENDOSCOPIC LESSER SAPHENOUS VEIN HARVEST ON BILATERAL LOWER EXTREMITY, AORTIC VALVE REPLACEMENT WITH TISSUE HEART VALVE INSPIRIS  RESILIA  AORTIC VALVE SIZE: 25MM, AND ON CARDIOPULMONARY PUMP OXYGENATOR  (    CTA ANGIOGRAM CORONARY ARTERY      LAPAROTOMY EXPLORATORY N/A 3/30/2025    Procedure: exploratory laparotomy with enterotomy and removal of gallstone;  Surgeon: Clau Calles MD;  Location:  OR    ORTHOPEDIC SURGERY Left     elbow surgery      Allergies   Allergen Reactions    Niaspan [Niacin] Other (See Comments)     flushing      Social History     Tobacco Use    Smoking status: Former    Smokeless tobacco: Current     Types: Chew    Tobacco comments:     daily   Substance Use Topics    Alcohol use: Not Currently      Wt Readings from Last 1 Encounters:   25 (!) 137.3 kg (302 lb 9.6 oz)        Anesthesia Evaluation   Pt has had prior anesthetic.     No history of anesthetic complications       ROS/MED HX  ENT/Pulmonary:     (+) sleep apnea, doesn't use CPAP,              tobacco use,                        Neurologic:    (-) no seizures and no CVA   Cardiovascular: Comment:  Hypotensive     (+) Dyslipidemia hypertension- -  CAD - past MI CABG- -                        dysrhythmias, a-fib,  valvular problems/murmurs  s/p AVR.    Previous cardiac testing   Echo: Date: 1/2025 Results:  Technically difficult imaging  The rhythm was atrial fibrillation.  Left ventricular systolic function is normal.  The visual ejection fraction is 60-65%.  The left ventricle is normal in size.  The right ventricle is normal in structure, function and size.  Doppler interrogation does not demonstrate significant stenosis or  insufficiency involving cardiica valves . No change since 12/10/2024  _______________________________________________________  Interpretation Summary     S  Stress Test:  Date: Results:    ECG Reviewed:  Date: Results:    Cath:  Date: Results:      METS/Exercise Tolerance:     Hematologic:       Musculoskeletal:       GI/Hepatic: Comment: ILEUS   (-) GERD and liver disease   Renal/Genitourinary:     (+) renal disease, type: ARF,      BPH,      Endo:     (+)  type II DM,   Not using insulin, - not using insulin pump.   Diabetic complications: cardiac problems.      Obesity,    (-) thyroid disease   Psychiatric/Substance Use:       Infectious Disease: Comment: sepsis      Malignancy:    (-) malignancy   Other:            Physical Exam    Airway        Mallampati: III   TM distance: > 3 FB   Neck ROM: full   Mouth opening: > 3 cm    Respiratory Devices and Support         Dental       (+) Modest Abnormalities - crowns, retainers, 1 or 2 missing teeth      Cardiovascular          Rhythm and rate: irregular     Pulmonary           (+) decreased breath sounds           OUTSIDE LABS:  CBC:   Lab Results   Component Value Date    WBC 35.9 (H) 04/08/2025    WBC 14.1 (H) 04/07/2025    HGB 15.5 04/08/2025    HGB 13.9 04/07/2025    HCT 46.6 04/08/2025    HCT 43.5 04/07/2025     04/08/2025     04/07/2025     BMP:   Lab Results   Component Value Date     (L) 04/08/2025      "04/07/2025    POTASSIUM 4.1 04/08/2025    POTASSIUM 4.3 04/08/2025    CHLORIDE 98 04/08/2025    CHLORIDE 97 (L) 04/07/2025    CO2 16 (L) 04/08/2025    CO2 22 04/07/2025    BUN 13.5 04/08/2025    BUN 9.8 04/07/2025    CR 1.30 (H) 04/08/2025    CR 0.79 04/07/2025     (H) 04/08/2025     (H) 04/08/2025     COAGS:   Lab Results   Component Value Date     (H) 10/20/2022    INR 1.15 04/07/2025    FIBR 198 10/20/2022     POC: No results found for: \"BGM\", \"HCG\", \"HCGS\"  HEPATIC:   Lab Results   Component Value Date    ALBUMIN 2.4 (L) 04/08/2025    PROTTOTAL 6.0 (L) 04/08/2025    ALT 24 04/08/2025    AST 36 04/08/2025    ALKPHOS 125 04/08/2025    BILITOTAL 0.7 04/08/2025    YUNG 19 10/23/2022     OTHER:   Lab Results   Component Value Date    PH 7.42 10/26/2022    LACT 5.5 (HH) 04/08/2025    A1C 6.8 (H) 03/31/2025    HALEY 9.7 04/08/2025    PHOS 2.3 (L) 04/04/2025    MAG 1.8 04/08/2025    LIPASE 29 04/07/2025       Anesthesia Plan    ASA Status:  4, emergent    NPO Status:  NPO Appropriate    Anesthesia Type: General.     - Airway: ETT   Induction: Intravenous, Propofol, RSI.   Maintenance: Balanced.   Techniques and Equipment:     - Airway: Video-Laryngoscope     - Lines/Monitors: Arterial Line, 2nd IV, Central Line     Consents    Anesthesia Plan(s) and associated risks, benefits, and realistic alternatives discussed. Questions answered and patient/representative(s) expressed understanding.     - Discussed:     - Discussed with:  Patient      - Extended Intubation/Ventilatory Support Discussed: Yes.           Postoperative Care    Pain management: IV analgesics.   PONV prophylaxis: Ondansetron (or other 5HT-3)     Comments:               Adalgisa Barber MD    Clinically Significant Risk Factors         # Hyponatremia: Lowest Na = 133 mmol/L in last 2 days, will monitor as appropriate  # Hypochloremia: Lowest Cl = 97 mmol/L in last 2 days, will monitor as appropriate   # Hypercalcemia: corrected calcium " is >10.1, will monitor as appropriate  # Hypomagnesemia: Lowest Mg = 1.6 mg/dL in last 2 days, will replace as needed  # Anion Gap Metabolic Acidosis: Highest Anion Gap = 19 mmol/L in last 2 days, will monitor and treat as appropriate  # Hypoalbuminemia: Lowest albumin = 2.4 g/dL at 4/8/2025  6:04 AM, will monitor as appropriate    # Acute Kidney Injury, unspecified: based on a >150% or 0.3 mg/dL increase in last creatinine compared to past 90 day average, will monitor renal function  # Hypertension: Noted on problem list           # DMII: A1C = 6.8 % (Ref range: <5.7 %) within past 6 months   # Severe Obesity: Estimated body mass index is 41.04 kg/m  as calculated from the following:    Height as of this encounter: 1.829 m (6').    Weight as of this encounter: 137.3 kg (302 lb 9.6 oz).      # Financial/Environmental Concerns: none   # History of CABG: noted on surgical history

## 2025-04-08 NOTE — PLAN OF CARE
Goal Outcome Evaluation:      Plan of Care Reviewed With: patient      A+Ox4. SBA to commode, no BM this shift, voiding. VSS RA. Denies CP or SOB. Is a little GOMEZ. Lasix restarted for tomorrow. Post Cardioversion-Now SR/ST . EKG done to verify not flutter. Back pain chronic, taking Tramadol and Tylenol/ice with some relief.

## 2025-04-08 NOTE — CODE/RAPID RESPONSE
"[facility]    House SUKHWINDER RRT Note  4/7/2025   Time Called: 2245    RRT called for: Rigors    Assessment & Plan     Recurrent fever with associated rigors suspect 2/2 severe sepsis, source unknown.  Lactic acidosis 2/2 severe sepsis, extensive rigors.  R flank pain.  Leukocytosis.  Anion gap metabolic acidosis.  - Upon arrival, pt lying in bed, awake, alert, ill appearing with noted extensive rigors throughout.  Pt reports feeling cold; no fever yet.  Pt's VS noting SBP 140s, HR 130s, RR 20s, O2 sats > 92% on RA.  Pt reports intermittent but ongoing R flank pain; describes as \"all of them\" when asked if stabbing, aching, throbbing, sharp.  Pt with abdominal binder in place.  Pt reports mild SOB; noted faint upper airway expiratory wheezes.      INTERVENTIONS:  - Stat blood cultures x2  - Stat lactic acid, procalcitonin, CBC, CMP, BNP, lipase  - Stat CT CAP w contrast  - Noted recent UA, COVID/influenza PCR completed  - Will check MRSA MSSA PCR  - Will initiate pt on zosyn therapy at this time  - Will repeat lactic acid at 0120; will check VBG at that time as well  - Requested for nursing to administer scheduled 0100 APAP now (2330)  - Offered to update pt's wife; pt declined  - 0143: Noted repeat lactic acid down to 3.4 without intervention.  In setting of CT not consistent with fluid overload, will order a 500 ml LR bolus over 2 hours.  Will repeat lactic acid with AM labs.  If pt develops hemodynamic instability, will proceed with full 30 ml/kg fluid resuscitation; however, in setting of concern for fluid overload and PTA lasix was to resume 4/8, will administer IVF judiciously at this time.    At the end of the RRT pt resting in bed, rigors subsiding    Discussed with and defer further cares to nursing and hospitalist    Interval History     Richar Davis is a 69 year old male who was admitted on 3/30/2025 for abdominal pain, N/V.    Medical history significant for:   Past Medical " History:   Diagnosis Date    Cataract     Complication of anesthesia     Diabetes mellitus (H)     Heart attack (H)     Heart murmur     Hypertension      Code Status: Full Code    Allergies   Allergies   Allergen Reactions    Niaspan [Niacin] Other (See Comments)     flushing     Physical Exam   Vital Signs with Ranges:  Temp:  [97.8  F (36.6  C)-98.8  F (37.1  C)] 98.1  F (36.7  C)  Pulse:  [] 127  Resp:  [10-28] 28  BP: ()/() 140/119  SpO2:  [95 %-99 %] 98 %  I/O last 3 completed shifts:  In: 1006.3 [P.O.:290; I.V.:716.3]  Out: 300 [Urine:300]    Constitutional: Pt lying in bed, awake, alert, ill appearing with noted extensive rigors throughout  Neck: Noted faint upper airway expiratory wheezes  Pulmonary: In mild respiratory distress, tachypneic, clear lung sounds throughout, no obvious crackles or wheezes noted  Cardiovascular: Tachycardic, regular rate and rhythm, normal S1S2, no murmur, rub or gallop noted  GI: Round, distended, very faint bowel sounds, noted perpendicular surgical incision near umbilicus with stables in place, noted mild redness around surgical site, no obvious drainage noted  Skin/Integumen: Warm, dry, pink  Neuro: Awake, alert, clear speech (minimal conversation), no obvious focal neuro deficit noted, moving all extremities spontaneously  Psych:  Calm  Extremities: Noted taut tissues throughout    Data     IMAGING: (X-ray/CT/MRI)   Recent Results (from the past 24 hours)   EP Cardioversion External    Narrative    Laurie Capps MD     4/7/2025  2:31 PM  [facility]    Procedure: EP Cardioversion External    Date/Time: 4/7/2025 2:29 PM    Performed by: Laurie Capps MD  Authorized by: Fahad Wei MD      UNIVERSAL PROTOCOL   Site Marked: Yes  Prior Images Obtained and Reviewed:  Yes  Required items: Required blood products, implants, devices and special   equipment available    Patient identity confirmed:  Verbally with  patient  Patient was reevaluated immediately before administering moderate or deep   sedation or anesthesia  Confirmation Checklist:  Patient's identity using two indicators  Time out: Immediately prior to the procedure a time out was called    Universal Protocol: the Joint Commission Universal Protocol was followed       ANESTHESIA    Anesthesia was administered and monitored by anesthesiology.  See   anesthesia documentation for details.    SEDATION  Patient Sedated: Yes    Sedation:  Propofol  Vital signs: Vital signs monitored during sedation      PROCEDURE DETAILS  Cardioversion basis: elective  Pre-procedure rhythm: atrial flutter  Patient position: patient was placed in a supine position  Chest area: chest area exposed  Electrodes: pads  Electrodes placed: anterior-posterior  Number of attempts: 1  Post-procedure rhythm: normal sinus rhythm  Complications: no complications      PROCEDURE  Describe Procedure: Consent was obtained. Patient was anticoagulated with   heparin infusion, confirmed within therapeutic range. Pads placed in AP   position. Single shock of synchronized cardioversion utilizing 200 J   applied which successfully converted rhythm from atrial flutter into   normal sinus rhythm. Sedation was provided by anesthesia team. No   immediate complications.   Patient Tolerance:  Patient tolerated the procedure well with no immediate   complications   CT Chest/Abdomen/Pelvis w Contrast    Narrative    EXAM: CT CHEST/ABDOMEN/PELVIS W CONTRAST  LOCATION: [facility]  DATE: 4/8/2025    INDICATION: Fever, recent ex lap small bowel enterotomy, removal of gallstone 1 week ago.  COMPARISON: 3/30/2025.  TECHNIQUE: CT scan of the chest, abdomen, and pelvis was performed following injection of IV contrast. Multiplanar reformats were obtained. Dose reduction techniques were used.   CONTRAST: 135 mL Isovue-370.    FINDINGS:   LUNGS AND PLEURA: Atelectasis and scarring at the lung bases.  "There are a few small lung nodules bilaterally measuring up to 3 mm. These are not significantly changed since 2022 and are considered benign.    MEDIASTINUM/AXILLAE: Previous median sternotomy. Aortic valve prosthesis. The heart size is normal. No lymph node enlargement.    CORONARY ARTERY CALCIFICATION: Severe.    HEPATOBILIARY: There is again a calcification in the gallbladder fossa which may be within a contracted gallbladder.    PANCREAS: Normal.    SPLEEN: Enlarged measuring 15 cm in length.    ADRENAL GLANDS: Normal.    KIDNEYS/BLADDER: There is no hydronephrosis. Few small cortical cysts.    BOWEL: Multiple mildly dilated small bowel loops with areas of wall thickening and associated mesenteric edema. No focal obstruction. Large amount of air and food debris in the stomach. Small amount of ascites. No free intraperitoneal gas.    LYMPH NODES: Normal.    VASCULATURE: Atherosclerotic calcification of the aorta and its branches. No aneurysm.    PELVIC ORGANS: The prostate gland is enlarged.    MUSCULOSKELETAL: Degenerative disease throughout the spine.      Impression    IMPRESSION:  1.  Dilated small bowel loops with mild wall thickening and without evidence of obstruction suggests ileus or enteritis.  2.  Small amount of ascites, increased.  3.  Stable mild splenomegaly.  4.  Prostate gland enlargement.  5.  No acute chest abnormality.     VBG:  No results for input(s): \"PHV\", \"PO2V\", \"PCO2V\", \"HCO3V\" in the last 168 hours.    BNP:  Recent Labs   Lab 04/07/25 2320   NTBNPI 1,612*     Lactic acid:  Recent Labs   Lab 04/07/25 2320 04/05/25  0905   LACT 6.4* 1.0     Lipase:  Recent Labs   Lab 04/07/25 2320   LIPASE 29     CBC with Diff:  Recent Labs   Lab Test 04/07/25 2320 04/07/25  0727   WBC 14.1*  --    HGB 13.9  --    MCV 94  --      --    INR  --  1.15      Comprehensive Metabolic Panel:  Recent Labs   Lab 04/07/25 2320 04/07/25  1113 04/07/25  0425 04/04/25  1214 04/04/25  0601     --  " 134*   < > 137   POTASSIUM 4.6  --  4.1  4.1   < > 3.4   CHLORIDE 97*  --  99   < > 98   CO2 22  --  24   < > 28   ANIONGAP 16*  --  11   < > 11   *   < > 151*   < > 137*   BUN 9.8  --  9.2   < > 7.9*   CR 0.79  --  0.72   < > 0.84   GFRESTIMATED >90  --  >90   < > >90   HALEY 9.5  --  9.6   < > 9.7   MAG  --   --  1.6*   < > 1.9   PHOS  --   --   --   --  2.3*   PROTTOTAL 6.6  --   --    < >  --    ALBUMIN 2.9*  --   --    < >  --    BILITOTAL 0.4  --   --    < >  --    ALKPHOS 140  --   --    < >  --    AST 33  --   --    < >  --    ALT 26  --   --    < >  --     < > = values in this interval not displayed.     Medical Decision Making       45 MINUTES SPENT BY ME on the date of service doing chart review, history, exam, documentation & further activities per the note.       ADONIS Min [facility]  Hospitalist-House SUKHWINDER  Hospitalist Service  Securely message with lifecake (more info)  Text page via [employer] Paging/Likeedsy

## 2025-04-08 NOTE — ANESTHESIA PROCEDURE NOTES
Central Line/PA Catheter Placement    Pre-Procedure   Staff -        Performed By: Anesthesiologist       Location: OR       Pre-Anesthestic Checklist: patient identified, risks and benefits discussed, informed consent, monitors and equipment checked, pre-op evaluation and at physician/surgeon's request  Timeout:       Correct Patient: Yes        Correct Procedure: Yes        Correct Site: Yes        Correct Position: Yes        Correct Laterality: N/A   Line Placement:   This line was placed Post Induction starting at 4/8/2025 4:45 PM and ending at 4/8/2025 5:10 PM    Procedure   Procedure: central line       Laterality: right       Insertion Site: internal jugular.       Patient Position: Trendelenburg  Sterile Prep        All elements of maximal sterile barrier technique followed       Patient Prep/Sterile Barriers: draped, hand hygiene, gloves , hat , mask , draped, gown, sterile gel and probe cover       Skin prep: Chloraprep  Insertion/Injection        Local skin infiltrated with 5 mL of.        Technique: ultrasound guided and Seldinger Technique        1. Ultrasound was used to evaluate the access site.       2. Vein evaluated via ultrasound for patency/adequacy.       3. Using real-time ultrasound the needle/catheter was observed entering the artery/vein.       4. Permanent image was captured and entered into the patient's record.       5. The visualized structures were anatomically normal.       6. There were no apparent abnormal pathologic findings.       Type: CVC       Catheter Length: 20       Number of Lumens: triple lumen  Narrative         Secured by: suture       Tegaderm and Biopatch dressing used.       Complications: None apparent,        blood aspirated (Aspirated first, then flushed) from all lumens,        All lumens flushed: Yes       Verification method: Placement to be verified post-op and Ultrasound   Comments:  Clinical Indication:  sepsis      Seldinger technique to place CVL under  real-time U/S guidance.    U/S used to identify vein, and used for real-time guidance to watch needle, IV Cath, and wire insertion into IJ.    Pt tolerated well.     Brief Operative Note Documentation:   Pre-operative diagnosis: sepsis   Post-operative diagnosis sepsis   Procedure: Central Venous Line Placement    Anesthesiologist/Proceduralist:  Dr. Barber   Estimated blood loss: 5 mL   Specimens: None   Findings: No abnormal anatomical findings with ultrasound at site.     Procedure Start & Stop Time: 9086 - 2060    Adalgisa Barber MD   April 8, 2025 5:21 PM

## 2025-04-09 ENCOUNTER — APPOINTMENT (OUTPATIENT)
Dept: GENERAL RADIOLOGY | Facility: CLINIC | Age: 70
DRG: 329 | End: 2025-04-09
Attending: SURGERY
Payer: COMMERCIAL

## 2025-04-09 LAB
ALBUMIN SERPL BCG-MCNC: 1.9 G/DL (ref 3.5–5.2)
ALLEN'S TEST: ABNORMAL
ALP SERPL-CCNC: 91 U/L (ref 40–150)
ALT SERPL W P-5'-P-CCNC: 16 U/L (ref 0–70)
ANION GAP SERPL CALCULATED.3IONS-SCNC: 15 MMOL/L (ref 7–15)
ANION GAP SERPL CALCULATED.3IONS-SCNC: 15 MMOL/L (ref 7–15)
AST SERPL W P-5'-P-CCNC: 30 U/L (ref 0–45)
BASE EXCESS BLDA CALC-SCNC: -5.3 MMOL/L (ref -3–3)
BILIRUB SERPL-MCNC: 0.4 MG/DL
BUN SERPL-MCNC: 23.1 MG/DL (ref 8–23)
BUN SERPL-MCNC: 25.4 MG/DL (ref 8–23)
CA-I BLD-MCNC: 4.5 MG/DL (ref 4.4–5.2)
CALCIUM SERPL-MCNC: 8 MG/DL (ref 8.8–10.4)
CALCIUM SERPL-MCNC: 8.1 MG/DL (ref 8.8–10.4)
CHLORIDE SERPL-SCNC: 100 MMOL/L (ref 98–107)
CHLORIDE SERPL-SCNC: 98 MMOL/L (ref 98–107)
CREAT SERPL-MCNC: 1.85 MG/DL (ref 0.67–1.17)
CREAT SERPL-MCNC: 1.96 MG/DL (ref 0.67–1.17)
EGFRCR SERPLBLD CKD-EPI 2021: 37 ML/MIN/1.73M2
EGFRCR SERPLBLD CKD-EPI 2021: 39 ML/MIN/1.73M2
ERYTHROCYTE [DISTWIDTH] IN BLOOD BY AUTOMATED COUNT: 13.9 % (ref 10–15)
GLUCOSE BLDC GLUCOMTR-MCNC: 148 MG/DL (ref 70–99)
GLUCOSE BLDC GLUCOMTR-MCNC: 149 MG/DL (ref 70–99)
GLUCOSE BLDC GLUCOMTR-MCNC: 151 MG/DL (ref 70–99)
GLUCOSE BLDC GLUCOMTR-MCNC: 160 MG/DL (ref 70–99)
GLUCOSE SERPL-MCNC: 161 MG/DL (ref 70–99)
GLUCOSE SERPL-MCNC: 165 MG/DL (ref 70–99)
HCO3 BLD-SCNC: 19 MMOL/L (ref 21–28)
HCO3 SERPL-SCNC: 17 MMOL/L (ref 22–29)
HCO3 SERPL-SCNC: 17 MMOL/L (ref 22–29)
HCT VFR BLD AUTO: 39.5 % (ref 40–53)
HGB BLD-MCNC: 13.4 G/DL (ref 13.3–17.7)
LACTATE SERPL-SCNC: 2.3 MMOL/L (ref 0.7–2)
LACTATE SERPL-SCNC: 2.7 MMOL/L (ref 0.7–2)
MAGNESIUM SERPL-MCNC: 1.6 MG/DL (ref 1.7–2.3)
MCH RBC QN AUTO: 30.6 PG (ref 26.5–33)
MCHC RBC AUTO-ENTMCNC: 33.9 G/DL (ref 31.5–36.5)
MCV RBC AUTO: 90 FL (ref 78–100)
O2/TOTAL GAS SETTING VFR VENT: 30 %
OXYHGB MFR BLDA: 98 % (ref 92–100)
PCO2 BLD: 30 MM HG (ref 35–45)
PH BLD: 7.4 [PH] (ref 7.35–7.45)
PHOSPHATE SERPL-MCNC: 5.6 MG/DL (ref 2.5–4.5)
PLATELET # BLD AUTO: 334 10E3/UL (ref 150–450)
PO2 BLD: 138 MM HG (ref 80–105)
POTASSIUM SERPL-SCNC: 5.2 MMOL/L (ref 3.4–5.3)
POTASSIUM SERPL-SCNC: 5.2 MMOL/L (ref 3.4–5.3)
PROT SERPL-MCNC: 4.7 G/DL (ref 6.4–8.3)
RBC # BLD AUTO: 4.38 10E6/UL (ref 4.4–5.9)
SAO2 % BLDA: 99.9 % (ref 95–96)
SODIUM SERPL-SCNC: 130 MMOL/L (ref 135–145)
SODIUM SERPL-SCNC: 132 MMOL/L (ref 135–145)
WBC # BLD AUTO: 33.3 10E3/UL (ref 4–11)

## 2025-04-09 PROCEDURE — P9045 ALBUMIN (HUMAN), 5%, 250 ML: HCPCS | Performed by: SURGERY

## 2025-04-09 PROCEDURE — 250N000013 HC RX MED GY IP 250 OP 250 PS 637: Performed by: INTERNAL MEDICINE

## 2025-04-09 PROCEDURE — 250N000011 HC RX IP 250 OP 636: Performed by: NURSE PRACTITIONER

## 2025-04-09 PROCEDURE — 258N000003 HC RX IP 258 OP 636: Performed by: SURGERY

## 2025-04-09 PROCEDURE — 250N000013 HC RX MED GY IP 250 OP 250 PS 637: Performed by: SURGERY

## 2025-04-09 PROCEDURE — 250N000009 HC RX 250: Performed by: SURGERY

## 2025-04-09 PROCEDURE — 94003 VENT MGMT INPAT SUBQ DAY: CPT

## 2025-04-09 PROCEDURE — 84100 ASSAY OF PHOSPHORUS: CPT | Performed by: INTERNAL MEDICINE

## 2025-04-09 PROCEDURE — 250N000011 HC RX IP 250 OP 636: Performed by: STUDENT IN AN ORGANIZED HEALTH CARE EDUCATION/TRAINING PROGRAM

## 2025-04-09 PROCEDURE — 250N000011 HC RX IP 250 OP 636: Mod: JZ | Performed by: SURGERY

## 2025-04-09 PROCEDURE — 85027 COMPLETE CBC AUTOMATED: CPT | Performed by: HOSPITALIST

## 2025-04-09 PROCEDURE — 80053 COMPREHEN METABOLIC PANEL: CPT | Performed by: SURGERY

## 2025-04-09 PROCEDURE — 83605 ASSAY OF LACTIC ACID: CPT | Performed by: SURGERY

## 2025-04-09 PROCEDURE — 999N000065 XR CHEST PORT 1 VIEW

## 2025-04-09 PROCEDURE — 258N000003 HC RX IP 258 OP 636: Performed by: STUDENT IN AN ORGANIZED HEALTH CARE EDUCATION/TRAINING PROGRAM

## 2025-04-09 PROCEDURE — 200N000001 HC R&B ICU

## 2025-04-09 PROCEDURE — 82330 ASSAY OF CALCIUM: CPT | Performed by: SURGERY

## 2025-04-09 PROCEDURE — 84295 ASSAY OF SERUM SODIUM: CPT | Performed by: HOSPITALIST

## 2025-04-09 PROCEDURE — 82805 BLOOD GASES W/O2 SATURATION: CPT | Performed by: INTERNAL MEDICINE

## 2025-04-09 PROCEDURE — 999N000065 XR ABDOMEN PORT 1 VIEW

## 2025-04-09 PROCEDURE — 999N000287 HC ICU ADULT ROUNDING, EACH 10 MINS

## 2025-04-09 PROCEDURE — 99291 CRITICAL CARE FIRST HOUR: CPT | Performed by: INTERNAL MEDICINE

## 2025-04-09 PROCEDURE — 83735 ASSAY OF MAGNESIUM: CPT | Performed by: HOSPITALIST

## 2025-04-09 PROCEDURE — 258N000003 HC RX IP 258 OP 636: Performed by: INTERNAL MEDICINE

## 2025-04-09 PROCEDURE — 999N000157 HC STATISTIC RCP TIME EA 10 MIN

## 2025-04-09 RX ORDER — MAGNESIUM SULFATE HEPTAHYDRATE 40 MG/ML
2 INJECTION, SOLUTION INTRAVENOUS ONCE
Status: COMPLETED | OUTPATIENT
Start: 2025-04-09 | End: 2025-04-09

## 2025-04-09 RX ADMIN — PROPOFOL 50 MCG/KG/MIN: 10 INJECTION, EMULSION INTRAVENOUS at 09:02

## 2025-04-09 RX ADMIN — SODIUM CHLORIDE, SODIUM LACTATE, POTASSIUM CHLORIDE, AND CALCIUM CHLORIDE 1000 ML: .6; .31; .03; .02 INJECTION, SOLUTION INTRAVENOUS at 04:26

## 2025-04-09 RX ADMIN — NICOTINE 1 PATCH: 21 PATCH, EXTENDED RELEASE TRANSDERMAL at 08:25

## 2025-04-09 RX ADMIN — PROPOFOL 55 MCG/KG/MIN: 10 INJECTION, EMULSION INTRAVENOUS at 13:05

## 2025-04-09 RX ADMIN — SODIUM CHLORIDE, SODIUM LACTATE, POTASSIUM CHLORIDE, AND CALCIUM CHLORIDE 1000 ML: .6; .31; .03; .02 INJECTION, SOLUTION INTRAVENOUS at 13:46

## 2025-04-09 RX ADMIN — FENTANYL CITRATE 50 MCG: 50 INJECTION, SOLUTION INTRAMUSCULAR; INTRAVENOUS at 09:19

## 2025-04-09 RX ADMIN — PIPERACILLIN AND TAZOBACTAM 4.5 G: 4; .5 INJECTION, POWDER, FOR SOLUTION INTRAVENOUS at 11:44

## 2025-04-09 RX ADMIN — FENTANYL CITRATE 50 MCG: 50 INJECTION, SOLUTION INTRAMUSCULAR; INTRAVENOUS at 18:10

## 2025-04-09 RX ADMIN — CHLORHEXIDINE GLUCONATE 15 ML: 1.2 SOLUTION ORAL at 19:37

## 2025-04-09 RX ADMIN — PROPOFOL 50 MCG/KG/MIN: 10 INJECTION, EMULSION INTRAVENOUS at 06:44

## 2025-04-09 RX ADMIN — SODIUM CHLORIDE, SODIUM LACTATE, POTASSIUM CHLORIDE, AND CALCIUM CHLORIDE 500 ML: .6; .31; .03; .02 INJECTION, SOLUTION INTRAVENOUS at 23:48

## 2025-04-09 RX ADMIN — NOREPINEPHRINE BITARTRATE 0.18 MCG/KG/MIN: 0.02 INJECTION, SOLUTION INTRAVENOUS at 06:43

## 2025-04-09 RX ADMIN — PROPOFOL 45 MCG/KG/MIN: 10 INJECTION, EMULSION INTRAVENOUS at 02:04

## 2025-04-09 RX ADMIN — NOREPINEPHRINE BITARTRATE 0.18 MCG/KG/MIN: 0.02 INJECTION, SOLUTION INTRAVENOUS at 04:42

## 2025-04-09 RX ADMIN — FENTANYL CITRATE 50 MCG: 50 INJECTION, SOLUTION INTRAMUSCULAR; INTRAVENOUS at 14:19

## 2025-04-09 RX ADMIN — VASOPRESSIN 2.4 UNITS/HR: 20 INJECTION, SOLUTION INTRAVENOUS at 13:03

## 2025-04-09 RX ADMIN — PROPOFOL 60 MCG/KG/MIN: 10 INJECTION, EMULSION INTRAVENOUS at 22:25

## 2025-04-09 RX ADMIN — ALBUMIN HUMAN 25 G: 0.05 INJECTION, SOLUTION INTRAVENOUS at 06:19

## 2025-04-09 RX ADMIN — Medication 25 MCG/HR: at 23:52

## 2025-04-09 RX ADMIN — MAGNESIUM SULFATE HEPTAHYDRATE 2 G: 40 INJECTION, SOLUTION INTRAVENOUS at 06:20

## 2025-04-09 RX ADMIN — NOREPINEPHRINE BITARTRATE 0.16 MCG/KG/MIN: 0.02 INJECTION, SOLUTION INTRAVENOUS at 13:03

## 2025-04-09 RX ADMIN — SODIUM CHLORIDE, SODIUM LACTATE, POTASSIUM CHLORIDE, AND CALCIUM CHLORIDE 1000 ML: .6; .31; .03; .02 INJECTION, SOLUTION INTRAVENOUS at 09:24

## 2025-04-09 RX ADMIN — CHLORHEXIDINE GLUCONATE 15 ML: 1.2 SOLUTION ORAL at 08:25

## 2025-04-09 RX ADMIN — VASOPRESSIN 2.4 UNITS/HR: 20 INJECTION, SOLUTION INTRAVENOUS at 06:20

## 2025-04-09 RX ADMIN — PROPOFOL 50 MCG/KG/MIN: 10 INJECTION, EMULSION INTRAVENOUS at 04:29

## 2025-04-09 RX ADMIN — NOREPINEPHRINE BITARTRATE 0.11 MCG/KG/MIN: 0.02 INJECTION, SOLUTION INTRAVENOUS at 23:47

## 2025-04-09 RX ADMIN — VASOPRESSIN 2.4 UNITS/HR: 20 INJECTION, SOLUTION INTRAVENOUS at 20:46

## 2025-04-09 RX ADMIN — FENTANYL CITRATE 50 MCG: 50 INJECTION, SOLUTION INTRAMUSCULAR; INTRAVENOUS at 15:50

## 2025-04-09 RX ADMIN — PROPOFOL 60 MCG/KG/MIN: 10 INJECTION, EMULSION INTRAVENOUS at 14:58

## 2025-04-09 RX ADMIN — NOREPINEPHRINE BITARTRATE 0.19 MCG/KG/MIN: 0.02 INJECTION, SOLUTION INTRAVENOUS at 01:58

## 2025-04-09 RX ADMIN — PIPERACILLIN AND TAZOBACTAM 4.5 G: 4; .5 INJECTION, POWDER, FOR SOLUTION INTRAVENOUS at 17:43

## 2025-04-09 RX ADMIN — PROPOFOL 60 MCG/KG/MIN: 10 INJECTION, EMULSION INTRAVENOUS at 20:29

## 2025-04-09 RX ADMIN — PIPERACILLIN AND TAZOBACTAM 4.5 G: 4; .5 INJECTION, POWDER, FOR SOLUTION INTRAVENOUS at 05:03

## 2025-04-09 RX ADMIN — FENTANYL CITRATE 50 MCG: 50 INJECTION, SOLUTION INTRAMUSCULAR; INTRAVENOUS at 02:50

## 2025-04-09 RX ADMIN — PROPOFOL 60 MCG/KG/MIN: 10 INJECTION, EMULSION INTRAVENOUS at 16:48

## 2025-04-09 RX ADMIN — NOREPINEPHRINE BITARTRATE 0.14 MCG/KG/MIN: 0.02 INJECTION, SOLUTION INTRAVENOUS at 09:59

## 2025-04-09 RX ADMIN — PROPOFOL 60 MCG/KG/MIN: 10 INJECTION, EMULSION INTRAVENOUS at 18:44

## 2025-04-09 RX ADMIN — SODIUM CHLORIDE, SODIUM LACTATE, POTASSIUM CHLORIDE, AND CALCIUM CHLORIDE 1000 ML: .6; .31; .03; .02 INJECTION, SOLUTION INTRAVENOUS at 00:28

## 2025-04-09 RX ADMIN — FENTANYL CITRATE 50 MCG: 50 INJECTION, SOLUTION INTRAMUSCULAR; INTRAVENOUS at 11:44

## 2025-04-09 RX ADMIN — NOREPINEPHRINE BITARTRATE 0.1 MCG/KG/MIN: 0.02 INJECTION, SOLUTION INTRAVENOUS at 19:37

## 2025-04-09 RX ADMIN — NOREPINEPHRINE BITARTRATE 0.16 MCG/KG/MIN: 0.02 INJECTION, SOLUTION INTRAVENOUS at 15:43

## 2025-04-09 RX ADMIN — PROPOFOL 50 MCG/KG/MIN: 10 INJECTION, EMULSION INTRAVENOUS at 11:01

## 2025-04-09 RX ADMIN — PANTOPRAZOLE SODIUM 40 MG: 40 INJECTION, POWDER, FOR SOLUTION INTRAVENOUS at 08:25

## 2025-04-09 ASSESSMENT — ACTIVITIES OF DAILY LIVING (ADL)
ADLS_ACUITY_SCORE: 52
ADLS_ACUITY_SCORE: 48
ADLS_ACUITY_SCORE: 52
ADLS_ACUITY_SCORE: 48
ADLS_ACUITY_SCORE: 54
ADLS_ACUITY_SCORE: 48
ADLS_ACUITY_SCORE: 52
ADLS_ACUITY_SCORE: 48
ADLS_ACUITY_SCORE: 56
ADLS_ACUITY_SCORE: 52
ADLS_ACUITY_SCORE: 56
ADLS_ACUITY_SCORE: 52
ADLS_ACUITY_SCORE: 48
ADLS_ACUITY_SCORE: 52
ADLS_ACUITY_SCORE: 56
ADLS_ACUITY_SCORE: 48
ADLS_ACUITY_SCORE: 48

## 2025-04-09 NOTE — ANESTHESIA CARE TRANSFER NOTE
Patient: Richar Davis    Procedure: Procedure(s):  EXPLORATORY LAPAROTOMY WITH SMALL BOWEL RESECTION       Diagnosis: Gallstone ileus (H) [K56.3]  Diagnosis Additional Information: No value filed.    Anesthesia Type:   General     Note:    Oropharynx: endotracheal tube in place, nasal gatric tube in place and ventilatory support  Level of Consciousness: iatrogenic sedation          Vital Signs Stable: post-procedure vital signs reviewed and stable  Report to RN Given: handoff report given  Patient transferred to: ICU  Comments: Transferred to ICU, ETT in place, ambu bag with 10L oxygen for transport, all monitors and alarms on for transport, IV/lines patent and drips continued per anesthesia record.  Placed on ventilator per RT in ICU, TV, RR, PEEP, FiO2. Placed on ICU monitors per ICU RN, alarms on, VSS.  Report to ICU RN.   ICU Handoff: Call for PAUSE to initiate/utilize ICU HANDOFF, Identified Patient, Identified Responsible Provider, Reviewed the Pertinent Medical History, Discussed Surgical Course, Reviewed Intra-OP Anesthesia Management and Issues during Anesthesia, Set Expectations for Post Procedure Period and Allowed Opportunity for Questions and Acknowledgement of Understanding  Vitals:  Vitals Value Taken Time   BP 77/29 04/08/25 1857   Temp 36.7  C (98  F) 04/08/25 1900   Pulse 89 04/08/25 1904   Resp 18 04/08/25 1904   SpO2 98 % 04/08/25 1904   Vitals shown include unfiled device data.    Electronically Signed By: ADONIS Martinez CRNA  April 8, 2025  7:05 PM

## 2025-04-09 NOTE — PROGRESS NOTES
[facility] ICU RESPIRATORY NOTE        Date of Admission: 3/30/2025    Date of Intubation (most recent): 4/8/25    Reason for Mechanical Ventilation: ex. lap.    Number of Days on Mechanical Ventilation: 1    Met Criteria for Spontaneous Breathing Trial: No    Reason for No Spontaneous Breathing Trial: Return to OR tomorrow.     Bite Block: No    Significant Events Today: exploratory laparotomy this evening, plan to go back to OR tomorrow.     ABG Results:   Recent Labs   Lab 04/08/25  2123 04/08/25  1908 04/08/25  1800 04/08/25  0128   PH 7.34* 7.23* 7.23*  --    PCO2 32* 45 39  --    PO2 143* 140* 275*  --    HCO3 17* 19* 16*  --    O2PER 40 50  --  21         Current Vent Settings: FiO2 (%): 50 %, Resp: 21, Vent Mode: CMV/AC, Resp Rate (Set): 24 breaths/min, Tidal Volume (Set, mL): 500 mL, PEEP (cm H2O): 8 cmH2O, Resp Rate (Set): 24 breaths/min, Tidal Volume (Set, mL): 500 mL, PEEP (cm H2O): 8 cmH2O    Skin Assessment: skin intact around ETT.    Plan: Wean MV settings and wean when appropriate.     Anamaria Gardiner, RT on 4/8/2025 at 10:06 PM

## 2025-04-09 NOTE — PROGRESS NOTES
Care Management Follow Up    Length of Stay (days): 10    Expected Discharge Date: 04/10/2025     Concerns to be Addressed: discharge planning     Patient plan of care discussed at interdisciplinary rounds: Yes    Anticipated Discharge Disposition: TBD              Anticipated Discharge Services: TBD  Anticipated Discharge DME:      Patient/family educated on Medicare website which has current facility and service quality ratings: no  Education Provided on the Discharge Plan:    Patient/Family in Agreement with the Plan: yes    Referrals Placed by CM/SW:    Private pay costs discussed: Not applicable    Discussed  Partnership in Safe Discharge Planning  document with patient/family: No     Handoff Completed: No, handoff not indicated or clinically appropriate    Additional Information:  Patient remains intubated.     Next Steps: Acute Care Management will continue to follow for discharge planning.    Jenny Valero RN  Jenny Valero RN, BSN, OCN   Inpatient Care Coordination ICU  [facility]  Office: [phone]

## 2025-04-09 NOTE — PLAN OF CARE
Neuro: PERRL, prop 50->60 over shift for comfort, int following commands, RASS: -3 -> -4, CPOT: 0    CV: SR-ST, low grade fever, Tmax: 99.4, vaso 2.4 and norepi 0.14 -> 0.1 over shift, MAP >65    Resp: No PS trial during shift, FiO2 40->30, overbreathing vent, RR high 20s to low 30s.    GI: NPO, NGT LIS with 100 mL output, wound vac in place, steady serosanguinous output    : Hernandez, Low UO, given 2 1L boluses over shift with increase in UO from 50mL/2h -> 75mL/2h, small amount of bleeding from hernandez insertion site    Skin: Open abd with wound vac, no other skin concerns at this time.    Activity: Bedrest    Additional: Plan to return to OR tomorrow if stable to close abd incision.    Goal Outcome Evaluation:      Plan of Care Reviewed With: spouse    Overall Patient Progress: no changeOverall Patient Progress: no change    Outcome Evaluation: Updated Mya morris, over the phone this morning with plan to return to the OR tomorrow most likely.      Problem: Adult Inpatient Plan of Care  Goal: Plan of Care Review  Description: The Plan of Care Review/Shift note should be completed every shift.  The Outcome Evaluation is a brief statement about your assessment that the patient is improving, declining, or no change.  This information will be displayed automatically on your shiftnote.  Outcome: Progressing  Flowsheets (Taken 4/9/2025 3737)  Outcome Evaluation: Updated Mya morris, over the phone this morning with plan to return to the OR tomorrow most likely.  Plan of Care Reviewed With: spouse  Overall Patient Progress: no change  Goal: Absence of Hospital-Acquired Illness or Injury  Intervention: Identify and Manage Fall Risk  Recent Flowsheet Documentation  Taken 4/9/2025 1600 by Woo Gonsalves, DAVID  Safety Promotion/Fall Prevention:   activity supervised   clutter free environment maintained   increased rounding and observation   increase visualization of patient   mobility aid in reach   nonskid shoes/slippers when  out of bed   room organization consistent   room near nurse's station   safety round/check completed  Taken 4/9/2025 1200 by Woo Gonsalves RN  Safety Promotion/Fall Prevention:   activity supervised   clutter free environment maintained   increased rounding and observation   increase visualization of patient   mobility aid in reach   nonskid shoes/slippers when out of bed   room organization consistent   room near nurse's station   safety round/check completed  Taken 4/9/2025 0800 by Woo Gonsalves RN  Safety Promotion/Fall Prevention:   activity supervised   clutter free environment maintained   increased rounding and observation   increase visualization of patient   mobility aid in reach   nonskid shoes/slippers when out of bed   room organization consistent   room near nurse's station   safety round/check completed  Intervention: Prevent Skin Injury  Recent Flowsheet Documentation  Taken 4/9/2025 1600 by Woo Gonsalves RN  Body Position:   turned   right   heels elevated   upper extremity elevated  Taken 4/9/2025 1400 by Woo Gonsalves RN  Body Position:   turned   left   heels elevated   upper extremity elevated  Taken 4/9/2025 1200 by Woo Gonsalves RN  Body Position:   turned   right   heels elevated   upper extremity elevated  Taken 4/9/2025 1000 by Woo Gonsalves RN  Body Position:   turned   left   heels elevated   upper extremity elevated  Taken 4/9/2025 0800 by Woo Gonsalves RN  Body Position:   turned   heels elevated   upper extremity elevated   right  Intervention: Prevent and Manage VTE (Venous Thromboembolism) Risk  Recent Flowsheet Documentation  Taken 4/9/2025 1600 by Woo Gonsalves RN  VTE Prevention/Management: SCDs on (sequential compression devices)  Taken 4/9/2025 1200 by Woo Gonsalves RN  VTE Prevention/Management: SCDs on (sequential compression devices)  Taken 4/9/2025 0800 by Woo Gonsalves RN  VTE Prevention/Management: SCDs on (sequential compression devices)  Intervention: Prevent  Infection  Recent Flowsheet Documentation  Taken 4/9/2025 1600 by Woo Gonsalves RN  Infection Prevention:   cohorting utilized   personal protective equipment utilized   rest/sleep promoted   single patient room provided  Taken 4/9/2025 1200 by Woo Gonsalves RN  Infection Prevention:   cohorting utilized   personal protective equipment utilized   rest/sleep promoted   single patient room provided  Taken 4/9/2025 0800 by Woo Gonsalves RN  Infection Prevention:   cohorting utilized   personal protective equipment utilized   rest/sleep promoted   single patient room provided  Goal: Optimal Comfort and Wellbeing  Intervention: Provide Person-Centered Care  Recent Flowsheet Documentation  Taken 4/9/2025 1600 by Woo Gonsalves RN  Trust Relationship/Rapport:   care explained   choices provided   emotional support provided   empathic listening provided   questions answered   questions encouraged   reassurance provided   thoughts/feelings acknowledged  Taken 4/9/2025 1200 by Woo Gonsalves RN  Trust Relationship/Rapport:   care explained   choices provided   emotional support provided   empathic listening provided   questions answered   questions encouraged   reassurance provided   thoughts/feelings acknowledged  Taken 4/9/2025 0800 by Woo Gonsalves RN  Trust Relationship/Rapport:   care explained   choices provided   emotional support provided   empathic listening provided   questions answered   questions encouraged   reassurance provided   thoughts/feelings acknowledged     Problem: Delirium  Goal: Optimal Coping  Intervention: Optimize Psychosocial Adjustment to Delirium  Recent Flowsheet Documentation  Taken 4/9/2025 1600 by Woo Gonsalves RN  Supportive Measures: active listening utilized  Taken 4/9/2025 1200 by Woo Gonsalves RN  Supportive Measures: active listening utilized  Taken 4/9/2025 0800 by Woo Gonsalves RN  Supportive Measures: active listening utilized  Goal: Improved Behavioral Control  Intervention:  Prevent and Manage Agitation  Recent Flowsheet Documentation  Taken 4/9/2025 1600 by Woo Gonsalves RN  Environment Familiarity/Consistency: daily routine followed  Taken 4/9/2025 1200 by Woo Gonsalves RN  Environment Familiarity/Consistency: daily routine followed  Taken 4/9/2025 0800 by Woo Gonsalves RN  Environment Familiarity/Consistency: daily routine followed  Intervention: Minimize Safety Risk  Recent Flowsheet Documentation  Taken 4/9/2025 1600 by Woo Gonsalves RN  Enhanced Safety Measures: pain management  Trust Relationship/Rapport:   care explained   choices provided   emotional support provided   empathic listening provided   questions answered   questions encouraged   reassurance provided   thoughts/feelings acknowledged  Taken 4/9/2025 1200 by Woo Gonsalves RN  Enhanced Safety Measures: pain management  Trust Relationship/Rapport:   care explained   choices provided   emotional support provided   empathic listening provided   questions answered   questions encouraged   reassurance provided   thoughts/feelings acknowledged  Taken 4/9/2025 0800 by Woo Gonsalves RN  Enhanced Safety Measures: pain management  Trust Relationship/Rapport:   care explained   choices provided   emotional support provided   empathic listening provided   questions answered   questions encouraged   reassurance provided   thoughts/feelings acknowledged  Goal: Improved Attention and Thought Clarity  Intervention: Maximize Cognitive Function  Recent Flowsheet Documentation  Taken 4/9/2025 1600 by Woo Gonsalves RN  Sensory Stimulation Regulation:   care clustered   lighting decreased   quiet environment promoted  Reorientation Measures:   calendar in view   clock in view   reorientation provided  Taken 4/9/2025 1200 by Woo Gonsalves RN  Sensory Stimulation Regulation:   care clustered   lighting decreased   quiet environment promoted  Reorientation Measures:   calendar in view   clock in view   reorientation provided  Taken  4/9/2025 0800 by Woo Gonsalves RN  Sensory Stimulation Regulation:   care clustered   lighting decreased   quiet environment promoted  Reorientation Measures:   calendar in view   clock in view   reorientation provided     Problem: Dysrhythmia  Goal: Normalized Cardiac Rhythm  Intervention: Monitor and Manage Cardiac Rhythm Effect  Recent Flowsheet Documentation  Taken 4/9/2025 1600 by Woo Gonsalves RN  VTE Prevention/Management: SCDs on (sequential compression devices)  Taken 4/9/2025 1200 by Woo Gonsalves RN  VTE Prevention/Management: SCDs on (sequential compression devices)  Taken 4/9/2025 0800 by Woo Gonsalves RN  VTE Prevention/Management: SCDs on (sequential compression devices)     Problem: Pain Acute  Goal: Optimal Pain Control and Function  Intervention: Optimize Psychosocial Wellbeing  Recent Flowsheet Documentation  Taken 4/9/2025 1600 by Woo Gonsalves RN  Supportive Measures: active listening utilized  Taken 4/9/2025 1200 by Woo Gonsalves RN  Supportive Measures: active listening utilized  Taken 4/9/2025 0800 by Woo Gonsalves RN  Supportive Measures: active listening utilized  Intervention: Prevent or Manage Pain  Recent Flowsheet Documentation  Taken 4/9/2025 1600 by Woo Gonsalves RN  Sensory Stimulation Regulation:   care clustered   lighting decreased   quiet environment promoted  Medication Review/Management: medications reviewed  Taken 4/9/2025 1200 by Woo Gonsalves RN  Sensory Stimulation Regulation:   care clustered   lighting decreased   quiet environment promoted  Medication Review/Management: medications reviewed  Taken 4/9/2025 0800 by Woo Gonsalves RN  Sensory Stimulation Regulation:   care clustered   lighting decreased   quiet environment promoted  Medication Review/Management: medications reviewed     Problem: Fall Injury Risk  Goal: Absence of Fall and Fall-Related Injury  Intervention: Identify and Manage Contributors  Recent Flowsheet Documentation  Taken 4/9/2025 1600 by  Woo Gonsalves, RN  Medication Review/Management: medications reviewed  Taken 4/9/2025 1200 by Woo Gonsalves, RN  Medication Review/Management: medications reviewed  Taken 4/9/2025 0800 by Woo Gonsalves RN  Medication Review/Management: medications reviewed  Intervention: Promote Injury-Free Environment  Recent Flowsheet Documentation  Taken 4/9/2025 1600 by Woo Gonsalves RN  Safety Promotion/Fall Prevention:   activity supervised   clutter free environment maintained   increased rounding and observation   increase visualization of patient   mobility aid in reach   nonskid shoes/slippers when out of bed   room organization consistent   room near nurse's station   safety round/check completed  Taken 4/9/2025 1200 by Woo Gonsalves RN  Safety Promotion/Fall Prevention:   activity supervised   clutter free environment maintained   increased rounding and observation   increase visualization of patient   mobility aid in reach   nonskid shoes/slippers when out of bed   room organization consistent   room near nurse's station   safety round/check completed  Taken 4/9/2025 0800 by Woo Gonsalves RN  Safety Promotion/Fall Prevention:   activity supervised   clutter free environment maintained   increased rounding and observation   increase visualization of patient   mobility aid in reach   nonskid shoes/slippers when out of bed   room organization consistent   room near nurse's station   safety round/check completed     Problem: Infection  Goal: Absence of Infection Signs and Symptoms  Intervention: Prevent or Manage Infection  Recent Flowsheet Documentation  Taken 4/9/2025 1600 by Woo Gonsalves RN  Infection Management: aseptic technique maintained     Problem: Restraint, Nonviolent  Goal: Absence of Harm or Injury  Intervention: Protect Dignity, Rights and Personal Wellbeing  Recent Flowsheet Documentation  Taken 4/9/2025 1600 by Woo Gonsalves RN  Trust Relationship/Rapport:   care explained   choices provided    emotional support provided   empathic listening provided   questions answered   questions encouraged   reassurance provided   thoughts/feelings acknowledged  Taken 4/9/2025 1200 by Woo Gonsalves RN  Trust Relationship/Rapport:   care explained   choices provided   emotional support provided   empathic listening provided   questions answered   questions encouraged   reassurance provided   thoughts/feelings acknowledged  Taken 4/9/2025 0800 by Woo Gonsalves RN  Trust Relationship/Rapport:   care explained   choices provided   emotional support provided   empathic listening provided   questions answered   questions encouraged   reassurance provided   thoughts/feelings acknowledged  Intervention: Protect Skin and Joint Integrity  Recent Flowsheet Documentation  Taken 4/9/2025 1600 by Woo Gonsalves RN  Body Position:   turned   right   heels elevated   upper extremity elevated  Range of Motion: ROM (range of motion) performed  Taken 4/9/2025 1400 by Woo Gonsalves RN  Body Position:   turned   left   heels elevated   upper extremity elevated  Taken 4/9/2025 1200 by Woo Gonsalves RN  Body Position:   turned   right   heels elevated   upper extremity elevated  Range of Motion: ROM (range of motion) performed  Taken 4/9/2025 1000 by Woo Gonsalves RN  Body Position:   turned   left   heels elevated   upper extremity elevated  Taken 4/9/2025 0800 by Woo Gonsalves RN  Body Position:   turned   heels elevated   upper extremity elevated   right  Range of Motion: ROM (range of motion) performed

## 2025-04-09 NOTE — CONSULTS
Critical Care Admission note      04/08/2025    Name: Richar Davis MRN#: [id number]   Age: 69 year old YOB: 1955     [facility] [place] [facility] [facility]  [place] 1    MV DAY # 1             Problem List:   Septic shock  Postoperative ventilator dependence  Acute kidney injury             Summary/Hospital Course:     69 year old male presented with signs of perforation requiring exploratory laparotomy today. On 3/30, patient underwent an exploratory laparotomy for a gallstone ileus, a small bowel enterotomy with removal of gallstone and a primary closure were performed. He had had return of bowel function however yesterday he had new onset atrial flutter(received a single shock and was successfully converted) and overnight, an RRT was called and he was noted to have a lactate elevation and hypotension. WBC count was 36 K from 14 K yesterday. CT scan showed increased free fluid. Patient went to the OR and was diagnosed with a breakdown of previous enterotomy repair. Patient requiring vasopressors, therefore he was left in discontinuity and brought to the ICU with intent to go back to OR when more stable.        Assessment and plan :     Richar Davis IS a 69 year old male admitted on 3/30/2025 for gallstone ileus. Now just underwent exploratory laparotomy for leak from enterotomy repair, as noted above.   I have personally reviewed the daily labs, imaging studies, cultures and discussed the case with referring physician and consulting physicians.     My assessment and plan by system for this patient is as follows:    Neurology/Psychiatry:   Sedated on vent.  Plan  Propofol for sedation, fentanyl for pain.  Keep RASS goal at around -2 tonight, reevaluate in a.m.     Cardiovascular:   Septic shock -   History of MI  Echo on 4/6 - Patient had normal left and right ventricles, visual ef was 60-65%. Was in Afib at that time, which he is out of, as noted above.  Plan  continue on vasopressors to maintain MAP >65  Will  give fluid boluses for now, heart should be able to tolerate more volume in current state.    Pulmonary/Ventilator Management:   On vent, A/C  , RR 18, PEEP 8  Metabolic acidosis  Plan  - Utilize vent to blow off excess CO2(was 45 on ABG, with pH being 7.23)    GI and Nutrition :   NPO, in discontinuity.  Abtherra in place.  Plan  -continue NPO, General Surgery will dictate any changes in NPO status after patient in continuity.    Renal/Fluids/Electrolytes:   Metabolic acidosis - secondary to sepsis.  Acute kidney injury - Creatinine 1.83, which is an increase from baseline.  Fluid status - hard to assess, no uop measured for several days up until two days ago. Patient likely intravascularly hypovolemic.  Plan  -Will give 1 liter LR bolus, follow closely.  - continue supportive ICU cares.  - monitor function and electrolytes as needed with replacement per ICU protocols.  - generally avoid nephrotoxic agents such as NSAID, IV contrast unless specifically required  - adjust medications as needed for renal clearance  - follow I/O's as appropriate.    Infectious Disease:   Septic Shock - on zosyn, 4.5 g q 6 hours  Bacteremia - positive blood culture from yesterday, gram positive cocci in pairs and chains.  Plan  - continue abx.    Endocrine:   Hx of DM  Plan  - ICU insulin protocol, goal sugar <180    Hematology/Oncology:   1. Wbc 36K  2. Hgb 15.5 which is higher than it was over past several days, likely secondary to intravascular dehydration.  Anemia, no signs, symptoms of active blood loss  Plan  - keep hgb >7.        MSKL/Rheum:  No new issues.  Plan  - PT/OT when appropriate.      IV/Access:   1. Venous access - RIJ triple lumen central line.  2. Arterial access - Right brachial artery arterial line.  Plan  - central access required and necessary      ICU Prophylaxis:   1. DVT: Hep Subq/ LMWH/mechanical  2. VAP: HOB 30 degrees, chlorhexidine rinse  3. Stress Ulcer: PPI/H2 blocker  4. Restraints: Nonviolent  soft two point restraints required and necessary for patient safety and continued cares and good effect as patient continues to pull at necessary lines, tubes despite education and distraction. Will readdress daily.   5. Wound care - per unit routine   6. Feeding - NPO, until general surgery says otherwise.                 Key Medications:     Current Facility-Administered Medications   Medication Dose Route Frequency Provider Last Rate Last Admin    acetaminophen (TYLENOL) tablet 975 mg  975 mg Oral Q8H Clau Calles MD   975 mg at 04/08/25 0806    atorvastatin (LIPITOR) tablet 80 mg  80 mg Oral Daily Khari Peña MD   80 mg at 04/08/25 0806    [Held by provider] famotidine (PEPCID) tablet 20 mg  20 mg Oral BID Terry Patino MD   20 mg at 04/08/25 0805    folic acid (FOLVITE) tablet 1,000 mcg  1,000 mcg Oral BID Khari Peña MD   1,000 mcg at 04/08/25 0805    [Held by provider] furosemide (LASIX) tablet 20 mg  20 mg Oral Daily Mann Torres DO   20 mg at 04/04/25 1017    insulin aspart (NovoLOG) injection (RAPID ACTING)  1-3 Units Subcutaneous TID AC Jake Blevins DO   1 Units at 04/08/25 0805    insulin aspart (NovoLOG) injection (RAPID ACTING)  1-3 Units Subcutaneous At Bedtime Jake Blevins DO        metoprolol tartrate (LOPRESSOR) tablet 50 mg  50 mg Oral BID Fahad Wei MD   50 mg at 04/08/25 0806    nicotine (NICODERM CQ) 21 MG/24HR 24 hr patch 1 patch  1 patch Transdermal Daily Maddie Schafer MD   1 patch at 04/08/25 0807    pantoprazole (PROTONIX) IV push injection 40 mg  40 mg Intravenous Daily with breakfast Noel Roche NP        piperacillin-tazobactam (ZOSYN) 4.5 g vial to attach to  mL bag  4.5 g Intravenous Q6H Terry Winchester APRN CNP   4.5 g at 04/08/25 1744    polyethylene glycol (MIRALAX) Packet 17 g  17 g Oral Daily Clau Calles MD   17 g at 04/08/25 5424    sodium chloride (PF) 0.9% PF flush 3 mL  3 mL Intracatheter Q8H MITA  Shant Winston MD   3 mL at 04/08/25 0541    tamsulosin (FLOMAX) capsule 0.8 mg  0.8 mg Oral QPM Khari Peña MD   0.8 mg at 04/07/25 2031     Current Facility-Administered Medications   Medication Dose Route Frequency Provider Last Rate Last Admin    lactated ringers infusion   Intravenous Continuous Adalgisa Barber  mL/hr at 04/08/25 1911 New Bag at 04/08/25 1911               Physical Examination:   Temp:  [96  F (35.6  C)-101.2  F (38.4  C)] 98  F (36.7  C)  Pulse:  [] 89  Resp:  [18-28] 18  BP: ()/() 77/29  MAP:  [39 mmHg-60 mmHg] 60 mmHg  Arterial Line BP: (22-88)/(-47-47) 87/43  FiO2 (%):  [50 %] 50 %  SpO2:  [95 %-99 %] 98 %    Intake/Output Summary (Last 24 hours) at 4/8/2025 1915  Last data filed at 4/8/2025 1900  Gross per 24 hour   Intake 2070 ml   Output 1045 ml   Net 1025 ml     Wt Readings from Last 4 Encounters:   04/08/25 (!) 143.7 kg (316 lb 12.8 oz)   12/16/24 131 kg (288 lb 12.8 oz)   12/12/24 129.2 kg (284 lb 14.4 oz)   06/10/24 127.8 kg (281 lb 12.8 oz)     Arterial Line BP: (22-88)/(-47-47) 87/43  MAP:  [39 mmHg-60 mmHg] 60 mmHg  BP - Mean:  [] 49  FiO2 (%): 50 %, Resp: 18, Vent Mode: CMV/AC, Resp Rate (Set): 15 breaths/min, Tidal Volume (Set, mL): 500 mL, PEEP (cm H2O): 5 cmH2O, Resp Rate (Set): 15 breaths/min, Tidal Volume (Set, mL): 500 mL, PEEP (cm H2O): 5 cmH2O  Recent Labs   Lab 04/08/25  1800 04/08/25  0128   PH 7.23*  --    PCO2 39  --    PO2 275*  --    HCO3 16*  --    O2PER  --  21       GEN: sedated on vent.    PULM: cta  CV/COR: RRR    ABD: soft, abthera in place, no sign of leak.  EXT:  Edema   warm  NEURO: grossly intact  SKIN: no obvious rash  LINES: clean, dry intact         Data:   All data and imaging reviewed     ROUTINE ICU LABS (Last four results)  CMP  Recent Labs   Lab 04/08/25  1858 04/08/25  1800 04/08/25  0732 04/08/25  0604 04/08/25  0150 04/07/25  2320 04/07/25  1113 04/07/25  0425 04/06/25  0803 04/06/25  0610  04/05/25  1121 04/05/25  0720 04/04/25  1214 04/04/25  0601   NA  --  130*  --  133*  --  135  --  134*  --  134*  --  135  135  --  137   POTASSIUM  --  5.3  --  4.3  4.1  --  4.6  --  4.1  4.1  --  3.9  --  3.6  3.6  --  3.4   CHLORIDE  --   --   --  98  --  97*  --  99  --  101  --  98  98  --  98   CO2  --   --   --  16*  --  22  --  24  --  22  --  25  25  --  28   ANIONGAP  --   --   --  19*  --  16*  --  11  --  11  --  12  12  --  11   *  --  179* 144* 146* 140*   < > 151*   < > 117*   < > 132*  132*   < > 137*   BUN  --   --   --  13.5  --  9.8  --  9.2  --  7.9*  --  7.2*  7.2*  --  7.9*   CR  --   --   --  1.30*  --  0.79  --  0.72  --  0.69  --  0.73  0.73  --  0.84   GFRESTIMATED  --   --   --  60*  --  >90  --  >90  --  >90  --  >90  >90  --  >90   HALEY  --   --   --  9.7  --  9.5  --  9.6  --  9.0  --  9.3  9.3  --  9.7   MAG  --   --   --  1.8  --   --   --  1.6*  --  1.8  --  1.7  --  1.9   PHOS  --   --   --   --   --   --   --   --   --   --   --   --   --  2.3*   PROTTOTAL  --   --   --  6.0*  --  6.6  --   --   --   --   --  6.2*  --   --    ALBUMIN  --   --   --  2.4*  --  2.9*  --   --   --   --   --  3.0*  --   --    BILITOTAL  --   --   --  0.7  --  0.4  --   --   --   --   --  0.6  --   --    ALKPHOS  --   --   --  125  --  140  --   --   --   --   --  96  --   --    AST  --   --   --  36  --  33  --   --   --   --   --  23  --   --    ALT  --   --   --  24  --  26  --   --   --   --   --  20  --   --     < > = values in this interval not displayed.     CBC  Recent Labs   Lab 04/08/25  1800 04/08/25  0722 04/07/25  2320 04/07/25  0425 04/06/25  0610   WBC  --  35.9* 14.1* 12.7* 10.5   RBC  --  5.02 4.62 4.11* 4.29*   HGB 13.9 15.5 13.9 12.5* 13.1*   HCT 41 46.6 43.5 37.7* 40.1   MCV  --  93 94 92 94   MCH  --  30.9 30.1 30.4 30.5   MCHC  --  33.3 32.0 33.2 32.7   RDW  --  14.1 14.0 13.8 13.7   PLT  --  301 294 205 167     INR  Recent Labs   Lab 04/07/25  0720  "04/07/25  0425   INR 1.15 1.13     Arterial Blood Gas  Recent Labs   Lab 04/08/25  1800 04/08/25  0128   PH 7.23*  --    PCO2 39  --    PO2 275*  --    HCO3 16*  --    O2PER  --  21       All cultures:  No results for input(s): \"CULT\" in the last 168 hours.  Recent Results (from the past 24 hours)   CT Chest/Abdomen/Pelvis w Contrast    Narrative    EXAM: CT CHEST/ABDOMEN/PELVIS W CONTRAST  LOCATION: [facility]  DATE: 4/8/2025    INDICATION: Fever, recent ex lap small bowel enterotomy, removal of gallstone 1 week ago.  COMPARISON: 3/30/2025.  TECHNIQUE: CT scan of the chest, abdomen, and pelvis was performed following injection of IV contrast. Multiplanar reformats were obtained. Dose reduction techniques were used.   CONTRAST: 135 mL [id number].    FINDINGS:   LUNGS AND PLEURA: Atelectasis and scarring at the lung bases. There are a few small lung nodules bilaterally measuring up to 3 mm. These are not significantly changed since 2022 and are considered benign.    MEDIASTINUM/AXILLAE: Previous median sternotomy. Aortic valve prosthesis. The heart size is normal. No lymph node enlargement.    CORONARY ARTERY CALCIFICATION: Severe.    HEPATOBILIARY: There is again a calcification in the gallbladder fossa which may be within a contracted gallbladder.    PANCREAS: Normal.    SPLEEN: Enlarged measuring 15 cm in length.    ADRENAL GLANDS: Normal.    KIDNEYS/BLADDER: There is no hydronephrosis. Few small cortical cysts.    BOWEL: Multiple mildly dilated small bowel loops with areas of wall thickening and associated mesenteric edema. No focal obstruction. Large amount of air and food debris in the stomach. Small amount of ascites. No free intraperitoneal gas.    LYMPH NODES: Normal.    VASCULATURE: Atherosclerotic calcification of the aorta and its branches. No aneurysm.    PELVIC ORGANS: The prostate gland is enlarged.    MUSCULOSKELETAL: Degenerative disease throughout the spine.      Impression "    IMPRESSION:  1.  Dilated small bowel loops with mild wall thickening and without evidence of obstruction suggests ileus or enteritis.  2.  Small amount of ascites, increased.  3.  Stable mild splenomegaly.  4.  Prostate gland enlargement.  5.  No acute chest abnormality.   CT Abdomen Pelvis w/o Contrast    Narrative    EXAM: CT ABDOMEN PELVIS W/O CONTRAST  LOCATION: [facility]  DATE: 4/8/2025    INDICATION: Concern for acute abdomen;worsening leukocytosis, lactic acidosis, recent abd surgery, new CRISTINA, increased abdominal distension and abd pain  COMPARISON: CT 4/8/2025 at 0021 hours, CT 3/30/2025  TECHNIQUE: CT scan of the abdomen and pelvis was performed without IV contrast. Multiplanar reformats were obtained. Dose reduction techniques were used.  CONTRAST: None.    FINDINGS:   LOWER CHEST: Aortic valve replacement. Mitral annulus and coronary artery calcifications/stents. Stable prominent likely reactive pericardial and cardiophrenic recess lymph nodes. Bibasilar atelectasis.    HEPATOBILIARY: Cholelithiasis. Stable nodular liver surface contour. No biliary ductal dilatation.    PANCREAS: Atrophic.    SPLEEN: Mildly enlarged.    ADRENAL GLANDS: Normal.    KIDNEYS/BLADDER: Bilateral renal cortical cysts. No follow-up is indicated. No intra-No hydronephrosis or hydroureter. The bladder is decompressed by a Mccarty catheter.    BOWEL: Mildly increased mild to moderate gastric and small bowel distention. No discrete transition is identified. Normal caliber colon. Slightly increased small volume complex ascites. Diffuse mesenteric edema. No free air and no pneumatosis.     LYMPH NODES: Stable prominent likely reactive mesenteric and retroperitoneal lymph nodes.    VASCULATURE: Moderate aortoiliac atherosclerosis. No evidence for portal or mesenteric venous gas.    PELVIC ORGANS: Moderate to severe prostatomegaly. No pelvic free fluid.    MUSCULOSKELETAL: Spinal and pelvic degenerative  changes.      Impression    IMPRESSION:   1.  Mildly increased gastric and small bowel distention which may reflect an ileus or small bowel obstruction. Follow-up is recommended.  2.  Slightly increased small volume complex ascites.           Billing: This patient is critically ill: Yes. Total critical care time today 45 min.

## 2025-04-09 NOTE — PROGRESS NOTES
Critical Care Admission note      04/09/2025    Name: Richar Davis MRN#: [id number]   Age: 69 year old YOB: 1955     [facility]    MV DAY # 1             Problem List:   Septic shock  Postoperative ventilator dependence  Acute kidney injury    Code status: Full code         Summary/Hospital Course:     Richar Davis is a 69 year old male with a history of diabetes, hypertension, CABG, status post AVR with bovine valve 10/2022, untreated sleep apnea, hypertension, hyperlipidemia, neuropathy, BPH admitted on 3/30/2025 with abdominal pain nausea and vomiting. Imaging showed gallstone ileus with 2.2 cm gallstone impacted, exploratory laparotomy on 3/30. Postoperatively had issues with ileus, fever, and aflutter with RVR. Returned to OR on 4/8 2/2 septic shock, postoperative ventilator dependence with pressors.      Timeline:  3/30- patient underwent an exploratory laparotomy for a gallstone ileus, a small bowel enterotomy with removal of gallstone and a primary closure were performed. He had had return of bowel function.  4/5- New onset atrial flutter, medication management  4/6- ECHO 60-65%, normal ventricle function  4/7- Cardioversion for ongoing atrial flutter, successful with single shock. Overnight, an RRT was called, noted to have a lactate elevation and hypotension. WBC count was 36 K (4/8) from 14 K (4/7). CT scan showed increased free fluid.   4/8- Returned to OR and was diagnosed with a breakdown of previous enterotomy repair. Patient requiring vasopressors, therefore he was left in discontinuity and brought to the ICU with intent to go back to OR when more stable.    No acute overnight events. Intubated and sedated. Mccarty in place with minimal urine output despite LR boluses. Pain seems to be managed well with current regimen. Surgery drain in place with yellow/red output, around 1L. Overbreathing ventilator at times. CMV/AC mode FiO2 40%, RR 24, PEEP 8,  mL.        Assessment and plan :     Richar Davis IS a 69 year old male admitted on 3/30/2025 for gallstone ileus. Underwent exploratory laparotomy for leak from enterotomy repair on 4/8, as noted above.   I have personally reviewed the daily labs, imaging studies, cultures and discussed the case with referring physician and consulting physicians.   My assessment and plan by system for this patient is as follows:    Neurology/Psychiatry:   # Sedation and analgesia for mechanical ventilation  # Hx neuropathy  - Propofol for sedation  - Fentanyl for pain, PRN  - RASS goal at around -2 to -3     Cardiovascular:   # Septic shock   # History of MI  # Aortic valve replacement  # S/p cardioversion for new onset Aflutter   Echo on 4/6 normal left and right ventricles, visual EF was 60-65%. Was in Afib at that time, which he is out of, as noted above.   - Continue on vasopressors to maintain MAP >65  - Fluid boluses for now, will monitor fluid status  - Continue to trend lactate   - Cardiology signed off, review discharge recommendations    Pulmonary/Ventilator Management:   # Metabolic acidosis  - Utilize vent to blow off excess CO2  - Trend ABGs. If alkalotic, plan to reduce respiratory rate.  - Negative chest xray 4/9    Renal/Fluids/Electrolytes:   # Metabolic acidosis 2/2 sepsis  # Acute kidney injury   # Hypoalbuminemia   Creatinine 1.9 (4/9) up from 0.79 (4/7) baseline. Fluid status is hard to assess, no UOP measured for several days up until 4/7. Patient likely intravascularly hypovolemic. Decreased urine output. Admission weight (3/31) 130 kg, now 155 kg (4/9), bed weights so some degree of variability but still likely volume up.  - LR boluses, follow closely  - Albumin replaced  - Magnesium replaced  - Monitor function and electrolytes, replace as needed  - Adjust medications as needed for renal clearance, avoid nephrotoxic agents such as NSAID, IV contrast unless specifically required  - Follow I/O's as  appropriate, hernandez in place    GI and Nutrition:   # Gallstone ileus s/p exploratory laparotomy 3/30  - Abtherra in place, monitor drainage  - Continue NPO, General Surgery will dictate any changes in NPO status after patient in continuity  - Plan to return to OR when more stable  - Reassess tomorrow for additional glucose needs    Infectious Disease:   # Septic shock  # Bacteremia   Positive blood culture 4/7 for enterococcus faecalis, gram positive cocci in pairs and chains.   - Continue Zosyn 4.5g Q6H  - Abdominal wound culture no growth, will follow    Endocrine:   # Hx of DM  Last A1C was 6.8. PTA metformin.   - ICU insulin protocol, goal sugar <180    Hematology/Oncology:   # Leukocytosis  No acute blood loss or signs of ongoing bleeding.  - Trend WBC  - Trend Hgb    MSKL/Rheum:  - PT/OT when appropriate    IV/Access:   1. Central line- right internal jugular  2. Arterial access- right brachial artery     ICU Prophylaxis:   1. DVT: LMWH/mechanical  2. VAP: HOB 30 degrees, chlorhexidine rinse  3. Stress Ulcer: PPI/H2 blocker  4. Restraints: Nonviolent soft two point restraints required and necessary for patient safety and continued cares and good effect as patient continues to pull at necessary lines, tubes despite education and distraction. Will readdress daily.   5. Wound care - per unit routine   6. Feeding - NPO, until general surgery says otherwise.    Family update:  Talked to wife on phone. She has no concerns, just wondering if his WBC had changed. She would appreciate it if we called her when we know about a surgery plan. She plans on staying home until patient comes off ventilator so she can rest also. Very appreciative of the level of care provided to him by the entire team.         Key Medications:     Current Facility-Administered Medications   Medication Dose Route Frequency Provider Last Rate Last Admin    [Held by provider] acetaminophen (TYLENOL) tablet 975 mg  975 mg Oral or NG Tube Q8H  Clau Calles MD        [Held by provider] atorvastatin (LIPITOR) tablet 80 mg  80 mg Oral or NG Tube Daily Khari Peña MD        chlorhexidine (PERIDEX) 0.12 % solution 15 mL  15 mL Mouth/Throat Q12H Michael Rodríguez MD   15 mL at 04/09/25 0825    [Held by provider] famotidine (PEPCID) tablet 20 mg  20 mg Oral BID Terry Patino MD   20 mg at 04/08/25 0805    [Held by provider] folic acid (FOLVITE) tablet 1,000 mcg  1,000 mcg Oral or NG Tube BID Khari Peña MD        [Held by provider] furosemide (LASIX) tablet 20 mg  20 mg Oral Daily Mann Torres DO   20 mg at 04/04/25 1017    [Held by provider] insulin aspart (NovoLOG) injection (RAPID ACTING)  1-3 Units Subcutaneous TID AC Jake Blevins DO   1 Units at 04/08/25 0805    [Held by provider] insulin aspart (NovoLOG) injection (RAPID ACTING)  1-3 Units Subcutaneous At Bedtime Jake Blevins DO        lactated ringers BOLUS 1,000 mL  1,000 mL Intravenous Once Drea Faustin MD        [Held by provider] metoprolol tartrate (LOPRESSOR) tablet 50 mg  50 mg Oral BID Fahad Wei MD   50 mg at 04/08/25 0806    nicotine (NICODERM CQ) 21 MG/24HR 24 hr patch 1 patch  1 patch Transdermal Daily Maddie Schafer MD   1 patch at 04/09/25 0825    pantoprazole (PROTONIX) 2 mg/mL suspension 40 mg  40 mg Per Feeding Tube QAM AC Michael Rodríguez MD        Or    pantoprazole (PROTONIX) IV push injection 40 mg  40 mg Intravenous QAM AC Michael Rodríguez MD   40 mg at 04/09/25 0825    piperacillin-tazobactam (ZOSYN) 4.5 g vial to attach to  mL bag  4.5 g Intravenous Q6H Terry Winchester APRN CNP   4.5 g at 04/09/25 0503    [Held by provider] polyethylene glycol (MIRALAX) Packet 17 g  17 g Oral Daily Calu Calles MD   17 g at 04/08/25 0805    sodium chloride (PF) 0.9% PF flush 3 mL  3 mL Intracatheter Q8H [facility] Shant Winston MD   3 mL at 04/09/25 0619    [Held by provider]  tamsulosin (FLOMAX) capsule 0.8 mg  0.8 mg Oral QPM Khari Peña MD   0.8 mg at 04/07/25 2031     Current Facility-Administered Medications   Medication Dose Route Frequency Provider Last Rate Last Admin    propofol (DIPRIVAN) infusion  5-75 mcg/kg/min Intravenous Continuous Michael Rodríguez MD 43.1 mL/hr at 04/09/25 0644 50 mcg/kg/min at 04/09/25 0644    And    Medication Instruction   Does not apply Continuous PRN Michael Rodríguez MD        norepinephrine (LEVOPHED) 4 mg in  mL infusion PREMIX  0.01-0.6 mcg/kg/min Intravenous Continuous Michael Rodríguez MD 75.4 mL/hr at 04/09/25 0657 0.14 mcg/kg/min at 04/09/25 0657    phenylephrine (JOSSE-SYNEPHRINE) 50 mg in NaCl 0.9 % 250 mL infusion  0.1-6 mcg/kg/min Intravenous Continuous Michael Rodríguez MD   Stopped at 04/08/25 2136    vasopressin 0.2 units/mL in NS (PITRESSIN) standard conc infusion  2.4 Units/hr Intravenous Continuous Michael Rodríguez MD 12 mL/hr at 04/09/25 0620 2.4 Units/hr at 04/09/25 0620               Physical Examination:   Temp:  [96  F (35.6  C)-98.4  F (36.9  C)] 98.4  F (36.9  C)  Pulse:  [] 98  Resp:  [18-34] 30  BP: ()/(29-63) 77/29  MAP:  [39 mmHg-68 mmHg] 68 mmHg  Arterial Line BP: ()/(-47-50) 116/48  FiO2 (%):  [40 %-50 %] 40 %  SpO2:  [91 %-100 %] 98 %    Intake/Output Summary (Last 24 hours) at 4/8/2025 1915  Last data filed at 4/8/2025 1900  Gross per 24 hour   Intake 2070 ml   Output 1045 ml   Net 1025 ml     Wt Readings from Last 4 Encounters:   04/09/25 (!) 155 kg (341 lb 11.4 oz)   12/16/24 131 kg (288 lb 12.8 oz)   12/12/24 129.2 kg (284 lb 14.4 oz)   06/10/24 127.8 kg (281 lb 12.8 oz)     Arterial Line BP: ()/(-47-50) 116/48  MAP:  [39 mmHg-68 mmHg] 68 mmHg  BP - Mean:  [49-76] 49  FiO2 (%): 40 %, Resp: 30, Vent Mode: CMV/AC, Resp Rate (Set): 24 breaths/min, Tidal Volume (Set, mL): 500 mL, PEEP (cm H2O): 8 cmH2O, Resp Rate (Set): 24  breaths/min, Tidal Volume (Set, mL): 500 mL, PEEP (cm H2O): 8 cmH2O  Recent Labs   Lab 04/08/25  2123 04/08/25  1908 04/08/25  1800 04/08/25  0128   PH 7.34* 7.23* 7.23*  --    PCO2 32* 45 39  --    PO2 143* 140* 275*  --    HCO3 17* 19* 16*  --    O2PER 40 50  --  21     Constitutional: Sedated and ventilated, sweating  Neuro: Awakens to loud stimuli, PEERL, cough and gag reflex present, spontaneously moving all extermities  HENT: Orally intubated, oropharynx pink, tongue midline, no swelling present, teeth intact  Pulmonary: No respiratory distress or significant ventilator dyssynchronydiminished breath sounds bilaterally, no wheezing or crackles, bilateral chest symmetrical chest expansion on inspection  Cardio: Regular rate and rhythm, distant heart sounds, S1/S2, murmur present  ABD: Soft, winces in pain with palpation, no erythema, no bowel sounds appreciated, abthera in place with no sign of leak, draining yellow/red tinged fluid, hernandez in place  Extremities:  Edema, warm, diminished peripheral pulses x4  Skin: No obvious rash, no cyanosis, bruises  Lines: Clean, dry intact         Data:   All data and imaging reviewed     ROUTINE ICU LABS (Last four results)  [facility]  Recent Labs   Lab 04/09/25  0802 04/09/25  0525 04/09/25  0152 04/09/25  0003 04/08/25  2049 04/08/25  1908 04/08/25  1858 04/08/25  1800 04/08/25  0732 04/08/25  0604 04/08/25  0150 04/07/25  2320 04/07/25  1113 04/07/25  0425 04/06/25  0803 04/06/25  0610 04/05/25  1121 04/05/25  0720 04/04/25  1214 04/04/25  0601   NA  --  132* 130*  --   --  128*  --  130*  --  133*  --  135  --  134*  --  134*  --  135  135  --  137   POTASSIUM  --  5.2 5.2  --   --  5.2  --  5.3  --  4.3  4.1  --  4.6  --  4.1  4.1  --  3.9  --  3.6  3.6  --  3.4   CHLORIDE  --  100 98  --   --  98  --   --   --  98  --  97*  --  99  --  101  --  98  98  --  98   CO2  --  17* 17*  --   --  17*  --   --   --  16*  --  22  --  24  --  22  --  25  25  --  28   ANIONGAP  --   15 15  --   --  13  --   --   --  19*  --  16*  --  11  --  11  --  12  12  --  11   * 161* 165* 151*   < > 146*   < >  --    < > 144*   < > 140*   < > 151*   < > 117*   < > 132*  132*   < > 137*   BUN  --  25.4* 23.1*  --   --  21.1  --   --   --  13.5  --  9.8  --  9.2  --  7.9*  --  7.2*  7.2*  --  7.9*   CR  --  1.96* 1.85*  --   --  1.83*  --   --   --  1.30*  --  0.79  --  0.72  --  0.69  --  0.73  0.73  --  0.84   GFRESTIMATED  --  37* 39*  --   --  40*  --   --   --  60*  --  >90  --  >90  --  >90  --  >90  >90  --  >90   HALEY  --  8.0* 8.1*  --   --  8.3*  --   --   --  9.7  --  9.5  --  9.6  --  9.0  --  9.3  9.3  --  9.7   MAG  --  1.6*  --   --   --   --   --   --   --  1.8  --   --   --  1.6*  --  1.8  --  1.7  --  1.9   PHOS  --   --   --   --   --   --   --   --   --   --   --   --   --   --   --   --   --   --   --  2.3*   PROTTOTAL  --  4.7*  --   --   --   --   --   --   --  6.0*  --  6.6  --   --   --   --   --  6.2*  --   --    ALBUMIN  --  1.9*  --   --   --   --   --   --   --  2.4*  --  2.9*  --   --   --   --   --  3.0*  --   --    BILITOTAL  --  0.4  --   --   --   --   --   --   --  0.7  --  0.4  --   --   --   --   --  0.6  --   --    ALKPHOS  --  91  --   --   --   --   --   --   --  125  --  140  --   --   --   --   --  96  --   --    AST  --  30  --   --   --   --   --   --   --  36  --  33  --   --   --   --   --  23  --   --    ALT  --  16  --   --   --   --   --   --   --  24  --  26  --   --   --   --   --  20  --   --     < > = values in this interval not displayed.     CBC  Recent Labs   Lab 04/09/25  0525 04/08/25  1800 04/08/25  0722 04/07/25  2320 04/07/25  0425   WBC 33.3*  --  35.9* 14.1* 12.7*   RBC 4.38*  --  5.02 4.62 4.11*   HGB 13.4 13.9 15.5 13.9 12.5*   HCT 39.5* 41 46.6 43.5 37.7*   MCV 90  --  93 94 92   MCH 30.6  --  30.9 30.1 30.4   MCHC 33.9  --  33.3 32.0 33.2   RDW 13.9  --  14.1 14.0 13.8     --  301 294 205     INR  Recent Labs   Lab  "04/07/25  0727 04/07/25  0425   INR 1.15 1.13     Arterial Blood Gas  Recent Labs   Lab 04/08/25  2123 04/08/25  1908 04/08/25  1800 04/08/25  0128   PH 7.34* 7.23* 7.23*  --    PCO2 32* 45 39  --    PO2 143* 140* 275*  --    HCO3 17* 19* 16*  --    O2PER 40 50  --  21       All cultures:  No results for input(s): \"CULT\" in the last 168 hours.  Recent Results (from the past 24 hours)   CT Abdomen Pelvis w/o Contrast    Narrative    EXAM: CT ABDOMEN PELVIS W/O CONTRAST  LOCATION: [facility]  DATE: 4/8/2025    INDICATION: Concern for acute abdomen;worsening leukocytosis, lactic acidosis, recent abd surgery, new CRISTINA, increased abdominal distension and abd pain  COMPARISON: CT 4/8/2025 at 0021 hours, CT 3/30/2025  TECHNIQUE: CT scan of the abdomen and pelvis was performed without IV contrast. Multiplanar reformats were obtained. Dose reduction techniques were used.  CONTRAST: None.    FINDINGS:   LOWER CHEST: Aortic valve replacement. Mitral annulus and coronary artery calcifications/stents. Stable prominent likely reactive pericardial and cardiophrenic recess lymph nodes. Bibasilar atelectasis.    HEPATOBILIARY: Cholelithiasis. Stable nodular liver surface contour. No biliary ductal dilatation.    PANCREAS: Atrophic.    SPLEEN: Mildly enlarged.    ADRENAL GLANDS: Normal.    KIDNEYS/BLADDER: Bilateral renal cortical cysts. No follow-up is indicated. No intra-No hydronephrosis or hydroureter. The bladder is decompressed by a Mccarty catheter.    BOWEL: Mildly increased mild to moderate gastric and small bowel distention. No discrete transition is identified. Normal caliber colon. Slightly increased small volume complex ascites. Diffuse mesenteric edema. No free air and no pneumatosis.     LYMPH NODES: Stable prominent likely reactive mesenteric and retroperitoneal lymph nodes.    VASCULATURE: Moderate aortoiliac atherosclerosis. No evidence for portal or mesenteric venous gas.    PELVIC ORGANS: " Moderate to severe prostatomegaly. No pelvic free fluid.    MUSCULOSKELETAL: Spinal and pelvic degenerative changes.      Impression    IMPRESSION:   1.  Mildly increased gastric and small bowel distention which may reflect an ileus or small bowel obstruction. Follow-up is recommended.  2.  Slightly increased small volume complex ascites.     XR Abdomen Port 1 View    Narrative    EXAM: XR ABDOMEN PORT 1 VIEW  LOCATION: [facility]  DATE: 4/8/2025    INDICATION: Gastric tube placement verification.  COMPARISON: None.      Impression    IMPRESSION: Nasogastric tube tip in proximal stomach with side port in distal esophagus. Recommend advancing an additional 5 to 10 cm. Essentially gasless upper abdomen, limiting assessment of the known SBO/ileus described on recent CT.   XR Chest Port 1 View    Narrative    EXAM: XR CHEST PORT 1 VIEW  LOCATION: [facility]  DATE: 4/8/2025    INDICATION: ETT placement verification    COMPARISON: 4/5/2025.      Impression    IMPRESSION: Endotracheal tube in the upper trachea terminating 6-7 cm above the sona. Right IJ line in the upper SVC. Nasogastric tube tip in the stomach. Prior sternotomy CABG procedure. Mild atelectasis at the left base. Otherwise clear. No   pneumothorax. No pleural effusion.   XR Chest Port 1 View    Narrative    EXAM: XR CHEST PORT 1 VIEW  LOCATION: [facility]  DATE: 4/9/2025    INDICATION: ett placement  COMPARISON: X-ray April 8, 2025.      Impression    IMPRESSION: Endotracheal tube tip about 4.5 cm above the sona. Nasogastric tube and right IJ central line are also noted. Otherwise negative chest x-ray.   XR Abdomen Port 1 View    Narrative    EXAM: XR ABDOMEN PORT 1 VIEW  LOCATION: [facility]  DATE: 4/9/2025    INDICATION: ng tube placement  COMPARISON: None.      Impression    IMPRESSION: Nasogastric tube proximal port lies about 7 cm below the left  hemidiaphragm. No abnormal bowel is evident.     Billing: This patient is critically ill: Yes. Total critical care time today 45 min.    Jana Pink, MS4  [employer]        Physician Attestation   I, Drea Faustin MD, was present with the medical/SUKHWINDER student who participated in the service and in the documentation of the note.  I have verified the history and personally performed the physical exam and medical decision making.  I agree with the assessment and plan of care as documented in the note.      Key findings: septic shock ongoing with moderate vasopressor requirements. CRISTINA stable. UOP low. Still with metabolic acidosis. Wound culture pending. Too unstable to go back to OR today.   - Repeat fluid bolus  - Add on Fentanyl infusion  - keep intubated    Drea Faustin MD  Date of Service (when I saw the patient): 4/9/25

## 2025-04-09 NOTE — BRIEF OP NOTE
[facility]    Brief Operative Note    Pre-operative diagnosis: Gallstone ileus (H) [K56.3]  Post-operative diagnosis Same as pre-operative diagnosis    Procedure: EXPLORATORY LAPAROTOMY WITH SMALL BOWEL RESECTION, N/A - Abdomen    Surgeon: Surgeons and Role:     * Liana Landrum MD - Primary     * Clau Calles MD - Assisting     * Ana Dougherty MD - Resident - Assisting  Anesthesia: General   Estimated Blood Loss: 25cc    Drains: TAC  Specimens:   ID Type Source Tests Collected by Time Destination   1 : Small Bowel Tissue Small Intestine SURGICAL PATHOLOGY EXAM Liana Landrum MD 4/8/2025  6:09 PM      Findings:   Breakdown of previous enterotomy repair, large volume ascites and intraabdominal contamination-3L removed. Meckel's diverticulum. Small bowel resection performed encompassing enterotomy and Meckel's approximately 80cm  Left in discontinuity and TAC.  Complications: None.  Implants: * No implants in log *    Post op plan:  -Admit to ICU intubated and in TAC  -NGT to LIS, no meds through NGT as patient is in discontinuity  -IV Ab  -Continue to hold anticoagulation  -Plan to return to OR 4/9 for second look and possible closure  -Resuscitation and other cares per ICU

## 2025-04-09 NOTE — PROVIDER NOTIFICATION
04/09/25 0308   Tech Time   $Tech Time (10 minute increments) 3   Ventilator Data   Vent Owner Rental   Vent Brand Drager   Vent ID [id number]   Ventilation Method Invasive   Ventilator Start - Date 04/08/25   Ventilation Day(s) 2   $Vent Subsequent Subsequent   Ventilator Settings    Vent Mode CMV/AC   Resp Rate (Set) 24 breaths/min   Tidal Volume (Set, mL) 500 mL   FiO2 40 %   PEEP (cm H2O) 8 cmH2O   Inspiratory Time (sec) 0.75 sec   Vent Humidifier - Heater/HME HME   Ventilator - Patient    Patient Resp Rate  24 breaths/min   Expiratory Vt (ml) 474   Minute Volume (ml) 12 L/min   Peak Inspiratory Pressure (cm H2O) 18 cmH2O   Mean Airway Pressure (cm H2O) 13 cmH2O   Plateau Pressure (cm H2O) 16 cmH2O   Ventilator Alarms   High Inspiratory Pressure Alarm (cmH2O) 50 cm H2O   High Respiratory Rate (breaths/min) 45 breaths/min   Minute Ventilation High (L) 20 L/min   Minute Ventilation Low (L) 3 L/min   Apnea Delay (sec) 20 sec   ETT Cuffed 8 mm   Placement Date/Time: 04/08/25 (c) 3558   Mask Ventilation: Not attempted  Induction Type: RSI  Ease of Intubation: Easy  Laryngoscopy Technique: Video laryngoscopy  Cuffed: Cuffed  ETT Type: Single  Single Lumen Tube Size: 8 mm  VL Blade Type: Glide s...   Secured at (cm) 25 cm   Measured from Teeth/Gums   Position Left   Secured by Commercial tube brewster   Bite Block None Present   Site Appearance Clean;Dry   Tube Care Securement device changed   Cuff Assessment Minimal occluding volume   Safety Measures Manual resuscitator at bedside   RT Device Skin Assessment   Oxygen Delivery Device ETT Brewster   Ventilator Arm In Place Yes   Site Appearance neck circumference Clean and dry   Site Appearance lips Clean and dry   Site Appearance occiput Clean and dry   Strap Tightness Finger Allowance between head and device strap   Device Skin Interventions Taken No adjustments needed

## 2025-04-09 NOTE — PROCEDURES
Operative Narrative:     PREOPERATIVE DIAGNOSIS: free fluid in the abdomen and septic shock     POSTOPERATIVE DIAGNOSIS:  Pin point dehiscence at the intentional enterotomy required at the last surgery to extract gallstone.  Meckel's diverticulum     PROCEDURE PERFORMED:  Exploratory Laparotomy  Peritoneal Lavage  Small bowel resection (including Meckel's)  Temporary Abdominal Closure     ANESTHESIA: General.  SURGEON: Liana Landrum MD  CO-SURGEON: Clau Rodriguez MD     COMPLICATIONS: None.     ESTIMATED BLOOD LOSS: 25 mL     INDICATIONS FOR THIS PROCEDURE: 69 year old man underwent laparotomy for gallstone extraction causing ileus.  He was recovering well, but became more distended and demonstrated septic shock.      Consent was obtained after a discussion of risks of the surgery, not limited to bleeding, infection, anastomotic leak, obstruction, ileus, visceral injury, as well as complications involving other organ systems such as respiratory, renal, and cardiac.      PROCEDURE: The patient was taken to the Operative Suite and placed in the supine position, under general anesthesia.      The abdomen was prepped and draped in the usual sterile fashion. The midline laparotomy incision was extended with a #10 blade scalpel and subcutaneous tissues were electrocauterized to the anterior abdominal fascia. The intra-abdominal cavity was accessed without injury.      There was a large volume of turbid, serosanguinous fluid in the abdomen.  The small intestine was invested in a modest amount of fibrinous exudate.  The bowel was carefully  of early, interloop adhesions.  The bowel was run from cecum to ligament of Trietz.  At the site of the repaired, intentional enterotomy, there was a pinpoint area of dehiscence.  Distal to this, there was a dilated Meckel's diverticulum.  The bowel containing the enterotomy was hemorrhagic and edematous.  The associated mesentery was very edematous.      80 cm of the  above-described bowel (including the enterotomy with dehiscence and the Meckel's diverticulum) were resected using blue loads of the AMMY stapler.  The associated mesentery was taken with ligasure.  The specimen was passed off.      The patient was on norepinephrine and phenylephrine pressors.  Hemostasis was confirmed and Abthera device was applied to facilitate a second look and closure once patient was requiring less support.    I discussed the findings and plan with the patient's wife.

## 2025-04-09 NOTE — PLAN OF CARE
"Goal Outcome Evaluation:    Problem: Adult Inpatient Plan of Care  Goal: Plan of Care Review  Description: The Plan of Care Review/Shift note should be completed every shift.  The Outcome Evaluation is a brief statement about your assessment that the patient is improving, declining, or no change.  This information will be displayed automatically on your shiftnote.  Outcome: Progressing  Flowsheets (Taken 4/9/2025 0532)  Outcome Evaluation: Patient arrived from OR around 1855. Intubated, sedated and vntilated. Rass goal -2/-3. Patient overbreathing ventilator at this time. On levophed to maintain MAP of 65. 4L of LR given during shift to maintain hydration. SR/ST. Abdomen open with wound vac. Mccarty in place. 30-35ml of urine every 2 hours. MD aware. NG to LIS. about 800mls out duiring shift.  Goal: Patient-Specific Goal (Individualized)  Description: You can add care plan individualizations to a care plan. Examples of Individualization might be:  \"Parent requests to be called daily at 9am for status\", \"I have a hard time hearing out of my right ear\", or \"Do not touch me to wake me up as it startlesme\".  Outcome: Progressing  Goal: Optimal Comfort and Wellbeing  Outcome: Progressing  Intervention: Provide Person-Centered Care  Recent Flowsheet Documentation  Taken 4/9/2025 0400 by Nico Garnett RN  Trust Relationship/Rapport:   care explained   choices provided   emotional support provided   empathic listening provided   questions answered   questions encouraged   reassurance provided   thoughts/feelings acknowledged  Taken 4/9/2025 0000 by Nico Garnett RN  Trust Relationship/Rapport:   care explained   choices provided   emotional support provided   empathic listening provided   questions answered   questions encouraged   reassurance provided   thoughts/feelings acknowledged  Taken 4/8/2025 2000 by Nico Garnett RN  Trust Relationship/Rapport:   care explained   choices provided   emotional support provided   " empathic listening provided   questions answered   questions encouraged   reassurance provided   thoughts/feelings acknowledged  Goal: Readiness for Transition of Care  Outcome: Progressing     Problem: Delirium  Goal: Optimal Coping  Outcome: Progressing  Goal: Improved Behavioral Control  Outcome: Progressing  Intervention: Minimize Safety Risk  Recent Flowsheet Documentation  Taken 4/9/2025 0400 by Nico Garnett RN  Trust Relationship/Rapport:   care explained   choices provided   emotional support provided   empathic listening provided   questions answered   questions encouraged   reassurance provided   thoughts/feelings acknowledged  Taken 4/9/2025 0000 by Nico Garnett RN  Trust Relationship/Rapport:   care explained   choices provided   emotional support provided   empathic listening provided   questions answered   questions encouraged   reassurance provided   thoughts/feelings acknowledged  Taken 4/8/2025 2000 by Nico Garnett RN  Trust Relationship/Rapport:   care explained   choices provided   emotional support provided   empathic listening provided   questions answered   questions encouraged   reassurance provided   thoughts/feelings acknowledged  Goal: Improved Attention and Thought Clarity  Outcome: Progressing  Goal: Improved Sleep  Outcome: Progressing     Problem: Dysrhythmia  Goal: Normalized Cardiac Rhythm  Outcome: Progressing  Intervention: Monitor and Manage Cardiac Rhythm Effect  Recent Flowsheet Documentation  Taken 4/9/2025 0400 by Nico Garnett RN  VTE Prevention/Management: SCDs on (sequential compression devices)  Taken 4/9/2025 0000 by Nico Garnett RN  VTE Prevention/Management: SCDs on (sequential compression devices)  Taken 4/8/2025 2000 by Nico Garnett RN  VTE Prevention/Management: SCDs on (sequential compression devices)     Problem: Pain Acute  Goal: Optimal Pain Control and Function  Outcome: Progressing     Problem: Fall Injury Risk  Goal: Absence of Fall and Fall-Related  Injury  Outcome: Progressing     Problem: Infection  Goal: Absence of Infection Signs and Symptoms  Outcome: Progressing     Problem: Restraint, Nonviolent  Goal: Absence of Harm or Injury  Outcome: Progressing  Intervention: Protect Dignity, Rights and Personal Wellbeing  Recent Flowsheet Documentation  Taken 4/9/2025 0400 by Nico Garnett RN  Trust Relationship/Rapport:   care explained   choices provided   emotional support provided   empathic listening provided   questions answered   questions encouraged   reassurance provided   thoughts/feelings acknowledged  Taken 4/9/2025 0000 by Nico Garnett RN  Trust Relationship/Rapport:   care explained   choices provided   emotional support provided   empathic listening provided   questions answered   questions encouraged   reassurance provided   thoughts/feelings acknowledged  Taken 4/8/2025 2000 by Nico Garnett RN  Trust Relationship/Rapport:   care explained   choices provided   emotional support provided   empathic listening provided   questions answered   questions encouraged   reassurance provided   thoughts/feelings acknowledged  Intervention: Protect Skin and Joint Integrity  Recent Flowsheet Documentation  Taken 4/9/2025 0400 by Nico Garnett RN  Body Position:   supine, head elevated   turned  Taken 4/9/2025 0000 by Nico Garnett RN  Body Position:   supine, head elevated   turned  Taken 4/8/2025 2200 by Nico Garnett RN  Body Position:   supine, head elevated   turned  Taken 4/8/2025 2000 by Nico Garnett RN  Body Position:   supine, head elevated   turned                  Outcome Evaluation: Patient arrived from OR around 1855. Intubated, sedated and vntilated. Rass goal -2/-3. Patient overbreathing ventilator at this time. On levophed to maintain MAP of 65. 4L of LR given during shift to maintain hydration. SR/ST. Abdomen open with wound vac. Mccarty in place. 30-35ml of urine every 2 hours. MD aware. NG to LIS. about 800mls out duiring shift.

## 2025-04-09 NOTE — PROGRESS NOTES
[facility] ICU RESPIRATORY NOTE        Date of Admission: 3/30/2025    Date of Intubation (most recent): 4/8/25    Reason for Mechanical Ventilation: Ex lap    Number of Days on Mechanical Ventilation: 2    Met Criteria for Spontaneous Breathing Trial: No    Reason for No Spontaneous Breathing Trial: Per MD    Bite Block: No    Significant Events Today: None    ABG Results:   Recent Labs   Lab 04/09/25  1257 04/08/25  2123 04/08/25  1908 04/08/25  1800 04/08/25  0128   PH 7.40 7.34* 7.23* 7.23*  --    PCO2 30* 32* 45 39  --    PO2 138* 143* 140* 275*  --    HCO3 19* 17* 19* 16*  --    O2PER 30 40 50  --  21         Current Vent Settings: FiO2 (%): 30 %, Resp: 28, Vent Mode: CMV/AC, Resp Rate (Set): 24 breaths/min, Tidal Volume (Set, mL): 500 mL, PEEP (cm H2O): 8 cmH2O, Resp Rate (Set): 24 breaths/min, Tidal Volume (Set, mL): 500 mL, PEEP (cm H2O): 8 cmH2O    Drea Da Silva, RT on 4/9/2025 at 4:59 PM

## 2025-04-09 NOTE — PROGRESS NOTES
Lactate down-trending;  Pressor requirements not appropriate for re-anastomosis; will continue to monitor through the morning regarding takeback for second look today vs tomorrow.    Vasopressin was added since yesterday, to maintain MAPs.  Low urine output, receiving additional fluids.    Serosanguinous Abthera outputs.    Plan for second look, possible closure in the OR tomorrow.

## 2025-04-10 ENCOUNTER — ANESTHESIA (OUTPATIENT)
Dept: SURGERY | Facility: CLINIC | Age: 70
End: 2025-04-10
Payer: COMMERCIAL

## 2025-04-10 ENCOUNTER — APPOINTMENT (OUTPATIENT)
Dept: GENERAL RADIOLOGY | Facility: CLINIC | Age: 70
DRG: 329 | End: 2025-04-10
Attending: SURGERY
Payer: COMMERCIAL

## 2025-04-10 ENCOUNTER — ANESTHESIA EVENT (OUTPATIENT)
Dept: SURGERY | Facility: CLINIC | Age: 70
End: 2025-04-10
Payer: COMMERCIAL

## 2025-04-10 PROBLEM — N17.0 ACUTE KIDNEY FAILURE WITH TUBULAR NECROSIS: Status: ACTIVE | Noted: 2025-04-10

## 2025-04-10 LAB
ALBUMIN SERPL BCG-MCNC: 2.1 G/DL (ref 3.5–5.2)
ALP SERPL-CCNC: 89 U/L (ref 40–150)
ALT SERPL W P-5'-P-CCNC: 10 U/L (ref 0–70)
ANION GAP SERPL CALCULATED.3IONS-SCNC: 13 MMOL/L (ref 7–15)
AST SERPL W P-5'-P-CCNC: 29 U/L (ref 0–45)
BACTERIA BLD CULT: ABNORMAL
BACTERIA BLD CULT: ABNORMAL
BACTERIA BLD CULT: NO GROWTH
BACTERIA BLD CULT: NO GROWTH
BACTERIA BLD CULT: NORMAL
BACTERIA WND CULT: ABNORMAL
BASE EXCESS BLDV CALC-SCNC: -4.9 MMOL/L (ref -3–3)
BILIRUB SERPL-MCNC: 0.3 MG/DL
BUN SERPL-MCNC: 27.8 MG/DL (ref 8–23)
CALCIUM SERPL-MCNC: 8.4 MG/DL (ref 8.8–10.4)
CHLORIDE SERPL-SCNC: 100 MMOL/L (ref 98–107)
CREAT SERPL-MCNC: 1.6 MG/DL (ref 0.67–1.17)
EGFRCR SERPLBLD CKD-EPI 2021: 47 ML/MIN/1.73M2
ERYTHROCYTE [DISTWIDTH] IN BLOOD BY AUTOMATED COUNT: 14.6 % (ref 10–15)
GLUCOSE BLDC GLUCOMTR-MCNC: 106 MG/DL (ref 70–99)
GLUCOSE BLDC GLUCOMTR-MCNC: 127 MG/DL (ref 70–99)
GLUCOSE BLDC GLUCOMTR-MCNC: 139 MG/DL (ref 70–99)
GLUCOSE BLDC GLUCOMTR-MCNC: 141 MG/DL (ref 70–99)
GLUCOSE BLDC GLUCOMTR-MCNC: 144 MG/DL (ref 70–99)
GLUCOSE BLDC GLUCOMTR-MCNC: 147 MG/DL (ref 70–99)
GLUCOSE BLDC GLUCOMTR-MCNC: 155 MG/DL (ref 70–99)
GLUCOSE SERPL-MCNC: 153 MG/DL (ref 70–99)
GRAM STAIN RESULT: ABNORMAL
HCO3 BLDV-SCNC: 20 MMOL/L (ref 21–28)
HCO3 SERPL-SCNC: 18 MMOL/L (ref 22–29)
HCT VFR BLD AUTO: 32.2 % (ref 40–53)
HGB BLD-MCNC: 10.6 G/DL (ref 13.3–17.7)
MAGNESIUM SERPL-MCNC: 2.2 MG/DL (ref 1.7–2.3)
MCH RBC QN AUTO: 30.4 PG (ref 26.5–33)
MCHC RBC AUTO-ENTMCNC: 32.9 G/DL (ref 31.5–36.5)
MCV RBC AUTO: 92 FL (ref 78–100)
O2/TOTAL GAS SETTING VFR VENT: 30 %
OXYHGB MFR BLDV: 98 % (ref 70–75)
PATH REPORT.COMMENTS IMP SPEC: NORMAL
PATH REPORT.COMMENTS IMP SPEC: NORMAL
PATH REPORT.FINAL DX SPEC: NORMAL
PATH REPORT.GROSS SPEC: NORMAL
PATH REPORT.MICROSCOPIC SPEC OTHER STN: NORMAL
PATH REPORT.RELEVANT HX SPEC: NORMAL
PCO2 BLDV: 33 MM HG (ref 40–50)
PH BLDV: 7.38 [PH] (ref 7.32–7.43)
PHOSPHATE SERPL-MCNC: 3.7 MG/DL (ref 2.5–4.5)
PHOTO IMAGE: NORMAL
PLATELET # BLD AUTO: 244 10E3/UL (ref 150–450)
PO2 BLDV: 126 MM HG (ref 25–47)
POTASSIUM SERPL-SCNC: 4.3 MMOL/L (ref 3.4–5.3)
PROT SERPL-MCNC: 4.9 G/DL (ref 6.4–8.3)
RBC # BLD AUTO: 3.49 10E6/UL (ref 4.4–5.9)
SAO2 % BLDV: 99.5 % (ref 70–75)
SODIUM SERPL-SCNC: 131 MMOL/L (ref 135–145)
WBC # BLD AUTO: 19.8 10E3/UL (ref 4–11)

## 2025-04-10 PROCEDURE — 999N000063 XR ABDOMEN PORT 1 VIEW

## 2025-04-10 PROCEDURE — 82805 BLOOD GASES W/O2 SATURATION: CPT | Performed by: INTERNAL MEDICINE

## 2025-04-10 PROCEDURE — 84100 ASSAY OF PHOSPHORUS: CPT | Performed by: INTERNAL MEDICINE

## 2025-04-10 PROCEDURE — 0D180Z8 BYPASS SMALL INTESTINE TO SMALL INTESTINE, OPEN APPROACH: ICD-10-PCS | Performed by: SURGERY

## 2025-04-10 PROCEDURE — 250N000011 HC RX IP 250 OP 636: Performed by: SURGERY

## 2025-04-10 PROCEDURE — 200N000001 HC R&B ICU

## 2025-04-10 PROCEDURE — 250N000013 HC RX MED GY IP 250 OP 250 PS 637: Performed by: PHYSICIAN ASSISTANT

## 2025-04-10 PROCEDURE — 258N000003 HC RX IP 258 OP 636: Performed by: SURGERY

## 2025-04-10 PROCEDURE — 258N000003 HC RX IP 258 OP 636: Performed by: NURSE ANESTHETIST, CERTIFIED REGISTERED

## 2025-04-10 PROCEDURE — 250N000013 HC RX MED GY IP 250 OP 250 PS 637: Performed by: SURGERY

## 2025-04-10 PROCEDURE — 250N000011 HC RX IP 250 OP 636: Performed by: PHYSICIAN ASSISTANT

## 2025-04-10 PROCEDURE — 250N000011 HC RX IP 250 OP 636: Mod: JZ | Performed by: NURSE ANESTHETIST, CERTIFIED REGISTERED

## 2025-04-10 PROCEDURE — 250N000025 HC SEVOFLURANE, PER MIN: Performed by: SURGERY

## 2025-04-10 PROCEDURE — 88307 TISSUE EXAM BY PATHOLOGIST: CPT | Mod: 26 | Performed by: PATHOLOGY

## 2025-04-10 PROCEDURE — 999N000157 HC STATISTIC RCP TIME EA 10 MIN

## 2025-04-10 PROCEDURE — 272N000001 HC OR GENERAL SUPPLY STERILE: Performed by: SURGERY

## 2025-04-10 PROCEDURE — 250N000009 HC RX 250: Performed by: SURGERY

## 2025-04-10 PROCEDURE — 99291 CRITICAL CARE FIRST HOUR: CPT | Performed by: INTERNAL MEDICINE

## 2025-04-10 PROCEDURE — 87040 BLOOD CULTURE FOR BACTERIA: CPT | Performed by: INTERNAL MEDICINE

## 2025-04-10 PROCEDURE — 82310 ASSAY OF CALCIUM: CPT | Performed by: INTERNAL MEDICINE

## 2025-04-10 PROCEDURE — 250N000009 HC RX 250: Performed by: PHYSICIAN ASSISTANT

## 2025-04-10 PROCEDURE — 94003 VENT MGMT INPAT SUBQ DAY: CPT

## 2025-04-10 PROCEDURE — 250N000011 HC RX IP 250 OP 636: Performed by: NURSE PRACTITIONER

## 2025-04-10 PROCEDURE — 82247 BILIRUBIN TOTAL: CPT | Performed by: INTERNAL MEDICINE

## 2025-04-10 PROCEDURE — 36415 COLL VENOUS BLD VENIPUNCTURE: CPT | Performed by: INTERNAL MEDICINE

## 2025-04-10 PROCEDURE — 370N000017 HC ANESTHESIA TECHNICAL FEE, PER MIN: Performed by: SURGERY

## 2025-04-10 PROCEDURE — 250N000011 HC RX IP 250 OP 636: Performed by: STUDENT IN AN ORGANIZED HEALTH CARE EDUCATION/TRAINING PROGRAM

## 2025-04-10 PROCEDURE — 250N000013 HC RX MED GY IP 250 OP 250 PS 637: Performed by: INTERNAL MEDICINE

## 2025-04-10 PROCEDURE — 360N000076 HC SURGERY LEVEL 3, PER MIN: Performed by: SURGERY

## 2025-04-10 PROCEDURE — 85027 COMPLETE CBC AUTOMATED: CPT | Performed by: INTERNAL MEDICINE

## 2025-04-10 PROCEDURE — 250N000009 HC RX 250: Performed by: NURSE ANESTHETIST, CERTIFIED REGISTERED

## 2025-04-10 PROCEDURE — 250N000011 HC RX IP 250 OP 636: Mod: JZ | Performed by: INTERNAL MEDICINE

## 2025-04-10 PROCEDURE — 83735 ASSAY OF MAGNESIUM: CPT | Performed by: SURGERY

## 2025-04-10 RX ORDER — FENTANYL CITRATE 50 UG/ML
INJECTION, SOLUTION INTRAMUSCULAR; INTRAVENOUS PRN
Status: DISCONTINUED | OUTPATIENT
Start: 2025-04-10 | End: 2025-04-10

## 2025-04-10 RX ORDER — PROPOFOL 10 MG/ML
INJECTION, EMULSION INTRAVENOUS PRN
Status: DISCONTINUED | OUTPATIENT
Start: 2025-04-10 | End: 2025-04-10

## 2025-04-10 RX ORDER — CEFAZOLIN SODIUM/WATER 3 G/30 ML
3 SYRINGE (ML) INTRAVENOUS
Status: COMPLETED | OUTPATIENT
Start: 2025-04-10 | End: 2025-04-10

## 2025-04-10 RX ORDER — CEFAZOLIN SODIUM/WATER 3 G/30 ML
3 SYRINGE (ML) INTRAVENOUS SEE ADMIN INSTRUCTIONS
Status: DISCONTINUED | OUTPATIENT
Start: 2025-04-10 | End: 2025-04-10 | Stop reason: HOSPADM

## 2025-04-10 RX ORDER — SODIUM CHLORIDE 9 MG/ML
INJECTION, SOLUTION INTRAVENOUS CONTINUOUS PRN
Status: DISCONTINUED | OUTPATIENT
Start: 2025-04-10 | End: 2025-04-10

## 2025-04-10 RX ORDER — HYDROMORPHONE HYDROCHLORIDE 1 MG/ML
0.5 INJECTION, SOLUTION INTRAMUSCULAR; INTRAVENOUS; SUBCUTANEOUS EVERY 6 HOURS
Status: DISCONTINUED | OUTPATIENT
Start: 2025-04-10 | End: 2025-04-11

## 2025-04-10 RX ORDER — MAGNESIUM HYDROXIDE 1200 MG/15ML
LIQUID ORAL PRN
Status: DISCONTINUED | OUTPATIENT
Start: 2025-04-10 | End: 2025-04-10 | Stop reason: HOSPADM

## 2025-04-10 RX ADMIN — PROPOFOL 60 MCG/KG/MIN: 10 INJECTION, EMULSION INTRAVENOUS at 00:07

## 2025-04-10 RX ADMIN — NICOTINE 1 PATCH: 21 PATCH, EXTENDED RELEASE TRANSDERMAL at 08:10

## 2025-04-10 RX ADMIN — ROCURONIUM BROMIDE 10 MG: 50 INJECTION, SOLUTION INTRAVENOUS at 15:10

## 2025-04-10 RX ADMIN — PROPOFOL 45 MCG/KG/MIN: 10 INJECTION, EMULSION INTRAVENOUS at 02:18

## 2025-04-10 RX ADMIN — PROPOFOL 30 MG: 10 INJECTION, EMULSION INTRAVENOUS at 12:53

## 2025-04-10 RX ADMIN — PIPERACILLIN AND TAZOBACTAM 4.5 G: 4; .5 INJECTION, POWDER, FOR SOLUTION INTRAVENOUS at 11:13

## 2025-04-10 RX ADMIN — FENTANYL CITRATE 100 MCG: 50 INJECTION INTRAMUSCULAR; INTRAVENOUS at 13:32

## 2025-04-10 RX ADMIN — ROCURONIUM BROMIDE 20 MG: 50 INJECTION, SOLUTION INTRAVENOUS at 15:00

## 2025-04-10 RX ADMIN — PIPERACILLIN AND TAZOBACTAM 4.5 G: 4; .5 INJECTION, POWDER, FOR SOLUTION INTRAVENOUS at 06:24

## 2025-04-10 RX ADMIN — ROCURONIUM BROMIDE 50 MG: 50 INJECTION, SOLUTION INTRAVENOUS at 12:53

## 2025-04-10 RX ADMIN — VASOPRESSIN 2.4 UNITS/HR: 20 INJECTION, SOLUTION INTRAVENOUS at 05:02

## 2025-04-10 RX ADMIN — Medication 75 MCG/HR: at 07:30

## 2025-04-10 RX ADMIN — Medication 25 MCG: at 12:02

## 2025-04-10 RX ADMIN — PANTOPRAZOLE SODIUM 40 MG: 40 INJECTION, POWDER, FOR SOLUTION INTRAVENOUS at 08:09

## 2025-04-10 RX ADMIN — MIDAZOLAM 2 MG: 1 INJECTION INTRAMUSCULAR; INTRAVENOUS at 12:53

## 2025-04-10 RX ADMIN — Medication 25 MCG: at 16:32

## 2025-04-10 RX ADMIN — SODIUM CHLORIDE: 900 INJECTION INTRAVENOUS at 12:53

## 2025-04-10 RX ADMIN — Medication 10 MG: at 08:13

## 2025-04-10 RX ADMIN — ROCURONIUM BROMIDE 20 MG: 50 INJECTION, SOLUTION INTRAVENOUS at 14:46

## 2025-04-10 RX ADMIN — PROPOFOL 45 MCG/KG/MIN: 10 INJECTION, EMULSION INTRAVENOUS at 07:28

## 2025-04-10 RX ADMIN — CHLORHEXIDINE GLUCONATE 15 ML: 1.2 SOLUTION ORAL at 08:09

## 2025-04-10 RX ADMIN — PROPOFOL 45 MCG/KG/MIN: 10 INJECTION, EMULSION INTRAVENOUS at 16:55

## 2025-04-10 RX ADMIN — Medication 3 G: at 13:07

## 2025-04-10 RX ADMIN — NOREPINEPHRINE BITARTRATE 0.05 MCG/KG/MIN: 0.02 INJECTION, SOLUTION INTRAVENOUS at 06:30

## 2025-04-10 RX ADMIN — PROPOFOL 45 MCG/KG/MIN: 10 INJECTION, EMULSION INTRAVENOUS at 09:40

## 2025-04-10 RX ADMIN — PIPERACILLIN AND TAZOBACTAM 4.5 G: 4; .5 INJECTION, POWDER, FOR SOLUTION INTRAVENOUS at 17:45

## 2025-04-10 RX ADMIN — PROPOFOL 45 MCG/KG/MIN: 10 INJECTION, EMULSION INTRAVENOUS at 16:01

## 2025-04-10 RX ADMIN — NOREPINEPHRINE BITARTRATE 0.03 MCG/KG/MIN: 0.02 INJECTION, SOLUTION INTRAVENOUS at 16:02

## 2025-04-10 RX ADMIN — PROPOFOL 45 MCG/KG/MIN: 10 INJECTION, EMULSION INTRAVENOUS at 11:57

## 2025-04-10 RX ADMIN — PROPOFOL 45 MCG/KG/MIN: 10 INJECTION, EMULSION INTRAVENOUS at 05:03

## 2025-04-10 RX ADMIN — PROPOFOL 40 MCG/KG/MIN: 10 INJECTION, EMULSION INTRAVENOUS at 23:28

## 2025-04-10 RX ADMIN — NOREPINEPHRINE BITARTRATE 0.08 MCG/KG/MIN: 0.02 INJECTION, SOLUTION INTRAVENOUS at 12:59

## 2025-04-10 RX ADMIN — PROPOFOL 40 MCG/KG/MIN: 10 INJECTION, EMULSION INTRAVENOUS at 21:08

## 2025-04-10 RX ADMIN — Medication 100 MCG/HR: at 16:03

## 2025-04-10 RX ADMIN — NOREPINEPHRINE BITARTRATE 0.06 MCG/KG/MIN: 0.02 INJECTION, SOLUTION INTRAVENOUS at 07:29

## 2025-04-10 RX ADMIN — CHLORHEXIDINE GLUCONATE 15 ML: 1.2 SOLUTION ORAL at 20:10

## 2025-04-10 RX ADMIN — HYDROMORPHONE HYDROCHLORIDE 0.5 MG: 1 INJECTION, SOLUTION INTRAMUSCULAR; INTRAVENOUS; SUBCUTANEOUS at 17:45

## 2025-04-10 RX ADMIN — PIPERACILLIN AND TAZOBACTAM 4.5 G: 4; .5 INJECTION, POWDER, FOR SOLUTION INTRAVENOUS at 00:16

## 2025-04-10 ASSESSMENT — ACTIVITIES OF DAILY LIVING (ADL)
ADLS_ACUITY_SCORE: 52
ADLS_ACUITY_SCORE: 52
ADLS_ACUITY_SCORE: 56
ADLS_ACUITY_SCORE: 52
ADLS_ACUITY_SCORE: 56
ADLS_ACUITY_SCORE: 52
ADLS_ACUITY_SCORE: 56
ADLS_ACUITY_SCORE: 52
ADLS_ACUITY_SCORE: 56
ADLS_ACUITY_SCORE: 52

## 2025-04-10 ASSESSMENT — ENCOUNTER SYMPTOMS
SEIZURES: 0
DYSRHYTHMIAS: 1

## 2025-04-10 ASSESSMENT — LIFESTYLE VARIABLES: TOBACCO_USE: 1

## 2025-04-10 NOTE — ANESTHESIA POSTPROCEDURE EVALUATION
Patient: Richar Davis    Procedure: Procedure(s):  EXPLORATORY LAPAROTOMY, SMALL BOWEL ANASTOMOSIS, ABDOMINAL CLOSURE       Anesthesia Type:  General    Note:  Disposition: ICU            ICU Sign Out: Anesthesiologist/ICU physician sign out WAS performed   Postop Pain Control: Uneventful            Sign Out: Well controlled pain   PONV: No   Neuro/Psych: Uneventful            Sign Out: PLANNED postop sedation   Airway/Respiratory: Uneventful            Sign Out: AIRWAY IN SITU/Resp. Support                 Reason: Planned Pre-op   CV/Hemodynamics: Uneventful            Sign Out: Acceptable CV status   Other NRE: NONE   DID A NON-ROUTINE EVENT OCCUR? No           Last vitals:  Vitals Value Taken Time   BP     Temp     Pulse 94 04/10/25 1601   Resp 24 04/10/25 1601   SpO2 93 % 04/10/25 1601   Vitals shown include unfiled device data.    Electronically Signed By: Vincenzo Esposito MD  April 10, 2025  4:02 PM

## 2025-04-10 NOTE — ANESTHESIA PREPROCEDURE EVALUATION
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    ANESTHESIA CARDIOVERSION N/A 2025    Procedure: Anesthesia cardioversion;  Surgeon: GENERIC ANESTHESIA PROVIDER;  Location:  OR    BYPASS GRAFT ARTERY CORONARY, REPLACE VALVE AORTIC, COMBINED N/A 10/20/2022    Procedure: CORONARY ARTERY BYPASS GRAFT x 3 (LEFT INTERNAL MAMMARY ARTERY - LEFT ANTERIOR DESCENDING ARTERY; SAPHENOUS VEIN - POSTERIOR DESCENDING ARTERY; SAPHENOUS VEIN - CIRCUMFLEX ARTERY) WITH ENDOSCOPIC LESSER SAPHENOUS VEIN HARVEST ON BILATERAL LOWER EXTREMITY, AORTIC VALVE REPLACEMENT WITH TISSUE HEART VALVE INSPIRIS  RESILIA  AORTIC VALVE SIZE: 25MM, AND ON CARDIOPULMONARY PUMP OXYGENATOR  (    COLECTOMY WITHOUT COLOSTOMY N/A 2025    Procedure: EXPLORATORY LAPAROTOMY WITH SMALL BOWEL RESECTION;  Surgeon: Liana Landrum MD;  Location:  OR    CTA ANGIOGRAM CORONARY ARTERY      LAPAROTOMY EXPLORATORY N/A 3/30/2025    Procedure: exploratory laparotomy with enterotomy and removal of gallstone;  Surgeon: Clau Calles MD;  Location:  OR    ORTHOPEDIC SURGERY Left     elbow surgery      Allergies   Allergen Reactions    Niaspan [Niacin] Other (See Comments)     flushing      Social History     Tobacco Use    Smoking status: Former    Smokeless tobacco: Current     Types: Chew    Tobacco comments:     daily   Substance Use Topics    Alcohol use: Not Currently      Wt Readings from Last 1 Encounters:   25 (!) 155 kg (341 lb 11.4 oz)        Anesthesia Evaluation   Pt has had prior anesthetic.     No history of anesthetic complications       ROS/MED HX  ENT/Pulmonary:      (+) sleep apnea, doesn't use CPAP,              tobacco use,                        Neurologic:    (-) no seizures and no CVA   Cardiovascular: Comment: Hypotensive on vasopressors    (+) Dyslipidemia hypertension- -  CAD - past MI CABG- -                        dysrhythmias, a-fib,  valvular problems/murmurs  s/p AVR.    Previous cardiac testing   Echo: Date: 1/2025 Results:  Technically difficult imaging  The rhythm was atrial fibrillation.  Left ventricular systolic function is normal.  The visual ejection fraction is 60-65%.  The left ventricle is normal in size.  The right ventricle is normal in structure, function and size.  Doppler interrogation does not demonstrate significant stenosis or  insufficiency involving cardiica valves . No change since 12/10/2024  _______________________________________________________  Interpretation Summary     S  Stress Test:  Date: Results:    ECG Reviewed:  Date: Results:    Cath:  Date: Results:      METS/Exercise Tolerance:     Hematologic:       Musculoskeletal:       GI/Hepatic: Comment: ILEUS   (-) GERD and liver disease   Renal/Genitourinary:     (+) renal disease, type: ARF,      BPH,      Endo:     (+)  type II DM,   Not using insulin, - not using insulin pump.   Diabetic complications: cardiac problems.      Obesity,    (-) thyroid disease   Psychiatric/Substance Use:       Infectious Disease: Comment: sepsis      Malignancy:    (-) malignancy   Other:            Physical Exam    Airway             Respiratory Devices and Support    ETT:      Dental           Cardiovascular   cardiovascular exam normal          Pulmonary   pulmonary exam normal                OUTSIDE LABS:  CBC:   Lab Results   Component Value Date    WBC 19.8 (H) 04/10/2025    WBC 33.3 (H) 04/09/2025    HGB 10.6 (L) 04/10/2025    HGB 13.4 04/09/2025    HCT 32.2 (L) 04/10/2025    HCT 39.5 (L) 04/09/2025     04/10/2025     04/09/2025     BMP:   Lab Results   Component Value Date    NA  "131 (L) 04/10/2025     (L) 04/09/2025    POTASSIUM 4.3 04/10/2025    POTASSIUM 5.2 04/09/2025    CHLORIDE 100 04/10/2025    CHLORIDE 100 04/09/2025    CO2 18 (L) 04/10/2025    CO2 17 (L) 04/09/2025    BUN 27.8 (H) 04/10/2025    BUN 25.4 (H) 04/09/2025    CR 1.60 (H) 04/10/2025    CR 1.96 (H) 04/09/2025     (H) 04/10/2025     (H) 04/10/2025     COAGS:   Lab Results   Component Value Date     (H) 10/20/2022    INR 1.15 04/07/2025    FIBR 198 10/20/2022     POC: No results found for: \"BGM\", \"HCG\", \"HCGS\"  HEPATIC:   Lab Results   Component Value Date    ALBUMIN 2.1 (L) 04/10/2025    PROTTOTAL 4.9 (L) 04/10/2025    ALT 10 04/10/2025    AST 29 04/10/2025    ALKPHOS 89 04/10/2025    BILITOTAL 0.3 04/10/2025    YUNG 19 10/23/2022     OTHER:   Lab Results   Component Value Date    PH 7.40 04/09/2025    LACT 2.3 (H) 04/09/2025    A1C 6.8 (H) 03/31/2025    HALEY 8.4 (L) 04/10/2025    PHOS 3.7 04/10/2025    MAG 2.2 04/10/2025    LIPASE 29 04/07/2025       Anesthesia Plan    ASA Status:  4    NPO Status:  NPO Appropriate    Anesthesia Type: General.     - Airway: ETT   Induction: Intravenous.   Maintenance: Balanced.        Consents    Anesthesia Plan(s) and associated risks, benefits, and realistic alternatives discussed. Questions answered and patient/representative(s) expressed understanding.     - Discussed:     - Discussed with:  Healthcare Power of , Spouse            Postoperative Care    Pain management: IV analgesics, Oral pain medications.   PONV prophylaxis: Ondansetron (or other 5HT-3)     Comments:               Vincenzo Esposito MD    Clinically Significant Risk Factors         # Hyponatremia: Lowest Na = 128 mmol/L in last 2 days, will monitor as appropriate     # Hypomagnesemia: Lowest Mg = 1.6 mg/dL in last 2 days, will replace as needed   # Hypoalbuminemia: Lowest albumin = 1.9 g/dL at 4/9/2025  5:25 AM, will monitor as appropriate    # Acute Kidney Injury, unspecified: based on " a >150% or 0.3 mg/dL increase in last creatinine compared to past 90 day average, will monitor renal function  # Hypertension: Noted on problem list           # DMII: A1C = 6.8 % (Ref range: <5.7 %) within past 6 months   # Severe Obesity: Estimated body mass index is 46.34 kg/m  as calculated from the following:    Height as of this encounter: 1.829 m (6').    Weight as of this encounter: 155 kg (341 lb 11.4 oz).      # Financial/Environmental Concerns: none   # History of CABG: noted on surgical history

## 2025-04-10 NOTE — ANESTHESIA CARE TRANSFER NOTE
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    Anesthesia Type:   General     Note:    Oropharynx: endotracheal tube in place, nasal gatric tube in place and ventilatory support  Level of Consciousness: iatrogenic sedation      Independent Airway: airway patency not satisfactory and stable  Dentition: dentition unchanged  Vital Signs Stable: post-procedure vital signs reviewed and stable  Report to RN Given: handoff report given  Patient transferred to: ICU  Comments: Patient connected to SpO2, EKG, and arterial blood pressure transport monitors and accompanied by CRNA, anesthesiologist, and circulating RN to ICU room. Patient ventilated by anesthesiologist with ambu via ETT with O2 at 10 liters per minute during transport.     On arrival to ICU, endotracheal tube position unchanged, equal, bilateral breath sounds auscultated in ICU room, patient placed on ICU ventilator by respiratory therapist, teeth and oral mucosa intact and unchanged at handoff of care. At anesthesia handoff of care, clinical monitors and alarms on and functioning, report on patient's clinical status given to ICU RN, and all ICU staff questions answered.   ICU Handoff: Call for PAUSE to initiate/utilize ICU HANDOFF, Identified Patient, Identified Responsible Provider, Reviewed the Pertinent Medical History, Discussed Surgical Course, Reviewed Intra-OP Anesthesia Management and Issues during Anesthesia, Set Expectations for Post Procedure Period and Allowed Opportunity for Questions and Acknowledgement of Understanding      Vitals:  Vitals Value Taken Time   BP     Temp     Pulse 94 04/10/25 1601   Resp 24 04/10/25 1601   SpO2 93 % 04/10/25 1601   Vitals shown include unfiled device data.    Electronically Signed By: ADONIS Cummins CRNA  April 10, 2025  4:02 PM

## 2025-04-10 NOTE — PROGRESS NOTES
[facility] ICU RESPIRATORY NOTE        Date of Admission: 3/30/2025    Date of Intubation (most recent): 4/8/25    Reason for Mechanical Ventilation: Ex Lap    Number of Days on Mechanical Ventilation: 3    Met Criteria for Spontaneous Breathing Trial: No    Reason for No Spontaneous Breathing Trial: Pt in OR this afternoon    Bite Block: No    ABG Results:   Recent Labs   Lab 04/10/25  0519 04/09/25  1257 04/08/25  2123 04/08/25  1908 04/08/25  1800   PH  --  7.40 7.34* 7.23* 7.23*   PCO2  --  30* 32* 45 39   PO2  --  138* 143* 140* 275*   HCO3  --  19* 17* 19* 16*   O2PER 30 30 40 50  --          Current Vent Settings: FiO2 (%): 30 %, Resp: 24, Vent Mode: CMV/AC, Resp Rate (Set): 24 breaths/min, Tidal Volume (Set, mL): 500 mL, PEEP (cm H2O): 8 cmH2O, Resp Rate (Set): 24 breaths/min, Tidal Volume (Set, mL): 500 mL, PEEP (cm H2O): 8 cmH2O    Skin Assessment: Intact    Drea Da Silva, RT on 4/10/2025 at 4:06 PM

## 2025-04-10 NOTE — PROGRESS NOTES
Care Management Follow Up    Length of Stay (days): 11    Expected Discharge Date: 04/14/2025     Concerns to be Addressed: discharge planning     Patient plan of care discussed at interdisciplinary rounds: Yes    Anticipated Discharge Disposition: TBD              Anticipated Discharge Services: Home Care  Anticipated Discharge DME:      Patient/family educated on Medicare website which has current facility and service quality ratings: no  Education Provided on the Discharge Plan:    Patient/Family in Agreement with the Plan: yes    Referrals Placed by CM/SW:    Private pay costs discussed: Not applicable    Discussed  Partnership in Safe Discharge Planning  document with patient/family: No     Handoff Completed: No, handoff not indicated or clinically appropriate    Additional Information:  Patient remains intubated.     Next Steps: Acute Care Management will continue to follow for discharge planning.     DAVID Ramirez RN, BSN, OCN   Inpatient Care Coordination ICU  [facility]  Office: [phone]

## 2025-04-10 NOTE — PLAN OF CARE
Goal Outcome Evaluation:      Plan of Care Reviewed With: patient    Overall Patient Progress: improvingOverall Patient Progress: improving    Outcome Evaluation: Pt opens eyes to touch. EVE. Pt moves fingers and toes when sedation is lightened. Tele SR. Lungs becoming more course thoughout the night. Changed wound vac container x2. OG to LIS. UO was low, gave 250mL LR bolus, output improved but still very concentrated. Abdominal site CDI. Fentanyl gtt started. Prop gtt and pressors weaned down.      Problem: Pain Acute  Goal: Optimal Pain Control and Function  Intervention: Prevent or Manage Pain  Recent Flowsheet Documentation  Taken 4/10/2025 0400 by Kim Win, RN  Sensory Stimulation Regulation:   lighting decreased   care clustered  Medication Review/Management: medications reviewed  Taken 4/10/2025 0000 by Kim Win, RN  Sensory Stimulation Regulation:   lighting decreased   care clustered  Medication Review/Management: medications reviewed  Taken 4/9/2025 2000 by Kim Win, RN  Sensory Stimulation Regulation:   lighting decreased   care clustered  Medication Review/Management: medications reviewed

## 2025-04-10 NOTE — BRIEF OP NOTE
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  Specimens: * No specimens in log *  Findings:   Stapled anastomosis and abdominal closure. VAC dressing to abdominal wound placed on 75 mmHg .  Complications: None.  Implants: * No implants in log *    PLAN:  - Continue excellent ICU cares. Dr. Landrum prefers to hold heparin drip until patient seen tomorrow morning to ensure no takeback needed given tight abdominal closure and recent small bowel dehiscence with previous surgery.

## 2025-04-10 NOTE — PROGRESS NOTES
Critical Care Admission note      04/10/2025    Name: Richar Davis MRN#: [id number]   Age: 69 year old YOB: 1955     [facility]    [facility] DAY # 1             Problem List:   Septic shock  Postoperative ventilator dependence  Acute kidney injury    Code status: Full code         Summary/Hospital Course:     Richar Davis is a 69 year old male with a history of diabetes, hypertension, CABG, status post AVR with bovine valve 10/2022, untreated sleep apnea, hypertension, hyperlipidemia, neuropathy, BPH admitted on 3/30/2025 with abdominal pain nausea and vomiting. Imaging showed gallstone ileus with 2.2 cm gallstone impacted, exploratory laparotomy on 3/30. Postoperatively had issues with ileus, fever, and aflutter with RVR. Returned to OR on 4/8 2/2 septic shock, postoperative ventilator dependence with pressors. Returned to OR on 4/10 for washout and closing.      No acute overnight events. Fentanyl increased to continuous given poorly managed pain. Patient appearing more comfortable with the adjustment. Decreases in prop and levo, no longer requiring vasopressin. Overbreathing the vent at times. Continues to have concentrated urine output and serosanguinous fluid from abdominal drain. Plan to go to OR this afternoon.      Timeline:  3/30- patient underwent an exploratory laparotomy for a gallstone ileus, a small bowel enterotomy with removal of gallstone and a primary closure were performed. He had had return of bowel function.  4/5- New onset atrial flutter, medication management  4/6- ECHO 60-65%, normal ventricle function  4/7- Cardioversion for ongoing atrial flutter, successful with single shock. Overnight, an RRT was called, noted to have a lactate elevation and hypotension. WBC count was 36 K (4/8) from 14 K (4/7). CT scan showed increased free fluid.   4/8- Returned to OR and was diagnosed with a breakdown of previous enterotomy repair. Patient requiring vasopressors,  therefore he was left in discontinuity and brought to the ICU with intent to go back to OR when more stable.  4/10- Returned to OR for washout and abdominal closing.       Assessment and plan :     Richar Davis is a 69 year old male admitted on 3/30/2025 for gallstone ileus. Underwent exploratory laparotomy for leak from enterotomy repair on 4/8, as noted above.   I have personally reviewed the daily labs, imaging studies, cultures and discussed the case with referring physician and consulting physicians.   My assessment and plan by system for this patient is as follows:    Neurology/Psychiatry:   # Sedation and analgesia for mechanical ventilation  # Hx neuropathy  - Propofol for sedation, wean as tolerated  - Fentanyl for pain, continuous. Transition to PRN fentanyl or dilaudid when appropriate.   - RASS goal at around -1 to 0   - Possible plan to extubate after surgery, dependent on future OR plans    Cardiovascular:   # Septic shock   # History of MI  # Aortic valve replacement  # S/p cardioversion for new onset Aflutter   Echo on 4/6 normal left and right ventricles, visual EF was 60-65%. Was in Afib at that time, which he is out of, as noted above. WBC count and lactate trending downward.   - MAP goal >65, weaning vasopressors  - Fluid boluses for now, will monitor fluid status  - Cardiology signed off, review discharge recommendations    Pulmonary/Ventilator Management:   # Metabolic acidosis  ABG on 4/10: 7.38/33/126/20. Not breathing above ventilator.   - Trend ABGs, adjust respiratory rate as needed  - Pressure support before extubation    Renal/Fluids/Electrolytes:   # Metabolic acidosis 2/2 sepsis  # Acute kidney injury   # Hypoalbuminemia   Creatinine 1.9 (4/9) up from 0.79 (4/7) baseline. Fluid status is hard to assess, no UOP measured for several days up until 4/7. Patient likely intravascularly hypovolemic. Decreased urine output. Creatine trending downwards, improving CRISTINA.   - LR boluses,  follow closely  - Monitor function and electrolytes, replace as needed  - Adjust medications as needed for renal clearance, avoid nephrotoxic agents such as NSAID, IV contrast unless specifically required  - Follow I/O's as appropriate, hernandez in place  - Goal urine output 50 mL/hour    GI and Nutrition:   # Gallstone ileus s/p exploratory laparotomy 3/30  - Abtherra in place, monitor drainage  - Continue NPO, General Surgery will dictate any changes in NPO status after patient in continuity  - Reassess tomorrow for additional glucose needs  - Return to OR 4/10    Infectious Disease:   # Septic shock  # Bacteremia   Positive blood culture 4/7 for enterococcus faecalis, gram positive cocci in pairs and chains.   - Continue Zosyn 4.5g Q6H  - Abdominal wound culture no growth  - Repeat blood cultures    Endocrine:   # Hx of DM  Last A1C was 6.8. PTA metformin.   - ICU insulin protocol, goal sugar <180    Hematology/Oncology:   # Leukocytosis  # Normocytic anemia  No signs of ongoing bleeding. Anemia is likely dilutional or 2/2 acute blood loss during surgery.   - WBC trending downward  - Trend hemoglobin  - Hold heparin until surgery sees patient on 4/11    MSKL/Rheum:  - PT/OT when appropriate    IV/Access:   1. Central line- right internal jugular  2. Arterial access- right brachial artery     ICU Prophylaxis:   1. DVT: LMWH/mechanical  2. VAP: HOB 30 degrees, chlorhexidine rinse  3. Stress Ulcer: PPI/H2 blocker  4. Restraints: Nonviolent soft two point restraints required and necessary for patient safety and continued cares and good effect as patient continues to pull at necessary lines, tubes despite education and distraction. Will readdress daily.   5. Wound care - per unit routine   6. Feeding - NPO, until general surgery says otherwise.    Family update:  Wife updated with plan. She does not want to see her  while he is intubated because the sight is emotionally taxing. If he is extubated tomorrow, she  would prefer that we call her once the tube is removed so that she can come visit. She has no further questions. Emotional on the phone.           Key Medications:     Current Facility-Administered Medications   Medication Dose Route Frequency Provider Last Rate Last Admin    [Held by provider] acetaminophen (TYLENOL) tablet 975 mg  975 mg Oral or NG Tube Q8H Clau Calles MD        [Held by provider] atorvastatin (LIPITOR) tablet 80 mg  80 mg Oral or NG Tube Daily Khari Peña MD        ceFAZolin Sodium (ANCEF) injection 3 g  3 g Intravenous Pre-Op/Pre-procedure x 1 dose Liana Landrum MD        ceFAZolin Sodium (ANCEF) injection 3 g  3 g Intravenous See Admin Instructions Liana Landrum MD        [Auto Hold] chlorhexidine (PERIDEX) 0.12 % solution 15 mL  15 mL Mouth/Throat Q12H Michael Rodríguez MD   15 mL at 04/10/25 0809    [Held by provider] famotidine (PEPCID) tablet 20 mg  20 mg Oral BID Terry Patino MD   20 mg at 04/08/25 0805    [Held by provider] folic acid (FOLVITE) tablet 1,000 mcg  1,000 mcg Oral or NG Tube BID Khari Peña MD        [Held by provider] furosemide (LASIX) tablet 20 mg  20 mg Oral Daily Mann Torres DO   20 mg at 04/04/25 1017    [Held by provider] insulin aspart (NovoLOG) injection (RAPID ACTING)  1-3 Units Subcutaneous TID AC Jake Blevins DO   1 Units at 04/08/25 0805    [Held by provider] insulin aspart (NovoLOG) injection (RAPID ACTING)  1-3 Units Subcutaneous At Bedtime Jake Blevins DO        [Held by provider] metoprolol tartrate (LOPRESSOR) tablet 50 mg  50 mg Oral BID Fahad Wei MD   50 mg at 04/08/25 0806    [Auto Hold] nicotine (NICODERM CQ) 21 MG/24HR 24 hr patch 1 patch  1 patch Transdermal Daily Maddie Schafer MD   1 patch at 04/10/25 0810    [Auto Hold] pantoprazole (PROTONIX) 2 mg/mL suspension 40 mg  40 mg Per Feeding Tube QAM AC Michael Rodríguez MD        Or    [Auto Hold] pantoprazole  (PROTONIX) IV push injection 40 mg  40 mg Intravenous QAM AC Michael Rodríguez MD   40 mg at 04/10/25 0809    [Auto Hold] piperacillin-tazobactam (ZOSYN) 4.5 g vial to attach to  mL bag  4.5 g Intravenous Q6H Terry Winchester APRN CNP   4.5 g at 04/10/25 1113    [Held by provider] polyethylene glycol (MIRALAX) Packet 17 g  17 g Oral Daily Clau Calles MD   17 g at 04/08/25 0805    [Auto Hold] sodium chloride (PF) 0.9% PF flush 3 mL  3 mL Intracatheter Q8H [facility] Shant Winston MD   3 mL at 04/10/25 0523    [Held by provider] tamsulosin (FLOMAX) capsule 0.8 mg  0.8 mg Oral QPM Khari Peña MD   0.8 mg at 04/07/25 2031     Current Facility-Administered Medications   Medication Dose Route Frequency Provider Last Rate Last Admin    fentaNYL (SUBLIMAZE) infusion   mcg/hr Intravenous Continuous Danni Donohue MD 2 mL/hr at 04/10/25 1200 100 mcg/hr at 04/10/25 1200    [Auto Hold] propofol (DIPRIVAN) infusion  5-75 mcg/kg/min Intravenous Continuous Michael Rodríguez MD 38.8 mL/hr at 04/10/25 1200 45 mcg/kg/min at 04/10/25 1200    And    [Auto Hold] Medication Instruction   Does not apply Continuous PRN Michael Rodríguez MD        norepinephrine (LEVOPHED) 4 mg in  mL infusion PREMIX  0.01-0.6 mcg/kg/min Intravenous Continuous Michael Rodríguez MD 43.1 mL/hr at 04/10/25 1259 0.08 mcg/kg/min at 04/10/25 1259    vasopressin 0.2 units/mL in NS (PITRESSIN) standard conc infusion  2.4 Units/hr Intravenous Continuous Michael Rodríguez MD   Stopped at 04/10/25 0651               Physical Examination:   Temp:  [99.3  F (37.4  C)-99.9  F (37.7  C)] 99.6  F (37.6  C)  Pulse:  [] 86  Resp:  [20-32] 24  BP: (106-109)/(52-57) 106/53  MAP:  [62 mmHg-256 mmHg] 65 mmHg  Arterial Line BP: ()/() 102/46  FiO2 (%):  [30 %] 30 %  SpO2:  [96 %-99 %] 98 %    Intake/Output Summary (Last 24 hours) at 4/8/2025 1915  Last data filed at  4/8/2025 1900  Gross per 24 hour   Intake 2070 ml   Output 1045 ml   Net 1025 ml     Wt Readings from Last 4 Encounters:   04/09/25 (!) 155 kg (341 lb 11.4 oz)   12/16/24 131 kg (288 lb 12.8 oz)   12/12/24 129.2 kg (284 lb 14.4 oz)   06/10/24 127.8 kg (281 lb 12.8 oz)     Arterial Line BP: ()/() 102/46  MAP:  [62 mmHg-256 mmHg] 65 mmHg  BP - Mean:  [75] 75  FiO2 (%): 30 %, Resp: 24, Vent Mode: CMV/AC, Resp Rate (Set): 24 breaths/min, Tidal Volume (Set, mL): 500 mL, PEEP (cm H2O): 8 cmH2O, Resp Rate (Set): 24 breaths/min, Tidal Volume (Set, mL): 500 mL, PEEP (cm H2O): 8 cmH2O  Recent Labs   Lab 04/10/25  0519 04/09/25  1257 04/08/25  2123 04/08/25  1908 04/08/25  1800   PH  --  7.40 7.34* 7.23* 7.23*   PCO2  --  30* 32* 45 39   PO2  --  138* 143* 140* 275*   HCO3  --  19* 17* 19* 16*   O2PER 30 30 40 50  --      Constitutional: Sedated and ventilated  Neuro: Awakens to loud stimuli, PEERL, cough and gag reflex present, spontaneously moving all extermities  HENT: Orally intubated, oropharynx pink, no swelling present, teeth intact  Pulmonary: No respiratory distress or significant ventilator dyssynchrony, coarse breath sounds bilaterally, bilateral chest symmetrical chest expansion on inspection  Cardio: Regular rate and rhythm, distant heart sounds, S1/S2, murmur present  ABD: Soft, no erythema, no bowel sounds appreciated, abthera in place with no sign of leak, draining yellow/red tinged fluid, hernandez in place  Extremities:  Edema, warm, diminished peripheral pulses x4, healed left middle finger amputation  Skin: No obvious rash, no cyanosis, bruises  Lines: Clean, dry intact    Output:  2500mL from abdominal drain, total          Data:   All data and imaging reviewed     ROUTINE ICU LABS (Last four results)  CMP  Recent Labs   Lab 04/10/25  1118 04/10/25  0830 04/10/25  0521 04/10/25  0519 04/09/25  0802 04/09/25  0525 04/09/25  0152 04/08/25  2049 04/08/25  1908 04/08/25  0732 04/08/25  0604  04/08/25  0150 04/07/25  2320 04/07/25  1113 04/07/25  0425 04/04/25  1214 04/04/25  0601   NA  --   --   --  131*  --  132* 130*  --  128*   < > 133*  --  135  --  134*   < > 137   POTASSIUM  --   --   --  4.3  --  5.2 5.2  --  5.2   < > 4.3  4.1  --  4.6  --  4.1  4.1   < > 3.4   CHLORIDE  --   --   --  100  --  100 98  --  98  --  98  --  97*  --  99   < > 98   CO2  --   --   --  18*  --  17* 17*  --  17*  --  16*  --  22  --  24   < > 28   ANIONGAP  --   --   --  13  --  15 15  --  13  --  19*  --  16*  --  11   < > 11   * 139* 141* 153*   < > 161* 165*   < > 146*   < > 144*   < > 140*   < > 151*   < > 137*   BUN  --   --   --  27.8*  --  25.4* 23.1*  --  21.1  --  13.5  --  9.8  --  9.2   < > 7.9*   CR  --   --   --  1.60*  --  1.96* 1.85*  --  1.83*  --  1.30*  --  0.79  --  0.72   < > 0.84   GFRESTIMATED  --   --   --  47*  --  37* 39*  --  40*  --  60*  --  >90  --  >90   < > >90   HALEY  --   --   --  8.4*  --  8.0* 8.1*  --  8.3*  --  9.7  --  9.5  --  9.6   < > 9.7   MAG  --   --   --  2.2  --  1.6*  --   --   --   --  1.8  --   --   --  1.6*   < > 1.9   PHOS  --   --   --  3.7  --  5.6*  --   --   --   --   --   --   --   --   --   --  2.3*   PROTTOTAL  --   --   --  4.9*  --  4.7*  --   --   --   --  6.0*  --  6.6  --   --    < >  --    ALBUMIN  --   --   --  2.1*  --  1.9*  --   --   --   --  2.4*  --  2.9*  --   --    < >  --    BILITOTAL  --   --   --  0.3  --  0.4  --   --   --   --  0.7  --  0.4  --   --    < >  --    ALKPHOS  --   --   --  89  --  91  --   --   --   --  125  --  140  --   --    < >  --    AST  --   --   --  29  --  30  --   --   --   --  36  --  33  --   --    < >  --    ALT  --   --   --  10  --  16  --   --   --   --  24  --  26  --   --    < >  --     < > = values in this interval not displayed.     CBC  Recent Labs   Lab 04/10/25  0519 04/09/25  0525 04/08/25  1800 04/08/25  0722 04/07/25  2320   WBC 19.8* 33.3*  --  35.9* 14.1*   RBC 3.49* 4.38*  --  5.02 4.62   HGB  10.6* 13.4 13.9 15.5 13.9   HCT 32.2* 39.5* 41 46.6 43.5   MCV 92 90  --  93 94   MCH 30.4 30.6  --  30.9 30.1   MCHC 32.9 33.9  --  33.3 32.0   RDW 14.6 13.9  --  14.1 14.0    334  --  301 294     INR  Recent Labs   Lab 04/07/25  0727 04/07/25  0425   INR 1.15 1.13     Arterial Blood Gas  Recent Labs   Lab 04/10/25  0519 04/09/25  1257 04/08/25  2123 04/08/25  1908 04/08/25  1800   PH  --  7.40 7.34* 7.23* 7.23*   PCO2  --  30* 32* 45 39   PO2  --  138* 143* 140* 275*   HCO3  --  19* 17* 19* 16*   O2PER 30 30 40 50  --        Billing: This patient is critically ill: Yes. Total critical care time today 45 min.    Jana Pink, MS4  [employer]        Physician Attestation   I, Drea Faustin MD, was present with the medical/SUKHWINDER student who participated in the service and in the documentation of the note.  I have verified the history and personally performed the physical exam and medical decision making.  I agree with the assessment and plan of care as documented in the note.      Key findings: improved hemodynamics and renal function. Fascia closed and reanastamosed today. Not going back to OR tomorrow. On 125mcg of Fentanyl but RN notes that he was appropriate on sedation vacation.   - change goal rass to 0 to -1  - schedule dilaudid and wean fentanyl  - Pressure support trial to assess WOB but think it is too early to extubate today.       Drea Faustin MD  Date of Service (when I saw the patient): 04/10/25

## 2025-04-10 NOTE — PLAN OF CARE
Patient underwent ex la, small bowel anastomosis & abdominal closure today. Paused norepinephrine at 1622 ,tolerating well. Weaning sedation to a RASS -1/0. Scheduled Dilaudid IV ordered to help wean fentanyl drip. Wound vac continued at -75 continuous suction.        Problem: Adult Inpatient Plan of Care  Goal: Absence of Hospital-Acquired Illness or Injury  Intervention: Prevent Skin Injury  Recent Flowsheet Documentation  Taken 4/10/2025 1200 by Torsten Loo RN  Body Position: position maintained  Taken 4/10/2025 1000 by Torsten Loo RN  Body Position:   turned   side-lying  Taken 4/10/2025 0800 by Torsten Loo RN  Body Position: position maintained     Problem: Adult Inpatient Plan of Care  Goal: Absence of Hospital-Acquired Illness or Injury  Intervention: Prevent and Manage VTE (Venous Thromboembolism) Risk  Recent Flowsheet Documentation  Taken 4/10/2025 1200 by Torsten Loo RN  VTE Prevention/Management: SCDs on (sequential compression devices)  Taken 4/10/2025 0800 by Torsten Loo RN  VTE Prevention/Management: SCDs on (sequential compression devices)     Problem: Dysrhythmia  Goal: Normalized Cardiac Rhythm  Intervention: Monitor and Manage Cardiac Rhythm Effect  Recent Flowsheet Documentation  Taken 4/10/2025 1200 by Trosten Loo RN  VTE Prevention/Management: SCDs on (sequential compression devices)  Taken 4/10/2025 0800 by Torsten Loo RN  VTE Prevention/Management: SCDs on (sequential compression devices)     Problem: Pain Acute  Goal: Optimal Pain Control and Function  Outcome: Progressing  Intervention: Develop Pain Management Plan  Recent Flowsheet Documentation  Taken 4/10/2025 0800 by Torsten Loo RN  Pain Management Interventions:   pain pump in use   repositioned  Intervention: Prevent or Manage Pain  Recent Flowsheet Documentation  Taken 4/10/2025 1200 by Torsten Loo RN  Medication Review/Management: medications reviewed  Taken 4/10/2025 0800 by Torsten Loo RN  Medication Review/Management:  medications reviewed     Problem: Restraint, Nonviolent  Goal: Absence of Harm or Injury  Intervention: Protect Dignity, Rights and Personal Wellbeing  Recent Flowsheet Documentation  Taken 4/10/2025 1200 by Torsten Loo RN  Trust Relationship/Rapport:   care explained   reassurance provided  Taken 4/10/2025 0800 by Torsten Loo RN  Trust Relationship/Rapport:   care explained   reassurance provided   Goal Outcome Evaluation:

## 2025-04-10 NOTE — PROGRESS NOTES
[facility] General Surgery Post-Op / Progress Note         Assessment and Plan:    Assessment:   Post-operative day #2  Exploratory Laparotomy with small bowel resection. Patient on multiple pressor intraoperatively and decision made to leave patient in discontinuity and temporary abdominal closure. Continued moderate vasopressor requirement 4/9 and decision made to continue resuscitation. Now on 0.6 levophed, off vasopressin. Plan for OR for washout, possible anastomosis and fascial closure         Plan:   -Plan to RTOR for abdominal washout and possible closure  -Continue NGT to LIS and NPO  -Continue IV Ab  - Continue to hold DVT ppx           Interval History:   Improved vasopressor requirement, weaned off vasopressin, on 0.6 levophed. Minimal vent settings and labs improving.            Review of Systems:    Unable to be completed due to intubation            Medications:   All medications related to the patient's surgery have been reviewed  Current Facility-Administered Medications   Medication Dose Route Frequency Provider Last Rate Last Admin    [Held by provider] acetaminophen (TYLENOL) tablet 975 mg  975 mg Oral or NG Tube Q8H Clau Calles MD        [Held by provider] atorvastatin (LIPITOR) tablet 80 mg  80 mg Oral or NG Tube Daily Khari Peña MD        benzocaine-menthol (CHLORASEPTIC) 6-10 MG lozenge 1 lozenge  1 lozenge Buccal Q1H PRN Clau Calles MD   1 lozenge at 04/01/25 0334    bisacodyl (DULCOLAX) suppository 10 mg  10 mg Rectal Daily PRN Clau Calles MD        ceFAZolin Sodium (ANCEF) injection 3 g  3 g Intravenous Pre-Op/Pre-procedure x 1 dose Liana Landrum MD        ceFAZolin Sodium (ANCEF) injection 3 g  3 g Intravenous See Admin Instructions Liana Landrum MD        chlorhexidine (PERIDEX) 0.12 % solution 15 mL  15 mL Mouth/Throat Q12H Michael Rodríguez MD   15 mL at 04/10/25 0809    glucose gel 15-30 g  15-30 g Oral Q15 Min PRN  Jake Blevins DO        Or    dextrose 50 % injection 25-50 mL  25-50 mL Intravenous Q15 Min PRN Jake Blevins DO        Or    glucagon injection 1 mg  1 mg Subcutaneous Q15 Min PRN Jake Blevins DO        diazepam (VALIUM) injection 2.5 mg  2.5 mg Intravenous Q6H PRN Clau Calles MD        diphenhydrAMINE (BENADRYL) elixir 12.5 mg  12.5 mg Oral Q6H PRN Clau Calles MD        Or    diphenhydrAMINE (BENADRYL) injection 12.5 mg  12.5 mg Intravenous Q6H PRN Clau Calles MD        [Held by provider] famotidine (PEPCID) tablet 20 mg  20 mg Oral BID Terry Patino MD   20 mg at 04/08/25 0805    fentaNYL (PF) (SUBLIMAZE) injection 50 mcg  50 mcg Intravenous Q1H PRN Michael Rodríguez MD   50 mcg at 04/09/25 1810    fentaNYL (SUBLIMAZE) 50 mcg/mL bolus from pump  25 mcg Intravenous Q1H PRN Danni Donohue MD        fentaNYL (SUBLIMAZE) infusion   mcg/hr Intravenous Continuous Danni Donohue MD 2 mL/hr at 04/10/25 1000 100 mcg/hr at 04/10/25 1000    [Held by provider] folic acid (FOLVITE) tablet 1,000 mcg  1,000 mcg Oral or NG Tube BID Khari Peña MD        [Held by provider] furosemide (LASIX) tablet 20 mg  20 mg Oral Daily Mann Torres DO   20 mg at 04/04/25 1017    [Held by provider] gabapentin (NEURONTIN) tablet 800 mg  800 mg Oral TID PRN Clau Calles MD        [Held by provider] insulin aspart (NovoLOG) injection (RAPID ACTING)  1-3 Units Subcutaneous TID AC Jake Blevins DO   1 Units at 04/08/25 0805    [Held by provider] insulin aspart (NovoLOG) injection (RAPID ACTING)  1-3 Units Subcutaneous At Bedtime Jake Blevins DO        lidocaine (LMX4) cream   Topical Q1H PRN Shant Winston MD        lidocaine 1 % 0.1-1 mL  0.1-1 mL Other Q1H PRN Shant Winston MD        magnesium hydroxide (MILK OF MAGNESIA) suspension 30 mL  30 mL Oral Daily PRN Clau Calles MD   30 mL at 04/01/25 0427    propofol  (DIPRIVAN) infusion  5-75 mcg/kg/min Intravenous Continuous Michael Rodríguez MD 38.8 mL/hr at 04/10/25 1000 45 mcg/kg/min at 04/10/25 1000    And    propofol (DIPRIVAN) bolus from bag or syringe pump  10 mg Intravenous Q15 Min PRN Michael Rodríguez MD   10 mg at 04/10/25 0813    And    Medication Instruction   Does not apply Continuous PRN Michael Rodríguez MD        methocarbamol (ROBAXIN) half-tab 250 mg  250 mg Oral Q6H PRN Clau Calles MD        metoprolol (LOPRESSOR) injection 5 mg  5 mg Intravenous Q4H PRN Agustin Ramires MD   5 mg at 04/06/25 0123    [Held by provider] metoprolol tartrate (LOPRESSOR) tablet 50 mg  50 mg Oral BID Fahad Wei MD   50 mg at 04/08/25 0806    naloxone (NARCAN) injection 0.2 mg  0.2 mg Intravenous Q2 Min PRN Clau Calles MD        Or    naloxone (NARCAN) injection 0.4 mg  0.4 mg Intravenous Q2 Min PRN Clau Calles MD        Or    naloxone (NARCAN) injection 0.2 mg  0.2 mg Intramuscular Q2 Min PRN Clau Calles MD        Or    naloxone (NARCAN) injection 0.4 mg  0.4 mg Intramuscular Q2 Min PRN Clau Calles MD        nicotine (COMMIT) lozenge 2 mg  2 mg Buccal Q1H PRN Damian Griffin MD   2 mg at 04/03/25 1145    nicotine (NICODERM CQ) 21 MG/24HR 24 hr patch 1 patch  1 patch Transdermal Daily Maddie Schafer MD   1 patch at 04/10/25 0810    norepinephrine (LEVOPHED) 4 mg in  mL infusion PREMIX  0.01-0.6 mcg/kg/min Intravenous Continuous Michael Rodríguez MD 32.3 mL/hr at 04/10/25 1000 0.06 mcg/kg/min at 04/10/25 1000    ondansetron (ZOFRAN ODT) ODT tab 4 mg  4 mg Oral Q6H PRN Clau Calles MD   4 mg at 04/08/25 0208    Or    ondansetron (ZOFRAN) injection 4 mg  4 mg Intravenous Q6H PRN Clau Calles MD   4 mg at 04/08/25 0901    pantoprazole (PROTONIX) 2 mg/mL suspension 40 mg  40 mg Per Feeding Tube QAM AC Michael Rodríguez MD        Or    pantoprazole (PROTONIX) IV push injection  40 mg  40 mg Intravenous QAM AC Michael Rodríguez MD   40 mg at 04/10/25 0809    phenol (CHLORASEPTIC) 1.4 % spray 1 mL  1 spray Mouth/Throat Q1H PRN Clau Calles MD        piperacillin-tazobactam (ZOSYN) 4.5 g vial to attach to  mL bag  4.5 g Intravenous Q6H Terry Winchester APRN CNP   4.5 g at 04/10/25 0624    [Held by provider] polyethylene glycol (MIRALAX) Packet 17 g  17 g Oral Daily Clau Calles MD   17 g at 04/08/25 0805    prochlorperazine (COMPAZINE) injection 5 mg  5 mg Intravenous Q6H PRN Clau Calles MD   5 mg at 04/08/25 1103    Or    prochlorperazine (COMPAZINE) tablet 5 mg  5 mg Oral Q6H PRN Clau Calles MD   5 mg at 04/03/25 0756    sodium chloride (PF) 0.9% PF flush 3 mL  3 mL Intracatheter Q8H [facility] Shant Winston MD   3 mL at 04/10/25 0523    sodium chloride (PF) 0.9% PF flush 3 mL  3 mL Intracatheter q1 min prn Shant Winston MD        [Held by provider] tamsulosin (FLOMAX) capsule 0.8 mg  0.8 mg Oral QPM LilKhari amaral MD   0.8 mg at 04/07/25 2031    traMADol (ULTRAM) tablet 50 mg  50 mg Oral Q6H PRN Ana Dougherty MD   50 mg at 04/08/25 0806    vasopressin 0.2 units/mL in NS (PITRESSIN) standard conc infusion  2.4 Units/hr Intravenous Continuous Michael Rodríguez MD   Stopped at 04/10/25 0651             Physical Exam:   All vitals stable  General: lying in bed intubated and sedated, awakens to touch  CV: RRR, distal pulses intact  Pul: intubated  Abd: soft, obese, in TAC with 200cc serous output, NGT with minimal bilious output, Mccarty in with moderate CYU  Skin: warm, dry, 2+ LE edema, no rashes          Data:   CBC RESULTS:   Recent Labs   Lab Test 04/10/25  0519   WBC 19.8*   RBC 3.49*   HGB 10.6*   HCT 32.2*   MCV 92   MCH 30.4   MCHC 32.9   RDW 14.6        Last Comprehensive Metabolic Panel:  Lab Results   Component Value Date     (L) 04/10/2025    POTASSIUM 4.3 04/10/2025    CHLORIDE 100 04/10/2025     CO2 18 (L) 04/10/2025    ANIONGAP 13 04/10/2025     (H) 04/10/2025    BUN 27.8 (H) 04/10/2025    CR 1.60 (H) 04/10/2025    GFRESTIMATED 47 (L) 04/10/2025    HALEY 8.4 (L) 04/10/2025       Last Arterial Blood Gas:  pH Arterial   Date Value Ref Range Status   04/09/2025 7.40 7.35 - 7.45 Final     pCO2 Arterial   Date Value Ref Range Status   04/09/2025 30 (L) 35 - 45 mm Hg Final     pO2 Arterial   Date Value Ref Range Status   04/09/2025 138 (H) 80 - 105 mm Hg Final     Bicarbonate Arterial   Date Value Ref Range Status   04/09/2025 19 (L) 21 - 28 mmol/L Final     O2 Sat, Arterial   Date Value Ref Range Status   04/09/2025 99.9 (H) 95.0 - 96.0 % Final     Base Excess/Deficit Arterial   Date Value Ref Range Status   04/09/2025 -5.3 (L) -3.0 - 3.0 mmol/L Final       Patient discussed with Dr. Diallo Dougherty MD  Surgery, PGY4

## 2025-04-10 NOTE — PROGRESS NOTES
[facility] ICU RESPIRATORY NOTE        Date of Admission: 3/30/2025    Date of Intubation (most recent): 4/8/2025    Reason for Mechanical Ventilation: AW protection    Number of Days on Mechanical Ventilation: 3     Met Criteria for Spontaneous Breathing Trial: No    Reason for No Spontaneous Breathing Trial: Per MD    Bite Block: No    Significant Events Today: none overnight    ABG Results:   Recent Labs   Lab 04/09/25  1257 04/08/25  2123 04/08/25  1908 04/08/25  1800 04/08/25  0128   PH 7.40 7.34* 7.23* 7.23*  --    PCO2 30* 32* 45 39  --    PO2 138* 143* 140* 275*  --    HCO3 19* 17* 19* 16*  --    O2PER 30 40 50  --  21         Current Vent Settings: FiO2 (%): 30 %, Resp: 26, Vent Mode: CMV/AC, Resp Rate (Set): 24 breaths/min, Tidal Volume (Set, mL): 500 mL, PEEP (cm H2O): 8 cmH2O, Resp Rate (Set): 24 breaths/min, Tidal Volume (Set, mL): 500 mL, PEEP (cm H2O): 8 cmH2O    Plan: Continue full vent support and assess for weaning daily    RT Erin on 4/10/2025 at 3:32 AM

## 2025-04-11 ENCOUNTER — APPOINTMENT (OUTPATIENT)
Dept: PHYSICAL THERAPY | Facility: CLINIC | Age: 70
DRG: 329 | End: 2025-04-11
Payer: COMMERCIAL

## 2025-04-11 VITALS
OXYGEN SATURATION: 98 % | DIASTOLIC BLOOD PRESSURE: 53 MMHG | HEIGHT: 72 IN | HEART RATE: 77 BPM | BODY MASS INDEX: 42.66 KG/M2 | WEIGHT: 315 LBS | SYSTOLIC BLOOD PRESSURE: 106 MMHG | RESPIRATION RATE: 18 BRPM | TEMPERATURE: 98.6 F

## 2025-04-11 LAB
ALBUMIN SERPL BCG-MCNC: 2 G/DL (ref 3.5–5.2)
ALLEN'S TEST: ABNORMAL
ALP SERPL-CCNC: 82 U/L (ref 40–150)
ALT SERPL W P-5'-P-CCNC: 9 U/L (ref 0–70)
ANION GAP SERPL CALCULATED.3IONS-SCNC: 10 MMOL/L (ref 7–15)
AST SERPL W P-5'-P-CCNC: 35 U/L (ref 0–45)
BASE EXCESS BLDA CALC-SCNC: -3 MMOL/L (ref -3–3)
BILIRUB SERPL-MCNC: 0.3 MG/DL
BUN SERPL-MCNC: 19.7 MG/DL (ref 8–23)
CALCIUM SERPL-MCNC: 8.4 MG/DL (ref 8.8–10.4)
CHLORIDE SERPL-SCNC: 105 MMOL/L (ref 98–107)
CREAT SERPL-MCNC: 1.09 MG/DL (ref 0.67–1.17)
EGFRCR SERPLBLD CKD-EPI 2021: 74 ML/MIN/1.73M2
ERYTHROCYTE [DISTWIDTH] IN BLOOD BY AUTOMATED COUNT: 14.5 % (ref 10–15)
ERYTHROCYTE [DISTWIDTH] IN BLOOD BY AUTOMATED COUNT: 14.6 % (ref 10–15)
GLUCOSE BLDC GLUCOMTR-MCNC: 114 MG/DL (ref 70–99)
GLUCOSE BLDC GLUCOMTR-MCNC: 132 MG/DL (ref 70–99)
GLUCOSE BLDC GLUCOMTR-MCNC: 138 MG/DL (ref 70–99)
GLUCOSE BLDC GLUCOMTR-MCNC: 48 MG/DL (ref 70–99)
GLUCOSE BLDC GLUCOMTR-MCNC: 94 MG/DL (ref 70–99)
GLUCOSE SERPL-MCNC: 125 MG/DL (ref 70–99)
HCO3 BLD-SCNC: 22 MMOL/L (ref 21–28)
HCO3 SERPL-SCNC: 21 MMOL/L (ref 22–29)
HCT VFR BLD AUTO: 33.6 % (ref 40–53)
HCT VFR BLD AUTO: 34.2 % (ref 40–53)
HGB BLD-MCNC: 10.7 G/DL (ref 13.3–17.7)
HGB BLD-MCNC: 11 G/DL (ref 13.3–17.7)
MAGNESIUM SERPL-MCNC: 2.5 MG/DL (ref 1.7–2.3)
MCH RBC QN AUTO: 29.4 PG (ref 26.5–33)
MCH RBC QN AUTO: 30.4 PG (ref 26.5–33)
MCHC RBC AUTO-ENTMCNC: 31.3 G/DL (ref 31.5–36.5)
MCHC RBC AUTO-ENTMCNC: 32.7 G/DL (ref 31.5–36.5)
MCV RBC AUTO: 93 FL (ref 78–100)
MCV RBC AUTO: 94 FL (ref 78–100)
O2/TOTAL GAS SETTING VFR VENT: 30 %
OXYHGB MFR BLDA: 97 % (ref 92–100)
PCO2 BLD: 37 MM HG (ref 35–45)
PEEP: 8 CM H2O
PH BLD: 7.37 [PH] (ref 7.35–7.45)
PHOSPHATE SERPL-MCNC: 3.3 MG/DL (ref 2.5–4.5)
PLATELET # BLD AUTO: 212 10E3/UL (ref 150–450)
PLATELET # BLD AUTO: 218 10E3/UL (ref 150–450)
PO2 BLD: 130 MM HG (ref 80–105)
POTASSIUM SERPL-SCNC: 4.6 MMOL/L (ref 3.4–5.3)
PROT SERPL-MCNC: 5 G/DL (ref 6.4–8.3)
RBC # BLD AUTO: 3.62 10E6/UL (ref 4.4–5.9)
RBC # BLD AUTO: 3.64 10E6/UL (ref 4.4–5.9)
SAO2 % BLDA: 99.8 % (ref 95–96)
SODIUM SERPL-SCNC: 136 MMOL/L (ref 135–145)
UFH PPP CHRO-ACNC: <0.1 IU/ML
WBC # BLD AUTO: 16.5 10E3/UL (ref 4–11)
WBC # BLD AUTO: 17.2 10E3/UL (ref 4–11)

## 2025-04-11 PROCEDURE — 83735 ASSAY OF MAGNESIUM: CPT | Performed by: PHYSICIAN ASSISTANT

## 2025-04-11 PROCEDURE — 258N000001 HC RX 258: Performed by: PHYSICIAN ASSISTANT

## 2025-04-11 PROCEDURE — 250N000011 HC RX IP 250 OP 636: Performed by: PHYSICIAN ASSISTANT

## 2025-04-11 PROCEDURE — 84100 ASSAY OF PHOSPHORUS: CPT | Performed by: PHYSICIAN ASSISTANT

## 2025-04-11 PROCEDURE — 250N000013 HC RX MED GY IP 250 OP 250 PS 637

## 2025-04-11 PROCEDURE — 85027 COMPLETE CBC AUTOMATED: CPT | Performed by: PHYSICIAN ASSISTANT

## 2025-04-11 PROCEDURE — 999N000197 HC STATISTIC WOC PT EDUCATION, 0-15 MIN

## 2025-04-11 PROCEDURE — 97530 THERAPEUTIC ACTIVITIES: CPT | Mod: GP

## 2025-04-11 PROCEDURE — 250N000013 HC RX MED GY IP 250 OP 250 PS 637: Performed by: PHYSICIAN ASSISTANT

## 2025-04-11 PROCEDURE — 999N000157 HC STATISTIC RCP TIME EA 10 MIN

## 2025-04-11 PROCEDURE — 200N000001 HC R&B ICU

## 2025-04-11 PROCEDURE — 999N000253 HC STATISTIC WEANING TRIALS

## 2025-04-11 PROCEDURE — 250N000011 HC RX IP 250 OP 636: Mod: JZ | Performed by: SURGERY

## 2025-04-11 PROCEDURE — 94003 VENT MGMT INPAT SUBQ DAY: CPT

## 2025-04-11 PROCEDURE — 250N000011 HC RX IP 250 OP 636: Performed by: INTERNAL MEDICINE

## 2025-04-11 PROCEDURE — 84450 TRANSFERASE (AST) (SGOT): CPT | Performed by: PHYSICIAN ASSISTANT

## 2025-04-11 PROCEDURE — 99291 CRITICAL CARE FIRST HOUR: CPT | Performed by: INTERNAL MEDICINE

## 2025-04-11 PROCEDURE — 97164 PT RE-EVAL EST PLAN CARE: CPT | Mod: GP

## 2025-04-11 PROCEDURE — 85520 HEPARIN ASSAY: CPT | Performed by: INTERNAL MEDICINE

## 2025-04-11 PROCEDURE — 82805 BLOOD GASES W/O2 SATURATION: CPT | Performed by: INTERNAL MEDICINE

## 2025-04-11 PROCEDURE — 85027 COMPLETE CBC AUTOMATED: CPT | Performed by: INTERNAL MEDICINE

## 2025-04-11 RX ORDER — QUETIAPINE FUMARATE 50 MG/1
50 TABLET, FILM COATED ORAL
Status: DISCONTINUED | OUTPATIENT
Start: 2025-04-11 | End: 2025-04-12

## 2025-04-11 RX ORDER — QUETIAPINE FUMARATE 25 MG/1
50 TABLET, FILM COATED ORAL 2 TIMES DAILY PRN
Status: DISCONTINUED | OUTPATIENT
Start: 2025-04-11 | End: 2025-04-15

## 2025-04-11 RX ORDER — CARBOXYMETHYLCELLULOSE SODIUM 5 MG/ML
1 SOLUTION/ DROPS OPHTHALMIC
Status: DISCONTINUED | OUTPATIENT
Start: 2025-04-11 | End: 2025-04-29 | Stop reason: HOSPADM

## 2025-04-11 RX ORDER — HYDROMORPHONE HYDROCHLORIDE 1 MG/ML
0.5 INJECTION, SOLUTION INTRAMUSCULAR; INTRAVENOUS; SUBCUTANEOUS EVERY 4 HOURS PRN
Status: DISCONTINUED | OUTPATIENT
Start: 2025-04-11 | End: 2025-04-14

## 2025-04-11 RX ORDER — HEPARIN SODIUM 10000 [USP'U]/100ML
0-5000 INJECTION, SOLUTION INTRAVENOUS CONTINUOUS
Status: DISCONTINUED | OUTPATIENT
Start: 2025-04-11 | End: 2025-04-14

## 2025-04-11 RX ADMIN — FENTANYL CITRATE 50 MCG: 50 INJECTION, SOLUTION INTRAMUSCULAR; INTRAVENOUS at 15:48

## 2025-04-11 RX ADMIN — PIPERACILLIN AND TAZOBACTAM 4.5 G: 4; .5 INJECTION, POWDER, FOR SOLUTION INTRAVENOUS at 00:02

## 2025-04-11 RX ADMIN — PROPOFOL 30 MCG/KG/MIN: 10 INJECTION, EMULSION INTRAVENOUS at 08:48

## 2025-04-11 RX ADMIN — PIPERACILLIN AND TAZOBACTAM 4.5 G: 4; .5 INJECTION, POWDER, FOR SOLUTION INTRAVENOUS at 12:31

## 2025-04-11 RX ADMIN — ONDANSETRON 4 MG: 2 INJECTION, SOLUTION INTRAMUSCULAR; INTRAVENOUS at 12:32

## 2025-04-11 RX ADMIN — QUETIAPINE FUMARATE 50 MG: 50 TABLET ORAL at 17:37

## 2025-04-11 RX ADMIN — PIPERACILLIN AND TAZOBACTAM 4.5 G: 4; .5 INJECTION, POWDER, FOR SOLUTION INTRAVENOUS at 05:21

## 2025-04-11 RX ADMIN — NICOTINE 1 PATCH: 21 PATCH, EXTENDED RELEASE TRANSDERMAL at 08:01

## 2025-04-11 RX ADMIN — PIPERACILLIN AND TAZOBACTAM 4.5 G: 4; .5 INJECTION, POWDER, FOR SOLUTION INTRAVENOUS at 17:48

## 2025-04-11 RX ADMIN — METHOCARBAMOL 250 MG: 500 TABLET ORAL at 12:52

## 2025-04-11 RX ADMIN — FENTANYL CITRATE 50 MCG: 50 INJECTION, SOLUTION INTRAMUSCULAR; INTRAVENOUS at 08:16

## 2025-04-11 RX ADMIN — PROPOFOL 40 MCG/KG/MIN: 10 INJECTION, EMULSION INTRAVENOUS at 05:20

## 2025-04-11 RX ADMIN — PROPOFOL 30 MCG/KG/MIN: 10 INJECTION, EMULSION INTRAVENOUS at 02:02

## 2025-04-11 RX ADMIN — QUETIAPINE FUMARATE 50 MG: 50 TABLET ORAL at 22:56

## 2025-04-11 RX ADMIN — PANTOPRAZOLE SODIUM 40 MG: 40 INJECTION, POWDER, FOR SOLUTION INTRAVENOUS at 07:48

## 2025-04-11 RX ADMIN — CHLORHEXIDINE GLUCONATE 15 ML: 1.2 SOLUTION ORAL at 07:48

## 2025-04-11 RX ADMIN — Medication 10 MG: at 04:25

## 2025-04-11 RX ADMIN — HYDROMORPHONE HYDROCHLORIDE 0.5 MG: 1 INJECTION, SOLUTION INTRAMUSCULAR; INTRAVENOUS; SUBCUTANEOUS at 00:02

## 2025-04-11 RX ADMIN — HYDROMORPHONE HYDROCHLORIDE 0.5 MG: 1 INJECTION, SOLUTION INTRAMUSCULAR; INTRAVENOUS; SUBCUTANEOUS at 17:37

## 2025-04-11 RX ADMIN — TRAMADOL HYDROCHLORIDE 50 MG: 50 TABLET, COATED ORAL at 12:52

## 2025-04-11 RX ADMIN — TRAMADOL HYDROCHLORIDE 50 MG: 50 TABLET, COATED ORAL at 20:45

## 2025-04-11 RX ADMIN — HYDROMORPHONE HYDROCHLORIDE 0.5 MG: 1 INJECTION, SOLUTION INTRAMUSCULAR; INTRAVENOUS; SUBCUTANEOUS at 10:45

## 2025-04-11 RX ADMIN — DEXTROSE MONOHYDRATE 25 ML: 25 INJECTION, SOLUTION INTRAVENOUS at 04:48

## 2025-04-11 RX ADMIN — HYDROMORPHONE HYDROCHLORIDE 0.5 MG: 1 INJECTION, SOLUTION INTRAMUSCULAR; INTRAVENOUS; SUBCUTANEOUS at 04:30

## 2025-04-11 RX ADMIN — HEPARIN SODIUM 1200 UNITS/HR: 10000 INJECTION, SOLUTION INTRAVENOUS at 14:46

## 2025-04-11 ASSESSMENT — ACTIVITIES OF DAILY LIVING (ADL)
ADLS_ACUITY_SCORE: 52
ADLS_ACUITY_SCORE: 51
ADLS_ACUITY_SCORE: 52
ADLS_ACUITY_SCORE: 51
ADLS_ACUITY_SCORE: 52
ADLS_ACUITY_SCORE: 51
ADLS_ACUITY_SCORE: 51
ADLS_ACUITY_SCORE: 52
ADLS_ACUITY_SCORE: 52
ADLS_ACUITY_SCORE: 51
ADLS_ACUITY_SCORE: 52

## 2025-04-11 NOTE — PROGRESS NOTES
Critical Care Admission note      04/11/2025    Name: Richar Davis MRN#: [id number]   Age: 69 year old YOB: 1955     [facility]  [facility]    MV DAY # 1             Problem List:   Septic shock  Postoperative ventilator dependence  Acute kidney injury    Code status: Full code         Summary/Hospital Course:     Richar Davis is a 69 year old male with a history of diabetes, hypertension, CABG, status post AVR with bovine valve 10/2022, untreated sleep apnea, hypertension, hyperlipidemia, neuropathy, BPH admitted on 3/30/2025 with abdominal pain nausea and vomiting. Imaging showed gallstone ileus with 2.2 cm gallstone impacted, exploratory laparotomy on 3/30. Postoperatively had issues with ileus, fever, and aflutter with RVR. Returned to OR on 4/8 2/2 septic shock, postoperative ventilator dependence with pressors. Returned to OR on 4/10 for washout and closing.      No acute overnight events. Weaned off pressors overnight. Scheduled dilaudid pushes. Starting to become more active/restless.    Timeline:  3/30- patient underwent an exploratory laparotomy for a gallstone ileus, a small bowel enterotomy with removal of gallstone and a primary closure were performed. He had had return of bowel function.  4/5- New onset atrial flutter, medication management  4/6- ECHO 60-65%, normal ventricle function  4/7- Cardioversion for ongoing atrial flutter, successful with single shock. Overnight, an RRT was called, noted to have a lactate elevation and hypotension. WBC count was 36 K (4/8) from 14 K (4/7). CT scan showed increased free fluid.   4/8- Returned to OR and was diagnosed with a breakdown of previous enterotomy repair. Patient requiring vasopressors, therefore he was left in discontinuity and brought to the ICU with intent to go back to OR when more stable.  4/10- Returned to OR for washout and fascial closing.   4/11- Extubated      Assessment and plan :     Richar Davis is a  69 year old male admitted on 3/30/2025 for gallstone ileus. Underwent exploratory laparotomy for leak from enterotomy repair on 4/8, as noted above.   I have personally reviewed the daily labs, imaging studies, cultures and discussed the case with referring physician and consulting physicians.   My assessment and plan by system for this patient is as follows:    Neurology/Psychiatry:   # Sedation and analgesia for mechanical ventilation  # Hx neuropathy  - Propofol for sedation, wean as tolerated  - Scheduled dilaudid, PRN fentanyl   - RASS goal at around -1 to 0   - Plan to extubate today    Cardiovascular:   # Septic shock   # History of MI  # Aortic valve replacement  # S/p cardioversion for new onset Aflutter   Echo on 4/6 normal left and right ventricles, visual EF was 60-65%. Was in Afib at that time, which he is out of, as noted above. WBC count and lactate trending downward.   - MAP goal >65, no longer requiring vasopressors  - Fluid boluses for now, will monitor fluid status  - Cardiology signed off, review discharge recommendations    Pulmonary/Ventilator Management:   # Metabolic acidosis-- resolved  # ALIREZA, hx CPAP intolerance  ABG 4/11 7.37/37/130/22  - Trend ABGs, adjust respiratory rate as needed  - Pressure support before extubation  - CPAP when sleeping    Renal/Fluids/Electrolytes:   # Metabolic acidosis 2/2 sepsis  # Acute kidney injury-- resolved   Creatinine 1.9 (4/9), during time of acute illness. Creatinine has now returned to WNL.    - LR boluses, follow closely  - Monitor function and electrolytes, replace as needed  - Adjust medications as needed for renal clearance, avoid nephrotoxic agents such as NSAID, IV contrast unless specifically required  - Follow I/O's as appropriate, hernandez in place  - Goal urine output 50 mL/hour    GI and Nutrition:   # Gallstone ileus s/p exploratory laparotomy 3/30  # Small bowel anastomosis and fascial closure 4/10  - Abtherra in place, monitor drainage  - NGT  for meds  - NPO until return of bowel function    Infectious Disease:   # Septic shock  # Bacteremia   Positive blood culture 4/7 for enterococcus faecalis, gram positive cocci in pairs and chains.   - Continue Zosyn 4.5g Q6H  - Abdominal wound culture no growth  - Repeat blood cultures- no growth at 12 hours  - WOC consult for wound vac management    Endocrine:   # Hx of DM  Last A1C was 6.8. PTA metformin.   - ICU insulin protocol, goal sugar <180    Hematology/Oncology:   # Leukocytosis  # Normocytic anemia  No signs of ongoing bleeding. Anemia is likely dilutional or 2/2 acute blood loss during surgery.   - WBC trending downward  - Trend hemoglobin  - Start heparin drip, needs anticoagulation for a few weeks s/p cardioversion    MSKL/Rheum:  - PT/OT when appropriate    IV/Access:   1. Central line- right internal jugular. Can be removed 4/11 if patient is stable off ventilator.   2. Arterial access- right brachial artery     ICU Prophylaxis:   1. DVT: LMWH/mechanical  2. VAP: HOB 30 degrees, chlorhexidine rinse  3. Stress Ulcer: PPI/H2 blocker  4. Restraints: Nonviolent soft two point restraints required and necessary for patient safety and continued cares and good effect as patient continues to pull at necessary lines, tubes despite education and distraction. Will readdress daily.   5. Wound care - per unit routine   6. Feeding - NPO, until general surgery says otherwise.    Family update:  Wife updated over the phone. She intends to come in this afternoon. Answered questions and had no ongoing concerns.          Key Medications:     Current Facility-Administered Medications   Medication Dose Route Frequency Provider Last Rate Last Admin    [Held by provider] acetaminophen (TYLENOL) tablet 975 mg  975 mg Oral or NG Tube Q8H Clau Calles MD        [Held by provider] atorvastatin (LIPITOR) tablet 80 mg  80 mg Oral or NG Tube Daily Khari Peña MD        chlorhexidine (PERIDEX) 0.12 % solution 15 mL   15 mL Mouth/Throat Q12H Viviane Xiao, PA-C   15 mL at 04/11/25 0748    [Held by provider] famotidine (PEPCID) tablet 20 mg  20 mg Oral BID Terry Patino MD   20 mg at 04/08/25 0805    [Held by provider] folic acid (FOLVITE) tablet 1,000 mcg  1,000 mcg Oral or NG Tube BID Khari Peña MD        [Held by provider] furosemide (LASIX) tablet 20 mg  20 mg Oral Daily Mann Torres DO   20 mg at 04/04/25 1017    HYDROmorphone (PF) (DILAUDID) injection 0.5 mg  0.5 mg Intravenous Q6H Drea Faustin MD   0.5 mg at 04/11/25 0430    [Held by provider] insulin aspart (NovoLOG) injection (RAPID ACTING)  1-3 Units Subcutaneous TID AC Jake Blevins DO   1 Units at 04/08/25 0805    [Held by provider] insulin aspart (NovoLOG) injection (RAPID ACTING)  1-3 Units Subcutaneous At Bedtime Jake Blevins DO        [Held by provider] metoprolol tartrate (LOPRESSOR) tablet 50 mg  50 mg Oral BID Fahad Wei MD   50 mg at 04/08/25 0806    nicotine (NICODERM CQ) 21 MG/24HR 24 hr patch 1 patch  1 patch Transdermal Daily Viviane Xiao PA-C   1 patch at 04/11/25 0801    pantoprazole (PROTONIX) 2 mg/mL suspension 40 mg  40 mg Per Feeding Tube QAM AC Viviane Xiao PA-C        Or    pantoprazole (PROTONIX) IV push injection 40 mg  40 mg Intravenous QAM AC Viviane Xiao, PA-C   40 mg at 04/11/25 0748    piperacillin-tazobactam (ZOSYN) 4.5 g vial to attach to  mL bag  4.5 g Intravenous Q6H Viviane Xiao, PA-C   4.5 g at 04/11/25 0521    [Held by provider] polyethylene glycol (MIRALAX) Packet 17 g  17 g Oral Daily Clau Calles MD   17 g at 04/08/25 0805    sodium chloride (PF) 0.9% PF flush 3 mL  3 mL Intracatheter Q8H Viviane Whitt PA-C   3 mL at 04/11/25 0525    [Held by provider] tamsulosin (FLOMAX) capsule 0.8 mg  0.8 mg Oral QPM Khari Peña MD   0.8 mg at 04/07/25 2031     Current Facility-Administered Medications   Medication Dose Route  Frequency Provider Last Rate Last Admin    fentaNYL (SUBLIMAZE) infusion   mcg/hr Intravenous Continuous Kalthoff, Viviane, PA-C   Stopped at 04/11/25 0630    propofol (DIPRIVAN) infusion  5-75 mcg/kg/min Intravenous Continuous Kalthoff, Viviane, PA-C 25.9 mL/hr at 04/11/25 0848 30 mcg/kg/min at 04/11/25 0848    And    Medication Instruction   Does not apply Continuous PRN Kalthoff, Viviane, PA-C        norepinephrine (LEVOPHED) 4 mg in  mL infusion PREMIX  0.01-0.6 mcg/kg/min Intravenous Continuous Kalthoff, Viviane, PA-C   Stopped at 04/10/25 1622    vasopressin 0.2 units/mL in NS (PITRESSIN) standard conc infusion  2.4 Units/hr Intravenous Continuous Kalthoff, Viviane, PA-C   Stopped at 04/10/25 0651               Physical Examination:   Temp:  [98  F (36.7  C)-99.6  F (37.6  C)] 98.5  F (36.9  C)  Pulse:  [71-97] 89  Resp:  [10-26] 13  BP: (106)/(53) 106/53  MAP:  [62 mmHg-83 mmHg] 73 mmHg  Arterial Line BP: ()/(-23-61) 121/49  FiO2 (%):  [30 %] 30 %  SpO2:  [95 %-100 %] 99 %    Intake/Output Summary (Last 24 hours) at 4/8/2025 1915  Last data filed at 4/8/2025 1900  Gross per 24 hour   Intake 2070 ml   Output 1045 ml   Net 1025 ml     Wt Readings from Last 4 Encounters:   04/10/25 (!) 147.4 kg (324 lb 15.3 oz)   12/16/24 131 kg (288 lb 12.8 oz)   12/12/24 129.2 kg (284 lb 14.4 oz)   06/10/24 127.8 kg (281 lb 12.8 oz)     Arterial Line BP: ()/(-23-61) 121/49  MAP:  [62 mmHg-83 mmHg] 73 mmHg  FiO2 (%): 30 %, Resp: 13, Vent Mode: CPAP/PS, Resp Rate (Set): 18 breaths/min, Tidal Volume (Set, mL): 500 mL, PEEP (cm H2O): 8 cmH2O, Pressure Support (cm H2O): 5 cmH2O, Resp Rate (Set): 18 breaths/min, Tidal Volume (Set, mL): 500 mL, PEEP (cm H2O): 8 cmH2O  Recent Labs   Lab 04/11/25  0755 04/10/25  0519 04/09/25  1257 04/08/25  2123 04/08/25  1908   PH 7.37  --  7.40 7.34* 7.23*   PCO2 37  --  30* 32* 45   PO2 130*  --  138* 143* 140*   HCO3 22  --  19* 17* 19*   O2PER 30 30 30 40 50      Constitutional: Sedated and ventilated  Neuro: Awakens to loud stimuli, PEERL, cough and gag reflex present, spontaneously moving all extermities  HENT: Orally intubated, oropharynx pink, no swelling present, teeth intact  Pulmonary: No respiratory distress or significant ventilator dyssynchrony, coarse breath sounds bilaterally, bilateral chest symmetrical chest expansion on inspection  Cardio: Regular rate and rhythm, distant heart sounds, S1/S2, murmur present  ABD: Soft, no erythema, no bowel sounds appreciated, abthera in place with no sign of leak, draining yellow/red tinged fluid, hernandez in place  Extremities:  Edema, warm, diminished peripheral pulses x4, healed left middle finger amputation  Skin: No obvious rash, no cyanosis, bruises  Lines: Clean, dry intact    Output:   from abdominal drain, total          Data:   All data and imaging reviewed     ROUTINE ICU LABS (Last four results)  CMP  Recent Labs   Lab 04/11/25  0752 04/11/25  0525 04/11/25  0445 04/11/25  0443 04/10/25  0521 04/10/25  0519 04/09/25  0802 04/09/25  0525 04/09/25  0152 04/08/25  0732 04/08/25  0604   NA  --   --  136  --   --  131*  --  132* 130*   < > 133*   POTASSIUM  --   --  4.6  --   --  4.3  --  5.2 5.2   < > 4.3  4.1   CHLORIDE  --   --  105  --   --  100  --  100 98   < > 98   CO2  --   --  21*  --   --  18*  --  17* 17*   < > 16*   ANIONGAP  --   --  10  --   --  13  --  15 15   < > 19*   * 138* 125* 48*   < > 153*   < > 161* 165*   < > 144*   BUN  --   --  19.7  --   --  27.8*  --  25.4* 23.1*   < > 13.5   CR  --   --  1.09  --   --  1.60*  --  1.96* 1.85*   < > 1.30*   GFRESTIMATED  --   --  74  --   --  47*  --  37* 39*   < > 60*   HALEY  --   --  8.4*  --   --  8.4*  --  8.0* 8.1*   < > 9.7   MAG  --   --  2.5*  --   --  2.2  --  1.6*  --   --  1.8   PHOS  --   --  3.3  --   --  3.7  --  5.6*  --   --   --    PROTTOTAL  --   --  5.0*  --   --  4.9*  --  4.7*  --   --  6.0*   ALBUMIN  --   --  2.0*  --   --  2.1*   --  1.9*  --   --  2.4*   BILITOTAL  --   --  0.3  --   --  0.3  --  0.4  --   --  0.7   ALKPHOS  --   --  82  --   --  89  --  91  --   --  125   AST  --   --  35  --   --  29  --  30  --   --  36   ALT  --   --  9  --   --  10  --  16  --   --  24    < > = values in this interval not displayed.     CBC  Recent Labs   Lab 04/11/25  0445 04/10/25  0519 04/09/25  0525 04/08/25  1800 04/08/25  0722   WBC 17.2* 19.8* 33.3*  --  35.9*   RBC 3.62* 3.49* 4.38*  --  5.02   HGB 11.0* 10.6* 13.4 13.9 15.5   HCT 33.6* 32.2* 39.5* 41 46.6   MCV 93 92 90  --  93   MCH 30.4 30.4 30.6  --  30.9   MCHC 32.7 32.9 33.9  --  33.3   RDW 14.6 14.6 13.9  --  14.1    244 334  --  301     INR  Recent Labs   Lab 04/07/25  0727 04/07/25  0425   INR 1.15 1.13     Arterial Blood Gas  Recent Labs   Lab 04/11/25  0755 04/10/25  0519 04/09/25  1257 04/08/25  2123 04/08/25  1908   PH 7.37  --  7.40 7.34* 7.23*   PCO2 37  --  30* 32* 45   PO2 130*  --  138* 143* 140*   HCO3 22  --  19* 17* 19*   O2PER 30 30 30 40 50           Jana Pink, MS4  [employer]          Physician Attestation   I, Drea Faustin MD, was present with the medical/SUKHWINDER student who participated in the service and in the documentation of the note.  I have verified the history and personally performed the physical exam and medical decision making.  I agree with the assessment and plan of care as documented in the note.      Key findings: Still with hypoxic respiratory failure. However meets criteria for trial of extubation. Will keep NPO until bowel movement and until we see whether his respiratory status stabilizes. Resuming heparin.    Critical Care Time: 35 minutes excluding time for procedures    Drea Faustin MD  Date of Service (when I saw the patient): 04/11/25

## 2025-04-11 NOTE — PROGRESS NOTES
1117: Patient completed SBT 5/8 30%, followed commands, and cuff checked for leak before patient was extubated to NC 3L.  Small amounts of thick cloudy secretions suctioned inline ETT before extubation and orally post-extubation. Patient able to demonstrate a strong productive cough. Lung sounds are clear/diminished, no stridor present. Respiratory will continue to monitor.    Vital signs post-extubation:  /53   Pulse 95   Temp 98.5  F (36.9  C) (Oral)   Resp 19   Ht 1.829 m (6')   Wt (!) 147.4 kg (324 lb 15.3 oz)   SpO2 100%   BMI 44.07 kg/m

## 2025-04-11 NOTE — PROGRESS NOTES
[facility] ICU RESPIRATORY NOTE        Date of Admission: 3/30/2025    Date of Intubation (most recent): 4/8/2025    Reason for Mechanical Ventilation: Ex Lap    Number of Days on Mechanical Ventilation: 4    Met Criteria for Spontaneous Breathing Trial: No    Reason for No Spontaneous Breathing Trial: per MD    Bite Block: No    ABG Results:   Recent Labs   Lab 04/10/25  0519 04/09/25  1257 04/08/25  2123 04/08/25  1908 04/08/25  1800   PH  --  7.40 7.34* 7.23* 7.23*   PCO2  --  30* 32* 45 39   PO2  --  138* 143* 140* 275*   HCO3  --  19* 17* 19* 16*   O2PER 30 30 40 50  --          Current Vent Settings: FiO2 (%): 30 %, Resp: 20, Vent Mode: CMV/AC, Resp Rate (Set): 18 breaths/min, Tidal Volume (Set, mL): 500 mL, PEEP (cm H2O): 8 cmH2O, Resp Rate (Set): 18 breaths/min, Tidal Volume (Set, mL): 500 mL, PEEP (cm H2O): 8 cmH2O    Skin Assessment: Intact    Plan: Continue full vent support assess readiness for SBT    RT Yvette on 4/11/2025 at 3:53 AM

## 2025-04-11 NOTE — PLAN OF CARE
Goal Outcome Evaluation:    Plan of Care Reviewed With: patient    Overall Patient Progress: no change    [facility]    ICU Nursing Progress Note     Major Event:     NEURO: RASS-1, PERRL, purposeful/localizing with stimulation.     CV: NSR, MAP >65. Doppler DP pulses.     RESP: AC FIO2 30% PEEP 8, LS-dim/clear. Min/moderate secretions.     GI: NPO, hypoactive BS.     : Mccarty exchanged d/t occlusion of sediment. 1L UOP.     SKIN: Midline abd incision to wound vac, L toe scab. L shin scab, sacral fissure.     BG: D50 given for BG of 48->up to 138.     GTTS: Prop @30     Lines/tubes/drains: OG @ 68cm, Wound vac-75mmHg, L PIV.  Mccarty Catheter: Yes  Central Lines: R CVC, R brachial arterial line.     Lab/Imaging: Unremarkable     Plan: Extubate      Problem: Adult Inpatient Plan of Care  Goal: Optimal Comfort and Wellbeing  Outcome: Not Progressing  Intervention: Provide Person-Centered Care  Recent Flowsheet Documentation  Taken 4/11/2025 0400 by Caitlin Maria RN  Trust Relationship/Rapport:   care explained   reassurance provided  Taken 4/11/2025 0000 by Caitlin Maria RN  Trust Relationship/Rapport:   care explained   reassurance provided  Taken 4/10/2025 2000 by Caitlin Maria RN  Trust Relationship/Rapport:   care explained   reassurance provided     Problem: Delirium  Goal: Improved Attention and Thought Clarity  Outcome: Not Progressing  Intervention: Maximize Cognitive Function  Recent Flowsheet Documentation  Taken 4/11/2025 0400 by Caitlin Maria RN  Sensory Stimulation Regulation:   care clustered   lighting decreased   quiet environment promoted  Reorientation Measures:   reorientation provided   clock in view  Taken 4/11/2025 0000 by Caitlin Maria, DAVID  Sensory Stimulation Regulation:   care clustered   lighting decreased   quiet environment promoted  Reorientation Measures:   reorientation provided   clock in view  Taken 4/10/2025 2000 by  "Caitlin Maria, DAVID  Sensory Stimulation Regulation:   care clustered   lighting decreased   quiet environment promoted  Reorientation Measures:   reorientation provided   clock in view     Problem: Adult Inpatient Plan of Care  Goal: Plan of Care Review  Description: The Plan of Care Review/Shift note should be completed every shift.  The Outcome Evaluation is a brief statement about your assessment that the patient is improving, declining, or no change.  This information will be displayed automatically on your shiftnote.  Outcome: Progressing  Flowsheets (Taken 4/11/2025 0528)  Plan of Care Reviewed With: patient  Overall Patient Progress: no change  Goal: Patient-Specific Goal (Individualized)  Description: You can add care plan individualizations to a care plan. Examples of Individualization might be:  \"Parent requests to be called daily at 9am for status\", \"I have a hard time hearing out of my right ear\", or \"Do not touch me to wake me up as it startlesme\".  Outcome: Progressing  Goal: Readiness for Transition of Care  Outcome: Progressing     Problem: Delirium  Goal: Optimal Coping  Outcome: Progressing  Intervention: Optimize Psychosocial Adjustment to Delirium  Recent Flowsheet Documentation  Taken 4/11/2025 0400 by Caitlin Maria, DAVID  Supportive Measures: relaxation techniques promoted  Taken 4/11/2025 0000 by Caitlin Maria RN  Supportive Measures: relaxation techniques promoted  Taken 4/10/2025 2000 by Caitlin Maria RN  Supportive Measures: relaxation techniques promoted  Goal: Improved Behavioral Control  Outcome: Progressing  Intervention: Prevent and Manage Agitation  Recent Flowsheet Documentation  Taken 4/11/2025 0400 by Caitlin Maria RN  Environment Familiarity/Consistency: daily routine followed  Taken 4/11/2025 0000 by Caitlin Maria RN  Environment Familiarity/Consistency: daily routine followed  Taken 4/10/2025 2000 by Caitlin Maria, " RN  Environment Familiarity/Consistency: daily routine followed  Intervention: Minimize Safety Risk  Recent Flowsheet Documentation  Taken 4/11/2025 0400 by Caitlin Maria RN  Enhanced Safety Measures: pain management  Trust Relationship/Rapport:   care explained   reassurance provided  Taken 4/11/2025 0000 by Caitlin Maria RN  Enhanced Safety Measures: pain management  Trust Relationship/Rapport:   care explained   reassurance provided  Taken 4/10/2025 2000 by Caitlin Maria RN  Enhanced Safety Measures: pain management  Trust Relationship/Rapport:   care explained   reassurance provided  Goal: Improved Sleep  Outcome: Progressing  Intervention: Promote Sleep  Recent Flowsheet Documentation  Taken 4/11/2025 0400 by Caitlin Maria RN  Sleep/Rest Enhancement:   comfort measures   relaxation techniques promoted  Taken 4/11/2025 0000 by Caitlin Maria RN  Sleep/Rest Enhancement:   comfort measures   relaxation techniques promoted  Taken 4/10/2025 2000 by Caitlin Maria RN  Sleep/Rest Enhancement:   comfort measures   relaxation techniques promoted     Problem: Dysrhythmia  Goal: Normalized Cardiac Rhythm  Outcome: Progressing  Intervention: Monitor and Manage Cardiac Rhythm Effect  Recent Flowsheet Documentation  Taken 4/11/2025 0400 by Caitlin Maria RN  VTE Prevention/Management: SCDs on (sequential compression devices)  Taken 4/11/2025 0000 by Caitlin Maria RN  VTE Prevention/Management: SCDs on (sequential compression devices)  Taken 4/10/2025 2000 by Caitlin Maria RN  VTE Prevention/Management: SCDs on (sequential compression devices)     Problem: Pain Acute  Goal: Optimal Pain Control and Function  Outcome: Progressing  Intervention: Optimize Psychosocial Wellbeing  Recent Flowsheet Documentation  Taken 4/11/2025 0400 by Caitlin Maria RN  Supportive Measures: relaxation techniques promoted  Taken 4/11/2025 0000 by Crow  Caitlin FLOYD RN  Supportive Measures: relaxation techniques promoted  Taken 4/10/2025 2000 by Caitlin Maria RN  Supportive Measures: relaxation techniques promoted  Intervention: Prevent or Manage Pain  Recent Flowsheet Documentation  Taken 4/11/2025 0400 by Cailtin Maria RN  Sensory Stimulation Regulation:   care clustered   lighting decreased   quiet environment promoted  Sleep/Rest Enhancement:   comfort measures   relaxation techniques promoted  Medication Review/Management: medications reviewed  Taken 4/11/2025 0000 by Caitlin Maria RN  Sensory Stimulation Regulation:   care clustered   lighting decreased   quiet environment promoted  Sleep/Rest Enhancement:   comfort measures   relaxation techniques promoted  Medication Review/Management: medications reviewed  Taken 4/10/2025 2000 by Caitlin Maria RN  Sensory Stimulation Regulation:   care clustered   lighting decreased   quiet environment promoted  Sleep/Rest Enhancement:   comfort measures   relaxation techniques promoted  Medication Review/Management: medications reviewed     Problem: Fall Injury Risk  Goal: Absence of Fall and Fall-Related Injury  Outcome: Progressing  Intervention: Identify and Manage Contributors  Recent Flowsheet Documentation  Taken 4/11/2025 0400 by Caitlin Maria RN  Medication Review/Management: medications reviewed  Taken 4/11/2025 0000 by Caitlin Maria RN  Medication Review/Management: medications reviewed  Taken 4/10/2025 2000 by Caitlin Maria RN  Medication Review/Management: medications reviewed  Intervention: Promote Injury-Free Environment  Recent Flowsheet Documentation  Taken 4/11/2025 0400 by Caitlin Maria RN  Safety Promotion/Fall Prevention:   activity supervised   lighting adjusted   clutter free environment maintained   safety round/check completed  Taken 4/11/2025 0000 by Caitlin Maria RN  Safety Promotion/Fall Prevention:   activity  supervised   lighting adjusted   clutter free environment maintained   safety round/check completed  Taken 4/10/2025 2000 by Caitlin Maria RN  Safety Promotion/Fall Prevention:   activity supervised   lighting adjusted   clutter free environment maintained   safety round/check completed     Problem: Infection  Goal: Absence of Infection Signs and Symptoms  Outcome: Progressing  Intervention: Prevent or Manage Infection  Recent Flowsheet Documentation  Taken 4/11/2025 0400 by Caitlin Maria RN  Infection Management:   aseptic technique maintained   cultures obtained and sent to lab  Taken 4/11/2025 0000 by Caitlin Maria RN  Infection Management:   aseptic technique maintained   cultures obtained and sent to lab  Taken 4/10/2025 2000 by Caitlin Maria RN  Infection Management:   aseptic technique maintained   cultures obtained and sent to lab     Problem: Restraint, Nonviolent  Goal: Absence of Harm or Injury  Outcome: Progressing  Intervention: Implement Least Restrictive Safety Strategies  Recent Flowsheet Documentation  Taken 4/11/2025 0400 by Caitlin Maria RN  Medical Device Protection: tubing secured  Taken 4/11/2025 0000 by Caitlin Maria RN  Medical Device Protection: tubing secured  Intervention: Protect Dignity, Rights and Personal Wellbeing  Recent Flowsheet Documentation  Taken 4/11/2025 0400 by Caitlin Maria RN  Trust Relationship/Rapport:   care explained   reassurance provided  Taken 4/11/2025 0000 by Caitlin Maria RN  Trust Relationship/Rapport:   care explained   reassurance provided  Taken 4/10/2025 2000 by Caitlin Maria RN  Trust Relationship/Rapport:   care explained   reassurance provided  Intervention: Protect Skin and Joint Integrity  Recent Flowsheet Documentation  Taken 4/11/2025 0400 by Caitlin Maria RN  Body Position:   turned   left   heels elevated   upper extremity elevated  Taken 4/11/2025 0200 by  Caitlin Maria RN  Body Position:   turned   right   heels elevated   upper extremity elevated  Taken 4/11/2025 0000 by Caitlin Maria RN  Body Position:   turned   left   heels elevated   upper extremity elevated  Taken 4/10/2025 2200 by Caitlin Maria RN  Body Position:   turned   right   heels elevated   upper extremity elevated  Taken 4/10/2025 2000 by Caitlin Maria RN  Body Position:   turned   left   heels elevated   upper extremity elevated     Problem: Adult Inpatient Plan of Care  Goal: Absence of Hospital-Acquired Illness or Injury  Intervention: Identify and Manage Fall Risk  Recent Flowsheet Documentation  Taken 4/11/2025 0400 by Caitlin Maria RN  Safety Promotion/Fall Prevention:   activity supervised   lighting adjusted   clutter free environment maintained   safety round/check completed  Taken 4/11/2025 0000 by Caitlin Maria RN  Safety Promotion/Fall Prevention:   activity supervised   lighting adjusted   clutter free environment maintained   safety round/check completed  Taken 4/10/2025 2000 by Caitlin Maria RN  Safety Promotion/Fall Prevention:   activity supervised   lighting adjusted   clutter free environment maintained   safety round/check completed  Intervention: Prevent Skin Injury  Recent Flowsheet Documentation  Taken 4/11/2025 0400 by Caitlin Maria RN  Body Position:   turned   left   heels elevated   upper extremity elevated  Taken 4/11/2025 0200 by Caitlin Maria RN  Body Position:   turned   right   heels elevated   upper extremity elevated  Taken 4/11/2025 0000 by Caitlin Maria RN  Body Position:   turned   left   heels elevated   upper extremity elevated  Taken 4/10/2025 2200 by Caitlin Maria RN  Body Position:   turned   right   heels elevated   upper extremity elevated  Taken 4/10/2025 2000 by Caitlin Maria RN  Body Position:   turned   left   heels elevated   upper extremity  elevated  Intervention: Prevent and Manage VTE (Venous Thromboembolism) Risk  Recent Flowsheet Documentation  Taken 4/11/2025 0400 by Caitlin Maria RN  VTE Prevention/Management: SCDs on (sequential compression devices)  Taken 4/11/2025 0000 by Caitlin Maria RN  VTE Prevention/Management: SCDs on (sequential compression devices)  Taken 4/10/2025 2000 by Caitlin Maria RN  VTE Prevention/Management: SCDs on (sequential compression devices)  Intervention: Prevent Infection  Recent Flowsheet Documentation  Taken 4/11/2025 0400 by Caitlin Maria RN  Infection Prevention:   cohorting utilized   environmental surveillance performed   rest/sleep promoted  Taken 4/11/2025 0000 by Caitlin Maria RN  Infection Prevention:   cohorting utilized   environmental surveillance performed   rest/sleep promoted  Taken 4/10/2025 2000 by Caitlin Maria RN  Infection Prevention:   cohorting utilized   environmental surveillance performed   rest/sleep promoted

## 2025-04-11 NOTE — PROGRESS NOTES
CLINICAL NUTRITION SERVICES - REASSESSMENT NOTE     Registered Dietitian Interventions:  - Diet advancement vs nutrition support in 2-3 days        INFORMATION OBTAINED  Chart reviewed, discussed in ICU rounds.     CURRENT NUTRITION ORDERS  Diet: NPO    CURRENT INTAKE/TOLERANCE  Pt was on a low fiber diet on 4/6, otherwise has been NPO or limited to clears x 12 day admission.      NEW FINDINGS  GI symptoms:  Last BM x2 on 4/7     Skin/wounds: Reviewed    Nutrition-relevant labs:   Mg 2.5 (H).   BGM <150  Nutrition-relevant medications: Reviewed  Weight: Significantly elevated from admission weight - 147.4 kg.     3/30:   1. Exploratory laparotomy   2. Small bowel enterotomy with removal of gallstone and primary closure   4/7: Cardioversion   4/8: RRT= Lactic acidosis suspect 2/2 severe sepsis vs. Acute congestive heart failure   4/8: EXPLORATORY LAPAROTOMY WITH SMALL BOWEL RESECTION   4/10: Returned to OR for washout and abdominal closing.   4/11: Extubated     ASSESSED NUTRITION NEEDS  Dosing Weight: 90 kg, based on adjusted wt  Estimated Energy Needs: 6010-4986 kcals/day (20 - 25 kcals/kg)  Justification: Obese  Estimated Protein Needs: 108-135+ grams protein/day (1.2 - 1.5+ grams of pro/kg)  Justification: Post-op  Estimated Fluid Needs: >1 mL/kcal/day   Justification: Maintenance    MALNUTRITION  % Intake: </= 50% for >/= 5 days (severe)  % Weight Loss: None noted  Subcutaneous Fat Loss: None observed - 4/6  Muscle Loss: None observed - 4/6  Fluid Accumulation/Edema: Mild, 1+ - do not suspect nutrition related   Malnutrition Diagnosis: Patient does not meet two of the established criteria necessary for diagnosing malnutrition    Previous Nutrition Diagnosis  Inadequate Oral Intake related to altered GI function as evidenced by 6 days NPO/CLD post op.   Evaluation: Declining    NUTRITION DIAGNOSIS  Inadequate oral intake related to altered GI function as evidenced by day 12 with negligible PO, largely NPO/CLD.      INTERVENTIONS  Collaboration by nutrition professional with other nutrition professionals    GOALS  Diet adv v nutrition support within 2-3 days.     MONITORING/EVALUATION  Progress toward goals will be monitored and evaluated per policy.    Mindy House RD, LD  Pager: [phone]  Available on [employer]

## 2025-04-11 NOTE — CONSULTS
"WOC Service Consult Note     S: Consulted on patient for abdominal NPWT management.   B: Patient s/p small bowel resection, left in discontinuity with return to the OR on 4/10 for small bowel anastomosis and fascial closure & NPWT placement.   A: NPWT initated 4/10 in the OR.   R: WOC to assist with routine NPWT dressing changes (M/W/F). Next dressing change planned for Monday, 4/14.    Lana Turk RN, CWON  Please contact via [employer] at group \"WOC nurse\"- M-F 8A-4P  "

## 2025-04-11 NOTE — PROGRESS NOTES
[facility] General Surgery Post-Op / Progress Note         Assessment and Plan:    Assessment:   Post-operative day #1  68yo male admitted on 3/30 for gallstone ileus. Underwent ex lap with removal of gallstone through small bowel enterotomy and was admitted to the floor post operatively. Patient had return of bowel function but new onset of Aflutter with RVR and underwent cardioversion on 4/7. Overnight 4/7 RRT called for hypotension and patient found to have elevated lactate, leukocytosis of 36. CT showing increased fluid and free air, started draining from midline incision. Patient taken to OR for ex lap, found to have gross sucus breakdown of enterotomy on 4/8. Patient on multiple pressors intraoperatively and decision was made to perform small bowel resection and leave in TAC and discontinuity. Patient RTOR 4/10 for small bowel anastomosis and fascial closure. Wound vac in place. Weaned off pressors post operatively.         Plan:   -No further plans for OR  -Wean to extubate per ICU  -Continue NGT to LIS, okay for meds, if unable to extubate recommend placement of corepack 4/12  -Continue IV Ab until leukocytosis resolves, afebrile, VSS-likely 7 day course  - Okay for heparin gtt  -WOC consult placed for wound vac management    Patient discussed with Dr. Landrum           Interval History:   Weaned off vasopressors overnight. Minimal vent settings. No BM post op. Adequate UOP            Review of Systems:    Unable to be completed due to intubation            Medications:   All medications related to the patient's surgery have been reviewed  Current Facility-Administered Medications   Medication Dose Route Frequency Provider Last Rate Last Admin    [Held by provider] acetaminophen (TYLENOL) tablet 975 mg  975 mg Oral or NG Tube Q8H Clau Calles MD        [Held by provider] atorvastatin (LIPITOR) tablet 80 mg  80 mg Oral or NG Tube Daily Khari Peña MD        benzocaine-menthol  (CHLORASEPTIC) 6-10 MG lozenge 1 lozenge  1 lozenge Buccal Q1H PRN Kalfitooff, Viviane, PA-C   1 lozenge at 04/01/25 0334    bisacodyl (DULCOLAX) suppository 10 mg  10 mg Rectal Daily PRN Kalthoff, Viviane, PA-C        chlorhexidine (PERIDEX) 0.12 % solution 15 mL  15 mL Mouth/Throat Q12H Kalthoff, Viviane, PA-C   15 mL at 04/11/25 0748    glucose gel 15-30 g  15-30 g Oral Q15 Min PRN Christineoff, Viviane, PA-C        Or    dextrose 50 % injection 25-50 mL  25-50 mL Intravenous Q15 Min PRN Kalthoff, Viviane, PA-C   25 mL at 04/11/25 0448    Or    glucagon injection 1 mg  1 mg Subcutaneous Q15 Min PRN Endythoff, Viviane, PA-C        diazepam (VALIUM) injection 2.5 mg  2.5 mg Intravenous Q6H PRN Endythoff, Viviane, PA-C        diphenhydrAMINE (BENADRYL) elixir 12.5 mg  12.5 mg Oral Q6H PRN Endythoff, Viviane, PA-C        Or    diphenhydrAMINE (BENADRYL) injection 12.5 mg  12.5 mg Intravenous Q6H PRN Kalthoff, Viviane, PA-C        [Held by provider] famotidine (PEPCID) tablet 20 mg  20 mg Oral BID Terry Patino MD   20 mg at 04/08/25 0805    fentaNYL (PF) (SUBLIMAZE) injection 50 mcg  50 mcg Intravenous Q1H PRN Kalthoff, Viviane, PA-C   50 mcg at 04/11/25 0816    fentaNYL (SUBLIMAZE) 50 mcg/mL bolus from pump  25 mcg Intravenous Q1H PRN Endythoff, Viviane, PA-C   25 mcg at 04/10/25 1632    fentaNYL (SUBLIMAZE) infusion   mcg/hr Intravenous Continuous Endythoff, Viviane, PA-C   Stopped at 04/11/25 0630    [Held by provider] folic acid (FOLVITE) tablet 1,000 mcg  1,000 mcg Oral or NG Tube BID Khari Peña MD        [Held by provider] furosemide (LASIX) tablet 20 mg  20 mg Oral Daily Mann Torres DO   20 mg at 04/04/25 1017    [Held by provider] gabapentin (NEURONTIN) tablet 800 mg  800 mg Oral TID PRN Clau Calles MD        HYDROmorphone (PF) (DILAUDID) injection 0.5 mg  0.5 mg Intravenous Q6H Drea Faustin MD   0.5 mg at 04/11/25 0430    [Held by provider] insulin aspart (NovoLOG)  injection (RAPID ACTING)  1-3 Units Subcutaneous TID AC Jake Blevins DO   1 Units at 04/08/25 0805    [Held by provider] insulin aspart (NovoLOG) injection (RAPID ACTING)  1-3 Units Subcutaneous At Bedtime Jake Blevins DO        lidocaine (LMX4) cream   Topical Q1H PRN Kalthoff, Viviane, PA-C        lidocaine 1 % 0.1-1 mL  0.1-1 mL Other Q1H PRN Kalthoff, Viviane, PA-C        magnesium hydroxide (MILK OF MAGNESIA) suspension 30 mL  30 mL Oral Daily PRN Kalthoff, Viviane, PA-C   30 mL at 04/01/25 0427    propofol (DIPRIVAN) infusion  5-75 mcg/kg/min Intravenous Continuous Kalthoff, Viviane, PA-C 25.9 mL/hr at 04/11/25 0646 30 mcg/kg/min at 04/11/25 0646    And    propofol (DIPRIVAN) bolus from bag or syringe pump  10 mg Intravenous Q15 Min PRN Kalthoff, Viviane, PA-C   10 mg at 04/11/25 0425    And    Medication Instruction   Does not apply Continuous PRN Kalthoff, Viviane, PA-C        methocarbamol (ROBAXIN) half-tab 250 mg  250 mg Oral Q6H PRN Kalthoff, Viviane, PA-C        metoprolol (LOPRESSOR) injection 5 mg  5 mg Intravenous Q4H PRN Kalthoff, Viviane, PA-C   5 mg at 04/06/25 0123    [Held by provider] metoprolol tartrate (LOPRESSOR) tablet 50 mg  50 mg Oral BID Fahad Wei MD   50 mg at 04/08/25 0806    naloxone (NARCAN) injection 0.2 mg  0.2 mg Intravenous Q2 Min PRN Kalthoff, Viviane, PA-C        Or    naloxone (NARCAN) injection 0.4 mg  0.4 mg Intravenous Q2 Min PRN Kalthoff, Viviane, PA-C        Or    naloxone (NARCAN) injection 0.2 mg  0.2 mg Intramuscular Q2 Min PRN Kalthoff, Viviane, PA-C        Or    naloxone (NARCAN) injection 0.4 mg  0.4 mg Intramuscular Q2 Min PRN Kalthoff, Viviane, PA-C        nicotine (COMMIT) lozenge 2 mg  2 mg Buccal Q1H PRN Kalthowen Viviane, PA-C   2 mg at 04/03/25 1145    nicotine (NICODERM CQ) 21 MG/24HR 24 hr patch 1 patch  1 patch Transdermal Daily Endythowen, Viviane, PA-C   1 patch at 04/11/25 0801    norepinephrine (LEVOPHED) 4 mg in   mL infusion PREMIX  0.01-0.6 mcg/kg/min Intravenous Continuous Kalthoff, Viviane, PA-C   Stopped at 04/10/25 1622    ondansetron (ZOFRAN ODT) ODT tab 4 mg  4 mg Oral Q6H PRN Kalthoff, Viviane, PA-C   4 mg at 04/08/25 0208    Or    ondansetron (ZOFRAN) injection 4 mg  4 mg Intravenous Q6H PRN Kalthoff, Viviane, PA-C   4 mg at 04/08/25 0901    pantoprazole (PROTONIX) 2 mg/mL suspension 40 mg  40 mg Per Feeding Tube QAM AC Kalthoff, Viviane, PA-C        Or    pantoprazole (PROTONIX) IV push injection 40 mg  40 mg Intravenous QAM AC Kalthoff, Viviane, PA-C   40 mg at 04/11/25 0748    phenol (CHLORASEPTIC) 1.4 % spray 1 mL  1 spray Mouth/Throat Q1H PRN Kalthoff, Viviane, PA-C        piperacillin-tazobactam (ZOSYN) 4.5 g vial to attach to  mL bag  4.5 g Intravenous Q6H Kalthoff, Viviane, PA-C   4.5 g at 04/11/25 0521    [Held by provider] polyethylene glycol (MIRALAX) Packet 17 g  17 g Oral Daily Clau Calles MD   17 g at 04/08/25 0805    prochlorperazine (COMPAZINE) injection 5 mg  5 mg Intravenous Q6H PRN Kalthoff, Viviane, PA-C   5 mg at 04/08/25 1103    Or    prochlorperazine (COMPAZINE) tablet 5 mg  5 mg Oral Q6H PRN Kalthoff, Viviane, PA-C   5 mg at 04/03/25 0756    sodium chloride (PF) 0.9% PF flush 3 mL  3 mL Intracatheter Q8H MITA Kalthoff, Viviane, PA-C   3 mL at 04/11/25 0525    sodium chloride (PF) 0.9% PF flush 3 mL  3 mL Intracatheter q1 min prn Kalthoff, Viviane, PA-C        [Held by provider] tamsulosin (FLOMAX) capsule 0.8 mg  0.8 mg Oral QPM Khari Peña MD   0.8 mg at 04/07/25 2031    traMADol (ULTRAM) tablet 50 mg  50 mg Oral Q6H PRN Dameon, Viviane, PA-C   50 mg at 04/08/25 0806    vasopressin 0.2 units/mL in NS (PITRESSIN) standard conc infusion  2.4 Units/hr Intravenous Continuous Kalthoff, Viviane, PA-C   Stopped at 04/10/25 0651             Physical Exam:   All VSS, soft BP but adequate MAP  General: lying in bed intubated and sedated, awakens to  touch  CV: RRR, distal pulses intact  Pul: intubated, equal chest rise  Abd: soft, obese, wound vac in place with 200cc serous output, NGT with minimal bilious output, Mccarty in with moderate CYU  Skin: warm, dry, 2+ LE edema, no rashes          Data:   CBC RESULTS:   Recent Labs   Lab Test 04/11/25  0445   WBC 17.2*   RBC 3.62*   HGB 11.0*   HCT 33.6*   MCV 93   MCH 30.4   MCHC 32.7   RDW 14.6            Last Comprehensive Metabolic Panel:  Lab Results   Component Value Date     04/11/2025    POTASSIUM 4.6 04/11/2025    CHLORIDE 105 04/11/2025    CO2 21 (L) 04/11/2025    ANIONGAP 10 04/11/2025     (H) 04/11/2025    BUN 19.7 04/11/2025    CR 1.09 04/11/2025    GFRESTIMATED 74 04/11/2025    HALEY 8.4 (L) 04/11/2025       Last Arterial Blood Gas:  pH Arterial   Date Value Ref Range Status   04/11/2025 7.37 7.35 - 7.45 Final     pCO2 Arterial   Date Value Ref Range Status   04/11/2025 37 35 - 45 mm Hg Final     pO2 Arterial   Date Value Ref Range Status   04/11/2025 130 (H) 80 - 105 mm Hg Final     Bicarbonate Arterial   Date Value Ref Range Status   04/11/2025 22 21 - 28 mmol/L Final     O2 Sat, Arterial   Date Value Ref Range Status   04/11/2025 99.8 (H) 95.0 - 96.0 % Final     Base Excess/Deficit Arterial   Date Value Ref Range Status   04/11/2025 -3.0 -3.0 - 3.0 mmol/L Final       Patient discussed with Dr. Diallo Dougherty MD  Surgery, PGY4

## 2025-04-11 NOTE — PROCEDURES
Operative Narrative:     PREOPERATIVE DIAGNOSIS: Open abdomen and small bowel discontinuity     POSTOPERATIVE DIAGNOSIS:  Same     PROCEDURE PERFORMED:  Exploratory laparotomy  Small bowel anastomosis  Fascial closure     ANESTHESIA: General.     COMPLICATIONS: None.     ESTIMATED BLOOD LOSS: 25 mL     INDICATIONS FOR THIS PROCEDURE: 69 year old man who underwent small bowel resection at enterotomy (intentional) closure, was left in discontinuity until off pressors; returns for anastomosis today.     PROCEDURE: The patient was taken to the Operative Suite and placed in the supine position, under general anesthesia.       The abdomen was prepped and draped in the usual sterile fashion. The Abthera device/sponges were removed.  The small intestine was followed to the stapled ends.  The bowel was run and no additional pathology was identified.  The bowel was viable.  The abdomen was irrigated with warm saline.  The limbs were oriented in parallel, without rotation.  They were approximated with silk stitches.  Then, intentional enterotomies were made in each limb.      A 100 mm AMMY stapler was used to create a functional end to end anastomosis.  It was widely patent by palpation.  The common enterotomy was stapled off using a 75 mm AMMY.  The specimens were sent for pathology.    The mesenteric defect was closed with interrupted 3-0 chromic suture.    The abdominal cavity was irrigated with sterile saline. The fascia was closed with two running looped 0- PDS sutures.  There were short durations of increased peak pressures during closures that resolved spontaneously.     The decision was made that drain placement was unlikely to afford any benefit.   The skin and subcutaneous tissues were closed by using standard KCI drapes and sponge, as a VAC dressing.     He was kept intubated and returned to the ICU for further cares.

## 2025-04-11 NOTE — PROGRESS NOTES
"   04/11/25 1604   Appointment Info   Signing Clinician's Name / Credentials (PT) Shy Lo, PT, DPT   Rehab Comments (PT) re-evaluation   Living Environment   People in Home spouse   Current Living Arrangements house   Home Accessibility stairs to enter home;stairs within home   Number of Stairs, Main Entrance 3   Stair Railings, Main Entrance railings on both sides of stairs   Number of Stairs, Within Home, Primary greater than 10 stairs   Stair Railings, Within Home, Primary railing on right side (ascending)   Transportation Anticipated family or friend will provide   Self-Care   Usual Activity Tolerance good   Current Activity Tolerance poor   Regular Exercise Yes   Activity/Exercise Type biking;strength training   Exercise Amount/Frequency 3-5 times/wk   Fall history within last six months no   Activity/Exercise/Self-Care Comment subjective per chart and wife present, Mya. Since hospital stay, most recent therapy appointment was on 4/2 where he was ambulatory. Since then has not been medically appropriate for therapies.   General Information   Onset of Illness/Injury or Date of Surgery 03/31/25   Cognition   Cognitive Status Comments can follow some very basic commands. overall confused, states \"help me\" over and over but can also respond to some questions. can't specify further than 'help me'. Wife reports that this is what he was like after his open heart surgery a few years ago.   Pain Assessment   Patient Currently in Pain   (abdominal pain)   Integumentary/Edema   Integumentary/Edema Comments wound vac   Posture    Posture Comments affected by trunk weakness   Range of Motion (ROM)   Range of Motion ROM deficits secondary to surgical procedure;ROM deficits secondary to pain;ROM deficits secondary to weakness   Strength (Manual Muscle Testing)   Strength Comments can lift either arm against gravity, unable to lift either leg. Wiggly in bed, appears to have some ankle and knee strength   Bed Mobility "   Comment, (Bed Mobility) max assist of 2   Transfers   Comment, (Transfers) NT d/t weakness, confusion   Balance   Balance Comments fair seated balance   Sensory Examination   Sensory Perception Comments per chart pt has neuropathy at baseline. per wife, pt unable to feel his feet   Clinical Impression   Criteria for Skilled Therapeutic Intervention Yes, treatment indicated   PT Diagnosis (PT) impaired functional mobility   Influenced by the following impairments decreased activity tolerance, strength, balance   Functional limitations due to impairments bed mobility, transfers, gait, stairs   Clinical Presentation (PT Evaluation Complexity) evolving   Clinical Presentation Rationale clinical judgement   Clinical Decision Making (Complexity) moderate complexity   Planned Therapy Interventions (PT) bed mobility training;balance training;gait training;strengthening;stair training;transfer training   Risk & Benefits of therapy have been explained evaluation/treatment results reviewed;care plan/treatment goals reviewed;risks/benefits reviewed;current/potential barriers reviewed;participants voiced agreement with care plan;participants included;patient;spouse/significant other   PT Total Evaluation Time   PT Eval, Re-eval Minutes (31370) 15   Physical Therapy Goals   PT Frequency 6x/week   PT Predicted Duration/Target Date for Goal Attainment 04/18/25   PT Goals Bed Mobility;Transfers;Gait;Stairs   PT: Bed Mobility Modified independent;Supine to/from sit;Rolling   PT: Transfers Modified independent;Sit to/from stand;Bed to/from chair;Assistive device   PT: Gait Supervision/stand-by assist;50 feet   Therapeutic Activity   Therapeutic Activities: dynamic activities to improve functional performance Minutes (87749) 14   Symptoms Noted During/After Treatment Fatigue;Increased pain   Treatment Detail/Skilled Intervention Pt in supine, on room air. Stating 'help me' frequently regardless of what's happening, but can say yes/no  to questions. When prompted, pt unable to state what's wrong. Agrees he has pain. At first unable to identify his wife, but by end of session was calling her 'honey'. On room air, has NG tube, art line. Pt able to lift arms against gravity and reach for PT's hand and squeeze. Unable to lift legs. Supine to seated EOB with max assist of 2, pt able to squeeze and pull with hand but quite weak. Max assist of 2 to situate with feet flat on floor and hips even. Once situation pt able to sit upright with CGA-min assist. Sits with flexed spinal posture, continues to state 'help me' but less frequently with wife in front. Pt re-orienated a few times to location, time, situation. Pt not participating overall well enough to try standing yet safely, pt appears to be getting fatigued and agrees he would like to lay down. Max assist of 2 to lie down and total assist to scoot and situate with pillows. Responds better to wife's questions and comments better by the end of session.   PT Discharge Planning   PT Plan bed mobility, trial ludwin cash. consider bringing OT on board   PT Discharge Recommendation (DC Rec) Transitional Care Facility   PT Rationale for DC Rec Re-evaluated today for PT, pt now post-op abdomen closure, extubated this morning. Hasn't been seen by PT since 4/2. Pt able to participate in therapy today, able to sit up at edge of bed. He now presents with confusion, decreased global strength, decreased activity tolerance. Patient will benefit from continued physical therapy to address this, likely from TCU setting when ready for discharge.   PT Brief overview of current status max assist of 2 bed mobility   PT Total Distance Amb During Session (feet) 0   Physical Therapy Time and Intention   Timed Code Treatment Minutes 14   Total Session Time (sum of timed and untimed services) 29

## 2025-04-11 NOTE — PLAN OF CARE
Goal Outcome Evaluation:      Plan of Care Reviewed With: spouse    Overall Patient Progress: improvingOverall Patient Progress: improving    Outcome Evaluation: Assumed cares from [id number]. Pt extubated at 1117 and titrated to room air. Post extubation, pt was able Alert to self only and remains confused. Added on seroquel/melatonin. PRN seroquel for agitation. Meeting blood pressure goals without use of pressors. NG to LIS; 300mL dark bile out this shift. Mccarty in place for retention; adequate UOP. Plan to pull lines tomorrow and possibly transfer out.      Problem: Delirium  Goal: Improved Behavioral Control  Outcome: Not Progressing  Intervention: Minimize Safety Risk  Recent Flowsheet Documentation  Taken 4/11/2025 1600 by Daxa Lema, RN  Enhanced Safety Measures: pain management  Trust Relationship/Rapport:   care explained   choices provided   reassurance provided   thoughts/feelings acknowledged  Taken 4/11/2025 1200 by Daxa Lema, RN  Enhanced Safety Measures: pain management  Trust Relationship/Rapport:   care explained   choices provided   reassurance provided   thoughts/feelings acknowledged  Taken 4/11/2025 0800 by Daxa Lema, RN  Enhanced Safety Measures: pain management  Trust Relationship/Rapport:   care explained   choices provided   reassurance provided   thoughts/feelings acknowledged     Problem: Adult Inpatient Plan of Care  Goal: Plan of Care Review  Description: The Plan of Care Review/Shift note should be completed every shift.  The Outcome Evaluation is a brief statement about your assessment that the patient is improving, declining, or no change.  This information will be displayed automatically on your shiftnote.  Outcome: Progressing  Flowsheets (Taken 4/11/2025 1836)  Outcome Evaluation:   Assumed cares from 0597-1746. Pt extubated at 1117 and titrated to room air. Post extubation, pt was able Alert to self only and remains confused. Added on seroquel/melatonin. PRN seroquel  for agitation. Meeting blood pressure goals without use of pressors. NG to LIS   300mL dark bile out this shift. Mccarty in place for retention   adequate UOP. Plan to pull lines tomorrow and possibly transfer out.  Plan of Care Reviewed With: spouse  Overall Patient Progress: improving  Goal: Optimal Comfort and Wellbeing  Outcome: Progressing  Intervention: Provide Person-Centered Care  Recent Flowsheet Documentation  Taken 4/11/2025 1600 by Daxa Lema, RN  Trust Relationship/Rapport:   care explained   choices provided   reassurance provided   thoughts/feelings acknowledged  Taken 4/11/2025 1200 by Daxa Lema, RN  Trust Relationship/Rapport:   care explained   choices provided   reassurance provided   thoughts/feelings acknowledged  Taken 4/11/2025 0800 by Daxa Lema, RN  Trust Relationship/Rapport:   care explained   choices provided   reassurance provided   thoughts/feelings acknowledged  Goal: Readiness for Transition of Care  Outcome: Progressing

## 2025-04-12 ENCOUNTER — APPOINTMENT (OUTPATIENT)
Dept: PHYSICAL THERAPY | Facility: CLINIC | Age: 70
DRG: 329 | End: 2025-04-12
Payer: COMMERCIAL

## 2025-04-12 LAB
ALBUMIN SERPL BCG-MCNC: 2.1 G/DL (ref 3.5–5.2)
ALP SERPL-CCNC: 80 U/L (ref 40–150)
ALT SERPL W P-5'-P-CCNC: 10 U/L (ref 0–70)
ANION GAP SERPL CALCULATED.3IONS-SCNC: 12 MMOL/L (ref 7–15)
AST SERPL W P-5'-P-CCNC: 34 U/L (ref 0–45)
BILIRUB SERPL-MCNC: 0.4 MG/DL
BUN SERPL-MCNC: 13 MG/DL (ref 8–23)
CALCIUM SERPL-MCNC: 8.4 MG/DL (ref 8.8–10.4)
CHLORIDE SERPL-SCNC: 109 MMOL/L (ref 98–107)
CREAT SERPL-MCNC: 0.78 MG/DL (ref 0.67–1.17)
EGFRCR SERPLBLD CKD-EPI 2021: >90 ML/MIN/1.73M2
ERYTHROCYTE [DISTWIDTH] IN BLOOD BY AUTOMATED COUNT: 14.5 % (ref 10–15)
GLUCOSE BLDC GLUCOMTR-MCNC: 100 MG/DL (ref 70–99)
GLUCOSE BLDC GLUCOMTR-MCNC: 107 MG/DL (ref 70–99)
GLUCOSE BLDC GLUCOMTR-MCNC: 112 MG/DL (ref 70–99)
GLUCOSE BLDC GLUCOMTR-MCNC: 96 MG/DL (ref 70–99)
GLUCOSE BLDC GLUCOMTR-MCNC: 96 MG/DL (ref 70–99)
GLUCOSE SERPL-MCNC: 112 MG/DL (ref 70–99)
HCO3 SERPL-SCNC: 20 MMOL/L (ref 22–29)
HCT VFR BLD AUTO: 31.7 % (ref 40–53)
HGB BLD-MCNC: 10.3 G/DL (ref 13.3–17.7)
MAGNESIUM SERPL-MCNC: 2.4 MG/DL (ref 1.7–2.3)
MCH RBC QN AUTO: 30.2 PG (ref 26.5–33)
MCHC RBC AUTO-ENTMCNC: 32.5 G/DL (ref 31.5–36.5)
MCV RBC AUTO: 93 FL (ref 78–100)
PHOSPHATE SERPL-MCNC: 2.2 MG/DL (ref 2.5–4.5)
PLATELET # BLD AUTO: 220 10E3/UL (ref 150–450)
POTASSIUM SERPL-SCNC: 3.8 MMOL/L (ref 3.4–5.3)
PROT SERPL-MCNC: 5.4 G/DL (ref 6.4–8.3)
RBC # BLD AUTO: 3.41 10E6/UL (ref 4.4–5.9)
SODIUM SERPL-SCNC: 141 MMOL/L (ref 135–145)
UFH PPP CHRO-ACNC: 0.13 IU/ML
UFH PPP CHRO-ACNC: 0.13 IU/ML
UFH PPP CHRO-ACNC: <0.1 IU/ML
WBC # BLD AUTO: 12.5 10E3/UL (ref 4–11)

## 2025-04-12 PROCEDURE — 85520 HEPARIN ASSAY: CPT | Performed by: STUDENT IN AN ORGANIZED HEALTH CARE EDUCATION/TRAINING PROGRAM

## 2025-04-12 PROCEDURE — 85520 HEPARIN ASSAY: CPT | Performed by: INTERNAL MEDICINE

## 2025-04-12 PROCEDURE — 120N000001 HC R&B MED SURG/OB

## 2025-04-12 PROCEDURE — 36415 COLL VENOUS BLD VENIPUNCTURE: CPT | Performed by: STUDENT IN AN ORGANIZED HEALTH CARE EDUCATION/TRAINING PROGRAM

## 2025-04-12 PROCEDURE — 250N000013 HC RX MED GY IP 250 OP 250 PS 637: Performed by: PHYSICIAN ASSISTANT

## 2025-04-12 PROCEDURE — 250N000011 HC RX IP 250 OP 636: Performed by: PHYSICIAN ASSISTANT

## 2025-04-12 PROCEDURE — 999N000040 HC STATISTIC CONSULT NO CHARGE VASC ACCESS

## 2025-04-12 PROCEDURE — 250N000011 HC RX IP 250 OP 636: Mod: JZ | Performed by: SURGERY

## 2025-04-12 PROCEDURE — 85027 COMPLETE CBC AUTOMATED: CPT | Performed by: PHYSICIAN ASSISTANT

## 2025-04-12 PROCEDURE — 84100 ASSAY OF PHOSPHORUS: CPT | Performed by: PHYSICIAN ASSISTANT

## 2025-04-12 PROCEDURE — 999N000128 HC STATISTIC PERIPHERAL IV START W/O US GUIDANCE

## 2025-04-12 PROCEDURE — 83735 ASSAY OF MAGNESIUM: CPT | Performed by: PHYSICIAN ASSISTANT

## 2025-04-12 PROCEDURE — 999N000127 HC STATISTIC PERIPHERAL IV START W US GUIDANCE

## 2025-04-12 PROCEDURE — 99232 SBSQ HOSP IP/OBS MODERATE 35: CPT | Mod: 24 | Performed by: STUDENT IN AN ORGANIZED HEALTH CARE EDUCATION/TRAINING PROGRAM

## 2025-04-12 PROCEDURE — 250N000013 HC RX MED GY IP 250 OP 250 PS 637: Performed by: STUDENT IN AN ORGANIZED HEALTH CARE EDUCATION/TRAINING PROGRAM

## 2025-04-12 PROCEDURE — 250N000011 HC RX IP 250 OP 636: Performed by: INTERNAL MEDICINE

## 2025-04-12 PROCEDURE — 99233 SBSQ HOSP IP/OBS HIGH 50: CPT | Performed by: HOSPITALIST

## 2025-04-12 PROCEDURE — 84155 ASSAY OF PROTEIN SERUM: CPT | Performed by: PHYSICIAN ASSISTANT

## 2025-04-12 PROCEDURE — 250N000013 HC RX MED GY IP 250 OP 250 PS 637

## 2025-04-12 PROCEDURE — 97530 THERAPEUTIC ACTIVITIES: CPT | Mod: GP | Performed by: PHYSICAL THERAPIST

## 2025-04-12 RX ORDER — QUETIAPINE FUMARATE 25 MG/1
25 TABLET, FILM COATED ORAL 2 TIMES DAILY
Status: DISCONTINUED | OUTPATIENT
Start: 2025-04-12 | End: 2025-04-23

## 2025-04-12 RX ADMIN — HYDROMORPHONE HYDROCHLORIDE 0.5 MG: 1 INJECTION, SOLUTION INTRAMUSCULAR; INTRAVENOUS; SUBCUTANEOUS at 03:00

## 2025-04-12 RX ADMIN — NICOTINE 1 PATCH: 21 PATCH, EXTENDED RELEASE TRANSDERMAL at 09:11

## 2025-04-12 RX ADMIN — METHOCARBAMOL 250 MG: 500 TABLET ORAL at 00:42

## 2025-04-12 RX ADMIN — PIPERACILLIN AND TAZOBACTAM 4.5 G: 4; .5 INJECTION, POWDER, FOR SOLUTION INTRAVENOUS at 17:24

## 2025-04-12 RX ADMIN — METHOCARBAMOL 250 MG: 500 TABLET ORAL at 17:14

## 2025-04-12 RX ADMIN — Medication 10 MG: at 00:42

## 2025-04-12 RX ADMIN — PIPERACILLIN AND TAZOBACTAM 4.5 G: 4; .5 INJECTION, POWDER, FOR SOLUTION INTRAVENOUS at 05:48

## 2025-04-12 RX ADMIN — QUETIAPINE FUMARATE 25 MG: 25 TABLET ORAL at 12:03

## 2025-04-12 RX ADMIN — PIPERACILLIN AND TAZOBACTAM 4.5 G: 4; .5 INJECTION, POWDER, FOR SOLUTION INTRAVENOUS at 00:24

## 2025-04-12 RX ADMIN — PIPERACILLIN AND TAZOBACTAM 4.5 G: 4; .5 INJECTION, POWDER, FOR SOLUTION INTRAVENOUS at 23:39

## 2025-04-12 RX ADMIN — TRAMADOL HYDROCHLORIDE 50 MG: 50 TABLET, COATED ORAL at 17:14

## 2025-04-12 RX ADMIN — HYDROMORPHONE HYDROCHLORIDE 0.5 MG: 1 INJECTION, SOLUTION INTRAMUSCULAR; INTRAVENOUS; SUBCUTANEOUS at 11:49

## 2025-04-12 RX ADMIN — HEPARIN SODIUM 1800 UNITS/HR: 10000 INJECTION, SOLUTION INTRAVENOUS at 13:37

## 2025-04-12 RX ADMIN — TRAMADOL HYDROCHLORIDE 50 MG: 50 TABLET, COATED ORAL at 05:57

## 2025-04-12 RX ADMIN — PIPERACILLIN AND TAZOBACTAM 4.5 G: 4; .5 INJECTION, POWDER, FOR SOLUTION INTRAVENOUS at 12:04

## 2025-04-12 RX ADMIN — HYDROMORPHONE HYDROCHLORIDE 0.5 MG: 1 INJECTION, SOLUTION INTRAMUSCULAR; INTRAVENOUS; SUBCUTANEOUS at 15:40

## 2025-04-12 RX ADMIN — QUETIAPINE FUMARATE 25 MG: 25 TABLET ORAL at 21:51

## 2025-04-12 RX ADMIN — HEPARIN SODIUM 2100 UNITS/HR: 10000 INJECTION, SOLUTION INTRAVENOUS at 23:37

## 2025-04-12 RX ADMIN — HEPARIN SODIUM 1500 UNITS/HR: 10000 INJECTION, SOLUTION INTRAVENOUS at 00:23

## 2025-04-12 RX ADMIN — PANTOPRAZOLE SODIUM 40 MG: 40 INJECTION, POWDER, FOR SOLUTION INTRAVENOUS at 09:08

## 2025-04-12 ASSESSMENT — ACTIVITIES OF DAILY LIVING (ADL)
ADLS_ACUITY_SCORE: 52
ADLS_ACUITY_SCORE: 56
ADLS_ACUITY_SCORE: 52
ADLS_ACUITY_SCORE: 48
ADLS_ACUITY_SCORE: 56
ADLS_ACUITY_SCORE: 48
ADLS_ACUITY_SCORE: 56
ADLS_ACUITY_SCORE: 52
ADLS_ACUITY_SCORE: 52
ADLS_ACUITY_SCORE: 56
ADLS_ACUITY_SCORE: 48
ADLS_ACUITY_SCORE: 56
ADLS_ACUITY_SCORE: 52
ADLS_ACUITY_SCORE: 56
ADLS_ACUITY_SCORE: 52
ADLS_ACUITY_SCORE: 56
ADLS_ACUITY_SCORE: 56
ADLS_ACUITY_SCORE: 52
ADLS_ACUITY_SCORE: 56
ADLS_ACUITY_SCORE: 52
ADLS_ACUITY_SCORE: 52

## 2025-04-12 NOTE — PROGRESS NOTES
Patient seen and examined.  Still somewhat confused.  Complains of abdominal pain but abdominal exam is fairly benign.  VAC in place, no signs of surrounding cellulitis.  Back drainage is serosanguineous.  Relatively scant NG tube outputs.  Recommend clamping trials today.  If patient does not have high residuals, we may consider removing the NG tube tomorrow and considering a clear liquid diet.  Robi Mason MD  General Surgery, Office [phone]

## 2025-04-12 NOTE — PLAN OF CARE
"  Problem: Adult Inpatient Plan of Care  Goal: Plan of Care Review  Description: The Plan of Care Review/Shift note should be completed every shift.  The Outcome Evaluation is a brief statement about your assessment that the patient is improving, declining, or no change.  This information will be displayed automatically on your shiftnote.  Outcome: Progressing  Flowsheets (Taken 4/12/2025 1844)  Outcome Evaluation: Pt remains confused, intermittently following commands, generalized weakness but moves all extremities equally. A-line and CVC removed today. Endorses pain in abdomen, given Dilaudid, robaxin, and tramadol, reports improvement. VSS on RA. LS dim. NPO ex meds. BS hypoactive. Small BM today. Mccarty with adequate urine output, sediments visible. NG to LIS. 225ml out all day. Heparin bolus given and infusion increased to 1800. Abd wound vac with 240 ml serosanganious output. Up in chair for 1 hour. Plan for heparin redraw around 1930. Pt transferred to gen surg. Mya notified.  Plan of Care Reviewed With:   patient   spouse  Overall Patient Progress: improving  Goal: Patient-Specific Goal (Individualized)  Description: You can add care plan individualizations to a care plan. Examples of Individualization might be:  \"Parent requests to be called daily at 9am for status\", \"I have a hard time hearing out of my right ear\", or \"Do not touch me to wake me up as it startlesme\".  Outcome: Progressing  Goal: Optimal Comfort and Wellbeing  Outcome: Progressing  Intervention: Provide Person-Centered Care  Recent Flowsheet Documentation  Taken 4/12/2025 1200 by Ainsley Polk, RN  Trust Relationship/Rapport:   care explained   choices provided   reassurance provided   thoughts/feelings acknowledged  Taken 4/12/2025 0800 by Ainsley Polk, RN  Trust Relationship/Rapport:   care explained   choices provided   reassurance provided   thoughts/feelings acknowledged  Goal: Readiness for Transition of Care  Outcome: " Progressing     Problem: Delirium  Goal: Optimal Coping  Outcome: Progressing  Intervention: Optimize Psychosocial Adjustment to Delirium  Recent Flowsheet Documentation  Taken 4/12/2025 1200 by Ainsley Polk RN  Supportive Measures: relaxation techniques promoted  Taken 4/12/2025 0800 by Ainsley Polk RN  Supportive Measures: relaxation techniques promoted  Goal: Improved Behavioral Control  Outcome: Progressing  Intervention: Prevent and Manage Agitation  Recent Flowsheet Documentation  Taken 4/12/2025 1200 by Ainsley Pokl RN  Environment Familiarity/Consistency: daily routine followed  Taken 4/12/2025 0800 by Ainsley Polk RN  Environment Familiarity/Consistency: daily routine followed  Intervention: Minimize Safety Risk  Recent Flowsheet Documentation  Taken 4/12/2025 1200 by Ainsley Polk RN  Enhanced Safety Measures: pain management  Trust Relationship/Rapport:   care explained   choices provided   reassurance provided   thoughts/feelings acknowledged  Taken 4/12/2025 0800 by Ainsley Polk RN  Enhanced Safety Measures: pain management  Trust Relationship/Rapport:   care explained   choices provided   reassurance provided   thoughts/feelings acknowledged  Goal: Improved Attention and Thought Clarity  Outcome: Progressing  Intervention: Maximize Cognitive Function  Recent Flowsheet Documentation  Taken 4/12/2025 1200 by Ainsley Polk RN  Sensory Stimulation Regulation:   care clustered   lighting decreased   quiet environment promoted  Reorientation Measures:   reorientation provided   clock in view  Taken 4/12/2025 0800 by Ainsley Polk RN  Sensory Stimulation Regulation:   care clustered   lighting decreased   quiet environment promoted  Reorientation Measures:   reorientation provided   clock in view  Goal: Improved Sleep  Outcome: Progressing     Problem: Dysrhythmia  Goal: Normalized Cardiac Rhythm  Outcome: Progressing  Intervention: Monitor and Manage Cardiac Rhythm Effect  Recent  Flowsheet Documentation  Taken 4/12/2025 1200 by Ainsley Polk RN  VTE Prevention/Management: SCDs on (sequential compression devices)  Taken 4/12/2025 0800 by Ainsley Polk RN  VTE Prevention/Management: SCDs on (sequential compression devices)     Problem: Pain Acute  Goal: Optimal Pain Control and Function  Outcome: Progressing  Intervention: Optimize Psychosocial Wellbeing  Recent Flowsheet Documentation  Taken 4/12/2025 1200 by Ainsley Polk RN  Supportive Measures: relaxation techniques promoted  Taken 4/12/2025 0800 by Ainsley Polk RN  Supportive Measures: relaxation techniques promoted  Intervention: Prevent or Manage Pain  Recent Flowsheet Documentation  Taken 4/12/2025 1200 by Ainsley Polk RN  Sensory Stimulation Regulation:   care clustered   lighting decreased   quiet environment promoted  Medication Review/Management: medications reviewed  Taken 4/12/2025 0800 by Ainsley Polk RN  Sensory Stimulation Regulation:   care clustered   lighting decreased   quiet environment promoted  Medication Review/Management: medications reviewed     Problem: Fall Injury Risk  Goal: Absence of Fall and Fall-Related Injury  Outcome: Progressing  Intervention: Identify and Manage Contributors  Recent Flowsheet Documentation  Taken 4/12/2025 1200 by Ainsley Polk RN  Medication Review/Management: medications reviewed  Taken 4/12/2025 0800 by Ainsley Polk RN  Medication Review/Management: medications reviewed  Intervention: Promote Injury-Free Environment  Recent Flowsheet Documentation  Taken 4/12/2025 1200 by Ainsley Polk RN  Safety Promotion/Fall Prevention:   lighting adjusted   room door open   room organization consistent  Taken 4/12/2025 0800 by Ainsley Polk RN  Safety Promotion/Fall Prevention:   lighting adjusted   room door open   room organization consistent     Problem: Infection  Goal: Absence of Infection Signs and Symptoms  Outcome: Progressing     Problem: Restraint, Nonviolent  Goal:  Absence of Harm or Injury  Outcome: Progressing  Intervention: Implement Least Restrictive Safety Strategies  Recent Flowsheet Documentation  Taken 4/12/2025 1200 by Ainsley Polk RN  Medical Device Protection: tubing secured  Taken 4/12/2025 0800 by Ainsley Polk RN  Medical Device Protection: tubing secured  Intervention: Protect Dignity, Rights and Personal Wellbeing  Recent Flowsheet Documentation  Taken 4/12/2025 1200 by Ainsley Polk RN  Trust Relationship/Rapport:   care explained   choices provided   reassurance provided   thoughts/feelings acknowledged  Taken 4/12/2025 0800 by Ainsley Polk RN  Trust Relationship/Rapport:   care explained   choices provided   reassurance provided   thoughts/feelings acknowledged  Intervention: Protect Skin and Joint Integrity  Recent Flowsheet Documentation  Taken 4/12/2025 1400 by Ainsley Polk RN  Body Position:   turned   heels elevated   upper extremity elevated  Taken 4/12/2025 1200 by Ainsley Polk RN  Body Position:   turned   heels elevated   upper extremity elevated  Skin Protection: silicone foam dressing in place  Taken 4/12/2025 0800 by Ainsley Polk RN  Body Position:   turned   heels elevated   upper extremity elevated  Skin Protection: silicone foam dressing in place

## 2025-04-12 NOTE — PROGRESS NOTES
Critical Care Staff Note                          04/12/2025    Name: Richar Davis MRN#: [id number]   Age: 69 year old YOB: 1955               Assessment and plan for today, 04/12/2025     Richar Davis IS a 69 year old male admitted on 3/30/2025 with abdominal pain and nausea. Findings revealed a gallstone ileus requiring ex lap on 3/30/25.    My assessment and plan by systems for this patient is as follows:    Neurologic  -Delirium. Oriented to self only.   -will order seroquel for agitation.     Pulmonary  -extubated yesterday, doing well this morning from an oxygen standpoint.   -hx of untreated ALIREZA.     Infectious disease  -blood cultures positive for enteroccous faecalis.   -continue zosyn IV  -repeat blood culture negatvie to date.   -woc consult for abdominal wound management.       Cardiovascular  -shock resolved. Cardioversion for A flutter. TTE shows LVEF of 60%. Will require 4 weeks of AC.   -hx of bovine valve 10/2022.     Gastrointestinal   -Gallstone ileus sp ex lap 3/30, small bowel anastomosis and fascial closure 4/10. Abtherra in place.   -NG tube to LIS.       Hematology/Oncology  -heparin drip for hx of A flutter with RVR. Was cardioverted. Needs a total of 4 weeks of AC.        Renal  -making urine, no signs of acidosis, shock resolved.       Endocrine  -glucose goal 140-180      ICU Checklist:   1. Lines/tubes: right internal jugular, will remove today.   2. Skin: WOC consult  3. Mccarty: in place for retention  4. Nutrition: NPO, surgery following.   5. DVT proph:  heparin drip  6. Stress ulcer proph: PPI        Interim History/Overnight Events:     Extubated yesterday. Agitated overnight.          Key Medications:     Current Facility-Administered Medications   Medication Dose Route Frequency Provider Last Rate Last Admin    [Held by provider] acetaminophen (TYLENOL) tablet 975 mg  975 mg Oral or NG Tube Q8H Clau Calles MD        [Held by provider] atorvastatin  (LIPITOR) tablet 80 mg  80 mg Oral or NG Tube Daily Khari Peña MD        [Held by provider] famotidine (PEPCID) tablet 20 mg  20 mg Oral BID Terry Patino MD   20 mg at 04/08/25 0805    [Held by provider] folic acid (FOLVITE) tablet 1,000 mcg  1,000 mcg Oral or NG Tube BID Khari Peña MD        [Held by provider] furosemide (LASIX) tablet 20 mg  20 mg Oral Daily Mann Torres DO   20 mg at 04/04/25 1017    [Held by provider] insulin aspart (NovoLOG) injection (RAPID ACTING)  1-3 Units Subcutaneous TID AC Jake Blevins DO   1 Units at 04/08/25 0805    [Held by provider] insulin aspart (NovoLOG) injection (RAPID ACTING)  1-3 Units Subcutaneous At Bedtime Jake Blevins DO        [Held by provider] metoprolol tartrate (LOPRESSOR) tablet 50 mg  50 mg Oral BID Fahad Wei MD   50 mg at 04/08/25 0806    nicotine (NICODERM CQ) 21 MG/24HR 24 hr patch 1 patch  1 patch Transdermal Daily Viviane Xiao PA-C   1 patch at 04/12/25 0911    pantoprazole (PROTONIX) 2 mg/mL suspension 40 mg  40 mg Per Feeding Tube QAM AC Viviane Xiao PA-C        Or    pantoprazole (PROTONIX) IV push injection 40 mg  40 mg Intravenous QAM  Viviane Xiao PA-C   40 mg at 04/12/25 0908    piperacillin-tazobactam (ZOSYN) 4.5 g vial to attach to  mL bag  4.5 g Intravenous Q6H Viviane Xiao PA-C 200 mL/hr at 04/12/25 0548 4.5 g at 04/12/25 0548    [Held by provider] polyethylene glycol (MIRALAX) Packet 17 g  17 g Oral Daily Clau Calles MD   17 g at 04/08/25 0805    sodium chloride (PF) 0.9% PF flush 3 mL  3 mL Intracatheter Q8H MITA Viviane Xiao PA-C   3 mL at 04/12/25 0553    [Held by provider] tamsulosin (FLOMAX) capsule 0.8 mg  0.8 mg Oral QPM Khari Peña MD   0.8 mg at 04/07/25 2031     Current Facility-Administered Medications   Medication Dose Route Frequency Provider Last Rate Last Admin    fentaNYL (SUBLIMAZE) infusion   mcg/hr Intravenous  Continuous Kalthoff, Viviane, PA-C   Stopped at 04/11/25 0630    heparin 25,000 units in 0.45% NaCl 250 mL ANTICOAGULANT infusion  0-5,000 Units/hr Intravenous Continuous Drea Faustin MD 16.5 mL/hr at 04/12/25 0552 1,650 Units/hr at 04/12/25 0552    propofol (DIPRIVAN) infusion  5-75 mcg/kg/min Intravenous Continuous Kalthoff, Viviane, PA-C   Stopped at 04/11/25 1019    And    Medication Instruction   Does not apply Continuous PRN Kalthoff, Viviane, PA-C        No lozenges or gum should be given while patient on BIPAP/AVAPS/AVAPS AE   Does not apply Continuous PRN Drea Faustin MD        norepinephrine (LEVOPHED) 4 mg in  mL infusion PREMIX  0.01-0.6 mcg/kg/min Intravenous Continuous Kalthoff, Viviane, PA-C   Stopped at 04/10/25 1622    Patient may continue current oral medications   Does not apply Continuous PRN Drea Faustin MD        vasopressin 0.2 units/mL in NS (PITRESSIN) standard conc infusion  2.4 Units/hr Intravenous Continuous Kalthoff, Viviane, PA-C   Stopped at 04/10/25 0651               Physical Examination:   Temp:  [97.6  F (36.4  C)-98.4  F (36.9  C)] 97.9  F (36.6  C)  Pulse:  [] 102  Resp:  [9-30] 30  MAP:  [63 mmHg-92 mmHg] 85 mmHg  Arterial Line BP: (102-146)/(20-63) 139/59  SpO2:  [93 %-100 %] 94 %  General: no acute distress  Resp:Normal respirations, no audible wheezing  Cardiac: Normal pulses  Abd: Soft, non distended, wound abtherra in place anterior abdomen.   MSK: Trace LE edema  Neuro: alert but confused, not agitated.          Data:   All data and imaging reviewed.     ROUTINE ICU LABS (Last four results)  CMP  Recent Labs   Lab 04/12/25  0801 04/12/25  0435 04/12/25  0405 04/11/25  2140 04/11/25  0525 04/11/25  0445 04/10/25  0521 04/10/25  0519 04/09/25  0802 04/09/25 0525   NA  --  141  --   --   --  136  --  131*  --  132*   POTASSIUM  --  3.8  --   --   --  4.6  --  4.3  --  5.2   CHLORIDE  --  109*  --   --   --  105  --  100  --   "100   CO2  --  20*  --   --   --  21*  --  18*  --  17*   ANIONGAP  --  12  --   --   --  10  --  13  --  15   GLC 96 112* 100* 94   < > 125*   < > 153*   < > 161*   BUN  --  13.0  --   --   --  19.7  --  27.8*  --  25.4*   CR  --  0.78  --   --   --  1.09  --  1.60*  --  1.96*   GFRESTIMATED  --  >90  --   --   --  74  --  47*  --  37*   HALEY  --  8.4*  --   --   --  8.4*  --  8.4*  --  8.0*   MAG  --  2.4*  --   --   --  2.5*  --  2.2  --  1.6*   PHOS  --  2.2*  --   --   --  3.3  --  3.7  --  5.6*   PROTTOTAL  --  5.4*  --   --   --  5.0*  --  4.9*  --  4.7*   ALBUMIN  --  2.1*  --   --   --  2.0*  --  2.1*  --  1.9*   BILITOTAL  --  0.4  --   --   --  0.3  --  0.3  --  0.4   ALKPHOS  --  80  --   --   --  82  --  89  --  91   AST  --  34  --   --   --  35  --  29  --  30   ALT  --  10  --   --   --  9  --  10  --  16    < > = values in this interval not displayed.     CBC  Recent Labs   Lab 04/12/25  0435 04/11/25  1040 04/11/25  0445 04/10/25  0519   WBC 12.5* 16.5* 17.2* 19.8*   RBC 3.41* 3.64* 3.62* 3.49*   HGB 10.3* 10.7* 11.0* 10.6*   HCT 31.7* 34.2* 33.6* 32.2*   MCV 93 94 93 92   MCH 30.2 29.4 30.4 30.4   MCHC 32.5 31.3* 32.7 32.9   RDW 14.5 14.5 14.6 14.6    212 218 244     INR  Recent Labs   Lab 04/07/25  0727 04/07/25  0425   INR 1.15 1.13     Arterial Blood Gas  Recent Labs   Lab 04/11/25  0755 04/10/25  0519 04/09/25  1257 04/08/25  2123 04/08/25  1908   PH 7.37  --  7.40 7.34* 7.23*   PCO2 37  --  30* 32* 45   PO2 130*  --  138* 143* 140*   HCO3 22  --  19* 17* 19*   O2PER 30 30 30 40 50       All cultures:  No results for input(s): \"CULT\" in the last 168 hours.  No results found for this or any previous visit (from the past 24 hours).                Clinically Significant Risk Factors          # Hyperchloremia: Highest Cl = 109 mmol/L in last 2 days, will monitor as appropriate          # Hypoalbuminemia: Lowest albumin = 1.9 g/dL at 4/9/2025  5:25 AM, will monitor as appropriate     # " Hypertension: Noted on problem list           # DMII: A1C = 6.8 % (Ref range: <5.7 %) within past 6 months   # Severe Obesity: Estimated body mass index is 43.26 kg/m  as calculated from the following:    Height as of this encounter: 1.829 m (6').    Weight as of this encounter: 144.7 kg (319 lb 0.1 oz).      # Financial/Environmental Concerns: none   # History of CABG: noted on surgical history         Amadou Hwang  Interventional Pulmonary & Critical Care Medicine

## 2025-04-12 NOTE — CONSULTS
[facility]  Consult Note - Hospitalist Service  Date of Admission:  3/30/2025  Consult Requested by: Intensivist  Reason for Consult: Medical Management    Assessment & Plan   Richar Davis is a 69 year old male with a history of diabetes, hypertension, CABG, status post AVR with bovine valve 10/2022, untreated sleep apnea, hypertension, hyperlipidemia, neuropathy, BPH admitted on 3/30/2025 with abdominal pain nausea and vomiting.  Imaging showed gallstone ileus with 2.2 cm gallstone impacted.  Underwent surgery on 3/30.  Postoperatively complication with ileus requiring a ex lap on 4/8 with resultant small bowel resection. Post -op admission to the ICU, intubated and on pressor support with temporary abdominal closure. He returned to OR on 4/10  for small bowel anastomosis and abdominal.  Wound VAC in place.  Weaned off pressors.  Continued NG tube to LIS.  Patient extubated on 4/11/2025, saturating well on room air.  Transferred back to hospitalist service on 4/12/25. Noted to be delirious.      Gallstone ileus s/p exploratory laparotomy 3/30  Small bowel ileus with resection on 4/8  Small bowel anastomosis and fascial closure 4/10  -- surgery following and managing   -- wound vac in place, monitor drainage  -- NGT in place and to LIS; can have meds.  -- continue NPO until return of bowel function per surgery recs  -- can have meds      Septic shock, resolved  Metabolic acidosis 2/2 sepsis  Bacteremia   Positive blood culture 4/7 for enterococcus faecalis, gram positive cocci in pairs and chains.   -- Continue Zosyn 4.5g Q6H  -- Abdominal wound culture no growth  -- Repeat blood cultures- no growth at 12 hours  -- [facility] consult for wound vac management  -- Plan per surgery recs is to continue abx until leukocytosis resolves      New onset Aflutter s/p cardioversion   History of MI  Aortic valve replacement  *Echo on 4/6 normal left and right ventricles, visual EF was 60-65%. Was in  Afib at that time, which he is out of, as noted above.  - s/p cardioversion  - Seen by cards, now signed off, review discharge recommendations  - started on heparin drip, needs anticoagulation for a few weeks s/p cardioversion  --Per cardiology note on 4/7, patient should be on Eliquis 5 mg twice daily.  Consider transitioning heparin drip once surgical procedures are done with.  -- Patient should discharge on 2-week Zio patch monitor and follow-up with cardiology clinic after 1 month.    Acute kidney injury, resolved   --Creatinine 1.9 (4/9), during time of acute illness.   -- Creatinine has now returned to WNL.    -- monitor BMP    Hx of DM  Last A1C was 6.8. PTA metformin.   - hold PTA metformin while here  - cont SSI     ALIREZA, hx CPAP intolerance  - CPAP when sleeping    Metabolic acidosis-- resolved         Clinically Significant Risk Factors          # Hyperchloremia: Highest Cl = 109 mmol/L in last 2 days, will monitor as appropriate          # Hypoalbuminemia: Lowest albumin = 1.9 g/dL at 4/9/2025  5:25 AM, will monitor as appropriate     # Hypertension: Noted on problem list           # DMII: A1C = 6.8 % (Ref range: <5.7 %) within past 6 months   # Severe Obesity: Estimated body mass index is 43.26 kg/m  as calculated from the following:    Height as of this encounter: 1.829 m (6').    Weight as of this encounter: 144.7 kg (319 lb 0.1 oz).      # Financial/Environmental Concerns: none   # History of CABG: noted on surgical history       Carolina Santiago MD  Hospitalist Service  Securely message with copygram (more info)  Text page via [facility] Paging/Directory   ______________________________________________________________________    Chief Complaint   Medical management    History is obtained from the patient    History of Present Illness   Richar Davis is a 69 year old male with a history of diabetes, hypertension, CABG, status post AVR with bovine valve 10/2022, untreated sleep apnea, hypertension,  hyperlipidemia, neuropathy, BPH admitted on 3/30/2025 with abdominal pain nausea and vomiting.  Imaging showed gallstone ileus with 2.2 cm gallstone impacted.  Underwent surgery on 3/30.  Postoperatively complication with ileus requiring a ex lap on 4/8 with resultant small bowel resection. Post -op admission to the ICU, intubated and on pressor support with temporary abdominal closure. He returned to OR on 4/10  for small bowel anastomosis and abdominal.  Wound VAC in place.  Weaned off pressors.  Continued NG tube to LIS.  Patient extubated on 4/11/2025, saturating well on room air.  Transferred back to hospitalist service on 4/12/25. Noted to be delirious.          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    ANESTHESIA CARDIOVERSION N/A 4/7/2025    Procedure: Anesthesia cardioversion;  Surgeon: GENERIC ANESTHESIA PROVIDER;  Location:  OR    BYPASS GRAFT ARTERY CORONARY, REPLACE VALVE AORTIC, COMBINED N/A 10/20/2022    Procedure: CORONARY ARTERY BYPASS GRAFT x 3 (LEFT INTERNAL MAMMARY ARTERY - LEFT ANTERIOR DESCENDING ARTERY; SAPHENOUS VEIN - POSTERIOR DESCENDING ARTERY; SAPHENOUS VEIN - CIRCUMFLEX ARTERY) WITH ENDOSCOPIC LESSER SAPHENOUS VEIN HARVEST ON BILATERAL LOWER EXTREMITY, AORTIC VALVE REPLACEMENT WITH TISSUE HEART VALVE INSPIRIS  RESILIA  AORTIC VALVE SIZE: 25MM, AND ON CARDIOPULMONARY PUMP OXYGENATOR  (    COLECTOMY WITHOUT COLOSTOMY N/A 4/8/2025    Procedure: EXPLORATORY LAPAROTOMY WITH SMALL BOWEL RESECTION;  Surgeon: Liana Landrum MD;  Location:  OR    CTA ANGIOGRAM CORONARY ARTERY      LAPAROTOMY EXPLORATORY N/A 3/30/2025    Procedure: exploratory laparotomy with enterotomy and removal of gallstone;  Surgeon: Marli  MD Clau;  Location:  OR    LAPAROTOMY EXPLORATORY N/A 4/10/2025    Procedure: EXPLORATORY LAPAROTOMY, SMALL BOWEL ANASTOMOSIS, ABDOMINAL CLOSURE;  Surgeon: Liana Landrum MD;  Location:  OR    ORTHOPEDIC SURGERY Left     elbow surgery       Medications   I have reviewed this patient's current medications       Review of Systems    Patient delirious but was able to provide review of systems.    Physical Exam   Vital Signs: Temp: 97.9  F (36.6  C) Temp src: Oral   Pulse: 102   Resp: 30 SpO2: 94 % O2 Device: None (Room air) Oxygen Delivery: 1 LPM  Weight: 319 lbs .09 oz    General Appearance: Well appearing for stated age.  Respiratory: CTAB, no rales or ronchi  Cardiovascular: S1, S2 normal, no murmurs  GI: No tenderness on light palpation.  Wound VAC in place.    Medical Decision Making       55 MINUTES SPENT BY ME on the date of service doing chart review, history, exam, documentation & further activities per the note.      Data     I have personally reviewed the following data over the past 24 hrs:    12.5 (H)  \   10.3 (L)   / 220     141 109 (H) 13.0 /  107 (H)   3.8 20 (L) 0.78 \     ALT: 10 AST: 34 AP: 80 TBILI: 0.4   ALB: 2.1 (L) TOT PROTEIN: 5.4 (L) LIPASE: N/A       Imaging results reviewed over the past 24 hrs:   No results found for this or any previous visit (from the past 24 hours).

## 2025-04-12 NOTE — PLAN OF CARE
"Goal Outcome Evaluation:  Plan of Care Reviewed With: patient  Overall Patient Progress: no change  Outcome Evaluation: Patient remains confused; oriented to self only. He was awake most of the night, yelling out \"help me\"  but when asked what he needed help with he states \"nothing\". He picks random words and repeats them over and over. He frequently attempts to pull on lines/tubes and needs contstant redirecting, sitter at bedside. He occasionally follows, simple commands. He moves all extremities purposefully. VSS on RA, NG to LIS. Mccarty patent with adequate urine output.      Problem: Dysrhythmia  Goal: Normalized Cardiac Rhythm  Outcome: Progressing  Intervention: Monitor and Manage Cardiac Rhythm Effect  Recent Flowsheet Documentation  Taken 4/12/2025 0400 by Miriam Stephen RN  VTE Prevention/Management: SCDs on (sequential compression devices)  Taken 4/12/2025 0000 by Miriam Stephen RN  VTE Prevention/Management: SCDs on (sequential compression devices)  Taken 4/11/2025 2000 by Miriam Stephen RN  VTE Prevention/Management: SCDs on (sequential compression devices)     Problem: Pain Acute  Goal: Optimal Pain Control and Function  Intervention: Develop Pain Management Plan  Recent Flowsheet Documentation  Taken 4/12/2025 0400 by Miriam Stephen RN  Pain Management Interventions:   care clustered   pillow support provided   repositioned  Taken 4/12/2025 0000 by Miriam Stephen RN  Pain Management Interventions:   medication (see MAR)   care clustered   pillow support provided   repositioned       "

## 2025-04-13 LAB
ALBUMIN SERPL BCG-MCNC: 2 G/DL (ref 3.5–5.2)
ALP SERPL-CCNC: 91 U/L (ref 40–150)
ALT SERPL W P-5'-P-CCNC: 11 U/L (ref 0–70)
ANION GAP SERPL CALCULATED.3IONS-SCNC: 13 MMOL/L (ref 7–15)
AST SERPL W P-5'-P-CCNC: 29 U/L (ref 0–45)
BACTERIA BLD CULT: NO GROWTH
BILIRUB SERPL-MCNC: 0.4 MG/DL
BUN SERPL-MCNC: 11.1 MG/DL (ref 8–23)
CALCIUM SERPL-MCNC: 8.8 MG/DL (ref 8.8–10.4)
CHLORIDE SERPL-SCNC: 111 MMOL/L (ref 98–107)
CREAT SERPL-MCNC: 0.72 MG/DL (ref 0.67–1.17)
EGFRCR SERPLBLD CKD-EPI 2021: >90 ML/MIN/1.73M2
ERYTHROCYTE [DISTWIDTH] IN BLOOD BY AUTOMATED COUNT: 14.3 % (ref 10–15)
GLUCOSE BLDC GLUCOMTR-MCNC: 93 MG/DL (ref 70–99)
GLUCOSE BLDC GLUCOMTR-MCNC: 99 MG/DL (ref 70–99)
GLUCOSE BLDC GLUCOMTR-MCNC: 99 MG/DL (ref 70–99)
GLUCOSE SERPL-MCNC: 107 MG/DL (ref 70–99)
HCO3 SERPL-SCNC: 20 MMOL/L (ref 22–29)
HCT VFR BLD AUTO: 33.3 % (ref 40–53)
HGB BLD-MCNC: 10.6 G/DL (ref 13.3–17.7)
MAGNESIUM SERPL-MCNC: 2.1 MG/DL (ref 1.7–2.3)
MCH RBC QN AUTO: 29.9 PG (ref 26.5–33)
MCHC RBC AUTO-ENTMCNC: 31.8 G/DL (ref 31.5–36.5)
MCV RBC AUTO: 94 FL (ref 78–100)
PHOSPHATE SERPL-MCNC: 2.2 MG/DL (ref 2.5–4.5)
PLATELET # BLD AUTO: 247 10E3/UL (ref 150–450)
POTASSIUM SERPL-SCNC: 3.9 MMOL/L (ref 3.4–5.3)
PROT SERPL-MCNC: 5.7 G/DL (ref 6.4–8.3)
RBC # BLD AUTO: 3.55 10E6/UL (ref 4.4–5.9)
SODIUM SERPL-SCNC: 144 MMOL/L (ref 135–145)
UFH PPP CHRO-ACNC: 0.24 IU/ML
UFH PPP CHRO-ACNC: 0.24 IU/ML
UFH PPP CHRO-ACNC: 0.27 IU/ML
WBC # BLD AUTO: 11.4 10E3/UL (ref 4–11)

## 2025-04-13 PROCEDURE — 250N000013 HC RX MED GY IP 250 OP 250 PS 637: Performed by: PHYSICIAN ASSISTANT

## 2025-04-13 PROCEDURE — 84155 ASSAY OF PROTEIN SERUM: CPT | Performed by: PHYSICIAN ASSISTANT

## 2025-04-13 PROCEDURE — 250N000009 HC RX 250: Performed by: PHYSICIAN ASSISTANT

## 2025-04-13 PROCEDURE — 120N000001 HC R&B MED SURG/OB

## 2025-04-13 PROCEDURE — 250N000011 HC RX IP 250 OP 636: Performed by: PHYSICIAN ASSISTANT

## 2025-04-13 PROCEDURE — 85520 HEPARIN ASSAY: CPT | Performed by: INTERNAL MEDICINE

## 2025-04-13 PROCEDURE — 99233 SBSQ HOSP IP/OBS HIGH 50: CPT | Performed by: INTERNAL MEDICINE

## 2025-04-13 PROCEDURE — 250N000013 HC RX MED GY IP 250 OP 250 PS 637: Performed by: STUDENT IN AN ORGANIZED HEALTH CARE EDUCATION/TRAINING PROGRAM

## 2025-04-13 PROCEDURE — 36415 COLL VENOUS BLD VENIPUNCTURE: CPT | Performed by: HOSPITALIST

## 2025-04-13 PROCEDURE — 84100 ASSAY OF PHOSPHORUS: CPT | Performed by: PHYSICIAN ASSISTANT

## 2025-04-13 PROCEDURE — 36415 COLL VENOUS BLD VENIPUNCTURE: CPT | Performed by: INTERNAL MEDICINE

## 2025-04-13 PROCEDURE — 250N000011 HC RX IP 250 OP 636: Performed by: INTERNAL MEDICINE

## 2025-04-13 PROCEDURE — 83735 ASSAY OF MAGNESIUM: CPT | Performed by: PHYSICIAN ASSISTANT

## 2025-04-13 PROCEDURE — 85520 HEPARIN ASSAY: CPT | Performed by: HOSPITALIST

## 2025-04-13 PROCEDURE — 85014 HEMATOCRIT: CPT | Performed by: PHYSICIAN ASSISTANT

## 2025-04-13 RX ADMIN — PIPERACILLIN AND TAZOBACTAM 4.5 G: 4; .5 INJECTION, POWDER, FOR SOLUTION INTRAVENOUS at 11:58

## 2025-04-13 RX ADMIN — QUETIAPINE FUMARATE 25 MG: 25 TABLET ORAL at 08:54

## 2025-04-13 RX ADMIN — QUETIAPINE FUMARATE 25 MG: 25 TABLET ORAL at 21:23

## 2025-04-13 RX ADMIN — PIPERACILLIN AND TAZOBACTAM 4.5 G: 4; .5 INJECTION, POWDER, FOR SOLUTION INTRAVENOUS at 05:27

## 2025-04-13 RX ADMIN — HEPARIN SODIUM 2250 UNITS/HR: 10000 INJECTION, SOLUTION INTRAVENOUS at 09:43

## 2025-04-13 RX ADMIN — METOPROLOL TARTRATE 5 MG: 5 INJECTION INTRAVENOUS at 23:38

## 2025-04-13 RX ADMIN — PIPERACILLIN AND TAZOBACTAM 4.5 G: 4; .5 INJECTION, POWDER, FOR SOLUTION INTRAVENOUS at 18:07

## 2025-04-13 RX ADMIN — PIPERACILLIN AND TAZOBACTAM 4.5 G: 4; .5 INJECTION, POWDER, FOR SOLUTION INTRAVENOUS at 23:23

## 2025-04-13 RX ADMIN — NICOTINE 1 PATCH: 21 PATCH, EXTENDED RELEASE TRANSDERMAL at 08:55

## 2025-04-13 RX ADMIN — HEPARIN SODIUM 2250 UNITS/HR: 10000 INJECTION, SOLUTION INTRAVENOUS at 21:04

## 2025-04-13 RX ADMIN — PANTOPRAZOLE SODIUM 40 MG: 40 INJECTION, POWDER, FOR SOLUTION INTRAVENOUS at 06:08

## 2025-04-13 ASSESSMENT — ACTIVITIES OF DAILY LIVING (ADL)
ADLS_ACUITY_SCORE: 56
ADLS_ACUITY_SCORE: 60
ADLS_ACUITY_SCORE: 60
ADLS_ACUITY_SCORE: 56
ADLS_ACUITY_SCORE: 58
ADLS_ACUITY_SCORE: 56
ADLS_ACUITY_SCORE: 58
ADLS_ACUITY_SCORE: 56
ADLS_ACUITY_SCORE: 60
ADLS_ACUITY_SCORE: 62
ADLS_ACUITY_SCORE: 60
ADLS_ACUITY_SCORE: 52
ADLS_ACUITY_SCORE: 52
ADLS_ACUITY_SCORE: 58
ADLS_ACUITY_SCORE: 58
ADLS_ACUITY_SCORE: 56
ADLS_ACUITY_SCORE: 56
ADLS_ACUITY_SCORE: 60
ADLS_ACUITY_SCORE: 56
ADLS_ACUITY_SCORE: 60

## 2025-04-13 NOTE — PROGRESS NOTES
Surgery    Reports feeling nauseated and abdominal pain  BM this morning  NG is bothersome  Denies CP, dyspnea    Gen:  Awake, Alert, NAD  /73 (BP Location: Left arm)   Pulse 110   Temp 98.1  F (36.7  C) (Oral)   Resp 18   Ht 1.829 m (6')   Wt (!) 144.7 kg (319 lb 0.1 oz)   SpO2 93%   BMI 43.26 kg/m    Resp - Non-labored  Abdomen - soft, generally tender with palpation.  Midline abdominal wound with Wound vac in place. No leaks. 240cc output yesterday.    NG output: 450cc yesterday    A/P 68yo male admitted on 3/30 for gallstone ileus. Underwent ex lap with removal of gallstone through small bowel enterotomy and was admitted to the floor post operatively. Patient had return of bowel function but new onset of Aflutter with RVR and underwent cardioversion on 4/7. Overnight 4/7 RRT called for hypotension and patient found to have elevated lactate, leukocytosis of 36. CT showing increased fluid and free air, started draining from midline incision. Patient taken to OR for ex lap, found to have gross sucus breakdown of enterotomy on 4/8. Patient on multiple pressors intraoperatively and decision was made to perform small bowel resection and leave in TAC and discontinuity. Patient RTOR 4/10 for small bowel anastomosis and fascial closure. Wound vac in place. Weaned off pressors post operatively.     - NG output acceptable yesterday and patient reports bm's with chart supporting this. However patient continues to report nausea.  Continue clamping trial and plan to remove NG tube later today if nausea resolves.  Patient may then trial clears liquids if he is safe to have oral intake.  May need SLP eval.  - encourage incentive spirometer use  - continue PT    Sandeep Crawley PA-C  Office: [phone]  Pager: [phone]

## 2025-04-13 NOTE — PROGRESS NOTES
Notified provider about indwelling hernandez catheter discussed removal or continued need.    Did provider choose to remove indwelling hernandez catheter? No    Provider's hernandez indication for keeping indwelling hernandez catheter: Surgical procedure.    Is there an order for indwelling hernandez catheter? Yes    *If there is a plan to keep hernandez catheter in place at discharge daily notification with provider is not necessary, but please add a notation in the treatment team sticky note that the patient will be discharging with the catheter.

## 2025-04-13 NOTE — PROGRESS NOTES
[facility]    Hospitalist Progress Note    Assessment & Plan   Richar Davis is a 69 year old male with a history of diabetes, hypertension, CABG, status post AVR with bovine valve 10/2022, untreated sleep apnea, hypertension, hyperlipidemia, neuropathy, BPH admitted on 3/30/2025 with abdominal pain nausea and vomiting.  Imaging showed gallstone ileus with 2.2 cm gallstone impacted.  Underwent surgery on 3/30.  Postoperatively complication with ileus requiring a ex lap on 4/8 with resultant small bowel resection. Post -op admission to the ICU, intubated and on pressor support with temporary abdominal closure. He returned to OR on 4/10  for small bowel anastomosis and abdominal.  Wound VAC in place.  Weaned off pressors.  Continued NG tube to LIS.  Patient extubated on 4/11/2025, saturating well on room air.  Transferred back to hospitalist service on 4/12/25.  Ongoing delirium noted.     Gallstone ileus s/p exploratory laparotomy 3/30  Small bowel ileus with resection on 4/8  Small bowel anastomosis and fascial closure 4/10  -- surgery following and managing   -- wound vac in place, monitor drainage  -- NGT in place and to LIS; can have meds.  -- continue NPO until return of bowel function per surgery recs  -- can have meds, nursing noted leakage around the Mccarty, discussed to notify surgery.  -- TPN as per surgery.  Right now he does not have nutritional support Other than IV fluids.     Septic shock, resolved  Metabolic acidosis 2/2 sepsis  Bacteremia  Ongoing septic encephalopathy  Positive blood culture 4/7 for enterococcus faecalis, gram positive cocci in pairs and chains.   -- Continue Zosyn 4.5g Q6H  -- Abdominal wound culture no growth  -- Repeat blood cultures- no growth at 24 hours  -- WOC consult for wound vac management  -- Plan per surgery recs is to continue abx until leukocytosis resolves  --Patient is on scheduled Seroquel now.  Since 4/12     New onset Aflutter s/p  cardioversion   History of MI  Aortic valve replacement  *Echo on 4/6 normal left and right ventricles, visual EF was 60-65%. Was in Afib at that time, which he is out of, as noted above.  - s/p cardioversion  - Seen by cards, now signed off, review discharge recommendations  - started on heparin drip, needs anticoagulation for a few weeks s/p cardioversion  --Per cardiology note on 4/7, patient should be on Eliquis 5 mg twice daily.  Consider transitioning heparin drip once surgical procedures are done with.  -- Patient should discharge on 2-week Zio patch monitor and follow-up with cardiology clinic after 1 month.     Acute kidney injury, resolved   --Creatinine 1.9 (4/9), during time of acute illness.   -- Creatinine has now returned to WNL.    -- monitor BMP     Hx of DM  Last A1C was 6.8. PTA metformin.   - hold PTA metformin while here  - cont SSI     ALIREZA, hx CPAP intolerance  - CPAP when sleeping     Metabolic acidosis-- resolved    Diet :NPO for Medical/Clinical Reasons Except for: Meds  DVT Prophylaxis: Heparin drip  Code Status: Full Code     51 MINUTES SPENT BY ME on the date of service doing chart review, history, exam, documentation & further activities per the note.  Medically Ready for Discharge: Anticipated in 2-4 Days    Clinically Significant Risk Factors          # Hyperchloremia: Highest Cl = 111 mmol/L in last 2 days, will monitor as appropriate          # Hypoalbuminemia: Lowest albumin = 1.9 g/dL at 4/9/2025  5:25 AM, will monitor as appropriate     # Hypertension: Noted on problem list           # DMII: A1C = 6.8 % (Ref range: <5.7 %) within past 6 months   # Severe Obesity: Estimated body mass index is 43.26 kg/m  as calculated from the following:    Height as of this encounter: 1.829 m (6').    Weight as of this encounter: 144.7 kg (319 lb 0.1 oz).      # Financial/Environmental Concerns: none   # History of CABG: noted on surgical history       Rebekah Velasquez MD  Text Page   (7am to  6pm)    Interval History   Patient appears to have ongoing confusion.  Nursing mentioned drainage around the Mccarty catheter, wound VAC present.  Patient denied any pain.  NG tube present    -Data reviewed today: I reviewed all new labs and imaging results over the last 24 hours.  Physical Exam     Vital Signs with Ranges  Temp:  [96.6  F (35.9  C)-98.3  F (36.8  C)] 98.1  F (36.7  C)  Pulse:  [] 110  Resp:  [13-27] 18  BP: (110-152)/(55-81) 122/73  SpO2:  [91 %-96 %] 93 %  I/O last 3 completed shifts:  In: 0   Out: 2050 [Urine:1385; Emesis/NG output:425; Drains:240]    Constitutional: Awake, alert, occasionally confused  Respiratory: Clear to auscultation bilaterally, no crackles or wheezing  Cardiovascular: Regular rate and rhythm, normal S1 and S2, and no murmur noted  GI: Wound VAC present, bowel sounds present  Skin/Integumen: Edema noted 2+ lower extremity  Neuro : moving all 4 extremities, no focal deficit noted     Medications   Current Facility-Administered Medications   Medication Dose Route Frequency Provider Last Rate Last Admin    heparin 25,000 units in 0.45% NaCl 250 mL ANTICOAGULANT infusion  0-5,000 Units/hr Intravenous Continuous Drea Faustin MD 22.5 mL/hr at 04/13/25 0943 2,250 Units/hr at 04/13/25 0943    No lozenges or gum should be given while patient on BIPAP/AVAPS/AVAPS AE   Does not apply Continuous PRN Drea Faustin MD        Patient may continue current oral medications   Does not apply Continuous PRN Drea Faustin MD         Current Facility-Administered Medications   Medication Dose Route Frequency Provider Last Rate Last Admin    [Held by provider] acetaminophen (TYLENOL) tablet 975 mg  975 mg Oral or NG Tube Q8H Clau Calles MD        [Held by provider] atorvastatin (LIPITOR) tablet 80 mg  80 mg Oral or NG Tube Daily Khari Peña MD        [Held by provider] famotidine (PEPCID) tablet 20 mg  20 mg Oral BID Terry Patino MD   20 mg at  04/08/25 0805    [Held by provider] folic acid (FOLVITE) tablet 1,000 mcg  1,000 mcg Oral or NG Tube BID Khari Peña MD        [Held by provider] furosemide (LASIX) tablet 20 mg  20 mg Oral Daily Mann Torres DO   20 mg at 04/04/25 1017    [Held by provider] metoprolol tartrate (LOPRESSOR) tablet 50 mg  50 mg Oral BID Fahad Wei MD   50 mg at 04/08/25 0806    nicotine (NICODERM CQ) 21 MG/24HR 24 hr patch 1 patch  1 patch Transdermal Daily Viviane Xiao, PA-C   1 patch at 04/13/25 0855    pantoprazole (PROTONIX) 2 mg/mL suspension 40 mg  40 mg Per Feeding Tube QAM AC Kalthoff Viviane, PA-C        Or    pantoprazole (PROTONIX) IV push injection 40 mg  40 mg Intravenous QAM AC Endythowen Viviane, PA-C   40 mg at 04/13/25 0608    piperacillin-tazobactam (ZOSYN) 4.5 g vial to attach to  mL bag  4.5 g Intravenous Q6H Dameon Viviane, PA-C 200 mL/hr at 04/12/25 0548 4.5 g at 04/13/25 0527    [Held by provider] polyethylene glycol (MIRALAX) Packet 17 g  17 g Oral Daily Clau Calles MD   17 g at 04/08/25 0805    QUEtiapine (SEROquel) tablet 25 mg  25 mg Oral BID Amadou Hwang DO   25 mg at 04/13/25 0854    sodium chloride (PF) 0.9% PF flush 3 mL  3 mL Intracatheter Q8H [facility] Viviane Xiao, PA-C   3 mL at 04/12/25 2153    [Held by provider] tamsulosin (FLOMAX) capsule 0.8 mg  0.8 mg Oral QPM Khari Peña MD   0.8 mg at 04/07/25 2031       Data   Recent Labs   Lab 04/13/25  0504 04/13/25  0310 04/12/25  2217 04/12/25  0801 04/12/25  0435 04/11/25  1234 04/11/25  1040 04/11/25  0525 04/11/25  0445 04/08/25  0150 04/07/25  2320 04/07/25  1113 04/07/25  0727 04/07/25  0425   WBC 11.4*  --   --   --  12.5*  --  16.5*  --  17.2*   < > 14.1*  --   --  12.7*   HGB 10.6*  --   --   --  10.3*  --  10.7*  --  11.0*   < > 13.9  --   --  12.5*   MCV 94  --   --   --  93  --  94  --  93   < > 94  --   --  92     --   --   --  220  --  212  --  218   < > 294  --   --  205    INR  --   --   --   --   --   --   --   --   --   --   --   --  1.15 1.13     --   --   --  141  --   --   --  136   < > 135  --   --  134*   POTASSIUM 3.9  --   --   --  3.8  --   --   --  4.6   < > 4.6  --   --  4.1  4.1   CHLORIDE 111*  --   --   --  109*  --   --   --  105   < > 97*  --   --  99   CO2 20*  --   --   --  20*  --   --   --  21*   < > 22  --   --  24   BUN 11.1  --   --   --  13.0  --   --   --  19.7   < > 9.8  --   --  9.2   CR 0.72  --   --   --  0.78  --   --   --  1.09   < > 0.79  --   --  0.72   ANIONGAP 13  --   --   --  12  --   --   --  10   < > 16*  --   --  11   HALEY 8.8  --   --   --  8.4*  --   --   --  8.4*   < > 9.5  --   --  9.6   * 99 96   < > 112*   < >  --    < > 125*   < > 140*   < >  --  151*   ALBUMIN 2.0*  --   --   --  2.1*  --   --   --  2.0*   < > 2.9*  --   --   --    PROTTOTAL 5.7*  --   --   --  5.4*  --   --   --  5.0*   < > 6.6  --   --   --    BILITOTAL 0.4  --   --   --  0.4  --   --   --  0.3   < > 0.4  --   --   --    ALKPHOS 91  --   --   --  80  --   --   --  82   < > 140  --   --   --    ALT 11  --   --   --  10  --   --   --  9   < > 26  --   --   --    AST 29  --   --   --  34  --   --   --  35   < > 33  --   --   --    LIPASE  --   --   --   --   --   --   --   --   --   --  29  --   --   --     < > = values in this interval not displayed.     Recent Labs   Lab 04/13/25  0504 04/13/25  0310 04/12/25  2217 04/12/25  1545 04/12/25  1216   * 99 96 107* 112*       Imaging:   No results found for this or any previous visit (from the past 24 hours).

## 2025-04-13 NOTE — PLAN OF CARE
Goal Outcome Evaluation:      Plan of Care Reviewed With: patient    Overall Patient Progress: improvingOverall Patient Progress: improving    Alert to self only. Confused, yelling out for help. VSS on RA. C/O back pain, reposition seems helps. NPO ex med. NG-LIS. Mccarty inplace with good output. Midline abd incision to wound vac. L shin scab. L PIV SL. R PIV heparin gtt @1800unit. R CVC dressing intact. T/R @2hr. BLE pulses present with doppler. Hypo BS, no BM this shift. BG checked. Discharge pending.

## 2025-04-13 NOTE — PLAN OF CARE
Date & Time: 3257-3264  Summary:   Admitted on 3/30 with gallstone ileus. Had ex lap with enterotomy and removal of gallstone   4/7- cardioversion  4/8- ex lap w/ small bowel resection. Post op admitted to ICU, intubated with temp abdominal closure  4/10- Returned to OR for ex lap, small bowel anastomosis, abdominal closure  4/11-extubated  4/12- admitted to gen surg     Behavior & Aggression: Green- can be restless    Fall Risk: Yes   Orientation: Confused. Only able to state name- said wrong birthday  ABNL VS/O2:VSS on RA except tachy (100's-110's)  ABNL Labs: BG checks: 96 and 99. Next hepXA check at 11:53 AM   Pain Management: Heat packs for back pain   Bowel/Bladder: Mccarty in place. Two loose BM during shift   Drains: PIV infusing heparin at 2250 units/hr. PIV SL. NG to LIS. Wound vac to abdominal incision.   Diet:NPO except meds   Number of times OUT OF BED this shift : Not oob. T/R Q2hrs  Anticipated  DC Date: Pending   Significant Information:  Pt will intermittently follow commands. Possibly remove NG today per surgery note.

## 2025-04-13 NOTE — PLAN OF CARE
Goal Outcome Evaluation:         Date/Time: 04/13/25 ([id number])    Mental Status: A&O to self, confused  Activity/dangle : Up with mechanical lift  Diet: NPO  Pain: Denies this shift  Hernandez/Voiding: Hernandez catheter- noticed dark pink urine leaking from hernandez catheter this morning- hospitalist notified.  Tele/Restraints/Iso: NA  02/LDA: Heparin infusing @ 22.5 mL/hr, NG tube on low intermittent suction, wound vac patent  D/C Date: TBD  Other Info: NG tube clamped @ 1150, complained of nausea @ 1700, on low intermittent suction. Spouse @ bedside this afternoon, frequent oral care provided with oral suction as needed. Had large BM x2 this shift.

## 2025-04-13 NOTE — PROVIDER NOTIFICATION
MD Notification    Notified Person: MD    Notified Person Name: Dr Velasquez    Notification Date/Time: 04/13/25 @ 1024    Notification Interaction: Vocera    Purpose of Notification: FYI, hernandez catheter is leaking, can we get an order for WOC consult for coccyx. Thanks     Orders Received: MD acknowledged    Comments:

## 2025-04-14 ENCOUNTER — APPOINTMENT (OUTPATIENT)
Dept: GENERAL RADIOLOGY | Facility: CLINIC | Age: 70
DRG: 329 | End: 2025-04-14
Payer: COMMERCIAL

## 2025-04-14 ENCOUNTER — APPOINTMENT (OUTPATIENT)
Dept: CT IMAGING | Facility: CLINIC | Age: 70
DRG: 329 | End: 2025-04-14
Attending: NURSE PRACTITIONER
Payer: COMMERCIAL

## 2025-04-14 ENCOUNTER — APPOINTMENT (OUTPATIENT)
Dept: CARDIOLOGY | Facility: CLINIC | Age: 70
DRG: 329 | End: 2025-04-14
Payer: COMMERCIAL

## 2025-04-14 ENCOUNTER — APPOINTMENT (OUTPATIENT)
Dept: GENERAL RADIOLOGY | Facility: CLINIC | Age: 70
DRG: 329 | End: 2025-04-14
Attending: HOSPITALIST
Payer: COMMERCIAL

## 2025-04-14 ENCOUNTER — ANESTHESIA (OUTPATIENT)
Dept: SURGERY | Facility: CLINIC | Age: 70
End: 2025-04-14
Payer: COMMERCIAL

## 2025-04-14 ENCOUNTER — ANESTHESIA EVENT (OUTPATIENT)
Dept: SURGERY | Facility: CLINIC | Age: 70
End: 2025-04-14
Payer: COMMERCIAL

## 2025-04-14 LAB
ALBUMIN SERPL BCG-MCNC: 2.1 G/DL (ref 3.5–5.2)
ALBUMIN UR-MCNC: 70 MG/DL
ALP SERPL-CCNC: 116 U/L (ref 40–150)
ALT SERPL W P-5'-P-CCNC: 14 U/L (ref 0–70)
ANION GAP SERPL CALCULATED.3IONS-SCNC: 14 MMOL/L (ref 7–15)
APPEARANCE UR: ABNORMAL
AST SERPL W P-5'-P-CCNC: 35 U/L (ref 0–45)
ATRIAL RATE - MUSE: 127 BPM
BACTERIA #/AREA URNS HPF: ABNORMAL /HPF
BASE EXCESS BLDV CALC-SCNC: -1.7 MMOL/L (ref -3–3)
BASE EXCESS BLDV CALC-SCNC: -1.7 MMOL/L (ref -3–3)
BILIRUB SERPL-MCNC: 0.4 MG/DL
BILIRUB UR QL STRIP: NEGATIVE
BUN SERPL-MCNC: 10.5 MG/DL (ref 8–23)
CALCIUM SERPL-MCNC: 9 MG/DL (ref 8.8–10.4)
CHLORIDE SERPL-SCNC: 111 MMOL/L (ref 98–107)
COLOR UR AUTO: ABNORMAL
CREAT SERPL-MCNC: 0.69 MG/DL (ref 0.67–1.17)
DIASTOLIC BLOOD PRESSURE - MUSE: NORMAL MMHG
EGFRCR SERPLBLD CKD-EPI 2021: >90 ML/MIN/1.73M2
ERYTHROCYTE [DISTWIDTH] IN BLOOD BY AUTOMATED COUNT: 14.6 % (ref 10–15)
ERYTHROCYTE [DISTWIDTH] IN BLOOD BY AUTOMATED COUNT: 14.7 % (ref 10–15)
GLUCOSE BLDC GLUCOMTR-MCNC: 100 MG/DL (ref 70–99)
GLUCOSE BLDC GLUCOMTR-MCNC: 108 MG/DL (ref 70–99)
GLUCOSE BLDC GLUCOMTR-MCNC: 129 MG/DL (ref 70–99)
GLUCOSE BLDC GLUCOMTR-MCNC: 82 MG/DL (ref 70–99)
GLUCOSE SERPL-MCNC: 109 MG/DL (ref 70–99)
GLUCOSE UR STRIP-MCNC: NEGATIVE MG/DL
HCO3 BLDV-SCNC: 22 MMOL/L (ref 21–28)
HCO3 BLDV-SCNC: 22 MMOL/L (ref 21–28)
HCO3 SERPL-SCNC: 21 MMOL/L (ref 22–29)
HCT VFR BLD AUTO: 35.7 % (ref 40–53)
HCT VFR BLD AUTO: 36.3 % (ref 40–53)
HGB BLD-MCNC: 11.4 G/DL (ref 13.3–17.7)
HGB BLD-MCNC: 11.7 G/DL (ref 13.3–17.7)
HGB UR QL STRIP: ABNORMAL
HOLD SPECIMEN: NORMAL
INTERPRETATION ECG - MUSE: NORMAL
KETONES UR STRIP-MCNC: 60 MG/DL
LACTATE SERPL-SCNC: 1.2 MMOL/L (ref 0.7–2)
LEUKOCYTE ESTERASE UR QL STRIP: ABNORMAL
MAGNESIUM SERPL-MCNC: 2.2 MG/DL (ref 1.7–2.3)
MCH RBC QN AUTO: 29.8 PG (ref 26.5–33)
MCH RBC QN AUTO: 30 PG (ref 26.5–33)
MCHC RBC AUTO-ENTMCNC: 31.9 G/DL (ref 31.5–36.5)
MCHC RBC AUTO-ENTMCNC: 32.2 G/DL (ref 31.5–36.5)
MCV RBC AUTO: 93 FL (ref 78–100)
MCV RBC AUTO: 94 FL (ref 78–100)
NITRATE UR QL: NEGATIVE
O2/TOTAL GAS SETTING VFR VENT: 0 %
O2/TOTAL GAS SETTING VFR VENT: 2 %
OXYHGB MFR BLDV: 85 % (ref 70–75)
OXYHGB MFR BLDV: 92 % (ref 70–75)
P AXIS - MUSE: NORMAL DEGREES
PATH REPORT.ADDENDUM SPEC: NORMAL
PATH REPORT.COMMENTS IMP SPEC: NORMAL
PATH REPORT.COMMENTS IMP SPEC: NORMAL
PATH REPORT.FINAL DX SPEC: NORMAL
PATH REPORT.GROSS SPEC: NORMAL
PATH REPORT.MICROSCOPIC SPEC OTHER STN: NORMAL
PATH REPORT.RELEVANT HX SPEC: NORMAL
PCO2 BLDV: 32 MM HG (ref 40–50)
PCO2 BLDV: 35 MM HG (ref 40–50)
PH BLDV: 7.42 [PH] (ref 7.32–7.43)
PH BLDV: 7.45 [PH] (ref 7.32–7.43)
PH UR STRIP: 5.5 [PH] (ref 5–7)
PHOSPHATE SERPL-MCNC: 2.2 MG/DL (ref 2.5–4.5)
PHOTO IMAGE: NORMAL
PLATELET # BLD AUTO: 281 10E3/UL (ref 150–450)
PLATELET # BLD AUTO: 301 10E3/UL (ref 150–450)
PO2 BLDV: 49 MM HG (ref 25–47)
PO2 BLDV: 67 MM HG (ref 25–47)
POTASSIUM SERPL-SCNC: 3.8 MMOL/L (ref 3.4–5.3)
PR INTERVAL - MUSE: NORMAL MS
PROT SERPL-MCNC: 6.1 G/DL (ref 6.4–8.3)
QRS DURATION - MUSE: 96 MS
QT - MUSE: 326 MS
QTC - MUSE: 511 MS
R AXIS - MUSE: 51 DEGREES
RBC # BLD AUTO: 3.8 10E6/UL (ref 4.4–5.9)
RBC # BLD AUTO: 3.92 10E6/UL (ref 4.4–5.9)
RBC URINE: >182 /HPF
SAO2 % BLDV: 86.4 % (ref 70–75)
SAO2 % BLDV: 94.3 % (ref 70–75)
SODIUM SERPL-SCNC: 145 MMOL/L (ref 135–145)
SODIUM SERPL-SCNC: 146 MMOL/L (ref 135–145)
SP GR UR STRIP: 1.03 (ref 1–1.03)
SYSTOLIC BLOOD PRESSURE - MUSE: NORMAL MMHG
T AXIS - MUSE: 139 DEGREES
UFH PPP CHRO-ACNC: 0.22 IU/ML
UFH PPP CHRO-ACNC: 0.28 IU/ML
UFH PPP CHRO-ACNC: 0.31 IU/ML
UROBILINOGEN UR STRIP-MCNC: NORMAL MG/DL
VENTRICULAR RATE- MUSE: 148 BPM
WBC # BLD AUTO: 10 10E3/UL (ref 4–11)
WBC # BLD AUTO: 9.9 10E3/UL (ref 4–11)
WBC URINE: 56 /HPF

## 2025-04-14 PROCEDURE — 71045 X-RAY EXAM CHEST 1 VIEW: CPT

## 2025-04-14 PROCEDURE — 999N000183 HC STATISTIC TEE INCLUDES SEDATION

## 2025-04-14 PROCEDURE — 250N000011 HC RX IP 250 OP 636: Performed by: HOSPITALIST

## 2025-04-14 PROCEDURE — 258N000003 HC RX IP 258 OP 636: Performed by: NURSE PRACTITIONER

## 2025-04-14 PROCEDURE — 250N000013 HC RX MED GY IP 250 OP 250 PS 637

## 2025-04-14 PROCEDURE — 74177 CT ABD & PELVIS W/CONTRAST: CPT

## 2025-04-14 PROCEDURE — 210N000001 HC R&B IMCU HEART CARE

## 2025-04-14 PROCEDURE — 83605 ASSAY OF LACTIC ACID: CPT | Performed by: NURSE PRACTITIONER

## 2025-04-14 PROCEDURE — 83735 ASSAY OF MAGNESIUM: CPT | Performed by: PHYSICIAN ASSISTANT

## 2025-04-14 PROCEDURE — 87086 URINE CULTURE/COLONY COUNT: CPT | Performed by: NURSE PRACTITIONER

## 2025-04-14 PROCEDURE — 85520 HEPARIN ASSAY: CPT | Performed by: NURSE PRACTITIONER

## 2025-04-14 PROCEDURE — 250N000011 HC RX IP 250 OP 636: Performed by: NURSE PRACTITIONER

## 2025-04-14 PROCEDURE — 36415 COLL VENOUS BLD VENIPUNCTURE: CPT | Performed by: INTERNAL MEDICINE

## 2025-04-14 PROCEDURE — 93010 ELECTROCARDIOGRAM REPORT: CPT | Mod: XU | Performed by: INTERNAL MEDICINE

## 2025-04-14 PROCEDURE — 36415 COLL VENOUS BLD VENIPUNCTURE: CPT | Performed by: NURSE PRACTITIONER

## 2025-04-14 PROCEDURE — 370N000017 HC ANESTHESIA TECHNICAL FEE, PER MIN

## 2025-04-14 PROCEDURE — 999N000010 HC STATISTIC ANES STAT CODE-CRNA PER MINUTE

## 2025-04-14 PROCEDURE — 74018 RADEX ABDOMEN 1 VIEW: CPT

## 2025-04-14 PROCEDURE — 250N000013 HC RX MED GY IP 250 OP 250 PS 637: Performed by: NURSE PRACTITIONER

## 2025-04-14 PROCEDURE — 99291 CRITICAL CARE FIRST HOUR: CPT | Performed by: NURSE PRACTITIONER

## 2025-04-14 PROCEDURE — 99233 SBSQ HOSP IP/OBS HIGH 50: CPT | Mod: 24 | Performed by: INTERNAL MEDICINE

## 2025-04-14 PROCEDURE — 999N000197 HC STATISTIC WOC PT EDUCATION, 0-15 MIN

## 2025-04-14 PROCEDURE — 250N000009 HC RX 250: Performed by: NURSE PRACTITIONER

## 2025-04-14 PROCEDURE — 250N000011 HC RX IP 250 OP 636

## 2025-04-14 PROCEDURE — 250N000009 HC RX 250: Performed by: PHYSICIAN ASSISTANT

## 2025-04-14 PROCEDURE — 250N000011 HC RX IP 250 OP 636: Performed by: PHYSICIAN ASSISTANT

## 2025-04-14 PROCEDURE — 85520 HEPARIN ASSAY: CPT | Performed by: HOSPITALIST

## 2025-04-14 PROCEDURE — 250N000011 HC RX IP 250 OP 636: Mod: JZ

## 2025-04-14 PROCEDURE — 99291 CRITICAL CARE FIRST HOUR: CPT | Performed by: HOSPITALIST

## 2025-04-14 PROCEDURE — 258N000003 HC RX IP 258 OP 636: Performed by: INTERNAL MEDICINE

## 2025-04-14 PROCEDURE — 36415 COLL VENOUS BLD VENIPUNCTURE: CPT | Performed by: HOSPITALIST

## 2025-04-14 PROCEDURE — 250N000011 HC RX IP 250 OP 636: Performed by: NURSE ANESTHETIST, CERTIFIED REGISTERED

## 2025-04-14 PROCEDURE — 99207 PR NO CHARGE LOS: CPT

## 2025-04-14 PROCEDURE — 84100 ASSAY OF PHOSPHORUS: CPT | Performed by: PHYSICIAN ASSISTANT

## 2025-04-14 PROCEDURE — 82805 BLOOD GASES W/O2 SATURATION: CPT

## 2025-04-14 PROCEDURE — 92960 CARDIOVERSION ELECTRIC EXT: CPT | Performed by: INTERNAL MEDICINE

## 2025-04-14 PROCEDURE — 92960 CARDIOVERSION ELECTRIC EXT: CPT

## 2025-04-14 PROCEDURE — 999N000157 HC STATISTIC RCP TIME EA 10 MIN

## 2025-04-14 PROCEDURE — 84295 ASSAY OF SERUM SODIUM: CPT | Performed by: HOSPITALIST

## 2025-04-14 PROCEDURE — 81001 URINALYSIS AUTO W/SCOPE: CPT | Performed by: NURSE PRACTITIONER

## 2025-04-14 PROCEDURE — 84132 ASSAY OF SERUM POTASSIUM: CPT | Performed by: PHYSICIAN ASSISTANT

## 2025-04-14 PROCEDURE — 85018 HEMOGLOBIN: CPT | Performed by: PHYSICIAN ASSISTANT

## 2025-04-14 PROCEDURE — 82805 BLOOD GASES W/O2 SATURATION: CPT | Performed by: NURSE PRACTITIONER

## 2025-04-14 PROCEDURE — 999N000128 HC STATISTIC PERIPHERAL IV START W/O US GUIDANCE

## 2025-04-14 PROCEDURE — 5A2204Z RESTORATION OF CARDIAC RHYTHM, SINGLE: ICD-10-PCS | Performed by: INTERNAL MEDICINE

## 2025-04-14 PROCEDURE — 85018 HEMOGLOBIN: CPT | Performed by: HOSPITALIST

## 2025-04-14 PROCEDURE — 999N000184 HC STATISTIC TELEMETRY

## 2025-04-14 PROCEDURE — 85520 HEPARIN ASSAY: CPT | Performed by: INTERNAL MEDICINE

## 2025-04-14 PROCEDURE — 250N000011 HC RX IP 250 OP 636: Performed by: INTERNAL MEDICINE

## 2025-04-14 PROCEDURE — 93005 ELECTROCARDIOGRAM TRACING: CPT

## 2025-04-14 PROCEDURE — 93325 DOPPLER ECHO COLOR FLOW MAPG: CPT

## 2025-04-14 RX ORDER — MAGNESIUM SULFATE HEPTAHYDRATE 40 MG/ML
2 INJECTION, SOLUTION INTRAVENOUS
Status: DISCONTINUED | OUTPATIENT
Start: 2025-04-14 | End: 2025-04-21

## 2025-04-14 RX ORDER — SODIUM CHLORIDE, SODIUM LACTATE, POTASSIUM CHLORIDE, CALCIUM CHLORIDE 600; 310; 30; 20 MG/100ML; MG/100ML; MG/100ML; MG/100ML
INJECTION, SOLUTION INTRAVENOUS CONTINUOUS
Status: ACTIVE | OUTPATIENT
Start: 2025-04-14 | End: 2025-04-14

## 2025-04-14 RX ORDER — BENZOCAINE/MENTHOL 6 MG-10 MG
1 LOZENGE MUCOUS MEMBRANE 3 TIMES DAILY PRN
Status: CANCELLED | OUTPATIENT
Start: 2025-04-14 | End: 2025-04-16

## 2025-04-14 RX ORDER — DEXTROSE MONOHYDRATE 25 G/50ML
25-50 INJECTION, SOLUTION INTRAVENOUS
Status: DISCONTINUED | OUTPATIENT
Start: 2025-04-14 | End: 2025-04-29 | Stop reason: HOSPADM

## 2025-04-14 RX ORDER — HYDROMORPHONE HCL IN WATER/PF 6 MG/30 ML
0.2 PATIENT CONTROLLED ANALGESIA SYRINGE INTRAVENOUS ONCE
Status: COMPLETED | OUTPATIENT
Start: 2025-04-14 | End: 2025-04-14

## 2025-04-14 RX ORDER — HEPARIN SODIUM 10000 [USP'U]/100ML
0-5000 INJECTION, SOLUTION INTRAVENOUS CONTINUOUS
Status: DISCONTINUED | OUTPATIENT
Start: 2025-04-14 | End: 2025-04-18

## 2025-04-14 RX ORDER — IOPAMIDOL 755 MG/ML
135 INJECTION, SOLUTION INTRAVASCULAR ONCE
Status: COMPLETED | OUTPATIENT
Start: 2025-04-14 | End: 2025-04-14

## 2025-04-14 RX ORDER — PROPOFOL 10 MG/ML
INJECTION, EMULSION INTRAVENOUS CONTINUOUS PRN
Status: DISCONTINUED | OUTPATIENT
Start: 2025-04-14 | End: 2025-04-14

## 2025-04-14 RX ORDER — MAGNESIUM HYDROXIDE/ALUMINUM HYDROXICE/SIMETHICONE 120; 1200; 1200 MG/30ML; MG/30ML; MG/30ML
30 SUSPENSION ORAL EVERY 8 HOURS PRN
Status: CANCELLED | OUTPATIENT
Start: 2025-04-14 | End: 2025-04-16

## 2025-04-14 RX ORDER — LIDOCAINE 40 MG/G
CREAM TOPICAL
Status: DISCONTINUED | OUTPATIENT
Start: 2025-04-14 | End: 2025-04-15

## 2025-04-14 RX ORDER — NICOTINE POLACRILEX 4 MG
15-30 LOZENGE BUCCAL
Status: DISCONTINUED | OUTPATIENT
Start: 2025-04-14 | End: 2025-04-29 | Stop reason: HOSPADM

## 2025-04-14 RX ORDER — HYDROMORPHONE HYDROCHLORIDE 1 MG/ML
0.3 INJECTION, SOLUTION INTRAMUSCULAR; INTRAVENOUS; SUBCUTANEOUS EVERY 4 HOURS PRN
Status: DISCONTINUED | OUTPATIENT
Start: 2025-04-14 | End: 2025-04-21

## 2025-04-14 RX ORDER — POTASSIUM CHLORIDE 1500 MG/1
20 TABLET, EXTENDED RELEASE ORAL
Status: COMPLETED | OUTPATIENT
Start: 2025-04-14 | End: 2025-04-14

## 2025-04-14 RX ORDER — METOPROLOL TARTRATE 25 MG/1
25 TABLET, FILM COATED ORAL 2 TIMES DAILY
Status: DISCONTINUED | OUTPATIENT
Start: 2025-04-14 | End: 2025-04-29 | Stop reason: HOSPADM

## 2025-04-14 RX ORDER — HYDROMORPHONE HCL IN WATER/PF 6 MG/30 ML
0.2 PATIENT CONTROLLED ANALGESIA SYRINGE INTRAVENOUS EVERY 4 HOURS PRN
Status: DISCONTINUED | OUTPATIENT
Start: 2025-04-14 | End: 2025-04-29 | Stop reason: HOSPADM

## 2025-04-14 RX ORDER — POTASSIUM CHLORIDE 1500 MG/1
40 TABLET, EXTENDED RELEASE ORAL
Status: DISCONTINUED | OUTPATIENT
Start: 2025-04-14 | End: 2025-04-21

## 2025-04-14 RX ORDER — SODIUM CHLORIDE 9 MG/ML
INJECTION, SOLUTION INTRAVENOUS CONTINUOUS
Status: DISCONTINUED | OUTPATIENT
Start: 2025-04-14 | End: 2025-04-14

## 2025-04-14 RX ORDER — ACETAMINOPHEN 325 MG/1
650 TABLET ORAL EVERY 4 HOURS PRN
Status: CANCELLED | OUTPATIENT
Start: 2025-04-14 | End: 2025-04-16

## 2025-04-14 RX ADMIN — PROPOFOL 100 MCG/KG/MIN: 10 INJECTION, EMULSION INTRAVENOUS at 14:23

## 2025-04-14 RX ADMIN — AMIODARONE HYDROCHLORIDE 1 MG/MIN: 1.8 INJECTION, SOLUTION INTRAVENOUS at 10:48

## 2025-04-14 RX ADMIN — PROPOFOL 20 MG: 10 INJECTION, EMULSION INTRAVENOUS at 14:41

## 2025-04-14 RX ADMIN — PROPOFOL 20 MG: 10 INJECTION, EMULSION INTRAVENOUS at 14:29

## 2025-04-14 RX ADMIN — HEPARIN SODIUM 2400 UNITS/HR: 10000 INJECTION, SOLUTION INTRAVENOUS at 06:29

## 2025-04-14 RX ADMIN — HYDROMORPHONE HYDROCHLORIDE 0.2 MG: 0.2 INJECTION, SOLUTION INTRAMUSCULAR; INTRAVENOUS; SUBCUTANEOUS at 04:56

## 2025-04-14 RX ADMIN — PIPERACILLIN AND TAZOBACTAM 4.5 G: 4; .5 INJECTION, POWDER, FOR SOLUTION INTRAVENOUS at 17:39

## 2025-04-14 RX ADMIN — QUETIAPINE FUMARATE 25 MG: 25 TABLET ORAL at 11:41

## 2025-04-14 RX ADMIN — SODIUM CHLORIDE: 0.9 INJECTION, SOLUTION INTRAVENOUS at 13:31

## 2025-04-14 RX ADMIN — METOPROLOL TARTRATE 25 MG: 25 TABLET, FILM COATED ORAL at 00:20

## 2025-04-14 RX ADMIN — PANTOPRAZOLE SODIUM 40 MG: 40 INJECTION, POWDER, FOR SOLUTION INTRAVENOUS at 05:58

## 2025-04-14 RX ADMIN — AMIODARONE HYDROCHLORIDE 150 MG: 1.5 INJECTION, SOLUTION INTRAVENOUS at 10:34

## 2025-04-14 RX ADMIN — QUETIAPINE FUMARATE 25 MG: 25 TABLET ORAL at 21:18

## 2025-04-14 RX ADMIN — QUETIAPINE FUMARATE 50 MG: 50 TABLET ORAL at 01:21

## 2025-04-14 RX ADMIN — PIPERACILLIN AND TAZOBACTAM 4.5 G: 4; .5 INJECTION, POWDER, FOR SOLUTION INTRAVENOUS at 05:58

## 2025-04-14 RX ADMIN — NICOTINE 1 PATCH: 21 PATCH, EXTENDED RELEASE TRANSDERMAL at 12:13

## 2025-04-14 RX ADMIN — HEPARIN SODIUM 2400 UNITS/HR: 10000 INJECTION, SOLUTION INTRAVENOUS at 17:00

## 2025-04-14 RX ADMIN — IOPAMIDOL 135 ML: 755 INJECTION, SOLUTION INTRAVENOUS at 09:46

## 2025-04-14 RX ADMIN — METOPROLOL TARTRATE 5 MG: 5 INJECTION INTRAVENOUS at 08:06

## 2025-04-14 RX ADMIN — METOPROLOL TARTRATE 5 MG: 5 INJECTION INTRAVENOUS at 04:08

## 2025-04-14 RX ADMIN — METOPROLOL TARTRATE 25 MG: 25 TABLET, FILM COATED ORAL at 21:19

## 2025-04-14 RX ADMIN — PIPERACILLIN AND TAZOBACTAM 4.5 G: 4; .5 INJECTION, POWDER, FOR SOLUTION INTRAVENOUS at 12:38

## 2025-04-14 RX ADMIN — POTASSIUM CHLORIDE 20 MEQ: 1500 TABLET, EXTENDED RELEASE ORAL at 12:13

## 2025-04-14 RX ADMIN — SODIUM CHLORIDE, POTASSIUM CHLORIDE, SODIUM LACTATE AND CALCIUM CHLORIDE: 600; 310; 30; 20 INJECTION, SOLUTION INTRAVENOUS at 08:34

## 2025-04-14 RX ADMIN — SODIUM CHLORIDE 84 ML: 9 INJECTION, SOLUTION INTRAVENOUS at 09:46

## 2025-04-14 RX ADMIN — AMIODARONE HYDROCHLORIDE 0.5 MG/MIN: 1.8 INJECTION, SOLUTION INTRAVENOUS at 16:52

## 2025-04-14 ASSESSMENT — ACTIVITIES OF DAILY LIVING (ADL)
ADLS_ACUITY_SCORE: 60
ADLS_ACUITY_SCORE: 60
ADLS_ACUITY_SCORE: 51
ADLS_ACUITY_SCORE: 60
ADLS_ACUITY_SCORE: 51
ADLS_ACUITY_SCORE: 60
ADLS_ACUITY_SCORE: 53
ADLS_ACUITY_SCORE: 51
ADLS_ACUITY_SCORE: 60
ADLS_ACUITY_SCORE: 51
ADLS_ACUITY_SCORE: 51
ADLS_ACUITY_SCORE: 60
ADLS_ACUITY_SCORE: 53
ADLS_ACUITY_SCORE: 60
ADLS_ACUITY_SCORE: 51
ADLS_ACUITY_SCORE: 53
ADLS_ACUITY_SCORE: 60

## 2025-04-14 ASSESSMENT — LIFESTYLE VARIABLES: TOBACCO_USE: 1

## 2025-04-14 ASSESSMENT — ENCOUNTER SYMPTOMS
SEIZURES: 0
DYSRHYTHMIAS: 1

## 2025-04-14 NOTE — PROGRESS NOTES
1315 Pt arrived in procedure area on cart. Lethargic and drowsy, oriented to self and family only. No dentures or loose teeth. NPO x 8+ hrs.  All questions & concerns addressed. Family at bedside.     MANDEEP with anesthesia: VSS. Propofol 220 mg administered by anesthesia.    CDV: Pt tolerated well. CDV x 1 @ 200 joules. See rhythm strips, SR in 70s after shock.       1530 Detailed report called to Violeta MAIN RN.     1539 Pt transferred to [facility] per cart accompanied by flying squad RN.  Pt drosy but arousable and responsive. Resp even & unlabored upon transfer.

## 2025-04-14 NOTE — PLAN OF CARE
Date & Time: 3063-2858  Summary:   Admitted on 3/30 with gallstone ileus. Had ex lap with enterotomy and removal of gallstone   4/7- cardioversion  4/8- ex lap w/ small bowel resection. Post op admitted to ICU, intubated with temp abdominal closure  4/10- Returned to OR for ex lap, small bowel anastomosis, abdominal closure  4/11-extubated  4/12- admitted to gen surg     Behavior & Aggression: Green- can be restless    Fall Risk: Yes   Orientation: Confused. Oriented to self overnight   ABNL VS/O2: On RA, BP stable. After 2300 HR up to 140's-150's. Tmax 99.2. Tachypneic up to 30's  ABNL Labs: See results. Next hepXA check at 9:37 AM   Pain Management: Denied   Bowel/Bladder: Hernandez in place. Did seem to have a bladder spasm and bypassed catheter. Flushed hernandez. Noted to have some sediment in tubing. Multiple loose BM during shift   Drains: x1 PIV infusing heparin at 2400 units/hr. X1 PIV SL. NG to LIS with green output. Wound vac to abdominal incision.   Diet: NPO except meds. When attempting to have pt take nighttime med, gave small amount of water and pt did not tolerate.   Number of times OUT OF BED this shift : Not oob. T/R Q2hrs  Anticipated  DC Date: Pending   Significant Information:  Pt will intermittently follow commands. RRT called overnight due to irregular HR, see notes. More restless this morning

## 2025-04-14 NOTE — CODE/RAPID RESPONSE
[facility]    House SUKHWINDER RRT Note  4/13/2025   Time Called: 2353 am     RRT called for: rapid heart rate     Assessment & Plan   Atrial flutter with RVR   Hospital course has been complicated by new onset atrial flutter s/p cardioversion on 4/7 with Dr. Capps. On heparin gtt with Eliquis 5 gm BID. PTA on metoprolol succinate ER 25 mg every evening. This admission prescribed metoprolol tartrate 50 mg BID but medication has been on hold since 4/5.     Nursing staff noted 's prompting RRT evaluation. On my arrival temp 99.2, , regular, /86 and SpO2 93% on RA. He is alert but disoriented. Denies pain, CP, or SOB.     INTERVENTIONS:  -12 lead EKG, by my read atrial flutter   -Continue tele   -25 mg metoprolol tartrate BID via NG tube   -Hold off on IV metoprolol until at least 4 am and reassess VS   -Re [facility] NP if he become symptomatic     At the end of the RRT pt remains chest pain free without SOB. Rates 144 with stable Bps.     Disposition General surgery     Defer further cares to hospitalist attending physician Dr. Velasquez.      ADDENDUM 0300:   Notified by nursing staff regarding constant A flutter 144, /70. Temp 98.8. RR 36-40, not labored. Denies SOB. CXR, shows mild pulmonary vascular prominence. Mild elevation left hemidiaphragm. No focal infiltrates. On RA. Attempted 0.2 mg hydromorphone for anxiety/tachypnea without effect. Will re consult cardiology. Will defer diuresis given he clinically appears dry. Will defer to primary hospitalist.    Recent Labs   Lab 04/14/25  0308 04/10/25  0519 04/08/25  0128   PHV 7.45* 7.38 7.42   PO2V 49* 126* 41   PCO2V 32* 33* 33*   HCO3V 22 20* 22         Interval History     Richar Davis is a 69 year old male with a history of diabetes, hypertension, CABG, status post AVR with bovine valve 10/2022, untreated sleep apnea, hypertension, hyperlipidemia, neuropathy, BPH admitted on 3/30/2025 with abdominal pain nausea  and vomiting.  Imaging showed gallstone ileus with 2.2 cm gallstone impacted.  Underwent surgery on 3/30.  Postoperatively complication with ileus requiring a ex lap on 4/8 with resultant small bowel resection. Post -op admission to the ICU, intubated and on pressor support with temporary abdominal closure. He returned to OR on 4/10  for small bowel anastomosis and abdominal.  Wound VAC in place.  Weaned off pressors.  Continued NG tube to LIS.  Patient extubated on 4/11/2025, saturating well on room air.  Transferred back to hospitalist service on 4/12/25.  Ongoing delirium noted.    Code Status: Full Code    Allergies   Allergies   Allergen Reactions    Niaspan [Niacin] Other (See Comments)     flushing       Physical Exam   Vital Signs with Ranges:  Temp:  [96.6  F (35.9  C)-99.2  F (37.3  C)] 99.2  F (37.3  C)  Pulse:  [] 138  Resp:  [16-22] 21  BP: (118-143)/(68-86) 123/81  SpO2:  [93 %-94 %] 93 %  I/O last 3 completed shifts:  In: 0   Out: 1750 [Urine:1350; Emesis/NG output:400]    Physical Exam  Constitutional:       General: He is not in acute distress.     Appearance: He is obese. He is not diaphoretic.   HENT:      Nose:      Comments: NG tube in place      Mouth/Throat:      Mouth: Mucous membranes are dry.   Eyes:      Pupils: Pupils are equal, round, and reactive to light.   Cardiovascular:      Rate and Rhythm: Normal rate and regular rhythm.      Pulses: Normal pulses.      Heart sounds: Normal heart sounds.   Pulmonary:      Effort: Pulmonary effort is normal.      Breath sounds: Decreased air movement present.   Abdominal:      General: There is distension.      Comments: Central wound vac     Genitourinary:     Comments: Mccarty in place with sediment and garret urine   Musculoskeletal:         General: Normal range of motion.   Skin:     General: Skin is warm and dry.      Capillary Refill: Capillary refill takes 2 to 3 seconds.   Neurological:      Mental Status: He is alert. He is  disoriented.       Data     EKG:  Interpreted by Jillian Carter NP  Time reviewed: 0010 am   Symptoms at time of EKG: None   Rhythm: atrial flutter  Rate: Tachycardia and 140-150  Axis: Normal  Ectopy: none  Conduction: normal  ST Segments/ T Waves: No acute ischemic changes  Q Waves: none  Comparison to prior: atrial flutter has replaced ST     Clinical Impression: atrial flutter (chronic)    CBC with Diff:  Recent Labs   Lab Test 04/13/25  0504 04/07/25 2320 04/07/25  0727   WBC 11.4*   < >  --    HGB 10.6*   < >  --    MCV 94   < >  --       < >  --    INR  --   --  1.15    < > = values in this interval not displayed.        Comprehensive Metabolic Panel:  Recent Labs   Lab 04/13/25 2329 04/13/25  1628 04/13/25  0504   NA  --   --  144   POTASSIUM  --   --  3.9   CHLORIDE  --   --  111*   CO2  --   --  20*   ANIONGAP  --   --  13   GLC 93   < > 107*   BUN  --   --  11.1   CR  --   --  0.72   GFRESTIMATED  --   --  >90   HALEY  --   --  8.8   MAG  --   --  2.1   PHOS  --   --  2.2*   PROTTOTAL  --   --  5.7*   ALBUMIN  --   --  2.0*   BILITOTAL  --   --  0.4   ALKPHOS  --   --  91   AST  --   --  29   ALT  --   --  11    < > = values in this interval not displayed.       Time Spent on this Encounter   I spent 25 minutes on the unit/floor managing the care of Richar Davis. Over 50% of my time was spent counseling the patient and/or coordinating care regarding services listed in this note.    Jillian Carter NP  [facility]  Securely message with the Vocera Web Console (learn more here)  Text page via Viewpoints Paging/Zodioy

## 2025-04-14 NOTE — PROGRESS NOTES
[facility]  Hospitalist Progress Note   04/14/2025          Assessment and Plan:       Richar Davis is a 69 year old male with a history of diabetes, hypertension, CABG, status post AVR with bovine valve 10/2022, untreated sleep apnea, hypertension, hyperlipidemia, neuropathy, BPH admitted on 3/30/2025 with abdominal pain nausea and vomiting.      CT imaging on admission showed Gallstone ileus. Distal small bowel obstruction secondary to an impacted 2.2 cm gallstone. Underwent Exploratory laparotomy with small bowel enterotomy and removal of gallstone on 3/30. Postoperative course complicated with ileus, atrial fibrillation with RVR requiring cardioversion, postoperative fever, hypoxia.      On 4/8 had elevated lactate with worsening abdominal pain, returned back to the OR for exploratory laparotomy with small bowel resection.postprocedure patient intubated on pressor support, temporary abdominal closure admitted to ICU. Returned to OR on 4/10 for small bowel anastomosis and abdominal Wound VAC placement.  Pressors weaned off, patient extubated 4/11 and transferred to hospitalist service on 4/12.     Small bowel ileus with resection on 4/8  Small bowel anastomosis and fascial closure 4/10  Exploratory laparotomy with small bowel enterotomy and removal of gallstone on 3/30.   Gallstone ileus, distal small bowel obstruction secondary to impacted 2.2 cm gallstone.  Enterococcus faecalis bacteremia -cleared  -CT imaging on admission showed Gallstone ileus. Distal small bowel obstruction secondary to an impacted 2.2 cm gallstone.   -Underwent Exploratory laparotomy with small bowel enterotomy and removal of gallstone on 3/30.   -Postoperative course complicated with ileus.  NG tube removed 4/2, unable to tolerate oral diet.   -Had fever on 4/5, COVID/Flu/RSV which was negative.  BC x 2 no growth. CXR without overt pneumonia.  UA then not consistent with infection.  - On 4/7 spiked fever with  significant leukocytosis, lactic acidosis.Blood cultures from 4/7, 4/8 with Enterococcus faecalis.  -Returned back to the OR for exploratory laparotomy with small bowel resection on 4/8.  -Postprocedure patient intubated on pressor support, temporary abdominal closure admitted to ICU.   -Returned to OR on 4/10 for small bowel anastomosis and abdominal wound VAC placement.    -Postprocedure patient continues to have distended abdomen with bloody output.  -NG tube in place.  Defer postoperative care including nutritional intake to surgical team.  Appreciate comanagement  Low threshold for repeat imaging if hypotension or spikes fever.  IV Protonix  IV/p.o. as needed pain meds.  Minimize narcotic use as able to given encephalopathy  Will IV fluids with 500 cc fluids on/14.  Hold Lasix.  IV/p.o. as needed Zofran.  Continue IV Zosyn.  Will consult infectious disease team for input on antibiotics .  Continues to be n.p.o., nutritional intake / support per surgical team.  On intravenous heparin, will continue unless active bleeding or significant drop in hemoglobin, await surgical team input.  Noted bloody output in wound VAC     Postoperative atrial fibrillation with RVR.  Status post cardioversion 4/7/2025  New Aflutter with RVR 4/5/2025  Coronary artery disease status post CABG x 3 in October 2022  Aortic stenosis (status post AVR with bovine valve) October 2022.  Hypertension  Hyperlipidemia.  Developed new aflutter with RVR postop.  Was on heparin drip, rate control with Cardizem drip, oral metoprolol. Underwent cardioversion on 4/7/2025.  -- Cardiology signed off/ 47 postprocedure, recommended Eliquis 5 mg twice daily.  2-week Zio patch at the time of discharge, follow-up with cardiology clinic after 1 month.  Appreciate input.  --Eliquis held from 4/8, postprocedure again started on intravenous heparin.   -Continue IV heparin drip.  No response in heart rate to IV metoprolol 2 doses.  Will initiate on Cardizem drip.   Transfer to [facility].  Continue metoprolol with hold parameters.  Continue to hold Lipitor in the setting of acute illness.  Cardiology consultation requested  Repeat echocardiogram 4/14 ordered.  Telemetry monitoring.  Strict intake output monitoring.  Hold oral Lasix on 4/8, as needed IV Lasix if needed if shortness of breath.    Acute hypoxia-likely from postoperative narcotic use, ALIREZA, deconditioning  Noted O2 sats of 90% this morning on room air.  Requiring 2 L nasal cannula.  Chest x-ray with no focal infiltrates, mild pulmonary vascular congestion.  Off supplemental nasal oxygen    Acute encephalopathy likely multifactorial from narcotic use, delirium from hospitalization.  Patient with prolonged hospital stay, ICU stay and now transferred out, postoperative.  Receiving narcotics.  Minimize narcotic use as able to.  Closely monitor mental status.    Neurochecks.  If any neurological weakness will consider CT imaging    Acute anemia likely from surgical blood loss, acute illness, dilutional.  Hemoglobin 11.7.  No active bleeding but noted bloody output from wound VAC.  Monitor hemoglobin levels in a.m. or earlier if symptomatic.  Hold heparin drip if any active bleeding    Severe hypoalbuminemia likely from acute illness.  Dilutional.  Serum albumin of 2.1.  Follow proBNP.  Hold off diuresis 4/14.  Nutritional intake per surgical team    Mild hypernatremia.  Mild dehydration.  IV hydration, recheck sodium this morning.    Acute kidney injury.  Resolved  Anion gap metabolic acidosis.  Resolved.  Monitor.    Type 2 diabetes mellitus with a hemoglobin A1c of 6.8  Hold PTA metformin.  Insulin sliding scale every 4 hours ordered    History of lower extremity edema.  Hold oral Lasix, as needed IV Lasix     BPH:   Flomax on hold.  Having Mccarty catheter     Neuropathy:   *Neurontin 800 mg 3 times daily as needed has been filled but patient reports taking 300mg TID  *4/1 per pharmacy note only recent fills are 800 mg and  not 300 mg dosing.   -Patient not really using now and denying any worsening neuropathy.  On hold since admission     Nicotine use: Chews tobacco  -Continue nicotine patch, and as needed nicotine lozenges.    Long term cessation to be encouraged during stay.     ALIREZA not on CPAP.  Recommend sleep studies as outpatient     Medically complicated obesity with a BMI of 41.  Increase all-cause mortality and morbidity.  Consider lifestyle modification with diet and exercise as able to.     Physical deconditioning from medical illness.  Rehab team following.  Fall precautions    Urinary retention.  Having Cmcarty catheter.  Trial of voiding once okay by surgical team.  Mccarty catheter to be replaced 4/14.  Follow UA after placing Mccarty    Goals of care discussion.  Patient critically ill with A-fib with RVR.  Will transfer to [facility] stat.  Will initiate on Cardizem drip.  Discussed goals of care with patient's wife over the telephone, continue restorative care full code at this time.     Clinically Significant Risk Factors         # Hypernatremia: Highest Na = 146 mmol/L in last 2 days, will monitor as appropriate  # Hyperchloremia: Highest Cl = 111 mmol/L in last 2 days, will monitor as appropriate       # Hypercalcemia: corrected calcium is >10.1, will monitor as appropriate    # Hypoalbuminemia: Lowest albumin = 1.9 g/dL at 4/9/2025  5:25 AM, will monitor as appropriate     # Hypertension: Noted on problem list           # DMII: A1C = 6.8 % (Ref range: <5.7 %) within past 6 months   # Severe Obesity: Estimated body mass index is 43.26 kg/m  as calculated from the following:    Height as of this encounter: 1.829 m (6').    Weight as of this encounter: 144.7 kg (319 lb 0.1 oz).      # Financial/Environmental Concerns: none   # History of CABG: noted on surgical history        Orders Placed This Encounter      NPO for Medical/Clinical Reasons Except for: Meds      DVT Prophylaxis: SCDs, on intravenous heparin drip  Code Status:  Full Code  Disposition: Expected discharge in greater than 3 days pending clinical improvement    Medically Ready for Discharge: Anticipated in 2-4 Days     Discussed with patient, bedside RN, RRT team.  Patient critically ill with A-fib with RVR.  Plan to transfer to [facility] and initiate on Cardizem drip.  Discussed with patient's wife over the telephone, full code - 4/14.    More than 70% of time spent in direct patient care, care coordination, patient counseling, and formalizing plan of care.      Tim Mays MD        Interval History:        Patient lying in bed.  This morning lethargic, arousable falls asleep. Unable to obtain review of systems.  Noted to be in A-fib with RVR.  Tachypneic.  No improvement in heart rate with 2 doses of IV metoprolol.  Mccarty catheter present.  Abdominal wound VAC present, bloody output.  Continues to be NPO.  On intravenous heparin drip.  RRT this morning - see notes for details   Receiving IV Dilaudid for pain.       Physical Exam:        Physical Exam   Temp:  [97.7  F (36.5  C)-99.2  F (37.3  C)] 97.7  F (36.5  C)  Pulse:  [] 149  Resp:  [16-40] 26  BP: (108-143)/(68-86) 108/74  Cuff Mean (mmHg):  [] 84  SpO2:  [93 %-97 %] 96 %    Intake/Output Summary (Last 24 hours) at 4/14/2025 0905  Last data filed at 4/14/2025 0834  Gross per 24 hour   Intake 0 ml   Output 1670 ml   Net -1670 ml       Admission Weight: 129.3 kg (285 lb)  Current Weight: (!) 144.7 kg (319 lb 0.1 oz)    PHYSICAL EXAM  GENERAL: Patient frail-appearing, drowsy, falls asleep  HEENT: Oropharynx pink  HEART: IRRegular rate and rhythm. S1S2  LUNGS: Bilateral coarse breath sounds.  Respirations unlabored  ABDOMEN: Distended, wound VAC present.  NEURO: Moving all extremities  EXTREMITIES: ++ pedal edema.   SKIN: Warm, dry  PSYCHIATRY Cooperative       Medications:        Current Facility-Administered Medications   Medication Dose Route Frequency Provider Last Rate Last Admin    [Held by provider]  acetaminophen (TYLENOL) tablet 975 mg  975 mg Oral or NG Tube Q8H Clau Calles MD        [Held by provider] atorvastatin (LIPITOR) tablet 80 mg  80 mg Oral or NG Tube Daily Khari Peña MD        [Held by provider] famotidine (PEPCID) tablet 20 mg  20 mg Oral BID Terry Patino MD   20 mg at 04/08/25 0805    [Held by provider] folic acid (FOLVITE) tablet 1,000 mcg  1,000 mcg Oral or NG Tube BID Khari Peña MD        [Held by provider] furosemide (LASIX) tablet 20 mg  20 mg Oral Daily Mann Torres DO   20 mg at 04/04/25 1017    metoprolol tartrate (LOPRESSOR) tablet 25 mg  25 mg Oral BID Hayley Vale APRN CNP   25 mg at 04/14/25 0020    [Held by provider] metoprolol tartrate (LOPRESSOR) tablet 50 mg  50 mg Oral BID Fahad Wei MD   50 mg at 04/08/25 0806    nicotine (NICODERM CQ) 21 MG/24HR 24 hr patch 1 patch  1 patch Transdermal Daily Hayley Vale APRN CNP   1 patch at 04/13/25 0855    pantoprazole (PROTONIX) 2 mg/mL suspension 40 mg  40 mg Per Feeding Tube QAM AC Hayley Vale APRN CNP        Or    pantoprazole (PROTONIX) IV push injection 40 mg  40 mg Intravenous QAM AC Hayley Vale APRN CNP   40 mg at 04/14/25 0558    piperacillin-tazobactam (ZOSYN) 4.5 g vial to attach to  mL bag  4.5 g Intravenous Q6H Hayley Vale APRN  mL/hr at 04/12/25 0548 4.5 g at 04/14/25 0558    [Held by provider] polyethylene glycol (MIRALAX) Packet 17 g  17 g Oral Daily Clau Calles MD   17 g at 04/08/25 0805    QUEtiapine (SEROquel) tablet 25 mg  25 mg Oral BID Hayley Vale APRN CNP   25 mg at 04/13/25 2123    sodium chloride (PF) 0.9% PF flush 3 mL  3 mL Intracatheter Q8H Hayley Barker APRN CNP        sodium chloride (PF) 0.9% PF flush 3 mL  3 mL Intracatheter Q8H MITA Hayley Vale APRN CNP   3 mL at 04/14/25 0609    sodium phosphate 15 mmol in NS 250mL intermittent infusion  15 mmol Intravenous Once Hayley Vale APRN CNP         [Held by provider] tamsulosin (FLOMAX) capsule 0.8 mg  0.8 mg Oral QPM Khari Peña MD   0.8 mg at 04/07/25 2031     Current Facility-Administered Medications   Medication Dose Route Frequency Provider Last Rate Last Admin    benzocaine-menthol (CHLORASEPTIC) 6-10 MG lozenge 1 lozenge  1 lozenge Buccal Q1H PRN Hayley Vale APRN CNP   1 lozenge at 04/01/25 0334    bisacodyl (DULCOLAX) suppository 10 mg  10 mg Rectal Daily PRN Hayley Vale APRN CNP        carboxymethylcellulose PF (REFRESH PLUS) 0.5 % ophthalmic solution 1 drop  1 drop Both Eyes Q1H PRN Hayley Vale APRN CNP        glucose gel 15-30 g  15-30 g Oral Q15 Min PRN Hayley Vale APRN CNP        Or    dextrose 50 % injection 25-50 mL  25-50 mL Intravenous Q15 Min PRN Hayley Vale APRN CNP   25 mL at 04/11/25 0448    Or    glucagon injection 1 mg  1 mg Subcutaneous Q15 Min PRN Hayley Vale APRN CNP        diazepam (VALIUM) injection 2.5 mg  2.5 mg Intravenous Q6H PRN Hayley Vale APRN CNP        diphenhydrAMINE (BENADRYL) elixir 12.5 mg  12.5 mg Oral Q6H PRN Hayley Vale APRN CNP        Or    diphenhydrAMINE (BENADRYL) injection 12.5 mg  12.5 mg Intravenous Q6H PRN Hayley Vale APRN CNP        fentaNYL (SUBLIMAZE) 50 mcg/mL bolus from pump  25 mcg Intravenous Q1H PRN Hayley Vale APRN CNP   25 mcg at 04/10/25 1632    [Held by provider] gabapentin (NEURONTIN) tablet 800 mg  800 mg Oral TID PRN Clau Calles MD        HYDROmorphone (DILAUDID) injection 0.2 mg  0.2 mg Intravenous Q4H PRN Tim Mays MD        HYDROmorphone (PF) (DILAUDID) injection 0.3 mg  0.3 mg Intravenous Q4H PRN Tim Mays MD        lidocaine (LMX4) cream   Topical Q1H PRN Hayley Vale APRN CNP        lidocaine 1 % 0.1-1 mL  0.1-1 mL Other Q1H PRN Hayley Vale APRN CNP        magnesium hydroxide (MILK OF MAGNESIA) suspension 30 mL  30 mL Oral Daily PRN Hayley Vale,  ADONIS CNP   30 mL at 04/01/25 0427    melatonin tablet 10 mg  10 mg Oral At Bedtime PRN Hayley Vale APRN CNP   10 mg at 04/12/25 0042    methocarbamol (ROBAXIN) half-tab 250 mg  250 mg Oral Q6H PRN Hayley Vale APRN CNP   250 mg at 04/12/25 1714    metoprolol (LOPRESSOR) injection 5 mg  5 mg Intravenous Q4H PRN Hayley Vale APRN CNP   5 mg at 04/14/25 0806    naloxone (NARCAN) injection 0.2 mg  0.2 mg Intravenous Q2 Min PRN Hayley Vale APRN CNP        Or    naloxone (NARCAN) injection 0.4 mg  0.4 mg Intravenous Q2 Min PRN Hayley Vale APRN CNP        Or    naloxone (NARCAN) injection 0.2 mg  0.2 mg Intramuscular Q2 Min PRN Hayley Vale APRN CNP        Or    naloxone (NARCAN) injection 0.4 mg  0.4 mg Intramuscular Q2 Min PRN Hayley Vale APRN CNP        nicotine (COMMIT) lozenge 2 mg  2 mg Buccal Q1H PRN Hayley Vale APRN CNP   2 mg at 04/03/25 1145    No lozenges or gum should be given while patient on BIPAP/AVAPS/AVAPS AE   Does not apply Continuous PRN Hayley Vale APRN CNP        ondansetron (ZOFRAN ODT) ODT tab 4 mg  4 mg Oral Q6H PRN Hayley Vale APRN CNP   4 mg at 04/08/25 0208    Or    ondansetron (ZOFRAN) injection 4 mg  4 mg Intravenous Q6H PRN Hayley Vale APRN CNP   4 mg at 04/11/25 1232    Patient may continue current oral medications   Does not apply Continuous PRN Hayley Vale APRN CNP        phenol (CHLORASEPTIC) 1.4 % spray 1 mL  1 spray Mouth/Throat Q1H PRN Hayley Vale APRN CNP        prochlorperazine (COMPAZINE) injection 5 mg  5 mg Intravenous Q6H PRN Hayley Vale APRN CNP   5 mg at 04/08/25 1103    Or    prochlorperazine (COMPAZINE) tablet 5 mg  5 mg Oral Q6H PRN Hayley Vale APRN CNP   5 mg at 04/03/25 0756    QUEtiapine (SEROquel) tablet 50 mg  50 mg Oral BID PRN Hayley Vale APRN CNP   50 mg at 04/14/25 0121    sodium chloride (PF) 0.9% PF flush 3 mL  3 mL Intracatheter q1 min prn  Hayley Vale, ADONIS CNP                Data:      All new lab and imaging data was reviewed.

## 2025-04-14 NOTE — CONSULTS
IR consult for abdominal fluid collection drains. Will plan on CT guided drainage of left paracolic gutter and right ventral fluid collections. Tentatively scheduled for 9 AM tomorrow. Please keep patient NPO after midnight. Heparin gtt off by 4AM.    Manny Guillen MD

## 2025-04-14 NOTE — CONSULTS
"[facility] Service Consult Note     S: Consulted on patient for coccyx wound and NPWT dressing change.   B: Patient s/p small bowel resection, left in discontinuity with return to the OR on 4/10 for small bowel anastomosis and fascial closure & NPWT placement.   A: Multiple attempts made today, 4/14 to assess and treat patient. Patient was transferred to CCU and then off the unit for cardioversion.   R: NPWT has been in place since 4/10. Orders placed for dressing removal by nursing staff and packing with vashe wet-moist gauze. [facility] team to follow-up for vac replacement tomorrow, 4/14.     04/14/25 1534  Wound care  Start:  04/14/25 1534,   End:  04/14/25 1534,   ONE TIME,   Routine        Comments: Abdominal wound: 4/14/25  1. Remove wound vac.  2. Cleanse with Vashe (PS#[id number]). Pat dry.  3. Gently pack with Vashe (PS#[id number]) moistened kerlix.  4. Cover with an ABD pad and secure with Medipore tape.  5. Change PRN for drainage strike-through.        Lana Turk RN, CWON  Please contact via [facility] at group \"[facility] nurse\"- M-F [phone]    "

## 2025-04-14 NOTE — PROGRESS NOTES
Confused and hallucinating  HR to 150s overnight, EKG shows SVT  +bm  Skin VAC'd and blood-colored drainage.  Will be cardioverted.    Abdomen is mildly distended, he is centrally obese at baseline.  Skin vac in place and functioning.  Ng in place    Wbc 9.9  Plat 301    CT CAP reviewed-  A left lateral fluid collection and right anterior fluid collection. It would be early for abscess formation, but he is at high risk for post-operative infection.  Have requested IR to evaluate for drain placement of these collections.  Ordered placed for aerobic and anaerobic cultures of these.    Pip/tazo, heparin gtt.

## 2025-04-14 NOTE — ANESTHESIA POSTPROCEDURE EVALUATION
Patient: Richar Davis    Procedure: Procedure(s):  Transesophageal echocardiogram intraoperative  Anesthesia cardioversion       Anesthesia Type:  MAC    Note:     Postop Pain Control: Uneventful            Sign Out: Well controlled pain   PONV: No   Neuro/Psych: Uneventful            Sign Out: Acceptable/Baseline neuro status   Airway/Respiratory: Uneventful            Sign Out: Acceptable/Baseline resp. status; O2 supplementation               Oxygen: Nasal Cannula   CV/Hemodynamics: Uneventful            Sign Out: Acceptable CV status; No obvious hypovolemia; No obvious fluid overload   Other NRE: NONE   DID A NON-ROUTINE EVENT OCCUR?        Last vitals:  Vitals Value Taken Time   BP     Temp     Pulse     Resp     SpO2         Electronically Signed By: Shant Garcia DO,   April 14, 2025  3:02 PM

## 2025-04-14 NOTE — PROGRESS NOTES
Updated house NP with 4 AM vitals. Gave another PRN dose of IV metoprolol, HR still elevated and tachypneic. Gave ordered one time dose of 0.2mg of IV dilaudid

## 2025-04-14 NOTE — PLAN OF CARE
4/14/25  [id number]  POD#15 Ex. Lap with small bowel enterotomy and gallstone removal. Wound vac put on abdomen.  Orientation: A/Ox 3 (not time)   Vitals/Tele: VSS on RA; Tele+ NSR (had cardioversion today).  IV Access/drains:  Mccarty catheter.  NG tube to LIS.   Diet: npo  Mobility: bed rest  GI/: Mccarty.   Wound/Skin: Abdominal wound vac in place.  Tests: IR tomorrow for drain.  Discharge Plan: TBD  See Flow sheets for assessment    Neuro's q4.  3/5 strength in legs and arms.    Amnio & heparin gtt.  Hep 10A @ recheck for 0000.      NPO at midnight for IR procedure tomorrow @ 0900. Hep gtt to stop at 0400 on 4/15.

## 2025-04-14 NOTE — SIGNIFICANT EVENT
RRT called around 8am. Pt's HR in the 150's, a-fib.  Pt's R=30's. See RRT notes for details.  Pt's wife updated on the pt's room number (243) in the heart center.  Report called to the heart center RN.  Informed the heart center RN that no scheduled morning medications off the MAR were given to the pt during the RRT.  Pt to CT on a cart.  Pt's bariatric bed moved over to the heart center.  The pharmacist and wound care RN were informed of the pt's transfer to the new unit.

## 2025-04-14 NOTE — PROGRESS NOTES
Care Management Follow Up    Length of Stay (days): 15    Expected Discharge Date: 04/19/2025     Concerns to be Addressed: discharge planning     Patient plan of care discussed at interdisciplinary rounds: Yes    Anticipated Discharge Disposition: ARU      Anticipated Discharge Services:   Anticipated Discharge DME:      Patient/family educated on Medicare website which has current facility and service quality ratings: no  Education Provided on the Discharge Plan:    Patient/Family in Agreement with the Plan: yes    Referrals Placed by CM/SW:    Private pay costs discussed: Not applicable    Discussed  Partnership in Safe Discharge Planning  document with patient/family: No     Handoff Completed: No, handoff not indicated or clinically appropriate    Additional Information:  Care coordination team following for discharge planning.  Currently PT is recommending ARU at discharge.      Next Steps: Writer will speak with patient regarding recommendation for ARU and if agreeable will make referral to  ARU.    Addendum: 3:35 PM  Writer spoke with patient's wife Mya via phone and explained ARU to her.  Mya stated that patient has been to TCU in the past.  She is concerned about driving downtown [place] but is supportive of doing whatever is best for patient.  Writer explained that he would still need to be accepted by the ARU and/or the recommendation could change depending on his progress during his hospital stay.  Writer will make referral to ARU.     Hoa Florentino RN Care Coordinator  [facility]  [phone]

## 2025-04-14 NOTE — ANESTHESIA PREPROCEDURE EVALUATION
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     ANESTHESIA CARDIOVERSION N/A 2025    Procedure: Anesthesia cardioversion;  Surgeon: GENERIC ANESTHESIA PROVIDER;  Location:  OR     BYPASS GRAFT ARTERY CORONARY, REPLACE VALVE AORTIC, COMBINED N/A 10/20/2022    Procedure: CORONARY ARTERY BYPASS GRAFT x 3 (LEFT INTERNAL MAMMARY ARTERY - LEFT ANTERIOR DESCENDING ARTERY; SAPHENOUS VEIN - POSTERIOR DESCENDING ARTERY; SAPHENOUS VEIN - CIRCUMFLEX ARTERY) WITH ENDOSCOPIC LESSER SAPHENOUS VEIN HARVEST ON BILATERAL LOWER EXTREMITY, AORTIC VALVE REPLACEMENT WITH TISSUE HEART VALVE INSPIRIS  RESILIA  AORTIC VALVE SIZE: 25MM, AND ON CARDIOPULMONARY PUMP OXYGENATOR  (     COLECTOMY WITHOUT COLOSTOMY N/A 2025    Procedure: EXPLORATORY LAPAROTOMY WITH SMALL BOWEL RESECTION;  Surgeon: Liana Landrum MD;  Location:  OR     CTA ANGIOGRAM CORONARY ARTERY       LAPAROTOMY EXPLORATORY N/A 3/30/2025    Procedure: exploratory laparotomy with enterotomy and removal of gallstone;  Surgeon: Clau Calles MD;  Location:  OR     LAPAROTOMY EXPLORATORY N/A 4/10/2025    Procedure: EXPLORATORY LAPAROTOMY, SMALL BOWEL ANASTOMOSIS, ABDOMINAL CLOSURE;  Surgeon: Liana Landrum MD;  Location:  OR     ORTHOPEDIC SURGERY Left     elbow surgery      Allergies   Allergen Reactions     Niaspan [Niacin] Other (See Comments)     flushing      Social History     Tobacco Use     Smoking status: Former     Smokeless tobacco: Current     Types: Chew     Tobacco comments:     daily   Substance Use Topics      Alcohol use: Not Currently      Wt Readings from Last 1 Encounters:   04/12/25 (!) 144.7 kg (319 lb 0.1 oz)        Anesthesia Evaluation   Pt has had prior anesthetic.     No history of anesthetic complications       ROS/MED HX  ENT/Pulmonary:     (+) sleep apnea, doesn't use CPAP,              tobacco use,                        Neurologic:    (-) no seizures and no CVA   Cardiovascular: Comment: 1. AFL/RVR in the setting of acute perioperative/infectious issues, currently hemodynamically stable, minimally symptomatic  a. First diagnosed 4/4/2025  b. S/p successful DCCV on 4/7/2025  c. Recurred on 4/14/2025  2. CAD s/p CABG (LIMA-LAD, SVG-PDA, SVG-dLCx) in 1/2022  3. Severe aortic stenosis s/p SAVR (25 mm Inspiris Resilia) in 10/2022  a. Severe gradients on 4/2025 TTE  4. HTN  5. HLD  6. ALIREZA  7. DM2  8. Tobacco abuse      (+) Dyslipidemia hypertension- -  CAD - past MI CABG- -                        dysrhythmias, a-fib,  valvular problems/murmurs  s/p AVR.    Previous cardiac testing   Echo: Date: 4/6/25 Results:  Interpretation Summary     Technically difficult imaging  The rhythm was atrial fibrillation.  Left ventricular systolic function is normal.  The visual ejection fraction is 60-65%.  The left ventricle is normal in size.  The right ventricle is normal in structure, function and size.  Doppler interrogation does not demonstrate significant stenosis or  insufficiency involving cardiica valves . No change since 12/10/2024    Stress Test:  Date: Results:    ECG Reviewed:  Date: Results:    Cath:  Date: Results:      METS/Exercise Tolerance:     Hematologic:       Musculoskeletal:       GI/Hepatic: Comment: ILEUS   (-) GERD and liver disease   Renal/Genitourinary:     (+) renal disease, type: ARF,      BPH,      Endo:     (+)  type II DM,   Not using insulin, - not using insulin pump.   Diabetic complications: cardiac problems.      Obesity,    (-) thyroid disease   Psychiatric/Substance Use:      "  Infectious Disease: Comment: sepsis      Malignancy:    (-) malignancy   Other:               OUTSIDE LABS:  CBC:   Lab Results   Component Value Date    WBC 9.9 04/14/2025    WBC 10.0 04/14/2025    HGB 11.7 (L) 04/14/2025    HGB 11.4 (L) 04/14/2025    HCT 36.3 (L) 04/14/2025    HCT 35.7 (L) 04/14/2025     04/14/2025     04/14/2025     BMP:   Lab Results   Component Value Date     04/14/2025     (H) 04/14/2025    POTASSIUM 3.8 04/14/2025    POTASSIUM 3.9 04/13/2025    CHLORIDE 111 (H) 04/14/2025    CHLORIDE 111 (H) 04/13/2025    CO2 21 (L) 04/14/2025    CO2 20 (L) 04/13/2025    BUN 10.5 04/14/2025    BUN 11.1 04/13/2025    CR 0.69 04/14/2025    CR 0.72 04/13/2025    GLC 82 04/14/2025     (H) 04/14/2025     COAGS:   Lab Results   Component Value Date     (H) 10/20/2022    INR 1.15 04/07/2025    FIBR 198 10/20/2022     POC: No results found for: \"BGM\", \"HCG\", \"HCGS\"  HEPATIC:   Lab Results   Component Value Date    ALBUMIN 2.1 (L) 04/14/2025    PROTTOTAL 6.1 (L) 04/14/2025    ALT 14 04/14/2025    AST 35 04/14/2025    ALKPHOS 116 04/14/2025    BILITOTAL 0.4 04/14/2025    YUNG 19 10/23/2022     OTHER:   Lab Results   Component Value Date    PH 7.37 04/11/2025    LACT 1.2 04/14/2025    A1C 6.8 (H) 03/31/2025    HALEY 9.0 04/14/2025    PHOS 2.2 (L) 04/14/2025    MAG 2.2 04/14/2025    LIPASE 29 04/07/2025       Anesthesia Plan    ASA Status:  4       Anesthesia Type: MAC.     - Reason for MAC: straight local not clinically adequate              Consents            Postoperative Care            Comments:               Shant Garcia, DO, DO    Clinically Significant Risk Factors         # Hypernatremia: Highest Na = 146 mmol/L in last 2 days, will monitor as appropriate  # Hyperchloremia: Highest Cl = 111 mmol/L in last 2 days, will monitor as appropriate       # Hypercalcemia: corrected calcium is >10.1, will monitor as appropriate    # Hypoalbuminemia: Lowest albumin = 1.9 " g/dL at 4/9/2025  5:25 AM, will monitor as appropriate     # Hypertension: Noted on problem list           # DMII: A1C = 6.8 % (Ref range: <5.7 %) within past 6 months   # Severe Obesity: Estimated body mass index is 43.26 kg/m  as calculated from the following:    Height as of this encounter: 1.829 m (6').    Weight as of this encounter: 144.7 kg (319 lb 0.1 oz).      # Financial/Environmental Concerns: none   # History of CABG: noted on surgical history

## 2025-04-14 NOTE — ANESTHESIA CARE TRANSFER NOTE
Patient: Richar Davis    Procedure: Procedure(s):  Transesophageal echocardiogram intraoperative  Anesthesia cardioversion       Diagnosis: Irregular cardiac rhythm [I49.9]  Diagnosis Additional Information: No value filed.    Anesthesia Type:   MAC     Note:    Oropharynx: oral airway in place and spontaneously breathing  Level of Consciousness: drowsy  Oxygen Supplementation: face mask  Level of Supplemental Oxygen (L/min / FiO2): 10  Independent Airway: airway patency satisfactory and stable  Dentition: dentition unchanged  Vital Signs Stable: post-procedure vital signs reviewed and stable  Report to RN Given: handoff report given  Destination: care suites annex.  Comments: At end of procedure, spontaneous respirations, patient alert to voice, able to follow commands. Oxygen via facemask at 10 liters per minute to PACU. Oxygen tubing connected to wall O2 in PACU, SpO2, NiBP, and EKG monitors and alarms on and functioning, report on patient's clinical status given to PACU RN, RN questions answered.        Vitals:  Vitals Value Taken Time   BP     Temp     Pulse     Resp     SpO2         Electronically Signed By: ADONIS Nicholas CRNA  April 14, 2025  2:47 PM

## 2025-04-14 NOTE — PROGRESS NOTES
[facility]    Cardiology Progress Note     Assessment & Plan     Gallstone ileus c/b bacteremia with septic shock s/p ex-lap and gallstone resection 3/30, small bowel resection 4/8, and small bowel anastomosis/fascial closure 4/10  AFL/RVR in the setting of acute perioperative/infectious issues, currently hemodynamically stable, minimally symptomatic  First diagnosed 4/4/2025  S/p successful DCCV on 4/7/2025  Recurred on 4/14/2025  CAD s/p CABG (LIMA-LAD, SVG-PDA, SVG-dLCx) in 1/2022  Severe aortic stenosis s/p SAVR (25 mm Inspiris Resilia) in 10/2022  Severe gradients on 4/2025 TTE  HTN  HLD  ALIREZA  DM2  Tobacco abuse        RECOMMENDATIONS:    With RVR in the setting of atrial flutter, particularly with ongoing infectious/inflammatory precipitants, rate control will be exceedingly difficult to achieve  Accordingly, recommend repeat DCCV.  Patient is currently n.p.o.  Unfortunately, we will need preceding MANDEEP as patient was off of anticoagulation for roughly 72 hours shortly after his 4/7 DCCV.  We will coordinate with anesthesia and attempt to perform MANDEEP/DCCV today.  If not able to accommodate, we will arrange for this as soon as possible tomorrow.  In the meantime, we will load with IV amiodarone per protocol.  Eventually we will transition to an oral amiodarone load, but goal would be for temporary amiodarone use to be discontinued once infectious/inflammatory issues have resolved.  If patient does develop recurrent AFL/RVR despite repeat cardioversion and IV amiodarone loading, would then consider inpatient EPS +/- ablation if clinically necessary  Continue IV heparin drip  Further plans per inpatient hospital medicine service        Cardiology will continue to follow.      Total time spent on shared visit more than 50 minutes, which includes my face-to-face patient evaluation, reviewing data with patient and team, documentation and coordination of care.       Paul Adames,  MD  Interventional Cardiology          Paul Adames MD    Interval History   69-year-old male with history of newly diagnosed AFL which first developed on 4/4/2025 in the setting of complicated ex lap for gallstone ileus with enterotomy and removal of gallstone.  He was successfully cardioverted on 4/7/2025 with return to sinus rhythm.  Unfortunately, he then converted back to 2-1 AFL with heart rates in the high 140s earlier today.  Following cardioversion, he was initially placed on Eliquis, but this was discontinued on 4/8/2025 given a need to return to the OR, and he was off of anticoagulation for roughly 72 hours before being started back on heparin drip at 2:45 PM on 4/11/2025.    At present, he is somewhat disoriented (oriented to person, but not to time or place).  Symptomatically he denies any obvious cardiac complaints.  As part of today's visit, I reviewed his most recent chemistry panel, CBC, blood gas, echocardiogram, and his last several ECGs.  I also spoke for an extended period of time to his wife, Mya, over the phone.    Physical Exam   Temp: 97.7  F (36.5  C) Temp src: Oral BP: 117/79 Pulse: (!) 151   Resp: 29 SpO2: 96 % O2 Device: Nasal cannula Oxygen Delivery: 2 LPM  Vitals:    04/09/25 0800 04/10/25 2000 04/12/25 0400   Weight: (!) 155 kg (341 lb 11.4 oz) (!) 147.4 kg (324 lb 15.3 oz) (!) 144.7 kg (319 lb 0.1 oz)     Vital Signs with Ranges  Temp:  [97.7  F (36.5  C)-99.2  F (37.3  C)] 97.7  F (36.5  C)  Pulse:  [] 151  Resp:  [16-40] 29  BP: (108-143)/(68-86) 117/79  Cuff Mean (mmHg):  [] 96  SpO2:  [93 %-97 %] 96 %  I/O last 3 completed shifts:  In: 0   Out: 1650 [Urine:1050; Emesis/NG output:600]  Patient Active Problem List   Diagnosis    Coronary artery disease involving coronary bypass graft of native heart without angina pectoris    Morbid obesity (H)    Hyperlipidemia LDL goal <70    Benign essential hypertension    ALIREZA (obstructive sleep apnea)    Gallstone ileus (H)     Acute kidney failure with tubular necrosis (H)       Constitutional: A&O x 1 (person), no acute distress, NG tube in place  Respiratory: Normal respiratory effort, minimal anterior crackles  Cardiovascular: Tachycardic but regular, no obvious m/r/g.  JVP difficult to assess due to body habitus.  Extremities cool with trivial bilateral edema.  Normal carotid upstrokes, no carotid bruits.      Medications   Current Facility-Administered Medications   Medication Dose Route Frequency Provider Last Rate Last Admin    amiodarone (NEXTERONE) 1.8 mg/mL in dextrose 5% 200 mL ADULT STANDARD infusion  1 mg/min Intravenous Continuous Richa Schmidt PA-C        amiodarone (NEXTERONE) 1.8 mg/mL in dextrose 5% 200 mL ADULT STANDARD infusion  0.5 mg/min Intravenous Continuous Richa Schmidt PA-C        heparin 25,000 units in 0.45% NaCl 250 mL ANTICOAGULANT infusion  0-5,000 Units/hr Intravenous Continuous Tim Mays MD 24 mL/hr at 04/14/25 1019 2,400 Units/hr at 04/14/25 1019    lactated ringers infusion   Intravenous Continuous Hayley Vale APRN  mL/hr at 04/14/25 1019 Rate Verify at 04/14/25 1019    No lozenges or gum should be given while patient on BIPAP/AVAPS/AVAPS AE   Does not apply Continuous PRN Hayley Vale APRN CNP        Patient may continue current oral medications   Does not apply Continuous PRN Hayley Vale APRN CNP         Current Facility-Administered Medications   Medication Dose Route Frequency Provider Last Rate Last Admin    [Held by provider] acetaminophen (TYLENOL) tablet 975 mg  975 mg Oral or NG Tube Q8H Clau Calles MD        amiodarone (NEXTERONE) bolus 150 mg  150 mg Intravenous Once Richa Schmidt PA-C        [Held by provider] atorvastatin (LIPITOR) tablet 80 mg  80 mg Oral or NG Tube Daily Khari Peña MD        [Held by provider] famotidine (PEPCID) tablet 20 mg  20 mg Oral BID Terry Patino MD   20 mg at 04/08/25 0805    [Held by  provider] folic acid (FOLVITE) tablet 1,000 mcg  1,000 mcg Oral or NG Tube BID Khari Peña MD        [Held by provider] furosemide (LASIX) tablet 20 mg  20 mg Oral Daily Mann Torres DO   20 mg at 04/04/25 1017    metoprolol tartrate (LOPRESSOR) tablet 25 mg  25 mg Oral BID Hayley Vale APRN CNP   25 mg at 04/14/25 0020    [Held by provider] metoprolol tartrate (LOPRESSOR) tablet 50 mg  50 mg Oral BID Fahad Wei MD   50 mg at 04/08/25 0806    nicotine (NICODERM CQ) 21 MG/24HR 24 hr patch 1 patch  1 patch Transdermal Daily Hayley Vale APRN CNP   1 patch at 04/13/25 0855    pantoprazole (PROTONIX) 2 mg/mL suspension 40 mg  40 mg Per Feeding Tube QAM AC Hayley Vale APRN CNP        Or    pantoprazole (PROTONIX) IV push injection 40 mg  40 mg Intravenous QAM AC Hayley Vale APRN CNP   40 mg at 04/14/25 0558    piperacillin-tazobactam (ZOSYN) 4.5 g vial to attach to  mL bag  4.5 g Intravenous Q6H Hayley Vale APRN  mL/hr at 04/12/25 0548 4.5 g at 04/14/25 0558    [Held by provider] polyethylene glycol (MIRALAX) Packet 17 g  17 g Oral Daily Clau Calles MD   17 g at 04/08/25 0805    QUEtiapine (SEROquel) tablet 25 mg  25 mg Oral BID Hayley Vale APRN CNP   25 mg at 04/13/25 2123    sodium chloride (PF) 0.9% PF flush 3 mL  3 mL Intracatheter Q8H MITA Hayley Vale APRN CNP        sodium chloride (PF) 0.9% PF flush 3 mL  3 mL Intracatheter Q8H MITA Hayley Vale APRN CNP   3 mL at 04/14/25 0609    sodium phosphate 15 mmol in NS 250mL intermittent infusion  15 mmol Intravenous Once Hayley Vale APRN CNP        [Held by provider] tamsulosin (FLOMAX) capsule 0.8 mg  0.8 mg Oral QPM Khari Peña MD   0.8 mg at 04/07/25 2031           Paul Adames MD  Interventional Cardiology  April 14, 2025

## 2025-04-14 NOTE — PROVIDER NOTIFICATION
Fe page Dr Mays regarding the pt's HR in the 150's, a-fib. Asking the MD to come assess the pt. Awaiting a return response.

## 2025-04-14 NOTE — PROVIDER NOTIFICATION
Per anesthesia and Dr. Elizabeth shultz for NG tube to remain in place for MANDEEP/DCCV.

## 2025-04-14 NOTE — PRE-PROCEDURE
GENERAL PRE-PROCEDURE:   Procedure:  MANDEEP/DCCV  Date/Time:  4/14/2025 2:12 PM    Verbal consent obtained?: Yes    Written consent obtained?: Yes    Risks and benefits: Risks, benefits and alternatives were discussed    DC Plan: Appropriate discharge home plan in place for patients who are going home after procedure   Consent given by:  Spouse  Patient states understanding of procedure being performed: Yes    Patient's understanding of procedure matches consent: Yes    Procedure consent matches procedure scheduled: Yes    Expected level of sedation:  Moderate  Appropriately NPO:  Yes  ASA Class:  3  Mallampati  :  Grade 3- soft palate visible, posterior pharyngeal wall not visible  Lungs:  Lungs clear with good breath sounds bilaterally  Heart:  Normal heart sounds and rate, RRR and a-fib  History & Physical reviewed:  History and physical reviewed and no updates needed  Statement of review:  I have reviewed the lab findings, diagnostic data, medications, and the plan for sedation

## 2025-04-14 NOTE — PROGRESS NOTES
Admission/Transfer from: [facility]  2 RN skin assessment completed. Yes  Significant findings include: Abdominal wound vac, scattered scabs/bruising. Reddened/rash in groin. L great toe blackened scab. Coccyx blanchable red, mepilex applied.   [facility] Nurse Consult Ordered? Yes

## 2025-04-14 NOTE — CODE/RAPID RESPONSE
"[facility]    RRT Note  4/14/2025   Time Called: 0803    Code Status: Full Code    diabetes, hypertension, CABG, status post AVR with bovine valve 10/2022, untreated sleep apnea, hypertension, hyperlipidemia, neuropathy, BPH admitted on 3/30/2025 with abdominal pain nausea and vomiting. Imaging showed gallstone ileus with 2.2 cm gallstone impacted. Underwent surgery on 3/30. Postoperatively complication with ileus requiring a ex lap on 4/8 with resultant small bowel resection. Post -op admission to the ICU, intubated and on pressor support with temporary abdominal closure. He returned to OR on 4/10 for small bowel anastomosis and abdominal. Wound VAC in place. Weaned off pressors. Continued NG tube to LIS. Patient extubated on 4/11/2025, saturating well on room air.     Please see RRT documentation from 04/14 1221. Called for persistent tachyarrhythmia.    Assessment & Plan     Atrial Flutter, RVR: Rates have perpetually been > 140s, pt remains asymptomatic, but unresponsive to beta blockers (Metoprolol 5 mg IV x 2 + metoprolol 25 mg PO given overnight). Subjectively notes \"pain at the tip of the penis\" and feeling very thirsty. Suspect rate increase is driven by underlying issue. Has had low grade temps ~99, /70s > 108/60s after one dose of metoprolol, no change in heart rate. Denies chest pain, abd soft, non tender, no signs of surrounding erythema or infection around wound VAC.  *Of note, pt has been seen in the past by cardiology, s/p cardioversion for aflutter.   - CT chest/abd/pelvis w/ contrast STAT   - remove/replace hernandez, urine dark/sediment > UA  - metoprolol  5 mg IV   - replace phos  - lactic acid STAT > if significantly elevated, will plan to re image abd  - Cardiology consult placed overnight    - plan for MANDEEP/DCCV + amio per Cardiology consult  - start diltiazem infusion   - given recent septic shock, will repeat echo to evaluate for new cardiomyopathy   - transfer to " CCU/[facility]  - sanguinous drainage from wound VAC > discussed w/ Gen Surg, Hgb stable    Clinically, pt appears hypovolemic but difficult give body habitus. Urine dark/sedimentary; pt  has been NPO, tongue very dry.   - LR infusion 100 cc x 5 hours - careful to monitor for resp distress    Concern for aspiration  Wet sounding cough with mouth swabs.  - consider SLP consult in future when oral intake progresses   - CT chest/abd/pelvis as above (will eval for signs of aspiration)    Hypernatremia  Na 146, question hypovolemia driven.  - recheck post fluids     Addendum 10 AM  Labs notable for VBG showing improved resp alkalosis, VBG pH 7.42, [id number], bicarb 22.  Hemoglobin remains stable at 11.7, no leukocytosis noted.  Lactic acid reassuring at 1.2, sodium 145.  Phosphorus added to a.m. labs, found to be 2.2.  - Phosphorus replacement protocol added    Addendum 1120  CT abd/pelvis shows multiple peripherally enhancing fluid collections, suspicious for developing abscesses.    - plan for IR drainage and culture 4/14      Last 24H PRN:     metoprolol (LOPRESSOR) injection 5 mg, 5 mg at 04/14/25 0806    QUEtiapine (SEROquel) tablet 50 mg, 50 mg at 04/14/25 0121      At the conclusion of this RRT patient was hemodynamically stable and will transfer to [facility]    Discussed with and defer further cares to Dr. Glasgow (Gen surg), Dr. Mays, and bedside nursing staff. Wife updated by Hospitalist.      ADONIS [facility]  Securely message with the Vocera Web Console (learn more here)  Text page via [facility] Paging/Directory        Physical Exam   Vital Signs with Ranges:  Temp:  [97.7  F (36.5  C)-99.2  F (37.3  C)] 97.7  F (36.5  C)  Pulse:  [] 150  Resp:  [16-40] 22  BP: (118-143)/(68-86) 118/78  SpO2:  [93 %-97 %] 95 %  I/O last 3 completed shifts:  In: 0   Out: 1650 [Urine:1050; Emesis/NG output:600]      Physical Exam  Vitals and nursing note reviewed.   HENT:      Mouth/Throat:  "     Mouth: Mucous membranes are dry.   Cardiovascular:      Rate and Rhythm: Regular rhythm. Tachycardia present.      Heart sounds: No murmur heard.  Pulmonary:      Effort: Tachypnea present.      Breath sounds: Normal breath sounds.   Abdominal:      General: Abdomen is protuberant.      Palpations: Abdomen is soft.      Tenderness: There is no abdominal tenderness. There is no guarding.      Comments: Wound vac in place, no evidence of infection surrounding skin   Skin:     General: Skin is warm and dry.   Neurological:      Mental Status: He is alert. Mental status is at baseline. He is confused.      GCS: GCS eye subscore is 4. GCS verbal subscore is 5. GCS motor subscore is 6.   Psychiatric:         Attention and Perception: He is inattentive.         Behavior: Behavior is cooperative.         Cognition and Memory: Cognition is impaired. Memory is impaired.          Data     IMAGING: (X-ray/CT/MRI)   Recent Results (from the past 24 hours)   XR Chest Port 1 View    Narrative    EXAM: XR CHEST PORT 1 VIEW  LOCATION: [facility]  DATE: 4/14/2025    INDICATION: Tachypnea and a flutter.  COMPARISON: None.      Impression    IMPRESSION: Sternotomy with mediastinal clips and markers. NG tube enters the stomach. Heart size within normal limits. Mild pulmonary vascular prominence. Mild elevation left hemidiaphragm. No focal lung infiltrates.       CBC with Diff:  Recent Labs   Lab Test 04/14/25  0306 04/07/25  2320 04/07/25  0727   WBC 10.0   < >  --    HGB 11.4*   < >  --    MCV 94   < >  --       < >  --    INR  --   --  1.15    < > = values in this interval not displayed.      No results found for: \"RETICABSCT\"  No results found for: \"RETP\"    Comprehensive Metabolic Panel:  Recent Labs   Lab 04/14/25  0747 04/14/25  0612 04/14/25  0306   NA  --   --  146*   POTASSIUM  --   --  3.8   CHLORIDE  --   --  111*   CO2  --   --  21*   ANIONGAP  --   --  14   GLC 82   < > 109*   BUN  --   " --  10.5   CR  --   --  0.69   GFRESTIMATED  --   --  >90   HALEY  --   --  9.0   MAG  --   --  2.2   PHOS  --   --  2.2*   PROTTOTAL  --   --  6.1*   ALBUMIN  --   --  2.1*   BILITOTAL  --   --  0.4   ALKPHOS  --   --  116   AST  --   --  35   ALT  --   --  14    < > = values in this interval not displayed.         Time Spent on this Encounter     Medical Decision Making         CRITICAL CARE TIME  I spent 50 minutes of critical care time on the unit/floor managing the care of Richar Davis. Upon evaluation, this patient had a high probability of imminent or life-threatening deterioration due to hemodynamic changes w/ concern for underlying sepsis which required my direct attention, intervention, and personal management. 100% of my time was spent at the bedside counseling the patient and/or coordinating care regarding services listed in this note.

## 2025-04-14 NOTE — PLAN OF CARE
Goal Outcome Evaluation:  Transferred to [facility] 1015 AM pt alert to self only- confused, hallucinating/reaching in the air at times.  A flutter/fib in the 150's this AM, now NSR in the 80's post cardioversion and on amio drip.  Other VSS on RA. NPO, NG to LIS with scant output.   Wound vac in place  to mid abdomen with sanguinous drainaige.  Total care t/r q2h.  Generalized edema + 2 throughout, BLE dusky/tomasz. Mccarty patent, incontient 2 small BM. Continues on amio, heparin and abx.

## 2025-04-14 NOTE — PROCEDURES
[facility]    Procedure: Cardioversion    Date/Time: 4/14/2025 2:44 PM    Performed by: Dangelo Johnson MD  Authorized by: Richa Schmidt PA-C      UNIVERSAL PROTOCOL   Site Marked: NA  Prior Images Obtained and Reviewed:  Yes  Required items: Required blood products, implants, devices and special equipment available    Patient identity confirmed:  Verbally with patient, provided demographic data, hospital-assigned identification number, arm band and anonymous protocol, patient vented/unresponsive  Patient was reevaluated immediately before administering moderate or deep sedation or anesthesia  Confirmation Checklist:  Patient's identity using two indicators, relevant allergies, procedure was appropriate and matched the consent or emergent situation and correct equipment/implants were available  Time out: Immediately prior to the procedure a time out was called    Universal Protocol: the Joint Commission Universal Protocol was followed    Preparation: Patient was prepped and draped in usual sterile fashion      SEDATION  Patient Sedated: Yes    Sedation Type:  Moderate (conscious) sedation  Vital signs: Vital signs monitored during sedation      PROCEDURE DETAILS  Cardioversion basis: elective  Pre-procedure rhythm: atrial fibrillation  Patient position: patient was placed in a supine position  Chest area: chest area exposed  Electrodes: pads  Electrodes placed: anterior-posterior  Number of attempts: 1    Details of Attempts:  Single synchronized shock of 200J, accompanied by chest compression, restored NSR.  Pt tolerated procedure well.  Post-procedure rhythm: normal sinus rhythm  Complications: no complications      PROCEDURE    Patient Tolerance:  Patient tolerated the procedure well with no immediate complications  Length of time physician/provider present for 1:1 monitoring during sedation: 45

## 2025-04-15 ENCOUNTER — APPOINTMENT (OUTPATIENT)
Dept: CARDIOLOGY | Facility: CLINIC | Age: 70
DRG: 329 | End: 2025-04-15
Attending: NURSE PRACTITIONER
Payer: COMMERCIAL

## 2025-04-15 ENCOUNTER — APPOINTMENT (OUTPATIENT)
Dept: ULTRASOUND IMAGING | Facility: CLINIC | Age: 70
DRG: 329 | End: 2025-04-15
Attending: HOSPITALIST
Payer: COMMERCIAL

## 2025-04-15 ENCOUNTER — APPOINTMENT (OUTPATIENT)
Dept: CT IMAGING | Facility: CLINIC | Age: 70
DRG: 329 | End: 2025-04-15
Attending: RADIOLOGY
Payer: COMMERCIAL

## 2025-04-15 LAB
ALBUMIN SERPL BCG-MCNC: 2.2 G/DL (ref 3.5–5.2)
ALP SERPL-CCNC: 103 U/L (ref 40–150)
ALT SERPL W P-5'-P-CCNC: 19 U/L (ref 0–70)
ANION GAP SERPL CALCULATED.3IONS-SCNC: 12 MMOL/L (ref 7–15)
AST SERPL W P-5'-P-CCNC: 36 U/L (ref 0–45)
ATRIAL RATE - MUSE: 147 BPM
ATRIAL RATE - MUSE: 159 BPM
ATRIAL RATE - MUSE: 75 BPM
BACTERIA BLD CULT: NO GROWTH
BACTERIA BLD CULT: NO GROWTH
BACTERIA SPEC CULT: NORMAL
BACTERIA SPEC CULT: NORMAL
BILIRUB SERPL-MCNC: 0.3 MG/DL
BUN SERPL-MCNC: 11.8 MG/DL (ref 8–23)
CALCIUM SERPL-MCNC: 8.8 MG/DL (ref 8.8–10.4)
CHLORIDE SERPL-SCNC: 113 MMOL/L (ref 98–107)
CREAT SERPL-MCNC: 0.7 MG/DL (ref 0.67–1.17)
DIASTOLIC BLOOD PRESSURE - MUSE: NORMAL MMHG
EGFRCR SERPLBLD CKD-EPI 2021: >90 ML/MIN/1.73M2
ERYTHROCYTE [DISTWIDTH] IN BLOOD BY AUTOMATED COUNT: 14.6 % (ref 10–15)
GLUCOSE BLDC GLUCOMTR-MCNC: 101 MG/DL (ref 70–99)
GLUCOSE BLDC GLUCOMTR-MCNC: 104 MG/DL (ref 70–99)
GLUCOSE BLDC GLUCOMTR-MCNC: 104 MG/DL (ref 70–99)
GLUCOSE BLDC GLUCOMTR-MCNC: 107 MG/DL (ref 70–99)
GLUCOSE BLDC GLUCOMTR-MCNC: 108 MG/DL (ref 70–99)
GLUCOSE BLDC GLUCOMTR-MCNC: 111 MG/DL (ref 70–99)
GLUCOSE SERPL-MCNC: 114 MG/DL (ref 70–99)
GRAM STAIN RESULT: NORMAL
HBV SURFACE AG SERPL QL IA: NONREACTIVE
HCO3 SERPL-SCNC: 21 MMOL/L (ref 22–29)
HCT VFR BLD AUTO: 34 % (ref 40–53)
HGB BLD-MCNC: 10.4 G/DL (ref 13.3–17.7)
HIV 1+2 AB+HIV1 P24 AG SERPL QL IA: NONREACTIVE
HIV 1+2 AB+HIV1P24 AG SERPLBLD IA.RAPID: NON REACTIVE
HIV 1+2 AB+HIV1P24 AG SERPLBLD IA.RAPID: NON REACTIVE
HIV INTERPRETATION: NORMAL
HOLD SPECIMEN: NORMAL
INTERPRETATION ECG - MUSE: NORMAL
LVEF ECHO: NORMAL
MAGNESIUM SERPL-MCNC: 2.2 MG/DL (ref 1.7–2.3)
MCH RBC QN AUTO: 29.1 PG (ref 26.5–33)
MCHC RBC AUTO-ENTMCNC: 30.6 G/DL (ref 31.5–36.5)
MCV RBC AUTO: 95 FL (ref 78–100)
P AXIS - MUSE: 54 DEGREES
P AXIS - MUSE: NORMAL DEGREES
P AXIS - MUSE: NORMAL DEGREES
PHOSPHATE SERPL-MCNC: 2.6 MG/DL (ref 2.5–4.5)
PLATELET # BLD AUTO: 267 10E3/UL (ref 150–450)
POTASSIUM SERPL-SCNC: 3.6 MMOL/L (ref 3.4–5.3)
PR INTERVAL - MUSE: 138 MS
PR INTERVAL - MUSE: NORMAL MS
PR INTERVAL - MUSE: NORMAL MS
PROT SERPL-MCNC: 5.8 G/DL (ref 6.4–8.3)
QRS DURATION - MUSE: 84 MS
QRS DURATION - MUSE: 84 MS
QRS DURATION - MUSE: 90 MS
QT - MUSE: 246 MS
QT - MUSE: 314 MS
QT - MUSE: 420 MS
QTC - MUSE: 387 MS
QTC - MUSE: 469 MS
QTC - MUSE: 487 MS
R AXIS - MUSE: 48 DEGREES
R AXIS - MUSE: 56 DEGREES
R AXIS - MUSE: 57 DEGREES
RBC # BLD AUTO: 3.57 10E6/UL (ref 4.4–5.9)
SODIUM SERPL-SCNC: 146 MMOL/L (ref 135–145)
SYSTOLIC BLOOD PRESSURE - MUSE: NORMAL MMHG
T AXIS - MUSE: 124 DEGREES
T AXIS - MUSE: 163 DEGREES
T AXIS - MUSE: 69 DEGREES
UFH PPP CHRO-ACNC: 0.42 IU/ML
UFH PPP CHRO-ACNC: <0.1 IU/ML
UFH PPP CHRO-ACNC: <0.1 IU/ML
VENTRICULAR RATE- MUSE: 145 BPM
VENTRICULAR RATE- MUSE: 149 BPM
VENTRICULAR RATE- MUSE: 75 BPM
WBC # BLD AUTO: 8.1 10E3/UL (ref 4–11)

## 2025-04-15 PROCEDURE — 99153 MOD SED SAME PHYS/QHP EA: CPT

## 2025-04-15 PROCEDURE — 250N000011 HC RX IP 250 OP 636: Performed by: NURSE PRACTITIONER

## 2025-04-15 PROCEDURE — 250N000013 HC RX MED GY IP 250 OP 250 PS 637

## 2025-04-15 PROCEDURE — 210N000001 HC R&B IMCU HEART CARE

## 2025-04-15 PROCEDURE — 85520 HEPARIN ASSAY: CPT | Performed by: HOSPITALIST

## 2025-04-15 PROCEDURE — 250N000013 HC RX MED GY IP 250 OP 250 PS 637: Performed by: NURSE PRACTITIONER

## 2025-04-15 PROCEDURE — 250N000011 HC RX IP 250 OP 636: Mod: JZ

## 2025-04-15 PROCEDURE — 93005 ELECTROCARDIOGRAM TRACING: CPT

## 2025-04-15 PROCEDURE — 87205 SMEAR GRAM STAIN: CPT | Performed by: RADIOLOGY

## 2025-04-15 PROCEDURE — 0W9G30Z DRAINAGE OF PERITONEAL CAVITY WITH DRAINAGE DEVICE, PERCUTANEOUS APPROACH: ICD-10-PCS | Performed by: STUDENT IN AN ORGANIZED HEALTH CARE EDUCATION/TRAINING PROGRAM

## 2025-04-15 PROCEDURE — 84100 ASSAY OF PHOSPHORUS: CPT | Performed by: NURSE PRACTITIONER

## 2025-04-15 PROCEDURE — 85018 HEMOGLOBIN: CPT | Performed by: NURSE PRACTITIONER

## 2025-04-15 PROCEDURE — 97606 NEG PRS WND THER DME>50 SQCM: CPT

## 2025-04-15 PROCEDURE — 93306 TTE W/DOPPLER COMPLETE: CPT | Mod: 26 | Performed by: INTERNAL MEDICINE

## 2025-04-15 PROCEDURE — 272N000431 CT ABDOMEN PERITONEUM ABSCESS DRAIN W CATH PLACE

## 2025-04-15 PROCEDURE — 255N000002 HC RX 255 OP 636: Performed by: NURSE PRACTITIONER

## 2025-04-15 PROCEDURE — 99222 1ST HOSP IP/OBS MODERATE 55: CPT | Performed by: PHYSICIAN ASSISTANT

## 2025-04-15 PROCEDURE — 93010 ELECTROCARDIOGRAM REPORT: CPT | Performed by: INTERNAL MEDICINE

## 2025-04-15 PROCEDURE — 36415 COLL VENOUS BLD VENIPUNCTURE: CPT | Performed by: HOSPITALIST

## 2025-04-15 PROCEDURE — 99233 SBSQ HOSP IP/OBS HIGH 50: CPT | Performed by: HOSPITALIST

## 2025-04-15 PROCEDURE — 999N000154 HC STATISTIC RADIOLOGY XRAY, US, CT, MAR, NM

## 2025-04-15 PROCEDURE — G0463 HOSPITAL OUTPT CLINIC VISIT: HCPCS | Mod: 25

## 2025-04-15 PROCEDURE — 250N000011 HC RX IP 250 OP 636: Performed by: HOSPITALIST

## 2025-04-15 PROCEDURE — 999N000104 HC STATISTIC NO CHARGE: Performed by: INTERNAL MEDICINE

## 2025-04-15 PROCEDURE — 82040 ASSAY OF SERUM ALBUMIN: CPT | Performed by: NURSE PRACTITIONER

## 2025-04-15 PROCEDURE — 99152 MOD SED SAME PHYS/QHP 5/>YRS: CPT

## 2025-04-15 PROCEDURE — 250N000009 HC RX 250: Performed by: INTERNAL MEDICINE

## 2025-04-15 PROCEDURE — 250N000009 HC RX 250: Performed by: RADIOLOGY

## 2025-04-15 PROCEDURE — 99233 SBSQ HOSP IP/OBS HIGH 50: CPT | Mod: 24

## 2025-04-15 PROCEDURE — 87070 CULTURE OTHR SPECIMN AEROBIC: CPT | Performed by: RADIOLOGY

## 2025-04-15 PROCEDURE — 93970 EXTREMITY STUDY: CPT

## 2025-04-15 PROCEDURE — 83735 ASSAY OF MAGNESIUM: CPT | Performed by: NURSE PRACTITIONER

## 2025-04-15 PROCEDURE — 250N000011 HC RX IP 250 OP 636: Performed by: STUDENT IN AN ORGANIZED HEALTH CARE EDUCATION/TRAINING PROGRAM

## 2025-04-15 PROCEDURE — 87075 CULTR BACTERIA EXCEPT BLOOD: CPT | Performed by: RADIOLOGY

## 2025-04-15 RX ORDER — FENTANYL CITRATE 50 UG/ML
25-50 INJECTION, SOLUTION INTRAMUSCULAR; INTRAVENOUS EVERY 5 MIN PRN
Status: DISCONTINUED | OUTPATIENT
Start: 2025-04-15 | End: 2025-04-21

## 2025-04-15 RX ORDER — AMIODARONE HYDROCHLORIDE 200 MG/1
200 TABLET ORAL 2 TIMES DAILY
Status: COMPLETED | OUTPATIENT
Start: 2025-04-15 | End: 2025-04-28

## 2025-04-15 RX ORDER — FLUMAZENIL 0.1 MG/ML
0.2 INJECTION, SOLUTION INTRAVENOUS
Status: DISCONTINUED | OUTPATIENT
Start: 2025-04-15 | End: 2025-04-18

## 2025-04-15 RX ORDER — NALOXONE HYDROCHLORIDE 0.4 MG/ML
0.2 INJECTION, SOLUTION INTRAMUSCULAR; INTRAVENOUS; SUBCUTANEOUS
Status: DISCONTINUED | OUTPATIENT
Start: 2025-04-15 | End: 2025-04-15

## 2025-04-15 RX ORDER — NALOXONE HYDROCHLORIDE 0.4 MG/ML
0.4 INJECTION, SOLUTION INTRAMUSCULAR; INTRAVENOUS; SUBCUTANEOUS
Status: DISCONTINUED | OUTPATIENT
Start: 2025-04-15 | End: 2025-04-15

## 2025-04-15 RX ORDER — AMIODARONE HYDROCHLORIDE 200 MG/1
200 TABLET ORAL DAILY
Status: DISCONTINUED | OUTPATIENT
Start: 2025-04-22 | End: 2025-04-15

## 2025-04-15 RX ORDER — AMIODARONE HYDROCHLORIDE 200 MG/1
200 TABLET ORAL DAILY
Status: DISCONTINUED | OUTPATIENT
Start: 2025-04-29 | End: 2025-04-29 | Stop reason: HOSPADM

## 2025-04-15 RX ORDER — METOPROLOL TARTRATE 1 MG/ML
5 INJECTION, SOLUTION INTRAVENOUS EVERY 6 HOURS
Status: DISCONTINUED | OUTPATIENT
Start: 2025-04-15 | End: 2025-04-16

## 2025-04-15 RX ORDER — AMIODARONE HYDROCHLORIDE 200 MG/1
200 TABLET ORAL 2 TIMES DAILY
Status: DISCONTINUED | OUTPATIENT
Start: 2025-04-15 | End: 2025-04-15

## 2025-04-15 RX ADMIN — PIPERACILLIN AND TAZOBACTAM 4.5 G: 4; .5 INJECTION, POWDER, FOR SOLUTION INTRAVENOUS at 00:29

## 2025-04-15 RX ADMIN — HEPARIN SODIUM 2550 UNITS/HR: 10000 INJECTION, SOLUTION INTRAVENOUS at 02:39

## 2025-04-15 RX ADMIN — FENTANYL CITRATE 25 MCG: 50 INJECTION, SOLUTION INTRAMUSCULAR; INTRAVENOUS at 11:00

## 2025-04-15 RX ADMIN — HYDROMORPHONE HYDROCHLORIDE 0.2 MG: 0.2 INJECTION, SOLUTION INTRAMUSCULAR; INTRAVENOUS; SUBCUTANEOUS at 17:37

## 2025-04-15 RX ADMIN — MIDAZOLAM 0.5 MG: 1 INJECTION INTRAMUSCULAR; INTRAVENOUS at 11:00

## 2025-04-15 RX ADMIN — LIDOCAINE HYDROCHLORIDE 10 ML: 10 INJECTION, SOLUTION EPIDURAL; INFILTRATION; INTRACAUDAL; PERINEURAL at 11:53

## 2025-04-15 RX ADMIN — FENTANYL CITRATE 25 MCG: 50 INJECTION, SOLUTION INTRAMUSCULAR; INTRAVENOUS at 11:14

## 2025-04-15 RX ADMIN — NICOTINE 1 PATCH: 21 PATCH, EXTENDED RELEASE TRANSDERMAL at 09:40

## 2025-04-15 RX ADMIN — PIPERACILLIN AND TAZOBACTAM 4.5 G: 4; .5 INJECTION, POWDER, FOR SOLUTION INTRAVENOUS at 06:44

## 2025-04-15 RX ADMIN — QUETIAPINE FUMARATE 25 MG: 25 TABLET ORAL at 09:40

## 2025-04-15 RX ADMIN — PANTOPRAZOLE SODIUM 40 MG: 40 INJECTION, POWDER, FOR SOLUTION INTRAVENOUS at 06:44

## 2025-04-15 RX ADMIN — PIPERACILLIN AND TAZOBACTAM 4.5 G: 4; .5 INJECTION, POWDER, FOR SOLUTION INTRAVENOUS at 17:38

## 2025-04-15 RX ADMIN — AMIODARONE HYDROCHLORIDE 200 MG: 200 TABLET ORAL at 12:44

## 2025-04-15 RX ADMIN — HEPARIN SODIUM 2850 UNITS/HR: 10000 INJECTION, SOLUTION INTRAVENOUS at 22:11

## 2025-04-15 RX ADMIN — AMIODARONE HYDROCHLORIDE 0.5 MG/MIN: 1.8 INJECTION, SOLUTION INTRAVENOUS at 03:56

## 2025-04-15 RX ADMIN — PERFLUTREN 4 ML: 6.52 INJECTION, SUSPENSION INTRAVENOUS at 12:38

## 2025-04-15 RX ADMIN — AMIODARONE HYDROCHLORIDE 0.5 MG/MIN: 1.8 INJECTION, SOLUTION INTRAVENOUS at 15:32

## 2025-04-15 RX ADMIN — MIDAZOLAM 0.5 MG: 1 INJECTION INTRAMUSCULAR; INTRAVENOUS at 11:12

## 2025-04-15 RX ADMIN — METOPROLOL TARTRATE 25 MG: 25 TABLET, FILM COATED ORAL at 09:39

## 2025-04-15 RX ADMIN — METOPROLOL TARTRATE 5 MG: 5 INJECTION INTRAVENOUS at 22:10

## 2025-04-15 RX ADMIN — LIDOCAINE HYDROCHLORIDE 10 ML: 10 INJECTION, SOLUTION EPIDURAL; INFILTRATION; INTRACAUDAL; PERINEURAL at 11:54

## 2025-04-15 RX ADMIN — PIPERACILLIN AND TAZOBACTAM 4.5 G: 4; .5 INJECTION, POWDER, FOR SOLUTION INTRAVENOUS at 12:45

## 2025-04-15 ASSESSMENT — ACTIVITIES OF DAILY LIVING (ADL)
ADLS_ACUITY_SCORE: 58
ADLS_ACUITY_SCORE: 62
ADLS_ACUITY_SCORE: 58
ADLS_ACUITY_SCORE: 62
ADLS_ACUITY_SCORE: 58
ADLS_ACUITY_SCORE: 51
ADLS_ACUITY_SCORE: 58
ADLS_ACUITY_SCORE: 62
ADLS_ACUITY_SCORE: 58
ADLS_ACUITY_SCORE: 58
ADLS_ACUITY_SCORE: 62
ADLS_ACUITY_SCORE: 62

## 2025-04-15 NOTE — PROVIDER NOTIFICATION
"Pt pulled out his nasogastric tube to low intermittent suction, pulls on hernandez cath, wound vac, unable to redirect. Confused. Responded to today's date \"Sunday night\" to where he was \"in my own home \" Notified Dr. You. See MD's  to place NGT & Hands mitt.           "

## 2025-04-15 NOTE — PLAN OF CARE
"  Problem: Adult Inpatient Plan of Care  Goal: Plan of Care Review  Description: The Plan of Care Review/Shift note should be completed every shift.  The Outcome Evaluation is a brief statement about your assessment that the patient is improving, declining, or no change.  This information will be displayed automatically on your shiftnote.  Outcome: Progressing  Goal: Patient-Specific Goal (Individualized)  Description: You can add care plan individualizations to a care plan. Examples of Individualization might be:  \"Parent requests to be called daily at 9am for status\", \"I have a hard time hearing out of my right ear\", or \"Do not touch me to wake me up as it startlesme\".  Outcome: Progressing  Goal: Optimal Comfort and Wellbeing  Outcome: Progressing  Intervention: Provide Person-Centered Care  Recent Flowsheet Documentation  Taken 4/15/2025 1600 by Roe Luke RN  Trust Relationship/Rapport:   care explained   choices provided   emotional support provided   empathic listening provided   questions answered   questions encouraged   reassurance provided   thoughts/feelings acknowledged  Taken 4/15/2025 1200 by Roe Luke RN  Trust Relationship/Rapport:   care explained   choices provided   emotional support provided   empathic listening provided   questions answered   questions encouraged   reassurance provided   thoughts/feelings acknowledged  Taken 4/15/2025 0800 by Roe Luke RN  Trust Relationship/Rapport:   care explained   choices provided   emotional support provided   empathic listening provided   questions answered   questions encouraged   reassurance provided   thoughts/feelings acknowledged  Goal: Readiness for Transition of Care  Outcome: Progressing     Problem: Delirium  Goal: Optimal Coping  Outcome: Progressing  Intervention: Optimize Psychosocial Adjustment to Delirium  Recent Flowsheet Documentation  Taken 4/15/2025 1600 by Roe Luke RN  Supportive Measures: active " listening utilized  Taken 4/15/2025 1200 by Roe Luke RN  Supportive Measures: active listening utilized  Taken 4/15/2025 0800 by Roe Luke RN  Supportive Measures: active listening utilized  Goal: Improved Behavioral Control  Outcome: Progressing  Intervention: Prevent and Manage Agitation  Recent Flowsheet Documentation  Taken 4/15/2025 1600 by Roe Luke RN  Environment Familiarity/Consistency: daily routine followed  Taken 4/15/2025 1200 by Roe Luke RN  Environment Familiarity/Consistency: daily routine followed  Taken 4/15/2025 0800 by Roe Luke RN  Environment Familiarity/Consistency: daily routine followed  Intervention: Minimize Safety Risk  Recent Flowsheet Documentation  Taken 4/15/2025 1600 by Roe Luke RN  Enhanced Safety Measures: pain management  Trust Relationship/Rapport:   care explained   choices provided   emotional support provided   empathic listening provided   questions answered   questions encouraged   reassurance provided   thoughts/feelings acknowledged  Taken 4/15/2025 1200 by Roe Luke RN  Enhanced Safety Measures: pain management  Trust Relationship/Rapport:   care explained   choices provided   emotional support provided   empathic listening provided   questions answered   questions encouraged   reassurance provided   thoughts/feelings acknowledged  Taken 4/15/2025 0800 by Roe Luke RN  Enhanced Safety Measures: pain management  Trust Relationship/Rapport:   care explained   choices provided   emotional support provided   empathic listening provided   questions answered   questions encouraged   reassurance provided   thoughts/feelings acknowledged  Goal: Improved Attention and Thought Clarity  Outcome: Progressing  Intervention: Maximize Cognitive Function  Recent Flowsheet Documentation  Taken 4/15/2025 1600 by Roe Luke RN  Sensory Stimulation Regulation:   care clustered   lighting decreased   music on   quiet  environment promoted   tactile stimulation minimized  Reorientation Measures:   calendar in view   clock in view  Taken 4/15/2025 1200 by Roe Luke RN  Sensory Stimulation Regulation:   care clustered   lighting decreased   music on   quiet environment promoted   tactile stimulation minimized  Reorientation Measures:   calendar in view   clock in view  Taken 4/15/2025 0800 by Roe Luke RN  Sensory Stimulation Regulation:   care clustered   lighting decreased   music on   quiet environment promoted   tactile stimulation minimized  Reorientation Measures:   calendar in view   clock in view  Goal: Improved Sleep  Outcome: Progressing     Problem: Dysrhythmia  Goal: Normalized Cardiac Rhythm  Outcome: Progressing  Intervention: Monitor and Manage Cardiac Rhythm Effect  Recent Flowsheet Documentation  Taken 4/15/2025 1600 by Roe Luke RN  VTE Prevention/Management: SCDs on (sequential compression devices)  Taken 4/15/2025 1200 by Roe Luke RN  VTE Prevention/Management: SCDs on (sequential compression devices)  Taken 4/15/2025 0800 by Roe Luke RN  VTE Prevention/Management: SCDs on (sequential compression devices)     Problem: Pain Acute  Goal: Optimal Pain Control and Function  Outcome: Progressing  Intervention: Optimize Psychosocial Wellbeing  Recent Flowsheet Documentation  Taken 4/15/2025 1600 by Roe Luke RN  Supportive Measures: active listening utilized  Diversional Activities: television  Taken 4/15/2025 1200 by Roe Luke RN  Supportive Measures: active listening utilized  Diversional Activities: television  Taken 4/15/2025 0800 by Roe Luke RN  Supportive Measures: active listening utilized  Diversional Activities: television  Intervention: Prevent or Manage Pain  Recent Flowsheet Documentation  Taken 4/15/2025 1600 by Roe Luke RN  Sensory Stimulation Regulation:   care clustered   lighting decreased   music on   quiet environment  promoted   tactile stimulation minimized  Medication Review/Management: medications reviewed  Taken 4/15/2025 1200 by Roe Luke RN  Sensory Stimulation Regulation:   care clustered   lighting decreased   music on   quiet environment promoted   tactile stimulation minimized  Medication Review/Management: medications reviewed  Taken 4/15/2025 0800 by Roe Luke RN  Sensory Stimulation Regulation:   care clustered   lighting decreased   music on   quiet environment promoted   tactile stimulation minimized  Medication Review/Management: medications reviewed     Problem: Fall Injury Risk  Goal: Absence of Fall and Fall-Related Injury  Outcome: Progressing  Intervention: Identify and Manage Contributors  Recent Flowsheet Documentation  Taken 4/15/2025 1600 by Roe Luke RN  Medication Review/Management: medications reviewed  Taken 4/15/2025 1200 by Roe Luke RN  Medication Review/Management: medications reviewed  Taken 4/15/2025 0800 by Roe Luke RN  Medication Review/Management: medications reviewed  Intervention: Promote Injury-Free Environment  Recent Flowsheet Documentation  Taken 4/15/2025 1600 by Roe Luke RN  Safety Promotion/Fall Prevention: activity supervised  Taken 4/15/2025 1200 by Roe Luke RN  Safety Promotion/Fall Prevention: activity supervised  Taken 4/15/2025 0800 by Roe Luke RN  Safety Promotion/Fall Prevention: activity supervised     Problem: Infection  Goal: Absence of Infection Signs and Symptoms  Outcome: Progressing  Intervention: Prevent or Manage Infection  Recent Flowsheet Documentation  Taken 4/15/2025 1600 by Roe Luke RN  Infection Management: aseptic technique maintained  Taken 4/15/2025 1200 by Roe Luke RN  Infection Management: aseptic technique maintained  Taken 4/15/2025 0800 by Roe Luke RN  Infection Management: aseptic technique maintained     Problem: Restraint, Nonviolent  Goal: Absence of Harm  or Injury  Outcome: Progressing  Intervention: Implement Least Restrictive Safety Strategies  Recent Flowsheet Documentation  Taken 4/15/2025 1600 by Roe Luke RN  Medical Device Protection: tubing secured  Diversional Activities: television  Taken 4/15/2025 1200 by Roe Luke RN  Medical Device Protection: tubing secured  Diversional Activities: television  Taken 4/15/2025 0800 by Roe Luke RN  Medical Device Protection: tubing secured  Diversional Activities: television  Intervention: Protect Dignity, Rights and Personal Wellbeing  Recent Flowsheet Documentation  Taken 4/15/2025 1600 by Roe Luke RN  Trust Relationship/Rapport:   care explained   choices provided   emotional support provided   empathic listening provided   questions answered   questions encouraged   reassurance provided   thoughts/feelings acknowledged  Taken 4/15/2025 1200 by Roe Luke RN  Trust Relationship/Rapport:   care explained   choices provided   emotional support provided   empathic listening provided   questions answered   questions encouraged   reassurance provided   thoughts/feelings acknowledged  Taken 4/15/2025 0800 by Roe Luke RN  Trust Relationship/Rapport:   care explained   choices provided   emotional support provided   empathic listening provided   questions answered   questions encouraged   reassurance provided   thoughts/feelings acknowledged  Intervention: Protect Skin and Joint Integrity  Recent Flowsheet Documentation  Taken 4/15/2025 1600 by Roe Luke RN  Body Position:   turned   left   side-lying   weight shifting  Taken 4/15/2025 1200 by Roe Luke RN  Body Position:   turned   left   side-lying   weight shifting  Taken 4/15/2025 0800 by Roe Luke RN  Body Position:   turned   left   side-lying   weight shifting   ICU End of Shift Nursing Summary *For vital signs and complete assessments, please see documentation flowsheets      Neuro:  A&Ox1-2  disoriented to situation and place and sometimes time, PERRL, denies new or changing N/T, generalized weakness moving all extremities to command, garbled speech baseline per wife when bottom dentures are not in place, sitter at bedside due to pulling lines, pulled NG tube today  Pain: Reports some minor back pain given PRN dilaudid and ice pack with relief  CV:  BP and HR within ordered parameters, denies CP, Tele SR  Pulm: LS diminished bilaterally, denies SOB, on RA  GI/: BS hypoactive, 1 loose BM this shift, NPO until speech eval then clear liquids if passed by speech per surgery, hernandez with concentrated urine  Endocrine: BG q4h with sliding scale insulin  Skin: Large abd incision with wound vac, R&L abd drain sites, redness to heels, redness to sacrum/coccyx, scattered scabbing and bruising, generalized edema  Lines: 2X PIV R arm, 1x PIV L arm, hernandez, 2x TERRIE drains  Drips: amiodarone, heparin  Additional: BLE ultrasound for RLE colder than LLE bilateral doppler pulses present, IR abd drain placement today    Plan (Upcoming Events): Speech consult and diet advancement    Bedside Shift Report Completed : yes  Bedside Safety Check Completed: yes

## 2025-04-15 NOTE — PROGRESS NOTES
[facility]  Hospitalist Progress Note   04/15/2025          Assessment and Plan:       Richar Davis is a 69 year old male with a history of diabetes, hypertension, CABG, status post AVR with bovine valve 10/2022, untreated sleep apnea, hypertension, hyperlipidemia, neuropathy, BPH admitted on 3/30/2025 with abdominal pain nausea and vomiting.     CT imaging on admission showed Gallstone ileus. Distal small bowel obstruction secondary to an impacted 2.2 cm gallstone. Underwent Exploratory laparotomy with small bowel enterotomy and removal of gallstone on 3/30. Postoperative course complicated with ileus, atrial fibrillation with RVR requiring cardioversion, postoperative fever, hypoxia.     On 4/8 had elevated lactate with worsening abdominal pain, returned back to the OR for exploratory laparotomy with small bowel resection.postprocedure patient intubated on pressor support, temporary abdominal closure admitted to ICU. Returned to OR on 4/10 for small bowel anastomosis and abdominal Wound VAC placement.  Pressors weaned off, patient extubated 4/11 and transferred to hospitalist service on 4/12.     Small bowel ileus with resection on 4/8  Small bowel anastomosis and fascial closure 4/10  Exploratory laparotomy with small bowel enterotomy and removal of gallstone on 3/30.   Gallstone ileus, distal small bowel obstruction secondary to impacted 2.2 cm gallstone.  Enterococcus faecalis bacteremia -cleared  -CT imaging on admission showed Gallstone ileus. Distal small bowel obstruction secondary to an impacted 2.2 cm gallstone.   -Underwent Exploratory laparotomy with small bowel enterotomy and removal of gallstone on 3/30.   -Postoperative course complicated with ileus.  NG tube removed 4/2, unable to tolerate oral diet.   -Had fever on 4/5, COVID/Flu/RSV which was negative.  BC x 2 no growth. CXR without overt pneumonia.  UA then not consistent with infection.  - On 4/7 spiked fever with  significant leukocytosis, lactic acidosis.Blood cultures from 4/7, 4/8 with Enterococcus faecalis.  -Returned back to the OR for exploratory laparotomy with small bowel resection on 4/8.  -Postprocedure patient intubated on pressor support, temporary abdominal closure admitted to ICU.   -Returned to OR on 4/10 for small bowel anastomosis and abdominal wound VAC placement.    -Postprocedure patient continues to have distended abdomen with bloody output.  -CT on 4/14 showing multiple peripherally enhancing fluid collections in the abdomen which are suspicious for developing abscesses. The largest of these is in the left paracolic gutter.   Small volume pneumoperitoneum which is favored postsurgical. Stable appearance of a contracted gallbladder containing a large gallstone which is fistulized to the duodenum with associated pneumobilia.  --4/15 having abdominal pain.  Continues to be NPO.  NG tube in place.  --Plan for IR guided abdominal drain placements.   --General Surgery following. Defer postoperative care including nutritional intake to surgical team.  Appreciate comanagement.  N.p.o. this morning.  Defer postoperative nutritional support/oral intake/fluid resuscitation to surgical team.   Continue IV Zosyn. Blood Cultures from 4/10 no growth to date.    Infectious disease team for input on antibiotics.  Follow intra-abdominal cultures.  Trend fever curve.  IV Protonix  IV/p.o. as needed pain meds. Minimize narcotic use as able to given encephalopathy.  IV/p.o. as needed Zofran.  On intravenous heparin, will continue unless active bleeding or significant drop in hemoglobin, await surgical team input.  Noted bloody output in wound VAC    Postoperative atrial fibrillation with RVR -status post cardioversion 4/14/2025  New Aflutter with RVR 4/5/2025 status post DCCV on 4/7/2025.  Coronary artery disease status post CABG x 3 in October 2022  Aortic stenosis (status post AVR with bovine valve) October  2022.  Hypertension  Hyperlipidemia.  -Postoperatively developed new aflutter with RVR, was on intravenous heparin drip, rate controlled with Cardizem drip and initiated oral metoprolol.   -- Cardiology followed underwent cardioversion on 4/7.   -- Again back to OR on 4/8 and was started on intravenous heparin during ICU stay.  --On 4/14 patient again developed recurrence of A-fib with RVR with heart rate in 140s.  Initiated on amiodarone drip, underwent successful cardioversion on 4/14.  Currently in sinus rhythm with heart rate in 70s to 80s.  -Echocardiogram 4/15 with EF of 55%.  Right ventricle normal size.  No valve disease.    -Cardiology following, finished IV amiodarone loading per protocol and start on oral amiodarone.  Appreciate input.  Continue metoprolol 25 mg twice daily.  Start PO amiodarone taper: 200 mg BID x 2 weeks, then 200 mg QD   Continue IV heparin drip.  Switch to oral Eliquis when okay by surgical team.  Closely monitor EKG for QTc while on amiodarone.  Continue to hold Lipitor in the setting of acute illness.  As needed Lasix as needed for diuresis.  Follow proBNP.  Telemetry monitoring.  Strict intake output monitoring.    Acute hypoxia-likely from atelectasis, bilateral pleural effusion, postoperative narcotic use, ALIREZA, deconditioning  CT chest from 4/14 with New small bilateral pleural effusions with overlying atelectasis/consolidation.  Afebrile.  Leukocytosis improved.  Wean off supplemental nasal oxygen as able to.    Acute encephalopathy likely multifactorial from narcotic use, delirium from hospitalization.  Patient with prolonged hospital stay, ICU stay and now transferred out, postoperative.  Receiving narcotics. Minimize narcotic use as able to.  Closely monitor mental status.    Neurochecks.  If any neurological weakness will consider CT imaging.  Hold QT prolonging medications including Seroquel, on amiodarone    Acute anemia likely from surgical blood loss, acute illness,  dilutional.  Hemoglobin 11.7.  No active bleeding.  Monitor hemoglobin levels in a.m. or earlier if symptomatic.  Hold heparin drip if any active bleeding    Severe hypoalbuminemia likely from acute illness.  Dilutional.  Serum albumin of 2.1.    Nutritional intake per surgical team    Acute on chronic bilateral lower extremity edema.  Noted cold right lower extremity, dopplerable pulses per report.  Pitting edema present.  PTA oral Lasix on hold since admission.  Ultrasound bilateral lower extremities to rule out DVT.    Mild hypernatremia.  Mild dehydration.  Oral intake per surgical team.    Acute kidney injury.  Resolved  Anion gap metabolic acidosis.  Resolved.  Monitor.    Type 2 diabetes mellitus with a hemoglobin A1c of 6.8  Hold PTA metformin.  Insulin sliding scale every 4 hours    Urinary retention  Traumatic hematuria.  BPH:   Urine cultures from 4/14 no growth  Trial of voiding when okay by surgical team.    Continue to hold PTA Flomax     Neuropathy:   *Neurontin 800 mg 3 times daily as needed has been filled but patient reports taking 300mg TID  *4/1 per pharmacy note only recent fills are 800 mg and not 300 mg dosing.   -Patient not really using now and denying any worsening neuropathy.  On hold since admission     Nicotine use: Chews tobacco  -Continue nicotine patch, and as needed nicotine lozenges.    Long term cessation to be encouraged during stay.     ALIREZA not on CPAP.  Recommend sleep studies as outpatient     Medically complicated obesity with a BMI of 41.  Increase all-cause mortality and morbidity.  Consider lifestyle modification with diet and exercise as able to.     Physical deconditioning from medical illness.  Rehab team following.  Fall precautions    Goals of care discussion.  Patient critically ill with A-fib with RVR.  Will transfer to [facility].  Will initiate on Cardizem drip.  Discussed goals of care with patient's wife over the telephone, continue restorative care full code at  this time.     Clinically Significant Risk Factors         # Hypernatremia: Highest Na = 146 mmol/L in last 2 days, will monitor as appropriate  # Hyperchloremia: Highest Cl = 113 mmol/L in last 2 days, will monitor as appropriate       # Hypercalcemia: corrected calcium is >10.1, will monitor as appropriate    # Hypoalbuminemia: Lowest albumin = 1.9 g/dL at 4/9/2025  5:25 AM, will monitor as appropriate     # Hypertension: Noted on problem list           # DMII: A1C = 6.8 % (Ref range: <5.7 %) within past 6 months   # Severe Obesity: Estimated body mass index is 41.62 kg/m  as calculated from the following:    Height as of this encounter: 1.829 m (6').    Weight as of this encounter: 139.2 kg (306 lb 14.4 oz).        # Financial/Environmental Concerns: none   # History of CABG: noted on surgical history        Orders Placed This Encounter      NPO for Procedure/Surgery per Anesthesia Guidelines      DVT Prophylaxis: SCDs, on intravenous heparin drip  Code Status: Full Code  Disposition: Expected discharge in greater than 3 days pending clinical improvement.  Continue C care 4/15    Medically Ready for Discharge: Anticipated in 2-4 Days     Discussed with patient, bedside RN, care team.  Updated patient's wife over the telephone 4/15.  More than 70% of time spent in direct patient care, care coordination, patient counseling, and formalizing plan of care.      Tim Mays MD        Interval History:        Patient lying in bed.  Lethargic, arousable.    Unable to obtain review of systems.  Abdominal wound VAC present, bloody output.  Continues to be NPO.  Plan for IR guided drain placement.  Has not been out of bed yet.  On intravenous heparin drip.  Underwent cardioversion, now in sinus rhythm.  On amiodarone loading.  Receiving as needed pain meds.  Discussed with patient's wife over the telephone, expressing concern regarding communication/no oral intake for many days now.  Questions answered          Physical Exam:        Physical Exam   Temp:  [97.4  F (36.3  C)-98.1  F (36.7  C)] 97.5  F (36.4  C)  Pulse:  [] 75  Resp:  [16-22] 18  BP: (109-156)/() 132/73  SpO2:  [90 %-98 %] 96 %    Intake/Output Summary (Last 24 hours) at 4/14/2025 0905  Last data filed at 4/14/2025 0834  Gross per 24 hour   Intake 0 ml   Output 1670 ml   Net -1670 ml       Admission Weight: 129.3 kg (285 lb)  Current Weight: (!) 144.7 kg (319 lb 0.1 oz)    PHYSICAL EXAM  GENERAL: Patient frail-appearing, drowsy, falls asleep  HEENT: Oropharynx pink  HEART: Regular rate and rhythm. S1S2  LUNGS: Bilateral coarse breath sounds. Respirations unlabored  ABDOMEN: Distended, wound VAC present.  NEURO: Moving all extremities  EXTREMITIES: ++ pedal edema.   SKIN: Warm, dry  PSYCHIATRY Cooperative       Medications:        Current Facility-Administered Medications   Medication Dose Route Frequency Provider Last Rate Last Admin    [Held by provider] acetaminophen (TYLENOL) tablet 975 mg  975 mg Oral or NG Tube Q8H Clau Calles MD        amiodarone (PACERONE) tablet 200 mg  200 mg Oral BID Richa Schmidt PA-C        Followed by    [START ON 4/29/2025] amiodarone (PACERONE) tablet 200 mg  200 mg Oral Daily Richa Schmidt PA-C        [Held by provider] atorvastatin (LIPITOR) tablet 80 mg  80 mg Oral or NG Tube Daily Khari Peña MD        [Held by provider] famotidine (PEPCID) tablet 20 mg  20 mg Oral BID Terry Patino MD   20 mg at 04/08/25 0805    [Held by provider] folic acid (FOLVITE) tablet 1,000 mcg  1,000 mcg Oral or NG Tube BID Khari Peña MD        [Held by provider] furosemide (LASIX) tablet 20 mg  20 mg Oral Daily Mann Torres DO   20 mg at 04/04/25 1017    insulin aspart (NovoLOG) injection (RAPID ACTING)  1-7 Units Subcutaneous Q4H Tim Mays MD        metoprolol tartrate (LOPRESSOR) tablet 25 mg  25 mg Oral BID Hayley Vale APRN CNP   25 mg at 04/15/25 0927    [Held by provider]  metoprolol tartrate (LOPRESSOR) tablet 50 mg  50 mg Oral BID Fahad Wei MD   50 mg at 04/08/25 0806    nicotine (NICODERM CQ) 21 MG/24HR 24 hr patch 1 patch  1 patch Transdermal Daily Hayley Vale APRN CNP   1 patch at 04/15/25 0940    pantoprazole (PROTONIX) 2 mg/mL suspension 40 mg  40 mg Per Feeding Tube QAM AC Hayley Vale APRN CNP        Or    pantoprazole (PROTONIX) IV push injection 40 mg  40 mg Intravenous QAM AC Hayley Vale APRN CNP   40 mg at 04/15/25 0644    piperacillin-tazobactam (ZOSYN) 4.5 g vial to attach to  mL bag  4.5 g Intravenous Q6H Hayley Vale APRN  mL/hr at 04/12/25 0548 4.5 g at 04/15/25 0644    [Held by provider] polyethylene glycol (MIRALAX) Packet 17 g  17 g Oral Daily Clau Calles MD   17 g at 04/08/25 0805    QUEtiapine (SEROquel) tablet 25 mg  25 mg Oral BID Hayley Vale APRN CNP   25 mg at 04/15/25 0940    sodium chloride (PF) 0.9% PF flush 3 mL  3 mL Intracatheter Q8H MITA Hayley Vale APRN CNP   3 mL at 04/15/25 0648    sodium phosphate 15 mmol in NS 250mL intermittent infusion  15 mmol Intravenous Once Hayley Vale APRN CNP 0 mL/hr at 04/14/25 1728 Restarted at 04/14/25 1728    [Held by provider] tamsulosin (FLOMAX) capsule 0.8 mg  0.8 mg Oral QPM Khari Peña MD   0.8 mg at 04/07/25 2031     Current Facility-Administered Medications   Medication Dose Route Frequency Provider Last Rate Last Admin    benzocaine-menthol (CHLORASEPTIC) 6-10 MG lozenge 1 lozenge  1 lozenge Buccal Q1H PRN Hayley Vale APRN CNP   1 lozenge at 04/01/25 0334    bisacodyl (DULCOLAX) suppository 10 mg  10 mg Rectal Daily PRN Hayley Vale APRN CNP        carboxymethylcellulose PF (REFRESH PLUS) 0.5 % ophthalmic solution 1 drop  1 drop Both Eyes Q1H PRN Hayley Vale APRN CNP        glucose gel 15-30 g  15-30 g Oral Q15 Min PRN Tim Mays MD        Or    dextrose 50 % injection 25-50 mL  25-50 mL  Intravenous Q15 Min PRN Tim Mays MD        Or    glucagon injection 1 mg  1 mg Subcutaneous Q15 Min PRN Tim Mays MD        diazepam (VALIUM) injection 2.5 mg  2.5 mg Intravenous Q6H PRN Hayley Vale APRN CNP        diphenhydrAMINE (BENADRYL) elixir 12.5 mg  12.5 mg Oral Q6H PRN Hayley Vale APRN CNP        Or    diphenhydrAMINE (BENADRYL) injection 12.5 mg  12.5 mg Intravenous Q6H PRN Hayley Vale APRN CNP        [Held by provider] gabapentin (NEURONTIN) tablet 800 mg  800 mg Oral TID PRN Clau Calles MD        Give   of usual dose of LONG ACTING insulin AM of procedure IF diabetic   Does not apply Continuous PRN Richa Schmidt PA-C        HOLD digoxin (LANOXIN)  AM of procedure IF on digoxin   Does not apply HOLD Richa Schmidt PA-C        HOLD: Insulin - RAPID/SHORT acting AM of procedure IF diabetic   Does not apply HOLD Richa Schmidt PA-C        HOLD: Insulin - REGULAR AM of procedure IF diabetic   Does not apply HOLD Richa Schmidt PA-C        HOLD: Oral hypoglycemics AM of procedure IF diabetic   Does not apply HOLD Richa Schmidt PA-C        HYDROmorphone (DILAUDID) injection 0.2 mg  0.2 mg Intravenous Q4H PRN Tim Mays MD        HYDROmorphone (PF) (DILAUDID) injection 0.3 mg  0.3 mg Intravenous Q4H PRN Tim Mays MD        lidocaine (LMX4) cream   Topical Q1H PRN Hayley Vale APRN CNP        lidocaine 1 % 0.1-1 mL  0.1-1 mL Other Q1H PRN Hayley Vale APRN CNP        magnesium hydroxide (MILK OF MAGNESIA) suspension 30 mL  30 mL Oral Daily PRN Hayley Vale APRN CNP   30 mL at 04/01/25 0427    magnesium sulfate 2 g in 50 mL sterile water intermittent infusion  2 g Intravenous Once PRN Richa Schmidt PA-C        May take oral meds with a sip of water, the morning of Cardioversion procedure.       Does not apply Continuous PRN Richa Schmidt PA-C        melatonin tablet 10 mg  10 mg Oral At Bedtime  PRN Hayley Vale APRN CNP   10 mg at 04/12/25 0042    methocarbamol (ROBAXIN) half-tab 250 mg  250 mg Oral Q6H PRN Hayley Vale APRN CNP   250 mg at 04/12/25 1714    metoprolol (LOPRESSOR) injection 5 mg  5 mg Intravenous Q4H PRN Hayley Vale APRN CNP   5 mg at 04/14/25 0806    naloxone (NARCAN) injection 0.2 mg  0.2 mg Intravenous Q2 Min PRN Hayley Vale APRN CNP        Or    naloxone (NARCAN) injection 0.4 mg  0.4 mg Intravenous Q2 Min PRN Hayley Vale APRN CNP        Or    naloxone (NARCAN) injection 0.2 mg  0.2 mg Intramuscular Q2 Min PRN Hayley Vale APRN CNP        Or    naloxone (NARCAN) injection 0.4 mg  0.4 mg Intramuscular Q2 Min PRN Hayley Vale APRN CNP        nicotine (COMMIT) lozenge 2 mg  2 mg Buccal Q1H PRN Hayley Vale APRN CNP   2 mg at 04/03/25 1145    No lozenges or gum should be given while patient on BIPAP/AVAPS/AVAPS AE   Does not apply Continuous PRN Hayley Vale APRN CNP        ondansetron (ZOFRAN ODT) ODT tab 4 mg  4 mg Oral Q6H PRN Hayley Vale APRN CNP   4 mg at 04/08/25 0208    Or    ondansetron (ZOFRAN) injection 4 mg  4 mg Intravenous Q6H PRN Hayley Vale APRN CNP   4 mg at 04/11/25 1232    Patient may continue current oral medications   Does not apply Continuous PRN Hayley Vale APRN CNP        phenol (CHLORASEPTIC) 1.4 % spray 1 mL  1 spray Mouth/Throat Q1H PRN Hayley Vale APRN CNP        potassium chloride eboni ER (KLOR-CON M20) CR tablet 40 mEq  40 mEq Oral Once PRN Richa Schmidt PA-C        prochlorperazine (COMPAZINE) injection 5 mg  5 mg Intravenous Q6H PRN Hayley Vale APRN CNP   5 mg at 04/08/25 1103    Or    prochlorperazine (COMPAZINE) tablet 5 mg  5 mg Oral Q6H PRN Hayley Vale APRN CNP   5 mg at 04/03/25 0756    QUEtiapine (SEROquel) tablet 50 mg  50 mg Oral BID PRN Hayley Vale APRN CNP   50 mg at 04/14/25 0121    sodium chloride (PF) 0.9% PF flush 3 mL  3  mL Intracatheter q1 min Hayley Kearns, APRN CNP                Data:      All new lab and imaging data was reviewed.

## 2025-04-15 NOTE — PROGRESS NOTES
SPIRITUAL HEALTH SERVICES  SPIRITUAL ASSESSMENT Progress Note  [facility]     REFERRAL SOURCE: Introductory visit    Introduced spiritual health services/role to Richar. He declined a visit and any needs at this time. He is aware of spiritual health support and how to request it, if desired.    PLAN: Spiritual Health remains available for support. Please consult as needs arise or as requested.    Yoselin Salazar  Associate      [facility] available 24/7 for emergent requests/referrals, either by paging the on-call  or by entering an ASAP/STAT consult in Epic (this will also page the on-call ).

## 2025-04-15 NOTE — PROGRESS NOTES
Abd Abscess Drain X2: Pt tolerated well. VSS. Total sedation given - 50 mcg Fentanyl and 1 mg Versed. Drain placed w/o difficulty. Fluid removed & specimen sent to lab. Drain irrigated with NS. Connected to TERRIE bulb - full suction. Stay Fix drsg applied & tube secured.    1200 Pt back to rm 271 per cart & transport. Detailed report given to Higinio GALLEGOS RN.

## 2025-04-15 NOTE — CONSULTS
[facility]  WO Nurse Inpatient Assessment     Consulted for: coccyx and abdominal NPWT changes    Summary: chart reviewed, patient agitated at times, restraints in use pulling at lines  coccyx: small blanchable elongated patches of erythema, skin intact-Signing off  Abdomen: s/p small bowel resection with VAC placement in OR. T/F changes week of 4/14, then begin MWF change schedule week of 4/21    WO nurse follow-up plan: Tuesday/Friday then MWF    Patient History (according to provider note(s):       69 year old male with a history of diabetes, hypertension, CABG, status post AVR with bovine valve 10/2022, untreated sleep apnea, hypertension, hyperlipidemia, neuropathy, BPH admitted on 3/30/2025 with abdominal pain nausea and vomiting.  Imaging showed gallstone ileus with 2.2 cm gallstone impacted.  Underwent surgery on 3/30.  Postoperatively had issues with ileus, fever, and aflutter with RVR.       Returned back to the OR for exploratory laparotomy with small bowel resection.Post -op admission to the ICU, intubated and on pressor support with temporary abdominal closure. He returned to OR on 4/10  for small bowel anastomosis and abdominal.  Wound VAC in place.  Pressors weaned off, patient extubated 4/11 and transferred to hospitalist service on 4/12.    Assessment:      Areas visualized during today's visit: Focused:, Sacrum/coccyx, and Abdomen    Negative pressure wound therapy applied to: abdomen       Last photo: 4/15/25   Wound due to: Surgical Wound   Wound history/plan of care:    Surgical date: 4/10/25   Service following: Gen Surgery  Date Negative Pressure Wound Therapy initiated: 4/10/25   Interventions in place: repositioning, pressure redistribution with device or dressing, specialty surface in use, moisture/incontinence management, offloading, and elevation  Is patient s nutritional status compromised? no  If yes, what interventions are in place? N/A  Reason for  initiating vac therapy? Need for accelerated granulation tissue  Which?of?the?following?co-morbidities?apply? Diabetes and Obesity  If diabetic is patient on a diabetic management program? Yes   Is osteomyelitis present in wound? no   If yes what treatments are in place? N/A    Wound base: 100% Granulation tissue and Adipose tissue,       Palpation of the wound bed: normal       Drainage: moderate      Volume in cannister: 375ml     Last cannister change date: 4/15/25     Description of drainage: serosanguinous and bloody      Measurements (length x width x depth, in cm) 19.5  x 7  x  4.8 cm       Tunneling N/A      Undermining N/A   Periwound skin: Intact and Erythema- blanchable       Color: red       Temperature: normal    Odor: none   Pain: denies  and unable to assess due to  confusion, none   Pain intervention prior to dressing change: patient tolerated well, soaking, slow and gentle cares , and distraction  Treatment goal: Heal , Drainage control, Infection control/prevention, Increase granulation, Maintain (prevention of deterioration), and Protection  STATUS: initial assessment   Supplies ordered: gathered, discussed with RN, and discussed with patient     Number of foam pieces removed from a wound (excluding foam for bridge) :  1 GranuFoam Black   Verified this matched the number of foam pieces applied last dressing change: Yes   Number of foam pieces packed into wound (excluding foam for bridge) :  1 GranuFoam Black and 1 White foam       4/15: [facility] received consult for coccyx. Skin intact, each buttock with elongated patch of erythema related to friction, patient has been intermittently confused and agitated per chart review. Nursing staff to monitor and moisturize skin.    Treatment Plan:     Negative pressure wound therapy plan:  Tuesday/Friday   Wound location: midline abdomen   Change Days: Tues/ Fri by WOC RN    Supplies (including all accessories) used: large Black foam , White foam, and Cavilon no  "sting barrier film  Cleanse with Vashe prior to replacing NPWT  Suction setting: -75   Methods used: Window paned all periwound skin with vac drape prior to applying sponge    Staff RN to assess integrity of dressing and ensure suction is set at appropriate level every shift.   Date canister. Chart canister output every shift. Change cannister weekly and PRN if full/occluded     Remove foam dressing and replace with BID normal saline moist gauze dressing if:   -a dressing failure which cannot be repaired within 2 hours   -patient is discharging to home without a home pump   -patient is discharging to a facility outside the local area   -if a dressing is a \"Silver Foam\", remove before Radiation Therapy or MRI     The hospital VAC pump is not to be discharged with the patient. Please disconnect the patient from the machine prior to discharge.  If a home VAC pump has been delivered, connect the home cannister to dressing tubing and the cannister to the home pump, turn on home pump  If the patient is transferring to a nearby facility with a VAC, the tubing can be disconnected, clamp tubing and cover the end with a glove, then can be reconnected if within 2 hours  If transfer will be longer than 2 hours, dressing must be removed and placed with a wet to moist gauze dressing for transfer    Generalized skin: Daily  Moisturize with body lotion of sween 24 daily      Orders: Written    RECOMMEND PRIMARY TEAM ORDER: None, at this time  Education provided: importance of repositioning, plan of care, wound progress, Infection prevention , Moisture management, Hygiene, and Off-loading pressure  Discussed plan of care with: Patient and Nurse  Notify WOC if wound(s) deteriorate.  Nursing to notify the Provider(s) and re-consult the WOC Nurse if new skin concern.    DATA:     Current support surface: Standard  Standard gel mattress (Isoflex)  Containment of urine/stool: Incontinence Protocol and Incontinent pad in bed  BMI: Body " mass index is 41.62 kg/m .   Active diet order: Orders Placed This Encounter      NPO for Procedure/Surgery per Anesthesia Guidelines     Output: I/O last 3 completed shifts:  In: 679 [P.O.:10; I.V.:594; NG/GT:75]  Out: 770 [Urine:20; Emesis/NG output:750]     Labs:   Recent Labs   Lab 04/15/25  0759   ALBUMIN 2.2*   HGB 10.4*   WBC 8.1     Pressure injury risk assessment:   Sensory Perception: 3-->slightly limited  Moisture: 3-->occasionally moist  Activity: 1-->bedfast  Mobility: 2-->very limited  Nutrition: 2-->probably inadequate  Friction and Shear: 2-->potential problem  Nash Score: 13    Caitlyn Sahni RN, BSN, CWOCN   Pager no longer is use, please contact through [facility]: [facility]  Dept. Office Number: [phone]

## 2025-04-15 NOTE — PROGRESS NOTES
Patient confused pulling at line, pulled NG tube out again. Hospitalist paged ordered CT and sitter for line pulling due to multiple drain, and wound vac in place Hospitalist deferred to surgery for plan with NG. Surgery MD paged okay to leave NG out at this time, and hopes to advance to clear liquid diet once safe to swallow, SLP consult ordered to evaluate swallow.

## 2025-04-15 NOTE — PRE-PROCEDURE
GENERAL PRE-PROCEDURE:     Verbal consent obtained?: Yes    Written consent obtained?: Yes    Risks and benefits: Risks, benefits and alternatives were discussed    Consent given by:  Spouse  Patient states understanding of procedure being performed: Yes    Patient's understanding of procedure matches consent: Yes    Procedure consent matches procedure scheduled: Yes    Expected level of sedation:  Moderate  Appropriately NPO:  Yes  ASA Class:  2  Mallampati  :  Grade 2- soft palate, base of uvula, tonsillar pillars, and portion of posterior pharyngeal wall visible  Lungs:  Lungs clear with good breath sounds bilaterally  Heart:  Normal heart sounds and rate  History & Physical reviewed:  History and physical reviewed and no updates needed  Statement of review:  I have reviewed the lab findings, diagnostic data, medications, and the plan for sedation

## 2025-04-15 NOTE — CONSULTS
[facility]    Infectious Disease Consultation     Date of Admission:  3/30/2025  Date of Consult (When I saw the patient): 04/15/25    Assessment & Plan   Richar Davis is a 69 year old male who was admitted on 3/30/2025.     Impression:  69 year old male with a history of well controlled diabetes, CAD, aortic stenosis s/p AVR with bovine valve, and HTN who presented to the hospital with gallstone ileus and is s/p exploratory laparotomy with removal of gallstone. Post-operatively he developed atrial flutter with RVR requiring cardioversion, gross sucus breakdown of enterotomy requiring return to OR on 4/8/25 and 4/10/25, E. Faecalis bacteremia, and delirium.    -4/7/25 - 4/8/25 had significant leukocytosis and fever with noted sucus breakdown of enterotomy s/p exploratory laparotomy 4/8/25 followed by small bowel anastomosis and fascial closure 4/10/25.   -E. Faecalis bacteremia with positive blood cultures 4/7/25. Follow-up blood cultures 4/10/25 are no growth to date.   -A.fib/flutter s/p Cardioversion  -Now with abdominal fluid collections. S/p drain placement 4/15/25. Cultures are pending.   -Fevers and leukocytosis resolved.       Recommendations:   Continue Zosyn for now as this covers the E. Faecalis bacteremia as well as any possible intra-abdominal infection.   Following cultures from abdominal fluid collections.   Final antibiotic plans to follow clinical course.     Clau Larson PA-C    Reason for Consult   Reason for consult: Asked to evaluate this patient for intra-abdominal infection.  Please comment on antibiotics .    Primary Care Physician   Cory Valles    Chief Complaint   Abdominal pain    History is obtained from the patient and medical records    History of Present Illness   Richar Davis is a 69 year old male with a history of well controlled diabetes, CAD, and HTN who presented to the hospital with gallstone ileus and is s/p exploratory laparotomy  with removal of gallstone. Post-operatively he developed atrial flutter with RVR requiring cardioversion, gross sucus breakdown of enterotomy requiring return to OR on 4/8/25 and 4/10/25, and delirium.     It is noted that on 4/7/25 - 4/8/25 he developed fever and significant leukocytosis with breakdown of his enterotomy and blood cultures at that time grew E.faecalis. He was placed on Zosyn and remains on that until present day. Repeat blood cultures on 4/10/25 were no growth.     On 4/14/25 he became tachycardic and EKG showed SVT. CT CAP was obtained, which showed left lateral fluid collection and right anterior fluid collection. He is now s/p image-guided drainage today. Cultures are pending.     Past Medical History   I have reviewed this patient's medical history and updated it with pertinent information if needed.   Past Medical History:   Diagnosis Date    Cataract     Complication of anesthesia     Diabetes mellitus (H)     Heart attack (H)     Heart murmur     Hypertension        Past Surgical History   I have reviewed this patient's surgical history and updated it with pertinent information if needed.  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    ANESTHESIA CARDIOVERSION N/A 4/7/2025    Procedure: Anesthesia cardioversion;  Surgeon: GENERIC ANESTHESIA PROVIDER;  Location:  OR    ANESTHESIA CARDIOVERSION N/A 4/14/2025    Procedure: Anesthesia cardioversion;  Surgeon: GENERIC ANESTHESIA PROVIDER;  Location:  OR    BYPASS GRAFT ARTERY CORONARY, REPLACE VALVE AORTIC, COMBINED N/A 10/20/2022    Procedure: CORONARY ARTERY BYPASS GRAFT x 3 (LEFT INTERNAL MAMMARY ARTERY - LEFT ANTERIOR DESCENDING ARTERY; SAPHENOUS VEIN - POSTERIOR DESCENDING ARTERY; SAPHENOUS VEIN - CIRCUMFLEX ARTERY) WITH ENDOSCOPIC LESSER SAPHENOUS VEIN HARVEST ON BILATERAL LOWER EXTREMITY, AORTIC VALVE REPLACEMENT WITH TISSUE HEART VALVE INSPIRIS  RESILIA  AORTIC VALVE SIZE: 25MM, AND  ON CARDIOPULMONARY PUMP OXYGENATOR  (    COLECTOMY WITHOUT COLOSTOMY N/A 4/8/2025    Procedure: EXPLORATORY LAPAROTOMY WITH SMALL BOWEL RESECTION;  Surgeon: Liana Landrum MD;  Location:  OR    CTA ANGIOGRAM CORONARY ARTERY      LAPAROTOMY EXPLORATORY N/A 3/30/2025    Procedure: exploratory laparotomy with enterotomy and removal of gallstone;  Surgeon: Clau Calles MD;  Location:  OR    LAPAROTOMY EXPLORATORY N/A 4/10/2025    Procedure: EXPLORATORY LAPAROTOMY, SMALL BOWEL ANASTOMOSIS, ABDOMINAL CLOSURE;  Surgeon: Liana Landrum MD;  Location:  OR    ORTHOPEDIC SURGERY Left     elbow surgery    TRANSESOPHAGEAL ECHOCARDIOGRAM INTRAOPERATIVE N/A 4/14/2025    Procedure: Transesophageal echocardiogram intraoperative;  Surgeon: GENERIC ANESTHESIA PROVIDER;  Location:  OR       Prior to Admission Medications   Prior to Admission Medications   Prescriptions Last Dose Informant Patient Reported? Taking?   Ascorbic Acid (VITAMIN C) 500 MG CAPS 3/29/2025 Morning Self Yes Yes   Sig: Take 500 mg by mouth daily   Cyanocobalamin (VITAMIN B-12 PO) 3/29/2025 Morning Self Yes Yes   Sig: Take 1 tablet by mouth every other day. Unknown dose   MAGNESIUM PO 3/29/2025 Morning  Yes Yes   Sig: Take 500 mg by mouth daily.   Multiple Vitamins-Minerals (MENS 50+ MULTI VITAMIN/MIN PO) 3/29/2025 Morning Self Yes Yes   Sig: Take 1 tablet by mouth daily   Multiple Vitamins-Minerals (PRESERVISION AREDS 2 PO) Past Week  Yes Yes   Sig: Take by mouth daily   acetaminophen (TYLENOL) 325 MG tablet Unknown  No Yes   Sig: Take 2 tablets (650 mg) by mouth every 4 hours as needed for other (For optimal non-opioid multimodal pain management to improve pain control.)   amoxicillin (AMOXIL) 500 MG capsule   No No   Sig: Take 4 capsules (2,000 mg) 30-60 minutes prior to your dental procedure   atorvastatin (LIPITOR) 80 MG tablet Past Week  No Yes   Sig: Take 1 tablet (80 mg) by mouth daily.   diphenhydrAMINE (BENADRYL) 50 MG tablet Unknown  Self Yes Yes   Sig: Take 100 mg by mouth every 6 hours as needed for itching or allergies   fish oil-omega-3 fatty acids 1000 MG capsule Past Month  Yes Yes   Sig: Take 2 g by mouth daily   folic acid (FOLVITE) 1 MG tablet Past Month Self Yes Yes   Sig: Take 1,000 mcg by mouth 2 times daily   furosemide (LASIX) 20 MG tablet Past Week  No Yes   Sig: Take 1 tablet (20 mg) by mouth daily.   gabapentin (NEURONTIN) 800 MG tablet Past Week  Yes Yes   Sig: Take 800 mg by mouth 3 times daily as needed.   metFORMIN (GLUCOPHAGE) 500 MG tablet Past Week  No Yes   Sig: Take 0.5 tablets (250 mg) by mouth daily (with dinner) (0.5 x 500 mg = 250 mg)   metoprolol succinate ER (TOPROL XL) 25 MG 24 hr tablet Past Week  No Yes   Sig: Take 1 tablet (25 mg) by mouth every evening   tamsulosin (FLOMAX) 0.4 MG capsule Past Week Self Yes Yes   Sig: Take 0.8 mg by mouth every evening. (2 x 0.4 mg = 0.8 mg)   traMADol (ULTRAM) 50 MG tablet Past Week  Yes Yes   Sig: Take 50 mg by mouth 4 times daily as needed for severe pain.   vitamin D3 (CHOLECALCIFEROL) 50 mcg (2000 units) tablet Past Week Self Yes Yes   Sig: Take 1 tablet by mouth every other day      Facility-Administered Medications: None     Allergies   Allergies   Allergen Reactions    Niaspan [Niacin] Other (See Comments)     flushing       Immunization History   Immunization History   Administered Date(s) Administered    COVID-19 12+ (MODERNA) 11/09/2023, 09/16/2024    COVID-19 Bivalent 18+ (Moderna) 12/11/2022, 05/06/2023    COVID-19 Monovalent 18+ (Moderna) 03/19/2021, 04/20/2021, 12/09/2021, 06/08/2022    Influenza Vaccine 65+ (Fluzone HD) 11/07/2022       Social History   I have reviewed this patient's social history and updated it with pertinent information if needed. Richar Davis  reports that he has quit smoking. His smokeless tobacco use includes chew. He reports that he does not currently use alcohol. He reports that he does not use drugs.    Family History   I have  reviewed this patient's family history and updated it with pertinent information if needed.   History reviewed. No pertinent family history.    Review of Systems   The 10 point Review of Systems is negative    Physical Exam   Temp: 97.5  F (36.4  C) Temp src: Oral BP: 135/68 Pulse: 79   Resp: 25 SpO2: 95 % O2 Device: None (Room air) Oxygen Delivery: 6 LPM  Vital Signs with Ranges  Temp:  [97.4  F (36.3  C)-98.1  F (36.7  C)] 97.5  F (36.4  C)  Pulse:  [] 79  Resp:  [16-29] 25  BP: (109-151)/(56-95) 135/68  SpO2:  [90 %-98 %] 95 %  306 lbs 14.4 oz  Body mass index is 41.62 kg/m .    GENERAL APPEARANCE:  Confused.   EYES: Eyes grossly normal to inspection  RESP: lungs clear   CV: regular rates and rhythm  ABDOMEN: soft, nontender. TERRIE drains with serous output.   MS: extremities normal  SKIN: no suspicious lesions or rashes. Heel with bandage on right.         Data   All laboratory data reviewed    Component      Latest Ref Rng 3/31/2025  8:09 AM 4/7/2025  11:20 PM 4/14/2025  3:06 AM 4/14/2025  9:25 AM 4/15/2025  7:59 AM   WBC      4.0 - 11.0 10e3/uL 10.6  14.1 (H)  10.0  9.9  8.1    RBC Count      4.40 - 5.90 10e6/uL 5.09  4.62  3.80 (L)  3.92 (L)  3.57 (L)    Hemoglobin      13.3 - 17.7 g/dL 15.7  13.9  11.4 (L)  11.7 (L)  10.4 (L)    Hematocrit      40.0 - 53.0 % 47.9  43.5  35.7 (L)  36.3 (L)  34.0 (L)    MCV      78 - 100 fL 94  94  94  93  95    MCH      26.5 - 33.0 pg 30.8  30.1  30.0  29.8  29.1    MCHC      31.5 - 36.5 g/dL 32.8  32.0  31.9  32.2  30.6 (L)    RDW      10.0 - 15.0 % 14.2  14.0  14.6  14.7  14.6    Platelet Count      150 - 450 10e3/uL 196  294  281  301  267       Component      Latest Ref Rn 3/31/2025  8:09 AM 4/10/2025  5:19 AM 4/14/2025  3:06 AM 4/15/2025  7:59 AM   Sodium      135 - 145 mmol/L 138  131 (L)  146 (H)  146 (H)    Potassium      3.4 - 5.3 mmol/L 4.3  4.3  3.8  3.6    Carbon Dioxide (CO2)      22 - 29 mmol/L 29  18 (L)  21 (L)  21 (L)    Anion Gap      7 - 15 mmol/L 15   13  14  12    Urea Nitrogen      8.0 - 23.0 mg/dL 22.2  27.8 (H)  10.5  11.8    Creatinine      0.67 - 1.17 mg/dL 1.09  1.60 (H)  0.69  0.70    GFR Estimate      >60 mL/min/1.73m2 74  47 (L)  >90  >90    Calcium      8.8 - 10.4 mg/dL 10.1  8.4 (L)  9.0  8.8    Chloride      98 - 107 mmol/L 94 (L)  100  111 (H)  113 (H)    Glucose      70 - 99 mg/dL 183 (H)  153 (H)  109 (H)  114 (H)    Alkaline Phosphatase      40 - 150 U/L 118  89  116  103    AST      0 - 45 U/L 25  29  35  36    ALT      0 - 70 U/L 17  10  14  19    Protein Total      6.4 - 8.3 g/dL 7.8  4.9 (L)  6.1 (L)  5.8 (L)    Albumin      3.5 - 5.2 g/dL 4.1  2.1 (L)  2.1 (L)  2.2 (L)    Bilirubin Total      <=1.2 mg/dL 0.8  0.3  0.4  0.3    Bilirubin Direct      0.00 - 0.30 mg/dL 0.29          04/14/2025 0855 04/15/2025 0553 Urine Culture [[id number]]   Urine, Catheter    Preliminary result Component Value   Culture No growth, less than 1 day P             04/10/2025 1719 04/14/2025 2016 Blood Culture Hand, Right [[id number]]   Blood from Hand, Right    Preliminary result Component Value   Culture No growth after 4 days P             04/10/2025 1717 04/14/2025 2031 Blood Culture Hand, Left [[id number]]   Blood from Hand, Left    Preliminary result Component Value   Culture No growth after 4 days P             04/08/2025 0903 04/10/2025 2202 Wound Aerobic Bacterial Culture Routine With Gram Stain [[id number]]    (Abnormal)   Wound from Abdomen    Final result Component Value   Culture 1+ Enterococcus faecalis Abnormal    Gram Stain Result No organisms seen    2+ WBC seen    4+ Red blood cells seen       Susceptibility     Enterococcus faecalis     QAMAR     Ampicillin <=2 ug/mL Susceptible     Gentamicin Synergy Susceptible... Susceptible 1     Vancomycin 2 ug/mL Susceptible              1 No high level gentamicin resistance found - therefore combination therapy with an aminoglycoside may be indicated for serious enterococcal infections such as  bacteremia and endocarditis.             04/07/2025 2338 04/10/2025 0731 Blood Culture Arm, Right [[id number]]    (Abnormal)   Blood from Arm, Right    Final result Component Value   Culture Positive on the 1st day of incubation Abnormal     Enterococcus faecalis Panic     1 of 2 bottles       Susceptibility     Enterococcus faecalis     QAMAR     Ampicillin <=2 ug/mL Susceptible     Gentamicin Synergy Susceptible... Susceptible 1     Vancomycin 2 ug/mL Susceptible              1 No high level gentamicin resistance found - therefore combination therapy with an aminoglycoside may be indicated for serious enterococcal infections such as bacteremia and endocarditis.             04/07/2025 2338 04/08/2025 1843 Blood Culture ID Panel, PCR [[id number]]    (Abnormal)   Blood from Arm, Right    Final result Component Value   Enterococcus faecalis Detected Abnormal    Enterococcus faecalis detected by Prized BCID2 assay. Final identification and antimicrobial susceptibility testing will be verified by standard methods.   Enterococcus faecium Not Detected   van A/B Not Detected   Listeria monocytogenes Not Detected   Staphylococcus species Not Detected   Staphylococcus aureus Not Detected   Staphylococcus epidermidis Not Detected   Staphylococcus lugdunensis Not Detected   Streptococcus species Not Detected   Streptococcus agalactiae Not Detected   Streptococcus pneumoniae Not Detected   Streptococcus pyogenes Not Detected   A. baumannii complex Not Detected   Bacteroides fragilis Not Detected   Enterobacterales Not Detected   Enterobacter cloacae complex Not Detected   Escherichia coli Not Detected   Klebsiella aerogenes Not Detected   Klebsiella oxytoca Not Detected   Klebsiella pneumoniae group Not Detected   Proteus species Not Detected   Salmonella species Not Detected   Serratia marcescens Not Detected   Haemophilus influenzae Not Detected   Neisseria meningitidis Not Detected   Pseudomonas aeruginosa Not Detected    Stenotrophomonas maltophilia Not Detected   Candida albicans Not Detected   Candida auris Not Detected   Candida glabrata Not Detected   Yamel krusei Not Detected   Candida parapsilosis Not Detected   Candida tropicalis Not Detected   Cryptococcus neoformans/gattii Not Detected          04/07/2025 2333 04/08/2025 0531 MRSA MSSA PCR, Nasal Swab [[id number]]    Swab from Nares, Bilateral    Final result Component Value   MRSA Target DNA Negative   SA Target DNA Negative          04/07/2025 2320 04/13/2025 0416 Blood Culture Arm, Right [[id number]]   Blood from Arm, Right    Final result Component Value   Culture No Growth             04/05/2025 1049 04/05/2025 1153 Influenza A/B, RSV and SARS-CoV2 PCR (COVID-19) Nose [[id number]]    Swab from Nose    Final result Component Value   Influenza A PCR Negative   Influenza B PCR Negative   RSV PCR Negative   SARS CoV2 PCR Negative   NEGATIVE: SARS-CoV-2 (COVID-19) RNA not detected, presumed negative.          04/05/2025 1043 04/10/2025 1606 Blood Culture Arm, Left [[id number]]   Blood from Arm, Left    Final result Component Value   Culture No Growth             04/05/2025 1038 04/10/2025 1606 Blood Culture Arm, Right [[id number]]   Blood from Arm, Right    Final result Component Value   Culture No Growth        EXAM: CT CHEST/ABDOMEN/PELVIS W CONTRAST  LOCATION: [facility]  DATE: 4/14/2025    INDICATION: RRT   re eval abd s p bowel resection, sepsis w  new HD changes  COMPARISON: CT CAP 4/8/2025.  TECHNIQUE: CT scan of the chest, abdomen, and pelvis was performed following injection of IV contrast. Multiplanar reformats were obtained. Dose reduction techniques were used.  CONTRAST: 135  ml Isovue 370    FINDINGS:  LUNGS AND PLEURA: There are new small bilateral pleural effusions with overlying atelectasis/consolidation.    MEDIASTINUM/AXILLAE: Normal heart size without pericardial effusion. Aortic valve replacement. No thoracic  adenopathy.    CORONARY ARTERY CALCIFICATION: Previous intervention (CABG).    HEPATOBILIARY: Unchanged appearance of a large calcification in the gallbladder fossa which likely represents a gallstone within the contracted gallbladder. The duodenum is inseparable from the contracted gallbladder compatible with fistulization in this   patient with a recent gallstone ileus. Pneumobilia. No biliary ductal dilatation. No focal liver lesion.    PANCREAS: Normal.    SPLEEN: Similar splenomegaly.    ADRENAL GLANDS: Normal.    KIDNEYS/BLADDER: Symmetric renal enhancement. No hydronephrosis. Unchanged left renal cyst and too small to characterize hypoattenuating lesions bilaterally, not requiring specific follow-up. Bladder is decompressed with a Mccarty catheter.    BOWEL: Postsurgical changes of partial small bowel resection. No evidence of bowel obstruction. Peripherally enhancing fluid collection in the left paracolic gutter measuring 10.2 x 4.3 x 11.5 cm (series 3, image 202). Additional small volume  peripherally enhancing perihepatic fluid and fluid in the right paracolic gutter. There is an ill-defined fluid and gas collection in the right ventral abdomen with mild peripheral enhancement measuring 3.0 x 11.1 x 7.6 cm (series 3, image 243).  Additional small volume nonorganized fluid in the abdomen and pelvis. Small volume pneumoperitoneum. Inflammatory changes of the omentum. Enteric tube terminates in the mid stomach.    LYMPH NODES: No lymphadenopathy by CT size criteria.    VASCULATURE: Moderate burden of vascular calcifications without abdominal aortic aneurysm.    PELVIC ORGANS: Marked prostatomegaly.    MUSCULOSKELETAL: Postsurgical changes of the ventral abdominal wall with soft tissue dehiscence. Body wall edema. Multilevel degenerative changes of the spine.   Impression:     IMPRESSION:  1.  There are multiple peripherally enhancing fluid collections in the abdomen which are suspicious for developing  abscesses. The largest of these is in the left paracolic gutter.  2.  Small volume pneumoperitoneum which is favored postsurgical.  3.  Stable appearance of a contracted gallbladder containing a large gallstone which is fistulized to the duodenum with associated pneumobilia.  4.  New small bilateral pleural effusions with overlying atelectasis/consolidation.

## 2025-04-15 NOTE — PROGRESS NOTES
[facility] Progress Note    Date of Admission: 3/30/2025  Service Date: 04/15/2025     Hospital Summary:  Mr. Richar Davis is a very pleasant 69 year old male with a past medical history of type 2 diabetes mellitus, hypertension, coronary artery disease s/p CABG, s/p bioprosthetic AVR in 10/2022, untreated ALIREZA, hyperlipidemia, neuropathy, BPH who was admitted on 3/30/2025 for nausea and vomiting.  Imaging demonstrated gallstone ileus.  He underwent surgery on 3/30.  He developed atrial fibrillation/flutter and underwent cardioversion on 4/7 with Dr. Capps.  Postoperative course was complicated by ileus requiring an ex lap on 4/8 with a small bowel resection.  He was sent to the ICU with temporary abdominal closure and return to the OR on 4/10 for small bowel anastomosis and closure.  On 4/14, he developed recurrence of atrial fibrillation/flutter with RVR with HR 140s.  Blood pressure was stable.  Cardiology was consulted for atrial fibrillation management.  Considering he had interrupted anticoagulation for surgery, he underwent MANDEEP with successful cardioversion on 4/14.  He was loaded with IV amiodarone and transitioned to an oral taper.      Interval History:  HR 70-80s. -140s. K 3.6. Cr 0.70,  stable within normal limits . Mg 2.2.     He reports doing well from a cardiac standpoint.  Denies chest discomfort/pain, palpitations, shortness of breath, or dizziness.  He has no acute cardiac concerns.  Overall feels tired and fatigued.    Detailed Assessment:  Atrial fibrillation/flutter with RVR  In the setting of acute postoperative/infectious concerns  Timeline:  First noted on 4/4/2025  S/p successful DCCV on 4/7/2025  Recurrence on 4/14/2025 > IV amiodarone  S/p successful MANDEEP with DCCV on 4/14/2025 > PO amiodarone taper  Eliquis was held on 4/8 due to procedure  Currently on IV heparin  Gallstone ileus complicated by bacteremia and septic  shock  S/p ex lap and gallstone resection on 3/30  Postoperative complication of ileus requiring ex lap and small bowel resection on 4/8 with anastomosis and fascial closure on 4/10  IR consulted for abdominal fluid collection drains, plans for CT-guided drain placement on 4/15  IV antibiotics per hospital team  Delirium with intermittent agitation  Seroquel 25 mg twice daily  Coronary artery disease s/p CABG x3 (LIMA-LAD, SVG-PDA, SVG-dLCx) in 10/2022  Severe aortic stenosis s/p bioprosthetic AVR (25 mm Inspiris Resilia) in 10/2022  Hypertension  Hyperlipidemia  Holding PTA atorvastatin  Uncontrolled ALIREZA  Type 2 diabetes mellitus  BPH  PTA Flomax held  Neuropathy  PTA Gabapentin  Tobacco abuse  Nicotine patch in place    Plan:  Continues in sinus rhythm with HR 70s/80s  Continue metoprolol tartrate 25 mg twice daily  Finish IV amiodarone loading per protocol  Start PO amiodarone taper: 200 mg BID x 2 weeks, then 200 mg QD  EKG to monitor QT/Qtc with multiple QT prolonging medications, continue to monitor  Restart Eliquis when able, continue IV heparin in the meantime      Thank you for the opportunity to participate in this pleasant patient's care.     Richa Schmidt PA-C   [facility]  Securely message via [employer] (8am - 5pm, M-F)      Objective:    Vitals: /73 (BP Location: Right arm)   Pulse 79   Temp 97.5  F (36.4  C) (Oral)   Resp 29   Ht 1.829 m (6')   Wt (!) 139.2 kg (306 lb 14.4 oz)   SpO2 97%   BMI 41.62 kg/m    Wt Readings from Last 4 Encounters:   04/15/25 (!) 139.2 kg (306 lb 14.4 oz)   12/16/24 131 kg (288 lb 12.8 oz)   12/12/24 129.2 kg (284 lb 14.4 oz)   06/10/24 127.8 kg (281 lb 12.8 oz)     I/O last 3 completed shifts:  In: 679 [P.O.:10; I.V.:594; NG/GT:75]  Out: 770 [Urine:20; Emesis/NG output:750]    Physical Exam:  General: Awake and alert. No acute distress.  Appears fatigued and tired.  Skin: Warm and dry. Appropriate color. No lesions or rashes noted.  HEENT:  Normocephalic and atraumatic. EOM intact. PERRL. Trachea midline. Right-sided JVD: none.  NG tube in place.  Cardiac: Normal S1 and S2, no murmurs, rubs, or gallops noted. Regular rate and rhythm.  Lung: Regular work of breathing without accessory muscle use. Clear to auscultation bilaterally.  Abdomen: No distension.  Extremities: Trace-1+ pitting edema of feet up to ankles bilaterally.  Neuro: A & O. No focal deficits noted. Moves all extremities appropriately.      Imaging:  Recent Results (from the past 48 hours)   XR Chest Port 1 View    Narrative    EXAM: XR CHEST PORT 1 VIEW  LOCATION: [facility]  DATE: 4/14/2025    INDICATION: Tachypnea and a flutter.  COMPARISON: None.      Impression    IMPRESSION: Sternotomy with mediastinal clips and markers. NG tube enters the stomach. Heart size within normal limits. Mild pulmonary vascular prominence. Mild elevation left hemidiaphragm. No focal lung infiltrates.   CT Chest/Abdomen/Pelvis w Contrast    Narrative    EXAM: CT CHEST/ABDOMEN/PELVIS W CONTRAST  LOCATION: [facility]  DATE: 4/14/2025    INDICATION: RRT   re eval abd s p bowel resection, sepsis w  new HD changes  COMPARISON: CT CAP 4/8/2025.  TECHNIQUE: CT scan of the chest, abdomen, and pelvis was performed following injection of IV contrast. Multiplanar reformats were obtained. Dose reduction techniques were used.   CONTRAST: 135  ml Isovue 370    FINDINGS:   LUNGS AND PLEURA: There are new small bilateral pleural effusions with overlying atelectasis/consolidation.    MEDIASTINUM/AXILLAE: Normal heart size without pericardial effusion. Aortic valve replacement. No thoracic adenopathy.    CORONARY ARTERY CALCIFICATION: Previous intervention (CABG).    HEPATOBILIARY: Unchanged appearance of a large calcification in the gallbladder fossa which likely represents a gallstone within the contracted gallbladder. The duodenum is inseparable from the contracted  gallbladder compatible with fistulization in this   patient with a recent gallstone ileus. Pneumobilia. No biliary ductal dilatation. No focal liver lesion.    PANCREAS: Normal.    SPLEEN: Similar splenomegaly.    ADRENAL GLANDS: Normal.    KIDNEYS/BLADDER: Symmetric renal enhancement. No hydronephrosis. Unchanged left renal cyst and too small to characterize hypoattenuating lesions bilaterally, not requiring specific follow-up. Bladder is decompressed with a Mccarty catheter.    BOWEL: Postsurgical changes of partial small bowel resection. No evidence of bowel obstruction. Peripherally enhancing fluid collection in the left paracolic gutter measuring 10.2 x 4.3 x 11.5 cm (series 3, image 202). Additional small volume   peripherally enhancing perihepatic fluid and fluid in the right paracolic gutter. There is an ill-defined fluid and gas collection in the right ventral abdomen with mild peripheral enhancement measuring 3.0 x 11.1 x 7.6 cm (series 3, image 243).   Additional small volume nonorganized fluid in the abdomen and pelvis. Small volume pneumoperitoneum. Inflammatory changes of the omentum. Enteric tube terminates in the mid stomach.    LYMPH NODES: No lymphadenopathy by CT size criteria.    VASCULATURE: Moderate burden of vascular calcifications without abdominal aortic aneurysm.    PELVIC ORGANS: Marked prostatomegaly.    MUSCULOSKELETAL: Postsurgical changes of the ventral abdominal wall with soft tissue dehiscence. Body wall edema. Multilevel degenerative changes of the spine.      Impression    IMPRESSION:  1.  There are multiple peripherally enhancing fluid collections in the abdomen which are suspicious for developing abscesses. The largest of these is in the left paracolic gutter.   2.  Small volume pneumoperitoneum which is favored postsurgical.  3.  Stable appearance of a contracted gallbladder containing a large gallstone which is fistulized to the duodenum with associated pneumobilia.  4.  New  small bilateral pleural effusions with overlying atelectasis/consolidation.       Cardioversion    Narrative    Dangelo Johnson MD     4/14/2025  2:46 PM  [facility]    Procedure: Cardioversion    Date/Time: 4/14/2025 2:44 PM    Performed by: Dangelo Johnson MD  Authorized by: Richa Schmidt PA-C      UNIVERSAL PROTOCOL   Site Marked: NA  Prior Images Obtained and Reviewed:  Yes  Required items: Required blood products, implants, devices and special   equipment available    Patient identity confirmed:  Verbally with patient, provided demographic   data, hospital-assigned identification number, arm band and anonymous   protocol, patient vented/unresponsive  Patient was reevaluated immediately before administering moderate or deep   sedation or anesthesia  Confirmation Checklist:  Patient's identity using two indicators, relevant   allergies, procedure was appropriate and matched the consent or emergent   situation and correct equipment/implants were available  Time out: Immediately prior to the procedure a time out was called    Universal Protocol: the Joint Commission Universal Protocol was followed    Preparation: Patient was prepped and draped in usual sterile fashion      SEDATION  Patient Sedated: Yes    Sedation Type:  Moderate (conscious) sedation  Vital signs: Vital signs monitored during sedation      PROCEDURE DETAILS  Cardioversion basis: elective  Pre-procedure rhythm: atrial fibrillation  Patient position: patient was placed in a supine position  Chest area: chest area exposed  Electrodes: pads  Electrodes placed: anterior-posterior  Number of attempts: 1    Details of Attempts:  Single synchronized shock of 200J, accompanied by   chest compression, restored NSR.  Pt tolerated procedure well.  Post-procedure rhythm: normal sinus rhythm  Complications: no complications      PROCEDURE    Patient Tolerance:  Patient tolerated the procedure well with no immediate    complications  Length of time physician/provider present for 1:1 monitoring during   sedation: 45   XR Abdomen Port 1 View    Narrative    EXAM: XR ABDOMEN PORT 1 VIEW  LOCATION: [facility]  DATE: 4/14/2025    INDICATION: NG placement verification  COMPARISON: None.      Impression    IMPRESSION: Enteric tube courses below the diaphragm with the distal tip overlying the expected position of the gastric body.       Echo:  No results found for this or any previous visit (from the past 4320 hours).      Data   Recent Labs   Lab 04/15/25  0759 04/14/25  0925 04/14/25  0306   WBC 8.1 9.9 10.0   HGB 10.4* 11.7* 11.4*   HCT 34.0* 36.3* 35.7*   MCV 95 93 94    301 281     Recent Labs   Lab 04/15/25  0812 04/15/25  0759 04/15/25  0402 04/14/25  1619 04/14/25  0907 04/14/25  0612 04/14/25  0306 04/13/25  1628 04/13/25  0504   NA  --  146*  --   --  145  --  146*  --  144   POTASSIUM  --  3.6  --   --   --   --  3.8  --  3.9   CHLORIDE  --  113*  --   --   --   --  111*  --  111*   CO2  --  21*  --   --   --   --  21*  --  20*   ANIONGAP  --  12  --   --   --   --  14  --  13   * 114* 111*   < >  --    < > 109*   < > 107*   BUN  --  11.8  --   --   --   --  10.5  --  11.1   CR  --  0.70  --   --   --   --  0.69  --  0.72   GFRESTIMATED  --  >90  --   --   --   --  >90  --  >90   HALEY  --  8.8  --   --   --   --  9.0  --  8.8    < > = values in this interval not displayed.          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    ANESTHESIA CARDIOVERSION N/A 4/7/2025    Procedure: Anesthesia cardioversion;  Surgeon: GENERIC ANESTHESIA PROVIDER;  Location:  OR    ANESTHESIA CARDIOVERSION N/A 4/14/2025    Procedure:  Anesthesia cardioversion;  Surgeon: GENERIC ANESTHESIA PROVIDER;  Location:  OR    BYPASS GRAFT ARTERY CORONARY, REPLACE VALVE AORTIC, COMBINED N/A 10/20/2022    Procedure: CORONARY ARTERY BYPASS GRAFT x 3 (LEFT INTERNAL MAMMARY ARTERY - LEFT ANTERIOR DESCENDING ARTERY; SAPHENOUS VEIN - POSTERIOR DESCENDING ARTERY; SAPHENOUS VEIN - CIRCUMFLEX ARTERY) WITH ENDOSCOPIC LESSER SAPHENOUS VEIN HARVEST ON BILATERAL LOWER EXTREMITY, AORTIC VALVE REPLACEMENT WITH TISSUE HEART VALVE INSPIRIS  RESILIA  AORTIC VALVE SIZE: 25MM, AND ON CARDIOPULMONARY PUMP OXYGENATOR  (    COLECTOMY WITHOUT COLOSTOMY N/A 4/8/2025    Procedure: EXPLORATORY LAPAROTOMY WITH SMALL BOWEL RESECTION;  Surgeon: Liana Landrum MD;  Location:  OR    CTA ANGIOGRAM CORONARY ARTERY      LAPAROTOMY EXPLORATORY N/A 3/30/2025    Procedure: exploratory laparotomy with enterotomy and removal of gallstone;  Surgeon: Clau Calles MD;  Location:  OR    LAPAROTOMY EXPLORATORY N/A 4/10/2025    Procedure: EXPLORATORY LAPAROTOMY, SMALL BOWEL ANASTOMOSIS, ABDOMINAL CLOSURE;  Surgeon: Liana Landrum MD;  Location:  OR    ORTHOPEDIC SURGERY Left     elbow surgery    TRANSESOPHAGEAL ECHOCARDIOGRAM INTRAOPERATIVE N/A 4/14/2025    Procedure: Transesophageal echocardiogram intraoperative;  Surgeon: GENERIC ANESTHESIA PROVIDER;  Location:  OR       Prior to Admission Medications   Prior to Admission Medications   Prescriptions Last Dose Informant Patient Reported? Taking?   Ascorbic Acid (VITAMIN C) 500 MG CAPS 3/29/2025 Morning Self Yes Yes   Sig: Take 500 mg by mouth daily   Cyanocobalamin (VITAMIN B-12 PO) 3/29/2025 Morning Self Yes Yes   Sig: Take 1 tablet by mouth every other day. Unknown dose   MAGNESIUM PO 3/29/2025 Morning  Yes Yes   Sig: Take 500 mg by mouth daily.   Multiple Vitamins-Minerals (MENS 50+ MULTI VITAMIN/MIN PO) 3/29/2025 Morning Self Yes Yes   Sig: Take 1 tablet by mouth daily   Multiple Vitamins-Minerals (PRESERVISION AREDS 2 PO)  Past Week  Yes Yes   Sig: Take by mouth daily   acetaminophen (TYLENOL) 325 MG tablet Unknown  No Yes   Sig: Take 2 tablets (650 mg) by mouth every 4 hours as needed for other (For optimal non-opioid multimodal pain management to improve pain control.)   amoxicillin (AMOXIL) 500 MG capsule   No No   Sig: Take 4 capsules (2,000 mg) 30-60 minutes prior to your dental procedure   atorvastatin (LIPITOR) 80 MG tablet Past Week  No Yes   Sig: Take 1 tablet (80 mg) by mouth daily.   diphenhydrAMINE (BENADRYL) 50 MG tablet Unknown Self Yes Yes   Sig: Take 100 mg by mouth every 6 hours as needed for itching or allergies   fish oil-omega-3 fatty acids 1000 MG capsule Past Month  Yes Yes   Sig: Take 2 g by mouth daily   folic acid (FOLVITE) 1 MG tablet Past Month Self Yes Yes   Sig: Take 1,000 mcg by mouth 2 times daily   furosemide (LASIX) 20 MG tablet Past Week  No Yes   Sig: Take 1 tablet (20 mg) by mouth daily.   gabapentin (NEURONTIN) 800 MG tablet Past Week  Yes Yes   Sig: Take 800 mg by mouth 3 times daily as needed.   metFORMIN (GLUCOPHAGE) 500 MG tablet Past Week  No Yes   Sig: Take 0.5 tablets (250 mg) by mouth daily (with dinner) (0.5 x 500 mg = 250 mg)   metoprolol succinate ER (TOPROL XL) 25 MG 24 hr tablet Past Week  No Yes   Sig: Take 1 tablet (25 mg) by mouth every evening   tamsulosin (FLOMAX) 0.4 MG capsule Past Week Self Yes Yes   Sig: Take 0.8 mg by mouth every evening. (2 x 0.4 mg = 0.8 mg)   traMADol (ULTRAM) 50 MG tablet Past Week  Yes Yes   Sig: Take 50 mg by mouth 4 times daily as needed for severe pain.   vitamin D3 (CHOLECALCIFEROL) 50 mcg (2000 units) tablet Past Week Self Yes Yes   Sig: Take 1 tablet by mouth every other day      Facility-Administered Medications: None     Current Facility-Administered Medications   Medication Dose Route Frequency Provider Last Rate Last Admin    [Held by provider] acetaminophen (TYLENOL) tablet 975 mg  975 mg Oral or NG Tube Q8H Clau Calles MD         amiodarone (NEXTERONE) 1.8 mg/mL in dextrose 5% 200 mL ADULT STANDARD infusion  0.5 mg/min Intravenous Continuous Richa Schmidt PA-C 16.7 mL/hr at 04/15/25 0800 0.5 mg/min at 04/15/25 0800    amiodarone (PACERONE) tablet 200 mg  200 mg Oral BID Richa Schmidt PA-C        Followed by    [START ON 4/29/2025] amiodarone (PACERONE) tablet 200 mg  200 mg Oral Daily Richa Schmidt PA-C        [Held by provider] atorvastatin (LIPITOR) tablet 80 mg  80 mg Oral or NG Tube Daily Khari Peña MD        benzocaine-menthol (CHLORASEPTIC) 6-10 MG lozenge 1 lozenge  1 lozenge Buccal Q1H PRN Hayley Vale APRN CNP   1 lozenge at 04/01/25 0334    bisacodyl (DULCOLAX) suppository 10 mg  10 mg Rectal Daily PRN Hayley Vale APRN CNP        carboxymethylcellulose PF (REFRESH PLUS) 0.5 % ophthalmic solution 1 drop  1 drop Both Eyes Q1H PRN Hayley Vale APRN CNP        glucose gel 15-30 g  15-30 g Oral Q15 Min PRN Tim Mays MD        Or    dextrose 50 % injection 25-50 mL  25-50 mL Intravenous Q15 Min PRN Tim Mays MD        Or    glucagon injection 1 mg  1 mg Subcutaneous Q15 Min PRN Tim Mays MD        diazepam (VALIUM) injection 2.5 mg  2.5 mg Intravenous Q6H PRN Hayley Vale APRN CNP        diphenhydrAMINE (BENADRYL) elixir 12.5 mg  12.5 mg Oral Q6H PRN Hayley Vale APRN CNP        Or    diphenhydrAMINE (BENADRYL) injection 12.5 mg  12.5 mg Intravenous Q6H PRN Hayley Vale APRN CNP        [Held by provider] famotidine (PEPCID) tablet 20 mg  20 mg Oral BID Terry Patino MD   20 mg at 04/08/25 0805    [Held by provider] folic acid (FOLVITE) tablet 1,000 mcg  1,000 mcg Oral or NG Tube BID Khari Peña MD        [Held by provider] furosemide (LASIX) tablet 20 mg  20 mg Oral Daily Mann Torres DO   20 mg at 04/04/25 1017    [Held by provider] gabapentin (NEURONTIN) tablet 800 mg  800 mg Oral TID PRN Clau Calles MD        Give   of  usual dose of LONG ACTING insulin AM of procedure IF diabetic   Does not apply Continuous PRN Richa Schmidt PA-C        heparin 25,000 units in 0.45% NaCl 250 mL ANTICOAGULANT infusion  0-5,000 Units/hr Intravenous Continuous Tim Mays MD   Stopped at 04/15/25 0359    HOLD digoxin (LANOXIN)  AM of procedure IF on digoxin   Does not apply HOLD Richa Schmidt PA-C        HOLD: Insulin - RAPID/SHORT acting AM of procedure IF diabetic   Does not apply HOLD Richa Schmidt PA-C        HOLD: Insulin - REGULAR AM of procedure IF diabetic   Does not apply HOLD Richa Schmidt PA-C        HOLD: Oral hypoglycemics AM of procedure IF diabetic   Does not apply HOLD Richa Schmidt PA-C        HYDROmorphone (DILAUDID) injection 0.2 mg  0.2 mg Intravenous Q4H PRN Tim Mays MD        HYDROmorphone (PF) (DILAUDID) injection 0.3 mg  0.3 mg Intravenous Q4H PRN Tim Mays MD        insulin aspart (NovoLOG) injection (RAPID ACTING)  1-7 Units Subcutaneous Q4H Tim Mays MD        lidocaine (LMX4) cream   Topical Q1H PRN Hayley Vale APRN CNP        lidocaine 1 % 0.1-1 mL  0.1-1 mL Other Q1H PRN Hayley Vale APRN CNP        lidocaine 1 % 10 mL  10 mL Intradermal Once Manny Guillen MD        magnesium hydroxide (MILK OF MAGNESIA) suspension 30 mL  30 mL Oral Daily PRN Hayley Vale APRN CNP   30 mL at 04/01/25 0427    magnesium sulfate 2 g in 50 mL sterile water intermittent infusion  2 g Intravenous Once PRN Richa Schmidt PA-C        May take oral meds with a sip of water, the morning of Cardioversion procedure.       Does not apply Continuous PRN Richa Schmidt PA-C        melatonin tablet 10 mg  10 mg Oral At Bedtime PRN Hayley Vale APRN CNP   10 mg at 04/12/25 0042    methocarbamol (ROBAXIN) half-tab 250 mg  250 mg Oral Q6H PRN Hayley Vale APRN CNP   250 mg at 04/12/25 1714    metoprolol (LOPRESSOR) injection 5 mg  5 mg  Intravenous Q4H PRN Hayley Vale APRN CNP   5 mg at 04/14/25 0806    metoprolol tartrate (LOPRESSOR) tablet 25 mg  25 mg Oral BID Hayley Vale APRN CNP   25 mg at 04/15/25 0939    [Held by provider] metoprolol tartrate (LOPRESSOR) tablet 50 mg  50 mg Oral BID Fahad Wei MD   50 mg at 04/08/25 0806    naloxone (NARCAN) injection 0.2 mg  0.2 mg Intravenous Q2 Min PRN Hayley Vale APRN CNP        Or    naloxone (NARCAN) injection 0.4 mg  0.4 mg Intravenous Q2 Min PRN Hayley Vale APRN CNP        Or    naloxone (NARCAN) injection 0.2 mg  0.2 mg Intramuscular Q2 Min PRN Hayley Vale APRN CNP        Or    naloxone (NARCAN) injection 0.4 mg  0.4 mg Intramuscular Q2 Min PRN Hayley Vale APRN CNP        nicotine (COMMIT) lozenge 2 mg  2 mg Buccal Q1H PRN Hayley Vale APRN CNP   2 mg at 04/03/25 1145    nicotine (NICODERM CQ) 21 MG/24HR 24 hr patch 1 patch  1 patch Transdermal Daily Hayley Vale APRN CNP   1 patch at 04/15/25 0940    No lozenges or gum should be given while patient on BIPAP/AVAPS/AVAPS AE   Does not apply Continuous PRN Hayley Vale APRN CNP        ondansetron (ZOFRAN ODT) ODT tab 4 mg  4 mg Oral Q6H PRN Hayley Vale APRN CNP   4 mg at 04/08/25 0208    Or    ondansetron (ZOFRAN) injection 4 mg  4 mg Intravenous Q6H PRN Hayley Vale APRN CNP   4 mg at 04/11/25 1232    pantoprazole (PROTONIX) 2 mg/mL suspension 40 mg  40 mg Per Feeding Tube QAM AC Hayley Vale APRN CNP        Or    pantoprazole (PROTONIX) IV push injection 40 mg  40 mg Intravenous QAM AC Hayley Vale APRN CNP   40 mg at 04/15/25 0644    Patient may continue current oral medications   Does not apply Continuous PRN Hayley Vale APRN CNP        phenol (CHLORASEPTIC) 1.4 % spray 1 mL  1 spray Mouth/Throat Q1H PRN Hayley Vale APRN CNP        piperacillin-tazobactam (ZOSYN) 4.5 g vial to attach to  mL bag  4.5 g Intravenous Q6H Kavin  ADONIS Dumont  mL/hr at 04/12/25 0548 4.5 g at 04/15/25 0644    [Held by provider] polyethylene glycol (MIRALAX) Packet 17 g  17 g Oral Daily Clau Calles MD   17 g at 04/08/25 0805    potassium chloride eboni ER (KLOR-CON M20) CR tablet 40 mEq  40 mEq Oral Once PRN Richa Schmidt PA-C        prochlorperazine (COMPAZINE) injection 5 mg  5 mg Intravenous Q6H PRN Hayley Vale APRN CNP   5 mg at 04/08/25 1103    Or    prochlorperazine (COMPAZINE) tablet 5 mg  5 mg Oral Q6H PRN Hayley Vale APRN CNP   5 mg at 04/03/25 0756    QUEtiapine (SEROquel) tablet 25 mg  25 mg Oral BID Hayley Vale APRN CNP   25 mg at 04/15/25 0940    QUEtiapine (SEROquel) tablet 50 mg  50 mg Oral BID PRN Hayley Vale APRN CNP   50 mg at 04/14/25 0121    sodium chloride (PF) 0.9% PF flush 3 mL  3 mL Intracatheter q1 min prn Hayley Vale APRN CNP        sodium chloride (PF) 0.9% PF flush 3 mL  3 mL Intracatheter Q8H MITA Hayley Vale APRN CNP   3 mL at 04/15/25 0648    sodium phosphate 15 mmol in NS 250mL intermittent infusion  15 mmol Intravenous Once Hayley Vale APRN CNP 0 mL/hr at 04/14/25 1728 Restarted at 04/14/25 1728    [Held by provider] tamsulosin (FLOMAX) capsule 0.8 mg  0.8 mg Oral QPM Khari Peña MD   0.8 mg at 04/07/25 2031     Current Facility-Administered Medications   Medication Dose Route Frequency Provider Last Rate Last Admin    [Held by provider] acetaminophen (TYLENOL) tablet 975 mg  975 mg Oral or NG Tube Q8H Clau Calles MD        amiodarone (NEXTERONE) 1.8 mg/mL in dextrose 5% 200 mL ADULT STANDARD infusion  0.5 mg/min Intravenous Continuous [place], Richa, PA-C 16.7 mL/hr at 04/15/25 0800 0.5 mg/min at 04/15/25 0800    amiodarone (PACERONE) tablet 200 mg  200 mg Oral BID Richa Schmidt PA-C        Followed by    [START ON 4/29/2025] amiodarone (PACERONE) tablet 200 mg  200 mg Oral Daily Richa Schmidt PA-C        [Held by  provider] atorvastatin (LIPITOR) tablet 80 mg  80 mg Oral or NG Tube Daily Khari Peña MD        benzocaine-menthol (CHLORASEPTIC) 6-10 MG lozenge 1 lozenge  1 lozenge Buccal Q1H PRN Hayley Vale APRN CNP   1 lozenge at 04/01/25 0334    bisacodyl (DULCOLAX) suppository 10 mg  10 mg Rectal Daily PRN Hayley Vale APRN CNP        carboxymethylcellulose PF (REFRESH PLUS) 0.5 % ophthalmic solution 1 drop  1 drop Both Eyes Q1H PRN Hayley Vale APRN CNP        glucose gel 15-30 g  15-30 g Oral Q15 Min PRN Tim Mays MD        Or    dextrose 50 % injection 25-50 mL  25-50 mL Intravenous Q15 Min PRN Tim Mays MD        Or    glucagon injection 1 mg  1 mg Subcutaneous Q15 Min PRN Tim Mays MD        diazepam (VALIUM) injection 2.5 mg  2.5 mg Intravenous Q6H PRN Hayley Vale APRN CNP        diphenhydrAMINE (BENADRYL) elixir 12.5 mg  12.5 mg Oral Q6H PRN Hayley Vale APRN CNP        Or    diphenhydrAMINE (BENADRYL) injection 12.5 mg  12.5 mg Intravenous Q6H PRN Hayley Vale APRN CNP        [Held by provider] famotidine (PEPCID) tablet 20 mg  20 mg Oral BID Terry Patino MD   20 mg at 04/08/25 0805    [Held by provider] folic acid (FOLVITE) tablet 1,000 mcg  1,000 mcg Oral or NG Tube BID Khari Peña MD        [Held by provider] furosemide (LASIX) tablet 20 mg  20 mg Oral Daily Mann Torres DO   20 mg at 04/04/25 1017    [Held by provider] gabapentin (NEURONTIN) tablet 800 mg  800 mg Oral TID PRN Clau Calles MD        Give   of usual dose of LONG ACTING insulin AM of procedure IF diabetic   Does not apply Continuous PRN Richa Schmidt PA-C        heparin 25,000 units in 0.45% NaCl 250 mL ANTICOAGULANT infusion  0-5,000 Units/hr Intravenous Continuous Tim Mays MD   Stopped at 04/15/25 0359    HOLD digoxin (LANOXIN)  AM of procedure IF on digoxin   Does not apply HOLD Richa Schmidt PA-C        HOLD: Insulin -  RAPID/SHORT acting AM of procedure IF diabetic   Does not apply HOLD Richa Schmidt PA-C        HOLD: Insulin - REGULAR AM of procedure IF diabetic   Does not apply HOLD Richa Schmidt PA-C        HOLD: Oral hypoglycemics AM of procedure IF diabetic   Does not apply HOLD Richa Schmidt PA-C        HYDROmorphone (DILAUDID) injection 0.2 mg  0.2 mg Intravenous Q4H PRN Tim Mays MD        HYDROmorphone (PF) (DILAUDID) injection 0.3 mg  0.3 mg Intravenous Q4H PRN Tim Mays MD        insulin aspart (NovoLOG) injection (RAPID ACTING)  1-7 Units Subcutaneous Q4H Tim Mays MD        lidocaine (LMX4) cream   Topical Q1H PRN Hayley Vale APRN CNP        lidocaine 1 % 0.1-1 mL  0.1-1 mL Other Q1H PRN Hayley Vale APRN CNP        lidocaine 1 % 10 mL  10 mL Intradermal Once Manny Guillen MD        magnesium hydroxide (MILK OF MAGNESIA) suspension 30 mL  30 mL Oral Daily PRN Hayley Vale APRN CNP   30 mL at 04/01/25 0427    magnesium sulfate 2 g in 50 mL sterile water intermittent infusion  2 g Intravenous Once PRN Richa Schmidt PA-C        May take oral meds with a sip of water, the morning of Cardioversion procedure.       Does not apply Continuous PRN Richa Schmidt PA-C        melatonin tablet 10 mg  10 mg Oral At Bedtime PRN Hayley Vale APRN CNP   10 mg at 04/12/25 0042    methocarbamol (ROBAXIN) half-tab 250 mg  250 mg Oral Q6H PRN Hayley Vale APRN CNP   250 mg at 04/12/25 1714    metoprolol (LOPRESSOR) injection 5 mg  5 mg Intravenous Q4H PRN Hayley Vale APRN CNP   5 mg at 04/14/25 0806    metoprolol tartrate (LOPRESSOR) tablet 25 mg  25 mg Oral BID Hayley Vale APRN CNP   25 mg at 04/15/25 0939    [Held by provider] metoprolol tartrate (LOPRESSOR) tablet 50 mg  50 mg Oral BID Fahad Wei MD   50 mg at 04/08/25 0806    naloxone (NARCAN) injection 0.2 mg  0.2 mg Intravenous Q2 Min PRN Hayley Vale APRN CNP         Or    naloxone (NARCAN) injection 0.4 mg  0.4 mg Intravenous Q2 Min PRN Hayley Vale APRN CNP        Or    naloxone (NARCAN) injection 0.2 mg  0.2 mg Intramuscular Q2 Min PRN Hayley Vale APRN CNP        Or    naloxone (NARCAN) injection 0.4 mg  0.4 mg Intramuscular Q2 Min PRN Hayley Vale APRN CNP        nicotine (COMMIT) lozenge 2 mg  2 mg Buccal Q1H PRN Hayley Vale APRN CNP   2 mg at 04/03/25 1145    nicotine (NICODERM CQ) 21 MG/24HR 24 hr patch 1 patch  1 patch Transdermal Daily Hayley Vale APRN CNP   1 patch at 04/15/25 0940    No lozenges or gum should be given while patient on BIPAP/AVAPS/AVAPS AE   Does not apply Continuous PRN Hayley Vale APRN CNP        ondansetron (ZOFRAN ODT) ODT tab 4 mg  4 mg Oral Q6H PRN Hayley Vale APRN CNP   4 mg at 04/08/25 0208    Or    ondansetron (ZOFRAN) injection 4 mg  4 mg Intravenous Q6H PRN Hayley Vale APRN CNP   4 mg at 04/11/25 1232    pantoprazole (PROTONIX) 2 mg/mL suspension 40 mg  40 mg Per Feeding Tube QAM AC Hayley Vale APRN CNP        Or    pantoprazole (PROTONIX) IV push injection 40 mg  40 mg Intravenous QAM AC Hayley Vale APRN CNP   40 mg at 04/15/25 0644    Patient may continue current oral medications   Does not apply Continuous PRN Hayley Vale APRN CNP        phenol (CHLORASEPTIC) 1.4 % spray 1 mL  1 spray Mouth/Throat Q1H PRN Hayley Vale APRN CNP        piperacillin-tazobactam (ZOSYN) 4.5 g vial to attach to  mL bag  4.5 g Intravenous Q6H Hayley Vale APRN  mL/hr at 04/12/25 0548 4.5 g at 04/15/25 0644    [Held by provider] polyethylene glycol (MIRALAX) Packet 17 g  17 g Oral Daily Clau Calles MD   17 g at 04/08/25 0805    potassium chloride eboni ER (KLOR-CON M20) CR tablet 40 mEq  40 mEq Oral Once PRN Richa Schmidt PAAshleighC        prochlorperazine (COMPAZINE) injection 5 mg  5 mg Intravenous Q6H PRN Hayley Vale, APRN CNP   5  mg at 04/08/25 1103    Or    prochlorperazine (COMPAZINE) tablet 5 mg  5 mg Oral Q6H PRN Hayley Vale APRN CNP   5 mg at 04/03/25 0756    QUEtiapine (SEROquel) tablet 25 mg  25 mg Oral BID ValeHayley rogers, APRN CNP   25 mg at 04/15/25 0940    QUEtiapine (SEROquel) tablet 50 mg  50 mg Oral BID PRN ValeHayley rogers, APRN CNP   50 mg at 04/14/25 0121    sodium chloride (PF) 0.9% PF flush 3 mL  3 mL Intracatheter q1 min prn Hayley Vale APRN CNP        sodium chloride (PF) 0.9% PF flush 3 mL  3 mL Intracatheter Q8H MITA Hayley Vale APRN CNP   3 mL at 04/15/25 0648    sodium phosphate 15 mmol in NS 250mL intermittent infusion  15 mmol Intravenous Once Hayley Vale APRN CNP 0 mL/hr at 04/14/25 1728 Restarted at 04/14/25 1728    [Held by provider] tamsulosin (FLOMAX) capsule 0.8 mg  0.8 mg Oral QPM Khari Peña MD   0.8 mg at 04/07/25 2031     Allergies   Allergies   Allergen Reactions    Niaspan [Niacin] Other (See Comments)     flushing       Social History    reports that he has quit smoking. His smokeless tobacco use includes chew. He reports that he does not currently use alcohol. He reports that he does not use drugs.    Family History            Review of Systems   A comprehensive review of system was performed and is negative other than that noted in the HPI.     Primary Care Physician   Cory Valles

## 2025-04-15 NOTE — PROGRESS NOTES
[facility] General Surgery Post-Op / Progress Note         Assessment and Plan:    Assessment:   68yo male admitted on 3/30 for gallstone ileus. Underwent ex lap with removal of gallstone through small bowel enterotomy and was admitted to the floor post operatively. Patient had return of bowel function but new onset of Aflutter with RVR and underwent cardioversion on 4/7. Overnight 4/7 RRT called for hypotension and patient found to have elevated lactate, leukocytosis of 36. CT showing increased fluid and free air, started draining from midline incision. Patient taken to OR for ex lap, found to have gross sucus breakdown of enterotomy on 4/8. RTOR 4/10 for small bowel anastomosis and fascial closure. Extubated and transferred to floor. RRT 4/14 for recurrent Aflutter with RVR, underwent repeat cardioversion. CT showing two new fluid collections, possible early abscess, plan for IR drainage 4/15.         Plan:   -IR for drainage of new fluid collections  -Continue NPO, NGT in setting of delirium and repeat anesthesia today, will need SLP consult prior to initiating diet  -Heparin gtt held for procedure, okay to continue post drainage from surgery perspective  -WOC for wound vac management    Patient discussed with Dr. Patino           Interval History:   Reports doing okay, would like CLD. Having BM, denies nausea. Still delirious but improving.          Review of Systems:   ROS negative            Medications:   All medications related to the patient's surgery have been reviewed  Current Facility-Administered Medications   Medication Dose Route Frequency Provider Last Rate Last Admin    [Held by provider] acetaminophen (TYLENOL) tablet 975 mg  975 mg Oral or NG Tube Q8H Clau Calles MD        amiodarone (NEXTERONE) 1.8 mg/mL in dextrose 5% 200 mL ADULT STANDARD infusion  0.5 mg/min Intravenous Continuous Richa Schmidt PA-C 16.7 mL/hr at 04/15/25 0800 0.5 mg/min at 04/15/25 0800    amiodarone  (PACERONE) tablet 200 mg  200 mg Oral BID Richa Schmidt PA-C        Followed by    [START ON 4/29/2025] amiodarone (PACERONE) tablet 200 mg  200 mg Oral Daily Richa Schmidt PA-C        [Held by provider] atorvastatin (LIPITOR) tablet 80 mg  80 mg Oral or NG Tube Daily Khari Peña MD        benzocaine-menthol (CHLORASEPTIC) 6-10 MG lozenge 1 lozenge  1 lozenge Buccal Q1H PRN Hayley Vale APRN CNP   1 lozenge at 04/01/25 0334    bisacodyl (DULCOLAX) suppository 10 mg  10 mg Rectal Daily PRN Hayley Vale APRN CNP        carboxymethylcellulose PF (REFRESH PLUS) 0.5 % ophthalmic solution 1 drop  1 drop Both Eyes Q1H PRN Hayley Vale APRN CNP        glucose gel 15-30 g  15-30 g Oral Q15 Min PRN Tim Mays MD        Or    dextrose 50 % injection 25-50 mL  25-50 mL Intravenous Q15 Min PRN Tim Mays MD        Or    glucagon injection 1 mg  1 mg Subcutaneous Q15 Min PRN Tim Mays MD        diazepam (VALIUM) injection 2.5 mg  2.5 mg Intravenous Q6H PRN Hayley Vale APRN CNP        diphenhydrAMINE (BENADRYL) elixir 12.5 mg  12.5 mg Oral Q6H PRN Hayley Vale APRN CNP        Or    diphenhydrAMINE (BENADRYL) injection 12.5 mg  12.5 mg Intravenous Q6H PRN Hayley Vale APRN CNP        [Held by provider] famotidine (PEPCID) tablet 20 mg  20 mg Oral BID Terry Patino MD   20 mg at 04/08/25 0805    [Held by provider] folic acid (FOLVITE) tablet 1,000 mcg  1,000 mcg Oral or NG Tube BID Khari Peña MD        [Held by provider] furosemide (LASIX) tablet 20 mg  20 mg Oral Daily Mann Torres DO   20 mg at 04/04/25 1017    [Held by provider] gabapentin (NEURONTIN) tablet 800 mg  800 mg Oral TID PRN Clau Calles MD        Give   of usual dose of LONG ACTING insulin AM of procedure IF diabetic   Does not apply Continuous PRN Richa Schmidt PA-C        heparin 25,000 units in 0.45% NaCl 250 mL ANTICOAGULANT infusion  0-5,000 Units/hr  Intravenous Continuous Tim Mays MD   Stopped at 04/15/25 0359    HOLD digoxin (LANOXIN)  AM of procedure IF on digoxin   Does not apply HOLD Richa Schmidt PA-C        HOLD: Insulin - RAPID/SHORT acting AM of procedure IF diabetic   Does not apply HOLD Richa Schmidt PA-C        HOLD: Insulin - REGULAR AM of procedure IF diabetic   Does not apply HOLD Richa Schmidt PA-C        HOLD: Oral hypoglycemics AM of procedure IF diabetic   Does not apply HOLD Richa Schmidt PA-C        HYDROmorphone (DILAUDID) injection 0.2 mg  0.2 mg Intravenous Q4H PRN Tim Mays MD        HYDROmorphone (PF) (DILAUDID) injection 0.3 mg  0.3 mg Intravenous Q4H PRN Tim Mays MD        insulin aspart (NovoLOG) injection (RAPID ACTING)  1-7 Units Subcutaneous Q4H Tim Mays MD        lidocaine (LMX4) cream   Topical Q1H PRN Hayley Vale APRN CNP        lidocaine 1 % 0.1-1 mL  0.1-1 mL Other Q1H PRN Hayley Vale APRN CNP        lidocaine 1 % 10 mL  10 mL Intradermal Once Manny Guillen MD        magnesium hydroxide (MILK OF MAGNESIA) suspension 30 mL  30 mL Oral Daily PRN Hayley Vale APRN CNP   30 mL at 04/01/25 0427    magnesium sulfate 2 g in 50 mL sterile water intermittent infusion  2 g Intravenous Once PRN Richa Schmidt PA-C        May take oral meds with a sip of water, the morning of Cardioversion procedure.       Does not apply Continuous PRN Richa Schmidt PA-C        melatonin tablet 10 mg  10 mg Oral At Bedtime PRN Hayley Vale APRN CNP   10 mg at 04/12/25 0042    methocarbamol (ROBAXIN) half-tab 250 mg  250 mg Oral Q6H PRN Hayley Vale APRN CNP   250 mg at 04/12/25 1714    metoprolol (LOPRESSOR) injection 5 mg  5 mg Intravenous Q4H PRN Hayley Vale APRN CNP   5 mg at 04/14/25 0806    metoprolol tartrate (LOPRESSOR) tablet 25 mg  25 mg Oral BID Hayley Vale APRN CNP   25 mg at 04/15/25 6239    [Held by provider]  metoprolol tartrate (LOPRESSOR) tablet 50 mg  50 mg Oral BID Fahad Wei MD   50 mg at 04/08/25 0806    naloxone (NARCAN) injection 0.2 mg  0.2 mg Intravenous Q2 Min PRN Hayley Vale APRN CNP        Or    naloxone (NARCAN) injection 0.4 mg  0.4 mg Intravenous Q2 Min PRN Hayley Vale APRN CNP        Or    naloxone (NARCAN) injection 0.2 mg  0.2 mg Intramuscular Q2 Min PRN Hayley Vale APRN CNP        Or    naloxone (NARCAN) injection 0.4 mg  0.4 mg Intramuscular Q2 Min PRN Hayley Vale APRN CNP        nicotine (COMMIT) lozenge 2 mg  2 mg Buccal Q1H PRN Hayley Vale APRN CNP   2 mg at 04/03/25 1145    nicotine (NICODERM CQ) 21 MG/24HR 24 hr patch 1 patch  1 patch Transdermal Daily Hayley Vale APRN CNP   1 patch at 04/15/25 0940    No lozenges or gum should be given while patient on BIPAP/AVAPS/AVAPS AE   Does not apply Continuous PRN Hayley Vale APRN CNP        ondansetron (ZOFRAN ODT) ODT tab 4 mg  4 mg Oral Q6H PRN Hayley Vale APRN CNP   4 mg at 04/08/25 0208    Or    ondansetron (ZOFRAN) injection 4 mg  4 mg Intravenous Q6H PRN Hayley Vale APRN CNP   4 mg at 04/11/25 1232    pantoprazole (PROTONIX) 2 mg/mL suspension 40 mg  40 mg Per Feeding Tube QAM AC Hayley Vale APRN CNP        Or    pantoprazole (PROTONIX) IV push injection 40 mg  40 mg Intravenous QAM AC Hayley Vale APRN CNP   40 mg at 04/15/25 0644    Patient may continue current oral medications   Does not apply Continuous PRN Hayley Vale APRN CNP        phenol (CHLORASEPTIC) 1.4 % spray 1 mL  1 spray Mouth/Throat Q1H PRN Hayley Vale APRN CNP        piperacillin-tazobactam (ZOSYN) 4.5 g vial to attach to  mL bag  4.5 g Intravenous Q6H Hayley Vale APRN  mL/hr at 04/12/25 0548 4.5 g at 04/15/25 0644    [Held by provider] polyethylene glycol (MIRALAX) Packet 17 g  17 g Oral Daily Clau Calles MD   17 g at 04/08/25 0805    potassium  chloride eboni ER (KLOR-CON M20) CR tablet 40 mEq  40 mEq Oral Once PRN Richa Schmidt PA-C        prochlorperazine (COMPAZINE) injection 5 mg  5 mg Intravenous Q6H PRN Hayley Vale APRN CNP   5 mg at 04/08/25 1103    Or    prochlorperazine (COMPAZINE) tablet 5 mg  5 mg Oral Q6H PRN Hayley Vale APRN CNP   5 mg at 04/03/25 0756    QUEtiapine (SEROquel) tablet 25 mg  25 mg Oral BID Hayley Vale APRN CNP   25 mg at 04/15/25 0940    QUEtiapine (SEROquel) tablet 50 mg  50 mg Oral BID PRN Hayley Vale APRN CNP   50 mg at 04/14/25 0121    sodium chloride (PF) 0.9% PF flush 3 mL  3 mL Intracatheter q1 min prn Hayley Vale APRN CNP        sodium chloride (PF) 0.9% PF flush 3 mL  3 mL Intracatheter Q8H MITA Hayley Vale APRN CNP   3 mL at 04/15/25 0648    sodium phosphate 15 mmol in NS 250mL intermittent infusion  15 mmol Intravenous Once Hayley Vale APRN CNP 0 mL/hr at 04/14/25 1728 Restarted at 04/14/25 1728    [Held by provider] tamsulosin (FLOMAX) capsule 0.8 mg  0.8 mg Oral QPM Khari Peña MD   0.8 mg at 04/07/25 2031             Physical Exam:   All VSS  General: lying in bed, NAD  CV: RRR  Pul: breathing comfortably on room air, equal chest rise  Abd: soft, obese, wound vac in place with serous output, NGT with minimal output  Skin: warm, dry, 2+ LE edema, no rashes          Data:   CBC RESULTS:   Recent Labs   Lab Test 04/15/25  0759   WBC 8.1   RBC 3.57*   HGB 10.4*   HCT 34.0*   MCV 95   MCH 29.1   MCHC 30.6*   RDW 14.6          Last Comprehensive Metabolic Panel:  Lab Results   Component Value Date     (H) 04/15/2025    POTASSIUM 3.6 04/15/2025    CHLORIDE 113 (H) 04/15/2025    CO2 21 (L) 04/15/2025    ANIONGAP 12 04/15/2025     (H) 04/15/2025    BUN 11.8 04/15/2025    CR 0.70 04/15/2025    GFRESTIMATED >90 04/15/2025    HALEY 8.8 04/15/2025         Ana Dougherty MD  Surgery, PGY4

## 2025-04-15 NOTE — PLAN OF CARE
Goal Outcome Evaluation:  -~Care plan-end of shift note:   -~Orientation/Mentation:disoriented to place, time & situation, increase orientation as the shift progresses, re-oriented   -~VS: VS ex elevated SBP 140s-150 at times.   -~LS/Pulm:Ls diminished   -~Tele/Cardiac:SR  -~GI:incontinent, loose x2 overnight, denies N/V. Place NGT last evening, confirmed with xray. NGT to LIS scant green yellow drainage    -~:hernandez catheter in place.   -~Pain:denies, refused offer of pain meds.   -~Mobility: 2A/lift, turned/repositioned   -~Skin:Abd wounds with wound vac, scattered bruises  -~Diet:NPO  -~Lines/IVs: amiodarone iv gtts at 0.5 mg/hr   -~Safety/Concern:fall risk   -~Aggression color:  -~Plan/Shift summary/Goals:denies SOB/CP. Tele remains in SR.Off Heparin at 03:59.   Plans for IR procedure at 0900, MANDEEP and echo today

## 2025-04-15 NOTE — PROCEDURES
[facility]    Procedure: IR Procedure Note    Date/Time: 4/15/2025 1:05 PM    Performed by: Tavo Larsen MD  Authorized by: Tavo Larsen MD  IR Fellow Physician:    Pre Procedure Diagnosis: Abdominal Collections  Post Procedure Diagnosis: Abdominal collections    UNIVERSAL PROTOCOL   Site Marked: Yes  Prior Images Obtained and Reviewed:  Yes  Required items: Required blood products, implants, devices and special equipment available    Patient identity confirmed:  Verbally with patient, hospital-assigned identification number and arm band  NA - No sedation, light sedation, or local anesthesia  Confirmation Checklist:  Patient's identity using two indicators, procedure was appropriate and matched the consent or emergent situation, correct equipment/implants were available and relevant allergies  Time out: Immediately prior to the procedure a time out was called    Universal Protocol: the Joint Commission Universal Protocol was followed    Preparation: Patient was prepped and draped in usual sterile fashion       ANESTHESIA    Local Anesthetic:  Lidocaine 1% without epinephrine      SEDATION  Patient Sedated: Yes    Sedation:  Ketamine and fentanyl  Vital signs: Vital signs monitored during sedation    Findings: CT guided right and left abdominal collection drain placement (8 FR)    Specimens: fluid and/or tissue for gram stain and culture    Procedural Complications: None    Condition: Stable      PROCEDURE    Length of time physician/provider present for 1:1 monitoring during sedation:  23-37 min

## 2025-04-15 NOTE — PROGRESS NOTES
Notified of restraint placement for pulling lines/tubes. Face-to-face assessment completed by myself.    Patient was disoriented to place, time, and situation with repeated attempts to remove his NG tube, wound vac dressing, and hernandez catheter. Patient denied pain, numbness, tingling, and had full range of motion of restrained extremities when restraints were removed. Continue passive/active range of motion breaks from restraints and continue attempts to reorient the patient.    Continue BUE soft restraints. Order expires 24 hours after restraint placement.    ADONIS Kennedy, CNP  Hospitalist - [facility]  Text me on the [employer] frances for a textback

## 2025-04-16 ENCOUNTER — APPOINTMENT (OUTPATIENT)
Dept: SPEECH THERAPY | Facility: CLINIC | Age: 70
DRG: 329 | End: 2025-04-16
Attending: STUDENT IN AN ORGANIZED HEALTH CARE EDUCATION/TRAINING PROGRAM
Payer: COMMERCIAL

## 2025-04-16 ENCOUNTER — APPOINTMENT (OUTPATIENT)
Dept: PHYSICAL THERAPY | Facility: CLINIC | Age: 70
DRG: 329 | End: 2025-04-16
Payer: COMMERCIAL

## 2025-04-16 LAB
ALBUMIN SERPL BCG-MCNC: 2.2 G/DL (ref 3.5–5.2)
ALP SERPL-CCNC: 103 U/L (ref 40–150)
ALT SERPL W P-5'-P-CCNC: 22 U/L (ref 0–70)
ANION GAP SERPL CALCULATED.3IONS-SCNC: 10 MMOL/L (ref 7–15)
AST SERPL W P-5'-P-CCNC: 35 U/L (ref 0–45)
BACTERIA UR CULT: NO GROWTH
BILIRUB SERPL-MCNC: 0.3 MG/DL
BUN SERPL-MCNC: 11.7 MG/DL (ref 8–23)
CALCIUM SERPL-MCNC: 8.9 MG/DL (ref 8.8–10.4)
CHLORIDE SERPL-SCNC: 112 MMOL/L (ref 98–107)
CREAT SERPL-MCNC: 0.72 MG/DL (ref 0.67–1.17)
EGFRCR SERPLBLD CKD-EPI 2021: >90 ML/MIN/1.73M2
ERYTHROCYTE [DISTWIDTH] IN BLOOD BY AUTOMATED COUNT: 14.8 % (ref 10–15)
GLUCOSE BLDC GLUCOMTR-MCNC: 104 MG/DL (ref 70–99)
GLUCOSE BLDC GLUCOMTR-MCNC: 108 MG/DL (ref 70–99)
GLUCOSE BLDC GLUCOMTR-MCNC: 110 MG/DL (ref 70–99)
GLUCOSE BLDC GLUCOMTR-MCNC: 114 MG/DL (ref 70–99)
GLUCOSE BLDC GLUCOMTR-MCNC: 88 MG/DL (ref 70–99)
GLUCOSE BLDC GLUCOMTR-MCNC: 98 MG/DL (ref 70–99)
GLUCOSE SERPL-MCNC: 109 MG/DL (ref 70–99)
HCO3 SERPL-SCNC: 23 MMOL/L (ref 22–29)
HCT VFR BLD AUTO: 32.8 % (ref 40–53)
HCV RNA SERPL NAA+PROBE-ACNC: NOT DETECTED IU/ML
HGB BLD-MCNC: 10.5 G/DL (ref 13.3–17.7)
MAGNESIUM SERPL-MCNC: 2.1 MG/DL (ref 1.7–2.3)
MCH RBC QN AUTO: 29.9 PG (ref 26.5–33)
MCHC RBC AUTO-ENTMCNC: 32 G/DL (ref 31.5–36.5)
MCV RBC AUTO: 93 FL (ref 78–100)
NT-PROBNP SERPL-MCNC: 2027 PG/ML (ref 0–900)
PHOSPHATE SERPL-MCNC: 2.8 MG/DL (ref 2.5–4.5)
PLATELET # BLD AUTO: 272 10E3/UL (ref 150–450)
POTASSIUM SERPL-SCNC: 3.6 MMOL/L (ref 3.4–5.3)
PROT SERPL-MCNC: 6 G/DL (ref 6.4–8.3)
RBC # BLD AUTO: 3.51 10E6/UL (ref 4.4–5.9)
SODIUM SERPL-SCNC: 145 MMOL/L (ref 135–145)
UFH PPP CHRO-ACNC: 0.2 IU/ML
UFH PPP CHRO-ACNC: 0.75 IU/ML
UFH PPP CHRO-ACNC: 0.97 IU/ML
WBC # BLD AUTO: 8.1 10E3/UL (ref 4–11)

## 2025-04-16 PROCEDURE — 210N000001 HC R&B IMCU HEART CARE

## 2025-04-16 PROCEDURE — 84100 ASSAY OF PHOSPHORUS: CPT | Performed by: NURSE PRACTITIONER

## 2025-04-16 PROCEDURE — 250N000013 HC RX MED GY IP 250 OP 250 PS 637

## 2025-04-16 PROCEDURE — 250N000009 HC RX 250: Performed by: INTERNAL MEDICINE

## 2025-04-16 PROCEDURE — 250N000011 HC RX IP 250 OP 636: Performed by: NURSE PRACTITIONER

## 2025-04-16 PROCEDURE — 92526 ORAL FUNCTION THERAPY: CPT | Mod: GN | Performed by: SPEECH-LANGUAGE PATHOLOGIST

## 2025-04-16 PROCEDURE — 250N000013 HC RX MED GY IP 250 OP 250 PS 637: Performed by: NURSE PRACTITIONER

## 2025-04-16 PROCEDURE — 82310 ASSAY OF CALCIUM: CPT | Performed by: NURSE PRACTITIONER

## 2025-04-16 PROCEDURE — 97530 THERAPEUTIC ACTIVITIES: CPT | Mod: GP

## 2025-04-16 PROCEDURE — 36415 COLL VENOUS BLD VENIPUNCTURE: CPT | Performed by: HOSPITALIST

## 2025-04-16 PROCEDURE — 36415 COLL VENOUS BLD VENIPUNCTURE: CPT | Performed by: NURSE PRACTITIONER

## 2025-04-16 PROCEDURE — 83880 ASSAY OF NATRIURETIC PEPTIDE: CPT | Performed by: HOSPITALIST

## 2025-04-16 PROCEDURE — 999N000040 HC STATISTIC CONSULT NO CHARGE VASC ACCESS

## 2025-04-16 PROCEDURE — 999N000127 HC STATISTIC PERIPHERAL IV START W US GUIDANCE

## 2025-04-16 PROCEDURE — 250N000011 HC RX IP 250 OP 636: Mod: JZ | Performed by: INTERNAL MEDICINE

## 2025-04-16 PROCEDURE — 99232 SBSQ HOSP IP/OBS MODERATE 35: CPT | Performed by: PHYSICIAN ASSISTANT

## 2025-04-16 PROCEDURE — 85014 HEMATOCRIT: CPT | Performed by: NURSE PRACTITIONER

## 2025-04-16 PROCEDURE — 85520 HEPARIN ASSAY: CPT | Performed by: HOSPITALIST

## 2025-04-16 PROCEDURE — 83735 ASSAY OF MAGNESIUM: CPT | Performed by: NURSE PRACTITIONER

## 2025-04-16 PROCEDURE — 250N000011 HC RX IP 250 OP 636: Performed by: HOSPITALIST

## 2025-04-16 PROCEDURE — 999N000285 HC STATISTIC VASC ACCESS LAB DRAW WITH PIV START

## 2025-04-16 PROCEDURE — 92610 EVALUATE SWALLOWING FUNCTION: CPT | Mod: GN | Performed by: SPEECH-LANGUAGE PATHOLOGIST

## 2025-04-16 PROCEDURE — 99233 SBSQ HOSP IP/OBS HIGH 50: CPT | Performed by: HOSPITALIST

## 2025-04-16 RX ORDER — AMOXICILLIN 250 MG
1 CAPSULE ORAL 2 TIMES DAILY
Status: DISCONTINUED | OUTPATIENT
Start: 2025-04-16 | End: 2025-04-29 | Stop reason: HOSPADM

## 2025-04-16 RX ORDER — TRAMADOL HYDROCHLORIDE 50 MG/1
50 TABLET ORAL EVERY 6 HOURS PRN
Status: DISCONTINUED | OUTPATIENT
Start: 2025-04-16 | End: 2025-04-29 | Stop reason: HOSPADM

## 2025-04-16 RX ADMIN — QUETIAPINE FUMARATE 25 MG: 25 TABLET ORAL at 22:15

## 2025-04-16 RX ADMIN — PIPERACILLIN AND TAZOBACTAM 4.5 G: 4; .5 INJECTION, POWDER, FOR SOLUTION INTRAVENOUS at 00:04

## 2025-04-16 RX ADMIN — AMIODARONE HYDROCHLORIDE 0.5 MG/MIN: 1.8 INJECTION, SOLUTION INTRAVENOUS at 02:18

## 2025-04-16 RX ADMIN — METHOCARBAMOL 250 MG: 500 TABLET ORAL at 10:47

## 2025-04-16 RX ADMIN — PIPERACILLIN AND TAZOBACTAM 4.5 G: 4; .5 INJECTION, POWDER, FOR SOLUTION INTRAVENOUS at 17:29

## 2025-04-16 RX ADMIN — METOPROLOL TARTRATE 25 MG: 25 TABLET, FILM COATED ORAL at 22:15

## 2025-04-16 RX ADMIN — QUETIAPINE FUMARATE 25 MG: 25 TABLET ORAL at 09:32

## 2025-04-16 RX ADMIN — AMIODARONE HYDROCHLORIDE 200 MG: 200 TABLET ORAL at 09:32

## 2025-04-16 RX ADMIN — METOPROLOL TARTRATE 25 MG: 25 TABLET, FILM COATED ORAL at 09:32

## 2025-04-16 RX ADMIN — METOPROLOL TARTRATE 5 MG: 5 INJECTION INTRAVENOUS at 04:33

## 2025-04-16 RX ADMIN — PANTOPRAZOLE SODIUM 40 MG: 40 INJECTION, POWDER, FOR SOLUTION INTRAVENOUS at 09:30

## 2025-04-16 RX ADMIN — NICOTINE 1 PATCH: 21 PATCH, EXTENDED RELEASE TRANSDERMAL at 09:35

## 2025-04-16 RX ADMIN — AMIODARONE HYDROCHLORIDE 200 MG: 200 TABLET ORAL at 22:15

## 2025-04-16 RX ADMIN — PIPERACILLIN AND TAZOBACTAM 4.5 G: 4; .5 INJECTION, POWDER, FOR SOLUTION INTRAVENOUS at 06:08

## 2025-04-16 RX ADMIN — PIPERACILLIN AND TAZOBACTAM 4.5 G: 4; .5 INJECTION, POWDER, FOR SOLUTION INTRAVENOUS at 12:00

## 2025-04-16 RX ADMIN — HEPARIN SODIUM 3000 UNITS/HR: 10000 INJECTION, SOLUTION INTRAVENOUS at 06:50

## 2025-04-16 RX ADMIN — HEPARIN SODIUM 2700 UNITS/HR: 10000 INJECTION, SOLUTION INTRAVENOUS at 16:13

## 2025-04-16 ASSESSMENT — ACTIVITIES OF DAILY LIVING (ADL)
ADLS_ACUITY_SCORE: 60
ADLS_ACUITY_SCORE: 62
ADLS_ACUITY_SCORE: 60
ADLS_ACUITY_SCORE: 60
ADLS_ACUITY_SCORE: 56
ADLS_ACUITY_SCORE: 60
ADLS_ACUITY_SCORE: 56
ADLS_ACUITY_SCORE: 56
ADLS_ACUITY_SCORE: 60
ADLS_ACUITY_SCORE: 56
ADLS_ACUITY_SCORE: 60
ADLS_ACUITY_SCORE: 56
ADLS_ACUITY_SCORE: 62
ADLS_ACUITY_SCORE: 60
ADLS_ACUITY_SCORE: 62
ADLS_ACUITY_SCORE: 56
ADLS_ACUITY_SCORE: 61
ADLS_ACUITY_SCORE: 60
ADLS_ACUITY_SCORE: 56

## 2025-04-16 NOTE — PROGRESS NOTES
[facility]    Infectious Disease Progress Note    Date of Service (when I saw the patient): 04/16/2025     Assessment & Plan   Richar Davis is a 69 year old male who was admitted on 3/30/2025.     Impression:  69 year old male with a history of well controlled diabetes, CAD, aortic stenosis s/p AVR with bovine valve, and HTN who presented to the hospital with gallstone ileus and is s/p exploratory laparotomy with removal of gallstone. Post-operatively he developed atrial flutter with RVR requiring cardioversion, gross sucus breakdown of enterotomy requiring return to OR on 4/8/25 and 4/10/25, E. Faecalis bacteremia, and delirium.     -4/7/25 - 4/8/25 had significant leukocytosis and fever with noted sucus breakdown of enterotomy s/p exploratory laparotomy 4/8/25 followed by small bowel anastomosis and fascial closure 4/10/25.   -E. Faecalis bacteremia with positive blood cultures 4/7/25. Follow-up blood cultures 4/10/25 are no growth to date.   -A.fib/flutter s/p Cardioversion  -Now with abdominal fluid collections. S/p drain placement 4/15/25. Cultures are no growth to date.   -Fevers and leukocytosis resolved.         Recommendations:   Continue Zosyn for now as this covers the E. Faecalis bacteremia as well as any possible intra-abdominal infection.   Following cultures from abdominal fluid collections.   Final antibiotic plans to follow clinical course.     Patient and plan discussed with Dr. Grady.      Clau Larson PA-C    Interval History   Tolerating antibiotics ok   No new rashes or issues with antibiotics   Labs reviewed   No changes to past medical, social or family history   Patient confused, with sitter in room. No complaints.       Physical Exam   Temp: 97.4  F (36.3  C) Temp src: Oral BP: 136/79 Pulse: 79   Resp: 24 SpO2: 98 % O2 Device: None (Room air)    Vitals:    04/10/25 2000 04/12/25 0400 04/15/25 0640   Weight: (!) 147.4 kg (324 lb 15.3 oz) (!) 144.7 kg (319 lb  0.1 oz) (!) 139.2 kg (306 lb 14.4 oz)     Vital Signs with Ranges  Temp:  [97.1  F (36.2  C)-98.3  F (36.8  C)] 97.4  F (36.3  C)  Pulse:  [70-80] 79  Resp:  [16-29] 24  BP: (136-168)/(71-91) 136/79  SpO2:  [97 %-98 %] 98 %    Constitutional: Awake, alert, cooperative, no apparent distress  Lungs: Clear to auscultation bilaterally, no crackles or wheezing  Cardiovascular: Regular rate and rhythm, normal S1 and S2, and no murmur noted  Abdomen: Normal bowel sounds, soft, non-distended, non-tender. Left drain with serous output. Right drain with serosanguineous output.   Skin: No rashes, no cyanosis, no edema  Other:    Medications   Current Facility-Administered Medications   Medication Dose Route Frequency Provider Last Rate Last Admin    Give   of usual dose of LONG ACTING insulin AM of procedure IF diabetic   Does not apply Continuous PRN Richa Schmidt PA-C        heparin 25,000 units in 0.45% NaCl 250 mL ANTICOAGULANT infusion  0-5,000 Units/hr Intravenous Continuous Tim Mays MD   Paused at 04/16/25 1226    May take oral meds with a sip of water, the morning of Cardioversion procedure.       Does not apply Continuous PRN Richa Schmidt PA-C        No lozenges or gum should be given while patient on BIPAP/AVAPS/AVAPS AE   Does not apply Continuous PRN Hayley Vale APRN CNP        Patient may continue current oral medications   Does not apply Continuous PRN Hayley Vale APRN CNP         Current Facility-Administered Medications   Medication Dose Route Frequency Provider Last Rate Last Admin    [Held by provider] acetaminophen (TYLENOL) tablet 975 mg  975 mg Oral or NG Tube Q8H Clau Calles MD        amiodarone (PACERONE) tablet 200 mg  200 mg Oral BID Richa Schmidt PA-C   200 mg at 04/16/25 0932    Followed by    [START ON 4/29/2025] amiodarone (PACERONE) tablet 200 mg  200 mg Oral Daily Richa Schmidt PA-C        [Held by provider] atorvastatin (LIPITOR) tablet 80 mg   80 mg Oral or NG Tube Daily Khari Peña MD        [Held by provider] folic acid (FOLVITE) tablet 1,000 mcg  1,000 mcg Oral or NG Tube BID Khari Peña MD        [Held by provider] furosemide (LASIX) tablet 20 mg  20 mg Oral Daily Mann Torres DO   20 mg at 04/04/25 1017    insulin aspart (NovoLOG) injection (RAPID ACTING)  1-7 Units Subcutaneous Q4H Tim Mays MD        metoprolol tartrate (LOPRESSOR) tablet 25 mg  25 mg Oral BID Hayley Vale APRN CNP   25 mg at 04/16/25 0932    [Held by provider] metoprolol tartrate (LOPRESSOR) tablet 50 mg  50 mg Oral BID Fahad Wei MD   50 mg at 04/08/25 0806    nicotine (NICODERM CQ) 21 MG/24HR 24 hr patch 1 patch  1 patch Transdermal Daily Hayley Vale APRN CNP   1 patch at 04/16/25 0935    pantoprazole (PROTONIX) 2 mg/mL suspension 40 mg  40 mg Per Feeding Tube QAM AC Hayley Vale APRN CNP        Or    pantoprazole (PROTONIX) IV push injection 40 mg  40 mg Intravenous QAM AC Hayley Vale APRN CNP   40 mg at 04/16/25 0930    piperacillin-tazobactam (ZOSYN) 4.5 g vial to attach to  mL bag  4.5 g Intravenous Q6H Hayley Vale APRN  mL/hr at 04/12/25 0548 4.5 g at 04/16/25 0608    polyethylene glycol (MIRALAX) Packet 17 g  17 g Oral Daily Ana Dougherty MD   17 g at 04/08/25 0805    QUEtiapine (SEROquel) tablet 25 mg  25 mg Oral BID Hayley Vale APRN CNP   25 mg at 04/16/25 0932    senna-docusate (SENOKOT-S/PERICOLACE) 8.6-50 MG per tablet 1 tablet  1 tablet Oral BID Ana Dougherty MD        sodium chloride (PF) 0.9% PF flush 3 mL  3 mL Intracatheter Q8H [facility] Hayley Vale APRN CNP   3 mL at 04/16/25 0608    sodium phosphate 15 mmol in NS 250mL intermittent infusion  15 mmol Intravenous Once Hayley Vale APRN CNP 0 mL/hr at 04/14/25 1728 Restarted at 04/14/25 1728    [Held by provider] tamsulosin (FLOMAX) capsule 0.8 mg  0.8 mg Oral QPM Khari Peña MD   0.8 mg  at 04/07/25 2031       Data   All microbiology laboratory data reviewed.  Recent Labs   Lab Test 04/16/25  0303 04/15/25  0759 04/14/25  0925   WBC 8.1 8.1 9.9   HGB 10.5* 10.4* 11.7*   HCT 32.8* 34.0* 36.3*   MCV 93 95 93    267 301     Recent Labs   Lab Test 04/16/25  0303 04/15/25  0759 04/14/25  0306   CR 0.72 0.70 0.69          04/15/2025 1130 04/15/2025 1211 Anaerobic Bacterial Culture Routine [[id number]]   Aspirate from Abdomen    In process Component Value   No component results             04/15/2025 1130 04/15/2025 1642 Gram Stain [[id number]]    Aspirate from Abdomen    Final result Component Value   GS Culture See corresponding culture for results   Gram Stain Result No organisms seen   Gram Stain Result 3+ WBC seen   Predominantly PMNs          04/15/2025 1130 04/16/2025 0954 Aspirate Aerobic Bacterial Culture Routine [[id number]]   Aspirate from Abdomen    Preliminary result Component Value   Culture No growth, less than 1 day P             04/15/2025 1115 04/15/2025 1212 Anaerobic Bacterial Culture Routine [[id number]]   Aspirate from Abdomen    In process Component Value   No component results             04/15/2025 1115 04/15/2025 1605 Gram Stain [[id number]]   Aspirate from Abdomen    Final result Component Value   GS Culture See corresponding culture for results   Gram Stain Result No organisms seen   Gram Stain Result 2+ WBC seen          04/15/2025 1115 04/16/2025 0954 Aspirate Aerobic Bacterial Culture Routine [[id number]]   Aspirate from Abdomen    Preliminary result Component Value   Culture No growth, less than 1 day P             04/14/2025 0855 04/16/2025 0512 Urine Culture [[id number]]   Urine, Catheter    Final result Component Value   Culture No Growth             04/10/2025 1719 04/15/2025 2016 Blood Culture Hand, Right [[id number]]   Blood from Hand, Right    Final result Component Value   Culture No Growth             04/10/2025 1717 04/15/2025 2031 Blood  Culture Hand, Left [[id number]]   Blood from Hand, Left    Final result Component Value   Culture No Growth

## 2025-04-16 NOTE — PROGRESS NOTES
"CLINICAL NUTRITION SERVICES - REASSESSMENT NOTE     RECOMMENDATIONS FOR MDs/PROVIDERS TO ORDER:  None at this time    Registered Dietitian Interventions:  Will send Gel+ (C) BID today   Oral intake encouragement appreciated    Future/Additional Recommendations:  Monitor po adequacy and diet adv per surgery  If unable to adv past CLD within 1-2 days, would recommend enteral nutrition support given negligible intakes >14 days     INFORMATION OBTAINED  Assessed patient in room.    CURRENT NUTRITION ORDERS  Diet: Clear liquids, per surgery   - NPO since  (7 days), SLP assessed today and cleared for EC7, thin liquids as diet allows    CURRENT INTAKE/TOLERANCE  Met with pt and spouse today at bedside. Pt adv to clear liquids today, reportedly to take things slowly. Per spouse, pt is feeling hungry -- expressed last night \"get me some food.\" Had just ordered veg broth, cherry jello, and juice. Agreeable to try cherry Gel+.     NEW FINDINGS  3/30: 1. Exploratory laparotomy, 2. Small bowel enterotomy with removal of gallstone and primary closure   : Cardioversion   : RRT= Lactic acidosis suspect 2/2 severe sepsis vs. Acute congestive heart failure   : EXPLORATORY LAPAROTOMY WITH SMALL BOWEL RESECTION   4/10: Returned to OR for washout and abdominal closing.   : Extubated     : SLP --> Recommend initiation of an easy to chew diet with thin liquids and supervision/reminders to use safe swallow strategies.     [place]/15: x3   : x4  : x5  : x1    Skin/wounds:  2+ edema; WOCN following 2-3x weekly, last note 4/15     Negative pressure wound therapy applied to: abdomen  Wound due to: Surgical Wound   STATUS: initial assessment     *Consulted for coccyx and abdominal NPWT changes. Coccyx: small blanchable elongated patches of erythema, skin intact - Signing off     Nutrition-relevant labs:  Reviewed, BG   Nutrition-relevant medications:  MSSI - not given, Protonix    Weight: Difficult to " assess given large scale fluctuations, up from admit    04/15/25 0640 139.2 kg (306 lb 14.4 oz) Abnormal  Bed scale   04/12/25 0400 144.7 kg (319 lb 0.1 oz) Abnormal  Bed scale   04/10/25 2000 147.4 kg (324 lb 15.3 oz) Abnormal  Bed scale   04/10/25 0400 -- Bed scale   04/09/25 0800 155 kg (341 lb 11.4 oz) Abnormal  Bed scale   04/08/25 1900 143.7 kg (316 lb 12.8 oz) Abnormal  Bed scale   04/07/25 0500 137.3 kg (302 lb 9.6 oz) Abnormal  Standing scale   04/06/25 1955 137.7 kg (303 lb 9.6 oz) Abnormal  Standing scale   04/06/25 0415 134.5 kg (296 lb 9.6 oz) Standing scale   04/04/25 2104 130.4 kg (287 lb 7.7 oz) --   03/31/25 0905 130.3 kg (287 lb 4.2 oz) Bed scale   03/30/25 1919 58.5 kg (129 lb) --   03/30/25 1455 129.3 kg (285 lb) --     ASSESSED NUTRITION NEEDS  Dosing Weight: 90 kg, based on adjusted wt  Estimated Energy Needs: 9332-3251 kcals/day (20 - 25 kcals/kg)  Justification: Obese  Estimated Protein Needs: 108-135+ grams protein/day (1.2 - 1.5+ grams of pro/kg)  Justification: Post-op  Estimated Fluid Needs: >1 mL/kcal/day   Justification: Maintenance    MALNUTRITION  % Intake: </= 50% for >/= 5 days (severe)  % Weight Loss: None noted  Subcutaneous Fat Loss: None observed - 4/6  Muscle Loss: None observed - 4/6  Fluid Accumulation/Edema: Mild, 1+ - do not suspect nutrition related   Malnutrition Diagnosis: Patient does not meet two of the established criteria necessary for diagnosing malnutrition    EVALUATION OF THE PROGRESS TOWARD GOALS   Previous Goals  Diet adv v nutrition support within 2-3 days  Evaluation: Progressing    Previous Nutrition Diagnosis  Inadequate oral intake related to altered GI function as evidenced by day 12 with negligible PO, largely NPO/CLD  Evaluation: No change    NUTRITION DIAGNOSIS  Inadequate oral intake related to altered GI function as evidenced by 15 days with negligible PO, largely NPO/CLD    INTERVENTIONS  See nutrition interventions above    GOALS  Patient to  consume % of nutritionally adequate meal trays TID, or the equivalent with supplements/snacks.  Diet adv >CLD vs nutrition support within 1-2 days     MONITORING/EVALUATION  Progress toward goals will be monitored and evaluated per policy.    Halley Avendaño RD, LD

## 2025-04-16 NOTE — PROGRESS NOTES
Interventional Radiology Progress Note:  Inpatient at [facility]  Date: April 16, 2025     HPI: Richar Davis is a 68yo male admitted on 3/30 for gallstone ileus. Underwent ex lap with removal of gallstone through small bowel enterotomy and was admitted to the floor post operatively. Patient had return of bowel function but new onset of Aflutter with RVR and underwent cardioversion on 4/7. Overnight 4/7 RRT called for hypotension and patient found to have elevated lactate, leukocytosis of 36. CT showing increased fluid and free air, started draining from midline incision. Patient taken to OR for ex lap, found to have gross sucus breakdown of enterotomy on 4/8. RTOR 4/10 for small bowel anastomosis and fascial closure. Extubated and transferred to floor. RRT 4/14 for recurrent Aflutter with RVR, underwent repeat cardioversion. CT showing two new fluid collections, possible early abscess, underwent IR drain placement x2 4/15. Cultures pending.    Interval History: Confused conversation, repeating himself.     Physical Exam:   Vitals:Temp:  [97.1  F (36.2  C)-98.3  F (36.8  C)] 97.4  F (36.3  C)  Pulse:  [70-80] 79  Resp:  [16-29] 24  BP: (136-168)/(71-91) 136/79  SpO2:  [97 %-98 %] 98 %    General: Pleasant, ill appearing male, confused in no acute distress.    Neuro:  Unable to evaluate orientation, Moves all extremities equally.  Resp:  Normal respirations on room air. Non labored breathing. Equal air entry B/L   Abdomen:  Soft, Obese, non-tender.    R mid abdominal Drain:    -Dressing is C/D/I.   -Tube was flushed easily and aspirated without pain or leaking with a total of 10 mL NS.   -Tube is draining  serosanguinous fluid.   Output since placement 130 mL,   NGTD    L mid abdominal Drain:    -Dressing is C/D/I.   -Tube was attempted to be flushed and unable to, with a total of 5 mL saline .   -Tube is draining serous fluid with red fibrin strings.   Output since placement 200 mL,    NGTD    Labs:  ROUTINE ICU LABS (Last four results)  CMP  Recent Labs   Lab 04/16/25  1203 04/16/25  0810 04/16/25  0418 04/16/25  0303 04/15/25  0812 04/15/25  0759 04/14/25  1619 04/14/25  0907 04/14/25  0612 04/14/25  0306 04/13/25  1628 04/13/25  0504   NA  --   --   --  145  --  146*  --  145  --  146*  --  144   POTASSIUM  --   --   --  3.6  --  3.6  --   --   --  3.8  --  3.9   CHLORIDE  --   --   --  112*  --  113*  --   --   --  111*  --  111*   CO2  --   --   --  23  --  21*  --   --   --  21*  --  20*   ANIONGAP  --   --   --  10  --  12  --   --   --  14  --  13   * 88 108* 109*   < > 114*   < >  --    < > 109*   < > 107*   BUN  --   --   --  11.7  --  11.8  --   --   --  10.5  --  11.1   CR  --   --   --  0.72  --  0.70  --   --   --  0.69  --  0.72   GFRESTIMATED  --   --   --  >90  --  >90  --   --   --  >90  --  >90   HALEY  --   --   --  8.9  --  8.8  --   --   --  9.0  --  8.8   MAG  --   --   --  2.1  --  2.2  --   --   --  2.2  --  2.1   PHOS  --   --   --  2.8  --  2.6  --   --   --  2.2*  --  2.2*   PROTTOTAL  --   --   --  6.0*  --  5.8*  --   --   --  6.1*  --  5.7*   ALBUMIN  --   --   --  2.2*  --  2.2*  --   --   --  2.1*  --  2.0*   BILITOTAL  --   --   --  0.3  --  0.3  --   --   --  0.4  --  0.4   ALKPHOS  --   --   --  103  --  103  --   --   --  116  --  91   AST  --   --   --  35  --  36  --   --   --  35  --  29   ALT  --   --   --  22  --  19  --   --   --  14  --  11    < > = values in this interval not displayed.     CBC  Recent Labs   Lab 04/16/25  0303 04/15/25  0759 04/14/25  0925 04/14/25 0306   WBC 8.1 8.1 9.9 10.0   RBC 3.51* 3.57* 3.92* 3.80*   HGB 10.5* 10.4* 11.7* 11.4*   HCT 32.8* 34.0* 36.3* 35.7*   MCV 93 95 93 94   MCH 29.9 29.1 29.8 30.0   MCHC 32.0 30.6* 32.2 31.9   RDW 14.8 14.6 14.7 14.6    267 301 281     INRNo lab results found in last 7 days.  Arterial Blood Gas  Recent Labs   Lab 04/14/25  0907 04/14/25  0308 04/11/25  0755 04/10/25  0519  04/09/25  1257   PH  --   --  7.37  --  7.40   PCO2  --   --  37  --  30*   PO2  --   --  130*  --  138*   HCO3  --   --  22  --  19*   O2PER 2 0 30 30 30     Assessment: Successful bilateral drain placement, unable to flush left drain, good outputs, Fluid so far culture -, NGTD    Plan: Will continue to observe, wait for culture results, watch outputs, CT in future or removal as outputs < 10 mL daily.     Total time spent on the date of the encounter is 25 minutes, including time spent counseling the patient, performing a medically appropriate evaluation, reviewing prior medical history, ordering medications and tests, documenting clinical information in the medical record, and communication of results.    Thanks [facility] Interventional Radiology CNP ([phone]) (phone [phone])

## 2025-04-16 NOTE — PROGRESS NOTES
"  SLP Bedside Swallow Evaluation  04/16/25 0919   Appointment Info   Signing Clinician's Name / Credentials (SLP) Briana Bangura MA [facility] SLP   General Information   Onset of Illness/Injury or Date of Surgery 03/30/25   Referring Physician Dr. Patino   Patient/Family Therapy Goal Statement (SLP) To eat and drink ASAP   Pertinent History of Current Problem Per notes: \"Richar Davis is a 69 year old male with a history of diabetes, hypertension, CABG, status post AVR with bovine valve 10/2022, untreated sleep apnea, hypertension, hyperlipidemia, neuropathy, BPH admitted on 3/30/2025 with abdominal pain nausea and vomiting.      CT imaging on admission showed Gallstone ileus. Distal small bowel obstruction secondary to an impacted 2.2 cm gallstone. Underwent Exploratory laparotomy with small bowel enterotomy and removal of gallstone on 3/30. Postoperative course complicated with ileus, atrial fibrillation with RVR requiring cardioversion, postoperative fever, hypoxia.      On 4/8 had elevated lactate with worsening abdominal pain, returned back to the OR for exploratory laparotomy with small bowel resection.postprocedure patient intubated on pressor support, temporary abdominal closure admitted to ICU. Returned to OR on 4/10 for small bowel anastomosis and abdominal Wound VAC placement.  Pressors weaned off, patient extubated 4/11 and transferred to hospitalist service on 4/12. \"   General Observations Pt was alert, but disoriented.  Pt was able to folllow commands.  Pt gave vague report of infrequent swallowing difficulty at baseline.  Pt did not recall previous SLP swallow eval and Tx.   Type of Evaluation   Type of Evaluation Swallow Evaluation   Oral Motor   Oral Musculature generally intact   Dentition (Oral Motor)   Dentition (Oral Motor) some missing teeth   Cough/Swallow/Gag Reflex (Oral Motor)   Comment, Cough/Swallow/Gag Reflex (Oral Motor) WFL   Vocal Quality/Secretion Management (Oral Motor)   Comment, " Vocal Quality/Secretion Management (Oral Motor) WFL   General Swallowing Observations   Past History of Dysphagia VFSS 11/2/22: Flash penetration of thin by tsp, penetration with residual of thin by cup/straw, moderate-severe pharyngeal residue at times, cognitive deficits were a signifcant factor for safey at that time; diet recommendation was an IDDSI Level 6 with mildly thick liquids, diet was advanced to a regular diet with thin liquids by the time of discharge   Respiratory Support room air   Current Diet/Method of Nutritional Intake (General Swallowing Observations, NIS) NPO   Swallowing Evaluation Clinical swallow evaluation   Clinical Swallow Evaluation   Feeding Assistance minimal assistance required   Clinical Swallow Evaluation Textures Trialed thin liquids;soft & bite-sized   Clinical Swallow Eval: Thin Liquid Texture Trial   Mode of Presentation, Thin Liquids cup   Volume of Liquid or Food Presented sips x 5   Oral Phase of Swallow WFL   Pharyngeal Phase of Swallow intact   Diagnostic Statement slight burping, no aspiration signs   Clinical Swallow Eval: Soft & Bite Sized   Mode of Presentation self-fed  (min assist)   Volume Presented 3 ounces   Oral Phase residue in oral cavity  (min-mild, large bites noted)   Pharyngeal Phase intact   Diagnostic Statement slight burping, no aspiration signs   Swallowing Recommendations   Diet Consistency Recommendations easy to chew (level 7);thin liquids (level 0)   Supervision Level for Intake close supervision needed  Sit at 90 degrees, slow rate, small bites/sips, alternate textures   Clinical Impression   Criteria for Skilled Therapeutic Interventions Met (SLP Eval) Yes, treatment indicated   SLP Diagnosis Mild oral-pharyngeal dysphagia   Risks & Benefits of therapy have been explained evaluation/treatment results reviewed;care plan/treatment goals reviewed;risks/benefits reviewed;current/potential barriers reviewed;participants voiced agreement with care  plan;participants included;patient   Clinical Impression Comments Mild oral-pharyngeal dysphagia was observed at bedside.  Deficits/risk factors include hx of dysphagia in 2022, confusion/decreased awareness, impulsivity, decreased dentition, and mild-min oral residue after soft solids.  Recommend initiation of an easy to chew diet with thin liquids and supervision/reminders to use safe swallow strategies.  Plan to provide short term swallow Tx to assess diet tolerance, train strategies, and trial regular solids.   SLP Total Evaluation Time   Eval: oral/pharyngeal swallow function, clinical swallow Minutes (41289) 15   Interventions   Interventions Quick Adds Swallowing Dysfunction   Swallowing Intervention   Treatment of Swallowing Dysfunction &/or Oral Function for Feeding Minutes (10938) 10   Symptoms Noted During/After Treatment None   Treatment Detail/Skilled Intervention Educated pt and nursing re: findings, recommended diet modification and strategies/precautions.  All verbalized understanding, pt will need reminders to use strategies due to cognitive deficits.  Pt was able to tolerate additional bites of soft solid and sips of thin by cup while feeding self given min cues for strategies.  No aspiration signs noted.   SLP Discharge Planning   SLP Plan Assess diet tolerance, train strategies, trial regular solids   SLP Discharge Recommendation Transitional Care Facility   SLP Rationale for DC Rec Swallow Tx goal may be met as an IP; pt may be at/close to baseline;  Supervision after discharge due to cognitive deficits   SLP Brief overview of current status  Mild dysphagia; Rec: easy to chew diet, thin liquids, supervision/reminders to use safe swallow strategies/precautions- Sit at 90 degrees, slow rate, small bites/sips, alternate textures   SLP Time and Intention   Total Session Time (sum of timed and untimed services) 25

## 2025-04-16 NOTE — PROGRESS NOTES
"Care Management Follow Up    Length of Stay (days): 17    Expected Discharge Date: 04/20/2025     Concerns to be Addressed: discharge planning     Patient plan of care discussed at interdisciplinary rounds: Yes    Anticipated Discharge Disposition: Acute Rehab vs LTACH     Anticipated Discharge Services: Home Care  Anticipated Discharge DME:      Patient/family educated on Medicare website which has current facility and service quality ratings: no  Education Provided on the Discharge Plan: Yes  Patient/Family in Agreement with the Plan: yes    Referrals Placed by CM/SW:  ARU, LTACH  Private pay costs discussed:  no    Discussed  Partnership in Safe Discharge Planning  document with patient/family: No     Handoff Completed: No, handoff not indicated or clinically appropriate    Additional Information:  Received call from LTACH liaison, who states pt is medically appropriate for LTACH level of care once heart rhythm improved.  She would like to be updated when discharge date is better known, as well as anticipated length of IV abx therapy.  Appears providers are awaiting finalized cultures.  Pt will need prior auth prior to LTACH discharge.      Per notes pt had been evaluated by ARU; however pt anticipated to need more support post discharge.  This CM-RN brought Medicare \"levels of care\" handout to room for spouse reference to help navigate terminology: www.medicare.gov/care-compare/resources/zxwgntxteny-dzjki-qxezpccr-types   Explained pt may qualify for \"long term hospital rehab,\" but might not for \"short term hospital rehab.\"  Pt spouse indicated she thinks planning might be premature, \"he was very active before all this, he could really bounce back.\"  CM-RN re-emphasized that both ARU and LTACH recommendations are \"hospital\" level of care with rehab focus; we will continue to follow each day.  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    "   Next Steps: Continue to follow to support safe transition of care to LTACH vs TCU vs ARU.     Arlyn Baxter RN, BSN, PHN  [facility]  Inpatient Care Management - FLOAT

## 2025-04-16 NOTE — PLAN OF CARE
A&O to self, confused, restless, hypertensive, RA. Tele- SR. Mccarty in place, assist of 2 with lift, had 3 BMs this shift. Heparin gtt infusing at 3000 units/hr, amiodarone gtt 0.5 mg/hr. 2 TERRIE drains, R TERRIE drain with the most output. LE pulses. Pt is NPO, wound VAC in place.

## 2025-04-16 NOTE — PLAN OF CARE
Goal Outcome Evaluation:       Patient remains alert but confused. Following commands. Multiple loose BM's this shift. Wound vac and drains intact. Mccarty intact with adequate UOP. Speech eval passed. Refused using lift to go into chair, repositioning in bed. Sitter at bedside. Wife at bedside and updated.         Problem: Adult Inpatient Plan of Care  Goal: Plan of Care Review  Description:   Outcome: Progressing  Goal: Patient-Specific Goal (Individualized)  Description:   Outcome: Progressing  Goal: Optimal Comfort and Wellbeing  Outcome: Progressing  Goal: Readiness for Transition of Care  Outcome: Progressing     Problem: Delirium  Goal: Optimal Coping  Outcome: Progressing  Goal: Improved Behavioral Control  Outcome: Progressing  Goal: Improved Attention and Thought Clarity  Outcome: Progressing  Goal: Improved Sleep  Outcome: Progressing     Problem: Dysrhythmia  Goal: Normalized Cardiac Rhythm  Outcome: Progressing     Problem: Pain Acute  Goal: Optimal Pain Control and Function  Outcome: Progressing     Problem: Fall Injury Risk  Goal: Absence of Fall and Fall-Related Injury  Outcome: Progressing     Problem: Infection  Goal: Absence of Infection Signs and Symptoms  Outcome: Progressing     Problem: Restraint, Nonviolent  Goal: Absence of Harm or Injury  Outcome: Progressing  Intervention: Protect Skin and Joint Integrity  Recent Flowsheet Documentation  Taken 4/16/2025 1400 by Brianne Martinez, RN  Body Position:   right   turned   heels elevated   upper extremity elevated  Taken 4/16/2025 1000 by Brianne Martinez, RN  Body Position: refuses positioning

## 2025-04-16 NOTE — PROVIDER NOTIFICATION
MD Notification    Notified Person: MD    Notified Person Name:Dr. Colón    Notification Date/Time:4/15/25    Notification Interaction:vocmorales    Purpose of Notification: Pt is NPO, SLP consult to see pt 4/16. Pt pulled NG tube out, rounder aware and ok to leave it out. Per report, pt coughs after mouth swabs, pt is NPO. Pt has po meds tonight. Plan to switch to po amiodarone after the bag is done. Would you like to switch metoprolol po to IV tonight? Do you want amiodarone gtt to infuse tonight until SLP see the pt tomorrow? hold po amiodarone tonight? Please advise, thanks    Comments: Dr. Colón ok to continue with amiodarone gtt and switch po metoprolol to IV.

## 2025-04-16 NOTE — PROGRESS NOTES
[facility] General Surgery Post-Op / Progress Note         Assessment and Plan:    Assessment:   70yo male admitted on 3/30 for gallstone ileus. Underwent ex lap with removal of gallstone through small bowel enterotomy and was admitted to the floor post operatively. Patient had return of bowel function but new onset of Aflutter with RVR and underwent cardioversion on 4/7. Overnight 4/7 RRT called for hypotension and patient found to have elevated lactate, leukocytosis of 36. CT showing increased fluid and free air, started draining from midline incision. Patient taken to OR for ex lap, found to have gross sucus breakdown of enterotomy on 4/8. RTOR 4/10 for small bowel anastomosis and fascial closure. Extubated and transferred to floor. RRT 4/14 for recurrent Aflutter with RVR, underwent repeat cardioversion. CT showing two new fluid collections, possible early abscess, underwent IR drain placement x2 4/15. Cultures pending.    Patient removed NGT overnight, no nausea or vomiting. SLP evaluated and approved for easy chew however will start with slow advancement of diet. Patient having bowel function.         Plan:   -Follow-up cultures of abdominal fluid collection, continue IV Zoysn  -Continue drains  -Okay to start CLD, if develops nausea or vomiting plan to return to NPO  -Continue aggressive bowel regimen  -Okay for heparin gtt  -WOC for wound vac management    Patient discussed with Dr. Patino           Interval History:   Reports doing okay, denies nausea, vomiting, abdominal pain. Still delirious but improving.          Review of Systems:   ROS negative            Medications:   All medications related to the patient's surgery have been reviewed  Current Facility-Administered Medications   Medication Dose Route Frequency Provider Last Rate Last Admin    [Held by provider] acetaminophen (TYLENOL) tablet 975 mg  975 mg Oral or NG Tube Q8H Calu Calles MD        amiodarone (PACERONE) tablet 200  mg  200 mg Oral BID Richa Schmidt PA-C   200 mg at 04/16/25 0932    Followed by    [START ON 4/29/2025] amiodarone (PACERONE) tablet 200 mg  200 mg Oral Daily Richa Schmidt PA-C        [Held by provider] atorvastatin (LIPITOR) tablet 80 mg  80 mg Oral or NG Tube Daily Khari Peña MD        benzocaine-menthol (CHLORASEPTIC) 6-10 MG lozenge 1 lozenge  1 lozenge Buccal Q1H PRN Hayley Vale APRN CNP   1 lozenge at 04/01/25 0334    bisacodyl (DULCOLAX) suppository 10 mg  10 mg Rectal Daily PRN Hayley Vale APRN CNP        carboxymethylcellulose PF (REFRESH PLUS) 0.5 % ophthalmic solution 1 drop  1 drop Both Eyes Q1H PRN Hayley Vale APRN CNP        glucose gel 15-30 g  15-30 g Oral Q15 Min PRN Tim Mays MD        Or    dextrose 50 % injection 25-50 mL  25-50 mL Intravenous Q15 Min PRN Tim Mays MD        Or    glucagon injection 1 mg  1 mg Subcutaneous Q15 Min PRN Tim Mays MD        diazepam (VALIUM) injection 2.5 mg  2.5 mg Intravenous Q6H PRN Hayley Vale APRN CNP        diphenhydrAMINE (BENADRYL) elixir 12.5 mg  12.5 mg Oral Q6H PRN Hayley Vale APRN CNP        Or    diphenhydrAMINE (BENADRYL) injection 12.5 mg  12.5 mg Intravenous Q6H PRN Hayley Vale APRN CNP        [Held by provider] famotidine (PEPCID) tablet 20 mg  20 mg Oral BID Terry Patino MD   20 mg at 04/08/25 0805    fentaNYL (PF) (SUBLIMAZE) injection 25-50 mcg  25-50 mcg Intravenous Q5 Min PRN Tavo Larsen MD   25 mcg at 04/15/25 1114    flumazenil (ROMAZICON) injection 0.2 mg  0.2 mg Intravenous q1 min prn Tavo Larsen MD        [Held by provider] folic acid (FOLVITE) tablet 1,000 mcg  1,000 mcg Oral or NG Tube BID Khari Peña MD        [Held by provider] furosemide (LASIX) tablet 20 mg  20 mg Oral Daily Mann Torres DO   20 mg at 04/04/25 1017    [Held by provider] gabapentin (NEURONTIN) tablet 800 mg  800 mg Oral TID PRN  Clau Calles MD        Give   of usual dose of LONG ACTING insulin AM of procedure IF diabetic   Does not apply Continuous PRN Richa Schmidt PA-C        heparin 25,000 units in 0.45% NaCl 250 mL ANTICOAGULANT infusion  0-5,000 Units/hr Intravenous Continuous Tim Mays MD 30 mL/hr at 04/16/25 0650 3,000 Units/hr at 04/16/25 0650    HOLD digoxin (LANOXIN)  AM of procedure IF on digoxin   Does not apply HOLD Richa Schmidt PA-C        HOLD: Insulin - RAPID/SHORT acting AM of procedure IF diabetic   Does not apply HOLD Richa Schmidt PA-C        HOLD: Insulin - REGULAR AM of procedure IF diabetic   Does not apply HOLD Richa Schmidt PA-C        HOLD: Oral hypoglycemics AM of procedure IF diabetic   Does not apply HOLD Richa Schmidt PA-C        HYDROmorphone (DILAUDID) injection 0.2 mg  0.2 mg Intravenous Q4H PRN Tim Mays MD   0.2 mg at 04/15/25 1737    HYDROmorphone (PF) (DILAUDID) injection 0.3 mg  0.3 mg Intravenous Q4H PRN Tim Mays MD        insulin aspart (NovoLOG) injection (RAPID ACTING)  1-7 Units Subcutaneous Q4H Tim Mays MD        lidocaine (LMX4) cream   Topical Q1H PRN Hayley Vale APRN CNP        lidocaine 1 % 0.1-1 mL  0.1-1 mL Other Q1H PRN Hayley Vale APRN CNP        magnesium hydroxide (MILK OF MAGNESIA) suspension 30 mL  30 mL Oral Daily PRN Hayley Vale APRN CNP   30 mL at 04/01/25 0427    magnesium sulfate 2 g in 50 mL sterile water intermittent infusion  2 g Intravenous Once PRN Richa Schmidt PA-C        May take oral meds with a sip of water, the morning of Cardioversion procedure.       Does not apply Continuous PRN Richa Schmidt PA-C        melatonin tablet 10 mg  10 mg Oral At Bedtime PRN Hayley Vale APRN CNP   10 mg at 04/12/25 0042    methocarbamol (ROBAXIN) half-tab 250 mg  250 mg Oral Q6H PRN Hayley Vale, ADONIS CNP   250 mg at 04/16/25 1047    metoprolol (LOPRESSOR) injection 5 mg   5 mg Intravenous Q6H Guillermina Colón MD   5 mg at 04/16/25 0433    metoprolol (LOPRESSOR) injection 5 mg  5 mg Intravenous Q4H PRN Hayley Vale APRN CNP   5 mg at 04/14/25 0806    metoprolol tartrate (LOPRESSOR) tablet 25 mg  25 mg Oral BID Hayley Vale APRN CNP   25 mg at 04/16/25 0932    [Held by provider] metoprolol tartrate (LOPRESSOR) tablet 50 mg  50 mg Oral BID Fahad Wei MD   50 mg at 04/08/25 0806    midazolam (VERSED) injection 0.5-2 mg  0.5-2 mg Intravenous Q4 Min PRN Tavo Larsen MD   0.5 mg at 04/15/25 1112    naloxone (NARCAN) injection 0.2 mg  0.2 mg Intravenous Q2 Min PRN Hayley Vale APRN CNP        Or    naloxone (NARCAN) injection 0.4 mg  0.4 mg Intravenous Q2 Min PRN Hayley Vale APRN CNP        Or    naloxone (NARCAN) injection 0.2 mg  0.2 mg Intramuscular Q2 Min PRN Hayley Vale APRN CNP        Or    naloxone (NARCAN) injection 0.4 mg  0.4 mg Intramuscular Q2 Min PRN Hayley Vale APRN CNP        nicotine (COMMIT) lozenge 2 mg  2 mg Buccal Q1H PRN Hayley Vale APRN CNP   2 mg at 04/03/25 1145    nicotine (NICODERM CQ) 21 MG/24HR 24 hr patch 1 patch  1 patch Transdermal Daily Hayley Vale APRN CNP   1 patch at 04/16/25 0935    No lozenges or gum should be given while patient on BIPAP/AVAPS/AVAPS AE   Does not apply Continuous PRN Hayley Vale APRN CNP        ondansetron (ZOFRAN ODT) ODT tab 4 mg  4 mg Oral Q6H PRN Hayley Vale APRN CNP   4 mg at 04/08/25 0208    Or    ondansetron (ZOFRAN) injection 4 mg  4 mg Intravenous Q6H PRN Hayley Vale APRN CNP   4 mg at 04/11/25 1232    pantoprazole (PROTONIX) 2 mg/mL suspension 40 mg  40 mg Per Feeding Tube QAM AC Hayley Vale APRN CNP        Or    pantoprazole (PROTONIX) IV push injection 40 mg  40 mg Intravenous QAM Hayley Jeffers APRN CNP   40 mg at 04/16/25 0930    Patient may continue current oral medications   Does not apply Continuous PRN Kavin  ADONIS Dumont CNP        phenol (CHLORASEPTIC) 1.4 % spray 1 mL  1 spray Mouth/Throat Q1H PRN Hayley Vale APRN CNP        piperacillin-tazobactam (ZOSYN) 4.5 g vial to attach to  mL bag  4.5 g Intravenous Q6H Hayley Vale APRN  mL/hr at 04/12/25 0548 4.5 g at 04/16/25 0608    [Held by provider] polyethylene glycol (MIRALAX) Packet 17 g  17 g Oral Daily Clau Calles MD   17 g at 04/08/25 0805    potassium chloride eboni ER (KLOR-CON M20) CR tablet 40 mEq  40 mEq Oral Once PRN Richa Schmidt PA-C        prochlorperazine (COMPAZINE) injection 5 mg  5 mg Intravenous Q6H PRN Hayley Vale APRN CNP   5 mg at 04/08/25 1103    Or    prochlorperazine (COMPAZINE) tablet 5 mg  5 mg Oral Q6H PRN Hayley Vale APRN CNP   5 mg at 04/03/25 0756    QUEtiapine (SEROquel) tablet 25 mg  25 mg Oral BID Hayley Vale APRN CNP   25 mg at 04/16/25 0932    sodium chloride (PF) 0.9% PF flush 3 mL  3 mL Intracatheter q1 min prn Hayley Vale APRN CNP        sodium chloride (PF) 0.9% PF flush 3 mL  3 mL Intracatheter Q8H MITA Hayley Vale APRN CNP   3 mL at 04/16/25 0608    sodium phosphate 15 mmol in NS 250mL intermittent infusion  15 mmol Intravenous Once Hayley Vale APRN CNP 0 mL/hr at 04/14/25 1728 Restarted at 04/14/25 1728    [Held by provider] tamsulosin (FLOMAX) capsule 0.8 mg  0.8 mg Oral QPM Khari Peña MD   0.8 mg at 04/07/25 2031             Physical Exam:   All VSS  General: sitting up in bedin bed, NAD  CV: RRR  Pul: breathing comfortably on room air, equal chest rise  Abd: soft, obese, wound vac in place with serous output-holding suction, no surrounding erythema, RLQ drain with moderate serosanginous output, LLQ drain serous  Skin: warm, dry, 2+ LE edema, no rashes          Data:   CBC RESULTS:   Recent Labs   Lab Test 04/16/25  0303   WBC 8.1   RBC 3.51*   HGB 10.5*   HCT 32.8*   MCV 93   MCH 29.9   MCHC 32.0   RDW 14.8              Last Comprehensive Metabolic Panel:  Lab Results   Component Value Date     04/16/2025    POTASSIUM 3.6 04/16/2025    CHLORIDE 112 (H) 04/16/2025    CO2 23 04/16/2025    ANIONGAP 10 04/16/2025    GLC 88 04/16/2025    BUN 11.7 04/16/2025    CR 0.72 04/16/2025    GFRESTIMATED >90 04/16/2025    HALEY 8.9 04/16/2025         Ana Dougherty MD  Surgery, PGY4

## 2025-04-16 NOTE — PROGRESS NOTES
[facility]  Hospitalist Progress Note   04/16/2025          Assessment and Plan:       Richar Davis is a 69 year old male with a history of diabetes, hypertension, CABG, status post AVR with bovine valve 10/2022, untreated sleep apnea, hypertension, hyperlipidemia, neuropathy, BPH admitted on 3/30/2025 with abdominal pain nausea and vomiting.     CT imaging on admission showed Gallstone ileus. Distal small bowel obstruction secondary to an impacted 2.2 cm gallstone. Underwent Exploratory laparotomy with small bowel enterotomy and removal of gallstone on 3/30. Postoperative course complicated with ileus, atrial fibrillation with RVR requiring cardioversion, postoperative fever, hypoxia.     On 4/8 had elevated lactate with worsening abdominal pain, returned back to the OR for exploratory laparotomy with small bowel resection.postprocedure patient intubated on pressor support, temporary abdominal closure admitted to ICU. Returned to OR on 4/10 for small bowel anastomosis and abdominal Wound VAC placement.  Pressors weaned off, patient extubated 4/11 and transferred to hospitalist service on 4/12.  Patient continued to have abdominal pain, underwent IR guided abdominal drains placement on 4/15.  =    Small bowel ileus with resection on 4/8  Small bowel anastomosis and fascial closure 4/10  Exploratory laparotomy with small bowel enterotomy and removal of gallstone on 3/30.   Gallstone ileus, distal small bowel obstruction secondary to impacted 2.2 cm gallstone.  Enterococcus faecalis bacteremia -cleared  -CT imaging on admission showed Gallstone ileus. Distal small bowel obstruction secondary to an impacted 2.2 cm gallstone.   -Underwent Exploratory laparotomy with small bowel enterotomy and removal of gallstone on 3/30.   -Postoperative course complicated with ileus.  NG tube removed 4/2, unable to tolerate oral diet.   -Had fever on 4/5, COVID/Flu/RSV which was negative.  BC x 2 no growth. CXR  without overt pneumonia.  UA then not consistent with infection.  - On 4/7 spiked fever with significant leukocytosis, lactic acidosis.Blood cultures from 4/7, 4/8 with Enterococcus faecalis.  -Returned back to the OR for exploratory laparotomy with small bowel resection on 4/8.  -Postprocedure patient intubated on pressor support, temporary abdominal closure admitted to ICU.   -Returned to OR on 4/10 for small bowel anastomosis and abdominal wound VAC placement.    -Postprocedure patient continues to have distended abdomen with bloody output.  -CT on 4/14 showing multiple peripherally enhancing fluid collections in the abdomen which are suspicious for developing abscesses. The largest of these is in the left paracolic gutter.   Small volume pneumoperitoneum which is favored postsurgical. Stable appearance of a contracted gallbladder containing a large gallstone which is fistulized to the duodenum with associated pneumobilia.  --4/15 underwent IR guided abdominal drain placements x 2.  Patient removed NG tube by himself  --4/16 does admit to some improvement in abdominal pain.  Draining serosanguineous fluid from abdominal drains, has wound VAC.  Surgery okay with liquid diet after speech eval.  --General Surgery following. Defer postoperative care including postoperative nutritional support/oral intake/fluid resuscitation to surgical team.   -IR following for abdominal drains  -Infectious disease following.    Appreciate multidisciplinary team input  -Continue IV Zosyn. Blood Cultures from 4/10 no growth to date.    Follow intra-abdominal cultures.  Trend fever curve.  Switch IV to oral PPI.  On intravenous heparin, will continue unless active bleeding or significant drop in hemoglobin, await surgical team input on transition to oral anticoagulation.  IV/p.o. as needed pain meds.  As needed muscle relaxants. Minimize narcotic use as able to given encephalopathy.  IV/p.o. as needed Zofran.    Postoperative atrial  fibrillation with RVR -status post cardioversion 4/14/2025  New Aflutter with RVR 4/5/2025 status post DCCV on 4/7/2025.  Coronary artery disease status post CABG x 3 in October 2022  Aortic stenosis (status post AVR with bovine valve) October 2022.  Hypertension  Hyperlipidemia.  -Postoperatively developed new aflutter with RVR, was on intravenous heparin drip, rate controlled with Cardizem drip and initiated oral metoprolol.   --Cardiology followed underwent cardioversion on 4/7.   -- Heparin was paused as patient back to OR on 4/8 and was started on intravenous heparin during ICU stay.  --On 4/14 patient again developed recurrence of A-fib with RVR with heart rate in 140s.  Initiated on amiodarone drip, underwent successful cardioversion on 4/14.  Currently in sinus rhythm with heart rate in 70s to 80s.  --Echocardiogram 4/15 with EF of 55%.  Right ventricle normal size.  No valve disease.    --Cardiology signed off 4/15, continue with amiodarone loading and switch to oral amiodarone.  -- Overnight patient on amiodarone drip as NG tube out.  This morning initiated on liquid diet.  Discontinue amiodarone drip.  -Oral PO amiodarone taper: 200 mg BID x 2 weeks ( starting 4/16), then 200 mg QD   Continue IV heparin drip. Switch to oral Eliquis when okay by surgical team.  Discontinue IV metoprolol, oral metoprolol 25 mg twice daily.  Closely monitor EKG for QTc while on amiodarone.  Continue to hold Lipitor in the setting of acute illness.  As needed Lasix as needed for diuresis.   Telemetry monitoring.  Strict intake output monitoring.  Daily weights.    Acute hypoxia-likely from atelectasis, bilateral pleural effusion, postoperative narcotic use, ALIREZA, deconditioning  CT chest from 4/14 with New small bilateral pleural effusions with overlying atelectasis/consolidation.  Afebrile.  Leukocytosis improved.  Weaned off oxygen.  This morning on room air.    Acute encephalopathy likely multifactorial from  anesthesia/sedation for procedure/delirium, hospitalization/ICU stay/narcotic use.  Patient with prolonged hospital stay -multiple procedures, ICU stay, on narcotics.  -4/16 mentation improving.  Closely monitor mental status.    Neurochecks.  If any neurological weakness will consider CT imaging.  Hold QT prolonging medications including Seroquel, while on amiodarone    Acute anemia likely from surgical blood loss, acute illness, dilutional.  Hemoglobin 11.7.  No active bleeding.  Monitor hemoglobin levels in a.m. or earlier if symptomatic.  Hold heparin drip if any active bleeding    Severe hypoalbuminemia likely from acute illness.  Dilutional.  Serum albumin of 2.1.    Nutritional intake per surgical team    Acute on chronic bilateral lower extremity edema.  Noted cold right lower extremity, dopplerable pulses per report.  Pitting edema present.  PTA oral Lasix on hold since admission.  Ultrasound bilateral lower extremities -no DVT.  CMS checks     Mild hypernatremia.  Mild dehydration.  Oral intake per surgical team.    Acute kidney injury.  Resolved  Anion gap metabolic acidosis.  Resolved.  Monitor.    Type 2 diabetes mellitus with a hemoglobin A1c of 6.8  Hold PTA metformin.  Insulin sliding scale every 4 hours    Urinary retention  Traumatic hematuria.  BPH:   Urine cultures from 4/14 no growth  Mccarty catheter removal per surgical team.  Continue to hold Flomax while having Mccarty catheter in.     Neuropathy:   *Neurontin 800 mg 3 times daily as needed has been filled but patient reports taking 300mg TID  *4/1 per pharmacy note only recent fills are 800 mg and not 300 mg dosing.   Gabapentin on hold since admission, continue to hold-resume at low-dose once able to tolerate orals likely 4/17     Nicotine use: Chews tobacco  -Continue nicotine patch, and as needed nicotine lozenges.    Long term cessation to be encouraged during stay.     ALIREZA not on CPAP.  Recommend sleep studies as outpatient     Medically  complicated obesity with a BMI of 41.  Increase all-cause mortality and morbidity.  Consider lifestyle modification with diet and exercise as able to.     Physical deconditioning from medical illness.  Rehab team following.  Fall precautions    Goals of care discussion.  Patient critically ill with A-fib with RVR.  Will transfer to C stat.  Will initiate on Cardizem drip.  Discussed goals of care with patient's wife over the telephone, continue restorative care full code at this time.     Clinically Significant Risk Factors         # Hypernatremia: Highest Na = 146 mmol/L in last 2 days, will monitor as appropriate  # Hyperchloremia: Highest Cl = 113 mmol/L in last 2 days, will monitor as appropriate          # Hypoalbuminemia: Lowest albumin = 1.9 g/dL at 4/9/2025  5:25 AM, will monitor as appropriate     # Hypertension: Noted on problem list           # DMII: A1C = 6.8 % (Ref range: <5.7 %) within past 6 months   # Severe Obesity: Estimated body mass index is 41.62 kg/m  as calculated from the following:    Height as of this encounter: 1.829 m (6').    Weight as of this encounter: 139.2 kg (306 lb 14.4 oz).        # Financial/Environmental Concerns: none   # History of CABG: noted on surgical history        Orders Placed This Encounter      Clear Liquid Diet Thin Liquids (level 0) (Supervision, sit at 90 degrees, small bites/sips, alternate textures, slow rate)      DVT Prophylaxis: SCDs, on intravenous heparin drip  Code Status: Full Code  Disposition: Expected discharge in greater than 3 days pending clinical improvement.  Discontinue IMC status    Medically Ready for Discharge: Anticipated in 2-4 Days     Discussed with patient, bedside RN, care team.  Updated patient's wife over the telephone 4/16.  More than 70% of time spent in direct patient care, care coordination, patient counseling, and formalizing plan of care.      Tim Mays MD        Interval History:        Patient lying in bed.  Appears  comfortable, awake and able to answer most questions.  Denies any chest pain or palpitations.  Denies any headache or dizziness.  Denies shortness of breath.  On room air this morning.  Reports some improvement in abdominal pain.  Having abdominal wound VAC, intra-abdominal drains.  N.p.o. this morning, okay by speech therapy /surgical team plan to initiate on clear liquids.  On intravenous heparin drip.  Report pulled out NG last night, this morning on IV amiodarone drip as unable to take oral amiodarone until speech therapy eval.  Receiving IV metoprolol.  Telemetry sinus rhythm.  Receiving as needed pain meds.  Discussed with patient, his wife over the telephone questions answered         Physical Exam:        Physical Exam   Temp:  [97.1  F (36.2  C)-98.3  F (36.8  C)] 98.1  F (36.7  C)  Pulse:  [70-85] 71  Resp:  [16-29] 29  BP: (127-168)/(56-91) 156/71  SpO2:  [95 %-98 %] 98 %    Intake/Output Summary (Last 24 hours) at 4/14/2025 0905  Last data filed at 4/14/2025 0834  Gross per 24 hour   Intake 0 ml   Output 1670 ml   Net -1670 ml       Admission Weight: 129.3 kg (285 lb)  Current Weight: (!) 144.7 kg (319 lb 0.1 oz)    PHYSICAL EXAM  GENERAL: Patient awake  HEENT: Oropharynx pink  HEART: Regular rate and rhythm. S1S2  LUNGS: Bilateral coarse breath sounds. Respirations unlabored  ABDOMEN: Distended, wound VAC present.  NEURO: Moving all extremities  EXTREMITIES: +pedal edema.   SKIN: Brusing   PSYCHIATRY Cooperative       Medications:        Current Facility-Administered Medications   Medication Dose Route Frequency Provider Last Rate Last Admin    [Held by provider] acetaminophen (TYLENOL) tablet 975 mg  975 mg Oral or NG Tube Q8H Clau Calles MD        amiodarone (PACERONE) tablet 200 mg  200 mg Oral BID Richa Schmidt PA-C   200 mg at 04/16/25 0932    Followed by    [START ON 4/29/2025] amiodarone (PACERONE) tablet 200 mg  200 mg Oral Daily Richa Schmidt PA-C        [Held by provider]  atorvastatin (LIPITOR) tablet 80 mg  80 mg Oral or NG Tube Daily Khari Peña MD        [Held by provider] famotidine (PEPCID) tablet 20 mg  20 mg Oral BID Terry Patino MD   20 mg at 04/08/25 0805    [Held by provider] folic acid (FOLVITE) tablet 1,000 mcg  1,000 mcg Oral or NG Tube BID Khari Peña MD        [Held by provider] furosemide (LASIX) tablet 20 mg  20 mg Oral Daily Mann Torres DO   20 mg at 04/04/25 1017    insulin aspart (NovoLOG) injection (RAPID ACTING)  1-7 Units Subcutaneous Q4H Tim Mays MD        metoprolol (LOPRESSOR) injection 5 mg  5 mg Intravenous Q6H Guillermina Colón MD   5 mg at 04/16/25 0433    metoprolol tartrate (LOPRESSOR) tablet 25 mg  25 mg Oral BID Hayley Vale APRN CNP   25 mg at 04/16/25 0932    [Held by provider] metoprolol tartrate (LOPRESSOR) tablet 50 mg  50 mg Oral BID Fahad Wei MD   50 mg at 04/08/25 0806    nicotine (NICODERM CQ) 21 MG/24HR 24 hr patch 1 patch  1 patch Transdermal Daily Hayley Vale APRN CNP   1 patch at 04/16/25 0935    pantoprazole (PROTONIX) 2 mg/mL suspension 40 mg  40 mg Per Feeding Tube QAM AC Hayley Vale APRN CNP        Or    pantoprazole (PROTONIX) IV push injection 40 mg  40 mg Intravenous QAM AC Hayley Vale APRN CNP   40 mg at 04/16/25 0930    piperacillin-tazobactam (ZOSYN) 4.5 g vial to attach to  mL bag  4.5 g Intravenous Q6H Hayley Vale APRN  mL/hr at 04/12/25 0548 4.5 g at 04/16/25 0608    polyethylene glycol (MIRALAX) Packet 17 g  17 g Oral Daily Ana Dougherty MD   17 g at 04/08/25 0805    QUEtiapine (SEROquel) tablet 25 mg  25 mg Oral BID Hayley Vale APRN CNP   25 mg at 04/16/25 0932    senna-docusate (SENOKOT-S/PERICOLACE) 8.6-50 MG per tablet 1 tablet  1 tablet Oral BID Ana Dougherty MD        sodium chloride (PF) 0.9% PF flush 3 mL  3 mL Intracatheter Q8H [facility] Hayley Vale APRN CNP   3 mL at 04/16/25 0608    sodium phosphate 15  mmol in NS 250mL intermittent infusion  15 mmol Intravenous Once Hayley Vale APRN CNP 0 mL/hr at 04/14/25 1728 Restarted at 04/14/25 1728    [Held by provider] tamsulosin (FLOMAX) capsule 0.8 mg  0.8 mg Oral QPM Khari Peña MD   0.8 mg at 04/07/25 2031     Current Facility-Administered Medications   Medication Dose Route Frequency Provider Last Rate Last Admin    benzocaine-menthol (CHLORASEPTIC) 6-10 MG lozenge 1 lozenge  1 lozenge Buccal Q1H PRN Hayley Vale APRN CNP   1 lozenge at 04/01/25 0334    bisacodyl (DULCOLAX) suppository 10 mg  10 mg Rectal Daily PRN Hayley Vale APRN CNP        carboxymethylcellulose PF (REFRESH PLUS) 0.5 % ophthalmic solution 1 drop  1 drop Both Eyes Q1H PRN Hayley Vale APRN CNP        glucose gel 15-30 g  15-30 g Oral Q15 Min PRN Tim Mays MD        Or    dextrose 50 % injection 25-50 mL  25-50 mL Intravenous Q15 Min PRN Tim Mays MD        Or    glucagon injection 1 mg  1 mg Subcutaneous Q15 Min PRN Tim Mays MD        diazepam (VALIUM) injection 2.5 mg  2.5 mg Intravenous Q6H PRN Hayley Vale APRN CNP        diphenhydrAMINE (BENADRYL) elixir 12.5 mg  12.5 mg Oral Q6H PRN Hayley Vale APRN CNP        Or    diphenhydrAMINE (BENADRYL) injection 12.5 mg  12.5 mg Intravenous Q6H PRN Hayley Vale APRN CNP        fentaNYL (PF) (SUBLIMAZE) injection 25-50 mcg  25-50 mcg Intravenous Q5 Min PRN Tavo Larsen MD   25 mcg at 04/15/25 1114    flumazenil (ROMAZICON) injection 0.2 mg  0.2 mg Intravenous q1 min prn Tavo Larsen MD        [Held by provider] gabapentin (NEURONTIN) tablet 800 mg  800 mg Oral TID PRN Clau Calles MD        Give   of usual dose of LONG ACTING insulin AM of procedure IF diabetic   Does not apply Continuous PRN Richa Schmidt PA-C        HOLD digoxin (LANOXIN)  AM of procedure IF on digoxin   Does not apply HOLD Richa Schmidt PA-C        HOLD: Insulin -  RAPID/SHORT acting AM of procedure IF diabetic   Does not apply HOLD Richa Schmidt PA-C        HOLD: Insulin - REGULAR AM of procedure IF diabetic   Does not apply HOLD Richa Schmidt PA-C        HOLD: Oral hypoglycemics AM of procedure IF diabetic   Does not apply HOLD Richa Schmidt PA-C        HYDROmorphone (DILAUDID) injection 0.2 mg  0.2 mg Intravenous Q4H PRN Tim Mays MD   0.2 mg at 04/15/25 1737    HYDROmorphone (PF) (DILAUDID) injection 0.3 mg  0.3 mg Intravenous Q4H PRN Tim Mays MD        lidocaine (LMX4) cream   Topical Q1H PRN Hayley Vale APRN CNP        lidocaine 1 % 0.1-1 mL  0.1-1 mL Other Q1H PRN Hayley Vale APRN CNP        magnesium hydroxide (MILK OF MAGNESIA) suspension 30 mL  30 mL Oral Daily PRN Hayley Vale APRN CNP   30 mL at 04/01/25 0427    magnesium sulfate 2 g in 50 mL sterile water intermittent infusion  2 g Intravenous Once PRN Richa Schmidt PA-C        May take oral meds with a sip of water, the morning of Cardioversion procedure.       Does not apply Continuous PRN Richa Schmidt PA-C        melatonin tablet 10 mg  10 mg Oral At Bedtime PRN Hayley Vale APRN CNP   10 mg at 04/12/25 0042    methocarbamol (ROBAXIN) half-tab 250 mg  250 mg Oral Q6H PRN Hayley Vale APRN CNP   250 mg at 04/16/25 1047    metoprolol (LOPRESSOR) injection 5 mg  5 mg Intravenous Q4H PRN Hayley Vale APRN CNP   5 mg at 04/14/25 0806    midazolam (VERSED) injection 0.5-2 mg  0.5-2 mg Intravenous Q4 Min PRN Tavo Larsen MD   0.5 mg at 04/15/25 1112    naloxone (NARCAN) injection 0.2 mg  0.2 mg Intravenous Q2 Min PRN Hayley Vale APRN CNP        Or    naloxone (NARCAN) injection 0.4 mg  0.4 mg Intravenous Q2 Min PRN Hayley Vale APRN CNP        Or    naloxone (NARCAN) injection 0.2 mg  0.2 mg Intramuscular Q2 Min PRN Hayley Vale APRN CNP        Or    naloxone (NARCAN) injection 0.4 mg  0.4 mg  Intramuscular Q2 Min PRN Hayley Vale APRN CNP        nicotine (COMMIT) lozenge 2 mg  2 mg Buccal Q1H PRN Hayley Vale APRN CNP   2 mg at 04/03/25 1145    No lozenges or gum should be given while patient on BIPAP/AVAPS/AVAPS AE   Does not apply Continuous PRN Hayley Vale APRN CNP        ondansetron (ZOFRAN ODT) ODT tab 4 mg  4 mg Oral Q6H PRN Hayley Vale APRN CNP   4 mg at 04/08/25 0208    Or    ondansetron (ZOFRAN) injection 4 mg  4 mg Intravenous Q6H PRN Hayley Vale APRN CNP   4 mg at 04/11/25 1232    Patient may continue current oral medications   Does not apply Continuous PRN Hayley Vale APRN CNP        phenol (CHLORASEPTIC) 1.4 % spray 1 mL  1 spray Mouth/Throat Q1H PRN Hayley Vale APRN CNP        potassium chloride eboni ER (KLOR-CON M20) CR tablet 40 mEq  40 mEq Oral Once PRN Richa Schmidt PA-C        prochlorperazine (COMPAZINE) injection 5 mg  5 mg Intravenous Q6H PRN Hayley Vale APRN CNP   5 mg at 04/08/25 1103    Or    prochlorperazine (COMPAZINE) tablet 5 mg  5 mg Oral Q6H PRN Hayley Vale APRN CNP   5 mg at 04/03/25 0756    sodium chloride (PF) 0.9% PF flush 3 mL  3 mL Intracatheter q1 min prn Hayley Vale APRN CNP                Data:      All new lab and imaging data was reviewed.

## 2025-04-16 NOTE — PROGRESS NOTES
Care Management Follow Up    Length of Stay (days): 17    Expected Discharge Date: 04/20/2025     Concerns to be Addressed: discharge planning     Patient plan of care discussed at interdisciplinary rounds: Yes    Anticipated Discharge Disposition: Acute Rehab              Anticipated Discharge Services: Home Care  Anticipated Discharge DME:      Patient/family educated on Medicare website which has current facility and service quality ratings: no  Education Provided on the Discharge Plan: Yes  Patient/Family in Agreement with the Plan: yes    Referrals Placed by CM/SW:    Private pay costs discussed: Not applicable    Discussed  Partnership in Safe Discharge Planning  document with patient/family: No     Handoff Completed: No, handoff not indicated or clinically appropriate    Additional Information:  Writer rec'd message [facility]ust[facility] that [facility] would need to have 24/7 support and likely lift support once he discharges from ARU.  Writer called patient's wife Mya to inquire about home support upon discharge from ARU.  Mya stated that she is the only support person patient will have upon discharge and she would not have anyone else to assist.  Writer explained that this likely will be a barrier to him being accepted at ARU and TCU may have to be considered instead.  Mya expressed understanding and stated that [facility] has been [facility] in past.   If TCU is the plan at discharge, Mya requested referrals be made to [facility] [place] [facility] and [facility] [place] [facility]     Next Steps: Follow up with ARU liaison regarding above.    Hoa Florentino RN Care Coordinator  [facility]  [phone]

## 2025-04-17 ENCOUNTER — APPOINTMENT (OUTPATIENT)
Dept: CT IMAGING | Facility: CLINIC | Age: 70
DRG: 329 | End: 2025-04-17
Attending: HOSPITALIST
Payer: COMMERCIAL

## 2025-04-17 ENCOUNTER — APPOINTMENT (OUTPATIENT)
Dept: SPEECH THERAPY | Facility: CLINIC | Age: 70
DRG: 329 | End: 2025-04-17
Payer: COMMERCIAL

## 2025-04-17 LAB
ANION GAP SERPL CALCULATED.3IONS-SCNC: 9 MMOL/L (ref 7–15)
BACTERIA ASPIRATE CULT: NORMAL
BUN SERPL-MCNC: 8.5 MG/DL (ref 8–23)
CALCIUM SERPL-MCNC: 8.8 MG/DL (ref 8.8–10.4)
CHLORIDE SERPL-SCNC: 111 MMOL/L (ref 98–107)
CHOLEST SERPL-MCNC: 125 MG/DL
CREAT SERPL-MCNC: 0.7 MG/DL (ref 0.67–1.17)
EGFRCR SERPLBLD CKD-EPI 2021: >90 ML/MIN/1.73M2
GLUCOSE BLDC GLUCOMTR-MCNC: 107 MG/DL (ref 70–99)
GLUCOSE BLDC GLUCOMTR-MCNC: 112 MG/DL (ref 70–99)
GLUCOSE BLDC GLUCOMTR-MCNC: 114 MG/DL (ref 70–99)
GLUCOSE BLDC GLUCOMTR-MCNC: 132 MG/DL (ref 70–99)
GLUCOSE BLDC GLUCOMTR-MCNC: 144 MG/DL (ref 70–99)
GLUCOSE SERPL-MCNC: 117 MG/DL (ref 70–99)
HCO3 SERPL-SCNC: 23 MMOL/L (ref 22–29)
HDLC SERPL-MCNC: 26 MG/DL
HGB BLD-MCNC: 10.9 G/DL (ref 13.3–17.7)
HGB BLD-MCNC: 11.1 G/DL (ref 13.3–17.7)
LDLC SERPL CALC-MCNC: 71 MG/DL
NONHDLC SERPL-MCNC: 99 MG/DL
PHOSPHATE SERPL-MCNC: 2.6 MG/DL (ref 2.5–4.5)
POTASSIUM SERPL-SCNC: 3.3 MMOL/L (ref 3.4–5.3)
POTASSIUM SERPL-SCNC: 3.6 MMOL/L (ref 3.4–5.3)
SODIUM SERPL-SCNC: 143 MMOL/L (ref 135–145)
TRIGL SERPL-MCNC: 142 MG/DL
UFH PPP CHRO-ACNC: 0.32 IU/ML
UFH PPP CHRO-ACNC: 0.33 IU/ML
UFH PPP CHRO-ACNC: 0.6 IU/ML

## 2025-04-17 PROCEDURE — 210N000002 HC R&B HEART CARE

## 2025-04-17 PROCEDURE — 36415 COLL VENOUS BLD VENIPUNCTURE: CPT | Performed by: HOSPITALIST

## 2025-04-17 PROCEDURE — 99232 SBSQ HOSP IP/OBS MODERATE 35: CPT | Performed by: PHYSICIAN ASSISTANT

## 2025-04-17 PROCEDURE — 80048 BASIC METABOLIC PNL TOTAL CA: CPT | Performed by: HOSPITALIST

## 2025-04-17 PROCEDURE — 84132 ASSAY OF SERUM POTASSIUM: CPT | Performed by: HOSPITALIST

## 2025-04-17 PROCEDURE — 85520 HEPARIN ASSAY: CPT | Performed by: HOSPITALIST

## 2025-04-17 PROCEDURE — 250N000013 HC RX MED GY IP 250 OP 250 PS 637: Performed by: NURSE PRACTITIONER

## 2025-04-17 PROCEDURE — 82465 ASSAY BLD/SERUM CHOLESTEROL: CPT | Performed by: HOSPITALIST

## 2025-04-17 PROCEDURE — 250N000013 HC RX MED GY IP 250 OP 250 PS 637: Performed by: HOSPITALIST

## 2025-04-17 PROCEDURE — 70450 CT HEAD/BRAIN W/O DYE: CPT

## 2025-04-17 PROCEDURE — 250N000013 HC RX MED GY IP 250 OP 250 PS 637

## 2025-04-17 PROCEDURE — 250N000011 HC RX IP 250 OP 636: Performed by: HOSPITALIST

## 2025-04-17 PROCEDURE — 92526 ORAL FUNCTION THERAPY: CPT | Mod: GN | Performed by: SPEECH-LANGUAGE PATHOLOGIST

## 2025-04-17 PROCEDURE — 250N000011 HC RX IP 250 OP 636: Performed by: NURSE PRACTITIONER

## 2025-04-17 PROCEDURE — 99233 SBSQ HOSP IP/OBS HIGH 50: CPT | Performed by: HOSPITALIST

## 2025-04-17 PROCEDURE — 84100 ASSAY OF PHOSPHORUS: CPT | Performed by: HOSPITALIST

## 2025-04-17 PROCEDURE — 85018 HEMOGLOBIN: CPT | Performed by: HOSPITALIST

## 2025-04-17 RX ORDER — POTASSIUM CHLORIDE 1500 MG/1
40 TABLET, EXTENDED RELEASE ORAL ONCE
Status: COMPLETED | OUTPATIENT
Start: 2025-04-17 | End: 2025-04-17

## 2025-04-17 RX ORDER — DEXTROSE MONOHYDRATE 25 G/50ML
25-50 INJECTION, SOLUTION INTRAVENOUS
Status: DISCONTINUED | OUTPATIENT
Start: 2025-04-17 | End: 2025-04-17

## 2025-04-17 RX ORDER — PANTOPRAZOLE SODIUM 20 MG/1
40 TABLET, DELAYED RELEASE ORAL
Status: DISCONTINUED | OUTPATIENT
Start: 2025-04-17 | End: 2025-04-29 | Stop reason: HOSPADM

## 2025-04-17 RX ORDER — NICOTINE POLACRILEX 4 MG
15-30 LOZENGE BUCCAL
Status: DISCONTINUED | OUTPATIENT
Start: 2025-04-17 | End: 2025-04-17

## 2025-04-17 RX ORDER — TAMSULOSIN HYDROCHLORIDE 0.4 MG/1
0.4 CAPSULE ORAL EVERY EVENING
Status: DISCONTINUED | OUTPATIENT
Start: 2025-04-17 | End: 2025-04-21

## 2025-04-17 RX ADMIN — HEPARIN SODIUM 2200 UNITS/HR: 10000 INJECTION, SOLUTION INTRAVENOUS at 16:33

## 2025-04-17 RX ADMIN — PIPERACILLIN AND TAZOBACTAM 4.5 G: 4; .5 INJECTION, POWDER, FOR SOLUTION INTRAVENOUS at 05:42

## 2025-04-17 RX ADMIN — TRAMADOL HYDROCHLORIDE 50 MG: 50 TABLET, COATED ORAL at 05:47

## 2025-04-17 RX ADMIN — POTASSIUM CHLORIDE 40 MEQ: 1500 TABLET, EXTENDED RELEASE ORAL at 09:08

## 2025-04-17 RX ADMIN — METOPROLOL TARTRATE 25 MG: 25 TABLET, FILM COATED ORAL at 09:08

## 2025-04-17 RX ADMIN — PIPERACILLIN AND TAZOBACTAM 4.5 G: 4; .5 INJECTION, POWDER, FOR SOLUTION INTRAVENOUS at 11:17

## 2025-04-17 RX ADMIN — HEPARIN SODIUM 2400 UNITS/HR: 10000 INJECTION, SOLUTION INTRAVENOUS at 03:24

## 2025-04-17 RX ADMIN — AMIODARONE HYDROCHLORIDE 200 MG: 200 TABLET ORAL at 09:08

## 2025-04-17 RX ADMIN — TRAMADOL HYDROCHLORIDE 50 MG: 50 TABLET, COATED ORAL at 10:49

## 2025-04-17 RX ADMIN — QUETIAPINE FUMARATE 25 MG: 25 TABLET ORAL at 09:08

## 2025-04-17 RX ADMIN — AMIODARONE HYDROCHLORIDE 200 MG: 200 TABLET ORAL at 20:16

## 2025-04-17 RX ADMIN — METOPROLOL TARTRATE 25 MG: 25 TABLET, FILM COATED ORAL at 20:16

## 2025-04-17 RX ADMIN — INSULIN ASPART 1 UNITS: 100 INJECTION, SOLUTION INTRAVENOUS; SUBCUTANEOUS at 18:39

## 2025-04-17 RX ADMIN — PIPERACILLIN AND TAZOBACTAM 4.5 G: 4; .5 INJECTION, POWDER, FOR SOLUTION INTRAVENOUS at 00:14

## 2025-04-17 RX ADMIN — PIPERACILLIN AND TAZOBACTAM 4.5 G: 4; .5 INJECTION, POWDER, FOR SOLUTION INTRAVENOUS at 23:54

## 2025-04-17 RX ADMIN — NICOTINE 1 PATCH: 21 PATCH, EXTENDED RELEASE TRANSDERMAL at 09:10

## 2025-04-17 RX ADMIN — QUETIAPINE FUMARATE 25 MG: 25 TABLET ORAL at 20:16

## 2025-04-17 RX ADMIN — PIPERACILLIN AND TAZOBACTAM 4.5 G: 4; .5 INJECTION, POWDER, FOR SOLUTION INTRAVENOUS at 16:34

## 2025-04-17 RX ADMIN — TAMSULOSIN HYDROCHLORIDE 0.4 MG: 0.4 CAPSULE ORAL at 20:16

## 2025-04-17 RX ADMIN — PANTOPRAZOLE SODIUM 40 MG: 20 TABLET, DELAYED RELEASE ORAL at 18:37

## 2025-04-17 ASSESSMENT — ACTIVITIES OF DAILY LIVING (ADL)
ADLS_ACUITY_SCORE: 54
ADLS_ACUITY_SCORE: 61
ADLS_ACUITY_SCORE: 57
ADLS_ACUITY_SCORE: 57
ADLS_ACUITY_SCORE: 54
ADLS_ACUITY_SCORE: 61
ADLS_ACUITY_SCORE: 55
ADLS_ACUITY_SCORE: 61
ADLS_ACUITY_SCORE: 57
ADLS_ACUITY_SCORE: 54
ADLS_ACUITY_SCORE: 54
ADLS_ACUITY_SCORE: 61
ADLS_ACUITY_SCORE: 61

## 2025-04-17 NOTE — PROVIDER NOTIFICATION
Brief update:    Notified by nursing overnight that a small amount of blood was seen on his sheets.  Not in stool, but found near stool as they were cleaning him up.  Multiple loose bowel movements overnight.    Nursing also questions some blood in his Mccarty catheter collection bag.  Largely garret, but possible blood in tubing after bag was emptied.  No gross blood from around catheter or at tip of penis.    On heparin given atrial fibrillation/atrial flutter    Hemoglobin ordered for a.m.    Continue to monitor for now.  If overt bleeding is noted, please page.    Agustin Ramires MD  3:53 AM

## 2025-04-17 NOTE — PROGRESS NOTES
[facility] General Surgery Post-Op / Progress Note         Assessment and Plan:    Assessment:   68yo male admitted on 3/30 for gallstone ileus. Underwent ex lap with removal of gallstone through small bowel enterotomy and was admitted to the floor post operatively. Patient had return of bowel function but new onset of Aflutter with RVR and underwent cardioversion on 4/7. Overnight 4/7 RRT called for hypotension and patient found to have elevated lactate, leukocytosis of 36. CT showing increased fluid and free air, started draining from midline incision. Patient taken to OR for ex lap, found to have gross sucus breakdown of enterotomy on 4/8. RTOR 4/10 for small bowel anastomosis and fascial closure. Extubated and transferred to floor. RRT 4/14 for recurrent Aflutter with RVR, underwent repeat cardioversion. CT showing two new fluid collections, possible early abscess, underwent IR drain placement x2 4/15. Cultures pending, ngtd.    LP evaluated and approved for easy chew however will start with slow advancement of diet. Patient having bowel function.          Plan:   -Follow-up cultures of abdominal fluid collection, continue IV Zoysn  -Continue drains  -Advance to FLD, if develops nausea or vomiting plan to return to NPO  -Okay for DOAC  -WOC for wound vac management    Patient discussed with Dr. Patino           Interval History:   Reports doing okay, denies nausea, vomiting, abdominal pain. Still delirious but improving.          Review of Systems:   ROS negative            Medications:   All medications related to the patient's surgery have been reviewed  Current Facility-Administered Medications   Medication Dose Route Frequency Provider Last Rate Last Admin    [Held by provider] acetaminophen (TYLENOL) tablet 975 mg  975 mg Oral or NG Tube Q8H Clau Calles MD        amiodarone (PACERONE) tablet 200 mg  200 mg Oral BID Richa Schmidt PA-C   200 mg at 04/16/25 4376    Followed by     [START ON 4/29/2025] amiodarone (PACERONE) tablet 200 mg  200 mg Oral Daily Richa Schmidt PA-C        [Held by provider] atorvastatin (LIPITOR) tablet 80 mg  80 mg Oral or NG Tube Daily Khari Peña MD        benzocaine-menthol (CHLORASEPTIC) 6-10 MG lozenge 1 lozenge  1 lozenge Buccal Q1H PRN Hayley Vale APRN CNP   1 lozenge at 04/01/25 0334    bisacodyl (DULCOLAX) suppository 10 mg  10 mg Rectal Daily PRN Hayley Vale APRN CNP        carboxymethylcellulose PF (REFRESH PLUS) 0.5 % ophthalmic solution 1 drop  1 drop Both Eyes Q1H PRN Hayley Vale APRN CNP        glucose gel 15-30 g  15-30 g Oral Q15 Min PRN Tim Mays MD        Or    dextrose 50 % injection 25-50 mL  25-50 mL Intravenous Q15 Min PRN Tim Mays MD        Or    glucagon injection 1 mg  1 mg Subcutaneous Q15 Min PRN Tim Mays MD        diazepam (VALIUM) injection 2.5 mg  2.5 mg Intravenous Q6H PRN Hayley Vale APRN CNP        diphenhydrAMINE (BENADRYL) elixir 12.5 mg  12.5 mg Oral Q6H PRN Hayley Vale APRN CNP        Or    diphenhydrAMINE (BENADRYL) injection 12.5 mg  12.5 mg Intravenous Q6H PRN Hayley Vale APRN CNP        fentaNYL (PF) (SUBLIMAZE) injection 25-50 mcg  25-50 mcg Intravenous Q5 Min PRN Tavo Larsen MD   25 mcg at 04/15/25 1114    flumazenil (ROMAZICON) injection 0.2 mg  0.2 mg Intravenous q1 min prn Tavo Larsen MD        [Held by provider] folic acid (FOLVITE) tablet 1,000 mcg  1,000 mcg Oral or NG Tube BID Khari Peña MD        [Held by provider] furosemide (LASIX) tablet 20 mg  20 mg Oral Daily Mann Torres DO   20 mg at 04/04/25 1017    [Held by provider] gabapentin (NEURONTIN) tablet 800 mg  800 mg Oral TID PRN Clau Calles MD        Give   of usual dose of LONG ACTING insulin AM of procedure IF diabetic   Does not apply Continuous PRN Richa Schmidt PA-C        heparin 25,000 units in 0.45% NaCl 250 mL  ANTICOAGULANT infusion  0-5,000 Units/hr Intravenous Continuous Tim Mays MD   Paused at 04/17/25 0629    HOLD digoxin (LANOXIN)  AM of procedure IF on digoxin   Does not apply HOLD Richa Schmidt PA-C        HOLD: Insulin - RAPID/SHORT acting AM of procedure IF diabetic   Does not apply HOLD Richa Schmidt PA-C        HOLD: Insulin - REGULAR AM of procedure IF diabetic   Does not apply HOLD Richa Schmidt PA-C        HOLD: Oral hypoglycemics AM of procedure IF diabetic   Does not apply HOLD Richa Schmidt PA-C        HYDROmorphone (DILAUDID) injection 0.2 mg  0.2 mg Intravenous Q4H PRN iTm Mays MD   0.2 mg at 04/15/25 1737    HYDROmorphone (PF) (DILAUDID) injection 0.3 mg  0.3 mg Intravenous Q4H PRN Tim Mays MD        insulin aspart (NovoLOG) injection (RAPID ACTING)  1-7 Units Subcutaneous Q4H Tim Mays MD        lidocaine (LMX4) cream   Topical Q1H PRN Hayley Vale APRN CNP        lidocaine 1 % 0.1-1 mL  0.1-1 mL Other Q1H PRN Hayley Vale APRN CNP        magnesium hydroxide (MILK OF MAGNESIA) suspension 30 mL  30 mL Oral Daily PRN Hayley Vale APRN CNP   30 mL at 04/01/25 0427    magnesium sulfate 2 g in 50 mL sterile water intermittent infusion  2 g Intravenous Once PRN Richa Schmidt PA-C        May take oral meds with a sip of water, the morning of Cardioversion procedure.       Does not apply Continuous PRN Richa Schmidt PA-C        melatonin tablet 10 mg  10 mg Oral At Bedtime PRN Hayley Vale APRN CNP   10 mg at 04/12/25 0042    methocarbamol (ROBAXIN) half-tab 250 mg  250 mg Oral Q6H PRN Hayley Vale APRN CNP   250 mg at 04/16/25 1047    metoprolol (LOPRESSOR) injection 5 mg  5 mg Intravenous Q4H PRN Hayley Vale APRN CNP   5 mg at 04/14/25 0806    metoprolol tartrate (LOPRESSOR) tablet 25 mg  25 mg Oral BID Hayley Vale APRN CNP   25 mg at 04/16/25 2212    midazolam (VERSED) injection 0.5-2 mg   0.5-2 mg Intravenous Q4 Min PRN Tavo Larsen MD   0.5 mg at 04/15/25 1112    naloxone (NARCAN) injection 0.2 mg  0.2 mg Intravenous Q2 Min PRN Hayley Vale APRN CNP        Or    naloxone (NARCAN) injection 0.4 mg  0.4 mg Intravenous Q2 Min PRN Hayley Vale APRN CNP        Or    naloxone (NARCAN) injection 0.2 mg  0.2 mg Intramuscular Q2 Min PRN Hayley Vale APRN CNP        Or    naloxone (NARCAN) injection 0.4 mg  0.4 mg Intramuscular Q2 Min PRN Hayley Vale APRN CNP        nicotine (COMMIT) lozenge 2 mg  2 mg Buccal Q1H PRN Hayley Vale APRN CNP   2 mg at 04/03/25 1145    nicotine (NICODERM CQ) 21 MG/24HR 24 hr patch 1 patch  1 patch Transdermal Daily Hayley Vale APRN CNP   1 patch at 04/16/25 0935    No lozenges or gum should be given while patient on BIPAP/AVAPS/AVAPS AE   Does not apply Continuous PRN Hayley Vale APRN CNP        ondansetron (ZOFRAN ODT) ODT tab 4 mg  4 mg Oral Q6H PRN Hayley Vale APRN CNP   4 mg at 04/08/25 0208    Or    ondansetron (ZOFRAN) injection 4 mg  4 mg Intravenous Q6H PRN Hayley Vale APRN CNP   4 mg at 04/11/25 1232    pantoprazole (PROTONIX) 2 mg/mL suspension 40 mg  40 mg Per Feeding Tube QAM AC Hayley Vale APRN CNP        Patient may continue current oral medications   Does not apply Continuous PRN Hayley Vale APRN CNP        phenol (CHLORASEPTIC) 1.4 % spray 1 mL  1 spray Mouth/Throat Q1H PRN Hayley Vale APRN CNP        piperacillin-tazobactam (ZOSYN) 4.5 g vial to attach to  mL bag  4.5 g Intravenous Q6H Hayley Vale APRN  mL/hr at 04/12/25 0548 4.5 g at 04/17/25 0542    polyethylene glycol (MIRALAX) Packet 17 g  17 g Oral Daily Ana Dougherty MD   17 g at 04/08/25 0805    potassium chloride eboni ER (KLOR-CON M20) CR tablet 40 mEq  40 mEq Oral Once Tim Mays MD        potassium chloride eboni ER (KLOR-CON M20) CR tablet 40 mEq  40 mEq Oral Once PRN  Richa Schmidt PA-C        prochlorperazine (COMPAZINE) injection 5 mg  5 mg Intravenous Q6H PRN Hayley Vale APRN CNP   5 mg at 04/08/25 1103    Or    prochlorperazine (COMPAZINE) tablet 5 mg  5 mg Oral Q6H PRN Hayley Vale APRN CNP   5 mg at 04/03/25 0756    QUEtiapine (SEROquel) tablet 25 mg  25 mg Oral BID Hayley Vale APRN CNP   25 mg at 04/16/25 2215    senna-docusate (SENOKOT-S/PERICOLACE) 8.6-50 MG per tablet 1 tablet  1 tablet Oral BID Ana Dougherty MD        sodium chloride (PF) 0.9% PF flush 3 mL  3 mL Intracatheter q1 min prn Hayley Vale APRN CNP        sodium chloride (PF) 0.9% PF flush 3 mL  3 mL Intracatheter Q8H [facility] Hayley Vale APRN CNP   3 mL at 04/16/25 0608    [Held by provider] tamsulosin (FLOMAX) capsule 0.8 mg  0.8 mg Oral QPM Khari Peña MD   0.8 mg at 04/07/25 2031    traMADol (ULTRAM) half-tab 25 mg  25 mg Oral Q6H PRN Tim Mays MD        traMADol (ULTRAM) tablet 50 mg  50 mg Oral Q6H PRN Tim Mays MD   50 mg at 04/17/25 0547             Physical Exam:   All VSS  General: lying in bed, NAD  CV: RRR  Pul: breathing comfortably on room air, equal chest rise  Abd: soft, obese, wound vac in place with serous output-holding suction, no surrounding erythema, RLQ drain with moderate serosanginous output, LLQ drain serous, Mccarty with moderate light pink urine  Skin: warm, dry, 2+ LE edema, no rashes          Data:   CBC RESULTS:   Recent Labs   Lab Test 04/17/25  0534 04/16/25  0303   WBC  --  8.1   RBC  --  3.51*   HGB 10.9* 10.5*   HCT  --  32.8*   MCV  --  93   MCH  --  29.9   MCHC  --  32.0   RDW  --  14.8   PLT  --  272          Last Comprehensive Metabolic Panel:  Lab Results   Component Value Date     04/17/2025    POTASSIUM 3.3 (L) 04/17/2025    CHLORIDE 111 (H) 04/17/2025    CO2 23 04/17/2025    ANIONGAP 9 04/17/2025     (H) 04/17/2025    BUN 8.5 04/17/2025    CR 0.70 04/17/2025    GFRESTIMATED >90  04/17/2025    HALEY 8.8 04/17/2025         Ana Dougherty MD  Surgery, PGY4

## 2025-04-17 NOTE — PROGRESS NOTES
[facility]    Infectious Disease Progress Note    Date of Service (when I saw the patient): 04/17/2025     Assessment & Plan   Richar Davis is a 69 year old male who was admitted on 3/30/2025.     Impression:  69 year old male with a history of well controlled diabetes, CAD, aortic stenosis s/p AVR with bovine valve, and HTN who presented to the hospital with gallstone ileus and is s/p exploratory laparotomy with removal of gallstone. Post-operatively he developed atrial flutter with RVR requiring cardioversion, gross sucus breakdown of enterotomy requiring return to OR on 4/8/25 and 4/10/25, E. Faecalis bacteremia, and delirium.     -4/7/25 - 4/8/25 had significant leukocytosis and fever with noted sucus breakdown of enterotomy s/p exploratory laparotomy 4/8/25 followed by small bowel anastomosis and fascial closure 4/10/25.   -E. Faecalis bacteremia with positive blood cultures 4/7/25. Follow-up blood cultures 4/10/25 are no growth to date.   -A.fib/flutter s/p Cardioversion  -Now with abdominal fluid collections. S/p drain placement 4/15/25. Cultures are still no growth to date.   -Fevers and leukocytosis resolved.         Recommendations:   Continue Zosyn for now as this covers the E. Faecalis bacteremia as well as any possible intra-abdominal infection.   Following cultures from abdominal fluid collections.   Final antibiotic plans to follow clinical course.     Patient and plan discussed with Dr. Grady.      Clau Larson PA-C    Interval History   Tolerating antibiotics ok   No new rashes or issues with antibiotics   Labs reviewed   No changes to past medical, social or family history   Patient confused, with sitter in room. No complaints.       Physical Exam   Temp: 97.6  F (36.4  C) Temp src: Oral BP: 137/78 Pulse: 72   Resp: 20 SpO2: 97 % O2 Device: None (Room air)    Vitals:    04/10/25 2000 04/12/25 0400 04/15/25 0640   Weight: (!) 147.4 kg (324 lb 15.3 oz) (!) 144.7 kg  (319 lb 0.1 oz) (!) 139.2 kg (306 lb 14.4 oz)     Vital Signs with Ranges  Temp:  [97.4  F (36.3  C)-98  F (36.7  C)] 97.6  F (36.4  C)  Pulse:  [69-87] 72  Resp:  [13-38] 20  BP: (102-185)/(73-99) 137/78  SpO2:  [79 %-100 %] 97 %    Constitutional: Awake, alert, cooperative, no apparent distress  Lungs: Clear to auscultation bilaterally, no crackles or wheezing  Cardiovascular: Regular rate and rhythm, normal S1 and S2, and no murmur noted  Abdomen: Normal bowel sounds, soft, non-distended, non-tender. Left drain with serous output. Right drain with serosanguineous output.   Skin: No rashes, no cyanosis, no edema  Other:    Medications   Current Facility-Administered Medications   Medication Dose Route Frequency Provider Last Rate Last Admin    Give   of usual dose of LONG ACTING insulin AM of procedure IF diabetic   Does not apply Continuous PRN Richa Schmidt PA-C        heparin 25,000 units in 0.45% NaCl 250 mL ANTICOAGULANT infusion  0-5,000 Units/hr Intravenous Continuous Tim Mays MD 22 mL/hr at 04/17/25 0817 2,200 Units/hr at 04/17/25 0817    May take oral meds with a sip of water, the morning of Cardioversion procedure.       Does not apply Continuous PRN Richa Schmidt PA-C        No lozenges or gum should be given while patient on BIPAP/AVAPS/AVAPS AE   Does not apply Continuous PRN Hayley Vale APRN CNP        Patient may continue current oral medications   Does not apply Continuous PRN Hayley Vale APRN CNP         Current Facility-Administered Medications   Medication Dose Route Frequency Provider Last Rate Last Admin    [Held by provider] acetaminophen (TYLENOL) tablet 975 mg  975 mg Oral or NG Tube Q8H Clau Calles MD        amiodarone (PACERONE) tablet 200 mg  200 mg Oral BID Richa Schmidt PA-C   200 mg at 04/17/25 0908    Followed by    [START ON 4/29/2025] amiodarone (PACERONE) tablet 200 mg  200 mg Oral Daily Richa Schmidt PA-C        [Held by  provider] atorvastatin (LIPITOR) tablet 80 mg  80 mg Oral or NG Tube Daily Khari Peña MD        [Held by provider] folic acid (FOLVITE) tablet 1,000 mcg  1,000 mcg Oral or NG Tube BID Khari Peña MD        [Held by provider] furosemide (LASIX) tablet 20 mg  20 mg Oral Daily Mann Torres DO   20 mg at 04/04/25 1017    insulin aspart (NovoLOG) injection (RAPID ACTING)  1-7 Units Subcutaneous TID  Tim Mays MD        insulin aspart (NovoLOG) injection (RAPID ACTING)  1-5 Units Subcutaneous At Bedtime Tim Mays MD        metoprolol tartrate (LOPRESSOR) tablet 25 mg  25 mg Oral BID Tim Mays MD   25 mg at 04/17/25 0908    nicotine (NICODERM CQ) 21 MG/24HR 24 hr patch 1 patch  1 patch Transdermal Daily Hayley Vale APRN CNP   1 patch at 04/17/25 0910    pantoprazole (PROTONIX) 2 mg/mL suspension 40 mg  40 mg Per Feeding Tube QAM AC Hayley Vale APRN CNP        piperacillin-tazobactam (ZOSYN) 4.5 g vial to attach to  mL bag  4.5 g Intravenous Q6H Hayley Vale APRN  mL/hr at 04/12/25 0548 4.5 g at 04/17/25 1117    polyethylene glycol (MIRALAX) Packet 17 g  17 g Oral Daily Ana Dougherty MD   17 g at 04/08/25 0805    QUEtiapine (SEROquel) tablet 25 mg  25 mg Oral BID Hayley Vale APRN CNP   25 mg at 04/17/25 0908    senna-docusate (SENOKOT-S/PERICOLACE) 8.6-50 MG per tablet 1 tablet  1 tablet Oral BID Ana Dougherty MD        sodium chloride (PF) 0.9% PF flush 3 mL  3 mL Intracatheter Q8H [facility] Hayley Vale APRN CNP   3 mL at 04/16/25 0608    tamsulosin (FLOMAX) capsule 0.4 mg  0.4 mg Oral QPM Tim Mays MD           Data   All microbiology laboratory data reviewed.  Recent Labs   Lab Test 04/17/25  0534 04/16/25  0303 04/15/25  0759 04/14/25  0925   WBC  --  8.1 8.1 9.9   HGB 10.9* 10.5* 10.4* 11.7*   HCT  --  32.8* 34.0* 36.3*   MCV  --  93 95 93   PLT  --  272 267 301     Recent Labs   Lab Test  04/17/25  0534 04/16/25  0303 04/15/25  0759   CR 0.70 0.72 0.70          04/15/2025 1130 04/16/2025 1606 Anaerobic Bacterial Culture Routine [[id number]]   Aspirate from Abdomen    Preliminary result Component Value   Culture No anaerobic organisms isolated after 1 day P             04/15/2025 1130 04/15/2025 1642 Gram Stain [[id number]]    Aspirate from Abdomen    Final result Component Value   GS Culture See corresponding culture for results   Gram Stain Result No organisms seen   Gram Stain Result 3+ WBC seen   Predominantly PMNs          04/15/2025 1130 04/17/2025 0952 Aspirate Aerobic Bacterial Culture Routine [[id number]]   Aspirate from Abdomen    Preliminary result Component Value   Culture No growth after 1 day P             04/15/2025 1115 04/16/2025 1606 Anaerobic Bacterial Culture Routine [[id number]]   Aspirate from Abdomen    Preliminary result Component Value   Culture No anaerobic organisms isolated after 1 day P             04/15/2025 1115 04/15/2025 1605 Gram Stain [[id number]]   Aspirate from Abdomen    Final result Component Value   GS Culture See corresponding culture for results   Gram Stain Result No organisms seen   Gram Stain Result 2+ WBC seen          04/15/2025 1115 04/17/2025 0953 Aspirate Aerobic Bacterial Culture Routine [[id number]]   Aspirate from Abdomen    Preliminary result Component Value   Culture No growth after 1 day P             04/14/2025 0855 04/16/2025 0512 Urine Culture [[id number]]   Urine, Catheter    Final result Component Value   Culture No Growth             04/10/2025 1719 04/15/2025 2016 Blood Culture Hand, Right [[id number]]   Blood from Hand, Right    Final result Component Value   Culture No Growth             04/10/2025 1717 04/15/2025 2031 Blood Culture Hand, Left [[id number]]   Blood from Hand, Left    Final result Component Value   Culture No Growth

## 2025-04-17 NOTE — PLAN OF CARE
Neuro: A&Ox2-4 at times disoriented to place and situation   Tele/Cardiac: SR  Resp: WDL  Activity: A2 lift  Pain: 7/10 lower back pain reported this morning. PRN tramadol given with relief  Drips/IV: heparin paused. Due to be decreased to 2100 unit/hr at 0735  GI/: Mccarty in place with garret and cherry red output. Several BMs overnight. Incontinent of bowel   Skin: midline abdominal wound vac in place. Bilateral TERRIE drains. Blanchable redness coccyx. Scattered bruising.   Diet: Diet: Clear Liquid Diet Thin Liquids (level 0) (Supervision, sit at 90 degrees, small bites/sips, alternate textures, slow rate)     Test/Procedures: n/a  Plan: plan of care ongoing.

## 2025-04-17 NOTE — PROGRESS NOTES
Care Management Follow Up    Length of Stay (days): 18    Expected Discharge Date: 04/20/2025     Concerns to be Addressed: discharge planning     Patient plan of care discussed at interdisciplinary rounds: Yes    Anticipated Discharge Disposition: Acute Rehab              Anticipated Discharge Services: Home Care  Anticipated Discharge DME:      Patient/family educated on Medicare website which has current facility and service quality ratings: no  Education Provided on the Discharge Plan: Yes  Patient/Family in Agreement with the Plan: yes    Referrals Placed by CM/SW:    Private pay costs discussed: Not applicable    Discussed  Partnership in Safe Discharge Planning  document with patient/family: No     Handoff Completed: No, handoff not indicated or clinically appropriate    Additional Information:  Writer received teams message from ELDA Garcia liaison regarding patient needing an [facility] peer to peer evaluation.  Provider to call [phone] option 5 by noon today.   Per Radha; Patient meets LTACH criteria under his wound care needs - wound vac changes 3x/wk, IV Zosyn Q6, Hep drip, 2 abdominal drains in setting of abscess/bacteremia. His needs cannot be safely met at a lower level of care.     Writer communicated above to Dr. Mays and she states that patient will not be medically ready for discharge until Monday, April 21st at the soonest so she will not call [facility] today.  Writer messaged Radha with above information and her team will continue to follow and initiate peer to peer conversation once closer to discharge readiness.   Next Steps: continue to follow and assist with discharge to [facility] when patient medically ready.    Hoa Florentino RN Care Coordinator  [facility]  [phone]

## 2025-04-17 NOTE — PLAN OF CARE
Pt aox4 but forgetful, RA, A 2 lift, and full code. Tele SR. Pt denies CP and SOB. Heparin gtt 2200 with recheck at 2030. IV zosyn. Ab wound with wound vac in place. Incontinent of bowel. Mccarty removed today. Tolerating full liquid diet. Pt endorses back pain and treated with prn tramadol and ice packs. Head CT was neg today.   Plan: will need LTACH at discharge. If hgb stable overnight and no blood in urine transition heparin to po anticoag.

## 2025-04-17 NOTE — PROGRESS NOTES
[facility]  Hospitalist Progress Note   04/17/2025          Assessment and Plan:       Richar Davis is a 69 year old male with a history of diabetes, hypertension, CABG, status post AVR with bovine valve 10/2022, untreated sleep apnea, hypertension, hyperlipidemia, neuropathy, BPH admitted on 3/30/2025 with abdominal pain nausea and vomiting.     CT imaging on admission showed Gallstone ileus. Distal small bowel obstruction secondary to an impacted 2.2 cm gallstone. Underwent Exploratory laparotomy with small bowel enterotomy and removal of gallstone on 3/30. Postoperative course complicated with ileus, atrial fibrillation with RVR requiring cardioversion, postoperative fever, hypoxia.   On 4/8 had elevated lactate with worsening abdominal pain, returned back to the OR for exploratory laparotomy with small bowel resection.postprocedure patient intubated on pressor support, temporary abdominal closure admitted to ICU. Returned to OR on 4/10 for small bowel anastomosis and abdominal Wound VAC placement.  Pressors weaned off, patient extubated 4/11 and transferred to hospitalist service on 4/12.  Patient continued to have abdominal pain, underwent IR guided abdominal drains placement on 4/15.    -Patient had pulled out his NG tube on 4/15, liquid diet started on 4/16.  Mental status postprocedure improving.    IR guided drain placement 4/15  Small bowel anastomosis and fascial closure 4/10.  Small bowel ileus with resection on 4/8  Exploratory laparotomy with small bowel enterotomy and removal of gallstone on 3/30.   Gallstone ileus, distal small bowel obstruction secondary to impacted 2.2 cm gallstone.  Enterococcus faecalis bacteremia -cleared  -CT imaging on admission showed Gallstone ileus. Distal small bowel obstruction secondary to an impacted 2.2 cm gallstone.   -Underwent Exploratory laparotomy with small bowel enterotomy and removal of gallstone on 3/30.   -Postoperative course  complicated with ileus.  NG tube removed 4/2, unable to tolerate oral diet.   -Had fever on 4/5, COVID/Flu/RSV which was negative.  BC x 2 no growth. CXR without overt pneumonia.  UA then not consistent with infection.  -On 4/7 spiked fever with significant leukocytosis, lactic acidosis. Blood cultures from 4/7, 4/8 with Enterococcus faecalis.  -Returned back to the OR for exploratory laparotomy with small bowel resection on 4/8.  -Postprocedure patient intubated on pressor support, temporary abdominal closure admitted to ICU.   -Returned to OR on 4/10 for small bowel anastomosis and abdominal wound VAC placement.    -Postprocedure continued to have distended abdomen with bloody output.  -CT on 4/14 showing multiple peripherally enhancing fluid collections in the abdomen which are suspicious for developing abscesses. The largest of these is in the left paracolic gutter. Stable appearance of a contracted gallbladder containing a large gallstone which is fistulized to the duodenum with associated pneumobilia.  --4/15 underwent IR guided abdominal drain placements x 2.  Patient removed NG tube by himself  --4/16 started on clear liquid diet after okay by surgery/speech therapy.  --4/17 does admit to some improvement in abdominal pain.  Draining serosanguineous fluid from abdominal drains, has wound VAC.  Per report early this morning noted small amount of blood on the sheets/multiple loose bowel movements overnight.  On intravenous heparin drip.  Monitor hemoglobin this afternoon.  --General Surgery following. Defer postoperative care including postoperative nutritional support/oral intake/fluid resuscitation to surgical team.   -IR followed 4/16, watch outputs, CT in future or removal as outputs < 10 mL daily.   -Infectious disease following.  Continue IV Zosyn  Appreciate multidisciplinary team input  -Continue IV Zosyn. Blood Cultures from 4/10 no growth to date.  Drain cultures no growth to date  -Follow final  cultures.  Trend fever curve.    -Will need close follow-up CT imaging likely over the weekend.   New oral PPI continue oral PPI.    -- Will continue intravenous heparin drip as this morning noted blood on the sheets/switch to DOAC if hemoglobin stable on 4/18.   IV/p.o. as needed pain meds.  As needed muscle relaxants. Minimize narcotic use as able to given encephalopathy.  IV/p.o. as needed Zofran.    Postoperative atrial fibrillation with RVR -status post cardioversion 4/14/2025  New Aflutter with RVR 4/5/2025 status post DCCV on 4/7/2025.  Coronary artery disease status post CABG x 3 in October 2022  Aortic stenosis (status post AVR with bovine valve) October 2022.  Hypertension  Hyperlipidemia.  -Postoperatively developed new aflutter with RVR, was on intravenous heparin drip, rate controlled with Cardizem drip and initiated oral metoprolol.   --Cardiology followed underwent cardioversion on 4/7.   --Heparin was paused as patient back to OR on 4/8 and was started on intravenous heparin during ICU stay.  --On 4/14 patient again developed recurrence of A-fib with RVR with heart rate in 140s.  Initiated on amiodarone drip, underwent successful cardioversion on 4/14.  Currently in sinus rhythm with heart rate in 70s to 80s.  --Echocardiogram 4/15 with EF of 55%.  Right ventricle normal size.  No valve disease.    --Cardiology signed off 4/15, continue with amiodarone loading and switch to oral amiodarone.  --Continue oral amiodarone taper: 200 mg BID x 2 weeks ( starting 4/16), then 200 mg every day.  EKG in coming days for QTc.  Continue IV heparin drip. Switch to oral Eliquis likely 4/18 [cherry colored urine, nursing noted blood on the sheets this morning, has CT head pending]  Continue Oral metoprolol 25 mg twice daily.  Continue to hold Lipitor in the setting of acute illness.  As needed Lasix as needed for diuresis.   Telemetry monitoring.  Strict intake output monitoring.  Daily weights.    Acute  hypoxia-likely from atelectasis, bilateral pleural effusion, postoperative narcotic use, ALIREZA, deconditioning  CT chest from 4/14 with New small bilateral pleural effusions with overlying atelectasis/consolidation.  Afebrile.  Leukocytosis improved.  Weaned off oxygen.  This morning on room air.  IS, ambulate     Acute encephalopathy likely multifactorial from anesthesia/sedation for procedure/delirium, hospitalization/ICU stay/narcotic use.  Patient with prolonged hospital stay -multiple procedures, ICU stay, on narcotics.  -4/17 -mentation improving but continues to have intermittent confusion.  --Discussed with patient's wife, CT head to rule out intracranial pathology before transitioning heparin to DOAC.  Closely monitor mental status.    Neurochecks  Hold QT prolonging medications including Seroquel, while on amiodarone    Acute anemia likely from surgical blood loss, acute illness, dilutional.  Hemoglobin 11.7.    As above concern for some blood on the sheets/cherry colored urine.  Monitor hemoglobin levels twice daily.  If stable switch to DOAC in a.m.  Hold heparin drip if any active bleeding    Severe hypoalbuminemia likely from acute illness.  Dilutional.  Serum albumin of 2.1.    Nutritional intake per surgical team    Acute on chronic bilateral lower extremity edema.  Noted cold right lower extremity, dopplerable pulses per report.  Pitting edema present.  PTA oral Lasix on hold since admission.  Ultrasound bilateral lower extremities -no DVT.  Monitor    Mild hypernatremia.  Mild dehydration.  Oral intake per surgical team.    Acute kidney injury.  Resolved  Anion gap metabolic acidosis.  Resolved.  Monitor.    Hypokalemia from poor intake.  Potassium 3.3.  On replacement protocol    Type 2 diabetes mellitus with a hemoglobin A1c of 6.8  Hold PTA metformin.  Insulin sliding scale.    Urinary retention  Traumatic hematuria.  BPH:   Urine cultures from 4/14 no growth  Mccarty catheter removal per surgical  team -encourage early Mccarty catheter removal, discussed with nursing on 4/17  Resume PTA Flomax     Neuropathy:   *Neurontin 800 mg 3 times daily as needed has been filled but patient reports taking 300mg TID  *4/1 per pharmacy note only recent fills are 800 mg and not 300 mg dosing.   Gabapentin on hold since admission, continue to hold -will resume at 100 mg twice daily on 4/17    Nicotine use: Chews tobacco  -Continue nicotine patch, and as needed nicotine lozenges.    Long term cessation to be encouraged during stay.     ALIREZA not on CPAP.  Recommend sleep studies as outpatient     Medically complicated obesity with a BMI of 41.  Increase all-cause mortality and morbidity.  Consider lifestyle modification with diet and exercise as able to.     Physical deconditioning from medical illness.  PT, OT evaluation requested.  Does not encourage up out of bed to chair as able to.  Fall precautions    Clinically Significant Risk Factors        # Hypokalemia: Lowest K = 3.3 mmol/L in last 2 days, will replace as needed   # Hyperchloremia: Highest Cl = 112 mmol/L in last 2 days, will monitor as appropriate          # Hypoalbuminemia: Lowest albumin = 1.9 g/dL at 4/9/2025  5:25 AM, will monitor as appropriate     # Hypertension: Noted on problem list           # DMII: A1C = 6.8 % (Ref range: <5.7 %) within past 6 months   # Severe Obesity: Estimated body mass index is 41.62 kg/m  as calculated from the following:    Height as of this encounter: 1.829 m (6').    Weight as of this encounter: 139.2 kg (306 lb 14.4 oz).        # Financial/Environmental Concerns: none   # History of CABG: noted on surgical history        Orders Placed This Encounter      Full Liquid Diet (Supervision, sit at 90 degrees, small bites/sips, alternate textures, slow rate)      DVT Prophylaxis: SCDs, on intravenous heparin drip  Code Status: Full Code  Disposition: Expected discharge in greater than > 3 days pending clinical improvement.  Discontinue  [facility] [place]    Medically Ready for Discharge: Anticipated in 2-4 Days     Discussed with patient, bedside RN, care team.  Updated patient's wife by the bedside 4/17  Total time greater than 51 minutes. more than 70% of time spent in direct patient care, care coordination, patient counseling, and formalizing plan of care.      Tim Mays MD        Interval History:        Patient lying in bed.  Appears comfortable, awake and able to answer most questions.  Denies any chest pain or palpitations.  Denies any headache or dizziness.  Improvement in shortness of breath, on room air this morning  Reports abdominal pain improving.  Having abdominal wound VAC, intra-abdominal drains.  On liquid diet.  On intravenous heparin drip.  Report noted blood on sheets this morning, multiple loose stools overnight.  Cherry colored urine.  Continues to have Mccarty catheter, discussed with nursing for removal if okay with surgical team.    Telemetry sinus rhythm.  Receiving as needed pain meds.  Continues to have intermittent confusion.  Patient reports he would like to get out of bed today, per nursing report assist of 2 with lift ( has not ambulated out of bed yet]   Patient's wife at the bedside, reports some improvement in mentation but not at baseline yet.         Physical Exam:        Physical Exam   Temp:  [97.4  F (36.3  C)-98  F (36.7  C)] 97.4  F (36.3  C)  Pulse:  [69-87] 78  Resp:  [13-38] 20  BP: (102-185)/(73-99) 184/84  SpO2:  [79 %-100 %] 98 %    Intake/Output Summary (Last 24 hours) at 4/14/2025 0905  Last data filed at 4/14/2025 0834  Gross per 24 hour   Intake 0 ml   Output 1670 ml   Net -1670 ml       Admission Weight: 129.3 kg (285 lb)  Current Weight: (!) 144.7 kg (319 lb 0.1 oz)    PHYSICAL EXAM  GENERAL: Patient awake, able to answer most questions  HEENT: Oropharynx pink  HEART: Regular rate and rhythm. S1S2  LUNGS: Bilateral coarse breath sounds. Respirations unlabored   ABDOMEN: Distended, wound VAC  present.Bowel sounds heard.   Drains present  NEURO: Moving all extremities  EXTREMITIES: +pedal edema.   SKIN: Brusing   PSYCHIATRY Cooperative       Medications:        Current Facility-Administered Medications   Medication Dose Route Frequency Provider Last Rate Last Admin    [Held by provider] acetaminophen (TYLENOL) tablet 975 mg  975 mg Oral or NG Tube Q8H Clau Calles MD        amiodarone (PACERONE) tablet 200 mg  200 mg Oral BID Richa Schmidt PA-C   200 mg at 04/17/25 0908    Followed by    [START ON 4/29/2025] amiodarone (PACERONE) tablet 200 mg  200 mg Oral Daily Richa Schmidt PA-C        [Held by provider] atorvastatin (LIPITOR) tablet 80 mg  80 mg Oral or NG Tube Daily Khari Peña MD        [Held by provider] folic acid (FOLVITE) tablet 1,000 mcg  1,000 mcg Oral or NG Tube BID Khari Peña MD        [Held by provider] furosemide (LASIX) tablet 20 mg  20 mg Oral Daily Mann Torres DO   20 mg at 04/04/25 1017    insulin aspart (NovoLOG) injection (RAPID ACTING)  1-7 Units Subcutaneous Q4H Tim Mays MD        metoprolol tartrate (LOPRESSOR) tablet 25 mg  25 mg Oral BID Hayley Vale APRN CNP   25 mg at 04/17/25 0908    nicotine (NICODERM CQ) 21 MG/24HR 24 hr patch 1 patch  1 patch Transdermal Daily Hayley Vale APRN CNP   1 patch at 04/17/25 0910    pantoprazole (PROTONIX) 2 mg/mL suspension 40 mg  40 mg Per Feeding Tube QAM AC Hayley Vale APRN CNP        piperacillin-tazobactam (ZOSYN) 4.5 g vial to attach to  mL bag  4.5 g Intravenous Q6H Hayley Vale APRN  mL/hr at 04/12/25 0548 4.5 g at 04/17/25 0542    polyethylene glycol (MIRALAX) Packet 17 g  17 g Oral Daily Ana Dougherty MD   17 g at 04/08/25 0805    QUEtiapine (SEROquel) tablet 25 mg  25 mg Oral BID Hayley Vale APRN CNP   25 mg at 04/17/25 0908    senna-docusate (SENOKOT-S/PERICOLACE) 8.6-50 MG per tablet 1 tablet  1 tablet Oral BID Ana Dougherty  MD DAYNA        sodium chloride (PF) 0.9% PF flush 3 mL  3 mL Intracatheter Q8H [facility] Hayley Vale APRN CNP   3 mL at 04/16/25 0608    [Held by provider] tamsulosin (FLOMAX) capsule 0.8 mg  0.8 mg Oral QPM Khari Peña MD   0.8 mg at 04/07/25 2031     Current Facility-Administered Medications   Medication Dose Route Frequency Provider Last Rate Last Admin    benzocaine-menthol (CHLORASEPTIC) 6-10 MG lozenge 1 lozenge  1 lozenge Buccal Q1H PRN Hayley Vale APRN CNP   1 lozenge at 04/01/25 0334    bisacodyl (DULCOLAX) suppository 10 mg  10 mg Rectal Daily PRN Hayley Vale APRN CNP        carboxymethylcellulose PF (REFRESH PLUS) 0.5 % ophthalmic solution 1 drop  1 drop Both Eyes Q1H PRN Hayley Vale APRN CNP        glucose gel 15-30 g  15-30 g Oral Q15 Min PRN Tim Mays MD        Or    dextrose 50 % injection 25-50 mL  25-50 mL Intravenous Q15 Min PRN Tim Mays MD        Or    glucagon injection 1 mg  1 mg Subcutaneous Q15 Min PRN Tim Mays MD        diazepam (VALIUM) injection 2.5 mg  2.5 mg Intravenous Q6H PRN Hayley Vale APRN CNP        diphenhydrAMINE (BENADRYL) elixir 12.5 mg  12.5 mg Oral Q6H PRN Hayley Vale APRN CNP        Or    diphenhydrAMINE (BENADRYL) injection 12.5 mg  12.5 mg Intravenous Q6H PRN Hayley Vale APRN CNP        fentaNYL (PF) (SUBLIMAZE) injection 25-50 mcg  25-50 mcg Intravenous Q5 Min PRN Tavo Larsen MD   25 mcg at 04/15/25 1114    flumazenil (ROMAZICON) injection 0.2 mg  0.2 mg Intravenous q1 min prn Tavo Larsen MD        [Held by provider] gabapentin (NEURONTIN) tablet 800 mg  800 mg Oral TID PRN Clau Calles MD        Give   of usual dose of LONG ACTING insulin AM of procedure IF diabetic   Does not apply Continuous PRN Richa Schmidt PA-C        HOLD digoxin (LANOXIN)  AM of procedure IF on digoxin   Does not apply HOLD Richa Schmidt PA-C        HOLD: Insulin - RAPID/SHORT  acting AM of procedure IF diabetic   Does not apply HOLD Richa Schmidt PA-C        HOLD: Insulin - REGULAR AM of procedure IF diabetic   Does not apply HOLD Richa Schmidt PA-C        HOLD: Oral hypoglycemics AM of procedure IF diabetic   Does not apply HOLD Richa Schmidt PA-C        HYDROmorphone (DILAUDID) injection 0.2 mg  0.2 mg Intravenous Q4H PRN Tim Mays MD   0.2 mg at 04/15/25 1737    HYDROmorphone (PF) (DILAUDID) injection 0.3 mg  0.3 mg Intravenous Q4H PRN Tim Mays MD        lidocaine (LMX4) cream   Topical Q1H PRN Hayley Vale APRN CNP        lidocaine 1 % 0.1-1 mL  0.1-1 mL Other Q1H PRN Hayley Vale APRN CNP        magnesium hydroxide (MILK OF MAGNESIA) suspension 30 mL  30 mL Oral Daily PRN Hayley Vale APRN CNP   30 mL at 04/01/25 0427    magnesium sulfate 2 g in 50 mL sterile water intermittent infusion  2 g Intravenous Once PRN Richa Schmidt PA-C        May take oral meds with a sip of water, the morning of Cardioversion procedure.       Does not apply Continuous PRN Richa Schmidt PA-C        melatonin tablet 10 mg  10 mg Oral At Bedtime PRN Hayley Vale APRN CNP   10 mg at 04/12/25 0042    methocarbamol (ROBAXIN) half-tab 250 mg  250 mg Oral Q6H PRN Hayley Vale APRN CNP   250 mg at 04/16/25 1047    metoprolol (LOPRESSOR) injection 5 mg  5 mg Intravenous Q4H PRN Hayley Vale APRN CNP   5 mg at 04/14/25 0806    midazolam (VERSED) injection 0.5-2 mg  0.5-2 mg Intravenous Q4 Min PRN Tavo Larsen MD   0.5 mg at 04/15/25 1112    naloxone (NARCAN) injection 0.2 mg  0.2 mg Intravenous Q2 Min PRN Hayley Vale APRN CNP        Or    naloxone (NARCAN) injection 0.4 mg  0.4 mg Intravenous Q2 Min PRN Hayley Vale APRN CNP        Or    naloxone (NARCAN) injection 0.2 mg  0.2 mg Intramuscular Q2 Min PRN Hayley Vale APRN CNP        Or    naloxone (NARCAN) injection 0.4 mg  0.4 mg Intramuscular Q2  Min PRN Hayley Vale APRN CNP        nicotine (COMMIT) lozenge 2 mg  2 mg Buccal Q1H PRN Hayley Vale APRN CNP   2 mg at 04/03/25 1145    No lozenges or gum should be given while patient on BIPAP/AVAPS/AVAPS AE   Does not apply Continuous PRN Hayley Vale APRN CNP        ondansetron (ZOFRAN ODT) ODT tab 4 mg  4 mg Oral Q6H PRN Hayley Vale APRN CNP   4 mg at 04/08/25 0208    Or    ondansetron (ZOFRAN) injection 4 mg  4 mg Intravenous Q6H PRN Hayley Vale APRN CNP   4 mg at 04/11/25 1232    Patient may continue current oral medications   Does not apply Continuous PRN Hayley Vale APRN CNP        phenol (CHLORASEPTIC) 1.4 % spray 1 mL  1 spray Mouth/Throat Q1H PRN Hayley Vale APRN CNP        potassium chloride eboni ER (KLOR-CON M20) CR tablet 40 mEq  40 mEq Oral Once PRN Richa Schmidt PA-C        prochlorperazine (COMPAZINE) injection 5 mg  5 mg Intravenous Q6H PRN Hayley Vale APRN CNP   5 mg at 04/08/25 1103    Or    prochlorperazine (COMPAZINE) tablet 5 mg  5 mg Oral Q6H PRN Hayley Vale APRN CNP   5 mg at 04/03/25 0756    sodium chloride (PF) 0.9% PF flush 3 mL  3 mL Intracatheter q1 min prn Hayley Vale APRN CNP        traMADol (ULTRAM) half-tab 25 mg  25 mg Oral Q6H PRN Tim Mays MD        traMADol (ULTRAM) tablet 50 mg  50 mg Oral Q6H PRN Tim Mays MD   50 mg at 04/17/25 0547            Data:      All new lab and imaging data was reviewed.

## 2025-04-18 ENCOUNTER — APPOINTMENT (OUTPATIENT)
Dept: CT IMAGING | Facility: CLINIC | Age: 70
DRG: 329 | End: 2025-04-18
Attending: NURSE PRACTITIONER
Payer: COMMERCIAL

## 2025-04-18 ENCOUNTER — APPOINTMENT (OUTPATIENT)
Dept: OCCUPATIONAL THERAPY | Facility: CLINIC | Age: 70
DRG: 329 | End: 2025-04-18
Attending: HOSPITALIST
Payer: COMMERCIAL

## 2025-04-18 ENCOUNTER — APPOINTMENT (OUTPATIENT)
Dept: PHYSICAL THERAPY | Facility: CLINIC | Age: 70
DRG: 329 | End: 2025-04-18
Attending: HOSPITALIST
Payer: COMMERCIAL

## 2025-04-18 VITALS
OXYGEN SATURATION: 96 % | SYSTOLIC BLOOD PRESSURE: 122 MMHG | HEART RATE: 66 BPM | BODY MASS INDEX: 41.57 KG/M2 | DIASTOLIC BLOOD PRESSURE: 66 MMHG | RESPIRATION RATE: 20 BRPM | WEIGHT: 306.9 LBS | HEIGHT: 72 IN | TEMPERATURE: 97.6 F

## 2025-04-18 LAB
GLUCOSE BLDC GLUCOMTR-MCNC: 108 MG/DL (ref 70–99)
GLUCOSE BLDC GLUCOMTR-MCNC: 110 MG/DL (ref 70–99)
GLUCOSE BLDC GLUCOMTR-MCNC: 111 MG/DL (ref 70–99)
GLUCOSE BLDC GLUCOMTR-MCNC: 118 MG/DL (ref 70–99)
GLUCOSE BLDC GLUCOMTR-MCNC: 135 MG/DL (ref 70–99)
GLUCOSE BLDC GLUCOMTR-MCNC: 135 MG/DL (ref 70–99)
HGB BLD-MCNC: 11 G/DL (ref 13.3–17.7)
UFH PPP CHRO-ACNC: 0.27 IU/ML

## 2025-04-18 PROCEDURE — 99232 SBSQ HOSP IP/OBS MODERATE 35: CPT | Performed by: PHYSICIAN ASSISTANT

## 2025-04-18 PROCEDURE — 250N000013 HC RX MED GY IP 250 OP 250 PS 637: Performed by: NURSE PRACTITIONER

## 2025-04-18 PROCEDURE — 120N000001 HC R&B MED SURG/OB

## 2025-04-18 PROCEDURE — 250N000011 HC RX IP 250 OP 636: Performed by: NURSE PRACTITIONER

## 2025-04-18 PROCEDURE — 97165 OT EVAL LOW COMPLEX 30 MIN: CPT | Mod: GO

## 2025-04-18 PROCEDURE — 250N000013 HC RX MED GY IP 250 OP 250 PS 637

## 2025-04-18 PROCEDURE — 99233 SBSQ HOSP IP/OBS HIGH 50: CPT | Performed by: HOSPITALIST

## 2025-04-18 PROCEDURE — 97605 NEG PRS WND THER DME<=50SQCM: CPT

## 2025-04-18 PROCEDURE — 250N000013 HC RX MED GY IP 250 OP 250 PS 637: Performed by: HOSPITALIST

## 2025-04-18 PROCEDURE — 97530 THERAPEUTIC ACTIVITIES: CPT | Mod: GP

## 2025-04-18 PROCEDURE — G0463 HOSPITAL OUTPT CLINIC VISIT: HCPCS | Mod: 25

## 2025-04-18 PROCEDURE — 76080 X-RAY EXAM OF FISTULA: CPT

## 2025-04-18 PROCEDURE — 250N000011 HC RX IP 250 OP 636: Performed by: HOSPITALIST

## 2025-04-18 PROCEDURE — 85018 HEMOGLOBIN: CPT | Performed by: HOSPITALIST

## 2025-04-18 PROCEDURE — 85520 HEPARIN ASSAY: CPT | Performed by: HOSPITALIST

## 2025-04-18 PROCEDURE — 97530 THERAPEUTIC ACTIVITIES: CPT | Mod: GO

## 2025-04-18 PROCEDURE — 97535 SELF CARE MNGMENT TRAINING: CPT | Mod: GO

## 2025-04-18 PROCEDURE — 36415 COLL VENOUS BLD VENIPUNCTURE: CPT | Performed by: HOSPITALIST

## 2025-04-18 RX ORDER — GABAPENTIN 100 MG/1
100 CAPSULE ORAL 2 TIMES DAILY
Status: DISCONTINUED | OUTPATIENT
Start: 2025-04-18 | End: 2025-04-29 | Stop reason: HOSPADM

## 2025-04-18 RX ORDER — ATORVASTATIN CALCIUM 10 MG/1
10 TABLET, FILM COATED ORAL EVERY EVENING
Status: DISCONTINUED | OUTPATIENT
Start: 2025-04-18 | End: 2025-04-21

## 2025-04-18 RX ADMIN — GABAPENTIN 100 MG: 100 CAPSULE ORAL at 20:03

## 2025-04-18 RX ADMIN — PIPERACILLIN AND TAZOBACTAM 4.5 G: 4; .5 INJECTION, POWDER, FOR SOLUTION INTRAVENOUS at 05:12

## 2025-04-18 RX ADMIN — TAMSULOSIN HYDROCHLORIDE 0.4 MG: 0.4 CAPSULE ORAL at 20:03

## 2025-04-18 RX ADMIN — AMIODARONE HYDROCHLORIDE 200 MG: 200 TABLET ORAL at 09:11

## 2025-04-18 RX ADMIN — AMIODARONE HYDROCHLORIDE 200 MG: 200 TABLET ORAL at 20:03

## 2025-04-18 RX ADMIN — APIXABAN 5 MG: 5 TABLET, FILM COATED ORAL at 09:18

## 2025-04-18 RX ADMIN — HEPARIN SODIUM 2200 UNITS/HR: 10000 INJECTION, SOLUTION INTRAVENOUS at 02:38

## 2025-04-18 RX ADMIN — NICOTINE 1 PATCH: 21 PATCH, EXTENDED RELEASE TRANSDERMAL at 09:12

## 2025-04-18 RX ADMIN — METOPROLOL TARTRATE 25 MG: 25 TABLET, FILM COATED ORAL at 20:03

## 2025-04-18 RX ADMIN — METOPROLOL TARTRATE 25 MG: 25 TABLET, FILM COATED ORAL at 09:11

## 2025-04-18 RX ADMIN — PIPERACILLIN AND TAZOBACTAM 4.5 G: 4; .5 INJECTION, POWDER, FOR SOLUTION INTRAVENOUS at 11:33

## 2025-04-18 RX ADMIN — ATORVASTATIN CALCIUM 10 MG: 10 TABLET, FILM COATED ORAL at 20:03

## 2025-04-18 RX ADMIN — PIPERACILLIN AND TAZOBACTAM 4.5 G: 4; .5 INJECTION, POWDER, FOR SOLUTION INTRAVENOUS at 18:13

## 2025-04-18 RX ADMIN — TRAMADOL HYDROCHLORIDE 25 MG: 50 TABLET, FILM COATED ORAL at 10:51

## 2025-04-18 RX ADMIN — QUETIAPINE FUMARATE 25 MG: 25 TABLET ORAL at 20:03

## 2025-04-18 RX ADMIN — APIXABAN 5 MG: 5 TABLET, FILM COATED ORAL at 20:03

## 2025-04-18 RX ADMIN — PANTOPRAZOLE SODIUM 40 MG: 20 TABLET, DELAYED RELEASE ORAL at 06:50

## 2025-04-18 RX ADMIN — QUETIAPINE FUMARATE 25 MG: 25 TABLET ORAL at 09:12

## 2025-04-18 ASSESSMENT — ACTIVITIES OF DAILY LIVING (ADL)
ADLS_ACUITY_SCORE: 63
ADLS_ACUITY_SCORE: 67
ADLS_ACUITY_SCORE: 63
ADLS_ACUITY_SCORE: 65
ADLS_ACUITY_SCORE: 63
ADLS_ACUITY_SCORE: 65
ADLS_ACUITY_SCORE: 61
ADLS_ACUITY_SCORE: 63
ADLS_ACUITY_SCORE: 67
ADLS_ACUITY_SCORE: 63
ADLS_ACUITY_SCORE: 55
ADLS_ACUITY_SCORE: 55
ADLS_ACUITY_SCORE: 63
ADLS_ACUITY_SCORE: 55

## 2025-04-18 NOTE — PROGRESS NOTES
Richar Davis in CT for a CT sinogram. Initial imaging was reviewed and the left drain is in the subcutaneous tissues. Only the Right drain was injected with 25 mL contrast.     Outputs from the Right side have been 120 to 220 mL daily, clear, yellow fluid. NGTD.     Left drain was removed intact without issues. Gauze and tape placed over the site.     Will wait for the dictation for further results    Total time: 20 minutes     Thanks, Floresita [facility] Interventional Radiology CNP ([phone]) (phone [phone])

## 2025-04-18 NOTE — PROGRESS NOTES
"   04/18/25 1600   Appointment Info   Signing Clinician's Name / Credentials (OT) Kim Montgomery, OTR/L   Rehab Comments (OT) A x 2   Living Environment   People in Home spouse   Current Living Arrangements house   Home Accessibility stairs to enter home;stairs within home   Number of Stairs, Main Entrance 3   Stair Railings, Main Entrance railings on both sides of stairs   Number of Stairs, Within Home, Primary greater than 10 stairs   Stair Railings, Within Home, Primary railing on right side (ascending)   Transportation Anticipated agency   Self-Care   Usual Activity Tolerance good   Current Activity Tolerance poor   Equipment Currently Used at Home shower chair;commode chair  ( note states, -\"shower chair, commode chair\")   Activity/Exercise/Self-Care Comment Pt unable to provide subjective hx (cog status). Gathered from PT kobyal, \"Since hospital stay, most recent therapy appointment was on 4/2 where he was ambulatory. Since then has not been medically appropriate for therapies\". Per  note from earlier in admission, pt was mod I-ind at baseline with ADLs.   Instrumental Activities of Daily Living (IADL)   IADL Comments When asked if pt drives, he said \"yes, I share the road\". Reported ind with med mgmt. Per SW note, was previously ind with IADLs.   General Information   Onset of Illness/Injury or Date of Surgery 03/30/25   Referring Physician Tim Mays MD   Patient/Family Therapy Goal Statement (OT) not stated   Additional Occupational Profile Info/Pertinent History of Current Problem 69 year old male with a history of well controlled diabetes, CAD, aortic stenosis s/p AVR with bovine valve, and HTN who presented to the hospital with gallstone ileus and is s/p exploratory laparotomy with removal of gallstone. Post-operatively he developed atrial flutter with RVR requiring cardioversion, gross sucus breakdown of enterotomy requiring return to OR on 4/8/25 and 4/10/25, E. Faecalis bacteremia, and " delirium.   Existing Precautions/Restrictions fall;abdominal   Left Upper Extremity (Weight-bearing Status) partial weight-bearing (PWB)   Right Upper Extremity (Weight-bearing Status) partial weight-bearing (PWB)   Left Lower Extremity (Weight-bearing Status) full weight-bearing (FWB)   Right Lower Extremity (Weight-bearing Status) full weight-bearing (FWB)   General Observations and Info abdominal wound vac   Cognitive Status Examination   Cognitive Status Comments oriented to year. unable to state month. believed we are in [place]. non-sensical, potential dilerium. will cont to assess. moderate command following.   Visual Perception   Impact of Vision Impairment on Function (Vision) glasses in room, unable to assess this date.   Pain Assessment   Patient Currently in Pain   (pt did not report pain during mobility)   Posture   Posture forward head position;protracted shoulders   Range of Motion Comprehensive   Comment, General Range of Motion B UE WFL, LE impaired (difficulty reaching figure 4)   Strength Comprehensive (MMT)   Comment, General Manual Muscle Testing (MMT) Assessment general deconditioning/weakness   Bed Mobility   Comment (Bed Mobility) log roll mod-max A x 2   Balance   Balance Comments sitting EOB SBA-CGA, noted fatigue over time   Activities of Daily Living   BADL Assessment/Intervention bathing;upper body dressing;lower body dressing;grooming;toileting   Bathing Assessment/Intervention   [place] Level (Bathing) dependent (less than 25% patient effort)   Comment, (Bathing) per clinical judgment   Upper Body Dressing Assessment/Training   Comment, (Upper Body Dressing) per clinical judgment   [place] Level (Upper Body Dressing) moderate assist (50% patient effort);maximum assist (25% patient effort)   Lower Body Dressing Assessment/Training   Comment, (Lower Body Dressing) per clinical judgment   [place] Level (Lower Body Dressing) maximum assist (25% patient effort);dependent  (less than 25% patient effort)   Grooming Assessment/Training   [place] Level (Grooming) set up;minimum assist (75% patient effort)   Comment, (Grooming) sitting EOB with SBA-CGA and cuing   Toileting   Comment, (Toileting) per clinical judgment   [place] Level (Toileting) dependent (less than 25% patient effort)   Clinical Impression   Criteria for Skilled Therapeutic Interventions Met (OT) Yes, treatment indicated   OT Diagnosis Dec ADL ind   OT Problem List-Impairments impacting ADL problems related to;activity tolerance impaired;balance;cognition;communication;inability to direct their own care;mobility;range of motion (ROM);strength;pain;post-surgical precautions;postural control   Assessment of Occupational Performance 5 or more Performance Deficits   Identified Performance Deficits func mobility, all I/ADLs   Planned Therapy Interventions (OT) ADL retraining;IADL retraining;balance training;cognition;strengthening;transfer training   Clinical Decision Making Complexity (OT) detailed assessment/moderate complexity   Risk & Benefits of therapy have been explained evaluation/treatment results reviewed;care plan/treatment goals reviewed;risks/benefits reviewed;current/potential barriers reviewed;participants voiced agreement with care plan;participants included;patient   OT Total Evaluation Time   OT Eval, Low Complexity Minutes (81092) 8   OT Goals   Therapy Frequency (OT) 3 times/week   OT Predicted Duration/Target Date for Goal Attainment 05/02/25   OT Goals Hygiene/Grooming;Upper Body Dressing;Lower Body Dressing;OT Goal 1;Toilet Transfer/Toileting;Lower Body Bathing;Upper Body Bathing   OT: Hygiene/Grooming   (set up in sitting with SBA)   OT: Upper Body Dressing Minimal assist   OT: Lower Body Dressing Minimal assist   OT: Upper Body Bathing Minimal assist  (sitting)   OT: Lower Body Bathing Minimal assist  (sitting)   OT: Toilet Transfer/Toileting   (BSC tx min A x 2)   OT: Goal 1 Pt will follow  "commands 80% of the time with < min cuing to enhance ADL participation/delirium prev   Interventions   Interventions Quick Adds Self-Care/Home Management;Therapeutic Activity   Self-Care/Home Management   Self-Care/Home Mgmt/ADL, Compensatory, Meal Prep Minutes (29228) 10   Symptoms Noted During/After Treatment (Meal Preparation/Planning Training) fatigue   Treatment Detail/Skilled Intervention Sitting EOB while rehab aide retreiving g/h items, SBA-CGA provided for safety. Pt cont to report he wanted to call his mom on his phone. Set up with phone. A to reach passcode screen, able to independently enter passcode and call spouse. Pt reported, \"I'm moving again\". Reoriented pt that he had already moved rooms and is now staying in room 2209. OT engaged pt in seated g/h task. Cues to engage in bilateral use of UEs, as pt heavily supporting body. SBA-CGA from rehab aide posterioriorly while OT anterior for safety. Poor coordination/accuracy noted with tooth paste, assist necessary to get onto brush. Tolerated tooth brushing approx 1 min sitting EOB, observed leaning back onto elbow. Cues to return to midline for balance and trunk control, somewhat complied with cuing. Attempted to have pt reach figure 4, however, unable to achieve. General re-orientation provided for delirium mgmt.   Therapeutic Activities   Therapeutic Activity Minutes (98934) 12   Symptoms noted during/after treatment fatigue   Treatment Detail/Skilled Intervention Pt greeted for OT eval/treat earlier in pm, returned once pt had been successfully moved to lift room. Rehab aide present for safety this date. Log roll with max cuing and mod-max A to mobilize LEs off EOB. Sit achieved with mod A x 2/repo sheets. Somewhat able to position self to EOB, bed rail interfering slightly with positioning. Initial mod A for sitting balance --> SBA-CGA once scooted forward. OT engaged pt in functional reaches for inc ADL strength/ROM, however, after 6 reps, pt " "reported, \"that's enough\". Noted fatigue over time in sitting position. Max A x 2 to return to supine via log roll and total A/bed features to boost. Pt non-sensical and required redirection throughout session. All needs met and alarm on for safety.   OT Discharge Planning   OT Plan g/h seated EOB (A x 2), SS to chair. UB dressing with command following/sequencing. delirium prev.   OT Discharge Recommendation (DC Rec) Transitional Care Facility  (per PT, pt may meetin LTACH med criteria)   OT Rationale for DC Rec Per chart, pt previously ind. At this time, poor cogniton and mobility, requiring A x 2. Rec TCU (pt may meet criteria for LTACH).   OT Brief overview of current status mod-max A x 2 bed mobility. Some A for g/h tasks. Goals of therapy will be to address safe mobility and make recs for d/c to next level of care. Pt and RN will continue to follow all falls risk precautions as documented by RN staff while hospitalized.   OT Total Distance Amb During Session (feet) 0   Total Session Time   Timed Code Treatment Minutes 22   Total Session Time (sum of timed and untimed services) 30   Psychosocial Support   Trust Relationship/Rapport care explained;emotional support provided;questions answered;reassurance provided;thoughts/feelings acknowledged   Care Plan Interventions   Supportive Measures active listening utilized   Positioning   Body Position turned     "

## 2025-04-18 NOTE — PROGRESS NOTES
[facility] Nurse Inpatient Assessment     Consulted for: coccyx and abdominal NPWT changes    Summary:   Coccyx: small blanchable elongated patches of erythema, skin intact-Signing off  2.    Abdomen: s/p small bowel resection with VAC placement in OR. NPWT midline wound changed 4/18 ~ 75 mmHG     WO nurse follow-up plan: Monday/WednesdayFriday     Patient History (according to provider note(s):       69 year old male with a history of diabetes, hypertension, CABG, status post AVR with bovine valve 10/2022, untreated sleep apnea, hypertension, hyperlipidemia, neuropathy, BPH admitted on 3/30/2025 with abdominal pain nausea and vomiting.  Imaging showed gallstone ileus with 2.2 cm gallstone impacted.  Underwent surgery on 3/30.  Postoperatively had issues with ileus, fever, and aflutter with RVR.       Returned back to the OR for exploratory laparotomy with small bowel resection.Post -op admission to the ICU, intubated and on pressor support with temporary abdominal closure. He returned to OR on 4/10  for small bowel anastomosis and abdominal.  Wound VAC in place.  Pressors weaned off, patient extubated 4/11 and transferred to hospitalist service on 4/12.    Assessment:      Areas visualized during today's visit: Focused: and Abdomen    Negative pressure wound therapy applied to: abdomen       Last photo: 4/18/25   Wound due to: Surgical Wound   Wound history/plan of care:    Surgical date: 4/10/25   Service following: Gen Surgery  Date Negative Pressure Wound Therapy initiated: 4/10/25   Interventions in place: repositioning, pressure redistribution with device or dressing, specialty surface in use, moisture/incontinence management, offloading, and elevation  Is patient s nutritional status compromised? no  If yes, what interventions are in place? N/A  Reason for initiating vac therapy? Need for accelerated granulation tissue  Which?of?the?following?co-morbidities?apply? Diabetes and  Obesity  If diabetic is patient on a diabetic management program? Yes   Is osteomyelitis present in wound? no   If yes what treatments are in place? N/A    Wound base: 95 % Granulation tissue and Adipose tissue, 5% slough     Palpation of the wound bed: normal       Drainage: moderate      Volume in cannister: 300ml     Last cannister change date: 4/15/25     Description of drainage: serosanguinous and bloody      Measurements (length x width x depth, in cm) 19 x 6.5  x  4.2 cm       Tunneling N/A      Undermining N/A   Periwound skin: Intact and Erythema- blanchable       Color: red       Temperature: normal    Odor: none   Pain: moderate, Irritable or crying out at intervals, tension to hands, feet and body, and unable to assess due to  confusion,   Pain intervention prior to dressing change: patient tolerated well, soaking, slow and gentle cares , and distraction  Treatment goal: Heal , Drainage control, Infection control/prevention, Increase granulation, Maintain (prevention of deterioration), and Protection  STATUS: improving   Supplies ordered: gathered, discussed with RN, and discussed with patient     Number of foam pieces removed from a wound (excluding foam for bridge) : 1 GranuFoam Black and 1 White foam   Verified this matched the number of foam pieces applied last dressing change: Yes   Number of foam pieces packed into wound (excluding foam for bridge) :  1 GranuFoam Black and 1 White foam       Copy for continuity.   4/15: [facility] received consult for coccyx. Skin intact, each buttock with elongated patch of erythema related to friction, patient has been intermittently confused and agitated per chart review. Nursing staff to monitor and moisturize skin.    Treatment Plan:     Negative pressure wound therapy plan:  Mon/Wed/Fri  Wound location: midline abdomen   Change Days: Mon/Wed/Fri by WOC RN    Supplies (including all accessories) used: medium Black foam , White foam, and Cavilon no sting barrier  "film  Cleanse with Vashe prior to replacing NPWT  Suction setting: -75   Methods used: Window paned all periwound skin with vac drape prior to applying sponge    Staff RN to assess integrity of dressing and ensure suction is set at appropriate level every shift.   Date canister. Chart canister output every shift. Change cannister weekly and PRN if full/occluded     Remove foam dressing and replace with BID normal saline moist gauze dressing if:   -a dressing failure which cannot be repaired within 2 hours   -patient is discharging to home without a home pump   -patient is discharging to a facility outside the local area   -if a dressing is a \"Silver Foam\", remove before Radiation Therapy or MRI     The hospital VAC pump is not to be discharged with the patient. Please disconnect the patient from the machine prior to discharge.  If a home VAC pump has been delivered, connect the home cannister to dressing tubing and the cannister to the home pump, turn on home pump  If the patient is transferring to a nearby facility with a VAC, the tubing can be disconnected, clamp tubing and cover the end with a glove, then can be reconnected if within 2 hours  If transfer will be longer than 2 hours, dressing must be removed and placed with a wet to moist gauze dressing for transfer    Generalized skin: Daily  Moisturize with body lotion of sween 24 daily      Orders: Reviewed    RECOMMEND PRIMARY TEAM ORDER: None, at this time  Education provided: importance of repositioning, plan of care, wound progress, Infection prevention , Moisture management, Hygiene, and Off-loading pressure  Discussed plan of care with: Patient and Nurse  Notify WOC if wound(s) deteriorate.  Nursing to notify the Provider(s) and re-consult the WOC Nurse if new skin concern.    DATA:     Current support surface: Standard  Standard gel mattress (Isoflex)  Containment of urine/stool: Incontinence Protocol and Incontinent pad in bed  BMI: Body mass index is " 41.62 kg/m .   Active diet order: Orders Placed This Encounter      Mechanical/Dental Soft Diet     Output: I/O last 3 completed shifts:  In: 390 [P.O.:390]  Out: 681 [Urine:351; Drains:330]     Labs:   Recent Labs   Lab 04/18/25  0952 04/17/25  0534 04/16/25  0303   ALBUMIN  --   --  2.2*   HGB 11.0*   < > 10.5*   WBC  --   --  8.1    < > = values in this interval not displayed.     Pressure injury risk assessment:   Sensory Perception: 3-->slightly limited  Moisture: 3-->occasionally moist  Activity: 2-->chairfast  Mobility: 2-->very limited  Nutrition: 2-->probably inadequate  Friction and Shear: 2-->potential problem  Nash Score: 14    LOLY NorthN, RN  Pager no longer is use, please contact through Anshul Landers group: [facility] Nurse [place]  Dept. Office Number: [phone]

## 2025-04-18 NOTE — PLAN OF CARE
Neuro: Alert and oriented x 4, forgetful, lethargic this shift, able to follow command, neuro intact  Vital signs: VSS  Respiratory: RA, denies GOMEZ  Pain:Denies   Tele:SR  Mobility:Assist of 2 with lift  Drips/Drains/IVF:Heparin gtt infusing at 2200 units/hr  Skin:scattered bruising, continuous abdominal wound VAC in place to 75 negative pressure, R and L TERRIE drain. R TERRIE drain with serosanguineous drain, minimal output on the L drain  Diet:Mechanical/dental   GI/:Incontinent of urine and bowel, one BM this shift. No bleeding noted this shift  Aggression color:green  Plan/Test/Consult:Continue with zosyn, switch to po anticoagulation  Labs:k, mg protocol  Misc:  Sitter at bedside for safety, swallows pill whole. Bed alarm on, call light within reach. Family updated about pt transfer to General Surgery unit    Other Important events this shift:  Admitted at , *** from **. Oriented to call light and room. Transferred to  *** at **. All belonging handed back to pt. Report given to ****   Education on bed alarm

## 2025-04-18 NOTE — PLAN OF CARE
Goal Outcome Evaluation:      Plan of Care Reviewed With: patient    Overall Patient Progress: improvingOverall Patient Progress: improving         Date & Time: 4/18/2025    7820--0199  Surgery/POD#:   3/30: enterotomy and removal gallstone  4/4 new A-flutter  4/7: cardioverted into SR  4/8 Bowel resection  4/10 Bowel resection and wound closure   4/14: A-flutter, cardioverted into SR  4/15 Drain placed for abd abscess     Behavior & Aggression: Green this shift   Fall Risk: Yes  Orientation:GOMEZ, pt was sleepy and lethargic   ABNL VS/O2:VSS on RA ex HTN at the times  ABNL Labs: See labs   Pain Management:Denies   Bowel/Bladder: Incontinent B/B. Loose stool BM x1  Drains: TERRIE drain x2. Wound vac on midline abd inc. CDI  Wounds/incisions: midline inc, TERRIE site x2  Diet:Full liquid   Activities: Ax2-3, lift  Number of times OUT OF BED this shift : 0  Tests/Procedures: N/A  Anticipated  DC Date: Penidng   Other information:  pt was very sleepy since arrived. Sleeping between cares. No blood noted in urine. Heparin infusing 2200 units/h. Plan to switch to po anticoagulation. Xa recheck AM. Sitter at bedside.

## 2025-04-18 NOTE — DISCHARGE INSTRUCTIONS
"Negative pressure wound therapy plan:  Mon/Wed/Fri  Wound location: midline abdomen   Change Days: Mon/Wed/Fri by C RN    Supplies (including all accessories) used: medium Black foam , White foam, and Cavilon no sting barrier film  Cleanse with Vashe prior to replacing NPWT  Suction setting: -75   Methods used: Window paned all periwound skin with vac drape prior to applying sponge     Staff RN to assess integrity of dressing and ensure suction is set at appropriate level every shift.   Date canister. Chart canister output every shift. Change cannister weekly and PRN if full/occluded      Remove foam dressing and replace with BID normal saline moist gauze dressing if:   -a dressing failure which cannot be repaired within 2 hours   -patient is discharging to home without a home pump   -patient is discharging to a facility outside the local area   -if a dressing is a \"Silver Foam\", remove before Radiation Therapy or MRI      The hospital VAC pump is not to be discharged with the patient. Please disconnect the patient from the machine prior to discharge.  If a home VAC pump has been delivered, connect the home cannister to dressing tubing and the cannister to the home pump, turn on home pump  If the patient is transferring to a nearby facility with a VAC, the tubing can be disconnected, clamp tubing and cover the end with a glove, then can be reconnected if within 2 hours  If transfer will be longer than 2 hours, dressing must be removed and placed with a wet to moist gauze dressing for transfer  "

## 2025-04-18 NOTE — PROGRESS NOTES
CLINICAL NUTRITION SERVICES - REASSESSMENT NOTE     Registered Dietitian Interventions:  - Trial supplements, pt noted to like chocolate ice cream. Will send Chocolate Ensure Max for protein push.        INFORMATION OBTAINED  Assessed patient in room. Pt was pretty drowsy during visit.     CURRENT NUTRITION ORDERS  Diet: Mechanical/Dental Soft(started 4/17)    CURRENT INTAKE/TOLERANCE  - Pt has been NPO or restricted to CLD/FLD for the majority of his 19 day admission.   - He ate both his breakfast and lunch today.      NEW FINDINGS  GI symptoms:  Daily stooling. BM x2 yesterday, x9 on 4/16.     Skin/wounds: Reviewed    Nutrition-relevant labs: Reviewed  Nutrition-relevant medications:  Medium sliding scale insulin   Bowel meds scheduled - held for loose stools   Weight: 139.2 kg - still elevated, suspect in setting of fluid status.     ASSESSED NUTRITION NEEDS  Dosing Weight: 90 kg, based on adjusted wt  Estimated Energy Needs: 2422-5941 kcals/day (20 - 25 kcals/kg)  Justification: Obese  Estimated Protein Needs: 108-135+ grams protein/day (1.2 - 1.5+ grams of pro/kg)  Justification: Post-op    MALNUTRITION  % Intake: </= 50% for >/= 5 days (severe)  % Weight Loss: Unable to assess   Subcutaneous Fat Loss: None observed  Muscle Loss: None observed  Fluid Accumulation/Edema: Moderate to severe, 2-4+  Malnutrition Diagnosis: Moderate malnutrition in the context of acute illness or injury  Malnutrition Present on Admission: No    EVALUATION OF THE PROGRESS TOWARD GOALS   Previous Goals  Patient to consume % of nutritionally adequate meal trays TID, or the equivalent with supplements/snacks.  Evaluation: Unable to meet  Diet adv >CLD vs nutrition support within 1-2 days  Evaluation: Unable to meet in timeframe     Previous Nutrition Diagnosis  Inadequate oral intake related to altered GI function as evidenced by 15 days with negligible PO, largely NPO/CLD   Evaluation: No change    NUTRITION DIAGNOSIS  Inadequate  oral intake related to altered GI function as evidenced by 19 days with negligible PO, largely NPO/liquid diet    INTERVENTIONS  See nutrition interventions above    GOALS  Patient to consume % of nutritionally adequate meal trays TID, or the equivalent with supplements/snacks.     MONITORING/EVALUATION  Progress toward goals will be monitored and evaluated per policy.    Mindy House RD, LD  Pager: [phone]  Available on [employer]

## 2025-04-18 NOTE — PLAN OF CARE
"Goal Outcome Evaluation:      Plan of Care Reviewed With: patient    Overall Patient Progress: improvingOverall Patient Progress: improving    Date & Time: 4/18/25 1426-6733  Behavior & Aggression: green  Fall Risk: yes  Orientation:A&OX3, unable to state year. Somewhat confused to exact situation. Forgetful. Per wife, orientation, \"much improved, but still a little off\".  ABNL VS/O2:VSS, RA  Pain Management:Ultram for back pain. Ice also applied.   Bowel/Bladder: Incontinent of urine. No BM this shift. Positive BS however.   Drains: Bilateral TERRIE drains, left TERRIE drain discontinued this afternoon.   Wounds/incisions: Left toe bandaid changed. Abd wound vac changed per WOC. Steph area/sacrum blanchable red, mepilex on. Elbows//heels dry skin. Repo q 2 hours.   Diet:Tolerating soft diet without nausea.   Number of times OUT OF BED this shift: Up to chair with PT and sarasteady, back to bed with lift. Pt weak.   Tests/Procedures: CT today, 1 TERRIE drain removed.   Anticipated  DC Date: pending.              "

## 2025-04-18 NOTE — PROGRESS NOTES
[facility]    Infectious Disease Progress Note    Date of Service (when I saw the patient): 04/18/2025     Assessment & Plan   Richar Davis is a 69 year old male who was admitted on 3/30/2025.     Impression:  69 year old male with a history of well controlled diabetes, CAD, aortic stenosis s/p AVR with bovine valve, and HTN who presented to the hospital with gallstone ileus and is s/p exploratory laparotomy with removal of gallstone. Post-operatively he developed atrial flutter with RVR requiring cardioversion, gross sucus breakdown of enterotomy requiring return to OR on 4/8/25 and 4/10/25, E. Faecalis bacteremia, and delirium.     -4/7/25 - 4/8/25 had significant leukocytosis and fever with noted sucus breakdown of enterotomy s/p exploratory laparotomy 4/8/25 followed by small bowel anastomosis and fascial closure 4/10/25.   -E. Faecalis bacteremia with positive blood cultures 4/7/25. Follow-up blood cultures 4/10/25 are no growth to date.   -A.fib/flutter s/p Cardioversion  -Now with abdominal fluid collections. S/p drain placement 4/15/25. Cultures are still no growth to date.   -Fevers and leukocytosis resolved.         Recommendations:   Continue Zosyn for now as this covers the E. Faecalis bacteremia as well as any possible intra-abdominal infection.   Following cultures from abdominal fluid collections, so far no growth.   Will re-eval patient on Monday. He may possibly finish 2 weeks of IV antibiotics in the hospital prior to discharge for treatment of bacteremia. Unclear if fluid collections truly infected.   Drain care per IR. Discussed with IR. They are considering a CT sinogram to evaluate diminished/hardly any output in one drain.   ID will follow peripherally over weekend. Please call us with questions.     Patient and plan discussed with Dr. Grady.      Clau Larson PA-C    Interval History   Tolerating antibiotics ok   No new rashes or issues with antibiotics    Labs reviewed   No changes to past medical, social or family history   Patient confused, with sitter in room. No complaints.       Physical Exam   Temp: 97.4  F (36.3  C) Temp src: Oral BP: 128/66 Pulse: 80   Resp: 20 SpO2: 97 % O2 Device: None (Room air)    Vitals:    04/10/25 2000 04/12/25 0400 04/15/25 0640   Weight: (!) 147.4 kg (324 lb 15.3 oz) (!) 144.7 kg (319 lb 0.1 oz) (!) 139.2 kg (306 lb 14.4 oz)     Vital Signs with Ranges  Temp:  [97.4  F (36.3  C)-98.3  F (36.8  C)] 97.4  F (36.3  C)  Pulse:  [66-80] 80  Resp:  [11-26] 20  BP: (116-152)/(66-95) 128/66  SpO2:  [77 %-97 %] 97 %    Constitutional: Awake, alert, cooperative, no apparent distress  Lungs: Clear to auscultation bilaterally, no crackles or wheezing  Cardiovascular: Regular rate and rhythm, normal S1 and S2, and no murmur noted  Abdomen: Normal bowel sounds, soft, non-distended, non-tender. Left drain with serous output. Right drain with serosanguineous output.   Skin: No rashes, no cyanosis, no edema  Other:    Medications   Current Facility-Administered Medications   Medication Dose Route Frequency Provider Last Rate Last Admin    Give   of usual dose of LONG ACTING insulin AM of procedure IF diabetic   Does not apply Continuous PRN Richa Schmidt PA-C        May take oral meds with a sip of water, the morning of Cardioversion procedure.       Does not apply Continuous PRN Richa Schmidt PA-C        No lozenges or gum should be given while patient on BIPAP/AVAPS/AVAPS AE   Does not apply Continuous PRN Hayely Vale APRN CNP        Patient may continue current oral medications   Does not apply Continuous PRN Hayley Vale APRN CNP         Current Facility-Administered Medications   Medication Dose Route Frequency Provider Last Rate Last Admin    [Held by provider] acetaminophen (TYLENOL) tablet 975 mg  975 mg Oral or NG Tube Q8H Clau Calles MD        amiodarone (PACERONE) tablet 200 mg  200 mg Oral BID Brayan,  ANNE Chaudhry   200 mg at 04/18/25 0911    Followed by    [START ON 4/29/2025] amiodarone (PACERONE) tablet 200 mg  200 mg Oral Daily Richa Schmidt PA-C        apixaban ANTICOAGULANT (ELIQUIS) tablet 5 mg  5 mg Oral BID Tim Mays MD   5 mg at 04/18/25 0918    [Held by provider] atorvastatin (LIPITOR) tablet 80 mg  80 mg Oral or NG Tube Daily Khari Peña MD        [Held by provider] folic acid (FOLVITE) tablet 1,000 mcg  1,000 mcg Oral or NG Tube BID Khari Peña MD        [Held by provider] furosemide (LASIX) tablet 20 mg  20 mg Oral Daily Mann Torres DO   20 mg at 04/04/25 1017    insulin aspart (NovoLOG) injection (RAPID ACTING)  1-7 Units Subcutaneous TID  Tim Mays MD   1 Units at 04/17/25 1839    insulin aspart (NovoLOG) injection (RAPID ACTING)  1-5 Units Subcutaneous At Bedtime Tim Mays MD        metoprolol tartrate (LOPRESSOR) tablet 25 mg  25 mg Oral BID Tim Mays MD   25 mg at 04/18/25 0911    nicotine (NICODERM CQ) 21 MG/24HR 24 hr patch 1 patch  1 patch Transdermal Daily Hayley Vale APRN CNP   1 patch at 04/18/25 0912    pantoprazole (PROTONIX) EC tablet 40 mg  40 mg Oral QAM AC Tim Mays MD   40 mg at 04/18/25 0650    piperacillin-tazobactam (ZOSYN) 4.5 g vial to attach to  mL bag  4.5 g Intravenous Q6H Hayley Vale APRN  mL/hr at 04/12/25 0548 4.5 g at 04/18/25 1133    polyethylene glycol (MIRALAX) Packet 17 g  17 g Oral Daily Ana Dougherty MD   17 g at 04/08/25 0805    QUEtiapine (SEROquel) tablet 25 mg  25 mg Oral BID Hayley Vale APRN CNP   25 mg at 04/18/25 0912    senna-docusate (SENOKOT-S/PERICOLACE) 8.6-50 MG per tablet 1 tablet  1 tablet Oral BID Ana Dougherty MD        sodium chloride (PF) 0.9% PF flush 3 mL  3 mL Intracatheter Q8H [facility] Hayley Vale APRN CNP   3 mL at 04/18/25 1133    tamsulosin (FLOMAX) capsule 0.4 mg  0.4 mg Oral QPM Tim Mays MD   0.4 mg  at 04/17/25 2016       Data   All microbiology laboratory data reviewed.  Recent Labs   Lab Test 04/18/25  0952 04/17/25  1341 04/17/25  0534 04/16/25  0303 04/15/25  0759 04/14/25  0925   WBC  --   --   --  8.1 8.1 9.9   HGB 11.0* 11.1* 10.9* 10.5* 10.4* 11.7*   HCT  --   --   --  32.8* 34.0* 36.3*   MCV  --   --   --  93 95 93   PLT  --   --   --  272 267 301     Recent Labs   Lab Test 04/17/25  0534 04/16/25  0303 04/15/25  0759   CR 0.70 0.72 0.70          04/15/2025 1130 04/17/2025 1606 Anaerobic Bacterial Culture Routine [[id number]]   Aspirate from Abdomen    Preliminary result Component Value   Culture No anaerobic organisms isolated after 2 days P             04/15/2025 1130 04/15/2025 1642 Gram Stain [[id number]]    Aspirate from Abdomen    Final result Component Value   GS Culture See corresponding culture for results   Gram Stain Result No organisms seen   Gram Stain Result 3+ WBC seen   Predominantly PMNs          04/15/2025 1130 04/18/2025 0729 Aspirate Aerobic Bacterial Culture Routine [[id number]]   Aspirate from Abdomen    Preliminary result Component Value   Culture No growth after 2 days P             04/15/2025 1115 04/17/2025 1606 Anaerobic Bacterial Culture Routine [[id number]]   Aspirate from Abdomen    Preliminary result Component Value   Culture No anaerobic organisms isolated after 2 days P             04/15/2025 1115 04/15/2025 1605 Gram Stain [[id number]]   Aspirate from Abdomen    Final result Component Value   GS Culture See corresponding culture for results   Gram Stain Result No organisms seen   Gram Stain Result 2+ WBC seen          04/15/2025 1115 04/18/2025 0729 Aspirate Aerobic Bacterial Culture Routine [[id number]]   Aspirate from Abdomen    Preliminary result Component Value   Culture No growth after 2 days P             04/14/2025 0855 04/16/2025 0512 Urine Culture [[id number]]   Urine, Catheter    Final result Component Value   Culture No Growth

## 2025-04-18 NOTE — PROGRESS NOTES
[facility]  Hospitalist Progress Note   04/18/2025          Assessment and Plan:       Richar Davis is a 69 year old male with a history of diabetes, hypertension, CABG, status post AVR with bovine valve 10/2022, untreated sleep apnea, hypertension, hyperlipidemia, neuropathy, BPH admitted on 3/30/2025 with abdominal pain nausea and vomiting.     CT imaging on admission showed Gallstone ileus. Distal small bowel obstruction secondary to an impacted 2.2 cm gallstone. Underwent Exploratory laparotomy with small bowel enterotomy and removal of gallstone on 3/30. Postoperative course complicated with ileus, atrial fibrillation with RVR requiring cardioversion, postoperative fever, hypoxia.   On 4/8 had elevated lactate with worsening abdominal pain, returned back to the OR for exploratory laparotomy with small bowel resection.postprocedure patient intubated on pressor support, temporary abdominal closure admitted to ICU. Returned to OR on 4/10 for small bowel anastomosis and abdominal Wound VAC placement.  Pressors weaned off, patient extubated 4/11 and transferred to hospitalist service on 4/12.  Patient continued to have abdominal pain, underwent IR guided abdominal drains placement on 4/15.    -Patient had pulled out his NG tube on 4/15, liquid diet started on 4/16.  Mental status postprocedure improving.    IR guided drain placement 4/15  Small bowel anastomosis and fascial closure 4/10.  Small bowel ileus with resection on 4/8  Exploratory laparotomy with small bowel enterotomy and removal of gallstone on 3/30.   Gallstone ileus, distal small bowel obstruction secondary to impacted 2.2 cm gallstone.  Enterococcus faecalis bacteremia -cleared  -CT imaging on admission showed Gallstone ileus. Distal small bowel obstruction secondary to an impacted 2.2 cm gallstone.   -Underwent Exploratory laparotomy with small bowel enterotomy and removal of gallstone on 3/30.   -Postoperative course  complicated with ileus.  NG tube removed 4/2, unable to tolerate oral diet.   -Had fever on 4/5, COVID/Flu/RSV which was negative.  BC x 2 no growth. CXR without overt pneumonia.  UA then not consistent with infection.  -On 4/7 spiked fever with significant leukocytosis, lactic acidosis. Blood cultures from 4/7, 4/8 with Enterococcus faecalis.  -Returned back to the OR for exploratory laparotomy with small bowel resection on 4/8.  -Postprocedure patient intubated on pressor support, temporary abdominal closure admitted to ICU.   -Returned to OR on 4/10 for small bowel anastomosis and abdominal wound VAC placement.    -Postprocedure continued to have distended abdomen with bloody output.  -CT on 4/14 showing multiple peripherally enhancing fluid collections in the abdomen which are suspicious for developing abscesses. The largest of these is in the left paracolic gutter. Stable appearance of a contracted gallbladder containing a large gallstone which is fistulized to the duodenum with associated pneumobilia.  --4/15 underwent IR guided abdominal drain placements x 2.  Patient removed NG tube by himself  --4/16 started on clear liquid diet after okay by surgery/speech therapy.  --4/17 advance to full liquid diet.   -- 4/18-does admit to some improvement in abdominal pain.  Advance to mechanical soft diet.  Will discontinue heparin and initiated on Eliquis as hemoglobin stable.  --Has wound VAC, intra-abdominal drains.  --General Surgery following. Defer postoperative care including postoperative nutritional support/oral intake/fluid resuscitation to surgical team.   -IR following.  CT sinogram 4/18.  -Infectious disease following.  Continue IV Zosyn  Appreciate multidisciplinary team input  -Continue IV Zosyn. Blood Cultures from 4/10 no growth to date.  Drain cultures no growth to date  Continue oral PPI.  As needed oral pain meds.  As needed muscle relaxants.  Minimize narcotic use.  As needed Zofran as  needed      Postoperative atrial fibrillation with RVR -status post cardioversion 4/14/2025  New Aflutter with RVR 4/5/2025 status post DCCV on 4/7/2025.  Coronary artery disease status post CABG x 3 in October 2022  Aortic stenosis (status post AVR with bovine valve) October 2022.  Hypertension  Hyperlipidemia.  -Postoperatively developed new aflutter with RVR, was on intravenous heparin drip, rate controlled with Cardizem drip and initiated oral metoprolol.   --Cardiology followed underwent cardioversion on 4/7.   --Heparin was paused as patient back to OR on 4/8 and was started on intravenous heparin during ICU stay.  --On 4/14 patient again developed recurrence of A-fib with RVR with heart rate in 140s.  Initiated on amiodarone drip, underwent successful cardioversion on 4/14.  Currently in sinus rhythm with heart rate in 70s to 80s.  --Echocardiogram 4/15 with EF of 55%.  Right ventricle normal size.  No valve disease.    --Cardiology signed off 4/15, continue with amiodarone loading and switch to oral amiodarone.  --Continue oral amiodarone taper: 200 mg BID x 2 weeks ( starting 4/16), then 200 mg every day.  EKG for QTC on 4/20.   --Was on intravenous heparin, switch to oral Eliquis 5 mg twice daily on 4/18.  Continue Oral metoprolol 25 mg twice daily.  PTA on Lipitor 80 mg oral daily, LDL 71.  Restart at lower dose of 10 mg oral daily.  As needed Lasix as needed for diuresis.   Telemetry monitoring.  Strict intake output monitoring.  Daily weights.    Acute hypoxia-likely from atelectasis, bilateral pleural effusion, postoperative narcotic use, ALIREZA, deconditioning  CT chest from 4/14 with New small bilateral pleural effusions with overlying atelectasis/consolidation.  Afebrile.  Leukocytosis improved.  Weaned off oxygen.  This morning on room air.  IS, ambulate     Acute encephalopathy likely multifactorial from anesthesia/sedation for procedure/delirium, hospitalization/ICU stay/narcotic use.  Patient with  prolonged hospital stay -multiple procedures, ICU stay, on narcotics.  -4/17 -mentation improving but continues to have intermittent confusion.  CT head 4/18 no acute intracranial pathology.  Monitor mental status closely  Hold QT prolonging medications including Seroquel, while on amiodarone    Acute anemia likely from surgical blood loss, acute illness, dilutional.  Presented with hemoglobin of 15.9, now hemoglobin in the 10-11 range.  No active bleeding.  Monitor in AM.    Severe hypoalbuminemia likely from acute illness.  Dilutional.  Serum albumin of 2.1.    Nutritional intake per surgical team    Acute on chronic bilateral lower extremity edema.  Ultrasound bilateral lower extremities -no DVT.  Pitting edema present.    PTA oral Lasix on hold since admission, resume in coming days pending oral intake.  As needed Lasix.    Mild hypernatremia.  Mild dehydration.  Oral intake per surgical team.    Acute kidney injury.  Resolved  Anion gap metabolic acidosis.  Resolved.  Monitor.    Hypokalemia from poor intake -replaced.  On replacement protocol    Type 2 diabetes mellitus with a hemoglobin A1c of 6.8  Hold PTA metformin.  Insulin sliding scale.    Urinary retention -Mccarty removed 4/17  Traumatic hematuria.  BPH:   Urine cultures from 4/14 no growth  Catheter removed 4/17.  Continue PTA Flomax    Neuropathy:   *Neurontin 800 mg 3 times daily as needed has been filled but patient reports taking 300mg TID  *4/1 per pharmacy note only recent fills are 800 mg and not 300 mg dosing.   Gabapentin on hold since admission,  will resume at 100 mg twice daily on 4/18 as mentation improving    Nicotine use: Chews tobacco  Continue nicotine patch, and as needed nicotine lozenges.    Long term cessation to be encouraged during stay.     ALIREZA not on CPAP.  Recommend sleep studies as outpatient     Medically complicated obesity with a BMI of 41.  Increase all-cause mortality and morbidity.  Consider lifestyle modification with  diet and exercise as able to.     Physical deconditioning from medical illness.  Encourage ambulation.  Fall precautions  Rehab team evaluation requested.      Clinically Significant Risk Factors        # Hypokalemia: Lowest K = 3.3 mmol/L in last 2 days, will replace as needed   # Hyperchloremia: Highest Cl = 111 mmol/L in last 2 days, will monitor as appropriate          # Hypoalbuminemia: Lowest albumin = 1.9 g/dL at 4/9/2025  5:25 AM, will monitor as appropriate     # Hypertension: Noted on problem list           # DMII: A1C = 6.8 % (Ref range: <5.7 %) within past 6 months   # Severe Obesity: Estimated body mass index is 41.62 kg/m  as calculated from the following:    Height as of this encounter: 1.829 m (6').    Weight as of this encounter: 139.2 kg (306 lb 14.4 oz).   # Moderate Malnutrition: based on nutrition assessment and treatment provided per dietitian's recommendations.      # Financial/Environmental Concerns: none   # History of CABG: noted on surgical history     Orders Placed This Encounter      Mechanical/Dental Soft Diet      DVT Prophylaxis: SCDs, on intravenous heparin drip  Code Status: Full Code  Disposition: Expected discharge in 2 days pending -surgery clearance/IR input/antibiotic plan in place/safe discharge plan in place.    Medically Ready for Discharge: Anticipated in 2-4 Days     Discussed with patient, bedside RN, care team.  Updated patient's wife by the bedside 4/18  Total time greater than 51 minutes. more than 70% of time spent in direct patient care, care coordination, patient counseling, and formalizing plan of care.      Tim Mays MD        Interval History:        Patient lying in bed.  Appears comfortable, awake and able to answer most questions.  Denies any chest pain or palpitations.  Denies any headache or dizziness.  Improvement in shortness of breath, on room air this morning  Reports abdominal pain improving.  Having abdominal wound VAC, intra-abdominal  drains.  Tolerated liquid diet  On intravenous heparin drip.  No bleeding.  Mccarty catheter removed 4/17, able to void  Telemetry sinus rhythm.  Receiving as needed pain meds.  Continues to have intermittent confusion -mentation clearing up.  Has been up out of bed to chair today.       Physical Exam:        Physical Exam   Temp:  [97.4  F (36.3  C)-98.3  F (36.8  C)] 97.4  F (36.3  C)  Pulse:  [66-80] 80  Resp:  [15-22] 20  BP: (119-152)/(66-95) 128/66  SpO2:  [92 %-97 %] 97 %    Intake/Output Summary (Last 24 hours) at 4/14/2025 0905  Last data filed at 4/14/2025 0834  Gross per 24 hour   Intake 0 ml   Output 1670 ml   Net -1670 ml       Admission Weight: 129.3 kg (285 lb)  Current Weight: (!) 144.7 kg (319 lb 0.1 oz)    PHYSICAL EXAM  GENERAL: Patient awake, able to answer most questions  HEENT: Oropharynx pink  HEART: Regular rate and rhythm. S1S2  LUNGS: Little decreased breath sounds, no wheezing or crackles.  Respiration unlabored  ABDOMEN: Distended, wound VAC present. Bowel sounds heard.   Drains present  NEURO: Moving all extremities  EXTREMITIES: +pedal edema.   SKIN: Brusing   PSYCHIATRY Cooperative       Medications:        Current Facility-Administered Medications   Medication Dose Route Frequency Provider Last Rate Last Admin    [Held by provider] acetaminophen (TYLENOL) tablet 975 mg  975 mg Oral or NG Tube Q8H Clau Calles MD        amiodarone (PACERONE) tablet 200 mg  200 mg Oral BID Richa Schmidt PA-C   200 mg at 04/18/25 0911    Followed by    [START ON 4/29/2025] amiodarone (PACERONE) tablet 200 mg  200 mg Oral Daily Richa Schmidt PA-C        apixaban ANTICOAGULANT (ELIQUIS) tablet 5 mg  5 mg Oral BID Tim Mays MD   5 mg at 04/18/25 0918    [Held by provider] atorvastatin (LIPITOR) tablet 80 mg  80 mg Oral or NG Tube Daily Khari Peña MD        [Held by provider] folic acid (FOLVITE) tablet 1,000 mcg  1,000 mcg Oral or NG Tube BID Khari Peña MD         [Held by provider] furosemide (LASIX) tablet 20 mg  20 mg Oral Daily Mann Torres DO   20 mg at 04/04/25 1017    insulin aspart (NovoLOG) injection (RAPID ACTING)  1-7 Units Subcutaneous TID  Tim Mays MD   1 Units at 04/17/25 1839    insulin aspart (NovoLOG) injection (RAPID ACTING)  1-5 Units Subcutaneous At Bedtime Tim Mays MD        metoprolol tartrate (LOPRESSOR) tablet 25 mg  25 mg Oral BID Tim Mays MD   25 mg at 04/18/25 0911    nicotine (NICODERM CQ) 21 MG/24HR 24 hr patch 1 patch  1 patch Transdermal Daily Hayley Vale APRN CNP   1 patch at 04/18/25 0912    pantoprazole (PROTONIX) EC tablet 40 mg  40 mg Oral QAM  Tim Mays MD   40 mg at 04/18/25 0650    piperacillin-tazobactam (ZOSYN) 4.5 g vial to attach to  mL bag  4.5 g Intravenous Q6H Hayley Vale APRN  mL/hr at 04/12/25 0548 4.5 g at 04/18/25 1133    polyethylene glycol (MIRALAX) Packet 17 g  17 g Oral Daily Ana Dougherty MD   17 g at 04/08/25 0805    QUEtiapine (SEROquel) tablet 25 mg  25 mg Oral BID Hayley Vale APRN CNP   25 mg at 04/18/25 0912    senna-docusate (SENOKOT-S/PERICOLACE) 8.6-50 MG per tablet 1 tablet  1 tablet Oral BID Ana Dougherty MD        sodium chloride (PF) 0.9% PF flush 3 mL  3 mL Intracatheter Q8H MITA Hayley Vale APRN CNP   3 mL at 04/18/25 1133    tamsulosin (FLOMAX) capsule 0.4 mg  0.4 mg Oral QPM Tim Mays MD   0.4 mg at 04/17/25 2016     Current Facility-Administered Medications   Medication Dose Route Frequency Provider Last Rate Last Admin    benzocaine-menthol (CHLORASEPTIC) 6-10 MG lozenge 1 lozenge  1 lozenge Buccal Q1H PRN Hayley Vale APRN CNP   1 lozenge at 04/01/25 0334    bisacodyl (DULCOLAX) suppository 10 mg  10 mg Rectal Daily PRN Hayley Vale APRN CNP        carboxymethylcellulose PF (REFRESH PLUS) 0.5 % ophthalmic solution 1 drop  1 drop Both Eyes Q1H PRN Hayley Vale APRN CNP         glucose gel 15-30 g  15-30 g Oral Q15 Min PRN Tim Mays MD        Or    dextrose 50 % injection 25-50 mL  25-50 mL Intravenous Q15 Min PRN Tim Mays MD        Or    glucagon injection 1 mg  1 mg Subcutaneous Q15 Min PRN Tim Mays MD        diazepam (VALIUM) injection 2.5 mg  2.5 mg Intravenous Q6H PRN Hayley Vale APRN CNP        diphenhydrAMINE (BENADRYL) elixir 12.5 mg  12.5 mg Oral Q6H PRN Hayley Vale APRN CNP        Or    diphenhydrAMINE (BENADRYL) injection 12.5 mg  12.5 mg Intravenous Q6H PRN Hayley Vale APRN CNP        fentaNYL (PF) (SUBLIMAZE) injection 25-50 mcg  25-50 mcg Intravenous Q5 Min PRN Tavo Larsen MD   25 mcg at 04/15/25 1114    flumazenil (ROMAZICON) injection 0.2 mg  0.2 mg Intravenous q1 min prn Tavo Larsen MD        [Held by provider] gabapentin (NEURONTIN) tablet 800 mg  800 mg Oral TID PRN Clau Calles MD        Give   of usual dose of LONG ACTING insulin AM of procedure IF diabetic   Does not apply Continuous PRN Richa Schmidt PA-C        HOLD digoxin (LANOXIN)  AM of procedure IF on digoxin   Does not apply HOLD Richa Schmidt PA-C        HOLD: Insulin - RAPID/SHORT acting AM of procedure IF diabetic   Does not apply HOLD Richa Schmidt PA-C        HOLD: Insulin - REGULAR AM of procedure IF diabetic   Does not apply HOLD Richa Schmidt PA-C        HOLD: Oral hypoglycemics AM of procedure IF diabetic   Does not apply HOLD Richa Schmidt PA-C        HYDROmorphone (DILAUDID) injection 0.2 mg  0.2 mg Intravenous Q4H PRN Tim Mays MD   0.2 mg at 04/15/25 1737    HYDROmorphone (PF) (DILAUDID) injection 0.3 mg  0.3 mg Intravenous Q4H PRN Tim Mays MD        lidocaine (LMX4) cream   Topical Q1H PRN Hayley Vale APRN CNP        lidocaine 1 % 0.1-1 mL  0.1-1 mL Other Q1H PRN Hayley Vale APRN CNP        magnesium hydroxide (MILK OF MAGNESIA) suspension 30 mL  30 mL Oral  Daily PRN Hayley Vale APRN CNP   30 mL at 04/01/25 0427    magnesium sulfate 2 g in 50 mL sterile water intermittent infusion  2 g Intravenous Once PRN Richa Schmidt PA-C        May take oral meds with a sip of water, the morning of Cardioversion procedure.       Does not apply Continuous PRN Richa Schmidt PA-C        melatonin tablet 10 mg  10 mg Oral At Bedtime PRN Hayley Vale APRN CNP   10 mg at 04/12/25 0042    methocarbamol (ROBAXIN) half-tab 250 mg  250 mg Oral Q6H PRN Hayley Vale APRN CNP   250 mg at 04/16/25 1047    metoprolol (LOPRESSOR) injection 5 mg  5 mg Intravenous Q4H PRN Hayley Vale APRN CNP   5 mg at 04/14/25 0806    midazolam (VERSED) injection 0.5-2 mg  0.5-2 mg Intravenous Q4 Min PRN Tavo Larsen MD   0.5 mg at 04/15/25 1112    naloxone (NARCAN) injection 0.2 mg  0.2 mg Intravenous Q2 Min PRN Hayley Vale APRN CNP        Or    naloxone (NARCAN) injection 0.4 mg  0.4 mg Intravenous Q2 Min PRN Hayley Vale APRN CNP        Or    naloxone (NARCAN) injection 0.2 mg  0.2 mg Intramuscular Q2 Min PRN Hayley Vale APRN CNP        Or    naloxone (NARCAN) injection 0.4 mg  0.4 mg Intramuscular Q2 Min PRN Hayley Vale APRN CNP        nicotine (COMMIT) lozenge 2 mg  2 mg Buccal Q1H PRN Hayley Vale APRN CNP   2 mg at 04/03/25 1145    No lozenges or gum should be given while patient on BIPAP/AVAPS/AVAPS AE   Does not apply Continuous PRN Hayley Vale APRN CNP        ondansetron (ZOFRAN ODT) ODT tab 4 mg  4 mg Oral Q6H PRN Hayley Vale APRN CNP   4 mg at 04/08/25 0208    Or    ondansetron (ZOFRAN) injection 4 mg  4 mg Intravenous Q6H PRN Hayley Vale APRN CNP   4 mg at 04/11/25 1232    Patient may continue current oral medications   Does not apply Continuous PRN Hayley Vale APRN CNP        phenol (CHLORASEPTIC) 1.4 % spray 1 mL  1 spray Mouth/Throat Q1H PRN Hayley Vale APRN CNP         potassium chloride eboni ER (KLOR-CON M20) CR tablet 40 mEq  40 mEq Oral Once PRN Richa Schmidt PA-C        prochlorperazine (COMPAZINE) injection 5 mg  5 mg Intravenous Q6H PRN Hayley Vale APRN CNP   5 mg at 04/08/25 1103    Or    prochlorperazine (COMPAZINE) tablet 5 mg  5 mg Oral Q6H PRN Hayley Vale APRN CNP   5 mg at 04/03/25 0756    sodium chloride (PF) 0.9% PF flush 3 mL  3 mL Intracatheter q1 min prn Hayley Vale APRN CNP        traMADol (ULTRAM) half-tab 25 mg  25 mg Oral Q6H PRN Tim Mays MD   25 mg at 04/18/25 1051    traMADol (ULTRAM) tablet 50 mg  50 mg Oral Q6H PRN Tim Mays MD   50 mg at 04/17/25 1049            Data:      All new lab and imaging data was reviewed.

## 2025-04-18 NOTE — PROGRESS NOTES
[facility] General Surgery Post-Op / Progress Note         Assessment and Plan:    Assessment:   68yo male admitted on 3/30 for gallstone ileus. Underwent ex lap with removal of gallstone through small bowel enterotomy and was admitted to the floor post operatively. Patient had return of bowel function but new onset of Aflutter with RVR and underwent cardioversion on 4/7. Overnight 4/7 RRT called for hypotension and patient found to have elevated lactate, leukocytosis of 36. CT showing increased fluid and free air, started draining from midline incision. Patient taken to OR for ex lap, found to have gross sucus breakdown of enterotomy on 4/8. RTOR 4/10 for small bowel anastomosis and fascial closure. Extubated and transferred to floor. RRT 4/14 for recurrent Aflutter with RVR, underwent repeat cardioversion. CT showing two new fluid collections, possible early abscess, underwent IR drain placement x2 4/15. Cultures pending, ngtd.    SLP evaluated and approved for easy chew however. Further diet advancement per SLP       Plan:   -Follow-up cultures of abdominal fluid collection, continue IV Zoysn  -Continue drains  -Defer to diet advancement per SLP, okay for regular from surgical standpoint  -Okay for DOAC  -WOC for wound vac management           Interval History:   Reports doing pretty good, denies nausea, vomiting, abdominal pain. Still delirious but improving. Having bowel function, Mccarty removed.          Review of Systems:   ROS negative            Medications:   All medications related to the patient's surgery have been reviewed  Current Facility-Administered Medications   Medication Dose Route Frequency Provider Last Rate Last Admin    [Held by provider] acetaminophen (TYLENOL) tablet 975 mg  975 mg Oral or NG Tube Q8H Clau Calles MD        amiodarone (PACERONE) tablet 200 mg  200 mg Oral BID Richa Schmidt PA-C   200 mg at 04/18/25 0911    Followed by    [START ON 4/29/2025]  amiodarone (PACERONE) tablet 200 mg  200 mg Oral Daily Richa Schmidt PA-C        apixaban ANTICOAGULANT (ELIQUIS) tablet 5 mg  5 mg Oral BID Tim Mays MD   5 mg at 04/18/25 0918    [Held by provider] atorvastatin (LIPITOR) tablet 80 mg  80 mg Oral or NG Tube Daily Khari Peña MD        benzocaine-menthol (CHLORASEPTIC) 6-10 MG lozenge 1 lozenge  1 lozenge Buccal Q1H PRN Hayley Vale APRN CNP   1 lozenge at 04/01/25 0334    bisacodyl (DULCOLAX) suppository 10 mg  10 mg Rectal Daily PRN Hayley Vale APRN CNP        carboxymethylcellulose PF (REFRESH PLUS) 0.5 % ophthalmic solution 1 drop  1 drop Both Eyes Q1H PRN Hayley Vale APRN CNP        glucose gel 15-30 g  15-30 g Oral Q15 Min PRN Tim Mays MD        Or    dextrose 50 % injection 25-50 mL  25-50 mL Intravenous Q15 Min PRN Tim Mays MD        Or    glucagon injection 1 mg  1 mg Subcutaneous Q15 Min PRN Tim Mays MD        diazepam (VALIUM) injection 2.5 mg  2.5 mg Intravenous Q6H PRN Hayley Vale APRN CNP        diphenhydrAMINE (BENADRYL) elixir 12.5 mg  12.5 mg Oral Q6H PRN Hayley Vale APRN CNP        Or    diphenhydrAMINE (BENADRYL) injection 12.5 mg  12.5 mg Intravenous Q6H PRN Hayley Vale APRN CNP        fentaNYL (PF) (SUBLIMAZE) injection 25-50 mcg  25-50 mcg Intravenous Q5 Min PRN Tavo Larsen MD   25 mcg at 04/15/25 1114    flumazenil (ROMAZICON) injection 0.2 mg  0.2 mg Intravenous q1 min prn Tavo Larsen MD        [Held by provider] folic acid (FOLVITE) tablet 1,000 mcg  1,000 mcg Oral or NG Tube BID Khari Peña MD        [Held by provider] furosemide (LASIX) tablet 20 mg  20 mg Oral Daily Mann Torres DO   20 mg at 04/04/25 1017    [Held by provider] gabapentin (NEURONTIN) tablet 800 mg  800 mg Oral TID PRN Clau Calles MD        Give   of usual dose of LONG ACTING insulin AM of procedure IF diabetic   Does not apply  Continuous PRN Richa Schmidt PA-C        HOLD digoxin (LANOXIN)  AM of procedure IF on digoxin   Does not apply HOLD Richa Schmidt PA-C        HOLD: Insulin - RAPID/SHORT acting AM of procedure IF diabetic   Does not apply HOLD Richa Schmidt PA-C        HOLD: Insulin - REGULAR AM of procedure IF diabetic   Does not apply HOLD Richa Schmidt PA-C        HOLD: Oral hypoglycemics AM of procedure IF diabetic   Does not apply HOLD Richa Schmidt PA-C        HYDROmorphone (DILAUDID) injection 0.2 mg  0.2 mg Intravenous Q4H PRN Tim Mays MD   0.2 mg at 04/15/25 1737    HYDROmorphone (PF) (DILAUDID) injection 0.3 mg  0.3 mg Intravenous Q4H PRN Tim Mays MD        insulin aspart (NovoLOG) injection (RAPID ACTING)  1-7 Units Subcutaneous TID AC Tim Mays MD   1 Units at 04/17/25 1839    insulin aspart (NovoLOG) injection (RAPID ACTING)  1-5 Units Subcutaneous At Bedtime Tim Mays MD        lidocaine (LMX4) cream   Topical Q1H PRN Hayley Vale APRN CNP        lidocaine 1 % 0.1-1 mL  0.1-1 mL Other Q1H PRN Hayley Vale APRN CNP        magnesium hydroxide (MILK OF MAGNESIA) suspension 30 mL  30 mL Oral Daily PRN Hayley Vale APRN CNP   30 mL at 04/01/25 0427    magnesium sulfate 2 g in 50 mL sterile water intermittent infusion  2 g Intravenous Once PRN Richa Schmitd PA-C        May take oral meds with a sip of water, the morning of Cardioversion procedure.       Does not apply Continuous PRN Richa Schmidt PA-C        melatonin tablet 10 mg  10 mg Oral At Bedtime PRN Hayley Vale APRN CNP   10 mg at 04/12/25 0042    methocarbamol (ROBAXIN) half-tab 250 mg  250 mg Oral Q6H PRN Hayley Vale APRN CNP   250 mg at 04/16/25 1047    metoprolol (LOPRESSOR) injection 5 mg  5 mg Intravenous Q4H PRN Hayley Vale APRN CNP   5 mg at 04/14/25 0806    metoprolol tartrate (LOPRESSOR) tablet 25 mg  25 mg Oral BID Tmi Mays MD    25 mg at 04/18/25 0911    midazolam (VERSED) injection 0.5-2 mg  0.5-2 mg Intravenous Q4 Min PRN Tavo Larsen MD   0.5 mg at 04/15/25 1112    naloxone (NARCAN) injection 0.2 mg  0.2 mg Intravenous Q2 Min PRN Hayley Vale APRN CNP        Or    naloxone (NARCAN) injection 0.4 mg  0.4 mg Intravenous Q2 Min PRN Hayley Vale APRN CNP        Or    naloxone (NARCAN) injection 0.2 mg  0.2 mg Intramuscular Q2 Min PRN Hayley Vale APRN CNP        Or    naloxone (NARCAN) injection 0.4 mg  0.4 mg Intramuscular Q2 Min PRN Hayley Vale APRN CNP        nicotine (COMMIT) lozenge 2 mg  2 mg Buccal Q1H PRN Hayley Vale APRN CNP   2 mg at 04/03/25 1145    nicotine (NICODERM CQ) 21 MG/24HR 24 hr patch 1 patch  1 patch Transdermal Daily Hayley Vale APRN CNP   1 patch at 04/18/25 0912    No lozenges or gum should be given while patient on BIPAP/AVAPS/AVAPS AE   Does not apply Continuous PRN Hayley Vale APRN CNP        ondansetron (ZOFRAN ODT) ODT tab 4 mg  4 mg Oral Q6H PRN Hayley Vale APRN CNP   4 mg at 04/08/25 0208    Or    ondansetron (ZOFRAN) injection 4 mg  4 mg Intravenous Q6H PRN Hayley Vale APRN CNP   4 mg at 04/11/25 1232    pantoprazole (PROTONIX) EC tablet 40 mg  40 mg Oral QAM AC Tim Mays MD   40 mg at 04/18/25 0650    Patient may continue current oral medications   Does not apply Continuous PRN Hayley Vale APRN CNP        phenol (CHLORASEPTIC) 1.4 % spray 1 mL  1 spray Mouth/Throat Q1H PRN Hayley Vale APRN CNP        piperacillin-tazobactam (ZOSYN) 4.5 g vial to attach to  mL bag  4.5 g Intravenous Q6H Hayley Vale APRN  mL/hr at 04/12/25 0548 4.5 g at 04/18/25 0512    polyethylene glycol (MIRALAX) Packet 17 g  17 g Oral Daily Ana Dougherty MD   17 g at 04/08/25 0805    potassium chloride eboni ER (KLOR-CON M20) CR tablet 40 mEq  40 mEq Oral Once PRN Richa Schmidt PA-C        prochlorperazine  (COMPAZINE) injection 5 mg  5 mg Intravenous Q6H PRN Hayley Vale APRN CNP   5 mg at 04/08/25 1103    Or    prochlorperazine (COMPAZINE) tablet 5 mg  5 mg Oral Q6H PRN Hayley Vale APRN CNP   5 mg at 04/03/25 0756    QUEtiapine (SEROquel) tablet 25 mg  25 mg Oral BID Hayley Vale APRN CNP   25 mg at 04/18/25 0912    senna-docusate (SENOKOT-S/PERICOLACE) 8.6-50 MG per tablet 1 tablet  1 tablet Oral BID Ana Dougherty MD        sodium chloride (PF) 0.9% PF flush 3 mL  3 mL Intracatheter q1 min prn Hayley Vale APRN CNP        sodium chloride (PF) 0.9% PF flush 3 mL  3 mL Intracatheter Q8H MITA Hayley Vale APRN CNP   3 mL at 04/18/25 0512    tamsulosin (FLOMAX) capsule 0.4 mg  0.4 mg Oral QPM Tim Mays MD   0.4 mg at 04/17/25 2016    traMADol (ULTRAM) half-tab 25 mg  25 mg Oral Q6H PRN Tim Mays MD        traMADol (ULTRAM) tablet 50 mg  50 mg Oral Q6H PRN Tim Mays MD   50 mg at 04/17/25 1049             Physical Exam:   All VSS  General: lying in bed, NAD  CV: RRR  Pul: breathing comfortably on room air, equal chest rise  Abd: soft, obese, wound vac in place with serous output-holding suction, no surrounding erythema, RLQ drain with moderate serosanginous output, LLQ drain serous  Skin: warm, dry, 2+ LE edema, no rashes          Data:   CBC RESULTS:   Recent Labs   Lab Test 04/17/25  0534 04/16/25  0303   WBC  --  8.1   RBC  --  3.51*   HGB 10.9* 10.5*   HCT  --  32.8*   MCV  --  93   MCH  --  29.9   MCHC  --  32.0   RDW  --  14.8   PLT  --  272          Last Comprehensive Metabolic Panel:  Lab Results   Component Value Date     04/17/2025    POTASSIUM 3.6 04/17/2025    CHLORIDE 111 (H) 04/17/2025    CO2 23 04/17/2025    ANIONGAP 9 04/17/2025     (H) 04/18/2025    BUN 8.5 04/17/2025    CR 0.70 04/17/2025    GFRESTIMATED >90 04/17/2025    HALEY 8.8 04/17/2025         Ana Dougherty MD  Surgery, PGY4

## 2025-04-19 LAB
ANION GAP SERPL CALCULATED.3IONS-SCNC: 7 MMOL/L (ref 7–15)
BUN SERPL-MCNC: 5.6 MG/DL (ref 8–23)
CALCIUM SERPL-MCNC: 9.1 MG/DL (ref 8.8–10.4)
CHLORIDE SERPL-SCNC: 111 MMOL/L (ref 98–107)
CREAT SERPL-MCNC: 0.71 MG/DL (ref 0.67–1.17)
EGFRCR SERPLBLD CKD-EPI 2021: >90 ML/MIN/1.73M2
GLUCOSE BLDC GLUCOMTR-MCNC: 118 MG/DL (ref 70–99)
GLUCOSE BLDC GLUCOMTR-MCNC: 122 MG/DL (ref 70–99)
GLUCOSE BLDC GLUCOMTR-MCNC: 128 MG/DL (ref 70–99)
GLUCOSE SERPL-MCNC: 124 MG/DL (ref 70–99)
HCO3 SERPL-SCNC: 24 MMOL/L (ref 22–29)
HGB BLD-MCNC: 10.7 G/DL (ref 13.3–17.7)
PHOSPHATE SERPL-MCNC: 2.9 MG/DL (ref 2.5–4.5)
POTASSIUM SERPL-SCNC: 3.5 MMOL/L (ref 3.4–5.3)
SODIUM SERPL-SCNC: 142 MMOL/L (ref 135–145)

## 2025-04-19 PROCEDURE — 99232 SBSQ HOSP IP/OBS MODERATE 35: CPT | Performed by: STUDENT IN AN ORGANIZED HEALTH CARE EDUCATION/TRAINING PROGRAM

## 2025-04-19 PROCEDURE — 80048 BASIC METABOLIC PNL TOTAL CA: CPT | Performed by: HOSPITALIST

## 2025-04-19 PROCEDURE — 250N000011 HC RX IP 250 OP 636: Performed by: NURSE PRACTITIONER

## 2025-04-19 PROCEDURE — 250N000013 HC RX MED GY IP 250 OP 250 PS 637: Performed by: STUDENT IN AN ORGANIZED HEALTH CARE EDUCATION/TRAINING PROGRAM

## 2025-04-19 PROCEDURE — 250N000013 HC RX MED GY IP 250 OP 250 PS 637

## 2025-04-19 PROCEDURE — 250N000013 HC RX MED GY IP 250 OP 250 PS 637: Performed by: NURSE PRACTITIONER

## 2025-04-19 PROCEDURE — 84100 ASSAY OF PHOSPHORUS: CPT | Performed by: INTERNAL MEDICINE

## 2025-04-19 PROCEDURE — 36415 COLL VENOUS BLD VENIPUNCTURE: CPT | Performed by: HOSPITALIST

## 2025-04-19 PROCEDURE — 250N000013 HC RX MED GY IP 250 OP 250 PS 637: Performed by: HOSPITALIST

## 2025-04-19 PROCEDURE — 120N000001 HC R&B MED SURG/OB

## 2025-04-19 PROCEDURE — 85018 HEMOGLOBIN: CPT | Performed by: HOSPITALIST

## 2025-04-19 RX ADMIN — MICONAZOLE NITRATE: 2 POWDER TOPICAL at 23:01

## 2025-04-19 RX ADMIN — ATORVASTATIN CALCIUM 10 MG: 10 TABLET, FILM COATED ORAL at 20:56

## 2025-04-19 RX ADMIN — PIPERACILLIN AND TAZOBACTAM 4.5 G: 4; .5 INJECTION, POWDER, FOR SOLUTION INTRAVENOUS at 00:49

## 2025-04-19 RX ADMIN — TRAMADOL HYDROCHLORIDE 50 MG: 50 TABLET, COATED ORAL at 06:25

## 2025-04-19 RX ADMIN — SENNOSIDES AND DOCUSATE SODIUM 1 TABLET: 50; 8.6 TABLET ORAL at 08:41

## 2025-04-19 RX ADMIN — POLYETHYLENE GLYCOL 3350 17 G: 17 POWDER, FOR SOLUTION ORAL at 08:41

## 2025-04-19 RX ADMIN — PANTOPRAZOLE SODIUM 40 MG: 20 TABLET, DELAYED RELEASE ORAL at 06:25

## 2025-04-19 RX ADMIN — QUETIAPINE FUMARATE 25 MG: 25 TABLET ORAL at 20:56

## 2025-04-19 RX ADMIN — MICONAZOLE NITRATE: 2 POWDER TOPICAL at 15:36

## 2025-04-19 RX ADMIN — GABAPENTIN 100 MG: 100 CAPSULE ORAL at 08:31

## 2025-04-19 RX ADMIN — AMIODARONE HYDROCHLORIDE 200 MG: 200 TABLET ORAL at 20:55

## 2025-04-19 RX ADMIN — METOPROLOL TARTRATE 25 MG: 25 TABLET, FILM COATED ORAL at 20:56

## 2025-04-19 RX ADMIN — GABAPENTIN 100 MG: 100 CAPSULE ORAL at 20:56

## 2025-04-19 RX ADMIN — TAMSULOSIN HYDROCHLORIDE 0.4 MG: 0.4 CAPSULE ORAL at 20:56

## 2025-04-19 RX ADMIN — QUETIAPINE FUMARATE 25 MG: 25 TABLET ORAL at 08:31

## 2025-04-19 RX ADMIN — METOPROLOL TARTRATE 25 MG: 25 TABLET, FILM COATED ORAL at 08:34

## 2025-04-19 RX ADMIN — PIPERACILLIN AND TAZOBACTAM 4.5 G: 4; .5 INJECTION, POWDER, FOR SOLUTION INTRAVENOUS at 12:51

## 2025-04-19 RX ADMIN — AMIODARONE HYDROCHLORIDE 200 MG: 200 TABLET ORAL at 08:33

## 2025-04-19 RX ADMIN — PIPERACILLIN AND TAZOBACTAM 4.5 G: 4; .5 INJECTION, POWDER, FOR SOLUTION INTRAVENOUS at 17:54

## 2025-04-19 RX ADMIN — SENNOSIDES AND DOCUSATE SODIUM 1 TABLET: 50; 8.6 TABLET ORAL at 20:56

## 2025-04-19 RX ADMIN — APIXABAN 5 MG: 5 TABLET, FILM COATED ORAL at 08:34

## 2025-04-19 RX ADMIN — NICOTINE 1 PATCH: 21 PATCH, EXTENDED RELEASE TRANSDERMAL at 08:34

## 2025-04-19 RX ADMIN — APIXABAN 5 MG: 5 TABLET, FILM COATED ORAL at 20:55

## 2025-04-19 RX ADMIN — PIPERACILLIN AND TAZOBACTAM 4.5 G: 4; .5 INJECTION, POWDER, FOR SOLUTION INTRAVENOUS at 06:24

## 2025-04-19 ASSESSMENT — ACTIVITIES OF DAILY LIVING (ADL)
ADLS_ACUITY_SCORE: 69
ADLS_ACUITY_SCORE: 67
ADLS_ACUITY_SCORE: 65
ADLS_ACUITY_SCORE: 65
ADLS_ACUITY_SCORE: 67
ADLS_ACUITY_SCORE: 67
ADLS_ACUITY_SCORE: 66
ADLS_ACUITY_SCORE: 66
ADLS_ACUITY_SCORE: 67
ADLS_ACUITY_SCORE: 66
ADLS_ACUITY_SCORE: 65
ADLS_ACUITY_SCORE: 67
ADLS_ACUITY_SCORE: 66
ADLS_ACUITY_SCORE: 67
ADLS_ACUITY_SCORE: 66
ADLS_ACUITY_SCORE: 67

## 2025-04-19 NOTE — PROGRESS NOTES
[facility]    Medicine Progress Note - Hospitalist Service    Date of Admission:  3/30/2025    Assessment & Plan      Richar Davis is a 69 year old male with a history of diabetes, hypertension, CABG, status post AVR with bovine valve 10/2022, untreated sleep apnea, hypertension, hyperlipidemia, neuropathy, BPH admitted on 3/30/2025 with abdominal pain nausea and vomiting.      IR guided drain placement 4/15  Small bowel anastomosis and fascial closure 4/10.  Small bowel ileus with resection on 4/8  Exploratory laparotomy with small bowel enterotomy and removal of gallstone on 3/30.   Gallstone ileus, distal small bowel obstruction secondary to impacted 2.2 cm gallstone.  Enterococcus faecalis bacteremia -cleared  -CT imaging on admission showed Gallstone ileus. Distal small bowel obstruction secondary to an impacted 2.2 cm gallstone.   -Underwent Exploratory laparotomy with small bowel enterotomy and removal of gallstone on 3/30.   -Postoperative course complicated with ileus.  NG tube removed 4/2, unable to tolerate oral diet.   -Had fever on 4/5, COVID/Flu/RSV which was negative.  BC x 2 no growth. CXR without overt pneumonia.  UA then not consistent with infection.  -On 4/7 spiked fever with significant leukocytosis, lactic acidosis. Blood cultures from 4/7, 4/8 with Enterococcus faecalis.  -Returned back to the OR for exploratory laparotomy with small bowel resection on 4/8.  -Postprocedure patient intubated on pressor support, temporary abdominal closure admitted to ICU.   -Returned to OR on 4/10 for small bowel anastomosis and abdominal wound VAC placement.    -Postprocedure continued to have distended abdomen with bloody output.  -CT on 4/14 showing multiple peripherally enhancing fluid collections in the abdomen which are suspicious for developing abscesses. The largest of these is in the left paracolic gutter. Stable appearance of a contracted gallbladder containing a large  gallstone which is fistulized to the duodenum with associated pneumobilia.  --4/15 underwent IR guided abdominal drain placements x 2.  Patient removed NG tube by himself  --4/16 started on clear liquid diet after okay by surgery/speech therapy.  --4/17 advance to full liquid diet.   --4/18-does admit to some improvement in abdominal pain.  Advance to mechanical soft diet.  Will discontinue heparin and initiated on Eliquis as hemoglobin stable.  --Has wound VAC, intra-abdominal drains.  --General Surgery following. Defer postoperative care including postoperative nutritional support/oral intake/fluid resuscitation to surgical team.   -IR following.  CT sinogram 4/18.  -Infectious disease following.  Continue IV Zosyn  Appreciate multidisciplinary team input  -Continue IV Zosyn. Blood Cultures from 4/10 no growth to date.  Drain cultures no growth to date  Continue oral PPI.  As needed oral pain meds.  As needed muscle relaxants.  Minimize narcotic use.  As needed Zofran as needed        Postoperative atrial fibrillation with RVR -status post cardioversion 4/14/2025  New Aflutter with RVR 4/5/2025 status post DCCV on 4/7/2025.  Coronary artery disease status post CABG x 3 in October 2022  Aortic stenosis (status post AVR with bovine valve) October 2022.  Hypertension  Hyperlipidemia.  -Postoperatively developed new aflutter with RVR, was on intravenous heparin drip, rate controlled with Cardizem drip and initiated oral metoprolol.   --Cardiology followed underwent cardioversion on 4/7.   --Heparin was paused as patient back to OR on 4/8 and was started on intravenous heparin during ICU stay.  --On 4/14 patient again developed recurrence of A-fib with RVR with heart rate in 140s.  Initiated on amiodarone drip, underwent successful cardioversion on 4/14.  Currently in sinus rhythm with heart rate in 70s to 80s.  --Echocardiogram 4/15 with EF of 55%.  Right ventricle normal size.  No valve disease.    --Cardiology  signed off 4/15, continue with amiodarone loading and switch to oral amiodarone.  --Continue oral amiodarone taper: 200 mg BID x 2 weeks ( starting 4/16), then 200 mg every day.  EKG for QTC on 4/20.   --Was on intravenous heparin, switch to oral Eliquis 5 mg twice daily on 4/18.  Continue Oral metoprolol 25 mg twice daily.  PTA on Lipitor 80 mg oral daily, LDL 71.  Restart at lower dose of 10 mg oral daily.  As needed Lasix as needed for diuresis.   Telemetry monitoring.  Strict intake output monitoring.  Daily weights.     Acute hypoxia-likely from atelectasis, bilateral pleural effusion, postoperative narcotic use, ALIREZA, deconditioning  CT chest from 4/14 with New small bilateral pleural effusions with overlying atelectasis/consolidation.  Afebrile.  Leukocytosis improved.  Weaned off oxygen.  This morning on room air.  IS, ambulate      Acute encephalopathy likely multifactorial from anesthesia/sedation for procedure/delirium, hospitalization/ICU stay/narcotic use.  Patient with prolonged hospital stay -multiple procedures, ICU stay, on narcotics.  -4/17 -mentation improving but continues to have intermittent confusion.  CT head 4/18 no acute intracranial pathology.  Monitor mental status closely  Hold QT prolonging medications including Seroquel, while on amiodarone     Acute anemia likely from surgical blood loss, acute illness, dilutional.  Presented with hemoglobin of 15.9, now hemoglobin in the 10-11 range.  No active bleeding.  Monitor in AM.     Severe hypoalbuminemia likely from acute illness.  Dilutional.  Serum albumin of 2.1.    Nutritional intake per surgical team     Acute on chronic bilateral lower extremity edema.  Ultrasound bilateral lower extremities -no DVT.  Pitting edema present.    PTA oral Lasix on hold since admission, resume in coming days pending oral intake.  As needed Lasix.     Mild hypernatremia.  Mild dehydration.  Oral intake per surgical team.     Acute kidney injury.   Resolved  Anion gap metabolic acidosis.  Resolved.  Monitor.     Hypokalemia from poor intake -replaced.  On replacement protocol     Type 2 diabetes mellitus with a hemoglobin A1c of 6.8  Hold PTA metformin.  Insulin sliding scale.     Urinary retention -Mccarty removed 4/17  Traumatic hematuria.  BPH:   Urine cultures from 4/14 no growth  Catheter removed 4/17.  Continue PTA Flomax     Neuropathy:   *Neurontin 800 mg 3 times daily as needed has been filled but patient reports taking 300mg TID  *4/1 per pharmacy note only recent fills are 800 mg and not 300 mg dosing.   Gabapentin on hold since admission,  will resume at 100 mg twice daily on 4/18 as mentation improving     Nicotine use: Chews tobacco  Continue nicotine patch, and as needed nicotine lozenges.    Long term cessation to be encouraged during stay.     ALIREZA not on CPAP.  Recommend sleep studies as outpatient     Medically complicated obesity with a BMI of 41.  Increase all-cause mortality and morbidity.  Consider lifestyle modification with diet and exercise as able to.     Physical deconditioning from medical illness.  Encourage ambulation.  Fall precautions  Rehab team evaluation requested.            Diet: Mechanical/Dental Soft Diet  Snacks/Supplements Adult: Ensure Max Protein (bariatric); Between Meals    DVT Prophylaxis: DOAC  Mccarty Catheter: Not present  Lines: None     Cardiac Monitoring: ACTIVE order. Indication: Tachyarrhythmias, acute (48 hours)  Code Status: Full Code      Clinically Significant Risk Factors          # Hyperchloremia: Highest Cl = 111 mmol/L in last 2 days, will monitor as appropriate          # Hypoalbuminemia: Lowest albumin = 1.9 g/dL at 4/9/2025  5:25 AM, will monitor as appropriate     # Hypertension: Noted on problem list           # DMII: A1C = 6.8 % (Ref range: <5.7 %) within past 6 months   # Severe Obesity: Estimated body mass index is 41.62 kg/m  as calculated from the following:    Height as of this encounter:  1.829 m (6').    Weight as of this encounter: 139.2 kg (306 lb 14.4 oz).   # Moderate Malnutrition: based on nutrition assessment and treatment provided per dietitian's recommendations.      # Financial/Environmental Concerns: none   # History of CABG: noted on surgical history       Social Drivers of Health    Tobacco Use: High Risk (3/30/2025)    Patient History     Smoking Tobacco Use: Former     Smokeless Tobacco Use: Current          Disposition Plan     Medically Ready for Discharge: Anticipated Tomorrow         Andres Sparks DO  Hospitalist Service  [facility]  Securely message with [employer] (more info)  Text page via [facility] Paging/Directory   ______________________________________________________________________    Interval History     - No acute events overnight  - Awake   - Has not acute concerns  - Thinks he continues to get better  - Has not other acute concerns     Physical Exam   Vital Signs: Temp: 98.3  F (36.8  C) Temp src: Oral BP: 131/64 Pulse: 75   Resp: 18 SpO2: 98 % O2 Device: None (Room air)    Weight: 306 lbs 14.4 oz    Constitutional: awake, alert, cooperative, no apparent distress, and appears stated age  Eyes: Lids and lashes normal  ENT: Normocephalic, without obvious abnormality, atraumatic  Respiratory: No increased work of breathing, good air exchange, clear to auscultation bilaterally, no crackles or wheezing  Cardiovascular: Normal apical impulse, regular rate and rhythm  GI: Distended with wound vac present  Skin: No rash   Musculoskeletal: No deformity   Neurologic: Awake, alert    Medical Decision Making       45 MINUTES SPENT BY ME on the date of service doing chart review, history, exam, documentation & further activities per the note.      Data   ------------------------- PAST 24 HR DATA REVIEWED -----------------------------------------------    I have personally reviewed the following data over the past 24 hrs:    N/A  \   10.7 (L)   / N/A     142  111 (H) 5.6 (L) /  128 (H)   3.5 24 0.71 \       Imaging results reviewed over the past 24 hrs:   No results found for this or any previous visit (from the past 24 hours).

## 2025-04-19 NOTE — PLAN OF CARE
Goal Outcome Evaluation:      Plan of Care Reviewed With: patient    Overall Patient Progress: no changeOverall Patient Progress: no change    Date & Time: 4/19/25 [id number]  Behavior & Aggression: yellow, confusion, restless at times and periodically pulling at tubes/tele monitor.   Fall Risk: yes  Orientation:A&OX2-3, currently disoriented to place and situation. Forgetful. Other neuros intact.  ABNL VS/O2:VSS  Pain Management:denies pain this shift.  Bowel/Bladder: Incont of bladder. No BM this shift. Abd round, soft with positive BS.  Drains: TERRIE with yellow drainage.   Wounds/incisions: Abd wound vac CDI. Reddened groin/kathie area. Nystatin powder ordered applied.  Sween lotion applied to body.   Diet:Tolerating soft diet without nausea.   Number of times OUT OF BED this shift : up to chair with ceiling lift once this shift.  Repo q 2 hours.   Anticipated  DC Date: pending.

## 2025-04-19 NOTE — PLAN OF CARE
Goal Outcome Evaluation:             Date & Time: 4/18/2025    0627--8228  Surgery/POD#:   3/30: enterotomy and removal gallstone  4/4 new A-flutter  4/7: cardioverted into SR  4/8 Bowel resection  4/10 Bowel resection and wound closure   4/14: A-flutter, cardioverted into SR  4/15 Drain placed for abd abscess     Behavior & Aggression: Green this shift   Fall Risk: Yes  Orientation:Aox2-3 (time and situations)    ABNL VS/O2:VSS on RA ex HTN at the times  ABNL Labs: See labs   Pain Management:Denies   Bowel/Bladder: Incontinent B/B. +BS, +gas, -BM  Drains: PIV x1 SL. TERRIE drain x1. Wound vac on midline abd inc. CDI  Wounds/incisions: midline inc, TERRIE site   Diet:Full liquid   Activities: Ax2-3, lift, reposition q2  Number of times OUT OF BED this shift : 0  Tests/Procedures: N/A  Anticipated  DC Date: Penidng   Other information:  Confused and pulled off PIV x1, pull off gown and tele monitor multiple times. Reoriented  constantly. Restless and attempted to pull out wound vac, TERRIE drain. Sitter at bed site.

## 2025-04-20 LAB
BACTERIA ASPIRATE CULT: NO GROWTH
BACTERIA ASPIRATE CULT: NO GROWTH
GLUCOSE BLDC GLUCOMTR-MCNC: 116 MG/DL (ref 70–99)
GLUCOSE BLDC GLUCOMTR-MCNC: 130 MG/DL (ref 70–99)
GLUCOSE BLDC GLUCOMTR-MCNC: 142 MG/DL (ref 70–99)
GLUCOSE BLDC GLUCOMTR-MCNC: 176 MG/DL (ref 70–99)
PHOSPHATE SERPL-MCNC: 2.9 MG/DL (ref 2.5–4.5)
POTASSIUM SERPL-SCNC: 4 MMOL/L (ref 3.4–5.3)

## 2025-04-20 PROCEDURE — 120N000001 HC R&B MED SURG/OB

## 2025-04-20 PROCEDURE — 84100 ASSAY OF PHOSPHORUS: CPT | Performed by: INTERNAL MEDICINE

## 2025-04-20 PROCEDURE — 250N000013 HC RX MED GY IP 250 OP 250 PS 637

## 2025-04-20 PROCEDURE — 250N000013 HC RX MED GY IP 250 OP 250 PS 637: Performed by: STUDENT IN AN ORGANIZED HEALTH CARE EDUCATION/TRAINING PROGRAM

## 2025-04-20 PROCEDURE — 36415 COLL VENOUS BLD VENIPUNCTURE: CPT | Performed by: INTERNAL MEDICINE

## 2025-04-20 PROCEDURE — 93005 ELECTROCARDIOGRAM TRACING: CPT

## 2025-04-20 PROCEDURE — 84132 ASSAY OF SERUM POTASSIUM: CPT | Performed by: INTERNAL MEDICINE

## 2025-04-20 PROCEDURE — 250N000013 HC RX MED GY IP 250 OP 250 PS 637: Performed by: NURSE PRACTITIONER

## 2025-04-20 PROCEDURE — 250N000013 HC RX MED GY IP 250 OP 250 PS 637: Performed by: HOSPITALIST

## 2025-04-20 PROCEDURE — 99232 SBSQ HOSP IP/OBS MODERATE 35: CPT | Performed by: STUDENT IN AN ORGANIZED HEALTH CARE EDUCATION/TRAINING PROGRAM

## 2025-04-20 PROCEDURE — 250N000011 HC RX IP 250 OP 636: Performed by: NURSE PRACTITIONER

## 2025-04-20 RX ADMIN — PIPERACILLIN AND TAZOBACTAM 4.5 G: 4; .5 INJECTION, POWDER, FOR SOLUTION INTRAVENOUS at 06:36

## 2025-04-20 RX ADMIN — ATORVASTATIN CALCIUM 10 MG: 10 TABLET, FILM COATED ORAL at 21:48

## 2025-04-20 RX ADMIN — APIXABAN 5 MG: 5 TABLET, FILM COATED ORAL at 21:48

## 2025-04-20 RX ADMIN — PIPERACILLIN AND TAZOBACTAM 4.5 G: 4; .5 INJECTION, POWDER, FOR SOLUTION INTRAVENOUS at 12:02

## 2025-04-20 RX ADMIN — NICOTINE 1 PATCH: 21 PATCH, EXTENDED RELEASE TRANSDERMAL at 08:25

## 2025-04-20 RX ADMIN — GABAPENTIN 100 MG: 100 CAPSULE ORAL at 08:22

## 2025-04-20 RX ADMIN — TRAMADOL HYDROCHLORIDE 25 MG: 50 TABLET, FILM COATED ORAL at 08:23

## 2025-04-20 RX ADMIN — APIXABAN 5 MG: 5 TABLET, FILM COATED ORAL at 08:23

## 2025-04-20 RX ADMIN — SENNOSIDES AND DOCUSATE SODIUM 1 TABLET: 50; 8.6 TABLET ORAL at 08:23

## 2025-04-20 RX ADMIN — AMIODARONE HYDROCHLORIDE 200 MG: 200 TABLET ORAL at 21:48

## 2025-04-20 RX ADMIN — METOPROLOL TARTRATE 25 MG: 25 TABLET, FILM COATED ORAL at 21:48

## 2025-04-20 RX ADMIN — PIPERACILLIN AND TAZOBACTAM 4.5 G: 4; .5 INJECTION, POWDER, FOR SOLUTION INTRAVENOUS at 00:13

## 2025-04-20 RX ADMIN — QUETIAPINE FUMARATE 25 MG: 25 TABLET ORAL at 08:25

## 2025-04-20 RX ADMIN — PIPERACILLIN AND TAZOBACTAM 4.5 G: 4; .5 INJECTION, POWDER, FOR SOLUTION INTRAVENOUS at 17:11

## 2025-04-20 RX ADMIN — TRAMADOL HYDROCHLORIDE 50 MG: 50 TABLET, COATED ORAL at 02:34

## 2025-04-20 RX ADMIN — METOPROLOL TARTRATE 25 MG: 25 TABLET, FILM COATED ORAL at 08:23

## 2025-04-20 RX ADMIN — AMIODARONE HYDROCHLORIDE 200 MG: 200 TABLET ORAL at 08:23

## 2025-04-20 RX ADMIN — POLYETHYLENE GLYCOL 3350 17 G: 17 POWDER, FOR SOLUTION ORAL at 08:22

## 2025-04-20 RX ADMIN — TAMSULOSIN HYDROCHLORIDE 0.4 MG: 0.4 CAPSULE ORAL at 21:48

## 2025-04-20 RX ADMIN — GABAPENTIN 100 MG: 100 CAPSULE ORAL at 21:48

## 2025-04-20 RX ADMIN — PANTOPRAZOLE SODIUM 40 MG: 20 TABLET, DELAYED RELEASE ORAL at 06:35

## 2025-04-20 RX ADMIN — QUETIAPINE FUMARATE 25 MG: 25 TABLET ORAL at 21:48

## 2025-04-20 ASSESSMENT — ACTIVITIES OF DAILY LIVING (ADL)
ADLS_ACUITY_SCORE: 66
ADLS_ACUITY_SCORE: 64
ADLS_ACUITY_SCORE: 71
ADLS_ACUITY_SCORE: 64
ADLS_ACUITY_SCORE: 66
ADLS_ACUITY_SCORE: 71
ADLS_ACUITY_SCORE: 64
ADLS_ACUITY_SCORE: 60
ADLS_ACUITY_SCORE: 66
ADLS_ACUITY_SCORE: 60
ADLS_ACUITY_SCORE: 71
ADLS_ACUITY_SCORE: 64
ADLS_ACUITY_SCORE: 71
ADLS_ACUITY_SCORE: 75
ADLS_ACUITY_SCORE: 64
ADLS_ACUITY_SCORE: 66

## 2025-04-20 NOTE — PLAN OF CARE
Goal Outcome Evaluation:      Plan of Care Reviewed With: patient    Overall Patient Progress: improvingOverall Patient Progress: improving         Date & Time: 4/19-20/2025    [id number]  Surgery/POD#:   3/30: enterotomy and removal gallstone  4/4 new A-flutter  4/7: cardioverted into SR  4/8 Bowel resection  4/10 Bowel resection and wound closure   4/14: A-flutter, cardioverted into SR  4/15 Drain placed for abd abscess     Behavior & Aggression: Green, can be restless and pulls at lines  Fall Risk: Yes  Orientation:Aox2-3 (time and situations)    ABNL VS/O2:VSS on RA ex HTN at the times  ABNL Labs: See labs   Pain Management: Tramadol given x1  Bowel/Bladder: Incontinent B/B. +BS, +gas, -BM. Purewick in place overnight  Drains: PIV x1 SL. TERRIE drain x1. Wound vac on midline abd inc. CDI. Purewick.  Wounds/incisions: midline inc, TERRIE site   Diet:Full liquid   Activities: Ax2-3, lift, reposition q2  Number of times OUT OF BED this shift : 0. T/R q2hr  Tests/Procedures: N/A  Anticipated  DC Date: Penidng   Other information:  Confused, pull off gown and tele monitor multiple times. Tugs at drains, PIVs. Reoriented  constantly. Restless, even while asleep. Sitter at bed site. Can appropriately ask for pain medication, food, water.

## 2025-04-20 NOTE — PROGRESS NOTES
[facility]    Medicine Progress Note - Hospitalist Service    Date of Admission:  3/30/2025    Assessment & Plan      Richar Davis is a 69 year old male with a history of diabetes, hypertension, CABG, status post AVR with bovine valve 10/2022, untreated sleep apnea, hypertension, hyperlipidemia, neuropathy, BPH admitted on 3/30/2025 with abdominal pain nausea and vomiting.      IR guided drain placement 4/15  Small bowel anastomosis and fascial closure 4/10.  Small bowel ileus with resection on 4/8  Exploratory laparotomy with small bowel enterotomy and removal of gallstone on 3/30.   Gallstone ileus, distal small bowel obstruction secondary to impacted 2.2 cm gallstone.  Enterococcus faecalis bacteremia -cleared  -CT imaging on admission showed Gallstone ileus. Distal small bowel obstruction secondary to an impacted 2.2 cm gallstone.   -Underwent Exploratory laparotomy with small bowel enterotomy and removal of gallstone on 3/30.   -Postoperative course complicated with ileus.  NG tube removed 4/2, unable to tolerate oral diet.   -Had fever on 4/5, COVID/Flu/RSV which was negative.  BC x 2 no growth. CXR without overt pneumonia.  UA then not consistent with infection.  -On 4/7 spiked fever with significant leukocytosis, lactic acidosis. Blood cultures from 4/7, 4/8 with Enterococcus faecalis.  -Returned back to the OR for exploratory laparotomy with small bowel resection on 4/8.  -Postprocedure patient intubated on pressor support, temporary abdominal closure admitted to ICU.   -Returned to OR on 4/10 for small bowel anastomosis and abdominal wound VAC placement.    -Postprocedure continued to have distended abdomen with bloody output.  -CT on 4/14 showing multiple peripherally enhancing fluid collections in the abdomen which are suspicious for developing abscesses. The largest of these is in the left paracolic gutter. Stable appearance of a contracted gallbladder containing a large  gallstone which is fistulized to the duodenum with associated pneumobilia.  --4/15 underwent IR guided abdominal drain placements x 2.  Patient removed NG tube by himself  --4/16 started on clear liquid diet after okay by surgery/speech therapy.  --4/17 advance to full liquid diet.   --4/18-does admit to some improvement in abdominal pain.  Advance to mechanical soft diet.  Will discontinue heparin and initiated on Eliquis as hemoglobin stable.  --Has wound VAC, intra-abdominal drains.  --General Surgery following. Defer postoperative care including postoperative nutritional support/oral intake/fluid resuscitation to surgical team.   -IR following.  CT sinogram 4/18.  -Infectious disease following.  Continue IV Zosyn  Appreciate multidisciplinary team input  -Continue IV Zosyn. Blood Cultures from 4/10 no growth to date.  Drain cultures no growth to date. ID to re-evaluate patient on 4/21  -Continue oral PPI.    Postoperative atrial fibrillation with RVR -status post cardioversion 4/14/2025  New Aflutter with RVR 4/5/2025 status post DCCV on 4/7/2025.  Coronary artery disease status post CABG x 3 in October 2022  Aortic stenosis (status post AVR with bovine valve) October 2022.  Hypertension  Hyperlipidemia.  -Postoperatively developed new aflutter with RVR, was on intravenous heparin drip, rate controlled with Cardizem drip and initiated oral metoprolol.   --Cardiology followed underwent cardioversion on 4/7.   --Heparin was paused as patient back to OR on 4/8 and was started on intravenous heparin during ICU stay.  --On 4/14 patient again developed recurrence of A-fib with RVR with heart rate in 140s.  Initiated on amiodarone drip, underwent successful cardioversion on 4/14.  Currently in sinus rhythm with heart rate in 70s to 80s.  --Echocardiogram 4/15 with EF of 55%.  Right ventricle normal size.  No valve disease.    --Cardiology signed off 4/15, continue with amiodarone loading and switch to oral  amiodarone.  --Continue oral amiodarone taper: 200 mg BID x 2 weeks ( starting 4/16), then 200 mg every day.  EKG for QTC on 4/20.   --Was on intravenous heparin, switch to oral Eliquis 5 mg twice daily on 4/18.  -- Continue Oral metoprolol 25 mg twice daily.  -- PTA on Lipitor 80 mg oral daily, LDL 71.  Restart at lower dose of 10 mg oral daily.    Acute hypoxia-likely from atelectasis, bilateral pleural effusion, postoperative narcotic use, ALIREZA, deconditioning  CT chest from 4/14 with New small bilateral pleural effusions with overlying atelectasis/consolidation.  Afebrile.  Leukocytosis improved.  Weaned off oxygen.  This morning on room air.  IS, ambulate      Acute encephalopathy likely multifactorial from anesthesia/sedation for procedure/delirium, hospitalization/ICU stay/narcotic use.  Patient with prolonged hospital stay -multiple procedures, ICU stay, on narcotics.  CT head 4/18 no acute intracranial pathology.  Monitor mental status closely  - Assumed care 4/19, patient on Seroquel 25 mg bid , repeat EKG 4/20 with normal Qt interval, will continue at current dosing     Acute anemia likely from surgical blood loss, acute illness, dilutional.  Presented with hemoglobin of 15.9, now hemoglobin in the 10-11 range.  No active bleeding.  Monitor in AM.     Severe hypoalbuminemia likely from acute illness.  Dilutional.  Serum albumin of 2.1.    Nutritional intake per surgical team     Acute on chronic bilateral lower extremity edema.  Ultrasound bilateral lower extremities -no DVT.  Pitting edema present.    PTA oral Lasix on hold since admission, will repeat BMP 4/21 and plan to resume if stable and patient having adequate oral intake     Mild hypernatremia.  Mild dehydration.  Oral intake per surgical team.     Acute kidney injury.  Resolved  Anion gap metabolic acidosis.  Resolved.  Monitor.     Hypokalemia from poor intake -replaced.  On replacement protocol     Type 2 diabetes mellitus with a hemoglobin A1c  of 6.8  Hold PTA metformin.  Insulin sliding scale.     Urinary retention -Mccarty removed 4/17  Traumatic hematuria.  BPH:   Urine cultures from 4/14 no growth  Catheter removed 4/17.  Continue PTA Flomax     Neuropathy:   *Neurontin 800 mg 3 times daily as needed has been filled but patient reports taking 300mg TID  *4/1 per pharmacy note only recent fills are 800 mg and not 300 mg dosing.   Gabapentin on hold since admission,  will resume at 100 mg twice daily on 4/18 as mentation improving     Nicotine use: Chews tobacco  Continue nicotine patch, and as needed nicotine lozenges.    Long term cessation to be encouraged during stay.     ALIREZA not on CPAP.  Recommend sleep studies as outpatient     Medically complicated obesity with a BMI of 41.  Increase all-cause mortality and morbidity.  Consider lifestyle modification with diet and exercise as able to.     Physical deconditioning from medical illness.  Encourage ambulation.  Fall precautions  Rehab team evaluation requested.            Diet: Mechanical/Dental Soft Diet  Snacks/Supplements Adult: Ensure Max Protein (bariatric); Between Meals    DVT Prophylaxis: DOAC  Mccarty Catheter: Not present  Lines: None     Cardiac Monitoring: ACTIVE order. Indication: Tachyarrhythmias, acute (48 hours)  Code Status: Full Code      Clinically Significant Risk Factors          # Hyperchloremia: Highest Cl = 111 mmol/L in last 2 days, will monitor as appropriate          # Hypoalbuminemia: Lowest albumin = 1.9 g/dL at 4/9/2025  5:25 AM, will monitor as appropriate     # Hypertension: Noted on problem list           # DMII: A1C = 6.8 % (Ref range: <5.7 %) within past 6 months   # Severe Obesity: Estimated body mass index is 41.62 kg/m  as calculated from the following:    Height as of this encounter: 1.829 m (6').    Weight as of this encounter: 139.2 kg (306 lb 14.4 oz).   # Moderate Malnutrition: based on nutrition assessment and treatment provided per dietitian's  recommendations.      # Financial/Environmental Concerns: none   # History of CABG: noted on surgical history       Social Drivers of Health    Tobacco Use: High Risk (3/30/2025)    Patient History     Smoking Tobacco Use: Former     Smokeless Tobacco Use: Current          Disposition Plan     Medically Ready for Discharge: Anticipated Tomorrow         Andres Sparks DO  Hospitalist Service  [facility]  Securely message with [employer] (more info)  Text page via [employer] Paging/Directory   ______________________________________________________________________    Interval History     - No acute events overnight  - Awake   - Angry about not ambulating. He is trying to get out of bed  - Would like to sit up in a chair today  - No other acute concerns     Physical Exam   Vital Signs: Temp: 97.5  F (36.4  C) Temp src: Oral BP: (!) 176/76 Pulse: 74   Resp: 18 SpO2: 96 % O2 Device: None (Room air)    Weight: 306 lbs 14.4 oz    Constitutional: awake, alert, cooperative, no apparent distress, and appears stated age  Eyes: Lids and lashes normal  ENT: Normocephalic, without obvious abnormality, atraumatic  Respiratory: No increased work of breathing, good air exchange, clear to auscultation bilaterally, no crackles or wheezing  Cardiovascular: Normal apical impulse, regular rate and rhythm  GI: Distended with wound vac present  Skin: No rash   Musculoskeletal: No deformity   Neurologic: Awake, alert    Medical Decision Making       33 MINUTES SPENT BY ME on the date of service doing chart review, history, exam, documentation & further activities per the note.      Data   ------------------------- PAST 24 HR DATA REVIEWED -----------------------------------------------    I have personally reviewed the following data over the past 24 hrs:    N/A  \   N/A   / N/A     N/A N/A N/A /  116 (H)   N/A N/A N/A \       Imaging results reviewed over the past 24 hrs:   No results found for this or any previous visit  (from the past 24 hours).

## 2025-04-20 NOTE — PLAN OF CARE
Goal Outcome Evaluation:    Date & Time: 4/20/25 ([id number])  Summary:   3/30: enterotomy and removal gallstone  4/4 new A-flutter  4/7: cardioverted into SR  4/8 Bowel resection  4/10 Bowel resection and wound closure   4/14: A-flutter, cardioverted into SR  4/15 Drain placed for abd abscess   Orientation: confused, disoriented to time & situation  ABNL VS/O2: VSS on RA, ex HTN  Pain Management: tramadol  Bowel/Bladder: incontinent B&B  Drains: PIV SL. TERRIE x1. Midline incision with wound vac   Wounds/incisions: midline incision, TERRIE site   Diet: mechanical/dental soft diet   Number of times OUT OF BED this shift: Up in chair for meal with assist of 2 & lift   Anticipated  DC Date: pending.   Significant Information: EKG done. Can make needs known at times.

## 2025-04-20 NOTE — PROGRESS NOTES
[facility]    General Surgery  Daily Progress Note       Assessment and Plan:   Richar Davis is a 69 year old male admitted on 3/30 for gallstone ileus. Underwent ex lap with removal of gallstone through small bowel enterotomy and was admitted to the floor post operatively. Patient had return of bowel function but new onset of Aflutter with RVR and underwent cardioversion on 4/7. Overnight 4/7 RRT called for hypotension and patient found to have elevated lactate, leukocytosis of 36. CT showing increased fluid and free air, started draining from midline incision. Patient taken to OR for ex lap, found to have gross sucus breakdown of enterotomy on 4/8. RTOR 4/10 for small bowel anastomosis and fascial closure. Extubated and transferred to floor. RRT 4/14 for recurrent Aflutter with RVR, underwent repeat cardioversion. CT showing two new fluid collections, possible early abscess, underwent IR drain placement x2 4/15. Cultures pending, ngtd. CT sinogram yesterday, left drain removed as was in the subcutaneous tissues.    PLAN:  - Okay for regular diet from our standpoint. Advance diet as tolerated per discretion of SLP.  - No growth on abdominal fluid collections, on Zosyn. Continue antibiotic recommendations per ID.  - Drain cares per IR. Continue to monitor output. Per sinogram note 4/18, no significant residual fluid but still having 200 ml outputs.  - Hgb stable. Back on Eliquis.  - Appreciate WOC for wound VAC management, on MWF changes.  - Ambulate QID to assist with bowel function, PCDs for DVT prophylaxis.  - Encourage deep breathe and IS.   - Medical management per hospitalist team.        Interval History:   Richar Davis is seen on surgical rounds. Per nursing notes, confused restless night and pulling at drain and IV. Less confused currently and not pulling at drain or leads. He denies complaints. Tolerating diet. Passing flatus, no BM yesterday. Just got EKG.  Hypertensive at times, vitals otherwise wnl.         Physical Exam:   Temp: 97.5  F (36.4  C) Temp src: Oral BP: (!) 176/76 Pulse: 74   Resp: 18 SpO2: 96 % O2 Device: None (Room air)      I/O last 3 completed shifts:  In: 920 [P.O.:920]  Out: 652 [Urine:450; Drains:202]    GENERAL: VS reviewed, alert, oriented, no acute distress  LUNGS: Normal respiratory effort, no wheezing  ABDOMEN:  Obese, soft, nontender, wound VAC in place, IR drain with 202 serosanguinous drainage/24 hours  EXTREMITIES: Moving all extremities, no gross deformities  NEUROLOGICAL: Grossly non-focal, mood & affect appropriate    Data   Recent Labs   Lab 04/20/25  0632 04/19/25  2150 04/19/25  1756 04/19/25  1259 04/19/25  0639 04/18/25  1120 04/18/25  0952 04/17/25  1554 04/17/25  1341 04/17/25  0757 04/17/25  0534 04/16/25  0418 04/16/25  0303 04/15/25  0812 04/15/25  0759 04/14/25  1619 04/14/25  0925   WBC  --   --   --   --   --   --   --   --   --   --   --   --  8.1  --  8.1  --  9.9   HGB  --   --   --   --  10.7*  --  11.0*  --  11.1*  --  10.9*  --  10.5*  --  10.4*  --  11.7*   MCV  --   --   --   --   --   --   --   --   --   --   --   --  93  --  95  --  93   PLT  --   --   --   --   --   --   --   --   --   --   --   --  272  --  267  --  301   NA  --   --   --   --  142  --   --   --   --   --  143  --  145  --  146*  --   --    POTASSIUM  --   --   --   --  3.5  --   --   --  3.6  --  3.3*  --  3.6  --  3.6  --   --    CHLORIDE  --   --   --   --  111*  --   --   --   --   --  111*  --  112*  --  113*  --   --    CO2  --   --   --   --  24  --   --   --   --   --  23  --  23  --  21*  --   --    BUN  --   --   --   --  5.6*  --   --   --   --   --  8.5  --  11.7  --  11.8  --   --    CR  --   --   --   --  0.71  --   --   --   --   --  0.70  --  0.72  --  0.70  --   --    ANIONGAP  --   --   --   --  7  --   --   --   --   --  9  --  10  --  12  --   --    HALEY  --   --   --   --  9.1  --   --   --   --   --  8.8  --  8.9  --  8.8   --   --    * 122* 118*   < > 124*   < >  --    < >  --    < > 117*   < > 109*   < > 114*   < >  --    ALBUMIN  --   --   --   --   --   --   --   --   --   --   --   --  2.2*  --  2.2*  --   --    PROTTOTAL  --   --   --   --   --   --   --   --   --   --   --   --  6.0*  --  5.8*  --   --    BILITOTAL  --   --   --   --   --   --   --   --   --   --   --   --  0.3  --  0.3  --   --    ALKPHOS  --   --   --   --   --   --   --   --   --   --   --   --  103  --  103  --   --    ALT  --   --   --   --   --   --   --   --   --   --   --   --  22  --  19  --   --    AST  --   --   --   --   --   --   --   --   --   --   --   --  35  --  36  --   --     < > = values in this interval not displayed.       Viviane Xiao PA-C    Please use Anshul to page 7:30am-4pm, page on-call surgeon after 4pm  Office number: [phone]

## 2025-04-20 NOTE — PROVIDER NOTIFICATION
"MD Notification    Notified Person: MD Sparks    Notified Person Name:    Notification Date/Time: 4/20/25 @08:14    Notification Interaction:    Purpose of Notification: \"hi doc, i read your note on pt 2224 GG, to hold QT prolonging meds and to hold seroquel while on amiodarone, but i saw seroquel is scheduled for this am and pt been taking it. should i give it or hold?\"    Orders Received: \"If patient behaving appropriately go ahead and hold this morning dose\"  \"EKG ordered.\"    Comments: pt restless/pulling at lines, so am dose given.   "

## 2025-04-21 ENCOUNTER — APPOINTMENT (OUTPATIENT)
Dept: OCCUPATIONAL THERAPY | Facility: CLINIC | Age: 70
DRG: 329 | End: 2025-04-21
Payer: COMMERCIAL

## 2025-04-21 LAB
ANION GAP SERPL CALCULATED.3IONS-SCNC: 8 MMOL/L (ref 7–15)
ATRIAL RATE - MUSE: 85 BPM
BUN SERPL-MCNC: 7 MG/DL (ref 8–23)
CALCIUM SERPL-MCNC: 9.1 MG/DL (ref 8.8–10.4)
CHLORIDE SERPL-SCNC: 108 MMOL/L (ref 98–107)
CREAT SERPL-MCNC: 0.62 MG/DL (ref 0.67–1.17)
DIASTOLIC BLOOD PRESSURE - MUSE: NORMAL MMHG
EGFRCR SERPLBLD CKD-EPI 2021: >90 ML/MIN/1.73M2
GLUCOSE BLDC GLUCOMTR-MCNC: 117 MG/DL (ref 70–99)
GLUCOSE BLDC GLUCOMTR-MCNC: 121 MG/DL (ref 70–99)
GLUCOSE BLDC GLUCOMTR-MCNC: 144 MG/DL (ref 70–99)
GLUCOSE BLDC GLUCOMTR-MCNC: 154 MG/DL (ref 70–99)
GLUCOSE SERPL-MCNC: 125 MG/DL (ref 70–99)
HCO3 SERPL-SCNC: 25 MMOL/L (ref 22–29)
HGB BLD-MCNC: 11 G/DL (ref 13.3–17.7)
INTERPRETATION ECG - MUSE: NORMAL
P AXIS - MUSE: 83 DEGREES
PHOSPHATE SERPL-MCNC: 2.8 MG/DL (ref 2.5–4.5)
PLATELET # BLD AUTO: 242 10E3/UL (ref 150–450)
POTASSIUM SERPL-SCNC: 3.9 MMOL/L (ref 3.4–5.3)
PR INTERVAL - MUSE: 132 MS
QRS DURATION - MUSE: 82 MS
QT - MUSE: 352 MS
QTC - MUSE: 418 MS
R AXIS - MUSE: 75 DEGREES
SODIUM SERPL-SCNC: 141 MMOL/L (ref 135–145)
SYSTOLIC BLOOD PRESSURE - MUSE: NORMAL MMHG
T AXIS - MUSE: 90 DEGREES
VENTRICULAR RATE- MUSE: 85 BPM

## 2025-04-21 PROCEDURE — 85049 AUTOMATED PLATELET COUNT: CPT | Performed by: STUDENT IN AN ORGANIZED HEALTH CARE EDUCATION/TRAINING PROGRAM

## 2025-04-21 PROCEDURE — 250N000013 HC RX MED GY IP 250 OP 250 PS 637: Performed by: NURSE PRACTITIONER

## 2025-04-21 PROCEDURE — G0463 HOSPITAL OUTPT CLINIC VISIT: HCPCS | Mod: 25

## 2025-04-21 PROCEDURE — 250N000013 HC RX MED GY IP 250 OP 250 PS 637: Performed by: STUDENT IN AN ORGANIZED HEALTH CARE EDUCATION/TRAINING PROGRAM

## 2025-04-21 PROCEDURE — 36415 COLL VENOUS BLD VENIPUNCTURE: CPT | Performed by: STUDENT IN AN ORGANIZED HEALTH CARE EDUCATION/TRAINING PROGRAM

## 2025-04-21 PROCEDURE — 84100 ASSAY OF PHOSPHORUS: CPT | Performed by: STUDENT IN AN ORGANIZED HEALTH CARE EDUCATION/TRAINING PROGRAM

## 2025-04-21 PROCEDURE — 120N000001 HC R&B MED SURG/OB

## 2025-04-21 PROCEDURE — 250N000012 HC RX MED GY IP 250 OP 636 PS 637: Performed by: HOSPITALIST

## 2025-04-21 PROCEDURE — 250N000013 HC RX MED GY IP 250 OP 250 PS 637

## 2025-04-21 PROCEDURE — 250N000013 HC RX MED GY IP 250 OP 250 PS 637: Performed by: HOSPITALIST

## 2025-04-21 PROCEDURE — 250N000011 HC RX IP 250 OP 636: Performed by: NURSE PRACTITIONER

## 2025-04-21 PROCEDURE — 80048 BASIC METABOLIC PNL TOTAL CA: CPT | Performed by: STUDENT IN AN ORGANIZED HEALTH CARE EDUCATION/TRAINING PROGRAM

## 2025-04-21 PROCEDURE — 99232 SBSQ HOSP IP/OBS MODERATE 35: CPT | Performed by: PHYSICIAN ASSISTANT

## 2025-04-21 PROCEDURE — 99232 SBSQ HOSP IP/OBS MODERATE 35: CPT | Performed by: STUDENT IN AN ORGANIZED HEALTH CARE EDUCATION/TRAINING PROGRAM

## 2025-04-21 PROCEDURE — 97605 NEG PRS WND THER DME<=50SQCM: CPT

## 2025-04-21 PROCEDURE — 97535 SELF CARE MNGMENT TRAINING: CPT | Mod: GO

## 2025-04-21 PROCEDURE — 85018 HEMOGLOBIN: CPT | Performed by: STUDENT IN AN ORGANIZED HEALTH CARE EDUCATION/TRAINING PROGRAM

## 2025-04-21 RX ORDER — ACETAMINOPHEN 325 MG/1
650 TABLET ORAL EVERY 4 HOURS PRN
Status: DISCONTINUED | OUTPATIENT
Start: 2025-04-21 | End: 2025-04-29 | Stop reason: HOSPADM

## 2025-04-21 RX ORDER — ATORVASTATIN CALCIUM 40 MG/1
80 TABLET, FILM COATED ORAL EVERY EVENING
Status: DISCONTINUED | OUTPATIENT
Start: 2025-04-21 | End: 2025-04-23

## 2025-04-21 RX ORDER — TAMSULOSIN HYDROCHLORIDE 0.4 MG/1
0.8 CAPSULE ORAL EVERY EVENING
Status: DISCONTINUED | OUTPATIENT
Start: 2025-04-21 | End: 2025-04-29 | Stop reason: HOSPADM

## 2025-04-21 RX ORDER — FUROSEMIDE 20 MG/1
20 TABLET ORAL DAILY
Status: DISCONTINUED | OUTPATIENT
Start: 2025-04-21 | End: 2025-04-25

## 2025-04-21 RX ADMIN — GABAPENTIN 100 MG: 100 CAPSULE ORAL at 09:02

## 2025-04-21 RX ADMIN — TAMSULOSIN HYDROCHLORIDE 0.8 MG: 0.4 CAPSULE ORAL at 21:20

## 2025-04-21 RX ADMIN — PANTOPRAZOLE SODIUM 40 MG: 20 TABLET, DELAYED RELEASE ORAL at 06:50

## 2025-04-21 RX ADMIN — AMIODARONE HYDROCHLORIDE 200 MG: 200 TABLET ORAL at 21:20

## 2025-04-21 RX ADMIN — QUETIAPINE FUMARATE 25 MG: 25 TABLET ORAL at 21:20

## 2025-04-21 RX ADMIN — INSULIN ASPART 1 UNITS: 100 INJECTION, SOLUTION INTRAVENOUS; SUBCUTANEOUS at 18:08

## 2025-04-21 RX ADMIN — GABAPENTIN 100 MG: 100 CAPSULE ORAL at 21:20

## 2025-04-21 RX ADMIN — METOPROLOL TARTRATE 25 MG: 25 TABLET, FILM COATED ORAL at 09:01

## 2025-04-21 RX ADMIN — NICOTINE 1 PATCH: 21 PATCH, EXTENDED RELEASE TRANSDERMAL at 09:10

## 2025-04-21 RX ADMIN — PIPERACILLIN AND TAZOBACTAM 4.5 G: 4; .5 INJECTION, POWDER, FOR SOLUTION INTRAVENOUS at 06:46

## 2025-04-21 RX ADMIN — PIPERACILLIN AND TAZOBACTAM 4.5 G: 4; .5 INJECTION, POWDER, FOR SOLUTION INTRAVENOUS at 17:21

## 2025-04-21 RX ADMIN — FUROSEMIDE 20 MG: 20 TABLET ORAL at 14:32

## 2025-04-21 RX ADMIN — APIXABAN 5 MG: 5 TABLET, FILM COATED ORAL at 09:02

## 2025-04-21 RX ADMIN — QUETIAPINE FUMARATE 25 MG: 25 TABLET ORAL at 09:01

## 2025-04-21 RX ADMIN — AMIODARONE HYDROCHLORIDE 200 MG: 200 TABLET ORAL at 09:01

## 2025-04-21 RX ADMIN — APIXABAN 5 MG: 5 TABLET, FILM COATED ORAL at 21:20

## 2025-04-21 RX ADMIN — METOPROLOL TARTRATE 25 MG: 25 TABLET, FILM COATED ORAL at 21:20

## 2025-04-21 RX ADMIN — ATORVASTATIN CALCIUM 80 MG: 40 TABLET, FILM COATED ORAL at 21:20

## 2025-04-21 RX ADMIN — TRAMADOL HYDROCHLORIDE 25 MG: 50 TABLET, FILM COATED ORAL at 12:25

## 2025-04-21 RX ADMIN — PIPERACILLIN AND TAZOBACTAM 4.5 G: 4; .5 INJECTION, POWDER, FOR SOLUTION INTRAVENOUS at 11:54

## 2025-04-21 RX ADMIN — PIPERACILLIN AND TAZOBACTAM 4.5 G: 4; .5 INJECTION, POWDER, FOR SOLUTION INTRAVENOUS at 00:35

## 2025-04-21 ASSESSMENT — ACTIVITIES OF DAILY LIVING (ADL)
ADLS_ACUITY_SCORE: 64
ADLS_ACUITY_SCORE: 65
ADLS_ACUITY_SCORE: 65
ADLS_ACUITY_SCORE: 60
ADLS_ACUITY_SCORE: 64
ADLS_ACUITY_SCORE: 64
ADLS_ACUITY_SCORE: 65
ADLS_ACUITY_SCORE: 67
ADLS_ACUITY_SCORE: 60
ADLS_ACUITY_SCORE: 60
ADLS_ACUITY_SCORE: 65
ADLS_ACUITY_SCORE: 64
ADLS_ACUITY_SCORE: 65
ADLS_ACUITY_SCORE: 64
ADLS_ACUITY_SCORE: 65
ADLS_ACUITY_SCORE: 65

## 2025-04-21 NOTE — PROGRESS NOTES
[facility]  WO Nurse Inpatient Assessment     Consulted for: coccyx and abdominal NPWT changes    Summary:   Coccyx: small blanchable elongated patches of erythema, skin intact-Signing off 4/15  2.   Abdomen: s/p small bowel resection with VAC placement in OR. NPWT midline wound changed 4/21 ~ 75 mmHG     WO nurse follow-up plan: Monday/WednesdayFriday     Patient History (according to provider note(s):       69 year old male with a history of diabetes, hypertension, CABG, status post AVR with bovine valve 10/2022, untreated sleep apnea, hypertension, hyperlipidemia, neuropathy, BPH admitted on 3/30/2025 with abdominal pain nausea and vomiting.  Imaging showed gallstone ileus with 2.2 cm gallstone impacted.  Underwent surgery on 3/30.  Postoperatively had issues with ileus, fever, and aflutter with RVR.       Returned back to the OR for exploratory laparotomy with small bowel resection.Post -op admission to the ICU, intubated and on pressor support with temporary abdominal closure. He returned to OR on 4/10  for small bowel anastomosis and abdominal.  Wound VAC in place.  Pressors weaned off, patient extubated 4/11 and transferred to hospitalist service on 4/12.    Assessment:      Areas visualized during today's visit: Focused: and Abdomen    Negative pressure wound therapy applied to: abdomen         Last photo: 4/21/25   Wound due to: Surgical Wound   Wound history/plan of care:    Surgical date: 4/10/25   Service following: Gen Surgery  Date Negative Pressure Wound Therapy initiated: 4/10/25   Interventions in place: repositioning, pressure redistribution with device or dressing, specialty surface in use, moisture/incontinence management, offloading, and elevation  Is patient s nutritional status compromised? no  If yes, what interventions are in place? N/A  Reason for initiating vac therapy? Need for accelerated granulation tissue  Which?of?the?following?co-morbidities?apply?  Diabetes and Obesity  If diabetic is patient on a diabetic management program? Yes   Is osteomyelitis present in wound? no   If yes what treatments are in place? N/A    Wound base: 98 % Granulation tissue and Adipose tissue, 2 % slough     Palpation of the wound bed: normal       Drainage: moderate      Volume in cannister: 0 ml     Last cannister change date: 4/21/25     Description of drainage: serosanguinous and bloody      Measurements (length x width x depth, in cm) 18.7 x 6  x  4 cm       Tunneling N/A      Undermining N/A   Periwound skin: Intact       Color: pink       Temperature: normal    Odor: none   Pain: mild,   Pain intervention prior to dressing change: patient tolerated well, oral Tramadol, slow and gentle cares , and distraction  Treatment goal: Drainage control, Infection control/prevention, Increase granulation, Maintain (prevention of deterioration), Protection, and Promote epidermal migration  STATUS: improving   Supplies ordered: gathered, at bedside, supplies stored on unit, and discussed with patient     Number of foam pieces removed from a wound (excluding foam for bridge) : 1 GranuFoam Black and 1 White foam   Verified this matched the number of foam pieces applied last dressing change: Yes   Number of foam pieces packed into wound (excluding foam for bridge) :  1 GranuFoam Black and 1 White foam       Copy for continuity.   4/15: WO received consult for coccyx. Skin intact, each buttock with elongated patch of erythema related to friction, patient has been intermittently confused and agitated per chart review. Nursing staff to monitor and moisturize skin.    Treatment Plan:     Negative pressure wound therapy plan:  Mon/Wed/Fri  Wound location: midline abdomen   Change Days: Mon/Wed/Fri by WOC RN    Supplies (including all accessories) used: medium Black foam , White foam, and Cavilon no sting barrier film  Cleanse with Vashe prior to replacing NPWT  Suction setting: -75   Methods used:  "Window paned all periwound skin with vac drape prior to applying sponge    Staff RN to assess integrity of dressing and ensure suction is set at appropriate level every shift.   Date canister. Chart canister output every shift. Change cannister weekly and PRN if full/occluded     Remove foam dressing and replace with BID normal saline moist gauze dressing if:   -a dressing failure which cannot be repaired within 2 hours   -patient is discharging to home without a home pump   -patient is discharging to a facility outside the local area   -if a dressing is a \"Silver Foam\", remove before Radiation Therapy or MRI     The hospital VAC pump is not to be discharged with the patient. Please disconnect the patient from the machine prior to discharge.  If a home VAC pump has been delivered, connect the home cannister to dressing tubing and the cannister to the home pump, turn on home pump  If the patient is transferring to a nearby facility with a VAC, the tubing can be disconnected, clamp tubing and cover the end with a glove, then can be reconnected if within 2 hours  If transfer will be longer than 2 hours, dressing must be removed and placed with a wet to moist gauze dressing for transfer    Generalized skin: Daily  Moisturize with body lotion of sween 24 daily      Orders: Reviewed    RECOMMEND PRIMARY TEAM ORDER: None, at this time  Education provided: importance of repositioning, plan of care, wound progress, Infection prevention , Moisture management, Hygiene, and Off-loading pressure  Discussed plan of care with: Patient and Nurse  Notify WOC if wound(s) deteriorate.  Nursing to notify the Provider(s) and re-consult the WOC Nurse if new skin concern.    DATA:     Current support surface: Standard  Standard gel mattress (Isoflex)  Containment of urine/stool: Incontinence Protocol and Incontinent pad in bed  BMI: Body mass index is 41.62 kg/m .   Active diet order: Orders Placed This Encounter      Mechanical/Dental " Soft Diet     Output: I/O last 3 completed shifts:  In: 830 [P.O.:830]  Out: 890 [Urine:750; Drains:140]     Labs:   Recent Labs   Lab 04/21/25  0828 04/17/25  0534 04/16/25  0303   ALBUMIN  --   --  2.2*   HGB 11.0*   < > 10.5*   WBC  --   --  8.1    < > = values in this interval not displayed.     Pressure injury risk assessment:   Sensory Perception: 4-->no impairment  Moisture: 3-->occasionally moist  Activity: 2-->chairfast  Mobility: 2-->very limited  Nutrition: 2-->probably inadequate  Friction and Shear: 2-->potential problem  Nash Score: 15    PEDRO [place], RN  Pager no longer is use, please contact through Anshul Landers group: [facility] Nurse [place]  Dept. Office Number: [phone]

## 2025-04-21 NOTE — PROGRESS NOTES
[facility]  GENERAL SURGERY Progress Note    Admission Date: 3/30/2025  4/21/2025         Assessment and Plan:     Richar Davsi is a 69 year old male admitted for gallstone ileus; S/P ex lap with removal of gallstone through small bowel enterotomy 3/30/25.  New onset of Aflutter with RVR, underwent cardioversion on 4/7. RRT called overnight 4/7 for hypotension and patient found to have elevated lactate, leukocytosis of 36. CT showing increased fluid and free air, drainage from midline incision. Taken to OR 4/8 for ex lap, found to have gross sucus breakdown of enterotomy, return to OR 4/10 for small bowel anastomosis and fascial closure.  Extubated and transferred to floor. RRT 4/14 for recurrent Aflutter with RVR, underwent repeat cardioversion. CT showing two new fluid collections, possible early abscess, underwent IR drain placement x2 4/15, cultures with NGTD. CT sinogram 4/19, left drain removed as it was in the subcutaneous tissue, right drain has no significant residual fluid but continued due to output.     - Tolerating soft diet, okay to advance to moderate CHO diet if ok with SLP  - No growth on abdominal fluid collections, on Zosyn (started 4/7), ID recommends to discontinue after today  - Right abdominal drain output 202mL-->140mL, continue to monitor output and await IR recs for removal  - Wound VAC changes MWF, appreciate WO RN cares   - Pain control- Tylenol and Ultram or Dilaudid PRN  - Hgb stable, Eliquis resumed 4/18  - Continue Protonix  - Ambulate with assistance 4x day and encourage IS  - Med management per hospitalist, much appreciate your assistance  - Discharge planning for TCU vs LTACH             Interval History:     BP better, sore at incision during dressing changes otherwise pain controlled, tolerating diet, +Flatus/BM, UO adequate, up to chair.                     Physical Exam:   Blood pressure 128/68, pulse 78, temperature 97.8  F (36.6  C), temperature  source Oral, resp. rate 18, height 1.829 m (6'), weight (!) 139.2 kg (306 lb 14.4 oz), SpO2 99%.  Temperature Temp  Av.3  F (36.8  C)  Min: 97.8  F (36.6  C)  Max: 98.8  F (37.1  C)   I/O last 3 completed shifts:  In: 830 [P.O.:830]  Out: 890 [Urine:750; Drains:140]  Constitutional:  Awake and in no apparent distress.   Lungs: No increased work of breathing.   Cardiovascular: Regular rate and rhythm.   Abdomen: Obese, soft, non-distended, appropriately tender at incision(s). IR drain intact with serous drainage.   Wound(s): Clean, dry, with vac in place. No surrounding erythema or drainage.           Data:     Recent Labs   Lab Test 25  0828 25  0639 25  0952 25  0534 25  0303 04/15/25  0759 25  0925   WBC  --   --   --   --  8.1 8.1 9.9   HGB 11.0* 10.7* 11.0*   < > 10.5* 10.4* 11.7*   HCT  --   --   --   --  32.8* 34.0* 36.3*     --   --   --  272 267 301    < > = values in this interval not displayed.      Recent Labs   Lab Test 25  0828 25  0808 25  0639 25  1341 25  0534     --  142  --  143   POTASSIUM 3.9 4.0 3.5   < > 3.3*   CHLORIDE 108*  --  111*  --  111*   CO2 25  --  24  --  23   BUN 7.0*  --  5.6*  --  8.5   CR 0.62*  --  0.71  --  0.70    < > = values in this interval not displayed.       Shi Goncalves PA-C  [facility]  Office: [phone]  Page on-call surgeon after 4pm

## 2025-04-21 NOTE — PLAN OF CARE
Date & Time: 4/21/2025  4505-9695  Surgery/POD#:   3/30: enterotomy and removal gallstone  4/4 :new A-flutter  4/7: cardioverted into SR  4/8: small bowel resection  4/10: small bowel anastomosis and wound closure   4/14: A-flutter, cardioverted into SR  4/15: Drain placed for abd abscess     Behavior & Aggression: Green, can be restless  Fall Risk: Yes  Orientation:A&O x 2-3 (time and situation), confused but redirectable   ABNL VS/O2:VSS on RA  ABNL Labs: See labs   Pain Management: Tramadol PRN  Bowel/Bladder: Incontinent B/B. +BS, +gas, Purewick placed inside incontinent brief and appears to keep patient dry of urine.   Drains: PIV x1 SL. TERRIE drain x1. Wound vac dressing changed by WOC today- on midline abd incision. CDI  Wounds/incisions: midline incision, TERRIE site   Diet: mechanical soft  Activities: Ax2-3, lift, reposition q2  Number of times OUT OF BED this shift : 0. T/R q2hr completed  Tests/Procedures: N/A  Anticipated  DC Date: Penidng   Other information:  Will feed himself, breakfast order for 4/22 placed with dietary team. Patient will remove his gown, prefers to be naked in bed (with brief- tolerated).

## 2025-04-21 NOTE — PROGRESS NOTES
Care Management Follow Up    Length of Stay (days): 22    Expected Discharge Date: 04/21/2025     Concerns to be Addressed: discharge planning     Patient plan of care discussed at interdisciplinary rounds: Yes    Anticipated Discharge Disposition: Acute Rehab/LTACH     Anticipated Discharge Services: Home Care  Anticipated Discharge DME:      Patient/family educated on Medicare website which has current facility and service quality ratings: no  Education Provided on the Discharge Plan: Yes  Patient/Family in Agreement with the Plan: yes    Referrals Placed by CM/SW:    Private pay costs discussed: Not applicable    Discussed  Partnership in Safe Discharge Planning  document with patient/family: Yes:     Handoff Completed: Yes, non-[facility] PCP: External handoff communication completed    Additional Information:  Writer checking with LTACH liaison Janet to inquire about doing an insurance appeal versus a new authorization, Janet thinks it may make more sense to start a new auth if patient is close to being ready to discharge.     Next Steps: Speak with family - wait for approval for LTACH    ADD - spoke with patients spouse Mya about discharge planning, LTACH started auth     KANDI Campbell

## 2025-04-21 NOTE — PROGRESS NOTES
[facility]    Medicine Progress Note - Hospitalist Service    Date of Admission:  3/30/2025    Assessment & Plan      Richar Davis is a 69 year old male with a history of diabetes, hypertension, CABG, status post AVR with bovine valve 10/2022, untreated sleep apnea, hypertension, hyperlipidemia, neuropathy, BPH admitted on 3/30/2025 with abdominal pain nausea and vomiting. Patient is medically stable for discharge to LTACH but please note he still has right intraabdominal drain and wound vac in place.      IR guided drain placement 4/15  Small bowel anastomosis and fascial closure 4/10.  Small bowel ileus with resection on 4/8  Exploratory laparotomy with small bowel enterotomy and removal of gallstone on 3/30.   Gallstone ileus, distal small bowel obstruction secondary to impacted 2.2 cm gallstone.  Enterococcus faecalis bacteremia -cleared  -CT imaging on admission showed Gallstone ileus. Distal small bowel obstruction secondary to an impacted 2.2 cm gallstone.   -Underwent Exploratory laparotomy with small bowel enterotomy and removal of gallstone on 3/30.   -Postoperative course complicated with ileus.  NG tube removed 4/2, unable to tolerate oral diet.   -Had fever on 4/5, COVID/Flu/RSV which was negative.  BC x 2 no growth. CXR without overt pneumonia.  UA then not consistent with infection.  -On 4/7 spiked fever with significant leukocytosis, lactic acidosis. Blood cultures from 4/7, 4/8 with Enterococcus faecalis.  -Returned back to the OR for exploratory laparotomy with small bowel resection on 4/8.  -Postprocedure patient intubated on pressor support, temporary abdominal closure admitted to ICU.   -Returned to OR on 4/10 for small bowel anastomosis and abdominal wound VAC placement.    -Postprocedure continued to have distended abdomen with bloody output.  -CT on 4/14 showing multiple peripherally enhancing fluid collections in the abdomen which are suspicious for  developing abscesses. The largest of these is in the left paracolic gutter. Stable appearance of a contracted gallbladder containing a large gallstone which is fistulized to the duodenum with associated pneumobilia.  -4/15 underwent IR guided abdominal drain placements x 2.  Patient removed NG tube by himself  -4/16 started on clear liquid diet after okay by surgery/speech therapy.  -4/17 advance to full liquid diet.   -4/18-does admit to some improvement in abdominal pain.  Advance to mechanical soft diet.   Will discontinue heparin and initiated on Eliquis as hemoglobin stable.  -Has wound VAC, intra-abdominal drains.  -General Surgery following. Defer postoperative care including postoperative nutritional support/oral intake/fluid resuscitation to surgical team.   -IR following.  CT sinogram 4/18. Left intra-abdominal drain removed. Patient still has right intraabdominal drain as of 4/21, appreciate IR and surgery input   -Blood Cultures from 4/10 show no growth to date.  Drain cultures no growth to date. ID to re-evaluate patient on 4/21, planning to stop antibiotics after today  -Continue oral PPI.    Postoperative atrial fibrillation with RVR -status post cardioversion 4/14/2025  New Aflutter with RVR 4/5/2025 status post DCCV on 4/7/2025.  Coronary artery disease status post CABG x 3 in October 2022  Aortic stenosis (status post AVR with bovine valve) October 2022.  Hypertension  Hyperlipidemia.  -Postoperatively developed new aflutter with RVR, was on intravenous heparin drip, rate controlled with Cardizem drip and initiated oral metoprolol.   --Cardiology followed underwent cardioversion on 4/7.   --Heparin was paused as patient back to OR on 4/8 and was started on intravenous heparin during ICU stay.  --On 4/14 patient again developed recurrence of A-fib with RVR with heart rate in 140s.  Initiated on amiodarone drip, underwent successful cardioversion on 4/14.  Currently in sinus rhythm with heart rate in  70s to 80s.  --Echocardiogram 4/15 with EF of 55%.  Right ventricle normal size.  No valve disease.    --Cardiology signed off 4/15, continue with amiodarone loading and switch to oral amiodarone.  --Continue oral amiodarone taper: 200 mg BID x 2 weeks ( starting 4/16), then 200 mg every day.  EKG for QTC on 4/20.   --Was on intravenous heparin, switch to oral Eliquis 5 mg twice daily on 4/18.  --Continue Oral metoprolol 25 mg twice daily.  --PTA on Lipitor 80 mg oral daily, LDL 71.  Restarted at lower dose of 10 mg oral daily. Increased to PTA dosing 80 mg on 4/21    Acute hypoxia-likely from atelectasis, bilateral pleural effusion, postoperative narcotic use, ALIREZA, deconditioning  CT chest from 4/14 with New small bilateral pleural effusions with overlying atelectasis/consolidation.  Afebrile.  Leukocytosis improved.  Weaned off oxygen.  This morning on room air.  IS, ambulate      Acute encephalopathy likely multifactorial from anesthesia/sedation for procedure/delirium, hospitalization/ICU stay/narcotic use.  Patient with prolonged hospital stay -multiple procedures, ICU stay, on narcotics.  CT head 4/18 no acute intracranial pathology.  Monitor mental status closely  - Assumed care 4/19, patient on Seroquel 25 mg bid , repeat EKG 4/20 with normal Qt interval, will continue at current dosing     Acute anemia likely from surgical blood loss, acute illness, dilutional.  Presented with hemoglobin of 15.9, now hemoglobin in the 10-11 range.  No active bleeding.  Monitor in AM.     Severe hypoalbuminemia likely from acute illness.  Dilutional.  Serum albumin of 2.1.    Nutritional intake per surgical team     Acute on chronic bilateral lower extremity edema.  Ultrasound bilateral lower extremities -no DVT.  Pitting edema present.    PTA oral Lasix 20 mg resume on 4/21 given stable BMP and improving oral intake      Mild hypernatremia.  Mild dehydration.  Oral intake per surgical team.     Acute kidney injury.   Resolved  Anion gap metabolic acidosis.  Resolved.  Monitor.     Hypokalemia from poor intake -replaced.  On replacement protocol     Type 2 diabetes mellitus with a hemoglobin A1c of 6.8  Hold PTA metformin.  Insulin sliding scale.     Urinary retention -Mccarty removed 4/17  Traumatic hematuria.  BPH:   Urine cultures from 4/14 no growth  Catheter removed 4/17.  Continue PTA Flomax     Neuropathy:   *Neurontin 800 mg 3 times daily as needed has been filled but patient reports taking 300mg TID  *4/1 per pharmacy note only recent fills are 800 mg and not 300 mg dosing.   Gabapentin on hold since admission,  will resume at 100 mg twice daily on 4/18 as mentation improving     Nicotine use: Chews tobacco  Continue nicotine patch, and as needed nicotine lozenges.    Long term cessation to be encouraged during stay.     ALIREZA not on CPAP.  Recommend sleep studies as outpatient     Medically complicated obesity with a BMI of 41.  Increase all-cause mortality and morbidity.  Consider lifestyle modification with diet and exercise as able to.     Physical deconditioning from medical illness.  Encourage ambulation.  Fall precautions  Rehab team evaluation requested.            Diet: Mechanical/Dental Soft Diet  Snacks/Supplements Adult: Ensure Max Protein (bariatric); Between Meals  Room Service    DVT Prophylaxis: DOAC  Mccarty Catheter: Not present  Lines: None     Cardiac Monitoring: None  Code Status: Full Code      Clinically Significant Risk Factors          # Hyperchloremia: Highest Cl = 108 mmol/L in last 2 days, will monitor as appropriate          # Hypoalbuminemia: Lowest albumin = 1.9 g/dL at 4/9/2025  5:25 AM, will monitor as appropriate     # Hypertension: Noted on problem list           # DMII: A1C = 6.8 % (Ref range: <5.7 %) within past 6 months   # Severe Obesity: Estimated body mass index is 41.62 kg/m  as calculated from the following:    Height as of this encounter: 1.829 m (6').    Weight as of this encounter:  139.2 kg (306 lb 14.4 oz).   # Moderate Malnutrition: based on nutrition assessment and treatment provided per dietitian's recommendations.      # Financial/Environmental Concerns: none   # History of CABG: noted on surgical history       Social Drivers of Health    Tobacco Use: High Risk (3/30/2025)    Patient History     Smoking Tobacco Use: Former     Smokeless Tobacco Use: Current          Disposition Plan     Medically Ready for Discharge: Anticipated Tomorrow         Andres Sparks DO  Hospitalist Service  [facility]  Securely message with [email or web address] (more info)  Text page via [employer] Paging/Directory   ______________________________________________________________________    Interval History     - Patient resting comfortably this morning  - Wakes up and reports to be doing fair  - He is frustrated with being stuck in a bed and wants to do more physical therapy  - Her reports his abdominal pain is improving. He was able to eat some food his wif    Physical Exam   Vital Signs: Temp: 97.8  F (36.6  C) Temp src: Oral BP: 128/68 Pulse: 78   Resp: 18 SpO2: 99 % O2 Device: None (Room air)    Weight: 306 lbs 14.4 oz    Constitutional: awake, alert, cooperative, no apparent distress, and appears stated age  Eyes: Lids and lashes normal  ENT: Normocephalic, without obvious abnormality, atraumatic  Respiratory: No increased work of breathing, good air exchange, clear to auscultation bilaterally, no crackles or wheezing  Cardiovascular: Normal apical impulse, regular rate and rhythm  GI: Distended with wound vac present. Right intrabdominal drain  Skin: No rash   Musculoskeletal: No deformity   Neurologic: Awake, alert    Medical Decision Making       38 MINUTES SPENT BY ME on the date of service doing chart review, history, exam, documentation & further activities per the note.      Data   ------------------------- PAST 24 HR DATA REVIEWED -----------------------------------------------    I have  personally reviewed the following data over the past 24 hrs:    N/A  \   11.0 (L)   / 242     141 108 (H) 7.0 (L) /  121 (H)   3.9 25 0.62 (L) \       Imaging results reviewed over the past 24 hrs:   No results found for this or any previous visit (from the past 24 hours).

## 2025-04-21 NOTE — PLAN OF CARE
Goal Outcome Evaluation:      Plan of Care Reviewed With: patient    Overall Patient Progress: improvingOverall Patient Progress: improving         Date & Time: 4/21/2025  [id number]  Surgery/POD#:   3/30: enterotomy and removal gallstone  4/4 new A-flutter  4/7: cardioverted into SR  4/8 Bowel resection  4/10 Bowel resection and wound closure   4/14: A-flutter, cardioverted into SR  4/15 Drain placed for abd abscess     Behavior & Aggression: Green, can be restless and pulls at lines  Fall Risk: Yes  Orientation:Aox2-3 (time and situations) , confused but redirectable   ABNL VS/O2:VSS on RA  ABNL Labs: See labs   Pain Management: gabapentin as scheduled.   Bowel/Bladder: Incontinent B/B. +BS, +gas, loose stool BMx4  Drains: PIV x1 SL. TERRIE drain x1. Wound vac on midline abd inc. CDI  Wounds/incisions: midline inc, TERRIE site   Diet: soft diet  Activities: Ax2-3, lift, reposition q2  Number of times OUT OF BED this shift : 0. T/R q2hr completed  Tests/Procedures: N/A  Anticipated  DC Date: Penidng   Other information:  Confused, pull off gown and tele monitor multiple times. Unable to keep tele monitor on. Refused PCDs and IS.  Pt is able to turn and pull himself up in the bed during reposition.

## 2025-04-21 NOTE — PROGRESS NOTES
[facility]    Infectious Disease Progress Note    Date of Service (when I saw the patient): 04/21/2025     Assessment & Plan   Richar Davis is a 69 year old male who was admitted on 3/30/2025.     Impression:  69 year old male with a history of well controlled diabetes, CAD, aortic stenosis s/p AVR with bovine valve, and HTN who presented to the hospital with gallstone ileus and is s/p exploratory laparotomy with removal of gallstone. Post-operatively he developed atrial flutter with RVR requiring cardioversion, gross sucus breakdown of enterotomy requiring return to OR on 4/8/25 and 4/10/25, E. Faecalis bacteremia, and delirium.     -4/7/25 - 4/8/25 had significant leukocytosis and fever with noted sucus breakdown of enterotomy s/p exploratory laparotomy 4/8/25 followed by small bowel anastomosis and fascial closure 4/10/25.   -E. Faecalis bacteremia with positive blood cultures 4/7/25. Follow-up blood cultures 4/10/25 are no growth.  -A.fib/flutter s/p Cardioversion  -Now with abdominal fluid collections. S/p drain placement 4/15/25. Cultures are still no growth to date. Left drain removed 4/18 after it pulled out. Right drain with no surrounding fluid per sinogram 4/18 but ongoing higher output of over 100 ml/day so left in place.   -Fevers and leukocytosis resolved.         Recommendations:   Day 14 of Zosyn today - this covered the E. Faecalis bacteremia as well as any possible intra-abdominal infection. Stop antibiotics after today.   Cultures from abdominal fluid collections were no growth. Unclear if fluid collections truly infected.   Drain care per IR and surgery.   ID will sign off. Please call us back with questions.     Patient and plan discussed with Dr. Grady.      Clau Larson PA-C    Interval History   Tolerating antibiotics ok   No new rashes or issues with antibiotics   Labs reviewed   No changes to past medical, social or family history   More alert and oriented  today. No complaints presently.       Physical Exam   Temp: 97.8  F (36.6  C) Temp src: Oral BP: 128/68 Pulse: 78   Resp: 18 SpO2: 99 % O2 Device: None (Room air)    Vitals:    04/10/25 2000 04/12/25 0400 04/15/25 0640   Weight: (!) 147.4 kg (324 lb 15.3 oz) (!) 144.7 kg (319 lb 0.1 oz) (!) 139.2 kg (306 lb 14.4 oz)     Vital Signs with Ranges  Temp:  [97.8  F (36.6  C)-98.8  F (37.1  C)] 97.8  F (36.6  C)  Pulse:  [78-86] 78  Resp:  [18-20] 18  BP: (127-148)/(61-70) 128/68  SpO2:  [95 %-99 %] 99 %    Constitutional: Awake, alert, cooperative, no apparent distress  Lungs: Clear to auscultation bilaterally, no crackles or wheezing  Cardiovascular: Regular rate and rhythm, normal S1 and S2, and no murmur noted  Abdomen: Normal bowel sounds, soft, non-distended, non-tender. Left drain has been removed. Right drain with serosanguineous output. Midline wound vac.   Skin: No rashes, no cyanosis, no edema  Other:    Medications   Current Facility-Administered Medications   Medication Dose Route Frequency Provider Last Rate Last Admin    No lozenges or gum should be given while patient on BIPAP/AVAPS/AVAPS AE   Does not apply Continuous PRN Hayley Vale APRN CNP        Patient may continue current oral medications   Does not apply Continuous PRN Hayley Vale APRN CNP         Current Facility-Administered Medications   Medication Dose Route Frequency Provider Last Rate Last Admin    amiodarone (PACERONE) tablet 200 mg  200 mg Oral BID Richa Schmidt PA-C   200 mg at 04/21/25 0901    Followed by    [START ON 4/29/2025] amiodarone (PACERONE) tablet 200 mg  200 mg Oral Daily Richa Schmidt PA-C        apixaban ANTICOAGULANT (ELIQUIS) tablet 5 mg  5 mg Oral BID Tim Mays MD   5 mg at 04/21/25 0902    atorvastatin (LIPITOR) tablet 10 mg  10 mg Oral QPM Tim Mays MD   10 mg at 04/20/25 2148    gabapentin (NEURONTIN) capsule 100 mg  100 mg Oral BID Tim Mays MD   100 mg at 04/21/25  0902    insulin aspart (NovoLOG) injection (RAPID ACTING)  1-7 Units Subcutaneous TID  Tmi Mays MD   1 Units at 04/17/25 1839    insulin aspart (NovoLOG) injection (RAPID ACTING)  1-5 Units Subcutaneous At Bedtime Tim Mays MD        metoprolol tartrate (LOPRESSOR) tablet 25 mg  25 mg Oral BID Tim Mays MD   25 mg at 04/21/25 0901    nicotine (NICODERM CQ) 21 MG/24HR 24 hr patch 1 patch  1 patch Transdermal Daily Hayley Vale APRN CNP   1 patch at 04/21/25 0910    pantoprazole (PROTONIX) EC tablet 40 mg  40 mg Oral QAM  Tim Mays MD   40 mg at 04/21/25 0650    piperacillin-tazobactam (ZOSYN) 4.5 g vial to attach to  mL bag  4.5 g Intravenous Q6H Hayley Vale APRN  mL/hr at 04/12/25 0548 4.5 g at 04/21/25 0646    polyethylene glycol (MIRALAX) Packet 17 g  17 g Oral Daily Ana Dougherty MD   17 g at 04/20/25 0822    QUEtiapine (SEROquel) tablet 25 mg  25 mg Oral BID Hayley Vale APRN CNP   25 mg at 04/21/25 0901    senna-docusate (SENOKOT-S/PERICOLACE) 8.6-50 MG per tablet 1 tablet  1 tablet Oral BID Ana Dougherty MD   1 tablet at 04/20/25 0823    sodium chloride (PF) 0.9% PF flush 3 mL  3 mL Intracatheter Q8H [facility] Hayley Vale APRN CNP   3 mL at 04/21/25 0646    tamsulosin (FLOMAX) capsule 0.4 mg  0.4 mg Oral QPM Tim Mays MD   0.4 mg at 04/20/25 2149       Data   All microbiology laboratory data reviewed.  Recent Labs   Lab Test 04/21/25  0828 04/19/25  0639 04/18/25  0952 04/17/25  0534 04/16/25  0303 04/15/25  0759 04/14/25  0925   WBC  --   --   --   --  8.1 8.1 9.9   HGB 11.0* 10.7* 11.0*   < > 10.5* 10.4* 11.7*   HCT  --   --   --   --  32.8* 34.0* 36.3*   MCV  --   --   --   --  93 95 93     --   --   --  272 267 301    < > = values in this interval not displayed.     Recent Labs   Lab Test 04/21/25  0828 04/19/25  0639 04/17/25  0534   CR 0.62* 0.71 0.70     04/15/2025 1130 04/20/2025 1547 Anaerobic  Bacterial Culture Routine [[id number]]   Aspirate from Abdomen    Preliminary result Component Value   Culture No anaerobic organisms isolated after 5 days P             04/15/2025 1130 04/15/2025 1642 Gram Stain [[id number]]    Aspirate from Abdomen    Final result Component Value   GS Culture See corresponding culture for results   Gram Stain Result No organisms seen   Gram Stain Result 3+ WBC seen   Predominantly PMNs          04/15/2025 1130 04/20/2025 0653 Aspirate Aerobic Bacterial Culture Routine [[id number]]   Aspirate from Abdomen    Final result Component Value   Culture No Growth             04/15/2025 1115 04/20/2025 1547 Anaerobic Bacterial Culture Routine [[id number]]   Aspirate from Abdomen    Preliminary result Component Value   Culture No anaerobic organisms isolated after 5 days P             04/15/2025 1115 04/15/2025 1605 Gram Stain [[id number]]   Aspirate from Abdomen    Final result Component Value   GS Culture See corresponding culture for results   Gram Stain Result No organisms seen   Gram Stain Result 2+ WBC seen          04/15/2025 1115 04/20/2025 0653 Aspirate Aerobic Bacterial Culture Routine [[id number]]   Aspirate from Abdomen    Final result Component Value   Culture No Growth           CT SINOGRAM INJECTION  4/18/2025 1:12 PM CDT    HISTORY:  ; H/o R and L abd drains placed 4/15, no output from L drain for a couple days, evaluate drains and fluid resolution;      COMPARISON: CT of the abdomen pelvis dated 4/14/2025    TECHNIQUE: CT of the lower abdomen/pelvis was performed without contrast and following the administration of contrast through a right lower quadrant drain. Radiation lowering techniques utilized.    FINDINGS: There is a drain in the left lower abdomen. This has pulled out of the abdominal cavity and now lies in the subcutaneous tissues.    There is a drain in the right lower quadrant. On the noncontrast enhanced exam, there is no significant residual fluid  about the pigtail of this catheter. Post administration of contrast, there is filling of a small collection cavity but no evidence for  fistulous communication to adjacent bowel.    There remains a mild amount of free fluid in the left abdomen and in the midline lower abdomen adjacent to loops of small bowel.    Again noted are scattered colonic diverticuli.    Again noted is increased nodularity/density in the mesenteric fat of the upper abdomen bilaterally suggestive of peritoneal carcinomatosis.    Again noted is a gallstone.    There is an anterior abdominal incision with packing material.    There are multilevel degenerative changes in the lumbar spine.   Impression:     IMPRESSION:  1.  The left lower quadrant drain has pulled out of the abdominal cavity and now lies in the subcutaneous tissues. This can be removed.  2.  The right lower quadrant drain has no significant residual fluid about the pigtail of this catheter. This can also be removed.  3.  Mild amount of residual free fluid in the left abdomen and in the midline lower abdomen.  4.  Increased nodularity/density in the mesenteric fat of the upper abdomen bilaterally suggestive of peritoneal carcinomatosis.  5.  Gallstone.

## 2025-04-22 ENCOUNTER — APPOINTMENT (OUTPATIENT)
Dept: SPEECH THERAPY | Facility: CLINIC | Age: 70
DRG: 329 | End: 2025-04-22
Payer: COMMERCIAL

## 2025-04-22 LAB
BACTERIA ASPIRATE CULT: NORMAL
BACTERIA ASPIRATE CULT: NORMAL
GLUCOSE BLDC GLUCOMTR-MCNC: 111 MG/DL (ref 70–99)
GLUCOSE BLDC GLUCOMTR-MCNC: 118 MG/DL (ref 70–99)
GLUCOSE BLDC GLUCOMTR-MCNC: 139 MG/DL (ref 70–99)
GLUCOSE BLDC GLUCOMTR-MCNC: 151 MG/DL (ref 70–99)

## 2025-04-22 PROCEDURE — 250N000013 HC RX MED GY IP 250 OP 250 PS 637: Performed by: STUDENT IN AN ORGANIZED HEALTH CARE EDUCATION/TRAINING PROGRAM

## 2025-04-22 PROCEDURE — 250N000013 HC RX MED GY IP 250 OP 250 PS 637: Performed by: HOSPITALIST

## 2025-04-22 PROCEDURE — 250N000013 HC RX MED GY IP 250 OP 250 PS 637: Performed by: NURSE PRACTITIONER

## 2025-04-22 PROCEDURE — G0463 HOSPITAL OUTPT CLINIC VISIT: HCPCS

## 2025-04-22 PROCEDURE — 120N000001 HC R&B MED SURG/OB

## 2025-04-22 PROCEDURE — 250N000011 HC RX IP 250 OP 636: Performed by: NURSE PRACTITIONER

## 2025-04-22 PROCEDURE — 92526 ORAL FUNCTION THERAPY: CPT | Mod: GN

## 2025-04-22 PROCEDURE — 250N000013 HC RX MED GY IP 250 OP 250 PS 637

## 2025-04-22 PROCEDURE — 99232 SBSQ HOSP IP/OBS MODERATE 35: CPT | Performed by: STUDENT IN AN ORGANIZED HEALTH CARE EDUCATION/TRAINING PROGRAM

## 2025-04-22 RX ORDER — TRAMADOL HYDROCHLORIDE 50 MG/1
50 TABLET ORAL ONCE
Status: COMPLETED | OUTPATIENT
Start: 2025-04-22 | End: 2025-04-22

## 2025-04-22 RX ADMIN — AMIODARONE HYDROCHLORIDE 200 MG: 200 TABLET ORAL at 21:37

## 2025-04-22 RX ADMIN — PIPERACILLIN AND TAZOBACTAM 4.5 G: 4; .5 INJECTION, POWDER, FOR SOLUTION INTRAVENOUS at 00:33

## 2025-04-22 RX ADMIN — APIXABAN 5 MG: 5 TABLET, FILM COATED ORAL at 08:41

## 2025-04-22 RX ADMIN — PIPERACILLIN AND TAZOBACTAM 4.5 G: 4; .5 INJECTION, POWDER, FOR SOLUTION INTRAVENOUS at 06:29

## 2025-04-22 RX ADMIN — PANTOPRAZOLE SODIUM 40 MG: 20 TABLET, DELAYED RELEASE ORAL at 06:28

## 2025-04-22 RX ADMIN — TRAMADOL HYDROCHLORIDE 50 MG: 50 TABLET, COATED ORAL at 08:40

## 2025-04-22 RX ADMIN — GABAPENTIN 100 MG: 100 CAPSULE ORAL at 21:37

## 2025-04-22 RX ADMIN — FUROSEMIDE 20 MG: 20 TABLET ORAL at 08:41

## 2025-04-22 RX ADMIN — NICOTINE 1 PATCH: 21 PATCH, EXTENDED RELEASE TRANSDERMAL at 08:41

## 2025-04-22 RX ADMIN — TRAMADOL HYDROCHLORIDE 50 MG: 50 TABLET, COATED ORAL at 12:39

## 2025-04-22 RX ADMIN — METOPROLOL TARTRATE 25 MG: 25 TABLET, FILM COATED ORAL at 08:41

## 2025-04-22 RX ADMIN — INSULIN ASPART 1 UNITS: 100 INJECTION, SOLUTION INTRAVENOUS; SUBCUTANEOUS at 12:39

## 2025-04-22 RX ADMIN — METOPROLOL TARTRATE 25 MG: 25 TABLET, FILM COATED ORAL at 21:37

## 2025-04-22 RX ADMIN — QUETIAPINE FUMARATE 25 MG: 25 TABLET ORAL at 21:37

## 2025-04-22 RX ADMIN — QUETIAPINE FUMARATE 25 MG: 25 TABLET ORAL at 08:40

## 2025-04-22 RX ADMIN — ATORVASTATIN CALCIUM 80 MG: 40 TABLET, FILM COATED ORAL at 21:37

## 2025-04-22 RX ADMIN — GABAPENTIN 100 MG: 100 CAPSULE ORAL at 08:41

## 2025-04-22 RX ADMIN — AMIODARONE HYDROCHLORIDE 200 MG: 200 TABLET ORAL at 08:41

## 2025-04-22 RX ADMIN — APIXABAN 5 MG: 5 TABLET, FILM COATED ORAL at 21:37

## 2025-04-22 RX ADMIN — TAMSULOSIN HYDROCHLORIDE 0.8 MG: 0.4 CAPSULE ORAL at 21:36

## 2025-04-22 ASSESSMENT — ACTIVITIES OF DAILY LIVING (ADL)
ADLS_ACUITY_SCORE: 65

## 2025-04-22 NOTE — PROGRESS NOTES
Care Management Follow Up    Length of Stay (days): 23    Expected Discharge Date: 04/23/2025     Concerns to be Addressed: discharge planning     Patient plan of care discussed at interdisciplinary rounds: Yes    Anticipated Discharge Disposition: LTACH       Anticipated Discharge Services: Home Care  Anticipated Discharge DME:      Patient/family educated on Medicare website which has current facility and service quality ratings: no  Education Provided on the Discharge Plan: Yes  Patient/Family in Agreement with the Plan: yes    Referrals Placed by CM/SW:    Private pay costs discussed: Not applicable    Discussed  Partnership in Safe Discharge Planning  document with patient/family: Yes:     Handoff Completed: Yes, non-[facility] PCP: External handoff communication completed    Additional Information:  Writer reaching out to LTACH liaison to see if auth has been submitted for patient     Next Steps: :LTACH, Versus TCU     ADD - Ltach liaison stating patient does not meet medical criteria, need to locate a TCU for care     Writer met with patient and spouse to discuss and sent referrals for TCU via [employer]         KANDI Campbell

## 2025-04-22 NOTE — PLAN OF CARE
Date & Time: 4/22/2025  1558-1686  Surgery/POD#:   3/30: enterotomy and removal gallstone  4/4 new A-flutter  4/7: cardioverted into SR  4/8 Bowel resection  4/10 Bowel resection and wound closure   4/14: A-flutter, cardioverted into SR  4/15 Drain placed for abd abscess     Behavior & Aggression: Green, intermittent confusing.   Fall Risk: Yes  Orientation:Aox3 (time) , confused but redirectable   ABNL VS/O2:VSS on RA  ABNL Labs: See labs   Pain Management: PRN Ultram   Bowel/Bladder: Inc Bladder/BM   Drains: PIV x1 SL. TERRIE drain x1. Wound vac on midline abd inc. CDI  Wounds/incisions: midline inc, TERRIE site, red/fredrick  Diet: Mod Carb  Activities: Ax2 Clau Steady  Number of times OUT OF BED this shift: x1 up to chair  Tests/Procedures: N/A  Anticipated  DC Date: Penidng   Other information: Up to chair for breakfast, was able to stand and take 3 steps back to bed with x2 staff, unsteady. Inc between turns.

## 2025-04-22 NOTE — PROGRESS NOTES
[facility]  GENERAL SURGERY Progress Note    Admission Date: 3/30/2025  4/22/2025         Assessment and Plan:     Richar Davis is a 69 year old male admitted for gallstone ileus; S/P ex lap with removal of gallstone through small bowel enterotomy 3/30/25.  New onset of Aflutter with RVR, underwent cardioversion on 4/7. RRT called overnight 4/7 for hypotension and patient found to have elevated lactate, leukocytosis of 36. CT showing increased fluid and free air, drainage from midline incision. Taken to OR 4/8 for ex lap, found to have gross sucus breakdown of enterotomy, return to OR 4/10 for small bowel anastomosis and fascial closure.  Extubated and transferred to floor. RRT 4/14 for recurrent Aflutter with RVR, underwent repeat cardioversion. CT showing two new fluid collections, possible early abscess, underwent IR drain placement x2 4/15, cultures with NGTD. CT sinogram 4/19, left drain removed as it was in the subcutaneous tissue, right drain has no significant residual fluid but continued due to output.     - Tolerating moderate CHO diet  - No growth on abdominal fluid collections, on Zosyn (started 4/7, discontinued 4/22/25 per ID)  - Right abdominal drain output 140mL-->100mL, discussed with IR, plan to remove prior to discharge  - Wound VAC changes MWF, appreciate WOC RN cares   - Pain control- Tylenol and Ultram PRN, avoid Dilaudid  - Hgb stable, Eliquis resumed 4/18  - Continue Protonix  - Ambulate with assistance if able 4x day and encourage IS  - Med management per hospitalist, much appreciate your assistance  - Doing well from surgery standpoint  - Discharge planning for TCU             Interval History:     Pain controlled, tolerating diet, +Flatus/BM, UO adequate, up in chair, PT consulted.                      Physical Exam:   Blood pressure (!) 142/76, pulse 83, temperature 99.1  F (37.3  C), temperature source Oral, resp. rate 16, height 1.829 m (6'), weight (!) 139.2 kg  (306 lb 14.4 oz), SpO2 98%.  Temperature Temp  Av.1  F (36.7  C)  Min: 97.2  F (36.2  C)  Max: 99.1  F (37.3  C)   I/O last 3 completed shifts:  In: 1780 [P.O.:1680; I.V.:100]  Out: 3210 [Urine:2600; Drains:610]  Constitutional:  Awakens easily and in no apparent distress.   Lungs: No increased work of breathing.   Cardiovascular: Regular rate and rhythm.   Abdomen: Obese, soft, non-distended, appropriately tender at incision(s). IR drain intact with serous drainage.    Wound(s): Clean, dry, and wound vac intact. No erythema or drainage.    Extremities: No edema or calf tenderness. +SCDs          Data:   No new labs/imaging.     Shi Goncalves PA-C  [facility]  Office: [phone]  Page on-call surgeon after 4pm

## 2025-04-22 NOTE — PROGRESS NOTES
[facility]  WO Nurse Inpatient Assessment     Consulted for: coccyx and abdominal NPWT changes    Summary:   Coccyx: small blanchable elongated patches of erythema, skin intact-Signing off 4/15  2.   Abdomen: s/p small bowel resection with VAC placement in OR. NPWT midline wound changed 4/21 ~ 75 mmHG     4/22: Paged by RN for assistance with troubleshooting NPWT, trac pad dislodged when getting up to chair. RN ablet o replace but had a small leak and couldn't get full seal    [facility] nurse follow-up plan: Monday/WednesdayFriday     Patient History (according to provider note(s):       69 year old male with a history of diabetes, hypertension, CABG, status post AVR with bovine valve 10/2022, untreated sleep apnea, hypertension, hyperlipidemia, neuropathy, BPH admitted on 3/30/2025 with abdominal pain nausea and vomiting.  Imaging showed gallstone ileus with 2.2 cm gallstone impacted.  Underwent surgery on 3/30.  Postoperatively had issues with ileus, fever, and aflutter with RVR.       Returned back to the OR for exploratory laparotomy with small bowel resection.Post -op admission to the ICU, intubated and on pressor support with temporary abdominal closure. He returned to OR on 4/10  for small bowel anastomosis and abdominal.  Wound VAC in place.  Pressors weaned off, patient extubated 4/11 and transferred to hospitalist service on 4/12.    Assessment:      Areas visualized during today's visit: Focused: and Abdomen    Negative pressure wound therapy applied to: abdomen         Last photo: 4/21/25   Wound due to: Surgical Wound   Wound history/plan of care:      4/22: Paged by RN for assistance with troubleshooting NPWT, trac pad dislodged for approx 20 minutes when getting up to chair. RN able to replace but had a small leak and couldn't get full seal. Able to patch with vac drape to get good seal, machine continued to have a sputtering noise, recommended RN to exchange VAC pump, setting at  ~75mmHG    Surgical date: 4/10/25   Service following: Gen Surgery  Date Negative Pressure Wound Therapy initiated: 4/10/25   Interventions in place: repositioning, pressure redistribution with device or dressing, specialty surface in use, moisture/incontinence management, offloading, and elevation  Is patient s nutritional status compromised? no  If yes, what interventions are in place? N/A  Reason for initiating vac therapy? Need for accelerated granulation tissue  Which?of?the?following?co-morbidities?apply? Diabetes and Obesity  If diabetic is patient on a diabetic management program? Yes   Is osteomyelitis present in wound? no   If yes what treatments are in place? N/A    Wound base: 98 % Granulation tissue and Adipose tissue, 2 % slough     Palpation of the wound bed: normal       Drainage: moderate      Volume in cannister: 0 ml     Last cannister change date: 4/21/25     Description of drainage: serosanguinous and bloody      Measurements (length x width x depth, in cm) 18.7 x 6  x  4 cm       Tunneling N/A      Undermining N/A   Periwound skin: Intact       Color: pink       Temperature: normal    Odor: none   Pain: mild,   Pain intervention prior to dressing change: patient tolerated well, oral Tramadol, slow and gentle cares , and distraction  Treatment goal: Drainage control, Infection control/prevention, Increase granulation, Maintain (prevention of deterioration), Protection, and Promote epidermal migration  STATUS: improving   Supplies ordered: gathered, at bedside, supplies stored on unit, and discussed with patient     Number of foam pieces removed from a wound (excluding foam for bridge) : 1 GranuFoam Black and 1 White foam   Verified this matched the number of foam pieces applied last dressing change: Yes   Number of foam pieces packed into wound (excluding foam for bridge) :  1 GranuFoam Black and 1 White foam       Copy for continuity.   4/15: WO received consult for coccyx. Skin intact, each  "buttock with elongated patch of erythema related to friction, patient has been intermittently confused and agitated per chart review. Nursing staff to monitor and moisturize skin.        Treatment Plan:     Negative pressure wound therapy plan:  Mon/Wed/Fri  Wound location: midline abdomen   Change Days: Mon/Wed/Fri by [facility] RN    Supplies (including all accessories) used: medium Black foam , White foam, and Cavilon no sting barrier film  Cleanse with Vashe prior to replacing NPWT  Suction setting: -75   Methods used: Window paned all periwound skin with vac drape prior to applying sponge    Staff RN to assess integrity of dressing and ensure suction is set at appropriate level every shift.   Date canister. Chart canister output every shift. Change cannister weekly and PRN if full/occluded     Remove foam dressing and replace with BID normal saline moist gauze dressing if:   -a dressing failure which cannot be repaired within 2 hours   -patient is discharging to home without a home pump   -patient is discharging to a facility outside the local area   -if a dressing is a \"Silver Foam\", remove before Radiation Therapy or MRI     The hospital VAC pump is not to be discharged with the patient. Please disconnect the patient from the machine prior to discharge.  If a home VAC pump has been delivered, connect the home cannister to dressing tubing and the cannister to the home pump, turn on home pump  If the patient is transferring to a nearby facility with a VAC, the tubing can be disconnected, clamp tubing and cover the end with a glove, then can be reconnected if within 2 hours  If transfer will be longer than 2 hours, dressing must be removed and placed with a wet to moist gauze dressing for transfer    Generalized skin: Daily  Moisturize with body lotion of sween 24 daily      Orders: Reviewed    RECOMMEND PRIMARY TEAM ORDER: None, at this time  Education provided: importance of repositioning, plan of care, wound " progress, Infection prevention , Moisture management, Hygiene, and Off-loading pressure  Discussed plan of care with: Patient and Nurse  Notify [facility] if wound(s) deteriorate.  Nursing to notify the Provider(s) and re-consult the [facility] Nurse if new skin concern.    DATA:     Current support surface: Standard  Standard gel mattress (Isoflex)  Containment of urine/stool: Incontinence Protocol and Incontinent pad in bed  BMI: Body mass index is 41.62 kg/m .   Active diet order: Orders Placed This Encounter      Combination Diet Moderate Consistent Carb (60 g CHO per Meal) Diet     Output: I/O last 3 completed shifts:  In: 1780 [P.O.:1680; I.V.:100]  Out: 2710 [Urine:2600; Drains:110]     Labs:   Recent Labs   Lab 04/21/25  0828 04/17/25  0534 04/16/25  0303   ALBUMIN  --   --  2.2*   HGB 11.0*   < > 10.5*   WBC  --   --  8.1    < > = values in this interval not displayed.     Pressure injury risk assessment:   Sensory Perception: 4-->no impairment  Moisture: 3-->occasionally moist  Activity: 2-->chairfast  Mobility: 2-->very limited  Nutrition: 2-->probably inadequate  Friction and Shear: 2-->potential problem  Nash Score: 15    Caitlyn Sahni RN, BSN, CWOCN   Pager no longer is use, please contact through [employer] group: [facility] Nurse Sav Rosadot. Office Number: [phone]

## 2025-04-22 NOTE — PLAN OF CARE
Goal Outcome Evaluation:      Plan of Care Reviewed With: patient    Overall Patient Progress: improvingOverall Patient Progress: improving         Date & Time: 4/22/2025  [id number]  Surgery/POD#:   3/30: enterotomy and removal gallstone  4/4 new A-flutter  4/7: cardioverted into SR  4/8 Bowel resection  4/10 Bowel resection and wound closure   4/14: A-flutter, cardioverted into SR  4/15 Drain placed for abd abscess     Behavior & Aggression: Green, intermittent confusing.   Fall Risk: Yes  Orientation:Aox3 (time) , confused but redirectable   ABNL VS/O2:VSS on RA  ABNL Labs: See labs   Pain Management: gabapentin as scheduled.   Bowel/Bladder: Incontinent B/B. +BS, +gas, loose stool Bmx1.   Drains: PIV x1 SL. TERRIE drain x1. Wound vac on midline abd inc. CDI  Wounds/incisions: midline inc, TERRIE site, red/fredrick  Diet: soft diet  Activities: Ax2-3, lift, reposition q2  Number of times OUT OF BED this shift : 0. T/R q2hr completed  Tests/Procedures: N/A  Anticipated  DC Date: Penidng   Other information: Refused PCDs and IS. Slept between care.

## 2025-04-22 NOTE — PROGRESS NOTES
[facility]    Medicine Progress Note - Hospitalist Service    Date of Admission:  3/30/2025    Assessment & Plan      Richar Davis is a 69 year old male with a history of diabetes, hypertension, CABG, status post AVR with bovine valve 10/2022, untreated sleep apnea, hypertension, hyperlipidemia, neuropathy, BPH admitted on 3/30/2025 with abdominal pain nausea and vomiting. See below for events of prolonged hospital stay. Anticipate medically stable for discharge in 2-4 days. Needs right sided abdominal drain removed prior to discharge to transitional care versus ARU.     IR guided drain placement 4/15  Small bowel anastomosis and fascial closure 4/10.  Small bowel ileus with resection on 4/8  Exploratory laparotomy with small bowel enterotomy and removal of gallstone on 3/30.   Gallstone ileus, distal small bowel obstruction secondary to impacted 2.2 cm gallstone.  Enterococcus faecalis bacteremia -cleared  -CT imaging on admission showed Gallstone ileus. Distal small bowel obstruction secondary to an impacted 2.2 cm gallstone.   -Underwent Exploratory laparotomy with small bowel enterotomy and removal of gallstone on 3/30.   -Postoperative course complicated with ileus.  NG tube removed 4/2, unable to tolerate oral diet.   -Had fever on 4/5, COVID/Flu/RSV which was negative.  BC x 2 no growth. CXR without overt pneumonia.  UA then not consistent with infection.  -On 4/7 spiked fever with significant leukocytosis, lactic acidosis. Blood cultures from 4/7, 4/8 with Enterococcus faecalis.  -Returned back to the OR for exploratory laparotomy with small bowel resection on 4/8.  -Postprocedure patient intubated on pressor support, temporary abdominal closure admitted to ICU.   -Returned to OR on 4/10 for small bowel anastomosis and abdominal wound VAC placement.    -Postprocedure continued to have distended abdomen with bloody output.  -CT on 4/14 showing multiple peripherally enhancing  fluid collections in the abdomen which are suspicious for developing abscesses. The largest of these is in the left paracolic gutter. Stable appearance of a contracted gallbladder containing a large gallstone which is fistulized to the duodenum with associated pneumobilia.  -4/15 underwent IR guided abdominal drain placements x 2.  Patient removed NG tube by himself  -4/16 started on clear liquid diet after okay by surgery/speech therapy.  -4/17 advance to full liquid diet.   -4/18-does admit to some improvement in abdominal pain.  Advance to mechanical soft diet.   Will discontinue heparin and initiated on Eliquis as hemoglobin stable.  -Has wound VAC, intra-abdominal drains.  -General Surgery following. Defer postoperative care including postoperative nutritional support/oral intake/fluid resuscitation to surgical team.   -IR following.  CT sinogram 4/18. Left intra-abdominal drain removed 4/18. Patient still has right intraabdominal drain as of 4/22, surgery and IR planning to remove prior to discharge  -Blood Cultures from 4/10 show no growth to date.  Drain cultures no growth to date. ID re-evaluated the patient and recommended stopping antibiotics after 4/21. Zosyn stopped AM 4/22  -Continue oral PPI.    Postoperative atrial fibrillation with RVR -status post cardioversion 4/14/2025  New Aflutter with RVR 4/5/2025 status post DCCV on 4/7/2025.  Coronary artery disease status post CABG x 3 in October 2022  Aortic stenosis (status post AVR with bovine valve) October 2022.  Hypertension  Hyperlipidemia.  -Postoperatively developed new aflutter with RVR, was on intravenous heparin drip, rate controlled with Cardizem drip and initiated oral metoprolol.   --Cardiology followed underwent cardioversion on 4/7.   --Heparin was paused as patient back to OR on 4/8 and was started on intravenous heparin during ICU stay.  --On 4/14 patient again developed recurrence of A-fib with RVR with heart rate in 140s.  Initiated on  amiodarone drip, underwent successful cardioversion on 4/14.  Currently in sinus rhythm with heart rate in 70s to 80s.  --Echocardiogram 4/15 with EF of 55%.  Right ventricle normal size.  No valve disease.    --Cardiology signed off 4/15, continue with amiodarone loading and switch to oral amiodarone.  --Continue oral amiodarone taper: 200 mg BID x 2 weeks ( starting 4/16), then 200 mg every day.  EKG for QTC on 4/20.   --Was on intravenous heparin, switched to oral Eliquis 5 mg twice daily on 4/18.  --Continue Oral metoprolol 25 mg twice daily.  --PTA on Lipitor 80 mg oral daily, LDL 71.  Initially restarted at lower dose of 10 mg oral daily. Increased to PTA dosing 80 mg on 4/21    Acute hypoxia-likely from atelectasis, bilateral pleural effusion, postoperative narcotic use, ALIREZA, deconditioning  CT chest from 4/14 with New small bilateral pleural effusions with overlying atelectasis/consolidation.  Afebrile.  Leukocytosis improved.  Weaned off oxygen.  This morning on room air.  IS, ambulate      Acute encephalopathy likely multifactorial from anesthesia/sedation for procedure/delirium, hospitalization/ICU stay/narcotic use.  Patient with prolonged hospital stay -multiple procedures, ICU stay, on narcotics.  CT head 4/18 no acute intracranial pathology.  Monitor mental status closely. Intermittently disoriented and agitated.   - Assumed care 4/19, patient on Seroquel 25 mg bid, repeat EKG 4/20 with normal Qt interval, will continue at current dosing, recommend changing to prn on discharge or adding an end date. Still appropriate as of 4/22     Acute anemia likely from surgical blood loss, acute illness, dilutional.  Presented with hemoglobin of 15.9, now hemoglobin in the 10-11 range.  No active bleeding. Monitor intermittently     Severe hypoalbuminemia likely from acute illness.  Dilutional.  Serum albumin of 2.1.    Nutritional intake per surgical team     Acute on chronic bilateral lower extremity  edema.  Ultrasound bilateral lower extremities -no DVT.  Pitting edema present.    PTA oral Lasix 20 mg resumed on 4/21 given stable BMP and improving oral intake      Mild hypernatremia.  Mild dehydration.  Oral intake per surgical team.     Acute kidney injury.  Resolved  Anion gap metabolic acidosis.  Resolved.  Monitor.     Hypokalemia from poor intake -replaced.  On replacement protocol     Type 2 diabetes mellitus with a hemoglobin A1c of 6.8  Hold PTA metformin.  Insulin sliding scale.     Urinary retention -Mccarty removed 4/17  Traumatic hematuria.  BPH:   Urine cultures from 4/14 no growth  Catheter removed 4/17.  Continue PTA Flomax     Neuropathy:   *Neurontin 800 mg 3 times daily as needed has been filled but patient reports taking 300mg TID  *4/1 per pharmacy note only recent fills are 800 mg and not 300 mg dosing.   Gabapentin on hold since admission,  will resume at 100 mg twice daily on 4/18 as mentation improving     Nicotine use: Chews tobacco  Continue nicotine patch, and as needed nicotine lozenges.    Long term cessation to be encouraged during stay.     ALIREZA not on CPAP.  Recommend sleep studies as outpatient     Medically complicated obesity with a BMI of 41.  Increase all-cause mortality and morbidity.  Consider lifestyle modification with diet and exercise as able to.     Physical deconditioning from medical illness.  Encourage ambulation.  Fall precautions  Rehab team evaluation requested.            Diet: Snacks/Supplements Adult: Ensure Max Protein (bariatric); Between Meals  Room Service  Combination Diet Moderate Consistent Carb (60 g CHO per Meal) Diet    DVT Prophylaxis: DOAC  Mccarty Catheter: Not present  Lines: None     Cardiac Monitoring: None  Code Status: Full Code      Clinically Significant Risk Factors          # Hyperchloremia: Highest Cl = 108 mmol/L in last 2 days, will monitor as appropriate          # Hypoalbuminemia: Lowest albumin = 1.9 g/dL at 4/9/2025  5:25 AM, will  monitor as appropriate     # Hypertension: Noted on problem list           # DMII: A1C = 6.8 % (Ref range: <5.7 %) within past 6 months   # Severe Obesity: Estimated body mass index is 41.62 kg/m  as calculated from the following:    Height as of this encounter: 1.829 m (6').    Weight as of this encounter: 139.2 kg (306 lb 14.4 oz).   # Moderate Malnutrition: based on nutrition assessment and treatment provided per dietitian's recommendations.      # Financial/Environmental Concerns: none   # History of CABG: noted on surgical history       Social Drivers of Health    Tobacco Use: High Risk (3/30/2025)    Patient History     Smoking Tobacco Use: Former     Smokeless Tobacco Use: Current          Disposition Plan     Medically Ready for Discharge: Anticipated Tomorrow         Andres Sparks DO  Hospitalist Service  [facility]  Securely message with [employer] (more info)  Text page via [email or web address] Paging/Directory   ______________________________________________________________________    Interval History     - Patient resting comfortably this morning  - Wakes up and reports to be doing fair  - Updated wife at bedside  - Anticipating medically stable fori discharge in 2 days    Physical Exam   Vital Signs: Temp: 99.1  F (37.3  C) Temp src: Oral BP: (!) 142/76 Pulse: 83   Resp: 16 SpO2: 98 % O2 Device: None (Room air)    Weight: 306 lbs 14.4 oz    Constitutional: Resting comfortably, wakes up and denies any acute concerns   Eyes: Lids and lashes normal  ENT: Normocephalic, without obvious abnormality, atraumatic  Respiratory: No increased work of breathing, good air exchange, clear to auscultation bilaterally, no crackles or wheezing  Cardiovascular: Normal apical impulse, regular rate and rhythm  GI: Distended with wound vac present. Right intrabdominal drain  Skin: No rash   Musculoskeletal: No deformity   Neurologic: Awake, alert    Medical Decision Making       42 MINUTES SPENT BY ME on the  date of service doing chart review, history, exam, documentation & further activities per the note.      Data   ------------------------- PAST 24 HR DATA REVIEWED -----------------------------------------------        Imaging results reviewed over the past 24 hrs:   No results found for this or any previous visit (from the past 24 hours).

## 2025-04-22 NOTE — PROGRESS NOTES
CLINICAL NUTRITION SERVICES - REASSESSMENT NOTE     RECOMMENDATIONS FOR MDs/PROVIDERS TO ORDER:  None    Registered Dietitian Interventions:  - Encouraged small, frequent meals with protein d/t early satiety at meal times.   - Continue scheduled BID Ensure Enlive to optimize intakes.     Future/Additional Recommendations:  - Monitor adequacy of PO intakes.     Admitting diagnosis: Gallstone ileus  PMH:   Past Medical History:   Diagnosis Date    Cataract     Complication of anesthesia     Diabetes mellitus (H)     Heart attack (H)     Heart murmur     Hypertension      INFORMATION OBTAINED  Assessed patient in room. With wife's assistance.     CURRENT NUTRITION ORDERS  Diet:  Moderate Consistent Carbohydrate (60 g CHO per meal)    CURRENT INTAKE/TOLERANCE  % of TID meals per patient's wife and I/O flowsheet documentation.   - Patient intermittently lethargic at time of visit, lunch on bedside table. Wife mentioned he ate a later breakfast and she will encourage patient to eat later this afternoon.      NEW FINDINGS  Height: 1.829 m (6')  Admission Weight: 129.3 kg (285 lb) (03/30/25 1455)   IBW: 80.9 kg   Weight History: Trending weight gain  Wt Readings from Last 5 Encounters:   04/15/25 (!) 139.2 kg (306 lb 14.4 oz)   12/16/24 131 kg (288 lb 12.8 oz)   12/12/24 129.2 kg (284 lb 14.4 oz)   06/10/24 127.8 kg (281 lb 12.8 oz)   12/13/23 126.9 kg (279 lb 12.8 oz)   Additional weights from EMR:   09/30/24 132.2 kg (291 lb 8 oz)  03/20/24 127.5 kg (281 lb)      Skin/wounds:   Negative Pressure Wound Therapy Abdomen Midline;Anterior (Active)   Wound Type Surgical 04/21/25 1955   Unit Type wound vac 04/21/25 1955   Dressing Pieces Removed (# of Each Type) Black foam 04/21/25 1955   Dressing Pieces Applied (# of Each Type) 2 04/14/25 1739   Cycle Continuous 04/21/25 1955   Target Pressure (mmHg) 75 04/21/25 1955   Drainage Color/Characteristics Serosanguineous 04/21/25 1955   Cannister changed? No 04/21/25 1955    Output (ml) 0 ml 04/19/25 1400       Wound Anterior;Left Toe (Comment  which one) Other (comment) (Active)   Wound Bed Scabbing 04/21/25 1955   Steph-wound Assessment Warm 04/20/25 2200   Wound Length (cm) 0.5 cm 04/14/25 1030   Wound Width (cm) 1 cm 04/10/25 0800   Wound Surface Area (cm^2) 0.5 cm^2 04/10/25 0800   Drainage Amount None 04/21/25 1955   Dressing Open to air 04/20/25 2200   Dressing Status Clean, dry, intact 04/21/25 1955       Wound Medial Sacrum (Active)   Wound Bed Erythema, blanchable 04/21/25 1955   Steph-wound Assessment Warm 04/21/25 1955   Drainage Amount None 04/21/25 1955   Wound Care/Cleansing Soap and water 04/21/25 1955   Dressing Foam 04/21/25 1955   Dressing Status Changed;Clean, dry, intact 04/22/25 0000   Dressing Change Due 04/22/25 04/21/25 1955       Wound Lower;Midline Abdomen (Active)   Wound Bed Other (Comment) 04/19/25 2100   Wound Length (cm) 19.5 cm 04/15/25 1254   Wound Width (cm) 7 cm 04/15/25 1254   Wound Depth (cm) 4.8 cm 04/15/25 1254   Wound Surface Area (cm^2) 136.5 cm^2 04/15/25 1254   Drainage Amount None 04/19/25 2100   Wound Care/Cleansing Wound cleanser 04/15/25 1254   Dressing Vacuum based 04/20/25 0855   Dressing Status Clean, dry, intact 04/19/25 2100   Dressing Change Due 04/18/25 04/15/25 1254   Wound Image   04/15/25 1254       Incision/Surgical Site 03/30/25 Midline Abdomen (Active)   Incision Assessment WDL 04/21/25 1955   Dressing Vacuum based 04/21/25 1955   Stehp-Incision Assessment Erythema 04/21/25 1955   Closure GOMEZ 04/21/25 0930   Incision Drainage Amount Moderate 04/21/25 0930   Drainage Description Yellow 04/21/25 0930   Incision Care Therapy - negative pressure 04/21/25 1955   Dressing Intervention Clean, dry, intact 04/21/25 1955     GI symptoms: no concerns; Last BM:  (4/20/2025)  Nutrition-relevant medications: Reviewed  Current Facility-Administered Medications   Medication Dose Route Frequency Provider Last Rate Last Admin    acetaminophen  (TYLENOL) tablet 650 mg  650 mg Oral Q4H PRN Shi Goncalves PA-C        amiodarone (PACERONE) tablet 200 mg  200 mg Oral BID Richa Schmidt PA-C   200 mg at 04/21/25 2120    Followed by    [START ON 4/29/2025] amiodarone (PACERONE) tablet 200 mg  200 mg Oral Daily Richa Schmidt PA-C        apixaban ANTICOAGULANT (ELIQUIS) tablet 5 mg  5 mg Oral BID Tim Mays MD   5 mg at 04/21/25 2120    atorvastatin (LIPITOR) tablet 80 mg  80 mg Oral QPM Andres Sparks, DO   80 mg at 04/21/25 2120    benzocaine-menthol (CHLORASEPTIC) 6-10 MG lozenge 1 lozenge  1 lozenge Buccal Q1H PRN Hayley Vale APRN CNP   1 lozenge at 04/01/25 0334    bisacodyl (DULCOLAX) suppository 10 mg  10 mg Rectal Daily PRN Hayley Vale APRN CNP        carboxymethylcellulose PF (REFRESH PLUS) 0.5 % ophthalmic solution 1 drop  1 drop Both Eyes Q1H PRN Hayley Vale APRN CNP        glucose gel 15-30 g  15-30 g Oral Q15 Min PRN Tim Mays MD        Or    dextrose 50 % injection 25-50 mL  25-50 mL Intravenous Q15 Min PRN Tim Mays MD        Or    glucagon injection 1 mg  1 mg Subcutaneous Q15 Min PRN Tim Mays MD        diphenhydrAMINE (BENADRYL) elixir 12.5 mg  12.5 mg Oral Q6H PRN Hayley Vale APRN CNP        Or    diphenhydrAMINE (BENADRYL) injection 12.5 mg  12.5 mg Intravenous Q6H PRN Hayley Vale APRN CNP        furosemide (LASIX) tablet 20 mg  20 mg Oral Daily Andres Sparks, DO   20 mg at 04/21/25 1432    gabapentin (NEURONTIN) capsule 100 mg  100 mg Oral BID Tim Mays MD   100 mg at 04/21/25 2120    HYDROmorphone (DILAUDID) injection 0.2 mg  0.2 mg Intravenous Q4H PRN Tim Mays MD   0.2 mg at 04/15/25 1737    insulin aspart (NovoLOG) injection (RAPID ACTING)  1-7 Units Subcutaneous TID AC Tim Mays MD   1 Units at 04/21/25 1808    insulin aspart (NovoLOG) injection (RAPID ACTING)  1-5 Units Subcutaneous At Bedtime Tim Mays MD         lidocaine (LMX4) cream   Topical Q1H PRN Hayley Vale APRN CNP        lidocaine 1 % 0.1-1 mL  0.1-1 mL Other Q1H PRN Hayley Vale APRN CNP        magnesium hydroxide (MILK OF MAGNESIA) suspension 30 mL  30 mL Oral Daily PRN Hayley Vale APRN CNP   30 mL at 04/01/25 0427    melatonin tablet 10 mg  10 mg Oral At Bedtime PRN Hayley Vale APRN CNP   10 mg at 04/12/25 0042    metoprolol (LOPRESSOR) injection 5 mg  5 mg Intravenous Q4H PRN Hayley Vale APRN CNP   5 mg at 04/14/25 0806    metoprolol tartrate (LOPRESSOR) tablet 25 mg  25 mg Oral BID Tim Mays MD   25 mg at 04/21/25 2120    miconazole (MICATIN) 2 % powder   Topical BID PRN Andres Sparks DO   Given at 04/19/25 2301    naloxone (NARCAN) injection 0.2 mg  0.2 mg Intravenous Q2 Min PRN Hayley Vale APRN CNP        Or    naloxone (NARCAN) injection 0.4 mg  0.4 mg Intravenous Q2 Min PRN Hayley Vale APRN CNP        Or    naloxone (NARCAN) injection 0.2 mg  0.2 mg Intramuscular Q2 Min PRN Hayley Vale APRN CNP        Or    naloxone (NARCAN) injection 0.4 mg  0.4 mg Intramuscular Q2 Min PRN Hayley Vale APRN CNP        nicotine (COMMIT) lozenge 2 mg  2 mg Buccal Q1H PRN Hayley Vale APRN CNP   2 mg at 04/03/25 1145    nicotine (NICODERM CQ) 21 MG/24HR 24 hr patch 1 patch  1 patch Transdermal Daily Hayley Vale APRN CNP   1 patch at 04/21/25 0910    No lozenges or gum should be given while patient on BIPAP/AVAPS/AVAPS AE   Does not apply Continuous PRN Hayley Vale APRN CNP        ondansetron (ZOFRAN ODT) ODT tab 4 mg  4 mg Oral Q6H PRN Hayley Vale APRN CNP   4 mg at 04/08/25 0208    Or    ondansetron (ZOFRAN) injection 4 mg  4 mg Intravenous Q6H PRN Hayley Vale APRN CNP   4 mg at 04/11/25 1232    pantoprazole (PROTONIX) EC tablet 40 mg  40 mg Oral QAM AC Tim Mays MD   40 mg at 04/22/25 0628    Patient may continue current oral medications    Does not apply Continuous PRN Hayley Vale APRN CNP        phenol (CHLORASEPTIC) 1.4 % spray 1 mL  1 spray Mouth/Throat Q1H PRN Hayley Vale APRN CNP        polyethylene glycol (MIRALAX) Packet 17 g  17 g Oral Daily Ana Dougherty MD   17 g at 04/20/25 0822    prochlorperazine (COMPAZINE) injection 5 mg  5 mg Intravenous Q6H PRN Hayley Vale APRN CNP   5 mg at 04/08/25 1103    Or    prochlorperazine (COMPAZINE) tablet 5 mg  5 mg Oral Q6H PRN Hayley Vale APRN CNP   5 mg at 04/03/25 0756    QUEtiapine (SEROquel) tablet 25 mg  25 mg Oral BID Hayley Vale APRN CNP   25 mg at 04/21/25 2120    senna-docusate (SENOKOT-S/PERICOLACE) 8.6-50 MG per tablet 1 tablet  1 tablet Oral BID Ana Dougherty MD   1 tablet at 04/20/25 0823    sodium chloride (PF) 0.9% PF flush 3 mL  3 mL Intracatheter q1 min prn Hayley Vale APRN CNP        sodium chloride (PF) 0.9% PF flush 3 mL  3 mL Intracatheter Q8H MITA Hayley Vale APRN CNP   3 mL at 04/22/25 0630    tamsulosin (FLOMAX) capsule 0.8 mg  0.8 mg Oral QPM Andres Sparks,    0.8 mg at 04/21/25 2120    traMADol (ULTRAM) half-tab 25 mg  25 mg Oral Q6H PRN Tim Mays MD   25 mg at 04/21/25 1225    traMADol (ULTRAM) tablet 50 mg  50 mg Oral Q6H PRN Tim Mays MD   50 mg at 04/20/25 0234   Nutrition-relevant labs: Reviewed  Lab Results   Component Value Date     04/21/2025    POTASSIUM 3.9 04/21/2025     (H) 04/21/2025    BUN 7.0 (L) 04/21/2025    CR 0.62 (L) 04/21/2025    GFRESTIMATED >90 04/21/2025    HALEY 9.1 04/21/2025    MAG 2.1 04/16/2025    PHOS 2.8 04/21/2025     REASSESSED ESTIMATED NUTRITION NEEDS  Dosing Weight: 90 kg, based on adjusted wt    Dosing Weight: 90 kg, based on adjusted wt  Estimated Energy Needs: 1249-3461 kcals/day (20 - 25 kcals/kg)  Justification: Obese  Estimated Protein Needs: 108-135+ grams protein/day (1.2 - 1.5+ grams of pro/kg)  Justification: Post-op    MALNUTRITION  %  Intake: </= 50% for >/= 5 days (severe)  % Weight Loss: Unable to assess   Subcutaneous Fat Loss: None observed  Muscle Loss: None observed  Fluid Accumulation/Edema: Moderate to severe, 2-4+  Malnutrition Diagnosis: Moderate malnutrition in the context of acute illness or injury  Malnutrition Present on Admission: No    EVALUATION OF THE PROGRESS TOWARD GOALS   Previous Goals  Patient to consume % of nutritionally adequate meal trays TID, or the equivalent with supplements/snacks.   Evaluation: Progressing    Previous Nutrition Diagnosis  Inadequate oral intake related to altered GI function as evidenced by 19 days with negligible PO, largely NPO/liquid diet   Evaluation: Resolved    NUTRITION DIAGNOSIS  Inadequate oral intake related to  early satiety  as evidenced by patient report, s/p ex lap, abdominal drain, and wound vac present.     INTERVENTIONS  See nutrition interventions above    Goals  Patient to consume % of nutritionally adequate meal trays TID, or the equivalent with supplements/snacks.     Monitoring/Evaluation      Progress toward goals will be monitored and evaluated per policy.    Ayden Amaya RD, LD, MS  [employer]  Available on [employer]

## 2025-04-23 ENCOUNTER — APPOINTMENT (OUTPATIENT)
Dept: PHYSICAL THERAPY | Facility: CLINIC | Age: 70
DRG: 329 | End: 2025-04-23
Payer: COMMERCIAL

## 2025-04-23 ENCOUNTER — APPOINTMENT (OUTPATIENT)
Dept: OCCUPATIONAL THERAPY | Facility: CLINIC | Age: 70
DRG: 329 | End: 2025-04-23
Payer: COMMERCIAL

## 2025-04-23 LAB
ANION GAP SERPL CALCULATED.3IONS-SCNC: 9 MMOL/L (ref 7–15)
BUN SERPL-MCNC: 8 MG/DL (ref 8–23)
CALCIUM SERPL-MCNC: 9.3 MG/DL (ref 8.8–10.4)
CHLORIDE SERPL-SCNC: 102 MMOL/L (ref 98–107)
CREAT SERPL-MCNC: 0.65 MG/DL (ref 0.67–1.17)
EGFRCR SERPLBLD CKD-EPI 2021: >90 ML/MIN/1.73M2
GLUCOSE BLDC GLUCOMTR-MCNC: 127 MG/DL (ref 70–99)
GLUCOSE BLDC GLUCOMTR-MCNC: 133 MG/DL (ref 70–99)
GLUCOSE BLDC GLUCOMTR-MCNC: 134 MG/DL (ref 70–99)
GLUCOSE SERPL-MCNC: 167 MG/DL (ref 70–99)
HCO3 SERPL-SCNC: 27 MMOL/L (ref 22–29)
HGB BLD-MCNC: 10.8 G/DL (ref 13.3–17.7)
POTASSIUM SERPL-SCNC: 4.2 MMOL/L (ref 3.4–5.3)
SODIUM SERPL-SCNC: 138 MMOL/L (ref 135–145)

## 2025-04-23 PROCEDURE — 82378 CARCINOEMBRYONIC ANTIGEN: CPT | Performed by: INTERNAL MEDICINE

## 2025-04-23 PROCEDURE — 85018 HEMOGLOBIN: CPT | Performed by: HOSPITALIST

## 2025-04-23 PROCEDURE — 97530 THERAPEUTIC ACTIVITIES: CPT | Mod: GP

## 2025-04-23 PROCEDURE — 36415 COLL VENOUS BLD VENIPUNCTURE: CPT | Performed by: HOSPITALIST

## 2025-04-23 PROCEDURE — 250N000013 HC RX MED GY IP 250 OP 250 PS 637: Performed by: STUDENT IN AN ORGANIZED HEALTH CARE EDUCATION/TRAINING PROGRAM

## 2025-04-23 PROCEDURE — 250N000013 HC RX MED GY IP 250 OP 250 PS 637

## 2025-04-23 PROCEDURE — 250N000013 HC RX MED GY IP 250 OP 250 PS 637: Performed by: HOSPITALIST

## 2025-04-23 PROCEDURE — 97606 NEG PRS WND THER DME>50 SQCM: CPT

## 2025-04-23 PROCEDURE — 99223 1ST HOSP IP/OBS HIGH 75: CPT | Performed by: INTERNAL MEDICINE

## 2025-04-23 PROCEDURE — 250N000013 HC RX MED GY IP 250 OP 250 PS 637: Performed by: NURSE PRACTITIONER

## 2025-04-23 PROCEDURE — 97535 SELF CARE MNGMENT TRAINING: CPT | Mod: GO | Performed by: OCCUPATIONAL THERAPIST

## 2025-04-23 PROCEDURE — 80048 BASIC METABOLIC PNL TOTAL CA: CPT | Performed by: HOSPITALIST

## 2025-04-23 PROCEDURE — 120N000001 HC R&B MED SURG/OB

## 2025-04-23 PROCEDURE — 86301 IMMUNOASSAY TUMOR CA 19-9: CPT | Performed by: INTERNAL MEDICINE

## 2025-04-23 PROCEDURE — 99232 SBSQ HOSP IP/OBS MODERATE 35: CPT | Performed by: HOSPITALIST

## 2025-04-23 RX ORDER — ATORVASTATIN CALCIUM 20 MG/1
20 TABLET, FILM COATED ORAL EVERY EVENING
Status: DISCONTINUED | OUTPATIENT
Start: 2025-04-23 | End: 2025-04-29 | Stop reason: HOSPADM

## 2025-04-23 RX ORDER — TRAMADOL HYDROCHLORIDE 50 MG/1
50 TABLET ORAL EVERY 6 HOURS PRN
Qty: 15 TABLET | Refills: 0 | Status: SHIPPED | OUTPATIENT
Start: 2025-04-23

## 2025-04-23 RX ORDER — QUETIAPINE FUMARATE 25 MG/1
25 TABLET, FILM COATED ORAL 2 TIMES DAILY PRN
Status: DISCONTINUED | OUTPATIENT
Start: 2025-04-23 | End: 2025-04-29 | Stop reason: HOSPADM

## 2025-04-23 RX ORDER — AMOXICILLIN 250 MG
1 CAPSULE ORAL 2 TIMES DAILY
Qty: 15 TABLET | Refills: 0 | Status: SHIPPED | OUTPATIENT
Start: 2025-04-23

## 2025-04-23 RX ADMIN — TAMSULOSIN HYDROCHLORIDE 0.8 MG: 0.4 CAPSULE ORAL at 21:12

## 2025-04-23 RX ADMIN — FUROSEMIDE 20 MG: 20 TABLET ORAL at 09:09

## 2025-04-23 RX ADMIN — APIXABAN 5 MG: 5 TABLET, FILM COATED ORAL at 09:09

## 2025-04-23 RX ADMIN — APIXABAN 5 MG: 5 TABLET, FILM COATED ORAL at 21:12

## 2025-04-23 RX ADMIN — PANTOPRAZOLE SODIUM 40 MG: 20 TABLET, DELAYED RELEASE ORAL at 06:48

## 2025-04-23 RX ADMIN — QUETIAPINE FUMARATE 25 MG: 25 TABLET ORAL at 09:09

## 2025-04-23 RX ADMIN — SENNOSIDES AND DOCUSATE SODIUM 1 TABLET: 50; 8.6 TABLET ORAL at 21:12

## 2025-04-23 RX ADMIN — METOPROLOL TARTRATE 25 MG: 25 TABLET, FILM COATED ORAL at 21:12

## 2025-04-23 RX ADMIN — NICOTINE 1 PATCH: 21 PATCH, EXTENDED RELEASE TRANSDERMAL at 09:14

## 2025-04-23 RX ADMIN — POLYETHYLENE GLYCOL 3350 17 G: 17 POWDER, FOR SOLUTION ORAL at 09:09

## 2025-04-23 RX ADMIN — ATORVASTATIN CALCIUM 20 MG: 20 TABLET, FILM COATED ORAL at 21:12

## 2025-04-23 RX ADMIN — METOPROLOL TARTRATE 25 MG: 25 TABLET, FILM COATED ORAL at 09:09

## 2025-04-23 RX ADMIN — GABAPENTIN 100 MG: 100 CAPSULE ORAL at 21:12

## 2025-04-23 RX ADMIN — PANTOPRAZOLE SODIUM 40 MG: 20 TABLET, DELAYED RELEASE ORAL at 06:22

## 2025-04-23 RX ADMIN — SENNOSIDES AND DOCUSATE SODIUM 1 TABLET: 50; 8.6 TABLET ORAL at 09:09

## 2025-04-23 RX ADMIN — AMIODARONE HYDROCHLORIDE 200 MG: 200 TABLET ORAL at 09:09

## 2025-04-23 RX ADMIN — GABAPENTIN 100 MG: 100 CAPSULE ORAL at 09:09

## 2025-04-23 RX ADMIN — TRAMADOL HYDROCHLORIDE 50 MG: 50 TABLET, COATED ORAL at 06:52

## 2025-04-23 RX ADMIN — AMIODARONE HYDROCHLORIDE 200 MG: 200 TABLET ORAL at 21:12

## 2025-04-23 ASSESSMENT — ACTIVITIES OF DAILY LIVING (ADL)
ADLS_ACUITY_SCORE: 63
ADLS_ACUITY_SCORE: 65
ADLS_ACUITY_SCORE: 65
ADLS_ACUITY_SCORE: 63
ADLS_ACUITY_SCORE: 61
ADLS_ACUITY_SCORE: 63
ADLS_ACUITY_SCORE: 63
ADLS_ACUITY_SCORE: 65
ADLS_ACUITY_SCORE: 65
ADLS_ACUITY_SCORE: 61
ADLS_ACUITY_SCORE: 63
ADLS_ACUITY_SCORE: 63
ADLS_ACUITY_SCORE: 65
ADLS_ACUITY_SCORE: 63
ADLS_ACUITY_SCORE: 63
ADLS_ACUITY_SCORE: 65
ADLS_ACUITY_SCORE: 61
ADLS_ACUITY_SCORE: 63
ADLS_ACUITY_SCORE: 65

## 2025-04-23 NOTE — PROGRESS NOTES
[facility]  GENERAL SURGERY Progress Note    Admission Date: 3/30/2025  4/23/2025         Assessment and Plan:     Richar Davis is a 69 year old male admitted for gallstone ileus; S/P ex lap with removal of gallstone through small bowel enterotomy 3/30/25.  New onset of Aflutter with RVR, underwent cardioversion on 4/7. RRT called overnight 4/7 for hypotension and patient found to have elevated lactate, leukocytosis of 36. CT showing increased fluid and free air, drainage from midline incision. Taken to OR 4/8 for ex lap, found to have gross sucus breakdown of enterotomy, return to OR 4/10 for small bowel anastomosis and fascial closure.  Extubated and transferred to floor. RRT 4/14 for recurrent Aflutter with RVR, underwent repeat cardioversion. CT showing two new fluid collections, possible early abscess, underwent IR drain placement x2 4/15, cultures with NGTD. CT sinogram 4/19, left drain removed as it was in the subcutaneous tissue, right drain has no significant residual fluid but continued due to output. No growth on abdominal fluid collections, on Zosyn (started 4/7, discontinued 4/22/25 per ID).  Eliquis resumed 4/18.     - Continue moderate CHO diet  - Right abdominal drain output 100mL-->60mL, discussed with IR- remove prior to discharge  - Continue Wound VAC changes MWF, appreciate WOC RN cares   - Pain control- Tylenol and Ultram PRN, avoid Dilaudid  - Hgb remains stable  - Continue Protonix  - Ambulate with assistance if able 4x day and encourage IS  - Med management per hospitalist, much appreciate your assistance- sinogram reviewed  - Sinogram from 4/18: Increased nodularity/density in the mesenteric fat of the upper abdomen bilaterally suggestive of peritoneal carcinomatosis.  Discussed with Dr. Neves. Recommend follow up with PCP in 2 months for a repeat CT scan.  Oncology following. ACS team to see patient tomorrow.   - Doing well from surgery standpoint, ok to discharge  to with TCU when medically able  - Discharge instructions in chart, rx printed, follow up with Dr. Landrum  at 1pm              Interval History:     Pain at wound with vac changes, otherwise doing well, tolerating diet, +Flatus/BM, UO adequate.                     Physical Exam:   Blood pressure 132/70, pulse 81, temperature 98.4  F (36.9  C), temperature source Oral, resp. rate 18, height 1.829 m (6'), weight 135.6 kg (298 lb 14.4 oz), SpO2 99%.  Temperature Temp  Av.6  F (37  C)  Min: 98.2  F (36.8  C)  Max: 99.1  F (37.3  C)   I/O last 3 completed shifts:  In: 1120 [P.O.:1120]  Out: 860 [Urine:800; Drains:60]  Constitutional:  More awake and in no apparent distress.   Abdomen: Obese, soft, non-distended, appropriately tender at incision(s). IR drain intact with serous drainage.    Wound(s): Clean, dry, and vac intact. No erythema or drainage.    Extremities: No edema or calf tenderness. +SCDs          Data:   No new labs/imaging.   Recent Labs   Lab Test 25  0828 25  0639 25  0952 25  0534 25  0303 04/15/25  0759 25  0925   WBC  --   --   --   --  8.1 8.1 9.9   HGB 11.0* 10.7* 11.0*   < > 10.5* 10.4* 11.7*   HCT  --   --   --   --  32.8* 34.0* 36.3*     --   --   --  272 267 301    < > = values in this interval not displayed.      Recent Labs   Lab Test 25  0828 25  0808 25  0639 25  1341 25  0534     --  142  --  143   POTASSIUM 3.9 4.0 3.5   < > 3.3*   CHLORIDE 108*  --  111*  --  111*   CO2 25  --  24  --  23   BUN 7.0*  --  5.6*  --  8.5   CR 0.62*  --  0.71  --  0.70    < > = values in this interval not displayed.     JAVIER GonzalezC  [facility]  Office: [phone]  Page on-call surgeon after 4pm

## 2025-04-23 NOTE — PROGRESS NOTES
[facility]  Hospitalist Progress Note   04/23/2025          Assessment and Plan:       Richar Davis is a 69 year old male with a history of diabetes, hypertension, CABG, status post AVR with bovine valve 10/2022, untreated sleep apnea, hypertension, hyperlipidemia, neuropathy, BPH admitted on 3/30/2025 with abdominal pain nausea and vomiting. See below for events of prolonged hospital stay. Anticipate medically stable for discharge in 2-4 days. Needs right sided abdominal drain removed prior to discharge to transitional care versus ARU.     IR guided drain placement 4/15  Small bowel anastomosis and fascial closure 4/10.  Small bowel ileus with resection on 4/8  Exploratory laparotomy with small bowel enterotomy and removal of gallstone on 3/30.   Gallstone ileus, distal small bowel obstruction secondary to impacted 2.2 cm gallstone.  Enterococcus faecalis bacteremia -cleared  -CT imaging on admission showed Gallstone ileus. Distal small bowel obstruction secondary to an impacted 2.2 cm gallstone.   -Underwent Exploratory laparotomy with small bowel enterotomy and removal of gallstone on 3/30.   -Postoperative course complicated with ileus.  NG tube removed 4/2, unable to tolerate oral diet.   -Had fever on 4/5, COVID/Flu/RSV which was negative.  BC x 2 no growth. CXR without overt pneumonia.  UA then not consistent with infection.  -On 4/7 spiked fever with significant leukocytosis, lactic acidosis. Blood cultures from 4/7, 4/8 with Enterococcus faecalis.  -Returned back to the OR for exploratory laparotomy with small bowel resection on 4/8.  -Postprocedure patient intubated on pressor support, temporary abdominal closure admitted to ICU.   -Returned to OR on 4/10 for small bowel anastomosis and abdominal wound VAC placement.    -Postprocedure continued to have distended abdomen with bloody output.  -CT on 4/14 showing multiple peripherally enhancing fluid collections in the abdomen which  are suspicious for developing abscesses. The largest of these is in the left paracolic gutter. Stable appearance of a contracted gallbladder containing a large gallstone which is fistulized to the duodenum with associated pneumobilia.  -Underwent IR guided abdominal drain placements x 2 on 4/15.  Patient removed NG tube by himself and started on oral liquids on 4/16.  Underwent CT sinogram on 4/18, left intra-abdominal drain removed.  ---Blood Cultures from 4/10 show no growth to date.  Drain cultures no growth to date.  Infectious disease followed, recommended stop antibiotics on 4/21, Zosyn discontinued 4/22.   --On 4/23 patient continues to have right intra-abdominal drain draining serous fluid.  --General Surgery followed.  Appreciate input.  IR following, plan for removal of right intra-abdominal drain.  Continue oral diet as tolerated.  Continue wound VAC per general surgery.  As needed pain meds.  As needed antiemetics.      Concern for peritoneal carcinomatosis. On imaging   Health maintenance  CT sinogram from 4/18 with Increased nodularity/density in the mesenteric fat of the upper abdomen bilaterally suggestive of peritoneal carcinomatosis.  Pathology from drain fluid was not sent.   -- Do not see documentation/input from surgical team or hospitalist.   --Patient not up-to-date with health maintenance.  Has never had a colonoscopy.  --Oncology input requested.  -- Will reach out to IR / gen surgery to review imaging, comment.   -- Findings discussed with patient, his wife over the telephone     Postoperative atrial fibrillation with RVR -status post cardioversion 4/14/2025  New Aflutter with RVR 4/5/2025 status post DCCV on 4/7/2025.  Coronary artery disease status post CABG x 3 in October 2022  Aortic stenosis (status post AVR with bovine valve) October 2022.  Hypertension  Hyperlipidemia.  -Postoperatively developed new aflutter with RVR, was on intravenous heparin drip, rate controlled with Cardizem  drip and initiated oral metoprolol.   --Cardiology followed underwent cardioversion on 4/7.   --Heparin was paused as patient back to OR on 4/8 and was started on intravenous heparin during ICU stay.  --On 4/14 patient again developed recurrence of A-fib with RVR with heart rate in 140s.  Initiated on amiodarone drip, underwent successful cardioversion on 4/14.  Currently in sinus rhythm with heart rate in 70s to 80s.  --Echocardiogram 4/15 with EF of 55%.  Right ventricle normal size.  No valve disease.    --Cardiology signed off 4/15, continue with amiodarone loading and switch to oral amiodarone.  --Continue oral amiodarone taper: 200 mg BID x 2 weeks ( starting 4/16), then 200 mg every day.  EKG for QTC on 4/20.   --Was on intravenous heparin, switched to oral Eliquis 5 mg twice daily on 4/18.  Continue Eliquis 5 mg twice daily.  --Continue Oral metoprolol 25 mg twice daily.  --PTA on Lipitor 80 mg oral daily, LDL 71.  Continue lower dose of Lipitor 20 mg oral daily.      Acute hypoxia-likely from atelectasis, bilateral pleural effusion, postoperative narcotic use, ALIREZA, deconditioning  CT chest from 4/14 with New small bilateral pleural effusions with overlying atelectasis/consolidation.  Afebrile.  Leukocytosis improved.  Weaned off oxygen to room air.  IS, ambulate.      Acute encephalopathy likely multifactorial from anesthesia/sedation for procedure/delirium, hospitalization/ICU stay/narcotic use.  Patient with prolonged hospital stay -multiple procedures, ICU stay, on narcotics.  CT head 4/18 no acute intracranial pathology.  -Switch Seroquel to 25 mg twice daily as needed.  Monitor mental status closely.  Fall precautions.  Delirium precautions.    Acute anemia likely from surgical blood loss, acute illness, dilutional.  Presented with hemoglobin of 15.9, now hemoglobin in the 10-11 range.  No active bleeding. Monitor intermittently     Severe hypoalbuminemia likely from acute illness.  Dilutional.  Serum  albumin of 2.1.    Nutritional intake per surgical team.  Follow albumin levels, proBNP.  Will consider intravenous diuresis if needed     Acute on chronic bilateral lower extremity edema.  Ultrasound bilateral lower extremities -no DVT.  Pitting edema present.    -Continue PTA Lasix [resume 4/21].  Follow proBNP.     Mild hypernatremia -improved.  Mild dehydration.- resolved     Acute kidney injury.  Resolved  Anion gap metabolic acidosis.  Resolved.  Monitor.     Hypokalemia from poor intake -replaced.  On replacement protocol     Type 2 diabetes mellitus with a hemoglobin A1c of 6.8  Hold PTA metformin.  Insulin sliding scale.     Urinary retention -Mccarty removed 4/17  Traumatic hematuria.  BPH:   Urine cultures from 4/14 no growth  Catheter removed 4/17.  Continue PTA Flomax     Neuropathy:   *Neurontin 800 mg 3 times daily as needed has been filled but patient reports taking 300mg TID  *4/1 per pharmacy note only recent fills are 800 mg and not 300 mg dosing.   --Gabapentin was on hold since admission, resumed at 100 mg twice daily on 4/18.  -Continue current dose of gabapentin.    Nicotine use: Chews tobacco  Continue nicotine patch, and as needed nicotine lozenges.    Long term cessation to be encouraged during stay.     ALIREZA not on CPAP.  Recommend sleep studies as outpatient     Medically complicated obesity with a BMI of 41.  Increase all-cause mortality and morbidity.  Consider lifestyle modification with diet and exercise as able to.     Physical deconditioning from medical illness.  Encourage ambulation.  Fall precautions  Rehab team following.    Clinically Significant Risk Factors               # Hypoalbuminemia: Lowest albumin = 1.9 g/dL at 4/9/2025  5:25 AM, will monitor as appropriate     # Hypertension: Noted on problem list           # DMII: A1C = 6.8 % (Ref range: <5.7 %) within past 6 months   # Severe Obesity: Estimated body mass index is 40.54 kg/m  as calculated from the following:    Height  as of this encounter: 1.829 m (6').    Weight as of this encounter: 135.6 kg (298 lb 14.4 oz).   # Moderate Malnutrition: based on nutrition assessment and treatment provided per dietitian's recommendations.    # Financial/Environmental Concerns: none   # History of CABG: noted on surgical history           Orders Placed This Encounter      Combination Diet Moderate Consistent Carb (60 g CHO per Meal) Diet      DVT Prophylaxis: SCDs, ambulate.  Code Status: Full Code  Disposition: Expected discharge likely tomorrow pending drain removal, clinical improvement, safe discharge plan in place.    Medically Ready for Discharge: Anticipated Tomorrow    Discussed with patient, bedside RN, general surgery team, interventional radiology team.  Updated patient's wife over the telephone 4/23.  Total time greater than 51 minutes. More than 70% of time spent in direct patient care, care coordination, patient counseling, and formalizing plan of care.        Tim Mays MD        Interval History:        Patient lying in bed.  Appears comfortable, drowsy but arousable.  Does admit to some improvement in abdominal pain.  Continues to have intra-abdominal drain, clear fluid draining out.  Tolerating oral diet.  Denies any chest pain or palpitations.  Denies any headache or dizziness.  Improvement in shortness of breath, on room air this morning  Receiving as needed pain meds, scheduled gabapentin.  Continues to have intermittent confusion  Has not been up out of bed yet, per report requiring assistance of 2 with Clau padron.             Physical Exam:        Physical Exam   Temp:  [98.2  F (36.8  C)-98.6  F (37  C)] 98.4  F (36.9  C)  Pulse:  [75-84] 77  Resp:  [15-20] 20  BP: (121-134)/(62-70) 121/68  SpO2:  [97 %-99 %] 98 %    Intake/Output Summary (Last 24 hours) at 4/23/2025 1412  Last data filed at 4/23/2025 0900  Gross per 24 hour   Intake 1360 ml   Output 1010 ml   Net 350 ml       Admission Weight: 129.3 kg (285  lb)  Current Weight: 135.6 kg (298 lb 14.4 oz)    PHYSICAL EXAM  GENERAL: Patient awake, able to answer most questions  HEART: Regular rate and rhythm. S1S2  LUNGS: bilateral decreased breath sounds, no wheezing or crackles.  Respiration unlabored  ABDOMEN: Distended, wound VAC present. Bowel sounds heard.    NEURO: Moving all extremities  EXTREMITIES:+ +pedal edema.   SKIN: Brusing   PSYCHIATRY Cooperative       Medications:        Current Facility-Administered Medications   Medication Dose Route Frequency Provider Last Rate Last Admin    amiodarone (PACERONE) tablet 200 mg  200 mg Oral BID Richa Schmidt PA-C   200 mg at 04/23/25 0909    Followed by    [START ON 4/29/2025] amiodarone (PACERONE) tablet 200 mg  200 mg Oral Daily Richa Schmidt PA-C        apixaban ANTICOAGULANT (ELIQUIS) tablet 5 mg  5 mg Oral BID Tim Mays MD   5 mg at 04/23/25 0909    atorvastatin (LIPITOR) tablet 20 mg  20 mg Oral QPM Tim Mays MD        furosemide (LASIX) tablet 20 mg  20 mg Oral Daily Andres Sparks DO   20 mg at 04/23/25 0909    gabapentin (NEURONTIN) capsule 100 mg  100 mg Oral BID Tim Mays MD   100 mg at 04/23/25 0909    insulin aspart (NovoLOG) injection (RAPID ACTING)  1-7 Units Subcutaneous TID Tim Seymour MD   1 Units at 04/22/25 1239    insulin aspart (NovoLOG) injection (RAPID ACTING)  1-5 Units Subcutaneous At Bedtime Tim Mays MD        metoprolol tartrate (LOPRESSOR) tablet 25 mg  25 mg Oral BID Tim Mays MD   25 mg at 04/23/25 0909    nicotine (NICODERM CQ) 21 MG/24HR 24 hr patch 1 patch  1 patch Transdermal Daily Hayley Vale APRN CNP   1 patch at 04/23/25 0914    pantoprazole (PROTONIX) EC tablet 40 mg  40 mg Oral QAM AC Tim Mays MD   40 mg at 04/23/25 0648    polyethylene glycol (MIRALAX) Packet 17 g  17 g Oral Daily Ana Dougherty MD   17 g at 04/23/25 0909    senna-docusate (SENOKOT-S/PERICOLACE) 8.6-50 MG per tablet 1  tablet  1 tablet Oral BID Ana Dougherty MD   1 tablet at 04/23/25 0909    sodium chloride (PF) 0.9% PF flush 3 mL  3 mL Intracatheter Q8H MITA Hayley Vale APRN CNP   3 mL at 04/22/25 2140    tamsulosin (FLOMAX) capsule 0.8 mg  0.8 mg Oral QPM Andres Sparks DO   0.8 mg at 04/22/25 2136     Current Facility-Administered Medications   Medication Dose Route Frequency Provider Last Rate Last Admin    acetaminophen (TYLENOL) tablet 650 mg  650 mg Oral Q4H PRN Shi Goncalves PA-C        benzocaine-menthol (CHLORASEPTIC) 6-10 MG lozenge 1 lozenge  1 lozenge Buccal Q1H PRN Hayley Vale APRN CNP   1 lozenge at 04/01/25 0334    bisacodyl (DULCOLAX) suppository 10 mg  10 mg Rectal Daily PRN Hayley Vale APRN CNP        carboxymethylcellulose PF (REFRESH PLUS) 0.5 % ophthalmic solution 1 drop  1 drop Both Eyes Q1H PRN Hayley Vale APRN CNP        glucose gel 15-30 g  15-30 g Oral Q15 Min PRN Tim Mays MD        Or    dextrose 50 % injection 25-50 mL  25-50 mL Intravenous Q15 Min PRN Tim Mays MD        Or    glucagon injection 1 mg  1 mg Subcutaneous Q15 Min PRN Tim Mays MD        diphenhydrAMINE (BENADRYL) elixir 12.5 mg  12.5 mg Oral Q6H PRN Hayley Vale APRN CNP        Or    diphenhydrAMINE (BENADRYL) injection 12.5 mg  12.5 mg Intravenous Q6H PRN Hayley Vale APRN CNP        HYDROmorphone (DILAUDID) injection 0.2 mg  0.2 mg Intravenous Q4H PRN Tim Mays MD   0.2 mg at 04/15/25 1737    lidocaine (LMX4) cream   Topical Q1H PRN Hayley Vale APRN CNP        lidocaine 1 % 0.1-1 mL  0.1-1 mL Other Q1H PRN Hayley Vale APRN CNP        magnesium hydroxide (MILK OF MAGNESIA) suspension 30 mL  30 mL Oral Daily PRN Hayley Vale APRN CNP   30 mL at 04/01/25 0427    melatonin tablet 10 mg  10 mg Oral At Bedtime PRN Hayley Vale APRN CNP   10 mg at 04/12/25 0042    metoprolol (LOPRESSOR) injection 5 mg  5 mg Intravenous  Q4H PRN Hayley Vale APRN CNP   5 mg at 04/14/25 0806    miconazole (MICATIN) 2 % powder   Topical BID PRN Andres Sparks DO   Given at 04/19/25 2301    naloxone (NARCAN) injection 0.2 mg  0.2 mg Intravenous Q2 Min PRN Hayley Vale APRN CNP        Or    naloxone (NARCAN) injection 0.4 mg  0.4 mg Intravenous Q2 Min PRN Hayley Vale APRN CNP        Or    naloxone (NARCAN) injection 0.2 mg  0.2 mg Intramuscular Q2 Min PRN Hayley Vale APRN CNP        Or    naloxone (NARCAN) injection 0.4 mg  0.4 mg Intramuscular Q2 Min PRN Hayley Vale APRN CNP        nicotine (COMMIT) lozenge 2 mg  2 mg Buccal Q1H PRN Hayley Vale APRN CNP   2 mg at 04/03/25 1145    No lozenges or gum should be given while patient on BIPAP/AVAPS/AVAPS AE   Does not apply Continuous PRN Hayley Vale APRN CNP        ondansetron (ZOFRAN ODT) ODT tab 4 mg  4 mg Oral Q6H PRN Hayley Vale APRN CNP   4 mg at 04/08/25 0208    Or    ondansetron (ZOFRAN) injection 4 mg  4 mg Intravenous Q6H PRN Hayley Vale APRN CNP   4 mg at 04/11/25 1232    Patient may continue current oral medications   Does not apply Continuous PRN Hayley Vale APRN CNP        phenol (CHLORASEPTIC) 1.4 % spray 1 mL  1 spray Mouth/Throat Q1H PRN Hayley Vale APRN CNP        prochlorperazine (COMPAZINE) injection 5 mg  5 mg Intravenous Q6H PRN Hayley Vale APRN CNP   5 mg at 04/08/25 1103    Or    prochlorperazine (COMPAZINE) tablet 5 mg  5 mg Oral Q6H PRN Hayley Vale APRN CNP   5 mg at 04/03/25 0756    QUEtiapine (SEROquel) tablet 25 mg  25 mg Oral BID PRN Tim Mays MD        sodium chloride (PF) 0.9% PF flush 3 mL  3 mL Intracatheter q1 min prn Hayley Vale APRN CNP        traMADol (ULTRAM) half-tab 25 mg  25 mg Oral Q6H PRN Tim Mays MD   25 mg at 04/21/25 1228    traMADol (ULTRAM) tablet 50 mg  50 mg Oral Q6H PRN Tim Mays MD   50 mg at 04/23/25 0636             Data:      All new lab and imaging data was reviewed.

## 2025-04-23 NOTE — PLAN OF CARE
Date & Time: 4/23/2025  [id number]  Surgery/POD#:   3/30: enterotomy and removal gallstone  4/4 new A-flutter  4/7: cardioverted into SR  4/8 Bowel resection  4/10 Bowel resection and wound closure   4/14: A-flutter, cardioverted into SR  4/15 Drain placed for abd abscess   4/23 TERRIE drain removal    Behavior & Aggression: Green, intermittent agitation, but redirectable. PRN seroquel ordered but not given this shift.  Fall Risk: Yes  Orientation:Aox3-4 (not time) , intermittent confused but redirectable   ABNL VS/O2:VSS on RA  ABNL Labs: creatinine 0.65  Pain Management: scheduled gabapentin  Bowel/Bladder: Inc Bladder/BM, loose incontinent stool BM x1, external catheter in place but leaking.  Redness in perineum.   Drains: TERRIE drain removed today. Wound vac to -75 mmHg on midline abd inc. CDI  Wounds/incisions: midline incision  Diet: Mod Carb, fair appetite. Blood sugar checks  Activities: Ax2 Clau Steady.  Up to chair today and work with PT  Number of times OUT OF BED this shift: 2, repo in bed independent q2  Tests/Procedures: N/A  Anticipated  DC Date: 4/24 to TCU  Other information: pt able to reposition independently but need encouragements.  Wife at bedside and supportive. Hem/Onc consulted today.

## 2025-04-23 NOTE — PROGRESS NOTES
[facility]  WO Nurse Inpatient Assessment     Consulted for: coccyx and abdominal NPWT changes    Summary:   Coccyx: small blanchable elongated patches of erythema, skin intact-Signing off 4/15  2.   Abdomen: s/p small bowel resection with VAC placement in OR. NPWT midline wound changed 4/23 ~ 75 mmHG     WOC nurse follow-up plan: Monday/WednesdayFriday     Patient History (according to provider note(s):       69 year old male with a history of diabetes, hypertension, CABG, status post AVR with bovine valve 10/2022, untreated sleep apnea, hypertension, hyperlipidemia, neuropathy, BPH admitted on 3/30/2025 with abdominal pain nausea and vomiting.  Imaging showed gallstone ileus with 2.2 cm gallstone impacted.  Underwent surgery on 3/30.  Postoperatively had issues with ileus, fever, and aflutter with RVR.       Returned back to the OR for exploratory laparotomy with small bowel resection.Post -op admission to the ICU, intubated and on pressor support with temporary abdominal closure. He returned to OR on 4/10  for small bowel anastomosis and abdominal.  Wound VAC in place.  Pressors weaned off, patient extubated 4/11 and transferred to hospitalist service on 4/12.    Assessment:      Areas visualized during today's visit: Focused: and Abdomen    Negative pressure wound therapy applied to: abdomen         Last photo: 4/23/25   Wound due to: Surgical Wound   Wound history/plan of care:    Patient is intermittently confused. Spouse at the bedside and settling patient down. Increased granulation buds within wound bed, over hydration to tissue at umbilicus area. Periwound scattered with pustules in varying sizes. Unable to determine if tension from vac tape, minimized use of tape on skin and prepped with no sting barrier prior to tape placement.   Surgical date: 4/10/25   Service following: Gen Surgery  Date Negative Pressure Wound Therapy initiated: 4/10/25   Interventions in place:  repositioning, pressure redistribution with device or dressing, specialty surface in use, moisture/incontinence management, offloading, and elevation  Is patient s nutritional status compromised? no  If yes, what interventions are in place? N/A  Reason for initiating vac therapy? Need for accelerated granulation tissue  Which?of?the?following?co-morbidities?apply? Diabetes and Obesity  If diabetic is patient on a diabetic management program? Yes   Is osteomyelitis present in wound? no   If yes what treatments are in place? N/A    Wound base: 100 % Granulation tissue and Adipose tissue,       Palpation of the wound bed: normal       Drainage: moderate      Volume in cannister: <100ml      Last cannister change date: 4/21/25     Description of drainage: serous, serosanguinous, and bloody      Measurements (length x width x depth, in cm) 20 x 6.4  x  3 cm       Tunneling N/A      Undermining N/A   Periwound skin: Blister(s)       Color: pink       Temperature: normal    Odor: none   Pain: denies ,   Pain intervention prior to dressing change: soaking, slow and gentle cares , and distraction  Treatment goal: Drainage control, Infection control/prevention, Increase granulation, Maintain (prevention of deterioration), Protection, and Promote epidermal migration  STATUS: improving   Supplies ordered: gathered, at bedside, supplies stored on unit, and discussed with patient     Number of foam pieces removed from a wound (excluding foam for bridge) : 1 GranuFoam Black and 1 White foam   Verified this matched the number of foam pieces applied last dressing change: Yes   Number of foam pieces packed into wound (excluding foam for bridge) :  2 GranuFoam Black and 1 Oil emulsion dressing         4/15: [facility] received consult for coccyx. Skin intact, each buttock with elongated patch of erythema related to friction, patient has been intermittently confused and agitated per chart review. Nursing staff to monitor and moisturize skin.  "Continue with Pressure injury and skin breakdown prevention measures        Treatment Plan:     Negative pressure wound therapy plan:  Mon/Wed/Fri  Wound location: midline abdomen   Change Days: Mon/Wed/Fri by WOC RN    Supplies (including all accessories) used: medium Black foam , White foam, and Cavilon no sting barrier film  Cleanse with Vashe prior to replacing NPWT  Suction setting: -75   Methods used: Window paned all periwound skin with vac drape prior to applying sponge    Staff RN to assess integrity of dressing and ensure suction is set at appropriate level every shift.   Date canister. Chart canister output every shift. Change cannister weekly and PRN if full/occluded     Remove foam dressing and replace with BID normal saline moist gauze dressing if:   -a dressing failure which cannot be repaired within 2 hours   -patient is discharging to home without a home pump   -patient is discharging to a facility outside the local area   -if a dressing is a \"Silver Foam\", remove before Radiation Therapy or MRI     The hospital VAC pump is not to be discharged with the patient. Please disconnect the patient from the machine prior to discharge.  If a home VAC pump has been delivered, connect the home cannister to dressing tubing and the cannister to the home pump, turn on home pump  If the patient is transferring to a nearby facility with a VAC, the tubing can be disconnected, clamp tubing and cover the end with a glove, then can be reconnected if within 2 hours  If transfer will be longer than 2 hours, dressing must be removed and placed with a wet to moist gauze dressing for transfer    Generalized skin: Daily  Moisturize with body lotion of sween 24 daily      Orders: Reviewed    RECOMMEND PRIMARY TEAM ORDER: None, at this time  Education provided: importance of repositioning, plan of care, wound progress, Infection prevention , Moisture management, Hygiene, and Off-loading pressure  Discussed plan of care with: " Patient and Nurse  Notify [facility] if wound(s) deteriorate.  Nursing to notify the Provider(s) and re-consult the WO Nurse if new skin concern.    DATA:     Current support surface: Standard  Standard gel mattress (Isoflex)  Containment of urine/stool: Incontinence Protocol and Incontinent pad in bed  BMI: Body mass index is 40.54 kg/m .   Active diet order: Orders Placed This Encounter      Combination Diet Moderate Consistent Carb (60 g CHO per Meal) Diet     Output: I/O last 3 completed shifts:  In: 1120 [P.O.:1120]  Out: 860 [Urine:800; Drains:60]     Labs:   Recent Labs   Lab 04/21/25  0828   HGB 11.0*     Pressure injury risk assessment:   Sensory Perception: 4-->no impairment  Moisture: 3-->occasionally moist  Activity: 2-->chairfast  Mobility: 2-->very limited  Nutrition: 2-->probably inadequate  Friction and Shear: 2-->potential problem  Nash Score: 15    Caitlyn Sahni RN, BSN, CWOCN   Pager no longer is use, please contact through [employer] group: [facility] Nurse Sav  Dept. Office Number: [phone]

## 2025-04-23 NOTE — PROGRESS NOTES
Care Management Follow Up    Length of Stay (days): 24    Expected Discharge Date: 04/24/2025     Concerns to be Addressed: discharge planning     Patient plan of care discussed at interdisciplinary rounds: Yes    Anticipated Discharge Disposition: Transitional Care        Anticipated Discharge Services: Home Care  Anticipated Discharge DME:      Patient/family educated on Medicare website which has current facility and service quality ratings: no  Education Provided on the Discharge Plan: Yes  Patient/Family in Agreement with the Plan: yes    Referrals Placed by CM/SW:    Private pay costs discussed: transportation costs    Discussed  Partnership in Safe Discharge Planning  document with patient/family: Yes:     Handoff Completed: No, handoff not indicated or clinically appropriate    Additional Information:  Writer arranged tentative transport using [employer], patient aware of costs, ride time is for between 1310 - 1350 Thursday     Next Steps:     KANDI Campbell

## 2025-04-23 NOTE — CONSULTS
[facility]    Hematology / Oncology Consultation     Date of Admission:  3/30/2025  Date of Consult (When I saw the patient): 04/23/25    Assessment & Plan   Richar Davis is a 69 year old male who was admitted on 3/30/2025 with abdominal pain, nausea, vomiting with small bowel obstruction.  I was asked to see the patient for concerns of peritoneal carcinomatosis.     Richar presented with nausea, vomiting and abdominal pain to the urgent care on 3/30/25. He had a AXR which suggested small bowel obstruction and was referred to ED. He had a CT abd/p done the same day which revealed gallstone ileus, distal small bowel obstruction secondary to an impacted 2.2 cm gallstone located just right of midline at the level of the right common iliac artery bifurcation. He was seen by surgery and had exploratory laparotomy and small bowel enterotomy with removal of gallstone done the same day.     He had complications post surgery and had small bowel ileus which was resected on 4/8/25 and small bowel anastomosis and fascial closure on 4/10. He had IR guided drains placed in the pelvis. His imaging studies have shown nodularity in the mesenteric fat in upper abdomen bilaterally concerning for possible peritoneal carcinomatosis.     His last scans were without contrast. It would be important to repeat imaging with contrast. We would need biopsy to have any idea about diagnosis. I will check for CEA and  tumor markers but alone these would have little value if any.  I have ordered for CT abdomen without and with contrast and CT chest/pelvis with contrast.    Patient has very little idea of his health. He noted that he had atrial fibrillation which was the reason for his admission. He refused having cancer and could not understand the purpose of my referral.     I have reviewed his case with Dr. Mays and would recommend IR referral for a biopsy. He can be discharge after biopsy once he is  clinically stable. We will follow after biopsy to have a meaningful conversation. We will include his wife in conversation.     Over 80 min spent on day of visit including review of tests, obtaining/reviewing separately obtained history/physical exam, counseling patient, ordering medications/tests/procedures, communicating with PCP/consultants, and documenting in electronic medical record.    Reinaldo Taylor  Hematologist and Medical Oncologist  [facility]     Reinaldo Taylor MD, MD    Code Status    Full Code    Reason for Consult   Reason for consult: I was asked by  to evaluate this patient for peritoneal carcinomatosis.     Primary Care Physician   Cory Valles    Chief Complaint   Abdominal pain, nausea, vomiting    History is obtained from chart as the patient does not seem to have a clue about why he has been admitted.     History of Present Illness   Richar Davis is a 69 year old male who presents with nausea, vomiting, abdominal pain.     Richar was alone in the room. He admits he is not doing well on questioning specifically but feels he is admitted for his atrial fibrillation.     Past Medical History   I have reviewed this patient's medical history and updated it with pertinent information if needed.   Past Medical History:   Diagnosis Date    Cataract     Complication of anesthesia     Diabetes mellitus (H)     Heart attack (H)     Heart murmur     Hypertension        Past Surgical History   I have reviewed this patient's surgical history and updated it with pertinent information if needed.  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    ANESTHESIA CARDIOVERSION N/A 4/7/2025    Procedure: Anesthesia cardioversion;  Surgeon: GENERIC ANESTHESIA PROVIDER;  Location:  OR    ANESTHESIA CARDIOVERSION N/A 4/14/2025    Procedure: Anesthesia cardioversion;  Surgeon: GENERIC ANESTHESIA PROVIDER;  Location:  OR    BYPASS GRAFT  ARTERY CORONARY, REPLACE VALVE AORTIC, COMBINED N/A 10/20/2022    Procedure: CORONARY ARTERY BYPASS GRAFT x 3 (LEFT INTERNAL MAMMARY ARTERY - LEFT ANTERIOR DESCENDING ARTERY; SAPHENOUS VEIN - POSTERIOR DESCENDING ARTERY; SAPHENOUS VEIN - CIRCUMFLEX ARTERY) WITH ENDOSCOPIC LESSER SAPHENOUS VEIN HARVEST ON BILATERAL LOWER EXTREMITY, AORTIC VALVE REPLACEMENT WITH TISSUE HEART VALVE INSPIRIS  MARINAA  AORTIC VALVE SIZE: 25MM, AND ON CARDIOPULMONARY PUMP OXYGENATOR  (    COLECTOMY WITHOUT COLOSTOMY N/A 4/8/2025    Procedure: EXPLORATORY LAPAROTOMY WITH SMALL BOWEL RESECTION;  Surgeon: Liana Landrum MD;  Location:  OR    CTA ANGIOGRAM CORONARY ARTERY      LAPAROTOMY EXPLORATORY N/A 3/30/2025    Procedure: exploratory laparotomy with enterotomy and removal of gallstone;  Surgeon: Clau Calles MD;  Location:  OR    LAPAROTOMY EXPLORATORY N/A 4/10/2025    Procedure: EXPLORATORY LAPAROTOMY, SMALL BOWEL ANASTOMOSIS, ABDOMINAL CLOSURE;  Surgeon: Liana Landrum MD;  Location:  OR    ORTHOPEDIC SURGERY Left     elbow surgery    TRANSESOPHAGEAL ECHOCARDIOGRAM INTRAOPERATIVE N/A 4/14/2025    Procedure: Transesophageal echocardiogram intraoperative;  Surgeon: GENERIC ANESTHESIA PROVIDER;  Location:  OR       Prior to Admission Medications   Prior to Admission Medications   Prescriptions Last Dose Informant Patient Reported? Taking?   Ascorbic Acid (VITAMIN C) 500 MG CAPS 3/29/2025 Morning Self Yes Yes   Sig: Take 500 mg by mouth daily   Cyanocobalamin (VITAMIN B-12 PO) 3/29/2025 Morning Self Yes Yes   Sig: Take 1 tablet by mouth every other day. Unknown dose   MAGNESIUM PO 3/29/2025 Morning  Yes Yes   Sig: Take 500 mg by mouth daily.   Multiple Vitamins-Minerals (MENS 50+ MULTI VITAMIN/MIN PO) 3/29/2025 Morning Self Yes Yes   Sig: Take 1 tablet by mouth daily   Multiple Vitamins-Minerals (PRESERVISION AREDS 2 PO) Past Week  Yes Yes   Sig: Take by mouth daily   acetaminophen (TYLENOL) 325 MG tablet Unknown  No  Yes   Sig: Take 2 tablets (650 mg) by mouth every 4 hours as needed for other (For optimal non-opioid multimodal pain management to improve pain control.)   amoxicillin (AMOXIL) 500 MG capsule   No No   Sig: Take 4 capsules (2,000 mg) 30-60 minutes prior to your dental procedure   atorvastatin (LIPITOR) 80 MG tablet Past Week  No Yes   Sig: Take 1 tablet (80 mg) by mouth daily.   diphenhydrAMINE (BENADRYL) 50 MG tablet Unknown Self Yes Yes   Sig: Take 100 mg by mouth every 6 hours as needed for itching or allergies   fish oil-omega-3 fatty acids 1000 MG capsule Past Month  Yes Yes   Sig: Take 2 g by mouth daily   folic acid (FOLVITE) 1 MG tablet Past Month Self Yes Yes   Sig: Take 1,000 mcg by mouth 2 times daily   furosemide (LASIX) 20 MG tablet Past Week  No Yes   Sig: Take 1 tablet (20 mg) by mouth daily.   gabapentin (NEURONTIN) 800 MG tablet Past Week  Yes Yes   Sig: Take 800 mg by mouth 3 times daily as needed.   metFORMIN (GLUCOPHAGE) 500 MG tablet Past Week  No Yes   Sig: Take 0.5 tablets (250 mg) by mouth daily (with dinner) (0.5 x 500 mg = 250 mg)   metoprolol succinate ER (TOPROL XL) 25 MG 24 hr tablet Past Week  No Yes   Sig: Take 1 tablet (25 mg) by mouth every evening   tamsulosin (FLOMAX) 0.4 MG capsule Past Week Self Yes Yes   Sig: Take 0.8 mg by mouth every evening. (2 x 0.4 mg = 0.8 mg)   traMADol (ULTRAM) 50 MG tablet Past Week  Yes No   Sig: Take 50 mg by mouth 4 times daily as needed for severe pain.   vitamin D3 (CHOLECALCIFEROL) 50 mcg (2000 units) tablet Past Week Self Yes Yes   Sig: Take 1 tablet by mouth every other day      Facility-Administered Medications: None     Allergies   Allergies   Allergen Reactions    Niaspan [Niacin] Other (See Comments)     flushing       Social History   I have reviewed this patient's social history and updated it with pertinent information if needed. Richar Davis  reports that he has quit smoking. His smokeless tobacco use includes chew. He reports  that he does not currently use alcohol. He reports that he does not use drugs.    Family History   I have reviewed this patient's family history and updated it with pertinent information if needed.   History reviewed. No pertinent family history.    Review of Systems   The 10 point Review of Systems is negative other than noted in the HPI or here.     Physical Exam   Temp: 98.4  F (36.9  C) Temp src: Oral BP: 121/68 Pulse: 77   Resp: 20 SpO2: 98 % O2 Device: None (Room air)    Vital Signs with Ranges  Temp:  [98.2  F (36.8  C)-98.6  F (37  C)] 98.4  F (36.9  C)  Pulse:  [75-84] 77  Resp:  [15-20] 20  BP: (121-134)/(62-70) 121/68  SpO2:  [97 %-99 %] 98 %  298 lbs 14.4 oz         Data   Recent Labs   Lab Test 04/23/25  1051 04/23/25  0828 04/22/25 2017 04/22/25  1715 04/22/25  1220 04/21/25  1147 04/21/25  0828 04/20/25  1109 04/20/25  0808 04/19/25  1259 04/19/25  0639 04/17/25  1554 04/17/25  1341 04/17/25  0757 04/17/25  0534 04/16/25  0418 04/16/25  0303     --   --   --   --   --  141  --   --   --  142  --   --   --  143  --  145   POTASSIUM 4.2  --   --   --   --   --  3.9  --  4.0  --  3.5  --  3.6  --  3.3*  --  3.6   CHLORIDE 102  --   --   --   --   --  108*  --   --   --  111*  --   --   --  111*  --  112*   CO2 27  --   --   --   --   --  25  --   --   --  24  --   --   --  23  --  23   ANIONGAP 9  --   --   --   --   --  8  --   --   --  7  --   --   --  9  --  10   BUN 8.0  --   --   --   --   --  7.0*  --   --   --  5.6*  --   --   --  8.5  --  11.7   CR 0.65*  --   --   --   --   --  0.62*  --   --   --  0.71  --   --   --  0.70  --  0.72   * 127* 118* 111* 151*   < > 125*   < >  --    < > 124*   < >  --    < > 117*   < > 109*   HALEY 9.3  --   --   --   --   --  9.1  --   --   --  9.1  --   --   --  8.8  --  8.9    < > = values in this interval not displayed.     Recent Labs   Lab Test 04/21/25  0828 04/20/25  0808 04/19/25  0639 04/17/25  1341 04/16/25  0303 04/15/25  0759 04/14/25  0306  "04/13/25  0504 04/12/25  0435   MAG  --   --   --   --  2.1 2.2 2.2 2.1 2.4*   PHOS 2.8 2.9 2.9 2.6 2.8 2.6 2.2* 2.2* 2.2*     Recent Labs   Lab Test 04/23/25  1051 04/21/25  0828 04/19/25  0639 04/18/25  0952 04/17/25  1341 04/17/25  0534 04/16/25  0303 04/15/25  0759 04/14/25  0925 04/14/25  0306 04/13/25  0504 04/08/25  1800 04/08/25  0722 03/31/25  0809 03/30/25  1455 10/20/22  0631 09/14/22  1111   WBC  --   --   --   --   --   --  8.1 8.1 9.9 10.0 11.4*   < > 35.9*   < > 9.6   < > 6.9   HGB 10.8* 11.0* 10.7* 11.0* 11.1*   < > 10.5* 10.4* 11.7* 11.4* 10.6*   < > 15.5   < > 15.9   < > 14.8   PLT  --  242  --   --   --   --  272 267 301 281 247   < > 301   < > 199   < > 150   MCV  --   --   --   --   --   --  93 95 93 94 94   < > 93   < > 92   < > 94   NEUTROPHIL  --   --   --   --   --   --   --   --   --   --   --   --  88  --  74  --  74    < > = values in this interval not displayed.     Recent Labs   Lab Test 04/16/25  0303 04/15/25  0759 04/14/25  0306   BILITOTAL 0.3 0.3 0.4   ALKPHOS 103 103 116   ALT 22 19 14   AST 35 36 35   ALBUMIN 2.2* 2.2* 2.1*     No results found for: \"TSH\"  No results for input(s): \"CEA\" in the last 48279 hours.  Results for orders placed or performed during the hospital encounter of 03/30/25   CT Abdomen Pelvis w Contrast    Narrative    EXAM: CT ABDOMEN PELVIS W CONTRAST  LOCATION: [facility]  DATE: 3/30/2025    INDICATION: Diffuse abdominal pain, distension, vomiting, outside AXR concerning for possible obstruction  COMPARISON: 11/30/2021  TECHNIQUE: CT scan of the abdomen and pelvis was performed following injection of IV contrast. Multiplanar reformats were obtained. Dose reduction techniques were used.  CONTRAST: 135mL Isovue 370    FINDINGS:   LOWER CHEST: Normal.    HEPATOBILIARY: Cholelithiasis with contracted gallbladder, no suggestion for acute cholecystitis. However, only a single faceted stone identified within gallbladder currently while on " prior exam there were several large faceted stones.  Liver and bile ducts normal.    PANCREAS: Tiny calcifications involving pancreatic tail unchanged, no acute inflammation.    SPLEEN: Mild splenomegaly.    ADRENAL GLANDS: Normal.    KIDNEYS/BLADDER: No significant mass, stone, or hydronephrosis.    BOWEL: Small bowel obstruction present with numerous loops of fluid-filled dilated small bowel proximally and several decompressed distal ileal loops present. At the transition point there is an impacted 2.2 cm gallstone, located deep and just right of   midline projecting directly anterior of the right common iliac artery bifurcation (series 3 image 150, series 5 image 63).  Appendix and colon unremarkable.    LYMPH NODES: Normal.    VASCULATURE: Normal.    PELVIC ORGANS: Prostatomegaly. Wisp of ascites within the pelvis similar to previous exam.    MUSCULOSKELETAL: Degenerative disc and facet joint disease throughout lumbar spine with at least moderate central spinal stenosis.      Impression    IMPRESSION:   1.  Gallstone ileus. Distal small bowel obstruction secondary to an impacted 2.2 cm gallstone located just right of midline at the level of the right common iliac artery bifurcation.  2.  Cholelithiasis with contracted gallbladder but no suggestion for acute cholecystitis.  3.  Mild splenomegaly.  4.  Trace volume ascites.  5.  Prostatomegaly.  6.  Moderate central spinal stenosis at lumbar spine.   XR Abdomen 1 View    Narrative    EXAM: XR ABDOMEN 1 VIEW  LOCATION: [facility]  DATE: 4/3/2025    INDICATION: ongoing nausea after ex lap for gallstone ileus, evaluate for post op ileus.  COMPARISON: CT 3/30/2025.      Impression    IMPRESSION: There is a 2.5 cm calcified gallstone in the right upper quadrant correlating with prior CT. No other abdominal calcification. There is abnormally dilated small bowel in the left midabdomen measuring up to 4-5 cm compatible with ileus. No   free air.  Imaged portions of the lower chest show changes of prior sternotomy and aortic valve replacement. Multiple lower abdominal wall skin staples.   XR Chest Port 1 View    Narrative    EXAM: XR CHEST PORT 1 VIEW  LOCATION: [facility]  DATE: 4/5/2025    INDICATION: Fever  COMPARISON: Chest radiograph 10/31/2022      Impression    IMPRESSION: Stable size of cardiomediastinal silhouette status post median sternotomy and CABG. No definite airspace consolidation, pleural effusion or pneumothorax. No acute bony abnormality.   CT Chest/Abdomen/Pelvis w Contrast    Narrative    EXAM: CT CHEST/ABDOMEN/PELVIS W CONTRAST  LOCATION: [facility]  DATE: 4/8/2025    INDICATION: Fever, recent ex lap small bowel enterotomy, removal of gallstone 1 week ago.  COMPARISON: 3/30/2025.  TECHNIQUE: CT scan of the chest, abdomen, and pelvis was performed following injection of IV contrast. Multiplanar reformats were obtained. Dose reduction techniques were used.   CONTRAST: 135 mL Isovue-370.    FINDINGS:   LUNGS AND PLEURA: Atelectasis and scarring at the lung bases. There are a few small lung nodules bilaterally measuring up to 3 mm. These are not significantly changed since 2022 and are considered benign.    MEDIASTINUM/AXILLAE: Previous median sternotomy. Aortic valve prosthesis. The heart size is normal. No lymph node enlargement.    CORONARY ARTERY CALCIFICATION: Severe.    HEPATOBILIARY: There is again a calcification in the gallbladder fossa which may be within a contracted gallbladder.    PANCREAS: Normal.    SPLEEN: Enlarged measuring 15 cm in length.    ADRENAL GLANDS: Normal.    KIDNEYS/BLADDER: There is no hydronephrosis. Few small cortical cysts.    BOWEL: Multiple mildly dilated small bowel loops with areas of wall thickening and associated mesenteric edema. No focal obstruction. Large amount of air and food debris in the stomach. Small amount of ascites. No free intraperitoneal  gas.    LYMPH NODES: Normal.    VASCULATURE: Atherosclerotic calcification of the aorta and its branches. No aneurysm.    PELVIC ORGANS: The prostate gland is enlarged.    MUSCULOSKELETAL: Degenerative disease throughout the spine.      Impression    IMPRESSION:  1.  Dilated small bowel loops with mild wall thickening and without evidence of obstruction suggests ileus or enteritis.  2.  Small amount of ascites, increased.  3.  Stable mild splenomegaly.  4.  Prostate gland enlargement.  5.  No acute chest abnormality.   CT Abdomen Pelvis w/o Contrast    Narrative    EXAM: CT ABDOMEN PELVIS W/O CONTRAST  LOCATION: [facility]  DATE: 4/8/2025    INDICATION: Concern for acute abdomen;worsening leukocytosis, lactic acidosis, recent abd surgery, new CRISTINA, increased abdominal distension and abd pain  COMPARISON: CT 4/8/2025 at 0021 hours, CT 3/30/2025  TECHNIQUE: CT scan of the abdomen and pelvis was performed without IV contrast. Multiplanar reformats were obtained. Dose reduction techniques were used.  CONTRAST: None.    FINDINGS:   LOWER CHEST: Aortic valve replacement. Mitral annulus and coronary artery calcifications/stents. Stable prominent likely reactive pericardial and cardiophrenic recess lymph nodes. Bibasilar atelectasis.    HEPATOBILIARY: Cholelithiasis. Stable nodular liver surface contour. No biliary ductal dilatation.    PANCREAS: Atrophic.    SPLEEN: Mildly enlarged.    ADRENAL GLANDS: Normal.    KIDNEYS/BLADDER: Bilateral renal cortical cysts. No follow-up is indicated. No intra-No hydronephrosis or hydroureter. The bladder is decompressed by a Mccarty catheter.    BOWEL: Mildly increased mild to moderate gastric and small bowel distention. No discrete transition is identified. Normal caliber colon. Slightly increased small volume complex ascites. Diffuse mesenteric edema. No free air and no pneumatosis.     LYMPH NODES: Stable prominent likely reactive mesenteric and retroperitoneal  lymph nodes.    VASCULATURE: Moderate aortoiliac atherosclerosis. No evidence for portal or mesenteric venous gas.    PELVIC ORGANS: Moderate to severe prostatomegaly. No pelvic free fluid.    MUSCULOSKELETAL: Spinal and pelvic degenerative changes.      Impression    IMPRESSION:   1.  Mildly increased gastric and small bowel distention which may reflect an ileus or small bowel obstruction. Follow-up is recommended.  2.  Slightly increased small volume complex ascites.     XR Chest Port 1 View    Narrative    EXAM: XR CHEST PORT 1 VIEW  LOCATION: [facility]  DATE: 4/8/2025    INDICATION: ETT placement verification    COMPARISON: 4/5/2025.      Impression    IMPRESSION: Endotracheal tube in the upper trachea terminating 6-7 cm above the sona. Right IJ line in the upper SVC. Nasogastric tube tip in the stomach. Prior sternotomy CABG procedure. Mild atelectasis at the left base. Otherwise clear. No   pneumothorax. No pleural effusion.   XR Abdomen Port 1 View    Narrative    EXAM: XR ABDOMEN PORT 1 VIEW  LOCATION: [facility]  DATE: 4/8/2025    INDICATION: Gastric tube placement verification.  COMPARISON: None.      Impression    IMPRESSION: Nasogastric tube tip in proximal stomach with side port in distal esophagus. Recommend advancing an additional 5 to 10 cm. Essentially gasless upper abdomen, limiting assessment of the known SBO/ileus described on recent CT.   XR Chest Port 1 View    Narrative    EXAM: XR CHEST PORT 1 VIEW  LOCATION: [facility]  DATE: 4/9/2025    INDICATION: ett placement  COMPARISON: X-ray April 8, 2025.      Impression    IMPRESSION: Endotracheal tube tip about 4.5 cm above the sona. Nasogastric tube and right IJ central line are also noted. Otherwise negative chest x-ray.   XR Abdomen Port 1 View    Narrative    EXAM: XR ABDOMEN PORT 1 VIEW  LOCATION: [facility]  DATE:  4/9/2025    INDICATION: ng tube placement  COMPARISON: None.      Impression    IMPRESSION: Nasogastric tube proximal port lies about 7 cm below the left hemidiaphragm. No abnormal bowel is evident.   XR Abdomen Port 1 View    Narrative    EXAM: XR ABDOMEN PORT 1 VIEW  LOCATION: [facility]  DATE: 4/10/2025    INDICATION: Intraoperative   COMPARISON: None.      Impression    IMPRESSION: NG tube tip and sidehole in the stomach. Left upper quadrant surgical clip. Large gallstone. Moderate degenerative change thoracic spine. No abnormal radiopaque retained foreign objects.    XR Chest Port 1 View    Narrative    EXAM: XR CHEST PORT 1 VIEW  LOCATION: [facility]  DATE: 4/14/2025    INDICATION: Tachypnea and a flutter.  COMPARISON: None.      Impression    IMPRESSION: Sternotomy with mediastinal clips and markers. NG tube enters the stomach. Heart size within normal limits. Mild pulmonary vascular prominence. Mild elevation left hemidiaphragm. No focal lung infiltrates.   CT Chest/Abdomen/Pelvis w Contrast    Narrative    EXAM: CT CHEST/ABDOMEN/PELVIS W CONTRAST  LOCATION: [facility]  DATE: 4/14/2025    INDICATION: RRT   re eval abd s p bowel resection, sepsis w  new HD changes  COMPARISON: CT CAP 4/8/2025.  TECHNIQUE: CT scan of the chest, abdomen, and pelvis was performed following injection of IV contrast. Multiplanar reformats were obtained. Dose reduction techniques were used.   CONTRAST: 135  ml Isovue 370    FINDINGS:   LUNGS AND PLEURA: There are new small bilateral pleural effusions with overlying atelectasis/consolidation.    MEDIASTINUM/AXILLAE: Normal heart size without pericardial effusion. Aortic valve replacement. No thoracic adenopathy.    CORONARY ARTERY CALCIFICATION: Previous intervention (CABG).    HEPATOBILIARY: Unchanged appearance of a large calcification in the gallbladder fossa which likely represents a gallstone within  the contracted gallbladder. The duodenum is inseparable from the contracted gallbladder compatible with fistulization in this   patient with a recent gallstone ileus. Pneumobilia. No biliary ductal dilatation. No focal liver lesion.    PANCREAS: Normal.    SPLEEN: Similar splenomegaly.    ADRENAL GLANDS: Normal.    KIDNEYS/BLADDER: Symmetric renal enhancement. No hydronephrosis. Unchanged left renal cyst and too small to characterize hypoattenuating lesions bilaterally, not requiring specific follow-up. Bladder is decompressed with a Mccarty catheter.    BOWEL: Postsurgical changes of partial small bowel resection. No evidence of bowel obstruction. Peripherally enhancing fluid collection in the left paracolic gutter measuring 10.2 x 4.3 x 11.5 cm (series 3, image 202). Additional small volume   peripherally enhancing perihepatic fluid and fluid in the right paracolic gutter. There is an ill-defined fluid and gas collection in the right ventral abdomen with mild peripheral enhancement measuring 3.0 x 11.1 x 7.6 cm (series 3, image 243).   Additional small volume nonorganized fluid in the abdomen and pelvis. Small volume pneumoperitoneum. Inflammatory changes of the omentum. Enteric tube terminates in the mid stomach.    LYMPH NODES: No lymphadenopathy by CT size criteria.    VASCULATURE: Moderate burden of vascular calcifications without abdominal aortic aneurysm.    PELVIC ORGANS: Marked prostatomegaly.    MUSCULOSKELETAL: Postsurgical changes of the ventral abdominal wall with soft tissue dehiscence. Body wall edema. Multilevel degenerative changes of the spine.      Impression    IMPRESSION:  1.  There are multiple peripherally enhancing fluid collections in the abdomen which are suspicious for developing abscesses. The largest of these is in the left paracolic gutter.   2.  Small volume pneumoperitoneum which is favored postsurgical.  3.  Stable appearance of a contracted gallbladder containing a large gallstone  which is fistulized to the duodenum with associated pneumobilia.  4.  New small bilateral pleural effusions with overlying atelectasis/consolidation.       CT Abdomen Peritoneum Abscess Drain w Cath Place    Narrative    EXAM:  1. PERCUTANEOUS DRAIN PLACEMENT RIGHT ABDOMINAL DRAIN  2. CT GUIDANCE  3. CONSCIOUS SEDATION  LOCATION: [facility]  DATE: 4/15/2025    INDICATION: ventral fluid collection  TECHNIQUE: Dose reduction techniques were used.    PROCEDURE: Informed consent obtained. Site marked. Prior images reviewed. Required items made available. Patient identity confirmed verbally and with arm band. Patient reevaluated immediately before administering sedation. Universal protocol was   followed. Time out performed. The site was prepped and draped in sterile fashion. 10 mL of 1% lidocaine was infused into the local soft tissues. Using standard technique and under direct CT guidance, a 8 Finnish drainage catheter was inserted into the   fluid collection.      SPECIMEN: 5 mL of serosanguineous fluid was aspirated and sent to lab for cultures and Gram stain.    BLOOD LOSS: Minimal.    The patient tolerated the procedure well. No immediate complications.    SEDATION: Versed 1 mg. Fentanyl 50 mcg. The procedure was performed with administration intravenous conscious sedation with appropriate preoperative, intraoperative, and postoperative evaluation.    15 minutes of supervised face to face conscious sedation time was provided by a radiology nurse under my direct supervision.      Impression    IMPRESSION:  1.  Successful CT-guided drain placement into right abdominal collection.    Reference CPT Codes: 20843, 54792   XR Abdomen Port 1 View    Narrative    EXAM: XR ABDOMEN PORT 1 VIEW  LOCATION: [facility]  DATE: 4/14/2025    INDICATION: NG placement verification  COMPARISON: None.      Impression    IMPRESSION: Enteric tube courses below the diaphragm with the  distal tip overlying the expected position of the gastric body.   CT Abdomen Peritoneum Abscess Drain w Cath Place    Narrative    EXAM:  1. PERCUTANEOUS DRAIN PLACEMENT LEFT ABDOMINAL COLLECTION  2. CT GUIDANCE  3. CONSCIOUS SEDATION  LOCATION: [facility]  DATE: 4/15/2025    INDICATION: Left sided abscess drain  TECHNIQUE: Dose reduction techniques were used.    PROCEDURE: Informed consent obtained. Site marked. Prior images reviewed. Required items made available. Patient identity confirmed verbally and with arm band. Patient reevaluated immediately before administering sedation. Universal protocol was   followed. Time out performed. The site was prepped and draped in sterile fashion. 10 mL of 1% lidocaine was infused into the local soft tissues. Using standard technique and under direct CT guidance, a 10 Greenlandic drainage catheter was inserted into the   fluid collection.      SPECIMEN: 10 mL of yellow fluid was aspirated and sent to lab for cultures and Gram stain.    BLOOD LOSS: Minimal.    The patient tolerated the procedure well. No immediate complications.    SEDATION: Versed 1 mg. Fentanyl 50 mcg. The procedure was performed with administration intravenous conscious sedation with appropriate preoperative, intraoperative, and postoperative evaluation.    The minutes of supervised face to face conscious sedation time was provided by a radiology nurse under my direct supervision.      Impression    IMPRESSION:  1.  Successful CT-guided drain placement into left abdominal collection.    Reference CPT Codes: 02241, 64737   US Lower Extremity Venous Duplex Bilateral    Narrative    EXAM: US LOWER EXTREMITY VENOUS DUPLEX BILATERAL  LOCATION: [facility]  DATE: 4/15/2025    INDICATION: Rule out DVT  patient with atrial fibrillation, postoperative.  Poor mobility.  COMPARISON: None.  TECHNIQUE: Venous Duplex ultrasound of bilateral lower extremities with and without  compression, augmentation and duplex. Color flow and spectral Doppler with waveform analysis performed.    FINDINGS: Exam includes the common femoral, femoral, popliteal veins as well as segmentally visualized deep calf veins and greater saphenous vein.     RIGHT: No deep vein thrombosis. No superficial thrombophlebitis. No popliteal cyst.    LEFT: No deep vein thrombosis. No superficial thrombophlebitis. No popliteal cyst.      Impression    IMPRESSION:  1.  No deep venous thrombosis in the bilateral lower extremities.   CT Head w/o Contrast    Narrative    EXAM: CT HEAD W/O CONTRAST  LOCATION: [facility]  DATE: 4/17/2025    INDICATION: Postoperative encephalopathy; on Heparin.  COMPARISON: None.  TECHNIQUE: Routine CT Head without IV contrast. Multiplanar reformats. Dose reduction techniques were used.    FINDINGS:  INTRACRANIAL CONTENTS: No intracranial hemorrhage, extraaxial collection, or mass effect. No CT evidence of acute infarct. Mild presumed chronic small vessel ischemic changes. Mild generalized volume loss. No hydrocephalus.     VISUALIZED ORBITS/SINUSES/MASTOIDS: No intraorbital abnormality. No paranasal sinus mucosal disease. No middle ear or mastoid effusion.    BONES/SOFT TISSUES: No acute abnormality.      Impression    IMPRESSION:  1.  No CT evidence for acute intracranial process.  2.  Brain atrophy and presumed chronic microvascular ischemic changes as above.   CT Sinogram Injection    Narrative    CT SINOGRAM INJECTION  4/18/2025 1:12 PM CDT     HISTORY:  ; H/o R and L abd drains placed 4/15, no output from L drain for a couple days, evaluate drains and fluid resolution;       COMPARISON: CT of the abdomen pelvis dated 4/14/2025    TECHNIQUE: CT of the lower abdomen/pelvis was performed without contrast and following the administration of contrast through a right lower quadrant drain. Radiation lowering techniques utilized.    FINDINGS: There is a drain in the left  lower abdomen. This has pulled out of the abdominal cavity and now lies in the subcutaneous tissues.    There is a drain in the right lower quadrant. On the noncontrast enhanced exam, there is no significant residual fluid about the pigtail of this catheter. Post administration of contrast, there is filling of a small collection cavity but no evidence for   fistulous communication to adjacent bowel.    There remains a mild amount of free fluid in the left abdomen and in the midline lower abdomen adjacent to loops of small bowel.    Again noted are scattered colonic diverticuli.    Again noted is increased nodularity/density in the mesenteric fat of the upper abdomen bilaterally suggestive of peritoneal carcinomatosis.    Again noted is a gallstone.    There is an anterior abdominal incision with packing material.    There are multilevel degenerative changes in the lumbar spine.      Impression    IMPRESSION:  1.  The left lower quadrant drain has pulled out of the abdominal cavity and now lies in the subcutaneous tissues. This can be removed.  2.  The right lower quadrant drain has no significant residual fluid about the pigtail of this catheter. This can also be removed.  3.  Mild amount of residual free fluid in the left abdomen and in the midline lower abdomen.  4.  Increased nodularity/density in the mesenteric fat of the upper abdomen bilaterally suggestive of peritoneal carcinomatosis.  5.  Gallstone.       EP Cardioversion External    Narrative    Laurie Capps MD     4/7/2025  2:31 PM  [facility]    Procedure: EP Cardioversion External    Date/Time: 4/7/2025 2:29 PM    Performed by: Laurie Capps MD  Authorized by: Fahad Wei MD      UNIVERSAL PROTOCOL   Site Marked: Yes  Prior Images Obtained and Reviewed:  Yes  Required items: Required blood products, implants, devices and special   equipment available    Patient identity confirmed:  Verbally with patient  Patient  was reevaluated immediately before administering moderate or deep   sedation or anesthesia  Confirmation Checklist:  Patient's identity using two indicators  Time out: Immediately prior to the procedure a time out was called    Universal Protocol: the Joint Commission Universal Protocol was followed       ANESTHESIA    Anesthesia was administered and monitored by anesthesiology.  See   anesthesia documentation for details.    SEDATION  Patient Sedated: Yes    Sedation:  Propofol  Vital signs: Vital signs monitored during sedation      PROCEDURE DETAILS  Cardioversion basis: elective  Pre-procedure rhythm: atrial flutter  Patient position: patient was placed in a supine position  Chest area: chest area exposed  Electrodes: pads  Electrodes placed: anterior-posterior  Number of attempts: 1  Post-procedure rhythm: normal sinus rhythm  Complications: no complications      PROCEDURE  Describe Procedure: Consent was obtained. Patient was anticoagulated with   heparin infusion, confirmed within therapeutic range. Pads placed in AP   position. Single shock of synchronized cardioversion utilizing 200 J   applied which successfully converted rhythm from atrial flutter into   normal sinus rhythm. Sedation was provided by anesthesia team. No   immediate complications.   Patient Tolerance:  Patient tolerated the procedure well with no immediate   complications   Cardioversion    Narrative    Dangelo Johnson MD     4/14/2025  2:46 PM  [facility]    Procedure: Cardioversion    Date/Time: 4/14/2025 2:44 PM    Performed by: Dangelo Johnson MD  Authorized by: Richa Schmidt PA-C      UNIVERSAL PROTOCOL   Site Marked: NA  Prior Images Obtained and Reviewed:  Yes  Required items: Required blood products, implants, devices and special   equipment available    Patient identity confirmed:  Verbally with patient, provided demographic   data, hospital-assigned identification number, arm band and  anonymous   protocol, patient vented/unresponsive  Patient was reevaluated immediately before administering moderate or deep   sedation or anesthesia  Confirmation Checklist:  Patient's identity using two indicators, relevant   allergies, procedure was appropriate and matched the consent or emergent   situation and correct equipment/implants were available  Time out: Immediately prior to the procedure a time out was called    Universal Protocol: the Joint Commission Universal Protocol was followed    Preparation: Patient was prepped and draped in usual sterile fashion      SEDATION  Patient Sedated: Yes    Sedation Type:  Moderate (conscious) sedation  Vital signs: Vital signs monitored during sedation      PROCEDURE DETAILS  Cardioversion basis: elective  Pre-procedure rhythm: atrial fibrillation  Patient position: patient was placed in a supine position  Chest area: chest area exposed  Electrodes: pads  Electrodes placed: anterior-posterior  Number of attempts: 1    Details of Attempts:  Single synchronized shock of 200J, accompanied by   chest compression, restored NSR.  Pt tolerated procedure well.  Post-procedure rhythm: normal sinus rhythm  Complications: no complications      PROCEDURE    Patient Tolerance:  Patient tolerated the procedure well with no immediate   complications  Length of time physician/provider present for 1:1 monitoring during   sedation: 45   Echocardiogram Complete     Value    LVEF  60-65%    Narrative    [id number]  [id number]  [id number]  [id number]^JACQUIE^JORGE^BC     [facility]  Echocardiography Laboratory  [address] [place] [address]     Name: KANNAN LORD  MRN: [id number]  : 1955  Study Date: 2025 08:18 AM  Age: 69 yrs  Gender: Male  Patient Location: [facility]  Reason For Study: Atrial Fibrillation  Ordering Physician: JORGE DHILLON  Performed By: Wolf Gaxiola     BSA: 2.5 m2  Height: 72 in  Weight: 287 lb  HR: 129  BP: 125/78  mmHg  ______________________________________________________________________________  Procedure  Echocardiogram with two-dimensional, color and spectral Doppler. Definity (NDC  #61000-434) given intravenously.  ______________________________________________________________________________  Interpretation Summary     Technically difficult imaging  The rhythm was atrial fibrillation.  Left ventricular systolic function is normal.  The visual ejection fraction is 60-65%.  The left ventricle is normal in size.  The right ventricle is normal in structure, function and size.  Doppler interrogation does not demonstrate significant stenosis or  insufficiency involving cardiica valves . No change since 12/10/2024  ______________________________________________________________________________  Left Ventricle  The left ventricle is normal in size. There is normal left ventricular wall  thickness. Left ventricular systolic function is normal. The visual ejection  fraction is 60-65%. Left ventricular diastolic function is indeterminate. No  regional wall motion abnormalities noted. There is no thrombus seen in the  left ventricle.     Right Ventricle  The right ventricle is normal in structure, function and size. There is no  mass or thrombus in the right ventricle.     Atria  Normal left atrial size. Right atrial size is normal. There is no atrial shunt  seen. The left atrial appendage is not well visualized.     Mitral Valve  The mitral valve is normal in structure and function. There is no mitral  regurgitation noted. There is no mitral valve stenosis.     Tricuspid Valve  Normal tricuspid valve. No tricuspid regurgitation. Right ventricular systolic  pressure could not be approximated due to inadequate tricuspid regurgitation.  There is no tricuspid stenosis.     Aortic Valve  The aortic valve is trileaflet. No aortic regurgitation is present. No aortic  stenosis is present.     Pulmonic Valve  The pulmonic valve is not well  visualized. There is no pulmonic valvular  regurgitation. There is no pulmonic valvular stenosis.     Vessels  The aortic root is normal size. Normal size ascending aorta. The inferior vena  cava is normal. The pulmonary artery is normal size.     Pericardium  The pericardium appears normal. There is no pleural effusion.     Rhythm  The rhythm was atrial fibrillation.  ______________________________________________________________________________  MMode/2D Measurements & Calculations     IVSd: 1.0 cm  LVIDd: 4.4 cm  LVIDs: 3.5 cm  LVPWd: 1.1 cm  FS: 21.3 %  LV mass(C)d: 158.2 grams  LV mass(C)dI: 63.7 grams/m2  asc Aorta Diam: 2.9 cm  LVOT diam: 2.0 cm  LVOT area: 3.1 cm2  Asc Ao diam index BSA (cm/m2): 1.1  Asc Ao diam index Ht(cm/m): 1.6  LA Volume (BP): 31.6 ml  LA Volume Index (BP): 12.7 ml/m2     RWT: 0.50     Doppler Measurements & Calculations  MV E max shola: 131.3 cm/sec  MV dec slope: 1106 cm/sec2  MV dec time: 0.12 sec  Ao V2 max: 199.7 cm/sec  Ao max P.0 mmHg  Ao V2 mean: 141.3 cm/sec  Ao mean P.3 mmHg  Ao V2 VTI: 26.7 cm  AKILAH(I,D): 1.7 cm2  AKILAH(V,D): 1.5 cm2  LV V1 max PG: 3.5 mmHg  LV V1 max: 93.0 cm/sec  LV V1 VTI: 14.8 cm  SV(LVOT): 46.4 ml  SI(LVOT): 18.7 ml/m2  PA acc time: 0.06 sec  AV Shola Ratio (DI): 0.47  AKILAH Index (cm2/m2): 0.70  E/E' avg: 10.8  Lateral E/e': 7.7  Medial E/e': 13.9  RV S Shola: 7.1 cm/sec     ______________________________________________________________________________  Report approved by: Dr. Andres Smith on 2025 10:28 AM         Echocardiogram Complete     Value    LVEF  55%    Narrative    [id number]  [id number]  [id number]  [id number]^SELENE^ITZEL^MORE     [facility]  Echocardiography Laboratory  [address] [place] [address]     Name: KANNAN LORD  MRN: [id number]  : 1955  Study Date: 04/15/2025 12:15 PM  Age: 69 yrs  Gender: Male  Patient Location: [facility]  Reason For Study: Atrial Flutter  Ordering Physician: ITZEL MORTON  MORE  Performed By: Christel Wise ANIYAH     BSA: 2.6 m2  Height: 72 in  Weight: 319 lb  HR: 76  BP: 112/74 mmHg  ______________________________________________________________________________  Procedure  Echocardiogram with two-dimensional, color and spectral Doppler. Definity (NDC  #16240-492) given intravenously.  ______________________________________________________________________________  Interpretation Summary     1. The left ventricle is normal in structure, function and size. The visual  ejection fraction is estimated at 55%.  2. The right ventricle is normal size. Limited views of RV, may have mild  dysfunction.  3. No valve disease.     No changes from echo 4-6-25.  ______________________________________________________________________________  Left Ventricle  The left ventricle is normal in structure, function and size. There is normal  left ventricular wall thickness. The visual ejection fraction is estimated at  55%. Regional wall motion abnormalities cannot be excluded due to limited  visualization.     Right Ventricle  The right ventricle is normal size. Limited views of RV, may have mild  dysfunction.     Atria  Normal left atrial size. Right atrial size is normal. There is no atrial shunt  seen.     Mitral Valve  There is trace to mild mitral regurgitation.     Tricuspid Valve  No tricuspid regurgitation.     Aortic Valve  There is mild trileaflet aortic sclerosis.     Pulmonic Valve  The pulmonic valve is not well visualized.     Vessels  Normal ascending, transverse (arch), and descending aorta. Mild IVC dilation  with reduced collapse.     Pericardium  There is no pericardial effusion.     Rhythm  Sinus rhythm was noted.  ______________________________________________________________________________  MMode/2D Measurements & Calculations     IVSd: 0.89 cm  LVIDd: 4.5 cm  LVIDs: 3.4 cm  LVPWd: 1.1 cm  FS: 24.7 %  LV mass(C)d: 153.9 grams  LV mass(C)dI: 59.2 grams/m2  Ao root diam: 3.0 cm  asc  Aorta Diam: 3.5 cm  LVOT diam: 1.9 cm  LVOT area: 2.9 cm2  Ao root diam index Ht(cm/m): 1.7  Ao root diam index BSA (cm/m2): 1.2  Asc Ao diam index BSA (cm/m2): 1.3  Asc Ao diam index Ht(cm/m): 1.9  RV Base: 3.3 cm  RWT: 0.48  TAPSE: 1.4 cm     Doppler Measurements & Calculations  MV E max shola: 143.0 cm/sec  MV A max shola: 85.7 cm/sec  MV E/A: 1.7  MV max P.9 mmHg  MV mean P.6 mmHg  MV V2 VTI: 38.5 cm  MVA(VTI): 2.2 cm2  MV dec time: 0.18 sec  Ao V2 max: 214.8 cm/sec  Ao max P.5 mmHg  Ao V2 mean: 139.2 cm/sec  Ao mean P.3 mmHg  Ao V2 VTI: 43.1 cm  AKILAH(I,D): 1.9 cm2  AKILAH(V,D): 1.8 cm2  LV V1 max P.3 mmHg  LV V1 max: 134.8 cm/sec  LV V1 VTI: 28.6 cm  SV(LVOT): 82.9 ml  SI(LVOT): 31.9 ml/m2  AV Shola Ratio (DI): 0.63  AKILAH Index (cm2/m2): 0.74  E/E' av.2  Lateral E/e': 14.4  Medial E/e': 19.9  RV S Shola: 7.4 cm/sec     ______________________________________________________________________________  Report approved by: Delio Dougherty MD on 04/15/2025 01:10 PM

## 2025-04-23 NOTE — PROGRESS NOTES
4/23 1150-Requested to remove the R abdominal drain from surgery was made. Culture negative. Most likely ascites.     After explaining the procedure the drain was removed intact without pain or complications. Gauze and tape placed over the site.     Richar was mumbling at times and also very sleepy. Discussed drain removal with wife when she came into room to visit.     Total time: 20 minutes     ThanksFloresita [facility] Inter[facility] CNP ([phone]) (phone [phone])

## 2025-04-23 NOTE — PLAN OF CARE
Goal Outcome Evaluation:      Plan of Care Reviewed With: patient    Overall Patient Progress: improvingOverall Patient Progress: improving         Date & Time: 4/23/2025  [id number]  Surgery/POD#:   3/30: enterotomy and removal gallstone  4/4 new A-flutter  4/7: cardioverted into SR  4/8 Bowel resection  4/10 Bowel resection and wound closure   4/14: A-flutter, cardioverted into SR  4/15 Drain placed for abd abscess     Behavior & Aggression: Green, intermittent confusing.   Fall Risk: Yes  Orientation:Aox3-4 (time) , intermittent confused but redirectable   ABNL VS/O2:VSS on RA  ABNL Labs: See labs   Pain Management: prabhakar gabapentin  Bowel/Bladder: Inc Bladder/BM, loose stool Bmx2, external catheter in place.   Drains: TERRIE drain x1. Wound vac on midline abd inc. CDI  Wounds/incisions: midline inc, TERRIE site, red/fredrick  Diet: Mod Carb  Activities: Ax2 Clau Steady  Number of times OUT OF BED this shift: 0,  repo q2 completed   Tests/Procedures: N/A  Anticipated  DC Date: Penidng   Other information: refused using IS, PCDs. Pt able to reposition independently but need encouragements.

## 2025-04-24 ENCOUNTER — APPOINTMENT (OUTPATIENT)
Dept: CT IMAGING | Facility: CLINIC | Age: 70
DRG: 329 | End: 2025-04-24
Attending: HOSPITALIST
Payer: COMMERCIAL

## 2025-04-24 VITALS
HEIGHT: 72 IN | TEMPERATURE: 98.1 F | RESPIRATION RATE: 18 BRPM | DIASTOLIC BLOOD PRESSURE: 67 MMHG | SYSTOLIC BLOOD PRESSURE: 123 MMHG | HEART RATE: 90 BPM | WEIGHT: 298.9 LBS | OXYGEN SATURATION: 97 % | BODY MASS INDEX: 40.48 KG/M2

## 2025-04-24 LAB
ALBUMIN SERPL BCG-MCNC: 2.6 G/DL (ref 3.5–5.2)
ALP SERPL-CCNC: 88 U/L (ref 40–150)
ALT SERPL W P-5'-P-CCNC: 18 U/L (ref 0–70)
ANION GAP SERPL CALCULATED.3IONS-SCNC: 11 MMOL/L (ref 7–15)
AST SERPL W P-5'-P-CCNC: 24 U/L (ref 0–45)
BILIRUB DIRECT SERPL-MCNC: 0.12 MG/DL (ref 0–0.3)
BILIRUB SERPL-MCNC: 0.3 MG/DL
BUN SERPL-MCNC: 9.4 MG/DL (ref 8–23)
CALCIUM SERPL-MCNC: 9.7 MG/DL (ref 8.8–10.4)
CEA SERPL-MCNC: 2.3 NG/ML
CHLORIDE SERPL-SCNC: 105 MMOL/L (ref 98–107)
CREAT SERPL-MCNC: 0.63 MG/DL (ref 0.67–1.17)
EGFRCR SERPLBLD CKD-EPI 2021: >90 ML/MIN/1.73M2
GLUCOSE BLDC GLUCOMTR-MCNC: 128 MG/DL (ref 70–99)
GLUCOSE BLDC GLUCOMTR-MCNC: 130 MG/DL (ref 70–99)
GLUCOSE BLDC GLUCOMTR-MCNC: 131 MG/DL (ref 70–99)
GLUCOSE BLDC GLUCOMTR-MCNC: 137 MG/DL (ref 70–99)
GLUCOSE SERPL-MCNC: 128 MG/DL (ref 70–99)
HCO3 SERPL-SCNC: 24 MMOL/L (ref 22–29)
HGB BLD-MCNC: 11.9 G/DL (ref 13.3–17.7)
HGB BLD-MCNC: 8.9 G/DL (ref 13.3–17.7)
NT-PROBNP SERPL-MCNC: 875 PG/ML (ref 0–900)
POTASSIUM SERPL-SCNC: 4.6 MMOL/L (ref 3.4–5.3)
POTASSIUM SERPL-SCNC: 5.5 MMOL/L (ref 3.4–5.3)
PROT SERPL-MCNC: 6.6 G/DL (ref 6.4–8.3)
SODIUM SERPL-SCNC: 140 MMOL/L (ref 135–145)
WBC # BLD AUTO: 6.6 10E3/UL (ref 4–11)

## 2025-04-24 PROCEDURE — 84155 ASSAY OF PROTEIN SERUM: CPT | Performed by: HOSPITALIST

## 2025-04-24 PROCEDURE — 85018 HEMOGLOBIN: CPT | Performed by: HOSPITALIST

## 2025-04-24 PROCEDURE — 84132 ASSAY OF SERUM POTASSIUM: CPT | Performed by: HOSPITALIST

## 2025-04-24 PROCEDURE — 250N000011 HC RX IP 250 OP 636: Performed by: HOSPITALIST

## 2025-04-24 PROCEDURE — 250N000013 HC RX MED GY IP 250 OP 250 PS 637

## 2025-04-24 PROCEDURE — 80048 BASIC METABOLIC PNL TOTAL CA: CPT | Performed by: HOSPITALIST

## 2025-04-24 PROCEDURE — 83880 ASSAY OF NATRIURETIC PEPTIDE: CPT | Performed by: HOSPITALIST

## 2025-04-24 PROCEDURE — 99233 SBSQ HOSP IP/OBS HIGH 50: CPT | Performed by: HOSPITALIST

## 2025-04-24 PROCEDURE — 999N000040 HC STATISTIC CONSULT NO CHARGE VASC ACCESS

## 2025-04-24 PROCEDURE — 36415 COLL VENOUS BLD VENIPUNCTURE: CPT | Performed by: HOSPITALIST

## 2025-04-24 PROCEDURE — 71260 CT THORAX DX C+: CPT

## 2025-04-24 PROCEDURE — 250N000013 HC RX MED GY IP 250 OP 250 PS 637: Performed by: STUDENT IN AN ORGANIZED HEALTH CARE EDUCATION/TRAINING PROGRAM

## 2025-04-24 PROCEDURE — 250N000013 HC RX MED GY IP 250 OP 250 PS 637: Performed by: PHYSICIAN ASSISTANT

## 2025-04-24 PROCEDURE — 250N000013 HC RX MED GY IP 250 OP 250 PS 637: Performed by: HOSPITALIST

## 2025-04-24 PROCEDURE — 120N000001 HC R&B MED SURG/OB

## 2025-04-24 PROCEDURE — 250N000009 HC RX 250: Performed by: HOSPITALIST

## 2025-04-24 PROCEDURE — 999N000127 HC STATISTIC PERIPHERAL IV START W US GUIDANCE

## 2025-04-24 PROCEDURE — 85048 AUTOMATED LEUKOCYTE COUNT: CPT | Performed by: HOSPITALIST

## 2025-04-24 PROCEDURE — 250N000013 HC RX MED GY IP 250 OP 250 PS 637: Performed by: NURSE PRACTITIONER

## 2025-04-24 RX ORDER — FUROSEMIDE 10 MG/ML
20 INJECTION INTRAMUSCULAR; INTRAVENOUS ONCE
Status: COMPLETED | OUTPATIENT
Start: 2025-04-24 | End: 2025-04-24

## 2025-04-24 RX ORDER — FUROSEMIDE 10 MG/ML
20 INJECTION INTRAMUSCULAR; INTRAVENOUS ONCE
Status: DISCONTINUED | OUTPATIENT
Start: 2025-04-24 | End: 2025-04-24

## 2025-04-24 RX ORDER — IOPAMIDOL 755 MG/ML
135 INJECTION, SOLUTION INTRAVASCULAR ONCE
Status: COMPLETED | OUTPATIENT
Start: 2025-04-24 | End: 2025-04-24

## 2025-04-24 RX ORDER — FUROSEMIDE 10 MG/ML
20 INJECTION INTRAMUSCULAR; INTRAVENOUS DAILY
Status: DISCONTINUED | OUTPATIENT
Start: 2025-04-25 | End: 2025-04-24

## 2025-04-24 RX ORDER — FUROSEMIDE 10 MG/ML
20 INJECTION INTRAMUSCULAR; INTRAVENOUS DAILY
Status: DISCONTINUED | OUTPATIENT
Start: 2025-04-25 | End: 2025-04-25

## 2025-04-24 RX ADMIN — METOPROLOL TARTRATE 25 MG: 25 TABLET, FILM COATED ORAL at 20:39

## 2025-04-24 RX ADMIN — IOPAMIDOL 135 ML: 755 INJECTION, SOLUTION INTRAVENOUS at 12:51

## 2025-04-24 RX ADMIN — AMIODARONE HYDROCHLORIDE 200 MG: 200 TABLET ORAL at 10:39

## 2025-04-24 RX ADMIN — ACETAMINOPHEN 650 MG: 325 TABLET, FILM COATED ORAL at 10:39

## 2025-04-24 RX ADMIN — TRAMADOL HYDROCHLORIDE 25 MG: 50 TABLET, FILM COATED ORAL at 15:43

## 2025-04-24 RX ADMIN — FUROSEMIDE 20 MG: 10 INJECTION, SOLUTION INTRAVENOUS at 13:14

## 2025-04-24 RX ADMIN — TRAMADOL HYDROCHLORIDE 25 MG: 50 TABLET, FILM COATED ORAL at 16:12

## 2025-04-24 RX ADMIN — SODIUM CHLORIDE 79 ML: 9 INJECTION, SOLUTION INTRAVENOUS at 12:51

## 2025-04-24 RX ADMIN — PANTOPRAZOLE SODIUM 40 MG: 20 TABLET, DELAYED RELEASE ORAL at 06:21

## 2025-04-24 RX ADMIN — NICOTINE 1 PATCH: 21 PATCH, EXTENDED RELEASE TRANSDERMAL at 01:22

## 2025-04-24 RX ADMIN — METOPROLOL TARTRATE 25 MG: 25 TABLET, FILM COATED ORAL at 10:40

## 2025-04-24 RX ADMIN — POLYETHYLENE GLYCOL 3350 17 G: 17 POWDER, FOR SOLUTION ORAL at 10:38

## 2025-04-24 RX ADMIN — APIXABAN 5 MG: 5 TABLET, FILM COATED ORAL at 10:40

## 2025-04-24 RX ADMIN — GABAPENTIN 100 MG: 100 CAPSULE ORAL at 10:39

## 2025-04-24 RX ADMIN — QUETIAPINE FUMARATE 25 MG: 25 TABLET ORAL at 17:21

## 2025-04-24 RX ADMIN — APIXABAN 5 MG: 5 TABLET, FILM COATED ORAL at 20:39

## 2025-04-24 RX ADMIN — AMIODARONE HYDROCHLORIDE 200 MG: 200 TABLET ORAL at 20:39

## 2025-04-24 RX ADMIN — SENNOSIDES AND DOCUSATE SODIUM 1 TABLET: 50; 8.6 TABLET ORAL at 10:39

## 2025-04-24 RX ADMIN — GABAPENTIN 100 MG: 100 CAPSULE ORAL at 20:39

## 2025-04-24 RX ADMIN — ATORVASTATIN CALCIUM 20 MG: 20 TABLET, FILM COATED ORAL at 20:39

## 2025-04-24 RX ADMIN — NICOTINE 1 PATCH: 21 PATCH, EXTENDED RELEASE TRANSDERMAL at 10:39

## 2025-04-24 RX ADMIN — TAMSULOSIN HYDROCHLORIDE 0.8 MG: 0.4 CAPSULE ORAL at 20:39

## 2025-04-24 ASSESSMENT — ACTIVITIES OF DAILY LIVING (ADL)
ADLS_ACUITY_SCORE: 58
ADLS_ACUITY_SCORE: 62
ADLS_ACUITY_SCORE: 63
ADLS_ACUITY_SCORE: 63
ADLS_ACUITY_SCORE: 58
ADLS_ACUITY_SCORE: 63
ADLS_ACUITY_SCORE: 58
ADLS_ACUITY_SCORE: 62
ADLS_ACUITY_SCORE: 63
ADLS_ACUITY_SCORE: 58
ADLS_ACUITY_SCORE: 57
ADLS_ACUITY_SCORE: 58
ADLS_ACUITY_SCORE: 58
ADLS_ACUITY_SCORE: 63
ADLS_ACUITY_SCORE: 62
ADLS_ACUITY_SCORE: 58

## 2025-04-24 NOTE — PROGRESS NOTES
[facility]  Hospitalist Progress Note   04/24/2025          Assessment and Plan:       Richar Davis is a 69 year old male with a history of diabetes, hypertension, CABG, status post AVR with bovine valve 10/2022, untreated sleep apnea, hypertension, hyperlipidemia, neuropathy, BPH admitted on 3/30/2025 with abdominal pain nausea and vomiting. See below for events of prolonged hospital stay. Anticipate medically stable for discharge in 2-4 days. Needs right sided abdominal drain removed prior to discharge to transitional care versus ARU.     IR guided drain placement 4/15  Small bowel anastomosis and fascial closure 4/10.  Small bowel ileus with resection on 4/8  Exploratory laparotomy with small bowel enterotomy and removal of gallstone on 3/30.   Gallstone ileus, distal small bowel obstruction secondary to impacted 2.2 cm gallstone.  Enterococcus faecalis bacteremia -cleared  -CT imaging on admission showed Gallstone ileus. Distal small bowel obstruction secondary to an impacted 2.2 cm gallstone.   -Underwent Exploratory laparotomy with small bowel enterotomy and removal of gallstone on 3/30.   -Postoperative course complicated with ileus.  NG tube removed 4/2, unable to tolerate oral diet.   -Had fever on 4/5, COVID/Flu/RSV which was negative.  BC x 2 no growth. CXR without overt pneumonia.  UA then not consistent with infection.  -On 4/7 spiked fever with significant leukocytosis, lactic acidosis. Blood cultures from 4/7, 4/8 with Enterococcus faecalis.  -Returned back to the OR for exploratory laparotomy with small bowel resection on 4/8.  -Postprocedure patient intubated on pressor support, temporary abdominal closure admitted to ICU.   -Returned to OR on 4/10 for small bowel anastomosis and abdominal wound VAC placement.    -Postprocedure continued to have distended abdomen with bloody output.  -CT on 4/14 showing multiple peripherally enhancing fluid collections in the abdomen which  are suspicious for developing abscesses. The largest of these is in the left paracolic gutter. Stable appearance of a contracted gallbladder containing a large gallstone which is fistulized to the duodenum with associated pneumobilia.  -Underwent IR guided abdominal drain placements x 2 on 4/15.  Patient removed NG tube by himself and started on oral liquids on 4/16.  Underwent CT sinogram on 4/18, left intra-abdominal drain removed.  ---Blood Cultures from 4/10 show no growth to date.  Drain cultures no growth to date.  Infectious disease followed, recommended stop antibiotics on 4/21, Zosyn discontinued 4/22.   --On 4/23 patient continues to have right intra-abdominal drain draining serous fluid.  IR removed right intra-abdominal drain.  --4/24 abdomen distended, minimal tenderness.  Bowel sounds heard.  Tolerating oral diet.  No report of blood in the stools or blood in the urine.  --General Surgery following.  Appreciate input.  Continue oral diet as tolerated.  Continue wound VAC per general surgery.  As needed pain meds.  As needed antiemetics.      Concern for peritoneal carcinomatosis. On imaging (4/18)   Due for Health maintenance  CT sinogram from 4/18 with Increased nodularity/density in the mesenteric fat of the upper abdomen bilaterally suggestive of peritoneal carcinomatosis.  Pathology from drain fluid was not sent.   --Patient not up-to-date with health maintenance, never had colonoscopy, smoking history.  --CT chest, abdomen and pelvis with contrast ordered.  Oncology followed, appreciate input.  - Will also increase diuresis to-20 mg IV Lasix twice daily given edema.     Postoperative atrial fibrillation with RVR -status post cardioversion 4/14/2025  New Aflutter with RVR 4/5/2025 status post DCCV on 4/7/2025.  Coronary artery disease status post CABG x 3 in October 2022  Aortic stenosis (status post AVR with bovine valve) October 2022.  Hypertension  Hyperlipidemia.  -Postoperatively developed new  aflutter with RVR, was on intravenous heparin drip, rate controlled with Cardizem drip and initiated oral metoprolol.   --Cardiology followed underwent cardioversion on 4/7.   --Heparin was paused as patient back to OR on 4/8 and was started on intravenous heparin during ICU stay.  --On 4/14 patient again developed recurrence of A-fib with RVR with heart rate in 140s.  Initiated on amiodarone drip, underwent successful cardioversion on 4/14.  Currently in sinus rhythm with heart rate in 70s to 80s.  --Echocardiogram 4/15 with EF of 55%.  Right ventricle normal size.  No valve disease.    --Cardiology signed off 4/15, continue with amiodarone loading and switch to oral amiodarone.  --Continue oral amiodarone taper: 200 mg BID x 2 weeks ( starting 4/16), then 200 mg every day.  EKG for QTC on 4/20.   --Was on intravenous heparin, switched to oral Eliquis 5 mg twice daily on 4/18.  Continue Eliquis 5 mg twice daily.  Monitor hemoglobin levels later today given drop in hemoglobin to 8.9 this morning.  --Continue Oral metoprolol 25 mg twice daily with hold parameters  --PTA on Lipitor 80 mg oral daily, LDL 71.  Continue lower dose of Lipitor 20 mg oral daily.      Acute hypoxia-likely from atelectasis, bilateral pleural effusion, postoperative narcotic use, ALIREZA, deconditioning  CT chest from 4/14 with New small bilateral pleural effusions with overlying atelectasis/consolidation.  Afebrile.  Leukocytosis improved.  Weaned off oxygen to room air.  Continues to have bilateral decreased breath sounds.  Received 20 mg oral Lasix this morning.  Will order additional 20 mg IV Lasix  daily for 2 doses, monitor renal function on 04/25 and optimize dose.  IS, ambulate.     Acute anemia likely from surgical blood loss, acute illness, dilutional.  Presented with hemoglobin of 15.9, now hemoglobin postoperatively in the 10-11 range.  4/24 noted to drop in hemoglobin to 8.9.  No active bleeding but had drain removed on 4/23, on  Eliquis.    Recheck hemoglobin later today, will hold Eliquis if any further drop in hemoglobin.     Acute encephalopathy likely multifactorial from anesthesia/sedation for procedure/delirium, hospitalization/ICU stay/narcotic use.  Patient with prolonged hospital stay -multiple procedures, ICU stay, on narcotics.  CT head 4/18 no acute intracranial pathology.  Continue Seroquel 25 mg twice daily as needed.  Monitor mental status closely.  Fall precautions.  Delirium precautions.     Severe hypoalbuminemia likely from acute illness.  Dilutional.  Serum albumin of 2.1 >2.6  Nutritional intake per surgical team.  Intravenous diuresis with Lasix as above.     Acute on chronic bilateral lower extremity edema.  Ultrasound bilateral lower extremities -no DVT.  Pitting edema present.    Received 20 mg oral Lasix this morning.  Will order additional 20 mg IV Lasix  daily for 2 doses, monitor renal function on 04/25 and optimize dose.    Hyperkalemia likely hemolyzed sample.  Noted serum potassium 5.5 with drop in hemoglobin this morning.  Recheck potassium levels this afternoon.  Lasix as above     Acute kidney injury.  Resolved  Anion gap metabolic acidosis.  Resolved.  Mild hypernatremia -improved.  Mild dehydration.- resolved   Hypokalemia from poor intake -replaced.  Monitor     Type 2 diabetes mellitus with a hemoglobin A1c of 6.8  Hold PTA metformin.  Insulin sliding scale.     Urinary retention -Mccarty removed 4/17  Traumatic hematuria -improved  BPH:   Urine cultures from 4/14 no growth  Catheter removed 4/17.  Continue PTA Flomax     Neuropathy:   *Neurontin 800 mg 3 times daily as needed has been filled but patient reports taking 300mg TID  *4/1 per pharmacy note only recent fills are 800 mg and not 300 mg dosing.   --Gabapentin was on hold since admission, resumed at 100 mg twice daily on 4/18.  Will increase gabapentin to 100 mg 3 times daily.    Nicotine use: Chews tobacco  Continue nicotine patch, and as needed  nicotine lozenges.    Long term cessation to be encouraged during stay.     ALIREZA not on CPAP.  Recommend sleep studies as outpatient     Medically complicated obesity with a BMI of 41.  Increase all-cause mortality and morbidity.  Consider lifestyle modification with diet and exercise as able to.     Physical deconditioning from medical illness.  Encourage ambulation.  Fall precautions  Rehab team following.    Clinically Significant Risk Factors        # Hyperkalemia: Highest K = 5.5 mmol/L in last 2 days, will monitor as appropriate     # Hypercalcemia: corrected calcium is >10.1, will monitor as appropriate    # Hypoalbuminemia: Lowest albumin = 1.9 g/dL at 4/9/2025  5:25 AM, will monitor as appropriate     # Hypertension: Noted on problem list           # DMII: A1C = 6.8 % (Ref range: <5.7 %) within past 6 months   # Severe Obesity: Estimated body mass index is 40.54 kg/m  as calculated from the following:    Height as of this encounter: 1.829 m (6').    Weight as of this encounter: 135.6 kg (298 lb 14.4 oz).   # Moderate Malnutrition: based on nutrition assessment and treatment provided per dietitian's recommendations.      # Financial/Environmental Concerns: none   # History of CABG: noted on surgical history       Orders Placed This Encounter      Combination Diet Moderate Consistent Carb (60 g CHO per Meal) Diet      DVT Prophylaxis: SCDs, ambulate.  Code Status: Full Code  Disposition: Expected discharge likely tomorrow-pending CT imaging, stable hemoglobin and diuresis.    Medically Ready for Discharge: Anticipated Tomorrow    Discussed with patient, bedside RN, general surgery team.  Updated patient's wife over the telephone 4/24  Total time greater than 51 minutes. More than 70% of time spent in direct patient care, care coordination, patient counseling, and formalizing plan of care.        Tim Mays MD        Interval History:        Patient lying in bed.  Confused this morning able to answer  some questions.  Abdominal drain removed 4/23.  Tolerating oral diet.  Denies any chest pain or palpitations.  Denies any headache or dizziness.  This morning on room air, continues to have decreased breath sounds.  Receiving as needed pain meds, scheduled gabapentin.  Intermittent confusion  Had been out of bed to chair       Physical Exam:        Physical Exam   Temp:  [97.5  F (36.4  C)-97.8  F (36.6  C)] 97.8  F (36.6  C)  Pulse:  [79-85] 82  Resp:  [16-21] 16  BP: (116-147)/(64-69) 116/64  SpO2:  [92 %-98 %] 97 %    Intake/Output Summary (Last 24 hours) at 4/23/2025 1412  Last data filed at 4/23/2025 0900  Gross per 24 hour   Intake 1360 ml   Output 1010 ml   Net 350 ml       Admission Weight: 129.3 kg (285 lb)  Current Weight: 135.6 kg (298 lb 14.4 oz)    PHYSICAL EXAM  GENERAL: Patient awake, able to answer most questions    HEART: Regular rate and rhythm. S1S2  LUNGS: bilateral decreased breath sounds, no wheezing or crackles.  Respiration unlabored  ABDOMEN: Distended, wound VAC present. Bowel sounds heard.    NEURO: Moving all extremities  EXTREMITIES:+ +pedal edema.   SKIN: Brusing   PSYCHIATRY Cooperative       Medications:        Current Facility-Administered Medications   Medication Dose Route Frequency Provider Last Rate Last Admin    amiodarone (PACERONE) tablet 200 mg  200 mg Oral BID Richa Schmidt PA-C   200 mg at 04/24/25 1039    Followed by    [START ON 4/29/2025] amiodarone (PACERONE) tablet 200 mg  200 mg Oral Daily Richa Schmidt PA-C        apixaban ANTICOAGULANT (ELIQUIS) tablet 5 mg  5 mg Oral BID Tim Mays MD   5 mg at 04/24/25 1040    atorvastatin (LIPITOR) tablet 20 mg  20 mg Oral QPM Tim Mays MD   20 mg at 04/23/25 2112    [Held by provider] furosemide (LASIX) tablet 20 mg  20 mg Oral Daily Andres Sparks DO   20 mg at 04/23/25 0909    gabapentin (NEURONTIN) capsule 100 mg  100 mg Oral BID Tim Mays MD   100 mg at 04/24/25 1037    insulin aspart  (NovoLOG) injection (RAPID ACTING)  1-7 Units Subcutaneous TID AC Tim Mays MD   1 Units at 04/22/25 1239    insulin aspart (NovoLOG) injection (RAPID ACTING)  1-5 Units Subcutaneous At Bedtime Tim Mays MD        metoprolol tartrate (LOPRESSOR) tablet 25 mg  25 mg Oral BID Tim Mays MD   25 mg at 04/24/25 1040    nicotine (NICODERM CQ) 21 MG/24HR 24 hr patch 1 patch  1 patch Transdermal Daily Hayley Vale APRN CNP   1 patch at 04/24/25 1039    pantoprazole (PROTONIX) EC tablet 40 mg  40 mg Oral QAM AC Tim Mays MD   40 mg at 04/24/25 0621    polyethylene glycol (MIRALAX) Packet 17 g  17 g Oral Daily Ana Dougherty MD   17 g at 04/24/25 1038    senna-docusate (SENOKOT-S/PERICOLACE) 8.6-50 MG per tablet 1 tablet  1 tablet Oral BID Ana Dougherty MD   1 tablet at 04/24/25 1039    sodium chloride (PF) 0.9% PF flush 3 mL  3 mL Intracatheter Q8H MITA Hayley Vale APRN CNP   3 mL at 04/22/25 2140    tamsulosin (FLOMAX) capsule 0.8 mg  0.8 mg Oral QPM Andres Sparks DO   0.8 mg at 04/23/25 2112     Current Facility-Administered Medications   Medication Dose Route Frequency Provider Last Rate Last Admin    acetaminophen (TYLENOL) tablet 650 mg  650 mg Oral Q4H PRN Shi Goncalves PA-C   650 mg at 04/24/25 1039    benzocaine-menthol (CHLORASEPTIC) 6-10 MG lozenge 1 lozenge  1 lozenge Buccal Q1H PRN Hayley Vale APRN CNP   1 lozenge at 04/01/25 0334    bisacodyl (DULCOLAX) suppository 10 mg  10 mg Rectal Daily PRN Hayley Vale APRN CNP        carboxymethylcellulose PF (REFRESH PLUS) 0.5 % ophthalmic solution 1 drop  1 drop Both Eyes Q1H PRN Hayley Vale APRN CNP        glucose gel 15-30 g  15-30 g Oral Q15 Min PRTim Rios MD        Or    dextrose 50 % injection 25-50 mL  25-50 mL Intravenous Q15 Min PRTim Rios MD        Or    glucagon injection 1 mg  1 mg Subcutaneous Q15 Min PRTim Rios MD         diphenhydrAMINE (BENADRYL) elixir 12.5 mg  12.5 mg Oral Q6H PRN Hayley Vale APRN CNP        Or    diphenhydrAMINE (BENADRYL) injection 12.5 mg  12.5 mg Intravenous Q6H PRN Hayley Vale APRN CNP        HYDROmorphone (DILAUDID) injection 0.2 mg  0.2 mg Intravenous Q4H PRN Tim Mays MD   0.2 mg at 04/15/25 1737    lidocaine (LMX4) cream   Topical Q1H PRN Hayley Vale APRN CNP        lidocaine 1 % 0.1-1 mL  0.1-1 mL Other Q1H PRN Hayley Vale APRN CNP        magnesium hydroxide (MILK OF MAGNESIA) suspension 30 mL  30 mL Oral Daily PRN Hayley Vale APRN CNP   30 mL at 04/01/25 0427    melatonin tablet 10 mg  10 mg Oral At Bedtime PRN Hayley Vale APRN CNP   10 mg at 04/12/25 0042    metoprolol (LOPRESSOR) injection 5 mg  5 mg Intravenous Q4H PRN Hayley Vale APRN CNP   5 mg at 04/14/25 0806    miconazole (MICATIN) 2 % powder   Topical BID PRN Andres Sparks DO   Given at 04/19/25 2301    naloxone (NARCAN) injection 0.2 mg  0.2 mg Intravenous Q2 Min PRN Hayley Vale APRN CNP        Or    naloxone (NARCAN) injection 0.4 mg  0.4 mg Intravenous Q2 Min PRN Hayley Vale APRN CNP        Or    naloxone (NARCAN) injection 0.2 mg  0.2 mg Intramuscular Q2 Min PRN Hayley Vale APRN CNP        Or    naloxone (NARCAN) injection 0.4 mg  0.4 mg Intramuscular Q2 Min PRN Hayley Vale APRN CNP        nicotine (COMMIT) lozenge 2 mg  2 mg Buccal Q1H PRN Hayley Vale APRN CNP   2 mg at 04/03/25 1145    No lozenges or gum should be given while patient on BIPAP/AVAPS/AVAPS AE   Does not apply Continuous PRN Hayley Vale APRN CNP        ondansetron (ZOFRAN ODT) ODT tab 4 mg  4 mg Oral Q6H PRN Hayley Vale APRN CNP   4 mg at 04/08/25 0208    Or    ondansetron (ZOFRAN) injection 4 mg  4 mg Intravenous Q6H PRN Hayley Vale APRN CNP   4 mg at 04/11/25 1232    Patient may continue current oral medications   Does not apply Continuous  PRN Hayley Vale APRN CNP        phenol (CHLORASEPTIC) 1.4 % spray 1 mL  1 spray Mouth/Throat Q1H PRN Hayley Vale APRN CNP        prochlorperazine (COMPAZINE) injection 5 mg  5 mg Intravenous Q6H PRN Hayley Vale APRN CNP   5 mg at 04/08/25 1103    Or    prochlorperazine (COMPAZINE) tablet 5 mg  5 mg Oral Q6H PRN Hayley Vale APRN CNP   5 mg at 04/03/25 0756    QUEtiapine (SEROquel) tablet 25 mg  25 mg Oral BID PRN Tim Mays MD        sodium chloride (PF) 0.9% PF flush 3 mL  3 mL Intracatheter q1 min prn Hayley Vale APRN CNP        traMADol (ULTRAM) half-tab 25 mg  25 mg Oral Q6H PRN Tim Mays MD   25 mg at 04/21/25 1225    traMADol (ULTRAM) tablet 50 mg  50 mg Oral Q6H PRN Tim Mays MD   50 mg at 04/23/25 0652            Data:      All new lab and imaging data was reviewed.

## 2025-04-24 NOTE — PROGRESS NOTES
[facility]  GENERAL SURGERY Progress Note    Admission Date: 3/30/2025   Today 04/24/25          Assessment and Plan:     Richar Davis is a 69 year old male admitted for gallstone ileus; S/P ex lap with removal of gallstone through small bowel enterotomy 3/30/25.  New onset of Aflutter with RVR, underwent cardioversion on 4/7. RRT called overnight 4/7 for hypotension and patient found to have elevated lactate, leukocytosis of 36. CT showing increased fluid and free air, drainage from midline incision. Taken to OR 4/8 for ex lap, found to have gross sucus breakdown of enterotomy, return to OR 4/10 for small bowel anastomosis and fascial closure.  Extubated and transferred to floor. RRT 4/14 for recurrent Aflutter with RVR, underwent repeat cardioversion. CT showing two new fluid collections, possible early abscess, underwent IR drain placement x2 4/15, cultures with NGTD. CT sinogram 4/19, left drain removed as it was in the subcutaneous tissue, right drain has no significant residual fluid but continued due to output. No growth on abdominal fluid collections, on Zosyn (started 4/7, discontinued 4/22/25 per ID).  Eliquis resumed 4/18.    Potassium is mildly elevated at 5.5. Hgb is 8.9 <--10.8.    - Continue moderate CHO diet  - Right drain removed 4/23 by IR.  - Continue Wound VAC changes MWF, appreciate WOC RN cares   - Pain control- Tylenol and Ultram PRN, avoid Dilaudid  - Hgb remains stable  - Continue Protonix  - Ambulate with assistance if able 4x day and encourage IS  - Med management per hospitalist, much appreciate your assistance- sinogram reviewed  - Sinogram from 4/18: Increased nodularity/density in the mesenteric fat of the upper abdomen bilaterally suggestive of peritoneal carcinomatosis.  Oncology consulted. Appreciate their recommendation.     Extensively discussed with Dr. Landrum who was the surgeon during his ex-lap and there was no gross evidence of any peritoneal or  mesenteric studding or mets identified during that ex-lap. Pathology shows benign small bowel resection.    - Doing well from surgery standpoint, ok to discharge to with TCU when medically able  - Discharge instructions in chart, rx printed, follow up with Dr. Landrum  at 1pm              Interval History:     Doing ok. Ate breakfast. Would like talk to his wife.                      Physical Exam:   Blood pressure 116/64, pulse 82, temperature 97.8  F (36.6  C), temperature source Oral, resp. rate 16, height 1.829 m (6'), weight 135.6 kg (298 lb 14.4 oz), SpO2 97%.  Temperature Temp  Av.6  F (37  C)  Min: 98.2  F (36.8  C)  Max: 99.1  F (37.3  C)   I/O last 3 completed shifts:  In: 510 [P.O.:510]  Out: 1040 [Urine:1040]  Constitutional:  More awake and in no apparent distress.   Abdomen: Obese, soft, non-distended, appropriately tender at midline incision. Wound vac in place.    Wound(s): Clean, dry, and vac intact. No erythema or drainage.    Extremities: No edema or calf tenderness. +SCDs          Data:   No new labs/imaging.   Recent Labs   Lab Test 25  0707 25  1051 25  0828 25  0534 25  0303 04/15/25  0759 25  0925   WBC 6.6  --   --   --  8.1 8.1 9.9   HGB 8.9* 10.8* 11.0*   < > 10.5* 10.4* 11.7*   HCT  --   --   --   --  32.8* 34.0* 36.3*   PLT  --   --  242  --  272 267 301    < > = values in this interval not displayed.      Recent Labs   Lab Test 25  0705 25  1051 25  0828    138 141   POTASSIUM 5.5* 4.2 3.9   CHLORIDE 105 102 108*   CO2 24 27 25   BUN 9.4 8.0 7.0*   CR 0.63* 0.65* 0.62*     Terry Patino MD

## 2025-04-24 NOTE — PLAN OF CARE
Goal Outcome Evaluation:       Alert but intermittently confused. Fluctuating personality. Irritable and frustrated at times. Demanding some things to be done before he cooperates with care. Vitals remain with in normal limits. Potassium was 5.5. 20 mg IV Lasix given. Recheck was 4.6. Abdominal CT scan was completed. Apparent Lab collection order from abdominal  fluid  site for cytology, but there was no drain in the old TERRIE site. So unable to collect. Lower back pain. Relieved with tylenol. Lift to transfer.

## 2025-04-24 NOTE — PROGRESS NOTES
Care Management Follow Up    Length of Stay (days): 25    Expected Discharge Date: 04/25/2025     Concerns to be Addressed: discharge planning     Patient plan of care discussed at interdisciplinary rounds: Yes    Anticipated Discharge Disposition: Skilled Nursing Facility      Anticipated Discharge Services:  Anticipated Discharge DME:      Patient/family educated on Medicare website which has current facility and service quality ratings: no  Education Provided on the Discharge Plan: Yes  Patient/Family in Agreement with the Plan: yes    Referrals Placed by CM/SW:    Private pay costs discussed: transportation costs    Discussed  Partnership in Safe Discharge Planning  document with patient/family: Yes:     Handoff Completed: Yes, non-[facility] PCP: External handoff communication completed    Additional Information:  Writer requested order for patients discharge today     Writer completed pre admission screening   PAS-RR    D: Per DHS regulation, SW completed and submitted PAS-RR to [place] [employer] Direct Connect via the Senior LinkAge Line.  PAS-RR confirmation # is :     P: Further questions may be directed to Senior LinkAge Line at #[phone], option #4 for PAS-RR staff.     Writer waiting for orders to fax to [facility] through [place]     Faxed script     Next Steps: orders        ADD - provider stated patient is not ready for discharge today, writer rescheduled transport to Friday between 1400 - 1500 and let the facility know         KANDI Campbell      details…

## 2025-04-24 NOTE — PLAN OF CARE
Date & Time: 4044-7892  Surgery/POD#:   3/30: enterotomy and removal gallstone  4/4 new A-flutter  4/7: cardioverted into SR  4/8 Bowel resection  4/10 Bowel resection and wound closure   4/14: A-flutter, cardioverted into SR  4/15 Drain placed for abd abscess   4/23 TERRIE drain removal    Behavior & Aggression: Green, intermittent agitation, but redirectable. PRN seroquel ordered but not given this shift.  Fall Risk: Yes  Orientation: Aox3-4 (not time) , intermittent confused but redirectable   ABNL VS/O2:VSS on RA  ABNL Labs: creatinine 0.65   Pain Management: scheduled gabapentin  Bowel/Bladder: Inc Bladder/Bowel, loose incontinent stool BM x1. external catheter in place but leaking- good UOP.  Redness in perineum.   Drains: TERRIE drain removed yesterday. Wound vac to -75 mmHg on midline abd inc. CDI  Wounds/incisions: midline incision. Old TERRIE site   Diet: Mod Carb- denied nausea  Activities: Ax2 Clau Steady   Number of times OUT OF BED this shift: 0. T/R, pt able to turn   Tests/Procedures: CT w/ and w/out contrast   Anticipated  DC Date: Pending CT   Other information: pt able to reposition independently but need encouragements.  Hem/Onc consulted yesterday, put in order for CT. Per CT they need a new order that separates the current combined order in order to complete.

## 2025-04-24 NOTE — PROGRESS NOTES
Date & Time: 4/23/2025  6021-2804  Surgery/POD#:   3/30: enterotomy and removal gallstone  4/4 new A-flutter  4/7: cardioverted into SR  4/8 Bowel resection  4/10 Bowel resection and wound closure   4/14: A-flutter, cardioverted into SR  4/15 Drain placed for abd abscess   4/23 TERRIE drain removal     Behavior & Aggression: Green, intermittent agitation, but redirectable.  Fall Risk: Yes  Orientation:Aox3-4 (not time) , intermittent confused but redirectable   ABNL VS/O2:VSS on RA  ABNL Labs: creatinine 0.65  Pain Management: Denies  Bowel/Bladder: Inc Bladder/BM, external catheter in place. Redness in perineum.   Drains: Wound vac to -75 mmHg on midline abd inc. CDI  Wounds/incisions: midline incision  Skin: TERRIE drain sight w/moderate amount of Purulent drainage. Dressing changed.   Diet: Mod Carb, good appetite. Blood sugar checks  Activities: Ax2 Clau Steady/lift.   Number of times OUT OF BED this shift: 1, repo in bed independent q2  Tests/Procedures: N/A  Anticipated  DC Date: 4/24 to TCU  Other information: pt able to reposition independently but need encouragements. Hem/Onc consulted today.

## 2025-04-25 ENCOUNTER — APPOINTMENT (OUTPATIENT)
Dept: PHYSICAL THERAPY | Facility: CLINIC | Age: 70
DRG: 329 | End: 2025-04-25
Payer: COMMERCIAL

## 2025-04-25 ENCOUNTER — APPOINTMENT (OUTPATIENT)
Dept: OCCUPATIONAL THERAPY | Facility: CLINIC | Age: 70
DRG: 329 | End: 2025-04-25
Payer: COMMERCIAL

## 2025-04-25 LAB
CANCER AG19-9 SERPL IA-ACNC: 15 U/ML
CREAT SERPL-MCNC: 0.7 MG/DL (ref 0.67–1.17)
EGFRCR SERPLBLD CKD-EPI 2021: >90 ML/MIN/1.73M2
GLUCOSE BLDC GLUCOMTR-MCNC: 121 MG/DL (ref 70–99)
GLUCOSE BLDC GLUCOMTR-MCNC: 129 MG/DL (ref 70–99)
GLUCOSE BLDC GLUCOMTR-MCNC: 133 MG/DL (ref 70–99)
GLUCOSE BLDC GLUCOMTR-MCNC: 150 MG/DL (ref 70–99)
GLUCOSE BLDC GLUCOMTR-MCNC: 152 MG/DL (ref 70–99)
HGB BLD-MCNC: 11.1 G/DL (ref 13.3–17.7)
POTASSIUM SERPL-SCNC: 4.6 MMOL/L (ref 3.4–5.3)

## 2025-04-25 PROCEDURE — 97606 NEG PRS WND THER DME>50 SQCM: CPT

## 2025-04-25 PROCEDURE — 99233 SBSQ HOSP IP/OBS HIGH 50: CPT | Performed by: HOSPITALIST

## 2025-04-25 PROCEDURE — 120N000001 HC R&B MED SURG/OB

## 2025-04-25 PROCEDURE — 250N000013 HC RX MED GY IP 250 OP 250 PS 637

## 2025-04-25 PROCEDURE — G0463 HOSPITAL OUTPT CLINIC VISIT: HCPCS | Mod: 25

## 2025-04-25 PROCEDURE — 84132 ASSAY OF SERUM POTASSIUM: CPT | Performed by: HOSPITALIST

## 2025-04-25 PROCEDURE — 82565 ASSAY OF CREATININE: CPT | Performed by: HOSPITALIST

## 2025-04-25 PROCEDURE — 250N000013 HC RX MED GY IP 250 OP 250 PS 637: Performed by: STUDENT IN AN ORGANIZED HEALTH CARE EDUCATION/TRAINING PROGRAM

## 2025-04-25 PROCEDURE — 250N000013 HC RX MED GY IP 250 OP 250 PS 637: Performed by: HOSPITALIST

## 2025-04-25 PROCEDURE — 97530 THERAPEUTIC ACTIVITIES: CPT | Mod: GP

## 2025-04-25 PROCEDURE — 250N000011 HC RX IP 250 OP 636: Performed by: HOSPITALIST

## 2025-04-25 PROCEDURE — 85018 HEMOGLOBIN: CPT | Performed by: HOSPITALIST

## 2025-04-25 PROCEDURE — 36415 COLL VENOUS BLD VENIPUNCTURE: CPT | Performed by: HOSPITALIST

## 2025-04-25 PROCEDURE — 250N000013 HC RX MED GY IP 250 OP 250 PS 637: Performed by: NURSE PRACTITIONER

## 2025-04-25 PROCEDURE — 97535 SELF CARE MNGMENT TRAINING: CPT | Mod: GO

## 2025-04-25 RX ORDER — PANTOPRAZOLE SODIUM 40 MG/1
40 TABLET, DELAYED RELEASE ORAL
DISCHARGE
Start: 2025-04-26 | End: 2025-05-26

## 2025-04-25 RX ORDER — FUROSEMIDE 40 MG/1
40 TABLET ORAL DAILY
Status: DISCONTINUED | OUTPATIENT
Start: 2025-04-26 | End: 2025-04-28

## 2025-04-25 RX ORDER — POLYETHYLENE GLYCOL 3350 17 G/17G
17 POWDER, FOR SOLUTION ORAL DAILY PRN
DISCHARGE
Start: 2025-04-25

## 2025-04-25 RX ADMIN — TAMSULOSIN HYDROCHLORIDE 0.8 MG: 0.4 CAPSULE ORAL at 21:54

## 2025-04-25 RX ADMIN — TRAMADOL HYDROCHLORIDE 50 MG: 50 TABLET, COATED ORAL at 15:04

## 2025-04-25 RX ADMIN — NICOTINE 1 PATCH: 21 PATCH, EXTENDED RELEASE TRANSDERMAL at 09:06

## 2025-04-25 RX ADMIN — ATORVASTATIN CALCIUM 20 MG: 20 TABLET, FILM COATED ORAL at 21:53

## 2025-04-25 RX ADMIN — AMIODARONE HYDROCHLORIDE 200 MG: 200 TABLET ORAL at 09:08

## 2025-04-25 RX ADMIN — METOPROLOL TARTRATE 25 MG: 25 TABLET, FILM COATED ORAL at 21:54

## 2025-04-25 RX ADMIN — METOPROLOL TARTRATE 25 MG: 25 TABLET, FILM COATED ORAL at 09:08

## 2025-04-25 RX ADMIN — QUETIAPINE FUMARATE 25 MG: 25 TABLET ORAL at 22:04

## 2025-04-25 RX ADMIN — GABAPENTIN 100 MG: 100 CAPSULE ORAL at 21:54

## 2025-04-25 RX ADMIN — TRAMADOL HYDROCHLORIDE 50 MG: 50 TABLET, COATED ORAL at 22:23

## 2025-04-25 RX ADMIN — GABAPENTIN 100 MG: 100 CAPSULE ORAL at 09:08

## 2025-04-25 RX ADMIN — PANTOPRAZOLE SODIUM 40 MG: 20 TABLET, DELAYED RELEASE ORAL at 09:08

## 2025-04-25 RX ADMIN — FUROSEMIDE 20 MG: 10 INJECTION, SOLUTION INTRAVENOUS at 09:08

## 2025-04-25 RX ADMIN — AMIODARONE HYDROCHLORIDE 200 MG: 200 TABLET ORAL at 21:53

## 2025-04-25 RX ADMIN — APIXABAN 5 MG: 5 TABLET, FILM COATED ORAL at 21:54

## 2025-04-25 RX ADMIN — APIXABAN 5 MG: 5 TABLET, FILM COATED ORAL at 09:08

## 2025-04-25 ASSESSMENT — ACTIVITIES OF DAILY LIVING (ADL)
ADLS_ACUITY_SCORE: 57
ADLS_ACUITY_SCORE: 62
ADLS_ACUITY_SCORE: 57
ADLS_ACUITY_SCORE: 62
ADLS_ACUITY_SCORE: 57
ADLS_ACUITY_SCORE: 57
ADLS_ACUITY_SCORE: 62
ADLS_ACUITY_SCORE: 57
ADLS_ACUITY_SCORE: 57
ADLS_ACUITY_SCORE: 62
ADLS_ACUITY_SCORE: 62
ADLS_ACUITY_SCORE: 57
ADLS_ACUITY_SCORE: 62
ADLS_ACUITY_SCORE: 57
ADLS_ACUITY_SCORE: 62

## 2025-04-25 NOTE — PROGRESS NOTES
[facility]  WO Nurse Inpatient Assessment     Consulted for: coccyx and abdominal NPWT changes    Summary:   Coccyx: small blanchable elongated patches of erythema, skin intact-Signing off 4/15  2.   Abdomen: s/p small bowel resection with VAC placement in OR. NPWT midline wound changed 4/25 ~ 75 mmHG     WO nurse follow-up plan: Monday/WednesdayFriday     Patient History (according to provider note(s):       69 year old male with a history of diabetes, hypertension, CABG, status post AVR with bovine valve 10/2022, untreated sleep apnea, hypertension, hyperlipidemia, neuropathy, BPH admitted on 3/30/2025 with abdominal pain nausea and vomiting.  Imaging showed gallstone ileus with 2.2 cm gallstone impacted.  Underwent surgery on 3/30.  Postoperatively had issues with ileus, fever, and aflutter with RVR.       Returned back to the OR for exploratory laparotomy with small bowel resection.Post -op admission to the ICU, intubated and on pressor support with temporary abdominal closure. He returned to OR on 4/10  for small bowel anastomosis and abdominal.  Wound VAC in place.  Pressors weaned off, patient extubated 4/11 and transferred to hospitalist service on 4/12.    Assessment:      Areas visualized during today's visit: Focused: and Abdomen    Negative pressure wound therapy applied to: abdomen         Last photo: 4/25/25   Wound due to: Surgical Wound   Wound history/plan of care:    Kathie-wound skin is denuded. Suspect folliculitis vs tension from vac drape. Protected kathie-wound skin with stoma powder and liquid skin barrier. Kathie-wound skin shaved with electric razor. Wound continues to improve.   Surgical date: 4/10/25   Service following: Gen Surgery  Date Negative Pressure Wound Therapy initiated: 4/10/25   Interventions in place: repositioning, pressure redistribution with device or dressing, specialty surface in use, moisture/incontinence management, offloading, and elevation  Is  patient s nutritional status compromised? no  If yes, what interventions are in place? N/A  Reason for initiating vac therapy? Need for accelerated granulation tissue  Which?of?the?following?co-morbidities?apply? Diabetes and Obesity  If diabetic is patient on a diabetic management program? Yes   Is osteomyelitis present in wound? no   If yes what treatments are in place? N/A    Wound base: 100 % Granulation tissue and Adipose tissue,       Palpation of the wound bed: normal       Drainage: moderate      Volume in cannister: <100ml      Last cannister change date: 4/21/25     Description of drainage: serous, serosanguinous, and bloody      Measurements (length x width x depth, in cm) 17.5 x 4.9  x  2.5 cm       Tunneling N/A      Undermining N/A   Periwound skin: Denuded       Color: pink       Temperature: normal    Odor: none   Pain: mild, no grimacing or signs of discomfort, and during dressing change   Pain intervention prior to dressing change: soaking, slow and gentle cares , and distraction  Treatment goal: Drainage control, Infection control/prevention, Increase granulation, Protection, and Promote epidermal migration  STATUS: improving   Supplies ordered: gathered, at bedside, supplies stored on unit, and discussed with patient     Number of foam pieces removed from a wound (excluding foam for bridge) : 2 GranuFoam Black and 1 oil emulsion dressing    Verified this matched the number of foam pieces applied last dressing change: Yes   Number of foam pieces packed into wound (excluding foam for bridge) :  1 GranuFoam Black         4/15: WOC received consult for coccyx. Skin intact, each buttock with elongated patch of erythema related to friction, patient has been intermittently confused and agitated per chart review. Nursing staff to monitor and moisturize skin. Continue with Pressure injury and skin breakdown prevention measures      Treatment Plan:     Negative pressure wound therapy plan:   "Mon/Wed/Fri  Wound location: midline abdomen   Change Days: Mon/Wed/Fri by WO RN    Supplies (including all accessories) used: medium Black foam  and Cavilon no sting barrier film  Cleanse with Vashe prior to replacing NPWT  Suction setting: -75   Methods used: Window paned all periwound skin with vac drape prior to applying sponge    Staff RN to assess integrity of dressing and ensure suction is set at appropriate level every shift.   Date canister. Chart canister output every shift. Change cannister weekly and PRN if full/occluded     Remove foam dressing and replace with BID normal saline moist gauze dressing if:   -a dressing failure which cannot be repaired within 2 hours   -patient is discharging to home without a home pump   -patient is discharging to a facility outside the local area   -if a dressing is a \"Silver Foam\", remove before Radiation Therapy or MRI     The hospital VAC pump is not to be discharged with the patient. Please disconnect the patient from the machine prior to discharge.  If a home VAC pump has been delivered, connect the home cannister to dressing tubing and the cannister to the home pump, turn on home pump  If the patient is transferring to a nearby facility with a VAC, the tubing can be disconnected, clamp tubing and cover the end with a glove, then can be reconnected if within 2 hours  If transfer will be longer than 2 hours, dressing must be removed and placed with a wet to moist gauze dressing for transfer    Generalized skin: Daily  Moisturize with body lotion of sween 24 daily      Orders: Reviewed    RECOMMEND PRIMARY TEAM ORDER: None, at this time  Education provided: plan of care, wound progress, Infection prevention , and Moisture management  Discussed plan of care with: Patient and Nurse  Notify [facility] if wound(s) deteriorate.  Nursing to notify the Provider(s) and re-consult the [facility] Nurse if new skin concern.    DATA:     Current support surface: Standard  Standard gel " "mattress (Isoflex)  Containment of urine/stool: Incontinence Protocol and Incontinent pad in bed  BMI: Body mass index is 40.54 kg/m .   Active diet order: Orders Placed This Encounter      Combination Diet Moderate Consistent Carb (60 g CHO per Meal) Diet     Output: I/O last 3 completed shifts:  In: 510 [P.O.:510]  Out: 2100 [Urine:2100]     Labs:   Recent Labs   Lab 04/25/25  0741 04/24/25  1355 04/24/25  0707   ALBUMIN  --   --  2.6*   HGB 11.1*   < > 8.9*   WBC  --   --  6.6    < > = values in this interval not displayed.     Pressure injury risk assessment:   Sensory Perception: 3-->slightly limited  Moisture: 3-->occasionally moist  Activity: 2-->chairfast  Mobility: 2-->very limited  Nutrition: 3-->adequate  Friction and Shear: 2-->potential problem  Nash Score: 15    Lana Turk RN, CWON  Please contact via [place] at group \"[facility] nurse\"- M-F 8A-4P    "

## 2025-04-25 NOTE — PLAN OF CARE
Goal Outcome Evaluation:      Plan of Care Reviewed With: patient    Overall Patient Progress: improvingOverall Patient Progress: improving         Shift: 4/24/25, 3053-2227  POD#/Summary:   3/30: enterotomy and removal gallstone  4/4 new A-flutter  4/7: cardioverted into SR  4/8 Bowel resection  4/10 Bowel resection and wound closure   4/14: A-flutter, cardioverted into SR  4/15 Drain placed for abd abscess   4/23 TERRIE drain removal    Orientation: A&Ox2-3, intermittently confused, disoriented to time  Vital Signs: VSS on RA  Labs: K, see chart  Pain Management: Ultram half tab x2, scheduled meds  Bowel/Bladder: Incontinent, external cath  Drains: Wound vac on abdomen for abscess site  IV: SL  Wounds/Incisions: Midline inc, old TERRIE site, wound vac site  Diet: Mod carb   Activity Level: T/R- independently turns, needs encouragement   Tests/Procedures: CT done this afternoon   Anticipated Discharge Date/Plan: Pending

## 2025-04-25 NOTE — PROGRESS NOTES
"General Surgery Progress Note    Admission Date: 3/30/2025  Today's Date: 4/25/2025         Assessment:      Richar Davis is a 69 year old male admitted for gallstone ileus; S/P ex lap with removal of gallstone through small bowel enterotomy 3/30/25.  New onset of Aflutter with RVR, underwent cardioversion on 4/7. RRT called overnight 4/7 for hypotension and patient found to have elevated lactate, leukocytosis of 36. CT showing increased fluid and free air, drainage from midline incision. Taken to OR 4/8 for ex lap, found to have gross sucus breakdown of enterotomy, return to OR 4/10 for small bowel anastomosis and fascial closure.  Extubated and transferred to floor. RRT 4/14 for recurrent Aflutter with RVR, underwent repeat cardioversion. CT showing two new fluid collections, possible early abscess, underwent IR drain placement x2 4/15, cultures with NGTD. CT sinogram 4/19, left drain removed as it was in the subcutaneous tissue, right drain has no significant residual fluid but continued due to output. No growth on abdominal fluid collections, on Zosyn (started 4/7, discontinued 4/22/25 per ID).  Eliquis resumed 4/18.          Plan:   - Continue moderate CHO diet  - Right drain removed 4/23 by IR.  - Continue Wound VAC changes MWF, appreciate [facility] RN cares   - Pain control- Tylenol and Ultram PRN, avoid Dilaudid  - Hgb remains stable  - Continue Protonix  - Ambulate with assistance if able 4x day and encourage IS  - Med management per hospitalist, much appreciate your assistance- sinogram reviewed    - Sinogram from 4/18 showed \"increased nodularity/density in the mesenteric fat of the upper abdomen bilaterally suggestive of peritoneal carcinomatosis.\"  Oncology consulted, recommended IR biopsy. Repeat CT chest/abdomen/pelvis ordered 4/24. This CT again showed omental nodularity and stranding, radiologist read favors inflammatory postop changes rather than malignancy. We are in agreement with this. " Recommend that patient avoid IR biopsy at this time and instead have follow-up CT as an outpatient in 3 months. Appreciate input from all teams. There was no gross evidence of any peritoneal or mesenteric studding or mets identified during patient's laparotomy. Pathology shows benign small bowel resection.    - Repeat CT also showed that gallstone that was previously in gallbladder has passed into small bowel. Will plan to keep patient in hospital until this has passed.     - Discharge instructions in chart, rx printed, follow up with Dr. Landrum 5/7 at 1pm         Interval History:   Afebrile, vitals stable on room air. Richar continues to tolerate diet, tired of the food here. Passing gas, having BMs, minimal abdominal pain, no nausea. Would like to go home but agreeable to discharging to TCU.           Physical Exam:   /64 (BP Location: Right arm)   Pulse 86   Temp 97.3  F (36.3  C) (Oral)   Resp 18   Ht 1.829 m (6')   Wt 135.6 kg (298 lb 14.4 oz)   SpO2 95%   BMI 40.54 kg/m    I/O last 3 completed shifts:  In: 510 [P.O.:510]  Out: 2100 [Urine:2100]  General: NAD, pleasant, alert and awake. Answers questions appropriately  Respiratory: non-labored breathing    Abdomen: obese, soft, nondistended. Mild appropriate tenderness around midline incision. [facility] nurse performing VAC change during visit. Wound bed healthy and clean with good granulation tissue. Surrounding skin intact without erythema    LABS:  Recent Labs   Lab Test 04/25/25  0741 04/24/25  1355 04/24/25  0707 04/23/25  1051 04/21/25  0828 04/17/25  0534 04/16/25  0303 04/15/25  0759 04/14/25  0925   WBC  --   --  6.6  --   --   --  8.1 8.1 9.9   HGB 11.1* 11.9* 8.9*   < > 11.0*   < > 10.5* 10.4* 11.7*   MCV  --   --   --   --   --   --  93 95 93   PLT  --   --   --   --  242  --  272 267 301    < > = values in this interval not displayed.      Recent Labs   Lab Test 04/25/25  0741 04/24/25  1355 04/24/25  0705 04/23/25  1051 04/21/25  0828    POTASSIUM 4.6 4.6 5.5* 4.2 3.9   CHLORIDE  --   --  105 102 108*   CO2  --   --  24 27 25   BUN  --   --  9.4 8.0 7.0*   CR 0.70  --  0.63* 0.65* 0.62*   ANIONGAP  --   --  11 9 8      Recent Labs   Lab Test 04/24/25  0707 04/16/25  0303 04/15/25  0759   ALBUMIN 2.6* 2.2* 2.2*   BILITOTAL 0.3 0.3 0.3   ALT 18 22 19   AST 24 35 36   ALKPHOS 88 103 103      -------------------------------    Arlyn Wang PA-C  [facility]  [phone]

## 2025-04-25 NOTE — DISCHARGE SUMMARY
[facility] Discharge Summary    Richar Davis MRN# [id number]   Age: 69 year old YOB: 1955     Date of Admission:  3/30/2025  Date of Discharge:  4/25/2025  Admitting Provider:  Clau Calles MD  Discharge Provider:  Arlyn Wang PA-C with Acute Care Surgery service  Discharging Service: General Surgery     Primary Provider: Cory Valles  Primary Care Physician Phone Number: [phone]          Admission Diagnoses:   Principle Diagnosis: Gallstone ileus (H) [K56.3]     Secondary Diagnoses:  Anastomotic breakdown  Enterococcus faecalis bacteremia  Intra-abdominal abscess   Omental nodularity and studding seen on postop CT  Atrial fibrillation with RVR s/p cardioversion  Aortic stenosis  Hypertension  Hyperlipidemia  Acute hypoxia  Bilateral pleural effusion  Obstructive sleep apnea  Deconditioning  Acute anemia, likely blood loss and dilutional as well as due to acute illness  Acute encephalopathy - multifactorial  Severe hypoalbuminemia  Acute on chronic bilateral lower extremity edema  Acute kidney injury  Type 2 diabetes mellitus  Urinary retention   Neuropathy  Nicotine use  Morbid obesity          Discharge Diagnosis:     -Same as above          Procedures:     Procedure(s): Exploratory laparotomy with small bowel enterotomy and removal of gallstone, primary closure (3/30/25).   Exploratory laparotomy with small bowel resection (4/8/25)  Exploratory laparotomy with small bowel anastomosis and abdominal closure (4/10/25)            Discharge Medications:     Current Discharge Medication List        START taking these medications    Details   pantoprazole (PROTONIX) 40 MG EC tablet Take 1 tablet (40 mg) by mouth every morning (before breakfast).    Associated Diagnoses: Gallstone ileus (H)      polyethylene glycol (MIRALAX) 17 GM/Dose powder Take 17 g by mouth daily as needed for constipation.    Associated Diagnoses: Gallstone ileus (H)      senna-docusate  (SENOKOT-S/PERICOLACE) 8.6-50 MG tablet Take 1 tablet by mouth 2 times daily.  Qty: 15 tablet, Refills: 0    Associated Diagnoses: Acute post-operative pain           CONTINUE these medications which have CHANGED    Details   traMADol (ULTRAM) 50 MG tablet Take 1 tablet (50 mg) by mouth every 6 hours as needed for severe pain.  Qty: 15 tablet, Refills: 0    Associated Diagnoses: Acute post-operative pain           CONTINUE these medications which have NOT CHANGED    Details   acetaminophen (TYLENOL) 325 MG tablet Take 2 tablets (650 mg) by mouth every 4 hours as needed for other (For optimal non-opioid multimodal pain management to improve pain control.)  Qty: 100 tablet, Refills: 1    Associated Diagnoses: S/P AVR (aortic valve replacement); S/P CABG (coronary artery bypass graft)      Ascorbic Acid (VITAMIN C) 500 MG CAPS Take 500 mg by mouth daily      atorvastatin (LIPITOR) 80 MG tablet Take 1 tablet (80 mg) by mouth daily.  Qty: 90 tablet, Refills: 3    Associated Diagnoses: S/P AVR (aortic valve replacement); S/P CABG (coronary artery bypass graft)      Cyanocobalamin (VITAMIN B-12 PO) Take 1 tablet by mouth every other day. Unknown dose      diphenhydrAMINE (BENADRYL) 50 MG tablet Take 100 mg by mouth every 6 hours as needed for itching or allergies      fish oil-omega-3 fatty acids 1000 MG capsule Take 2 g by mouth daily      folic acid (FOLVITE) 1 MG tablet Take 1,000 mcg by mouth 2 times daily      furosemide (LASIX) 20 MG tablet Take 1 tablet (20 mg) by mouth daily.  Qty: 90 tablet, Refills: 4    Associated Diagnoses: Coronary artery disease due to lipid rich plaque; Nonrheumatic aortic valve stenosis; S/P AVR (aortic valve replacement); S/P CABG (coronary artery bypass graft)      gabapentin (NEURONTIN) 800 MG tablet Take 800 mg by mouth 3 times daily as needed.      MAGNESIUM PO Take 500 mg by mouth daily.      metFORMIN (GLUCOPHAGE) 500 MG tablet Take 0.5 tablets (250 mg) by mouth daily (with dinner)  (0.5 x 500 mg = 250 mg)  Qty: 30 tablet, Refills: 1    Associated Diagnoses: S/P AVR (aortic valve replacement); S/P CABG (coronary artery bypass graft)      metoprolol succinate ER (TOPROL XL) 25 MG 24 hr tablet Take 1 tablet (25 mg) by mouth every evening    Associated Diagnoses: S/P AVR (aortic valve replacement); S/P CABG (coronary artery bypass graft)      Multiple Vitamins-Minerals (MENS 50+ MULTI VITAMIN/MIN PO) Take 1 tablet by mouth daily      Multiple Vitamins-Minerals (PRESERVISION AREDS 2 PO) Take by mouth daily      tamsulosin (FLOMAX) 0.4 MG capsule Take 0.8 mg by mouth every evening. (2 x 0.4 mg = 0.8 mg)      vitamin D3 (CHOLECALCIFEROL) 50 mcg (2000 units) tablet Take 1 tablet by mouth every other day      amoxicillin (AMOXIL) 500 MG capsule Take 4 capsules (2,000 mg) 30-60 minutes prior to your dental procedure  Qty: 4 capsule, Refills: 1    Associated Diagnoses: S/P AVR (aortic valve replacement)                 Allergies:         Allergies   Allergen Reactions    Niaspan [Niacin] Other (See Comments)     flushing             Brief History of Illness:   Richar Davis is a 69 year old male who presented to the emergency department with abdominal pain.  Patient was feeling unwell and went to the emergency department for further evaluation.  Patient with vomiting.  Patient also had x-ray with possible SBO and sent to the ED.  Was at urgent care with vomiting for the past 3 days.  Patient unable to take medications.  Multiple dilated loops of small bowel in the upper abdomen on x-ray per report.  Evaluation in the emergency department revealed normal creatinine.  Liver function testing within normal range.  White blood cell count of 9.6.  Abdominal x-ray showing multiple dilated loops of small bowel.  CT abdomen showing gallstone ileus distal small bowel obstruction secondary to impacted 2.2 cm gallstone, see report for further details.  Patient was given Ancef in the emergency  department/perioperative time period.  Subsequently was taken to the OR on March 30 with ex lap small bowel enterotomy and removal of gallstone with primary closure.  See operative report for further details.            Hospital Course:   Richar Davis's hospital course was prolonged due to postoperative complication of breakdown of enterotomy repair as well as numerous medical issues. Please see hospitalist and surgery team progress notes for full details. Diagnoses during his hospital stay are listed above and include breakdown of small bowel enterotomy requiring repeat surgeries, intra-abdominal abscess requiring drain placement, multiple medical issues. On the date of discharge Richar is in stable condition and afebrile. He is tolerating diet, having good bowel function, his abdominal wound is making excellent progress, he is working with physical and occupational therapy, he is having good bowel function. He is cleared for discharge to a rehab facility by surgery and medical teams. Of note, CT postoperatively showed omental nodularity concerning for carcinomatosis appearance, but radiologist and surgery team are in agreement that this is likely postop inflammation and we have no true concern for malignancy. Plan for outpatient CT in 3 months to reassess.             Condition on Discharge:        Discharge condition: Stable   Discharge vitals: Blood pressure 115/64, pulse 86, temperature 97.3  F (36.3  C), temperature source Oral, resp. rate 18, height 1.829 m (6'), weight 135.6 kg (298 lb 14.4 oz), SpO2 95%.           Discharge Disposition:     Discharged to rehabilitation facility          Discharge Instructions and Follow-Up:      Richar Davis will return to see Dr. Landrum in the surgical clinic on 5/7/25. Please see AVS for complete discharge instructions.      Arlyn Wang PA-C  [facility]  [phone]

## 2025-04-25 NOTE — PLAN OF CARE
Goal Outcome Evaluation:      Plan of Care Reviewed With: patient    Overall Patient Progress: no changeOverall Patient Progress: no change     Shift: 4/24/25, 1914-3098  POD#/Summary:   3/30: enterotomy and removal gallstone  4/4 new A-flutter  4/7: cardioverted into SR  4/8 Bowel resection  4/10 Bowel resection and wound closure   4/14: A-flutter, cardioverted into SR  4/15 Drain placed for abd abscess   4/23 TERRIE drain removal     Orientation: A&Ox2-3, intermittently confused, disoriented to time  Vital Signs: VSS on RA  Labs: , 150  Pain Management: Ultram half tab x1, scheduled meds  Bowel/Bladder: Incontinent, external cath, BM x2  Drains: Wound vac on abdomen for abscess site  IV: SL  Wounds/Incisions: Midline inc, old TERRIE site, wound vac site  Diet: Mod carb   Activity Level: T/R- independently turns, needs encouragement   Tests/Procedures: N/A  Anticipated Discharge Date/Plan: Pending   Significant information: pt threw entire dinner on floor, states they spilled it.

## 2025-04-25 NOTE — PLAN OF CARE
Goal Outcome Evaluation:      Plan of Care Reviewed With: patient    Overall Patient Progress: no changeOverall Patient Progress: no change         Shift: 4/25/25, 3674-6079  POD#/Summary:   3/30: enterotomy and removal gallstone  4/4 new A-flutter  4/7: cardioverted into SR  4/8 Bowel resection  4/10 Bowel resection and wound closure   4/14: A-flutter, cardioverted into SR  4/15 Drain placed for abd abscess   4/23 TERRIE drain removal     Orientation: A&Ox2-3, intermittently confused, disoriented to time  Vital Signs: VSS on RA  Pain Management: PRN Ultram x1  Bowel/Bladder: Incontinent, multiple soft BMs this shift  Drains: Wound vac on abdomen for abscess site  IV: SL  Wounds/Incisions: Midline inc, old TERRIE site, wound vac site  Diet: Mod carb   Activity Level: T/R- independently turns, needs encouragement   Anticipated Discharge Date/Plan: Pending, was supposed to discharge this afternoon, being kept until patient passes gallstone that is in the small bowel.

## 2025-04-25 NOTE — PROGRESS NOTES
Care Management Follow Up    Length of Stay (days): 26    Expected Discharge Date: 04/25/2025     Concerns to be Addressed: discharge planning     Patient plan of care discussed at interdisciplinary rounds: Yes    Anticipated Discharge Disposition: Skilled Nursing Facility      Anticipated Discharge Services: Home Care  Anticipated Discharge DME:      Patient/family educated on Medicare website which has current facility and service quality ratings: no  Education Provided on the Discharge Plan: Yes  Patient/Family in Agreement with the Plan: yes    Referrals Placed by CM/SW:    Private pay costs discussed: Not applicable    Discussed  Partnership in Safe Discharge Planning  document with patient/family: Yes:     Handoff Completed: No, handoff not indicated or clinically appropriate    Additional Information:  Writer heard from surgery that patient is no longer cleared to discharge today. Writer cancelled the scheduled wheel chair ride and also notified Amita with [facility] that patient can not admit to [facility] today and that he likely will stay through the weekend     Next Steps: Follow for medical readiness - schedule ride and check back with KANDI Cabrera

## 2025-04-25 NOTE — PROGRESS NOTES
[facility]  Hospitalist Progress Note   04/25/2025          Assessment and Plan:       Richar Davis is a 69 year old male with a history of diabetes, hypertension, CABG, status post AVR with bovine valve 10/2022, untreated sleep apnea, hypertension, hyperlipidemia, neuropathy, BPH admitted on 3/30/2025 with abdominal pain nausea and vomiting. S    See below for events of prolonged hospital stay.  CT imaging on 4/24 showed that gallstone that was previously in gallbladder has passed into small bowel, general surgery recommended continue monitoring in hospital until this has passed.     IR guided drain placement 4/15, drain removed 4/23.  Small bowel anastomosis and fascial closure 4/10.  Small bowel ileus with resection on 4/8  Exploratory laparotomy with small bowel enterotomy and removal of gallstone on 3/30.   Gallstone ileus, distal small bowel obstruction secondary to impacted 2.2 cm gallstone.  Enterococcus faecalis bacteremia -cleared  -CT imaging on admission showed Gallstone ileus. Distal small bowel obstruction secondary to an impacted 2.2 cm gallstone.   -Underwent Exploratory laparotomy with small bowel enterotomy and removal of gallstone on 3/30.   -Postoperative course complicated with ileus.  NG tube removed 4/2, unable to tolerate oral diet.   -Had fever on 4/5, COVID/Flu/RSV which was negative.  BC x 2 no growth. CXR without overt pneumonia.  UA then not consistent with infection.  -On 4/7 spiked fever with significant leukocytosis, lactic acidosis. Blood cultures from 4/7, 4/8 with Enterococcus faecalis.  -Returned back to the OR for exploratory laparotomy with small bowel resection on 4/8.  -Postprocedure patient intubated on pressor support, temporary abdominal closure admitted to ICU.   -Returned to OR on 4/10 for small bowel anastomosis and abdominal wound VAC placement.    -Postprocedure continued to have distended abdomen with bloody output.  -CT on 4/14 showing  multiple peripherally enhancing fluid collections in the abdomen which are suspicious for developing abscesses. The largest of these is in the left paracolic gutter. Stable appearance of a contracted gallbladder containing a large gallstone which is fistulized to the duodenum with associated pneumobilia.  -Underwent IR guided abdominal drain placements x 2 on 4/15.  Patient removed NG tube by himself and started on oral liquids on 4/16.  Underwent CT sinogram on 4/18, left intra-abdominal drain removed.  ---Blood Cultures from 4/10 show no growth to date.  Drain cultures no growth to date.  Infectious disease followed, recommended stop antibiotics on 4/21, Zosyn discontinued 4/22.   --On 4/23- IR removed right intra-abdominal drain.  --4/24 CT showed that gallstone that was previously in gallbladder has passed into small bowel.   --4/25 -patient having minimal tenderness, bowel sounds heard.  Tolerating oral diet, having bowel movements  --General Surgery following.  Recommend to continue monitoring in hospital until stone has passed.  Appreciate input.  Continue oral diet as tolerated.  Continue wound VAC per general surgery.  As needed pain meds.  As needed antiemetics.      Increased omental nodularity/stranding on imaging  Due for Health maintenance  --CT sinogram from 4/18 with Increased nodularity/density in the mesenteric fat of the upper abdomen bilaterally suggestive of peritoneal carcinomatosis.   --Oncology followed, recommended reimaging/IR guided biopsy.  --Repeat CT on 4/24, showed omental nodularity and stranding, radiologist read favors inflammatory postop changes rather than malignancy.   --Pathology from surgery showed benign small bowel resection.  --General surgery recommends avoid IR biopsy at this time, have follow-up CT as outpatient in 3 months.  -- Recommend patient have age-appropriate health maintenance as outpatient.  --Diuresis as below.     Postoperative atrial fibrillation with RVR  -status post cardioversion 4/14/2025  New Aflutter with RVR 4/5/2025 status post DCCV on 4/7/2025.  Coronary artery disease status post CABG x 3 in October 2022  Aortic stenosis (status post AVR with bovine valve) October 2022.  Hypertension  Hyperlipidemia.  -Postoperatively developed new aflutter with RVR, was on intravenous heparin drip, rate controlled with Cardizem drip and initiated oral metoprolol.   --Cardiology followed underwent cardioversion on 4/7.   --Heparin was paused as patient back to OR on 4/8 and was started on intravenous heparin during ICU stay.  --On 4/14 patient again developed recurrence of A-fib with RVR with heart rate in 140s.  Initiated on amiodarone drip, underwent successful cardioversion on 4/14.  Currently in sinus rhythm with heart rate in 70s to 80s.  --Echocardiogram 4/15 with EF of 55%.  Right ventricle normal size.  No valve disease.    --Cardiology signed off 4/15, continue with amiodarone loading and switch to oral amiodarone.  --Continue oral amiodarone taper: 200 mg BID x 2 weeks ( starting 4/16), then 200 mg every day.  EKG for QTC on 4/20.   --Was on intravenous heparin, switched to oral Eliquis 5 mg twice daily on 4/18.  Continue Eliquis 5 mg twice daily.  Monitor hemoglobin levels periodically.  --Continue Oral metoprolol 25 mg twice daily with hold parameters  --PTA on Lipitor 80 mg oral daily, LDL 71.  Continue lower dose of Lipitor 20 mg oral daily.      Acute hypoxia-likely from atelectasis, bilateral pleural effusion, postoperative narcotic use, ALIREZA, deconditioning  -CT chest from 4/14 with New small bilateral pleural effusions with overlying atelectasis/consolidation.  -CT chest from 4/24 with trace bilateral pleural effusion with overlying atelectasis  -Weaned off oxygen to room air.    --Has been receiving intermittent Lasix for diuresis, switch IV to oral Lasix on 4/26.  IS, ambulate.     Acute anemia likely from surgical blood loss, acute illness,  dilutional.  Presented with hemoglobin of 15.9, now hemoglobin postoperatively in the 10-11 range.  On 4/24 hemoglobin dropped to 8.9, recheck hemoglobin up to 11 range.  No active bleeding.  Monitor hemoglobin levels periodically.     Acute encephalopathy likely multifactorial from anesthesia/sedation for procedure/delirium, hospitalization/ICU stay/narcotic use.  Patient with prolonged hospital stay -multiple procedures, ICU stay, on narcotics.  CT head 4/18 no acute intracranial pathology.  Continue Seroquel 25 mg twice daily as needed.  Monitor mental status closely.  Fall precautions.  Delirium precautions.     Severe hypoalbuminemia likely from acute illness.  Dilutional.  Serum albumin of 2.1 >2.6  Nutritional intake per surgical team.  Diuresis with Lasix as above.     Acute on chronic bilateral lower extremity edema.  Ultrasound bilateral lower extremities -no DVT.  Diuresis as above.  Lower extremity edema improving.    Hyperkalemia likely hemolyzed sample.  Noted serum potassium 5.5 on 4/24, recheck potassium within normal limits     Acute kidney injury.  Resolved  Anion gap metabolic acidosis.  Resolved.  Mild hypernatremia -improved.  Mild dehydration.- resolved   Hypokalemia from poor intake -replaced.  Monitor     Type 2 diabetes mellitus with a hemoglobin A1c of 6.8  Hold PTA metformin.  Insulin sliding scale.     Urinary retention -Mccarty removed 4/17  Traumatic hematuria -improved  BPH:   Urine cultures from 4/14 no growth  Catheter removed 4/17.  Continue PTA Flomax     Neuropathy:   *Neurontin 800 mg 3 times daily as needed has been filled but patient reports taking 300mg TID  *4/1 per pharmacy note only recent fills are 800 mg and not 300 mg dosing.   --Gabapentin was on hold since admission, resumed at 100 mg twice daily on 4/18.  Continue gabapentin to 100 mg 3 times daily.    Nicotine use: Chews tobacco  Continue nicotine patch, and as needed nicotine lozenges.    Long term cessation to be  encouraged during stay.     ALIREZA not on CPAP.  Recommend sleep studies as outpatient     Medically complicated obesity with a BMI of 41.  Increase all-cause mortality and morbidity.  Consider lifestyle modification with diet and exercise as able to.     Physical deconditioning from medical illness.  Encourage ambulation.  Fall precautions  Rehab team following.    Clinically Significant Risk Factors        # Hyperkalemia: Highest K = 5.5 mmol/L in last 2 days, will monitor as appropriate     # Hypercalcemia: corrected calcium is >10.1, will monitor as appropriate    # Hypoalbuminemia: Lowest albumin = 1.9 g/dL at 4/9/2025  5:25 AM, will monitor as appropriate     # Hypertension: Noted on problem list           # DMII: A1C = 6.8 % (Ref range: <5.7 %) within past 6 months   # Severe Obesity: Estimated body mass index is 40.54 kg/m  as calculated from the following:    Height as of this encounter: 1.829 m (6').    Weight as of this encounter: 135.6 kg (298 lb 14.4 oz).   # Moderate Malnutrition: based on nutrition assessment and treatment provided per dietitian's recommendations.      # Financial/Environmental Concerns: none   # History of CABG: noted on surgical history       Orders Placed This Encounter      Combination Diet Moderate Consistent Carb (60 g CHO per Meal) Diet      Diet      DVT Prophylaxis: SCDs, ambulate.  Code Status: Full Code  Disposition: Expected discharge likely in 1 to 2 days pending passing of gallstone/surgical clearance.    Medically Ready for Discharge: Anticipated Tomorrow    Discussed with patient, bedside RN, general surgery team.  Updated patient's wife by bedside 4/25.  More than 70% of time spent in direct patient care, care coordination, patient counseling, and formalizing plan of care.        Tim Mays MD        Interval History:        Patient lying in bed.  Confused this morning able to answer some questions.  Tolerating oral diet.  Bowel movements.  Denies any chest pain  or palpitations.  Denies any headache or dizziness.  On room air.  Receiving as needed pain meds, scheduled gabapentin.  Had been out of bed to chair       Physical Exam:        Physical Exam   Temp:  [97.3  F (36.3  C)-98.2  F (36.8  C)] 97.3  F (36.3  C)  Pulse:  [81-90] 86  Resp:  [16-18] 18  BP: (106-131)/(64-72) 115/64  SpO2:  [95 %-98 %] 95 %    Intake/Output Summary (Last 24 hours) at 4/23/2025 1412  Last data filed at 4/23/2025 0900  Gross per 24 hour   Intake 1360 ml   Output 1010 ml   Net 350 ml       Admission Weight: 129.3 kg (285 lb)  Current Weight: 135.6 kg (298 lb 14.4 oz)    PHYSICAL EXAM  GENERAL: Patient awake, able to answer most questions  HEART: Regular rate and rhythm. S1S2  LUNGS: bilateral decreased breath sounds, no wheezing or crackles.  Respiration unlabored  ABDOMEN: Distended, wound VAC present. Bowel sounds heard.    NEURO: Moving all extremities  EXTREMITIES:+ +pedal edema.   SKIN: Brusing   PSYCHIATRY Cooperative       Medications:        Current Facility-Administered Medications   Medication Dose Route Frequency Provider Last Rate Last Admin    amiodarone (PACERONE) tablet 200 mg  200 mg Oral BID Richa Schmidt PA-C   200 mg at 04/25/25 0908    Followed by    [START ON 4/29/2025] amiodarone (PACERONE) tablet 200 mg  200 mg Oral Daily Richa Schmidt PA-C        apixaban ANTICOAGULANT (ELIQUIS) tablet 5 mg  5 mg Oral BID Tim Mays MD   5 mg at 04/25/25 0908    atorvastatin (LIPITOR) tablet 20 mg  20 mg Oral QPM Tim Mays MD   20 mg at 04/24/25 2039    furosemide (LASIX) injection 20 mg  20 mg Intravenous Daily Tim Mays MD   20 mg at 04/25/25 0908    [Held by provider] furosemide (LASIX) tablet 20 mg  20 mg Oral Daily Andres Sparks DO   20 mg at 04/23/25 0909    gabapentin (NEURONTIN) capsule 100 mg  100 mg Oral BID Tim Mays MD   100 mg at 04/25/25 0908    insulin aspart (NovoLOG) injection (RAPID ACTING)  1-7 Units Subcutaneous TID AC  Tim Mays MD   1 Units at 04/22/25 1239    insulin aspart (NovoLOG) injection (RAPID ACTING)  1-5 Units Subcutaneous At Bedtime Tim Mays MD        metoprolol tartrate (LOPRESSOR) tablet 25 mg  25 mg Oral BID Tim Mays MD   25 mg at 04/25/25 0908    nicotine (NICODERM CQ) 21 MG/24HR 24 hr patch 1 patch  1 patch Transdermal Daily Hayley Vale APRN CNP   1 patch at 04/25/25 0906    pantoprazole (PROTONIX) EC tablet 40 mg  40 mg Oral QAM AC Tim Mays MD   40 mg at 04/25/25 0908    polyethylene glycol (MIRALAX) Packet 17 g  17 g Oral Daily Ana Dougherty MD   17 g at 04/24/25 1038    senna-docusate (SENOKOT-S/PERICOLACE) 8.6-50 MG per tablet 1 tablet  1 tablet Oral BID Ana Dougherty MD   1 tablet at 04/24/25 1039    sodium chloride (PF) 0.9% PF flush 3 mL  3 mL Intracatheter Q8H MITA Hayley Vale APRN CNP   3 mL at 04/22/25 2140    tamsulosin (FLOMAX) capsule 0.8 mg  0.8 mg Oral QPM Andres Sparks DO   0.8 mg at 04/24/25 2039     Current Facility-Administered Medications   Medication Dose Route Frequency Provider Last Rate Last Admin    acetaminophen (TYLENOL) tablet 650 mg  650 mg Oral Q4H PRN Shi Goncalves PA-C   650 mg at 04/24/25 1039    benzocaine-menthol (CHLORASEPTIC) 6-10 MG lozenge 1 lozenge  1 lozenge Buccal Q1H PRN Hayley Vale APRN CNP   1 lozenge at 04/01/25 0334    bisacodyl (DULCOLAX) suppository 10 mg  10 mg Rectal Daily PRN Hayley Vale APRN CNP        carboxymethylcellulose PF (REFRESH PLUS) 0.5 % ophthalmic solution 1 drop  1 drop Both Eyes Q1H PRN Hayley Vale APRN CNP        glucose gel 15-30 g  15-30 g Oral Q15 Min PRN Tim Mays MD        Or    dextrose 50 % injection 25-50 mL  25-50 mL Intravenous Q15 Min PRN Tim Mays MD        Or    glucagon injection 1 mg  1 mg Subcutaneous Q15 Min PRN Tim Mays MD        diphenhydrAMINE (BENADRYL) elixir 12.5 mg  12.5 mg Oral Q6H PRN Hayley Vale  ADONIS Alexis CNP        Or    diphenhydrAMINE (BENADRYL) injection 12.5 mg  12.5 mg Intravenous Q6H PRN Hayley Vale APRN CNP        HYDROmorphone (DILAUDID) injection 0.2 mg  0.2 mg Intravenous Q4H PRN Tim Mays MD   0.2 mg at 04/15/25 1737    lidocaine (LMX4) cream   Topical Q1H PRN Hayley Vale APRN CNP        lidocaine 1 % 0.1-1 mL  0.1-1 mL Other Q1H PRN Hayley Vale APRN CNP        magnesium hydroxide (MILK OF MAGNESIA) suspension 30 mL  30 mL Oral Daily PRN Hayley Vale APRN CNP   30 mL at 04/01/25 0427    melatonin tablet 10 mg  10 mg Oral At Bedtime PRN Hayley Vale APRN CNP   10 mg at 04/12/25 0042    metoprolol (LOPRESSOR) injection 5 mg  5 mg Intravenous Q4H PRN Hayley Vale APRN CNP   5 mg at 04/14/25 0806    miconazole (MICATIN) 2 % powder   Topical BID PRN Andres Sparks DO   Given at 04/19/25 2301    naloxone (NARCAN) injection 0.2 mg  0.2 mg Intravenous Q2 Min PRN Hayley Vale APRN CNP        Or    naloxone (NARCAN) injection 0.4 mg  0.4 mg Intravenous Q2 Min PRN Hayley Vale APRN CNP        Or    naloxone (NARCAN) injection 0.2 mg  0.2 mg Intramuscular Q2 Min PRN Hayley Vale APRN CNP        Or    naloxone (NARCAN) injection 0.4 mg  0.4 mg Intramuscular Q2 Min PRN Hayley Vale APRN CNP        nicotine (COMMIT) lozenge 2 mg  2 mg Buccal Q1H PRN Hayley Vale APRN CNP   2 mg at 04/03/25 1145    No lozenges or gum should be given while patient on BIPAP/AVAPS/AVAPS AE   Does not apply Continuous PRN Hayley Vale APRN CNP        ondansetron (ZOFRAN ODT) ODT tab 4 mg  4 mg Oral Q6H PRN Hayley Vale APRN CNP   4 mg at 04/08/25 0208    Or    ondansetron (ZOFRAN) injection 4 mg  4 mg Intravenous Q6H PRN Hayley Vale APRN CNP   4 mg at 04/11/25 1232    Patient may continue current oral medications   Does not apply Continuous PRN Hayley Vale APRN CNP        phenol (CHLORASEPTIC) 1.4 % spray 1  mL  1 spray Mouth/Throat Q1H PRN Hayley Vale APRN CNP        prochlorperazine (COMPAZINE) injection 5 mg  5 mg Intravenous Q6H PRN Hayley Vale APRN CNP   5 mg at 04/08/25 1103    Or    prochlorperazine (COMPAZINE) tablet 5 mg  5 mg Oral Q6H PRN Hayley Vale APRN CNP   5 mg at 04/03/25 0756    QUEtiapine (SEROquel) tablet 25 mg  25 mg Oral BID PRN Tim Mays MD   25 mg at 04/24/25 1721    sodium chloride (PF) 0.9% PF flush 3 mL  3 mL Intracatheter q1 min prn Hayley Vale APRN CNP        traMADol (ULTRAM) half-tab 25 mg  25 mg Oral Q6H PRN Tim Mays MD   25 mg at 04/24/25 1612    traMADol (ULTRAM) tablet 50 mg  50 mg Oral Q6H PRN Tim Mays MD   50 mg at 04/23/25 0652            Data:      All new lab and imaging data was reviewed.

## 2025-04-26 ENCOUNTER — APPOINTMENT (OUTPATIENT)
Dept: GENERAL RADIOLOGY | Facility: CLINIC | Age: 70
DRG: 329 | End: 2025-04-26
Attending: SURGERY
Payer: COMMERCIAL

## 2025-04-26 LAB
GLUCOSE BLDC GLUCOMTR-MCNC: 124 MG/DL (ref 70–99)
GLUCOSE BLDC GLUCOMTR-MCNC: 125 MG/DL (ref 70–99)
GLUCOSE BLDC GLUCOMTR-MCNC: 156 MG/DL (ref 70–99)
GLUCOSE BLDC GLUCOMTR-MCNC: 160 MG/DL (ref 70–99)

## 2025-04-26 PROCEDURE — 250N000013 HC RX MED GY IP 250 OP 250 PS 637: Performed by: STUDENT IN AN ORGANIZED HEALTH CARE EDUCATION/TRAINING PROGRAM

## 2025-04-26 PROCEDURE — 99232 SBSQ HOSP IP/OBS MODERATE 35: CPT | Performed by: HOSPITALIST

## 2025-04-26 PROCEDURE — 250N000013 HC RX MED GY IP 250 OP 250 PS 637: Performed by: HOSPITALIST

## 2025-04-26 PROCEDURE — 250N000013 HC RX MED GY IP 250 OP 250 PS 637: Performed by: NURSE PRACTITIONER

## 2025-04-26 PROCEDURE — 120N000001 HC R&B MED SURG/OB

## 2025-04-26 PROCEDURE — 250N000013 HC RX MED GY IP 250 OP 250 PS 637

## 2025-04-26 PROCEDURE — 74019 RADEX ABDOMEN 2 VIEWS: CPT

## 2025-04-26 PROCEDURE — 250N000013 HC RX MED GY IP 250 OP 250 PS 637: Performed by: PHYSICIAN ASSISTANT

## 2025-04-26 RX ADMIN — NICOTINE 1 PATCH: 21 PATCH, EXTENDED RELEASE TRANSDERMAL at 09:06

## 2025-04-26 RX ADMIN — METOPROLOL TARTRATE 25 MG: 25 TABLET, FILM COATED ORAL at 08:55

## 2025-04-26 RX ADMIN — AMIODARONE HYDROCHLORIDE 200 MG: 200 TABLET ORAL at 08:54

## 2025-04-26 RX ADMIN — TRAMADOL HYDROCHLORIDE 50 MG: 50 TABLET, COATED ORAL at 09:03

## 2025-04-26 RX ADMIN — APIXABAN 5 MG: 5 TABLET, FILM COATED ORAL at 20:49

## 2025-04-26 RX ADMIN — TAMSULOSIN HYDROCHLORIDE 0.8 MG: 0.4 CAPSULE ORAL at 20:47

## 2025-04-26 RX ADMIN — ACETAMINOPHEN 650 MG: 325 TABLET, FILM COATED ORAL at 16:44

## 2025-04-26 RX ADMIN — TRAMADOL HYDROCHLORIDE 25 MG: 50 TABLET, FILM COATED ORAL at 16:44

## 2025-04-26 RX ADMIN — PANTOPRAZOLE SODIUM 40 MG: 20 TABLET, DELAYED RELEASE ORAL at 09:03

## 2025-04-26 RX ADMIN — SENNOSIDES AND DOCUSATE SODIUM 1 TABLET: 50; 8.6 TABLET ORAL at 20:49

## 2025-04-26 RX ADMIN — INSULIN ASPART 1 UNITS: 100 INJECTION, SOLUTION INTRAVENOUS; SUBCUTANEOUS at 17:17

## 2025-04-26 RX ADMIN — GABAPENTIN 100 MG: 100 CAPSULE ORAL at 08:54

## 2025-04-26 RX ADMIN — METOPROLOL TARTRATE 25 MG: 25 TABLET, FILM COATED ORAL at 20:49

## 2025-04-26 RX ADMIN — ATORVASTATIN CALCIUM 20 MG: 20 TABLET, FILM COATED ORAL at 20:47

## 2025-04-26 RX ADMIN — AMIODARONE HYDROCHLORIDE 200 MG: 200 TABLET ORAL at 20:49

## 2025-04-26 RX ADMIN — GABAPENTIN 100 MG: 100 CAPSULE ORAL at 20:48

## 2025-04-26 RX ADMIN — FUROSEMIDE 40 MG: 40 TABLET ORAL at 08:54

## 2025-04-26 RX ADMIN — SENNOSIDES AND DOCUSATE SODIUM 1 TABLET: 50; 8.6 TABLET ORAL at 08:54

## 2025-04-26 RX ADMIN — APIXABAN 5 MG: 5 TABLET, FILM COATED ORAL at 08:54

## 2025-04-26 RX ADMIN — POLYETHYLENE GLYCOL 3350 17 G: 17 POWDER, FOR SOLUTION ORAL at 08:54

## 2025-04-26 ASSESSMENT — ACTIVITIES OF DAILY LIVING (ADL)
ADLS_ACUITY_SCORE: 69
ADLS_ACUITY_SCORE: 62
ADLS_ACUITY_SCORE: 69
ADLS_ACUITY_SCORE: 61
ADLS_ACUITY_SCORE: 69
ADLS_ACUITY_SCORE: 62
ADLS_ACUITY_SCORE: 69
ADLS_ACUITY_SCORE: 61
ADLS_ACUITY_SCORE: 61
ADLS_ACUITY_SCORE: 69
ADLS_ACUITY_SCORE: 61
ADLS_ACUITY_SCORE: 65
ADLS_ACUITY_SCORE: 62
ADLS_ACUITY_SCORE: 61
ADLS_ACUITY_SCORE: 69
ADLS_ACUITY_SCORE: 61
ADLS_ACUITY_SCORE: 62
ADLS_ACUITY_SCORE: 61
ADLS_ACUITY_SCORE: 61

## 2025-04-26 NOTE — PROGRESS NOTES
[facility]  Hospitalist Progress Note   04/26/2025          Assessment and Plan:       Richar Davis is a 69 year old male with a history of diabetes, hypertension, CABG, status post AVR with bovine valve 10/2022, untreated sleep apnea, hypertension, hyperlipidemia, neuropathy, BPH admitted on 3/30/2025 with abdominal pain nausea and vomiting. S    See below for events of prolonged hospital stay.  CT imaging on 4/24 showed that gallstone that was previously in gallbladder has passed into small bowel, general surgery recommended continue monitoring in hospital until this has passed.     IR guided drain placement 4/15, drain removed 4/23.  Small bowel anastomosis and fascial closure 4/10.  Small bowel ileus with resection on 4/8  Exploratory laparotomy with small bowel enterotomy and removal of gallstone on 3/30.   Gallstone ileus, distal small bowel obstruction secondary to impacted 2.2 cm gallstone.  Enterococcus faecalis bacteremia -cleared  -CT imaging on admission showed Gallstone ileus. Distal small bowel obstruction secondary to an impacted 2.2 cm gallstone.   -Underwent Exploratory laparotomy with small bowel enterotomy and removal of gallstone on 3/30.   -Postoperative course complicated with ileus.  NG tube removed 4/2, unable to tolerate oral diet.   -Had fever on 4/5, COVID/Flu/RSV which was negative.  BC x 2 no growth. CXR without overt pneumonia.  UA then not consistent with infection.  -On 4/7 spiked fever with significant leukocytosis, lactic acidosis. Blood cultures from 4/7, 4/8 with Enterococcus faecalis.  -Returned back to the OR for exploratory laparotomy with small bowel resection on 4/8.  -Postprocedure patient intubated on pressor support, temporary abdominal closure admitted to ICU.   -Returned to OR on 4/10 for small bowel anastomosis and abdominal wound VAC placement.    -Postprocedure continued to have distended abdomen with bloody output.  -CT on 4/14 showing  multiple peripherally enhancing fluid collections in the abdomen which are suspicious for developing abscesses. The largest of these is in the left paracolic gutter. Stable appearance of a contracted gallbladder containing a large gallstone which is fistulized to the duodenum with associated pneumobilia.  -Underwent IR guided abdominal drain placements x 2 on 4/15.  Patient removed NG tube by himself and started on oral liquids on 4/16.  Underwent CT sinogram on 4/18, left intra-abdominal drain removed.  ---Blood Cultures from 4/10 show no growth to date.  Drain cultures no growth to date.  Infectious disease followed, recommended stop antibiotics on 4/21, Zosyn discontinued 4/22.   --On 4/23- IR removed right intra-abdominal drain.  --4/24 CT showed that gallstone that was previously in gallbladder has passed into small bowel.   --4/26 -patient having minimal tenderness, bowel sounds heard.  Tolerating oral diet, having bowel movements  --General Surgery following.  Recommend to continue monitoring in hospital until stone has passed.  Appreciate input.  Continue oral diet as tolerated.  Continue wound VAC per general surgery.  As needed pain meds.  As needed antiemetics.      Increased omental nodularity/stranding on imaging  Due for Health maintenance  --CT sinogram from 4/18 with Increased nodularity/density in the mesenteric fat of the upper abdomen bilaterally suggestive of peritoneal carcinomatosis.   --Oncology followed, recommended reimaging/IR guided biopsy.  --Repeat CT on 4/24, showed omental nodularity and stranding, radiologist read favors inflammatory postop changes rather than malignancy.   --Pathology from surgery showed benign small bowel resection.  --General surgery recommends avoid IR biopsy at this time, have follow-up CT as outpatient in 3 months.  -- Recommend patient have age-appropriate health maintenance as outpatient.  --Diuresis as below.     Postoperative atrial fibrillation with RVR  -status post cardioversion 4/14/2025  New Aflutter with RVR 4/5/2025 status post DCCV on 4/7/2025.  Coronary artery disease status post CABG x 3 in October 2022  Aortic stenosis (status post AVR with bovine valve) October 2022.  Hypertension  Hyperlipidemia.  -Postoperatively developed new aflutter with RVR, was on intravenous heparin drip, rate controlled with Cardizem drip and initiated oral metoprolol.   --Cardiology followed underwent cardioversion on 4/7.   --Heparin was paused as patient back to OR on 4/8 and was started on intravenous heparin during ICU stay.  --On 4/14 patient again developed recurrence of A-fib with RVR with heart rate in 140s.  Initiated on amiodarone drip, underwent successful cardioversion on 4/14.  Currently in sinus rhythm with heart rate in 70s to 80s.  --Echocardiogram 4/15 with EF of 55%.  Right ventricle normal size.  No valve disease.    --Cardiology signed off 4/15, continue with amiodarone loading and switch to oral amiodarone.  --Continue oral amiodarone taper: 200 mg BID x 2 weeks ( starting 4/15), then 200 mg every day (starting 4/29).  EKG for QTC on 4/20.   --Was on intravenous heparin, switched to oral Eliquis 5 mg twice daily on 4/18.  Continue Eliquis 5 mg twice daily.  Monitor hemoglobin levels periodically.  --Continue Oral metoprolol 25 mg twice daily with hold parameters  --PTA on Lipitor 80 mg oral daily, LDL 71.  Continue lower dose of Lipitor 20 mg oral daily.   -- Follow-up with cardiology in clinic in 2 weeks after discharge    Acute hypoxia-likely from atelectasis, bilateral pleural effusion, postoperative narcotic use, ALIREZA, deconditioning  -CT chest from 4/14 with New small bilateral pleural effusions with overlying atelectasis/consolidation.  -CT chest from 4/24 with trace bilateral pleural effusion with overlying atelectasis  -Weaned off oxygen to room air.    --Has been receiving intermittent Lasix for diuresis, switch IV to oral Lasix on 4/26.  --Started  on 40 mg Lasix oral daily [4/26], can decrease to home dose of 20 mg oral daily if euvolemic.  IS, ambulate.     Acute anemia likely from surgical blood loss, acute illness, dilutional.  Presented with hemoglobin of 15.9, now hemoglobin postoperatively in the 10-11 range.    -On 4/24, hemoglobin dropped to 8.9, recheck hemoglobin up to 11 range.    No active bleeding.  Monitor hemoglobin levels periodically.     Acute encephalopathy likely multifactorial from anesthesia/sedation for procedure/delirium, hospitalization/ICU stay/narcotic use.  Patient with prolonged hospital stay -multiple procedures, ICU stay, on narcotics.  CT head 4/18 no acute intracranial pathology.  Continue Seroquel 25 mg twice daily as needed.  Monitor mental status closely.  Fall precautions.  Delirium precautions.     Severe hypoalbuminemia likely from acute illness.  Dilutional.  Serum albumin of 2.1 >2.6  Nutritional intake per surgical team.  Diuresis with Lasix as above.     Acute on chronic bilateral lower extremity edema.  Ultrasound bilateral lower extremities -no DVT.  Diuresis as above.  Lower extremity edema improving.    Hyperkalemia likely hemolyzed sample.  Noted serum potassium 5.5 on 4/24, recheck potassium within normal limits     Acute kidney injury.  Resolved  Anion gap metabolic acidosis.  Resolved.  Mild hypernatremia -improved.  Mild dehydration.- resolved   Hypokalemia from poor intake -replaced.  Monitor     Type 2 diabetes mellitus with a hemoglobin A1c of 6.8  Hold PTA metformin.  Insulin sliding scale.     Urinary retention -Mccarty removed 4/17  Traumatic hematuria -improved  BPH:   Urine cultures from 4/14 no growth  Catheter removed 4/17.  Continue PTA Flomax     Neuropathy:   *Neurontin 800 mg 3 times daily as needed has been filled but patient reports taking 300mg TID  *4/1 per pharmacy note only recent fills are 800 mg and not 300 mg dosing.   --Gabapentin was on hold since admission, resumed on 4/18 at lower  dose.  Will increase gabapentin to 100 mg 3 times daily.    Nicotine use: Chews tobacco  Continue nicotine patch, and as needed nicotine lozenges.    Long term cessation to be encouraged during stay.     ALIREZA not on CPAP.  Recommend sleep studies as outpatient     Medically complicated obesity with a BMI of 41.  Increase all-cause mortality and morbidity.  Consider lifestyle modification with diet and exercise as able to.     Physical deconditioning from medical illness.  Encourage ambulation-out of bed to chair for all meals.  Fall precautions  Rehab team followed, recommended TCU    Clinically Significant Risk Factors               # Hypoalbuminemia: Lowest albumin = 1.9 g/dL at 4/9/2025  5:25 AM, will monitor as appropriate     # Hypertension: Noted on problem list           # DMII: A1C = 6.8 % (Ref range: <5.7 %) within past 6 months   # Severe Obesity: Estimated body mass index is 40.54 kg/m  as calculated from the following:    Height as of this encounter: 1.829 m (6').    Weight as of this encounter: 135.6 kg (298 lb 14.4 oz).   # Moderate Malnutrition: based on nutrition assessment and treatment provided per dietitian's recommendations.      # Financial/Environmental Concerns: none   # History of CABG: noted on surgical history       Orders Placed This Encounter      Combination Diet Moderate Consistent Carb (60 g CHO per Meal) Diet      Diet      DVT Prophylaxis: SCDs, ambulate.  Code Status: Full Code  Disposition: Expected discharge likely in 1 to 2 days pending passing of gallstone/surgical clearance.    Medically Ready for Discharge: Anticipated Tomorrow    Discussed with patient, bedside RN, general surgery team.  Updated patient's wife by bedside 4/26  More than 70% of time spent in direct patient care, care coordination, patient counseling, and formalizing plan of care.        Tim Mays MD        Interval History:        Patient lying in bed.  Confused this morning able to answer some  questions.  Patient's wife by bedside expressing concerns that patient had not been out of bed, not had  Tolerating oral diet.  Bowel movements.  Denies any chest pain or palpitations.  Denies any headache or dizziness.  On room air.  Receiving as needed pain meds, scheduled gabapentin.       Physical Exam:        Physical Exam   Temp:  [97.4  F (36.3  C)-98.8  F (37.1  C)] 98.8  F (37.1  C)  Pulse:  [83-96] 84  Resp:  [16] 16  BP: (100-142)/(64-82) 116/71  SpO2:  [92 %-98 %] 96 %    Intake/Output Summary (Last 24 hours) at 4/23/2025 1412  Last data filed at 4/23/2025 0900  Gross per 24 hour   Intake 1360 ml   Output 1010 ml   Net 350 ml       Admission Weight: 129.3 kg (285 lb)  Current Weight: 135.6 kg (298 lb 14.4 oz)    PHYSICAL EXAM  GENERAL: Patient awake, able to answer most questions  HEART: Regular rate and rhythm. S1S2  LUNGS: bilateral decreased breath sounds, no wheezing or crackles.  Respiration unlabored  ABDOMEN: Distended, wound VAC present. Bowel sounds heard.    NEURO: Moving all extremities.  EXTREMITIES: + pedal edema.   SKIN: Brusing   PSYCHIATRY Cooperative       Medications:        Current Facility-Administered Medications   Medication Dose Route Frequency Provider Last Rate Last Admin    amiodarone (PACERONE) tablet 200 mg  200 mg Oral BID Richa Schmidt PA-C   200 mg at 04/26/25 0854    Followed by    [START ON 4/29/2025] amiodarone (PACERONE) tablet 200 mg  200 mg Oral Daily Richa Schmidt PA-C        apixaban ANTICOAGULANT (ELIQUIS) tablet 5 mg  5 mg Oral BID Tim Mays MD   5 mg at 04/26/25 0854    atorvastatin (LIPITOR) tablet 20 mg  20 mg Oral QPM Tim Mays MD   20 mg at 04/25/25 2153    furosemide (LASIX) tablet 40 mg  40 mg Oral Daily Tim Mays MD   40 mg at 04/26/25 0854    gabapentin (NEURONTIN) capsule 100 mg  100 mg Oral BID Tim Mays MD   100 mg at 04/26/25 0854    insulin aspart (NovoLOG) injection (RAPID ACTING)  1-7 Units Subcutaneous  TID Tim Seymour MD   1 Units at 04/22/25 1239    insulin aspart (NovoLOG) injection (RAPID ACTING)  1-5 Units Subcutaneous At Bedtime Tim Mays MD        metoprolol tartrate (LOPRESSOR) tablet 25 mg  25 mg Oral BID Tim Mays MD   25 mg at 04/26/25 0855    nicotine (NICODERM CQ) 21 MG/24HR 24 hr patch 1 patch  1 patch Transdermal Daily Hayley Vale APRN CNP   1 patch at 04/26/25 0906    pantoprazole (PROTONIX) EC tablet 40 mg  40 mg Oral QAM  Tim Mays MD   40 mg at 04/26/25 0903    polyethylene glycol (MIRALAX) Packet 17 g  17 g Oral Daily Ana Dougherty MD   17 g at 04/26/25 0854    senna-docusate (SENOKOT-S/PERICOLACE) 8.6-50 MG per tablet 1 tablet  1 tablet Oral BID Ana Dougherty MD   1 tablet at 04/26/25 0854    sodium chloride (PF) 0.9% PF flush 3 mL  3 mL Intracatheter Q8H MITA Hayley Vale APRN CNP   3 mL at 04/26/25 0607    tamsulosin (FLOMAX) capsule 0.8 mg  0.8 mg Oral QPM Andres Sparks DO   0.8 mg at 04/25/25 2154     Current Facility-Administered Medications   Medication Dose Route Frequency Provider Last Rate Last Admin    acetaminophen (TYLENOL) tablet 650 mg  650 mg Oral Q4H PRN Shi Goncalves PA-C   650 mg at 04/24/25 1039    benzocaine-menthol (CHLORASEPTIC) 6-10 MG lozenge 1 lozenge  1 lozenge Buccal Q1H PRN Hayley Vale APRN CNP   1 lozenge at 04/01/25 0334    bisacodyl (DULCOLAX) suppository 10 mg  10 mg Rectal Daily PRN Hayley Vale APRN CNP        carboxymethylcellulose PF (REFRESH PLUS) 0.5 % ophthalmic solution 1 drop  1 drop Both Eyes Q1H PRN Hayley Vale APRN CNP        glucose gel 15-30 g  15-30 g Oral Q15 Min PRN Tim Mays MD        Or    dextrose 50 % injection 25-50 mL  25-50 mL Intravenous Q15 Min PRN Tim Mays MD        Or    glucagon injection 1 mg  1 mg Subcutaneous Q15 Min PRN Tim Mays MD        diphenhydrAMINE (BENADRYL) elixir 12.5 mg  12.5 mg Oral Q6H PRN Kavin,  ADONIS Dumont CNP        Or    diphenhydrAMINE (BENADRYL) injection 12.5 mg  12.5 mg Intravenous Q6H PRN Hayley Vale APRN CNP        HYDROmorphone (DILAUDID) injection 0.2 mg  0.2 mg Intravenous Q4H PRN Tim Mays MD   0.2 mg at 04/15/25 1737    lidocaine (LMX4) cream   Topical Q1H PRN Hayley Vale APRN CNP        lidocaine 1 % 0.1-1 mL  0.1-1 mL Other Q1H PRN Hayley Vale APRN CNP        magnesium hydroxide (MILK OF MAGNESIA) suspension 30 mL  30 mL Oral Daily PRN Hayley Vale APRN CNP   30 mL at 04/01/25 0427    melatonin tablet 10 mg  10 mg Oral At Bedtime PRN Hayley Vale APRN CNP   10 mg at 04/12/25 0042    metoprolol (LOPRESSOR) injection 5 mg  5 mg Intravenous Q4H PRN Hayley Vale APRN CNP   5 mg at 04/14/25 0806    miconazole (MICATIN) 2 % powder   Topical BID PRN Andres Sparks DO   Given at 04/19/25 2301    naloxone (NARCAN) injection 0.2 mg  0.2 mg Intravenous Q2 Min PRN Hayley Vale APRN CNP        Or    naloxone (NARCAN) injection 0.4 mg  0.4 mg Intravenous Q2 Min PRN Hayley Vale APRN CNP        Or    naloxone (NARCAN) injection 0.2 mg  0.2 mg Intramuscular Q2 Min PRN Hayley Vale APRN CNP        Or    naloxone (NARCAN) injection 0.4 mg  0.4 mg Intramuscular Q2 Min PRN Hayley Vale APRN CNP        nicotine (COMMIT) lozenge 2 mg  2 mg Buccal Q1H PRN Hayley Vale APRN CNP   2 mg at 04/03/25 1145    No lozenges or gum should be given while patient on BIPAP/AVAPS/AVAPS AE   Does not apply Continuous PRN Hayley Vale APRN CNP        ondansetron (ZOFRAN ODT) ODT tab 4 mg  4 mg Oral Q6H PRN Hayley Vale APRN CNP   4 mg at 04/08/25 0208    Or    ondansetron (ZOFRAN) injection 4 mg  4 mg Intravenous Q6H PRN Hayley Vale APRN CNP   4 mg at 04/11/25 1232    Patient may continue current oral medications   Does not apply Continuous PRN Hayley Vale APRN CNP        phenol (CHLORASEPTIC) 1.4 %  spray 1 mL  1 spray Mouth/Throat Q1H PRN Hayley Vale APRN CNP        prochlorperazine (COMPAZINE) injection 5 mg  5 mg Intravenous Q6H PRN Hayley Vale APRN CNP   5 mg at 04/08/25 1103    Or    prochlorperazine (COMPAZINE) tablet 5 mg  5 mg Oral Q6H PRN Hayley Vale APRN CNP   5 mg at 04/03/25 0756    QUEtiapine (SEROquel) tablet 25 mg  25 mg Oral BID PRN Tim Mays MD   25 mg at 04/25/25 2204    sodium chloride (PF) 0.9% PF flush 3 mL  3 mL Intracatheter q1 min prn Hayley Vale APRN CNP        traMADol (ULTRAM) half-tab 25 mg  25 mg Oral Q6H PRN Tim Mays MD   25 mg at 04/24/25 1612    traMADol (ULTRAM) tablet 50 mg  50 mg Oral Q6H PRN Tim Mays MD   50 mg at 04/26/25 0903            Data:      All new lab and imaging data was reviewed.

## 2025-04-26 NOTE — PLAN OF CARE
Goal Outcome Evaluation:      Plan of Care Reviewed With: patient    Overall Patient Progress: no changeOverall Patient Progress: no change    Shift: 4/26/25 0869-1941  POD#/Summary:   3/30: enterotomy and removal gallstone  4/4 new A-flutter  4/7: cardioverted into SR  4/8 Bowel resection  4/10 Bowel resection and wound closure   4/14: A-flutter, cardioverted into SR  4/15 Drain placed for abd abscess   4/23 TERRIE drain removal     Orientation: A&O to himself; Disoritented to time, situation; Occaisonally seemed to know he was in the hospital  Vital Signs: VSS RA  Labs:   Pain Management: Ultram half tab x1 PRN; PRN PO Tylenol x1  Bowel/Bladder: Incontinent; BM x1  Drains: Wound vac on abdomen for abscess site  IV: PIV SL   Wounds/Incisions: Midline inc, old TERRIE site, wound vac site  Diet: Mod carb   Activity Level: T/R- independently turns, needs encouragement   Tests/Procedures: N/A  Anticipated Discharge Date/Plan: Pending   Significant information: Linen and lift sheet changed this afternoon

## 2025-04-26 NOTE — PLAN OF CARE
Goal Outcome Evaluation:       Date & Time: 04/26/2025  Surgery/POD#:     POD #27 Exploratory laparotomy with small bowel enterotomy and removal of gallstone    POD #19 exploratory laparotomy with small bowel resection     POD # 21 small bowel anastomosis and abdominal wound VAC placement     POD #12 IR guided abdominal drain placements x 2     Behavior & Aggression: Calm and cooperative, follows commands   Fall Risk: Yes  Orientation:Disoriented x4   ABNL VS/O2:VSS, RA   ABNL Labs: NA   Pain Management: PAINAID score 0  Bowel/Bladder: Incontinent of bowel and bladder. LBM 04/26  Drains: NA   Wounds/incisions: Midline inc. W/ Vac   Diet: Mod carb   Activity: Ass.2 w/ lift, pt is able to reposition himself in bed   Tests/Procedures: NA   Anticipated  DC Date: TBD   Significant Information:

## 2025-04-26 NOTE — PLAN OF CARE
Goal Outcome Evaluation:    Summary:  POD#/Summary:   3/30: enterotomy and removal gallstone  4/4 new A-flutter  4/7: cardioverted into SR  4/8 Bowel resection  4/10 Bowel resection and wound closure   4/14: A-flutter, cardioverted into SR  4/15 Drain placed for abd abscess   4/23 TERRIE drain removal      Date & Time: 4/25/25 0926-0117   Orientation:  A&Ox2-3, intermittently confused, disoriented to time  Activity Level: A2 GBW    Fall Risk: Yes    Behavior & Aggression: Green    Pain Management: PRN tramadol    ABNL VS/O2: VSS on RA    Tele: N/A   ABNL Lab/BG: ,    Diet:Mod carb    Bowel/Bladder: Incontinent    Skin:  Midline inc, old TERRIE site, wound vac site  Drains/Devices:  Wound vac on abdomen for abscess site  Tests/Procedures: None   Anticipated DC Date:   Pending, was supposed to discharge this afternoon, being kept until patient passes gallstone that is in the small bowel.   Other Important Info:

## 2025-04-26 NOTE — PROGRESS NOTES
Surgery Note    Richar reports he is doing fine.  He has tolerated regular breakfast no significant abdominal discomfort.  He has not had a bowel movement yet today.  Had normal bowel movements yesterday.  Denies nausea.  Has not been able to ambulate or out of bed much.  His wife is very concerned about this.    O: /71   Pulse 84   Temp 98.8  F (37.1  C) (Oral)   Resp 16   Ht 1.829 m (6')   Wt 135.6 kg (298 lb 14.4 oz)   SpO2 96%   BMI 40.54 kg/m    Abdomen: Obese, soft, wound VAC on midline incision    A/P: 69 year old male admitted for gallstone ileus; S/P ex lap with removal of gallstone through small bowel enterotomy 3/30/25.  New onset of Aflutter with RVR, underwent cardioversion on 4/7. RRT called overnight 4/7 for hypotension and patient found to have elevated lactate, leukocytosis of 36. CT showing increased fluid and free air, drainage from midline incision. Taken to OR 4/8 for ex lap, found to have gross sucus breakdown of enterotomy, return to OR 4/10 for small bowel anastomosis and fascial closure.     CT completed 4/24 revealed a another gallstone in the small intestine near the prior anastomosis.  We are hoping this will pass without further surgical intervention.  Will plan for a follow-up abdominal x-ray to evaluate stone position tomorrow.  Okay to continue diet.      - abdominal XR tomorrow am  - continue diet  - continue bowel regimen  - encouraged pt to be out of bed, in chair and ideally would ambulate  - needs PT to see him      Clau Calles MD  [facility]  [phone]

## 2025-04-26 NOTE — PLAN OF CARE
Goal Outcome Evaluation:       Date & Time: 04/26/2025 3189-3703    POD #27 Exploratory laparotomy with small bowel enterotomy and removal of gallstone    POD #19 exploratory laparotomy with small bowel resection     POD # 21 small bowel anastomosis and abdominal wound VAC placement     POD #12 IR guided abdominal drain placements x 2     Behavior & Aggression: Calm and cooperative, follows commands   Fall Risk: Yes  Orientation:Disoriented x4   ABNL VS/O2:VSS, RA   ABNL Labs: NA   Pain Management: PAINAID score 0  Bowel/Bladder: Incontinent of bowel and bladder. LBM 04/26  Drains: NA   Wounds/incisions: Midline inc. W/ Vac   Diet: Mod carb   Activity: Ass.2 ludwin steady, pt is able to reposition himself in bed   Tests/Procedures: NA   Anticipated  DC Date: TBD   Significant Information:   Pt up to wheelchair today. Went to family room area with wife and looked outside.     X-ray done today. Results pending.

## 2025-04-27 ENCOUNTER — APPOINTMENT (OUTPATIENT)
Dept: CT IMAGING | Facility: CLINIC | Age: 70
DRG: 329 | End: 2025-04-27
Attending: STUDENT IN AN ORGANIZED HEALTH CARE EDUCATION/TRAINING PROGRAM
Payer: COMMERCIAL

## 2025-04-27 LAB
GLUCOSE BLDC GLUCOMTR-MCNC: 133 MG/DL (ref 70–99)
GLUCOSE BLDC GLUCOMTR-MCNC: 142 MG/DL (ref 70–99)
GLUCOSE BLDC GLUCOMTR-MCNC: 150 MG/DL (ref 70–99)
GLUCOSE BLDC GLUCOMTR-MCNC: 151 MG/DL (ref 70–99)
GLUCOSE BLDC GLUCOMTR-MCNC: 157 MG/DL (ref 70–99)

## 2025-04-27 PROCEDURE — 250N000013 HC RX MED GY IP 250 OP 250 PS 637: Performed by: NURSE PRACTITIONER

## 2025-04-27 PROCEDURE — 74176 CT ABD & PELVIS W/O CONTRAST: CPT

## 2025-04-27 PROCEDURE — 250N000013 HC RX MED GY IP 250 OP 250 PS 637: Performed by: STUDENT IN AN ORGANIZED HEALTH CARE EDUCATION/TRAINING PROGRAM

## 2025-04-27 PROCEDURE — 120N000001 HC R&B MED SURG/OB

## 2025-04-27 PROCEDURE — 250N000013 HC RX MED GY IP 250 OP 250 PS 637: Performed by: HOSPITALIST

## 2025-04-27 PROCEDURE — 99232 SBSQ HOSP IP/OBS MODERATE 35: CPT | Performed by: HOSPITALIST

## 2025-04-27 PROCEDURE — 250N000013 HC RX MED GY IP 250 OP 250 PS 637

## 2025-04-27 PROCEDURE — 250N000013 HC RX MED GY IP 250 OP 250 PS 637: Performed by: PHYSICIAN ASSISTANT

## 2025-04-27 RX ADMIN — Medication 10 MG: at 22:27

## 2025-04-27 RX ADMIN — ACETAMINOPHEN 650 MG: 325 TABLET, FILM COATED ORAL at 00:14

## 2025-04-27 RX ADMIN — AMIODARONE HYDROCHLORIDE 200 MG: 200 TABLET ORAL at 20:13

## 2025-04-27 RX ADMIN — FUROSEMIDE 40 MG: 40 TABLET ORAL at 09:23

## 2025-04-27 RX ADMIN — METOPROLOL TARTRATE 25 MG: 25 TABLET, FILM COATED ORAL at 20:13

## 2025-04-27 RX ADMIN — TRAMADOL HYDROCHLORIDE 50 MG: 50 TABLET, COATED ORAL at 12:32

## 2025-04-27 RX ADMIN — NICOTINE 1 PATCH: 21 PATCH, EXTENDED RELEASE TRANSDERMAL at 09:22

## 2025-04-27 RX ADMIN — POLYETHYLENE GLYCOL 3350 17 G: 17 POWDER, FOR SOLUTION ORAL at 09:22

## 2025-04-27 RX ADMIN — ACETAMINOPHEN 650 MG: 325 TABLET, FILM COATED ORAL at 12:32

## 2025-04-27 RX ADMIN — METOPROLOL TARTRATE 25 MG: 25 TABLET, FILM COATED ORAL at 09:23

## 2025-04-27 RX ADMIN — GABAPENTIN 100 MG: 100 CAPSULE ORAL at 20:13

## 2025-04-27 RX ADMIN — INSULIN ASPART 1 UNITS: 100 INJECTION, SOLUTION INTRAVENOUS; SUBCUTANEOUS at 12:34

## 2025-04-27 RX ADMIN — SENNOSIDES AND DOCUSATE SODIUM 1 TABLET: 50; 8.6 TABLET ORAL at 20:13

## 2025-04-27 RX ADMIN — TAMSULOSIN HYDROCHLORIDE 0.8 MG: 0.4 CAPSULE ORAL at 20:13

## 2025-04-27 RX ADMIN — INSULIN ASPART 1 UNITS: 100 INJECTION, SOLUTION INTRAVENOUS; SUBCUTANEOUS at 08:11

## 2025-04-27 RX ADMIN — ATORVASTATIN CALCIUM 20 MG: 20 TABLET, FILM COATED ORAL at 20:13

## 2025-04-27 RX ADMIN — APIXABAN 5 MG: 5 TABLET, FILM COATED ORAL at 20:14

## 2025-04-27 RX ADMIN — APIXABAN 5 MG: 5 TABLET, FILM COATED ORAL at 09:23

## 2025-04-27 RX ADMIN — TRAMADOL HYDROCHLORIDE 50 MG: 50 TABLET, COATED ORAL at 20:19

## 2025-04-27 RX ADMIN — GABAPENTIN 100 MG: 100 CAPSULE ORAL at 09:23

## 2025-04-27 RX ADMIN — AMIODARONE HYDROCHLORIDE 200 MG: 200 TABLET ORAL at 09:23

## 2025-04-27 RX ADMIN — PANTOPRAZOLE SODIUM 40 MG: 20 TABLET, DELAYED RELEASE ORAL at 07:45

## 2025-04-27 RX ADMIN — QUETIAPINE FUMARATE 25 MG: 25 TABLET ORAL at 20:14

## 2025-04-27 RX ADMIN — SENNOSIDES AND DOCUSATE SODIUM 1 TABLET: 50; 8.6 TABLET ORAL at 09:23

## 2025-04-27 ASSESSMENT — ACTIVITIES OF DAILY LIVING (ADL)
ADLS_ACUITY_SCORE: 61
ADLS_ACUITY_SCORE: 57
ADLS_ACUITY_SCORE: 61
ADLS_ACUITY_SCORE: 57
ADLS_ACUITY_SCORE: 57
ADLS_ACUITY_SCORE: 61
ADLS_ACUITY_SCORE: 61
ADLS_ACUITY_SCORE: 57
ADLS_ACUITY_SCORE: 62
ADLS_ACUITY_SCORE: 61
ADLS_ACUITY_SCORE: 62
ADLS_ACUITY_SCORE: 61
ADLS_ACUITY_SCORE: 57
ADLS_ACUITY_SCORE: 62
ADLS_ACUITY_SCORE: 61
ADLS_ACUITY_SCORE: 57
ADLS_ACUITY_SCORE: 57
ADLS_ACUITY_SCORE: 61
ADLS_ACUITY_SCORE: 57
ADLS_ACUITY_SCORE: 57

## 2025-04-27 NOTE — PROGRESS NOTES
Care Management Follow Up    Length of Stay (days): 28    Expected Discharge Date: 04/28/2025     Concerns to be Addressed: discharge planning     Patient plan of care discussed at interdisciplinary rounds: Yes    Anticipated Discharge Disposition: Skilled Nursing Facility              Anticipated Discharge Services: Home Care  Anticipated Discharge DME:      Patient/family educated on Medicare website which has current facility and service quality ratings: no  Education Provided on the Discharge Plan: Yes  Patient/Family in Agreement with the Plan: yes    Referrals Placed by CM/SW:    Private pay costs discussed: Not applicable    Discussed  Partnership in Safe Discharge Planning  document with patient/family: No     Handoff Completed: No, handoff not indicated or clinically appropriate    Additional Information:  Writer attended morning charge report. Charge RN states pt is not medically stable for a few days. Charge states pt has been off a sitter since 900am today.Writer left notice for unit  to follow up with pt closer to date of discharge.      Next Steps: awaiting medical stability.

## 2025-04-27 NOTE — PLAN OF CARE
Goal Outcome Evaluation:      Plan of Care Reviewed With: patient, spouse    Overall Patient Progress: improvingOverall Patient Progress: improving    Outcome Evaluation: Patient is becoming more alert and orientated today    Shift: 4/27/25 2919-1663  POD#/Summary:   3/30: enterotomy and removal gallstone  4/4 new A-flutter  4/7: cardioverted into SR  4/8 Bowel resection  4/10 Bowel resection and wound closure   4/14: A-flutter, cardioverted into SR  4/15 Drain placed for abd abscess   4/23 TERRIE drain removal     Orientation: AO 2-3; D/O time and situation waxes and wanes; Patient is becoming more alert, and was intelligently talking about being in the hospital today and asking questions about his care; Calling appropriately   Vital Signs: VSS RA  Labs: -142  Pain Management: PRN Tramadol x1 ; PRN PO Tylenol x1  Bowel/Bladder: Continent today; up to BSC and using Bed pan   Drains: Wound vac on abdomen for abscess site  IV: PIV SL   Wounds/Incisions: Midline inc, old TERRIE site, wound vac site  Diet: Mod carb   Activity Level: T/R- independently turns, needs encouragement   Tests/Procedures: N/A  Anticipated Discharge Date/Plan: Pending   Significant information: Linen and lift sheet changed this afternoon; CT SCAN today

## 2025-04-27 NOTE — PROGRESS NOTES
General Surgery Progress Note    Subjective: Richar reports he is doing fine.  He had several bowel movements yesterday.  He has been eating a regular diet.  He has minimal abdominal discomfort.    Objective: /71 (BP Location: Right arm)   Pulse 82   Temp 97.5  F (36.4  C) (Axillary)   Resp 18   Ht 1.829 m (6')   Wt 125.3 kg (276 lb 4.8 oz)   SpO2 98%   BMI 37.47 kg/m    Gen: Lying in bed, appears comfortable  CV: RRR  Pulm: nonlabored breathing  Abd: Obese, wound VAC in place to midline incision is intact, minimally tender to palpation  Ext: WWP    Assessment/Plan:   Richar Davis  is a 69 year old male admitted for gallstone ileus; S/P ex lap with removal of gallstone through small bowel enterotomy 3/30/25.  New onset of Aflutter with RVR, underwent cardioversion on 4/7. RRT called overnight 4/7 for hypotension and patient found to have elevated lactate, leukocytosis of 36. CT showing increased fluid and free air, drainage from midline incision. Taken to OR 4/8 for ex lap, found to have gross sucus  and pinpoint leak at enterotomy repair site, return to OR 4/10 for small bowel anastomosis and fascial closure.      CT completed 4/24 revealed a another gallstone in the small intestine near the prior anastomosis.  We are hoping this will pass without further surgical intervention as he is high risk for additional surgery.  X-ray yesterday did not reveal any evidence of ongoing retained stone however difficult imaging with his body habitus.  We discussed that it would be best to do a noncontrast CT today as we have been able to see stones better on this for him and I would hate to discharge him and have him come back with an obstruction related to the stone.  - Noncontrast CT today  - If no stone present plan discharge tomorrow to TCU as previously planned  - Please continue to get patient out of bed to chair and ambulating    Addendum: CT reveals stone still present near his anastomosis. It is not  obstructing. I continue to favor expectant management and no further surgery unless it becomes obstructing. Could consider gastrograffin challenge to see if that moves it along. Will defer to ACS team tomorrow am.     Clau Calles MD  [facility]  [phone]

## 2025-04-27 NOTE — PROGRESS NOTES
[facility]  Hospitalist Progress Note   04/27/2025          Assessment and Plan:       Richar Davis is a 69 year old male with a history of diabetes, hypertension, CABG, status post AVR with bovine valve 10/2022, untreated sleep apnea, hypertension, hyperlipidemia, neuropathy, BPH admitted on 3/30/2025 with abdominal pain nausea and vomiting. S    See below for events of prolonged hospital stay.  CT imaging on 4/24 showed that gallstone that was previously in gallbladder has passed into small bowel, general surgery recommended continue monitoring in hospital until this has passed.     IR guided drain placement 4/15, drain removed 4/23.  Small bowel anastomosis and fascial closure 4/10.  Small bowel ileus with resection on 4/8  Exploratory laparotomy with small bowel enterotomy and removal of gallstone on 3/30.   Gallstone ileus, distal small bowel obstruction secondary to impacted 2.2 cm gallstone.  Enterococcus faecalis bacteremia -cleared  -CT imaging on admission showed Gallstone ileus. Distal small bowel obstruction secondary to an impacted 2.2 cm gallstone.   -Underwent Exploratory laparotomy with small bowel enterotomy and removal of gallstone on 3/30.   -Postoperative course complicated with ileus.  NG tube removed 4/2, unable to tolerate oral diet.   -Had fever on 4/5, COVID/Flu/RSV which was negative.  BC x 2 no growth. CXR without overt pneumonia.  UA then not consistent with infection.  -On 4/7 spiked fever with significant leukocytosis, lactic acidosis. Blood cultures from 4/7, 4/8 with Enterococcus faecalis.  -Returned back to the OR for exploratory laparotomy with small bowel resection on 4/8.  -Postprocedure patient intubated on pressor support, temporary abdominal closure admitted to ICU.   -Returned to OR on 4/10 for small bowel anastomosis and abdominal wound VAC placement.    -Postprocedure continued to have distended abdomen with bloody output.  -CT on 4/14 showing  multiple peripherally enhancing fluid collections in the abdomen which are suspicious for developing abscesses. The largest of these is in the left paracolic gutter. Stable appearance of a contracted gallbladder containing a large gallstone which is fistulized to the duodenum with associated pneumobilia.  -Underwent IR guided abdominal drain placements x 2 on 4/15.  Patient removed NG tube by himself and started on oral liquids on 4/16.  Underwent CT sinogram on 4/18, left intra-abdominal drain removed.  ---Blood Cultures from 4/10 show no growth to date.  Drain cultures no growth to date.  Infectious disease followed, recommended stop antibiotics on 4/21, Zosyn discontinued 4/22.   --On 4/23- IR removed right intra-abdominal drain.  --4/24 CT showed that gallstone that was previously in gallbladder has passed into small bowel.   --X-ray plain film 4/26 did not show any evidence of ongoing retained stone.  --Patient comfortable lying in bed, tolerating oral diet.  Having bowel movements.  --General Surgery following. Noncontrast CT today.   Appreciate input.  -Follow LFTs.  Continue oral diet as tolerated.  Continue wound VAC per general surgery.  As needed pain meds.  As needed antiemetics.      Increased omental nodularity/stranding on imaging  Due for Health maintenance  --CT sinogram from 4/18 with Increased nodularity/density in the mesenteric fat of the upper abdomen bilaterally suggestive of peritoneal carcinomatosis.   --Oncology followed, recommended reimaging/IR guided biopsy.  --Repeat CT on 4/24, showed omental nodularity and stranding, radiologist read favors inflammatory postop changes rather than malignancy.   --Pathology from surgery showed benign small bowel resection.  --General surgery recommends avoid IR biopsy at this time, have follow-up CT as outpatient in 3 months.  -- Recommend patient have age-appropriate health maintenance as outpatient.  --Diuresis as below.     Postoperative atrial  fibrillation with RVR -status post cardioversion 4/14/2025  New Aflutter with RVR 4/5/2025 status post DCCV on 4/7/2025.  Coronary artery disease status post CABG x 3 in October 2022  Aortic stenosis (status post AVR with bovine valve) October 2022.  Hypertension  Hyperlipidemia.  -Postoperatively developed new aflutter with RVR, was on intravenous heparin drip, rate controlled with Cardizem drip and initiated oral metoprolol.   --Cardiology followed underwent cardioversion on 4/7.   --Heparin was paused as patient back to OR on 4/8 and was started on intravenous heparin during ICU stay.  --On 4/14 patient again developed recurrence of A-fib with RVR with heart rate in 140s.  Initiated on amiodarone drip, underwent successful cardioversion on 4/14.  Currently in sinus rhythm with heart rate in 70s to 80s.  --Echocardiogram 4/15 with EF of 55%.  Right ventricle normal size.  No valve disease.    --Cardiology signed off 4/15, continue with amiodarone loading and switch to oral amiodarone.  --Continue oral amiodarone taper: 200 mg BID x 2 weeks ( starting 4/15), then 200 mg every day (starting 4/29).  QTc from 4/20 within normal limits.  --Was on intravenous heparin, switched to oral Eliquis 5 mg twice daily on 4/18.  Continue Eliquis 5 mg twice daily.  Monitor hemoglobin levels periodically.  --Continue Oral metoprolol 25 mg twice daily with hold parameters  --PTA on Lipitor 80 mg oral daily, LDL 71.  Continue lower dose of Lipitor 20 mg oral daily.   -- Follow-up with cardiology in clinic in 2 weeks after discharge    Acute hypoxia-likely from atelectasis, bilateral pleural effusion, postoperative narcotic use, ALIREZA, deconditioning  -CT chest from 4/14 with New small bilateral pleural effusions with overlying atelectasis/consolidation.  -CT chest from 4/24 with trace bilateral pleural effusion with overlying atelectasis  -Weaned off oxygen to room air.    --Has been receiving intermittent Lasix for diuresis, switch IV  to oral Lasix on 4/26.  --Started on 40 mg Lasix oral daily [4/26], can decrease to home dose of 20 mg oral daily if euvolemic on 4/27.  Follow BMP in AM.  IS, ambulate.     Acute anemia likely from surgical blood loss, acute illness, dilutional.  Presented with hemoglobin of 15.9, now hemoglobin postoperatively in the 10-11 range.    -On 4/24, hemoglobin dropped to 8.9, recheck hemoglobin up to 11 range.    No active bleeding.  Monitor hemoglobin levels in AM.     Acute encephalopathy likely multifactorial from anesthesia/sedation for procedure/delirium, hospitalization/ICU stay/narcotic use.  Patient with prolonged hospital stay -multiple procedures, ICU stay, on narcotics.  CT head 4/18 no acute intracranial pathology.  Continue Seroquel 25 mg twice daily as needed.  Monitor mental status closely.  Fall precautions.  Delirium precautions.     Severe hypoalbuminemia likely from acute illness.  Dilutional.  Serum albumin of 2.1 >2.6  Nutritional intake per surgical team.  Diuresis with Lasix as above.     Acute on chronic bilateral lower extremity edema.  Ultrasound bilateral lower extremities -no DVT.  Diuresis as above.  Lower extremity edema improving.    Hyperkalemia likely hemolyzed sample.  Noted serum potassium 5.5 on 4/24, recheck potassium within normal limits     Acute kidney injury.  Resolved  Anion gap metabolic acidosis.  Resolved.  Mild hypernatremia -improved.  Mild dehydration.- resolved   Hypokalemia from poor intake -replaced.  Monitor     Type 2 diabetes mellitus with a hemoglobin A1c of 6.8  Hold PTA metformin.  Insulin sliding scale.     Urinary retention -Mccarty removed 4/17  Traumatic hematuria -improved  BPH:   Urine cultures from 4/14 no growth  Catheter removed 4/17.  Continue PTA Flomax     Neuropathy:   *Neurontin 800 mg 3 times daily as needed has been filled but patient reports taking 300mg TID  *4/1 per pharmacy note only recent fills are 800 mg and not 300 mg dosing.   --Gabapentin  was on hold since admission, resumed on 4/18 at lower dose.  Continue gabapentin to 100 mg 3 times daily.    Nicotine use: Chews tobacco  Continue nicotine patch, and as needed nicotine lozenges.    Long term cessation to be encouraged during stay.     ALIREZA not on CPAP.  Recommend sleep studies as outpatient     Medically complicated obesity with a BMI of 41.  Increase all-cause mortality and morbidity.  Consider lifestyle modification with diet and exercise as able to.     Physical deconditioning from medical illness.  Encourage ambulation-out of bed to chair for all meals.  Fall precautions  Rehab team followed, recommended TCU    Clinically Significant Risk Factors               # Hypoalbuminemia: Lowest albumin = 1.9 g/dL at 4/9/2025  5:25 AM, will monitor as appropriate     # Hypertension: Noted on problem list           # DMII: A1C = 6.8 % (Ref range: <5.7 %) within past 6 months   # Obesity: Estimated body mass index is 37.47 kg/m  as calculated from the following:    Height as of this encounter: 1.829 m (6').    Weight as of this encounter: 125.3 kg (276 lb 4.8 oz).   # Moderate Malnutrition: based on nutrition assessment and treatment provided per dietitian's recommendations.      # Financial/Environmental Concerns: none   # History of CABG: noted on surgical history       Orders Placed This Encounter      Combination Diet Moderate Consistent Carb (60 g CHO per Meal) Diet      Diet      DVT Prophylaxis: SCDs, ambulate.  Code Status: Full Code  Disposition: Expected discharge likely in 1 to 2 days pending passing of gallstone/surgical clearance.    Medically Ready for Discharge: Anticipated Tomorrow    Discussed with patient, bedside RN, general surgery team.  Updated patient's wife by bedside 4/27  More than 70% of time spent in direct patient care, care coordination, patient counseling, and formalizing plan of care.        Tim Mays MD        Interval History:        Patient lying in bed.  Appears  comfortable lying in bed.  Much more awake today.  Ambulating out of bed to chair.  Tolerating oral diet.  Having bowel movements.  Denies any chest pain or palpitations.  Denies any headache or dizziness.  On room air.  Receiving as needed pain meds, scheduled gabapentin.       Physical Exam:        Physical Exam   Temp:  [97.5  F (36.4  C)-98.3  F (36.8  C)] 97.5  F (36.4  C)  Pulse:  [76-82] 82  Resp:  [16-18] 18  BP: (112-136)/(50-74) 125/71  SpO2:  [97 %-99 %] 98 %    Intake/Output Summary (Last 24 hours) at 4/23/2025 1412  Last data filed at 4/23/2025 0900  Gross per 24 hour   Intake 1360 ml   Output 1010 ml   Net 350 ml       Admission Weight: 129.3 kg (285 lb)  Current Weight: 135.6 kg (298 lb 14.4 oz)    PHYSICAL EXAM  GENERAL: Patient awake, able to answer most questions  HEART: Regular rate and rhythm. S1S2  LUNGS: bilateral decreased breath sounds, no wheezing or crackles.  Respiration unlabored  ABDOMEN: Distended, wound VAC present. Bowel sounds heard.    NEURO: Moving all extremities.  EXTREMITIES: + pedal edema.   SKIN: Brusing   PSYCHIATRY Cooperative       Medications:        Current Facility-Administered Medications   Medication Dose Route Frequency Provider Last Rate Last Admin    amiodarone (PACERONE) tablet 200 mg  200 mg Oral BID Richa Schmidt PA-C   200 mg at 04/27/25 0923    Followed by    [START ON 4/29/2025] amiodarone (PACERONE) tablet 200 mg  200 mg Oral Daily Richa Schmidt PA-C        apixaban ANTICOAGULANT (ELIQUIS) tablet 5 mg  5 mg Oral BID Tim Mays MD   5 mg at 04/27/25 0923    atorvastatin (LIPITOR) tablet 20 mg  20 mg Oral QPM Tim Mays MD   20 mg at 04/26/25 2047    furosemide (LASIX) tablet 40 mg  40 mg Oral Daily Tim Mays MD   40 mg at 04/27/25 0923    gabapentin (NEURONTIN) capsule 100 mg  100 mg Oral BID Tim Mays MD   100 mg at 04/27/25 0923    insulin aspart (NovoLOG) injection (RAPID ACTING)  1-7 Units Subcutaneous TID AC  Tim Mays MD   1 Units at 04/27/25 0811    insulin aspart (NovoLOG) injection (RAPID ACTING)  1-5 Units Subcutaneous At Bedtime Tim Mays MD        metoprolol tartrate (LOPRESSOR) tablet 25 mg  25 mg Oral BID Tim Mays MD   25 mg at 04/27/25 0923    nicotine (NICODERM CQ) 21 MG/24HR 24 hr patch 1 patch  1 patch Transdermal Daily Hayley Vale APRN CNP   1 patch at 04/27/25 0922    pantoprazole (PROTONIX) EC tablet 40 mg  40 mg Oral QAM AC Tim Mays MD   40 mg at 04/27/25 0745    polyethylene glycol (MIRALAX) Packet 17 g  17 g Oral Daily Ana Dougherty MD   17 g at 04/27/25 0922    senna-docusate (SENOKOT-S/PERICOLACE) 8.6-50 MG per tablet 1 tablet  1 tablet Oral BID Ana Dougherty MD   1 tablet at 04/27/25 0923    sodium chloride (PF) 0.9% PF flush 3 mL  3 mL Intracatheter Q8H MITA Hayley Vale APRN CNP   3 mL at 04/27/25 0619    tamsulosin (FLOMAX) capsule 0.8 mg  0.8 mg Oral QPM Andres Sparks DO   0.8 mg at 04/26/25 2047     Current Facility-Administered Medications   Medication Dose Route Frequency Provider Last Rate Last Admin    acetaminophen (TYLENOL) tablet 650 mg  650 mg Oral Q4H PRN Shi Goncalves PA-C   650 mg at 04/27/25 0014    benzocaine-menthol (CHLORASEPTIC) 6-10 MG lozenge 1 lozenge  1 lozenge Buccal Q1H PRN Hayley Vale APRN CNP   1 lozenge at 04/01/25 0334    bisacodyl (DULCOLAX) suppository 10 mg  10 mg Rectal Daily PRN Hayley Vale APRN CNP        carboxymethylcellulose PF (REFRESH PLUS) 0.5 % ophthalmic solution 1 drop  1 drop Both Eyes Q1H PRN Hayley Vale APRN CNP        glucose gel 15-30 g  15-30 g Oral Q15 Min PRN Tim Mays MD        Or    dextrose 50 % injection 25-50 mL  25-50 mL Intravenous Q15 Min PRN Tim Mays MD        Or    glucagon injection 1 mg  1 mg Subcutaneous Q15 Min PRN Tim Mays MD        diphenhydrAMINE (BENADRYL) elixir 12.5 mg  12.5 mg Oral Q6H PRN Hayley Vale  ADONIS Alexis CNP        Or    diphenhydrAMINE (BENADRYL) injection 12.5 mg  12.5 mg Intravenous Q6H PRN Hayley Vale APRN CNP        HYDROmorphone (DILAUDID) injection 0.2 mg  0.2 mg Intravenous Q4H PRN Tim Mays MD   0.2 mg at 04/15/25 1737    lidocaine (LMX4) cream   Topical Q1H PRN Hayley Vale APRN CNP        lidocaine 1 % 0.1-1 mL  0.1-1 mL Other Q1H PRN Hayley aVle APRN CNP        magnesium hydroxide (MILK OF MAGNESIA) suspension 30 mL  30 mL Oral Daily PRN Hayley Vale APRN CNP   30 mL at 04/01/25 0427    melatonin tablet 10 mg  10 mg Oral At Bedtime PRN Hayley Vale APRN CNP   10 mg at 04/12/25 0042    metoprolol (LOPRESSOR) injection 5 mg  5 mg Intravenous Q4H PRN Hayley Vale APRN CNP   5 mg at 04/14/25 0806    miconazole (MICATIN) 2 % powder   Topical BID PRN Andres Sparks DO   Given at 04/19/25 2301    naloxone (NARCAN) injection 0.2 mg  0.2 mg Intravenous Q2 Min PRN Hayley Vale APRN CNP        Or    naloxone (NARCAN) injection 0.4 mg  0.4 mg Intravenous Q2 Min PRN Hayley Vale APRN CNP        Or    naloxone (NARCAN) injection 0.2 mg  0.2 mg Intramuscular Q2 Min PRN Hayley Vale APRN CNP        Or    naloxone (NARCAN) injection 0.4 mg  0.4 mg Intramuscular Q2 Min PRN Hayley Vale APRN CNP        nicotine (COMMIT) lozenge 2 mg  2 mg Buccal Q1H PRN Hayley Vale APRN CNP   2 mg at 04/03/25 1145    No lozenges or gum should be given while patient on BIPAP/AVAPS/AVAPS AE   Does not apply Continuous PRN Hayley Vale APRN CNP        ondansetron (ZOFRAN ODT) ODT tab 4 mg  4 mg Oral Q6H PRN Hayley Vale APRN CNP   4 mg at 04/08/25 0208    Or    ondansetron (ZOFRAN) injection 4 mg  4 mg Intravenous Q6H PRN Hayley Vale APRN CNP   4 mg at 04/11/25 1232    Patient may continue current oral medications   Does not apply Continuous PRN Hayley Vale APRN CNP        phenol (CHLORASEPTIC) 1.4 % spray 1  mL  1 spray Mouth/Throat Q1H PRN Hayley Vale APRN CNP        prochlorperazine (COMPAZINE) injection 5 mg  5 mg Intravenous Q6H PRN Hayley Vale APRN CNP   5 mg at 04/08/25 1103    Or    prochlorperazine (COMPAZINE) tablet 5 mg  5 mg Oral Q6H PRN Hayley Vale APRN CNP   5 mg at 04/03/25 0756    QUEtiapine (SEROquel) tablet 25 mg  25 mg Oral BID PRN Tim Mays MD   25 mg at 04/25/25 2204    sodium chloride (PF) 0.9% PF flush 3 mL  3 mL Intracatheter q1 min prn Hayley Vale APRN CNP        traMADol (ULTRAM) half-tab 25 mg  25 mg Oral Q6H PRN Tim Mays MD   25 mg at 04/26/25 1644    traMADol (ULTRAM) tablet 50 mg  50 mg Oral Q6H PRN Tim Mays MD   50 mg at 04/26/25 0903            Data:      All new lab and imaging data was reviewed.

## 2025-04-27 NOTE — PLAN OF CARE
Goal Outcome Evaluation:                 Date & Time: 04/26/2025  Surgery/POD#:      POD #28 Exploratory laparotomy with small bowel enterotomy and removal of gallstone     POD #20 exploratory laparotomy with small bowel resection      POD # 22 small bowel anastomosis and abdominal wound VAC placement      POD #13 IR guided abdominal drain placements x 2      Behavior & Aggression: Calm and cooperative, follows commands   Fall Risk: Yes  Orientation:Oriented to self only   ABNL VS/O2:VSS, RA   ABNL Labs: NA   Pain Management: PAINAID score 0  Bowel/Bladder: Incontinent of bowel and bladder. LBM 04/26  Drains: NA   Wounds/incisions: Midline inc. W/ Vac   Diet: Mod carb   Activity: Ass.2 w/ lift, pt is able to reposition himself in bed   Tests/Procedures: NA   Anticipated  DC Date: TBD   Significant Information:

## 2025-04-28 LAB
ALBUMIN SERPL BCG-MCNC: 3 G/DL (ref 3.5–5.2)
ALP SERPL-CCNC: 91 U/L (ref 40–150)
ALT SERPL W P-5'-P-CCNC: 16 U/L (ref 0–70)
ANION GAP SERPL CALCULATED.3IONS-SCNC: 8 MMOL/L (ref 7–15)
AST SERPL W P-5'-P-CCNC: 19 U/L (ref 0–45)
BILIRUB DIRECT SERPL-MCNC: 0.09 MG/DL (ref 0–0.3)
BILIRUB SERPL-MCNC: 0.2 MG/DL
BUN SERPL-MCNC: 15.4 MG/DL (ref 8–23)
CALCIUM SERPL-MCNC: 9.4 MG/DL (ref 8.8–10.4)
CHLORIDE SERPL-SCNC: 102 MMOL/L (ref 98–107)
CREAT SERPL-MCNC: 0.92 MG/DL (ref 0.67–1.17)
EGFRCR SERPLBLD CKD-EPI 2021: >90 ML/MIN/1.73M2
ERYTHROCYTE [DISTWIDTH] IN BLOOD BY AUTOMATED COUNT: 14.7 % (ref 10–15)
GLUCOSE BLDC GLUCOMTR-MCNC: 145 MG/DL (ref 70–99)
GLUCOSE BLDC GLUCOMTR-MCNC: 149 MG/DL (ref 70–99)
GLUCOSE BLDC GLUCOMTR-MCNC: 155 MG/DL (ref 70–99)
GLUCOSE BLDC GLUCOMTR-MCNC: 157 MG/DL (ref 70–99)
GLUCOSE BLDC GLUCOMTR-MCNC: 158 MG/DL (ref 70–99)
GLUCOSE SERPL-MCNC: 152 MG/DL (ref 70–99)
HCO3 SERPL-SCNC: 28 MMOL/L (ref 22–29)
HCT VFR BLD AUTO: 33.3 % (ref 40–53)
HGB BLD-MCNC: 10.4 G/DL (ref 13.3–17.7)
MCH RBC QN AUTO: 29.1 PG (ref 26.5–33)
MCHC RBC AUTO-ENTMCNC: 31.2 G/DL (ref 31.5–36.5)
MCV RBC AUTO: 93 FL (ref 78–100)
PLATELET # BLD AUTO: 168 10E3/UL (ref 150–450)
POTASSIUM SERPL-SCNC: 4.1 MMOL/L (ref 3.4–5.3)
PROT SERPL-MCNC: 6.8 G/DL (ref 6.4–8.3)
RBC # BLD AUTO: 3.57 10E6/UL (ref 4.4–5.9)
SODIUM SERPL-SCNC: 138 MMOL/L (ref 135–145)
WBC # BLD AUTO: 6.1 10E3/UL (ref 4–11)

## 2025-04-28 PROCEDURE — 250N000013 HC RX MED GY IP 250 OP 250 PS 637: Performed by: STUDENT IN AN ORGANIZED HEALTH CARE EDUCATION/TRAINING PROGRAM

## 2025-04-28 PROCEDURE — 250N000011 HC RX IP 250 OP 636: Mod: JZ | Performed by: HOSPITALIST

## 2025-04-28 PROCEDURE — 82248 BILIRUBIN DIRECT: CPT | Performed by: HOSPITALIST

## 2025-04-28 PROCEDURE — G0463 HOSPITAL OUTPT CLINIC VISIT: HCPCS | Mod: 25

## 2025-04-28 PROCEDURE — 97605 NEG PRS WND THER DME<=50SQCM: CPT

## 2025-04-28 PROCEDURE — 99233 SBSQ HOSP IP/OBS HIGH 50: CPT | Performed by: INTERNAL MEDICINE

## 2025-04-28 PROCEDURE — 250N000013 HC RX MED GY IP 250 OP 250 PS 637: Performed by: NURSE PRACTITIONER

## 2025-04-28 PROCEDURE — 97530 THERAPEUTIC ACTIVITIES: CPT | Mod: GP | Performed by: PHYSICAL THERAPIST

## 2025-04-28 PROCEDURE — 250N000013 HC RX MED GY IP 250 OP 250 PS 637: Performed by: HOSPITALIST

## 2025-04-28 PROCEDURE — 36415 COLL VENOUS BLD VENIPUNCTURE: CPT | Performed by: HOSPITALIST

## 2025-04-28 PROCEDURE — 80048 BASIC METABOLIC PNL TOTAL CA: CPT | Performed by: HOSPITALIST

## 2025-04-28 PROCEDURE — 250N000013 HC RX MED GY IP 250 OP 250 PS 637: Performed by: PHYSICIAN ASSISTANT

## 2025-04-28 PROCEDURE — 120N000001 HC R&B MED SURG/OB

## 2025-04-28 PROCEDURE — 250N000013 HC RX MED GY IP 250 OP 250 PS 637

## 2025-04-28 PROCEDURE — 85018 HEMOGLOBIN: CPT | Performed by: HOSPITALIST

## 2025-04-28 RX ORDER — FUROSEMIDE 20 MG/1
20 TABLET ORAL DAILY
Status: DISCONTINUED | OUTPATIENT
Start: 2025-04-29 | End: 2025-04-29 | Stop reason: HOSPADM

## 2025-04-28 RX ADMIN — METOPROLOL TARTRATE 25 MG: 25 TABLET, FILM COATED ORAL at 08:29

## 2025-04-28 RX ADMIN — GABAPENTIN 100 MG: 100 CAPSULE ORAL at 21:23

## 2025-04-28 RX ADMIN — TRAMADOL HYDROCHLORIDE 50 MG: 50 TABLET, COATED ORAL at 21:52

## 2025-04-28 RX ADMIN — QUETIAPINE FUMARATE 25 MG: 25 TABLET ORAL at 02:16

## 2025-04-28 RX ADMIN — SENNOSIDES AND DOCUSATE SODIUM 1 TABLET: 50; 8.6 TABLET ORAL at 21:23

## 2025-04-28 RX ADMIN — APIXABAN 5 MG: 5 TABLET, FILM COATED ORAL at 21:23

## 2025-04-28 RX ADMIN — HYDROMORPHONE HYDROCHLORIDE 0.2 MG: 0.2 INJECTION, SOLUTION INTRAMUSCULAR; INTRAVENOUS; SUBCUTANEOUS at 03:27

## 2025-04-28 RX ADMIN — TRAMADOL HYDROCHLORIDE 50 MG: 50 TABLET, COATED ORAL at 15:37

## 2025-04-28 RX ADMIN — FUROSEMIDE 40 MG: 40 TABLET ORAL at 08:30

## 2025-04-28 RX ADMIN — ATORVASTATIN CALCIUM 20 MG: 20 TABLET, FILM COATED ORAL at 21:23

## 2025-04-28 RX ADMIN — ACETAMINOPHEN 650 MG: 325 TABLET, FILM COATED ORAL at 21:26

## 2025-04-28 RX ADMIN — POLYETHYLENE GLYCOL 3350 17 G: 17 POWDER, FOR SOLUTION ORAL at 08:29

## 2025-04-28 RX ADMIN — TRAMADOL HYDROCHLORIDE 25 MG: 50 TABLET, FILM COATED ORAL at 02:15

## 2025-04-28 RX ADMIN — INSULIN ASPART 1 UNITS: 100 INJECTION, SOLUTION INTRAVENOUS; SUBCUTANEOUS at 12:43

## 2025-04-28 RX ADMIN — GABAPENTIN 100 MG: 100 CAPSULE ORAL at 08:31

## 2025-04-28 RX ADMIN — APIXABAN 5 MG: 5 TABLET, FILM COATED ORAL at 08:30

## 2025-04-28 RX ADMIN — PANTOPRAZOLE SODIUM 40 MG: 20 TABLET, DELAYED RELEASE ORAL at 08:32

## 2025-04-28 RX ADMIN — SENNOSIDES AND DOCUSATE SODIUM 1 TABLET: 50; 8.6 TABLET ORAL at 08:29

## 2025-04-28 RX ADMIN — AMIODARONE HYDROCHLORIDE 200 MG: 200 TABLET ORAL at 08:32

## 2025-04-28 RX ADMIN — AMIODARONE HYDROCHLORIDE 200 MG: 200 TABLET ORAL at 21:23

## 2025-04-28 RX ADMIN — NICOTINE 1 PATCH: 21 PATCH, EXTENDED RELEASE TRANSDERMAL at 12:48

## 2025-04-28 RX ADMIN — INSULIN ASPART 1 UNITS: 100 INJECTION, SOLUTION INTRAVENOUS; SUBCUTANEOUS at 08:28

## 2025-04-28 RX ADMIN — HYDROMORPHONE HYDROCHLORIDE 0.2 MG: 0.2 INJECTION, SOLUTION INTRAMUSCULAR; INTRAVENOUS; SUBCUTANEOUS at 13:51

## 2025-04-28 RX ADMIN — METOPROLOL TARTRATE 25 MG: 25 TABLET, FILM COATED ORAL at 21:23

## 2025-04-28 RX ADMIN — ACETAMINOPHEN 650 MG: 325 TABLET, FILM COATED ORAL at 01:34

## 2025-04-28 RX ADMIN — INSULIN ASPART 1 UNITS: 100 INJECTION, SOLUTION INTRAVENOUS; SUBCUTANEOUS at 17:50

## 2025-04-28 RX ADMIN — TAMSULOSIN HYDROCHLORIDE 0.8 MG: 0.4 CAPSULE ORAL at 21:23

## 2025-04-28 ASSESSMENT — ACTIVITIES OF DAILY LIVING (ADL)
ADLS_ACUITY_SCORE: 44
ADLS_ACUITY_SCORE: 57
ADLS_ACUITY_SCORE: 44
ADLS_ACUITY_SCORE: 57
ADLS_ACUITY_SCORE: 46
ADLS_ACUITY_SCORE: 57
ADLS_ACUITY_SCORE: 44
ADLS_ACUITY_SCORE: 44
ADLS_ACUITY_SCORE: 46
ADLS_ACUITY_SCORE: 57
ADLS_ACUITY_SCORE: 46

## 2025-04-28 NOTE — PROGRESS NOTES
Care Management Follow Up    Length of Stay (days): 29    Expected Discharge Date: 04/29/2025     Concerns to be Addressed: discharge planning     Patient plan of care discussed at interdisciplinary rounds: Yes    Anticipated Discharge Disposition: Skilled Nursing Facility      Anticipated Discharge Services: Home Care  Anticipated Discharge DME:      Patient/family educated on Medicare website which has current facility and service quality ratings: no  Education Provided on the Discharge Plan: Yes  Patient/Family in Agreement with the Plan: yes    Referrals Placed by CM/SW:    Private pay costs discussed: transportation costs    Discussed  Partnership in Safe Discharge Planning  document with patient/family: Yes:    Handoff Completed: No, handoff not indicated or clinically appropriate    Additional Information:  Writer confirmed with [facility] that they have bed availability for tomorrow, consulted with nursing and surgeons. Scheduled stretcher ride for between 10 - 1045 to [facility] for Tuesday. Using a stretcher due to confusion and impulsivity.     Next Steps: PCS form     KANDI Campbell

## 2025-04-28 NOTE — PLAN OF CARE
Goal Outcome Evaluation:        Date & Time: 04/26/2025  Surgery/POD#:      POD #29 Exploratory laparotomy with small bowel enterotomy and removal of gallstone     POD #20 exploratory laparotomy with small bowel resection      POD # 18 small bowel anastomosis and abdominal wound VAC placement      POD #14 IR guided abdominal drain placements x 2      Behavior & Aggression: Calm and cooperative, follows commands   Fall Risk: Yes  Orientation:Oriented to self only   ABNL VS/O2:VSS, RA   ABNL Labs: NA   Pain Management: PAINAD score 5 PRN tylenol and tramadol for middle back pain   Bowel/Bladder: Pt has been calling for use of bathroom  LB 04/27  Drains: NA   Wounds/incisions: Midline inc. W/ Vac   Diet: Mod carb   Activity: Ass.2 w/ lift, pt is able to reposition himself in bed   Tests/Procedures: NA   Anticipated  DC Date: TBD   Significant Information:

## 2025-04-28 NOTE — PROGRESS NOTES
[facility]  WO Nurse Inpatient Assessment     Consulted for: coccyx and abdominal NPWT changes    Summary:   Coccyx: small blanchable elongated patches of erythema, skin intact-Signing off 4/15  2.   Abdomen: s/p small bowel resection with VAC placement in OR. NPWT midline wound changed 4/28 ~ 75 mmHG     WO nurse follow-up plan: Monday/WednesdayFriday     Patient History (according to provider note(s):       69 year old male with a history of diabetes, hypertension, CABG, status post AVR with bovine valve 10/2022, untreated sleep apnea, hypertension, hyperlipidemia, neuropathy, BPH admitted on 3/30/2025 with abdominal pain nausea and vomiting.  Imaging showed gallstone ileus with 2.2 cm gallstone impacted.  Underwent surgery on 3/30.  Postoperatively had issues with ileus, fever, and aflutter with RVR.       Returned back to the OR for exploratory laparotomy with small bowel resection.Post -op admission to the ICU, intubated and on pressor support with temporary abdominal closure. He returned to OR on 4/10  for small bowel anastomosis and abdominal.  Wound VAC in place.  Pressors weaned off, patient extubated 4/11 and transferred to hospitalist service on 4/12.    Assessment:      Areas visualized during today's visit: Focused: and Abdomen    Negative pressure wound therapy applied to: abdomen         Last photo: 4/28/25   Wound due to: Surgical Wound   Wound history/plan of care:    Patient is intermittently confused. Spouse at the bedside and settling patient down. Wound is healing and getting smaller. Increase granulation tissues at the umbilicus area. Periwound is intact and protected with liquid skin barrier and vac drape.   Wound continues to improve and gets smaller.   Surgical date: 4/10/25   Service following: Gen Surgery  Date Negative Pressure Wound Therapy initiated: 4/10/25   Interventions in place: repositioning, pressure redistribution with device or dressing, specialty  surface in use, moisture/incontinence management, offloading, and elevation  Is patient s nutritional status compromised? no  If yes, what interventions are in place? N/A  Reason for initiating vac therapy? Need for accelerated granulation tissue  Which?of?the?following?co-morbidities?apply? Diabetes and Obesity  If diabetic is patient on a diabetic management program? Yes   Is osteomyelitis present in wound? no   If yes what treatments are in place? N/A    Wound base: 95 % Granulation tissue and Adipose tissue,  5 % slough     Palpation of the wound bed: normal       Drainage: moderate      Volume in cannister: 500 ml      Last cannister change date: 4/21/25     Description of drainage: serous      Measurements (length x width x depth, in cm) 18 x 4.5  x  3 cm       Tunneling N/A      Undermining N/A   Periwound skin: Intact       Color: pink       Temperature: normal    Odor: none   Pain: mild, Irritable or crying out at intervals, and during dressing change   Pain intervention prior to dressing change: soaking, slow and gentle cares , and distraction  Treatment goal: Drainage control, Infection control/prevention, Increase granulation, Protection, and Promote epidermal migration  STATUS: granulating and improving   Supplies ordered: gathered, at bedside, supplies stored on unit, and discussed with patient     Number of foam pieces removed from a wound (excluding foam for bridge) : 1 GranuFoam Black   Verified this matched the number of foam pieces applied last dressing change: Yes   Number of foam pieces packed into wound (excluding foam for bridge) :  1 GranuFoam Black       Copied for continuity   4/15: WOC received consult for coccyx. Skin intact, each buttock with elongated patch of erythema related to friction, patient has been intermittently confused and agitated per chart review. Nursing staff to monitor and moisturize skin. Continue with Pressure injury and skin breakdown prevention  "measures      Treatment Plan:     Negative pressure wound therapy plan:  Mon/Wed/Fri  Wound location: midline abdomen   Change Days: Mon/Wed/Fri by WOC RN    Supplies (including all accessories) used: medium Black foam  and Cavilon no sting barrier film  Cleanse with Vashe prior to replacing NPWT  Suction setting: -75   Methods used: Window paned all periwound skin with vac drape prior to applying sponge    Staff RN to assess integrity of dressing and ensure suction is set at appropriate level every shift.   Date canister. Chart canister output every shift. Change cannister weekly and PRN if full/occluded     Remove foam dressing and replace with BID normal saline moist gauze dressing if:   -a dressing failure which cannot be repaired within 2 hours   -patient is discharging to home without a home pump   -patient is discharging to a facility outside the local area   -if a dressing is a \"Silver Foam\", remove before Radiation Therapy or MRI     The hospital VAC pump is not to be discharged with the patient. Please disconnect the patient from the machine prior to discharge.  If a home VAC pump has been delivered, connect the home cannister to dressing tubing and the cannister to the home pump, turn on home pump  If the patient is transferring to a nearby facility with a VAC, the tubing can be disconnected, clamp tubing and cover the end with a glove, then can be reconnected if within 2 hours  If transfer will be longer than 2 hours, dressing must be removed and placed with a wet to moist gauze dressing for transfer    Generalized skin: Daily  Moisturize with body lotion of sween 24 daily      Orders: Reviewed    RECOMMEND PRIMARY TEAM ORDER: None, at this time  Education provided: plan of care, wound progress, Infection prevention , and Moisture management  Discussed plan of care with: Patient and Nurse  Notify [facility] if wound(s) deteriorate.  Nursing to notify the Provider(s) and re-consult the [facility] Nurse if new skin " concern.    DATA:     Current support surface: Standard  Standard gel mattress (Isoflex)  Containment of urine/stool: Incontinence Protocol and Incontinent pad in bed  BMI: Body mass index is 37.08 kg/m .   Active diet order: Orders Placed This Encounter      Combination Diet Moderate Consistent Carb (60 g CHO per Meal) Diet      Diet     Output: I/O last 3 completed shifts:  In: 360 [P.O.:360]  Out: 825 [Urine:825]     Labs:   Recent Labs   Lab 04/28/25  0820   ALBUMIN 3.0*   HGB 10.4*   WBC 6.1     Pressure injury risk assessment:   Sensory Perception: 4-->no impairment  Moisture: 3-->occasionally moist  Activity: 2-->chairfast  Mobility: 2-->very limited  Nutrition: 3-->adequate  Friction and Shear: 2-->potential problem  Nash Score: 16    LOLY NorthN, RN  1st choice: Securely message with [employer] ([facility] [employer])   2nd option: [facility] Office Phone [phone], messages checked periodically Mon-Fri 8a-4p)

## 2025-04-28 NOTE — PLAN OF CARE
Shift: 4/28/25 [id number]  POD#/Summary:   3/30: enterotomy and removal gallstone  4/4 new A-flutter  4/7: cardioverted into SR  4/8 Bowel resection  4/10 Bowel resection and wound closure   4/14: A-flutter, cardioverted into SR  4/15 Drain placed for abd abscess   4/23 TERRIE drain removal     Orientation: AO 2-3; D/O time and situation waxes and wanes;   Vital Signs: VSS RA  Labs: -142  Pain Management: PRN Tramadol x. PRN IV dilaudid X1 before wound vac change  Bowel/Bladder: Incontinent B/B today. BS active and passing gas/stool  Drains: Wound vac on abdomen for abscess site (changed by WOC today)  IV: PIV SL   Wounds/Incisions: Midline inc, old TERRIE site, wound vac site  Diet: Mod carb, denies N/V  Activity Level: T/R- independently turns, needs encouragement   Tests/Procedures: N/A  Anticipated Discharge Date/Plan: Planning to discharge to TCU tomorrow  Significant information: CMS intact. Up in WC with PT today.

## 2025-04-28 NOTE — PROGRESS NOTES
[facility]    Internal Medicine Hospitalist Progress Note  04/28/2025  I evaluated patient on the above date.    Adonay Hightower Jr., MD  [phone] (p)  Text Page  Vocera      [New actions/orders today (04/28/2025) are underlined in the assessment and plan. All lab results in the assessment and plan were reviewed.]  Assessment & Plan      Richar Davis is a 69 year old male with a history including DM2; HTN; CAD s/p CABG: aortic valve disease s/p AVR (bioprosthetic); HLD; neuropathy; BPH; and untreated sleep apnea; who presented 3/30/2025 with abdominal pain, nausea and vomiting and found with signs of gallstone ileus (distal small bowel obstruction secondary to an impacted 2.2 cm gallstone).    Underwent urgent surgery 3/30/2025. Hospitalization complicated by multiple issues as noted.      # Gallstone ileus - s/p ex-lap with small bowel enterotomy with removal of gallstone and primary closure 3/30/2025.  # Post-surgical leak with abdominal contamination with sepsis and Enterococcus faecalis bacteremia - s/p ex-lap with small bowel resection 4/8/2025 and s/p ex-lap, small bowel anastomosis and abdominal closure with wound VAC placed on 4/10/2025.  # Intra-abdominal fluid collections/abscesses s/p IR drains placed 4/15/2025 (drains subsequently removed).  # Contracted gallbladder containing a large gallstone with signs of fistulization to the duodenum.  * Initial presentation as above. Surgery contacted urgently and pt taken to OR for surgery as noted.  * Post-op developed suspected ileus.  * Subsequently developed fevers/sepsis with lactic acidosis, BC's 4/7 and 4/8 positive for Enterococcus faecalis. CT imaging 4/8 showed increased gastric and SB distension, increased ascites. Started on piperacillin-tazobactam 4/8. Subsequently taken to OR again 4/8 as above with subsequent closure 4/10 as above.  * Subsequently, continued to have distended abdomen, bloody VAC output.  * CT on 4/14  showed multiple peripherally enhancing fluid collections in the abdomen suspicious for developing abscesses, the largest of these was in the left paracolic gutter; stable appearance of a contracted gallbladder containing a large gallstone which was fistulized to the duodenum with associated pneumobilia.  * Underwent IR guided abdominal drain placements x 2 on 4/15, cultures NG.    * Patient removed NG tube by himself and started on oral liquids on 4/16.    * Underwent CT sinogram on 4/18, left intra-abdominal drain removed.  * Piperacillin-tazobactam discontinued 4/22 (BC's from 4/10 NG).  * IR removed right intra-abdominal drain 4/23.  * CT on 4/24 showed that gallstone that was previously in gallbladder had passed into small bowel. Surgery recommended continued hospitalization until stone passed.  * X-ray plain film 4/26 did not show any evidence of ongoing retained stone.  * CT on 4/27 showed gallstone remains in the mid small bowel at the side to side small bowel anastomosis, no obstruction; rachael significant residual or recurrent abscess.  Recent Labs   Lab 04/28/25  0820 04/24/25  0707   WBC 6.1 6.6   - Post-op management per Surgery.  - Continue diet as tolerated.  - Continue PRN acetaminophen, PRN tramadol, PRN IV hydromorphone; minimize opioids as able.  - Continue PRN ondansetron, PRN prochlorperazine.  - Continue regular ambulation as able.    Increased omental nodularity/stranding on imaging, favor inflammatory post-op changes rather than malignancy.  * CT sinogram from 4/18 with increased nodularity/density in the mesenteric fat of the upper abdomen bilaterally suggestive of peritoneal carcinomatosis.  * Oncology consulted 4/23 and recommended reimaging/IR guided biopsy.  * Repeat CT on 4/24 showed omental nodularity and stranding, radiologist read favored inflammatory postop changes rather than malignancy.   * Of note, pathology from surgery showed benign small bowel resection.  - Overall, Surgery  recommends avoid IR biopsy at this time, have follow-up CT as outpatient in 3 months.  - Recommend patient have age-appropriate health maintenance as outpatient.     New atrial flutter with RVR 4/5/2025 - status post DCCV on 4/7/2025.  Postoperative atrial fibrillation with RVR - status post cardioversion 4/14/2025.  * Postoperatively developed new aflutter with RVR, was on intravenous heparin drip, rate controlled with diltiazem drip and initiated oral metoprolol. Cardiology consulted.  * Underwent DCCV on 4/7.   * Heparin was paused as patient back to OR on 4/8 and was started on intravenous heparin during ICU stay.  * Patient again developed recurrence of A-fib with RVR with heart rate in 140s on 4/14 and initiated on amiodarone drip, underwent MANDEEP with successful cardioversion on 4/14.    * Echo 4/15 showed LVEF 55%; RV normal size, limited views, question of mild dysfunction; no valve disease.  * Switched to PO amiodarone 4/15.  * Transitioned to apixaban 4/18.  * QTc from 4/20 within normal limits.   - Continue amiodarone 200 mg BID through 4/28, then 200 mg daily starting 4/29.  - Continue metoprolol 25 mg BID.  - Continue apixaban 5 mg BID.    # Volume overload/diastolic CHF with bilateral pleural effusions, acute on chronic bilateral lower extremity edema.  # Coronary artery disease status post CABG x 3v (10/2022)  # Aortic stenosis (status post AVR with bovine valve) (10/2022).  # Hypertension (benign essential).  # Hyperlipidemia.  [PTA: atorvastatin 80 mg daily; furosemide 20 mg daily; metoprolol ER 25 mg daily.]  * Issues with afib/flutter as noted.  * Issues with hypoxia with bilateral pleural effusions. Diuresed with IV furosemide. Bilateral lower extremity US 4/15 negative. Restarted PTA PO furosemide increased dose 4/26.  * Atorvastatin dose decreased this hospitalization given LDL level 71.  - Continue furosemide - decrease from 40 mg to PTA 20 mg daily.  - Continue metoprolol 25 mg BID.  -  Continue atorvastatin 20 mg daily.  - Continue to monitor i/o's, daily wts..  - Follow-up with Cardiology in clinic in 2 weeks after discharge.    Acute hypoxia, suspect multifactorial related to atelectasis, bilateral pleural effusion, postoperative narcotic use, ALIREZA, deconditioning, resolved.  * CT chest from 4/14 with new small bilateral pleural effusions with overlying atelectasis/consolidation.  * CT chest from 4/24 with trace bilateral pleural effusion with overlying atelectasis.  * Diuresed this hospitalization as noted.  * Other issues treated as noted.  * Subsequently weaned off O2.  - Continue regular ambulation, IS.    Acute anemia likely from surgical blood loss, acute illness, dilutional.  * Presented with hemoglobin of 15.9.  * Hemoglobin postoperatively in the 10-11 range.    * On 4/24, hemoglobin dropped to 8.9, recheck hemoglobin up to 11 range.    - Continue to monitor CBC periodically as needed.     Acute encephalopathy likely multifactorial including metabolic and toxic from anesthesia/sedation for procedure/delirium, hospitalization/ICU stay/narcotic use.  * Patient with prolonged hospital stay, multiple procedures, ICU stay, on narcotics.  * CT head 4/18 no acute intracranial pathology.  * QTc from 4/20 within normal limits (on amiodarone).  - Continue PRN quetiapine 25 mg twice daily.  - Continue to treat other issues as noted.  - Re-orient as needed.  - Maintain normal day/night, sleep/wake cycles.  - Minimize sedating medications as able.     Severe hypoalbuminemia likely from acute illness, dilutional.  - Continue to treat other issues as noted.  - Continue diet as tolerated.    Type 2 diabetes mellitus.  [PTA: metformin 250 mg with supper.]  Recent Labs   Lab 04/28/25  1220 04/28/25  0820 04/28/25  0734 04/28/25  0159 04/27/25  2136 04/27/25  1718   * 152* 145* 149* 151* 133*     Recent Labs   Lab Test 03/31/25  0809 09/14/22  1111   A1C 6.8* 5.6   - Continue moderate CHO diet.  -  Plan resume metformin at discharge.  - Continue aspart ISS (medium).      Urinary retention, resolved.  Traumatic hematuria, resolved.  BPH:   * Required Mccarty this hospitalization.  * Urine cultures from 4/14 no growth  * Catheter removed 4/17.   - Continue PTA tamsulosin.     Neuropathy.  * PRN gabapentin 800 mg 3 times daily had been filled but patient reported taking 300 mg TID.  * On 4/1, per pharmacy note only recent fills were 800 mg and not 300 mg dosing.   * Gabapentin was on hold since admission, resumed on 4/18 at lower dose.  - Continue gabapentin to 100 mg 3 times daily.    Nicotine use, chews tobacco.  - Continue nicotine patch, and PRN nicotine lozenges.    - Strongly recommend tobacco cessation.    ALIREZA not on CPAP.  - Recommend sleep studies as outpatient    Weakness and physical deconditioning due to multiple acute and chronic medical issues.  - Continue PT and OT.  - Plan TCU.     Obesity.  * Body mass index is 37.08 kg/m .  - Needs to continue to pursue aggressive dietary and lifestyle modifications.    Other resolved issues:  Acute kidney injury, resolved.  Anion gap metabolic acidosis, resolved.  Mild hypernatremia, resolved.  Hypokalemia from poor intake, resolved.      Clinically Significant Risk Factors               # Hypoalbuminemia: Lowest albumin = 1.9 g/dL at 4/9/2025  5:25 AM, will monitor as appropriate     # Hypertension: Noted on problem list           # DMII: A1C = 6.8 % (Ref range: <5.7 %) within past 6 months   # Obesity: Estimated body mass index is 37.08 kg/m  as calculated from the following:    Height as of this encounter: 1.829 m (6').    Weight as of this encounter: 124 kg (273 lb 5.9 oz).   # Moderate Malnutrition: based on nutrition assessment and treatment provided per dietitian's recommendations.    # Financial/Environmental Concerns: none   # History of CABG: noted on surgical history       Diet: Snacks/Supplements Adult: Ensure Max Protein (bariatric); Between  Meals  Room Service  Combination Diet Moderate Consistent Carb (60 g CHO per Meal) Diet  Diet    Prophylaxis: PCD's, ambulation, and is on DOAC.  Mccarty Catheter: Not present  Lines: None     Code Status: Full Code    Disposition Plan   Medically Ready for Discharge: Anticipated in 1-2 Days  Expected discharge to transitional care unit (TCU) pending above.    Entered: Adonay Hightower MD 04/28/2025, 1:18 PM     COMMUNICATION  - I discussed with patient's wife 04/28/25    TIME SPENT: I spent approximately 60 minutes bedside and on the inpatient unit today managing the care of patient. Over 50% of my time on the unit was spent in extensive chart review, counseling the patient/family and/or coordinating care regarding services listed in this note.        Interval History   C/o some low back pain.  Tolerating diet.  Asking about eventual discharge.    * Data reviewed today: I reviewed all new labs and imaging over the last 24 hours. I personally reviewed the ECG tracing showing findings as described above in the A/P.    Physical Exam   Most recent vitals:   , Blood pressure (!) 145/76, pulse 99, temperature 97.8  F (36.6  C), temperature source Oral, resp. rate 16, height 1.829 m (6'), weight 124 kg (273 lb 5.9 oz), SpO2 100%. O2 Device: None (Room air)    Vitals:    04/23/25 0600 04/27/25 0538 04/28/25 0500   Weight: 135.6 kg (298 lb 14.4 oz) 125.3 kg (276 lb 4.8 oz) 124 kg (273 lb 5.9 oz)     Vital signs with ranges:  Temp:  [97.8  F (36.6  C)-99.1  F (37.3  C)] 97.8  F (36.6  C)  Pulse:  [82-99] 99  Resp:  [16-18] 16  BP: (131-145)/(66-76) 145/76  SpO2:  [98 %-100 %] 100 %  Patient Vitals for the past 24 hrs:   BP Temp Temp src Pulse Resp SpO2 Weight   04/28/25 0700 (!) 145/76 97.8  F (36.6  C) Oral 99 16 100 % --   04/28/25 0500 -- -- -- -- -- -- 124 kg (273 lb 5.9 oz)   04/27/25 2343 133/73 98.4  F (36.9  C) Oral 89 16 98 % --   04/27/25 1606 131/66 99.1  F (37.3  C) Oral 82 18 -- --     I/O's last 24  hours:  I/O last 3 completed shifts:  In: 1050 [P.O.:1050]  Out: 425 [Urine:425]    Constitutional: awake, alert, conversant   Head:   Eyes:   ENT:   Neck:   Cardiovascular: regular rate and rhythm; no murmurs, rubs or gallops  Lungs: diminished in the bases, no crackles or wheezes  Gastrointestinal/Abdomen: soft, non-tender to light touch, mildly distended, + bowel sounds, +wound VAC  :   Musculoskeletal:   Skin/Extremities:   Neurologic:   Psychiatric:   Hematologic/Lymphatic/Immunologic:        Labs reviewed.  Recent Labs   Lab 04/28/25  1220 04/28/25  0820 04/28/25  0734 04/25/25  1305 04/25/25  0741 04/24/25  1713 04/24/25  1355 04/24/25  1321 04/24/25  0707 04/24/25  0705 04/23/25  1635 04/23/25  1051   WBC  --  6.1  --   --   --   --   --   --  6.6  --   --   --    HGB  --  10.4*  --   --  11.1*  --  11.9*  --  8.9*  --   --  10.8*   MCV  --  93  --   --   --   --   --   --   --   --   --   --    PLT  --  168  --   --   --   --   --   --   --   --   --   --    NA  --  138  --   --   --   --   --   --   --  140  --  138   POTASSIUM  --  4.1  --   --  4.6  --  4.6  --   --  5.5*  --  4.2   CHLORIDE  --  102  --   --   --   --   --   --   --  105  --  102   CO2  --  28  --   --   --   --   --   --   --  24  --  27   BUN  --  15.4  --   --   --   --   --   --   --  9.4  --  8.0   CR  --  0.92  --   --  0.70  --   --   --   --  0.63*  --  0.65*   ANIONGAP  --  8  --   --   --   --   --   --   --  11  --  9   HALEY  --  9.4  --   --   --   --   --   --   --  9.7  --  9.3   * 152* 145*   < >  --    < >  --    < >  --  128*   < > 167*   ALBUMIN  --  3.0*  --   --   --   --   --   --  2.6*  --   --   --    PROTTOTAL  --  6.8  --   --   --   --   --   --  6.6  --   --   --    BILITOTAL  --  0.2  --   --   --   --   --   --  0.3  --   --   --    ALKPHOS  --  91  --   --   --   --   --   --  88  --   --   --    ALT  --  16  --   --   --   --   --   --  18  --   --   --    AST  --  19  --   --   --   --   --   --   "24  --   --   --     < > = values in this interval not displayed.     Recent Labs   Lab Test 04/24/25  0707 04/16/25  0303 04/08/25  0604   NT-PROBNP, INPATIENT 875 2,027* 3,106*     Recent Labs   Lab 04/28/25  1220 04/28/25  0820 04/28/25  0734 04/28/25  0159 04/27/25  2136 04/27/25  1718   * 152* 145* 149* 151* 133*     Recent Labs   Lab Test 03/31/25  0809 09/14/22  1111   A1C 6.8* 5.6       No results for input(s): \"INR\", \"[id number]\" in the last 168 hours.  Recent Labs   Lab 04/28/25  0820 04/24/25  0707   WBC 6.1 6.6       MICRO:  Cultures (including blood and urine):  No lab results found in last 7 days.    Recent Results (from the past 24 hours)   CT Abdomen Pelvis w/o Contrast    Narrative    EXAM: CT ABDOMEN PELVIS W/O CONTRAST  LOCATION: [facility]  DATE: 4/27/2025    INDICATION: Recurrent gallstone in the small bowel post partial small bowel resection for gallstone ileus. Evaluate for passage of stone.  COMPARISON: Multiple recent CTs most recent 4/24/2025.  TECHNIQUE: CT scan of the abdomen and pelvis was performed without IV contrast. Multiplanar reformats were obtained. Dose reduction techniques were used.  CONTRAST: None.    FINDINGS:   LOWER CHEST: Normal.    HEPATOBILIARY: Tiny amount of air in the bile ducts. Minimal residual stone material within the gallbladder.    PANCREAS: Normal.    SPLEEN: Normal.    ADRENAL GLANDS: Normal.    KIDNEYS/BLADDER: Normal.    BOWEL: Side to side small bowel anastomosis mid abdomen anteriorly. Gallstone remains at the anastomosis 2.5 cm in size. No obstruction. The right drainage catheter has been removed with no recurrent abscess. Tiny sliver of remaining left lateral fluid   pocket. No significant new or residual abscess.    LYMPH NODES: Normal.    VASCULATURE: Normal.    PELVIC ORGANS: Marked prostatic hypertrophy.    MUSCULOSKELETAL: Normal.      Impression    IMPRESSION:   1.  Gallstone remains in the mid small bowel at the " side to side small bowel anastomosis. No obstruction.    2.  No significant residual or recurrent abscess.         Medications   All medications were reviewed. MAR.    Infusions:  Current Facility-Administered Medications   Medication Dose Route Frequency Provider Last Rate Last Admin    No lozenges or gum should be given while patient on BIPAP/AVAPS/AVAPS AE   Does not apply Continuous PRN Hayley Vale APRN CNP        Patient may continue current oral medications   Does not apply Continuous PRN Hayley Vale APRN CNP         Scheduled Medications:  Current Facility-Administered Medications   Medication Dose Route Frequency Provider Last Rate Last Admin    amiodarone (PACERONE) tablet 200 mg  200 mg Oral BID Richa Schmidt PA-C   200 mg at 04/28/25 0832    Followed by    [START ON 4/29/2025] amiodarone (PACERONE) tablet 200 mg  200 mg Oral Daily Richa Schmidt PA-C        apixaban ANTICOAGULANT (ELIQUIS) tablet 5 mg  5 mg Oral BID Tim Mays MD   5 mg at 04/28/25 0830    atorvastatin (LIPITOR) tablet 20 mg  20 mg Oral QPM Tim Mays MD   20 mg at 04/27/25 2013    furosemide (LASIX) tablet 40 mg  40 mg Oral Daily Tim Mays MD   40 mg at 04/28/25 0830    gabapentin (NEURONTIN) capsule 100 mg  100 mg Oral BID Tim Mays MD   100 mg at 04/28/25 0831    insulin aspart (NovoLOG) injection (RAPID ACTING)  1-7 Units Subcutaneous TID  Tim Mays MD   1 Units at 04/28/25 1243    insulin aspart (NovoLOG) injection (RAPID ACTING)  1-5 Units Subcutaneous At Bedtime Tim Mays MD        metoprolol tartrate (LOPRESSOR) tablet 25 mg  25 mg Oral BID Tim Mays MD   25 mg at 04/28/25 0829    nicotine (NICODERM CQ) 21 MG/24HR 24 hr patch 1 patch  1 patch Transdermal Daily Hayley Vale APRN CNP   1 patch at 04/28/25 1248    pantoprazole (PROTONIX) EC tablet 40 mg  40 mg Oral QAM AC Tim Mays MD   40 mg at 04/28/25 0832    polyethylene glycol  (MIRALAX) Packet 17 g  17 g Oral Daily Ana Dougherty MD   17 g at 04/28/25 0829    senna-docusate (SENOKOT-S/PERICOLACE) 8.6-50 MG per tablet 1 tablet  1 tablet Oral BID Ana Dougherty MD   1 tablet at 04/28/25 0829    sodium chloride (PF) 0.9% PF flush 3 mL  3 mL Intracatheter Q8H MITA Hayley Vale APRN CNP   3 mL at 04/28/25 0520    tamsulosin (FLOMAX) capsule 0.8 mg  0.8 mg Oral QPM Andres Sparks DO   0.8 mg at 04/27/25 2013     PRN Medications:  Current Facility-Administered Medications   Medication Dose Route Frequency Provider Last Rate Last Admin    acetaminophen (TYLENOL) tablet 650 mg  650 mg Oral Q4H PRN Shi Goncalves PA-C   650 mg at 04/28/25 0134    benzocaine-menthol (CHLORASEPTIC) 6-10 MG lozenge 1 lozenge  1 lozenge Buccal Q1H PRN Hayley Vale APRN CNP   1 lozenge at 04/01/25 0334    bisacodyl (DULCOLAX) suppository 10 mg  10 mg Rectal Daily PRN Hayley Vale APRN CNP        carboxymethylcellulose PF (REFRESH PLUS) 0.5 % ophthalmic solution 1 drop  1 drop Both Eyes Q1H PRN Hayley Vale APRN CNP        glucose gel 15-30 g  15-30 g Oral Q15 Min PRN Tim Mays MD        Or    dextrose 50 % injection 25-50 mL  25-50 mL Intravenous Q15 Min PRN Tim Mays MD        Or    glucagon injection 1 mg  1 mg Subcutaneous Q15 Min PRN Tim Mays MD        diphenhydrAMINE (BENADRYL) elixir 12.5 mg  12.5 mg Oral Q6H PRN Hayley Vale APRN CNP        Or    diphenhydrAMINE (BENADRYL) injection 12.5 mg  12.5 mg Intravenous Q6H PRN Hayley Vale APRN CNP        HYDROmorphone (DILAUDID) injection 0.2 mg  0.2 mg Intravenous Q4H PRN Tim Mays MD   0.2 mg at 04/28/25 0327    lidocaine (LMX4) cream   Topical Q1H PRN Hayley Vale APRN CNP        lidocaine 1 % 0.1-1 mL  0.1-1 mL Other Q1H PRN Hayley Vale APRN CNP        magnesium hydroxide (MILK OF MAGNESIA) suspension 30 mL  30 mL Oral Daily PRN Hayley Vale APRN CNP   30  mL at 04/01/25 0427    melatonin tablet 10 mg  10 mg Oral At Bedtime PRN Hayley Vale APRN CNP   10 mg at 04/27/25 2227    metoprolol (LOPRESSOR) injection 5 mg  5 mg Intravenous Q4H PRN Hayley Vale APRN CNP   5 mg at 04/14/25 0806    miconazole (MICATIN) 2 % powder   Topical BID PRN Andres Sparks, DO   Given at 04/19/25 2301    naloxone (NARCAN) injection 0.2 mg  0.2 mg Intravenous Q2 Min PRN Hayley Vale APRN CNP        Or    naloxone (NARCAN) injection 0.4 mg  0.4 mg Intravenous Q2 Min PRN Hayley Vale APRN CNP        Or    naloxone (NARCAN) injection 0.2 mg  0.2 mg Intramuscular Q2 Min PRN Hayley Vale APRN CNP        Or    naloxone (NARCAN) injection 0.4 mg  0.4 mg Intramuscular Q2 Min PRN Hayley Vale APRN CNP        nicotine (COMMIT) lozenge 2 mg  2 mg Buccal Q1H PRN Hayley Vale APRN CNP   2 mg at 04/03/25 1145    No lozenges or gum should be given while patient on BIPAP/AVAPS/AVAPS AE   Does not apply Continuous PRN Hayley Vale APRN CNP        ondansetron (ZOFRAN ODT) ODT tab 4 mg  4 mg Oral Q6H PRN Hayley Vale APRN CNP   4 mg at 04/08/25 0208    Or    ondansetron (ZOFRAN) injection 4 mg  4 mg Intravenous Q6H PRN Hayley Vale APRN CNP   4 mg at 04/11/25 1232    Patient may continue current oral medications   Does not apply Continuous PRN Hayley Vale APRN CNP        phenol (CHLORASEPTIC) 1.4 % spray 1 mL  1 spray Mouth/Throat Q1H PRN Hayley Vale APRN CNP        prochlorperazine (COMPAZINE) injection 5 mg  5 mg Intravenous Q6H PRN Hayley Vale APRN CNP   5 mg at 04/08/25 1103    Or    prochlorperazine (COMPAZINE) tablet 5 mg  5 mg Oral Q6H PRN Hayley Vale APRN CNP   5 mg at 04/03/25 0756    QUEtiapine (SEROquel) tablet 25 mg  25 mg Oral BID PRN Tim Mays MD   25 mg at 04/28/25 0216    sodium chloride (PF) 0.9% PF flush 3 mL  3 mL Intracatheter q1 min prn Hayley Vale APRN CNP         traMADol (ULTRAM) half-tab 25 mg  25 mg Oral Q6H PRN Tim Mays MD   25 mg at 04/28/25 0215    traMADol (ULTRAM) tablet 50 mg  50 mg Oral Q6H PRN Tim Mays MD   50 mg at 04/27/25 2019

## 2025-04-28 NOTE — PLAN OF CARE
Goal Outcome Evaluation:    Summary:  POD#/Summary:   3/30: enterotomy and removal gallstone  4/4 new A-flutter  4/7: cardioverted into SR  4/8 Bowel resection  4/10 Bowel resection and wound closure   4/14: A-flutter, cardioverted into SR  4/15 Drain placed for abd abscess   4/23 TERRIE drain removal      Date & Time: 4/27/25 9404-5597   Orientation:  A&Ox2-3, intermittently confused, disoriented to time and situation   Activity Level: A2 ludwin steady, independently turns in bed     Fall Risk: Yes    Behavior & Aggression: Green    Pain Management: PRN tramadol    ABNL VS/O2: VSS on RA    Tele: N/A   ABNL Lab/BG: , 151  Diet:Mod carb    Bowel/Bladder: Incontinent    Skin: Midline inc, old TERRIE site, wound vac site  Drains/Devices: Wound vac on abdomen for abscess site  Tests/Procedures:CT abd/pelvis done today  Anticipated DC Date: Pending  Other Important Info:

## 2025-04-28 NOTE — PROGRESS NOTES
CLINICAL NUTRITION SERVICES - REASSESSMENT NOTE    Registered Dietitian Interventions:  Continue Moderate consistent carbohydrate diet and supplements as ordered     INFORMATION OBTAINED  Patient not available for interview due to busy with cares      CURRENT NUTRITION ORDERS  Diet: Orders Placed This Encounter      Combination Diet Moderate Consistent Carb (60 g CHO per Meal) Diet      Diet  Snacks/Supplements: Ensure Max BID    Nutrition Support: -    ASSESSED NUTRITION NEEDS:   Dosing Weight: 90 kg, based on adjusted wt  Estimated Energy Needs: 6211-7511 kcals/day (20 - 25 kcals/kg)  Justification: Obese  Estimated Protein Needs: 108-135+ grams protein/day (1.2 - 1.5+ grams of pro/kg)  Justification: Post-op     MALNUTRITION  % Intake: </= 50% for >/= 5 days (severe)  % Weight Loss: Unable to assess   Subcutaneous Fat Loss: None observed  Muscle Loss: None observed  Fluid Accumulation/Edema: Moderate to severe, 2-4+  Malnutrition Diagnosis: Moderate malnutrition in the context of acute illness or injury  Malnutrition Present on Admission: No    CURRENT INTAKE/TOLERANCE  - Flowsheets show a good appetite and 2-3 intakes per day of % for the most part    NEW FINDINGS  - Pt eager to discharge   Nutrition-relevant labs: Reviewed   Nutrition-relevant medications: Reviewed   GI symptoms: Reviewed  Skin/wounds: WOCN following for surgical wounds  Weight:   - Admission: 129.3 kg (285 lb) (03/30/25 1455)   - Most Recent: 124 kg (273 lb 5.9 oz) (04/28/25 0500)    EVALUATION OF THE PROGRESS TOWARD GOALS   Previous Goals  Patient to consume % of nutritionally adequate meal trays TID, or the equivalent with supplements/snacks.  Evaluation: Progressing    Previous Nutrition Diagnosis  Inadequate oral intake related to  early satiety  as evidenced by patient report, s/p ex lap, abdominal drain, and wound vac present.   Evaluation: No change    NUTRITION DIAGNOSIS  Inadequate oral intake related to  early satiety  as  evidenced by patient report, s/p ex lap, abdominal drain, and wound vac present.     INTERVENTIONS  See nutrition interventions above    Goals  Patient to consume % of nutritionally adequate meal trays TID, or the equivalent with supplements/snacks.     Monitoring/Evaluation      Progress toward goals will be monitored and evaluated per policy.

## 2025-04-29 VITALS
HEIGHT: 72 IN | WEIGHT: 273.37 LBS | DIASTOLIC BLOOD PRESSURE: 72 MMHG | BODY MASS INDEX: 37.03 KG/M2 | HEART RATE: 82 BPM | OXYGEN SATURATION: 96 % | SYSTOLIC BLOOD PRESSURE: 126 MMHG | RESPIRATION RATE: 18 BRPM | TEMPERATURE: 98.3 F

## 2025-04-29 LAB — GLUCOSE BLDC GLUCOMTR-MCNC: 144 MG/DL (ref 70–99)

## 2025-04-29 PROCEDURE — 250N000013 HC RX MED GY IP 250 OP 250 PS 637: Performed by: INTERNAL MEDICINE

## 2025-04-29 PROCEDURE — 250N000013 HC RX MED GY IP 250 OP 250 PS 637: Performed by: HOSPITALIST

## 2025-04-29 PROCEDURE — 250N000013 HC RX MED GY IP 250 OP 250 PS 637: Performed by: STUDENT IN AN ORGANIZED HEALTH CARE EDUCATION/TRAINING PROGRAM

## 2025-04-29 PROCEDURE — 99239 HOSP IP/OBS DSCHRG MGMT >30: CPT | Performed by: INTERNAL MEDICINE

## 2025-04-29 PROCEDURE — 250N000013 HC RX MED GY IP 250 OP 250 PS 637: Performed by: PHYSICIAN ASSISTANT

## 2025-04-29 PROCEDURE — 250N000013 HC RX MED GY IP 250 OP 250 PS 637

## 2025-04-29 PROCEDURE — 250N000013 HC RX MED GY IP 250 OP 250 PS 637: Performed by: NURSE PRACTITIONER

## 2025-04-29 RX ORDER — ATORVASTATIN CALCIUM 20 MG/1
20 TABLET, FILM COATED ORAL DAILY
DISCHARGE
Start: 2025-04-29

## 2025-04-29 RX ORDER — NICOTINE 21 MG/24HR
1 PATCH, TRANSDERMAL 24 HOURS TRANSDERMAL DAILY
DISCHARGE
Start: 2025-04-30 | End: 2025-04-29

## 2025-04-29 RX ORDER — METOPROLOL TARTRATE 25 MG/1
25 TABLET, FILM COATED ORAL 2 TIMES DAILY
DISCHARGE
Start: 2025-04-29

## 2025-04-29 RX ORDER — AMIODARONE HYDROCHLORIDE 200 MG/1
200 TABLET ORAL DAILY
DISCHARGE
Start: 2025-04-30

## 2025-04-29 RX ORDER — NICOTINE 21 MG/24HR
1 PATCH, TRANSDERMAL 24 HOURS TRANSDERMAL DAILY
DISCHARGE
Start: 2025-04-30

## 2025-04-29 RX ORDER — QUETIAPINE FUMARATE 25 MG/1
25 TABLET, FILM COATED ORAL 2 TIMES DAILY PRN
DISCHARGE
Start: 2025-04-29 | End: 2025-04-29

## 2025-04-29 RX ORDER — POLYETHYLENE GLYCOL 3350 17 G
2 POWDER IN PACKET (EA) ORAL
DISCHARGE
Start: 2025-04-29

## 2025-04-29 RX ADMIN — POLYETHYLENE GLYCOL 3350 17 G: 17 POWDER, FOR SOLUTION ORAL at 09:03

## 2025-04-29 RX ADMIN — METOPROLOL TARTRATE 25 MG: 25 TABLET, FILM COATED ORAL at 09:03

## 2025-04-29 RX ADMIN — INSULIN ASPART 1 UNITS: 100 INJECTION, SOLUTION INTRAVENOUS; SUBCUTANEOUS at 09:04

## 2025-04-29 RX ADMIN — FUROSEMIDE 20 MG: 20 TABLET ORAL at 09:03

## 2025-04-29 RX ADMIN — SENNOSIDES AND DOCUSATE SODIUM 1 TABLET: 50; 8.6 TABLET ORAL at 09:03

## 2025-04-29 RX ADMIN — GABAPENTIN 100 MG: 100 CAPSULE ORAL at 09:03

## 2025-04-29 RX ADMIN — TRAMADOL HYDROCHLORIDE 50 MG: 50 TABLET, COATED ORAL at 09:02

## 2025-04-29 RX ADMIN — NICOTINE 1 PATCH: 21 PATCH, EXTENDED RELEASE TRANSDERMAL at 09:02

## 2025-04-29 RX ADMIN — AMIODARONE HYDROCHLORIDE 200 MG: 200 TABLET ORAL at 09:03

## 2025-04-29 RX ADMIN — APIXABAN 5 MG: 5 TABLET, FILM COATED ORAL at 09:03

## 2025-04-29 RX ADMIN — PANTOPRAZOLE SODIUM 40 MG: 20 TABLET, DELAYED RELEASE ORAL at 09:03

## 2025-04-29 RX ADMIN — ACETAMINOPHEN 650 MG: 325 TABLET, FILM COATED ORAL at 09:02

## 2025-04-29 ASSESSMENT — ACTIVITIES OF DAILY LIVING (ADL)
ADLS_ACUITY_SCORE: 46
ADLS_ACUITY_SCORE: 46
ADLS_ACUITY_SCORE: 48
ADLS_ACUITY_SCORE: 46
ADLS_ACUITY_SCORE: 46
ADLS_ACUITY_SCORE: 47
ADLS_ACUITY_SCORE: 48
ADLS_ACUITY_SCORE: 46
ADLS_ACUITY_SCORE: 46

## 2025-04-29 NOTE — DISCHARGE SUMMARY
Goal Outcome Evaluation:       Plan of Care Reviewed With: patient     Overall Patient Progress: improvingOverall Patient Progress: improving        Shift: 4/29/25 5038-0489  POD#/Summary:   3/30: enterotomy and removal gallstone  4/4 new A-flutter  4/7: cardioverted into SR  4/8 Bowel resection  4/10 Bowel resection and wound closure   4/14: A-flutter, cardioverted into SR  4/15 Drain placed for abd abscess   4/23 TERRIE drain removal     Orientation: AxOx2, confused to time/situation.  Vital Signs: VSS RA, pain rated 6-7/10 in abdomen.  Labs: glucose 144, Hgb 10.4.  Pain Management: PRN Tramadol and tylenol with some relief.  Bowel/Bladder: incontinent at times, uses urinal/bedpan, had BM.  Drains: Wound vac on abdomen for abscess site @ 75.  IV: PIV SL removed prior to discharge.  Wounds/Incisions: Midline inc, old TERRIE sites, wound vac  Diet: Mod carb, tolerated breakfast well.  Activity Level: T/R- independently turns, needs encouragement   Tests/Procedures: N/A  Anticipated Discharge Date/Plan: discharged to TCU 4/29 via EMS stretcher.  Significant information: Discharged with all personal belongings to stretcher ride over to [facility].

## 2025-04-29 NOTE — PROGRESS NOTES
[facility]    Internal Medicine Hospitalist Progress Note  04/29/2025  I evaluated patient on the above date.    Adonay Hightower Jr., MD  [phone] (p)  Text Page  Vocera      [New actions/orders today (04/29/2025) are underlined in the assessment and plan. All lab results in the assessment and plan were reviewed.]  Assessment & Plan      Richar Davis is a 69 year old male with a history including DM2; HTN; CAD s/p CABG: aortic valve disease s/p AVR (bioprosthetic); HLD; neuropathy; BPH; and untreated sleep apnea; who presented 3/30/2025 with abdominal pain, nausea and vomiting and found with signs of gallstone ileus (distal small bowel obstruction secondary to an impacted 2.2 cm gallstone).    Underwent urgent surgery 3/30/2025. Hospitalization complicated by multiple issues as noted.      # Gallstone ileus - s/p ex-lap with small bowel enterotomy with removal of gallstone and primary closure 3/30/2025.  # Post-surgical leak with abdominal contamination with sepsis and Enterococcus faecalis bacteremia - s/p ex-lap with small bowel resection 4/8/2025 and s/p ex-lap, small bowel anastomosis and abdominal closure with wound VAC placed on 4/10/2025.  # Intra-abdominal fluid collections/abscesses s/p IR drains placed 4/15/2025 (drains subsequently removed).  # Contracted gallbladder containing a large gallstone with signs of fistulization to the duodenum.  * Initial presentation as above. Surgery contacted urgently and pt taken to OR for surgery as noted.  * Post-op developed suspected ileus.  * Subsequently developed fevers/sepsis with lactic acidosis, BC's 4/7 and 4/8 positive for Enterococcus faecalis. CT imaging 4/8 showed increased gastric and SB distension, increased ascites. Started on piperacillin-tazobactam 4/8. Subsequently taken to OR again 4/8 as above with subsequent closure 4/10 as above.  * Subsequently, continued to have distended abdomen, bloody VAC output.  * CT on 4/14  showed multiple peripherally enhancing fluid collections in the abdomen suspicious for developing abscesses, the largest of these was in the left paracolic gutter; stable appearance of a contracted gallbladder containing a large gallstone which was fistulized to the duodenum with associated pneumobilia.  * Underwent IR guided abdominal drain placements x 2 on 4/15, cultures NG.    * Patient removed NG tube by himself and started on oral liquids on 4/16.    * Underwent CT sinogram on 4/18, left intra-abdominal drain removed.  * Piperacillin-tazobactam discontinued 4/22 (BC's from 4/10 NG).  * IR removed right intra-abdominal drain 4/23.  * CT on 4/24 showed that gallstone that was previously in gallbladder had passed into small bowel. Surgery recommended continued hospitalization until stone passed.  * X-ray plain film 4/26 did not show any evidence of ongoing retained stone.  * CT on 4/27 showed gallstone remains in the mid small bowel at the side to side small bowel anastomosis, no obstruction; rachael significant residual or recurrent abscess.  Recent Labs   Lab 04/28/25  0820 04/24/25  0707   WBC 6.1 6.6   - Post-op and wound management per Surgery.  - Continue diet as tolerated.  - Continue PRN acetaminophen, PRN tramadol; minimize opioids as able.  - Continue regular ambulation as able.  - On 4/28, Surgery d/w pt and his wife about the large gallstone transiting through the small bowel and possibilities for management. Most aggressive would be surgical treatment but not felt that he should undergo another surgery presenty; also discussed surveillance for possible bowel obstruction in the hospital vs discharge to TCU with close CT follow-up (stone not visualized on plain films). Ultimately decided to discharge to TCU with close CT follow-up per Surgery.    Increased omental nodularity/stranding on imaging, favor inflammatory post-op changes rather than malignancy.  * CT sinogram from 4/18 with increased  nodularity/density in the mesenteric fat of the upper abdomen bilaterally suggestive of peritoneal carcinomatosis.  * Oncology consulted 4/23 and recommended reimaging/IR guided biopsy.  * Repeat CT on 4/24 showed omental nodularity and stranding, radiologist read favored inflammatory postop changes rather than malignancy.   * Of note, pathology from surgery showed benign small bowel resection.  - Overall, Surgery recommends avoid IR biopsy at this time, have follow-up CT as outpatient in 3 months.  - Recommend patient have age-appropriate health maintenance as outpatient.     New atrial flutter with RVR 4/5/2025 - status post DCCV on 4/7/2025.  Postoperative atrial fibrillation with RVR - status post cardioversion 4/14/2025.  * Postoperatively developed new aflutter with RVR, was on intravenous heparin drip, rate controlled with diltiazem drip and initiated oral metoprolol. Cardiology consulted.  * Underwent DCCV on 4/7.   * Heparin was paused as patient back to OR on 4/8 and was started on intravenous heparin during ICU stay.  * Patient again developed recurrence of A-fib with RVR with heart rate in 140s on 4/14 and initiated on amiodarone drip, underwent MANDEEP with successful cardioversion on 4/14.    * Echo 4/15 showed LVEF 55%; RV normal size, limited views, question of mild dysfunction; no valve disease.  * Switched to PO amiodarone 4/15.  * Transitioned to apixaban 4/18.  * QTc from 4/20 within normal limits.   - Continue amiodarone 200 daily (new) until follow-up with Cardiology.  - Continue metoprolol 25 mg BID (increased dose; changed from metoprolol ER).  - Continue apixaban 5 mg BID (new).  - Follow-up with Cardiology in clinic in 2 weeks after discharge.    # Volume overload/diastolic CHF with bilateral pleural effusions, acute on chronic bilateral lower extremity edema.  # Coronary artery disease status post CABG x 3v (10/2022)  # Aortic stenosis (status post AVR with bovine valve) (10/2022).  #  Hypertension (benign essential).  # Hyperlipidemia.  [PTA: atorvastatin 80 mg daily; furosemide 20 mg daily; metoprolol ER 25 mg daily.]  * Issues with afib/flutter as noted.  * Issues with hypoxia with bilateral pleural effusions. Diuresed with IV furosemide. Bilateral lower extremity US 4/15 negative. Restarted PTA PO furosemide increased dose 4/26.  * Atorvastatin dose decreased this hospitalization given LDL level 71.  - Continue furosemide 20 mg daily.  - Continue metoprolol 25 mg BID (increased dose; changed from metoprolol ER).  - Continue atorvastatin 20 mg daily.  - Continue to monitor daily wts..    Acute hypoxia, suspect multifactorial related to atelectasis, bilateral pleural effusion, postoperative narcotic use, ALIREZA, deconditioning, resolved.  * CT chest from 4/14 with new small bilateral pleural effusions with overlying atelectasis/consolidation.  * CT chest from 4/24 with trace bilateral pleural effusion with overlying atelectasis.  * Diuresed this hospitalization as noted.  * Other issues treated as noted.  * Subsequently weaned off O2.  - Continue regular ambulation, IS.    Acute anemia likely from surgical blood loss, acute illness, dilutional.  * Presented with hemoglobin of 15.9.  * Hemoglobin postoperatively in the 10-11 range.    * On 4/24, hemoglobin dropped to 8.9, recheck hemoglobin up to 11 range.    - Continue to monitor CBC periodically as needed.     Acute encephalopathy likely multifactorial including metabolic and toxic from anesthesia/sedation for procedure/delirium, hospitalization/ICU stay/narcotic use.  * Patient with prolonged hospital stay, multiple procedures, ICU stay, on narcotics.  * CT head 4/18 no acute intracranial pathology.  * QTc from 4/20 within normal limits (on amiodarone).  - Continue PRN quetiapine 25 mg twice daily (note also on amiodarone increasing risk of QT prolongation; QTC normal 4/20 as noted).  - Continue to treat other issues as noted.  - Re-orient as  needed.  - Maintain normal day/night, sleep/wake cycles.  - Minimize sedating medications as able.     Severe hypoalbuminemia likely from acute illness, dilutional.  - Continue to treat other issues as noted.  - Continue diet as tolerated.    Type 2 diabetes mellitus.  [PTA: metformin 250 mg with supper.]  Recent Labs   Lab 04/29/25  0609 04/28/25  2132 04/28/25  1745 04/28/25  1220 04/28/25  0820 04/28/25  0734   * 158* 155* 157* 152* 145*     Recent Labs   Lab Test 03/31/25  0809 09/14/22  1111   A1C 6.8* 5.6   - Continue moderate CHO diet.  - Plan resume metformin at discharge.      Urinary retention, resolved.  Traumatic hematuria, resolved.  BPH:   * Required Mccarty this hospitalization.  * Urine cultures from 4/14 no growth  * Catheter removed 4/17.   - Continue PTA tamsulosin.     Neuropathy.  * PRN gabapentin 800 mg 3 times daily had been filled but patient reported taking 300 mg TID.  * On 4/1, per pharmacy note only recent fills were 800 mg and not 300 mg dosing.   * Gabapentin was on hold since admission, resumed on 4/18 at lower dose.  - Continue gabapentin to 100 mg BID.    Nicotine use, chews tobacco.  - Continue nicotine patch, and PRN nicotine lozenges (new).    - Strongly recommend tobacco cessation.    ALIREZA not on CPAP.  - Recommend sleep studies as outpatient    Weakness and physical deconditioning due to multiple acute and chronic medical issues.  - Continue PT and OT.  - Plan TCU.     Obesity.  * Body mass index is 37.08 kg/m .  - Needs to continue to pursue aggressive dietary and lifestyle modifications.    Other resolved issues:  Acute kidney injury, resolved.  Anion gap metabolic acidosis, resolved.  Mild hypernatremia, resolved.  Hypokalemia from poor intake, resolved.      Clinically Significant Risk Factors               # Hypoalbuminemia: Lowest albumin = 1.9 g/dL at 4/9/2025  5:25 AM, will monitor as appropriate     # Hypertension: Noted on problem list           # DMII: A1C = 6.8 %  (Ref range: <5.7 %) within past 6 months   # Obesity: Estimated body mass index is 37.08 kg/m  as calculated from the following:    Height as of this encounter: 1.829 m (6').    Weight as of this encounter: 124 kg (273 lb 5.9 oz).   # Moderate Malnutrition: based on nutrition assessment and treatment provided per dietitian's recommendations.      # Financial/Environmental Concerns: none   # History of CABG: noted on surgical history       Diet: Snacks/Supplements Adult: Ensure Max Protein (bariatric); Between Meals  Room Service  Combination Diet Moderate Consistent Carb (60 g CHO per Meal) Diet  Diet    Prophylaxis: PCD's, ambulation, and is on DOAC.  Mccarty Catheter: Not present  Lines: None     Code Status: Full Code    Disposition Plan   Medically Ready for Discharge: Anticipated in 1-2 Days  Expected discharge to transitional care unit (TCU) pending above.    Entered: Adonay Hightower MD 04/29/2025, 9:17 AM             Interval History   Doing well.  Feels ready for discharge.    * Data reviewed today: I reviewed all new labs and imaging over the last 24 hours. I personally reviewed no images or ECG's today.    Physical Exam   Most recent vitals:   , Blood pressure 126/72, pulse 82, temperature 98.3  F (36.8  C), temperature source Oral, resp. rate 18, height 1.829 m (6'), weight 124 kg (273 lb 5.9 oz), SpO2 96%. O2 Device: None (Room air)    Vitals:    04/23/25 0600 04/27/25 0538 04/28/25 0500   Weight: 135.6 kg (298 lb 14.4 oz) 125.3 kg (276 lb 4.8 oz) 124 kg (273 lb 5.9 oz)     Vital signs with ranges:  Temp:  [97.5  F (36.4  C)-98.3  F (36.8  C)] 98.3  F (36.8  C)  Pulse:  [78-89] 82  Resp:  [16-18] 18  BP: (101-126)/(59-72) 126/72  SpO2:  [95 %-96 %] 96 %  Patient Vitals for the past 24 hrs:   BP Temp Temp src Pulse Resp SpO2   04/29/25 0812 126/72 98.3  F (36.8  C) Oral 82 18 96 %   04/29/25 0134 124/71 97.5  F (36.4  C) Oral 78 16 95 %   04/28/25 2123 121/63 -- -- 89 -- --   04/28/25 1520 101/59 97.8   F (36.6  C) Oral 84 16 96 %     I/O's last 24 hours:  I/O last 3 completed shifts:  In: 120 [P.O.:120]  Out: 400 [Urine:400]    Constitutional: awake, alert, pleasant, conversant   Head:   Eyes:   ENT:   Neck:   Cardiovascular: regular rate and rhythm; no murmurs, rubs or gallops  Lungs: diminished in the bases, no crackles or wheezes  Gastrointestinal/Abdomen: soft, non-tender to light touch, mildly distended, + bowel sounds, +wound VAC  :   Musculoskeletal:   Skin/Extremities:   Neurologic:   Psychiatric:   Hematologic/Lymphatic/Immunologic:        Labs reviewed.  Recent Labs   Lab 04/29/25  0609 04/28/25  2132 04/28/25  1745 04/28/25  1220 04/28/25  0820 04/25/25  1305 04/25/25  0741 04/24/25  1713 04/24/25  1355 04/24/25  1321 04/24/25  0707 04/24/25  0705 04/23/25  1635 04/23/25  1051   WBC  --   --   --   --  6.1  --   --   --   --   --  6.6  --   --   --    HGB  --   --   --   --  10.4*  --  11.1*  --  11.9*  --  8.9*  --   --  10.8*   MCV  --   --   --   --  93  --   --   --   --   --   --   --   --   --    PLT  --   --   --   --  168  --   --   --   --   --   --   --   --   --    NA  --   --   --   --  138  --   --   --   --   --   --  140  --  138   POTASSIUM  --   --   --   --  4.1  --  4.6  --  4.6  --   --  5.5*  --  4.2   CHLORIDE  --   --   --   --  102  --   --   --   --   --   --  105  --  102   CO2  --   --   --   --  28  --   --   --   --   --   --  24  --  27   BUN  --   --   --   --  15.4  --   --   --   --   --   --  9.4  --  8.0   CR  --   --   --   --  0.92  --  0.70  --   --   --   --  0.63*  --  0.65*   ANIONGAP  --   --   --   --  8  --   --   --   --   --   --  11  --  9   HALEY  --   --   --   --  9.4  --   --   --   --   --   --  9.7  --  9.3   * 158* 155*   < > 152*   < >  --    < >  --    < >  --  128*   < > 167*   ALBUMIN  --   --   --   --  3.0*  --   --   --   --   --  2.6*  --   --   --    PROTTOTAL  --   --   --   --  6.8  --   --   --   --   --  6.6  --   --   --   "  BILITOTAL  --   --   --   --  0.2  --   --   --   --   --  0.3  --   --   --    ALKPHOS  --   --   --   --  91  --   --   --   --   --  88  --   --   --    ALT  --   --   --   --  16  --   --   --   --   --  18  --   --   --    AST  --   --   --   --  19  --   --   --   --   --  24  --   --   --     < > = values in this interval not displayed.     Recent Labs   Lab Test 04/24/25  0707 04/16/25  0303 04/08/25  0604   NT-PROBNP, INPATIENT 875 2,027* 3,106*     Recent Labs   Lab 04/29/25  0609 04/28/25  2132 04/28/25  1745 04/28/25  1220 04/28/25  0820 04/28/25  0734   * 158* 155* 157* 152* 145*     Recent Labs   Lab Test 03/31/25  0809 09/14/22  1111   A1C 6.8* 5.6       No results for input(s): \"INR\", \"[id number]\" in the last 168 hours.  Recent Labs   Lab 04/28/25  0820 04/24/25  0707   WBC 6.1 6.6       MICRO:  Cultures (including blood and urine):  No lab results found in last 7 days.    No results found for this or any previous visit (from the past 24 hours).      Medications   All medications were reviewed. MAR.    Infusions:  Current Facility-Administered Medications   Medication Dose Route Frequency Provider Last Rate Last Admin    No lozenges or gum should be given while patient on BIPAP/AVAPS/AVAPS AE   Does not apply Continuous PRN Hayley Vale APRN CNP        Patient may continue current oral medications   Does not apply Continuous PRN Hayley Vale APRN CNP         Scheduled Medications:  Current Facility-Administered Medications   Medication Dose Route Frequency Provider Last Rate Last Admin    amiodarone (PACERONE) tablet 200 mg  200 mg Oral Daily Richa Schmidt PA-C   200 mg at 04/29/25 0903    apixaban ANTICOAGULANT (ELIQUIS) tablet 5 mg  5 mg Oral BID Tim Mays MD   5 mg at 04/29/25 0903    atorvastatin (LIPITOR) tablet 20 mg  20 mg Oral QPM Tim Mays MD   20 mg at 04/28/25 2123    furosemide (LASIX) tablet 20 mg  20 mg Oral Daily Adonay Hightower MD  "  20 mg at 04/29/25 0903    gabapentin (NEURONTIN) capsule 100 mg  100 mg Oral BID Tim Mays MD   100 mg at 04/29/25 0903    insulin aspart (NovoLOG) injection (RAPID ACTING)  1-7 Units Subcutaneous TID AC Tim Mays MD   1 Units at 04/29/25 0904    insulin aspart (NovoLOG) injection (RAPID ACTING)  1-5 Units Subcutaneous At Bedtime Tim Mays MD        metoprolol tartrate (LOPRESSOR) tablet 25 mg  25 mg Oral BID Tim Mays MD   25 mg at 04/29/25 0903    nicotine (NICODERM CQ) 21 MG/24HR 24 hr patch 1 patch  1 patch Transdermal Daily Hayley Vale APRN CNP   1 patch at 04/29/25 0902    pantoprazole (PROTONIX) EC tablet 40 mg  40 mg Oral QAM AC Tim Mays MD   40 mg at 04/29/25 0903    polyethylene glycol (MIRALAX) Packet 17 g  17 g Oral Daily Ana Dougherty MD   17 g at 04/29/25 0903    senna-docusate (SENOKOT-S/PERICOLACE) 8.6-50 MG per tablet 1 tablet  1 tablet Oral BID Ana Dougherty MD   1 tablet at 04/29/25 0903    sodium chloride (PF) 0.9% PF flush 3 mL  3 mL Intracatheter Q8H MITA Hayley Vale APRN CNP   3 mL at 04/29/25 0904    tamsulosin (FLOMAX) capsule 0.8 mg  0.8 mg Oral QPM Andres Sparks DO   0.8 mg at 04/28/25 2123     PRN Medications:  Current Facility-Administered Medications   Medication Dose Route Frequency Provider Last Rate Last Admin    acetaminophen (TYLENOL) tablet 650 mg  650 mg Oral Q4H PRN Shi Goncalves PA-C   650 mg at 04/29/25 0902    benzocaine-menthol (CHLORASEPTIC) 6-10 MG lozenge 1 lozenge  1 lozenge Buccal Q1H PRN Hayley Vale APRN CNP   1 lozenge at 04/01/25 0334    bisacodyl (DULCOLAX) suppository 10 mg  10 mg Rectal Daily PRN Hayley Vale APRN CNP        carboxymethylcellulose PF (REFRESH PLUS) 0.5 % ophthalmic solution 1 drop  1 drop Both Eyes Q1H PRN Hayley Vale APRN CNP        glucose gel 15-30 g  15-30 g Oral Q15 Min PRN Tim Mays MD        Or    dextrose 50 % injection 25-50 mL   25-50 mL Intravenous Q15 Min PRN Tim Mays MD        Or    glucagon injection 1 mg  1 mg Subcutaneous Q15 Min PRN Tim Mays MD        diphenhydrAMINE (BENADRYL) elixir 12.5 mg  12.5 mg Oral Q6H PRN Hayley Vale APRN CNP        Or    diphenhydrAMINE (BENADRYL) injection 12.5 mg  12.5 mg Intravenous Q6H PRN Hayley Vael APRN CNP        HYDROmorphone (DILAUDID) injection 0.2 mg  0.2 mg Intravenous Q4H PRN Tim Mays MD   0.2 mg at 04/28/25 1351    lidocaine (LMX4) cream   Topical Q1H PRN Hayley Vale APRN CNP        lidocaine 1 % 0.1-1 mL  0.1-1 mL Other Q1H PRN Hayley Vale APRN CNP        magnesium hydroxide (MILK OF MAGNESIA) suspension 30 mL  30 mL Oral Daily PRN Hayley Vale APRN CNP   30 mL at 04/01/25 0427    melatonin tablet 10 mg  10 mg Oral At Bedtime PRN Hayley Vale APRN CNP   10 mg at 04/27/25 2227    metoprolol (LOPRESSOR) injection 5 mg  5 mg Intravenous Q4H PRN Hayley Vale APRN CNP   5 mg at 04/14/25 0806    miconazole (MICATIN) 2 % powder   Topical BID PRN Andres Sparks DO   Given at 04/19/25 2301    naloxone (NARCAN) injection 0.2 mg  0.2 mg Intravenous Q2 Min PRN Hayley Vale APRN CNP        Or    naloxone (NARCAN) injection 0.4 mg  0.4 mg Intravenous Q2 Min PRN Hayley Vale APRN CNP        Or    naloxone (NARCAN) injection 0.2 mg  0.2 mg Intramuscular Q2 Min PRN Hayley Vale APRN CNP        Or    naloxone (NARCAN) injection 0.4 mg  0.4 mg Intramuscular Q2 Min PRN Hayley Vale APRN CNP        nicotine (COMMIT) lozenge 2 mg  2 mg Buccal Q1H PRN Hayley Vale APRN CNP   2 mg at 04/03/25 1145    No lozenges or gum should be given while patient on BIPAP/AVAPS/AVAPS AE   Does not apply Continuous PRN Hayley Vale APRN CNP        ondansetron (ZOFRAN ODT) ODT tab 4 mg  4 mg Oral Q6H PRN Hayley Vale APRN CNP   4 mg at 04/08/25 0208    Or    ondansetron (ZOFRAN) injection 4 mg  4  mg Intravenous Q6H PRN Hayley Vale APRN CNP   4 mg at 04/11/25 1232    Patient may continue current oral medications   Does not apply Continuous PRN Hayley Vale APRN CNP        phenol (CHLORASEPTIC) 1.4 % spray 1 mL  1 spray Mouth/Throat Q1H PRN Hayley Vale APRN CNP        prochlorperazine (COMPAZINE) injection 5 mg  5 mg Intravenous Q6H PRN Hayley Vale APRN CNP   5 mg at 04/08/25 1103    Or    prochlorperazine (COMPAZINE) tablet 5 mg  5 mg Oral Q6H PRN Hayley Vale APRN CNP   5 mg at 04/03/25 0756    QUEtiapine (SEROquel) tablet 25 mg  25 mg Oral BID PRN Tim Mays MD   25 mg at 04/28/25 0216    sodium chloride (PF) 0.9% PF flush 3 mL  3 mL Intracatheter q1 min prn Hayley Vale APRN CNP        traMADol (ULTRAM) half-tab 25 mg  25 mg Oral Q6H PRN Tim Mays MD   25 mg at 04/28/25 0215    traMADol (ULTRAM) tablet 50 mg  50 mg Oral Q6H PRN Tim Mays MD   50 mg at 04/29/25 0902

## 2025-04-29 NOTE — PLAN OF CARE
Goal Outcome Evaluation:      Plan of Care Reviewed With: patient    Overall Patient Progress: improvingOverall Patient Progress: improving         Shift: 4/28-29/25 [id number]  POD#/Summary:   3/30: enterotomy and removal gallstone  4/4 new A-flutter  4/7: cardioverted into SR  4/8 Bowel resection  4/10 Bowel resection and wound closure   4/14: A-flutter, cardioverted into SR  4/15 Drain placed for abd abscess   4/23 TERRIE drain removal     Orientation: AO 2-3; D/O time and situation waxes and wanes;   Vital Signs: VSS RA  Labs: See chart  Pain Management: PRN Tramadol and scheduled tylenol  Bowel/Bladder: Cont./Incont. B/B, BM overnight  Drains: Wound vac on abdomen for abscess site   IV: PIV SL   Wounds/Incisions: Midline inc, old TERRIE sites, wound vac  Diet: Mod carb, denies N/V  Activity Level: T/R- independently turns, needs encouragement   Tests/Procedures: N/A  Anticipated Discharge Date/Plan: Planning to discharge to TCU 4/29  Significant information: CMS intact.

## 2025-04-29 NOTE — PLAN OF CARE
Occupational Therapy Discharge Summary    Reason for therapy discharge:    Discharged to transitional care facility.    Progress towards therapy goal(s). See goals on Care Plan in [facility] electronic health record for goal details.  Goals partially met.  Barriers to achieving goals:   discharge from facility.    Therapy recommendation(s):    Continued therapy is recommended.  Rationale/Recommendations:  Per treating therapist, pt would benefit from continued OT in TCU. See OT notes for details.

## 2025-04-29 NOTE — PLAN OF CARE
"Speech Language Therapy Discharge Summary    Reason for therapy discharge:    Discharged to transitional care facility.    Progress towards therapy goal(s). See goals on Care Plan in [facility] electronic health record for goal details.  Goals partially met.  Barriers to achieving goals:   discharge from facility.    Therapy recommendation(s):    Continued therapy is recommended.  Rationale/Recommendations:  Per surgery team: \"okay to advance to moderate CHO diet if ok with SLP\" -- clinically appropriate to advance to this diet/ regular consistencies with thin liqiuds. Recommend upright positioning, slow rate, single bites/sips at a time and liquid wash every few bites; remain upright for 30-60 minutes after PO. Recommend short course SLP services to monitor diet tolerance.    "

## 2025-04-29 NOTE — PROGRESS NOTES
Care Management Discharge Note    Discharge Date: 04/29/2025       Discharge Disposition: Skilled Nursing Facility    Discharge Services: Home Care    Discharge DME:      Discharge Transportation: agency    Private pay costs discussed: transportation costs    Does the patient's insurance plan have a 3 day qualifying hospital stay waiver?  No    PAS Confirmation Code: [id number]  Patient/family educated on Medicare website which has current facility and service quality ratings: no    Education Provided on the Discharge Plan: Yes  Persons Notified of Discharge Plans: [facility], patient, family, providers, facility   Patient/Family in Agreement with the Plan: yes    Handoff Referral Completed: No, handoff not indicated or clinically appropriate    Additional Information:  Writer faxed orders and scripts to [facility], confirmed with providers, patient and facility that discharge was taking place today.     A safe discharge plan was followed     KANDI Campbell

## 2025-04-29 NOTE — DISCHARGE SUMMARY
[facility]  Discharge Summary        Richar Davis MRN# [id number]   YOB: 1955 Age: 69 year old     Date of Admission: 3/30/2025  Date of Discharge: 04/29/2025  Admitting Physician: Jake Blevins DO  Discharge Physician: Adonay Hightower MD     Primary Provider: Cory Valles  Primary Care Physician Phone Number: [phone]         Discharge Diagnoses:   Gallstone ileus - s/p ex-lap with small bowel enterotomy with removal of gallstone and primary closure 3/30/2025.  Post-surgical leak with abdominal contamination with sepsis and Enterococcus faecalis bacteremia - s/p ex-lap with small bowel resection 4/8/2025 and s/p ex-lap, small bowel anastomosis and abdominal closure with wound VAC placed on 4/10/2025.  Intra-abdominal fluid collections/abscesses s/p IR drains placed 4/15/2025 (drains subsequently removed).  Contracted gallbladder containing a large gallstone with signs of fistulization to the duodenum.  Increased omental nodularity/stranding on imaging, favor inflammatory post-op changes rather than malignancy.  Atrial flutter with RVR 4/5/2025 - status post DCCV on 4/7/2025.  Postoperative atrial fibrillation with RVR - status post cardioversion 4/14/2025.  Volume overload/diastolic CHF with bilateral pleural effusions, acute on chronic bilateral lower extremity edema.  Acute hypoxia, suspect multifactorial related to atelectasis, bilateral pleural effusion, postoperative narcotic use, ALIREZA, deconditioning.  Acute anemia likely from surgical blood loss, acute illness, dilutional.  Acute encephalopathy likely multifactorial including metabolic and toxic from anesthesia/sedation for procedure/delirium, hospitalization/ICU stay/narcotic use.  Acute kidney injury.  Anion gap metabolic acidosis.  Mild hypernatremia.  Hypokalemia from poor intake.  Severe hypoalbuminemia likely from acute illness, dilutional.  Urinary retention.  Traumatic  hematuria.  Weakness and physical deconditioning due to multiple acute and chronic medical issues.        Other Chronic Medical Problems:      Type 2 diabetes mellitus.  Coronary artery disease status post CABG x 3v (10/2022)  Aortic stenosis (status post AVR with bovine valve) (10/2022).  Hypertension (benign essential).  Hyperlipidemia.  BPH.  Neuropathy.  Nicotine use, chews tobacco.  ALIREZA not on CPAP.  Obesity.       Allergies:         Allergies   Allergen Reactions    Niaspan [Niacin] Other (See Comments)     flushing           Discharge Medications:        Current Discharge Medication List        START taking these medications    Details   amiodarone (PACERONE) 200 MG tablet Take 1 tablet (200 mg) by mouth daily.    Associated Diagnoses: Paroxysmal atrial fibrillation (H)      apixaban ANTICOAGULANT (ELIQUIS) 5 MG tablet Take 1 tablet (5 mg) by mouth 2 times daily.    Associated Diagnoses: Paroxysmal atrial fibrillation (H)      metoprolol tartrate (LOPRESSOR) 25 MG tablet Take 1 tablet (25 mg) by mouth 2 times daily. Hold for SBP<100 or HR<55.    Associated Diagnoses: Paroxysmal atrial fibrillation (H); Benign essential hypertension      miconazole (MICATIN) 2 % external powder Apply topically 2 times daily as needed for other (rash groin).    Associated Diagnoses: Rash      nicotine (COMMIT) 2 MG lozenge Place 1 lozenge (2 mg) inside cheek every hour as needed for nicotine withdrawal symptoms.    Associated Diagnoses: Tobacco use disorder      nicotine (NICODERM CQ) 21 MG/24HR 24 hr patch Place 1 patch over 24 hours onto the skin daily. Taper over 8 weeks as per protocol.    Associated Diagnoses: Tobacco use disorder      pantoprazole (PROTONIX) 40 MG EC tablet Take 1 tablet (40 mg) by mouth every morning (before breakfast).    Associated Diagnoses: Gallstone ileus (H)      polyethylene glycol (MIRALAX) 17 GM/Dose powder Take 17 g by mouth daily as needed for constipation.    Associated Diagnoses: Gallstone  ileus (H)      psyllium (METAMUCIL) 28.3 % packet Take 1 packet by mouth 2 times daily.  Qty: 16 packet, Refills: 0    Associated Diagnoses: Gallstone ileus (H)      senna-docusate (SENOKOT-S/PERICOLACE) 8.6-50 MG tablet Take 1 tablet by mouth 2 times daily.  Qty: 15 tablet, Refills: 0    Associated Diagnoses: Acute post-operative pain           CONTINUE these medications which have CHANGED    Details   gabapentin (NEURONTIN) 100 MG tablet Take 1 tablet (100 mg) by mouth 2 times daily.    Associated Diagnoses: Neuropathy      traMADol (ULTRAM) 50 MG tablet Take 1 tablet (50 mg) by mouth every 6 hours as needed for severe pain.  Qty: 15 tablet, Refills: 0    Associated Diagnoses: Acute post-operative pain           CONTINUE these medications which have NOT CHANGED    Details   acetaminophen (TYLENOL) 325 MG tablet Take 2 tablets (650 mg) by mouth every 4 hours as needed for other (For optimal non-opioid multimodal pain management to improve pain control.)  Qty: 100 tablet, Refills: 1    Associated Diagnoses: S/P AVR (aortic valve replacement); S/P CABG (coronary artery bypass graft)      Ascorbic Acid (VITAMIN C) 500 MG CAPS Take 500 mg by mouth daily      atorvastatin (LIPITOR) 80 MG tablet Take 1 tablet (80 mg) by mouth daily.  Qty: 90 tablet, Refills: 3    Associated Diagnoses: S/P AVR (aortic valve replacement); S/P CABG (coronary artery bypass graft)      Cyanocobalamin (VITAMIN B-12 PO) Take 1 tablet by mouth every other day. Unknown dose      diphenhydrAMINE (BENADRYL) 50 MG tablet Take 100 mg by mouth every 6 hours as needed for itching or allergies      fish oil-omega-3 fatty acids 1000 MG capsule Take 2 g by mouth daily      folic acid (FOLVITE) 1 MG tablet Take 1,000 mcg by mouth 2 times daily      furosemide (LASIX) 20 MG tablet Take 1 tablet (20 mg) by mouth daily.  Qty: 90 tablet, Refills: 4    Associated Diagnoses: Coronary artery disease due to lipid rich plaque; Nonrheumatic aortic valve stenosis;  S/P AVR (aortic valve replacement); S/P CABG (coronary artery bypass graft)      MAGNESIUM PO Take 500 mg by mouth daily.      metFORMIN (GLUCOPHAGE) 500 MG tablet Take 0.5 tablets (250 mg) by mouth daily (with dinner) (0.5 x 500 mg = 250 mg)  Qty: 30 tablet, Refills: 1    Associated Diagnoses: S/P AVR (aortic valve replacement); S/P CABG (coronary artery bypass graft)      Multiple Vitamins-Minerals (MENS 50+ MULTI VITAMIN/MIN PO) Take 1 tablet by mouth daily      Multiple Vitamins-Minerals (PRESERVISION AREDS 2 PO) Take by mouth daily      tamsulosin (FLOMAX) 0.4 MG capsule Take 0.8 mg by mouth every evening. (2 x 0.4 mg = 0.8 mg)      vitamin D3 (CHOLECALCIFEROL) 50 mcg (2000 units) tablet Take 1 tablet by mouth every other day      amoxicillin (AMOXIL) 500 MG capsule Take 4 capsules (2,000 mg) 30-60 minutes prior to your dental procedure  Qty: 4 capsule, Refills: 1    Associated Diagnoses: S/P AVR (aortic valve replacement)           STOP taking these medications       metoprolol succinate ER (TOPROL XL) 25 MG 24 hr tablet Comments:   Reason for Stopping:                   Discharge Instructions and Follow-Up:      Discharge Orders      CT Abdomen Pelvis w/o Contrast    5-7 days from discharge; can be closer to home.     Follow-Up with Cardiology EP      Wound care    Site:   Abdomen- RUQ  Instructions:  Dry dressing to prior drain site daily and as needed     Follow Up and recommended labs and tests    Follow up with Dr. Landrum in Acute Care Clinic May 7th at 1pm. Please arrive at 12:45pm. Call [phone] if you have any concerns. We are located at [address] [place] [address].     Wound care (specify)    Site:   Abdomen- midline   Instructions:  Wound vac changes M, W, F  Supplies: medium Black foam , White foam, and Cavilon no sting barrier film, Vashe  Cleanse with Vashe prior to replacing NPWT, no sting barrier film on skin  Suction setting: -75 mmHg     General info for SNF     "Length of Stay Estimate: Short Term Care: Estimated # of Days 31-90  Condition at Discharge: Improving  Level of care:skilled   Rehabilitation Potential: Good  Admission H&P remains valid and up-to-date: Yes  Recent Chemotherapy: N/A  Use Nursing Home Standing Orders: Yes     Mantoux instructions    Give two-step Mantoux (PPD) Per Facility Policy Yes     Follow Up and recommended labs and tests    Return to the surgery clinic on 5/7 as scheduled with Dr. Landrum. Your appointment is at 1:00pm. Please arrive 15 minutes prior to your appointment time. Our clinic's name is [facility]. The address is [address] [place] [address], [address], [place], [place], [address]. Call our office at [phone] with any questions.     Reason for your hospital stay    Gallstone causing small bowel obstruction     Wound care (specify)    Negative pressure wound therapy plan:  Mon/Wed/Fri  Wound location: midline abdomen   Change Days: Mon/Wed/Fri by RN  Supplies (including all accessories) used: medium Black foam , White foam, and Cavilon no sting barrier film  Cleanse with Vashe prior to replacing NPWT  Suction setting: -75   Methods used: Window paned all periwound skin with vac drape prior to applying sponge     Staff RN to assess integrity of dressing and ensure suction is set at appropriate level every shift.   Date canister. Chart canister output every shift. Change cannister weekly and PRN if full/occluded      Remove foam dressing and replace with BID normal saline moist gauze dressing if:   -a dressing failure which cannot be repaired within 2 hours   -patient is discharging to home without a home pump   -patient is discharging to a facility outside the local area   -if a dressing is a \"Silver Foam\", remove before Radiation Therapy or MRI     Activity - Up with nursing assistance     Activity - Ambulate in hallway    Every shift     Monitor and record    Weight every day; call provider for weight gain of more than 2 pounds per day or " 5 pounds per week.     Glucose monitor nursing POCT    BID     Follow Up and recommended labs and tests    1. Follow up with detention physician.  The following labs/tests are recommended: CBC, BMP.  2. Follow-up with Cardiology in 2 weeks.  3. Follow-up with PCP after TCU discharge.     Physical Therapy Adult Consult    Evaluate and treat as clinically indicated.    Reason:  extended hospital stay, deconditioning after abdominal surgery. Needs improvement in mobility, strengthening, increase exercise tolerance     Occupational Therapy Adult Consult    Evaluate and treat as clinically indicated.    Reason:  extended hospital stay, deconditioning after abdominal surgery. Needs improvement in mobility, strengthening, increase exercise tolerance     Fall precautions     Diet    Follow this diet upon discharge:   - Ensure Max Protein (bariatric) Between Meals  - Combination Diet Moderate Consistent Carb (60 g CHO per Meal) Diet     Hospital Follow-up with Existing Primary Care Provider (PCP)    CT abdomen/pelvis with contrast in 3 months (approximately 7/25/25) to reassess omental findings on CT during hospital stay           Consultations This Hospital Stay:      HOSPITALIST IP CONSULT  PHYSICAL THERAPY ADULT IP CONSULT  CARE MANAGEMENT / SOCIAL WORK IP CONSULT  VASCULAR ACCESS ADULT IP CONSULT  CARDIOLOGY IP CONSULT  VASCULAR ACCESS ADULT IP CONSULT  VASCULAR ACCESS ADULT IP CONSULT  PHARMACY IP CONSULT  CARE MANAGEMENT / SOCIAL WORK IP CONSULT  VASCULAR ACCESS ADULT IP CONSULT  PHARMACY LIAISON FOR MEDICATION COVERAGE CONSULT  VASCULAR ACCESS ADULT IP CONSULT  WOUND OSTOMY CONTINENCE NURSE  IP CONSULT  PHARMACY IP CONSULT  VASCULAR ACCESS ADULT IP CONSULT  VASCULAR ACCESS ADULT IP CONSULT  WOUND OSTOMY CONTINENCE NURSE  IP CONSULT  CARDIOLOGY IP CONSULT  PHARMACY IP CONSULT  INTERVENTIONAL RADIOLOGY ADULT/PEDS IP CONSULT  INFECTIOUS DISEASES IP CONSULT  VASCULAR ACCESS ADULT IP CONSULT  SPEECH LANGUAGE PATH ADULT  IP CONSULT  VASCULAR ACCESS ADULT IP CONSULT  PHYSICAL THERAPY ADULT IP CONSULT  OCCUPATIONAL THERAPY ADULT IP CONSULT  HEMATOLOGY & ONCOLOGY IP CONSULT  VASCULAR ACCESS ADULT IP CONSULT  PHYSICAL THERAPY ADULT IP CONSULT  OCCUPATIONAL THERAPY ADULT IP CONSULT  PHYSICAL THERAPY ADULT IP CONSULT        Admission History:      Please see the H&P by Jake Blevins DO on 3/30/2025 for complete details. Briefly, Richar Davsi is a 69 year old male with a history including DM2; HTN; CAD s/p CABG: aortic valve disease s/p AVR (bioprosthetic); HLD; neuropathy; BPH; and untreated sleep apnea; who presented 3/30/2025 with abdominal pain, nausea and vomiting and found with signs of gallstone ileus (distal small bowel obstruction secondary to an impacted 2.2 cm gallstone).        Problem Oriented Hospital Course:        # Gallstone ileus - s/p ex-lap with small bowel enterotomy with removal of gallstone and primary closure 3/30/2025.  # Post-surgical leak with abdominal contamination with sepsis and Enterococcus faecalis bacteremia - s/p ex-lap with small bowel resection 4/8/2025 and s/p ex-lap, small bowel anastomosis and abdominal closure with wound VAC placed on 4/10/2025.  # Intra-abdominal fluid collections/abscesses s/p IR drains placed 4/15/2025 (drains subsequently removed).  # Contracted gallbladder containing a large gallstone with signs of fistulization to the duodenum.  * Initial presentation as above. Surgery contacted urgently and pt taken to OR for surgery as noted.  * Post-op developed suspected ileus.  * Subsequently developed fevers/sepsis with lactic acidosis, BC's 4/7 and 4/8 positive for Enterococcus faecalis. CT imaging 4/8 showed increased gastric and SB distension, increased ascites. Started on piperacillin-tazobactam 4/8. Subsequently taken to OR again 4/8 as above with subsequent closure 4/10 as above.  * Subsequently, continued to have distended abdomen, bloody VAC output.  * CT on 4/14  showed multiple peripherally enhancing fluid collections in the abdomen suspicious for developing abscesses, the largest of these was in the left paracolic gutter; stable appearance of a contracted gallbladder containing a large gallstone which was fistulized to the duodenum with associated pneumobilia.  * Underwent IR guided abdominal drain placements x 2 on 4/15, cultures NG.    * Patient removed NG tube by himself and started on oral liquids on 4/16.    * Underwent CT sinogram on 4/18, left intra-abdominal drain removed.  * Piperacillin-tazobactam discontinued 4/22 (BC's from 4/10 NG).  * IR removed right intra-abdominal drain 4/23.  * CT on 4/24 showed that gallstone that was previously in gallbladder had passed into small bowel. Surgery recommended continued hospitalization until stone passed.  * X-ray plain film 4/26 did not show any evidence of ongoing retained stone.  * CT on 4/27 showed gallstone remains in the mid small bowel at the side to side small bowel anastomosis, no obstruction; rachael significant residual or recurrent abscess.  - Post-op and wound management per Surgery.  - Continue diet as tolerated.  - Continue PRN acetaminophen, PRN tramadol; minimize opioids as able.  - Continue regular ambulation as able.  - On 4/28, Surgery d/w pt and his wife about the large gallstone transiting through the small bowel and possibilities for management. Most aggressive would be surgical treatment but not felt that he should undergo another surgery presenty; also discussed surveillance for possible bowel obstruction in the hospital vs discharge to TCU with close CT follow-up (stone not visualized on plain films). Ultimately decided to discharge to TCU with close CT follow-up per Surgery.    Increased omental nodularity/stranding on imaging, favor inflammatory post-op changes rather than malignancy.  * CT sinogram from 4/18 with increased nodularity/density in the mesenteric fat of the upper abdomen bilaterally  suggestive of peritoneal carcinomatosis.  * Oncology consulted 4/23 and recommended reimaging/IR guided biopsy.  * Repeat CT on 4/24 showed omental nodularity and stranding, radiologist read favored inflammatory postop changes rather than malignancy.   * Of note, pathology from surgery showed benign small bowel resection.  - Overall, Surgery recommends avoid IR biopsy at this time, have follow-up CT as outpatient in 3 months.  - Recommend patient have age-appropriate health maintenance as outpatient.     New atrial flutter with RVR 4/5/2025 - status post DCCV on 4/7/2025.  Postoperative atrial fibrillation with RVR - status post cardioversion 4/14/2025.  * Postoperatively developed new aflutter with RVR, was on intravenous heparin drip, rate controlled with diltiazem drip and initiated oral metoprolol. Cardiology consulted.  * Underwent DCCV on 4/7.   * Heparin was paused as patient back to OR on 4/8 and was started on intravenous heparin during ICU stay.  * Patient again developed recurrence of A-fib with RVR with heart rate in 140s on 4/14 and initiated on amiodarone drip, underwent MANDEEP with successful cardioversion on 4/14.    * Echo 4/15 showed LVEF 55%; RV normal size, limited views, question of mild dysfunction; no valve disease.  * Switched to PO amiodarone 4/15.  * Transitioned to apixaban 4/18.  * QTc from 4/20 within normal limits.   - Continue amiodarone 200 daily (new) until follow-up with Cardiology.  - Continue metoprolol 25 mg BID (increased dose; changed from metoprolol ER).  - Continue apixaban 5 mg BID (new).  - Follow-up with Cardiology in clinic in 2 weeks after discharge.    # Volume overload/diastolic CHF with bilateral pleural effusions, acute on chronic bilateral lower extremity edema.  # Coronary artery disease status post CABG x 3v (10/2022)  # Aortic stenosis (status post AVR with bovine valve) (10/2022).  # Hypertension (benign essential).  # Hyperlipidemia.  [PTA: atorvastatin 80 mg  daily; furosemide 20 mg daily; metoprolol ER 25 mg daily.]  * Issues with afib/flutter as noted.  * Issues with hypoxia with bilateral pleural effusions. Diuresed with IV furosemide. Bilateral lower extremity US 4/15 negative. Restarted PTA PO furosemide increased dose 4/26.  * Atorvastatin dose decreased this hospitalization given LDL level 71.  - Continue furosemide 20 mg daily.  - Continue metoprolol 25 mg BID (increased dose; changed from metoprolol ER).  - Continue atorvastatin 20 mg daily (decreased).  - Continue to monitor daily wts.    Acute hypoxia, suspect multifactorial related to atelectasis, bilateral pleural effusion, postoperative narcotic use, ALIREZA, deconditioning, resolved.  * CT chest from 4/14 with new small bilateral pleural effusions with overlying atelectasis/consolidation.  * CT chest from 4/24 with trace bilateral pleural effusion with overlying atelectasis.  * Diuresed this hospitalization as noted.  * Other issues treated as noted.  * Subsequently weaned off O2.  - Continue regular ambulation, IS.    Acute anemia likely from surgical blood loss, acute illness, dilutional.  * Presented with hemoglobin of 15.9.  * Hemoglobin postoperatively in the 10-11 range.    * On 4/24, hemoglobin dropped to 8.9, recheck hemoglobin up to 11 range.    - Continue to monitor CBC periodically as needed.     Acute encephalopathy likely multifactorial including metabolic and toxic from anesthesia/sedation for procedure/delirium, hospitalization/ICU stay/narcotic use.  * Patient with prolonged hospital stay, multiple procedures, ICU stay, on narcotics.  * CT head 4/18 no acute intracranial pathology.  * QTc from 4/20 within normal limits (on amiodarone).  * Improved overall 4/29.  - Continue to treat other issues as noted.  - Re-orient as needed.  - Maintain normal day/night, sleep/wake cycles.  - Minimize sedating medications as able.     Severe hypoalbuminemia likely from acute illness, dilutional.  - Continue  to treat other issues as noted.  - Continue diet as tolerated.    Type 2 diabetes mellitus.  [PTA: metformin 250 mg with supper.]  Recent Labs   Lab 04/29/25  0609 04/28/25  2132 04/28/25  1745 04/28/25  1220 04/28/25  0820 04/28/25  0734   * 158* 155* 157* 152* 145*     Recent Labs   Lab Test 03/31/25  0809 09/14/22  1111   A1C 6.8* 5.6   - Continue moderate CHO diet.  - Plan resume metformin at discharge.      Urinary retention, resolved.  Traumatic hematuria, resolved.  BPH:   * Required M[place]arty this hospitalization.  * Urine cultures from 4/14 no growth  * Catheter removed 4/17.   - Continue PTA tamsulosin.     Neuropathy.  * PRN gabapentin 800 mg 3 times daily had been filled but patient reported taking 300 mg TID.  * On 4/1, per pharmacy note only recent fills were 800 mg and not 300 mg dosing.   * Gabapentin was on hold since admission, resumed on 4/18 at lower dose.  - Continue gabapentin to 100 mg BID.    Nicotine use, chews tobacco.  - Continue nicotine patch, and PRN nicotine lozenges (new).    - Strongly recommend tobacco cessation.    ALIREZA not on CPAP.  - Recommend sleep studies as outpatient    Weakness and physical deconditioning due to multiple acute and chronic medical issues.  - Continue PT and OT.  - Plan TCU.     Obesity.  * Body mass index is 37.08 kg/m .  - Needs to continue to pursue aggressive dietary and lifestyle modifications.    Other resolved issues:  Acute kidney injury, resolved.  Anion gap metabolic acidosis, resolved.  Mild hypernatremia, resolved.  Hypokalemia from poor intake, resolved.        Clinically Significant Risk Factors               # Hypoalbuminemia: Lowest albumin = 1.9 g/dL at 4/9/2025  5:25 AM, will monitor as appropriate     # Hypertension: Noted on problem list           # DMII: A1C = 6.8 % (Ref range: <5.7 %) within past 6 months   # Obesity: Estimated body mass index is 37.08 kg/m  as calculated from the following:    Height as of this encounter: 1.829 m  (6').    Weight as of this encounter: 124 kg (273 lb 5.9 oz).   # Moderate Malnutrition: based on nutrition assessment and treatment provided per dietitian's recommendations.      # Financial/Environmental Concerns: none   # History of CABG: noted on surgical history             Pending Results:        Unresulted Labs Ordered in the Past 30 Days of this Admission       No orders found from 2/28/2025 to 3/31/2025.                  Discharge Disposition:      Discharge to transitional care unit (TCU).        Discharge Time:      Approximately 30 minutes.          Condition and Physical on Discharge:    See progress note on the same date as this discharge summary.          Key Imaging Studies, Lab Findings and Procedures/Surgeries:        Results for orders placed or performed during the hospital encounter of 03/30/25   CT Abdomen Pelvis w Contrast    Narrative    EXAM: CT ABDOMEN PELVIS W CONTRAST  LOCATION: [facility]  DATE: 3/30/2025    INDICATION: Diffuse abdominal pain, distension, vomiting, outside AXR concerning for possible obstruction  COMPARISON: 11/30/2021  TECHNIQUE: CT scan of the abdomen and pelvis was performed following injection of IV contrast. Multiplanar reformats were obtained. Dose reduction techniques were used.  CONTRAST: 135mL Isovue 370    FINDINGS:   LOWER CHEST: Normal.    HEPATOBILIARY: Cholelithiasis with contracted gallbladder, no suggestion for acute cholecystitis. However, only a single faceted stone identified within gallbladder currently while on prior exam there were several large faceted stones.  Liver and bile ducts normal.    PANCREAS: Tiny calcifications involving pancreatic tail unchanged, no acute inflammation.    SPLEEN: Mild splenomegaly.    ADRENAL GLANDS: Normal.    KIDNEYS/BLADDER: No significant mass, stone, or hydronephrosis.    BOWEL: Small bowel obstruction present with numerous loops of fluid-filled dilated small bowel proximally and several  decompressed distal ileal loops present. At the transition point there is an impacted 2.2 cm gallstone, located deep and just right of   midline projecting directly anterior of the right common iliac artery bifurcation (series 3 image 150, series 5 image 63).  Appendix and colon unremarkable.    LYMPH NODES: Normal.    VASCULATURE: Normal.    PELVIC ORGANS: Prostatomegaly. Wisp of ascites within the pelvis similar to previous exam.    MUSCULOSKELETAL: Degenerative disc and facet joint disease throughout lumbar spine with at least moderate central spinal stenosis.      Impression    IMPRESSION:   1.  Gallstone ileus. Distal small bowel obstruction secondary to an impacted 2.2 cm gallstone located just right of midline at the level of the right common iliac artery bifurcation.  2.  Cholelithiasis with contracted gallbladder but no suggestion for acute cholecystitis.  3.  Mild splenomegaly.  4.  Trace volume ascites.  5.  Prostatomegaly.  6.  Moderate central spinal stenosis at lumbar spine.   XR Abdomen 1 View    Narrative    EXAM: XR ABDOMEN 1 VIEW  LOCATION: [facility]  DATE: 4/3/2025    INDICATION: ongoing nausea after ex lap for gallstone ileus, evaluate for post op ileus.  COMPARISON: CT 3/30/2025.      Impression    IMPRESSION: There is a 2.5 cm calcified gallstone in the right upper quadrant correlating with prior CT. No other abdominal calcification. There is abnormally dilated small bowel in the left midabdomen measuring up to 4-5 cm compatible with ileus. No   free air. Imaged portions of the lower chest show changes of prior sternotomy and aortic valve replacement. Multiple lower abdominal wall skin staples.   XR Chest Port 1 View    Narrative    EXAM: XR CHEST PORT 1 VIEW  LOCATION: [facility]  DATE: 4/5/2025    INDICATION: Fever  COMPARISON: Chest radiograph 10/31/2022      Impression    IMPRESSION: Stable size of cardiomediastinal silhouette status post  median sternotomy and CABG. No definite airspace consolidation, pleural effusion or pneumothorax. No acute bony abnormality.   CT Chest/Abdomen/Pelvis w Contrast    Narrative    EXAM: CT CHEST/ABDOMEN/PELVIS W CONTRAST  LOCATION: [facility]  DATE: 4/8/2025    INDICATION: Fever, recent ex lap small bowel enterotomy, removal of gallstone 1 week ago.  COMPARISON: 3/30/2025.  TECHNIQUE: CT scan of the chest, abdomen, and pelvis was performed following injection of IV contrast. Multiplanar reformats were obtained. Dose reduction techniques were used.   CONTRAST: 135 mL Isovue-370.    FINDINGS:   LUNGS AND PLEURA: Atelectasis and scarring at the lung bases. There are a few small lung nodules bilaterally measuring up to 3 mm. These are not significantly changed since 2022 and are considered benign.    MEDIASTINUM/AXILLAE: Previous median sternotomy. Aortic valve prosthesis. The heart size is normal. No lymph node enlargement.    CORONARY ARTERY CALCIFICATION: Severe.    HEPATOBILIARY: There is again a calcification in the gallbladder fossa which may be within a contracted gallbladder.    PANCREAS: Normal.    SPLEEN: Enlarged measuring 15 cm in length.    ADRENAL GLANDS: Normal.    KIDNEYS/BLADDER: There is no hydronephrosis. Few small cortical cysts.    BOWEL: Multiple mildly dilated small bowel loops with areas of wall thickening and associated mesenteric edema. No focal obstruction. Large amount of air and food debris in the stomach. Small amount of ascites. No free intraperitoneal gas.    LYMPH NODES: Normal.    VASCULATURE: Atherosclerotic calcification of the aorta and its branches. No aneurysm.    PELVIC ORGANS: The prostate gland is enlarged.    MUSCULOSKELETAL: Degenerative disease throughout the spine.      Impression    IMPRESSION:  1.  Dilated small bowel loops with mild wall thickening and without evidence of obstruction suggests ileus or enteritis.  2.  Small amount of ascites,  increased.  3.  Stable mild splenomegaly.  4.  Prostate gland enlargement.  5.  No acute chest abnormality.   CT Abdomen Pelvis w/o Contrast    Narrative    EXAM: CT ABDOMEN PELVIS W/O CONTRAST  LOCATION: [facility]  DATE: 4/8/2025    INDICATION: Concern for acute abdomen;worsening leukocytosis, lactic acidosis, recent abd surgery, new CRISTINA, increased abdominal distension and abd pain  COMPARISON: CT 4/8/2025 at 0021 hours, CT 3/30/2025  TECHNIQUE: CT scan of the abdomen and pelvis was performed without IV contrast. Multiplanar reformats were obtained. Dose reduction techniques were used.  CONTRAST: None.    FINDINGS:   LOWER CHEST: Aortic valve replacement. Mitral annulus and coronary artery calcifications/stents. Stable prominent likely reactive pericardial and cardiophrenic recess lymph nodes. Bibasilar atelectasis.    HEPATOBILIARY: Cholelithiasis. Stable nodular liver surface contour. No biliary ductal dilatation.    PANCREAS: Atrophic.    SPLEEN: Mildly enlarged.    ADRENAL GLANDS: Normal.    KIDNEYS/BLADDER: Bilateral renal cortical cysts. No follow-up is indicated. No intra-No hydronephrosis or hydroureter. The bladder is decompressed by a Mccarty catheter.    BOWEL: Mildly increased mild to moderate gastric and small bowel distention. No discrete transition is identified. Normal caliber colon. Slightly increased small volume complex ascites. Diffuse mesenteric edema. No free air and no pneumatosis.     LYMPH NODES: Stable prominent likely reactive mesenteric and retroperitoneal lymph nodes.    VASCULATURE: Moderate aortoiliac atherosclerosis. No evidence for portal or mesenteric venous gas.    PELVIC ORGANS: Moderate to severe prostatomegaly. No pelvic free fluid.    MUSCULOSKELETAL: Spinal and pelvic degenerative changes.      Impression    IMPRESSION:   1.  Mildly increased gastric and small bowel distention which may reflect an ileus or small bowel obstruction. Follow-up is  recommended.  2.  Slightly increased small volume complex ascites.     XR Chest Port 1 View    Narrative    EXAM: XR CHEST PORT 1 VIEW  LOCATION: [facility]  DATE: 4/8/2025    INDICATION: ETT placement verification    COMPARISON: 4/5/2025.      Impression    IMPRESSION: Endotracheal tube in the upper trachea terminating 6-7 cm above the sona. Right IJ line in the upper SVC. Nasogastric tube tip in the stomach. Prior sternotomy CABG procedure. Mild atelectasis at the left base. Otherwise clear. No   pneumothorax. No pleural effusion.   XR Abdomen Port 1 View    Narrative    EXAM: XR ABDOMEN PORT 1 VIEW  LOCATION: [facility]  DATE: 4/8/2025    INDICATION: Gastric tube placement verification.  COMPARISON: None.      Impression    IMPRESSION: Nasogastric tube tip in proximal stomach with side port in distal esophagus. Recommend advancing an additional 5 to 10 cm. Essentially gasless upper abdomen, limiting assessment of the known SBO/ileus described on recent CT.   XR Chest Port 1 View    Narrative    EXAM: XR CHEST PORT 1 VIEW  LOCATION: [facility]  DATE: 4/9/2025    INDICATION: ett placement  COMPARISON: X-ray April 8, 2025.      Impression    IMPRESSION: Endotracheal tube tip about 4.5 cm above the sona. Nasogastric tube and right IJ central line are also noted. Otherwise negative chest x-ray.   XR Abdomen Port 1 View    Narrative    EXAM: XR ABDOMEN PORT 1 VIEW  LOCATION: [facility]  DATE: 4/9/2025    INDICATION: ng tube placement  COMPARISON: None.      Impression    IMPRESSION: Nasogastric tube proximal port lies about 7 cm below the left hemidiaphragm. No abnormal bowel is evident.   XR Abdomen Port 1 View    Narrative    EXAM: XR ABDOMEN PORT 1 VIEW  LOCATION: [facility]  DATE: 4/10/2025    INDICATION: Intraoperative   COMPARISON: None.      Impression    IMPRESSION: NG tube tip  and sidehole in the stomach. Left upper quadrant surgical clip. Large gallstone. Moderate degenerative change thoracic spine. No abnormal radiopaque retained foreign objects.    XR Chest Port 1 View    Narrative    EXAM: XR CHEST PORT 1 VIEW  LOCATION: [facility]  DATE: 4/14/2025    INDICATION: Tachypnea and a flutter.  COMPARISON: None.      Impression    IMPRESSION: Sternotomy with mediastinal clips and markers. NG tube enters the stomach. Heart size within normal limits. Mild pulmonary vascular prominence. Mild elevation left hemidiaphragm. No focal lung infiltrates.   CT Chest/Abdomen/Pelvis w Contrast    Narrative    EXAM: CT CHEST/ABDOMEN/PELVIS W CONTRAST  LOCATION: [facility]  DATE: 4/14/2025    INDICATION: RRT   re eval abd s p bowel resection, sepsis w  new HD changes  COMPARISON: CT CAP 4/8/2025.  TECHNIQUE: CT scan of the chest, abdomen, and pelvis was performed following injection of IV contrast. Multiplanar reformats were obtained. Dose reduction techniques were used.   CONTRAST: 135  ml Isovue 370    FINDINGS:   LUNGS AND PLEURA: There are new small bilateral pleural effusions with overlying atelectasis/consolidation.    MEDIASTINUM/AXILLAE: Normal heart size without pericardial effusion. Aortic valve replacement. No thoracic adenopathy.    CORONARY ARTERY CALCIFICATION: Previous intervention (CABG).    HEPATOBILIARY: Unchanged appearance of a large calcification in the gallbladder fossa which likely represents a gallstone within the contracted gallbladder. The duodenum is inseparable from the contracted gallbladder compatible with fistulization in this   patient with a recent gallstone ileus. Pneumobilia. No biliary ductal dilatation. No focal liver lesion.    PANCREAS: Normal.    SPLEEN: Similar splenomegaly.    ADRENAL GLANDS: Normal.    KIDNEYS/BLADDER: Symmetric renal enhancement. No hydronephrosis. Unchanged left renal cyst and too small to  characterize hypoattenuating lesions bilaterally, not requiring specific follow-up. Bladder is decompressed with a Mccarty catheter.    BOWEL: Postsurgical changes of partial small bowel resection. No evidence of bowel obstruction. Peripherally enhancing fluid collection in the left paracolic gutter measuring 10.2 x 4.3 x 11.5 cm (series 3, image 202). Additional small volume   peripherally enhancing perihepatic fluid and fluid in the right paracolic gutter. There is an ill-defined fluid and gas collection in the right ventral abdomen with mild peripheral enhancement measuring 3.0 x 11.1 x 7.6 cm (series 3, image 243).   Additional small volume nonorganized fluid in the abdomen and pelvis. Small volume pneumoperitoneum. Inflammatory changes of the omentum. Enteric tube terminates in the mid stomach.    LYMPH NODES: No lymphadenopathy by CT size criteria.    VASCULATURE: Moderate burden of vascular calcifications without abdominal aortic aneurysm.    PELVIC ORGANS: Marked prostatomegaly.    MUSCULOSKELETAL: Postsurgical changes of the ventral abdominal wall with soft tissue dehiscence. Body wall edema. Multilevel degenerative changes of the spine.      Impression    IMPRESSION:  1.  There are multiple peripherally enhancing fluid collections in the abdomen which are suspicious for developing abscesses. The largest of these is in the left paracolic gutter.   2.  Small volume pneumoperitoneum which is favored postsurgical.  3.  Stable appearance of a contracted gallbladder containing a large gallstone which is fistulized to the duodenum with associated pneumobilia.  4.  New small bilateral pleural effusions with overlying atelectasis/consolidation.       CT Abdomen Peritoneum Abscess Drain w Cath Place    Narrative    EXAM:  1. PERCUTANEOUS DRAIN PLACEMENT RIGHT ABDOMINAL DRAIN  2. CT GUIDANCE  3. CONSCIOUS SEDATION  LOCATION: [facility]  DATE: 4/15/2025    INDICATION: ventral fluid  collection  TECHNIQUE: Dose reduction techniques were used.    PROCEDURE: Informed consent obtained. Site marked. Prior images reviewed. Required items made available. Patient identity confirmed verbally and with arm band. Patient reevaluated immediately before administering sedation. Universal protocol was   followed. Time out performed. The site was prepped and draped in sterile fashion. 10 mL of 1% lidocaine was infused into the local soft tissues. Using standard technique and under direct CT guidance, a 8 Yemeni drainage catheter was inserted into the   fluid collection.      SPECIMEN: 5 mL of serosanguineous fluid was aspirated and sent to lab for cultures and Gram stain.    BLOOD LOSS: Minimal.    The patient tolerated the procedure well. No immediate complications.    SEDATION: Versed 1 mg. Fentanyl 50 mcg. The procedure was performed with administration intravenous conscious sedation with appropriate preoperative, intraoperative, and postoperative evaluation.    15 minutes of supervised face to face conscious sedation time was provided by a radiology nurse under my direct supervision.      Impression    IMPRESSION:  1.  Successful CT-guided drain placement into right abdominal collection.    Reference CPT Codes: 97563, 90532   XR Abdomen Port 1 View    Narrative    EXAM: XR ABDOMEN PORT 1 VIEW  LOCATION: [facility]  DATE: 4/14/2025    INDICATION: NG placement verification  COMPARISON: None.      Impression    IMPRESSION: Enteric tube courses below the diaphragm with the distal tip overlying the expected position of the gastric body.   CT Abdomen Peritoneum Abscess Drain w Cath Place    Narrative    EXAM:  1. PERCUTANEOUS DRAIN PLACEMENT LEFT ABDOMINAL COLLECTION  2. CT GUIDANCE  3. CONSCIOUS SEDATION  LOCATION: [facility]  DATE: 4/15/2025    INDICATION: Left sided abscess drain  TECHNIQUE: Dose reduction techniques were used.    PROCEDURE: Informed consent  obtained. Site marked. Prior images reviewed. Required items made available. Patient identity confirmed verbally and with arm band. Patient reevaluated immediately before administering sedation. Universal protocol was   followed. Time out performed. The site was prepped and draped in sterile fashion. 10 mL of 1% lidocaine was infused into the local soft tissues. Using standard technique and under direct CT guidance, a 10 Georgian drainage catheter was inserted into the   fluid collection.      SPECIMEN: 10 mL of yellow fluid was aspirated and sent to lab for cultures and Gram stain.    BLOOD LOSS: Minimal.    The patient tolerated the procedure well. No immediate complications.    SEDATION: Versed 1 mg. Fentanyl 50 mcg. The procedure was performed with administration intravenous conscious sedation with appropriate preoperative, intraoperative, and postoperative evaluation.    The minutes of supervised face to face conscious sedation time was provided by a radiology nurse under my direct supervision.      Impression    IMPRESSION:  1.  Successful CT-guided drain placement into left abdominal collection.    Reference CPT Codes: 34126, 44797   US Lower Extremity Venous Duplex Bilateral    Narrative    EXAM: US LOWER EXTREMITY VENOUS DUPLEX BILATERAL  LOCATION: [facility]  DATE: 4/15/2025    INDICATION: Rule out DVT  patient with atrial fibrillation, postoperative.  Poor mobility.  COMPARISON: None.  TECHNIQUE: Venous Duplex ultrasound of bilateral lower extremities with and without compression, augmentation and duplex. Color flow and spectral Doppler with waveform analysis performed.    FINDINGS: Exam includes the common femoral, femoral, popliteal veins as well as segmentally visualized deep calf veins and greater saphenous vein.     RIGHT: No deep vein thrombosis. No superficial thrombophlebitis. No popliteal cyst.    LEFT: No deep vein thrombosis. No superficial thrombophlebitis. No popliteal  cyst.      Impression    IMPRESSION:  1.  No deep venous thrombosis in the bilateral lower extremities.   CT Head w/o Contrast    Narrative    EXAM: CT HEAD W/O CONTRAST  LOCATION: [facility]  DATE: 4/17/2025    INDICATION: Postoperative encephalopathy; on Heparin.  COMPARISON: None.  TECHNIQUE: Routine CT Head without IV contrast. Multiplanar reformats. Dose reduction techniques were used.    FINDINGS:  INTRACRANIAL CONTENTS: No intracranial hemorrhage, extraaxial collection, or mass effect. No CT evidence of acute infarct. Mild presumed chronic small vessel ischemic changes. Mild generalized volume loss. No hydrocephalus.     VISUALIZED ORBITS/SINUSES/MASTOIDS: No intraorbital abnormality. No paranasal sinus mucosal disease. No middle ear or mastoid effusion.    BONES/SOFT TISSUES: No acute abnormality.      Impression    IMPRESSION:  1.  No CT evidence for acute intracranial process.  2.  Brain atrophy and presumed chronic microvascular ischemic changes as above.   CT Sinogram Injection    Narrative    CT SINOGRAM INJECTION  4/18/2025 1:12 PM CDT     HISTORY:  ; H/o R and L abd drains placed 4/15, no output from L drain for a couple days, evaluate drains and fluid resolution;       COMPARISON: CT of the abdomen pelvis dated 4/14/2025    TECHNIQUE: CT of the lower abdomen/pelvis was performed without contrast and following the administration of contrast through a right lower quadrant drain. Radiation lowering techniques utilized.    FINDINGS: There is a drain in the left lower abdomen. This has pulled out of the abdominal cavity and now lies in the subcutaneous tissues.    There is a drain in the right lower quadrant. On the noncontrast enhanced exam, there is no significant residual fluid about the pigtail of this catheter. Post administration of contrast, there is filling of a small collection cavity but no evidence for   fistulous communication to adjacent bowel.    There remains a  mild amount of free fluid in the left abdomen and in the midline lower abdomen adjacent to loops of small bowel.    Again noted are scattered colonic diverticuli.    Again noted is increased nodularity/density in the mesenteric fat of the upper abdomen bilaterally suggestive of peritoneal carcinomatosis.    Again noted is a gallstone.    There is an anterior abdominal incision with packing material.    There are multilevel degenerative changes in the lumbar spine.      Impression    IMPRESSION:  1.  The left lower quadrant drain has pulled out of the abdominal cavity and now lies in the subcutaneous tissues. This can be removed.  2.  The right lower quadrant drain has no significant residual fluid about the pigtail of this catheter. This can also be removed.  3.  Mild amount of residual free fluid in the left abdomen and in the midline lower abdomen.  4.  Increased nodularity/density in the mesenteric fat of the upper abdomen bilaterally suggestive of peritoneal carcinomatosis.  5.  Gallstone.       CT Chest/Abdomen/Pelvis w Contrast    Narrative    EXAM: CT CHEST/ABDOMEN/PELVIS W CONTRAST  LOCATION: [facility]  DATE: 4/24/2025    INDICATION: Post op   Fluid retention ( ?? peritoneal carcinomatosis). CT abd   concerning for peritoneal carcinomatosis. Smoking history, not up to date with health maintance.  COMPARISON: CT sinogram 04/18/2025. CT CAP 04/14/2025. CT abdomen pelvis 03/30/2025.  TECHNIQUE: CT scan of the chest, abdomen, and pelvis was performed following injection of IV contrast. Multiplanar reformats were obtained. Dose reduction techniques were used.   CONTRAST: 135 mL Isovue 370    FINDINGS:   LUNGS AND PLEURA: Trace bilateral pleural effusions with overlying atelectasis, decrease in size from prior.    MEDIASTINUM/AXILLAE: Normal heart size without pericardial effusion. Aortic valve replacement. No thoracic adenopathy.    CORONARY ARTERY CALCIFICATION: Previous intervention  (CABG).    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 A large peripherally calcified gallstone measuring 2.4 cm is no longer within the gallbladder and is now within a small bowel   loop in the mid anterior abdomen at the site of a previous small bowel resection (series 3, image 225). No focal liver lesion.    PANCREAS: Few scattered calcifications of the pancreas, likely sequela of prior pancreatitis. No evidence of acute peripancreatic inflammation.    SPLEEN: Similar splenomegaly.    ADRENAL GLANDS: Normal.    KIDNEYS/BLADDER: Symmetric renal enhancement. No hydronephrosis. Renal cysts and too small to characterize hypoattenuating lesions do not require specific follow-up. Mildly distended urinary bladder.    BOWEL: Gallstone within a mid anterior small bowel loop as described above. No evidence of associated bowel obstruction. Moderate burden of stool throughout the colon. Colonic diverticula without focal peridiverticular inflammation.    Intra-abdominal abscesses have markedly decreased in size from prior. A small fluid and gas collection in the left paracolic gutter now measures 5.5 x 1.5 cm compared to 10.2 x 4.3 cm (series 3, image 206). A small fluid and gas collection of the right   lateral abdomen now measures 1.1 x 5.1 compared to 3.7 x 12.0 cm (series 3, image 242). Additional small volume loculated fluid appears similar, for example small volume perihepatic fluid and small volume fluid associated with small bowel loops in the   left lower quadrant.    Omental nodularity and stranding appears similar to 04/14/2025 but was not present on a more remote preoperative CT from 03/30/2025.    LYMPH NODES: Multiple prominent retroperitoneal, mesenteric, and pelvic lymph nodes are favored reactive.    VASCULATURE: Moderate burden of vascular calcifications without abdominal aortic aneurysm.    PELVIC ORGANS: Marked  prostatomegaly.    MUSCULOSKELETAL: Postsurgical changes of the ventral abdominal wall with similar soft tissue dehiscence. Persistent body wall edema. Polyarticular degenerative changes including multilevel degenerative changes of the spine. Intact median sternotomy.      Impression    IMPRESSION:  1.  A large peripherally calcified gallstone is no longer within the gallbladder and is now within a small bowel loop in the mid anterior abdomen at the site of a previous small bowel resection. No evidence of associated bowel obstruction at this time.  2.  Marked interval decrease in size of intra-abdominal abscesses.  3.  Omental nodularity and stranding appears similar to 04/14/2025 . However, this was not present on a more remote preoperative CT from 03/30/2025 and is therefore favored to reflect inflammatory changes of the omentum in the postoperative setting. This   could be followed up with a CT of the abdomen and pelvis in 3 months to ensure resolution.  4.  Trace bilateral pleural effusions with overlying atelectasis, decreased in size from prior.  5.  Persistent body wall edema.  6.  Additional unchanged findings as above.     XR Abdomen 2 Views    Narrative    EXAM: XR ABDOMEN 2 VIEWS  LOCATION: [facility]  DATE: 4/26/2025    INDICATION: Evaluate progression of gallstone.  COMPARISON: CT 4/24/2025.      Impression    IMPRESSION: The gallstones seen in the small bowel on CT 4/24/2025 are not seen with any certainty on the plain film radiographs. These may have passed. Normal bowel gas pattern. Advanced degenerative changes most marked in the spine.   CT Abdomen Pelvis w/o Contrast    Narrative    EXAM: CT ABDOMEN PELVIS W/O CONTRAST  LOCATION: [facility]  DATE: 4/27/2025    INDICATION: Recurrent gallstone in the small bowel post partial small bowel resection for gallstone ileus. Evaluate for passage of stone.  COMPARISON: Multiple recent CTs most recent  4/24/2025.  TECHNIQUE: CT scan of the abdomen and pelvis was performed without IV contrast. Multiplanar reformats were obtained. Dose reduction techniques were used.  CONTRAST: None.    FINDINGS:   LOWER CHEST: Normal.    HEPATOBILIARY: Tiny amount of air in the bile ducts. Minimal residual stone material within the gallbladder.    PANCREAS: Normal.    SPLEEN: Normal.    ADRENAL GLANDS: Normal.    KIDNEYS/BLADDER: Normal.    BOWEL: Side to side small bowel anastomosis mid abdomen anteriorly. Gallstone remains at the anastomosis 2.5 cm in size. No obstruction. The right drainage catheter has been removed with no recurrent abscess. Tiny sliver of remaining left lateral fluid   pocket. No significant new or residual abscess.    LYMPH NODES: Normal.    VASCULATURE: Normal.    PELVIC ORGANS: Marked prostatic hypertrophy.    MUSCULOSKELETAL: Normal.      Impression    IMPRESSION:   1.  Gallstone remains in the mid small bowel at the side to side small bowel anastomosis. No obstruction.    2.  No significant residual or recurrent abscess.     EP Cardioversion External    Narrative    Laurie Capps MD     4/7/2025  2:31 PM  [facility]    Procedure: EP Cardioversion External    Date/Time: 4/7/2025 2:29 PM    Performed by: Laurie Capps MD  Authorized by: Fahad Wei MD      [employer]OTOCOL   Site Marked: Yes  Prior Images Obtained and Reviewed:  Yes  Required items: Required blood products, implants, devices and special   equipment available    Patient identity confirmed:  Verbally with patient  Patient was reevaluated immediately before administering moderate or deep   sedation or anesthesia  Confirmation Checklist:  Patient's identity using two indicators  Time out: Immediately prior to the procedure a time out was called    Universal Protocol: the Joint Commission Universal Protocol was followed       ANESTHESIA    Anesthesia was administered and monitored by anesthesiology.  See    anesthesia documentation for details.    SEDATION  Patient Sedated: Yes    Sedation:  Propofol  Vital signs: Vital signs monitored during sedation      PROCEDURE DETAILS  Cardioversion basis: elective  Pre-procedure rhythm: atrial flutter  Patient position: patient was placed in a supine position  Chest area: chest area exposed  Electrodes: pads  Electrodes placed: anterior-posterior  Number of attempts: 1  Post-procedure rhythm: normal sinus rhythm  Complications: no complications      PROCEDURE  Describe Procedure: Consent was obtained. Patient was anticoagulated with   heparin infusion, confirmed within therapeutic range. Pads placed in AP   position. Single shock of synchronized cardioversion utilizing 200 J   applied which successfully converted rhythm from atrial flutter into   normal sinus rhythm. Sedation was provided by anesthesia team. No   immediate complications.   Patient Tolerance:  Patient tolerated the procedure well with no immediate   complications   Cardioversion    Narrative    Dangelo Johnson MD     4/14/2025  2:46 PM  [facility]    Procedure: Cardioversion    Date/Time: 4/14/2025 2:44 PM    Performed by: Dangelo Johnson MD  Authorized by: Richa Schmidt PA-C      UNIVERSAL PROTOCOL   Site Marked: NA  Prior Images Obtained and Reviewed:  Yes  Required items: Required blood products, implants, devices and special   equipment available    Patient identity confirmed:  Verbally with patient, provided demographic   data, hospital-assigned identification number, arm band and anonymous   protocol, patient vented/unresponsive  Patient was reevaluated immediately before administering moderate or deep   sedation or anesthesia  Confirmation Checklist:  Patient's identity using two indicators, relevant   allergies, procedure was appropriate and matched the consent or emergent   situation and correct equipment/implants were available  Time out: Immediately prior to the  procedure a time out was called    Universal Protocol: the Joint Commission Universal Protocol was followed    Preparation: Patient was prepped and draped in usual sterile fashion      SEDATION  Patient Sedated: Yes    Sedation Type:  Moderate (conscious) sedation  Vital signs: Vital signs monitored during sedation      PROCEDURE DETAILS  Cardioversion basis: elective  Pre-procedure rhythm: atrial fibrillation  Patient position: patient was placed in a supine position  Chest area: chest area exposed  Electrodes: pads  Electrodes placed: anterior-posterior  Number of attempts: 1    Details of Attempts:  Single synchronized shock of 200J, accompanied by   chest compression, restored NSR.  Pt tolerated procedure well.  Post-procedure rhythm: normal sinus rhythm  Complications: no complications      PROCEDURE    Patient Tolerance:  Patient tolerated the procedure well with no immediate   complications  Length of time physician/provider present for 1:1 monitoring during   sedation: 45   Echocardiogram Complete     Value    LVEF  60-65%    Narrative    [id number]  [id number]  [id number]  [id number]^JACQUIE^JORGE^CB     [facility]  Echocardiography Laboratory  [address] [place] [address]     Name: KANNAN LORD  MRN: [id number]  : 1955  Study Date: 2025 08:18 AM  Age: 69 yrs  Gender: Male  Patient Location: [facility]  Reason For Study: Atrial Fibrillation  Ordering Physician: JORGE DHILLON  Performed By: Wolf Gaxiola     BSA: 2.5 m2  Height: 72 in  Weight: 287 lb  HR: 129  BP: 125/78 mmHg  ______________________________________________________________________________  Procedure  Echocardiogram with two-dimensional, color and spectral Doppler. Definity (NDC  #18039-849) given intravenously.  ______________________________________________________________________________  Interpretation Summary     Technically difficult imaging  The rhythm was atrial fibrillation.  Left ventricular  systolic function is normal.  The visual ejection fraction is 60-65%.  The left ventricle is normal in size.  The right ventricle is normal in structure, function and size.  Doppler interrogation does not demonstrate significant stenosis or  insufficiency involving cardiica valves . No change since 12/10/2024  ______________________________________________________________________________  Left Ventricle  The left ventricle is normal in size. There is normal left ventricular wall  thickness. Left ventricular systolic function is normal. The visual ejection  fraction is 60-65%. Left ventricular diastolic function is indeterminate. No  regional wall motion abnormalities noted. There is no thrombus seen in the  left ventricle.     Right Ventricle  The right ventricle is normal in structure, function and size. There is no  mass or thrombus in the right ventricle.     Atria  Normal left atrial size. Right atrial size is normal. There is no atrial shunt  seen. The left atrial appendage is not well visualized.     Mitral Valve  The mitral valve is normal in structure and function. There is no mitral  regurgitation noted. There is no mitral valve stenosis.     Tricuspid Valve  Normal tricuspid valve. No tricuspid regurgitation. Right ventricular systolic  pressure could not be approximated due to inadequate tricuspid regurgitation.  There is no tricuspid stenosis.     Aortic Valve  The aortic valve is trileaflet. No aortic regurgitation is present. No aortic  stenosis is present.     Pulmonic Valve  The pulmonic valve is not well visualized. There is no pulmonic valvular  regurgitation. There is no pulmonic valvular stenosis.     Vessels  The aortic root is normal size. Normal size ascending aorta. The inferior vena  cava is normal. The pulmonary artery is normal size.     Pericardium  The pericardium appears normal. There is no pleural effusion.     Rhythm  The rhythm was atrial  fibrillation.  ______________________________________________________________________________  MMode/2D Measurements & Calculations     IVSd: 1.0 cm  LVIDd: 4.4 cm  LVIDs: 3.5 cm  LVPWd: 1.1 cm  FS: 21.3 %  LV mass(C)d: 158.2 grams  LV mass(C)dI: 63.7 grams/m2  asc Aorta Diam: 2.9 cm  LVOT diam: 2.0 cm  LVOT area: 3.1 cm2  Asc Ao diam index BSA (cm/m2): 1.1  Asc Ao diam index Ht(cm/m): 1.6  LA Volume (BP): 31.6 ml  LA Volume Index (BP): 12.7 ml/m2     RWT: 0.50     Doppler Measurements & Calculations  MV E max shola: 131.3 cm/sec  MV dec slope: 1106 cm/sec2  MV dec time: 0.12 sec  Ao V2 max: 199.7 cm/sec  Ao max P.0 mmHg  Ao V2 mean: 141.3 cm/sec  Ao mean P.3 mmHg  Ao V2 VTI: 26.7 cm  AKILAH(I,D): 1.7 cm2  AKILAH(V,D): 1.5 cm2  LV V1 max PG: 3.5 mmHg  LV V1 max: 93.0 cm/sec  LV V1 VTI: 14.8 cm  SV(LVOT): 46.4 ml  SI(LVOT): 18.7 ml/m2  PA acc time: 0.06 sec  AV Shola Ratio (DI): 0.47  AKILAH Index (cm2/m2): 0.70  E/E' avg: 10.8  Lateral E/e': 7.7  Medial E/e': 13.9  RV S Shola: 7.1 cm/sec     ______________________________________________________________________________  Report approved by: Dr. Andres Smith on 2025 10:28 AM         Echocardiogram Complete     Value    LVEF  55%    Narrative    [id number]  [id number]  [id number]  [id number]^SELENE^ITZEL^MORE     [facility]  Echocardiography Laboratory  [address] [place] [address]     Name: KANNAN LORD  MRN: [id number]  : 1955  Study Date: 04/15/2025 12:15 PM  Age: 69 yrs  Gender: Male  Patient Location: [facility]  Reason For Study: Atrial Flutter  Ordering Physician: ITZEL MORTON  Performed By: Christel Wise RDCS     BSA: 2.6 m2  Height: 72 in  Weight: 319 lb  HR: 76  BP: 112/74 mmHg  ______________________________________________________________________________  Procedure  Echocardiogram with two-dimensional, color and spectral Doppler. Definity (NDC  #14344-330) given  intravenously.  ______________________________________________________________________________  Interpretation Summary     1. The left ventricle is normal in structure, function and size. The visual  ejection fraction is estimated at 55%.  2. The right ventricle is normal size. Limited views of RV, may have mild  dysfunction.  3. No valve disease.     No changes from echo 4-6-25.  ______________________________________________________________________________  Left Ventricle  The left ventricle is normal in structure, function and size. There is normal  left ventricular wall thickness. The visual ejection fraction is estimated at  55%. Regional wall motion abnormalities cannot be excluded due to limited  visualization.     Right Ventricle  The right ventricle is normal size. Limited views of RV, may have mild  dysfunction.     Atria  Normal left atrial size. Right atrial size is normal. There is no atrial shunt  seen.     Mitral Valve  There is trace to mild mitral regurgitation.     Tricuspid Valve  No tricuspid regurgitation.     Aortic Valve  There is mild trileaflet aortic sclerosis.     Pulmonic Valve  The pulmonic valve is not well visualized.     Vessels  Normal ascending, transverse (arch), and descending aorta. Mild IVC dilation  with reduced collapse.     Pericardium  There is no pericardial effusion.     Rhythm  Sinus rhythm was noted.  ______________________________________________________________________________  MMode/2D Measurements & Calculations     IVSd: 0.89 cm  LVIDd: 4.5 cm  LVIDs: 3.4 cm  LVPWd: 1.1 cm  FS: 24.7 %  LV mass(C)d: 153.9 grams  LV mass(C)dI: 59.2 grams/m2  Ao root diam: 3.0 cm  asc Aorta Diam: 3.5 cm  LVOT diam: 1.9 cm  LVOT area: 2.9 cm2  Ao root diam index Ht(cm/m): 1.7  Ao root diam index BSA (cm/m2): 1.2  Asc Ao diam index BSA (cm/m2): 1.3  Asc Ao diam index Ht(cm/m): 1.9  RV Base: 3.3 cm  RWT: 0.48  TAPSE: 1.4 cm     Doppler Measurements & Calculations  MV E max blas: 143.0  cm/sec  MV A max shola: 85.7 cm/sec  MV E/A: 1.7  MV max P.9 mmHg  MV mean P.6 mmHg  MV V2 VTI: 38.5 cm  MVA(VTI): 2.2 cm2  MV dec time: 0.18 sec  Ao V2 max: 214.8 cm/sec  Ao max P.5 mmHg  Ao V2 mean: 139.2 cm/sec  Ao mean P.3 mmHg  Ao V2 VTI: 43.1 cm  AKILAH(I,D): 1.9 cm2  AKILAH(V,D): 1.8 cm2  LV V1 max P.3 mmHg  LV V1 max: 134.8 cm/sec  LV V1 VTI: 28.6 cm  SV(LVOT): 82.9 ml  SI(LVOT): 31.9 ml/m2  AV Shola Ratio (DI): 0.63  AKILAH Index (cm2/m2): 0.74  E/E' av.2  Lateral E/e': 14.4  Medial E/e': 19.9  RV S Shola: 7.4 cm/sec     ______________________________________________________________________________  Report approved by: Delio Dougherty MD on 04/15/2025 01:10 PM             SURGERIES:  3/30/2025:  PROCEDURE:   Exploratory laparotomy  Small bowel enterotomy with removal of gallstone and primary closure     2025:  Procedure:      EXPLORATORY LAPAROTOMY WITH SMALL BOWEL RESECTION, N/A - Abdomen  Findings:                     Breakdown of previous enterotomy repair, large volume ascites and intraabdominal contamination-3L removed. Meckel's diverticulum. Small bowel resection performed encompassing enterotomy and Meckel's approximately 80cm  Left in discontinuity and TAC.  Complications:            None.    4/10/2025:  EXPLORATORY LAPAROTOMY, SMALL BOWEL ANASTOMOSIS, ABDOMINAL CLOSURE, N/A - Abdomen

## 2025-04-29 NOTE — PROGRESS NOTES
I discussed management of the gallstone, in transit through the small bowel and seen at the prior anastomosis on CT yesterday, with Mr. Davis and his wife.    The most aggressive intervention for this large gallstone would be surgical extraction of it.  This will require enterotomy or small bowel resection, and possible open surgery.  I don't think this is a good time for Mr. Davis to undergo another surgery.  He is still actively recovering from his prior surgeries.     Next, we could keep him in the hospital for continued surveillance regarding development of obstruction from this stone.  Unfortunately, the stone is not reliably visualized with plain film.  We have been monitoring it with CT scan.    If we discharge him to TCU, we can plan for repeat imaging in 3-5 days.  The Mini' agree that this is a very reasonable plan.  If he tolerates, fiber supplement may help pass the stone.

## 2025-04-29 NOTE — PLAN OF CARE
Physical Therapy Discharge Summary    Reason for therapy discharge:    Discharged to transitional care facility.    Progress towards therapy goal(s). See goals on Care Plan in [facility] electronic health record for goal details.  Goals partially met.  Barriers to achieving goals:   discharge from facility.    Therapy recommendation(s):    Continued therapy is recommended.  Rationale/Recommendations:  Pt was previously Mod I with a walker for mobility; lives with a wife in a house. Pt is showing progressing able to  WW with asst x 1 plus SBA of 1. Able to take pivot steps and a couple steps forward and back with WW. Will require further IP therapy at time of hospital discharge in TCU setting.    *Of note, writer did not treat pt

## 2025-05-01 ENCOUNTER — TELEPHONE (OUTPATIENT)
Dept: CARDIOLOGY | Facility: CLINIC | Age: 70
End: 2025-05-01
Payer: COMMERCIAL

## 2025-05-01 NOTE — TELEPHONE ENCOUNTER
Patient was admitted to [facility] on 3/30/25 with emesis and found to have SBO. Found to have gallstone ileus - s/p ex-lap with small bowel enterotomy with removal of gallstone and primary closure.    PMH: coronary disease status post CABG and aortic valve replacement with a bioprosthetic prosthesis in 2022, essential hypertension, type 2 diabetes.     4/4/25: Cardiology consulted after pt developed new onset A. Flutter with RVR on 4/4/25. Treated with several doses of IV Metoprolol.     4/6/25: Echo showed EF of 60-65%. The left ventricle is normal in size. The right ventricle is normal in structure, function and size. Doppler interrogation does not demonstrate significant stenosis or insufficiency involving cardiica valves. No change since 12/10/2024.    4/7/25: Successful DCCV of A. Flutter to NSR after shock x 1.    4/8/25: status post ex lap for gallstone ileus with enterotomy and removal of gallstone and transverse closure complicated by possible ileus. Post-surgical leak with abdominal contamination with sepsis and Enterococcus faecalis bacteremia - s/p ex-lap with small bowel resection 4/8/2025.     4/10/25: s/p ex-lap, small bowel anastomosis and abdominal closure with wound VAC placed on 4/10/2025.    4/14/25: Successful DCCV of A. Fib to NSR after shock x 1.    4/15/25: Echo showed EF of 55%. The right ventricle is normal size. Limited views of RV, may have mild dysfunction. No valve disease.    4/15/25: Intra-abdominal fluid collections/abscesses s/p IR drains placed-drains subsequently removed. Contracted gallbladder containing a large gallstone with signs of fistulization to the duodenum.    Pt was started on Amiodarone, Eliquis and Metoprolol at time of discharge.    Pt is scheduled for an OV on 5/19/25 at 1435 with SUKHWINDER Magalie Rodas at our [place] [facility].    Pt was discharged to [facility] TCU.    Writer called and spoke with pt's wife. OV as above was verified. No further questions. MARA Juárez,  DAVID.

## 2025-05-07 ENCOUNTER — TELEPHONE (OUTPATIENT)
Dept: SURGERY | Facility: CLINIC | Age: 70
End: 2025-05-07
Payer: COMMERCIAL

## 2025-05-07 NOTE — TELEPHONE ENCOUNTER
Name of caller: spouse, Mya  Consent to communicate on file for Mya    Reason for Call:  Call back  Would like to discuss schedule for wound vac change as pt is discharging from TCU Friday. Wants to make sure everything is in order.   He has a PO scheduled next Wednesday, and is assuming they will also check it there?    Surgeon:  Diallo    Recent Surgery:  Yes.    If yes, when & what type:  4/10/25      EXPLORATORY LAPAROTOMY, SMALL BOWEL ANASTOMOSIS, ABDOMINAL CLOSUR     Best phone number to reach pt at is: [phone]    Ok to leave a message with medical info? Yes.

## 2025-05-07 NOTE — TELEPHONE ENCOUNTER
Patient recently had long hospitalization for gallstone ileus - s/p ex lap with small bowel enterotomy with removal of gallstone and primary closure, 3/30/2025; post surgical leak with abdominal contamination with sepsis and enterococcus faecalis; abdominal closure with wound vac on 4/10/25    Patient is discharging from [facility] on Monday next week and patient's wife is wanting to make sure everything is in place for home care nursing as well as appointment with Dr. Landrum    She would like me to call , Porsha, at [facility] to discuss and to inform her that Dr. Landrum will change wound vac on Wednesday, next week, during post op visit.      Informed her that I will call Porsha to discuss this, and to reiterate that home care nursing services should not have to start for VAC changes, until Friday of next week.    Informed Mya that they will need to bring wound vac supplies to appointment next week, in order for Dr. Landrum to be able to perform the wound vac change. She verbalized understanding of this.    All questions answered    She will call PRN    Suma Bishop RN-BSN      Attempted to call Porsha, no answer.  Left detailed message with call back number and asked her to call back to confirm message received.    MORGAN PaulaBSN

## 2025-05-09 ENCOUNTER — MEDICAL CORRESPONDENCE (OUTPATIENT)
Dept: HEALTH INFORMATION MANAGEMENT | Facility: CLINIC | Age: 70
End: 2025-05-09

## 2025-05-14 ENCOUNTER — OFFICE VISIT (OUTPATIENT)
Dept: SURGERY | Facility: CLINIC | Age: 70
End: 2025-05-14
Payer: COMMERCIAL

## 2025-05-14 DIAGNOSIS — K56.3 GALLSTONE ILEUS (H): Primary | ICD-10-CM

## 2025-05-14 PROCEDURE — 97605 NEG PRS WND THER DME<=50SQCM: CPT | Mod: 76 | Performed by: SURGERY

## 2025-05-14 PROCEDURE — 99024 POSTOP FOLLOW-UP VISIT: CPT | Performed by: SURGERY

## 2025-05-14 NOTE — PROGRESS NOTES
Mr. Worthy was operated on for gallstone ileus.  Intentional enterotomy was made to extract a large stone.  Post-operatively, he became septic and was taken back to the operating room for a punctate breakdown near the repair.  He incidentally also had a Meckel's diverticulum that was resected with the area of breakdown. The bowel was reanastomosed in a staged procedure two days later.    He was found to have another stone in the small bowel.  Rather than operating again, we decided to monitor this stone.  It has passed on the most recent CT.    He has a wound vac anterior to the fascia.  There is a 1 to 1.5 cm gap at the superior part of the incision and below the umbilicus the gap is smaller.  No tunneling.  Good granulation.  The plan will be to reapply the vac (done today) and continue for 1-2 weeks until the incision is too shallow to place the vac.  He asked about showering with this.  It is ok, if the powered portion of the vac is kept outside and the tubing is covered with tegaderm or vac drape for added protection from water contamination or subsequent dysfunction.    He will likely need a cholecystectomy.  This would ideally be when he is completely recovered from his recent hospital stay and interventions.    Sailaja shultz.  Follow up as needed.

## 2025-05-19 ENCOUNTER — OFFICE VISIT (OUTPATIENT)
Dept: CARDIOLOGY | Facility: CLINIC | Age: 70
End: 2025-05-19
Payer: COMMERCIAL

## 2025-05-19 VITALS
SYSTOLIC BLOOD PRESSURE: 110 MMHG | DIASTOLIC BLOOD PRESSURE: 58 MMHG | BODY MASS INDEX: 37.98 KG/M2 | HEIGHT: 71 IN | WEIGHT: 271.3 LBS | HEART RATE: 76 BPM

## 2025-05-19 DIAGNOSIS — G47.33 OSA (OBSTRUCTIVE SLEEP APNEA): ICD-10-CM

## 2025-05-19 DIAGNOSIS — I35.0 NONRHEUMATIC AORTIC VALVE STENOSIS: ICD-10-CM

## 2025-05-19 DIAGNOSIS — I25.810 CORONARY ARTERY DISEASE INVOLVING CORONARY BYPASS GRAFT OF NATIVE HEART WITHOUT ANGINA PECTORIS: ICD-10-CM

## 2025-05-19 DIAGNOSIS — E66.01 MORBID OBESITY (H): ICD-10-CM

## 2025-05-19 DIAGNOSIS — K56.3 GALLSTONE ILEUS (H): ICD-10-CM

## 2025-05-19 DIAGNOSIS — Z95.2 S/P AVR (AORTIC VALVE REPLACEMENT): ICD-10-CM

## 2025-05-19 DIAGNOSIS — I48.0 PAROXYSMAL ATRIAL FIBRILLATION (H): Primary | ICD-10-CM

## 2025-05-19 DIAGNOSIS — I10 BENIGN ESSENTIAL HYPERTENSION: ICD-10-CM

## 2025-05-19 DIAGNOSIS — E78.5 HYPERLIPIDEMIA LDL GOAL <70: ICD-10-CM

## 2025-05-19 NOTE — PROGRESS NOTES
CARDIOLOGY CLINIC NOTE    PRIMARY CARDIOLOGIST  Dr. Mejias   PRIMARY CARE PHYSICIAN:  Cory Valles    HISTORY OF PRESENT ILLNESS:  Richar Davis is a very pleasant 69-year-old male with a past medical history significant for coronary artery disease status post CABG x 3 using a LIMA/LAD, SVG/PDA and SVG to distal circumflex, aortic stenosis status post AVR with a 25 mm Bal Inspiris Resilia bovine valve (10/2022), postop A-fib with recurrence,  hypertension, hyperlipidemia, obstructive sleep apnea (untreated), tobacco use and type 2 diabetes.     He was last seen on 12/12/2024 as part of routine follow-up.  He did not endorse any cardiac complaint and no medication changes were made.    Echocardiogram 12/2024 showed a normal ejection fraction estimated at 60 to 65%, well-seated bioprosthetic aortic valve with a mean gradient of 12 mmHg, normal RV size and function.    He had been doing well until until 3/30/2025 when he presented to urgent care for evaluation of vomiting and tender abdomen.  X-ray was performed suggestive for small bowel obstruction and was referred to the emergency room.  He was urgently seen by surgery and underwent exploratory laparotomy with enterotomy for removal of gallstones.  His admission was complicated by atrial flutter/fibrillation followed by successful cardioversion on 4/7/2025.  He was initiated on Eliquis 5 mg twice daily and continued on metoprolol.  Unfortunately, he became hypotensive with a significant spike in WBC count.  CT scan showed increased free fluid.  He returned to the OR and diagnosed with a breakdown of previous enterotomy repair. He underwent a small bowel resection on 4/8/2025 and a small bowel anastomosis/fascial closure on 4/10/2025.  He developed recurrent atrial flutter on 4/14/2025 and subsequently underwent a successful MANDEEP/cardioversion.  He was loaded with IV amiodarone and transition to oral taper.  Echocardiogram on 4/15/2025 showed a normal ejection  fraction estimated at 55%, normal RV size and mildly reduced function and mild mitral regurgitation.  He had a very prolonged hospital stay.  Follow-up CT showed a previous gallstone had passed into the small bowel.  However, another surgery was not recommended and discussed surveillance monitoring.  He was discharged to a TCU where he spent 2 weeks rehabbing.    He presents to the office today accompanied by his wife for hospital follow-up.  He denies chest pain, shortness of breath, palpitations, PND, orthopnea or edema.  His primary complaint is episodes of presyncope and feeling he could pass out.  Upon exam, lungs are clear bilaterally, heart rate and rhythm regular, no palpitations or edema.  He currently has a abdominal wound VAC.     EKG today showed sinus rhythm, QTc 411, heart rate 78.  He is on amiodarone 200 mg daily, metoprolol 25 mg twice daily and Eliquis 5 mg twice daily for stroke prevention.    Blood pressure is well-controlled at 110/58  Last blood work was reviewed, BMP was unremarkable with the exception of elevated glucose.  CBC showed a hemoglobin of 10.4, hematocrit 33.3 and platelet 168.  WBC 6.1.  Lipid panel showed a total cholesterol of 125, HDL 26, LDL 71 and triglycerides 142.  Wt 271 lbs, down approximately 15 pounds since admission.    PAST MEDICAL HISTORY:  Past Medical History:   Diagnosis Date    Cataract     Complication of anesthesia     Diabetes mellitus (H)     Heart attack (H)     Heart murmur     Hypertension        MEDICATIONS:  Current Outpatient Medications   Medication Sig Dispense Refill    acetaminophen (TYLENOL) 325 MG tablet Take 2 tablets (650 mg) by mouth every 4 hours as needed for other (For optimal non-opioid multimodal pain management to improve pain control.) 100 tablet 1    amiodarone (PACERONE) 200 MG tablet Take 1 tablet (200 mg) by mouth daily.      amoxicillin (AMOXIL) 500 MG capsule Take 4 capsules (2,000 mg) 30-60 minutes prior to your dental procedure 4  capsule 1    apixaban ANTICOAGULANT (ELIQUIS) 5 MG tablet Take 1 tablet (5 mg) by mouth 2 times daily.      Ascorbic Acid (VITAMIN C) 500 MG CAPS Take 500 mg by mouth daily      atorvastatin (LIPITOR) 20 MG tablet Take 1 tablet (20 mg) by mouth daily. (Patient taking differently: Take 80 mg by mouth daily.)      Cyanocobalamin (VITAMIN B-12 PO) Take 1 tablet by mouth every other day. Unknown dose      diphenhydrAMINE (BENADRYL) 50 MG tablet Take 100 mg by mouth every 6 hours as needed for itching or allergies      fish oil-omega-3 fatty acids 1000 MG capsule Take 2 g by mouth daily      folic acid (FOLVITE) 1 MG tablet Take 1,000 mcg by mouth 2 times daily      furosemide (LASIX) 20 MG tablet Take 1 tablet (20 mg) by mouth daily. 90 tablet 4    gabapentin (NEURONTIN) 100 MG tablet Take 1 tablet (100 mg) by mouth 2 times daily.      MAGNESIUM PO Take 500 mg by mouth daily.      metFORMIN (GLUCOPHAGE) 500 MG tablet Take 0.5 tablets (250 mg) by mouth daily (with dinner) (0.5 x 500 mg = 250 mg) 30 tablet 1    metoprolol tartrate (LOPRESSOR) 25 MG tablet Take 1 tablet (25 mg) by mouth 2 times daily. Hold for SBP<100 or HR<55.      miconazole (MICATIN) 2 % external powder Apply topically 2 times daily as needed for other (rash groin).      Multiple Vitamins-Minerals (MENS 50+ MULTI VITAMIN/MIN PO) Take 1 tablet by mouth daily      Multiple Vitamins-Minerals (PRESERVISION AREDS 2 PO) Take by mouth daily      nicotine (COMMIT) 2 MG lozenge Place 1 lozenge (2 mg) inside cheek every hour as needed for nicotine withdrawal symptoms.      nicotine (NICODERM CQ) 21 MG/24HR 24 hr patch Place 1 patch over 24 hours onto the skin daily. Taper over 8 weeks as per protocol.      polyethylene glycol (MIRALAX) 17 GM/Dose powder Take 17 g by mouth daily as needed for constipation.      psyllium (METAMUCIL) 28.3 % packet Take 1 packet by mouth 2 times daily. 16 packet 0    tamsulosin (FLOMAX) 0.4 MG capsule Take 0.8 mg by mouth every  evening. (2 x 0.4 mg = 0.8 mg)      traMADol (ULTRAM) 50 MG tablet Take 1 tablet (50 mg) by mouth every 6 hours as needed for severe pain. 15 tablet 0    vitamin D3 (CHOLECALCIFEROL) 50 mcg (2000 units) tablet Take 1 tablet by mouth every other day      pantoprazole (PROTONIX) 40 MG EC tablet Take 1 tablet (40 mg) by mouth every morning (before breakfast). (Patient not taking: Reported on 5/19/2025)      senna-docusate (SENOKOT-S/PERICOLACE) 8.6-50 MG tablet Take 1 tablet by mouth 2 times daily. (Patient not taking: Reported on 5/19/2025) 15 tablet 0     No current facility-administered medications for this visit.       SOCIAL HISTORY:  I have reviewed this patient's social history and updated it with pertinent information if needed. Richar Davis  reports that he has quit smoking. His smokeless tobacco use includes chew. He reports that he does not currently use alcohol. He reports that he does not use drugs.    PHYSICAL EXAM:  Pulse:  [76] 76  BP: (110)/(58) 110/58  271 lbs 4.8 oz    Constitutional: alert, no distress  Respiratory: Good bilateral air entry  Cardiovascular: Regular rate and rhythm  GI: nondistended  Neuropsychiatric: appropriate affact    ASSESSMENT/PLAN:  Pertinent issues addressed/ reviewed during this cardiology visit  Atrial flutter/fibrillation -status post MANDEEP cardioversion on 4/14/2025.  EKG today shows sinus rhythm, QTc 411.  Continue amiodarone for a total of 3 months.  Continue metoprolol and Eliquis for stroke prevention.  Concerning for possible bradycardia/pauses with complaints of near syncope, recommend a 7-day Zio patch monitor.   Coronary artery disease -status post CABG x 3 using a LIMA/LAD, SVG/PDA and SVG to distal circumflex.  No symptoms of ischemia.  Continue current medical therapy.  Aortic stenosis status post AVR with a 25 mm Bal Inspiris Resilia bovine valve (10/2022).  Mean AoV pressure gradient of 9.3 mmHg.  Follow-up echocardiogram in 1 year.  Hypertension  -well-controlled  Hyperlipidemia -LDL at goal, continue atorvastatin  SBO in the setting of gallstones-status post resection 3/30/2025, on wound VAC.  Follows with surgery/wound care.  Obstructive sleep apnea   Obesity    Follow-up in 1 month with SUKHWINDER for review of Zio patch results.    It was a pleasure seeing this patient in clinic today. Please do not hesitate to contact me with any future questions.     ADONIS Dunn, CNP  Cardiology - [facility]  05/19/2025       The level of medical decision making during this visit was of moderate complexity.    This note was completed in part using dictation via the Dragon voice recognition software. Some word and grammatical errors may occur and must be interpreted in the appropriate clinical context.  If there are any questions pertaining to this issue, please contact me for further clarification.

## 2025-05-19 NOTE — PATIENT INSTRUCTIONS
Thanks for participating in a office visit with the [facility] clinic today.    You developed atrial fibrillation during recent hospitalization .   EKG today showed regular rhythm, on amiodarone, metoprolol and Eliquis for stroke prevention.   Blood pressures stable.   Complaints of presyncope over the last few days. Recommend a 7 day zio patch monitor.   Continue current medical therapy.     Follow up in 1 month with SUKHWINDER     Please call my nurse at   [phone]. Call with any questions or concerns.    Scheduling phone number: [phone].   Reminder: Please bring in all current medications, over the counter supplements and vitamin bottles to your next appointment.

## 2025-05-19 NOTE — LETTER
5/19/2025    Cory Valles MD  [address]  [facility] [id number]    RE: Richar Davis       Dear Colleague,     I had the pleasure of seeing Richar Davis in the [facility].  CARDIOLOGY CLINIC NOTE    PRIMARY CARDIOLOGIST  Dr. Mejias   PRIMARY CARE PHYSICIAN:  Cory Valles    HISTORY OF PRESENT ILLNESS:  Richar Davis is a very pleasant 69-year-old male with a past medical history significant for coronary artery disease status post CABG x 3 using a LIMA/LAD, SVG/PDA and SVG to distal circumflex, aortic stenosis status post AVR with a 25 mm Bal Inspiris Resilia bovine valve (10/2022), postop A-fib with recurrence,  hypertension, hyperlipidemia, obstructive sleep apnea (untreated), tobacco use and type 2 diabetes.     He was last seen on 12/12/2024 as part of routine follow-up.  He did not endorse any cardiac complaint and no medication changes were made.    Echocardiogram 12/2024 showed a normal ejection fraction estimated at 60 to 65%, well-seated bioprosthetic aortic valve with a mean gradient of 12 mmHg, normal RV size and function.    He had been doing well until until 3/30/2025 when he presented to urgent care for evaluation of vomiting and tender abdomen.  X-ray was performed suggestive for small bowel obstruction and was referred to the emergency room.  He was urgently seen by surgery and underwent exploratory laparotomy with enterotomy for removal of gallstones.  His admission was complicated by atrial flutter/fibrillation followed by successful cardioversion on 4/7/2025.  He was initiated on Eliquis 5 mg twice daily and continued on metoprolol.  Unfortunately, he became hypotensive with a significant spike in WBC count.  CT scan showed increased free fluid.  He returned to the OR and diagnosed with a breakdown of previous enterotomy repair. He underwent a small bowel resection on 4/8/2025 and a small bowel anastomosis/fascial closure on 4/10/2025.  He  developed recurrent atrial flutter on 4/14/2025 and subsequently underwent a successful MANDEEP/cardioversion.  He was loaded with IV amiodarone and transition to oral taper.  Echocardiogram on 4/15/2025 showed a normal ejection fraction estimated at 55%, normal RV size and mildly reduced function and mild mitral regurgitation.  He had a very prolonged hospital stay.  Follow-up CT showed a previous gallstone had passed into the small bowel.  However, another surgery was not recommended and discussed surveillance monitoring.  He was discharged to a TCU where he spent 2 weeks rehabbing.    He presents to the office today accompanied by his wife for hospital follow-up.  He denies chest pain, shortness of breath, palpitations, PND, orthopnea or edema.  His primary complaint is episodes of presyncope and feeling he could pass out.  Upon exam, lungs are clear bilaterally, heart rate and rhythm regular, no palpitations or edema.  He currently has a abdominal wound VAC.     EKG today showed sinus rhythm, QTc 411, heart rate 78.  He is on amiodarone 200 mg daily, metoprolol 25 mg twice daily and Eliquis 5 mg twice daily for stroke prevention.    Blood pressure is well-controlled at 110/58  Last blood work was reviewed, BMP was unremarkable with the exception of elevated glucose.  CBC showed a hemoglobin of 10.4, hematocrit 33.3 and platelet 168.  WBC 6.1.  Lipid panel showed a total cholesterol of 125, HDL 26, LDL 71 and triglycerides 142.  Wt 271 lbs, down approximately 15 pounds since admission.    PAST MEDICAL HISTORY:  Past Medical History:   Diagnosis Date     Cataract      Complication of anesthesia      Diabetes mellitus (H)      Heart attack (H)      Heart murmur      Hypertension        MEDICATIONS:  Current Outpatient Medications   Medication Sig Dispense Refill     acetaminophen (TYLENOL) 325 MG tablet Take 2 tablets (650 mg) by mouth every 4 hours as needed for other (For optimal non-opioid multimodal pain management  to improve pain control.) 100 tablet 1     amiodarone (PACERONE) 200 MG tablet Take 1 tablet (200 mg) by mouth daily.       amoxicillin (AMOXIL) 500 MG capsule Take 4 capsules (2,000 mg) 30-60 minutes prior to your dental procedure 4 capsule 1     apixaban ANTICOAGULANT (ELIQUIS) 5 MG tablet Take 1 tablet (5 mg) by mouth 2 times daily.       Ascorbic Acid (VITAMIN C) 500 MG CAPS Take 500 mg by mouth daily       atorvastatin (LIPITOR) 20 MG tablet Take 1 tablet (20 mg) by mouth daily. (Patient taking differently: Take 80 mg by mouth daily.)       Cyanocobalamin (VITAMIN B-12 PO) Take 1 tablet by mouth every other day. Unknown dose       diphenhydrAMINE (BENADRYL) 50 MG tablet Take 100 mg by mouth every 6 hours as needed for itching or allergies       fish oil-omega-3 fatty acids 1000 MG capsule Take 2 g by mouth daily       folic acid (FOLVITE) 1 MG tablet Take 1,000 mcg by mouth 2 times daily       furosemide (LASIX) 20 MG tablet Take 1 tablet (20 mg) by mouth daily. 90 tablet 4     gabapentin (NEURONTIN) 100 MG tablet Take 1 tablet (100 mg) by mouth 2 times daily.       MAGNESIUM PO Take 500 mg by mouth daily.       metFORMIN (GLUCOPHAGE) 500 MG tablet Take 0.5 tablets (250 mg) by mouth daily (with dinner) (0.5 x 500 mg = 250 mg) 30 tablet 1     metoprolol tartrate (LOPRESSOR) 25 MG tablet Take 1 tablet (25 mg) by mouth 2 times daily. Hold for SBP<100 or HR<55.       miconazole (MICATIN) 2 % external powder Apply topically 2 times daily as needed for other (rash groin).       Multiple Vitamins-Minerals (MENS 50+ MULTI VITAMIN/MIN PO) Take 1 tablet by mouth daily       Multiple Vitamins-Minerals (PRESERVISION AREDS 2 PO) Take by mouth daily       nicotine (COMMIT) 2 MG lozenge Place 1 lozenge (2 mg) inside cheek every hour as needed for nicotine withdrawal symptoms.       nicotine (NICODERM CQ) 21 MG/24HR 24 hr patch Place 1 patch over 24 hours onto the skin daily. Taper over 8 weeks as per protocol.        polyethylene glycol (MIRALAX) 17 GM/Dose powder Take 17 g by mouth daily as needed for constipation.       psyllium (METAMUCIL) 28.3 % packet Take 1 packet by mouth 2 times daily. 16 packet 0     tamsulosin (FLOMAX) 0.4 MG capsule Take 0.8 mg by mouth every evening. (2 x 0.4 mg = 0.8 mg)       traMADol (ULTRAM) 50 MG tablet Take 1 tablet (50 mg) by mouth every 6 hours as needed for severe pain. 15 tablet 0     vitamin D3 (CHOLECALCIFEROL) 50 mcg (2000 units) tablet Take 1 tablet by mouth every other day       pantoprazole (PROTONIX) 40 MG EC tablet Take 1 tablet (40 mg) by mouth every morning (before breakfast). (Patient not taking: Reported on 5/19/2025)       senna-docusate (SENOKOT-S/PERICOLACE) 8.6-50 MG tablet Take 1 tablet by mouth 2 times daily. (Patient not taking: Reported on 5/19/2025) 15 tablet 0     No current facility-administered medications for this visit.       SOCIAL HISTORY:  I have reviewed this patient's social history and updated it with pertinent information if needed. Richar Davis  reports that he has quit smoking. His smokeless tobacco use includes chew. He reports that he does not currently use alcohol. He reports that he does not use drugs.    PHYSICAL EXAM:  Pulse:  [76] 76  BP: (110)/(58) 110/58  271 lbs 4.8 oz    Constitutional: alert, no distress  Respiratory: Good bilateral air entry  Cardiovascular: Regular rate and rhythm  GI: nondistended  Neuropsychiatric: appropriate affact    ASSESSMENT/PLAN:  Pertinent issues addressed/ reviewed during this cardiology visit  Atrial flutter/fibrillation -status post MANDEEP cardioversion on 4/14/2025.  EKG today shows sinus rhythm, QTc 411.  Continue amiodarone for a total of 3 months.  Continue metoprolol and Eliquis for stroke prevention.  Concerning for possible bradycardia/pauses with complaints of near syncope, recommend a 7-day Zio patch monitor.   Coronary artery disease -status post CABG x 3 using a LIMA/LAD, SVG/PDA and SVG to distal  circumflex.  No symptoms of ischemia.  Continue current medical therapy.  Aortic stenosis status post AVR with a 25 mm Bal Inspiris Resilia bovine valve (10/2022).  Mean AoV pressure gradient of 9.3 mmHg.  Follow-up echocardiogram in 1 year.  Hypertension -well-controlled  Hyperlipidemia -LDL at goal, continue atorvastatin  SBO in the setting of gallstones-status post resection 3/30/2025, on wound VAC.  Follows with surgery/wound care.  Obstructive sleep apnea   Obesity    Follow-up in 1 month with SUKHWINDER for review of Zio patch results.    It was a pleasure seeing this patient in clinic today. Please do not hesitate to contact me with any future questions.     ADONIS Dunn, CNP  Cardiology - [facility]  05/19/2025       The level of medical decision making during this visit was of moderate complexity.    This note was completed in part using dictation via the Dragon voice recognition software. Some word and grammatical errors may occur and must be interpreted in the appropriate clinical context.  If there are any questions pertaining to this issue, please contact me for further clarification.    Thank you for allowing me to participate in the care of your patient.      Sincerely,     ADONIS Dunn CNP     [facility]  cc:   Cory Valles MD  [address] [place] [address]

## 2025-05-20 ENCOUNTER — ORDERS ONLY (AUTO-RELEASED) (OUTPATIENT)
Dept: CARDIOLOGY | Facility: CLINIC | Age: 70
End: 2025-05-20
Payer: COMMERCIAL

## 2025-05-20 DIAGNOSIS — I48.0 PAROXYSMAL ATRIAL FIBRILLATION (H): ICD-10-CM

## 2025-05-21 ENCOUNTER — APPOINTMENT (OUTPATIENT)
Dept: GENERAL RADIOLOGY | Facility: CLINIC | Age: 70
DRG: 291 | End: 2025-05-21
Attending: STUDENT IN AN ORGANIZED HEALTH CARE EDUCATION/TRAINING PROGRAM
Payer: COMMERCIAL

## 2025-05-21 ENCOUNTER — HOSPITAL ENCOUNTER (INPATIENT)
Facility: CLINIC | Age: 70
DRG: 291 | End: 2025-05-21
Attending: STUDENT IN AN ORGANIZED HEALTH CARE EDUCATION/TRAINING PROGRAM | Admitting: HOSPITALIST
Payer: COMMERCIAL

## 2025-05-21 DIAGNOSIS — I35.0 NONRHEUMATIC AORTIC VALVE STENOSIS: ICD-10-CM

## 2025-05-21 DIAGNOSIS — S31.109D OPEN WOUND OF ABDOMEN, SUBSEQUENT ENCOUNTER: Primary | ICD-10-CM

## 2025-05-21 DIAGNOSIS — I50.9 ACUTE ON CHRONIC CONGESTIVE HEART FAILURE, UNSPECIFIED HEART FAILURE TYPE (H): ICD-10-CM

## 2025-05-21 DIAGNOSIS — Z95.1 S/P CABG (CORONARY ARTERY BYPASS GRAFT): ICD-10-CM

## 2025-05-21 DIAGNOSIS — Z95.2 S/P AVR (AORTIC VALVE REPLACEMENT): ICD-10-CM

## 2025-05-21 DIAGNOSIS — S31.109D OPEN WOUND OF ABDOMEN, SUBSEQUENT ENCOUNTER: ICD-10-CM

## 2025-05-21 DIAGNOSIS — I25.10 CORONARY ARTERY DISEASE DUE TO LIPID RICH PLAQUE: ICD-10-CM

## 2025-05-21 DIAGNOSIS — R05.1 ACUTE COUGH: ICD-10-CM

## 2025-05-21 DIAGNOSIS — I25.83 CORONARY ARTERY DISEASE DUE TO LIPID RICH PLAQUE: ICD-10-CM

## 2025-05-21 LAB
ALBUMIN SERPL BCG-MCNC: 3.6 G/DL (ref 3.5–5.2)
ALP SERPL-CCNC: 111 U/L (ref 40–150)
ALT SERPL W P-5'-P-CCNC: 21 U/L (ref 0–70)
ANION GAP SERPL CALCULATED.3IONS-SCNC: 12 MMOL/L (ref 7–15)
AST SERPL W P-5'-P-CCNC: 30 U/L (ref 0–45)
ATRIAL RATE - MUSE: 104 BPM
BASOPHILS # BLD AUTO: 0 10E3/UL (ref 0–0.2)
BASOPHILS NFR BLD AUTO: 0 %
BILIRUB SERPL-MCNC: 0.7 MG/DL
BUN SERPL-MCNC: 8.8 MG/DL (ref 8–23)
CALCIUM SERPL-MCNC: 9.3 MG/DL (ref 8.8–10.4)
CHLORIDE SERPL-SCNC: 96 MMOL/L (ref 98–107)
CREAT SERPL-MCNC: 0.75 MG/DL (ref 0.67–1.17)
DIASTOLIC BLOOD PRESSURE - MUSE: NORMAL MMHG
EGFRCR SERPLBLD CKD-EPI 2021: >90 ML/MIN/1.73M2
ELLIPTOCYTES BLD QL SMEAR: SLIGHT
EOSINOPHIL # BLD AUTO: 0 10E3/UL (ref 0–0.7)
EOSINOPHIL NFR BLD AUTO: 0 %
ERYTHROCYTE [DISTWIDTH] IN BLOOD BY AUTOMATED COUNT: 16.3 % (ref 10–15)
FLUAV RNA SPEC QL NAA+PROBE: NEGATIVE
FLUBV RNA RESP QL NAA+PROBE: NEGATIVE
GLUCOSE BLDC GLUCOMTR-MCNC: 123 MG/DL (ref 70–99)
GLUCOSE BLDC GLUCOMTR-MCNC: 138 MG/DL (ref 70–99)
GLUCOSE SERPL-MCNC: 119 MG/DL (ref 70–99)
HCO3 BLDV-SCNC: 26 MMOL/L (ref 21–28)
HCO3 SERPL-SCNC: 24 MMOL/L (ref 22–29)
HCT VFR BLD AUTO: 35.6 % (ref 40–53)
HGB BLD-MCNC: 11.5 G/DL (ref 13.3–17.7)
HOLD SPECIMEN: NORMAL
IMM GRANULOCYTES # BLD: 0 10E3/UL
IMM GRANULOCYTES NFR BLD: 1 %
INTERPRETATION ECG - MUSE: NORMAL
LACTATE BLD-SCNC: 1.3 MMOL/L (ref 0.7–2)
LACTATE SERPL-SCNC: 1.6 MMOL/L (ref 0.7–2)
LYMPHOCYTES # BLD AUTO: 0.5 10E3/UL (ref 0.8–5.3)
LYMPHOCYTES NFR BLD AUTO: 8 %
MCH RBC QN AUTO: 28.5 PG (ref 26.5–33)
MCHC RBC AUTO-ENTMCNC: 32.3 G/DL (ref 31.5–36.5)
MCV RBC AUTO: 88 FL (ref 78–100)
MONOCYTES # BLD AUTO: 0.5 10E3/UL (ref 0–1.3)
MONOCYTES NFR BLD AUTO: 10 %
NEUTROPHILS # BLD AUTO: 4.3 10E3/UL (ref 1.6–8.3)
NEUTROPHILS NFR BLD AUTO: 80 %
NRBC # BLD AUTO: 0 10E3/UL
NRBC BLD AUTO-RTO: 0 /100
NT-PROBNP SERPL-MCNC: 2003 PG/ML (ref 0–229)
P AXIS - MUSE: 104 DEGREES
PCO2 BLDV: 38 MM HG (ref 40–50)
PH BLDV: 7.45 [PH] (ref 7.32–7.43)
PLAT MORPH BLD: ABNORMAL
PLATELET # BLD AUTO: 143 10E3/UL (ref 150–450)
PO2 BLDV: 28 MM HG (ref 25–47)
POTASSIUM SERPL-SCNC: 4.2 MMOL/L (ref 3.4–5.3)
PR INTERVAL - MUSE: 146 MS
PROCALCITONIN SERPL IA-MCNC: 0.09 NG/ML
PROT SERPL-MCNC: 7.3 G/DL (ref 6.4–8.3)
QRS DURATION - MUSE: 82 MS
QT - MUSE: 348 MS
QTC - MUSE: 457 MS
R AXIS - MUSE: 60 DEGREES
RBC # BLD AUTO: 4.03 10E6/UL (ref 4.4–5.9)
RBC MORPH BLD: ABNORMAL
RSV RNA SPEC NAA+PROBE: NEGATIVE
SAO2 % BLDV: 57 % (ref 70–75)
SARS-COV-2 RNA RESP QL NAA+PROBE: NEGATIVE
SODIUM SERPL-SCNC: 132 MMOL/L (ref 135–145)
SYSTOLIC BLOOD PRESSURE - MUSE: NORMAL MMHG
T AXIS - MUSE: 98 DEGREES
TROPONIN T SERPL HS-MCNC: 23 NG/L
TROPONIN T SERPL HS-MCNC: 25 NG/L
VENTRICULAR RATE- MUSE: 104 BPM
WBC # BLD AUTO: 5.4 10E3/UL (ref 4–11)

## 2025-05-21 PROCEDURE — 250N000013 HC RX MED GY IP 250 OP 250 PS 637: Performed by: INTERNAL MEDICINE

## 2025-05-21 PROCEDURE — 93005 ELECTROCARDIOGRAM TRACING: CPT

## 2025-05-21 PROCEDURE — 85025 COMPLETE CBC W/AUTO DIFF WBC: CPT

## 2025-05-21 PROCEDURE — 87637 SARSCOV2&INF A&B&RSV AMP PRB: CPT

## 2025-05-21 PROCEDURE — 82803 BLOOD GASES ANY COMBINATION: CPT

## 2025-05-21 PROCEDURE — 99223 1ST HOSP IP/OBS HIGH 75: CPT | Performed by: INTERNAL MEDICINE

## 2025-05-21 PROCEDURE — 250N000013 HC RX MED GY IP 250 OP 250 PS 637: Performed by: STUDENT IN AN ORGANIZED HEALTH CARE EDUCATION/TRAINING PROGRAM

## 2025-05-21 PROCEDURE — 99285 EMERGENCY DEPT VISIT HI MDM: CPT | Mod: 25

## 2025-05-21 PROCEDURE — 36415 COLL VENOUS BLD VENIPUNCTURE: CPT

## 2025-05-21 PROCEDURE — 80053 COMPREHEN METABOLIC PANEL: CPT

## 2025-05-21 PROCEDURE — 36415 COLL VENOUS BLD VENIPUNCTURE: CPT | Performed by: STUDENT IN AN ORGANIZED HEALTH CARE EDUCATION/TRAINING PROGRAM

## 2025-05-21 PROCEDURE — G0378 HOSPITAL OBSERVATION PER HR: HCPCS

## 2025-05-21 PROCEDURE — 84145 PROCALCITONIN (PCT): CPT

## 2025-05-21 PROCEDURE — 82962 GLUCOSE BLOOD TEST: CPT

## 2025-05-21 PROCEDURE — 250N000011 HC RX IP 250 OP 636

## 2025-05-21 PROCEDURE — 71046 X-RAY EXAM CHEST 2 VIEWS: CPT

## 2025-05-21 PROCEDURE — 96374 THER/PROPH/DIAG INJ IV PUSH: CPT

## 2025-05-21 PROCEDURE — 83605 ASSAY OF LACTIC ACID: CPT

## 2025-05-21 PROCEDURE — 84484 ASSAY OF TROPONIN QUANT: CPT | Performed by: STUDENT IN AN ORGANIZED HEALTH CARE EDUCATION/TRAINING PROGRAM

## 2025-05-21 PROCEDURE — 83880 ASSAY OF NATRIURETIC PEPTIDE: CPT

## 2025-05-21 RX ORDER — GABAPENTIN 100 MG/1
100 CAPSULE ORAL 2 TIMES DAILY PRN
Status: DISCONTINUED | OUTPATIENT
Start: 2025-05-21 | End: 2025-05-24 | Stop reason: HOSPADM

## 2025-05-21 RX ORDER — IOPAMIDOL 755 MG/ML
500 INJECTION, SOLUTION INTRAVASCULAR ONCE
Status: DISCONTINUED | OUTPATIENT
Start: 2025-05-21 | End: 2025-05-21

## 2025-05-21 RX ORDER — GUAIFENESIN 200 MG/10ML
200 LIQUID ORAL EVERY 4 HOURS PRN
Status: DISCONTINUED | OUTPATIENT
Start: 2025-05-21 | End: 2025-05-24 | Stop reason: HOSPADM

## 2025-05-21 RX ORDER — AMOXICILLIN 250 MG
1 CAPSULE ORAL 2 TIMES DAILY
Status: DISCONTINUED | OUTPATIENT
Start: 2025-05-21 | End: 2025-05-24 | Stop reason: HOSPADM

## 2025-05-21 RX ORDER — ACETAMINOPHEN 325 MG/1
650 TABLET ORAL EVERY 4 HOURS PRN
Status: DISCONTINUED | OUTPATIENT
Start: 2025-05-21 | End: 2025-05-24 | Stop reason: HOSPADM

## 2025-05-21 RX ORDER — METHOCARBAMOL 500 MG/1
500 TABLET, FILM COATED ORAL 4 TIMES DAILY
COMMUNITY

## 2025-05-21 RX ORDER — FUROSEMIDE 10 MG/ML
60 INJECTION INTRAMUSCULAR; INTRAVENOUS ONCE
Status: COMPLETED | OUTPATIENT
Start: 2025-05-21 | End: 2025-05-21

## 2025-05-21 RX ORDER — DEXTROSE MONOHYDRATE 25 G/50ML
25-50 INJECTION, SOLUTION INTRAVENOUS
Status: DISCONTINUED | OUTPATIENT
Start: 2025-05-21 | End: 2025-05-24 | Stop reason: HOSPADM

## 2025-05-21 RX ORDER — BISACODYL 10 MG
10 SUPPOSITORY, RECTAL RECTAL DAILY PRN
Status: DISCONTINUED | OUTPATIENT
Start: 2025-05-21 | End: 2025-05-24 | Stop reason: HOSPADM

## 2025-05-21 RX ORDER — ATORVASTATIN CALCIUM 40 MG/1
80 TABLET, FILM COATED ORAL DAILY
Status: DISCONTINUED | OUTPATIENT
Start: 2025-05-22 | End: 2025-05-24 | Stop reason: HOSPADM

## 2025-05-21 RX ORDER — PROCHLORPERAZINE MALEATE 5 MG/1
5 TABLET ORAL EVERY 6 HOURS PRN
Status: DISCONTINUED | OUTPATIENT
Start: 2025-05-21 | End: 2025-05-24 | Stop reason: HOSPADM

## 2025-05-21 RX ORDER — AMIODARONE HYDROCHLORIDE 200 MG/1
200 TABLET ORAL DAILY
Status: DISCONTINUED | OUTPATIENT
Start: 2025-05-22 | End: 2025-05-24 | Stop reason: HOSPADM

## 2025-05-21 RX ORDER — TRAMADOL HYDROCHLORIDE 50 MG/1
50 TABLET ORAL ONCE
Status: COMPLETED | OUTPATIENT
Start: 2025-05-21 | End: 2025-05-21

## 2025-05-21 RX ORDER — DIPHENHYDRAMINE HCL 25 MG
50 CAPSULE ORAL EVERY 6 HOURS PRN
Status: DISCONTINUED | OUTPATIENT
Start: 2025-05-21 | End: 2025-05-24 | Stop reason: HOSPADM

## 2025-05-21 RX ORDER — PANTOPRAZOLE SODIUM 40 MG/1
40 TABLET, DELAYED RELEASE ORAL
Status: DISCONTINUED | OUTPATIENT
Start: 2025-05-22 | End: 2025-05-24 | Stop reason: HOSPADM

## 2025-05-21 RX ORDER — NICOTINE 21 MG/24HR
1 PATCH, TRANSDERMAL 24 HOURS TRANSDERMAL DAILY
Status: DISCONTINUED | OUTPATIENT
Start: 2025-05-21 | End: 2025-05-24 | Stop reason: HOSPADM

## 2025-05-21 RX ORDER — ONDANSETRON 2 MG/ML
4 INJECTION INTRAMUSCULAR; INTRAVENOUS EVERY 6 HOURS PRN
Status: DISCONTINUED | OUTPATIENT
Start: 2025-05-21 | End: 2025-05-24 | Stop reason: HOSPADM

## 2025-05-21 RX ORDER — HYDRALAZINE HYDROCHLORIDE 20 MG/ML
10 INJECTION INTRAMUSCULAR; INTRAVENOUS EVERY 4 HOURS PRN
Status: DISCONTINUED | OUTPATIENT
Start: 2025-05-21 | End: 2025-05-24 | Stop reason: HOSPADM

## 2025-05-21 RX ORDER — FUROSEMIDE 10 MG/ML
40 INJECTION INTRAMUSCULAR; INTRAVENOUS
Status: DISCONTINUED | OUTPATIENT
Start: 2025-05-22 | End: 2025-05-23

## 2025-05-21 RX ORDER — METOPROLOL TARTRATE 25 MG/1
25 TABLET, FILM COATED ORAL 2 TIMES DAILY
Status: DISCONTINUED | OUTPATIENT
Start: 2025-05-21 | End: 2025-05-24 | Stop reason: HOSPADM

## 2025-05-21 RX ORDER — TAMSULOSIN HYDROCHLORIDE 0.4 MG/1
0.8 CAPSULE ORAL EVERY EVENING
Status: DISCONTINUED | OUTPATIENT
Start: 2025-05-21 | End: 2025-05-24 | Stop reason: HOSPADM

## 2025-05-21 RX ORDER — NICOTINE POLACRILEX 4 MG
15-30 LOZENGE BUCCAL
Status: DISCONTINUED | OUTPATIENT
Start: 2025-05-21 | End: 2025-05-24 | Stop reason: HOSPADM

## 2025-05-21 RX ORDER — POLYETHYLENE GLYCOL 3350 17 G/17G
17 POWDER, FOR SOLUTION ORAL DAILY PRN
Status: DISCONTINUED | OUTPATIENT
Start: 2025-05-21 | End: 2025-05-24 | Stop reason: HOSPADM

## 2025-05-21 RX ORDER — POLYETHYLENE GLYCOL 3350 17 G
2 POWDER IN PACKET (EA) ORAL
Status: DISCONTINUED | OUTPATIENT
Start: 2025-05-21 | End: 2025-05-24 | Stop reason: HOSPADM

## 2025-05-21 RX ORDER — HYDRALAZINE HYDROCHLORIDE 10 MG/1
10 TABLET, FILM COATED ORAL EVERY 4 HOURS PRN
Status: DISCONTINUED | OUTPATIENT
Start: 2025-05-21 | End: 2025-05-24 | Stop reason: HOSPADM

## 2025-05-21 RX ORDER — BENZONATATE 100 MG/1
100 CAPSULE ORAL 3 TIMES DAILY PRN
Status: DISCONTINUED | OUTPATIENT
Start: 2025-05-21 | End: 2025-05-24 | Stop reason: HOSPADM

## 2025-05-21 RX ORDER — ONDANSETRON 4 MG/1
4 TABLET, ORALLY DISINTEGRATING ORAL EVERY 6 HOURS PRN
Status: DISCONTINUED | OUTPATIENT
Start: 2025-05-21 | End: 2025-05-24 | Stop reason: HOSPADM

## 2025-05-21 RX ORDER — TRAMADOL HYDROCHLORIDE 50 MG/1
50 TABLET ORAL EVERY 6 HOURS PRN
Status: DISCONTINUED | OUTPATIENT
Start: 2025-05-21 | End: 2025-05-24 | Stop reason: HOSPADM

## 2025-05-21 RX ORDER — METHOCARBAMOL 500 MG/1
500 TABLET, FILM COATED ORAL 4 TIMES DAILY
Status: DISCONTINUED | OUTPATIENT
Start: 2025-05-21 | End: 2025-05-24 | Stop reason: HOSPADM

## 2025-05-21 RX ADMIN — TRAMADOL HYDROCHLORIDE 50 MG: 50 TABLET, COATED ORAL at 23:03

## 2025-05-21 RX ADMIN — BENZONATATE 100 MG: 100 CAPSULE ORAL at 21:24

## 2025-05-21 RX ADMIN — METHOCARBAMOL 500 MG: 500 TABLET ORAL at 19:38

## 2025-05-21 RX ADMIN — NICOTINE 1 PATCH: 21 PATCH, EXTENDED RELEASE TRANSDERMAL at 19:03

## 2025-05-21 RX ADMIN — SENNOSIDES AND DOCUSATE SODIUM 1 TABLET: 50; 8.6 TABLET ORAL at 19:38

## 2025-05-21 RX ADMIN — Medication 1 CAPSULE: at 20:36

## 2025-05-21 RX ADMIN — TAMSULOSIN HYDROCHLORIDE 0.8 MG: 0.4 CAPSULE ORAL at 19:38

## 2025-05-21 RX ADMIN — GUAIFENESIN 200 MG: 100 LIQUID ORAL at 21:24

## 2025-05-21 RX ADMIN — METOPROLOL TARTRATE 25 MG: 25 TABLET, FILM COATED ORAL at 19:38

## 2025-05-21 RX ADMIN — METFORMIN HYDROCHLORIDE 250 MG: 500 TABLET ORAL at 20:36

## 2025-05-21 RX ADMIN — APIXABAN 5 MG: 5 TABLET, FILM COATED ORAL at 19:38

## 2025-05-21 RX ADMIN — FUROSEMIDE 60 MG: 10 INJECTION, SOLUTION INTRAMUSCULAR; INTRAVENOUS at 14:37

## 2025-05-21 RX ADMIN — TRAMADOL HYDROCHLORIDE 50 MG: 50 TABLET, COATED ORAL at 16:36

## 2025-05-21 ASSESSMENT — COLUMBIA-SUICIDE SEVERITY RATING SCALE - C-SSRS
6. HAVE YOU EVER DONE ANYTHING, STARTED TO DO ANYTHING, OR PREPARED TO DO ANYTHING TO END YOUR LIFE?: NO
2. HAVE YOU ACTUALLY HAD ANY THOUGHTS OF KILLING YOURSELF IN THE PAST MONTH?: NO
1. IN THE PAST MONTH, HAVE YOU WISHED YOU WERE DEAD OR WISHED YOU COULD GO TO SLEEP AND NOT WAKE UP?: NO

## 2025-05-21 ASSESSMENT — ACTIVITIES OF DAILY LIVING (ADL)
ADLS_ACUITY_SCORE: 59

## 2025-05-21 NOTE — ED PROVIDER NOTES
"  Emergency Department Note      History of Present Illness     Chief Complaint   Generalized Weakness      HPI   Richar Davis is a 69 year old male with history of MI, heart failure, CAD s/p CABG x 3v, aortic stenosis s/p AVR, atrial fibrillation on Eliquis, type 2 diabetes mellitus, hypertension, and hyperlipidemia who presents to the ED via EMS for evaluation of generalized weakness. Richar reports a few days of increased generalized weakness. This morning, he felt he could not ambulate throughout his home. He also endorses cough and shortness of breath which worsen lying flat. No recent falls or injuries. He denies any pain. Notes a new tremor in his hand over the past week.    Independent Historian   None    Review of External Notes   I reviewed cardiology note from 5/19/25:  \"He had been doing well until 3/30/2025 when he presented to urgent care for evaluation of vomiting and tender abdomen.  X-ray was performed suggestive for small bowel obstruction and was referred to the emergency room.  He was urgently seen by surgery and underwent exploratory laparotomy with enterotomy for removal of gallstones.  His admission was complicated by atrial flutter/fibrillation followed by successful cardioversion on 4/7/2025.  He was initiated on Eliquis 5 mg twice daily and continued on metoprolol.  Unfortunately, he became hypotensive with a significant spike in WBC count.  CT scan showed increased free fluid.  He returned to the OR and diagnosed with a breakdown of previous enterotomy repair. He underwent a small bowel resection on 4/8/2025 and a small bowel anastomosis/fascial closure on 4/10/2025.  He developed recurrent atrial flutter on 4/14/2025 and subsequently underwent a successful MANDEEP/cardioversion.  He was loaded with IV amiodarone and transition to oral taper.  Echocardiogram on 4/15/2025 showed a normal ejection fraction estimated at 55%, normal RV size and mildly reduced function and mild mitral " "regurgitation.  He had a very prolonged hospital stay.  Follow-up CT showed a previous gallstone had passed into the small bowel.  However, another surgery was not recommended and discussed surveillance monitoring.  He was discharged to a TCU where he spent 2 weeks rehabbing.\"    Past Medical History     Medical History and Problem List   Type 2 diabetes mellitus  MI  Heart murmur  Hypertension   CAD  Hyperlipidemia  ALIREZA  Gallstone ileus  AKF with tubular necrosis  Nonrheumatic aortic valve stenosis  Acute on chronic congestive heart failure  Angina pectoris  Atrial fibrillation  Intestinal obstruction  Tobacco use disorder  BPH    Medications   Furosemide  Metformin  Atorvastatin  Gabapentin  Tamsulosin  Apixaban  Amiodarone  Metoprolol tartrate  Pantoprazole    Surgical History   AVR  CABG  Left elbow nerve release  Left middle finger distal amputation  Colectomy without colostomy  Exploratory laparotomy with enterotomy and removal of gallstone  Exploratory laparotomy, small bowel anastomosis, abdominal closure    Physical Exam     Patient Vitals for the past 24 hrs:   BP Temp Pulse Resp SpO2   05/21/25 1630 125/73 -- 108 18 92 %   05/21/25 1539 -- -- -- -- 93 %   05/21/25 1233 (!) 142/76 99.4  F (37.4  C) 105 20 94 %     Physical Exam  General: Awake, alert, in no acute distress   HEENT: Atraumatic   EOM normal   External ears normal   Trachea midline  Posterior oropharynx clear  Neck: Supple, normal ROM  CV: Regular rate, regular rhythm  1+ pitting edema bilateral lower extremities  2+ radial and DP pulses  PULM: Tachypnea, coarse wet breath sounds throughout  ABD: Soft, non-tender, non-distended  Normal bowel sounds   No rebound or guarding   MSK: No gross deformities  NEURO: Alert, no focal deficits  Skin: Scant amount of erythema around right heel, no warmth or tenderness    Diagnostics     Lab Results   Labs Ordered and Resulted from Time of ED Arrival to Time of ED Departure   COMPREHENSIVE METABOLIC PANEL " - Abnormal       Result Value    Sodium 132 (*)     Potassium 4.2      Carbon Dioxide (CO2) 24      Anion Gap 12      Urea Nitrogen 8.8      Creatinine 0.75      GFR Estimate >90      Calcium 9.3      Chloride 96 (*)     Glucose 119 (*)     Alkaline Phosphatase 111      AST 30      ALT 21      Protein Total 7.3      Albumin 3.6      Bilirubin Total 0.7     NT-PROBNP - Abnormal    NT-proBNP 2,003 (*)    ISTAT GASES LACTATE VENOUS POCT - Abnormal    Lactic Acid POCT 1.3      Bicarbonate Venous POCT 26      O2 Sat, Venous POCT 57 (*)     pCO2 Venous POCT 38 (*)     pH Venous POCT 7.45 (*)     pO2 Venous POCT 28     CBC WITH PLATELETS AND DIFFERENTIAL - Abnormal    WBC Count 5.4      RBC Count 4.03 (*)     Hemoglobin 11.5 (*)     Hematocrit 35.6 (*)     MCV 88      MCH 28.5      MCHC 32.3      RDW 16.3 (*)     Platelet Count 143 (*)     % Neutrophils 80      % Lymphocytes 8      % Monocytes 10      % Eosinophils 0      % Basophils 0      % Immature Granulocytes 1      NRBCs per 100 WBC 0      Absolute Neutrophils 4.3      Absolute Lymphocytes 0.5 (*)     Absolute Monocytes 0.5      Absolute Eosinophils 0.0      Absolute Basophils 0.0      Absolute Immature Granulocytes 0.0      Absolute NRBCs 0.0     RBC AND PLATELET MORPHOLOGY - Abnormal    RBC Morphology Confirmed RBC Indices      Platelet Assessment        Value: Automated Count Confirmed. Platelet morphology is normal.    Elliptocytes Slight (*)    TROPONIN T, HIGH SENSITIVITY - Abnormal    Troponin T, High Sensitivity 23 (*)    LACTIC ACID WHOLE BLOOD WITH 1X REPEAT IN 2 HR WHEN >2 - Normal    Lactic Acid, Initial 1.6     INFLUENZA A/B, RSV AND SARS-COV2 PCR - Normal    Influenza A PCR Negative      Influenza B PCR Negative      RSV PCR Negative      SARS CoV2 PCR Negative     PROCALCITONIN - Normal    Procalcitonin 0.09     TROPONIN T, HIGH SENSITIVITY       Imaging   Chest XR,  PA & LAT   Final Result   IMPRESSION: Stable size of cardiac silhouette status post  median sternotomy, CABG and aortic valve replacement. There are some patchy bilateral lower lobe and perihilar opacities, differential includes moderate edema and infectious/inflammatory process.    No definite lobar consolidation, pleural effusion or pneumothorax. Bones are unchanged.          EKG   ECG results from 05/21/25   EKG 12 lead     Value    Systolic Blood Pressure     Diastolic Blood Pressure     Ventricular Rate 104    Atrial Rate 104    DE Interval 146    QRS Duration 82        QTc 457    P Axis 104    R AXIS 60    T Axis 98    Interpretation ECG      Sinus tachycardia  Nonspecific T wave abnormality  Abnormal ECG  No significant change as compared to prior, dated 05/19/2025  Read by Dr. Joaquin at 1243        Independent Interpretation   None    ED Course      Medications Administered   Medications   furosemide (LASIX) injection 60 mg (60 mg Intravenous $Given 5/21/25 1437)   traMADol (ULTRAM) tablet 50 mg (50 mg Oral $Given 5/21/25 1636)       Procedures   Procedures     Discussion of Management   Admitting Hospitalist, Dr. Tello    ED Course   ED Course as of 05/21/25 1654   Wed May 21, 2025   1241 My medical resident obtained history and examined the patient.   1526 Recheck. Feels slightly improved   1608 Recheck. At imaging       Additional Documentation  None    Medical Decision Making / Diagnosis     CMS Diagnoses: None    MIPS   None               MDM   Richar David Davis is a 69 year old male here with generalized weakness, cough, wet breath sounds.  Is tachypneic, tachycardic on arrival.  VBG suggests respiratory alkalosis.  Initially at higher concern for blood clot but on chart review, he has been on anticoagulants for at least a month so lower suspicion at this time.  We tried to send for PE study but he was so orthopneic he could not lay flat for the exam.  His abdominal exam is benign.  White count, lactic, and Pro-Ephraim are low suggesting against a bacterial process.  Viral panel  is negative.  BNP is elevated at 2000.  Has a normal EF.     He has been compliant with his Lasix, denies excessive salt intake, or weight gain.  Wife says he had a tickle in his throat and a cough so has been drinking large volumes of tea with honey estimating 72 ounces over the last 48 hours.     Chest x-ray shows bibasilar findings suggestive of likely pulmonary edema.  Does have a viral URI so may be superimposed bronchitis.  Will admit to observation given symptomatic exertional dyspnea with evidence of volume overload.  Could consider PE study or D-dimer if not improving in the next day.      Disposition   The patient was admitted to the hospital.     Diagnosis     ICD-10-CM    1. Acute on chronic congestive heart failure, unspecified heart failure type (H)  I50.9                Scribe Disclosure:  Nan JACINTO, am serving as a scribe at 1:05 PM on 5/21/2025 to document services personally performed by Tanya Crystal DO based on my observations and the provider's statements to me.        Tanya Crystal DO  05/21/25 [id number]

## 2025-05-21 NOTE — ED TRIAGE NOTES
Pt to ED [facility] for increasing weakness x1 day. Denies any falls/dizziness/injury. AAOx4, RR even UL. Ambulatory with slow steady gait. Denies any pain. Surgery 6wks ago for gallstones, woundvac remains in place     Triage Assessment (Adult)       Row Name 05/21/25 1234          Triage Assessment    Airway WDL WDL        Respiratory WDL    Respiratory WDL WDL        Skin Circulation/Temperature WDL    Skin Circulation/Temperature WDL WDL        Cardiac WDL    Cardiac WDL X     Cardiac Rhythm ST        Peripheral/Neurovascular WDL    Peripheral Neurovascular WDL WDL        Cognitive/Neuro/Behavioral WDL    Cognitive/Neuro/Behavioral WDL WDL

## 2025-05-21 NOTE — ED NOTES
Ambulated patient in hallway with pulse ox, O2 was at 93%. Pt asked for oxygen because he felt like he couldn't breath. MD and RN notified.

## 2025-05-21 NOTE — ED NOTES
Patient notified staff that he dropped his urinal in the room. Floor was cleaned, patient was provided with  clean gown and linen change.

## 2025-05-21 NOTE — ED NOTES
Pt continuously removing continuous monitoring devices, educated on necessity of monitors, pt aware but not cooperating

## 2025-05-21 NOTE — H&P
History and Physical - Hospitalist Service       Date of Admission:  5/21/2025    Assessment & Plan      Richar Davis is a 69 year old male admitted on 5/21/2025 with suspected congestive heart failure.  He has history of type 2 diabetes, hypertension, hyperlipidemia, coronary artery disease with previous MI and three-vessel bypass, cataracts, A-fib, cardioversion, chronic anticoagulation with Eliquis, gallstones, obesity with BMI of 37, and aortic valve replacement.  He had a recent prolonged hospitalization at [facility] from 3/30/2025 through 4/29/2025 with gallstone ileus complicated by wound issues.  He had 2 surgeries.  He currently has an abdominal wound VAC in place.  After discharge he went to transitional care where he stayed for 2 weeks.  He has been home for about a week.  He presented to the emergency department today for evaluation of 2 days of worsening weakness, cough, and orthopnea.  He thinks he might have a little bit more lower extremity edema than normal.  He denied chest pain, fevers, and chills.    Emergency department evaluation showed temperature 99.4 and blood pressure 142/76 but otherwise unremarkable vital signs.  Laboratory evaluation showed BNP of 2003.  Procalcitonin was normal at 0.09.  Testing for COVID/influenza/RSV was negative.  Comprehensive metabolic panel was unremarkable.  CBC was unremarkable aside from hemoglobin 11.5 and platelets 143.  Chest x-ray showed bilateral pulmonary opacities, consistent with fluid.  He was given Lasix 60 mg IV for suspected congestive heart failure.  I was asked to admit him for further cares.    Problem list:    Suspected congestive heart failure  Weakness  Cough  Orthopnea  Lower extremity edema  PE less likely on DOAC  No clear infection  -Admit to observation with telemetry  -Continue diuresis with Lasix 40 mg IV twice daily  -Echocardiogram in the morning.  Note that most recent  echo was on 4/14/2025 and showed EF of 55%.  -Monitor intake/output, daily weights, and daily BMP  -Diuresis has been brisk so far.  Reassess tomorrow.  If not improving consider expanded evaluation with possible CTA of chest    Hypertension  Hyperlipidemia  Coronary artery disease  Previous bypass surgery  Atrial fibrillation with previous cardioversion  -Resume prior to admission amiodarone, Lipitor, and metoprolol.  -Holding prior to admission Lasix with IV Lasix    Type 2 diabetes  -Resume prior to admission low-dose metformin  -I have ordered NovoLog insulin sliding scale (medium resistance)    Recent prolonged hospital stay with gallstone ileus, surgeries, and wound issues  Wound VAC in place  -WOC consult    Weakness and deconditioning  -He is deconditioned from recent hospital stay.  -If hospital stay becomes prolonged would involve physical therapy     Observation Goals: -diagnostic tests and consults completed and resulted, -vital signs normal or at patient baseline, Nurse to notify provider when observation goals have been met and patient is ready for discharge.  Diet: Combination Diet 2 gm NA Diet; No Caffeine Diet (and additional linked orders)  Fluid restriction 2000 ML FLUID (and additional linked orders)    DVT Prophylaxis: DOAC  Mccarty Catheter: Not present  Lines: None     Cardiac Monitoring: ACTIVE order. Indication: Acute decompensated heart failure (48 hours)  Code Status: Full Code      Clinically Significant Risk Factors Present on Admission         # Hyponatremia: Lowest Na = 132 mmol/L in last 2 days, will monitor as appropriate  # Hypochloremia: Lowest Cl = 96 mmol/L in last 2 days, will monitor as appropriate       # Drug Induced Coagulation Defect: home medication list includes an anticoagulant medication    # Hypertension: Noted on problem list  # Acute heart failure with preserved ejection fraction: heart failure noted on problem list, last echo with EF >50%, and receiving IV diuretics    "      # DMII: A1C = 6.8 % (Ref range: <5.7 %) within past 6 months    # Obesity: Estimated body mass index is 37.84 kg/m  as calculated from the following:    Height as of 5/19/25: 1.803 m (5' 11\").    Weight as of 5/19/25: 123.1 kg (271 lb 4.8 oz).       # Financial/Environmental Concerns:     # History of CABG: noted on surgical history       Disposition Plan     Medically Ready for Discharge: Anticipated Tomorrow           Jose Tello MD  Hospitalist Service  [facility]  Securely message with [employer] (more info)  Text page via [facility] Paging/Directory     ______________________________________________________________________    Chief Complaint   Weakness, cough, orthopnea, and lower extremity edema    History is obtained from the patient, Dr. Joaquin, and the medical record    History of Present Illness   Richar Davis is a 69 year old male admitted on 5/21/2025 with suspected congestive heart failure.  He has history of type 2 diabetes, hypertension, hyperlipidemia, coronary artery disease with previous MI and three-vessel bypass, cataracts, A-fib, cardioversion, chronic anticoagulation with Eliquis, gallstones, obesity with BMI of 37, and aortic valve replacement.  He had a recent prolonged hospitalization at [facility] from 3/30/2025 through 4/29/2025 with gallstone ileus complicated by wound issues.  He had 2 surgeries.  He currently has an abdominal wound VAC in place.  After discharge he went to transitional care where he stayed for 2 weeks.  He has been home for about a week.  He presented to the emergency department today for evaluation of 2 days of worsening weakness, cough, and orthopnea.  He thinks he might have a little bit more lower extremity edema than normal.  He denied chest pain, fevers, and chills.    Emergency department evaluation showed temperature 99.4 and blood pressure 142/76 but otherwise unremarkable vital signs.  Laboratory evaluation " showed BNP of 2003.  Procalcitonin was normal at 0.09.  Testing for COVID/influenza/RSV was negative.  Comprehensive metabolic panel was unremarkable.  CBC was unremarkable aside from hemoglobin 11.5 and platelets 143.  Chest x-ray showed bilateral pulmonary opacities, consistent with fluid.  He was given Lasix 60 mg IV for suspected congestive heart failure.  I was asked to admit him for further cares.      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    ANESTHESIA CARDIOVERSION N/A 4/7/2025    Procedure: Anesthesia cardioversion;  Surgeon: GENERIC ANESTHESIA PROVIDER;  Location:  OR    ANESTHESIA CARDIOVERSION N/A 4/14/2025    Procedure: Anesthesia cardioversion;  Surgeon: GENERIC ANESTHESIA PROVIDER;  Location:  OR    BYPASS GRAFT ARTERY CORONARY, REPLACE VALVE AORTIC, COMBINED N/A 10/20/2022    Procedure: CORONARY ARTERY BYPASS GRAFT x 3 (LEFT INTERNAL MAMMARY ARTERY - LEFT ANTERIOR DESCENDING ARTERY; SAPHENOUS VEIN - POSTERIOR DESCENDING ARTERY; SAPHENOUS VEIN - CIRCUMFLEX ARTERY) WITH ENDOSCOPIC LESSER SAPHENOUS VEIN HARVEST ON BILATERAL LOWER EXTREMITY, AORTIC VALVE REPLACEMENT WITH TISSUE HEART VALVE INSPIRIS  RESILIA  AORTIC VALVE SIZE: 25MM, AND ON CARDIOPULMONARY PUMP OXYGENATOR  (    COLECTOMY WITHOUT COLOSTOMY N/A 4/8/2025    Procedure: EXPLORATORY LAPAROTOMY WITH SMALL BOWEL RESECTION;  Surgeon: Liana Landrum MD;  Location:  OR    CTA ANGIOGRAM CORONARY ARTERY      LAPAROTOMY EXPLORATORY N/A 3/30/2025    Procedure: exploratory laparotomy with enterotomy and removal of gallstone;  Surgeon: Clau Calles MD;  Location:  OR    LAPAROTOMY EXPLORATORY N/A 4/10/2025    Procedure: EXPLORATORY LAPAROTOMY, SMALL BOWEL ANASTOMOSIS,  ABDOMINAL CLOSURE;  Surgeon: Liana Landrum MD;  Location:  OR    ORTHOPEDIC SURGERY Left     elbow surgery    TRANSESOPHAGEAL ECHOCARDIOGRAM INTRAOPERATIVE N/A 4/14/2025    Procedure: Transesophageal echocardiogram intraoperative;  Surgeon: GENERIC ANESTHESIA PROVIDER;  Location:  OR       Prior to Admission Medications   Prior to Admission Medications   Prescriptions Last Dose Informant Patient Reported? Taking?   Ascorbic Acid (VITAMIN C) 500 MG CAPS 5/20/2025 Self Yes Yes   Sig: Take 500 mg by mouth daily   Cyanocobalamin (VITAMIN B-12 PO)  Self Yes Yes   Sig: Take 1 tablet by mouth every other day. Unknown dose   MAGNESIUM PO 5/20/2025  Yes Yes   Sig: Take 500 mg by mouth daily.   Multiple Vitamins-Minerals (MENS 50+ MULTI VITAMIN/MIN PO) 5/20/2025 Self Yes Yes   Sig: Take 1 tablet by mouth daily   acetaminophen (TYLENOL) 325 MG tablet   No Yes   Sig: Take 2 tablets (650 mg) by mouth every 4 hours as needed for other (For optimal non-opioid multimodal pain management to improve pain control.)   amiodarone (PACERONE) 200 MG tablet 5/20/2025  No Yes   Sig: Take 1 tablet (200 mg) by mouth daily.   amoxicillin (AMOXIL) 500 MG capsule   No Yes   Sig: Take 4 capsules (2,000 mg) 30-60 minutes prior to your dental procedure   apixaban ANTICOAGULANT (ELIQUIS) 5 MG tablet 5/20/2025  No Yes   Sig: Take 1 tablet (5 mg) by mouth 2 times daily.   atorvastatin (LIPITOR) 20 MG tablet 5/21/2025 Morning  No Yes   Sig: Take 1 tablet (20 mg) by mouth daily.   Patient taking differently: Take 80 mg by mouth daily.   diphenhydrAMINE (BENADRYL) 50 MG tablet  Self Yes Yes   Sig: Take 100 mg by mouth every 6 hours as needed for itching or allergies   fish oil-omega-3 fatty acids 1000 MG capsule 5/20/2025  Yes Yes   Sig: Take 2 g by mouth daily   folic acid (FOLVITE) 1 MG tablet 5/21/2025 Morning Self Yes Yes   Sig: Take 1,000 mcg by mouth 2 times daily   furosemide (LASIX) 20 MG tablet 5/21/2025  No Yes   Sig: Take 1 tablet (20  mg) by mouth daily.   gabapentin (NEURONTIN) 100 MG tablet   No Yes   Sig: Take 1 tablet (100 mg) by mouth 2 times daily.   Patient taking differently: Take 100 mg by mouth 2 times daily as needed.   metFORMIN (GLUCOPHAGE) 500 MG tablet 5/20/2025  No Yes   Sig: Take 0.5 tablets (250 mg) by mouth daily (with dinner) (0.5 x 500 mg = 250 mg)   methocarbamol (ROBAXIN) 500 MG tablet 5/20/2025  Yes Yes   Sig: Take 500 mg by mouth 4 times daily.   metoprolol tartrate (LOPRESSOR) 25 MG tablet Past Week  No Yes   Sig: Take 1 tablet (25 mg) by mouth 2 times daily. Hold for SBP<100 or HR<55.   miconazole (MICATIN) 2 % external powder   No Yes   Sig: Apply topically 2 times daily as needed for other (rash groin).   nicotine (COMMIT) 2 MG lozenge   No Yes   Sig: Place 1 lozenge (2 mg) inside cheek every hour as needed for nicotine withdrawal symptoms.   nicotine (NICODERM CQ) 21 MG/24HR 24 hr patch 5/20/2025  No Yes   Sig: Place 1 patch over 24 hours onto the skin daily. Taper over 8 weeks as per protocol.   pantoprazole (PROTONIX) 40 MG EC tablet 5/20/2025  No Yes   Sig: Take 1 tablet (40 mg) by mouth every morning (before breakfast).   polyethylene glycol (MIRALAX) 17 GM/Dose powder   No Yes   Sig: Take 17 g by mouth daily as needed for constipation.   psyllium (METAMUCIL) 28.3 % packet 5/20/2025  No Yes   Sig: Take 1 packet by mouth 2 times daily.   senna-docusate (SENOKOT-S/PERICOLACE) 8.6-50 MG tablet 5/20/2025  No Yes   Sig: Take 1 tablet by mouth 2 times daily.   tamsulosin (FLOMAX) 0.4 MG capsule Past Month Self Yes Yes   Sig: Take 0.8 mg by mouth every evening. (2 x 0.4 mg = 0.8 mg)   traMADol (ULTRAM) 50 MG tablet 5/21/2025  No Yes   Sig: Take 1 tablet (50 mg) by mouth every 6 hours as needed for severe pain.   vitamin D3 (CHOLECALCIFEROL) 50 mcg (2000 units) tablet 5/20/2025 Self Yes Yes   Sig: Take 1 tablet by mouth every other day      Facility-Administered Medications: None        Review of Systems    The 10 point  Review of Systems is negative other than noted in the HPI or here.     Social History   I have reviewed this patient's social history and updated it with pertinent information if needed.  Social History     Tobacco Use    Smoking status: Former    Smokeless tobacco: Current     Types: Chew   Vaping Use    Vaping status: Never Used   Substance Use Topics    Alcohol use: Not Currently    Drug use: Never         Family History     Diabetes, hypertension, stroke, and coronary artery disease      Allergies   Allergies   Allergen Reactions    Niaspan [Niacin] Other (See Comments)     flushing        Physical Exam   Vital Signs: Temp: 98.9  F (37.2  C) Temp src: Oral BP: (!) 143/82 Pulse: 107   Resp: 18 SpO2: 93 % O2 Device: None (Room air) Oxygen Delivery: 2 LPM  Weight: 0 lbs 0 oz    GENERAL: Pleasant and cooperative. No acute distress.  Appears weak and discouraged.  EYES: Pupils equal and round. No scleral erythema or icterus.  ENT: External ears are normal without deformity. Posterior oropharynx is without erythem, swelling, or exudate.  NECK: Supple. No masses or swelling. No tenderness. Thyroid is normal without mass or tenderness.  CHEST: Clear to auscultation. Normal breath sounds. No retractions.   CV: Regular rate and rhythm. No JVD. Pulses normal.  ABDOMEN: Bowel sounds present. No tenderness. No masses or hernia.  Wound VAC in place  EXTREMETIES: No clubbing, cyanosis, or ischemia.  1-2/4 symmetric lower extremity edema  SKIN: Warm and dry to touch. No wounds or rashes.  NEUROLOGIC: Strength and sensation are normal. Deep tendon reflexes are normal. Cranial nerves are normal.      Medical Decision Making       78 MINUTES SPENT BY ME on the date of service doing chart review, history, exam, documentation & further activities per the note.      Data     I have personally reviewed the following data over the past 24 hrs:    5.4  \   11.5 (L)   / 143 (L)     132 (L) 96 (L) 8.8 /  119 (H)   4.2 24 0.75 \     ALT:  21 AST: 30 AP: 111 TBILI: 0.7   ALB: 3.6 TOT PROTEIN: 7.3 LIPASE: N/A     Trop: 25 (H) BNP: 2,003 (H)     Procal: 0.09 CRP: N/A Lactic Acid: 1.6         Imaging results reviewed over the past 24 hrs:   Recent Results (from the past 24 hours)   Chest XR,  PA & LAT    Narrative    EXAM: XR CHEST 2 VIEWS  LOCATION: [facility]  DATE: 5/21/2025    INDICATION: Shortness of breath  COMPARISON: Chest CT 4/24/2025      Impression    IMPRESSION: Stable size of cardiac silhouette status post median sternotomy, CABG and aortic valve replacement. There are some patchy bilateral lower lobe and perihilar opacities, differential includes moderate edema and infectious/inflammatory process.   No definite lobar consolidation, pleural effusion or pneumothorax. Bones are unchanged.     Recent Labs   Lab 05/21/25  1328   WBC 5.4   HGB 11.5*   MCV 88   *   *   POTASSIUM 4.2   CHLORIDE 96*   CO2 24   BUN 8.8   CR 0.75   ANIONGAP 12   HALEY 9.3   *   ALBUMIN 3.6   PROTTOTAL 7.3   BILITOTAL 0.7   ALKPHOS 111   ALT 21   AST 30

## 2025-05-21 NOTE — ED NOTES
RECEIVING UNIT ED HANDOFF REVIEW    Above ED Nurse Handoff Report was reviewed: Yes  Reviewed by: Vanesa Yap RN on May 22, 2025 at 1:47 PM   OPHELIA Landers called the ED to inform them the note was read: Yes          [facility]  ED Nurse Handoff Report    ED Chief complaint: Generalized Weakness  . ED Diagnosis:   Final diagnoses:   Acute on chronic congestive heart failure, unspecified heart failure type (H)       Allergies:   Allergies   Allergen Reactions    Niaspan [Niacin] Other (See Comments)     flushing       Code Status: Full Code    Activity level - Baseline/Home:  standby.  Activity Level - Current:   assist of 1.   Lift room needed: No.   Bariatric: No   Needed: No   Isolation: No.   Infection: Not Applicable.     Respiratory status: Room air    Vital Signs (within 30 minutes):   Vitals:    05/21/25 1233 05/21/25 1539 05/21/25 1630   BP: (!) 142/76  125/73   Pulse: 105  108   Resp: 20  18   Temp: 99.4  F (37.4  C)     SpO2: 94% 93% 92%       Cardiac Rhythm:  ,   Cardiac  Cardiac Rhythm: Sinus tachycardia  Pain level:    Patient confused: No.   Patient Falls Risk: nonskid shoes/slippers when out of bed and patient and family education.   Elimination Status: Has voided     Patient Report - Initial Complaint: Generalized weakness.   Focused Assessment: bears weight but weak,  AAOx4, RR even UL, wound vac from previous gallstone surgery, no signs infection, VSS    Abnormal Results:   Labs Ordered and Resulted from Time of ED Arrival to Time of ED Departure   COMPREHENSIVE METABOLIC PANEL - Abnormal       Result Value    Sodium 132 (*)     Potassium 4.2      Carbon Dioxide (CO2) 24      Anion Gap 12      Urea Nitrogen 8.8      Creatinine 0.75      GFR Estimate >90      Calcium 9.3      Chloride 96 (*)     Glucose 119 (*)     Alkaline Phosphatase 111      AST 30      ALT 21      Protein Total 7.3      Albumin 3.6      Bilirubin Total 0.7     NT-PROBNP - Abnormal     NT-proBNP 2,003 (*)    ISTAT GASES LACTATE VENOUS POCT - Abnormal    Lactic Acid POCT 1.3      Bicarbonate Venous POCT 26      O2 Sat, Venous POCT 57 (*)     pCO2 Venous POCT 38 (*)     pH Venous POCT 7.45 (*)     pO2 Venous POCT 28     CBC WITH PLATELETS AND DIFFERENTIAL - Abnormal    WBC Count 5.4      RBC Count 4.03 (*)     Hemoglobin 11.5 (*)     Hematocrit 35.6 (*)     MCV 88      MCH 28.5      MCHC 32.3      RDW 16.3 (*)     Platelet Count 143 (*)     % Neutrophils 80      % Lymphocytes 8      % Monocytes 10      % Eosinophils 0      % Basophils 0      % Immature Granulocytes 1      NRBCs per 100 WBC 0      Absolute Neutrophils 4.3      Absolute Lymphocytes 0.5 (*)     Absolute Monocytes 0.5      Absolute Eosinophils 0.0      Absolute Basophils 0.0      Absolute Immature Granulocytes 0.0      Absolute NRBCs 0.0     RBC AND PLATELET MORPHOLOGY - Abnormal    RBC Morphology Confirmed RBC Indices      Platelet Assessment        Value: Automated Count Confirmed. Platelet morphology is normal.    Elliptocytes Slight (*)    TROPONIN T, HIGH SENSITIVITY - Abnormal    Troponin T, High Sensitivity 23 (*)    LACTIC ACID WHOLE BLOOD WITH 1X REPEAT IN 2 HR WHEN >2 - Normal    Lactic Acid, Initial 1.6     INFLUENZA A/B, RSV AND SARS-COV2 PCR - Normal    Influenza A PCR Negative      Influenza B PCR Negative      RSV PCR Negative      SARS CoV2 PCR Negative     PROCALCITONIN - Normal    Procalcitonin 0.09     TROPONIN T, HIGH SENSITIVITY        Chest XR,  PA & LAT   Final Result   IMPRESSION: Stable size of cardiac silhouette status post median sternotomy, CABG and aortic valve replacement. There are some patchy bilateral lower lobe and perihilar opacities, differential includes moderate edema and infectious/inflammatory process.    No definite lobar consolidation, pleural effusion or pneumothorax. Bones are unchanged.          Treatments provided: lasix, tramadol  Family Comments: at bedside  OBS brochure/video  discussed/provided to patient:  Yes  ED Medications:   Medications   furosemide (LASIX) injection 60 mg (60 mg Intravenous $Given 5/21/25 8922)   traMADol (ULTRAM) tablet 50 mg (50 mg Oral $Given 5/21/25 1636)       Drips infusing:  No  For the majority of the shift this patient was Green.   Interventions performed were see above/epic2.    Sepsis treatment initiated: No    Cares/treatment/interventions/medications to be completed following ED care: lasix, pain control    ED Nurse Name: Dasia Tafoya RN  4:52 PM

## 2025-05-21 NOTE — PHARMACY-ADMISSION MEDICATION HISTORY
Pharmacist Admission Medication History    Admission medication history is complete. The information provided in this note is only as accurate as the sources available at the time of the update.    Information Source(s): Patient via in-person    Pertinent Information: outside meds    Changes made to PTA medication list:  Added: robaxin  Deleted: preservision areds,   Changed: gabapentin    Allergies reviewed with patient and updates made in EHR: yes    Medication History Completed By: Hari Miles RP 5/21/2025 6:13 PM    PTA Med List   Medication Sig Last Dose/Taking    acetaminophen (TYLENOL) 325 MG tablet Take 2 tablets (650 mg) by mouth every 4 hours as needed for other (For optimal non-opioid multimodal pain management to improve pain control.) Taking As Needed    amiodarone (PACERONE) 200 MG tablet Take 1 tablet (200 mg) by mouth daily. 5/20/2025    amoxicillin (AMOXIL) 500 MG capsule Take 4 capsules (2,000 mg) 30-60 minutes prior to your dental procedure Taking    apixaban ANTICOAGULANT (ELIQUIS) 5 MG tablet Take 1 tablet (5 mg) by mouth 2 times daily. 5/20/2025    Ascorbic Acid (VITAMIN C) 500 MG CAPS Take 500 mg by mouth daily 5/20/2025    atorvastatin (LIPITOR) 20 MG tablet Take 1 tablet (20 mg) by mouth daily. (Patient taking differently: Take 80 mg by mouth daily.) 5/21/2025 Morning    Cyanocobalamin (VITAMIN B-12 PO) Take 1 tablet by mouth every other day. Unknown dose Taking    diphenhydrAMINE (BENADRYL) 50 MG tablet Take 100 mg by mouth every 6 hours as needed for itching or allergies Taking As Needed    fish oil-omega-3 fatty acids 1000 MG capsule Take 2 g by mouth daily 5/20/2025    folic acid (FOLVITE) 1 MG tablet Take 1,000 mcg by mouth 2 times daily 5/21/2025 Morning    furosemide (LASIX) 20 MG tablet Take 1 tablet (20 mg) by mouth daily. 5/21/2025    gabapentin (NEURONTIN) 100 MG tablet Take 1 tablet (100 mg) by mouth 2 times daily. (Patient taking differently: Take 100 mg by mouth 2 times daily as  needed.) Taking Differently    MAGNESIUM PO Take 500 mg by mouth daily. 5/20/2025    metFORMIN (GLUCOPHAGE) 500 MG tablet Take 0.5 tablets (250 mg) by mouth daily (with dinner) (0.5 x 500 mg = 250 mg) 5/20/2025    methocarbamol (ROBAXIN) 500 MG tablet Take 500 mg by mouth 4 times daily. 5/20/2025    metoprolol tartrate (LOPRESSOR) 25 MG tablet Take 1 tablet (25 mg) by mouth 2 times daily. Hold for SBP<100 or HR<55. Past Week    miconazole (MICATIN) 2 % external powder Apply topically 2 times daily as needed for other (rash groin). Taking As Needed    Multiple Vitamins-Minerals (MENS 50+ MULTI VITAMIN/MIN PO) Take 1 tablet by mouth daily 5/20/2025    nicotine (COMMIT) 2 MG lozenge Place 1 lozenge (2 mg) inside cheek every hour as needed for nicotine withdrawal symptoms. Taking As Needed    nicotine (NICODERM CQ) 21 MG/24HR 24 hr patch Place 1 patch over 24 hours onto the skin daily. Taper over 8 weeks as per protocol. 5/20/2025    pantoprazole (PROTONIX) 40 MG EC tablet Take 1 tablet (40 mg) by mouth every morning (before breakfast). 5/20/2025    polyethylene glycol (MIRALAX) 17 GM/Dose powder Take 17 g by mouth daily as needed for constipation. Taking As Needed    psyllium (METAMUCIL) 28.3 % packet Take 1 packet by mouth 2 times daily. 5/20/2025    senna-docusate (SENOKOT-S/PERICOLACE) 8.6-50 MG tablet Take 1 tablet by mouth 2 times daily. 5/20/2025    tamsulosin (FLOMAX) 0.4 MG capsule Take 0.8 mg by mouth every evening. (2 x 0.4 mg = 0.8 mg) Past Month    traMADol (ULTRAM) 50 MG tablet Take 1 tablet (50 mg) by mouth every 6 hours as needed for severe pain. 5/21/2025    vitamin D3 (CHOLECALCIFEROL) 50 mcg (2000 units) tablet Take 1 tablet by mouth every other day 5/20/2025

## 2025-05-22 VITALS
WEIGHT: 257.5 LBS | HEIGHT: 72 IN | SYSTOLIC BLOOD PRESSURE: 94 MMHG | TEMPERATURE: 98 F | HEART RATE: 78 BPM | DIASTOLIC BLOOD PRESSURE: 67 MMHG | OXYGEN SATURATION: 94 % | RESPIRATION RATE: 18 BRPM | BODY MASS INDEX: 34.88 KG/M2

## 2025-05-22 PROBLEM — S31.109D OPEN WOUND OF ABDOMEN, SUBSEQUENT ENCOUNTER: Status: ACTIVE | Noted: 2025-05-22

## 2025-05-22 LAB
ANION GAP SERPL CALCULATED.3IONS-SCNC: 15 MMOL/L (ref 7–15)
BUN SERPL-MCNC: 9.9 MG/DL (ref 8–23)
CALCIUM SERPL-MCNC: 9.4 MG/DL (ref 8.8–10.4)
CHLORIDE SERPL-SCNC: 96 MMOL/L (ref 98–107)
CREAT SERPL-MCNC: 0.72 MG/DL (ref 0.67–1.17)
D DIMER PPP FEU-MCNC: 0.96 UG/ML FEU (ref 0–0.5)
EGFRCR SERPLBLD CKD-EPI 2021: >90 ML/MIN/1.73M2
GLUCOSE BLDC GLUCOMTR-MCNC: 102 MG/DL (ref 70–99)
GLUCOSE BLDC GLUCOMTR-MCNC: 124 MG/DL (ref 70–99)
GLUCOSE BLDC GLUCOMTR-MCNC: 135 MG/DL (ref 70–99)
GLUCOSE BLDC GLUCOMTR-MCNC: 163 MG/DL (ref 70–99)
GLUCOSE SERPL-MCNC: 103 MG/DL (ref 70–99)
HCO3 SERPL-SCNC: 22 MMOL/L (ref 22–29)
MAGNESIUM SERPL-MCNC: 1.8 MG/DL (ref 1.7–2.3)
POTASSIUM SERPL-SCNC: 4.1 MMOL/L (ref 3.4–5.3)
SODIUM SERPL-SCNC: 133 MMOL/L (ref 135–145)

## 2025-05-22 PROCEDURE — 250N000012 HC RX MED GY IP 250 OP 636 PS 637: Performed by: INTERNAL MEDICINE

## 2025-05-22 PROCEDURE — 94640 AIRWAY INHALATION TREATMENT: CPT

## 2025-05-22 PROCEDURE — G0463 HOSPITAL OUTPT CLINIC VISIT: HCPCS

## 2025-05-22 PROCEDURE — 82962 GLUCOSE BLOOD TEST: CPT

## 2025-05-22 PROCEDURE — 250N000013 HC RX MED GY IP 250 OP 250 PS 637: Performed by: INTERNAL MEDICINE

## 2025-05-22 PROCEDURE — 36415 COLL VENOUS BLD VENIPUNCTURE: CPT | Performed by: HOSPITALIST

## 2025-05-22 PROCEDURE — 83735 ASSAY OF MAGNESIUM: CPT | Performed by: HOSPITALIST

## 2025-05-22 PROCEDURE — 80051 ELECTROLYTE PANEL: CPT | Performed by: INTERNAL MEDICINE

## 2025-05-22 PROCEDURE — 999N000156 HC STATISTIC RCP CONSULT EA 30 MIN

## 2025-05-22 PROCEDURE — 120N000001 HC R&B MED SURG/OB

## 2025-05-22 PROCEDURE — 36415 COLL VENOUS BLD VENIPUNCTURE: CPT | Performed by: INTERNAL MEDICINE

## 2025-05-22 PROCEDURE — 96376 TX/PRO/DX INJ SAME DRUG ADON: CPT

## 2025-05-22 PROCEDURE — 99232 SBSQ HOSP IP/OBS MODERATE 35: CPT | Performed by: HOSPITALIST

## 2025-05-22 PROCEDURE — 85379 FIBRIN DEGRADATION QUANT: CPT | Performed by: HOSPITALIST

## 2025-05-22 PROCEDURE — 250N000009 HC RX 250: Performed by: HOSPITALIST

## 2025-05-22 PROCEDURE — 250N000011 HC RX IP 250 OP 636: Performed by: INTERNAL MEDICINE

## 2025-05-22 PROCEDURE — 250N000009 HC RX 250: Performed by: INTERNAL MEDICINE

## 2025-05-22 PROCEDURE — 999N000157 HC STATISTIC RCP TIME EA 10 MIN

## 2025-05-22 RX ORDER — IPRATROPIUM BROMIDE AND ALBUTEROL SULFATE 2.5; .5 MG/3ML; MG/3ML
3 SOLUTION RESPIRATORY (INHALATION) EVERY 4 HOURS PRN
Status: DISCONTINUED | OUTPATIENT
Start: 2025-05-22 | End: 2025-05-24 | Stop reason: HOSPADM

## 2025-05-22 RX ORDER — ALBUTEROL SULFATE 0.83 MG/ML
2.5 SOLUTION RESPIRATORY (INHALATION)
Status: DISCONTINUED | OUTPATIENT
Start: 2025-05-22 | End: 2025-05-24 | Stop reason: HOSPADM

## 2025-05-22 RX ORDER — IPRATROPIUM BROMIDE AND ALBUTEROL SULFATE 2.5; .5 MG/3ML; MG/3ML
3 SOLUTION RESPIRATORY (INHALATION)
Status: DISCONTINUED | OUTPATIENT
Start: 2025-05-22 | End: 2025-05-22

## 2025-05-22 RX ORDER — IPRATROPIUM BROMIDE AND ALBUTEROL SULFATE 2.5; .5 MG/3ML; MG/3ML
3 SOLUTION RESPIRATORY (INHALATION)
Status: DISCONTINUED | OUTPATIENT
Start: 2025-05-23 | End: 2025-05-24 | Stop reason: HOSPADM

## 2025-05-22 RX ADMIN — ATORVASTATIN CALCIUM 80 MG: 40 TABLET, FILM COATED ORAL at 07:45

## 2025-05-22 RX ADMIN — PANTOPRAZOLE SODIUM 40 MG: 40 TABLET, DELAYED RELEASE ORAL at 07:45

## 2025-05-22 RX ADMIN — APIXABAN 5 MG: 5 TABLET, FILM COATED ORAL at 20:09

## 2025-05-22 RX ADMIN — METOPROLOL TARTRATE 25 MG: 25 TABLET, FILM COATED ORAL at 20:09

## 2025-05-22 RX ADMIN — FUROSEMIDE 40 MG: 10 INJECTION, SOLUTION INTRAMUSCULAR; INTRAVENOUS at 07:44

## 2025-05-22 RX ADMIN — Medication 1 CAPSULE: at 07:45

## 2025-05-22 RX ADMIN — TRAMADOL HYDROCHLORIDE 50 MG: 50 TABLET, COATED ORAL at 10:05

## 2025-05-22 RX ADMIN — METHOCARBAMOL 500 MG: 500 TABLET ORAL at 07:45

## 2025-05-22 RX ADMIN — AMIODARONE HYDROCHLORIDE 200 MG: 200 TABLET ORAL at 07:45

## 2025-05-22 RX ADMIN — IPRATROPIUM BROMIDE AND ALBUTEROL SULFATE 3 ML: .5; 3 SOLUTION RESPIRATORY (INHALATION) at 07:46

## 2025-05-22 RX ADMIN — GUAIFENESIN 200 MG: 100 LIQUID ORAL at 20:09

## 2025-05-22 RX ADMIN — METHOCARBAMOL 500 MG: 500 TABLET ORAL at 11:58

## 2025-05-22 RX ADMIN — TRAMADOL HYDROCHLORIDE 50 MG: 50 TABLET, COATED ORAL at 23:33

## 2025-05-22 RX ADMIN — Medication 1 CAPSULE: at 20:09

## 2025-05-22 RX ADMIN — BENZONATATE 100 MG: 100 CAPSULE ORAL at 23:33

## 2025-05-22 RX ADMIN — METOPROLOL TARTRATE 25 MG: 25 TABLET, FILM COATED ORAL at 07:46

## 2025-05-22 RX ADMIN — NICOTINE 1 PATCH: 21 PATCH, EXTENDED RELEASE TRANSDERMAL at 07:43

## 2025-05-22 RX ADMIN — APIXABAN 5 MG: 5 TABLET, FILM COATED ORAL at 07:45

## 2025-05-22 RX ADMIN — SENNOSIDES AND DOCUSATE SODIUM 1 TABLET: 50; 8.6 TABLET ORAL at 20:09

## 2025-05-22 RX ADMIN — FUROSEMIDE 40 MG: 10 INJECTION, SOLUTION INTRAMUSCULAR; INTRAVENOUS at 13:57

## 2025-05-22 RX ADMIN — IPRATROPIUM BROMIDE AND ALBUTEROL SULFATE 3 ML: .5; 3 SOLUTION RESPIRATORY (INHALATION) at 01:26

## 2025-05-22 RX ADMIN — ALBUTEROL SULFATE 2.5 MG: 2.5 SOLUTION RESPIRATORY (INHALATION) at 20:33

## 2025-05-22 RX ADMIN — GUAIFENESIN 200 MG: 100 LIQUID ORAL at 10:57

## 2025-05-22 RX ADMIN — METFORMIN HYDROCHLORIDE 250 MG: 500 TABLET ORAL at 18:27

## 2025-05-22 RX ADMIN — BENZONATATE 100 MG: 100 CAPSULE ORAL at 10:57

## 2025-05-22 RX ADMIN — TAMSULOSIN HYDROCHLORIDE 0.8 MG: 0.4 CAPSULE ORAL at 20:08

## 2025-05-22 RX ADMIN — IPRATROPIUM BROMIDE AND ALBUTEROL SULFATE 3 ML: .5; 3 SOLUTION RESPIRATORY (INHALATION) at 11:58

## 2025-05-22 RX ADMIN — ACETAMINOPHEN 650 MG: 325 TABLET, FILM COATED ORAL at 10:06

## 2025-05-22 RX ADMIN — INSULIN ASPART 1 UNITS: 100 INJECTION, SOLUTION INTRAVENOUS; SUBCUTANEOUS at 12:32

## 2025-05-22 RX ADMIN — SENNOSIDES AND DOCUSATE SODIUM 1 TABLET: 50; 8.6 TABLET ORAL at 07:45

## 2025-05-22 RX ADMIN — METHOCARBAMOL 500 MG: 500 TABLET ORAL at 20:08

## 2025-05-22 RX ADMIN — IPRATROPIUM BROMIDE AND ALBUTEROL SULFATE 3 ML: .5; 3 SOLUTION RESPIRATORY (INHALATION) at 15:52

## 2025-05-22 ASSESSMENT — ACTIVITIES OF DAILY LIVING (ADL)
ADLS_ACUITY_SCORE: 59
TOILETING_ISSUES: NO
DIFFICULTY_COMMUNICATING: NO
HEARING_DIFFICULTY_OR_DEAF: NO
ADLS_ACUITY_SCORE: 59
DIFFICULTY_EATING/SWALLOWING: NO
ADLS_ACUITY_SCORE: 59
EQUIPMENT_CURRENTLY_USED_AT_HOME: SHOWER CHAIR;COMMODE CHAIR
ADLS_ACUITY_SCORE: 65
ADLS_ACUITY_SCORE: 59
ADLS_ACUITY_SCORE: 65
ADLS_ACUITY_SCORE: 59
ADLS_ACUITY_SCORE: 41
ADLS_ACUITY_SCORE: 59
ADLS_ACUITY_SCORE: 65
ADLS_ACUITY_SCORE: 59
DRESSING/BATHING_DIFFICULTY: NO
ADLS_ACUITY_SCORE: 42
ADLS_ACUITY_SCORE: 41
ADLS_ACUITY_SCORE: 42
ADLS_ACUITY_SCORE: 59
ADLS_ACUITY_SCORE: 65
WALKING_OR_CLIMBING_STAIRS_DIFFICULTY: NO
ADLS_ACUITY_SCORE: 41
DOING_ERRANDS_INDEPENDENTLY_DIFFICULTY: NO
ADLS_ACUITY_SCORE: 65
ADLS_ACUITY_SCORE: 41
ADLS_ACUITY_SCORE: 59
ADLS_ACUITY_SCORE: 41
WEAR_GLASSES_OR_BLIND: YES
VISION_MANAGEMENT: GLASSES
CONCENTRATING,_REMEMBERING_OR_MAKING_DECISIONS_DIFFICULTY: NO
CHANGE_IN_FUNCTIONAL_STATUS_SINCE_ONSET_OF_CURRENT_ILLNESS/INJURY: YES
ADLS_ACUITY_SCORE: 41
FALL_HISTORY_WITHIN_LAST_SIX_MONTHS: NO
ADLS_ACUITY_SCORE: 59

## 2025-05-22 NOTE — PLAN OF CARE
"Goal Outcome Evaluation:      Plan of Care Reviewed With: patient      [facility]    ED Boarding Nurse Handoff Addendum Report:    Date/time: 5/22/2025, 11:08 AM    Activity Level: assist of 2    Fall Risk: Yes:  nonskid shoes/slippers when out of bed    Active Infusions: none    Current Meds Due: none    Current care needs: none    Oxygen requirements (liters/min and/or FiO2): none    Respiratory status: Room air    Vital signs (within last 30 minutes):    Vitals:    05/21/25 1938 05/21/25 2348 05/22/25 0449 05/22/25 0734   BP: 128/68 121/68 103/61 127/77   BP Location:  Left arm Left arm Right arm   Cuff Size:  Adult Regular Adult Regular    Pulse: 102 82 89 88   Resp:  16 18 18   Temp:  99.1  F (37.3  C) 98.4  F (36.9  C) 97.9  F (36.6  C)   TempSrc:  Oral Oral Oral   SpO2:  93% 94% 92%       Focused assessment within last 30 minutes:    /77 (BP Location: Right arm)   Pulse 88   Temp 97.9  F (36.6  C) (Oral)   Resp 18   SpO2 92%    Problem: Adult Inpatient Plan of Care  Goal: Plan of Care Review  Description: The Plan of Care Review/Shift note should be completed every shift.  The Outcome Evaluation is a brief statement about your assessment that the patient is improving, declining, or no change.  This information will be displayed automatically on your shift  note.  Outcome: Not Progressing  Flowsheets (Taken 5/22/2025 1104)  Outcome Evaluation: Pt A&O, 2 assist with gait belt walker, got BM today, having a lot of pain, PRN Tramadole, tylenol, were given for pain, and PRN Tesalon and robitosin were given for cought , ate 100% of his meal, has wound on abdomenal area covered with mapilex.  Plan of Care Reviewed With: patient  Goal: Patient-Specific Goal (Individualized)  Description: You can add care plan individualizations to a care plan. Examples of Individualization might be:  \"Parent requests to be called daily at 9am for status\", \"I have a hard time hearing out of my right " "ear\", or \"Do not touch me to wake me up as it startles  me\".  Outcome: Not Progressing  Goal: Absence of Hospital-Acquired Illness or Injury  Outcome: Not Progressing  Intervention: Identify and Manage Fall Risk  Recent Flowsheet Documentation  Taken 5/22/2025 1100 by Viky Stevenson RN  Safety Promotion/Fall Prevention:   activity supervised   assistive device/personal items within reach   clutter free environment maintained   lighting adjusted   increase visualization of patient   increased rounding and observation   nonskid shoes/slippers when out of bed   patient and family education   room organization consistent   safety round/check completed   treat reversible contributory factors   supervised activity   treat underlying cause  Intervention: Prevent Skin Injury  Recent Flowsheet Documentation  Taken 5/22/2025 1100 by Viky Stevenson RN  Body Position: position changed independently  Taken 5/22/2025 1047 by Viky Stevenson RN  Body Position: position changed independently  Taken 5/22/2025 1006 by Viky Stevenson RN  Body Position: position changed independently  Goal: Optimal Comfort and Wellbeing  Outcome: Not Progressing  Intervention: Monitor Pain and Promote Comfort  Recent Flowsheet Documentation  Taken 5/22/2025 1047 by Viky Stevenson RN  Pain Management Interventions: medication (see MAR)  Taken 5/22/2025 1006 by Viky Stevenson RN  Pain Management Interventions: medication (see MAR)  Goal: Readiness for Transition of Care  Outcome: Not Progressing     Problem: Delirium  Goal: Optimal Coping  Outcome: Not Progressing  Goal: Improved Behavioral Control  Outcome: Not Progressing  Intervention: Minimize Safety Risk  Recent Flowsheet Documentation  Taken 5/22/2025 1100 by Viky Stevenson RN  Enhanced Safety Measures:   pain management   patient/family teach back on injury risk   monitor patients coagulation values   assistive devices when indicated   Pre/Post Op education on " fall prevention   review medications for side effects with activity  Goal: Improved Attention and Thought Clarity  Outcome: Not Progressing  Goal: Improved Sleep  Outcome: Not Progressing       ED Boarding Nurse name: Viky Stevenson RN     Outcome Evaluation: Pt A&O, 2 assist with gait belt walker, got BM today, having a lot of pain, PRN Tramadole, tylenol, were given for pain, and PRN Tesalon and robitosin were given for cought , ate 100% of his meal, has wound on abdomenal area covered with mapilex.

## 2025-05-22 NOTE — PROGRESS NOTES
[facility]    Medicine Progress Note - Hospitalist Service    Date of Admission:  5/21/2025    Assessment & Plan      Richar Davis is a 69 year old male admitted on 5/21/2025 with suspected congestive heart failure.  He has history of type 2 diabetes, hypertension, hyperlipidemia, coronary artery disease with previous MI and three-vessel bypass, cataracts, A-fib, cardioversion, chronic anticoagulation with Eliquis, gallstones, obesity with BMI of 37, and aortic valve replacement.  He had a recent prolonged hospitalization at [facility] from 3/30/2025 through 4/29/2025 with gallstone ileus complicated by wound issues.  He had 2 surgeries.  He currently has an abdominal wound VAC in place.  After discharge he went to transitional care where he stayed for 2 weeks.  He has been home for about a week.  He presented to the emergency department today for evaluation of 2 days of worsening weakness, cough, and orthopnea.  He thinks he might have a little bit more lower extremity edema than normal.  He denied chest pain, fevers, and chills.    Emergency department evaluation showed temperature 99.4 and blood pressure 142/76 but otherwise unremarkable vital signs.  Laboratory evaluation showed BNP of 2003.  Procalcitonin was normal at 0.09.  Testing for COVID/influenza/RSV was negative.  Comprehensive metabolic panel was unremarkable.  CBC was unremarkable aside from hemoglobin 11.5 and platelets 143.  Chest x-ray showed bilateral pulmonary opacities, consistent with fluid.  He was given Lasix 60 mg IV for suspected congestive heart failure.  I was asked to admit him for further cares.    Problem list:    Suspected congestive heart failure  Generalized muscle weakness  Cough moving clear phlegm  Orthopnea  Lower extremity edema  PE less likely on DOAC  No clear infection  -Admit to observation with telemetry  -Continue diuresis with Lasix 40 mg IV twice daily  -Echocardiogram this  morning.  Most recent echo was on 4/14/2025 and showed EF of 55%.  -Strict NIKKI's, daily weights, and daily BMP  -Diuresis has been brisk so far.  Reassess tomorrow.  If not improving consider expanded evaluation with possible CTA of chest    Hypertension  Hyperlipidemia  Coronary artery disease  Previous triple bypass surgery  Atrial fibrillation with previous cardioversion  Aortic valve replacement   -On amiodarone, Lipitor, and metoprolol.  -Holding po Lasix, continue IV Lasix    Type 2 diabetes  -On low-dose of metformin  -NovoLog insulin sliding scale (medium resistance)    Recent prolonged hospital stay with gallstone ileus, surgeries, and wound issues  Wound VAC in place  -WOC consult    Weakness and deconditioning  -He is deconditioned from recent hospital stay.  -If hospital stay becomes prolonged would involve physical therapy    Severe obesity BMI 38       Observation Goals: -diagnostic tests and consults completed and resulted, -vital signs normal or at patient baseline, Nurse to notify provider when observation goals have been met and patient is ready for discharge.  Diet: Combination Diet 2 gm NA Diet; No Caffeine Diet (and additional linked orders)  Fluid restriction 2000 ML FLUID (and additional linked orders)    DVT Prophylaxis: DOAC. Apixaban  Mccarty Catheter: Not present  Lines: None     Cardiac Monitoring: ACTIVE order. Indication: Acute decompensated heart failure (48 hours)  Code Status: Full Code      Clinically Significant Risk Factors Present on Admission         # Hyponatremia: Lowest Na = 132 mmol/L in last 2 days, will monitor as appropriate  # Hypochloremia: Lowest Cl = 96 mmol/L in last 2 days, will monitor as appropriate       # Drug Induced Coagulation Defect: home medication list includes an anticoagulant medication    # Hypertension: Noted on problem list  # Acute heart failure with preserved ejection fraction: heart failure noted on problem list, last echo with EF >50%, and receiving  "IV diuretics         # DMII: A1C = 6.8 % (Ref range: <5.7 %) within past 6 months    # Obesity: Estimated body mass index is 37.84 kg/m  as calculated from the following:    Height as of 5/19/25: 1.803 m (5' 11\").    Weight as of 5/19/25: 123.1 kg (271 lb 4.8 oz).       # Financial/Environmental Concerns:     # History of CABG: noted on surgical history       Social Drivers of Health    Tobacco Use: High Risk (5/21/2025)    Patient History     Smoking Tobacco Use: Former     Smokeless Tobacco Use: Current          Disposition Plan     Medically Ready for Discharge: Anticipated in 2-4 Days     Ruperto Jensen MD  Hospitalist Service  [facility]  Securely message with [employer] (more info)  Text page via [employer] Paging/Directory   ______________________________________________________________________    Interval History   Denies chest pain or difficulty breathing this morning. Comfort in bed.  No other concerns at the moment of my visit  Urine output, negative balance for almost 3 L    Physical Exam   Vital Signs: Temp: 97.9  F (36.6  C) Temp src: Oral BP: 127/77 Pulse: 88   Resp: 18 SpO2: 92 % O2 Device: None (Room air) Oxygen Delivery: 2 LPM  Weight: 0 lbs 0 oz    GEN: Severe obesity.  Cooperative, alert, appears comfortable, NAD.  HEENT:  Normocephalic/atraumatic, no scleral icterus, no nasal discharge, mouth moist.  CV:  Regular rate and rhythm, no murmur or JVD.  S1 + S2 noted, no S3 or S4.  LUNGS: Coarse sounds to auscultation bilaterally without posterior crackles.  Symmetric chest rise on inhalation noted.  ABD: Prominent, difficult to palpate. Active bowel sounds, soft, non-tender/non-distended.  No rebound/guarding/rigidity.  EXT:  Bilateral LE edema, no  cyanosis.  No joint synovitis noted.  SKIN:  Dry to touch, no exanthems noted in the visualized areas.         Medical Decision Making       45 MINUTES SPENT BY ME on the date of service doing chart review, history, exam, documentation & " further activities per the note.      Data     I have personally reviewed the following data over the past 24 hrs:    5.4  \   11.5 (L)   / 143 (L)     132 (L) 96 (L) 8.8 /  102 (H)   4.2 24 0.75 \     ALT: 21 AST: 30 AP: 111 TBILI: 0.7   ALB: 3.6 TOT PROTEIN: 7.3 LIPASE: N/A     Trop: 25 (H) BNP: 2,003 (H)     Procal: 0.09 CRP: N/A Lactic Acid: 1.6         Imaging results reviewed over the past 24 hrs:   Recent Results (from the past 24 hours)   Chest XR,  PA & LAT    Narrative    EXAM: XR CHEST 2 VIEWS  LOCATION: [facility]  DATE: 5/21/2025    INDICATION: Shortness of breath  COMPARISON: Chest CT 4/24/2025      Impression    IMPRESSION: Stable size of cardiac silhouette status post median sternotomy, CABG and aortic valve replacement. There are some patchy bilateral lower lobe and perihilar opacities, differential includes moderate edema and infectious/inflammatory process.   No definite lobar consolidation, pleural effusion or pneumothorax. Bones are unchanged.

## 2025-05-22 NOTE — DISCHARGE INSTRUCTIONS
Abdominal wound(s): Every 3 days  Cleanse wounds and surrounding skin with Vashe and dry  Apply Aquacel Ag (cut to fit) to wounds   Cover with sacral Mepilex

## 2025-05-22 NOTE — PLAN OF CARE
PRIMARY DIAGNOSIS: CONGESTIVE HEART FAILURE  OUTPATIENT/OBSERVATION GOALS TO BE MET BEFORE DISCHARGE:  Dyspnea improved and O2 sats >88% at RA or at prior home O2 therapy level: Yes        SpO2: 92 %, O2 Device: None (Room air)  There were no vitals filed for this visit.     ECHO and other diagnostic testing complete (if applicable): Yes    Return to near baseline physical activity: No    Discharge Planner Nurse   Safe discharge environment identified: Yes  Barriers to discharge: Yes       Entered by: Viky Stevenson RN 05/22/2025 08:11 am   Pt A&O,2 assist for mobility, voided adequately, on BG check, covered with insuline, ate 100% of his meal.  Please review provider order for any additional goals.   Nurse to notify provider when observation goals have been met and patient is ready for discharge.Goal Outcome Evaluation:      Plan of Care Reviewed With: patient  /77 (BP Location: Right arm)   Pulse 88   Temp 97.9  F (36.6  C) (Oral)   Resp 18   SpO2 92%    Problem: Adult Inpatient Plan of Care  Goal: Plan of Care Review  Description: The Plan of Care Review/Shift note should be completed every shift.  The Outcome Evaluation is a brief statement about your assessment that the patient is improving, declining, or no change.  This information will be displayed automatically on your shift  note.  5/22/2025 1109 by Viky Stevenson RN  Outcome: Not Progressing  5/22/2025 1104 by Viky Stevenson RN  Outcome: Not Progressing  Flowsheets (Taken 5/22/2025 1104)  Outcome Evaluation: Pt A&O, 2 assist with gait belt walker, got BM today, having a lot of pain, PRN Tramadole, tylenol, were given for pain, and PRN Tesalon and robitosin were given for cought , ate 100% of his meal, has wound on abdomenal area covered with mapilex.  Plan of Care Reviewed With: patient  Goal: Patient-Specific Goal (Individualized)  Description: You can add care plan individualizations to a care plan. Examples of  "Individualization might be:  \"Parent requests to be called daily at 9am for status\", \"I have a hard time hearing out of my right ear\", or \"Do not touch me to wake me up as it startles  me\".  5/22/2025 1109 by Viky Stevenson RN  Outcome: Not Progressing  5/22/2025 1104 by Viky Stevenson RN  Outcome: Not Progressing  Goal: Absence of Hospital-Acquired Illness or Injury  5/22/2025 1109 by Viky Stevenson RN  Outcome: Not Progressing  5/22/2025 1104 by Viky Stevenson RN  Outcome: Not Progressing  Intervention: Identify and Manage Fall Risk  Recent Flowsheet Documentation  Taken 5/22/2025 1100 by Viky Stevenson RN  Safety Promotion/Fall Prevention:   activity supervised   assistive device/personal items within reach   clutter free environment maintained   lighting adjusted   increase visualization of patient   increased rounding and observation   nonskid shoes/slippers when out of bed   patient and family education   room organization consistent   safety round/check completed   treat reversible contributory factors   supervised activity   treat underlying cause  Intervention: Prevent Skin Injury  Recent Flowsheet Documentation  Taken 5/22/2025 1100 by Viky Stevenson RN  Body Position: position changed independently  Taken 5/22/2025 1047 by Viky Stevenson RN  Body Position: position changed independently  Taken 5/22/2025 1006 by Viky Stevenson RN  Body Position: position changed independently  Goal: Optimal Comfort and Wellbeing  5/22/2025 1109 by Viky Stevenson RN  Outcome: Not Progressing  5/22/2025 1104 by Viky Stevenson RN  Outcome: Not Progressing  Intervention: Monitor Pain and Promote Comfort  Recent Flowsheet Documentation  Taken 5/22/2025 1047 by Viky Stevenson RN  Pain Management Interventions: medication (see MAR)  Taken 5/22/2025 1006 by Viky Stevenson RN  Pain Management Interventions: medication (see MAR)  Goal: Readiness for Transition of " Care  5/22/2025 1109 by Viky Stevenson RN  Outcome: Not Progressing  5/22/2025 1104 by Viky Stevenson RN  Outcome: Not Progressing     Problem: Delirium  Goal: Optimal Coping  5/22/2025 1109 by Viky Stevenson RN  Outcome: Not Progressing  5/22/2025 1104 by Viky Stevenson RN  Outcome: Not Progressing  Goal: Improved Behavioral Control  5/22/2025 1109 by Viky Stevenson RN  Outcome: Not Progressing  5/22/2025 1104 by Viky Stevenson RN  Outcome: Not Progressing  Intervention: Minimize Safety Risk  Recent Flowsheet Documentation  Taken 5/22/2025 1100 by Viky Stevenson RN  Enhanced Safety Measures:   pain management   patient/family teach back on injury risk   monitor patients coagulation values   assistive devices when indicated   Pre/Post Op education on fall prevention   review medications for side effects with activity  Goal: Improved Attention and Thought Clarity  5/22/2025 1109 by Viky Stevenson RN  Outcome: Not Progressing  5/22/2025 1104 by Viky Stevenson RN  Outcome: Not Progressing  Goal: Improved Sleep  5/22/2025 1109 by Viky Stevenson RN  Outcome: Not Progressing  5/22/2025 1104 by Viky Stevenson RN  Outcome: Not Progressing           Outcome Evaluation: Pt A&O, 2 assist with gait belt walker, got BM today, having a lot of pain, PRN Tramadole, tylenol, were given for pain, and PRN Tesalon and robitosin were given for cought , ate 100% of his meal, has wound on abdomenal area covered with mapilex.

## 2025-05-22 NOTE — CONSULTS
[facility] Nurse Inpatient Assessment     Consulted for: Abdominal wound    Summary: Patient with small bowel resection and VAC placement on 4/10/25.  Patient using Wound VAC since that time.  Wound no longer needs wound VAC and it was discontinued today.  Irritation around wound from previous VAC dressing being placed incorrectly.  Discussed with patient and nurse that home VAC pump needs to go home with him.      [facility] nurse follow-up plan: weekly    Patient History (according to provider note(s):      Richar Davis is a 69 year old male admitted on 5/21/2025 with suspected congestive heart failure.  He has history of type 2 diabetes, hypertension, hyperlipidemia, coronary artery disease with previous MI and three-vessel bypass, cataracts, A-fib, cardioversion, chronic anticoagulation with Eliquis, gallstones, obesity with BMI of 37, and aortic valve replacement.  He had a recent prolonged hospitalization at [facility] from 3/30/2025 through 4/29/2025 with gallstone ileus complicated by wound issues.  He had 2 surgeries.  He currently has an abdominal wound VAC in place.  After discharge he went to transitional care where he stayed for 2 weeks.  He has been home for about a week.  He presented to the emergency department today for evaluation of 2 days of worsening weakness, cough, and orthopnea.  He thinks he might have a little bit more lower extremity edema than normal.  He denied chest pain, fevers, and chills.     Assessment:      Areas visualized during today's visit: Abdomen    Wound location: Abdomen  Last photo: 5/22/25    Wound due to: Surgical Wound  Wound history/plan of care: Patient with small bowel resection and VAC placement on 4/10/25.  Patient using Wound VAC since that time.  Wound no longer needs wound VAC and it was discontinued today.  Irritation around wound from previous VAC dressing being placed incorrectly (had VAC foam directly over skin).    Wound base: 100 % Granulation tissue     Palpation of the wound bed: normal      Drainage: small     Description of drainage: serosanguinous     Measurements (length x width x depth, in cm): 8x1x0.6cm, 0.8x1x0.1cm and 6x0.6x0.3cm      Tunneling: N/A     Undermining: N/A  Periwound skin: Denuded and Erythema- blanchable      Color: pink      Temperature: normal   Odor: mild  Pain: denies   Pain interventions prior to dressing change: N/A  Treatment goal: Heal  and Infection control/prevention  STATUS: initial assessment  Supplies ordered: ordered Vashe, Aquacel Ag       Treatment Plan:     Abdominal wound(s): Every 3 days  Cleanse wounds and surrounding skin with Vashe and dry  Apply Aquacel Ag (SPD#[id number]) cut to fit to wounds   Cover with sacral Mepilex     Orders: Written    RECOMMEND PRIMARY TEAM ORDER: None, at this time  Education provided: plan of care  Discussed plan of care with: Patient and Nurse  Notify WOC if wound(s) deteriorate.  Nursing to notify the Provider(s) and re-consult the WOC Nurse if new skin concern.    DATA:     Current support surface: Standard  ED cart  BMI: There is no height or weight on file to calculate BMI.   Active diet order: Orders Placed This Encounter      Combination Diet 2 gm NA Diet; No Caffeine Diet     Output: I/O last 3 completed shifts:  In: -   Out: 2725 [Urine:2725]     Labs:   Recent Labs   Lab 05/21/25  1328   ALBUMIN 3.6   HGB 11.5*   WBC 5.4     Pressure injury risk assessment:                          Elgin Talbot RN CWOCN  Contact [facility] Nurse Dottie)  Dept. Office Number: [phone]       Quality 130: Documentation Of Current Medications In The Medical Record: Current Medications Documented Detail Level: Detailed Quality 431: Preventive Care And Screening: Unhealthy Alcohol Use - Screening: Patient identified as an unhealthy alcohol user when screened for unhealthy alcohol use using a systematic screening method and received brief counseling Quality 110: Preventive Care And Screening: Influenza Immunization: Influenza Immunization Administered during Influenza season Quality 226: Preventive Care And Screening: Tobacco Use: Screening And Cessation Intervention: Patient screened for tobacco use and is an ex/non-smoker

## 2025-05-22 NOTE — PROGRESS NOTES
[facility]    ED Boarding Nurse Handoff Addendum Report:    Date/time: 5/22/2025, 12:26 AM    Activity Level: assist of 1    Fall Risk: Yes:  bed/chair alarm on, nonskid shoes/slippers when out of bed, arm band in place, and activity supervised    Active Infusions: none    Current Meds Due: see MAR    Current care needs:     Oxygen requirements (liters/min and/or FiO2): room air    Respiratory status: Room air    Vital signs (within last 30 minutes):    Vitals:    05/21/25 1926 05/21/25 1927 05/21/25 1938 05/21/25 2348   BP: 128/68  128/68 121/68   BP Location:    Left arm   Cuff Size:    Adult Regular   Pulse: 101 102 102 82   Resp:    16   Temp:    99.1  F (37.3  C)   TempSrc:    Oral   SpO2:  (!) 91%  93%       Focused assessment within last 30 minutes:    Patient AAOx4, RA, wound vac on the abdomen from previous hospitalization. WOC consulted. 2000 ml fluid restriction. Potassium protocol.    ED Boarding Nurse name: Lowell Myles RN

## 2025-05-22 NOTE — PLAN OF CARE
ROOM # 227    Living Situation (if not independent, order SW consult): lives with spouse in single story home  Facility name: N/A  : [facility] [phone]    Activity level at baseline: SBA  Activity level on admit: A-1 w/walker/GB      Patient registered to observation; given Patient Bill of Rights; given the opportunity to ask questions about observation status and their plan of care.  Patient has been oriented to the observation room, bathroom and call light is in place.    Discussed discharge goals and expectations with patient/family.

## 2025-05-22 NOTE — PROGRESS NOTES
[facility]  Respiratory Care Note         05/22/25 1604   RCAT Assessment   Reason for Assessment CHF   Pulmonary Status 3   Surgical Status 0   Chest X-ray 3   Respiratory Pattern 0   Mental Status 0   Breath Sounds 2   Cough Effectiveness 0   Level of Activity 0   O2 Required for SpO2>=92% 0   Acuity Level (points) 8   Acuity Level  4   Re-eval Interval Guideline Every 3 days   Re-evaluation Date 05/22/25   $RT Consult Time Spent RCAT (30 minute increments) 1   Clinical Indications/Symptoms   Aerosol Therapy Physician order;RCAT protocol   Volume Expansion Decreased breath sounds   Broncho-Pulmonary Hygiene Plan   Broncho-Pulmonary Hygiene Treatment Coughing techniques   Broncho-Pulm Hygiene Frequency Acuity Level 3: TID-Small amounts secretions/poor cough, hx of secretions   Volume Expansion Plan   Volume Expansion Treatment Incentive Spirometer   Volume Expansion Frequency Acuity Level 3: TID-At risk for developing atelectasis       Vital signs:  Temp: 98  F (36.7  C) Temp src: Oral BP: 139/68 Pulse: 78   Resp: 18 SpO2: 92 % O2 Device: None (Room air) Oxygen Delivery: 2 LPM Height: 182.9 cm (6') Weight: 116.8 kg (257 lb 8 oz)  Estimated body mass index is 34.92 kg/m  as calculated from the following:    Height as of this encounter: 1.829 m (6').    Weight as of this encounter: 116.8 kg (257 lb 8 oz).      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    ANESTHESIA CARDIOVERSION N/A 4/7/2025    Procedure: Anesthesia cardioversion;  Surgeon: GENERIC ANESTHESIA PROVIDER;  Location:  OR    ANESTHESIA CARDIOVERSION N/A 4/14/2025    Procedure: Anesthesia cardioversion;  Surgeon: GENERIC ANESTHESIA PROVIDER;  Location:  OR    BYPASS GRAFT ARTERY CORONARY, REPLACE  VALVE AORTIC, COMBINED N/A 10/20/2022    Procedure: CORONARY ARTERY BYPASS GRAFT x 3 (LEFT INTERNAL MAMMARY ARTERY - LEFT ANTERIOR DESCENDING ARTERY; SAPHENOUS VEIN - POSTERIOR DESCENDING ARTERY; SAPHENOUS VEIN - CIRCUMFLEX ARTERY) WITH ENDOSCOPIC LESSER SAPHENOUS VEIN HARVEST ON BILATERAL LOWER EXTREMITY, AORTIC VALVE REPLACEMENT WITH TISSUE HEART VALVE INSPIRIS  RESILIA  AORTIC VALVE SIZE: 25MM, AND ON CARDIOPULMONARY PUMP OXYGENATOR  (    COLECTOMY WITHOUT COLOSTOMY N/A 4/8/2025    Procedure: EXPLORATORY LAPAROTOMY WITH SMALL BOWEL RESECTION;  Surgeon: Liana Landrum MD;  Location:  OR    CTA ANGIOGRAM CORONARY ARTERY      LAPAROTOMY EXPLORATORY N/A 3/30/2025    Procedure: exploratory laparotomy with enterotomy and removal of gallstone;  Surgeon: Clau Calles MD;  Location:  OR    LAPAROTOMY EXPLORATORY N/A 4/10/2025    Procedure: EXPLORATORY LAPAROTOMY, SMALL BOWEL ANASTOMOSIS, ABDOMINAL CLOSURE;  Surgeon: Liana Landrum MD;  Location:  OR    ORTHOPEDIC SURGERY Left     elbow surgery    TRANSESOPHAGEAL ECHOCARDIOGRAM INTRAOPERATIVE N/A 4/14/2025    Procedure: Transesophageal echocardiogram intraoperative;  Surgeon: GENERIC ANESTHESIA PROVIDER;  Location:  OR       History reviewed. No pertinent family history.    Social History     Tobacco Use    Smoking status: Former    Smokeless tobacco: Current     Types: Chew   Substance Use Topics    Alcohol use: Not Currently     Study Result    Narrative & Impression   EXAM: XR CHEST 2 VIEWS  LOCATION: [facility]  DATE: 5/21/2025     INDICATION: Shortness of breath  COMPARISON: Chest CT 4/24/2025                                                                      IMPRESSION: Stable size of cardiac silhouette status post median sternotomy, CABG and aortic valve replacement. There are some patchy bilateral lower lobe and perihilar opacities, differential includes moderate edema and infectious/inflammatory process.   No definite  lobar consolidation, pleural effusion or pneumothorax. Bones are unchanged.     PTA Med List   Medication Sig Last Dose/Taking    acetaminophen (TYLENOL) 325 MG tablet Take 2 tablets (650 mg) by mouth every 4 hours as needed for other (For optimal non-opioid multimodal pain management to improve pain control.) Taking As Needed    amiodarone (PACERONE) 200 MG tablet Take 1 tablet (200 mg) by mouth daily. 5/20/2025    amoxicillin (AMOXIL) 500 MG capsule Take 4 capsules (2,000 mg) 30-60 minutes prior to your dental procedure Taking    apixaban ANTICOAGULANT (ELIQUIS) 5 MG tablet Take 1 tablet (5 mg) by mouth 2 times daily. 5/20/2025    Ascorbic Acid (VITAMIN C) 500 MG CAPS Take 500 mg by mouth daily 5/20/2025    atorvastatin (LIPITOR) 20 MG tablet Take 1 tablet (20 mg) by mouth daily. (Patient taking differently: Take 80 mg by mouth daily.) 5/21/2025 Morning    Cyanocobalamin (VITAMIN B-12 PO) Take 1 tablet by mouth every other day. Unknown dose Taking    diphenhydrAMINE (BENADRYL) 50 MG tablet Take 100 mg by mouth every 6 hours as needed for itching or allergies Taking As Needed    fish oil-omega-3 fatty acids 1000 MG capsule Take 2 g by mouth daily 5/20/2025    folic acid (FOLVITE) 1 MG tablet Take 1,000 mcg by mouth 2 times daily 5/21/2025 Morning    furosemide (LASIX) 20 MG tablet Take 1 tablet (20 mg) by mouth daily. 5/21/2025    gabapentin (NEURONTIN) 100 MG tablet Take 1 tablet (100 mg) by mouth 2 times daily. (Patient taking differently: Take 100 mg by mouth 2 times daily as needed.) Taking Differently    MAGNESIUM PO Take 500 mg by mouth daily. 5/20/2025    metFORMIN (GLUCOPHAGE) 500 MG tablet Take 0.5 tablets (250 mg) by mouth daily (with dinner) (0.5 x 500 mg = 250 mg) 5/20/2025    methocarbamol (ROBAXIN) 500 MG tablet Take 500 mg by mouth 4 times daily. 5/20/2025    metoprolol tartrate (LOPRESSOR) 25 MG tablet Take 1 tablet (25 mg) by mouth 2 times daily. Hold for SBP<100 or HR<55. Past Week    miconazole  (MICATIN) 2 % external powder Apply topically 2 times daily as needed for other (rash groin). Taking As Needed    Multiple Vitamins-Minerals (MENS 50+ MULTI VITAMIN/MIN PO) Take 1 tablet by mouth daily 5/20/2025    nicotine (COMMIT) 2 MG lozenge Place 1 lozenge (2 mg) inside cheek every hour as needed for nicotine withdrawal symptoms. Taking As Needed    nicotine (NICODERM CQ) 21 MG/24HR 24 hr patch Place 1 patch over 24 hours onto the skin daily. Taper over 8 weeks as per protocol. 5/20/2025    pantoprazole (PROTONIX) 40 MG EC tablet Take 1 tablet (40 mg) by mouth every morning (before breakfast). 5/20/2025    polyethylene glycol (MIRALAX) 17 GM/Dose powder Take 17 g by mouth daily as needed for constipation. Taking As Needed    psyllium (METAMUCIL) 28.3 % packet Take 1 packet by mouth 2 times daily. 5/20/2025    senna-docusate (SENOKOT-S/PERICOLACE) 8.6-50 MG tablet Take 1 tablet by mouth 2 times daily. 5/20/2025    tamsulosin (FLOMAX) 0.4 MG capsule Take 0.8 mg by mouth every evening. (2 x 0.4 mg = 0.8 mg) Past Month    traMADol (ULTRAM) 50 MG tablet Take 1 tablet (50 mg) by mouth every 6 hours as needed for severe pain. 5/21/2025    vitamin D3 (CHOLECALCIFEROL) 50 mcg (2000 units) tablet Take 1 tablet by mouth every other day 5/20/2025             Andres Zavaleta [facility]  5/22/2025

## 2025-05-23 ENCOUNTER — APPOINTMENT (OUTPATIENT)
Dept: OCCUPATIONAL THERAPY | Facility: CLINIC | Age: 70
DRG: 291 | End: 2025-05-23
Attending: INTERNAL MEDICINE
Payer: COMMERCIAL

## 2025-05-23 ENCOUNTER — APPOINTMENT (OUTPATIENT)
Dept: CARDIOLOGY | Facility: CLINIC | Age: 70
DRG: 291 | End: 2025-05-23
Attending: INTERNAL MEDICINE
Payer: COMMERCIAL

## 2025-05-23 LAB
GLUCOSE BLDC GLUCOMTR-MCNC: 105 MG/DL (ref 70–99)
GLUCOSE BLDC GLUCOMTR-MCNC: 115 MG/DL (ref 70–99)
GLUCOSE BLDC GLUCOMTR-MCNC: 133 MG/DL (ref 70–99)
GLUCOSE BLDC GLUCOMTR-MCNC: 151 MG/DL (ref 70–99)
GLUCOSE BLDC GLUCOMTR-MCNC: 99 MG/DL (ref 70–99)
LVEF ECHO: NORMAL
MAGNESIUM SERPL-MCNC: 1.8 MG/DL (ref 1.7–2.3)
POTASSIUM SERPL-SCNC: 3.5 MMOL/L (ref 3.4–5.3)

## 2025-05-23 PROCEDURE — 83735 ASSAY OF MAGNESIUM: CPT | Performed by: HOSPITALIST

## 2025-05-23 PROCEDURE — 999N000157 HC STATISTIC RCP TIME EA 10 MIN

## 2025-05-23 PROCEDURE — 120N000001 HC R&B MED SURG/OB

## 2025-05-23 PROCEDURE — 250N000013 HC RX MED GY IP 250 OP 250 PS 637: Performed by: INTERNAL MEDICINE

## 2025-05-23 PROCEDURE — 250N000013 HC RX MED GY IP 250 OP 250 PS 637: Performed by: PHYSICIAN ASSISTANT

## 2025-05-23 PROCEDURE — 84132 ASSAY OF SERUM POTASSIUM: CPT | Performed by: HOSPITALIST

## 2025-05-23 PROCEDURE — 97535 SELF CARE MNGMENT TRAINING: CPT | Mod: GO

## 2025-05-23 PROCEDURE — 94640 AIRWAY INHALATION TREATMENT: CPT | Mod: 76

## 2025-05-23 PROCEDURE — 99233 SBSQ HOSP IP/OBS HIGH 50: CPT | Performed by: PHYSICIAN ASSISTANT

## 2025-05-23 PROCEDURE — 93306 TTE W/DOPPLER COMPLETE: CPT | Mod: 26 | Performed by: INTERNAL MEDICINE

## 2025-05-23 PROCEDURE — 97165 OT EVAL LOW COMPLEX 30 MIN: CPT | Mod: GO

## 2025-05-23 PROCEDURE — 94640 AIRWAY INHALATION TREATMENT: CPT

## 2025-05-23 PROCEDURE — 36415 COLL VENOUS BLD VENIPUNCTURE: CPT | Performed by: HOSPITALIST

## 2025-05-23 PROCEDURE — 250N000009 HC RX 250: Performed by: INTERNAL MEDICINE

## 2025-05-23 PROCEDURE — 255N000002 HC RX 255 OP 636: Performed by: INTERNAL MEDICINE

## 2025-05-23 RX ORDER — NALOXONE HYDROCHLORIDE 0.4 MG/ML
0.4 INJECTION, SOLUTION INTRAMUSCULAR; INTRAVENOUS; SUBCUTANEOUS
Status: DISCONTINUED | OUTPATIENT
Start: 2025-05-23 | End: 2025-05-24 | Stop reason: HOSPADM

## 2025-05-23 RX ORDER — FUROSEMIDE 40 MG/1
40 TABLET ORAL DAILY
Status: DISCONTINUED | OUTPATIENT
Start: 2025-05-23 | End: 2025-05-24 | Stop reason: HOSPADM

## 2025-05-23 RX ORDER — NALOXONE HYDROCHLORIDE 0.4 MG/ML
0.2 INJECTION, SOLUTION INTRAMUSCULAR; INTRAVENOUS; SUBCUTANEOUS
Status: DISCONTINUED | OUTPATIENT
Start: 2025-05-23 | End: 2025-05-24 | Stop reason: HOSPADM

## 2025-05-23 RX ADMIN — TAMSULOSIN HYDROCHLORIDE 0.8 MG: 0.4 CAPSULE ORAL at 19:36

## 2025-05-23 RX ADMIN — TRAMADOL HYDROCHLORIDE 50 MG: 50 TABLET, COATED ORAL at 08:30

## 2025-05-23 RX ADMIN — METHOCARBAMOL 500 MG: 500 TABLET ORAL at 19:36

## 2025-05-23 RX ADMIN — NICOTINE 1 PATCH: 21 PATCH, EXTENDED RELEASE TRANSDERMAL at 08:22

## 2025-05-23 RX ADMIN — APIXABAN 5 MG: 5 TABLET, FILM COATED ORAL at 19:36

## 2025-05-23 RX ADMIN — Medication 1 CAPSULE: at 19:36

## 2025-05-23 RX ADMIN — IPRATROPIUM BROMIDE AND ALBUTEROL SULFATE 3 ML: .5; 3 SOLUTION RESPIRATORY (INHALATION) at 19:37

## 2025-05-23 RX ADMIN — AMIODARONE HYDROCHLORIDE 200 MG: 200 TABLET ORAL at 08:22

## 2025-05-23 RX ADMIN — METHOCARBAMOL 500 MG: 500 TABLET ORAL at 13:49

## 2025-05-23 RX ADMIN — Medication 1 CAPSULE: at 08:23

## 2025-05-23 RX ADMIN — SENNOSIDES AND DOCUSATE SODIUM 1 TABLET: 50; 8.6 TABLET ORAL at 19:36

## 2025-05-23 RX ADMIN — IPRATROPIUM BROMIDE AND ALBUTEROL SULFATE 3 ML: .5; 3 SOLUTION RESPIRATORY (INHALATION) at 15:34

## 2025-05-23 RX ADMIN — ATORVASTATIN CALCIUM 80 MG: 40 TABLET, FILM COATED ORAL at 08:22

## 2025-05-23 RX ADMIN — FUROSEMIDE 40 MG: 40 TABLET ORAL at 13:49

## 2025-05-23 RX ADMIN — HUMAN ALBUMIN MICROSPHERES AND PERFLUTREN 3 ML: 10; .22 INJECTION, SOLUTION INTRAVENOUS at 08:57

## 2025-05-23 RX ADMIN — METOPROLOL TARTRATE 25 MG: 25 TABLET, FILM COATED ORAL at 19:36

## 2025-05-23 RX ADMIN — INSULIN ASPART 1 UNITS: 100 INJECTION, SOLUTION INTRAVENOUS; SUBCUTANEOUS at 17:35

## 2025-05-23 RX ADMIN — METHOCARBAMOL 500 MG: 500 TABLET ORAL at 08:23

## 2025-05-23 RX ADMIN — IPRATROPIUM BROMIDE AND ALBUTEROL SULFATE 3 ML: .5; 3 SOLUTION RESPIRATORY (INHALATION) at 08:00

## 2025-05-23 RX ADMIN — METHOCARBAMOL 500 MG: 500 TABLET ORAL at 17:33

## 2025-05-23 RX ADMIN — APIXABAN 5 MG: 5 TABLET, FILM COATED ORAL at 08:22

## 2025-05-23 RX ADMIN — TRAMADOL HYDROCHLORIDE 50 MG: 50 TABLET, COATED ORAL at 14:33

## 2025-05-23 RX ADMIN — METFORMIN HYDROCHLORIDE 250 MG: 500 TABLET ORAL at 17:33

## 2025-05-23 RX ADMIN — TRAMADOL HYDROCHLORIDE 50 MG: 50 TABLET, COATED ORAL at 23:17

## 2025-05-23 ASSESSMENT — ACTIVITIES OF DAILY LIVING (ADL)
ADLS_ACUITY_SCORE: 47
ADLS_ACUITY_SCORE: 43
ADLS_ACUITY_SCORE: 47
ADLS_ACUITY_SCORE: 43
ADLS_ACUITY_SCORE: 47
ADLS_ACUITY_SCORE: 47
ADLS_ACUITY_SCORE: 43
ADLS_ACUITY_SCORE: 43
DEPENDENT_IADLS:: CLEANING;COOKING;LAUNDRY;SHOPPING;MEAL PREPARATION;TRANSPORTATION
ADLS_ACUITY_SCORE: 47
ADLS_ACUITY_SCORE: 47
ADLS_ACUITY_SCORE: 43
ADLS_ACUITY_SCORE: 47
ADLS_ACUITY_SCORE: 43
ADLS_ACUITY_SCORE: 43
ADLS_ACUITY_SCORE: 47
ADLS_ACUITY_SCORE: 43
ADLS_ACUITY_SCORE: 47
ADLS_ACUITY_SCORE: 47

## 2025-05-23 NOTE — PROGRESS NOTES
05/23/25 1036   Appointment Info   Signing Clinician's Name / Credentials (OT) Melissa Montero OTR/L   Rehab Comments (OT) abdominal precautions   Living Environment   People in Home spouse   Current Living Arrangements house  (single story)   Number of Stairs, Main Entrance 3   Stair Railings, Main Entrance railings on both sides of stairs   Number of Stairs, Within Home, Primary greater than 10 stairs  (to the basement, but has chair lift)   Transportation Anticipated family or friend will provide;car, drives self  (hasn't driven in awhile)   Self-Care   Usual Activity Tolerance good   Current Activity Tolerance poor   Regular Exercise Yes   Activity/Exercise Type strength training   Exercise Amount/Frequency 3-5 times/wk   Equipment Currently Used at Home shower chair;commode chair;walker, rolling   Fall history within last six months no   Activity/Exercise/Self-Care Comment was ind at baseline   Instrumental Activities of Daily Living (IADL)   IADL Comments split IADLs with spouse. has a house keeper come once a month   General Information   Onset of Illness/Injury or Date of Surgery 05/21/25   Referring Physician Jose Tello MD   Patient/Family Therapy Goal Statement (OT) to go home   Additional Occupational Profile Info/Pertinent History of Current Problem Richar Davis is a 69 year old male admitted on 5/21/2025 with suspected congestive heart failure.  He has history of type 2 diabetes, hypertension, hyperlipidemia, coronary artery disease with previous MI and three-vessel bypass, cataracts, A-fib, cardioversion, chronic anticoagulation with Eliquis, gallstones, obesity with BMI of 37, and aortic valve replacement.  He had a recent prolonged hospitalization at [facility] from 3/30/2025 through 4/29/2025 with gallstone ileus complicated by wound issues.  He had 2 surgeries.  He currently has an abdominal wound VAC in place.  After discharge he went to transitional care where  he stayed for 2 weeks.  He has been home for about a week.  He presented to the emergency department today for evaluation of 2 days of worsening weakness, cough, and orthopnea.  He thinks he might have a little bit more lower extremity edema than normal.  He denied chest pain, fevers, and chills.   Existing Precautions/Restrictions fall;abdominal   Left Upper Extremity (Weight-bearing Status) partial weight-bearing (PWB)   Right Upper Extremity (Weight-bearing Status) partial weight-bearing (PWB)   General Observations and Info abdominal incision   Cognitive Status Examination   Orientation Status orientation to person, place and time   Visual Perception   Visual Impairment/Limitations WNL   Posture   Posture forward head position;protracted shoulders   Range of Motion Comprehensive   Comment, General Range of Motion B UE WFL   Strength Comprehensive (MMT)   Comment, General Manual Muscle Testing (MMT) Assessment general weakness   Coordination   Upper Extremity Coordination No deficits were identified   Bed Mobility   Bed Mobility supine-sit;sit-supine   Supine-Sit [place] (Bed Mobility) supervision   Sit-Supine [place] (Bed Mobility) supervision   Transfers   Transfer Comments declined toilet transfer   Activities of Daily Living   BADL Assessment/Intervention lower body dressing;toileting;grooming   Lower Body Dressing Assessment/Training   [place] Level (Lower Body Dressing) contact guard assist   Comment, (Lower Body Dressing) clinical judgement   Grooming Assessment/Training   [place] Level (Grooming) set up;contact guard assist   Comment, (Grooming) clinical judgement   Toileting   [place] Level (Toileting) contact guard assist   Comment, (Toileting) per nursing and clinical judgement   Clinical Impression   Criteria for Skilled Therapeutic Interventions Met (OT) Yes, treatment indicated   OT Diagnosis impaired ADLs   Influenced by the following impairments decreased activity  tolernace, general weakness   OT Problem List-Impairments impacting ADL problems related to;activity tolerance impaired;strength;post-surgical precautions   Assessment of Occupational Performance 3-5 Performance Deficits   Identified Performance Deficits functional mobility, dressing, bathing, home management   Planned Therapy Interventions (OT) ADL retraining;transfer training;progressive activity/exercise   Clinical Decision Making Complexity (OT) problem focused assessment/low complexity   Risk & Benefits of therapy have been explained evaluation/treatment results reviewed;care plan/treatment goals reviewed;risks/benefits reviewed;current/potential barriers reviewed;participants voiced agreement with care plan;participants included;patient;spouse/significant other   OT Total Evaluation Time   OT Eval, Low Complexity Minutes (09076) 8   OT Goals   Therapy Frequency (OT) Daily   OT Predicted Duration/Target Date for Goal Attainment 05/30/25   OT: Hygiene/Grooming supervision/stand-by assist   OT: Lower Body Dressing Modified independent   OT: Toilet Transfer/Toileting Supervision/stand-by assist   Self-Care/Home Management   Self-Care/Home Mgmt/ADL, Compensatory, Meal Prep Minutes (87488) 18   Symptoms Noted During/After Treatment (Meal Preparation/Planning Training) fatigue   Treatment Detail/Skilled Intervention Pt in bed upon arrival, spouse present. Pt educated on abdominal precations, and handout provided. Pt also had initation of education on CHF. Pt declined any mobility at this time. Pt don/doff his socks while sititng up in bed. Pt transfered to sitting with SBA, and back to supine with VC to log roll. Spouse open to TCU, pt may be on board. All needs within reach, alarm on. Nursing updated.   Cardiac Education   Education Provided Daily weights;Diet;Stop light tool   Education Packet Given to Patient No   All Patient Education Handouts Reviewed with Patient and/or Family No   OT Discharge Planning   OT  Plan dressing with AE, toilet transfer, functional mobility, CHF packet   OT Discharge Recommendation (DC Rec) Transitional Care Facility;home with home care occupational therapy   OT Rationale for DC Rec Pt presenting below baseline with deficits in functional mobility and dressing while limited by activity tolerance, participation, and general strength. Pt was ind prior, and is now requiring Ax1 for self cares and functional mobility. Pt would benefit from TCU to progress strength and ADLs, but if refusing TCU pt would benefit from HH OT and assist from spouse for IADLs and ADLs as needed.   OT Brief overview of current status Ax1   OT Total Distance Amb During Session (feet) 0   Total Session Time   Timed Code Treatment Minutes 18   Total Session Time (sum of timed and untimed services) 26

## 2025-05-23 NOTE — PROGRESS NOTES
[facility]    Medicine Progress Note - Hospitalist Service    Date of Admission:  5/21/2025    Assessment & Plan   Richar Davis is a 69 year old male admitted on 5/21/2025 with suspected congestive heart failure.  He has history of type 2 diabetes, hypertension, hyperlipidemia, coronary artery disease with previous MI and three-vessel bypass, cataracts, A-fib, cardioversion, chronic anticoagulation with Eliquis, obesity with BMI of 37, and aortic valve replacement.  He had a recent prolonged hospitalization at [facility] from 3/30/2025 through 4/29/2025 with SBO due to impacted gallstone. He underwent ex lap, small bowel enterotomy and removal of gallstone 3/30, then small bowel resection on 4/8 as he became more distended and demonstrated septic shock, temporarily closed and stabilized in ICU. He returned to OR 4/10 for small bowel anastomosis and primary closure of incision and placement of wound vac.  After discharge he went to transitional care where he stayed for 2 weeks and has been home for about a week.  He presented to the emergency department for evaluation of 2 days of worsening weakness, cough, and orthopnea.  He thinks he might have a little bit more lower extremity edema than normal.  He denied chest pain, fevers, and chills.    In the ED, VSS. BNP of 2003.  Procalcitonin was normal at 0.09. CMP was unremarkable.  CBC was unremarkable aside from hemoglobin 11.5 and platelets 143.  Chest x-ray showed bilateral pulmonary opacities, consistent with fluid.  He was given Lasix 60 mg IV for suspected congestive heart failure.     Suspected acute congestive heart failure  Generalized muscle weakness  Orthopnea  Lower extremity edema  PE less likely on DOAC  No clear infection  Presents with cough, SOB worse with lying down and LE edema. Low suspicion for pna. CXR and labs consistent with acute CHF  - diuresed with Lasix 40 mg IV twice daily. Held this AM (5/23)  due to soft BPs  - stop IV lasix, restart PO lasix 40 mg daily 5/23  - Echocardiogram this morning is unchanged other than AV mean gradient is increased from 9 to 14-16 mmHg. EF of 55-60%.  - continue strict I&O's, daily weights  - daily BMP  - pt reports improvement but not at baseline. Occasionally requiring 1L NS. If not improving consider expanded evaluation with possible CTA of chest    Hypertension  Hyperlipidemia  Coronary artery disease  Previous triple bypass surgery  Paroxysmal Atrial flutter, new onset during previous admission, s/p cardioversion 4/7  Aortic valve replacement   - continue amiodarone, Lipitor, and metoprolol.  - continue Eliquis  - Held po Lasix on admission and given IV Lasix. Ok to resume PO lasix today    Type 2 diabetes  - On low-dose of metformin  - NovoLog insulin sliding scale (medium resistance)  - hypoglycemia protocol    Recent prolonged hospital stay with gallstone ileus, surgeries, and wound issues  Wound VAC discontinued  - wound cares per WOC     Weakness and deconditioning  - He is deconditioned from recent hospital stay.  - OT consulted, recommending TCU    Severe obesity BMI 38          Diet: Combination Diet 2 gm NA Diet; No Caffeine Diet (and additional linked orders)  Fluid restriction 2000 ML FLUID (and additional linked orders)    DVT Prophylaxis: DOAC  Mccarty Catheter: Not present  Lines: None     Cardiac Monitoring: ACTIVE order. Indication: Acute decompensated heart failure (48 hours)  Code Status: Full Code      Clinically Significant Risk Factors Present on Admission         # Hyponatremia: Lowest Na = 132 mmol/L in last 2 days, will monitor as appropriate  # Hypochloremia: Lowest Cl = 96 mmol/L in last 2 days, will monitor as appropriate       # Drug Induced Coagulation Defect: home medication list includes an anticoagulant medication    # Hypertension: Noted on problem list  # Acute heart failure with preserved ejection fraction: heart failure noted on problem  list, last echo with EF >50%, and receiving IV diuretics         # DMII: A1C = 6.8 % (Ref range: <5.7 %) within past 6 months    # Obesity: Estimated body mass index is 34.68 kg/m  as calculated from the following:    Height as of this encounter: 1.829 m (6').    Weight as of this encounter: 116 kg (255 lb 11.2 oz).       # Financial/Environmental Concerns:     # History of CABG: noted on surgical history       Social Drivers of Health    Tobacco Use: High Risk (5/21/2025)    Patient History     Smoking Tobacco Use: Former     Smokeless Tobacco Use: Current          Disposition Plan     Medically Ready for Discharge: Anticipated in 2-4 Days           The patient's care was discussed with the Bedside Nurse and Patient.    Halley Correa PA-C  Hospitalist Service  [facility]  Securely message with [employer] (more info)  Text page via [employer] Paging/Directory   ______________________________________________________________________    Interval History   States breathing is better but not baseline, currently on 1L supplemental O2. Ongoing cough    Physical Exam   Vital Signs: Temp: 97.9  F (36.6  C) Temp src: Axillary BP: 111/54 Pulse: 79   Resp: 18 SpO2: 98 % O2 Device: Nasal cannula Oxygen Delivery: 1 LPM  Weight: 255 lbs 11.2 oz    GENERAL:  Comfortable.  PSYCH: pleasant, oriented, No acute distress.  HEART:  Normal S1, S2 with no murmur, no pericardial rub, gallops or S3 or S4.  LUNGS:  expiratory wheeze otherwise clear to auscultation, normal Respiratory effort.   GI:  Soft, normal bowel sounds. Non-tender, non distended.   EXTREMITIES:  trace pedal edema, +2 pulses bilateral and equal.  SKIN:  Dry to touch, No rash, wound or ulcerations.  NEUROLOGIC:  grossly intact      Medical Decision Making       60 MINUTES SPENT BY ME on the date of service doing chart review, history, exam, documentation & further activities per the note.      Data     I have personally reviewed the following data over  the past 24 hrs:    N/A  \   N/A   / N/A     N/A N/A N/A /  115 (H)   3.5 N/A N/A \       Imaging results reviewed over the past 24 hrs:   Recent Results (from the past 24 hours)   Echocardiogram Complete   Result Value    LVEF  60-65%    [facility]    [id number]  [id number]  [id number]  [id number]^RONNELL^JOHN^GLORY     [facility]  Echocardiography Laboratory  [address] [place] [address]     Name: KANNAN LORD  MRN: [id number]  : 1955  Study Date: 2025 08:24 AM  Age: 69 yrs  Gender: Male  Patient Location: [facility]  Reason For Study: CHF  Ordering Physician: JOHN REYNAGA  Performed By: Rosendo Valdes RDCS     BSA: 2.4 m2  Height: 72 in  Weight: 255 lb  HR: 73  BP: 143/82 mmHg  ______________________________________________________________________________  Procedure  Echocardiogram with two-dimensional, color and spectral Doppler. Optison (NDC  #[id number]) given intravenously. Technically difficult study.  ______________________________________________________________________________  Interpretation Summary     Technically difficult study.  Status post CABG and 25 mm Bal Inspiris Resilia bovine valve (10/2022).  The valve is well-seated. No regurgitation. Mean systolic gradient 14 mmHg.  Normal left ventricular systolic function. Estimated LVEF 55-60%.  No regional wall motion abnormalities.  The right ventricle was not well-visualized.     Compared to the recent study dated 4/15/2025, bioprosthetic aortic valve mean  gradient has increased from 9 mmHg to 14-16 mmHg.     ______________________________________________________________________________  Left Ventricle  The left ventricle is normal in size. There is normal left ventricular wall  thickness. Left ventricular systolic function is normal. The visual ejection  fraction is 60-65%. Grade I or early diastolic dysfunction. No regional wall  motion abnormalities noted.     Right Ventricle  The right ventricle is not  well visualized. Right ventricular function cannot  be assessed due to poor image quality.     Atria  Normal left atrial size. Right atrium not well visualized. There is no atrial  shunt seen.     Mitral Valve  Mitral valve sclerosis. There is trace mitral regurgitation. There is no  mitral valve stenosis. The mean mitral valve gradient is 3.2 mmHg.     Tricuspid Valve  The tricuspid valve is not well visualized. There is trace tricuspid  regurgitation.     Aortic Valve  There is a bioprosthetic aortic valve. This degree of valvular regurgitation  is within normal limits.     Pulmonic Valve  The pulmonic valve is not well visualized.     Vessels  The aortic root is normal size. Ascending aorta not well visualized. The  inferior vena cava is normal.     Pericardium  There is no pericardial effusion.     Rhythm  Sinus rhythm was noted.  ______________________________________________________________________________  MMode/2D Measurements & Calculations     IVSd: 1.1 cm  LVIDd: 4.4 cm  LVIDs: 3.3 cm  LVPWd: 0.90 cm  IVC diam: 2.2 cm  FS: 25.9 %  LV mass(C)d: 154.8 grams  LV mass(C)dI: 65.5 grams/m2  Ao root diam: 2.6 cm  LA dimension: 5.0 cm  LA/Ao: 1.9  LVOT diam: 1.9 cm  LVOT area: 2.8 cm2  Ao root diam index Ht(cm/m): 1.4  Ao root diam index BSA (cm/m2): 1.1  LA Volume (BP): 46.9 ml  LA Volume Index (BP): 19.9 ml/m2     RWT: 0.40     Doppler Measurements & Calculations  MV E max shola: 130.0 cm/sec  MV A max shola: 98.5 cm/sec  MV E/A: 1.3  MV max P.4 mmHg  MV mean PG: 3.2 mmHg  MV V2 VTI: 38.4 cm  MVA(VTI): 1.7 cm2  MV dec slope: 597.3 cm/sec2  MV dec time: 0.22 sec  Ao V2 max: 279.1 cm/sec  Ao max P.0 mmHg  Ao V2 mean: 164.5 cm/sec  Ao mean P.1 mmHg  Ao V2 VTI: 52.1 cm  AKILAH(I,D): 1.2 cm2  AKILAH(V,D): 1.0 cm2  LV V1 max P.1 mmHg  LV V1 max: 101.8 cm/sec  LV V1 VTI: 23.0 cm  SV(LVOT): 65.1 ml  SI(LVOT): 27.6 ml/m2  AV Shola Ratio (DI): 0.36  AKILAH Index (cm2/m2): 0.53  E/E' av.3  Lateral E/e':  11.5  [facility] E/e': 13.1     ______________________________________________________________________________  Report approved by: Dr Thanh Bai on 05/23/2025 12:32 PM

## 2025-05-23 NOTE — PLAN OF CARE
"Patient is alert and oriented, VS WNL, normal sinus rhythm, bowel sounds active, lung sounds coarse, patient complained of some cough and SOB which was managed with robitussin and albuterol neb, patient is an AX1 with the walker, on a cardiac diet, wound vac to mid-abdominal wound/incision removed today by woc. Plan: RT and WOC following, PT and Social work to consult  Problem: Adult Inpatient Plan of Care  Goal: Plan of Care Review  Description: The Plan of Care Review/Shift note should be completed every shift.  The Outcome Evaluation is a brief statement about your assessment that the patient is improving, declining, or no change.  This information will be displayed automatically on your shift  note.  Outcome: Progressing  Flowsheets (Taken 5/22/2025 2122)  Plan of Care Reviewed With: patient  Overall Patient Progress: improving  Goal: Patient-Specific Goal (Individualized)  Description: You can add care plan individualizations to a care plan. Examples of Individualization might be:  \"Parent requests to be called daily at 9am for status\", \"I have a hard time hearing out of my right ear\", or \"Do not touch me to wake me up as it startles  me\".  Outcome: Progressing  Goal: Absence of Hospital-Acquired Illness or Injury  Outcome: Progressing  Intervention: Identify and Manage Fall Risk  Recent Flowsheet Documentation  Taken 5/22/2025 1631 by Monty Mayen, DAVID  Safety Promotion/Fall Prevention:   activity supervised   assistive device/personal items within reach   clutter free environment maintained   lighting adjusted   nonskid shoes/slippers when out of bed   room organization consistent   safety round/check completed   treat reversible contributory factors   supervised activity   treat underlying cause  Intervention: Prevent Skin Injury  Recent Flowsheet Documentation  Taken 5/22/2025 1631 by Monty Mayen, RN  Body Position: position changed independently  Goal: Optimal Comfort and " Wellbeing  Outcome: Progressing  Intervention: Monitor Pain and Promote Comfort  Recent Flowsheet Documentation  Taken 5/22/2025 1631 by Monty Mayen RN  Pain Management Interventions: medication (see MAR)  Goal: Readiness for Transition of Care  Outcome: Progressing     Problem: Delirium  Goal: Optimal Coping  Outcome: Progressing  Goal: Improved Behavioral Control  Outcome: Progressing  Intervention: Minimize Safety Risk  Recent Flowsheet Documentation  Taken 5/22/2025 1631 by Monty Mayen RN  Enhanced Safety Measures:   pain management   patient/family teach back on injury risk   monitor patients coagulation values   assistive devices when indicated   Pre/Post Op education on fall prevention   review medications for side effects with activity  Goal: Improved Attention and Thought Clarity  Outcome: Progressing  Intervention: Maximize Cognitive Function  Recent Flowsheet Documentation  Taken 5/22/2025 1631 by Monty Mayen RN  Sensory Stimulation Regulation:   care clustered   quiet environment promoted   television on  Goal: Improved Sleep  Outcome: Progressing     Problem: Comorbidity Management  Goal: Blood Glucose Levels Within Targeted Range  Outcome: Progressing  Intervention: Monitor and Manage Glycemia  Recent Flowsheet Documentation  Taken 5/22/2025 1631 by Monty Mayen RN  Medication Review/Management:   medications reviewed   high-risk medications identified   Goal Outcome Evaluation:      Plan of Care Reviewed With: patient    Overall Patient Progress: improvingOverall Patient Progress: improving

## 2025-05-23 NOTE — PLAN OF CARE
Goal Outcome Evaluation:  Care From: [id number]  Admitted Diagnosis: Chronic/Acute CHF      Plan of Care Reviewed With: patient    Overall Patient Progress: improvingOverall Patient Progress: improving    Outcome Evaluation: A&Ox4. Reported low back pain. Tramadol given. pt stated he is unable to sleep due to orthopnea and frequent cough. Sat stable on RA but placed on 2L O2 to see if ii ease breathing. pt said O2 helped with SOB. Placed on continous pulse-ox,    Vitals: BP 89/54 (BP Location: Right arm)   Pulse 65   Temp 97.9  F (36.6  C) (Oral)   Resp 18   Ht 1.829 m (6')   Wt 116.8 kg (257 lb 8 oz)   SpO2 100%   BMI 34.92 kg/m       Neuro: alert and oriented x4. Able to make needs known.  Cardiac: wdl. On tele running SR/HR 70's.   Lungs: pt c/o unable to sleep due to orthopnea and frequent cough. Sat stable on RA but placed on 2L O2 to see if it eases breathing. pt said O2 helped with SOB. Placed on continous pulse-ox and 2L O2 for the night. Cough meds and PRN Albuterol neb given.    GI: wdl ex for surgical wound in the mid abdomen. Mepliex changed   : wdl. Uses urinal at bedside.  Pain: low back pain. Tramadol given.  IV: IV Lasix Q12 hrs.  Labs/Imaging: Mg 1.8. K+ 4.1. On Mg and K+ replacement protocol.   Diet: 2gm Na diet with 2000cc fluid restriction. BG ACHS.   Activity: assist of x1  Consult: WOC, PT, OT, SW  Plan: strict I&Os. Pain management. Wound care. Daily weight. PT/OT eval pending. Echo in AM if tolerating laying in bed.     Problem: Adult Inpatient Plan of Care  Goal: Plan of Care Review  Description: The Plan of Care Review/Shift note should be completed every shift.  The Outcome Evaluation is a brief statement about your assessment that the patient is improving, declining, or no change.  This information will be displayed automatically on your shift  note.  Outcome: Progressing  Flowsheets (Taken 5/23/2025 4563)  Outcome Evaluation: A&Ox4. Reported low back pain. Tramadol given. pt  "stated he is unable to sleep due to orthopnea and frequent cough. Sat stable on RA but placed on 2L O2 to see if ii ease breathing. pt said O2 helped with SOB. Placed on continous pulse-ox,  Plan of Care Reviewed With: patient  Overall Patient Progress: improving  Goal: Patient-Specific Goal (Individualized)  Description: You can add care plan individualizations to a care plan. Examples of Individualization might be:  \"Parent requests to be called daily at 9am for status\", \"I have a hard time hearing out of my right ear\", or \"Do not touch me to wake me up as it startles  me\".  Outcome: Progressing  Goal: Absence of Hospital-Acquired Illness or Injury  Outcome: Progressing  Intervention: Identify and Manage Fall Risk  Recent Flowsheet Documentation  Taken 5/23/2025 0000 by Fran Landrum RN  Safety Promotion/Fall Prevention:   activity supervised   clutter free environment maintained   increased rounding and observation   room organization consistent   safety round/check completed  Intervention: Prevent Skin Injury  Recent Flowsheet Documentation  Taken 5/23/2025 0000 by Fran Landrum RN  Body Position: position changed independently  Goal: Optimal Comfort and Wellbeing  Outcome: Progressing  Intervention: Provide Person-Centered Care  Recent Flowsheet Documentation  Taken 5/23/2025 0000 by Fran Landrum RN  Trust Relationship/Rapport:   care explained   choices provided  Goal: Readiness for Transition of Care  Outcome: Progressing     Problem: Delirium  Goal: Optimal Coping  Outcome: Progressing  Goal: Improved Behavioral Control  Outcome: Progressing  Intervention: Minimize Safety Risk  Recent Flowsheet Documentation  Taken 5/23/2025 0000 by Fran Landrum RN  Enhanced Safety Measures:   pain management   review medications for side effects with activity  Trust Relationship/Rapport:   care explained   choices provided  Goal: Improved Attention and Thought Clarity  Outcome: " Progressing  Goal: Improved Sleep  Outcome: Progressing     Problem: Comorbidity Management  Goal: Blood Glucose Levels Within Targeted Range  Outcome: Progressing  Intervention: Monitor and Manage Glycemia  Recent Flowsheet Documentation  Taken 5/23/2025 0000 by Fran Landrum RN  Medication Review/Management: medications reviewed

## 2025-05-23 NOTE — CONSULTS
Care Management Initial Consult    General Information  Assessment completed with: Patient, Spouse or significant other (Met with pt and wife at bedside, pt sleeping soundly. Assessment completed with wife),    Type of CM/SW Visit: Initial Assessment    Primary Care Provider verified and updated as needed: Yes   Readmission within the last 30 days: no previous admission in last 30 days      Reason for Consult: discharge planning    Communication Assessment  Patient's communication style: spoken language (English or Bilingual)    Hearing Difficulty or Deaf: no   Wear Glasses or Blind: yes    Cognitive  Cognitive/Neuro/Behavioral: WDL                      Living Environment:   People in home: spouse     Current living Arrangements: house      Able to return to prior arrangements:  (TBD)     Family/Social Support:  Care provided by: self, spouse/significant other  Provides care for: no one, unable/limited ability to care for self  Marital Status:   Support system: Wife          Description of Support System: Supportive, Involved       Current Resources:   Patient receiving home care services: Yes  Skilled Home Care Services: Skilled Nursing, Physical Therapy, Home Health Aid  Community Resources: Home Care  Equipment currently used at home: shower chair, commode chair, walker, rolling    Lifestyle & Psychosocial Needs:  Social Drivers of Health     Food Insecurity: Low Risk  (5/22/2025)    Food Insecurity     Within the past 12 months, did you worry that your food would run out before you got money to buy more?: No     Within the past 12 months, did the food you bought just not last and you didn t have money to get more?: No   Depression: Not at risk (1/7/2025)    [facility] [employer] [facility]    PHQ-2     PHQ-2 TOTAL SCORE: 0   Housing Stability: Low Risk  (5/22/2025)    Housing Stability     Do you have housing? : Yes     Are you worried about losing your housing?: No   Tobacco  Use: High Risk (5/21/2025)    Patient History     Smoking Tobacco Use: Former     Smokeless Tobacco Use: Current     Passive Exposure: Not on file   Financial Resource Strain: Low Risk  (5/22/2025)    Financial Resource Strain     Within the past 12 months, have you or your family members you live with been unable to get utilities (heat, electricity) when it was really needed?: No   Alcohol Use: Not on file   Transportation Needs: Low Risk  (5/22/2025)    Transportation Needs     Within the past 12 months, has lack of transportation kept you from medical appointments, getting your medicines, non-medical meetings or appointments, work, or from getting things that you need?: No   Physical Activity: Not on file   Interpersonal Safety: Low Risk  (5/22/2025)    Interpersonal Safety     Do you feel physically and emotionally safe where you currently live?: Yes     Within the past 12 months, have you been hit, slapped, kicked or otherwise physically hurt by someone?: No     Within the past 12 months, have you been humiliated or emotionally abused in other ways by your partner or ex-partner?: No   Stress: Not on file   Social Connections: Socially Integrated (9/30/2024)    Received from [employer] [facility]    Social Connections     Do you often feel lonely or isolated from those around you?: 0   Health Literacy: Not on file     Functional Status:  Prior to admission patient needed assistance:   Dependent ADLs:: Bathing  Dependent IADLs:: Cleaning, Cooking, Laundry, Shopping, Meal Preparation, Transportation     Discussed  Partnership in Safe Discharge Planning  document with patient/family: Yes    Additional Information:  CM consulted due to Unplanned Readmission Risk of 25%. Met with pt and wife at bedside to assess for needs. Pt sleeping soundly, assessment completed with wife. Pt was admitted at [facility] from 3/30-4/29 and then was at [facility] from 4/29-5/13. He was discharged to home with Home  [employer] [facility] RN/PT/HHA and wound vac to abdominal wound, confirmed with HC Care Coordinator Shantell, they will need updated orders at discharge. Wound vac was discontinued on 5/22. Pt does not use home O2 at baseline.     OT provided update and is recommending TCU at discharge. Wife reports that pt would prefer to go home but has concerns for increased weakness leading to this hospitalization and feels as though pt will need TCU upon discharge. She reports that pt will be agreeable to this if home is not a safe option. She would like referrals sent for a private room to 1) [place] 2) [place] 3) [place] 4) [facility].     Wife and pt to decide on transportation closer to discharge.     Next Steps: Follow-up on referrals, Rhoda BERGERON RN BSN   Inpatient Care Coordination  [facility]   Phone [phone]

## 2025-05-23 NOTE — PLAN OF CARE
"Goal Outcome Evaluation:      Plan of Care Reviewed With: patient    Overall Patient Progress: no changeOverall Patient Progress: no change    Outcome Evaluation: Patient states he didn't sleep overnight, took a nap today. Frequent productive cough. 93% on RA, however will wear 2L for comfort.  Continuous pulse on inplace.  Tele SR. RT following, scheduled and PRN Nebs given.  PO lasix given.  ECHO complete. Complains of chronic low back pain; Tramodol and Robaxin given.  Mid abdomen wound, dressed with Mepliex dressing. Following a low sodium, no caffiene, FR 2000ml diet.      Problem: Adult Inpatient Plan of Care  Goal: Plan of Care Review  Description: The Plan of Care Review/Shift note should be completed every shift.  The Outcome Evaluation is a brief statement about your assessment that the patient is improving, declining, or no change.  This information will be displayed automatically on your shift  note.  Outcome: Progressing  Flowsheets (Taken 5/23/2025 8470)  Outcome Evaluation:   Patient states he didn't sleep overnight, took a nap today. Frequent productive cough. 93% on RA, however will wear 2L for comfort.  Continuous pulse on inplace.  Tele SR. RT following, scheduled and PRN Nebs given.  PO lasix given.  ECHO complete. Complains of chronic low back pain   Tramodol and Robaxin given.  Mid abdomen wound, dressed with Mepliex dressing. Following a low sodium, no caffiene, FR 2000ml diet.  Plan of Care Reviewed With: patient  Overall Patient Progress: no change  Goal: Patient-Specific Goal (Individualized)  Description: You can add care plan individualizations to a care plan. Examples of Individualization might be:  \"Parent requests to be called daily at 9am for status\", \"I have a hard time hearing out of my right ear\", or \"Do not touch me to wake me up as it startles  me\".  Outcome: Progressing  Goal: Absence of Hospital-Acquired Illness or Injury  Outcome: Progressing  Intervention: Identify and Manage " Fall Risk  Recent Flowsheet Documentation  Taken 5/23/2025 0800 by Drea Brooks RN  Safety Promotion/Fall Prevention:   activity supervised   clutter free environment maintained   increased rounding and observation   room organization consistent   safety round/check completed  Intervention: Prevent Skin Injury  Recent Flowsheet Documentation  Taken 5/23/2025 0800 by Drea Brooks RN  Body Position: position changed independently  Goal: Optimal Comfort and Wellbeing  Outcome: Progressing  Intervention: Provide Person-Centered Care  Recent Flowsheet Documentation  Taken 5/23/2025 0800 by Drea Brooks RN  Trust Relationship/Rapport:   care explained   choices provided  Goal: Readiness for Transition of Care  Outcome: Progressing

## 2025-05-24 VITALS
BODY MASS INDEX: 34.63 KG/M2 | RESPIRATION RATE: 16 BRPM | HEART RATE: 75 BPM | SYSTOLIC BLOOD PRESSURE: 112 MMHG | TEMPERATURE: 97.7 F | OXYGEN SATURATION: 95 % | HEIGHT: 72 IN | WEIGHT: 255.7 LBS | DIASTOLIC BLOOD PRESSURE: 60 MMHG

## 2025-05-24 LAB
ANION GAP SERPL CALCULATED.3IONS-SCNC: 11 MMOL/L (ref 7–15)
BUN SERPL-MCNC: 11.4 MG/DL (ref 8–23)
CALCIUM SERPL-MCNC: 9.6 MG/DL (ref 8.8–10.4)
CHLORIDE SERPL-SCNC: 98 MMOL/L (ref 98–107)
CREAT SERPL-MCNC: 0.83 MG/DL (ref 0.67–1.17)
EGFRCR SERPLBLD CKD-EPI 2021: >90 ML/MIN/1.73M2
ERYTHROCYTE [DISTWIDTH] IN BLOOD BY AUTOMATED COUNT: 16 % (ref 10–15)
GLUCOSE BLDC GLUCOMTR-MCNC: 111 MG/DL (ref 70–99)
GLUCOSE BLDC GLUCOMTR-MCNC: 91 MG/DL (ref 70–99)
GLUCOSE SERPL-MCNC: 100 MG/DL (ref 70–99)
HCO3 SERPL-SCNC: 28 MMOL/L (ref 22–29)
HCT VFR BLD AUTO: 37.6 % (ref 40–53)
HGB BLD-MCNC: 11.7 G/DL (ref 13.3–17.7)
MAGNESIUM SERPL-MCNC: 1.8 MG/DL (ref 1.7–2.3)
MCH RBC QN AUTO: 27.9 PG (ref 26.5–33)
MCHC RBC AUTO-ENTMCNC: 31.1 G/DL (ref 31.5–36.5)
MCV RBC AUTO: 90 FL (ref 78–100)
PLATELET # BLD AUTO: 180 10E3/UL (ref 150–450)
POTASSIUM SERPL-SCNC: 3.6 MMOL/L (ref 3.4–5.3)
RBC # BLD AUTO: 4.19 10E6/UL (ref 4.4–5.9)
SODIUM SERPL-SCNC: 137 MMOL/L (ref 135–145)
WBC # BLD AUTO: 5.2 10E3/UL (ref 4–11)

## 2025-05-24 PROCEDURE — 99239 HOSP IP/OBS DSCHRG MGMT >30: CPT | Performed by: PHYSICIAN ASSISTANT

## 2025-05-24 PROCEDURE — 999N000157 HC STATISTIC RCP TIME EA 10 MIN

## 2025-05-24 PROCEDURE — 94640 AIRWAY INHALATION TREATMENT: CPT

## 2025-05-24 PROCEDURE — 250N000013 HC RX MED GY IP 250 OP 250 PS 637: Performed by: INTERNAL MEDICINE

## 2025-05-24 PROCEDURE — 82435 ASSAY OF BLOOD CHLORIDE: CPT | Performed by: PHYSICIAN ASSISTANT

## 2025-05-24 PROCEDURE — 83735 ASSAY OF MAGNESIUM: CPT | Performed by: PHYSICIAN ASSISTANT

## 2025-05-24 PROCEDURE — 85027 COMPLETE CBC AUTOMATED: CPT | Performed by: PHYSICIAN ASSISTANT

## 2025-05-24 PROCEDURE — 250N000013 HC RX MED GY IP 250 OP 250 PS 637: Performed by: PHYSICIAN ASSISTANT

## 2025-05-24 PROCEDURE — 250N000009 HC RX 250: Performed by: INTERNAL MEDICINE

## 2025-05-24 PROCEDURE — 99239 HOSP IP/OBS DSCHRG MGMT >30: CPT | Mod: 24 | Performed by: STUDENT IN AN ORGANIZED HEALTH CARE EDUCATION/TRAINING PROGRAM

## 2025-05-24 PROCEDURE — 36415 COLL VENOUS BLD VENIPUNCTURE: CPT | Performed by: PHYSICIAN ASSISTANT

## 2025-05-24 RX ORDER — ATORVASTATIN CALCIUM 20 MG/1
80 TABLET, FILM COATED ORAL DAILY
COMMUNITY
Start: 2025-05-24

## 2025-05-24 RX ORDER — BENZONATATE 100 MG/1
100 CAPSULE ORAL 3 TIMES DAILY PRN
Qty: 30 CAPSULE | Refills: 0 | Status: SHIPPED | OUTPATIENT
Start: 2025-05-24

## 2025-05-24 RX ORDER — FUROSEMIDE 20 MG/1
40 TABLET ORAL DAILY
COMMUNITY
Start: 2025-05-24

## 2025-05-24 RX ADMIN — Medication 1 CAPSULE: at 09:43

## 2025-05-24 RX ADMIN — ATORVASTATIN CALCIUM 80 MG: 40 TABLET, FILM COATED ORAL at 09:42

## 2025-05-24 RX ADMIN — NICOTINE 1 PATCH: 21 PATCH, EXTENDED RELEASE TRANSDERMAL at 09:41

## 2025-05-24 RX ADMIN — AMIODARONE HYDROCHLORIDE 200 MG: 200 TABLET ORAL at 09:51

## 2025-05-24 RX ADMIN — METOPROLOL TARTRATE 25 MG: 25 TABLET, FILM COATED ORAL at 09:43

## 2025-05-24 RX ADMIN — PANTOPRAZOLE SODIUM 40 MG: 40 TABLET, DELAYED RELEASE ORAL at 09:43

## 2025-05-24 RX ADMIN — ACETAMINOPHEN 650 MG: 325 TABLET, FILM COATED ORAL at 09:44

## 2025-05-24 RX ADMIN — FUROSEMIDE 40 MG: 40 TABLET ORAL at 09:44

## 2025-05-24 RX ADMIN — APIXABAN 5 MG: 5 TABLET, FILM COATED ORAL at 09:45

## 2025-05-24 RX ADMIN — METHOCARBAMOL 500 MG: 500 TABLET ORAL at 09:44

## 2025-05-24 RX ADMIN — TRAMADOL HYDROCHLORIDE 50 MG: 50 TABLET, COATED ORAL at 13:46

## 2025-05-24 RX ADMIN — IPRATROPIUM BROMIDE AND ALBUTEROL SULFATE 3 ML: .5; 3 SOLUTION RESPIRATORY (INHALATION) at 07:31

## 2025-05-24 RX ADMIN — SENNOSIDES AND DOCUSATE SODIUM 1 TABLET: 50; 8.6 TABLET ORAL at 09:43

## 2025-05-24 RX ADMIN — METHOCARBAMOL 500 MG: 500 TABLET ORAL at 13:46

## 2025-05-24 ASSESSMENT — ACTIVITIES OF DAILY LIVING (ADL)
ADLS_ACUITY_SCORE: 48
ADLS_ACUITY_SCORE: 47
ADLS_ACUITY_SCORE: 48
ADLS_ACUITY_SCORE: 47
ADLS_ACUITY_SCORE: 48

## 2025-05-24 NOTE — DISCHARGE SUMMARY
[facility]  Hospitalist Discharge Summary      Date of Admission:  5/21/2025  Date of Discharge:  5/24/2025  Discharging Provider: Halley Correa PA-C  Discharge Service: Hospitalist Service    Discharge Diagnoses   Acute CHF exacerbation  Cough  Orthopnea  Generalized weakness    Clinically Significant Risk Factors     # DMII: A1C = 6.8 % (Ref range: <5.7 %) within past 6 months  # Obesity: Estimated body mass index is 34.68 kg/m  as calculated from the following:    Height as of this encounter: 1.829 m (6').    Weight as of this encounter: 116 kg (255 lb 11.2 oz).       Follow-ups Needed After Discharge   Follow-up Appointments       Hospital Follow-up with Existing Primary Care Provider (PCP)          Schedule Primary Care visit within: 7 Days               Unresulted Labs Ordered in the Past 30 Days of this Admission       No orders found from 4/21/2025 to 5/22/2025.        These results will be followed up by PCP    Discharge Disposition   Discharged to home  Condition at discharge: Stable    Hospital Course   Richar Davis is a 69 year old male admitted on 5/21/2025 with suspected congestive heart failure.  He has history of type 2 diabetes, hypertension, hyperlipidemia, coronary artery disease with previous MI and three-vessel bypass, cataracts, A-fib, cardioversion, chronic anticoagulation with Eliquis, obesity with BMI of 37, and aortic valve replacement.  He had a recent prolonged hospitalization at [facility] from 3/30/2025 through 4/29/2025 with SBO due to impacted gallstone. He underwent ex lap, small bowel enterotomy and removal of gallstone 3/30, then small bowel resection on 4/8 as he became more distended and demonstrated septic shock, temporarily closed and stabilized in ICU. He returned to OR 4/10 for small bowel anastomosis and primary closure of incision and placement of wound vac.  After discharge he went to transitional care where he stayed  for 2 weeks and has been home for about a week.  He presented to the emergency department for evaluation of 2 days of worsening weakness, cough, and orthopnea.  He thinks he might have a little bit more lower extremity edema than normal.  He denied chest pain, fevers, and chills.    In the ED, VSS. BNP of 2003.  Procalcitonin was normal at 0.09. CMP was unremarkable.  CBC was unremarkable aside from hemoglobin 11.5 and platelets 143.  Chest x-ray showed bilateral pulmonary opacities, consistent with fluid.  He was given Lasix 60 mg IV for suspected congestive heart failure.     Suspected acute congestive heart failure  Generalized muscle weakness  Orthopnea  Lower extremity edema  PE less likely on DOAC  No clear infection  Presents with cough, SOB worse with lying down and LE edema. Low suspicion for pna. CXR and labs consistent with acute CHF  - diuresed with Lasix 40 mg IV twice daily. Held 5/23 due to soft BPs  - stop IV lasix, restart PO lasix 40 mg daily 5/23  - Echocardiogram this morning is unchanged other than AV mean gradient is increased from 9 to 14-16 mmHg. EF of 55-60%.  - weight 271 in cardiology clinic on 5/19, 257 5/22 after 2 doses of IV lasix. 225 on 5/23  - out nearly 4L  - breathing and cough improved, occasionally required 1L NS, on room air today. Able to ambulate in hallway without hypoxia.   - ok to discharge on increased dose of PO lasix, 40 mg daily, up from 20 mg.   - pt instructed to weigh himself daily and record. Contact cardiologist if pt gains more than 3 lbs in one day or 5 lbs in one week.   - may benefit from CORE clinic but will defer to cardiologist.    Hypertension  Hyperlipidemia  Coronary artery disease  Previous triple bypass surgery  Paroxysmal Atrial flutter, new onset during previous admission, s/p cardioversion 4/7  Aortic valve replacement   - continue amiodarone, Lipitor, and metoprolol.  - continue Eliquis  - Held po Lasix on admission and given IV Lasix. Ok to resume  PO lasix 5/23    Type 2 diabetes  HgbA1c 6.8 in March  - continue low-dose of metformin    Recent prolonged hospital stay with gallstone ileus, surgeries, and wound issues  Wound VAC discontinued  - wound cares per WOC     Weakness and deconditioning  - He is deconditioned from recent hospital stay.  - OT consulted, recommending TCU. Pt able to ambulate in hallways, ok to discharge home  - Home Care services ordered on discharge     Severe obesity BMI 38    Consultations This Hospital Stay   OCCUPATIONAL THERAPY ADULT IP CONSULT  WOUND OSTOMY CONTINENCE NURSE  IP CONSULT  CARE MANAGEMENT / SOCIAL WORK IP CONSULT    Code Status   Full Code    Time Spent on this Encounter   I, Halley Correa PA-C, personally saw the patient today and spent greater than 30 minutes discharging this patient.       Halley Correa PA-C  [facility] OBSERVATION DEPT  [address]  [facility] [address]  Phone: [phone]  ______________________________________________________________________    Physical Exam   Vital Signs: Temp: 97.7  F (36.5  C) Temp src: Oral BP: 112/60 Pulse: 75   Resp: 16 SpO2: 95 % O2 Device: None (Room air) Oxygen Delivery: 1 LPM  Weight: 255 lbs 11.2 oz    GENERAL:  Comfortable.  PSYCH: pleasant, oriented, No acute distress.  HEART:  Normal S1, S2 with no murmur, no pericardial rub, gallops or S3 or S4.  LUNGS:  mild expiratory wheeze otherwise clear to auscultation, normal Respiratory effort.   GI:  Soft, normal bowel sounds. Healing surgical incision to central abd.    EXTREMITIES:  able to ambulate  NEUROLOGIC:  grossly intact        Primary Care Physician   Cory Valles    Discharge Orders      Home Care Referral      Reason for your hospital stay    Cough, shortness of breath, especially with lying down, and weakness. Suspect symptoms are due to acute CHF exacerbation. You were started on IV diuresis with good output and improvement in symptoms. You were transitioned to  oral lasix at an increased dose and tolerated this well. Wound vac was discontinued here and wound care recommendations made.     Activity    Your activity upon discharge: activity as tolerated     When to contact your care team    Call your primary doctor if you have any of the following: temperature greater than 101, increased shortness of breath, or chest pain.     Discharge Instructions    Record daily weights every morning. Call cardiology clinic if you gain >3 lbs in one day or >5 lbs in one week.  Increase lasix to 40 mg daily     Wound care and dressings    Instructions to care for your wound at home:  Abdominal wound(s): Every 3 days  1. Cleanse wounds and surrounding skin with Vashe and dry  2. Apply Aquacel Ag ([id number]) cut to fit to wounds   3. Cover with sacral Mepilex     Diet    Follow this diet upon discharge: 2g salt diet     Hospital Follow-up with Existing Primary Care Provider (PCP)            Significant Results and Procedures   Most Recent 3 CBC's:  Recent Labs   Lab Test 05/24/25  0805 05/21/25  1328 04/28/25  0820   WBC 5.2 5.4 6.1   HGB 11.7* 11.5* 10.4*   MCV 90 88 93    143* 168     Most Recent 3 BMP's:  Recent Labs   Lab Test 05/24/25  0805 05/24/25  0715 05/24/25  0214 05/23/25  0748 05/23/25  0536 05/22/25  0802 05/22/25  0742 05/21/25  1852 05/21/25  1328     --   --   --   --   --  133*  --  132*   POTASSIUM 3.6  --   --   --  3.5  --  4.1  --  4.2   CHLORIDE 98  --   --   --   --   --  96*  --  96*   CO2 28  --   --   --   --   --  22  --  24   BUN 11.4  --   --   --   --   --  9.9  --  8.8   CR 0.83  --   --   --   --   --  0.72  --  0.75   ANIONGAP 11  --   --   --   --   --  15  --  12   HALEY 9.6  --   --   --   --   --  9.4  --  9.3   * 91 111*   < >  --    < > 103*   < > 119*    < > = values in this interval not displayed.     Most Recent 2 LFT's:  Recent Labs   Lab Test 05/21/25  1328 04/28/25  0820   AST 30 19   ALT 21 16   ALKPHOS 111 91   BILITOTAL 0.7  0.2     Most Recent 3 INR's:  Recent Labs   Lab Test 04/07/25  0727 04/07/25  0425 10/20/22  1526   INR 1.15 1.13 1.31*     Most Recent 3 Troponin's:No lab results found.  Most Recent 3 BNP's:  Recent Labs   Lab Test 05/21/25  1328 04/24/25  0707 04/16/25  0303 04/08/25  0604   NTBNPI  --  875 2,027* 3,106*   NTBNP 2,003*  --   --   --      Most Recent D-dimer:  Recent Labs   Lab Test 05/22/25  1310   DD 0.96*     7-Day Micro Results       Collected Updated Procedure Result Status      05/21/2025 1313 05/21/2025 1404 Influenza A/B, RSV and SARS-CoV2 PCR (COVID-19) Nasopharyngeal [[id number]]    Swab from Nasopharyngeal    Final result Component Value   Influenza A PCR Negative   Influenza B PCR Negative   RSV PCR Negative   SARS CoV2 PCR Negative   NEGATIVE: SARS-CoV-2 (COVID-19) RNA not detected, presumed negative.                  Most Recent TSH and T4:No lab results found.  Most Recent Hemoglobin A1c:  Recent Labs   Lab Test 03/31/25  0809   A1C 6.8*     Most Recent Urinalysis:  Recent Labs   Lab Test 04/14/25  0855   COLOR Orange*   APPEARANCE Slightly Cloudy*   URINEGLC Negative   URINEBILI Negative   URINEKETONE 60*   SG 1.030   UBLD Large*   URINEPH 5.5   PROTEIN 70*   NITRITE Negative   LEUKEST Small*   RBCU >182*   WBCU 56*   ,   Results for orders placed or performed during the hospital encounter of 05/21/25   Chest XR,  PA & LAT    Narrative    EXAM: XR CHEST 2 VIEWS  LOCATION: [facility]  DATE: 5/21/2025    INDICATION: Shortness of breath  COMPARISON: Chest CT 4/24/2025      Impression    IMPRESSION: Stable size of cardiac silhouette status post median sternotomy, CABG and aortic valve replacement. There are some patchy bilateral lower lobe and perihilar opacities, differential includes moderate edema and infectious/inflammatory process.   No definite lobar consolidation, pleural effusion or pneumothorax. Bones are unchanged.   Echocardiogram Complete     Value    LVEF  60-65%     [facility]    [id number]  [id number]  [id number]  [id number]^RONNELL^JOHN^GLORY     [facility]  Echocardiography Laboratory  [address] [place] [address]     Name: KANNAN LORD  MRN: [id number]  : 1955  Study Date: 2025 08:24 AM  Age: 69 yrs  Gender: Male  Patient Location: [facility]  Reason For Study: CHF  Ordering Physician: JOHN REYNAGA  Performed By: Rosendo Valdes RDCS     BSA: 2.4 m2  Height: 72 in  Weight: 255 lb  HR: 73  BP: 143/82 mmHg  ______________________________________________________________________________  Procedure  Echocardiogram with two-dimensional, color and spectral Doppler. Optison (NDC  #[id number]) given intravenously. Technically difficult study.  ______________________________________________________________________________  Interpretation Summary     Technically difficult study.  Status post CABG and 25 mm Bal Inspiris Resilia bovine valve (10/2022).  The valve is well-seated. No regurgitation. Mean systolic gradient 14 mmHg.  Normal left ventricular systolic function. Estimated LVEF 55-60%.  No regional wall motion abnormalities.  The right ventricle was not well-visualized.     Compared to the recent study dated 4/15/2025, bioprosthetic aortic valve mean  gradient has increased from 9 mmHg to 14-16 mmHg.     ______________________________________________________________________________  Left Ventricle  The left ventricle is normal in size. There is normal left ventricular wall  thickness. Left ventricular systolic function is normal. The visual ejection  fraction is 60-65%. Grade I or early diastolic dysfunction. No regional wall  motion abnormalities noted.     Right Ventricle  The right ventricle is not well visualized. Right ventricular function cannot  be assessed due to poor image quality.     Atria  Normal left atrial size. Right atrium not well visualized. There is no atrial  shunt seen.     Mitral Valve  Mitral valve sclerosis.  There is trace mitral regurgitation. There is no  mitral valve stenosis. The mean mitral valve gradient is 3.2 mmHg.     Tricuspid Valve  The tricuspid valve is not well visualized. There is trace tricuspid  regurgitation.     Aortic Valve  There is a bioprosthetic aortic valve. This degree of valvular regurgitation  is within normal limits.     Pulmonic Valve  The pulmonic valve is not well visualized.     Vessels  The aortic root is normal size. Ascending aorta not well visualized. The  inferior vena cava is normal.     Pericardium  There is no pericardial effusion.     Rhythm  Sinus rhythm was noted.  ______________________________________________________________________________  MMode/2D Measurements & Calculations     IVSd: 1.1 cm  LVIDd: 4.4 cm  LVIDs: 3.3 cm  LVPWd: 0.90 cm  IVC diam: 2.2 cm  FS: 25.9 %  LV mass(C)d: 154.8 grams  LV mass(C)dI: 65.5 grams/m2  Ao root diam: 2.6 cm  LA dimension: 5.0 cm  LA/Ao: 1.9  LVOT diam: 1.9 cm  LVOT area: 2.8 cm2  Ao root diam index Ht(cm/m): 1.4  Ao root diam index BSA (cm/m2): 1.1  LA Volume (BP): 46.9 ml  LA Volume Index (BP): 19.9 ml/m2     RWT: 0.40     Doppler Measurements & Calculations  MV E max shola: 130.0 cm/sec  MV A max shola: 98.5 cm/sec  MV E/A: 1.3  MV max P.4 mmHg  MV mean PG: 3.2 mmHg  MV V2 VTI: 38.4 cm  MVA(VTI): 1.7 cm2  MV dec slope: 597.3 cm/sec2  MV dec time: 0.22 sec  Ao V2 max: 279.1 cm/sec  Ao max P.0 mmHg  Ao V2 mean: 164.5 cm/sec  Ao mean P.1 mmHg  Ao V2 VTI: 52.1 cm  AKILAH(I,D): 1.2 cm2  AKILAH(V,D): 1.0 cm2  LV V1 max P.1 mmHg  LV V1 max: 101.8 cm/sec  LV V1 VTI: 23.0 cm  SV(LVOT): 65.1 ml  SI(LVOT): 27.6 ml/m2  AV Shola Ratio (DI): 0.36  AKILAH Index (cm2/m2): 0.53  E/E' av.3  Lateral E/e': 11.5  Medial E/e': 13.1     ______________________________________________________________________________  Report approved by: Dr Thanh Bai on 2025 12:32 PM             Discharge Medications   Current Discharge Medication List         START taking these medications    Details   benzonatate (TESSALON) 100 MG capsule Take 1 capsule (100 mg) by mouth 3 times daily as needed for cough.  Qty: 30 capsule, Refills: 0    Associated Diagnoses: Acute cough           CONTINUE these medications which have CHANGED    Details   atorvastatin (LIPITOR) 20 MG tablet Take 4 tablets (80 mg) by mouth daily.    Associated Diagnoses: S/P AVR (aortic valve replacement); S/P CABG (coronary artery bypass graft)      furosemide (LASIX) 20 MG tablet Take 2 tablets (40 mg) by mouth daily.    Associated Diagnoses: S/P AVR (aortic valve replacement); S/P CABG (coronary artery bypass graft); Coronary artery disease due to lipid rich plaque; Nonrheumatic aortic valve stenosis           CONTINUE these medications which have NOT CHANGED    Details   acetaminophen (TYLENOL) 325 MG tablet Take 2 tablets (650 mg) by mouth every 4 hours as needed for other (For optimal non-opioid multimodal pain management to improve pain control.)  Qty: 100 tablet, Refills: 1    Associated Diagnoses: S/P AVR (aortic valve replacement); S/P CABG (coronary artery bypass graft)      amiodarone (PACERONE) 200 MG tablet Take 1 tablet (200 mg) by mouth daily.    Associated Diagnoses: Paroxysmal atrial fibrillation (H)      amoxicillin (AMOXIL) 500 MG capsule Take 4 capsules (2,000 mg) 30-60 minutes prior to your dental procedure  Qty: 4 capsule, Refills: 1    Associated Diagnoses: S/P AVR (aortic valve replacement)      apixaban ANTICOAGULANT (ELIQUIS) 5 MG tablet Take 1 tablet (5 mg) by mouth 2 times daily.    Associated Diagnoses: Paroxysmal atrial fibrillation (H)      Ascorbic Acid (VITAMIN C) 500 MG CAPS Take 500 mg by mouth daily      Cyanocobalamin (VITAMIN B-12 PO) Take 1 tablet by mouth every other day. Unknown dose      diphenhydrAMINE (BENADRYL) 50 MG tablet Take 100 mg by mouth every 6 hours as needed for itching or allergies      fish oil-omega-3 fatty acids 1000 MG capsule Take 2 g by  mouth daily      folic acid (FOLVITE) 1 MG tablet Take 1,000 mcg by mouth 2 times daily      gabapentin (NEURONTIN) 100 MG tablet Take 1 tablet (100 mg) by mouth 2 times daily.    Associated Diagnoses: Neuropathy      MAGNESIUM PO Take 500 mg by mouth daily.      metFORMIN (GLUCOPHAGE) 500 MG tablet Take 0.5 tablets (250 mg) by mouth daily (with dinner) (0.5 x 500 mg = 250 mg)  Qty: 30 tablet, Refills: 1    Associated Diagnoses: S/P AVR (aortic valve replacement); S/P CABG (coronary artery bypass graft)      methocarbamol (ROBAXIN) 500 MG tablet Take 500 mg by mouth 4 times daily.      metoprolol tartrate (LOPRESSOR) 25 MG tablet Take 1 tablet (25 mg) by mouth 2 times daily. Hold for SBP<100 or HR<55.    Associated Diagnoses: Paroxysmal atrial fibrillation (H); Benign essential hypertension      miconazole (MICATIN) 2 % external powder Apply topically 2 times daily as needed for other (rash groin).    Associated Diagnoses: Rash      Multiple Vitamins-Minerals (MENS 50+ MULTI VITAMIN/MIN PO) Take 1 tablet by mouth daily      nicotine (COMMIT) 2 MG lozenge Place 1 lozenge (2 mg) inside cheek every hour as needed for nicotine withdrawal symptoms.    Associated Diagnoses: Tobacco use disorder      nicotine (NICODERM CQ) 21 MG/24HR 24 hr patch Place 1 patch over 24 hours onto the skin daily. Taper over 8 weeks as per protocol.    Associated Diagnoses: Tobacco use disorder      pantoprazole (PROTONIX) 40 MG EC tablet Take 1 tablet (40 mg) by mouth every morning (before breakfast).    Associated Diagnoses: Gallstone ileus (H)      polyethylene glycol (MIRALAX) 17 GM/Dose powder Take 17 g by mouth daily as needed for constipation.    Associated Diagnoses: Gallstone ileus (H)      psyllium (METAMUCIL) 28.3 % packet Take 1 packet by mouth 2 times daily.  Qty: 16 packet, Refills: 0    Associated Diagnoses: Gallstone ileus (H)      senna-docusate (SENOKOT-S/PERICOLACE) 8.6-50 MG tablet Take 1 tablet by mouth 2 times  daily.  Qty: 15 tablet, Refills: 0    Associated Diagnoses: Acute post-operative pain      tamsulosin (FLOMAX) 0.4 MG capsule Take 0.8 mg by mouth every evening. (2 x 0.4 mg = 0.8 mg)      traMADol (ULTRAM) 50 MG tablet Take 1 tablet (50 mg) by mouth every 6 hours as needed for severe pain.  Qty: 15 tablet, Refills: 0    Associated Diagnoses: Acute post-operative pain      vitamin D3 (CHOLECALCIFEROL) 50 mcg (2000 units) tablet Take 1 tablet by mouth every other day           Allergies   Allergies   Allergen Reactions    Niaspan [Niacin] Other (See Comments)     flushing

## 2025-05-24 NOTE — PLAN OF CARE
"INPATIENT NOTE: 1500 - 2300     PRIMARY PROBLEM: Generalized Weakness     Vital Signs: Stable        Orientation: A/O x 4  Neuro: Intact  Pain status: Denies  Resp: Lung sounds CTA, denies any SOB  Cardiac: WDL, Tele: SR 71  GI: Bowel sounds active. Last BM 5/21  : Incontinent  Skin: Abdominal wound, dressing intact, WOC following  LDA: Peripheral IV to SL  Pertinent Labs/Imaging: Mag/Pot protocol, recheck tomorrow AM  Diet: Combination, no caffeine  Activity: Assist x 1 with walker  Alarms/Safety: All alarms on and audible   Consults: SW,OT,WOC   Discharge Plan: PT recommends TCU, if pt do not want TCU, then home with HH & OT. SW is following for safe placement  Discharge Date: TBD     Will continue to monitor and provide cares.     Carolina oRa RN Problem: Adult Inpatient Plan of Care  Goal: Plan of Care Review  Description: The Plan of Care Review/Shift note should be completed every shift.  The Outcome Evaluation is a brief statement about your assessment that the patient is improving, declining, or no change.  This information will be displayed automatically on your shift  note.  Outcome: Progressing  Goal: Patient-Specific Goal (Individualized)  Description: You can add care plan individualizations to a care plan. Examples of Individualization might be:  \"Parent requests to be called daily at 9am for status\", \"I have a hard time hearing out of my right ear\", or \"Do not touch me to wake me up as it startles  me\".  Outcome: Progressing  Goal: Absence of Hospital-Acquired Illness or Injury  Outcome: Progressing  Intervention: Identify and Manage Fall Risk  Recent Flowsheet Documentation  Taken 5/23/2025 1633 by Carolina Roa RN  Safety Promotion/Fall Prevention:   activity supervised   clutter free environment maintained   mobility aid in reach   lighting adjusted   nonskid shoes/slippers when out of bed  Intervention: Prevent Skin Injury  Recent Flowsheet Documentation  Taken 5/23/2025 1633 by Janina" Carolina FLOYD RN  Body Position: position changed independently  Intervention: Prevent and Manage VTE (Venous Thromboembolism) Risk  Recent Flowsheet Documentation  Taken 5/23/2025 1633 by Carolina Roa RN  VTE Prevention/Management: SCDs off (sequential compression devices)  Intervention: Prevent Infection  Recent Flowsheet Documentation  Taken 5/23/2025 1633 by Carolina Roa RN  Infection Prevention:   cohorting utilized   hand hygiene promoted   single patient room provided   rest/sleep promoted  Goal: Optimal Comfort and Wellbeing  Outcome: Progressing  Goal: Readiness for Transition of Care  Outcome: Progressing     Problem: Delirium  Goal: Optimal Coping  Outcome: Progressing  Goal: Improved Behavioral Control  Outcome: Progressing  Intervention: Minimize Safety Risk  Recent Flowsheet Documentation  Taken 5/23/2025 1633 by Carolina Roa RN  Enhanced Safety Measures:   pain management   review medications for side effects with activity  Goal: Improved Attention and Thought Clarity  Outcome: Progressing  Goal: Improved Sleep  Outcome: Progressing     Problem: Comorbidity Management  Goal: Blood Glucose Levels Within Targeted Range  Outcome: Progressing  Intervention: Monitor and Manage Glycemia  Recent Flowsheet Documentation  Taken 5/23/2025 1633 by Carolina Roa RN  Medication Review/Management: medications reviewed

## 2025-05-24 NOTE — PLAN OF CARE
"A&O x4. On RA during care but likes to use 1L of O2 while asleep. PIV saline locked. Up with A1 GB/W. Tele in place SR. Abdominal wound dressed with a mapilex. Mg K protocol. Denies pain,SOB. Plan for discharge to either TCU or home with home health per SW notes.       Goal Outcome Evaluation:      Plan of Care Reviewed With: patient    Overall Patient Progress: no changeOverall Patient Progress: no change    Outcome Evaluation: Plan for discharge to home with home care per pt preference.    Problem: Adult Inpatient Plan of Care  Goal: Plan of Care Review  Description: The Plan of Care Review/Shift note should be completed every shift.  The Outcome Evaluation is a brief statement about your assessment that the patient is improving, declining, or no change.  This information will be displayed automatically on your shift  note.  Outcome: Progressing  Flowsheets (Taken 5/24/2025 0316)  Outcome Evaluation: Plan for discharge to home with home care per pt preference.  Plan of Care Reviewed With: patient  Overall Patient Progress: no change  Goal: Patient-Specific Goal (Individualized)  Description: You can add care plan individualizations to a care plan. Examples of Individualization might be:  \"Parent requests to be called daily at 9am for status\", \"I have a hard time hearing out of my right ear\", or \"Do not touch me to wake me up as it startles  me\".  Outcome: Progressing  Goal: Absence of Hospital-Acquired Illness or Injury  Outcome: Progressing  Intervention: Identify and Manage Fall Risk  Recent Flowsheet Documentation  Taken 5/24/2025 0028 by Thierry Holliday RN  Safety Promotion/Fall Prevention:   activity supervised   room door open   safety round/check completed   supervised activity  Intervention: Prevent Skin Injury  Recent Flowsheet Documentation  Taken 5/24/2025 0028 by Thierry Holliday RN  Body Position: position changed independently  Intervention: Prevent and Manage VTE (Venous Thromboembolism) " Risk  Recent Flowsheet Documentation  Taken 5/24/2025 0028 by Thierry Holliday RN  VTE Prevention/Management: SCDs off (sequential compression devices)  Intervention: Prevent Infection  Recent Flowsheet Documentation  Taken 5/24/2025 0028 by Thierry Holliday RN  Infection Prevention: rest/sleep promoted  Goal: Optimal Comfort and Wellbeing  Outcome: Progressing  Intervention: Provide Person-Centered Care  Recent Flowsheet Documentation  Taken 5/24/2025 0028 by Thierry Holliday RN  Trust Relationship/Rapport:   care explained   choices provided  Goal: Readiness for Transition of Care  Outcome: Progressing     Problem: Delirium  Goal: Optimal Coping  Outcome: Progressing  Goal: Improved Behavioral Control  Outcome: Progressing  Intervention: Minimize Safety Risk  Recent Flowsheet Documentation  Taken 5/24/2025 0028 by Thierry Holliday RN  Enhanced Safety Measures: pain management  Trust Relationship/Rapport:   care explained   choices provided  Goal: Improved Attention and Thought Clarity  Outcome: Progressing  Goal: Improved Sleep  Outcome: Progressing     Problem: Comorbidity Management  Goal: Blood Glucose Levels Within Targeted Range  Outcome: Progressing  Intervention: Monitor and Manage Glycemia  Recent Flowsheet Documentation  Taken 5/24/2025 0028 by Thierry Holliday RN  Medication Review/Management: medications reviewed

## 2025-05-24 NOTE — PLAN OF CARE
"Inpatient: 0700 - 1419    Dx: Congestive Heart Failure       Orientation: x4  Pain: Chronic Lumbar Pain   Activity: SBA with Walker from Home   LDA: Right PIV SL   Diet: Cardiac Diet - 2000 mL Fluid Restriction  Neuro: Baseline neuropathy - otherwise intact   Cardio: On Tele   Resp: Crackles in lung fields - Dyspnea upon exertion and displays orthopnea  MS: Generalized Weakness   GI: WDL  : Voiding without difficulty   Derm: Incisional wound to abdomen   Plan: Anticipating discharge           Passed road test and maintained adequate oxygenation - Orthostatic blood pressures completed.         Given PRN acetaminophen and tramadol for lumbar pain.           Wound care performed per orders this shift.           /60 (BP Location: Left arm, Patient Position: Semi-Brock's, Cuff Size: Adult Large)   Pulse 75   Temp 97.7  F (36.5  C) (Oral)   Resp 16   Ht 1.829 m (6')   Wt 116 kg (255 lb 11.2 oz)   SpO2 95%   BMI 34.68 kg/m             Problem: Adult Inpatient Plan of Care  Goal: Plan of Care Review  Description: The Plan of Care Review/Shift note should be completed every shift.  The Outcome Evaluation is a brief statement about your assessment that the patient is improving, declining, or no change.  This information will be displayed automatically on your shift  note.  Outcome: Progressing  Flowsheets (Taken 5/24/2025 1232)  Outcome Evaluation: Passed Road Test  Plan of Care Reviewed With: patient  Overall Patient Progress: no change  Goal: Patient-Specific Goal (Individualized)  Description: You can add care plan individualizations to a care plan. Examples of Individualization might be:  \"Parent requests to be called daily at 9am for status\", \"I have a hard time hearing out of my right ear\", or \"Do not touch me to wake me up as it startles  me\".  Outcome: Progressing  Goal: Absence of Hospital-Acquired Illness or Injury  Outcome: Progressing  Intervention: Identify and Manage Fall Risk  Recent Flowsheet " Documentation  Taken 5/24/2025 1228 by Arun Nicholas RN  Safety Promotion/Fall Prevention:   activity supervised   assistive device/personal items within reach   clutter free environment maintained   lighting adjusted   mobility aid in reach   nonskid shoes/slippers when out of bed   patient and family education   room organization consistent   safety round/check completed   supervised activity  Intervention: Prevent Skin Injury  Recent Flowsheet Documentation  Taken 5/24/2025 1228 by Arun Nicholas RN  Body Position: position changed independently  Intervention: Prevent and Manage VTE (Venous Thromboembolism) Risk  Recent Flowsheet Documentation  Taken 5/24/2025 1228 by Arun Nicholas RN  VTE Prevention/Management: SCDs off (sequential compression devices)  Goal: Optimal Comfort and Wellbeing  Outcome: Progressing  Goal: Readiness for Transition of Care  Outcome: Progressing     Problem: Delirium  Goal: Optimal Coping  Outcome: Progressing  Goal: Improved Behavioral Control  Outcome: Progressing  Intervention: Minimize Safety Risk  Recent Flowsheet Documentation  Taken 5/24/2025 1228 by Arun Nicholas RN  Enhanced Safety Measures:   pain management   patient/family teach back on injury risk   review medications for side effects with activity  Goal: Improved Attention and Thought Clarity  Outcome: Progressing  Goal: Improved Sleep  Outcome: Progressing     Problem: Comorbidity Management  Goal: Blood Glucose Levels Within Targeted Range  Outcome: Progressing  Intervention: Monitor and Manage Glycemia  Recent Flowsheet Documentation  Taken 5/24/2025 1228 by Arun Nicholas RN  Medication Review/Management: medications reviewed   Goal Outcome Evaluation:      Plan of Care Reviewed With: patient    Overall Patient Progress: no changeOverall Patient Progress: no change    Outcome Evaluation: Passed Road Test

## 2025-05-24 NOTE — PLAN OF CARE
Patient's After Visit Summary was reviewed with patient and/or significant other.   Patient verbalized understanding of After Visit Summary, recommended follow up and was given an opportunity to ask questions.   Discharge medications sent home with patient/family: Not applicable   Discharged with significant other            Goal Outcome Evaluation:      Plan of Care Reviewed With: patient    Overall Patient Progress: no changeOverall Patient Progress: no change    Outcome Evaluation: Passed Road Test

## 2025-05-27 ENCOUNTER — PATIENT OUTREACH (OUTPATIENT)
Dept: CARE COORDINATION | Facility: CLINIC | Age: 70
End: 2025-05-27
Payer: COMMERCIAL

## 2025-05-27 NOTE — PROGRESS NOTES
[facility]    Background: Transitional Care Management program identified per system criteria and reviewed by [facility] team for possible outreach.    Assessment: Upon chart review, [facility] Team member will not proceed with patient outreach related to this episode of Transitional Care Management program due to reason below:    Patient declined to answer all post hospital discharge questions with [facility] team member and disconnected call.    Plan: Transitional Care Management episode addressed appropriately per reason noted above.      SLY Esquivel  [facility]    *Connected Care Resource Team does NOT follow patient ongoing. Referrals are identified based on internal discharge reports and the outreach is to ensure patient has an understanding of their discharge instructions.   Well Child Exam: age 6 month old     Impression:   · 6 month old female, Health Maintenance Exam  · We discussed the following problems today (see detail below):     Patient Active Problem List   Diagnosis   • Single liveborn, born in hospital, delivered     · Otherwise healthy    Plan:   · The Bright Futures handout for age was provided and discussed, as were other handouts in our age-specific packet.   · Immunizations: Given today per the recommended schedule (see specific orders below). The parent was counseled on each vaccine component, and potential side effects were discussed.    Follow up:Return in about 3 months (around 2017) for Well Exam.    Diagnoses associated with this encounter:  1. Encounter for routine child health examination without abnormal findings    2. Need for vaccination        Orders Placed This Encounter   • DTaP Hep B IPV (Pediarix) - 06307   • Pneumococcal Conjugate 13 (Prevnar) - 03418   • Rotavirus Vaccine (Pentavalent) - 64327       ___________________________________________________________    Chief Complaint   Patient presents with   • Well Child     6 month, wet 7-8, bm every few days, breast feeding 5-6 times per day       History:  Tesha Atwood is a 6 month old girl  and is here for well child exam.   · Development for age: Normal per [place] Developmental Prescreening Questionnaire  · Growth for age: Normal  · Feeding: Breast milk  · Parental concerns: None    The patient's problem list was reviewed and updated as follows:  Patient Active Problem List    Diagnosis Date Noted   • Single liveborn, born in hospital, delivered 2016     Priority: Medium       Other concerns: None    ALLERGIES:  No Known Allergies  Current Outpatient Prescriptions   Medication Sig Dispense Refill   • Cholecalciferol (VITAMIN D) 400 UNIT/ML liquid Take 1 mL by mouth daily. 50 mL 11     No current facility-administered medications for this visit.        Physical:  The patient is alert and in  no distress.  Vital Signs   Visit Vitals  Pulse 88   Temp 97.5 °F (36.4 °C) (Temporal Artery)   Resp 20   Ht 25.5\" (64.8 cm)   Wt 6.464 kg   HC 41 cm (16.14\")   BMI 15.41 kg/m²     • Eyes including conjunctivae and pupils are normal.   • Ears: TMs normal  • Oropharynx: moist mucous membranes without erythema  • Neck: supple   • Lungs: clear to auscultation. No obvious tachypnea. Respiratory effort normal.  • Heart: regular rate and rhythm with normal heart sounds, no murmur. Precordium normal.  • Abdomen soft and nontender, without mass or hepatosplenomegaly  • Skin: well-perfused without rash. Normal skin turgor.  • Extremities: normal range of motion of upper and lower extremities, with normal hip exam.  • Neurologic: normal muscle tone and deep tendon reflexes. Facial movements normal and symmetric.   • Genitalia: Normal  female   Puberty Stage 1

## 2025-06-16 LAB — CV ZIO PRELIM RESULTS: NORMAL

## 2025-06-18 ENCOUNTER — RESULTS FOLLOW-UP (OUTPATIENT)
Dept: CARDIOLOGY | Facility: CLINIC | Age: 70
End: 2025-06-18

## 2025-07-03 ENCOUNTER — TELEPHONE (OUTPATIENT)
Dept: CARDIOLOGY | Facility: CLINIC | Age: 70
End: 2025-07-03

## 2025-07-03 ENCOUNTER — OFFICE VISIT (OUTPATIENT)
Dept: CARDIOLOGY | Facility: CLINIC | Age: 70
End: 2025-07-03
Attending: NURSE PRACTITIONER
Payer: COMMERCIAL

## 2025-07-03 VITALS
BODY MASS INDEX: 37.66 KG/M2 | OXYGEN SATURATION: 97 % | DIASTOLIC BLOOD PRESSURE: 62 MMHG | SYSTOLIC BLOOD PRESSURE: 112 MMHG | HEART RATE: 73 BPM | HEIGHT: 71 IN | WEIGHT: 269 LBS

## 2025-07-03 DIAGNOSIS — E66.01 MORBID OBESITY (H): ICD-10-CM

## 2025-07-03 DIAGNOSIS — E78.5 HYPERLIPIDEMIA LDL GOAL <70: ICD-10-CM

## 2025-07-03 DIAGNOSIS — Z95.2 S/P AVR (AORTIC VALVE REPLACEMENT): ICD-10-CM

## 2025-07-03 DIAGNOSIS — G47.33 OSA (OBSTRUCTIVE SLEEP APNEA): ICD-10-CM

## 2025-07-03 DIAGNOSIS — I25.10 CORONARY ARTERY DISEASE INVOLVING NATIVE CORONARY ARTERY OF NATIVE HEART WITHOUT ANGINA PECTORIS: Primary | ICD-10-CM

## 2025-07-03 DIAGNOSIS — I10 BENIGN ESSENTIAL HYPERTENSION: ICD-10-CM

## 2025-07-03 DIAGNOSIS — I48.0 PAROXYSMAL ATRIAL FIBRILLATION (H): ICD-10-CM

## 2025-07-03 DIAGNOSIS — I50.9 ACUTE ON CHRONIC CONGESTIVE HEART FAILURE, UNSPECIFIED HEART FAILURE TYPE (H): ICD-10-CM

## 2025-07-03 RX ORDER — AMIODARONE HYDROCHLORIDE 200 MG/1
200 TABLET ORAL DAILY
Qty: 90 TABLET | Refills: 4 | Status: CANCELLED | OUTPATIENT
Start: 2025-07-03

## 2025-07-03 NOTE — PROGRESS NOTES
CARDIOLOGY CLINIC NOTE    PRIMARY CARDIOLOGIST  Dr. Mejias   PRIMARY CARE PHYSICIAN:  Cory Valles    HISTORY OF PRESENT ILLNESS:  Richar Davis is a very pleasant 69-year-old male with a past medical history significant for coronary artery disease status post CABG x 3 using a LIMA/LAD, SVG/PDA and SVG to distal circumflex, aortic stenosis status post AVR with a 25 mm Bal Inspiris Resilia bovine valve (10/2022), postop A-fib with recurrence,  hypertension, hyperlipidemia, obstructive sleep apnea (untreated), tobacco use and type 2 diabetes.     He was last seen on 5/19/2025 for hospital follow-up.  In review, he was admitted on 3/30/2025 with vomiting and abdominal pain, suggestive for small bowel obstruction.  He underwent urgent exploratory laparotomy with enterotomy for removal of gallstones.  His admission was complicated by atrial flutter/fibrillation followed by successful cardioversion on 4/7/2025.  He was initiated on Eliquis 5 mg twice daily and continued on metoprolol.  Unfortunately, he became hypotensive with a significant spike in WBC count.  CT scan showed increased free fluid.  He returned to the OR and diagnosed with a breakdown of previous enterotomy repair. He underwent a small bowel resection on 4/8/2025 and a small bowel anastomosis/fascial closure on 4/10/2025.  He developed recurrent atrial flutter followed by successful MANDEEP/cardioversion.  He was initiated on amiodarone to be taken for a total of 3 months.     At that office visit, he noted episodes of near syncope.  He wore a 7-day Zio patch monitor that showed primarily sinus rhythm with a rate of 73, rare PACs and PVCs, no atrial fibrillation.    On 5/21/2025, he presented to the emergency room with acute heart failure exacerbation.  NT proBNP elevated at 2003.  He was initiated on IV Lasix and transitioned to 40 mg daily.  He diuresed approximately 20 pounds.  Discharge weight 255 pounds.  He states his dry weight is 269 pounds.    Echocardiogram on 5/23/2025 showed a normal EF 55-60%, no regional wall motion abnormalities, a well-seated bioprosthetic aortic valve with a mean gradient of 14 mmHg.     He returns to the office today for follow-up.  He is feeling well on a cardiac standpoint, denies chest pain, shortness of breath, palpitations, PND, orthopnea, presyncope, syncope or lower extremity edema.  He does note very occasional lightheadedness with position changes.  He notes some abdominal incisional tenderness.  He is scheduled to follow-up with GI in the near future.  Exam, lungs are clear bilaterally, heart rate and rhythm regular, no evidence of fluid overload.    Blood pressure is excellent at 112/62  Last blood work was reviewed, BMP normal.  CBC showed a hemoglobin of 11.7, hematocrit 37.6 and platelet 180.  Panel showed total cholesterol 125, HDL 26, LDL 71 and triglycerides 142.  Weight 269 pounds (dry weight), up 14 pounds from discharge weight.    He is back to exercising regularly  Follows a strict low-sodium diet  Compliant with all medications.      PAST MEDICAL HISTORY:  Past Medical History:   Diagnosis Date    Cataract     Complication of anesthesia     Diabetes mellitus (H)     Heart attack (H)     Heart murmur     Hypertension        MEDICATIONS:  Current Outpatient Medications   Medication Sig Dispense Refill    acetaminophen (TYLENOL) 325 MG tablet Take 2 tablets (650 mg) by mouth every 4 hours as needed for other (For optimal non-opioid multimodal pain management to improve pain control.) 100 tablet 1    amiodarone (PACERONE) 200 MG tablet Take 1 tablet (200 mg) by mouth daily. 30 tablet 0    amoxicillin (AMOXIL) 500 MG capsule Take 4 capsules (2,000 mg) 30-60 minutes prior to your dental procedure 4 capsule 1    apixaban ANTICOAGULANT (ELIQUIS) 5 MG tablet Take 1 tablet (5 mg) by mouth 2 times daily. 180 tablet 4    Ascorbic Acid (VITAMIN C) 500 MG CAPS Take 500 mg by mouth daily      atorvastatin (LIPITOR) 20 MG  tablet Take 4 tablets (80 mg) by mouth daily.      benzonatate (TESSALON) 100 MG capsule Take 1 capsule (100 mg) by mouth 3 times daily as needed for cough. 30 capsule 0    Cyanocobalamin (VITAMIN B-12 PO) Take 1 tablet by mouth every other day. Unknown dose      diphenhydrAMINE (BENADRYL) 50 MG tablet Take 100 mg by mouth every 6 hours as needed for itching or allergies      fish oil-omega-3 fatty acids 1000 MG capsule Take 2 g by mouth daily      folic acid (FOLVITE) 1 MG tablet Take 1,000 mcg by mouth 2 times daily      furosemide (LASIX) 20 MG tablet Take 2 tablets (40 mg) by mouth daily.      gabapentin (NEURONTIN) 100 MG tablet Take 1 tablet (100 mg) by mouth 2 times daily. (Patient taking differently: Take 100 mg by mouth 2 times daily as needed.)      MAGNESIUM PO Take 500 mg by mouth daily.      metFORMIN (GLUCOPHAGE) 500 MG tablet Take 0.5 tablets (250 mg) by mouth daily (with dinner) (0.5 x 500 mg = 250 mg) 30 tablet 1    methocarbamol (ROBAXIN) 500 MG tablet Take 500 mg by mouth 4 times daily.      metoprolol tartrate (LOPRESSOR) 25 MG tablet Take 1 tablet (25 mg) by mouth 2 times daily. Hold for SBP<100 or HR<55.      miconazole (MICATIN) 2 % external powder Apply topically 2 times daily as needed for other (rash groin).      Multiple Vitamins-Minerals (MENS 50+ MULTI VITAMIN/MIN PO) Take 1 tablet by mouth daily      nicotine (COMMIT) 2 MG lozenge Place 1 lozenge (2 mg) inside cheek every hour as needed for nicotine withdrawal symptoms.      nicotine (NICODERM CQ) 21 MG/24HR 24 hr patch Place 1 patch over 24 hours onto the skin daily. Taper over 8 weeks as per protocol.      polyethylene glycol (MIRALAX) 17 GM/Dose powder Take 17 g by mouth daily as needed for constipation.      psyllium (METAMUCIL) 28.3 % packet Take 1 packet by mouth 2 times daily. 16 packet 0    senna-docusate (SENOKOT-S/PERICOLACE) 8.6-50 MG tablet Take 1 tablet by mouth 2 times daily. 15 tablet 0    tamsulosin (FLOMAX) 0.4 MG  capsule Take 0.8 mg by mouth every evening. (2 x 0.4 mg = 0.8 mg)      traMADol (ULTRAM) 50 MG tablet Take 1 tablet (50 mg) by mouth every 6 hours as needed for severe pain. 15 tablet 0    vitamin D3 (CHOLECALCIFEROL) 50 mcg (2000 units) tablet Take 1 tablet by mouth every other day       No current facility-administered medications for this visit.       SOCIAL HISTORY:  I have reviewed this patient's social history and updated it with pertinent information if needed. Richar Davis  reports that he has quit smoking. His smokeless tobacco use includes chew. He reports that he does not currently use alcohol. He reports that he does not use drugs.    PHYSICAL EXAM:  Pulse:  [73] 73  BP: (112)/(62) 112/62  SpO2:  [97 %] 97 %  269 lbs 0 oz    Constitutional: alert, no distress  Respiratory: Good bilateral air entry  Cardiovascular: Regular rate and rhythm  GI: nondistended  Neuropsychiatric: appropriate affact    ASSESSMENT/PLAN:  Pertinent issues addressed/ reviewed during this cardiology visit  Atrial flutter/fibrillation -status post MANDEEP cardioversion on 4/14/2025.  Currently in regular rhythm.  Continue amiodarone for a total of 3 months.  Stop when current prescription is completed.  Will washout for 1 month and get a 7-day Zio patch monitor in early November.  Continue metoprolol and Eliquis for stroke prevention.  Coronary artery disease -status post CABG x 3 using a LIMA/LAD, SVG/PDA and SVG to distal circumflex.  No symptoms of ischemia.  Continue current medical therapy.  HFpEF - Wt up ~ 14 lbs from discharge weight.  Dry weight 269 pounds, consistent with today's weight.  Euvolemic upon exam.  He does not appear to be in decompensated heart failure.  Continue furosemide and metoprolol.  Aortic stenosis status post AVR with a 25 mm Bal Inspiris Resilia bovine valve (10/2022).  Recent echo showed a well-seated bioprosthetic aortic valve with a mean gradient of 14 mmHg.   Hypertension  -well-controlled  Hyperlipidemia -due for fasting lipid panel after up titration of atorvastatin.  Patient would like to get labs with [facility].   SBO in the setting of gallstones-status post resection 3/30/2025.  Complaints of mild incisional tenderness.  Follows with surgery/wound care.  Obstructive sleep apnea   Obesity - Encourage exercise and weight loss       Follow up in 4 months with Dr. Del Castillo.     It was a pleasure seeing this patient in clinic today. Please do not hesitate to contact me with any future questions.     ADONIS Dunn, CNP  Cardiology - [facility]  07/03/2025       The level of medical decision making during this visit was of moderate complexity.    This note was completed in part using dictation via the Dragon voice recognition software. Some word and grammatical errors may occur and must be interpreted in the appropriate clinical context.  If there are any questions pertaining to this issue, please contact me for further clarification.

## 2025-07-03 NOTE — PATIENT INSTRUCTIONS
Thanks for participating in a office visit with the [facility] clinic today.    Doing well on a cardiac standpoint.   Reviewed result soft echocardiogram showing heart function is stable. Valve is working well.   Due for fasting lipid panel and BNP in 2 weeks ( ok to get with [facility])   Stop Amiodarone after current prescription is done. Wash out of the medicine x 30 days.   7 day zio patch monitor in November   Continue on all other medications  Monitor daily weight. Call office with a weight gain of 3 lbs overnight or 5 lbs in a week   Strict low sodium diet.     Follow up in 6 months with SUKHWINDER     Please call my nurse at   [phone]. Call with any questions or concerns.    Scheduling phone number: [phone].   Reminder: Please bring in all current medications, over the counter supplements and vitamin bottles to your next appointment.

## 2025-07-03 NOTE — LETTER
7/3/2025    Cory Valles MD  [address]  [facility] [address]    RE: Richar Davis       Dear Colleague,     I had the pleasure of seeing Richar Davis in the [facility].  CARDIOLOGY CLINIC NOTE    PRIMARY CARDIOLOGIST  Dr. Mejias   PRIMARY CARE PHYSICIAN:  Cory Valles    HISTORY OF PRESENT ILLNESS:  Richar Davis is a very pleasant 69-year-old male with a past medical history significant for coronary artery disease status post CABG x 3 using a LIMA/LAD, SVG/PDA and SVG to distal circumflex, aortic stenosis status post AVR with a 25 mm Bal Inspiris Resilia bovine valve (10/2022), postop A-fib with recurrence,  hypertension, hyperlipidemia, obstructive sleep apnea (untreated), tobacco use and type 2 diabetes.     He was last seen on 5/19/2025 for hospital follow-up.  In review, he was admitted on 3/30/2025 with vomiting and abdominal pain, suggestive for small bowel obstruction.  He underwent urgent exploratory laparotomy with enterotomy for removal of gallstones.  His admission was complicated by atrial flutter/fibrillation followed by successful cardioversion on 4/7/2025.  He was initiated on Eliquis 5 mg twice daily and continued on metoprolol.  Unfortunately, he became hypotensive with a significant spike in WBC count.  CT scan showed increased free fluid.  He returned to the OR and diagnosed with a breakdown of previous enterotomy repair. He underwent a small bowel resection on 4/8/2025 and a small bowel anastomosis/fascial closure on 4/10/2025.  He developed recurrent atrial flutter followed by successful MANDEEP/cardioversion.  He was initiated on amiodarone to be taken for a total of 3 months.     At that office visit, he noted episodes of near syncope.  He wore a 7-day Zio patch monitor that showed primarily sinus rhythm with a rate of 73, rare PACs and PVCs, no atrial fibrillation.    On 5/21/2025, he presented to the emergency room with acute heart  Patient: Amy Clemens    Procedure(s):  hysteroscopy, dilation and curettage, possible polypectomy with myosure    Diagnosis:Thickened endometrium [R93.89]  Diagnosis Additional Information: No value filed.    Anesthesia Type:  MAC    Note:  Anesthesia Post Evaluation    Patient location during evaluation: PACU  Patient participation: Able to fully participate in evaluation  Level of consciousness: awake and alert  Pain management: adequate  multimodal analgesia used between 6 hours prior to anesthesia start to PACU dischargeAirway patency: patent  Cardiovascular status: acceptable  Respiratory status: acceptable  two or more mitigation strategies used for obstructive sleep apneaHydration status: acceptable  PONV: none     Anesthetic complications: None          Last vitals:  Vitals:    01/13/20 0900 01/13/20 0915 01/13/20 0955   BP: 110/62 120/66 116/81   Pulse:      Resp: 16 16 16   Temp:   98.5  F (36.9  C)   SpO2: 98%  98%         Electronically Signed By: Matt Pacheco MD  January 13, 2020  4:51 PM   failure exacerbation.  NT proBNP elevated at 2003.  He was initiated on IV Lasix and transitioned to 40 mg daily.  He diuresed approximately 20 pounds.  Discharge weight 255 pounds.  He states his dry weight is 269 pounds.   Echocardiogram on 5/23/2025 showed a normal EF 55-60%, no regional wall motion abnormalities, a well-seated bioprosthetic aortic valve with a mean gradient of 14 mmHg.     He returns to the office today for follow-up.  He is feeling well on a cardiac standpoint, denies chest pain, shortness of breath, palpitations, PND, orthopnea, presyncope, syncope or lower extremity edema.  He does note very occasional lightheadedness with position changes.  He notes some abdominal incisional tenderness.  He is scheduled to follow-up with GI in the near future.  Exam, lungs are clear bilaterally, heart rate and rhythm regular, no evidence of fluid overload.    Blood pressure is excellent at 112/62  Last blood work was reviewed, BMP normal.  CBC showed a hemoglobin of 11.7, hematocrit 37.6 and platelet 180.  Panel showed total cholesterol 125, HDL 26, LDL 71 and triglycerides 142.  Weight 269 pounds (dry weight), up 14 pounds from discharge weight.    He is back to exercising regularly  Follows a strict low-sodium diet  Compliant with all medications.      PAST MEDICAL HISTORY:  Past Medical History:   Diagnosis Date     Cataract      Complication of anesthesia      Diabetes mellitus (H)      Heart attack (H)      Heart murmur      Hypertension        MEDICATIONS:  Current Outpatient Medications   Medication Sig Dispense Refill     acetaminophen (TYLENOL) 325 MG tablet Take 2 tablets (650 mg) by mouth every 4 hours as needed for other (For optimal non-opioid multimodal pain management to improve pain control.) 100 tablet 1     amiodarone (PACERONE) 200 MG tablet Take 1 tablet (200 mg) by mouth daily. 30 tablet 0     amoxicillin (AMOXIL) 500 MG capsule Take 4 capsules (2,000 mg) 30-60 minutes prior to your  dental procedure 4 capsule 1     apixaban ANTICOAGULANT (ELIQUIS) 5 MG tablet Take 1 tablet (5 mg) by mouth 2 times daily. 180 tablet 4     Ascorbic Acid (VITAMIN C) 500 MG CAPS Take 500 mg by mouth daily       atorvastatin (LIPITOR) 20 MG tablet Take 4 tablets (80 mg) by mouth daily.       benzonatate (TESSALON) 100 MG capsule Take 1 capsule (100 mg) by mouth 3 times daily as needed for cough. 30 capsule 0     Cyanocobalamin (VITAMIN B-12 PO) Take 1 tablet by mouth every other day. Unknown dose       diphenhydrAMINE (BENADRYL) 50 MG tablet Take 100 mg by mouth every 6 hours as needed for itching or allergies       fish oil-omega-3 fatty acids 1000 MG capsule Take 2 g by mouth daily       folic acid (FOLVITE) 1 MG tablet Take 1,000 mcg by mouth 2 times daily       furosemide (LASIX) 20 MG tablet Take 2 tablets (40 mg) by mouth daily.       gabapentin (NEURONTIN) 100 MG tablet Take 1 tablet (100 mg) by mouth 2 times daily. (Patient taking differently: Take 100 mg by mouth 2 times daily as needed.)       MAGNESIUM PO Take 500 mg by mouth daily.       metFORMIN (GLUCOPHAGE) 500 MG tablet Take 0.5 tablets (250 mg) by mouth daily (with dinner) (0.5 x 500 mg = 250 mg) 30 tablet 1     methocarbamol (ROBAXIN) 500 MG tablet Take 500 mg by mouth 4 times daily.       metoprolol tartrate (LOPRESSOR) 25 MG tablet Take 1 tablet (25 mg) by mouth 2 times daily. Hold for SBP<100 or HR<55.       miconazole (MICATIN) 2 % external powder Apply topically 2 times daily as needed for other (rash groin).       Multiple Vitamins-Minerals (MENS 50+ MULTI VITAMIN/MIN PO) Take 1 tablet by mouth daily       nicotine (COMMIT) 2 MG lozenge Place 1 lozenge (2 mg) inside cheek every hour as needed for nicotine withdrawal symptoms.       nicotine (NICODERM CQ) 21 MG/24HR 24 hr patch Place 1 patch over 24 hours onto the skin daily. Taper over 8 weeks as per protocol.       polyethylene glycol (MIRALAX) 17 GM/Dose powder Take 17 g by mouth daily as  needed for constipation.       psyllium (METAMUCIL) 28.3 % packet Take 1 packet by mouth 2 times daily. 16 packet 0     senna-docusate (SENOKOT-S/PERICOLACE) 8.6-50 MG tablet Take 1 tablet by mouth 2 times daily. 15 tablet 0     tamsulosin (FLOMAX) 0.4 MG capsule Take 0.8 mg by mouth every evening. (2 x 0.4 mg = 0.8 mg)       traMADol (ULTRAM) 50 MG tablet Take 1 tablet (50 mg) by mouth every 6 hours as needed for severe pain. 15 tablet 0     vitamin D3 (CHOLECALCIFEROL) 50 mcg (2000 units) tablet Take 1 tablet by mouth every other day       No current facility-administered medications for this visit.       SOCIAL HISTORY:  I have reviewed this patient's social history and updated it with pertinent information if needed. Richar Davis  reports that he has quit smoking. His smokeless tobacco use includes chew. He reports that he does not currently use alcohol. He reports that he does not use drugs.    PHYSICAL EXAM:  Pulse:  [73] 73  BP: (112)/(62) 112/62  SpO2:  [97 %] 97 %  269 lbs 0 oz    Constitutional: alert, no distress  Respiratory: Good bilateral air entry  Cardiovascular: Regular rate and rhythm  GI: nondistended  Neuropsychiatric: appropriate affact    ASSESSMENT/PLAN:  Pertinent issues addressed/ reviewed during this cardiology visit  Atrial flutter/fibrillation -status post MANDEEP cardioversion on 4/14/2025.  Currently in regular rhythm.  Continue amiodarone for a total of 3 months.  Stop when current prescription is completed.  Will washout for 1 month and get a 7-day Zio patch monitor in early November.  Continue metoprolol and Eliquis for stroke prevention.  Coronary artery disease -status post CABG x 3 using a LIMA/LAD, SVG/PDA and SVG to distal circumflex.  No symptoms of ischemia.  Continue current medical therapy.  HFpEF - Wt up ~ 14 lbs from discharge weight.  Dry weight 269 pounds, consistent with today's weight.  Euvolemic upon exam.  He does not appear to be in decompensated heart failure.   Continue furosemide and metoprolol.  Aortic stenosis status post AVR with a 25 mm Bal Inspiris Resilia bovine valve (10/2022).  Recent echo showed a well-seated bioprosthetic aortic valve with a mean gradient of 14 mmHg.   Hypertension -well-controlled  Hyperlipidemia -due for fasting lipid panel after up titration of atorvastatin.  Patient would like to get labs with [facility].   SBO in the setting of gallstones-status post resection 3/30/2025.  Complaints of mild incisional tenderness.  Follows with surgery/wound care.  Obstructive sleep apnea   Obesity - Encourage exercise and weight loss       Follow up in 4 months with Dr. Del Castillo.     It was a pleasure seeing this patient in clinic today. Please do not hesitate to contact me with any future questions.     ADONIS Dunn, CNP  Cardiology - [facility]  07/03/2025       The level of medical decision making during this visit was of moderate complexity.    This note was completed in part using dictation via the Dragon voice recognition software. Some word and grammatical errors may occur and must be interpreted in the appropriate clinical context.  If there are any questions pertaining to this issue, please contact me for further clarification.    Thank you for allowing me to participate in the care of your patient.      Sincerely,     ADONIS Dunn CNP     [facility]  cc:   ADONIS Dunn CNP  [address] [place] [address]

## 2025-07-03 NOTE — TELEPHONE ENCOUNTER
Per Magalie MICHAUD   Patient is due for a fasting lipid panel and follow-up NT proBNP.  He would like to get these at [facility].  Could we please fax over a request?       Faxed orders to [facility] [phone] for above orders.  Estela Menezes RN, [phone]

## 2025-07-09 ENCOUNTER — OFFICE VISIT (OUTPATIENT)
Dept: SURGERY | Facility: CLINIC | Age: 70
End: 2025-07-09
Payer: COMMERCIAL

## 2025-07-09 DIAGNOSIS — G62.9 NEUROPATHY: ICD-10-CM

## 2025-07-09 PROCEDURE — 99024 POSTOP FOLLOW-UP VISIT: CPT | Performed by: SURGERY

## 2025-08-14 ENCOUNTER — TELEPHONE (OUTPATIENT)
Dept: CARDIOLOGY | Facility: CLINIC | Age: 70
End: 2025-08-14
Payer: COMMERCIAL

## 2025-08-19 ENCOUNTER — EXTERNAL ORDER RESULTS (OUTPATIENT)
Dept: LAB | Facility: CLINIC | Age: 70
End: 2025-08-19
Payer: COMMERCIAL

## 2025-08-21 LAB
CHOLESTEROL (EXTERNAL): 139 MG/DL
HDLC SERPL-MCNC: 58 MG/DL
LDLC SERPL CALC-MCNC: 67 MG/DL
Lab: 286 PG/ML
TRIGLYCERIDES (EXTERNAL): 61 MG/DL

## (undated) DEVICE — SU PROLENE 6-0 C-1DA 30" 8706H

## (undated) DEVICE — BONE WAX OSTENE 3.5GM CT410

## (undated) DEVICE — SU VICRYL 2-0 CT-1 27" J339H

## (undated) DEVICE — SU SILK 1 TIE 10X30" SA87G

## (undated) DEVICE — Device

## (undated) DEVICE — SU PROLENE 5-0 RB-1DA 36"  8556H

## (undated) DEVICE — GLOVE BIOGEL PI MICRO SZ 6.0 48560

## (undated) DEVICE — CANNULA PERFUSION AORTIC ROOT 22FR 20012

## (undated) DEVICE — LINEN TOWEL PACK X5 5464

## (undated) DEVICE — SU PROLENE 7-0 BV-1DA 4X30" M8703

## (undated) DEVICE — STPL RELOAD LINEAR CUT 75MM TCR75

## (undated) DEVICE — LINEN LEG ROLL 5489

## (undated) DEVICE — SU VICRYL 3-0 SH 27" J316H

## (undated) DEVICE — SUCTION TIP POOLE K770

## (undated) DEVICE — SU VICRYL 2-0 REEL 54" J206G

## (undated) DEVICE — PACK MAJOR SBA15MAFSI

## (undated) DEVICE — GLOVE PROTEXIS W/NEU-THERA 7.5  2D73TE75

## (undated) DEVICE — TUBING PERFUSION 1/4X1/16X8FT

## (undated) DEVICE — GOWN LG DISP 9515

## (undated) DEVICE — TOURNIQUET VASCULAR

## (undated) DEVICE — PACK OPEN HEART PV12OH524

## (undated) DEVICE — SOL NACL 0.9% IRRIG 1000ML BOTTLE 2F7124

## (undated) DEVICE — BLOWER/MISTER CLEARVIEW 22150

## (undated) DEVICE — SU PROLENE 6-0 C-1DA 30" M8706

## (undated) DEVICE — GLOVE BIOGEL PI MICRO INDICATOR UNDERGLOVE SZ 6.5 48965

## (undated) DEVICE — SU VICRYL+ 2-0 12X18IN VIO VCP105G

## (undated) DEVICE — DEVICE ASSEMBLY SUCTION/ANTI COAG BTC93

## (undated) DEVICE — BLADE KNIFE BEAVER MINI STR BEAVER6900

## (undated) DEVICE — RESERVOIR CELL SAVING BLOOD COLLECTION EL2120

## (undated) DEVICE — SU STEEL 6 CCS 4X18" M654G

## (undated) DEVICE — SUCTION CANISTER MEDIVAC LINER 3000ML W/LID 65651-530

## (undated) DEVICE — ESU PENCIL W/SMOKE EVAC NEPTUNE STRYKER 0703-046-000

## (undated) DEVICE — COVER TABLE POLY 65X90" 8186

## (undated) DEVICE — DRAPE IOBAN INCISE 23X17" 6650EZ

## (undated) DEVICE — SU PROLENE 4-0 RB-1DA 36" 8557H

## (undated) DEVICE — SU TIE VICRYL+ 3-0 12X18IN VIO VCP104G

## (undated) DEVICE — POSITIONER ASSIST ESSTECH 3S T401210S

## (undated) DEVICE — KIT WASH CELL SAVING ATL2001

## (undated) DEVICE — ESU ELEC BLADE 6" COATED E1450-6

## (undated) DEVICE — PACK MINI VAC CUSTOM CARDOPULMONARY BB5Z97R15

## (undated) DEVICE — NEEDLE HYPO MONOJECT STANDARD 22GA 1 1/2IN BLUE 1188822112

## (undated) DEVICE — PUNCH AORTIC 4.0MMX8" RCB40

## (undated) DEVICE — CONNECTOR PERFUSION 3/8X3/8" W/O LL 6023

## (undated) DEVICE — SU PROLENE 7-0 BV175-6 24' M8737

## (undated) DEVICE — DRSG WOUND VAC GRANUFOAM LG BLACK 26X15CM M8275053/5

## (undated) DEVICE — STPL LINEAR CUT 75MM TLC75

## (undated) DEVICE — RX SURGIFLO HEMOSTATIC MATRIX 10ML 199102S

## (undated) DEVICE — SOMASENSOR CEREBRAL OXIMETER ADULT SAFB-SM

## (undated) DEVICE — SU VICRYL 0 CTX 36" J370H

## (undated) DEVICE — PREP CHLORAPREP 26ML TINTED HI-LITE ORANGE 930815

## (undated) DEVICE — DRAPE LAP W/ARMBOARD 29410

## (undated) DEVICE — PACK TUBING MINI VAC CUSTOM 3/8X3/8T BB7V94R1

## (undated) DEVICE — SU ETHIBOND 3-0 BBDA 36" X588H

## (undated) DEVICE — SU PROLENE 4-0 SHDA 36" 8521H

## (undated) DEVICE — CANISTER WOUND VAC W/GEL 500ML M8275063/5

## (undated) DEVICE — ESU GROUND PAD UNIVERSAL W/O CORD

## (undated) DEVICE — SOL WATER IRRIG 1000ML BOTTLE 2F7114

## (undated) DEVICE — SU PROLENE 3-0 SHDA 36" 8522H

## (undated) DEVICE — SU VICRYL 3-0 FS-1 27" J442H

## (undated) DEVICE — SU ETHIBOND 2-0 SHDA 30" X563H

## (undated) DEVICE — LINEN TOWEL PACK X30 5481

## (undated) DEVICE — SU PROLENE 3-0 RB-1DA 36"  8558H

## (undated) DEVICE — SU CHROMIC 3-0 SH 27" G122H

## (undated) DEVICE — PROTECTOR ARM ONE-STEP TRENDELENBURG 40418

## (undated) DEVICE — SU SILK 3-0 SH CR 8X30" C017D

## (undated) DEVICE — STPL SKIN 35W 6.9MM  PXW35

## (undated) DEVICE — DRSG KERLIX 4 1/2"X4YDS ROLL 6715

## (undated) DEVICE — CABLE MYO/LEAD PACING WHITE DISP 019-530

## (undated) DEVICE — SOL NACL 0.9% INJ 1000ML BAG 2B1324X

## (undated) DEVICE — CANNULA PERFUSION 14FRX16" LEFT 12016

## (undated) DEVICE — DECANTER BAG 2002S

## (undated) DEVICE — SUCTION TIP YANKAUER STR K87

## (undated) DEVICE — SU ETHIBOND 0 CT-1 CR 8X18" CX21D

## (undated) DEVICE — ESU LIGASURE IMPACT OPEN SEALER/DVDR CVD LG JAW LF4418

## (undated) DEVICE — PACK TUBING MINI VAC CUSTOM 1/2X3/8T BB9J78R4

## (undated) DEVICE — CLIP HORIZON MULTI SM YELLOW 001204

## (undated) DEVICE — SPONGE RAY-TEC 4X8" 7318

## (undated) DEVICE — GOWN IMPERVIOUS BREATHABLE SMART LG 89015

## (undated) DEVICE — SU VICRYL 2-0 CT-1 27" UND J259H

## (undated) DEVICE — BNDG ABDOMINAL BINDER 9X62-84" 79-89210

## (undated) DEVICE — SU PDS II 0 CTX 60" Z990G

## (undated) DEVICE — CLIP HORIZON MULTI MED BLUE 002204

## (undated) DEVICE — STPL LINEAR CUT 100MM TLC10

## (undated) DEVICE — DEVICE TISSUE STABILIZATION OCTOBASE 28707

## (undated) DEVICE — SU ETHIBOND 2-0 V-5DA 10X30" PXX52

## (undated) DEVICE — SU PROLENE 2-0 SHDA 48" 8533H

## (undated) DEVICE — CANNULA PERFUSION ARTERIAL 22FR 12" 77422

## (undated) DEVICE — SU VICRYL 0 TIE 12X18" J906G

## (undated) DEVICE — SU PROLENE 7-0 BV-1DA 24" 8702H

## (undated) DEVICE — BLADE KNIFE SURG 15 371115

## (undated) DEVICE — PREP CHLORAPREP W/ORANGE TINT 10.5ML 930715

## (undated) DEVICE — CANNULA PERFUSION VENOUS 2 STAGE 34-46FR

## (undated) DEVICE — KIT ENDO VASOVIEW HEMOPRO 2 VH-4000

## (undated) DEVICE — SUCTION MANIFOLD NEPTUNE 2 SYS 4 PORT 0702-020-000

## (undated) DEVICE — SU PLEDGET SOFT TFE 3/8"X3/26"X1/16" PCP40

## (undated) DEVICE — SU VICRYL 2-0 SH 27" J317H

## (undated) DEVICE — CANNISTER ABTHERA NEGATIVE PRESSURE WOUND 370620S

## (undated) DEVICE — SU VICRYL 3-0 SH CR 8X18" J774

## (undated) DEVICE — ESU ELEC BLADE 6" COATED/INSULATED E1455-6

## (undated) DEVICE — DRAIN CHEST TUBE 32FR STR 8032

## (undated) DEVICE — DRSG GAUZE 4X4" 3033

## (undated) DEVICE — DRSG ABTHERA ADVANCE OPEN ABDOMEN ABT1055

## (undated) DEVICE — BAG DECANTER STERILE WHITE DYNJDEC09

## (undated) RX ORDER — FENTANYL CITRATE 50 UG/ML
INJECTION, SOLUTION INTRAMUSCULAR; INTRAVENOUS
Status: DISPENSED
Start: 2022-09-19

## (undated) RX ORDER — CHLORHEXIDINE GLUCONATE ORAL RINSE 1.2 MG/ML
SOLUTION DENTAL
Status: DISPENSED
Start: 2022-10-20

## (undated) RX ORDER — PROTAMINE SULFATE 10 MG/ML
INJECTION, SOLUTION INTRAVENOUS
Status: DISPENSED
Start: 2022-10-20

## (undated) RX ORDER — NEOSTIGMINE METHYLSULFATE 1 MG/ML
VIAL (ML) INJECTION
Status: DISPENSED
Start: 2022-10-20

## (undated) RX ORDER — ONDANSETRON 2 MG/ML
INJECTION INTRAMUSCULAR; INTRAVENOUS
Status: DISPENSED
Start: 2025-03-30

## (undated) RX ORDER — PROPOFOL 10 MG/ML
INJECTION, EMULSION INTRAVENOUS
Status: DISPENSED
Start: 2025-03-30

## (undated) RX ORDER — VECURONIUM BROMIDE 1 MG/ML
INJECTION, POWDER, LYOPHILIZED, FOR SOLUTION INTRAVENOUS
Status: DISPENSED
Start: 2022-10-20

## (undated) RX ORDER — FENTANYL CITRATE 50 UG/ML
INJECTION, SOLUTION INTRAMUSCULAR; INTRAVENOUS
Status: DISPENSED
Start: 2025-04-15

## (undated) RX ORDER — SODIUM CHLORIDE, SODIUM GLUCONATE, SODIUM ACETATE, POTASSIUM CHLORIDE AND MAGNESIUM CHLORIDE 526; 502; 368; 37; 30 MG/100ML; MG/100ML; MG/100ML; MG/100ML; MG/100ML
INJECTION, SOLUTION INTRAVENOUS
Status: DISPENSED
Start: 2022-10-20

## (undated) RX ORDER — KETOROLAC TROMETHAMINE 15 MG/ML
INJECTION, SOLUTION INTRAMUSCULAR; INTRAVENOUS
Status: DISPENSED
Start: 2025-03-30

## (undated) RX ORDER — HEPARIN SODIUM 1000 [USP'U]/ML
INJECTION, SOLUTION INTRAVENOUS; SUBCUTANEOUS
Status: DISPENSED
Start: 2022-10-20

## (undated) RX ORDER — PROPOFOL 10 MG/ML
INJECTION, EMULSION INTRAVENOUS
Status: DISPENSED
Start: 2022-10-20

## (undated) RX ORDER — METHOCARBAMOL 100 MG/ML
INJECTION, SOLUTION INTRAMUSCULAR; INTRAVENOUS
Status: DISPENSED
Start: 2025-03-30

## (undated) RX ORDER — HYDROMORPHONE HCL IN WATER/PF 6 MG/30 ML
PATIENT CONTROLLED ANALGESIA SYRINGE INTRAVENOUS
Status: DISPENSED
Start: 2025-03-30

## (undated) RX ORDER — HEPARIN SODIUM 1000 [USP'U]/ML
INJECTION, SOLUTION INTRAVENOUS; SUBCUTANEOUS
Status: DISPENSED
Start: 2022-09-19

## (undated) RX ORDER — FENTANYL CITRATE 50 UG/ML
INJECTION, SOLUTION INTRAMUSCULAR; INTRAVENOUS
Status: DISPENSED
Start: 2025-04-08

## (undated) RX ORDER — VANCOMYCIN HYDROCHLORIDE 1 G/20ML
INJECTION, POWDER, LYOPHILIZED, FOR SOLUTION INTRAVENOUS
Status: DISPENSED
Start: 2022-09-19

## (undated) RX ORDER — BUPIVACAINE HYDROCHLORIDE 5 MG/ML
INJECTION, SOLUTION EPIDURAL; INTRACAUDAL; PERINEURAL
Status: DISPENSED
Start: 2025-03-30

## (undated) RX ORDER — FENTANYL CITRATE 50 UG/ML
INJECTION, SOLUTION INTRAMUSCULAR; INTRAVENOUS
Status: DISPENSED
Start: 2025-04-10

## (undated) RX ORDER — PAPAVERINE HYDROCHLORIDE 30 MG/ML
INJECTION INTRAMUSCULAR; INTRAVENOUS
Status: DISPENSED
Start: 2022-10-20

## (undated) RX ORDER — FENTANYL CITRATE 50 UG/ML
INJECTION, SOLUTION INTRAMUSCULAR; INTRAVENOUS
Status: DISPENSED
Start: 2022-10-20

## (undated) RX ORDER — FENTANYL CITRATE 50 UG/ML
INJECTION, SOLUTION INTRAMUSCULAR; INTRAVENOUS
Status: DISPENSED
Start: 2025-03-30

## (undated) RX ORDER — CEFAZOLIN SODIUM 1 G/3ML
INJECTION, POWDER, FOR SOLUTION INTRAMUSCULAR; INTRAVENOUS
Status: DISPENSED
Start: 2022-09-19

## (undated) RX ORDER — LABETALOL HYDROCHLORIDE 5 MG/ML
INJECTION, SOLUTION INTRAVENOUS
Status: DISPENSED
Start: 2025-03-30

## (undated) RX ORDER — FAMOTIDINE 20 MG/1
TABLET, FILM COATED ORAL
Status: DISPENSED
Start: 2022-10-20

## (undated) RX ORDER — PAPAVERINE HYDROCHLORIDE 30 MG/ML
INJECTION INTRAMUSCULAR; INTRAVENOUS
Status: DISPENSED
Start: 2022-09-19

## (undated) RX ORDER — ONDANSETRON 2 MG/ML
INJECTION INTRAMUSCULAR; INTRAVENOUS
Status: DISPENSED
Start: 2025-04-08

## (undated) RX ORDER — CEFAZOLIN SODIUM/WATER 3 G/30 ML
SYRINGE (ML) INTRAVENOUS
Status: DISPENSED
Start: 2022-10-20

## (undated) RX ORDER — DEXMEDETOMIDINE HYDROCHLORIDE 4 UG/ML
INJECTION, SOLUTION INTRAVENOUS
Status: DISPENSED
Start: 2022-10-20

## (undated) RX ORDER — FLUMAZENIL 0.1 MG/ML
INJECTION, SOLUTION INTRAVENOUS
Status: DISPENSED
Start: 2025-04-15

## (undated) RX ORDER — HYDROMORPHONE HYDROCHLORIDE 1 MG/ML
INJECTION, SOLUTION INTRAMUSCULAR; INTRAVENOUS; SUBCUTANEOUS
Status: DISPENSED
Start: 2025-03-30

## (undated) RX ORDER — CEFAZOLIN SODIUM 1 G/3ML
INJECTION, POWDER, FOR SOLUTION INTRAMUSCULAR; INTRAVENOUS
Status: DISPENSED
Start: 2022-10-20

## (undated) RX ORDER — CEFAZOLIN SODIUM/WATER 3 G/30 ML
SYRINGE (ML) INTRAVENOUS
Status: DISPENSED
Start: 2025-04-08

## (undated) RX ORDER — PIPERACILLIN SODIUM, TAZOBACTAM SODIUM 3; .375 G/15ML; G/15ML
INJECTION, POWDER, LYOPHILIZED, FOR SOLUTION INTRAVENOUS
Status: DISPENSED
Start: 2025-04-08

## (undated) RX ORDER — FENTANYL CITRATE 0.05 MG/ML
INJECTION, SOLUTION INTRAMUSCULAR; INTRAVENOUS
Status: DISPENSED
Start: 2025-03-30

## (undated) RX ORDER — NALOXONE HYDROCHLORIDE 0.4 MG/ML
INJECTION, SOLUTION INTRAMUSCULAR; INTRAVENOUS; SUBCUTANEOUS
Status: DISPENSED
Start: 2025-04-15

## (undated) RX ORDER — GLYCOPYRROLATE 0.2 MG/ML
INJECTION, SOLUTION INTRAMUSCULAR; INTRAVENOUS
Status: DISPENSED
Start: 2022-10-20

## (undated) RX ORDER — ASPIRIN 81 MG/1
TABLET, CHEWABLE ORAL
Status: DISPENSED
Start: 2022-10-20

## (undated) RX ORDER — BUPIVACAINE HYDROCHLORIDE AND EPINEPHRINE 5; 5 MG/ML; UG/ML
INJECTION, SOLUTION EPIDURAL; INTRACAUDAL; PERINEURAL
Status: DISPENSED
Start: 2025-04-08

## (undated) RX ORDER — LIDOCAINE HYDROCHLORIDE 10 MG/ML
INJECTION, SOLUTION EPIDURAL; INFILTRATION; INTRACAUDAL; PERINEURAL
Status: DISPENSED
Start: 2025-04-15

## (undated) RX ORDER — FENTANYL CITRATE 0.05 MG/ML
INJECTION, SOLUTION INTRAMUSCULAR; INTRAVENOUS
Status: DISPENSED
Start: 2022-10-20

## (undated) RX ORDER — LIDOCAINE HYDROCHLORIDE 10 MG/ML
INJECTION, SOLUTION INFILTRATION; PERINEURAL
Status: DISPENSED
Start: 2025-04-08

## (undated) RX ORDER — LIDOCAINE HYDROCHLORIDE 20 MG/ML
INJECTION, SOLUTION EPIDURAL; INFILTRATION; INTRACAUDAL; PERINEURAL
Status: DISPENSED
Start: 2022-10-20

## (undated) RX ORDER — DEXAMETHASONE SODIUM PHOSPHATE 4 MG/ML
INJECTION, SOLUTION INTRA-ARTICULAR; INTRALESIONAL; INTRAMUSCULAR; INTRAVENOUS; SOFT TISSUE
Status: DISPENSED
Start: 2025-03-30